# Patient Record
Sex: MALE | Race: WHITE | NOT HISPANIC OR LATINO | Employment: OTHER | URBAN - METROPOLITAN AREA
[De-identification: names, ages, dates, MRNs, and addresses within clinical notes are randomized per-mention and may not be internally consistent; named-entity substitution may affect disease eponyms.]

---

## 2017-01-03 ENCOUNTER — LAB VISIT (OUTPATIENT)
Dept: LAB | Facility: HOSPITAL | Age: 57
End: 2017-01-03
Attending: INTERNAL MEDICINE
Payer: MEDICARE

## 2017-01-03 DIAGNOSIS — Z95.811 HEART REPLACED BY HEART ASSIST DEVICE: ICD-10-CM

## 2017-01-03 DIAGNOSIS — R74.02 ELEVATED SERUM LACTATE DEHYDROGENASE (LDH): ICD-10-CM

## 2017-01-03 DIAGNOSIS — Z79.01 LONG TERM (CURRENT) USE OF ANTICOAGULANTS: ICD-10-CM

## 2017-01-03 LAB
INR PPP: 2.3
LDH SERPL L TO P-CCNC: 590 U/L
PROTHROMBIN TIME: 23.5 SEC

## 2017-01-03 PROCEDURE — 85610 PROTHROMBIN TIME: CPT

## 2017-01-03 PROCEDURE — 83615 LACTATE (LD) (LDH) ENZYME: CPT

## 2017-01-03 PROCEDURE — 36415 COLL VENOUS BLD VENIPUNCTURE: CPT

## 2017-01-04 ENCOUNTER — ANTI-COAG VISIT (OUTPATIENT)
Dept: CARDIOLOGY | Facility: CLINIC | Age: 57
End: 2017-01-04
Payer: MEDICAID

## 2017-01-04 DIAGNOSIS — Z95.811 PRESENCE OF HEART ASSIST DEVICE: ICD-10-CM

## 2017-01-04 PROCEDURE — 99999 PR PBB SHADOW E&M-EST. PATIENT-LVL I: CPT | Mod: PBBFAC,,, | Performed by: PHARMACIST

## 2017-01-04 PROCEDURE — 99211 OFF/OP EST MAY X REQ PHY/QHP: CPT | Mod: PBBFAC | Performed by: PHARMACIST

## 2017-01-04 NOTE — INTERVAL H&P NOTE
The patient has been examined and the H&P has been reviewed:    I concur with the findings and changes have been noted since the H&P was written: Patient is being admitted for elevated LDH     55 y/o man with history as below admitted for elevated LDH. Will order repeat LDH and monitor overnight. Also ordered CBC, CMP, INR, Mag. Last Admit it was believed his elevated LDH may be due to HTN. Patient was discharged with , yesterday was 590, INR 2.3. Historical baseline 200-300. Speed echo done on 12/30.    Elevated LDH/ S/P HM II  - VAD order set, Speed 9000, LSL 8600  - ECHO today  - Continue ASA, continue coumadin  - Consider Persantine 75 TID  - OHT eval orders last admit    HTN  - Will assess MAP on admission  - lisinopril 20 BID at home  - goal MAP 60-80    DM  - SS insulin    Juany Benjamin PA-C  [employer] #[id number]

## 2017-01-04 NOTE — H&P (VIEW-ONLY)
Subjective:   Patient ID:  Mick Barriga is a 56 y.o. male who presents for LVAD followup visit.    Implant date: 8/25/16    TXP RODRI INTERROGATIONS 12/28/2016   Type HeartMate II   Flow 4.8   Speed 9000   PI 7.4   Power (Kwon) 5.2   LSL 8600   Pulsatility Pulse       HPI  57 yo man with CHFrEF, stage D underwent HM 2 LVAD placement on 8/24/16.  s/p ICD, HTN, DM presented originally to [facility] with atrial flutter and decompensated HF with cardiogenic shock. He remains weak and not very active but is walking a few blocks almost daily.  Profound fatigue and generalized weakness are limiting factors more than MARTINO.  No orthopnea, PND.    Review of Systems   Constitution: Positive for weakness, malaise/fatigue and weight gain (2-3# past month). Negative for chills, fever, night sweats (but perspires easily) and weight loss.   HENT: Positive for headaches (intermittently). Negative for congestion, hearing loss, hoarse voice, nosebleeds and sore throat.    Eyes: Positive for visual disturbance (hard seeing distances). Negative for double vision, pain, vision loss in left eye and vision loss in right eye.   Cardiovascular: Negative for chest pain, claudication, dyspnea on exertion, irregular heartbeat, leg swelling, near-syncope, orthopnea, palpitations, paroxysmal nocturnal dyspnea and syncope.   Respiratory: Negative for cough, hemoptysis, shortness of breath, sleep disturbances due to breathing, snoring, sputum production and wheezing.    Endocrine: Negative for cold intolerance, heat intolerance, polydipsia and polyuria.   Hematologic/Lymphatic: Negative for bleeding problem. Does not bruise/bleed easily.   Musculoskeletal: Positive for muscle cramps (occassional) and neck pain (soreness). Negative for falls and myalgias.   Gastrointestinal: Positive for constipation. Negative for bloating, abdominal pain, change in bowel habit, diarrhea, hematochezia, jaundice, melena and nausea.   Genitourinary: Positive for nocturia  "(2x/night). Negative for bladder incontinence, dysuria, frequency, hematuria, hesitancy, incomplete emptying and urgency.   Neurological: Positive for numbness (back of head and went to ED last week with negative CT). Negative for brief paralysis, dizziness, focal weakness, paresthesias and seizures.   Psychiatric/Behavioral: Negative for depression and memory loss. The patient has insomnia (sleeps 5-6 hours/night). The patient is not nervous/anxious.      Objective:   Blood pressure 94/71, pulse 77, height 5' 8" (1.727 m), weight 94.8 kg (208 lb 15.9 oz).body mass index is 31.78 kg/(m^2).  Doppler: 82 MM hG  Physical Exam   Constitutional: He appears well-developed and well-nourished. No distress.   BP 94/71 MAP 79 Pulse 77  Ht 5' 8" (1.727 m)  Wt 94.8 kg (208 lb 15.9 oz)  BMI 31.78 kg/m2   HENT:   Head: Normocephalic and atraumatic.   Eyes: Conjunctivae are normal. No scleral icterus.   Neck: No JVD present. No tracheal deviation present. No thyromegaly present.   Cardiovascular:   DL 1-2 with scab  Normal VAD sounds   Pulmonary/Chest: Effort normal and breath sounds normal. No respiratory distress. He has no wheezes. He has no rales.   Abdominal: Soft. Bowel sounds are normal. He exhibits no distension and no mass. There is no tenderness. There is no rebound and no guarding.   Musculoskeletal: He exhibits no edema or tenderness.   Neurological: He is alert.   Skin: Skin is warm and dry. He is not diaphoretic.   Psychiatric: He has a normal mood and affect. His behavior is normal. Judgment and thought content normal.     Lab Results   Component Value Date     (H) 12/28/2016     (L) 12/28/2016    K 4.4 12/28/2016    MG 1.7 12/28/2016     12/28/2016    CO2 25 12/28/2016    BUN 13 12/28/2016    CREATININE 0.9 12/28/2016     (H) 12/28/2016    HGBA1C 8.3 (H) 08/23/2016    AST 29 12/28/2016    ALT 22 12/28/2016    ALBUMIN 3.5 12/28/2016    PROT 7.8 12/28/2016    BILITOT 0.7 12/28/2016    WBC " 9.12 12/28/2016    HGB 10.8 (L) 12/28/2016    HCT 35.0 (L) 12/28/2016    HCT 24 (L) 08/26/2016     12/15/2016    INR 3.7 (H) 12/28/2016    INR 4.4 10/10/2016     (H) 12/28/2016    TSH 1.129 12/28/2016    CHOL 123 11/02/2016    HDL 37 (L) 11/02/2016    LDLCALC 59.8 (L) 11/02/2016    TRIG 131 11/02/2016       REPEAT LDH STAT returned 578    Assessment:      1. Heart replaced by heart assist device    2. Elevated serum lactate dehydrogenase (LDH)    3. Chronic anticoagulation    4. Chronic combined systolic and diastolic heart failure    5. Essential hypertension    6. Cardiomyopathy        Plan:   Repeat LDH and wait for resultsRETURNED 578 SO DOUBLED SINCE LAST MONTH 236--HIS LOWEST.  HE WILL BE ADMITTED FOR ASSESSMENT AND TREATMENT OF LDH    Increase walking program to 45-60 min/day adding 5 min every week until there    Patient is now NYHA II  Recommend 2 gram sodium restriction and 1500cc fluid restriction.  Encourage physical activity with graded exercise program.  Requested patient to weigh themselves daily, and to notify us if their weight increases by more than 3 lbs in 1 day or 5 lbs in 1 week.     Listed for transplant: No    UNOS Patient Status  Functional Status: 80% - Normal activity with effort: some symptoms of disease  Physical Capacity: No Limitations  Working for Income: No  If no, reason not working: Disability

## 2017-01-05 ENCOUNTER — TELEPHONE (OUTPATIENT)
Dept: INTENSIVE CARE | Facility: HOSPITAL | Age: 57
End: 2017-01-05

## 2017-01-05 ENCOUNTER — HOSPITAL ENCOUNTER (INPATIENT)
Facility: HOSPITAL | Age: 57
LOS: 17 days | Discharge: HOME | DRG: 948 | End: 2017-01-22
Attending: INTERNAL MEDICINE | Admitting: INTERNAL MEDICINE
Payer: MEDICARE

## 2017-01-05 DIAGNOSIS — I42.0 CARDIOMYOPATHY, DILATED, NONISCHEMIC: ICD-10-CM

## 2017-01-05 DIAGNOSIS — Z95.811 HEART REPLACED BY HEART ASSIST DEVICE: ICD-10-CM

## 2017-01-05 DIAGNOSIS — I42.9 CARDIOMYOPATHY, UNSPECIFIED TYPE: ICD-10-CM

## 2017-01-05 DIAGNOSIS — Z95.810 PRESENCE OF AUTOMATIC IMPLANTABLE CARDIOVERTER-DEFIBRILLATOR: ICD-10-CM

## 2017-01-05 DIAGNOSIS — I42.9 CARDIOMYOPATHY: ICD-10-CM

## 2017-01-05 DIAGNOSIS — Z79.01 CHRONIC ANTICOAGULATION: ICD-10-CM

## 2017-01-05 DIAGNOSIS — E87.1 HYPONATREMIA: ICD-10-CM

## 2017-01-05 DIAGNOSIS — Z95.811 LVAD (LEFT VENTRICULAR ASSIST DEVICE) PRESENT: Primary | ICD-10-CM

## 2017-01-05 DIAGNOSIS — I10 ESSENTIAL HYPERTENSION: Chronic | ICD-10-CM

## 2017-01-05 DIAGNOSIS — R74.02 ELEVATED SERUM LACTATE DEHYDROGENASE (LDH): ICD-10-CM

## 2017-01-05 DIAGNOSIS — Z76.82 ORGAN TRANSPLANT CANDIDATE: ICD-10-CM

## 2017-01-05 DIAGNOSIS — Z95.9 PRESENCE OF CARDIAC AND VASCULAR IMPLANT AND GRAFT: ICD-10-CM

## 2017-01-05 LAB
ALBUMIN SERPL BCP-MCNC: 3.4 G/DL
ALP SERPL-CCNC: 83 U/L
ALT SERPL W/O P-5'-P-CCNC: 18 U/L
ANION GAP SERPL CALC-SCNC: 10 MMOL/L
ANION GAP SERPL CALC-SCNC: 10 MMOL/L
ANISOCYTOSIS BLD QL SMEAR: SLIGHT
AORTIC VALVE REGURGITATION: NORMAL
APTT BLDCRRT: 31.5 SEC
AST SERPL-CCNC: 28 U/L
BASOPHILS # BLD AUTO: 0.02 K/UL
BASOPHILS NFR BLD: 0.2 %
BILIRUB SERPL-MCNC: 0.7 MG/DL
BNP SERPL-MCNC: 74 PG/ML
BUN SERPL-MCNC: 13 MG/DL
BUN SERPL-MCNC: 13 MG/DL
CALCIUM SERPL-MCNC: 9.4 MG/DL
CALCIUM SERPL-MCNC: 9.6 MG/DL
CHLORIDE SERPL-SCNC: 96 MMOL/L
CHLORIDE SERPL-SCNC: 96 MMOL/L
CO2 SERPL-SCNC: 26 MMOL/L
CO2 SERPL-SCNC: 27 MMOL/L
CREAT SERPL-MCNC: 1 MG/DL
CREAT SERPL-MCNC: 1 MG/DL
DIFFERENTIAL METHOD: ABNORMAL
EOSINOPHIL # BLD AUTO: 0.3 K/UL
EOSINOPHIL NFR BLD: 2.8 %
ERYTHROCYTE [DISTWIDTH] IN BLOOD BY AUTOMATED COUNT: 20.3 %
EST. GFR  (AFRICAN AMERICAN): >60 ML/MIN/1.73 M^2
EST. GFR  (AFRICAN AMERICAN): >60 ML/MIN/1.73 M^2
EST. GFR  (NON AFRICAN AMERICAN): >60 ML/MIN/1.73 M^2
EST. GFR  (NON AFRICAN AMERICAN): >60 ML/MIN/1.73 M^2
GLUCOSE SERPL-MCNC: 192 MG/DL
GLUCOSE SERPL-MCNC: 197 MG/DL
HCT VFR BLD AUTO: 36.1 %
HGB BLD-MCNC: 11.3 G/DL
HYPOCHROMIA BLD QL SMEAR: ABNORMAL
INR PPP: 2
LDH SERPL L TO P-CCNC: 558 U/L
LYMPHOCYTES # BLD AUTO: 2.5 K/UL
LYMPHOCYTES NFR BLD: 26.4 %
MAGNESIUM SERPL-MCNC: 1.8 MG/DL
MCH RBC QN AUTO: 18 PG
MCHC RBC AUTO-ENTMCNC: 31.3 %
MCV RBC AUTO: 57 FL
MONOCYTES # BLD AUTO: 0.6 K/UL
MONOCYTES NFR BLD: 6.8 %
NEUTROPHILS # BLD AUTO: 5.9 K/UL
NEUTROPHILS NFR BLD: 63.1 %
OVALOCYTES BLD QL SMEAR: ABNORMAL
PHOSPHATE SERPL-MCNC: 4.2 MG/DL
PLATELET # BLD AUTO: 247 K/UL
PLATELET BLD QL SMEAR: ABNORMAL
PMV BLD AUTO: ABNORMAL FL
POCT GLUCOSE: 159 MG/DL (ref 70–110)
POCT GLUCOSE: 172 MG/DL (ref 70–110)
POCT GLUCOSE: 193 MG/DL (ref 70–110)
POIKILOCYTOSIS BLD QL SMEAR: SLIGHT
POLYCHROMASIA BLD QL SMEAR: ABNORMAL
POTASSIUM SERPL-SCNC: 4.6 MMOL/L
POTASSIUM SERPL-SCNC: 4.7 MMOL/L
PROT SERPL-MCNC: 7.5 G/DL
PROTHROMBIN TIME: 19.7 SEC
RBC # BLD AUTO: 6.29 M/UL
SODIUM SERPL-SCNC: 132 MMOL/L
SODIUM SERPL-SCNC: 133 MMOL/L
TARGETS BLD QL SMEAR: ABNORMAL
WBC # BLD AUTO: 9.41 K/UL

## 2017-01-05 PROCEDURE — 83615 LACTATE (LD) (LDH) ENZYME: CPT

## 2017-01-05 PROCEDURE — 80048 BASIC METABOLIC PNL TOTAL CA: CPT

## 2017-01-05 PROCEDURE — 20600001 HC STEP DOWN PRIVATE ROOM

## 2017-01-05 PROCEDURE — 25000003 PHARM REV CODE 250: Performed by: INTERNAL MEDICINE

## 2017-01-05 PROCEDURE — 85025 COMPLETE CBC W/AUTO DIFF WBC: CPT

## 2017-01-05 PROCEDURE — 99223 1ST HOSP IP/OBS HIGH 75: CPT | Mod: ,,, | Performed by: INTERNAL MEDICINE

## 2017-01-05 PROCEDURE — 83735 ASSAY OF MAGNESIUM: CPT

## 2017-01-05 PROCEDURE — 93283 PRGRMG EVAL IMPLANTABLE DFB: CPT

## 2017-01-05 PROCEDURE — 93283 PRGRMG EVAL IMPLANTABLE DFB: CPT | Mod: 26,,, | Performed by: INTERNAL MEDICINE

## 2017-01-05 PROCEDURE — 80053 COMPREHEN METABOLIC PANEL: CPT

## 2017-01-05 PROCEDURE — 83880 ASSAY OF NATRIURETIC PEPTIDE: CPT

## 2017-01-05 PROCEDURE — 93306 TTE W/DOPPLER COMPLETE: CPT | Mod: 26,,, | Performed by: INTERNAL MEDICINE

## 2017-01-05 PROCEDURE — 27000248 HC VAD-ADDITIONAL DAY

## 2017-01-05 PROCEDURE — 63600175 PHARM REV CODE 636 W HCPCS: Performed by: PHYSICIAN ASSISTANT

## 2017-01-05 PROCEDURE — 93750 INTERROGATION VAD IN PERSON: CPT | Performed by: PHYSICIAN ASSISTANT

## 2017-01-05 PROCEDURE — 85610 PROTHROMBIN TIME: CPT

## 2017-01-05 PROCEDURE — 93306 TTE W/DOPPLER COMPLETE: CPT

## 2017-01-05 PROCEDURE — 85730 THROMBOPLASTIN TIME PARTIAL: CPT

## 2017-01-05 PROCEDURE — 84100 ASSAY OF PHOSPHORUS: CPT

## 2017-01-05 PROCEDURE — 85002 BLEEDING TIME TEST: CPT

## 2017-01-05 PROCEDURE — 93750 INTERROGATION VAD IN PERSON: CPT | Mod: ,,, | Performed by: INTERNAL MEDICINE

## 2017-01-05 PROCEDURE — 36415 COLL VENOUS BLD VENIPUNCTURE: CPT

## 2017-01-05 PROCEDURE — 25000003 PHARM REV CODE 250: Performed by: PHYSICIAN ASSISTANT

## 2017-01-05 RX ORDER — LANOLIN ALCOHOL/MO/W.PET/CERES
400 CREAM (GRAM) TOPICAL 3 TIMES DAILY
Status: DISCONTINUED | OUTPATIENT
Start: 2017-01-05 | End: 2017-01-22 | Stop reason: HOSPADM

## 2017-01-05 RX ORDER — ASPIRIN 325 MG
325 TABLET, DELAYED RELEASE (ENTERIC COATED) ORAL DAILY
Status: DISCONTINUED | OUTPATIENT
Start: 2017-01-05 | End: 2017-01-22 | Stop reason: HOSPADM

## 2017-01-05 RX ORDER — SERTRALINE HYDROCHLORIDE 100 MG/1
100 TABLET, FILM COATED ORAL DAILY
Status: DISCONTINUED | OUTPATIENT
Start: 2017-01-05 | End: 2017-01-10

## 2017-01-05 RX ORDER — IMIPRAMINE HYDROCHLORIDE 25 MG/1
25 TABLET, FILM COATED ORAL NIGHTLY
Status: DISCONTINUED | OUTPATIENT
Start: 2017-01-05 | End: 2017-01-16

## 2017-01-05 RX ORDER — INSULIN ASPART 100 [IU]/ML
0-5 INJECTION, SOLUTION INTRAVENOUS; SUBCUTANEOUS
Status: DISCONTINUED | OUTPATIENT
Start: 2017-01-05 | End: 2017-01-22 | Stop reason: HOSPADM

## 2017-01-05 RX ORDER — WARFARIN 2 MG/1
2 TABLET ORAL DAILY
Status: DISCONTINUED | OUTPATIENT
Start: 2017-01-05 | End: 2017-01-05

## 2017-01-05 RX ORDER — DIPYRIDAMOLE 75 MG
75 TABLET ORAL 3 TIMES DAILY
Status: DISCONTINUED | OUTPATIENT
Start: 2017-01-05 | End: 2017-01-08

## 2017-01-05 RX ORDER — LISINOPRIL 20 MG/1
20 TABLET ORAL 2 TIMES DAILY
Status: DISCONTINUED | OUTPATIENT
Start: 2017-01-05 | End: 2017-01-22 | Stop reason: HOSPADM

## 2017-01-05 RX ORDER — RAMELTEON 8 MG/1
8 TABLET ORAL NIGHTLY PRN
Status: DISCONTINUED | OUTPATIENT
Start: 2017-01-05 | End: 2017-01-22 | Stop reason: HOSPADM

## 2017-01-05 RX ORDER — GLUCAGON 1 MG
1 KIT INJECTION
Status: DISCONTINUED | OUTPATIENT
Start: 2017-01-05 | End: 2017-01-22 | Stop reason: HOSPADM

## 2017-01-05 RX ORDER — WARFARIN 3 MG/1
3 TABLET ORAL DAILY
Status: DISCONTINUED | OUTPATIENT
Start: 2017-01-05 | End: 2017-01-08

## 2017-01-05 RX ORDER — INSULIN ASPART 100 [IU]/ML
20 INJECTION, SOLUTION INTRAVENOUS; SUBCUTANEOUS
Status: DISCONTINUED | OUTPATIENT
Start: 2017-01-05 | End: 2017-01-16

## 2017-01-05 RX ORDER — HYDRALAZINE HYDROCHLORIDE 25 MG/1
25 TABLET, FILM COATED ORAL ONCE
Status: DISCONTINUED | OUTPATIENT
Start: 2017-01-06 | End: 2017-01-06

## 2017-01-05 RX ORDER — IBUPROFEN 200 MG
16 TABLET ORAL
Status: DISCONTINUED | OUTPATIENT
Start: 2017-01-05 | End: 2017-01-22 | Stop reason: HOSPADM

## 2017-01-05 RX ORDER — SPIRONOLACTONE 25 MG/1
25 TABLET ORAL DAILY
Status: DISCONTINUED | OUTPATIENT
Start: 2017-01-05 | End: 2017-01-13

## 2017-01-05 RX ORDER — IBUPROFEN 200 MG
24 TABLET ORAL
Status: DISCONTINUED | OUTPATIENT
Start: 2017-01-05 | End: 2017-01-22 | Stop reason: HOSPADM

## 2017-01-05 RX ORDER — PANTOPRAZOLE SODIUM 40 MG/1
40 TABLET, DELAYED RELEASE ORAL DAILY
Status: DISCONTINUED | OUTPATIENT
Start: 2017-01-05 | End: 2017-01-22 | Stop reason: HOSPADM

## 2017-01-05 RX ORDER — DOCUSATE SODIUM 100 MG/1
200 CAPSULE, LIQUID FILLED ORAL NIGHTLY
Status: DISCONTINUED | OUTPATIENT
Start: 2017-01-05 | End: 2017-01-22 | Stop reason: HOSPADM

## 2017-01-05 RX ORDER — CYCLOBENZAPRINE HCL 5 MG
5 TABLET ORAL DAILY PRN
Status: DISCONTINUED | OUTPATIENT
Start: 2017-01-05 | End: 2017-01-22 | Stop reason: HOSPADM

## 2017-01-05 RX ORDER — TAMSULOSIN HYDROCHLORIDE 0.4 MG/1
0.4 CAPSULE ORAL DAILY
Status: DISCONTINUED | OUTPATIENT
Start: 2017-01-05 | End: 2017-01-17

## 2017-01-05 RX ORDER — AMIODARONE HYDROCHLORIDE 200 MG/1
400 TABLET ORAL DAILY
Status: DISCONTINUED | OUTPATIENT
Start: 2017-01-05 | End: 2017-01-11

## 2017-01-05 RX ORDER — AMOXICILLIN 250 MG
1 CAPSULE ORAL DAILY PRN
Status: DISCONTINUED | OUTPATIENT
Start: 2017-01-05 | End: 2017-01-22 | Stop reason: HOSPADM

## 2017-01-05 RX ORDER — FERROUS GLUCONATE 324(38)MG
324 TABLET ORAL
Status: DISCONTINUED | OUTPATIENT
Start: 2017-01-05 | End: 2017-01-22 | Stop reason: HOSPADM

## 2017-01-05 RX ADMIN — SPIRONOLACTONE 25 MG: 25 TABLET, FILM COATED ORAL at 08:01

## 2017-01-05 RX ADMIN — DOCUSATE SODIUM 200 MG: 100 CAPSULE, LIQUID FILLED ORAL at 09:01

## 2017-01-05 RX ADMIN — ASPIRIN 325 MG: 325 TABLET, DELAYED RELEASE ORAL at 08:01

## 2017-01-05 RX ADMIN — Medication 400 MG: at 01:01

## 2017-01-05 RX ADMIN — Medication 400 MG: at 09:01

## 2017-01-05 RX ADMIN — AMIODARONE HYDROCHLORIDE 400 MG: 200 TABLET ORAL at 08:01

## 2017-01-05 RX ADMIN — LISINOPRIL 20 MG: 20 TABLET ORAL at 09:01

## 2017-01-05 RX ADMIN — INSULIN ASPART 20 UNITS: 100 INJECTION, SOLUTION INTRAVENOUS; SUBCUTANEOUS at 01:01

## 2017-01-05 RX ADMIN — INSULIN ASPART 20 UNITS: 100 INJECTION, SOLUTION INTRAVENOUS; SUBCUTANEOUS at 07:01

## 2017-01-05 RX ADMIN — MAGNESIUM SULFATE HEPTAHYDRATE 1 G: 500 INJECTION, SOLUTION INTRAMUSCULAR; INTRAVENOUS at 10:01

## 2017-01-05 RX ADMIN — INSULIN ASPART 20 UNITS: 100 INJECTION, SOLUTION INTRAVENOUS; SUBCUTANEOUS at 08:01

## 2017-01-05 RX ADMIN — WARFARIN SODIUM 3 MG: 3 TABLET ORAL at 05:01

## 2017-01-05 RX ADMIN — LISINOPRIL 20 MG: 20 TABLET ORAL at 08:01

## 2017-01-05 RX ADMIN — PANTOPRAZOLE SODIUM 40 MG: 40 TABLET, DELAYED RELEASE ORAL at 08:01

## 2017-01-05 RX ADMIN — DIPYRIDAMOLE 75 MG: 75 TABLET, FILM COATED ORAL at 09:01

## 2017-01-05 RX ADMIN — DIPYRIDAMOLE 75 MG: 75 TABLET, FILM COATED ORAL at 01:01

## 2017-01-05 RX ADMIN — FERROUS GLUCONATE TAB 324 MG (37.5 MG ELEMENTAL IRON) 324 MG: 324 (37.5 FE) TAB at 08:01

## 2017-01-05 RX ADMIN — TAMSULOSIN HYDROCHLORIDE 0.4 MG: 0.4 CAPSULE ORAL at 08:01

## 2017-01-05 RX ADMIN — SERTRALINE HYDROCHLORIDE 100 MG: 100 TABLET ORAL at 08:01

## 2017-01-05 RX ADMIN — IMIPRAMINE HYDROCHLORIDE 25 MG: 25 TABLET, FILM COATED ORAL at 09:01

## 2017-01-05 RX ADMIN — Medication 400 MG: at 05:01

## 2017-01-05 NOTE — SIGNIFICANT EVENT
Patient arrived on unit. Seen and examined. Doing well with no complaints. Agree with Juany Benjamin's note. Will hold of on starting Persantine until assessment by day team.    Signed:    Robby Lim MD  Cardiology Fellow, PGY-5  Pager: [phone]  1/5/2017 2:02 AM

## 2017-01-05 NOTE — PROCEDURES
TXP LVAD INTERROGATIONS 1/5/2017 1/5/2017 1/5/2017 1/5/2017 12/31/2016 12/31/2016 12/31/2016   Type (No Data) HeartMate II HeartMate II HeartMate II HeartMate II HeartMate II HeartMate II   Flow - 4.4 4.9 4.7 4.9 5.0 5.2   Speed - 9000 9000 9000 9000 9000 9000   PI - 7.1 6.9 7.1 7.5 6.8 6.6   Power (Kwon) - 4.9 5.4 5.1 5.2 5.2 5.3   LSL - - - - - - 8600   Pulsatility - - - - - - Pulse     VAD sounds smooth  Non pulsatile  No alarms noted

## 2017-01-05 NOTE — TELEPHONE ENCOUNTER
Notified pt of LDH level from last night and MDs would like to recheck this am.  Set him up for lab at 9 am on [facility] and explained to pt.  He verbalized he would go.      1300:  Called pt to check on him seeing that LDH has not been done.  Pt reports he fell asleep.  Dr. Gupta and Dr. Romero re-discussed his labs and asked for him to be admitted.   Explained this to pt and asked him to come to hospital for admit.  Pt verbalized understanding and agreement.

## 2017-01-05 NOTE — IP AVS SNAPSHOT
[facility]  [address]  [facility] [address]  Phone: [phone]           Patient Discharge Instructions     Our goal is to set you up for success. This packet includes information on your condition, medications, and your home care. It will help you to care for yourself so you don't get sicker and need to go back to the hospital.     Please ask your nurse if you have any questions.        There are many details to remember when preparing to leave the hospital. Here is what you will need to do:    1. Take your medicine. If you are prescribed medications, review your Medication List in the following pages. You may have new medications to  at the pharmacy and others that you'll need to stop taking. Review the instructions for how and when to take your medications. Talk with your doctor or nurses if you are unsure of what to do.     2. Go to your follow-up appointments. Specific follow-up information is listed in the following pages. Your may be contacted by a transition nurse or clinical provider about future appointments. Be sure we have all of the phone numbers to reach you, if needed. Please contact your provider's office if you are unable to make an appointment.     3. Watch for warning signs. Your doctor or nurse will give you detailed warning signs to watch for and when to call for assistance. These instructions may also include educational information about your condition. If you experience any of warning signs to your health, call your doctor.               [facility]  Unless otherwise directed by your provider, please contact [facility], our nurse care line that is available for 24/7 assistance.     [phone] (toll-free)    Registered nurses in the [facility] provide clinical advisement, health education, appointment booking, and other advisory services.                    ** Verify the list of medication(s) below is accurate and up  to date. Carry this with you in case of emergency. If your medications have changed, please notify your healthcare provider.             Medication List      START taking these medications        Additional Info                      amlodipine 10 MG tablet   Commonly known as:  NORVASC   Quantity:  30 tablet   Refills:  11   Dose:  10 mg    Last time this was given:  10 mg on 1/22/2017  8:47 AM   Instructions:  Take 1 tablet (10 mg total) by mouth once daily.     Begin Date    AM    Noon    PM    Bedtime       dipyridamole 50 MG tablet   Commonly known as:  PERSANTINE   Quantity:  180 tablet   Refills:  11   Dose:  100 mg    Last time this was given:  100 mg on 1/22/2017  6:08 AM   Instructions:  Take 2 tablets (100 mg total) by mouth 3 (three) times daily.     Begin Date    AM    Noon    PM    Bedtime       dutasteride 0.5 mg capsule   Commonly known as:  AVODART   Quantity:  30 capsule   Refills:  11   Dose:  0.5 mg    Last time this was given:  0.5 mg on 1/22/2017  8:47 AM   Instructions:  Take 1 capsule (0.5 mg total) by mouth once daily.     Begin Date    AM    Noon    PM    Bedtime       hydrALAZINE 10 MG tablet   Commonly known as:  APRESOLINE   Quantity:  90 tablet   Refills:  11   Dose:  10 mg    Last time this was given:  10 mg on 1/22/2017  6:08 AM   Instructions:  Take 1 tablet (10 mg total) by mouth every 8 (eight) hours.     Begin Date    AM    Noon    PM    Bedtime       isosorbide dinitrate 10 MG tablet   Commonly known as:  ISORDIL   Quantity:  90 tablet   Refills:  11   Dose:  10 mg    Last time this was given:  10 mg on 1/22/2017  6:08 AM   Instructions:  Take 1 tablet (10 mg total) by mouth 3 (three) times daily.     Begin Date    AM    Noon    PM    Bedtime         CONTINUE taking these medications        Additional Info                      amiodarone 400 MG tablet   Commonly known as:  PACERONE   Quantity:  90 tablet   Refills:  3   Dose:  400 mg    Last time this was given:  400 mg on  1/22/2017  8:47 AM   Instructions:  Take 1 tablet (400 mg total) by mouth once daily.     Begin Date    AM    Noon    PM    Bedtime       aspirin 325 MG EC tablet   Commonly known as:  ECOTRIN   Quantity:  90 tablet   Refills:  3   Dose:  325 mg    Last time this was given:  325 mg on 1/22/2017  8:47 AM   Instructions:  Take 1 tablet (325 mg total) by mouth once daily.     Begin Date    AM    Noon    PM    Bedtime       cyclobenzaprine 5 MG tablet   Commonly known as:  FLEXERIL   Refills:  0   Dose:  5 mg    Instructions:  Take 5 mg by mouth daily as needed.     Begin Date    AM    Noon    PM    Bedtime       docusate sodium 100 MG capsule   Commonly known as:  COLACE   Quantity:  180 capsule   Refills:  3   Dose:  180 mg    Last time this was given:  200 mg on 1/21/2017  8:50 PM   Instructions:  Take 2 capsules (200 mg total) by mouth every evening.     Begin Date    AM    Noon    PM    Bedtime       ergocalciferol 50,000 unit Cap   Commonly known as:  ERGOCALCIFEROL   Refills:  3    Instructions:  TK 1 C PO TWICE PER WEEK     Begin Date    AM    Noon    PM    Bedtime       esomeprazole 40 MG capsule   Commonly known as:  NEXIUM   Quantity:  90 capsule   Refills:  3   Dose:  40 mg    Instructions:  Take 1 capsule (40 mg total) by mouth before breakfast.     Begin Date    AM    Noon    PM    Bedtime       ferrous gluconate 324 MG tablet   Commonly known as:  FERGON   Quantity:  90 tablet   Refills:  3   Dose:  324 mg    Last time this was given:  324 mg on 1/22/2017  8:47 AM   Instructions:  Take 1 tablet (324 mg total) by mouth daily with breakfast.     Begin Date    AM    Noon    PM    Bedtime       insulin aspart 100 unit/mL Inpn pen   Commonly known as:  NOVOLOG FLEXPEN   Refills:  0   Dose:  20 Units    Last time this was given:  3 Units on 1/22/2017  9:50 AM   Instructions:  Inject 20 Units into the skin 3 (three) times daily with meals.     Begin Date    AM    Noon    PM    Bedtime       insulin glargine 100  unit/mL (3 mL) Inpn pen   Commonly known as:  LANROXIE HOOKSOSTAR   Quantity:  14.4 mL   Refills:  3   Dose:  16 Units    Instructions:  Inject 16 Units into the skin once daily.     Begin Date    AM    Noon    PM    Bedtime       lisinopril 20 MG tablet   Commonly known as:  PRINIVIL,ZESTRIL   Quantity:  180 tablet   Refills:  3   Dose:  20 mg    Last time this was given:  20 mg on 1/22/2017  8:47 AM   Instructions:  Take 1 tablet (20 mg total) by mouth 2 (two) times daily.     Begin Date    AM    Noon    PM    Bedtime       magnesium oxide 400 mg tablet   Commonly known as:  MAG-OX   Quantity:  270 tablet   Refills:  3   Dose:  400 mg    Last time this was given:  400 mg on 1/22/2017  6:08 AM   Instructions:  Take 1 tablet (400 mg total) by mouth 3 (three) times daily.     Begin Date    AM    Noon    PM    Bedtime       oxycodone 10 mg Tab immediate release tablet   Commonly known as:  ROXICODONE   Quantity:  120 tablet   Refills:  0   Dose:  10 mg    Last time this was given:  10 mg on 1/22/2017  8:50 AM   Instructions:  Take 1 tablet (10 mg total) by mouth every 6 (six) hours as needed.     Begin Date    AM    Noon    PM    Bedtime       senna-docusate 8.6-50 mg 8.6-50 mg per tablet   Commonly known as:  PERICOLACE   Quantity:  90 tablet   Refills:  3   Dose:  1 tablet    Last time this was given:  1 tablet on 1/22/2017  6:08 AM   Instructions:  Take 1 tablet by mouth daily as needed for Constipation.     Begin Date    AM    Noon    PM    Bedtime       warfarin 1 MG tablet   Commonly known as:  COUMADIN   Quantity:  70 tablet   Refills:  11    Last time this was given:  1.5 mg on 1/21/2017  5:10 PM   Instructions:  Take 1-2.5mg daily as directed by coumadin clinic; #70pills = 1 month supply     Begin Date    AM    Noon    PM    Bedtime         STOP taking these medications     imipramine 25 MG tablet   Commonly known as:  TOFRANIL       ramelteon 8 mg tablet   Commonly known as:  ROZEREM       sertraline 100 MG  tablet   Commonly known as:  ZOLOFT       sildenafil 100 MG tablet   Commonly known as:  VIAGRA       spironolactone 25 MG tablet   Commonly known as:  ALDACTONE       tamsulosin 0.4 mg Cp24   Commonly known as:  FLOMAX            Where to Get Your Medications      These medications were sent to [facility] - [place], [place] - [address] AT [facility] [address]  [address], [place] [address]    Hours:  24-hours Phone:  [phone]     amlodipine 10 MG tablet    dipyridamole 50 MG tablet    dutasteride 0.5 mg capsule    hydrALAZINE 10 MG tablet    isosorbide dinitrate 10 MG tablet                  Please bring to all follow up appointments:    1. A copy of your discharge instructions.  2. All medicines you are currently taking in their original bottles.  3. Identification and insurance card.    Please arrive 15 minutes ahead of scheduled appointment time.    Please call 24 hours in advance if you must reschedule your appointment and/or time.        Your Scheduled Appointments     Jan 26, 2017 12:35 PM CST   Non-Fasting Lab with LAB, APPOINTMENT [place] [facility] ([facility])    [address] [facility] [address]   [phone]            Jan 26, 2017  1:30 PM CST   Nurse Visit with HEARTTRANSPLANT, LVADN   [facility] ([facility] )    [address]  [place] [address]   [phone]            Jan 26, 2017  3:00 PM CST   VAD with Peewee Romero MD   [facility] ([facility] )    [address]  [place] [address]   [phone]            Jun 13, 2017 10:15 AM CDT   Established Patient Visit with MAURA Chatterjee MD   [facility] ([facility])    [address] [place] [address] [address]  [facility] [address] [phone]              Follow-up Information     Follow up with HEART, FAILURE CLINIC In 1 week.    Specialty:  Transplant        Follow up with [facility] - Infectious Diseases  In 2 weeks.    Specialty:  Infectious Diseases    Contact information:    [address]  [facility] [address] [phone]    Additional information:    1st Floor - Atrium        Discharge Instructions     Future Orders    CBC W/ AUTO DIFFERENTIAL     Process Instructions:    Please collect a Lavender, EDTA tube or EDTA Microtainer.  If the patient is a known platelet clumper, please collect an additional citrate (blue top) tube.    LACTATE DEHYDROGENASE         Discharge Instructions         Colonoscopy     A camera attached to a flexible tube with a viewing lens is used to take video pictures.     Colonoscopy is used to view the inside of your lower digestive tract (colon and rectum). It can help screen for colon cancer and can help find the source of abdominal pain, bleeding, and changes in bowel habits. The test is usually done in the hospital on an outpatient basis. During the exam, the doctor can remove a small tissue sample ( a biopsy) for testing. Small growths, such as polyps, may also be removed during colonoscopy.  Getting ready   · Be sure to tell your doctor about any medications you take. Also tell your doctor about any health conditions you may have.  · Discuss the risks of the test with your doctor. These include bleeding and bowel puncture.  · Your rectum and colon must be empty for the test. So be sure to follow the diet and bowel prep instructions exactly. If you dont, the test may need to be rescheduled.  · Ask your doctor whether you need to have a friend or family member prepared to drive you home after the test.  During the test   · You are given sedating (relaxing) medication through an IV line. You may be drowsy or completely asleep.  · The procedure takes 30 minutes or longer.  · The doctor performs a digital rectal exam to check for anal and rectal problems. The rectum is lubricated and the scope inserted.  · If you are awake, you may have a feeling similar to needing to  have a bowel movement. You may also feel pressure as air is pumped into the colon. Its OK to pass gas during the procedure.  After the test   ·      Colonoscopy provides an inside view of the entire colon.     You may discuss the results with your doctor right away or at a future visit.  · Try to pass all the gas right after the test to help prevent bloating and cramping.  · After the test, you can go back to your normal eating and other activities.  Risks and possible complications include:  · Bleeding · A puncture or tear in the colon · Risks of anesthesia       © 0275-4677 [employer]. [address] [place] [address]. All rights reserved. This information is not intended as a substitute for professional medical care. Always follow your healthcare professional's instructions.            Primary Diagnosis     Your primary diagnosis was:  Elevated Serum Lactate Dehydrogenase (Ldh)      Admission Information     Date & Time Provider Department [facility]    1/5/2017 12:24 AM Peewee Romero MD [facility] [id number]      Care Providers     Provider Role Specialty Primary office phone    Peewee Romero MD Attending Provider Cardiology [phone]    Peewee Romero MD Consulting Physician  Cardiology [phone]    Trixie Marx MD Team Attending  Cardiology [phone]    Petr Almanzar MD Surgeon  Gastroenterology [phone]    Shital Mcclellan MD Consulting Physician  Infectious Diseases [phone]    Jon Deal MD Consulting Physician  Infectious Diseases [phone]    Katherine L. Baumgarten, MD Consulting Physician  Infectious Diseases [phone]    Aravind Gutierrez MD Consulting Physician  Infectious Diseases [phone]    Sapna Perez MD Consulting Physician  Infectious Diseases [phone]    Xavier Villegas MD Consulting Physician  Infectious Diseases [phone]    Yumiko Sheridan MD Consulting Physician  Infectious Diseases  "[phone]      Your Vitals Were     BP Pulse Temp Resp Height Weight    80/0 (BP Location: Right arm, Patient Position: Lying, BP Method: Doppler) 91 97.5 °F (36.4 °C) (Oral) 16 5' 8" (1.727 m) 87.8 kg (193 lb 9 oz)    SpO2 BMI             98% 29.43 kg/m2         Recent Lab Values        6/15/2015 8/20/2016 8/22/2016 8/23/2016 12/28/2016               5:29 AM 12:00 AM  2:50 PM  4:00 AM 10:41 AM       A1C 7.9 (H) 7.8 (H) 8.1 (H) 8.3 (H) 7.8 (H)       Comment for A1C at 12:00 AM on 8/20/2016:  According to ADA guidelines, hemoglobin A1C <7.0% represents  optimal control in non-pregnant diabetic patients.  Different  metrics may apply to specific populations.   Standards of Medical Care in Diabetes - 2016.  For the purpose of screening for the presence of diabetes:  <5.7%     Consistent with the absence of diabetes  5.7-6.4%  Consistent with increasing risk for diabetes   (prediabetes)  >or=6.5%  Consistent with diabetes  Currently no consensus exists for use of hemoglobin A1C  for diagnosis of diabetes for children.      Comment for A1C at  2:50 PM on 8/22/2016:  According to ADA guidelines, hemoglobin A1C <7.0% represents  optimal control in non-pregnant diabetic patients.  Different  metrics may apply to specific populations.   Standards of Medical Care in Diabetes - 2016.  For the purpose of screening for the presence of diabetes:  <5.7%     Consistent with the absence of diabetes  5.7-6.4%  Consistent with increasing risk for diabetes   (prediabetes)  >or=6.5%  Consistent with diabetes  Currently no consensus exists for use of hemoglobin A1C  for diagnosis of diabetes for children.      Comment for A1C at  4:00 AM on 8/23/2016:  According to ADA guidelines, hemoglobin A1C <7.0% represents  optimal control in non-pregnant diabetic patients.  Different  metrics may apply to specific populations.   Standards of Medical Care in Diabetes - 2016.  For the purpose of screening for the presence of diabetes:  <5.7%     " Consistent with the absence of diabetes  5.7-6.4%  Consistent with increasing risk for diabetes   (prediabetes)  >or=6.5%  Consistent with diabetes  Currently no consensus exists for use of hemoglobin A1C  for diagnosis of diabetes for children.      Comment for A1C at 10:41 AM on 12/28/2016:  According to ADA guidelines, hemoglobin A1C <7.0% represents  optimal control in non-pregnant diabetic patients.  Different  metrics may apply to specific populations.   Standards of Medical Care in Diabetes - 2016.  For the purpose of screening for the presence of diabetes:  <5.7%     Consistent with the absence of diabetes  5.7-6.4%  Consistent with increasing risk for diabetes   (prediabetes)  >or=6.5%  Consistent with diabetes  Currently no consensus exists for use of hemoglobin A1C  for diagnosis of diabetes for children.        Pending Labs     Order Current Status    Magnesium In process    Schistosoma antibody, IgG In process    Strongyloides IgG Antibodies In process    Toxacara antibody In process    Trichinella antibody In process    Blood culture Preliminary result    Blood culture Preliminary result      Allergies as of 1/22/2017        Reactions    Levemir [Insulin Detemir] Itching    Pork/porcine Containing Products     Ranexa [Ranolazine] Nausea Only      Advance Directives     An advance directive is a document which, in the event you are no longer able to make decisions for yourself, tells your healthcare team what kind of treatment you do or do not want to receive, or who you would like to make those decisions for you.  If you do not currently have an advance directive, [facility] encourages you to create one.  For more information call:  [phone]), [phone] ([phone]),  or log on to [email or web address].        Smoking Cessation     If you would like to quit smoking:   You may be eligible for free services if you are a [place] resident and started smoking cigarettes before  September 1, 1988.  Call the Smoking Cessation Trust (SCT) toll free at (436) 426-4645 or (620) 142-4389.   Call 1-800-QUIT-NOW if you do not meet the above criteria.            Language Assistance Services     ATTENTION: Language assistance services are available, free of charge. Please call [phone].      ATENCIÓN: Si habla diaz, tiene a cowart disposición servicios gratuitos de asistencia lingüística. Llame al [phone].     CHÚ Ý: N?u b?n nói Ti?ng Vi?t, có các d?ch v? h? tr? ngôn ng? mi?n phí dành cho b?n. G?i s? [phone].        Heart Failure Education       Heart Failure: Being Active  You have a condition called heart failure. Being active doesnt mean that you have to wear yourself out. Even a little movement each day helps to strengthen your heart. If you cant get out to exercise, you can do simple stretching and strengthening exercises at home. These are good ways to keep you well-conditioned and prevent you and your heart from becoming excessively weak.    Ideas to get you started  · Add a little movement to things you do now. Walk to mail letters. Park your car at the far end of the parking lot and walk to the store. Walk up a flight of stairs instead of taking the elevator.  · Choose activities you enjoy. You might walk, swim, or ride an exercise bike. Things like gardening and washing the car count, too. Other possibilities include: washing dishes, walking the dog, walking around the mall, and doing aerobic activities with friends.  · Join a group exercise program at a [facility] or [facility], a senior center, or a community center. Or look into a hospital cardiac rehabilitation program. Ask your doctor if you qualify.  Tips to keep you going  · Get up and get dressed each day. Go to a coffee shop and read a newspaper or go somewhere that you'll be in the presence of other active people. Youll feel more like being active.  · Make a plan. Choose one or more activities that you enjoy and that  you can easily do. Then plan to do at least one each day. You might write your plan on a calendar.  · Go with a friend or a group if you like company. This can help you feel supported and stay motivated, too.  · Plan social events that you enjoy. This will keep you mentally engaged as well as physically motivated to do things you find pleasure in.  For your safety  · Talk with your healthcare provider before starting an exercise program.  · Exercise indoors when its too hot or too cold outside, or when the air quality is poor. Try walking at a shopping mall.  · Wear socks and sturdy shoes to maintain your balance and prevent falls.  · Start slowly. Do a few minutes several times a day at first. Increase your time and speed little by little.  · Stop and rest whenever you feel tired or get short of breath.  · Dont push yourself on days when you dont feel well.  © 4367-8769 [employer]. [address] [place] [address]. All rights reserved. This information is not intended as a substitute for professional medical care. Always follow your healthcare professional's instructions.              Heart Failure: Evaluating Your Heart  You have a condition called heart failure. To evaluate your condition, your doctor will examine you, ask questions, and do some tests. Along with looking for signs of heart failure, the doctor looks for any other health problems that may have led to heart failure. The results of your evaluation will help your doctor form a treatment plan.  Health history and physical exam  Your visit will start with a health history. Tell the doctor about any symptoms youve noticed and about all medicines you take. Then youll have a physical exam. This includes listening to your heartbeat and breathing. Youll also be checked for swelling (edema) in your legs and neck. When you have fluid buildup or fluid in the lungs, it may be called congestive heart failure.  Diagnosing heart  failure     During an echocardiogram, sound waves bounce off the heart. These are converted into a picture on the screen.   The following may be done to help your doctor form a diagnosis:  · X-rays show the size and shape of your heart. These pictures can also show fluid in your lungs.  · An electrocardiogram (ECG or EKG) shows the pattern of your heartbeat. Small pads (electrodes) are placed on your chest, arms, and legs. Wires connect the pads to the ECG machine, which records your hearts electrical signals. This can give the doctor information about heart function.  · An echocardiogram uses ultrasound waves to show the structure and movement of your heart muscle. This shows how well the heart pumps. It also shows the thickness of the heart walls, and if the heart is enlarged. It is one of the most useful, non-invasive tests as it provides information about the heart's general function. This helps your doctor make treatment decisions.  · Lab tests evaluate small amounts of blood or urine for signs of problems. A BNP lab test can help diagnose and evaluate heart failure. BNP stands for B-type natriuretic peptide. The ventricles secrete more BNP when heart failure worsens. Lab tests can also provide information about metabolic dysfunction or heart dysfunction.  Your treatment plan  Based on the results of your evaluation and tests, your doctor will develop a treatment plan. This plan is designed to relieve some of your heart failure symptoms and help make you more comfortable. Your treatment plan may include:  · Medicine to help your heart work better and improve your quality of life  · Changes in what you eat and drink to help prevent fluid from backing up in your body  · Daily monitoring of your weight and heart failure symptoms to see how well your treatment plan is working  · Exercise to help you stay healthy  · Help with quitting smoking  · Emotional and psychological support to help adjust to the  changes  · Referrals to other specialists to make sure you are being treated comprehensively  © 6690-7398 [employer]. [address] [place] [address], [place] [address]. All rights reserved. This information is not intended as a substitute for professional medical care. Always follow your healthcare professional's instructions.              Heart Failure: Making Changes to Your Diet  You have a condition called heart failure. When you have heart failure, excess fluid is more likely to build up in your body because your heart isn't working well. This makes the heart work harder to pump blood. Fluid buildup causes symptoms such as shortness of breath and swelling (edema). This is often referred to as congestive heart failure or CHF. Controlling the amount of salt (sodium) you eat may help stop fluid from building up. Your doctor may also tell you to reduce the amount of fluid you drink.  Reading food labels    Your healthcare provider will tell you how much sodium you can eat each day. Read food labels to keep track. Keep in mind that certain foods are high in salt. These include canned, frozen, and processed foods. Check the amount of sodium in each serving. Watch out for high-sodium ingredients. These include MSG (monosodium glutamate), baking soda, and sodium phosphate.   Eating less salt  Give yourself time to get used to eating less salt. It may take a little while. Here are some tips to help:  · Take the saltshaker off the table. Replace it with salt-free herb mixes and spices.  · Eat fresh or plain frozen vegetables. These have much less salt than canned vegetables.  · Choose low-sodium snacks like sodium-free pretzels, crackers, or air-popped popcorn.  · Dont add salt to your food when youre cooking. Instead, season your foods with pepper, lemon, garlic, or onion.  · When you eat out, ask that your food be cooked without added salt.  · Avoid eating fried foods as these often have a great deal of  salt.  If youre told to limit fluids  You may need to limit how much fluid you have to help prevent swelling. This includes anything that is liquid at room temperature, such as ice cream and soup. If your doctor tells you to limit fluid, try these tips:  · Measure drinks in a measuring cup before you drink them. This will help you meet daily goals.  · Chill drinks to make them more refreshing.  · Suck on frozen lemon wedges to quench thirst.  · Only drink when youre thirsty.  · Chew sugarless gum or suck on hard candy to keep your mouth moist.  · Weigh yourself daily to know if your body's fluid content is rising.  My sodium goal  Your healthcare provider may give you a sodium goal to meet each day. This includes sodium found in food as well as salt that you add. My goal is to eat no more than ___________ mg of sodium per day.     When to call your doctor  Call your doctor right away if you have any symptoms of worsening heart failure. These can include:  · Sudden weight gain  · Increased swelling of your legs or ankles  · Trouble breathing when youre resting or at night  · Increase in the number of pillows you have to sleep on  · Chest pain, pressure, discomfort, or pain in the jaw, neck, or back   © 7092-5468 [employer]. [address] [place] [address], [place] [address]. All rights reserved. This information is not intended as a substitute for professional medical care. Always follow your healthcare professional's instructions.              Heart Failure: Medicines to Help Your Heart    You have a condition called heart failure (also known as congestive heart failure, or CHF). Your doctor will likely prescribe medicines for heart failure and any underlying health problems you have. Most heart failure patients take one or more types of medicinen. Your healthcare provider will work to find the combination of medicines that works best for you.  Heart failure medicines  Here are the most common heart failure  medicines:  · ACE inhibitors lower blood pressure and decrease strain on the heart. This makes it easier for the heart to pump. Angiotensin receptor blockers have similar effects. These are prescribed for some patients instead of ACE inhibitors.  · Beta-blockers relieve stress on the heart. They also improve symptoms. They may also improve the heart's pumping action over time.  · Diuretics (also called water pills) help rid your body of excess water. This can help rid your body of swelling (edema). Having less fluid to pump means your heart doesnt have to work as hard. Some diuretics make your body lose a mineral called potassium. Your doctor will tell you if you need to take supplements or eat more foods high in potassium.  · Digoxin helps your heart pump with more strength. This helps your heart pump more blood with each beat. So, more oxygen-rich blood travels to the rest of the body.  · Aldosterone antagonists help alter hormones and decrease strain on the heart.  · Hydralazine and nitrates are two separate medicines used together to treat heart failure. They may come in one combination pill. They lower blood pressure and decrease how hard the heart has to pump.  Medicines for related conditions  Controlling other heart problems helps keep heart failure under control, too. Depending on other heart problems you have, medicines may be prescribed to:  · Lower blood pressure (antihypertensives).  · Lower cholesterol levels (statins).  · Prevent blood clots (anticoagulants or aspirin).  · Keep the heartbeat steady (antiarrhythmics).  © 0958-1325 [employer]. [address] [place] [address], [place] [address]. All rights reserved. This information is not intended as a substitute for professional medical care. Always follow your healthcare professional's instructions.              Heart Failure: Procedures That May Help    The heart is a muscle that pumps oxygen-rich blood to all parts of the body. When you  have heart failure, the heart is not able to pump as well as it should. Blood and fluid may back up into the lungs (congestive heart failure), and some parts of the body dont get enough oxygen-rich blood to work normally. These problems lead to the symptoms of heart failure.     Certain procedures may help the heart pump better in some cases of heart failure. Some procedures are done to treat health problems that may have caused the heart failure such as coronary artery disease or heart rhythm problems. For more serious heart failure, other options are available.  Treating artery and valve problems  If you have coronary artery disease or valve disease, procedures may be done to improve blood flow. This helps the heart pump better, which can improve heart failure symptoms. First, your doctor may do a cardiac catheterization to help detect clogged blood vessels or valve damage. During this procedure, a  thin tube (catheter) in inserted into a blood vessel and guided to the heart. There a dye is injected and a special type of X-ray (angiogram) is taken of the blood vessels. Procedures to open a blocked artery or fix damaged valves can also be done using catheterization.  · Angioplasty uses a balloon-tipped instrument at the end of the catheter. The balloon is inflated to widen the narrowed artery. In many cases, a stent is expanded to further support the narrowed artery. A stent is a metal mesh tube.  · Valve surgery repairs or replacement of faulty valves can also be done during catheterization so blood can flow properly through the chambers of the heart.  Bypass surgery is another option to help treat blocked arteries. It uses a healthy blood vessel from elsewhere in the body. The healthy blood vessel is attached above and below the blocked area so that blood can flow around the blocked artery.  Treating heart rhythm problems  A device may be placed in the chest to help a weak heart maintain a healthy, heartbeat so  the heart can pump more effectively:  · Pacemaker. A pacemaker is an implanted device that regulates your heartbeat electronically. It monitors your heart's rhythm and generates a painless electric impulse that helps the heart beat in a regular rhythm. A pacemaker is programmed to meet your specific heart rhythm needs.  · Biventricular pacing/cardiac resynchronization therapy. A type of pacemaker that paces both pumping chambers of the heart at the same time to coordinate contractions and to improve the heart's function. Some people with heart failure are candidates for this therapy.  · Implantable cardioverter defibrillator. A device similar to a pacemaker that senses when the heart is beating too fast and delivers an electrical shock to convert the fast rhythm to a normal rhythm. This can be a life saving device.  In severe cases  In more serious cases of heart failure when other treatments no longer work, other options may include:  · Ventricular assist devices (VADs). These are mechanical devices used to take over the pumping function for one or both of the heart's ventricles, or pumping chambers. A VAD may be necessary when heart failure progresses to the point that medicines and other treatments no longer help. In some cases, a VAD may be used as a bridge to transplant.  · Heart transplant. This is replacing the diseased heart with a healthy one from a donor. This is an option for a few people who are very sick. A heart transplant is very serious and not an option for all patients. Your doctor can tell you more.  © 7082-7504 [employer]. [address] [place] [address], [place] [address]. All rights reserved. This information is not intended as a substitute for professional medical care. Always follow your healthcare professional's instructions.              Heart Failure: Tracking Your Weight  You have a condition called heart failure. When you have heart failure, a sudden weight gain or a steady rise in  weight is a warning sign that your body is retaining too much water and salt. This could mean your heart failure is getting worse. If left untreated, it can cause problems for your lungs and result in shortness of breath. Weighing yourself each day is the best way to know if youre retaining water. If your weight goes up quickly, call your doctor. You will be given instructions on how to get rid of the excess water. You will likely need medicines and to avoid salt. This will help your heart work better.  Call your doctor if you gain more than 2 pounds in 1 day, more than 5 pounds in 1 week, or whatever weight gain you were told to report by your doctor. This is often a sign of worsening heart failure and needs to be evaluated and treated. Your doctor will tell you what to do next.   Tips for weighing yourself    · Weigh yourself at the same time each morning, wearing the same clothes. Weigh yourself after urinating and before eating.  · Use the same scale each day. Make sure the numbers are easy to read. Put the scale on a flat, hard surface -- not on a rug or carpet.  · Do not stop weighing yourself. If you forget one day, weigh again the next morning.  How to use your weight chart  · Keep your weight chart near the scale. Write your weight on the chart as soon as you get off the scale.  · Fill in the month and the start date on the chart. Then write down your weight each day. Your chart will look like this:    · If you miss a day, leave the space blank. Weigh yourself the next day and write your weight in the next space.  · Take your weight chart with you when you go to see your doctor.  © 4405-3042 [employer]. [address] [place] [address], [place] [address]. All rights reserved. This information is not intended as a substitute for professional medical care. Always follow your healthcare professional's instructions.              Heart Failure: Warning Signs of a Flare-Up  You have a condition called heart  failure. Once you have heart failure, flare-ups can happen. Below are signs that can mean your heart failure is getting worse. If you notice any of these warning signs, call your healthcare provider.  Swelling    · Your feet, ankles, or lower legs get puffier.  · You notice skin changes on your lower legs.  · Your shoes feel too tight.  · Your clothes are tighter in the waist.  · You have trouble getting rings on or off your fingers.  Shortness of breath  · You have to breathe harder even when youre doing your normal activities or when youre resting.  · You are short of breath walking up stairs or even short distances.  · You wake up at night short of breath or coughing.  · You need to use more pillows or sit up to sleep.  · You wake up tired or restless.  Other warning signs  · You feel weaker, dizzy, or more tired.  · You have chest pain or changes in your heartbeat.  · You have a cough that wont go away.  · You cant remember things or dont feel like eating.  Tracking your weight  Gaining weight is often the first warning sign that heart failure is getting worse. Gaining even a few pounds can be a sign that your body is retaining excess water and salt. Weighing yourself each day in the morning after you urinate and before you eat, is the best way to know if you're retaining water. Get a scale that is easy to read and make sure you wear the same clothes and use the same scale every time you weigh. Your healthcare provider will show you how to track your weight. Call your doctor if you gain more than 2 pounds in 1 day, 5 pounds in 1 week, or whatever weight gain you were told to report by your doctor. This is often a sign of worsening heart failure and needs to be evaluated and treated before it compromises your breathing. Your doctor will tell you what to do next.    © 1316-5152 [employer]. [address] [place] [address], [place] [address]. All rights reserved. This information is not intended as a  substitute for professional medical care. Always follow your healthcare professional's instructions.              Coumadin Discharge Instructions                         Chronic Kindey Disease Education             Diabetes Discharge Instructions                                    [facility] complies with applicable Federal civil rights laws and does not discriminate on the basis of race, color, national origin, age, disability, or sex.

## 2017-01-05 NOTE — PROGRESS NOTES
Progress Note  Heart Transplant Service    Admit Date: 1/5/2017   LOS: 0 days     SUBJECTIVE:     Follow up for: s/p LVAD, eleavted LDH    Interval History: Patient is very pleasant 56 year old male. Feels well. States he felt a mild shock sensation that woke him up out of his sleep overnight. Other than that patient feels well. Denies chest pain, SOB, NVD.     Scheduled Meds:   amiodarone  400 mg Oral Daily    aspirin  325 mg Oral Daily    dipyridamole  75 mg Oral TID    docusate sodium  200 mg Oral QHS    ferrous gluconate  324 mg Oral Daily with breakfast    imipramine  25 mg Oral QHS    insulin aspart  20 Units Subcutaneous TID WM    lisinopril  20 mg Oral BID    magnesium oxide  400 mg Oral TID    pantoprazole  40 mg Oral Daily    sertraline  100 mg Oral Daily    spironolactone  25 mg Oral Daily    tamsulosin  0.4 mg Oral Daily    warfarin  2 mg Oral Daily     Continuous Infusions:   PRN Meds:cyclobenzaprine, dextrose 50%, dextrose 50%, glucagon (human recombinant), glucose, glucose, insulin aspart, ramelteon, senna-docusate 8.6-50 mg    Review of patient's allergies indicates:   Allergen Reactions    Levemir [insulin detemir] Itching    Pork/porcine containing products     Ranexa [ranolazine] Nausea Only       OBJECTIVE:     Vital Signs (Most Recent)  Temp: 97.6 °F (36.4 °C) (01/05/17 1130)  Pulse: 86 (01/05/17 1300)  Resp: 18 (01/05/17 0800)  BP: (!) 86/0 (01/05/17 1144)  SpO2: 99 % (01/05/17 1130)    Vital Signs Range (Last 24H):  Temp:  [96.3 °F (35.7 °C)-97.7 °F (36.5 °C)]   Pulse:  [68-86]   Resp:  [18]   BP: ()/(0-82)   SpO2:  [98 %-99 %]     I & O (Last 24H):  Intake/Output Summary (Last 24 hours) at 01/05/17 1323  Last data filed at 01/05/17 0400   Gross per 24 hour   Intake              240 ml   Output              550 ml   Net             -310 ml     Telemetry: run of PVC's, may correlate with possibly ICD discharge. ICD programming ordered.     Physical Exam  Constitutional:  He appears well-developed and well-nourished. No distress.   Head: Normocephalic and atraumatic.   Eyes: Conjunctivae are normal. No scleral icterus.   Neck: No JVD present. No tracheal deviation present. No thyromegaly present.   Cardiovascular: VAD sounds smooth  Pulmonary/Chest: Effort normal and breath sounds normal. No respiratory distress. He has no wheezes. He has no rales.   Abdominal: Soft. Bowel sounds are normal. He exhibits no distension and no mass. There is no tenderness. There is no rebound and no guarding.   Musculoskeletal: He exhibits no edema or tenderness.   Neurological: He is alert.   Skin: Skin is warm and dry. He is not diaphoretic.   Psychiatric: He has a normal mood and affect. His behavior is normal. Judgment and thought content normal.     Labs:     Recent Labs  Lab 12/30/16  0708 12/31/16  0359 01/05/17  0506   WBC 8.62 8.67 9.41   HGB 10.5* 10.7* 11.3*   HCT 32.5* 34.1* 36.1*    224 247   LYMPH 30.2  2.6 23.6  2.1 26.4  2.5   MONO 5.8  0.5 7.2  0.6 6.8  0.6   EOSINOPHIL 1.7 2.2 2.8       Recent Labs  Lab 12/30/16  0708 12/31/16  0359 01/03/17  1605 01/05/17  0506   APTT 37.5* 34.1*  --  31.5   INR 2.8* 2.8* 2.3* 2.0*        Recent Labs  Lab 12/29/16  2138 12/30/16  0705 12/30/16  0708 12/31/16  0359 01/05/17  0506   *  --  185* 220* 197*  192*   CALCIUM 9.4  --  9.4 9.5 9.4  9.6   ALBUMIN 3.6 3.2*  --   --  3.4*   PROT 7.7 7.0  --   --  7.5   *  --  135* 135* 133*  132*   K 4.5  --  4.4 5.1 4.7  4.6   CO2 25  --  25 25 27  26   CL 99  --  101 100 96  96   BUN 13  --  14 14 13  13   CREATININE 0.9  --  0.9 0.9 1.0  1.0   ALKPHOS 75 70  --   --  83   ALT 20 20  --   --  18   AST 30 25  --   --  28   BILITOT 0.7 0.7  --   --  0.7   MG 2.0  --  1.7 1.8 1.8   PHOS  --   --  4.3 4.2 4.2     Estimated Creatinine Clearance: 92.5 mL/min (based on Cr of 1).      Recent Labs  Lab 01/05/17  1205   BNP 74       Recent Labs  Lab 12/29/16  2138 12/30/16  0708  "12/31/16  0359 01/03/17  1605 01/05/17  0506   * 540* 529* 590* 558*       Microbiology Results (last 7 days)     ** No results found for the last 168 hours. **        ASSESSMENT:     57 yo man with CHFrEF, stage D underwent HM 2 LVAD placement on 8/24/16. s/p ICD, HTN, DM who presents with elevated LDH, r/o pump thrombosis    PLAN:     Elevated LDH/ S/P HM II  - VAD order set, Speed 9000, LSL 8600  - ECHO today  - TEG drawn this AM  - Continue ASA, continue coumadin, INR 2 today   -home dose of coumadin 3 mg on Mon, Thu, Sat; 2 mg all other days  - Begin persantine 75 TID,  today  - OHT eval orders last admit    S/P ICD placement  - [employer] ICD, ICD programming performed today, awaiting results.  - "shock" sensation may have correlated with run of PVC's      HTN  - Will assess MAP on admission  - lisinopril 20 BID at home, continue on admit  - goal MAP 60-80, have been around 82 this admit     DM  - SS insulin    -2 g Na dietary restriction, 1500 mL fluid restriction, strict I/Os    Juany Benjamin PA-C  [facility] Spectralink #[id number]    "

## 2017-01-05 NOTE — PROGRESS NOTES
"[facility]-  Device interrogation performed 2ndary to patient feeling "mild shock" last evening.  Device fxn WNL, no sustained arrhythmias noted, no treated events seen.    "

## 2017-01-05 NOTE — PLAN OF CARE
Problem: Patient Care Overview  Goal: Plan of Care Review  Outcome: Ongoing (interventions implemented as appropriate)  Discussed Plan of Care with patient. VS stable and LVAD numbers within range. Ambulates well independently. Fall precautions in place. Bleeding precautions reviewed and maintained. Pain denied. TEG collected and ECHO complete. Persantine TID initiated. INR 2.0. Patient remained free of falls/ injury. All questions and concerns were addressed. Will continue to monitor patient.

## 2017-01-05 NOTE — PROGRESS NOTES
"[facility]-  Device interrogation performed 2ndary to patient feeling "mild shocks" last evening.  Device fxn WNL, no sustained arrhythmias noted, no treated events seen.  "

## 2017-01-05 NOTE — NURSING TRANSFER
Nursing Transfer Note      1/5/2017     Transfer To: ECHO    Transfer via stretcher    Transfer with cardiac monitoring    Transported by transport, [facility] [place]    Medicines sent: none    Chart send with patient: No    Notified: spouse    Telemetry room notiifed and LVAD emergency equipment with patient

## 2017-01-06 LAB
ALBUMIN SERPL BCP-MCNC: 3.4 G/DL
ALP SERPL-CCNC: 78 U/L
ALT SERPL W/O P-5'-P-CCNC: 18 U/L
ANION GAP SERPL CALC-SCNC: 10 MMOL/L
APTT BLDCRRT: 30.6 SEC
AST SERPL-CCNC: 24 U/L
BASOPHILS # BLD AUTO: 0.01 K/UL
BASOPHILS NFR BLD: 0.1 %
BILIRUB DIRECT SERPL-MCNC: 0.3 MG/DL
BILIRUB SERPL-MCNC: 0.6 MG/DL
BNP SERPL-MCNC: 51 PG/ML
BUN SERPL-MCNC: 15 MG/DL
CALCIUM SERPL-MCNC: 9.7 MG/DL
CHLORIDE SERPL-SCNC: 96 MMOL/L
CO2 SERPL-SCNC: 27 MMOL/L
CREAT SERPL-MCNC: 0.9 MG/DL
CRP SERPL-MCNC: 16.4 MG/L
DIFFERENTIAL METHOD: ABNORMAL
EOSINOPHIL # BLD AUTO: 0.2 K/UL
EOSINOPHIL NFR BLD: 2.7 %
ERYTHROCYTE [DISTWIDTH] IN BLOOD BY AUTOMATED COUNT: 20.3 %
EST. GFR  (AFRICAN AMERICAN): >60 ML/MIN/1.73 M^2
EST. GFR  (NON AFRICAN AMERICAN): >60 ML/MIN/1.73 M^2
GIANT PLATELETS BLD QL SMEAR: PRESENT
GLUCOSE SERPL-MCNC: 189 MG/DL
HCT VFR BLD AUTO: 35.5 %
HGB BLD-MCNC: 11.1 G/DL
INR PPP: 1.9
LDH SERPL L TO P-CCNC: 542 U/L
LYMPHOCYTES # BLD AUTO: 2.7 K/UL
LYMPHOCYTES NFR BLD: 30.1 %
MAGNESIUM SERPL-MCNC: 1.9 MG/DL
MCH RBC QN AUTO: 18 PG
MCHC RBC AUTO-ENTMCNC: 31.3 %
MCV RBC AUTO: 58 FL
MONOCYTES # BLD AUTO: 0.6 K/UL
MONOCYTES NFR BLD: 6.7 %
NEUTROPHILS # BLD AUTO: 5.3 K/UL
NEUTROPHILS NFR BLD: 59.5 %
PHOSPHATE SERPL-MCNC: 4.3 MG/DL
PLATELET # BLD AUTO: 243 K/UL
PLATELET BLD QL SMEAR: ABNORMAL
PMV BLD AUTO: ABNORMAL FL
POCT GLUCOSE: 161 MG/DL (ref 70–110)
POCT GLUCOSE: 186 MG/DL (ref 70–110)
POCT GLUCOSE: 199 MG/DL (ref 70–110)
POCT GLUCOSE: 221 MG/DL (ref 70–110)
POTASSIUM SERPL-SCNC: 5 MMOL/L
PREALB SERPL-MCNC: 20 MG/DL
PROT SERPL-MCNC: 7.3 G/DL
PROTHROMBIN TIME: 19.5 SEC
RBC # BLD AUTO: 6.17 M/UL
SODIUM SERPL-SCNC: 133 MMOL/L
WBC # BLD AUTO: 8.93 K/UL

## 2017-01-06 PROCEDURE — 80076 HEPATIC FUNCTION PANEL: CPT

## 2017-01-06 PROCEDURE — 80048 BASIC METABOLIC PNL TOTAL CA: CPT

## 2017-01-06 PROCEDURE — 99232 SBSQ HOSP IP/OBS MODERATE 35: CPT | Mod: ,,, | Performed by: INTERNAL MEDICINE

## 2017-01-06 PROCEDURE — 83735 ASSAY OF MAGNESIUM: CPT

## 2017-01-06 PROCEDURE — 25000003 PHARM REV CODE 250: Performed by: PHYSICIAN ASSISTANT

## 2017-01-06 PROCEDURE — 93750 INTERROGATION VAD IN PERSON: CPT | Mod: ,,, | Performed by: INTERNAL MEDICINE

## 2017-01-06 PROCEDURE — 84134 ASSAY OF PREALBUMIN: CPT

## 2017-01-06 PROCEDURE — 63600175 PHARM REV CODE 636 W HCPCS: Performed by: PHYSICIAN ASSISTANT

## 2017-01-06 PROCEDURE — 27000248 HC VAD-ADDITIONAL DAY

## 2017-01-06 PROCEDURE — 93750 INTERROGATION VAD IN PERSON: CPT | Performed by: PHYSICIAN ASSISTANT

## 2017-01-06 PROCEDURE — 25000003 PHARM REV CODE 250: Performed by: INTERNAL MEDICINE

## 2017-01-06 PROCEDURE — 36415 COLL VENOUS BLD VENIPUNCTURE: CPT

## 2017-01-06 PROCEDURE — 20600001 HC STEP DOWN PRIVATE ROOM

## 2017-01-06 PROCEDURE — 85025 COMPLETE CBC W/AUTO DIFF WBC: CPT

## 2017-01-06 PROCEDURE — 85610 PROTHROMBIN TIME: CPT

## 2017-01-06 PROCEDURE — 85730 THROMBOPLASTIN TIME PARTIAL: CPT

## 2017-01-06 PROCEDURE — 83615 LACTATE (LD) (LDH) ENZYME: CPT

## 2017-01-06 PROCEDURE — 84100 ASSAY OF PHOSPHORUS: CPT

## 2017-01-06 PROCEDURE — 86140 C-REACTIVE PROTEIN: CPT

## 2017-01-06 PROCEDURE — 83880 ASSAY OF NATRIURETIC PEPTIDE: CPT

## 2017-01-06 RX ORDER — HYDRALAZINE HYDROCHLORIDE 25 MG/1
25 TABLET, FILM COATED ORAL ONCE
Status: COMPLETED | OUTPATIENT
Start: 2017-01-06 | End: 2017-01-06

## 2017-01-06 RX ADMIN — DOCUSATE SODIUM 200 MG: 100 CAPSULE, LIQUID FILLED ORAL at 09:01

## 2017-01-06 RX ADMIN — SPIRONOLACTONE 25 MG: 25 TABLET, FILM COATED ORAL at 08:01

## 2017-01-06 RX ADMIN — WARFARIN SODIUM 3 MG: 3 TABLET ORAL at 05:01

## 2017-01-06 RX ADMIN — FERROUS GLUCONATE TAB 324 MG (37.5 MG ELEMENTAL IRON) 324 MG: 324 (37.5 FE) TAB at 08:01

## 2017-01-06 RX ADMIN — IMIPRAMINE HYDROCHLORIDE 25 MG: 25 TABLET, FILM COATED ORAL at 09:01

## 2017-01-06 RX ADMIN — INSULIN ASPART 20 UNITS: 100 INJECTION, SOLUTION INTRAVENOUS; SUBCUTANEOUS at 02:01

## 2017-01-06 RX ADMIN — INSULIN ASPART 20 UNITS: 100 INJECTION, SOLUTION INTRAVENOUS; SUBCUTANEOUS at 08:01

## 2017-01-06 RX ADMIN — Medication 400 MG: at 09:01

## 2017-01-06 RX ADMIN — AMIODARONE HYDROCHLORIDE 400 MG: 200 TABLET ORAL at 08:01

## 2017-01-06 RX ADMIN — DIPYRIDAMOLE 75 MG: 75 TABLET, FILM COATED ORAL at 09:01

## 2017-01-06 RX ADMIN — INSULIN ASPART 20 UNITS: 100 INJECTION, SOLUTION INTRAVENOUS; SUBCUTANEOUS at 06:01

## 2017-01-06 RX ADMIN — RAMELTEON 8 MG: 8 TABLET, FILM COATED ORAL at 09:01

## 2017-01-06 RX ADMIN — ASPIRIN 325 MG: 325 TABLET, DELAYED RELEASE ORAL at 08:01

## 2017-01-06 RX ADMIN — TAMSULOSIN HYDROCHLORIDE 0.4 MG: 0.4 CAPSULE ORAL at 08:01

## 2017-01-06 RX ADMIN — PANTOPRAZOLE SODIUM 40 MG: 40 TABLET, DELAYED RELEASE ORAL at 09:01

## 2017-01-06 RX ADMIN — DIPYRIDAMOLE 75 MG: 75 TABLET, FILM COATED ORAL at 05:01

## 2017-01-06 RX ADMIN — Medication 400 MG: at 02:01

## 2017-01-06 RX ADMIN — LISINOPRIL 20 MG: 20 TABLET ORAL at 09:01

## 2017-01-06 RX ADMIN — DIPYRIDAMOLE 75 MG: 75 TABLET, FILM COATED ORAL at 02:01

## 2017-01-06 RX ADMIN — Medication 400 MG: at 05:01

## 2017-01-06 RX ADMIN — INSULIN ASPART 2 UNITS: 100 INJECTION, SOLUTION INTRAVENOUS; SUBCUTANEOUS at 08:01

## 2017-01-06 RX ADMIN — HYDRALAZINE HYDROCHLORIDE 25 MG: 25 TABLET, FILM COATED ORAL at 02:01

## 2017-01-06 NOTE — PROCEDURES
Patient aaox3  with wife at bedside. VAD interrogation completed this AM in the event changes needed to be made.  Spent about 30 minutes with pt discussing LVAD pump thrombosis and s/s to monitor for at home.  Pt was able to verbalize read back of what to watch for and in agreement of plan to notifiy VAD coordinator of any of these s/s.  Pt verbalized appreciation for education.  Will continue to monitor for further issues.     Pulsatile: Yes, intermittent and No   VAD Sounds: Smooth  Problems / Issues / Alarms with VAD if any: None noted      VAD Interrogation:  TXP LVAD INTERROGATIONS 1/6/2017 1/6/2017 1/6/2017 1/6/2017 1/6/2017 1/5/2017 1/5/2017   Type HeartMate II HeartMate II HeartMate II HeartMate II HeartMate II HeartMate II HeartMate II   Flow 4.6 4.2 4.0 4 5 5.4 4.7   Speed 9000 9000 9000 9000 9000 9000 9000   PI 6.8 7.47 7.4 6.9 7.3 6.6 6.1   Power (Kwon) 5.1 4.8 4.8 4.6 5.2 5.5 5.1   LSL - 8600 8600 8600 8600 8600 -   Pulsatility - No Pulse - - No Pulse No Pulse -

## 2017-01-06 NOTE — PROGRESS NOTES
Progress Note  Heart Transplant Service    Admit Date: 1/5/2017   LOS: 1 day     SUBJECTIVE:     Follow up for: Elevated serum lactate dehydrogenase (LDH)    Interval History: Patient doing well today with no complaints. Had many questions about LDH and Persantine, understands need for medication and stay in hospital. Denies chest pain, SOB, NVD.    Scheduled Meds:   amiodarone  400 mg Oral Daily    aspirin  325 mg Oral Daily    dipyridamole  75 mg Oral TID    docusate sodium  200 mg Oral QHS    ferrous gluconate  324 mg Oral Daily with breakfast    imipramine  25 mg Oral QHS    insulin aspart  20 Units Subcutaneous TID WM    lisinopril  20 mg Oral BID    magnesium oxide  400 mg Oral TID    pantoprazole  40 mg Oral Daily    sertraline  100 mg Oral Daily    spironolactone  25 mg Oral Daily    tamsulosin  0.4 mg Oral Daily    warfarin  3 mg Oral Daily     Continuous Infusions:   PRN Meds:cyclobenzaprine, dextrose 50%, dextrose 50%, glucagon (human recombinant), glucose, glucose, insulin aspart, ramelteon, senna-docusate 8.6-50 mg    Review of patient's allergies indicates:   Allergen Reactions    Levemir [insulin detemir] Itching    Pork/porcine containing products     Ranexa [ranolazine] Nausea Only       OBJECTIVE:     Vital Signs (Most Recent)  Temp: 98.4 °F (36.9 °C) (01/06/17 1200)  Pulse: 89 (01/06/17 1300)  Resp: 18 (01/06/17 1200)  BP: (!) 84/0 (01/06/17 1205)  SpO2: 98 % (01/06/17 1200)    Vital Signs Range (Last 24H):  Temp:  [96.8 °F (36 °C)-98.6 °F (37 °C)]   Pulse:  [72-94]   Resp:  [16-18]   BP: ()/(0-72)   SpO2:  [97 %-99 %]     I & O (Last 24H):  Intake/Output Summary (Last 24 hours) at 01/06/17 1500  Last data filed at 01/06/17 0500   Gross per 24 hour   Intake              940 ml   Output             1900 ml   Net             -960 ml     Telemetry: no events overnight    Physical Exam  Constitutional: He appears well-developed and well-nourished. No distress.   Head:  Normocephalic and atraumatic.   Eyes: Conjunctivae are normal. No scleral icterus.   Neck: No JVD present. No tracheal deviation present. No thyromegaly present.   Cardiovascular: VAD sounds smooth  Pulmonary/Chest: Effort normal and breath sounds normal. No respiratory distress. He has no wheezes. He has no rales.   Abdominal: Soft. Bowel sounds are normal. He exhibits no distension and no mass. There is no tenderness. There is no rebound and no guarding.   Musculoskeletal: He exhibits no edema or tenderness.   Neurological: He is alert.   Skin: Skin is warm and dry. He is not diaphoretic.   Psychiatric: He has a normal mood and affect. His behavior is normal. Judgment and thought content normal.     Labs:    Recent Labs  Lab 12/31/16  0359 01/05/17  0506 01/06/17  0516   WBC 8.67 9.41 8.93   HGB 10.7* 11.3* 11.1*   HCT 34.1* 36.1* 35.5*    247 243   LYMPH 23.6  2.1 26.4  2.5 30.1  2.7   MONO 7.2  0.6 6.8  0.6 6.7  0.6   EOSINOPHIL 2.2 2.8 2.7       Recent Labs  Lab 12/31/16  0359 01/03/17  1605 01/05/17  0506 01/06/17  0516   APTT 34.1*  --  31.5 30.6   INR 2.8* 2.3* 2.0* 1.9*        Recent Labs  Lab 12/31/16  0359 01/05/17  0506 01/06/17  0516   * 197*  192* 189*   CALCIUM 9.5 9.4  9.6 9.7   ALBUMIN  --  3.4* 3.4*   PROT  --  7.5 7.3   * 133*  132* 133*   K 5.1 4.7  4.6 5.0   CO2 25 27  26 27    96  96 96   BUN 14 13  13 15   CREATININE 0.9 1.0  1.0 0.9   ALKPHOS  --  83 78   ALT  --  18 18   AST  --  28 24   BILITOT  --  0.7 0.6   MG 1.8 1.8 1.9   PHOS 4.2 4.2 4.3     Estimated Creatinine Clearance: 102.1 mL/min (based on Cr of 0.9).      Recent Labs  Lab 01/06/17  0516   BNP 51       Recent Labs  Lab 12/31/16  0359 01/03/17  1605 01/05/17  0506 01/06/17  0516   * 590* 558* 542*     Microbiology Results (last 7 days)     ** No results found for the last 168 hours. **        ECHO 1/5/17   1 - Eccentric hypertrophy.    2 - Severely depressed left ventricular systolic  function.    3 - Mild left atrial enlargement.    4 - Mildly depressed right ventricular systolic function .    5 - Mild aortic regurgitation.    6 - HeartMate II LVAD; speed 9000.     ASSESSMENT:     55 yo man with CHFrEF, stage D underwent HM 2 LVAD placement on 8/24/16. s/p ICD, HTN, DM who presents with elevated LDH, r/o pump thrombosis    PLAN:     Elevated LDH/ S/P HM II  - VAD order set, Speed 9000, LSL 8600  - ECHO yesterday, see above   - Continue ASA, continue coumadin, INR 1.9 today  - home dose of coumadin 3 mg on Mon, Thu, Sat; 2 mg all other days  - Begin persantine 75 TID,  today  - BNP 51  - OHT eval orders last admit     S/P ICD placement  - Biotronik ICD, ICD programming performed yesterday   -results: device fxn WNL, no sustained arrhythmias, no treated events seen    HTN  - Will assess MAP on admission  - lisinopril 20 BID at home, continue on admit  - goal MAP 60-80, have been around 82 this admit      DM  - SS insulin     -2 g Na dietary restriction, 1500 mL fluid restriction, strict I/Os    Juany Benjamin PA-C  [facility] #[id number]

## 2017-01-06 NOTE — PLAN OF CARE
Problem: Patient Care Overview  Goal: Plan of Care Review  Outcome: Ongoing (interventions implemented as appropriate)  No acute events throughout night, VS and assessment per flow sheet, 1x dose of hydralazine given for elevated doppler.  Patient progressing towards goals as tolerated, plan of care reviewed with Mick Barriga and family, all concerns addressed, will continue to monitor.

## 2017-01-07 LAB
ANION GAP SERPL CALC-SCNC: 7 MMOL/L
ANISOCYTOSIS BLD QL SMEAR: SLIGHT
APTT BLDCRRT: 32.4 SEC
BASOPHILS # BLD AUTO: 0.01 K/UL
BASOPHILS NFR BLD: 0.1 %
BUN SERPL-MCNC: 14 MG/DL
CALCIUM SERPL-MCNC: 9.5 MG/DL
CHLORIDE SERPL-SCNC: 95 MMOL/L
CO2 SERPL-SCNC: 27 MMOL/L
CREAT SERPL-MCNC: 0.9 MG/DL
DIFFERENTIAL METHOD: ABNORMAL
EOSINOPHIL # BLD AUTO: 0.3 K/UL
EOSINOPHIL NFR BLD: 2.8 %
ERYTHROCYTE [DISTWIDTH] IN BLOOD BY AUTOMATED COUNT: 20.7 %
EST. GFR  (AFRICAN AMERICAN): >60 ML/MIN/1.73 M^2
EST. GFR  (NON AFRICAN AMERICAN): >60 ML/MIN/1.73 M^2
GLUCOSE SERPL-MCNC: 209 MG/DL
HCT VFR BLD AUTO: 34.5 %
HGB BLD-MCNC: 11.1 G/DL
HYPOCHROMIA BLD QL SMEAR: ABNORMAL
INR PPP: 2.3
LDH SERPL L TO P-CCNC: 546 U/L
LYMPHOCYTES # BLD AUTO: 2.3 K/UL
LYMPHOCYTES NFR BLD: 21.3 %
MAGNESIUM SERPL-MCNC: 1.8 MG/DL
MCH RBC QN AUTO: 17.9 PG
MCHC RBC AUTO-ENTMCNC: 32.2 %
MCV RBC AUTO: 56 FL
MONOCYTES # BLD AUTO: 0.7 K/UL
MONOCYTES NFR BLD: 6.3 %
NEUTROPHILS # BLD AUTO: 7.4 K/UL
NEUTROPHILS NFR BLD: 69.5 %
OVALOCYTES BLD QL SMEAR: ABNORMAL
PHOSPHATE SERPL-MCNC: 3.9 MG/DL
PLATELET # BLD AUTO: 251 K/UL
PLATELET BLD QL SMEAR: ABNORMAL
PMV BLD AUTO: ABNORMAL FL
POCT GLUCOSE: 182 MG/DL (ref 70–110)
POCT GLUCOSE: 200 MG/DL (ref 70–110)
POCT GLUCOSE: 203 MG/DL (ref 70–110)
POIKILOCYTOSIS BLD QL SMEAR: SLIGHT
POLYCHROMASIA BLD QL SMEAR: ABNORMAL
POTASSIUM SERPL-SCNC: 4.9 MMOL/L
PROTHROMBIN TIME: 23.1 SEC
RBC # BLD AUTO: 6.2 M/UL
SCHISTOCYTES BLD QL SMEAR: PRESENT
SODIUM SERPL-SCNC: 129 MMOL/L
WBC # BLD AUTO: 10.77 K/UL

## 2017-01-07 PROCEDURE — 83615 LACTATE (LD) (LDH) ENZYME: CPT

## 2017-01-07 PROCEDURE — 27000248 HC VAD-ADDITIONAL DAY

## 2017-01-07 PROCEDURE — 25000003 PHARM REV CODE 250: Performed by: INTERNAL MEDICINE

## 2017-01-07 PROCEDURE — 85025 COMPLETE CBC W/AUTO DIFF WBC: CPT

## 2017-01-07 PROCEDURE — 84100 ASSAY OF PHOSPHORUS: CPT

## 2017-01-07 PROCEDURE — 85730 THROMBOPLASTIN TIME PARTIAL: CPT

## 2017-01-07 PROCEDURE — 85610 PROTHROMBIN TIME: CPT

## 2017-01-07 PROCEDURE — 20600001 HC STEP DOWN PRIVATE ROOM

## 2017-01-07 PROCEDURE — 63600175 PHARM REV CODE 636 W HCPCS: Performed by: INTERNAL MEDICINE

## 2017-01-07 PROCEDURE — 36415 COLL VENOUS BLD VENIPUNCTURE: CPT

## 2017-01-07 PROCEDURE — 99232 SBSQ HOSP IP/OBS MODERATE 35: CPT | Mod: ,,, | Performed by: INTERNAL MEDICINE

## 2017-01-07 PROCEDURE — 25000003 PHARM REV CODE 250: Performed by: PHYSICIAN ASSISTANT

## 2017-01-07 PROCEDURE — 83735 ASSAY OF MAGNESIUM: CPT

## 2017-01-07 PROCEDURE — 80048 BASIC METABOLIC PNL TOTAL CA: CPT

## 2017-01-07 RX ORDER — HYDRALAZINE HYDROCHLORIDE 20 MG/ML
5 INJECTION INTRAMUSCULAR; INTRAVENOUS ONCE
Status: COMPLETED | OUTPATIENT
Start: 2017-01-07 | End: 2017-01-07

## 2017-01-07 RX ADMIN — SPIRONOLACTONE 25 MG: 25 TABLET, FILM COATED ORAL at 08:01

## 2017-01-07 RX ADMIN — LISINOPRIL 20 MG: 20 TABLET ORAL at 08:01

## 2017-01-07 RX ADMIN — DIPYRIDAMOLE 75 MG: 75 TABLET, FILM COATED ORAL at 01:01

## 2017-01-07 RX ADMIN — Medication 400 MG: at 01:01

## 2017-01-07 RX ADMIN — INSULIN ASPART 2 UNITS: 100 INJECTION, SOLUTION INTRAVENOUS; SUBCUTANEOUS at 08:01

## 2017-01-07 RX ADMIN — INSULIN ASPART 20 UNITS: 100 INJECTION, SOLUTION INTRAVENOUS; SUBCUTANEOUS at 08:01

## 2017-01-07 RX ADMIN — AMIODARONE HYDROCHLORIDE 400 MG: 200 TABLET ORAL at 08:01

## 2017-01-07 RX ADMIN — WARFARIN SODIUM 3 MG: 3 TABLET ORAL at 04:01

## 2017-01-07 RX ADMIN — DIPYRIDAMOLE 75 MG: 75 TABLET, FILM COATED ORAL at 05:01

## 2017-01-07 RX ADMIN — TAMSULOSIN HYDROCHLORIDE 0.4 MG: 0.4 CAPSULE ORAL at 08:01

## 2017-01-07 RX ADMIN — IMIPRAMINE HYDROCHLORIDE 25 MG: 25 TABLET, FILM COATED ORAL at 08:01

## 2017-01-07 RX ADMIN — HYDRALAZINE HYDROCHLORIDE 5 MG: 20 INJECTION INTRAMUSCULAR; INTRAVENOUS at 12:01

## 2017-01-07 RX ADMIN — Medication 400 MG: at 10:01

## 2017-01-07 RX ADMIN — INSULIN ASPART 20 UNITS: 100 INJECTION, SOLUTION INTRAVENOUS; SUBCUTANEOUS at 01:01

## 2017-01-07 RX ADMIN — DIPYRIDAMOLE 75 MG: 75 TABLET, FILM COATED ORAL at 10:01

## 2017-01-07 RX ADMIN — FERROUS GLUCONATE TAB 324 MG (37.5 MG ELEMENTAL IRON) 324 MG: 324 (37.5 FE) TAB at 08:01

## 2017-01-07 RX ADMIN — DOCUSATE SODIUM 200 MG: 100 CAPSULE, LIQUID FILLED ORAL at 08:01

## 2017-01-07 RX ADMIN — ASPIRIN 325 MG: 325 TABLET, DELAYED RELEASE ORAL at 08:01

## 2017-01-07 RX ADMIN — PANTOPRAZOLE SODIUM 40 MG: 40 TABLET, DELAYED RELEASE ORAL at 08:01

## 2017-01-07 RX ADMIN — Medication 400 MG: at 05:01

## 2017-01-07 NOTE — PROGRESS NOTES
Progress Note  Cardiology    Admit Date: 1/5/2017   LOS: 2 days     SUBJECTIVE:     Patient seen and examined. Denies chest pain or shortness of breath. Pt denies any other complaints.     Scheduled Meds:   amiodarone  400 mg Oral Daily    aspirin  325 mg Oral Daily    dipyridamole  75 mg Oral TID    docusate sodium  200 mg Oral QHS    ferrous gluconate  324 mg Oral Daily with breakfast    imipramine  25 mg Oral QHS    insulin aspart  20 Units Subcutaneous TID WM    lisinopril  20 mg Oral BID    magnesium oxide  400 mg Oral TID    pantoprazole  40 mg Oral Daily    sertraline  100 mg Oral Daily    spironolactone  25 mg Oral Daily    tamsulosin  0.4 mg Oral Daily    warfarin  3 mg Oral Daily     Continuous Infusions:   PRN Meds:cyclobenzaprine, dextrose 50%, dextrose 50%, glucagon (human recombinant), glucose, glucose, insulin aspart, ramelteon, senna-docusate 8.6-50 mg    Review of patient's allergies indicates:   Allergen Reactions    Levemir [insulin detemir] Itching    Pork/porcine containing products     Ranexa [ranolazine] Nausea Only       OBJECTIVE:     Vital Signs (Most Recent)  Temp: 97.2 °F (36.2 °C) (01/07/17 0434)  Pulse: 92 (01/07/17 0700)  Resp: 18 (01/07/17 0434)  BP: (!) 76/0 (01/07/17 0436)  SpO2: 98 % (01/07/17 0434)    Vital Signs Range (Last 24H):  Temp:  [97.2 °F (36.2 °C)-98.4 °F (36.9 °C)]   Pulse:  [74-92]   Resp:  [16-20]   BP: ()/(0-81)   SpO2:  [98 %-99 %]     I & O (Last 24H):  Intake/Output Summary (Last 24 hours) at 01/07/17 0804  Last data filed at 01/07/17 0710   Gross per 24 hour   Intake              940 ml   Output             3725 ml   Net            -2785 ml       Physical Exam:   General: Patient in no acute distress or discomfort  HEENT: No JVD, moist mucous membranes  Cardiac: VAD hum  Chest: CTABL, no wheezing or rales  Abd:Soft NTND  Ext: No Edema No swelling  Neuro: A and O X 3, non focal      LABS  CBC with Diff:     Recent Labs  Lab 01/05/17  8009  01/06/17  0516 01/07/17 0431   WBC 9.41 8.93 10.77   HGB 11.3* 11.1* 11.1*   HCT 36.1* 35.5* 34.5*    243 251   LYMPH 26.4  2.5 30.1  2.7 21.3  2.3   MONO 6.8  0.6 6.7  0.6 6.3  0.7   EOSINOPHIL 2.8 2.7 2.8       COAG:    Recent Labs  Lab 01/05/17  0506 01/06/17  0516 01/07/17 0431   APTT 31.5 30.6 32.4*   INR 2.0* 1.9* 2.3*       CMP:   Recent Labs  Lab 01/05/17  0506 01/06/17 0516 01/07/17 0431   *  192* 189* 209*   CALCIUM 9.4  9.6 9.7 9.5   ALBUMIN 3.4* 3.4*  --    PROT 7.5 7.3  --    *  132* 133* 129*   K 4.7  4.6 5.0 4.9   CO2 27  26 27 27   CL 96  96 96 95   BUN 13  13 15 14   CREATININE 1.0  1.0 0.9 0.9   ALKPHOS 83 78  --    ALT 18 18  --    AST 28 24  --    BILITOT 0.7 0.6  --    MG 1.8 1.9 1.8   PHOS 4.2 4.3 3.9     Estimated Creatinine Clearance: 101.9 mL/min (based on Cr of 0.9).    .  Recent Labs  Lab 01/06/17 0516   BNP 51       Present on Admission:   Elevated serum lactate dehydrogenase (LDH)   Cardiomyopathy, dilated, nonischemic      ASSESSMENT / PLAN:   55 yo man with CHFrEF, stage D underwent HM 2 LVAD placement on 8/24/16. s/p ICD, HTN, DM who presents with elevated LDH, r/o pump thrombosis     Elevated LDH/ S/P HM II  - VAD order set, Speed 9000, LSL 8600  - ECHO , see above   - Continue ASA, continue coumadin, INR 2.3 today  - home dose of coumadin 3 mg on Mon, Thu, Sat; 2 mg all other days  - Begin persantine 75 TID,  today  - BNP 51  - OHT eval orders last admit      S/P ICD placement  - Biotronik ICD, ICD programming performed yesterday  -results: device fxn WNL, no sustained arrhythmias, no treated events seen     HTN  - Will assess MAP on admission  - lisinopril 20 BID at home, continue on admit  - goal MAP 60-80, have been around 82 this admit      DM  - SS insulin      -2 g Na dietary restriction, 1500 mL fluid restriction, strict I/Os      Jona Chaudhari MD  Cardiology Fellow  1/7/2017 8:04 AM

## 2017-01-07 NOTE — PLAN OF CARE
Problem: Patient Care Overview  Goal: Plan of Care Review  Outcome: Ongoing (interventions implemented as appropriate)  POC reviewed with patient and spouse with both verbalizing understanding. VSS and patient denies presence of pain. VAD numbers WNL. VAD dressing change due today with soap and water per wife. Patient continues on Persantine to lower LDH. Patient in no apparent sign of distress, will continue to monitor.

## 2017-01-08 LAB
ANION GAP SERPL CALC-SCNC: 8 MMOL/L
ANISOCYTOSIS BLD QL SMEAR: SLIGHT
APTT BLDCRRT: 33.6 SEC
BASOPHILS # BLD AUTO: 0.01 K/UL
BASOPHILS NFR BLD: 0.1 %
BUN SERPL-MCNC: 14 MG/DL
CALCIUM SERPL-MCNC: 9.4 MG/DL
CHLORIDE SERPL-SCNC: 95 MMOL/L
CO2 SERPL-SCNC: 27 MMOL/L
CREAT SERPL-MCNC: 0.9 MG/DL
DIFFERENTIAL METHOD: ABNORMAL
EOSINOPHIL # BLD AUTO: 0.3 K/UL
EOSINOPHIL NFR BLD: 3.5 %
ERYTHROCYTE [DISTWIDTH] IN BLOOD BY AUTOMATED COUNT: 19.8 %
EST. GFR  (AFRICAN AMERICAN): >60 ML/MIN/1.73 M^2
EST. GFR  (NON AFRICAN AMERICAN): >60 ML/MIN/1.73 M^2
GLUCOSE SERPL-MCNC: 150 MG/DL
HCT VFR BLD AUTO: 35.9 %
HGB BLD-MCNC: 11.2 G/DL
HYPOCHROMIA BLD QL SMEAR: ABNORMAL
INR PPP: 2.8
LDH SERPL L TO P-CCNC: 510 U/L
LYMPHOCYTES # BLD AUTO: 2.2 K/UL
LYMPHOCYTES NFR BLD: 24.5 %
MAGNESIUM SERPL-MCNC: 1.8 MG/DL
MCH RBC QN AUTO: 17.8 PG
MCHC RBC AUTO-ENTMCNC: 31.2 %
MCV RBC AUTO: 57 FL
MONOCYTES # BLD AUTO: 0.7 K/UL
MONOCYTES NFR BLD: 8 %
NEUTROPHILS # BLD AUTO: 5.8 K/UL
NEUTROPHILS NFR BLD: 63.9 %
OVALOCYTES BLD QL SMEAR: ABNORMAL
PHOSPHATE SERPL-MCNC: 4.7 MG/DL
PLATELET # BLD AUTO: 273 K/UL
PLATELET BLD QL SMEAR: ABNORMAL
PMV BLD AUTO: ABNORMAL FL
POCT GLUCOSE: 205 MG/DL (ref 70–110)
POCT GLUCOSE: 215 MG/DL (ref 70–110)
POCT GLUCOSE: 221 MG/DL (ref 70–110)
POIKILOCYTOSIS BLD QL SMEAR: SLIGHT
POTASSIUM SERPL-SCNC: 5 MMOL/L
PROTHROMBIN TIME: 28.4 SEC
RBC # BLD AUTO: 6.28 M/UL
SODIUM SERPL-SCNC: 130 MMOL/L
WBC # BLD AUTO: 9.1 K/UL

## 2017-01-08 PROCEDURE — 25000003 PHARM REV CODE 250: Performed by: PHYSICIAN ASSISTANT

## 2017-01-08 PROCEDURE — 85610 PROTHROMBIN TIME: CPT

## 2017-01-08 PROCEDURE — 85025 COMPLETE CBC W/AUTO DIFF WBC: CPT

## 2017-01-08 PROCEDURE — 83735 ASSAY OF MAGNESIUM: CPT

## 2017-01-08 PROCEDURE — 20600001 HC STEP DOWN PRIVATE ROOM

## 2017-01-08 PROCEDURE — 36415 COLL VENOUS BLD VENIPUNCTURE: CPT

## 2017-01-08 PROCEDURE — 85730 THROMBOPLASTIN TIME PARTIAL: CPT

## 2017-01-08 PROCEDURE — 27000248 HC VAD-ADDITIONAL DAY

## 2017-01-08 PROCEDURE — 99232 SBSQ HOSP IP/OBS MODERATE 35: CPT | Mod: ,,, | Performed by: INTERNAL MEDICINE

## 2017-01-08 PROCEDURE — 83615 LACTATE (LD) (LDH) ENZYME: CPT

## 2017-01-08 PROCEDURE — 80048 BASIC METABOLIC PNL TOTAL CA: CPT

## 2017-01-08 PROCEDURE — 84100 ASSAY OF PHOSPHORUS: CPT

## 2017-01-08 PROCEDURE — 25000003 PHARM REV CODE 250: Performed by: HOSPITALIST

## 2017-01-08 RX ORDER — WARFARIN 2 MG/1
2 TABLET ORAL DAILY
Status: DISCONTINUED | OUTPATIENT
Start: 2017-01-08 | End: 2017-01-09

## 2017-01-08 RX ADMIN — DOCUSATE SODIUM 200 MG: 100 CAPSULE, LIQUID FILLED ORAL at 08:01

## 2017-01-08 RX ADMIN — Medication 400 MG: at 09:01

## 2017-01-08 RX ADMIN — ASPIRIN 325 MG: 325 TABLET, DELAYED RELEASE ORAL at 08:01

## 2017-01-08 RX ADMIN — DIPYRIDAMOLE 75 MG: 75 TABLET, FILM COATED ORAL at 05:01

## 2017-01-08 RX ADMIN — Medication 400 MG: at 02:01

## 2017-01-08 RX ADMIN — INSULIN ASPART 2 UNITS: 100 INJECTION, SOLUTION INTRAVENOUS; SUBCUTANEOUS at 09:01

## 2017-01-08 RX ADMIN — SPIRONOLACTONE 25 MG: 25 TABLET, FILM COATED ORAL at 08:01

## 2017-01-08 RX ADMIN — INSULIN ASPART 2 UNITS: 100 INJECTION, SOLUTION INTRAVENOUS; SUBCUTANEOUS at 02:01

## 2017-01-08 RX ADMIN — TAMSULOSIN HYDROCHLORIDE 0.4 MG: 0.4 CAPSULE ORAL at 08:01

## 2017-01-08 RX ADMIN — AMIODARONE HYDROCHLORIDE 400 MG: 200 TABLET ORAL at 08:01

## 2017-01-08 RX ADMIN — DIPYRIDAMOLE 100 MG: 50 TABLET, FILM COATED ORAL at 02:01

## 2017-01-08 RX ADMIN — INSULIN ASPART 2 UNITS: 100 INJECTION, SOLUTION INTRAVENOUS; SUBCUTANEOUS at 07:01

## 2017-01-08 RX ADMIN — INSULIN ASPART 20 UNITS: 100 INJECTION, SOLUTION INTRAVENOUS; SUBCUTANEOUS at 07:01

## 2017-01-08 RX ADMIN — DIPYRIDAMOLE 100 MG: 50 TABLET, FILM COATED ORAL at 09:01

## 2017-01-08 RX ADMIN — LISINOPRIL 20 MG: 20 TABLET ORAL at 08:01

## 2017-01-08 RX ADMIN — IMIPRAMINE HYDROCHLORIDE 25 MG: 25 TABLET, FILM COATED ORAL at 08:01

## 2017-01-08 RX ADMIN — FERROUS GLUCONATE TAB 324 MG (37.5 MG ELEMENTAL IRON) 324 MG: 324 (37.5 FE) TAB at 08:01

## 2017-01-08 RX ADMIN — Medication 400 MG: at 05:01

## 2017-01-08 RX ADMIN — PANTOPRAZOLE SODIUM 40 MG: 40 TABLET, DELAYED RELEASE ORAL at 08:01

## 2017-01-08 RX ADMIN — WARFARIN SODIUM 2 MG: 2 TABLET ORAL at 05:01

## 2017-01-08 RX ADMIN — INSULIN ASPART 20 UNITS: 100 INJECTION, SOLUTION INTRAVENOUS; SUBCUTANEOUS at 02:01

## 2017-01-08 RX ADMIN — INSULIN ASPART 20 UNITS: 100 INJECTION, SOLUTION INTRAVENOUS; SUBCUTANEOUS at 09:01

## 2017-01-08 NOTE — PLAN OF CARE
Problem: Patient Care Overview  Goal: Plan of Care Review  Outcome: Ongoing (interventions implemented as appropriate)  POC reviewed with patient and spouse with both verbalizing understanding. VSS and patient denies presence of pain. Patient continues on Persantine to lower LDH. LVAD dressing due for change today per wife. Patient in no apparent sign of distress, will continue to monitor.

## 2017-01-08 NOTE — PLAN OF CARE
Problem: Patient Care Overview  Goal: Plan of Care Review  Outcome: Revised  Plan of care discussed with patient. AAO, VSS. Patient is free of fall/trauma/injury. Denies CP, SOB, or pain/discomfort.  , PO persantine given.  All questions addressed. Will continue to monitor.

## 2017-01-08 NOTE — PROGRESS NOTES
01/07/17 2000 01/07/17 2005   Vital Signs   /71 (!) 96/0   MAP (mmHg) 85 --    BP Location Left arm Left arm   BP Method Automatic Doppler   Patient Position Lying Lying   Scheduled lisinopril given. Dr. Gonzalez notified. No new orders. Will continue to monitor.

## 2017-01-08 NOTE — PROGRESS NOTES
Progress Note  Cardiology    Admit Date: 1/5/2017   LOS: 3 days     SUBJECTIVE:     Patient seen and examined. Denies chest pain or shortness of breath. Pt denies any other complaints.     Scheduled Meds:   amiodarone  400 mg Oral Daily    aspirin  325 mg Oral Daily    dipyridamole  100 mg Oral TID    docusate sodium  200 mg Oral QHS    ferrous gluconate  324 mg Oral Daily with breakfast    imipramine  25 mg Oral QHS    insulin aspart  20 Units Subcutaneous TID WM    lisinopril  20 mg Oral BID    magnesium oxide  400 mg Oral TID    pantoprazole  40 mg Oral Daily    sertraline  100 mg Oral Daily    spironolactone  25 mg Oral Daily    tamsulosin  0.4 mg Oral Daily    warfarin  2 mg Oral Daily     Continuous Infusions:   PRN Meds:cyclobenzaprine, dextrose 50%, dextrose 50%, glucagon (human recombinant), glucose, glucose, insulin aspart, ramelteon, senna-docusate 8.6-50 mg    Review of patient's allergies indicates:   Allergen Reactions    Levemir [insulin detemir] Itching    Pork/porcine containing products     Ranexa [ranolazine] Nausea Only       OBJECTIVE:     Vital Signs (Most Recent)  Temp: 97.9 °F (36.6 °C) (01/08/17 1130)  Pulse: 85 (01/08/17 1130)  Resp: 18 (01/08/17 1130)  BP: (!) 80/0 (01/08/17 1131)  SpO2: 97 % (01/08/17 1130)    Vital Signs Range (Last 24H):  Temp:  [96.8 °F (36 °C)-97.9 °F (36.6 °C)]   Pulse:  [72-85]   Resp:  [16-18]   BP: ()/(0-73)   SpO2:  [97 %-99 %]     I & O (Last 24H):  Intake/Output Summary (Last 24 hours) at 01/08/17 1245  Last data filed at 01/08/17 0537   Gross per 24 hour   Intake             1290 ml   Output             3200 ml   Net            -1910 ml       Physical Exam:   General: Patient in no acute distress or discomfort  HEENT: No JVD, moist mucous membranes  Cardiac: VAD hum  Chest: CTABL, no wheezing or rales  Abd:Soft NTND  Ext: No Edema No swelling  Neuro: A and O X 3, non focal  LABS  CBC with Diff:     Recent Labs  Lab 01/06/17  0570  01/07/17 0431 01/08/17 0428   WBC 8.93 10.77 9.10   HGB 11.1* 11.1* 11.2*   HCT 35.5* 34.5* 35.9*    251 273   LYMPH 30.1  2.7 21.3  2.3 24.5  2.2   MONO 6.7  0.6 6.3  0.7 8.0  0.7   EOSINOPHIL 2.7 2.8 3.5       COAG:    Recent Labs  Lab 01/06/17  0516 01/07/17 0431 01/08/17 0428   APTT 30.6 32.4* 33.6*   INR 1.9* 2.3* 2.8*       CMP:   Recent Labs  Lab 01/05/17  0506 01/06/17 0516 01/07/17 0431 01/08/17 0428   *  192* 189* 209* 150*   CALCIUM 9.4  9.6 9.7 9.5 9.4   ALBUMIN 3.4* 3.4*  --   --    PROT 7.5 7.3  --   --    *  132* 133* 129* 130*   K 4.7  4.6 5.0 4.9 5.0   CO2 27  26 27 27 27   CL 96  96 96 95 95   BUN 13  13 15 14 14   CREATININE 1.0  1.0 0.9 0.9 0.9   ALKPHOS 83 78  --   --    ALT 18 18  --   --    AST 28 24  --   --    BILITOT 0.7 0.6  --   --    MG 1.8 1.9 1.8 1.8   PHOS 4.2 4.3 3.9 4.7*     Estimated Creatinine Clearance: 102.2 mL/min (based on Cr of 0.9).    .  Recent Labs  Lab 01/06/17 0516   BNP 51       Present on Admission:   Elevated serum lactate dehydrogenase (LDH)   Cardiomyopathy, dilated, nonischemic      ASSESSMENT / PLAN:   55 yo man with CHFrEF, stage D underwent HM 2 LVAD placement on 8/24/16. s/p ICD, HTN, DM who presents with elevated LDH, r/o pump thrombosis      Elevated LDH/ S/P HM II  - VAD order set, Speed 9000, LSL 8600  - Continue ASA, continue coumadin will decrease to 2 mg, INR 2.8 today  - home dose of coumadin 3 mg on Mon, Thu, Sat; 2 mg all other days  - will increase persantine to 100 TID,  today  - BNP 51  - OHT eval orders last admit  -check daily LDH    S/P ICD placement  - Biotronik ICD, ICD programming performed yesterday  -results: device fxn WNL, no sustained arrhythmias, no treated events seen      HTN  - lisinopril 20 BID at home, continue on admit  - goal MAP 60-80, have been around 82 this admit      DM  - SS insulin      -2 g Na dietary restriction, 1500 mL fluid restriction, strict I/Os    Jona Chaudhari,  MD  Cardiology Fellow  1/8/2017 12:45 PM

## 2017-01-09 LAB
ALBUMIN SERPL BCP-MCNC: 3.5 G/DL
ALP SERPL-CCNC: 88 U/L
ALT SERPL W/O P-5'-P-CCNC: 18 U/L
ANION GAP SERPL CALC-SCNC: 7 MMOL/L
ANISOCYTOSIS BLD QL SMEAR: SLIGHT
APTT BLDCRRT: 34 SEC
AST SERPL-CCNC: 24 U/L
BASOPHILS # BLD AUTO: 0.02 K/UL
BASOPHILS NFR BLD: 0.2 %
BILIRUB DIRECT SERPL-MCNC: 0.3 MG/DL
BILIRUB SERPL-MCNC: 0.7 MG/DL
BNP SERPL-MCNC: 28 PG/ML
BUN SERPL-MCNC: 15 MG/DL
CALCIUM SERPL-MCNC: 9.1 MG/DL
CHLORIDE SERPL-SCNC: 91 MMOL/L
CO2 SERPL-SCNC: 28 MMOL/L
CREAT SERPL-MCNC: 0.9 MG/DL
CRP SERPL-MCNC: 13.2 MG/L
DIFFERENTIAL METHOD: ABNORMAL
EOSINOPHIL # BLD AUTO: 0.4 K/UL
EOSINOPHIL NFR BLD: 3.8 %
ERYTHROCYTE [DISTWIDTH] IN BLOOD BY AUTOMATED COUNT: 19.8 %
EST. GFR  (AFRICAN AMERICAN): >60 ML/MIN/1.73 M^2
EST. GFR  (NON AFRICAN AMERICAN): >60 ML/MIN/1.73 M^2
GLUCOSE SERPL-MCNC: 189 MG/DL
HCT VFR BLD AUTO: 37.8 %
HGB BLD-MCNC: 11.9 G/DL
INR PPP: 3.1
LDH SERPL L TO P-CCNC: 527 U/L
LYMPHOCYTES # BLD AUTO: 2.6 K/UL
LYMPHOCYTES NFR BLD: 24.3 %
MAGNESIUM SERPL-MCNC: 1.7 MG/DL
MCH RBC QN AUTO: 18 PG
MCHC RBC AUTO-ENTMCNC: 31.5 %
MCV RBC AUTO: 57 FL
MONOCYTES # BLD AUTO: 0.8 K/UL
MONOCYTES NFR BLD: 7 %
NEUTROPHILS # BLD AUTO: 6.9 K/UL
NEUTROPHILS NFR BLD: 64.7 %
OVALOCYTES BLD QL SMEAR: ABNORMAL
PHOSPHATE SERPL-MCNC: 4.1 MG/DL
PLATELET # BLD AUTO: 304 K/UL
PMV BLD AUTO: ABNORMAL FL
POCT GLUCOSE: 139 MG/DL (ref 70–110)
POCT GLUCOSE: 194 MG/DL (ref 70–110)
POCT GLUCOSE: 235 MG/DL (ref 70–110)
POCT GLUCOSE: 79 MG/DL (ref 70–110)
POIKILOCYTOSIS BLD QL SMEAR: SLIGHT
POLYCHROMASIA BLD QL SMEAR: ABNORMAL
POTASSIUM SERPL-SCNC: 4.9 MMOL/L
PREALB SERPL-MCNC: 20 MG/DL
PROT SERPL-MCNC: 7.1 G/DL
PROTHROMBIN TIME: 31.5 SEC
RBC # BLD AUTO: 6.62 M/UL
SCHISTOCYTES BLD QL SMEAR: ABNORMAL
SODIUM SERPL-SCNC: 126 MMOL/L
WBC # BLD AUTO: 10.85 K/UL

## 2017-01-09 PROCEDURE — 25000003 PHARM REV CODE 250: Performed by: INTERNAL MEDICINE

## 2017-01-09 PROCEDURE — 84100 ASSAY OF PHOSPHORUS: CPT

## 2017-01-09 PROCEDURE — 63600175 PHARM REV CODE 636 W HCPCS: Performed by: PHYSICIAN ASSISTANT

## 2017-01-09 PROCEDURE — 36415 COLL VENOUS BLD VENIPUNCTURE: CPT

## 2017-01-09 PROCEDURE — 85025 COMPLETE CBC W/AUTO DIFF WBC: CPT

## 2017-01-09 PROCEDURE — 99233 SBSQ HOSP IP/OBS HIGH 50: CPT | Mod: GC,,, | Performed by: INTERNAL MEDICINE

## 2017-01-09 PROCEDURE — 83880 ASSAY OF NATRIURETIC PEPTIDE: CPT

## 2017-01-09 PROCEDURE — 86140 C-REACTIVE PROTEIN: CPT

## 2017-01-09 PROCEDURE — 27000248 HC VAD-ADDITIONAL DAY

## 2017-01-09 PROCEDURE — 20600001 HC STEP DOWN PRIVATE ROOM

## 2017-01-09 PROCEDURE — 93750 INTERROGATION VAD IN PERSON: CPT | Performed by: PHYSICIAN ASSISTANT

## 2017-01-09 PROCEDURE — 25000003 PHARM REV CODE 250: Performed by: PHYSICIAN ASSISTANT

## 2017-01-09 PROCEDURE — 25000003 PHARM REV CODE 250: Performed by: HOSPITALIST

## 2017-01-09 PROCEDURE — 83615 LACTATE (LD) (LDH) ENZYME: CPT

## 2017-01-09 PROCEDURE — 93750 INTERROGATION VAD IN PERSON: CPT | Mod: ,,, | Performed by: INTERNAL MEDICINE

## 2017-01-09 PROCEDURE — 84134 ASSAY OF PREALBUMIN: CPT

## 2017-01-09 PROCEDURE — 83735 ASSAY OF MAGNESIUM: CPT

## 2017-01-09 PROCEDURE — 80048 BASIC METABOLIC PNL TOTAL CA: CPT

## 2017-01-09 PROCEDURE — 85730 THROMBOPLASTIN TIME PARTIAL: CPT

## 2017-01-09 PROCEDURE — 85610 PROTHROMBIN TIME: CPT

## 2017-01-09 PROCEDURE — 99232 SBSQ HOSP IP/OBS MODERATE 35: CPT | Mod: ,,, | Performed by: INTERNAL MEDICINE

## 2017-01-09 PROCEDURE — 80076 HEPATIC FUNCTION PANEL: CPT

## 2017-01-09 RX ORDER — POLYETHYLENE GLYCOL 3350, SODIUM SULFATE ANHYDROUS, SODIUM BICARBONATE, SODIUM CHLORIDE, POTASSIUM CHLORIDE 236; 22.74; 6.74; 5.86; 2.97 G/4L; G/4L; G/4L; G/4L; G/4L
4000 POWDER, FOR SOLUTION ORAL ONCE
Status: COMPLETED | OUTPATIENT
Start: 2017-01-10 | End: 2017-01-10

## 2017-01-09 RX ORDER — WARFARIN 1 MG/1
1 TABLET ORAL DAILY
Status: DISCONTINUED | OUTPATIENT
Start: 2017-01-09 | End: 2017-01-11

## 2017-01-09 RX ADMIN — AMIODARONE HYDROCHLORIDE 400 MG: 200 TABLET ORAL at 08:01

## 2017-01-09 RX ADMIN — INSULIN ASPART 2 UNITS: 100 INJECTION, SOLUTION INTRAVENOUS; SUBCUTANEOUS at 01:01

## 2017-01-09 RX ADMIN — IMIPRAMINE HYDROCHLORIDE 25 MG: 25 TABLET, FILM COATED ORAL at 09:01

## 2017-01-09 RX ADMIN — FERROUS GLUCONATE TAB 324 MG (37.5 MG ELEMENTAL IRON) 324 MG: 324 (37.5 FE) TAB at 08:01

## 2017-01-09 RX ADMIN — SPIRONOLACTONE 25 MG: 25 TABLET, FILM COATED ORAL at 08:01

## 2017-01-09 RX ADMIN — LISINOPRIL 20 MG: 20 TABLET ORAL at 08:01

## 2017-01-09 RX ADMIN — INSULIN ASPART 20 UNITS: 100 INJECTION, SOLUTION INTRAVENOUS; SUBCUTANEOUS at 08:01

## 2017-01-09 RX ADMIN — DIPYRIDAMOLE 100 MG: 50 TABLET, FILM COATED ORAL at 05:01

## 2017-01-09 RX ADMIN — DIPYRIDAMOLE 100 MG: 50 TABLET, FILM COATED ORAL at 09:01

## 2017-01-09 RX ADMIN — TAMSULOSIN HYDROCHLORIDE 0.4 MG: 0.4 CAPSULE ORAL at 08:01

## 2017-01-09 RX ADMIN — DOCUSATE SODIUM 200 MG: 100 CAPSULE, LIQUID FILLED ORAL at 09:01

## 2017-01-09 RX ADMIN — Medication 400 MG: at 09:01

## 2017-01-09 RX ADMIN — LISINOPRIL 20 MG: 20 TABLET ORAL at 09:01

## 2017-01-09 RX ADMIN — PANTOPRAZOLE SODIUM 40 MG: 40 TABLET, DELAYED RELEASE ORAL at 08:01

## 2017-01-09 RX ADMIN — DIPYRIDAMOLE 100 MG: 50 TABLET, FILM COATED ORAL at 02:01

## 2017-01-09 RX ADMIN — MAGNESIUM SULFATE HEPTAHYDRATE 1 G: 500 INJECTION, SOLUTION INTRAMUSCULAR; INTRAVENOUS at 09:01

## 2017-01-09 RX ADMIN — ASPIRIN 325 MG: 325 TABLET, DELAYED RELEASE ORAL at 08:01

## 2017-01-09 RX ADMIN — WARFARIN SODIUM 1 MG: 1 TABLET ORAL at 04:01

## 2017-01-09 RX ADMIN — INSULIN ASPART 20 UNITS: 100 INJECTION, SOLUTION INTRAVENOUS; SUBCUTANEOUS at 01:01

## 2017-01-09 RX ADMIN — Medication 400 MG: at 05:01

## 2017-01-09 RX ADMIN — Medication 400 MG: at 01:01

## 2017-01-09 NOTE — PLAN OF CARE
Problem: Patient Care Overview  Goal: Plan of Care Review  Outcome: Ongoing (interventions implemented as appropriate)  POC reviewed with patient and spouse with both verbalizing understanding. VSS and patient denies presence of pain. LVAD numbers WNL. LVAD dressing due for change today with soap and water per wife. Patient disappointed his LDH went up from yesterday. Patient continues on persantine. Patient received Mg IV replacement. Patient has remained free of falls and injuries, in no apparent sign of distress, will continue to monitor.

## 2017-01-09 NOTE — CONSULTS
GI Initial Consult H&P    Requesting service: HTS  Reason for Consult: heart transplant workup need colonoscopy    HPI:  Mick Barriga is a 56 y.o. male hx CHFrEF,  HM 2 LVAD placement on 8/24/16 on coumadin, s/p ICD, HTN, DM who presents with elevated LDH, undergoing workup and rule out pump thrombosis. Needs to remain on coumadin.  Primary team asking for evaluation for inpatient colonoscopy.  Pt has never had colonoscopy prior. No family hx of colon cancer or martinez cancers. Has brother with primary CNS malignancy. No blood in stool or changes in BMs. No diarrhea. No dysphagia. Otherwise feeling well, just concerned about his 'ldh level'      Endoscopic Hx:  n/a    Review of Systems:  Constitutional: no fever, chills or change in weight   Eyes: no visual changes   ENT: no sore throat or dysphagia  Respiratory: no cough or shortness of breath   Cardiovascular: no chest pain or palpitations   Gastrointestinal: as per HPI  Hematologic/Lymphatic: no easy bruising or lymphadenopathy   Musculoskeletal: no arthralgias or myalgias   Neurological: no seizures, tremors or change in mental status  Behavioral/Psych: no auditory or visual hallucinations    Past Medical History   Diagnosis Date    CHF (congestive heart failure)     Coronary artery disease     GERD (gastroesophageal reflux disease)     Hyperlipidemia     Hypertension     Type 2 diabetes mellitus with hyperglycemia        Past Surgical History   Procedure Laterality Date    Cardiac pacemaker placement      Cholecystectomy      Left ventricular assist device       x 3 months ago       Family History   Problem Relation Age of Onset    Heart disease Mother     Hypertension Mother     Heart attack Mother     Heart disease Father     Hypertension Father     Heart attack Father        Review of patient's allergies indicates:   Allergen Reactions    Levemir [insulin detemir] Itching    Pork/porcine containing products     Ranexa [ranolazine] Nausea  "Only       Social History     Social History    Marital status:      Spouse name: N/A    Number of children: N/A    Years of education: N/A     Social History Main Topics    Smoking status: Former Smoker     Packs/day: 0.50     Years: 15.00     Quit date: 6/14/2006    Smokeless tobacco: Never Used    Alcohol use No    Drug use: None    Sexual activity: Yes     Partners: Female     Other Topics Concern    None     Social History Narrative       Medications:     amiodarone  400 mg Oral Daily    aspirin  325 mg Oral Daily    dipyridamole  100 mg Oral TID    docusate sodium  200 mg Oral QHS    ferrous gluconate  324 mg Oral Daily with breakfast    imipramine  25 mg Oral QHS    insulin aspart  20 Units Subcutaneous TID WM    lisinopril  20 mg Oral BID    magnesium oxide  400 mg Oral TID    pantoprazole  40 mg Oral Daily    sertraline  100 mg Oral Daily    spironolactone  25 mg Oral Daily    tamsulosin  0.4 mg Oral Daily    warfarin  1 mg Oral Daily       Physical exam:  Visit Vitals    BP (!) 86/0 (BP Location: Left arm, Patient Position: Lying, BP Method: Doppler)    Pulse 72    Temp 96.4 °F (35.8 °C) (Oral)    Resp 18    Ht 5' 8" (1.727 m)    Wt 90.2 kg (198 lb 13.7 oz)    SpO2 97%    BMI 30.24 kg/m2       Gen: pleasant male in NAD with wife at bedside  HEENT: sclera non-icteric   Chest: non-labored breathing  CV: LVAD sounds ; distal pulses intact  Abd: soft, NT , ND ; active BS ; no organomegaly ; LVAD port over RLQ  Ext: no LE edema   Skin: no rashes,  or lesions   Pscyh: appropriate mood and affect  Neuro: alert, oriented ; no focal deficits noted        Recent Labs  Lab 01/09/17  0536   WBC 10.85   RBC 6.62*   HGB 11.9*   HCT 37.8*      MCV 57*   MCH 18.0*   MCHC 31.5*       Recent Labs  Lab 01/09/17  0536   CALCIUM 9.1   PROT 7.1   *   K 4.9   CO2 28   CL 91*   BUN 15   CREATININE 0.9   ALKPHOS 88   ALT 18   AST 24   BILITOT 0.7       Recent Labs  Lab " 01/09/17  0536   INR 3.1*   APTT 34.0*       MELD-Na score: 27 at 1/9/2017  5:36 AM  MELD score: 19 at 1/9/2017  5:36 AM  Calculated from:  Serum Creatinine: 0.9 mg/dL (Rounded to 1) at 1/9/2017  5:36 AM  Serum Sodium: 126 mmol/L at 1/9/2017  5:36 AM  Total Bilirubin: 0.7 mg/dL (Rounded to 1) at 1/9/2017  5:36 AM  INR(ratio): 3.1 at 1/9/2017  5:36 AM  Age: 56 years      Assessment:  Mick Barriga is a 56 y.o. male with LVAD on coumadin, here for concern for pump thrombosis with elevated LDH. Primary team requesting colonoscopy for potential Heart transplant eval.      Recs:  Plan for colonoscopy Wednesday (given had regular diet this morning)  Place on clear liquids tomorrow, NPO past Midnight Tuesday night  Will start prep tomorrow night, split dose PEG ( i will place order)      Thank you for the consult. Seen and discussed with attending, with addendum to follow.   Please call with any additional concerns/ questions.    Prasanth Gore MD  Gastroenterology Fellow ([id number])  Pager: [phone]

## 2017-01-09 NOTE — PROGRESS NOTES
Progress Note  Heart Transplant Service    Admit Date: 1/5/2017   LOS: 4 days     SUBJECTIVE:     Follow up for: Elevated serum lactate dehydrogenase (LDH)    Interval History: Patient has no complaints overnight. Denies chest pain, SOB, NVD. Demonstrates understanding that he needs C-scope to complete OHT work up.     Scheduled Meds:   amiodarone  400 mg Oral Daily    aspirin  325 mg Oral Daily    dipyridamole  100 mg Oral TID    docusate sodium  200 mg Oral QHS    ferrous gluconate  324 mg Oral Daily with breakfast    imipramine  25 mg Oral QHS    insulin aspart  20 Units Subcutaneous TID WM    lisinopril  20 mg Oral BID    magnesium oxide  400 mg Oral TID    pantoprazole  40 mg Oral Daily    sertraline  100 mg Oral Daily    spironolactone  25 mg Oral Daily    tamsulosin  0.4 mg Oral Daily    warfarin  1 mg Oral Daily     Continuous Infusions:   PRN Meds:cyclobenzaprine, dextrose 50%, dextrose 50%, glucagon (human recombinant), glucose, glucose, insulin aspart, ramelteon, senna-docusate 8.6-50 mg    Review of patient's allergies indicates:   Allergen Reactions    Levemir [insulin detemir] Itching    Pork/porcine containing products     Ranexa [ranolazine] Nausea Only       OBJECTIVE:     Vital Signs (Most Recent)  Temp: 96.4 °F (35.8 °C) (01/09/17 1120)  Pulse: 74 (01/09/17 1300)  Resp: 18 (01/09/17 1120)  BP: (!) 86/0 (01/09/17 1121)  SpO2: 97 % (01/09/17 1120)    Vital Signs Range (Last 24H):  Temp:  [96.4 °F (35.8 °C)-98 °F (36.7 °C)]   Pulse:  []   Resp:  [16-18]   BP: ()/(0-70)   SpO2:  [95 %-99 %]     I & O (Last 24H):  Intake/Output Summary (Last 24 hours) at 01/09/17 1441  Last data filed at 01/09/17 0600   Gross per 24 hour   Intake             1191 ml   Output             1450 ml   Net             -259 ml     Telemetry: no events overnight    Physical Exam  General: Patient in no acute distress or discomfort  HEENT: No JVD, moist mucous membranes  Cardiac: VAD hum  smooth  Chest: CTABL, no wheezing or rales  Abd:Soft NTND  Ext: No Edema No swelling  Neuro: A and O X 3, non focal    Labs:    Recent Labs  Lab 01/07/17  0431 01/08/17 0428 01/09/17  0536   WBC 10.77 9.10 10.85   HGB 11.1* 11.2* 11.9*   HCT 34.5* 35.9* 37.8*    273 304   LYMPH 21.3  2.3 24.5  2.2 24.3  2.6   MONO 6.3  0.7 8.0  0.7 7.0  0.8   EOSINOPHIL 2.8 3.5 3.8       Recent Labs  Lab 01/07/17  0431 01/08/17  0428 01/09/17  0536   APTT 32.4* 33.6* 34.0*   INR 2.3* 2.8* 3.1*        Recent Labs  Lab 01/05/17  0506 01/06/17  0516 01/07/17 0431 01/08/17 0428 01/09/17  0536   *  192* 189* 209* 150* 189*   CALCIUM 9.4  9.6 9.7 9.5 9.4 9.1   ALBUMIN 3.4* 3.4*  --   --  3.5   PROT 7.5 7.3  --   --  7.1   *  132* 133* 129* 130* 126*   K 4.7  4.6 5.0 4.9 5.0 4.9   CO2 27  26 27 27 27 28   CL 96  96 96 95 95 91*   BUN 13  13 15 14 14 15   CREATININE 1.0  1.0 0.9 0.9 0.9 0.9   ALKPHOS 83 78  --   --  88   ALT 18 18  --   --  18   AST 28 24  --   --  24   BILITOT 0.7 0.6  --   --  0.7   MG 1.8 1.9 1.8 1.8 1.7   PHOS 4.2 4.3 3.9 4.7* 4.1     Estimated Creatinine Clearance: 102.3 mL/min (based on Cr of 0.9).      Recent Labs  Lab 01/09/17  0536   BNP 28       Recent Labs  Lab 01/03/17  1605 01/05/17  0506 01/06/17  0516 01/07/17  0431 01/08/17  0428 01/09/17  0536   * 558* 542* 546* 510* 527*       Microbiology Results (last 7 days)     ** No results found for the last 168 hours. **          ASSESSMENT:     57 yo man with CHFrEF, stage D underwent HM 2 LVAD placement on 8/24/16. s/p ICD, HTN, DM who presents with elevated LDH, r/o pump thrombosis    PLAN:       Elevated LDH/ S/P HM II  - VAD order set, Speed 9000, LSL 8600  - Continue ASA, continue coumadin will decrease to 1 mg, INR 3.1 today  - home dose of coumadin 3 mg on Mon, Thu, Sat; 2 mg all other days  - will increase persantine to 100 TID,  today  - BNP 51     OHT eval  - Colonoscopy planned for this week. Patient  will get screening c-scope (no date yet, to be done w/o biopsy due to need for current anticoagulation). Appreciate GI assistance.   - will back off on coumadin dose tonight due to INR 3.1 today. (dose 1 mg today)    S/P ICD placement  - Biotronik ICD, ICD programming performed   -results: device fxn WNL, no sustained arrhythmias, no treated events seen      HTN  - lisinopril 20 BID at home, continue on admit  - goal MAP 60-80, currently at goal      DM  - SS insulin      -2 g Na dietary restriction, 1500 mL fluid restriction, strict I/Os      Juany Benjamin PA-C  [facility] #[id number]

## 2017-01-09 NOTE — NURSING
Notified HTS MD of patient's elevated doppler 98/0. Patient is pulsatile with pressure of 101/68 and map of 80. MD informed me to go with MAP which is WNL of 60-80 parameters. Will administer morning medication and continue to monitor.

## 2017-01-09 NOTE — PROGRESS NOTES
Update Note:    SW to pt's room for update. Pt and wife aaox4, calm, and pleasant. Pt and wife report coping well at this time. SW explained psychosocial evaluation will need to be finished (pt refused psychosocial eval with GERALD Graham LMSW on  8/23/16) prior to decision being made about listing pt for transplant. Pt and wife agreed to meet with SW to complete Psychosocial tomorrow. Pt and wife report no questions or concerns for SW at this time. SW providing ongoing psychosocial counseling and support, education, assistance, resources, and discharge planning as indicated. SW continuing to follow and remains available.

## 2017-01-09 NOTE — PROCEDURES
Patient aaox3  with wife at bedside. VAD interrogation completed this AM in the event changes needed to be made. Pt denies any additional questions at this time.  Will continue to monitor for further issues.     Pulsatile: Yes, intermittent   VAD Sounds: Smooth  Problems / Issues / Alarms with VAD if any: None noted      VAD Interrogation:  TXP LVAD INTERROGATIONS 1/9/2017 1/9/2017 1/9/2017 1/9/2017 1/8/2017 1/8/2017 1/8/2017   Type HeartMate II HeartMate II HeartMate II HeartMate II HeartMate II HeartMate II HeartMate II   Flow 4.5 4.2 4.6 4.6 - 6.0 -   Speed 9000 9000 9000 9000 - 9000 -   PI 6.1 7.3 6.9 6.4 - 6.8 -   Power (Kwon) 4.9 4.9 4.8 5.6 - 5.6 -   LSL 8600 - - - - - -   Pulsatility Intermittent pulse Pulse Pulse - - - -

## 2017-01-09 NOTE — PROGRESS NOTES
01/08/17 1945 01/08/17 1959   Vital Signs   BP (!) 92/0 101/70   MAP (mmHg) --  82   BP Location Left arm Left arm   BP Method Automatic Automatic   Patient Position Lying Lying   Dr. Majano notified. Stated this is ok, going by MAPs. Scheduled med given, continuing to monitor. No new orders.

## 2017-01-09 NOTE — PLAN OF CARE
Problem: Patient Care Overview  Goal: Plan of Care Review  Outcome: Revised  Plan of care discussed with patient. AAO,VSS. Patient is free of fall/trauma/injury. Denies CP, SOB, or pain/discomfort. All questions addressed. Will continue to monitor.

## 2017-01-10 ENCOUNTER — ANESTHESIA EVENT (OUTPATIENT)
Dept: ENDOSCOPY | Facility: HOSPITAL | Age: 57
DRG: 948 | End: 2017-01-10
Payer: MEDICARE

## 2017-01-10 ENCOUNTER — DOCUMENTATION ONLY (OUTPATIENT)
Dept: TRANSPLANT | Facility: CLINIC | Age: 57
End: 2017-01-10

## 2017-01-10 LAB
ANION GAP SERPL CALC-SCNC: 8 MMOL/L
ANISOCYTOSIS BLD QL SMEAR: SLIGHT
APTT BLDCRRT: 34.1 SEC
BACTERIA #/AREA URNS AUTO: NORMAL /HPF
BASOPHILS # BLD AUTO: 0.02 K/UL
BASOPHILS NFR BLD: 0.2 %
BILIRUB UR QL STRIP: NEGATIVE
BUN SERPL-MCNC: 15 MG/DL
BURR CELLS BLD QL SMEAR: ABNORMAL
CALCIUM SERPL-MCNC: 8.8 MG/DL
CHLORIDE SERPL-SCNC: 93 MMOL/L
CLARITY UR REFRACT.AUTO: CLEAR
CO2 SERPL-SCNC: 24 MMOL/L
COLOR UR AUTO: YELLOW
CREAT SERPL-MCNC: 0.8 MG/DL
CREAT UR-MCNC: 32 MG/DL
DIFFERENTIAL METHOD: ABNORMAL
EOSINOPHIL # BLD AUTO: 0.4 K/UL
EOSINOPHIL NFR BLD: 3.6 %
ERYTHROCYTE [DISTWIDTH] IN BLOOD BY AUTOMATED COUNT: 19.6 %
EST. GFR  (AFRICAN AMERICAN): >60 ML/MIN/1.73 M^2
EST. GFR  (NON AFRICAN AMERICAN): >60 ML/MIN/1.73 M^2
GLUCOSE SERPL-MCNC: 219 MG/DL
GLUCOSE UR QL STRIP: ABNORMAL
HCT VFR BLD AUTO: 36.5 %
HGB BLD-MCNC: 11.7 G/DL
HGB UR QL STRIP: NEGATIVE
HYPOCHROMIA BLD QL SMEAR: ABNORMAL
INR PPP: 2.9
KETONES UR QL STRIP: NEGATIVE
LDH SERPL L TO P-CCNC: 484 U/L
LEUKOCYTE ESTERASE UR QL STRIP: NEGATIVE
LYMPHOCYTES # BLD AUTO: 2.2 K/UL
LYMPHOCYTES NFR BLD: 22.2 %
MAGNESIUM SERPL-MCNC: 1.7 MG/DL
MCH RBC QN AUTO: 18.1 PG
MCHC RBC AUTO-ENTMCNC: 32.1 %
MCV RBC AUTO: 57 FL
MICROSCOPIC COMMENT: NORMAL
MONOCYTES # BLD AUTO: 0.8 K/UL
MONOCYTES NFR BLD: 8.3 %
NEUTROPHILS # BLD AUTO: 6.4 K/UL
NEUTROPHILS NFR BLD: 65 %
NITRITE UR QL STRIP: NEGATIVE
OSMOLALITY SERPL: 274 MOSM/KG
OVALOCYTES BLD QL SMEAR: ABNORMAL
PH UR STRIP: 5 [PH] (ref 5–8)
PHOSPHATE SERPL-MCNC: 3.4 MG/DL
PLATELET # BLD AUTO: 259 K/UL
PLATELET BLD QL SMEAR: ABNORMAL
PMV BLD AUTO: ABNORMAL FL
POCT GLUCOSE: 198 MG/DL (ref 70–110)
POCT GLUCOSE: 211 MG/DL (ref 70–110)
POCT GLUCOSE: 246 MG/DL (ref 70–110)
POCT GLUCOSE: 253 MG/DL (ref 70–110)
POIKILOCYTOSIS BLD QL SMEAR: SLIGHT
POLYCHROMASIA BLD QL SMEAR: ABNORMAL
POTASSIUM SERPL-SCNC: 4.7 MMOL/L
PROT UR QL STRIP: NEGATIVE
PROTHROMBIN TIME: 29.4 SEC
RBC # BLD AUTO: 6.46 M/UL
RBC #/AREA URNS AUTO: 1 /HPF (ref 0–4)
SODIUM SERPL-SCNC: 125 MMOL/L
SODIUM UR-SCNC: 27 MMOL/L
SP GR UR STRIP: 1 (ref 1–1.03)
TARGETS BLD QL SMEAR: ABNORMAL
URN SPEC COLLECT METH UR: ABNORMAL
UROBILINOGEN UR STRIP-ACNC: NEGATIVE EU/DL
WBC # BLD AUTO: 9.9 K/UL
YEAST UR QL AUTO: NORMAL

## 2017-01-10 PROCEDURE — 25000003 PHARM REV CODE 250: Performed by: PHYSICIAN ASSISTANT

## 2017-01-10 PROCEDURE — 82570 ASSAY OF URINE CREATININE: CPT

## 2017-01-10 PROCEDURE — 84300 ASSAY OF URINE SODIUM: CPT

## 2017-01-10 PROCEDURE — 25000003 PHARM REV CODE 250: Performed by: HOSPITALIST

## 2017-01-10 PROCEDURE — 85025 COMPLETE CBC W/AUTO DIFF WBC: CPT

## 2017-01-10 PROCEDURE — 36415 COLL VENOUS BLD VENIPUNCTURE: CPT

## 2017-01-10 PROCEDURE — 99223 1ST HOSP IP/OBS HIGH 75: CPT | Mod: ,,, | Performed by: INTERNAL MEDICINE

## 2017-01-10 PROCEDURE — 83735 ASSAY OF MAGNESIUM: CPT

## 2017-01-10 PROCEDURE — 93750 INTERROGATION VAD IN PERSON: CPT | Performed by: PHYSICIAN ASSISTANT

## 2017-01-10 PROCEDURE — 84100 ASSAY OF PHOSPHORUS: CPT

## 2017-01-10 PROCEDURE — 93750 INTERROGATION VAD IN PERSON: CPT | Mod: ,,, | Performed by: INTERNAL MEDICINE

## 2017-01-10 PROCEDURE — 85730 THROMBOPLASTIN TIME PARTIAL: CPT

## 2017-01-10 PROCEDURE — 99232 SBSQ HOSP IP/OBS MODERATE 35: CPT | Mod: ,,, | Performed by: INTERNAL MEDICINE

## 2017-01-10 PROCEDURE — 25000003 PHARM REV CODE 250: Performed by: INTERNAL MEDICINE

## 2017-01-10 PROCEDURE — 80048 BASIC METABOLIC PNL TOTAL CA: CPT

## 2017-01-10 PROCEDURE — 93283 PRGRMG EVAL IMPLANTABLE DFB: CPT

## 2017-01-10 PROCEDURE — 83930 ASSAY OF BLOOD OSMOLALITY: CPT

## 2017-01-10 PROCEDURE — 63600175 PHARM REV CODE 636 W HCPCS: Performed by: PHYSICIAN ASSISTANT

## 2017-01-10 PROCEDURE — 27000248 HC VAD-ADDITIONAL DAY

## 2017-01-10 PROCEDURE — 93283 PRGRMG EVAL IMPLANTABLE DFB: CPT | Mod: 26,,, | Performed by: INTERNAL MEDICINE

## 2017-01-10 PROCEDURE — 20600001 HC STEP DOWN PRIVATE ROOM

## 2017-01-10 PROCEDURE — 81001 URINALYSIS AUTO W/SCOPE: CPT

## 2017-01-10 PROCEDURE — 83615 LACTATE (LD) (LDH) ENZYME: CPT

## 2017-01-10 PROCEDURE — 85610 PROTHROMBIN TIME: CPT

## 2017-01-10 RX ORDER — HYDRALAZINE HYDROCHLORIDE 25 MG/1
25 TABLET, FILM COATED ORAL ONCE
Status: COMPLETED | OUTPATIENT
Start: 2017-01-10 | End: 2017-01-10

## 2017-01-10 RX ORDER — AMLODIPINE BESYLATE 5 MG/1
5 TABLET ORAL DAILY
Status: DISCONTINUED | OUTPATIENT
Start: 2017-01-10 | End: 2017-01-19

## 2017-01-10 RX ORDER — SERTRALINE HYDROCHLORIDE 50 MG/1
50 TABLET, FILM COATED ORAL DAILY
Status: DISCONTINUED | OUTPATIENT
Start: 2017-01-11 | End: 2017-01-16

## 2017-01-10 RX ADMIN — INSULIN ASPART 20 UNITS: 100 INJECTION, SOLUTION INTRAVENOUS; SUBCUTANEOUS at 06:01

## 2017-01-10 RX ADMIN — INSULIN ASPART 2 UNITS: 100 INJECTION, SOLUTION INTRAVENOUS; SUBCUTANEOUS at 06:01

## 2017-01-10 RX ADMIN — INSULIN ASPART 20 UNITS: 100 INJECTION, SOLUTION INTRAVENOUS; SUBCUTANEOUS at 09:01

## 2017-01-10 RX ADMIN — AMLODIPINE BESYLATE 5 MG: 5 TABLET ORAL at 05:01

## 2017-01-10 RX ADMIN — INSULIN ASPART 20 UNITS: 100 INJECTION, SOLUTION INTRAVENOUS; SUBCUTANEOUS at 01:01

## 2017-01-10 RX ADMIN — Medication 400 MG: at 05:01

## 2017-01-10 RX ADMIN — MAGNESIUM SULFATE HEPTAHYDRATE 3 G: 500 INJECTION, SOLUTION INTRAMUSCULAR; INTRAVENOUS at 10:01

## 2017-01-10 RX ADMIN — TAMSULOSIN HYDROCHLORIDE 0.4 MG: 0.4 CAPSULE ORAL at 08:01

## 2017-01-10 RX ADMIN — LISINOPRIL 20 MG: 20 TABLET ORAL at 09:01

## 2017-01-10 RX ADMIN — DIPYRIDAMOLE 100 MG: 50 TABLET, FILM COATED ORAL at 09:01

## 2017-01-10 RX ADMIN — Medication 400 MG: at 09:01

## 2017-01-10 RX ADMIN — FERROUS GLUCONATE TAB 324 MG (37.5 MG ELEMENTAL IRON) 324 MG: 324 (37.5 FE) TAB at 08:01

## 2017-01-10 RX ADMIN — POLYETHYLENE GLYCOL 3350, SODIUM SULFATE ANHYDROUS, SODIUM BICARBONATE, SODIUM CHLORIDE, POTASSIUM CHLORIDE 4000 ML: 236; 22.74; 6.74; 5.86; 2.97 POWDER, FOR SOLUTION ORAL at 06:01

## 2017-01-10 RX ADMIN — PANTOPRAZOLE SODIUM 40 MG: 40 TABLET, DELAYED RELEASE ORAL at 08:01

## 2017-01-10 RX ADMIN — HYDRALAZINE HYDROCHLORIDE 25 MG: 25 TABLET, FILM COATED ORAL at 01:01

## 2017-01-10 RX ADMIN — SPIRONOLACTONE 25 MG: 25 TABLET, FILM COATED ORAL at 08:01

## 2017-01-10 RX ADMIN — LISINOPRIL 20 MG: 20 TABLET ORAL at 08:01

## 2017-01-10 RX ADMIN — DIPYRIDAMOLE 100 MG: 50 TABLET, FILM COATED ORAL at 01:01

## 2017-01-10 RX ADMIN — INSULIN ASPART 2 UNITS: 100 INJECTION, SOLUTION INTRAVENOUS; SUBCUTANEOUS at 01:01

## 2017-01-10 RX ADMIN — Medication 400 MG: at 01:01

## 2017-01-10 RX ADMIN — IMIPRAMINE HYDROCHLORIDE 25 MG: 25 TABLET, FILM COATED ORAL at 09:01

## 2017-01-10 RX ADMIN — DIPYRIDAMOLE 100 MG: 50 TABLET, FILM COATED ORAL at 05:01

## 2017-01-10 RX ADMIN — AMIODARONE HYDROCHLORIDE 400 MG: 200 TABLET ORAL at 08:01

## 2017-01-10 RX ADMIN — ASPIRIN 325 MG: 325 TABLET, DELAYED RELEASE ORAL at 08:01

## 2017-01-10 RX ADMIN — WARFARIN SODIUM 1 MG: 1 TABLET ORAL at 05:01

## 2017-01-10 NOTE — PROCEDURES
Patient aaox3 with  Wife and Dr. Marx and [facility] team at bedside. VAD interrogation completed this AM in the event changes needed to be made. Will continue to monitor for further issues.     Pulsatile: Yes, intermittent   VAD Sounds: Smooth  Problems / Issues / Alarms with VAD if any: None noted      VAD Interrogation:  TXP LVAD INTERROGATIONS 1/10/2017 1/10/2017 1/10/2017 1/9/2017 1/9/2017 1/9/2017 1/9/2017   Type HeartMate II HeartMate II HeartMate II HeartMate II HeartMate II HeartMate II HeartMate II   Flow 4.9 5.0 5.2 4.4 4.5 4.2 5.1   Speed 9000 9000 9000 9000 9000 9000 9000   PI 7.0 6.4 6.5 6.5 6.1 6.4 4.9   Power (Kwon) 5.2 5.3 5.4 4.8 5 4.8 5.9   LSL 8600 8600 8600 8600 8600 - -   Pulsatility No Pulse No Pulse No Pulse Intermittent pulse Intermittent pulse - -   }

## 2017-01-10 NOTE — CONSULTS
Cardiac Electrophysiology Consult Note    Reason for Consultation: Palpitations  Attending Requesting: Francisco J  SUBJECTIVE:     History of Present Illness:  Patient is a 56 y.o. male with CAD and ICM s/p HM II LVAD 8/2016, HLD, HTN, Biotronik ICD for history of wide complex tachycardia currently being evaluated for OHT admitted for elevated LDH. Last night, he had an episode of palpitations and strange sensation of feeling like he was tremoring all over his body that lasted around 5 minutes. No chest pain or SOB during the episode. Otherwise, no complaints. Feels well. EP consulted for concern for slow VT.    Review of patient's allergies indicates:   Allergen Reactions    Levemir [insulin detemir] Itching    Pork/porcine containing products     Ranexa [ranolazine] Nausea Only       Past Medical History   Diagnosis Date    CHF (congestive heart failure)     Coronary artery disease     GERD (gastroesophageal reflux disease)     Hyperlipidemia     Hypertension     Type 2 diabetes mellitus with hyperglycemia      Past Surgical History   Procedure Laterality Date    Cardiac pacemaker placement      Cholecystectomy      Left ventricular assist device       x 3 months ago     Family History   Problem Relation Age of Onset    Heart disease Mother     Hypertension Mother     Heart attack Mother     Heart disease Father     Hypertension Father     Heart attack Father      Social History   Substance Use Topics    Smoking status: Former Smoker     Packs/day: 0.50     Years: 15.00     Quit date: 6/14/2006    Smokeless tobacco: Never Used    Alcohol use No        Review of Systems:  Gen: denies fever chills fatigue weight loss  HEENT: denies HA, blurry vision, tinnitus, dry mouth  Resp: denies SOB, cough, hemoptysis  CV: Denies CP, palps, orthopnea, PND, edema  GI: denies abd pain, nausea/vomiting, diarrhea, melena, constipation, hematochezia  : denies dysuria, hematuria  MSK: denies arthralgia,  myalgia  Neuro: denies LOC, weakness, seizure, HA, sensory loss  Psych: denies depression, anxiety, AH/VH  Endo: denies heat or cold intolerance, goiter  Heme: denies bleeding, infection, petechiae       OBJECTIVE:     Vital Signs (Most Recent)  Temp: 97.8 °F (36.6 °C) (01/10/17 1200)  Pulse: 90 (01/10/17 1519)  Resp: 16 (01/10/17 1200)  BP: (!) 92/0 (01/10/17 1522)  SpO2: 100 % (01/10/17 1519)    Vital Signs Range (Last 24H):  Temp:  [97.6 °F (36.4 °C)-98.2 °F (36.8 °C)]   Pulse:  [80-93]   Resp:  [16-18]   BP: ()/(0-75)   SpO2:  [97 %-100 %]     Physical Exam:  Gen: no acute distress, alert & oriented x 3  HEENT: normocephalic, atraumatic, extraocular movements intact, pupils equal round reactive to light, moist mucous membranes  Neck: no JVD, no carotid bruits  CV:  VAD hum  Resp: clear to auscultation bilaterally, normal effort  GI: soft, nontender nondistended, normal bowel sounds  Ext: warm well perfused, no edema, no clubbing or cyanosis      Laboratory:  Chemistry:  Lab Results   Component Value Date     (L) 01/10/2017    K 4.7 01/10/2017    CL 93 (L) 01/10/2017    CO2 24 01/10/2017    BUN 15 01/10/2017    CREATININE 0.8 01/10/2017    CALCIUM 8.8 01/10/2017    BNP 28 01/09/2017     CBC:   Lab Results   Component Value Date    WBC 9.90 01/10/2017    HGB 11.7 (L) 01/10/2017    HCT 36.5 (L) 01/10/2017    HCT 24 (L) 08/26/2016    MCV 57 (L) 01/10/2017     01/10/2017     Coagulation:   Lab Results   Component Value Date    INR 2.9 (H) 01/10/2017    INR 4.4 10/10/2016    APTT 34.1 (H) 01/10/2017       Diagnostic Results:  Telemetry with sinus rhythm and 8 minutes of V-paced rhythm at 90 bpm, no signs of VT    ASSESSMENT/PLAN:     Mr. Barriga is a 56 year old man with CAD and ICM s/p HM II LVAD and WCT s/p Biotronik ICD who presents for elevated LDH undergoing OHT eval, consulted to EP for palptiations overnight and concern for slow VT. Telemetry and interrogation with no signs of VT. During our  visit, we ventricular paced him and he had recurrence of symptoms. Likely became bradycardic overnight while sleeping and began to pace and he appears symptomatic when paced. Lowered his pacing threshold from 50 bpm to 40 bpm.    Plan:   --continue to monitor with new device settings  --if recurrence of symptoms or new questions arise, do not hesitate to call    Thank you for this interesting consult. Further recommendations per attending addendum    Andre Lin MD  PGY-[phone])  Cardiology Fellow

## 2017-01-10 NOTE — PROGRESS NOTES
"Progress Note  Heart Transplant Service    Admit Date: 1/5/2017   LOS: 5 days     SUBJECTIVE:     Follow up for: Elevated serum lactate dehydrogenase (LDH)    Interval History: Pt reports waking up around midnight with "jolting" feeling. Otherwise has no complaints. Plan for c-scope tomorrow.     Scheduled Meds:   amiodarone  400 mg Oral Daily    aspirin  325 mg Oral Daily    dipyridamole  100 mg Oral TID    docusate sodium  200 mg Oral QHS    ferrous gluconate  324 mg Oral Daily with breakfast    imipramine  25 mg Oral QHS    insulin aspart  20 Units Subcutaneous TID WM    lisinopril  20 mg Oral BID    magnesium oxide  400 mg Oral TID    magnesium sulfate IVPB  3 g Intravenous Once    pantoprazole  40 mg Oral Daily    polyethylene glycol  4,000 mL Oral Once    sertraline  100 mg Oral Daily    spironolactone  25 mg Oral Daily    tamsulosin  0.4 mg Oral Daily    warfarin  1 mg Oral Daily     Continuous Infusions:   PRN Meds:cyclobenzaprine, dextrose 50%, dextrose 50%, glucagon (human recombinant), glucose, glucose, insulin aspart, ramelteon, senna-docusate 8.6-50 mg    Review of patient's allergies indicates:   Allergen Reactions    Levemir [insulin detemir] Itching    Pork/porcine containing products     Ranexa [ranolazine] Nausea Only       OBJECTIVE:     Vital Signs (Most Recent)  Temp: 98 °F (36.7 °C) (01/10/17 0503)  Pulse: 81 (01/10/17 0518)  Resp: 16 (01/10/17 0503)  BP: (!) 80/0 (01/10/17 0610)  SpO2: 98 % (01/10/17 0503)    Vital Signs Range (Last 24H):  Temp:  [96.4 °F (35.8 °C)-98.2 °F (36.8 °C)]   Pulse:  [71-89]   Resp:  [16-18]   BP: ()/(0-70)   SpO2:  [97 %-99 %]          I & O (Last 24H):  Intake/Output Summary (Last 24 hours) at 01/10/17 0737  Last data filed at 01/10/17 0500   Gross per 24 hour   Intake             1080 ml   Output             1950 ml   Net             -870 ml       Telemetry: NSR 85 bpm currently, episode around midnight in  range with intermittent " pacing    Physical Exam   Constitutional: He is oriented to person, place, and time. He appears well-developed and well-nourished.   HENT:   Head: Normocephalic and atraumatic.   Eyes: EOM are normal. Pupils are equal, round, and reactive to light.   Neck: Normal range of motion. Neck supple. No JVD (at level of clavicle) present.   Cardiovascular: Normal rate and regular rhythm.    VAD sounds smooth   Pulmonary/Chest: Effort normal and breath sounds normal.   Abdominal: Soft. Bowel sounds are normal.   Musculoskeletal: Normal range of motion. He exhibits no edema.   Neurological: He is alert and oriented to person, place, and time.   Skin: Skin is warm and dry.   Psychiatric: He has a normal mood and affect. His behavior is normal.   Nursing note and vitals reviewed.      Labs:       Recent Labs  Lab 01/07/17  0431 01/08/17  0428 01/09/17  0536 01/10/17  0536   WBC 10.77 9.10 10.85 9.90   HGB 11.1* 11.2* 11.9* 11.7*   HCT 34.5* 35.9* 37.8* 36.5*    273 304  --    LYMPH 21.3  2.3 24.5  2.2 24.3  2.6 22.2  2.2   MONO 6.3  0.7 8.0  0.7 7.0  0.8 8.3  0.8   EOSINOPHIL 2.8 3.5 3.8 3.6         Recent Labs  Lab 01/08/17  0428 01/09/17  0536 01/10/17  0536   APTT 33.6* 34.0* 34.1*   INR 2.8* 3.1* 2.9*        Recent Labs  Lab 01/05/17  0506 01/06/17  0516  01/08/17  0428 01/09/17  0536 01/10/17  0536   *  192* 189*  < > 150* 189* 219*   CALCIUM 9.4  9.6 9.7  < > 9.4 9.1 8.8   ALBUMIN 3.4* 3.4*  --   --  3.5  --    PROT 7.5 7.3  --   --  7.1  --    *  132* 133*  < > 130* 126* 125*   K 4.7  4.6 5.0  < > 5.0 4.9 4.7   CO2 27  26 27  < > 27 28 24   CL 96  96 96  < > 95 91* 93*   BUN 13  13 15  < > 14 15 15   CREATININE 1.0  1.0 0.9  < > 0.9 0.9 0.8   ALKPHOS 83 78  --   --  88  --    ALT 18 18  --   --  18  --    AST 28 24  --   --  24  --    BILITOT 0.7 0.6  --   --  0.7  --    MG 1.8 1.9  < > 1.8 1.7 1.7   PHOS 4.2 4.3  < > 4.7* 4.1 3.4   < > = values in this interval not  displayed.  Estimated Creatinine Clearance: 114.4 mL/min (based on Cr of 0.8).      Recent Labs  Lab 01/09/17  0536   BNP 28         Recent Labs  Lab 01/03/17  1605 01/05/17  0506 01/06/17  0516 01/07/17  0431 01/08/17  0428 01/09/17  0536 01/10/17  0536   * 558* 542* 546* 510* 527* 484*       Microbiology Results (last 7 days)     ** No results found for the last 168 hours. **            ASSESSMENT:     55 y/o male with PMHx HFrEF s/p HM II 8/24/16, s/p AICD, HTN, DM , Atrial flutter, admitted for elevated LDH.     PLAN:     S/P LVAD HeartMate II Implanted 8/24/2016  -Continue Coumadin, Goal INR 2.0-3.0. Therapeutic today.   -LDH is trending down Will continue to monitor daily.  -Antiplatelets , Persantine 100 TID  -Speed set at 9000, LSL 8600  -Interrogation notable for no events  -needs colonoscopy to complete OHTx workup     Elevated LDH  -Trending down  -Continue current regimen with ASA, Persantine    Hypertension  -Non-Pulsatile, Dopplers/MAPs borderline uncontrolled  -BP medications include Spironolactone, Lisinopril   -Add additional agent if remains uncontrolled this afternoon     Hyponatremia  -Suspect this is due to zoloft, patient not on this as an outpatient  -Will wean to d/c starting today    Palpitations  -EP consult  -Patient reports symptoms during intermittent pacing overnight seen on tele  -Replete Mag     Clear liquid diet today, NPO after midnight with prep tonight for c-scope tomorrow    Neelima Navarrete PA-C  \[facility]\"" [facility] #[id number]

## 2017-01-10 NOTE — PLAN OF CARE
Problem: Patient Care Overview  Goal: Plan of Care Review  Outcome: Ongoing (interventions implemented as appropriate)  Diet switched to clear liquids; plan to start coloscopy prep today. Coloscopy planned for Wednesday (1/11/2017). Reviewed plan of care with pt and family; no questions or concerns at this time. Will continue to monitor.        Problem: Ventricular Assist Device (Adult)  Goal: Signs and Symptoms of Listed Potential Problems Will be Absent, Minimized or Managed (Ventricular Assist Device)  Signs and symptoms of listed potential problems will be absent, minimized or managed by discharge/transition of care (reference Ventricular Assist Device (Adult) CPG).   Outcome: Ongoing (interventions implemented as appropriate)  Increased persantine to 100 mg TID. LDH remains elevated @ 527.  Wife performed LVAD DSG change this evening.

## 2017-01-10 NOTE — PROGRESS NOTES
Device interrogated at bedside, no recorded VT/VF events. Pacing intermittently at 90 bpm while sleeping. Reprogrammed device to lower threshold of 40bpm from 50bpm. V-paced patient while in room with recurrence of symptoms.     Riley FORRESTER

## 2017-01-10 NOTE — PROGRESS NOTES
Mr. Barriga is readied for presentation at Heart Transplantation Multidisiplinary Team Selection Committee for 1/11/2016.  Per in patient rounds: Dr. Marx: Do not present tomorrow.

## 2017-01-10 NOTE — ANESTHESIA PREPROCEDURE EVALUATION
Pre-operative evaluation for Procedure(s) (LRB):  COLONOSCOPY (N/A)    Mick Barriga is a 56 y.o. male hx CHFrEF, HM 2 LVAD placement on 8/24/16 on coumadin, s/p ICD, HTN, DM who presents with elevated LDH, undergoing workup and rule out pump thrombosis. Plan for colonoscopy to complete OHT work up     LDA:   20g PIV    Prev airway:   Present Prior to Hospital Arrival?: No; Placement Date: 08/25/16; Placement Time: 1147; Method of Intubation: Direct laryngoscopy; Inserted by: Anesthesia Resident; Airway Device: Endotracheal Tube; Mask Ventilation: Easy; Intubated: Postinduction; Blade: Salazar #2; Airway Device Size: 9.0; Style: Cuffed; Cuff Inflation: Minimal occlusive pressure; Inflation Amount: 8; Placement Verified By: Auscultation, Capnometry; Grade: Grade I; Complicating Factors: None; Intubation Findings: Positive EtCO2, Bilateral breath sounds, Atraumatic/Condition of teeth unchanged;  Depth of Insertion: 22; Securment: Lips; Complications: None; Breath Sounds: Equal Bilateral; Insertion Attempts: 1; Removal Date: 08/27/16    Patient Active Problem List   Diagnosis    Presence of automatic implantable cardioverter-defibrillator    Essential hypertension    Hyperlipidemia    GERD (gastroesophageal reflux disease)    Type 2 diabetes mellitus with stage 3 chronic kidney disease, with long-term current use of insulin    Ischemic cardiomyopathy    Organ transplant candidate    Chronic combined systolic and diastolic heart failure    Presence of heart assist device    Cardiomyopathy    Non morbid obesity due to excess calories    Chronic anticoagulation    Uncontrolled secondary diabetes mellitus with stage 3 CKD (GFR 30-59)    Presence of left ventricular assist device (LVAD)    Elevated LDH    LVAD (left ventricular assist device) present    Elevated serum lactate dehydrogenase (LDH)    Cardiomyopathy, dilated, nonischemic       Review of patient's allergies indicates:   Allergen Reactions     Levemir [insulin detemir] Itching    Pork/porcine containing products     Ranexa [ranolazine] Nausea Only        No current facility-administered medications on file prior to encounter.      Current Outpatient Prescriptions on File Prior to Encounter   Medication Sig Dispense Refill    amiodarone (PACERONE) 400 MG tablet Take 1 tablet (400 mg total) by mouth once daily. 90 tablet 3    aspirin (ECOTRIN) 325 MG EC tablet Take 1 tablet (325 mg total) by mouth once daily. 90 tablet 3    cyclobenzaprine (FLEXERIL) 5 MG tablet Take 5 mg by mouth daily as needed.       docusate sodium (COLACE) 100 MG capsule Take 2 capsules (200 mg total) by mouth every evening. 180 capsule 3    ergocalciferol (ERGOCALCIFEROL) 50,000 unit Cap TK 1 C PO TWICE PER WEEK  3    esomeprazole (NEXIUM) 40 MG capsule Take 1 capsule (40 mg total) by mouth before breakfast. 90 capsule 3    ferrous gluconate (FERGON) 324 MG tablet Take 1 tablet (324 mg total) by mouth daily with breakfast. 90 tablet 3    imipramine (TOFRANIL) 25 MG tablet Take 1 tablet (25 mg total) by mouth every evening. 30 tablet 11    insulin aspart (NOVOLOG FLEXPEN) 100 unit/mL InPn pen Inject 20 Units into the skin 3 (three) times daily with meals.  0    insulin glargine (LANTUS SOLOSTAR) 100 unit/mL (3 mL) InPn pen Inject 16 Units into the skin once daily. 14.4 mL 3    lisinopril (PRINIVIL,ZESTRIL) 20 MG tablet Take 1 tablet (20 mg total) by mouth 2 (two) times daily. 180 tablet 3    magnesium oxide (MAG-OX) 400 mg tablet Take 1 tablet (400 mg total) by mouth 3 (three) times daily. 270 tablet 3    oxycodone (ROXICODONE) 10 mg Tab immediate release tablet Take 1 tablet (10 mg total) by mouth every 6 (six) hours as needed. 120 tablet 0    ramelteon (ROZEREM) 8 mg tablet Take 1 tablet (8 mg total) by mouth nightly as needed for Insomnia. 90 tablet 3    senna-docusate 8.6-50 mg (PERICOLACE) 8.6-50 mg per tablet Take 1 tablet by mouth daily as needed for  Constipation. 90 tablet 3    sertraline (ZOLOFT) 100 MG tablet Take 100 mg by mouth once daily.       sildenafil (VIAGRA) 100 MG tablet Take 1 tablet (100 mg total) by mouth daily as needed. 6 tablet 11    spironolactone (ALDACTONE) 25 MG tablet Take 1 tablet (25 mg total) by mouth once daily. 90 tablet 3    tamsulosin (FLOMAX) 0.4 mg Cp24 Take 1 capsule (0.4 mg total) by mouth once daily. 90 capsule 3    warfarin (COUMADIN) 1 MG tablet Take 1-2.5mg daily as directed by coumadin clinic; #70pills = 1 month supply 70 tablet 11       Past Surgical History   Procedure Laterality Date    Cardiac pacemaker placement      Cholecystectomy      Left ventricular assist device       x 3 months ago       Social History     Social History    Marital status:      Spouse name: N/A    Number of children: N/A    Years of education: N/A     Occupational History    Not on file.     Social History Main Topics    Smoking status: Former Smoker     Packs/day: 0.50     Years: 15.00     Quit date: 6/14/2006    Smokeless tobacco: Never Used    Alcohol use No    Drug use: Not on file    Sexual activity: Yes     Partners: Female     Other Topics Concern    Not on file     Social History Narrative         Vital Signs Range (Last 24H):  Temp:  [35.8 °C (96.4 °F)-36.8 °C (98.2 °F)]   Pulse:  [71-93]   Resp:  [16-18]   BP: ()/(0-72)   SpO2:  [97 %-99 %]       CBC:   Recent Labs      01/09/17   0536  01/10/17   0536   WBC  10.85  9.90   RBC  6.62*  6.46*   HGB  11.9*  11.7*   HCT  37.8*  36.5*   PLT  304  259   MCV  57*  57*   MCH  18.0*  18.1*   MCHC  31.5*  32.1       CMP:   Recent Labs      01/09/17   0536  01/10/17   0536   NA  126*  125*   K  4.9  4.7   CL  91*  93*   CO2  28  24   BUN  15  15   CREATININE  0.9  0.8   GLU  189*  219*   MG  1.7  1.7   PHOS  4.1  3.4   CALCIUM  9.1  8.8   ALBUMIN  3.5   --    PROT  7.1   --    ALKPHOS  88   --    ALT  18   --    AST  24   --    BILITOT  0.7   --        INR  Recent  Labs      01/08/17   0428  01/09/17   0536  01/10/17   0536   INR  2.8*  3.1*  2.9*   APTT  33.6*  34.0*  34.1*           Diagnostic Studies:  Cath 9/2016:  B. Summary/Post-Operative Diagnosis       Borderline normal-Mildly increased right and left-sided filling pressures.    No evidence of Pulmonary Hypertension.    Normal Cardiac output / index post HMII.    EKG:  Vent. Rate : 077 BPM     Atrial Rate : 077 BPM     P-R Int : 228 ms          QRS Dur : 136 ms      QT Int : 506 ms       P-R-T Axes : 051 -49 086 degrees     QTc Int : 572 ms    AV sequential or dual chamber electronic pacemaker    Abnormal ECG  When compared with ECG of 05-SEP-2016 23:24,  Poor data quality in current ECG precludes serial comparison  Confirmed by RICHARD IVEY MD ([id number]) on 9/9/2016 8:28:01 AM    2D Echo:  1/2017:  CONCLUSIONS     1 - Eccentric hypertrophy.     2 - Severely depressed left ventricular systolic function.     3 - Mild left atrial enlargement.     4 - Mildly depressed right ventricular systolic function .     5 - Mild aortic regurgitation.     6 - HeartMate II LVAD; speed 9000.         [facility] Anesthesia Evaluation         Review of Systems  Anesthesia Hx:  History of prior surgery of interest to airway management or planning: Denies Family Hx of Anesthesia complications.  Denies Personal Hx of Anesthesia complications.   Social:  Non-Smoker, No Alcohol Use    Cardiovascular:   Hypertension CHF See above   Pulmonary:   Denies COPD.  Denies Asthma. Shortness of breath    Renal/:   Denies Chronic Renal Disease.     Hepatic/GI:   GERD    Neurological:   Denies CVA. Denies Seizures.    Endocrine:   Diabetes, well controlled, type 2 Denies Hypothyroidism. Denies Hyperthyroidism.        Physical Exam  General:  Well nourished    Airway/Jaw/Neck:  Airway Findings: Mouth Opening: Normal Tongue: Normal  General Airway Assessment: Adult  Mallampati: III  Improves to II with phonation.  TM Distance: Normal, at least 6 cm  Jaw/Neck  Findings:  Neck ROM: Normal ROM      Dental:  Dental Findings: Lower Dentures    Chest/Lungs:  Chest/Lungs Findings: Clear to auscultation  Dyspneic.     Heart/Vascular:  Heart Findings: Rate: Normal  Rhythm: Regular Rhythm  Sounds: Quiet  Trace pitting edema BLE      Mental Status:  Mental Status Findings:  Cooperative, Alert and Oriented         Anesthesia Plan  Type of Anesthesia, risks & benefits discussed:  Anesthesia Type:  MAC, general  Patient's Preference: GA   Intra-op Monitoring Plan: standard ASA monitors  Intra-op Monitoring Plan Comments:   Post Op Pain Control Plan:   Post Op Pain Control Plan Comments:   Induction:   IV  Beta Blocker:  Patient is not currently on a Beta-Blocker (No further documentation required).       Informed Consent: Patient understands risks and agrees with Anesthesia plan.  Questions answered. Anesthesia consent signed with patient.  ASA Score: 4     Day of Surgery Review of History & Physical:    H&P update referred to the provider.         Ready For Surgery From Anesthesia Perspective.

## 2017-01-11 ENCOUNTER — ANESTHESIA (OUTPATIENT)
Dept: ENDOSCOPY | Facility: HOSPITAL | Age: 57
DRG: 948 | End: 2017-01-11
Payer: MEDICARE

## 2017-01-11 ENCOUNTER — SURGERY (OUTPATIENT)
Age: 57
End: 2017-01-11

## 2017-01-11 ENCOUNTER — ANESTHESIA EVENT (OUTPATIENT)
Dept: ENDOSCOPY | Facility: HOSPITAL | Age: 57
DRG: 948 | End: 2017-01-11
Payer: MEDICARE

## 2017-01-11 ENCOUNTER — SOCIAL WORK (OUTPATIENT)
Dept: TRANSPLANT | Facility: CLINIC | Age: 57
End: 2017-01-11
Payer: MEDICARE

## 2017-01-11 LAB
ALBUMIN SERPL BCP-MCNC: 3 G/DL
ALP SERPL-CCNC: 73 U/L
ALT SERPL W/O P-5'-P-CCNC: 16 U/L
ANION GAP SERPL CALC-SCNC: 5 MMOL/L
ANISOCYTOSIS BLD QL SMEAR: SLIGHT
APTT BLDCRRT: 33.9 SEC
AST SERPL-CCNC: 23 U/L
BASOPHILS # BLD AUTO: 0.01 K/UL
BASOPHILS NFR BLD: 0.1 %
BILIRUB DIRECT SERPL-MCNC: 0.3 MG/DL
BILIRUB SERPL-MCNC: 0.7 MG/DL
BNP SERPL-MCNC: 28 PG/ML
BUN SERPL-MCNC: 10 MG/DL
BURR CELLS BLD QL SMEAR: ABNORMAL
CALCIUM SERPL-MCNC: 8.3 MG/DL
CHLORIDE SERPL-SCNC: 91 MMOL/L
CLASS I ANTIBODIES - LUMINEX: NEGATIVE
CLASS II ANTIBODIES - LUMINEX: NEGATIVE
CO2 SERPL-SCNC: 28 MMOL/L
CPRA %: 0
CREAT SERPL-MCNC: 0.8 MG/DL
CREAT UR-MCNC: 38 MG/DL
CRP SERPL-MCNC: 9.5 MG/L
DIFFERENTIAL METHOD: ABNORMAL
EOSINOPHIL # BLD AUTO: 0.5 K/UL
EOSINOPHIL NFR BLD: 5.4 %
ERYTHROCYTE [DISTWIDTH] IN BLOOD BY AUTOMATED COUNT: 19.5 %
EST. GFR  (AFRICAN AMERICAN): >60 ML/MIN/1.73 M^2
EST. GFR  (NON AFRICAN AMERICAN): >60 ML/MIN/1.73 M^2
GIANT PLATELETS BLD QL SMEAR: PRESENT
GLUCOSE SERPL-MCNC: 123 MG/DL
HCT VFR BLD AUTO: 34.4 %
HGB BLD-MCNC: 11.1 G/DL
INR PPP: 2.9
LDH SERPL L TO P-CCNC: 486 U/L
LYMPHOCYTES # BLD AUTO: 2.2 K/UL
LYMPHOCYTES NFR BLD: 24 %
MAGNESIUM SERPL-MCNC: 1.6 MG/DL
MCH RBC QN AUTO: 18.1 PG
MCHC RBC AUTO-ENTMCNC: 32.3 %
MCV RBC AUTO: 56 FL
MONOCYTES # BLD AUTO: 0.7 K/UL
MONOCYTES NFR BLD: 7.6 %
NEUTROPHILS # BLD AUTO: 5.8 K/UL
NEUTROPHILS NFR BLD: 62.9 %
OSMOLALITY SERPL: 268 MOSM/KG
OSMOLALITY UR: 181 MOSM/KG
PHOSPHATE SERPL-MCNC: 3.6 MG/DL
PLATELET # BLD AUTO: 218 K/UL
PLATELET BLD QL SMEAR: ABNORMAL
PMV BLD AUTO: ABNORMAL FL
POCT GLUCOSE: 109 MG/DL (ref 70–110)
POCT GLUCOSE: 142 MG/DL (ref 70–110)
POCT GLUCOSE: 177 MG/DL (ref 70–110)
POCT GLUCOSE: 177 MG/DL (ref 70–110)
POCT GLUCOSE: 203 MG/DL (ref 70–110)
POIKILOCYTOSIS BLD QL SMEAR: SLIGHT
POTASSIUM SERPL-SCNC: 4.5 MMOL/L
PREALB SERPL-MCNC: 17 MG/DL
PROT SERPL-MCNC: 6.1 G/DL
PROTHROMBIN TIME: 29.1 SEC
RBC # BLD AUTO: 6.14 M/UL
SERUM COLLECTION DT - LUMINEX CLASS I: NORMAL
SERUM COLLECTION DT - LUMINEX CLASS II: NORMAL
SODIUM SERPL-SCNC: 124 MMOL/L
SODIUM UR-SCNC: 24 MMOL/L
SPCL1 TESTING DATE: NORMAL
SPCL2 TESTING DATE: NORMAL
TSH SERPL DL<=0.005 MIU/L-ACNC: 1.55 UIU/ML
WBC # BLD AUTO: 9.33 K/UL

## 2017-01-11 PROCEDURE — 86833 HLA CLASS II HIGH DEFIN QUAL: CPT

## 2017-01-11 PROCEDURE — 83735 ASSAY OF MAGNESIUM: CPT

## 2017-01-11 PROCEDURE — 37000008 HC ANESTHESIA 1ST 15 MINUTES: Performed by: INTERNAL MEDICINE

## 2017-01-11 PROCEDURE — 25000003 PHARM REV CODE 250: Performed by: PHYSICIAN ASSISTANT

## 2017-01-11 PROCEDURE — 25000003 PHARM REV CODE 250: Performed by: HOSPITALIST

## 2017-01-11 PROCEDURE — D9220A PRA ANESTHESIA: Mod: 33,ANES,, | Performed by: ANESTHESIOLOGY

## 2017-01-11 PROCEDURE — 27000248 HC VAD-ADDITIONAL DAY

## 2017-01-11 PROCEDURE — 80048 BASIC METABOLIC PNL TOTAL CA: CPT

## 2017-01-11 PROCEDURE — 93005 ELECTROCARDIOGRAM TRACING: CPT

## 2017-01-11 PROCEDURE — 84100 ASSAY OF PHOSPHORUS: CPT

## 2017-01-11 PROCEDURE — 83880 ASSAY OF NATRIURETIC PEPTIDE: CPT

## 2017-01-11 PROCEDURE — 83615 LACTATE (LD) (LDH) ENZYME: CPT

## 2017-01-11 PROCEDURE — 84134 ASSAY OF PREALBUMIN: CPT

## 2017-01-11 PROCEDURE — D9220A PRA ANESTHESIA: Mod: 33,CRNA,, | Performed by: NURSE ANESTHETIST, CERTIFIED REGISTERED

## 2017-01-11 PROCEDURE — 99232 SBSQ HOSP IP/OBS MODERATE 35: CPT | Mod: ,,, | Performed by: INTERNAL MEDICINE

## 2017-01-11 PROCEDURE — 25000003 PHARM REV CODE 250: Performed by: NURSE ANESTHETIST, CERTIFIED REGISTERED

## 2017-01-11 PROCEDURE — 83930 ASSAY OF BLOOD OSMOLALITY: CPT

## 2017-01-11 PROCEDURE — 93010 ELECTROCARDIOGRAM REPORT: CPT | Mod: ,,, | Performed by: INTERNAL MEDICINE

## 2017-01-11 PROCEDURE — 85730 THROMBOPLASTIN TIME PARTIAL: CPT

## 2017-01-11 PROCEDURE — 36415 COLL VENOUS BLD VENIPUNCTURE: CPT

## 2017-01-11 PROCEDURE — 86977 RBC SERUM PRETX INCUBJ/INHIB: CPT | Mod: 91

## 2017-01-11 PROCEDURE — 85610 PROTHROMBIN TIME: CPT

## 2017-01-11 PROCEDURE — 20600001 HC STEP DOWN PRIVATE ROOM

## 2017-01-11 PROCEDURE — 37000009 HC ANESTHESIA EA ADD 15 MINS: Performed by: INTERNAL MEDICINE

## 2017-01-11 PROCEDURE — 84300 ASSAY OF URINE SODIUM: CPT

## 2017-01-11 PROCEDURE — 83935 ASSAY OF URINE OSMOLALITY: CPT

## 2017-01-11 PROCEDURE — 84443 ASSAY THYROID STIM HORMONE: CPT

## 2017-01-11 PROCEDURE — 86832 HLA CLASS I HIGH DEFIN QUAL: CPT

## 2017-01-11 PROCEDURE — 63600175 PHARM REV CODE 636 W HCPCS: Performed by: PHYSICIAN ASSISTANT

## 2017-01-11 PROCEDURE — 85025 COMPLETE CBC W/AUTO DIFF WBC: CPT

## 2017-01-11 PROCEDURE — 86140 C-REACTIVE PROTEIN: CPT

## 2017-01-11 PROCEDURE — 25000003 PHARM REV CODE 250: Performed by: INTERNAL MEDICINE

## 2017-01-11 PROCEDURE — G0121 COLON CA SCRN NOT HI RSK IND: HCPCS | Mod: 53,GC,, | Performed by: INTERNAL MEDICINE

## 2017-01-11 PROCEDURE — 80076 HEPATIC FUNCTION PANEL: CPT

## 2017-01-11 PROCEDURE — 63600175 PHARM REV CODE 636 W HCPCS: Performed by: NURSE ANESTHETIST, CERTIFIED REGISTERED

## 2017-01-11 PROCEDURE — G0121 COLON CA SCRN NOT HI RSK IND: HCPCS | Performed by: INTERNAL MEDICINE

## 2017-01-11 PROCEDURE — 82570 ASSAY OF URINE CREATININE: CPT

## 2017-01-11 RX ORDER — LIDOCAINE HCL/PF 100 MG/5ML
SYRINGE (ML) INTRAVENOUS
Status: DISCONTINUED | OUTPATIENT
Start: 2017-01-11 | End: 2017-01-11

## 2017-01-11 RX ORDER — POLYETHYLENE GLYCOL 3350, SODIUM SULFATE ANHYDROUS, SODIUM BICARBONATE, SODIUM CHLORIDE, POTASSIUM CHLORIDE 236; 22.74; 6.74; 5.86; 2.97 G/4L; G/4L; G/4L; G/4L; G/4L
4000 POWDER, FOR SOLUTION ORAL ONCE
Status: COMPLETED | OUTPATIENT
Start: 2017-01-11 | End: 2017-01-11

## 2017-01-11 RX ORDER — PROPOFOL 10 MG/ML
VIAL (ML) INTRAVENOUS CONTINUOUS PRN
Status: DISCONTINUED | OUTPATIENT
Start: 2017-01-11 | End: 2017-01-11

## 2017-01-11 RX ORDER — SODIUM CHLORIDE 9 MG/ML
INJECTION, SOLUTION INTRAVENOUS CONTINUOUS PRN
Status: DISCONTINUED | OUTPATIENT
Start: 2017-01-11 | End: 2017-01-11

## 2017-01-11 RX ADMIN — DIPYRIDAMOLE 100 MG: 50 TABLET, FILM COATED ORAL at 09:01

## 2017-01-11 RX ADMIN — PANTOPRAZOLE SODIUM 40 MG: 40 TABLET, DELAYED RELEASE ORAL at 08:01

## 2017-01-11 RX ADMIN — AMIODARONE HYDROCHLORIDE 400 MG: 200 TABLET ORAL at 08:01

## 2017-01-11 RX ADMIN — Medication 400 MG: at 09:01

## 2017-01-11 RX ADMIN — LIDOCAINE HYDROCHLORIDE 100 MG: 20 INJECTION, SOLUTION INTRAVENOUS at 10:01

## 2017-01-11 RX ADMIN — FERROUS GLUCONATE TAB 324 MG (37.5 MG ELEMENTAL IRON) 324 MG: 324 (37.5 FE) TAB at 08:01

## 2017-01-11 RX ADMIN — DIPYRIDAMOLE 100 MG: 50 TABLET, FILM COATED ORAL at 05:01

## 2017-01-11 RX ADMIN — POLYETHYLENE GLYCOL 3350, SODIUM SULFATE ANHYDROUS, SODIUM BICARBONATE, SODIUM CHLORIDE, POTASSIUM CHLORIDE 4000 ML: 236; 22.74; 6.74; 5.86; 2.97 POWDER, FOR SOLUTION ORAL at 04:01

## 2017-01-11 RX ADMIN — TAMSULOSIN HYDROCHLORIDE 0.4 MG: 0.4 CAPSULE ORAL at 08:01

## 2017-01-11 RX ADMIN — SODIUM CHLORIDE: 0.9 INJECTION, SOLUTION INTRAVENOUS at 10:01

## 2017-01-11 RX ADMIN — INSULIN ASPART 2 UNITS: 100 INJECTION, SOLUTION INTRAVENOUS; SUBCUTANEOUS at 05:01

## 2017-01-11 RX ADMIN — INSULIN ASPART 20 UNITS: 100 INJECTION, SOLUTION INTRAVENOUS; SUBCUTANEOUS at 01:01

## 2017-01-11 RX ADMIN — PROPOFOL 150 MCG/KG/MIN: 10 INJECTION, EMULSION INTRAVENOUS at 10:01

## 2017-01-11 RX ADMIN — Medication 400 MG: at 05:01

## 2017-01-11 RX ADMIN — SPIRONOLACTONE 25 MG: 25 TABLET, FILM COATED ORAL at 08:01

## 2017-01-11 RX ADMIN — MAGNESIUM SULFATE HEPTAHYDRATE 3 G: 500 INJECTION, SOLUTION INTRAMUSCULAR; INTRAVENOUS at 01:01

## 2017-01-11 RX ADMIN — IMIPRAMINE HYDROCHLORIDE 25 MG: 25 TABLET, FILM COATED ORAL at 09:01

## 2017-01-11 RX ADMIN — INSULIN ASPART 20 UNITS: 100 INJECTION, SOLUTION INTRAVENOUS; SUBCUTANEOUS at 05:01

## 2017-01-11 RX ADMIN — AMLODIPINE BESYLATE 5 MG: 5 TABLET ORAL at 08:01

## 2017-01-11 RX ADMIN — LISINOPRIL 20 MG: 20 TABLET ORAL at 08:01

## 2017-01-11 RX ADMIN — Medication 400 MG: at 01:01

## 2017-01-11 RX ADMIN — LISINOPRIL 20 MG: 20 TABLET ORAL at 09:01

## 2017-01-11 RX ADMIN — DIPYRIDAMOLE 100 MG: 50 TABLET, FILM COATED ORAL at 01:01

## 2017-01-11 RX ADMIN — ASPIRIN 325 MG: 325 TABLET, DELAYED RELEASE ORAL at 08:01

## 2017-01-11 NOTE — TRANSFER OF CARE
"Anesthesia Transfer of Care Note    Patient: Mick Barriga    Procedure(s) Performed: Procedure(s) (LRB):  COLONOSCOPY (N/A)    Patient location: PACU    Anesthesia Type: general    Transport from OR: Transported from OR on room air with adequate spontaneous ventilation    Post pain: adequate analgesia    Post assessment: no apparent anesthetic complications and tolerated procedure well    Post vital signs: stable    Level of consciousness: awake, alert and oriented    Complications: none          Last vitals:   Visit Vitals    BP 92/65    Pulse 74    Temp 36.6 °C (97.8 °F) (Oral)    Resp 12    Ht 5' 8" (1.727 m)    Wt 91.2 kg (201 lb 1 oz)    SpO2 100%    BMI 30.57 kg/m2     "

## 2017-01-11 NOTE — ANESTHESIA POSTPROCEDURE EVALUATION
"Anesthesia Post Evaluation    Patient: Mick Barriga    Procedure(s) Performed: Procedure(s) (LRB):  COLONOSCOPY (N/A)    Final Anesthesia Type: general  Patient location during evaluation: PACU  Patient participation: Yes- Able to Participate  Level of consciousness: awake and alert and awake  Post-procedure vital signs: reviewed and stable  Pain management: adequate  Airway patency: patent  PONV status at discharge: No PONV  Anesthetic complications: no      Cardiovascular status: blood pressure returned to baseline  Respiratory status: unassisted and spontaneous ventilation  Hydration status: euvolemic  Follow-up not needed.        Visit Vitals    BP (!) 70/0 (BP Location: Left arm, Patient Position: Lying, BP Method: Doppler)    Pulse 76    Temp 36.6 °C (97.8 °F) (Oral)    Resp 13    Ht 5' 8" (1.727 m)    Wt 91.2 kg (201 lb 1 oz)    SpO2 100%    BMI 30.57 kg/m2       Pain/Hugo Score: Pain Assessment Performed: Yes (1/11/2017 11:30 AM)  Presence of Pain: denies (1/11/2017 11:30 AM)  Hugo Score: 9 (1/11/2017 11:30 AM)      "

## 2017-01-11 NOTE — NURSING TRANSFER
Nursing Transfer Note      1/11/2017     Transfer To: 312    Transfer via stretcher    Transfer with cardiac monitoring    Transported by RN & PCT    Medicines sent: N/A    Chart send with patient: Yes    Notified: spouse    Patient reassessed at: 1/11/17 @ 7857

## 2017-01-11 NOTE — PLAN OF CARE
Problem: Patient Care Overview  Goal: Plan of Care Review  Outcome: Ongoing (interventions implemented as appropriate)  NPO at midnight. GoLYTELY given until BMs clear. Tolerated very well. DP 78-86 this evening. 2.4 kg weight gain from yesterday. Reviewed plan of care with pt and family; no questions or concerns at this time. Will continue to monitor.

## 2017-01-11 NOTE — ANESTHESIA RELEASE NOTE
"Anesthesia Release from PACU Note    Patient: Mick Barriga    Procedure(s) Performed: Procedure(s) (LRB):  COLONOSCOPY (N/A)    Anesthesia type: general    Post pain: Adequate analgesia    Post assessment: no apparent anesthetic complications, tolerated procedure well and no evidence of recall    Last Vitals:   Visit Vitals    BP (!) 70/0 (BP Location: Left arm, Patient Position: Lying, BP Method: Doppler)    Pulse 76    Temp 36.6 °C (97.8 °F) (Oral)    Resp 13    Ht 5' 8" (1.727 m)    Wt 91.2 kg (201 lb 1 oz)    SpO2 100%    BMI 30.57 kg/m2       Post vital signs: stable    Level of consciousness: awake, alert  and oriented    Nausea/Vomiting: no nausea/no vomiting    Complications: none    Airway Patency: patent    Respiratory: unassisted, spontaneous ventilation    Cardiovascular: stable and blood pressure at baseline    Hydration: euvolemic  "

## 2017-01-11 NOTE — PROGRESS NOTES
GI Follow-up Note    Colon incomplete  Poor prep. Unable to visualize colon    Will repeat tomorrow  Keep on clear liquids today  Will re-order prep for tonight    Please call with any additional concerns /questions    Prasanth Gore MD  Gastroenterology Fellow (PGY-IV)  Pager: [phone]

## 2017-01-11 NOTE — PROGRESS NOTES
Transplant Recipient Adult Psychosocial Assessment    Mick Barriga   [facility] [place] [address]    Telephone Information:   Mobile [phone]   Home  [phone] (home)  Work  There is no work phone number on file.  E-mail  [email or web address]    Sex: male  YOB: 1960  Age: 56 y.o.    Encounter Date: 2017  [place] Citizen: yes  Primary Language: Syriac   Needed: no   Pt and wife report both read and speak English well and do not need an . SW advised pt of right to have  and how to contact internation services if desired.    Emergency Contact:  Name: Ivette Barriga  Relationship: wife  Address: same as pt  Phone Numbers:   [phone] (mobile)    Family/Social Support:   Number of dependents/: Pt denies  Marital history: Pt and wife report being  for 20 years.   Other family dynamics: Pt reports both parents are . Pt and wife have been  for 20 years and have 3 adult sons who all live close to pt. Pt's oldest son, Manny Barriga (age 26) is  with a 6 month old baby. Pt did not want to give SW the names of his other sons, and reported Manny is the most responsible.    Household Composition:  Name: Ivette Barriga  Relationship: spouse  Does person drive? yes   Pt's wife does drive, however she does not like to drive at fast speeds or on the interstate. Pt's wife uses Uber to get to and from the hospital.     Do you and your caregivers have access to reliable transportation? yes  PRIMARY CAREGIVER: Ivette Barriga will be primary caregiver, phone number [phone].      provided in-depth information to patient and caregiver regarding pre- and post-transplant caregiver role.   strongly encourages patient and caregiver to have concrete plan regarding post-transplant care giving, including back-up caregiver(s) to ensure care giving needs are met as needed.    Patient and Caregiver  states understanding all aspects of caregiver role/commitment and is able/willing/committed to being caregiver to the fullest extent necessary.    Patient and Caregiver verbalizes understanding of the education provided today and caregiver responsibilities.         remains available. Patient and Caregiver agree to contact  in a timely manner if concerns arise.      Able to take time off work without financial concerns: yes. Pt's wife does not work outside of the home and is available to pt 24/7.     Additional Significant Others who will Assist with Transplant:  Name: Manny Barriga  Age: 26  City: [place] State: [place]  Relationship: son  Does person drive? yes    Living Will: no  Healthcare Power of : no  Advance Directives on file: <<no information> per medical record.  Verbally reviewed LW/HCPA information.   provided patient with copy of LW/HCPA documents and provided education on completion of forms.    Highest Education Level: High School (9-12) or GED  Reading Ability: 12th grade  Reports difficulty with: N/A  Learns Best By:  Multiple methods, including verbal explanation, reading, and hands on.     Status: no  VA Benefits: no     Working for Income: No  If no, reason not working: Disability  Patient is disabled.  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    Spouse/Significant Other Employment: Pt's wife is not employed.    Disabled: yes: date disability began: 12 years ago, due to: CHF.    Monthly Income:  SSI: $954  Food [place]: $312  Able to afford all costs now and if transplanted, including medications: yes   Pt reports finances are tight, however pt is able to pay all necessary expenses at this time.     Patient and Caregiver verbalizes understanding of personal responsibilities related to transplant costs and the importance of having a financial plan to ensure that patients transplant costs are fully  "covered.      provided fundraising information/education.  Patient and Caregiver verbalizes understanding. Pt and wife report not interested in fundraising at this time. Pt reports fundraising would be "embarrassing" and is looked down on in his culture.  remains available.    Insurance:   Payor/Plan Subscr  Sex Relation Sub. Ins. ID Effective Group Num   1. [place] PHIL KUL[place]ARNI 1960 Male  [id number] 3/1/06                                    [address]   2. [place] 1960 Male  [id number]* 06                                    [address] [address]     Primary Insurance (for UNOS reporting): Public Insurance - Medicare FFS (Fee For Service)  Secondary Insurance (for UNOS reporting): Public Insurance - Medicaid  Patient and Caregiver verbalizes clear understanding that patient may experience difficulty obtaining and/or be denied insurance coverage post-surgery. This includes and is not limited to disability insurance, life insurance, health insurance, burial insurance, long term care insurance, and other insurances.    Patient and Caregiver also reports understanding that future health concerns related to or unrelated to transplantation may not be covered by patient's insurance.  Resources and information provided and reviewed.      Patient and Caregiver provides verbal permission to release any necessary information to outside resources for patient care and discharge planning.  Resources and information provided are reviewed.      Infusion Service: patient utilizing? no  Home Health: patient utilizing? no. Pt used [facility] in the past.   DME: no  Pulmonary/Cardiac Rehab: no   ADLS:  Pt reports nbot independent with ADLs. Pt reports able to care for himself on good days and requires assistance from with with dressing and bathing on other days. Pt reports pt does drive.    Adherence:   Pt reports a high level of adherence to health care regimen.  " Adherence education and counseling provided.     Per History Section:  Past Medical History   Diagnosis Date    CHF (congestive heart failure)     Coronary artery disease     GERD (gastroesophageal reflux disease)     Hyperlipidemia     Hypertension     Type 2 diabetes mellitus with hyperglycemia      Social History   Substance Use Topics    Smoking status: Former Smoker     Packs/day: 0.50     Years: 15.00     Quit date: 6/14/2006    Smokeless tobacco: Never Used    Alcohol use No     History   Drug Use Not on file     History   Sexual Activity    Sexual activity: Yes    Partners: Female       Per Today's Psychosocial:  Tobacco: No current use. Pt reports stopped smoking in 2006.  Alcohol: none, patient denies any use.  Illicit Drugs/Non-prescribed Medications: none, patient denies any use.    Patient and Caregiver states clear understanding of the potential impact of substance use as it relates to transplant candidacy and is aware of possible random substance screening.  Substance abstinence/cessation counseling, education and resources provided and reviewed.     Arrests/DWI/Treatment/Rehab: patient denies    Psychiatric History:    Mental Health: depression  Psychiatrist/Counselor: pt denies  Medications:  Zoloft  Suicide/Homicide Issues: Pt denies  Safety at home: Pt reports safe at home.    Knowledge: Patient and Caregiver states having clear understanding and realistic expectations regarding the potential risks and potential benefits of organ transplantation and organ donation, agrees to discuss with health care team members and support system members and to utilize available resources and express questions and/or concerns in order to further facilitate the pt informed decision-making.  Resources and information provided and reviewed.     Patient and Caregiver is aware of [facility]'s affiliation and/or partnership with agencies in home health care, LTAC, SNF, [facility], and other hospitals and  clinics.    Understanding: Patient and Caregiver reports having a clear understanding of the many lifetime commitments involved with being a transplant recipient, including costs, compliance, medications, lab work, procedures, appointments, concrete and financial planning, preparedness, timely and appropriate communication of concerns, abstinence (ETOH, tobacco, illicit non-prescribed drugs), adherence to all health care team recommendations, support system and caregiver involvement, appropriate and timely resource utilization and follow-through, mental health counseling as needed/recommended, and patient and caregiver responsibilities.  Social Service Handbook, resources and detailed educational information provided and reviewed.  Educational information provided.    Patient and Caregiver also reports current and expected compliance with health care regime and states having a clear understanding of the importance of compliance.      Patient and Caregiver reports a clear understanding that risks and benefits may be involved with organ transplantation and with organ donation.      Patient and Caregiver also reports clear understanding that psychosocial risk factors may affect patient, and include but are not limited to feelings of depression, generalized anxiety, anxiety regarding dependence on others, post traumatic stress disorder, feelings of guilt and other emotional and/or mental concerns, and/or exacerbation of existing mental health concerns.  Detailed resources provided and discussed.     Patient and Caregiver agrees to access appropriate resources in a timely manner as needed and/or as recommended, and to communicate concerns appropriately.  Patient and Caregiver also reports a clear understanding of treatment options available.      reviewed education, provided additional information, and answered questions.    Feelings or Concerns: Pt reports no concerns about transplant at this  time.    Coping: Pt reports coping adequately with family support at this time.    Goals: Pt reports goal of visiting [place] again.      Interview Behavior: Patient and Caregiver presents as alert and oriented x 4, pleasant, good eye contact, calm, communicative, cooperative and asking and answering questions appropriately.          Transplant Social Work - Candidacy  Assessment/Plan:     Psychosocial Suitability: Patient presents as a suitable candidate for transplant at this time. Based on psychosocial risk factors, patient presents as low risk, due to no aparant psychosocial concerns at this time.       Tamela Romeo, JACLYNW

## 2017-01-11 NOTE — ANESTHESIA PREPROCEDURE EVALUATION
01/11/2017  Mick Barriga is a 56 y.o., male. With PMH of HTN, HLD, GERD, DM 2, CKD III, CHF s/p LVAD (8/25/16) who presents for:    Pre-operative evaluation for Procedure(s) (LRB):  COLONOSCOPY (N/A)    Previous anes:  1/11/17: MAC for colonoscopy  9/9/16: Right heart cath  8/26/16: sternal wound closure  8/25/16: LVAD   Present Prior to Hospital Arrival?: No; Placement Date: 08/25/16; Placement Time: 1147; Method of Intubation: Direct laryngoscopy; Inserted by: Anesthesia Resident; Airway Device: Endotracheal Tube; Mask Ventilation: Easy; Intubated: Postinduction; Blade: Salazar #2; Airway Device Size: 9.0; Style: Cuffed; Cuff Inflation: Minimal occlusive pressure; Inflation Amount: 8; Placement Verified By: Auscultation, Capnometry; Grade: Grade I; Complicating Factors: None; Intubation Findings: Positive EtCO2, Bilateral breath sounds, Atraumatic/Condition of teeth unchanged;  Depth of Insertion: 22; Securment: Lips; Complications: None; Breath Sounds: Equal Bilateral; Insertion Attempts: 1; Removal Date: 08/27/16    Patient Active Problem List   Diagnosis    Presence of automatic implantable cardioverter-defibrillator    Essential hypertension    Hyperlipidemia    GERD (gastroesophageal reflux disease)    Type 2 diabetes mellitus with stage 3 chronic kidney disease, with long-term current use of insulin    Ischemic cardiomyopathy    Organ transplant candidate    Chronic combined systolic and diastolic heart failure    Presence of heart assist device    Cardiomyopathy    Non morbid obesity due to excess calories    Chronic anticoagulation    Uncontrolled secondary diabetes mellitus with stage 3 CKD (GFR 30-59)    Presence of left ventricular assist device (LVAD)    Elevated LDH    LVAD (left ventricular assist device) present    Elevated serum lactate dehydrogenase (LDH)     Cardiomyopathy, dilated, nonischemic       Review of patient's allergies indicates:   Allergen Reactions    Levemir [insulin detemir] Itching    Pork/porcine containing products     Ranexa [ranolazine] Nausea Only       No current facility-administered medications on file prior to encounter.      Current Outpatient Prescriptions on File Prior to Encounter   Medication Sig Dispense Refill    amiodarone (PACERONE) 400 MG tablet Take 1 tablet (400 mg total) by mouth once daily. 90 tablet 3    aspirin (ECOTRIN) 325 MG EC tablet Take 1 tablet (325 mg total) by mouth once daily. 90 tablet 3    cyclobenzaprine (FLEXERIL) 5 MG tablet Take 5 mg by mouth daily as needed.       docusate sodium (COLACE) 100 MG capsule Take 2 capsules (200 mg total) by mouth every evening. 180 capsule 3    ergocalciferol (ERGOCALCIFEROL) 50,000 unit Cap TK 1 C PO TWICE PER WEEK  3    esomeprazole (NEXIUM) 40 MG capsule Take 1 capsule (40 mg total) by mouth before breakfast. 90 capsule 3    ferrous gluconate (FERGON) 324 MG tablet Take 1 tablet (324 mg total) by mouth daily with breakfast. 90 tablet 3    imipramine (TOFRANIL) 25 MG tablet Take 1 tablet (25 mg total) by mouth every evening. 30 tablet 11    insulin aspart (NOVOLOG FLEXPEN) 100 unit/mL InPn pen Inject 20 Units into the skin 3 (three) times daily with meals.  0    insulin glargine (LANTUS SOLOSTAR) 100 unit/mL (3 mL) InPn pen Inject 16 Units into the skin once daily. 14.4 mL 3    lisinopril (PRINIVIL,ZESTRIL) 20 MG tablet Take 1 tablet (20 mg total) by mouth 2 (two) times daily. 180 tablet 3    magnesium oxide (MAG-OX) 400 mg tablet Take 1 tablet (400 mg total) by mouth 3 (three) times daily. 270 tablet 3    oxycodone (ROXICODONE) 10 mg Tab immediate release tablet Take 1 tablet (10 mg total) by mouth every 6 (six) hours as needed. 120 tablet 0    ramelteon (ROZEREM) 8 mg tablet Take 1 tablet (8 mg total) by mouth nightly as needed for Insomnia. 90 tablet 3     senna-docusate 8.6-50 mg (PERICOLACE) 8.6-50 mg per tablet Take 1 tablet by mouth daily as needed for Constipation. 90 tablet 3    sertraline (ZOLOFT) 100 MG tablet Take 100 mg by mouth once daily.       sildenafil (VIAGRA) 100 MG tablet Take 1 tablet (100 mg total) by mouth daily as needed. 6 tablet 11    spironolactone (ALDACTONE) 25 MG tablet Take 1 tablet (25 mg total) by mouth once daily. 90 tablet 3    tamsulosin (FLOMAX) 0.4 mg Cp24 Take 1 capsule (0.4 mg total) by mouth once daily. 90 capsule 3    warfarin (COUMADIN) 1 MG tablet Take 1-2.5mg daily as directed by coumadin clinic; #70pills = 1 month supply 70 tablet 11       Past Surgical History   Procedure Laterality Date    Cardiac pacemaker placement      Cholecystectomy      Left ventricular assist device       x 3 months ago       Social History     Social History    Marital status:      Spouse name: N/A    Number of children: N/A    Years of education: N/A     Occupational History    Not on file.     Social History Main Topics    Smoking status: Former Smoker     Packs/day: 0.50     Years: 15.00     Quit date: 6/14/2006    Smokeless tobacco: Never Used    Alcohol use No    Drug use: Not on file    Sexual activity: Yes     Partners: Female     Other Topics Concern    Not on file     Social History Narrative         Vital Signs Range (Last 24H):  Temp:  [36.3 °C (97.4 °F)-36.8 °C (98.3 °F)]   Pulse:  [68-96]   Resp:  [9-18]   BP: (70-93)/(0-65)   SpO2:  [95 %-100 %]       CBC:   Recent Labs      01/10/17   0536  01/11/17   0455   WBC  9.90  9.33   RBC  6.46*  6.14   HGB  11.7*  11.1*   HCT  36.5*  34.4*   PLT  259  218   MCV  57*  56*   MCH  18.1*  18.1*   MCHC  32.1  32.3       CMP:   Recent Labs      01/09/17   0536  01/10/17   0536  01/11/17   0455   NA  126*  125*  124*   K  4.9  4.7  4.5   CL  91*  93*  91*   CO2  28  24  28   BUN  15  15  10   CREATININE  0.9  0.8  0.8   GLU  189*  219*  123*   MG  1.7  1.7  1.6   PHOS  4.1   3.4  3.6   CALCIUM  9.1  8.8  8.3*   ALBUMIN  3.5   --   3.0*   PROT  7.1   --   6.1   ALKPHOS  88   --   73   ALT  18   --   16   AST  24   --   23   BILITOT  0.7   --   0.7       INR  Recent Labs      01/09/17   0536  01/10/17   0536  01/11/17   0455   INR  3.1*  2.9*  2.9*   APTT  34.0*  34.1*  33.9*           Diagnostic Studies:  CT Chest (12/30/16):  Technique: The chest was surveyed from the lung apices through the costophrenic angles without the use of intravenous contrast material.  Data was reformatted for contiguous 5 mm images in the axial, sagittal, and coronal planes.  Data was post processed for extraction of 1.25 mm images at 10 mm increments for effective high-resolution CT imaging and 8-mm maximum intensity projection (MIP) images at 2-mm increments confined to the axial plane without additional radiation to the patient.  3-D reconstruction of the LVAD device was obtained.    Comparison: CT chest abdomen and pelvis 8/23/16.  Findings:    The structures at the base of the neck reveal no convincing evidence of disease.  Postoperative changes from prior median sternotomy are noted with corticated but not yet fused sternal fragments.  An AICD device overlying the left anterior chest wall is noted with its distal transvenous leads terminating in the right atrium and ventricle.    There is a left-sided aortic arch with three branch vessels.  The thoracic aorta maintains normal caliber, contour, and course.  There is calcific atherosclerosis of the aorta and coronary arteries.  LVAD with inflow and outflow cannulae in appropriate positioning are identified.    The heart is not enlarged and there is no evidence of pericardial effusion.  There is no evidence of pneumothorax or pleural fluid.    The pulmonary arteries distribute normally.  There are four pulmonary veins.      There is no axillary lymph node enlargement.  The hilar contours are unremarkable on this non-contrast examination.  However, stable  enlarged mediastinal lymph nodes are noted including a paratracheal lymph node measuring 1.3 cm and a subcarinal lymph node measuring 2 cm in short axis.    The esophagus maintains a normal course and caliber.    The osseous structures demonstrate DJD.  Bilateral gynecomastia is noted.  Please note evaluation of abdominal structures is slightly limited by beam hardening artifact from the LVAD device.  There is a partially visualized drive line.      Limited views of the abdomen demonstrate cholecystectomy clips.  Hyperdense material is identified in the stomach and proximal small bowel.      The trachea and proximal airways are patent.    The lungs are symmetrically expanded without evidence of consolidation.  There is mosaic attenuation of the lung parenchyma, this may be secondary to small vessels disease, small airways disease or both.  Bandlike opacities are identified in the lingula, likely representing atelectatic changes.       Impression        1. LVAD in appropriate positioning.    2. Postoperative changes from prior median sternotomy with corticated but not yet fused sternal fragments.      3. Calcific atherosclerosis of aorta and coronary arteries.    4. Mosaic attenuation of the lung parenchyma, this may be secondary to small vessels disease, small airways disease or both.    5. Stable mediastinal lymphadenopathy.    6. Additional findings as above.      ______________________________________     Electronically signed by resident: DIANA SHANNON MD  Date: 12/30/16  Time: 09:20           EKG:  Vent. Rate : 077 BPM     Atrial Rate : 077 BPM     P-R Int : 228 ms          QRS Dur : 136 ms      QT Int : 506 ms       P-R-T Axes : 051 -49 086 degrees     QTc Int : 572 ms    AV sequential or dual chamber electronic pacemaker    Abnormal ECG  When compared with ECG of 05-SEP-2016 23:24,  Poor data quality in current ECG precludes serial comparison  Confirmed by RICHARD IVEY MD ([id number]) on 9/9/2016 8:28:01  AM    Echo(1/5/17):  TEST DESCRIPTION   Technical Quality: This is a technically challenging study. There is poor endocardial definition.     General: A catheter is present in the right-sided cardiac chambers.     Aorta: The aortic root is normal in size, measuring 2.2 cm at sinotubular junction and 2.4 cm at Sinuses of Valsalva. The proximal ascending aorta is normal in size, measuring 2.4 cm across.     Left Atrium: The left atrial volume index is mildly enlarged, measuring 36.13 cc/m2.     Left Ventricle: The left ventricle is moderately enlarged, with an end-diastolic diameter of 6.6 cm, and an end-systolic diameter of 5.7 cm. LV wall thickness is normal, with the septum measuring 0.7 cm and the posterior wall measuring 0.8 cm across.   Relative wall thickness was normal at 0.24, and the LV mass index was increased at 117.7 g/m2 consistent with eccentric left ventricular hypertrophy. Global left ventricular systolic function appears severely depressed. There is global hypokinesis.       Right Atrium: The right atrium is normal in size, measuring 4.6 cm in length and 3.7 cm in width in the apical view.     Right Ventricle: The right ventricle is normal in size measuring 4.3 cm at the base in the apical right ventricle-focused view. Global right ventricular systolic function appears mildly depressed. There is abnormal septal motion. Tricuspid annular plane   systolic excursion (TAPSE) is 1.4 cm.     Aortic Valve:  Aortic valve is normal in structure with normal leaflet mobility. Additionally, there is mild aortic regurgitation.     Mitral Valve:  Mitral valve is normal in structure with normal leaflet mobility.     Tricuspid Valve:  Tricuspid valve is normal in structure with normal leaflet mobility.     Pulmonary Valve:  Pulmonary valve is normal in structure with normal leaflet mobility.     IVC: The IVC is not visualized.     Intracavitary: There is no evidence of pericardial effusion, intracavity mass,  thrombi, or vegetation.       LVAD: There is a HeartMate II LVAD in place. Inflow cannula is visualized. Outflow graft is not visualized. Baseline speed is 9000 rpms. The aortic valve does not open. Septum is midline.       CONCLUSIONS     1 - Eccentric hypertrophy.     2 - Severely depressed left ventricular systolic function.     3 - Mild left atrial enlargement.     4 - Mildly depressed right ventricular systolic function .     5 - Mild aortic regurgitation.     6 - HeartMate II LVAD; speed 9000.       This document has been electronically    SIGNED BY: Sendy Caldwell MD On: 01/05/2017 15:10      [facility] Anesthesia Evaluation    I have reviewed the Patient Summary Reports.     I have reviewed the Medications.     Review of Systems  Anesthesia Hx:  No problems with previous Anesthesia  History of prior surgery of interest to airway management or planning: Previous anesthesia: MAC, General Denies Family Hx of Anesthesia complications.   Denies Personal Hx of Anesthesia complications.   Social:  Non-Smoker, No Alcohol Use    Hematology/Oncology:     Oncology Normal     Cardiovascular:   Hypertension CAD   CHF See above   Pulmonary:   Denies COPD.  Denies Asthma. Shortness of breath    Renal/:   Denies Chronic Renal Disease.     Hepatic/GI:   GERD    Neurological:   Denies CVA. Denies Seizures.    Endocrine:   Diabetes, well controlled, type 2 Denies Hypothyroidism. Denies Hyperthyroidism.        Physical Exam  General:  Well nourished    Airway/Jaw/Neck:  Airway Findings: Mouth Opening: Normal Tongue: Normal  General Airway Assessment: Adult  Mallampati: III  Improves to II with phonation.  TM Distance: Normal, at least 6 cm  Jaw/Neck Findings:  Neck ROM: Normal ROM      Dental:  Dental Findings: Lower Dentures    Chest/Lungs:  Chest/Lungs Findings: Clear to auscultation  Dyspneic.     Heart/Vascular:  Heart Findings: Rate: Normal  Rhythm: Regular Rhythm  LVAD heard throughout precordium, Trace pitting edema BLE       Mental Status:  Mental Status Findings:  Cooperative, Alert and Oriented         Anesthesia Plan  Type of Anesthesia, risks & benefits discussed:  Anesthesia Type:  MAC, general  Patient's Preference:   Intra-op Monitoring Plan:   Intra-op Monitoring Plan Comments:   Post Op Pain Control Plan:   Post Op Pain Control Plan Comments:   Induction:   IV  Beta Blocker:  Patient is not currently on a Beta-Blocker (No further documentation required).       Informed Consent: Patient understands risks and agrees with Anesthesia plan.  Questions answered. Anesthesia consent signed with patient.  ASA Score: 3     Day of Surgery Review of History & Physical:    H&P update referred to the surgeon.         Ready For Surgery From Anesthesia Perspective.

## 2017-01-11 NOTE — PLAN OF CARE
Problem: Patient Care Overview  Goal: Plan of Care Review  Outcome: Ongoing (interventions implemented as appropriate)  Pt remains free of falls and injury this shift, pt ambulates independnetly. Vital signs stable, SR noted on the monitor. Plan of care reviewed with patient, verbalized understanding. Pt going for C scope in AM; on clear liquid diet, given golytely. No signs of acute distress noted. Will continue to monitor.

## 2017-01-11 NOTE — INTERVAL H&P NOTE
Pre-Procedure H and P Addendum    Patient seen and examined.  History and exam unchanged from prior history and physical.      Procedure: Colonoscopy   Indication: Colon cancer screening for heart transplant evaluation  ASA Class: per anesthesiology  Airway: normal  Neck Mobility: full range of motion  Mallampatti score: per anesthesia  History of anesthesia problems: no  Family history of anesthesia problems: no  Anesthesia Plan: MAC    Anesthesia/Surgery risks, benefits and alternative options discussed and understood by patient/family.          Active Hospital Problems    Diagnosis  POA    *Elevated serum lactate dehydrogenase (LDH) [R74.0]  Yes    Cardiomyopathy, dilated, nonischemic [I42.9]  Yes    LVAD (left ventricular assist device) present [Z95.811]  Not Applicable      Resolved Hospital Problems    Diagnosis Date Resolved POA   No resolved problems to display.

## 2017-01-11 NOTE — PLAN OF CARE
Problem: Patient Care Overview  Goal: Plan of Care Review  Outcome: Ongoing (interventions implemented as appropriate)  Patient free of falls or trauma during this shift. Patient vital signs stable throughout shift. Has not complained of any breathing difficulties. CBGs have been managed with meals and insulin correction scale. Patient understands NPO orders after midnight for repeat colonoscopy in AM. Patient currently resting in bed and voices no concerns at this time. Will continue to monitor.

## 2017-01-11 NOTE — PROGRESS NOTES
Progress Note  Heart Transplant Service    Admit Date: 1/5/2017   LOS: 6 days     SUBJECTIVE:     Follow up for: Elevated serum lactate dehydrogenase (LDH)    Interval History: Patient is in good spirits today. States he did not sleep well due to golytely. Was up all night using the bathroom. Otherwise no complaints. Continues to ask questions about his labs and the plan, happy when we educate him. Denies chest pain, SOB, NV. Denies melena.    Scheduled Meds:   amiodarone  400 mg Oral Daily    amlodipine  5 mg Oral Daily    aspirin  325 mg Oral Daily    dipyridamole  100 mg Oral TID    docusate sodium  200 mg Oral QHS    ferrous gluconate  324 mg Oral Daily with breakfast    imipramine  25 mg Oral QHS    insulin aspart  20 Units Subcutaneous TID WM    lisinopril  20 mg Oral BID    magnesium oxide  400 mg Oral TID    magnesium sulfate IVPB  3 g Intravenous Once    pantoprazole  40 mg Oral Daily    polyethylene glycol  4,000 mL Oral Once    sertraline  50 mg Oral Daily    spironolactone  25 mg Oral Daily    tamsulosin  0.4 mg Oral Daily     Continuous Infusions:   PRN Meds:cyclobenzaprine, dextrose 50%, dextrose 50%, glucagon (human recombinant), glucose, glucose, insulin aspart, ramelteon, senna-docusate 8.6-50 mg    Review of patient's allergies indicates:   Allergen Reactions    Levemir [insulin detemir] Itching    Pork/porcine containing products     Ranexa [ranolazine] Nausea Only       OBJECTIVE:     Vital Signs (Most Recent)  Temp: 97.8 °F (36.6 °C) (01/11/17 1215)  Pulse: 78 (01/11/17 1300)  Resp: (!) 9 (01/11/17 1215)  BP: (!) 93/52 (01/11/17 1215)  SpO2: 100 % (01/11/17 1215)    Vital Signs Range (Last 24H):  Temp:  [97.4 °F (36.3 °C)-98.3 °F (36.8 °C)]   Pulse:  [69-96]   Resp:  [9-18]   BP: (70-93)/(0-65)   SpO2:  [97 %-100 %]      I & O (Last 24H):  Intake/Output Summary (Last 24 hours) at 01/11/17 1327  Last data filed at 01/11/17 1115   Gross per 24 hour   Intake              220 ml    Output             1475 ml   Net            -1255 ml     Telemetry: no events overnight    Physical Exam  Constitutional: He is oriented to person, place, and time. He appears well-developed and well-nourished.   Head: Normocephalic and atraumatic.   Eyes: EOM are normal. Pupils are equal, round, and reactive to light.   Neck: Normal range of motion. Neck supple. No JVD (at level of clavicle) present.   Cardiovascular: VAD sounds smooth   Pulmonary/Chest: Effort normal and breath sounds normal.   Abdominal: Soft. Bowel sounds are normal.   Musculoskeletal: Normal range of motion. He exhibits no edema.   Neurological: He is alert and oriented to person, place, and time.   Skin: Skin is warm and dry.   Psychiatric: He has a normal mood and affect. His behavior is normal.   Nursing note and vitals reviewed.    Labs:    Recent Labs  Lab 01/09/17  0536 01/10/17  0536 01/11/17  0455   WBC 10.85 9.90 9.33   HGB 11.9* 11.7* 11.1*   HCT 37.8* 36.5* 34.4*    259 218   LYMPH 24.3  2.6 22.2  2.2 24.0  2.2   MONO 7.0  0.8 8.3  0.8 7.6  0.7   EOSINOPHIL 3.8 3.6 5.4       Recent Labs  Lab 01/09/17  0536 01/10/17  0536 01/11/17  0455   APTT 34.0* 34.1* 33.9*   INR 3.1* 2.9* 2.9*        Recent Labs  Lab 01/06/17  0516  01/09/17  0536 01/10/17  0536 01/11/17  0455   *  < > 189* 219* 123*   CALCIUM 9.7  < > 9.1 8.8 8.3*   ALBUMIN 3.4*  --  3.5  --  3.0*   PROT 7.3  --  7.1  --  6.1   *  < > 126* 125* 124*   K 5.0  < > 4.9 4.7 4.5   CO2 27  < > 28 24 28   CL 96  < > 91* 93* 91*   BUN 15  < > 15 15 10   CREATININE 0.9  < > 0.9 0.8 0.8   ALKPHOS 78  --  88  --  73   ALT 18  --  18  --  16   AST 24  --  24  --  23   BILITOT 0.6  --  0.7  --  0.7   MG 1.9  < > 1.7 1.7 1.6   PHOS 4.3  < > 4.1 3.4 3.6   < > = values in this interval not displayed.  Estimated Creatinine Clearance: 115.7 mL/min (based on Cr of 0.8).      Recent Labs  Lab 01/11/17  0455   BNP 28       Recent Labs  Lab 01/05/17  0506 01/06/17  0516  01/07/17  0431 01/08/17  0428 01/09/17  0536 01/10/17  0536 01/11/17  0455   * 542* 546* 510* 527* 484* 486*     Microbiology Results (last 7 days)     ** No results found for the last 168 hours. **        ASSESSMENT:     55 y/o male with PMHx HFrEF s/p HM II 8/24/16, s/p AICD, HTN, DM , Atrial flutter, admitted for elevated LDH.     PLAN:     S/P LVAD HeartMate II Implanted 8/24/2016  - Continue Coumadin, Goal INR 2.0-3.0. Therapeutic today at 2.9  - LDH is trending down Will continue to monitor daily  - Antiplatelets , Persantine 100 TID  - Speed set at 9000, LSL 8600  - Interrogation notable for no events  - Needs colonoscopy to complete OHTx workup. Poor prep today (hard stool present), will repeat c-scope tomorrow. Clear liquids today, NPO at midnight.     Elevated LDH  - Trending down 456 today  - Continue current regimen with ASA, Persantine    Hypertension  - Non-Pulsatile, Dopplers/MAPs borderline uncontrolled  - BP medications include Spironolactone, Lisinopril, amlodipine  - Add additional agent if remains uncontrolled this afternoon     Hyponatremia  - Suspect this is due to zoloft, patient not on this as an outpatient  - Will wean to d/c starting today  - Chronic in nature (121-126 September of 2016 and even further back) Will also check urine osm, urine cr, urine Na    Palpitations  - EP consult  - Patient reports symptoms during intermittent pacing overnight seen on tele  - Patient had symptomatic RV pacing during sleep. Device interrogated, with no diaphragmatic capture seen at 5V @ 0.4 ms, EP dropped lower rate from 50 to 40 bpm  - Replete Mag prn     Clear liquid diet today, NPO after midnight with prep tonight for c-scope tomorrow    Juany Benjamin PA-C  [facility] #[id number]

## 2017-01-11 NOTE — H&P (VIEW-ONLY)
GI Initial Consult H&P    Requesting service: HTS  Reason for Consult: heart transplant workup need colonoscopy    HPI:  Mick Barriga is a 56 y.o. male hx CHFrEF,  HM 2 LVAD placement on 8/24/16 on coumadin, s/p ICD, HTN, DM who presents with elevated LDH, undergoing workup and rule out pump thrombosis. Needs to remain on coumadin.  Primary team asking for evaluation for inpatient colonoscopy.  Pt has never had colonoscopy prior. No family hx of colon cancer or martinez cancers. Has brother with primary CNS malignancy. No blood in stool or changes in BMs. No diarrhea. No dysphagia. Otherwise feeling well, just concerned about his 'ldh level'      Endoscopic Hx:  n/a    Review of Systems:  Constitutional: no fever, chills or change in weight   Eyes: no visual changes   ENT: no sore throat or dysphagia  Respiratory: no cough or shortness of breath   Cardiovascular: no chest pain or palpitations   Gastrointestinal: as per HPI  Hematologic/Lymphatic: no easy bruising or lymphadenopathy   Musculoskeletal: no arthralgias or myalgias   Neurological: no seizures, tremors or change in mental status  Behavioral/Psych: no auditory or visual hallucinations    Past Medical History   Diagnosis Date    CHF (congestive heart failure)     Coronary artery disease     GERD (gastroesophageal reflux disease)     Hyperlipidemia     Hypertension     Type 2 diabetes mellitus with hyperglycemia        Past Surgical History   Procedure Laterality Date    Cardiac pacemaker placement      Cholecystectomy      Left ventricular assist device       x 3 months ago       Family History   Problem Relation Age of Onset    Heart disease Mother     Hypertension Mother     Heart attack Mother     Heart disease Father     Hypertension Father     Heart attack Father        Review of patient's allergies indicates:   Allergen Reactions    Levemir [insulin detemir] Itching    Pork/porcine containing products     Ranexa [ranolazine] Nausea  "Only       Social History     Social History    Marital status:      Spouse name: N/A    Number of children: N/A    Years of education: N/A     Social History Main Topics    Smoking status: Former Smoker     Packs/day: 0.50     Years: 15.00     Quit date: 6/14/2006    Smokeless tobacco: Never Used    Alcohol use No    Drug use: None    Sexual activity: Yes     Partners: Female     Other Topics Concern    None     Social History Narrative       Medications:     amiodarone  400 mg Oral Daily    aspirin  325 mg Oral Daily    dipyridamole  100 mg Oral TID    docusate sodium  200 mg Oral QHS    ferrous gluconate  324 mg Oral Daily with breakfast    imipramine  25 mg Oral QHS    insulin aspart  20 Units Subcutaneous TID WM    lisinopril  20 mg Oral BID    magnesium oxide  400 mg Oral TID    pantoprazole  40 mg Oral Daily    sertraline  100 mg Oral Daily    spironolactone  25 mg Oral Daily    tamsulosin  0.4 mg Oral Daily    warfarin  1 mg Oral Daily       Physical exam:  Visit Vitals    BP (!) 86/0 (BP Location: Left arm, Patient Position: Lying, BP Method: Doppler)    Pulse 72    Temp 96.4 °F (35.8 °C) (Oral)    Resp 18    Ht 5' 8" (1.727 m)    Wt 90.2 kg (198 lb 13.7 oz)    SpO2 97%    BMI 30.24 kg/m2       Gen: pleasant male in NAD with wife at bedside  HEENT: sclera non-icteric   Chest: non-labored breathing  CV: LVAD sounds ; distal pulses intact  Abd: soft, NT , ND ; active BS ; no organomegaly ; LVAD port over RLQ  Ext: no LE edema   Skin: no rashes,  or lesions   Pscyh: appropriate mood and affect  Neuro: alert, oriented ; no focal deficits noted        Recent Labs  Lab 01/09/17  0536   WBC 10.85   RBC 6.62*   HGB 11.9*   HCT 37.8*      MCV 57*   MCH 18.0*   MCHC 31.5*       Recent Labs  Lab 01/09/17  0536   CALCIUM 9.1   PROT 7.1   *   K 4.9   CO2 28   CL 91*   BUN 15   CREATININE 0.9   ALKPHOS 88   ALT 18   AST 24   BILITOT 0.7       Recent Labs  Lab " 01/09/17  0536   INR 3.1*   APTT 34.0*       MELD-Na score: 27 at 1/9/2017  5:36 AM  MELD score: 19 at 1/9/2017  5:36 AM  Calculated from:  Serum Creatinine: 0.9 mg/dL (Rounded to 1) at 1/9/2017  5:36 AM  Serum Sodium: 126 mmol/L at 1/9/2017  5:36 AM  Total Bilirubin: 0.7 mg/dL (Rounded to 1) at 1/9/2017  5:36 AM  INR(ratio): 3.1 at 1/9/2017  5:36 AM  Age: 56 years      Assessment:  Mick Barriga is a 56 y.o. male with LVAD on coumadin, here for concern for pump thrombosis with elevated LDH. Primary team requesting colonoscopy for potential Heart transplant eval.      Recs:  Plan for colonoscopy Wednesday (given had regular diet this morning)  Place on clear liquids tomorrow, NPO past Midnight Tuesday night  Will start prep tomorrow night, split dose PEG ( i will place order)      Thank you for the consult. Seen and discussed with attending, with addendum to follow.   Please call with any additional concerns/ questions.    Prasanth Gore MD  Gastroenterology Fellow (PGY-IV)  Pager: [phone]

## 2017-01-12 ENCOUNTER — SURGERY (OUTPATIENT)
Age: 57
End: 2017-01-12

## 2017-01-12 ENCOUNTER — ANESTHESIA (OUTPATIENT)
Dept: ENDOSCOPY | Facility: HOSPITAL | Age: 57
DRG: 948 | End: 2017-01-12
Payer: MEDICARE

## 2017-01-12 LAB
ANION GAP SERPL CALC-SCNC: 9 MMOL/L
ANISOCYTOSIS BLD QL SMEAR: SLIGHT
APTT BLDCRRT: 33.9 SEC
BASOPHILS # BLD AUTO: 0.01 K/UL
BASOPHILS NFR BLD: 0.1 %
BUN SERPL-MCNC: 7 MG/DL
BURR CELLS BLD QL SMEAR: ABNORMAL
CALCIUM SERPL-MCNC: 9.1 MG/DL
CHLORIDE SERPL-SCNC: 100 MMOL/L
CO2 SERPL-SCNC: 25 MMOL/L
CREAT SERPL-MCNC: 0.8 MG/DL
DIFFERENTIAL METHOD: ABNORMAL
EOSINOPHIL # BLD AUTO: 0.4 K/UL
EOSINOPHIL NFR BLD: 3.4 %
ERYTHROCYTE [DISTWIDTH] IN BLOOD BY AUTOMATED COUNT: 20.2 %
EST. GFR  (AFRICAN AMERICAN): >60 ML/MIN/1.73 M^2
EST. GFR  (NON AFRICAN AMERICAN): >60 ML/MIN/1.73 M^2
GLUCOSE SERPL-MCNC: 145 MG/DL
HCT VFR BLD AUTO: 36.8 %
HGB BLD-MCNC: 11.8 G/DL
HPRA INTERPRETATION: NORMAL
HYPOCHROMIA BLD QL SMEAR: ABNORMAL
INR PPP: 2.3
LDH SERPL L TO P-CCNC: 511 U/L
LYMPHOCYTES # BLD AUTO: 1.4 K/UL
LYMPHOCYTES NFR BLD: 13.1 %
MAGNESIUM SERPL-MCNC: 1.7 MG/DL
MCH RBC QN AUTO: 18.3 PG
MCHC RBC AUTO-ENTMCNC: 32.1 %
MCV RBC AUTO: 57 FL
MONOCYTES # BLD AUTO: 0.6 K/UL
MONOCYTES NFR BLD: 5.5 %
NEUTROPHILS # BLD AUTO: 8.4 K/UL
NEUTROPHILS NFR BLD: 77.9 %
OVALOCYTES BLD QL SMEAR: ABNORMAL
PHOSPHATE SERPL-MCNC: 3.7 MG/DL
PLATELET # BLD AUTO: 228 K/UL
PMV BLD AUTO: ABNORMAL FL
POCT GLUCOSE: 166 MG/DL (ref 70–110)
POCT GLUCOSE: 202 MG/DL (ref 70–110)
POCT GLUCOSE: 206 MG/DL (ref 70–110)
POCT GLUCOSE: 274 MG/DL (ref 70–110)
POIKILOCYTOSIS BLD QL SMEAR: SLIGHT
POLYCHROMASIA BLD QL SMEAR: ABNORMAL
POTASSIUM SERPL-SCNC: 4.7 MMOL/L
PROTHROMBIN TIME: 22.6 SEC
RBC # BLD AUTO: 6.46 M/UL
SCHISTOCYTES BLD QL SMEAR: ABNORMAL
SODIUM SERPL-SCNC: 134 MMOL/L
WBC # BLD AUTO: 10.92 K/UL

## 2017-01-12 PROCEDURE — 93750 INTERROGATION VAD IN PERSON: CPT | Mod: ,,, | Performed by: INTERNAL MEDICINE

## 2017-01-12 PROCEDURE — G0121 COLON CA SCRN NOT HI RSK IND: HCPCS | Performed by: INTERNAL MEDICINE

## 2017-01-12 PROCEDURE — 36415 COLL VENOUS BLD VENIPUNCTURE: CPT

## 2017-01-12 PROCEDURE — 84100 ASSAY OF PHOSPHORUS: CPT

## 2017-01-12 PROCEDURE — 25000003 PHARM REV CODE 250: Performed by: NURSE ANESTHETIST, CERTIFIED REGISTERED

## 2017-01-12 PROCEDURE — 25000003 PHARM REV CODE 250: Performed by: PHYSICIAN ASSISTANT

## 2017-01-12 PROCEDURE — 63600175 PHARM REV CODE 636 W HCPCS: Performed by: PHYSICIAN ASSISTANT

## 2017-01-12 PROCEDURE — 85025 COMPLETE CBC W/AUTO DIFF WBC: CPT

## 2017-01-12 PROCEDURE — G0121 COLON CA SCRN NOT HI RSK IND: HCPCS | Mod: ,,, | Performed by: INTERNAL MEDICINE

## 2017-01-12 PROCEDURE — 37000009 HC ANESTHESIA EA ADD 15 MINS: Performed by: INTERNAL MEDICINE

## 2017-01-12 PROCEDURE — 37000008 HC ANESTHESIA 1ST 15 MINUTES: Performed by: INTERNAL MEDICINE

## 2017-01-12 PROCEDURE — 93750 INTERROGATION VAD IN PERSON: CPT | Performed by: INTERNAL MEDICINE

## 2017-01-12 PROCEDURE — 25000003 PHARM REV CODE 250: Performed by: HOSPITALIST

## 2017-01-12 PROCEDURE — 0DJD8ZZ INSPECTION OF LOWER INTESTINAL TRACT, VIA NATURAL OR ARTIFICIAL OPENING ENDOSCOPIC: ICD-10-PCS | Performed by: INTERNAL MEDICINE

## 2017-01-12 PROCEDURE — 80048 BASIC METABOLIC PNL TOTAL CA: CPT

## 2017-01-12 PROCEDURE — 85730 THROMBOPLASTIN TIME PARTIAL: CPT

## 2017-01-12 PROCEDURE — D9220A PRA ANESTHESIA: Mod: 33,CRNA,, | Performed by: NURSE ANESTHETIST, CERTIFIED REGISTERED

## 2017-01-12 PROCEDURE — 83615 LACTATE (LD) (LDH) ENZYME: CPT

## 2017-01-12 PROCEDURE — D9220A PRA ANESTHESIA: Mod: 33,ANES,, | Performed by: ANESTHESIOLOGY

## 2017-01-12 PROCEDURE — 85610 PROTHROMBIN TIME: CPT

## 2017-01-12 PROCEDURE — 63600175 PHARM REV CODE 636 W HCPCS: Performed by: NURSE ANESTHETIST, CERTIFIED REGISTERED

## 2017-01-12 PROCEDURE — 27000248 HC VAD-ADDITIONAL DAY

## 2017-01-12 PROCEDURE — 20600001 HC STEP DOWN PRIVATE ROOM

## 2017-01-12 PROCEDURE — 83735 ASSAY OF MAGNESIUM: CPT

## 2017-01-12 RX ORDER — PROPOFOL 10 MG/ML
VIAL (ML) INTRAVENOUS CONTINUOUS PRN
Status: DISCONTINUED | OUTPATIENT
Start: 2017-01-12 | End: 2017-01-12

## 2017-01-12 RX ORDER — FENTANYL CITRATE 50 UG/ML
INJECTION, SOLUTION INTRAMUSCULAR; INTRAVENOUS
Status: DISCONTINUED | OUTPATIENT
Start: 2017-01-12 | End: 2017-01-12

## 2017-01-12 RX ORDER — PHENYLEPHRINE HYDROCHLORIDE 10 MG/ML
INJECTION INTRAVENOUS
Status: DISCONTINUED | OUTPATIENT
Start: 2017-01-12 | End: 2017-01-12

## 2017-01-12 RX ORDER — WARFARIN 2 MG/1
2 TABLET ORAL ONCE
Status: COMPLETED | OUTPATIENT
Start: 2017-01-12 | End: 2017-01-12

## 2017-01-12 RX ORDER — LIDOCAINE HCL/PF 100 MG/5ML
SYRINGE (ML) INTRAVENOUS
Status: DISCONTINUED | OUTPATIENT
Start: 2017-01-12 | End: 2017-01-12

## 2017-01-12 RX ORDER — PROPOFOL 10 MG/ML
VIAL (ML) INTRAVENOUS
Status: DISCONTINUED | OUTPATIENT
Start: 2017-01-12 | End: 2017-01-12

## 2017-01-12 RX ORDER — SODIUM CHLORIDE 9 MG/ML
INJECTION, SOLUTION INTRAVENOUS CONTINUOUS PRN
Status: DISCONTINUED | OUTPATIENT
Start: 2017-01-12 | End: 2017-01-12

## 2017-01-12 RX ORDER — MAGNESIUM SULFATE HEPTAHYDRATE 40 MG/ML
2 INJECTION, SOLUTION INTRAVENOUS ONCE
Status: COMPLETED | OUTPATIENT
Start: 2017-01-12 | End: 2017-01-12

## 2017-01-12 RX ADMIN — IMIPRAMINE HYDROCHLORIDE 25 MG: 25 TABLET, FILM COATED ORAL at 09:01

## 2017-01-12 RX ADMIN — PROPOFOL 50 MG: 10 INJECTION, EMULSION INTRAVENOUS at 01:01

## 2017-01-12 RX ADMIN — Medication 400 MG: at 03:01

## 2017-01-12 RX ADMIN — INSULIN ASPART 1 UNITS: 100 INJECTION, SOLUTION INTRAVENOUS; SUBCUTANEOUS at 10:01

## 2017-01-12 RX ADMIN — SPIRONOLACTONE 25 MG: 25 TABLET, FILM COATED ORAL at 08:01

## 2017-01-12 RX ADMIN — ASPIRIN 325 MG: 325 TABLET, DELAYED RELEASE ORAL at 08:01

## 2017-01-12 RX ADMIN — PROPOFOL 100 MCG/KG/MIN: 10 INJECTION, EMULSION INTRAVENOUS at 01:01

## 2017-01-12 RX ADMIN — LISINOPRIL 20 MG: 20 TABLET ORAL at 09:01

## 2017-01-12 RX ADMIN — Medication 400 MG: at 09:01

## 2017-01-12 RX ADMIN — INSULIN ASPART 20 UNITS: 100 INJECTION, SOLUTION INTRAVENOUS; SUBCUTANEOUS at 04:01

## 2017-01-12 RX ADMIN — MAGNESIUM SULFATE IN WATER 2 G: 40 INJECTION, SOLUTION INTRAVENOUS at 08:01

## 2017-01-12 RX ADMIN — LIDOCAINE HYDROCHLORIDE 75 MG: 20 INJECTION, SOLUTION INTRAVENOUS at 01:01

## 2017-01-12 RX ADMIN — FERROUS GLUCONATE TAB 324 MG (37.5 MG ELEMENTAL IRON) 324 MG: 324 (37.5 FE) TAB at 08:01

## 2017-01-12 RX ADMIN — INSULIN ASPART 2 UNITS: 100 INJECTION, SOLUTION INTRAVENOUS; SUBCUTANEOUS at 04:01

## 2017-01-12 RX ADMIN — AMLODIPINE BESYLATE 5 MG: 5 TABLET ORAL at 08:01

## 2017-01-12 RX ADMIN — SODIUM CHLORIDE: 0.9 INJECTION, SOLUTION INTRAVENOUS at 12:01

## 2017-01-12 RX ADMIN — DIPYRIDAMOLE 100 MG: 50 TABLET, FILM COATED ORAL at 03:01

## 2017-01-12 RX ADMIN — TAMSULOSIN HYDROCHLORIDE 0.4 MG: 0.4 CAPSULE ORAL at 08:01

## 2017-01-12 RX ADMIN — PHENYLEPHRINE HYDROCHLORIDE 200 MCG: 10 INJECTION INTRAVENOUS at 01:01

## 2017-01-12 RX ADMIN — PANTOPRAZOLE SODIUM 40 MG: 40 TABLET, DELAYED RELEASE ORAL at 08:01

## 2017-01-12 RX ADMIN — FENTANYL CITRATE 25 MCG: 50 INJECTION, SOLUTION INTRAMUSCULAR; INTRAVENOUS at 01:01

## 2017-01-12 RX ADMIN — Medication 400 MG: at 05:01

## 2017-01-12 RX ADMIN — FENTANYL CITRATE 25 MCG: 50 INJECTION, SOLUTION INTRAMUSCULAR; INTRAVENOUS at 12:01

## 2017-01-12 RX ADMIN — LISINOPRIL 20 MG: 20 TABLET ORAL at 08:01

## 2017-01-12 RX ADMIN — WARFARIN SODIUM 2 MG: 2 TABLET ORAL at 04:01

## 2017-01-12 RX ADMIN — DOCUSATE SODIUM 200 MG: 100 CAPSULE, LIQUID FILLED ORAL at 09:01

## 2017-01-12 RX ADMIN — DIPYRIDAMOLE 100 MG: 50 TABLET, FILM COATED ORAL at 09:01

## 2017-01-12 RX ADMIN — DIPYRIDAMOLE 100 MG: 50 TABLET, FILM COATED ORAL at 05:01

## 2017-01-12 NOTE — PLAN OF CARE
Problem: Patient Care Overview  Goal: Plan of Care Review  Outcome: Revised  Plan of care discussed with patient.  Patient ambulating independently, fall precautions in place.LVAD DP and numbers WNL, smooth LVAD hum. Patient has no complaints of pain. Discussed medications and care.mag replaced. Scope today. Patient has no questions at this time. Will continue to monitor.

## 2017-01-12 NOTE — DISCHARGE INSTRUCTIONS
Colonoscopy     A camera attached to a flexible tube with a viewing lens is used to take video pictures.     Colonoscopy is used to view the inside of your lower digestive tract (colon and rectum). It can help screen for colon cancer and can help find the source of abdominal pain, bleeding, and changes in bowel habits. The test is usually done in the hospital on an outpatient basis. During the exam, the doctor can remove a small tissue sample ( a biopsy) for testing. Small growths, such as polyps, may also be removed during colonoscopy.  Getting ready   · Be sure to tell your doctor about any medications you take. Also tell your doctor about any health conditions you may have.  · Discuss the risks of the test with your doctor. These include bleeding and bowel puncture.  · Your rectum and colon must be empty for the test. So be sure to follow the diet and bowel prep instructions exactly. If you dont, the test may need to be rescheduled.  · Ask your doctor whether you need to have a friend or family member prepared to drive you home after the test.  During the test   · You are given sedating (relaxing) medication through an IV line. You may be drowsy or completely asleep.  · The procedure takes 30 minutes or longer.  · The doctor performs a digital rectal exam to check for anal and rectal problems. The rectum is lubricated and the scope inserted.  · If you are awake, you may have a feeling similar to needing to have a bowel movement. You may also feel pressure as air is pumped into the colon. Its OK to pass gas during the procedure.  After the test   ·      Colonoscopy provides an inside view of the entire colon.     You may discuss the results with your doctor right away or at a future visit.  · Try to pass all the gas right after the test to help prevent bloating and cramping.  · After the test, you can go back to your normal eating and other activities.  Risks and possible complications include:  · Bleeding · A  puncture or tear in the colon · Risks of anesthesia       © 2047-2942 [employer]. [address] [place] [address], [place] [address]. All rights reserved. This information is not intended as a substitute for professional medical care. Always follow your healthcare professional's instructions.

## 2017-01-12 NOTE — PROGRESS NOTES
Progress Note  Heart Transplant Service    Admit Date: 1/5/2017   LOS: 7 days     SUBJECTIVE:     Follow up for: Elevated serum lactate dehydrogenase (LDH)    Interval History: Patient has no complaints overnight. Did not sleep well due to golytely. Feels tired but states sips of water make him feel better. Very pleasant. Denies chest pain, SOB, NVD. Ready to have his c-scope today so he can eat later.     Scheduled Meds:   amlodipine  5 mg Oral Daily    aspirin  325 mg Oral Daily    dipyridamole  100 mg Oral TID    docusate sodium  200 mg Oral QHS    ferrous gluconate  324 mg Oral Daily with breakfast    imipramine  25 mg Oral QHS    insulin aspart  20 Units Subcutaneous TID WM    lisinopril  20 mg Oral BID    magnesium oxide  400 mg Oral TID    pantoprazole  40 mg Oral Daily    sertraline  50 mg Oral Daily    sodium chloride 0.9%  250 mL Intravenous Once    spironolactone  25 mg Oral Daily    tamsulosin  0.4 mg Oral Daily     Continuous Infusions:   PRN Meds:cyclobenzaprine, dextrose 50%, dextrose 50%, glucagon (human recombinant), glucose, glucose, insulin aspart, ramelteon, senna-docusate 8.6-50 mg    Review of patient's allergies indicates:   Allergen Reactions    Levemir [insulin detemir] Itching    Pork/porcine containing products     Ranexa [ranolazine] Nausea Only     OBJECTIVE:     Vital Signs (Most Recent)  Temp: 97.7 °F (36.5 °C) (01/12/17 1340)  Pulse: 72 (01/12/17 1400)  Resp: 10 (01/12/17 1400)  BP: (!) 81/64 (01/12/17 1400)  SpO2: 99 % (01/12/17 1400)    Vital Signs Range (Last 24H):  Temp:  [97.6 °F (36.4 °C)-98.4 °F (36.9 °C)]   Pulse:  []   Resp:  [10-18]   BP: (70-90)/(0-71)   SpO2:  [96 %-99 %]     I & O (Last 24H):  Intake/Output Summary (Last 24 hours) at 01/12/17 1406  Last data filed at 01/12/17 1323   Gross per 24 hour   Intake          2904.38 ml   Output             2800 ml   Net           104.38 ml     Telemetry: sustained VT this AM at 0758 on tele. Patient  without symptoms. He is dry of physical exam. Will replace lytes, give 250 bolus NS, and watch closely. If patient has another episode will re-consult EP.    Physical Exam  Constitutional: He is oriented to person, place, and time. He appears well-developed and well-nourished.   Head: Normocephalic and atraumatic.   Eyes: EOM are normal. Pupils are equal, round, and reactive to light.   Neck: Normal range of motion. Neck supple. No JVD (at level of clavicle) present.   Cardiovascular: VAD sounds smooth   Pulmonary/Chest: Effort normal and breath sounds normal.   Abdominal: Soft. Bowel sounds are normal.   Musculoskeletal: Normal range of motion. He exhibits no edema.   Neurological: He is alert and oriented to person, place, and time.   Skin: Skin is warm and dry.   Psychiatric: He has a normal mood and affect. His behavior is normal.   Nursing note and vitals reviewed.    Labs:    Recent Labs  Lab 01/10/17  0536 01/11/17  0455 01/12/17  0552   WBC 9.90 9.33 10.92   HGB 11.7* 11.1* 11.8*   HCT 36.5* 34.4* 36.8*    218 228   LYMPH 22.2  2.2 24.0  2.2 13.1*  1.4   MONO 8.3  0.8 7.6  0.7 5.5  0.6   EOSINOPHIL 3.6 5.4 3.4       Recent Labs  Lab 01/10/17  0536 01/11/17  0455 01/12/17  0552   APTT 34.1* 33.9* 33.9*   INR 2.9* 2.9* 2.3*      Recent Labs  Lab 01/06/17  0516  01/09/17  0536 01/10/17  0536 01/11/17  0455 01/12/17  0552   *  < > 189* 219* 123* 145*   CALCIUM 9.7  < > 9.1 8.8 8.3* 9.1   ALBUMIN 3.4*  --  3.5  --  3.0*  --    PROT 7.3  --  7.1  --  6.1  --    *  < > 126* 125* 124* 134*   K 5.0  < > 4.9 4.7 4.5 4.7   CO2 27  < > 28 24 28 25   CL 96  < > 91* 93* 91* 100   BUN 15  < > 15 15 10 7   CREATININE 0.9  < > 0.9 0.8 0.8 0.8   ALKPHOS 78  --  88  --  73  --    ALT 18  --  18  --  16  --    AST 24  --  24  --  23  --    BILITOT 0.6  --  0.7  --  0.7  --    MG 1.9  < > 1.7 1.7 1.6 1.7   PHOS 4.3  < > 4.1 3.4 3.6 3.7   < > = values in this interval not displayed.  Estimated Creatinine  Clearance: 113.3 mL/min (based on Cr of 0.8).    Recent Labs  Lab 01/11/17  0455   BNP 28       Recent Labs  Lab 01/06/17  0516 01/07/17  0431 01/08/17  0428 01/09/17  0536 01/10/17  0536 01/11/17  0455 01/12/17  0552   * 546* 510* 527* 484* 486* 511*     Microbiology Results (last 7 days)     ** No results found for the last 168 hours. **        C-scope  Impression:   - Non-thrombosed external hemorrhoids found on                         perianal exam.                        - Localized moderate inflammation was found in the                         cecum and may be secondary to ischemic colitis.                         Unable to biopsy due to anticoagulation.                        - One 3 mm polyp in the rectum. Resection not                         attempted.                        - The examination was otherwise normal on direct                         and retroflexion views.                        - No specimens collected.  Recommendation: - Return patient to [facility] for ongoing care.                        - Advance diet as tolerated.                        - Continue present medications.                        - Repeat colonoscopy (date not yet determined) for                         surveillance to remove polyp and follow-up colon                         inflammation. Recommend doing this at a time when                         he is able to safely stop anticoagulation - not                         urgent.    ASSESSMENT:     55 y/o male with PMHx HFrEF s/p HM II 8/24/16, s/p AICD, HTN, DM , Atrial flutter, admitted for elevated LDH.     PLAN:     S/P LVAD HeartMate II Implanted 8/24/2016  - Continue Coumadin, Goal INR 2.0-3.0. Therapeutic today at 2.3   - will give 2 mg tonight after GI gives clearance   - Antiplatelets , Persantine 100 TID. Will consider increasing it tomorrow if LDH not downtrending  - Speed set at 9000, LSL 8600  - Interrogation notable for no events  - Needs  colonoscopy to complete OHTx workup, c- scope today     Elevated LDH  - 511 today  -  today, may be falsely elevated due to dehydration (48 hours of golytely)  - Continue current regimen with ASA, Persantine     Hypertension  - Non-Pulsatile, Dopplers/MAPs closer to goal today  - BP medications include Spironolactone, Lisinopril, amlodipine     Hyponatremia  - Suspect this is due to zoloft, patient not on this as an outpatient  - Will wean to d/c starting today  - Chronic in nature (121-126 September of 2016 and even further back) Will also check urine osm, urine cr, urine Na   - FeNa calculated to be .4. Most likely pre-renal. Hypoalbuminemia?     Palpitations  - EP consult  - Patient reports symptoms during intermittent pacing overnight seen on tele  - Patient had symptomatic RV pacing during sleep. Device interrogated, with no diaphragmatic capture seen at 5V @ 0.4 ms, EP dropped lower rate from 50 to 40 bpm  - VT this am when patient got up to use the rest room. Patient was not symptomatic. States he is a little tired but feels better with sips of water. No re-occurrence since one episode this AM.    -received 300 bolus in c-scope, will give 250 bolus later if still clinically dry  - Replete Mag prn    - cardiac diet, 2g Na restriction    SANDIE WynneC  [facility] Scheduling Employee Scheduling Software #[id number]

## 2017-01-12 NOTE — ANESTHESIA POSTPROCEDURE EVALUATION
"Anesthesia Post Evaluation    Patient: Mick Barriga    Procedure(s) Performed: Procedure(s) (LRB):  COLONOSCOPY (N/A)    Final Anesthesia Type: general  Patient location during evaluation: PACU  Patient participation: Yes- Able to Participate  Level of consciousness: awake and alert  Post-procedure vital signs: reviewed and stable  Pain management: adequate  Airway patency: patent  PONV status at discharge: No PONV  Anesthetic complications: no      Cardiovascular status: stable  Respiratory status: unassisted and spontaneous ventilation  Hydration status: euvolemic  Follow-up not needed.        Visit Vitals    BP (!) 86/66    Pulse 70    Temp 36.6 °C (97.8 °F) (Oral)    Resp 14    Ht 5' 8" (1.727 m)    Wt 87.1 kg (192 lb 0.3 oz)    SpO2 99%    BMI 29.2 kg/m2       Pain/Hugo Score: Pain Assessment Performed: Yes (1/12/2017  1:40 PM)  Presence of Pain: denies (1/12/2017  1:40 PM)  Pain Rating Prior to Med Admin: 0 (1/12/2017  1:40 PM)  Hugo Score: 10 (1/12/2017  1:40 PM)      "

## 2017-01-12 NOTE — ANESTHESIA RELEASE NOTE
"Anesthesia Release from PACU Note    Patient: Mcik Barriga    Procedure(s) Performed: Procedure(s) (LRB):  COLONOSCOPY (N/A)  Anesthesia Release from PACU Note    Patient: Mick Barriga    Procedure(s) Performed: Procedure(s) (LRB):  COLONOSCOPY (N/A)    Anesthesia type: GEN    Post pain: Adequate analgesia reported    Post assessment: no apparent anesthetic complications, tolerated procedure well and no evidence of recall    Post vital signs:   Visit Vitals    BP (!) 86/66    Pulse 70    Temp 36.6 °C (97.8 °F) (Oral)    Resp 14    Ht 5' 8" (1.727 m)    Wt 87.1 kg (192 lb 0.3 oz)    SpO2 99%    BMI 29.2 kg/m2       Level of consciousness: awake, alert and oriented    Nausea/Vomiting: no nausea/no vomiting    Complications: none    Airway Patency: patent    Respiratory: unassisted, spontaneous ventilation, room air    Cardiovascular: stable and blood pressure at baseline    Hydration: euvolemic    "

## 2017-01-12 NOTE — PROGRESS NOTES
VAD Rounds:    Interdisciplinary team with HTS/CTS services including surgeon, cardiologist, LVAD coordinators, , dietician, PT, & Intermacs coordinator rounding on pt. Plan of care discussed with team.

## 2017-01-12 NOTE — PLAN OF CARE
Problem: Patient Care Overview  Goal: Plan of Care Review  Outcome: Ongoing (interventions implemented as appropriate)  Coloscopy unsuccessful today, will repeat tomorrow. NPO at midnight. Golytely given; pt tolerating better this evening. Periodic rhythm changes noted this evening, ECG shows NSR with PVCs, however.Reviewed plan of care with pt and family; no questions or concerns at this time. Will continue to monitor.

## 2017-01-12 NOTE — TRANSFER OF CARE
"Anesthesia Transfer of Care Note    Patient: Mick Barriga    Procedure(s) Performed: Procedure(s) (LRB):  COLONOSCOPY (N/A)    Patient location: PACU    Anesthesia Type: general    Transport from OR: Transported from OR on room air with adequate spontaneous ventilation    Post pain: adequate analgesia    Post assessment: no apparent anesthetic complications and tolerated procedure well    Post vital signs: stable    Level of consciousness: awake, alert and oriented    Nausea/Vomiting: no nausea/vomiting    Complications: none          Last vitals:   Visit Vitals    BP (!) 87/45    Pulse 80    Temp 36.9 °C (98.4 °F)    Resp 14    Ht 5' 8" (1.727 m)    Wt 87.1 kg (192 lb 0.3 oz)    SpO2 98%    BMI 29.2 kg/m2     "

## 2017-01-12 NOTE — PROCEDURES
TXP LVAD INTERROGATIONS 1/12/2017 1/12/2017 1/11/2017 1/11/2017 1/11/2017 1/11/2017 1/11/2017   Type HeartMate II HeartMate II HeartMate II HeartMate II HeartMate II HeartMate II HeartMate II   Flow 5.2 4.8 5.1 5.2 5.0 5.0 4.5   Speed 9000 9000 9000 9000 9000 9000 9000   PI 5.9 4.0 5.4 6.1 5.2 6.1 5.2   Power (Kwon) 5.3 4.9 5.3 5.2 5.2 5.1 4.9   LSL 8600 8600 8600 8600 - - -   Pulsatility No Pulse No Pulse No Pulse No Pulse - - -     VAD sounds smooth   No alarms noted  Non pulsatile    Pt and wife AAAO, no questions related to VAD today.

## 2017-01-12 NOTE — PROGRESS NOTES
Patient escorted off unit via stretcher for echo. Patient transferred to stretcher without assistance. No acute distress noted. Patient denies pain. Right forearm peripheral IV intact. No redness or swelling noted. Patient transported on telemetry. VAD emergency equipment with patient. Vane in monitor room notified. Wife at bedside awaiting patient's return.

## 2017-01-12 NOTE — INTERVAL H&P NOTE
Pre-Procedure H and P Addendum    Patient seen and examined.  History and exam unchanged from prior history and physical.      Procedure: Colonoscopy   Indication: Colon cancer screening, transplant evaluation  ASA Class: per anesthesiology  Airway: normal  Neck Mobility: full range of motion  Mallampatti score: per anesthesia  History of anesthesia problems: no  Family history of anesthesia problems: no  Anesthesia Plan: MAC    Anesthesia/Surgery risks, benefits and alternative options discussed and understood by patient/family.          Active Hospital Problems    Diagnosis  POA    *Elevated serum lactate dehydrogenase (LDH) [R74.0]  Yes    Cardiomyopathy, dilated, nonischemic [I42.9]  Yes    LVAD (left ventricular assist device) present [Z95.811]  Not Applicable      Resolved Hospital Problems    Diagnosis Date Resolved POA   No resolved problems to display.

## 2017-01-12 NOTE — NURSING TRANSFER
Nursing Transfer Note      1/12/2017     Transfer 312    Transfer via stretcher    Transfer with cardiac monitoring; LVAD emergency bag    Transported by RN and PCT    Medicines sent: none    Chart send with patient: Yes    Notified: pt states spouse is in room    Patient reassessed at: 1/12/16 at 1430     Report given to FIDENCIO Aguilar. Pt AAOx4. VSS. No distress noted at this time.

## 2017-01-13 LAB
ALBUMIN SERPL BCP-MCNC: 3.3 G/DL
ALP SERPL-CCNC: 77 U/L
ALT SERPL W/O P-5'-P-CCNC: 18 U/L
ANION GAP SERPL CALC-SCNC: 8 MMOL/L
ANISOCYTOSIS BLD QL SMEAR: SLIGHT
APTT BLDCRRT: 34.3 SEC
AST SERPL-CCNC: 24 U/L
BASOPHILS # BLD AUTO: 0.02 K/UL
BASOPHILS NFR BLD: 0.2 %
BILIRUB DIRECT SERPL-MCNC: 0.3 MG/DL
BILIRUB SERPL-MCNC: 0.7 MG/DL
BNP SERPL-MCNC: 135 PG/ML
BUN SERPL-MCNC: 13 MG/DL
CALCIUM SERPL-MCNC: 8.9 MG/DL
CHLORIDE SERPL-SCNC: 94 MMOL/L
CO2 SERPL-SCNC: 26 MMOL/L
CREAT SERPL-MCNC: 0.9 MG/DL
CRP SERPL-MCNC: 53.2 MG/L
DIFFERENTIAL METHOD: ABNORMAL
EOSINOPHIL # BLD AUTO: 0.3 K/UL
EOSINOPHIL NFR BLD: 4.1 %
ERYTHROCYTE [DISTWIDTH] IN BLOOD BY AUTOMATED COUNT: 20 %
EST. GFR  (AFRICAN AMERICAN): >60 ML/MIN/1.73 M^2
EST. GFR  (NON AFRICAN AMERICAN): >60 ML/MIN/1.73 M^2
GLUCOSE SERPL-MCNC: 197 MG/DL
HCT VFR BLD AUTO: 34.3 %
HGB BLD-MCNC: 10.8 G/DL
HYPOCHROMIA BLD QL SMEAR: ABNORMAL
INR PPP: 2.4
LDH SERPL L TO P-CCNC: 452 U/L
LYMPHOCYTES # BLD AUTO: 2.4 K/UL
LYMPHOCYTES NFR BLD: 29.1 %
MAGNESIUM SERPL-MCNC: 1.7 MG/DL
MCH RBC QN AUTO: 18 PG
MCHC RBC AUTO-ENTMCNC: 31.5 %
MCV RBC AUTO: 57 FL
MONOCYTES # BLD AUTO: 0.7 K/UL
MONOCYTES NFR BLD: 8.3 %
NEUTROPHILS # BLD AUTO: 4.8 K/UL
NEUTROPHILS NFR BLD: 58.3 %
OVALOCYTES BLD QL SMEAR: ABNORMAL
PHOSPHATE SERPL-MCNC: 3.5 MG/DL
PLATELET # BLD AUTO: 190 K/UL
PMV BLD AUTO: ABNORMAL FL
POCT GLUCOSE: 123 MG/DL (ref 70–110)
POCT GLUCOSE: 159 MG/DL (ref 70–110)
POCT GLUCOSE: 179 MG/DL (ref 70–110)
POCT GLUCOSE: 183 MG/DL (ref 70–110)
POIKILOCYTOSIS BLD QL SMEAR: SLIGHT
POLYCHROMASIA BLD QL SMEAR: ABNORMAL
POTASSIUM SERPL-SCNC: 5.4 MMOL/L
PREALB SERPL-MCNC: 17 MG/DL
PROT SERPL-MCNC: 6.7 G/DL
PROTHROMBIN TIME: 23.5 SEC
RBC # BLD AUTO: 6.01 M/UL
SCHISTOCYTES BLD QL SMEAR: ABNORMAL
SODIUM SERPL-SCNC: 128 MMOL/L
TARGETS BLD QL SMEAR: ABNORMAL
WBC # BLD AUTO: 8.33 K/UL

## 2017-01-13 PROCEDURE — 85025 COMPLETE CBC W/AUTO DIFF WBC: CPT

## 2017-01-13 PROCEDURE — 80076 HEPATIC FUNCTION PANEL: CPT

## 2017-01-13 PROCEDURE — 83615 LACTATE (LD) (LDH) ENZYME: CPT

## 2017-01-13 PROCEDURE — 85610 PROTHROMBIN TIME: CPT

## 2017-01-13 PROCEDURE — 83880 ASSAY OF NATRIURETIC PEPTIDE: CPT

## 2017-01-13 PROCEDURE — 86140 C-REACTIVE PROTEIN: CPT

## 2017-01-13 PROCEDURE — 25000003 PHARM REV CODE 250: Performed by: PHYSICIAN ASSISTANT

## 2017-01-13 PROCEDURE — 93283 PRGRMG EVAL IMPLANTABLE DFB: CPT

## 2017-01-13 PROCEDURE — 84134 ASSAY OF PREALBUMIN: CPT

## 2017-01-13 PROCEDURE — 83735 ASSAY OF MAGNESIUM: CPT

## 2017-01-13 PROCEDURE — 93283 PRGRMG EVAL IMPLANTABLE DFB: CPT | Mod: 26,,, | Performed by: INTERNAL MEDICINE

## 2017-01-13 PROCEDURE — 80048 BASIC METABOLIC PNL TOTAL CA: CPT

## 2017-01-13 PROCEDURE — 20600001 HC STEP DOWN PRIVATE ROOM

## 2017-01-13 PROCEDURE — 27000248 HC VAD-ADDITIONAL DAY

## 2017-01-13 PROCEDURE — 25000003 PHARM REV CODE 250: Performed by: HOSPITALIST

## 2017-01-13 PROCEDURE — 84100 ASSAY OF PHOSPHORUS: CPT

## 2017-01-13 PROCEDURE — 99233 SBSQ HOSP IP/OBS HIGH 50: CPT | Mod: ,,, | Performed by: INTERNAL MEDICINE

## 2017-01-13 PROCEDURE — 85730 THROMBOPLASTIN TIME PARTIAL: CPT

## 2017-01-13 PROCEDURE — 63600175 PHARM REV CODE 636 W HCPCS: Performed by: PHYSICIAN ASSISTANT

## 2017-01-13 RX ADMIN — INSULIN ASPART 20 UNITS: 100 INJECTION, SOLUTION INTRAVENOUS; SUBCUTANEOUS at 08:01

## 2017-01-13 RX ADMIN — AMLODIPINE BESYLATE 5 MG: 5 TABLET ORAL at 08:01

## 2017-01-13 RX ADMIN — INSULIN ASPART 20 UNITS: 100 INJECTION, SOLUTION INTRAVENOUS; SUBCUTANEOUS at 06:01

## 2017-01-13 RX ADMIN — INSULIN ASPART 20 UNITS: 100 INJECTION, SOLUTION INTRAVENOUS; SUBCUTANEOUS at 01:01

## 2017-01-13 RX ADMIN — Medication 400 MG: at 01:01

## 2017-01-13 RX ADMIN — ASPIRIN 325 MG: 325 TABLET, DELAYED RELEASE ORAL at 08:01

## 2017-01-13 RX ADMIN — DOCUSATE SODIUM 200 MG: 100 CAPSULE, LIQUID FILLED ORAL at 09:01

## 2017-01-13 RX ADMIN — Medication 400 MG: at 05:01

## 2017-01-13 RX ADMIN — DIPYRIDAMOLE 100 MG: 50 TABLET, FILM COATED ORAL at 09:01

## 2017-01-13 RX ADMIN — DIPYRIDAMOLE 100 MG: 50 TABLET, FILM COATED ORAL at 05:01

## 2017-01-13 RX ADMIN — IMIPRAMINE HYDROCHLORIDE 25 MG: 25 TABLET, FILM COATED ORAL at 09:01

## 2017-01-13 RX ADMIN — PANTOPRAZOLE SODIUM 40 MG: 40 TABLET, DELAYED RELEASE ORAL at 08:01

## 2017-01-13 RX ADMIN — MAGNESIUM SULFATE HEPTAHYDRATE 3 G: 500 INJECTION, SOLUTION INTRAMUSCULAR; INTRAVENOUS at 08:01

## 2017-01-13 RX ADMIN — FERROUS GLUCONATE TAB 324 MG (37.5 MG ELEMENTAL IRON) 324 MG: 324 (37.5 FE) TAB at 08:01

## 2017-01-13 RX ADMIN — TAMSULOSIN HYDROCHLORIDE 0.4 MG: 0.4 CAPSULE ORAL at 08:01

## 2017-01-13 RX ADMIN — LISINOPRIL 20 MG: 20 TABLET ORAL at 08:01

## 2017-01-13 RX ADMIN — Medication 400 MG: at 09:01

## 2017-01-13 RX ADMIN — DIPYRIDAMOLE 100 MG: 50 TABLET, FILM COATED ORAL at 01:01

## 2017-01-13 RX ADMIN — LISINOPRIL 20 MG: 20 TABLET ORAL at 09:01

## 2017-01-13 NOTE — NURSING
Right forearm PIV infiltrated.  Removed PIV.  Requested PICC consult per patient request.  Magnesium Sulfate was infusing.  Notified ETTA Coles  Warm packs applied to site.

## 2017-01-13 NOTE — PROGRESS NOTES
Progress Note  Heart Transplant Service    Admit Date: 1/5/2017   LOS: 8 days     SUBJECTIVE:     Follow up for: Elevated serum lactate dehydrogenase (LDH)    Interval History: Patient has no complaints overnight. Denies chest pain, SOB, NVD. Some possible slow VT overnight, patient states he was aware of palpitations during that time. No other complaints.     Scheduled Meds:   amlodipine  5 mg Oral Daily    aspirin  325 mg Oral Daily    dipyridamole  100 mg Oral TID    docusate sodium  200 mg Oral QHS    ferrous gluconate  324 mg Oral Daily with breakfast    imipramine  25 mg Oral QHS    insulin aspart  20 Units Subcutaneous TID WM    lisinopril  20 mg Oral BID    magnesium oxide  400 mg Oral TID    pantoprazole  40 mg Oral Daily    sertraline  50 mg Oral Daily    tamsulosin  0.4 mg Oral Daily     Continuous Infusions:   PRN Meds:cyclobenzaprine, dextrose 50%, dextrose 50%, glucagon (human recombinant), glucose, glucose, insulin aspart, ramelteon, senna-docusate 8.6-50 mg    Review of patient's allergies indicates:   Allergen Reactions    Levemir [insulin detemir] Itching    Pork/porcine containing products     Ranexa [ranolazine] Nausea Only       OBJECTIVE:     Vital Signs (Most Recent)  Temp: 97.7 °F (36.5 °C) (01/13/17 1200)  Pulse: 93 (01/13/17 1300)  Resp: 18 (01/13/17 1200)  BP: (!) 86/0 (01/13/17 1209)  SpO2: 100 % (01/13/17 0800)    Vital Signs Range (Last 24H):  Temp:  [97.6 °F (36.4 °C)-98.2 °F (36.8 °C)]   Pulse:  []   Resp:  [10-18]   BP: (72-95)/(0-71)   SpO2:  [98 %-100 %]     I & O (Last 24H):  Intake/Output Summary (Last 24 hours) at 01/13/17 1339  Last data filed at 01/13/17 1135   Gross per 24 hour   Intake              530 ml   Output             2550 ml   Net            -2020 ml     Telemetry: possible slow VT? Overnight. EP consulted    Physical Exam  Constitutional: He is oriented to person, place, and time. He appears well-developed and well-nourished.   Head:  Normocephalic and atraumatic.   Eyes: EOM are normal. Pupils are equal, round, and reactive to light.   Neck: Normal range of motion. Neck supple. No JVD (at level of clavicle) present.   Cardiovascular: VAD sounds smooth   Pulmonary/Chest: Effort normal and breath sounds normal.   Abdominal: Soft. Bowel sounds are normal.   Musculoskeletal: Normal range of motion. He exhibits no edema.   Neurological: He is alert and oriented to person, place, and time.   Skin: Skin is warm and dry.   Psychiatric: He has a normal mood and affect. His behavior is normal.   Nursing note and vitals reviewed.    Labs:    Recent Labs  Lab 01/11/17 0455 01/12/17  0552 01/13/17  0624   WBC 9.33 10.92 8.33   HGB 11.1* 11.8* 10.8*   HCT 34.4* 36.8* 34.3*    228 190   LYMPH 24.0  2.2 13.1*  1.4 29.1  2.4   MONO 7.6  0.7 5.5  0.6 8.3  0.7   EOSINOPHIL 5.4 3.4 4.1       Recent Labs  Lab 01/11/17  0455 01/12/17  0552 01/13/17  0624   APTT 33.9* 33.9* 34.3*   INR 2.9* 2.3* 2.4*        Recent Labs  Lab 01/09/17  0536  01/11/17  0455 01/12/17  0552 01/13/17  0624   *  < > 123* 145* 197*   CALCIUM 9.1  < > 8.3* 9.1 8.9   ALBUMIN 3.5  --  3.0*  --  3.3*   PROT 7.1  --  6.1  --  6.7   *  < > 124* 134* 128*   K 4.9  < > 4.5 4.7 5.4*   CO2 28  < > 28 25 26   CL 91*  < > 91* 100 94*   BUN 15  < > 10 7 13   CREATININE 0.9  < > 0.8 0.8 0.9   ALKPHOS 88  --  73  --  77   ALT 18  --  16  --  18   AST 24  --  23  --  24   BILITOT 0.7  --  0.7  --  0.7   MG 1.7  < > 1.6 1.7 1.7   PHOS 4.1  < > 3.6 3.7 3.5   < > = values in this interval not displayed.  Estimated Creatinine Clearance: 101.5 mL/min (based on Cr of 0.9).      Recent Labs  Lab 01/13/17  0624   *       Recent Labs  Lab 01/07/17  0431 01/08/17  0428 01/09/17  0536 01/10/17  0536 01/11/17  0455 01/12/17  0552 01/13/17  0624   * 510* 527* 484* 486* 511* 452*     Microbiology Results (last 7 days)     ** No results found for the last 168 hours. **         C-scope  Impression:   - Non-thrombosed external hemorrhoids found on                         perianal exam.                        - Localized moderate inflammation was found in the                         cecum and may be secondary to ischemic colitis.                         Unable to biopsy due to anticoagulation.                        - One 3 mm polyp in the rectum. Resection not                         attempted.                        - The examination was otherwise normal on direct                         and retroflexion views.                        - No specimens collected.  Recommendation: - Return [facility] to [facility] for ongoing care.                        - Advance diet as tolerated.                        - Continue present medications.                        - Repeat colonoscopy (date not yet determined) for                         surveillance to remove polyp and follow-up colon                         inflammation. Recommend doing this at a time when                         he is able to safely stop anticoagulation - not                         urgent.    ASSESSMENT:     55 y/o male with PMHx HFrEF s/p HM II 8/24/16, s/p AICD, HTN, DM , Atrial flutter, admitted for elevated LDH.     PLAN:     S/P LVAD HeartMate II Implanted 8/24/2016  - Continue Coumadin, Goal INR 2.0-3.0. Therapeutic today at 2.4  - will give 2 mg tonight after GI gives clearance   - Antiplatelets , Persantine 100 TID.  - Speed set at 9000, LSL 8600  - Interrogation notable for no events  - Needs colonoscopy to complete OHTx workup, c- scope yesterday   - repeat colonoscopy in future (date undetermined) after OHT, when patient can safely come off of  anticoagulation. Very low risk polyp (3mm).      Elevated LDH  - 452 today, downtrending  - Continue current regimen with ASA, Persantine      Hypertension  - Non-Pulsatile, Dopplers/MAPs closer to goal today  - BP medications include Spironolactone, Lisinopril,  amlodipine      Hyponatremia  - Suspect this is due to zoloft, patient not on this as an outpatient  - Will wean to d/c starting today  - Chronic in nature (121-126 September of 2016 and even further back) Will also check urine osm, urine cr, urine Na  - FeNa calculated to be .4. Most likely pre-renal, patient dehydrated. Encouraged liberal intake of fluid.       Palpitations  - Patient reports symptoms during intermittent pacing overnight seen on tele  - Patient had symptomatic RV pacing during sleep. Device interrogated, with no diaphragmatic capture seen at 5V @ 0.4 ms, EP dropped lower rate from 50 to 40 bpm  - VT yesterday morning, may be slow VT on tele today.. EP called and will give recs. Appreciate assistance.   - Replete Mag prn  EP note 1/13/16  - recommend restarting home amiodarone  - Reprogrammed to VT1 zone 120bpm with 6 bursts of ATP at 10ms decrements; VT2 zone 167 bpm with 3 burst ATP; VF zone to 231  - if cleared from HF standpoint, would begin low dose BB and Titrate up as tolerated     - cardiac diet, 2g Na restriction    Juany Benjamin PA-C  [employer] #[id number]

## 2017-01-13 NOTE — NURSING
Patient requested Low air Loss Overlay Mattress for complaints of back pain.  Ordered from [facility], [phone].

## 2017-01-13 NOTE — PLAN OF CARE
Problem: Patient Care Overview  Goal: Plan of Care Review  Outcome: Ongoing (interventions implemented as appropriate)  No significant events noted throughout shift. Free of falls/trauma/inury. VSS. Denies any CP, SOB, palpitations, or dizziness. General skin remains intact with no new breakdown. Turning/repositioning independently in bed. Diabetes managed with SSI, coverage required at bedtime. No c/o pain or any other discomforts this shift. Plan of care reviewed and updated, verbalizes understanding. No acute distress noted. Will continue to monitor.

## 2017-01-13 NOTE — PROGRESS NOTES
Cardiac EP Progress Note    CC: WCT overnight    S: Went into a wide complex tachycardia overnight, associated with feeling of tremors and uneasiness. No shock given, converted to sinus.     Current Facility-Administered Medications   Medication Dose Route Frequency Provider Last Rate Last Dose    amlodipine tablet 5 mg  5 mg Oral Daily Neelima FLOYDBennett Navarrete PA-C   5 mg at 01/13/17 0824    aspirin EC tablet 325 mg  325 mg Oral Daily Juany Benjamin PA-C   325 mg at 01/13/17 0824    cyclobenzaprine tablet 5 mg  5 mg Oral Daily PRN Juany Benjamin PA-C        dextrose 50% injection 12.5 g  12.5 g Intravenous PRN Juany Benjamin PA-C        dextrose 50% injection 25 g  25 g Intravenous PRN Juany Benjamin PA-C        dipyridamole tablet 100 mg  100 mg Oral TID Jona Chaudhari MD   100 mg at 01/13/17 1312    docusate sodium capsule 200 mg  200 mg Oral QHS Juany Benjamin PA-C   200 mg at 01/12/17 2131    ferrous gluconate tablet 324 mg  324 mg Oral Daily with breakfast Juany Benjamin PA-C   324 mg at 01/13/17 0830    glucagon (human recombinant) injection 1 mg  1 mg Intramuscular PRN Juany Benjamin PA-C        glucose chewable tablet 16 g  16 g Oral PRN Juany Benjamin PA-C        glucose chewable tablet 24 g  24 g Oral PRN Juany Benjamin PA-C        imipramine tablet 25 mg  25 mg Oral QHS Juany Benjamin PA-C   25 mg at 01/12/17 2131    insulin aspart pen 0-5 Units  0-5 Units Subcutaneous QID (AC + HS) PRN Juany Benjamin PA-C   1 Units at 01/12/17 2222    insulin aspart pen 20 Units  20 Units Subcutaneous TID WM Juany Benjamin PA-C   20 Units at 01/13/17 1310    lisinopril tablet 20 mg  20 mg Oral BID Juany Benjamin PA-C   20 mg at 01/13/17 0824    magnesium oxide tablet 400 mg  400 mg Oral TID Juany Benjamin PA-C   400 mg at 01/13/17 1313    pantoprazole EC tablet 40 mg  40 mg Oral Daily Juany Benjamin PA-C   40 mg at  "01/13/17 0824    ramelteon tablet 8 mg  8 mg Oral Nightly PRN Juany Benjamin PA-C   8 mg at 01/06/17 2105    senna-docusate 8.6-50 mg per tablet 1 tablet  1 tablet Oral Daily PRN Juany Benjamin PA-C        sertraline tablet 50 mg  50 mg Oral Daily Peewee Romero MD   50 mg at 01/11/17 0900    tamsulosin 24 hr capsule 0.4 mg  0.4 mg Oral Daily Juany Benjamin PA-C   0.4 mg at 01/13/17 0823       Gen: denies fever chills fatigue weight loss  HEENT: denies HA, blurry vision, tinnitus, dry mouth  Resp: denies SOB, cough, hemoptysis  CV: Denies CP, palps, orthopnea, PND, edema  GI: denies abd pain, nausea/vomiting, diarrhea, melena, constipation, hematochezia      O:  Visit Vitals    BP (!) 86/0 (BP Location: Left arm, Patient Position: Lying, BP Method: Doppler)    Pulse 93    Temp 97.7 °F (36.5 °C) (Oral)    Resp 18    Ht 5' 8" (1.727 m)    Wt 88.6 kg (195 lb 5.2 oz)    SpO2 100%    BMI 29.7 kg/m2       Intake/Output Summary (Last 24 hours) at 01/13/17 1405  Last data filed at 01/13/17 1135   Gross per 24 hour   Intake              350 ml   Output             2550 ml   Net            -2200 ml       Gen: NAD, A&Ox3  Neck: No JVD, no bruits  CV: VAD hum  Resp: CTAB, normal effort  Ext: no edema, 2+ pulses    Lab Results   Component Value Date    WBC 8.33 01/13/2017    HGB 10.8 (L) 01/13/2017    HCT 34.3 (L) 01/13/2017    MCV 57 (L) 01/13/2017     01/13/2017     BMP  Lab Results   Component Value Date     (L) 01/13/2017    K 5.4 (H) 01/13/2017    CL 94 (L) 01/13/2017    CO2 26 01/13/2017    BUN 13 01/13/2017    CREATININE 0.9 01/13/2017    CALCIUM 8.9 01/13/2017    ANIONGAP 8 01/13/2017    ESTGFRAFRICA >60.0 01/13/2017    EGFRNONAA >60.0 01/13/2017     Telemetry with MMVT morphology with pacer spikes ~135bpm  Interrogation with no sign of PMT, +atrial noise, no episodes of ATP or VT detection    A/P:  Mr. Barriga is a 56 year old man with CAD and ICM s/p HM II LVAD and WCT s/p " Biotronik ICD who presents for elevated LDH undergoing OHT eval, consulted to EP for concern for slow VT. PMT unlikely as there is no sign of VA conduction. Likely due to slow MMVT.     Plan:   --recommend restarting home amiodarone  --Reprogrammed to VT1 zone 120bpm with 6 bursts of ATP at 10ms decrements; VT2 zone 167 bpm with 3 burst ATP; VF zone to 231  --if cleared from HF standpoint, would begin low dose BB and  Titrate up as tolerated    Further recommendations per attending addendum    Andre Lin MD  PGY-4 ([phone])  Cardiology Fellow

## 2017-01-13 NOTE — PLAN OF CARE
Problem: Patient Care Overview  Goal: Plan of Care Review  Outcome: Ongoing (interventions implemented as appropriate)  Reviewed plan of care with patient.  Patient will be continuously monitored by telemetry. Vitals signs will be assessed q4h.  Labs will be drawn and results will be monitored.   Blood glucose monitoring will be completed 4 times daily before meals and at bedtime.  Dressing changes (LVAD) will be performed daily with soap and water by spouse per patient.   Today's procedures/and or assessments:  ICD Interrogation, EP Consult.  Patient will report:  Bleeding, SOB, pain, dizziness, and swelling.  Patient will immediately report:  inflammation, leaking, bruising or dislodged PIV.  Patient will remain free of fall/trauma/injury by using appropriate lighting, nonskid socks, and by keeping area free of debris.  Patient will call for assistance when needed. Patient verbalized understanding of all instructions.

## 2017-01-14 LAB
ANION GAP SERPL CALC-SCNC: 9 MMOL/L
ANISOCYTOSIS BLD QL SMEAR: SLIGHT
APTT BLDCRRT: 31.6 SEC
BASOPHILS # BLD AUTO: 0.02 K/UL
BASOPHILS NFR BLD: 0.2 %
BUN SERPL-MCNC: 13 MG/DL
BURR CELLS BLD QL SMEAR: ABNORMAL
CALCIUM SERPL-MCNC: 9 MG/DL
CHLORIDE SERPL-SCNC: 94 MMOL/L
CO2 SERPL-SCNC: 25 MMOL/L
CREAT SERPL-MCNC: 0.9 MG/DL
DIFFERENTIAL METHOD: ABNORMAL
EOSINOPHIL # BLD AUTO: 0.7 K/UL
EOSINOPHIL NFR BLD: 7.2 %
ERYTHROCYTE [DISTWIDTH] IN BLOOD BY AUTOMATED COUNT: 20 %
EST. GFR  (AFRICAN AMERICAN): >60 ML/MIN/1.73 M^2
EST. GFR  (NON AFRICAN AMERICAN): >60 ML/MIN/1.73 M^2
FACT X PPP CHRO-ACNC: 0.25 IU/ML
GLUCOSE SERPL-MCNC: 180 MG/DL
HCT VFR BLD AUTO: 33.2 %
HGB BLD-MCNC: 10.6 G/DL
HYPOCHROMIA BLD QL SMEAR: ABNORMAL
INR PPP: 1.6
LDH SERPL L TO P-CCNC: 478 U/L
LYMPHOCYTES # BLD AUTO: 2.7 K/UL
LYMPHOCYTES NFR BLD: 29.3 %
MAGNESIUM SERPL-MCNC: 1.7 MG/DL
MCH RBC QN AUTO: 18.2 PG
MCHC RBC AUTO-ENTMCNC: 31.9 %
MCV RBC AUTO: 57 FL
MONOCYTES # BLD AUTO: 0.6 K/UL
MONOCYTES NFR BLD: 6.4 %
NEUTROPHILS # BLD AUTO: 5.1 K/UL
NEUTROPHILS NFR BLD: 55.7 %
OVALOCYTES BLD QL SMEAR: ABNORMAL
PHOSPHATE SERPL-MCNC: 5.1 MG/DL
PLATELET # BLD AUTO: 155 K/UL
PMV BLD AUTO: ABNORMAL FL
POCT GLUCOSE: 154 MG/DL (ref 70–110)
POCT GLUCOSE: 186 MG/DL (ref 70–110)
POCT GLUCOSE: 194 MG/DL (ref 70–110)
POCT GLUCOSE: 200 MG/DL (ref 70–110)
POCT GLUCOSE: 209 MG/DL (ref 70–110)
POIKILOCYTOSIS BLD QL SMEAR: SLIGHT
POLYCHROMASIA BLD QL SMEAR: ABNORMAL
POTASSIUM SERPL-SCNC: 4.9 MMOL/L
PROTHROMBIN TIME: 16.1 SEC
RBC # BLD AUTO: 5.81 M/UL
SCHISTOCYTES BLD QL SMEAR: ABNORMAL
SODIUM SERPL-SCNC: 128 MMOL/L
WBC # BLD AUTO: 9.15 K/UL

## 2017-01-14 PROCEDURE — 27000248 HC VAD-ADDITIONAL DAY

## 2017-01-14 PROCEDURE — 20600001 HC STEP DOWN PRIVATE ROOM

## 2017-01-14 PROCEDURE — 25000003 PHARM REV CODE 250: Performed by: PHYSICIAN ASSISTANT

## 2017-01-14 PROCEDURE — 99232 SBSQ HOSP IP/OBS MODERATE 35: CPT | Mod: ,,, | Performed by: INTERNAL MEDICINE

## 2017-01-14 PROCEDURE — 85520 HEPARIN ASSAY: CPT

## 2017-01-14 PROCEDURE — 85610 PROTHROMBIN TIME: CPT

## 2017-01-14 PROCEDURE — 25000003 PHARM REV CODE 250: Performed by: HOSPITALIST

## 2017-01-14 PROCEDURE — 36415 COLL VENOUS BLD VENIPUNCTURE: CPT

## 2017-01-14 PROCEDURE — 83735 ASSAY OF MAGNESIUM: CPT

## 2017-01-14 PROCEDURE — 84100 ASSAY OF PHOSPHORUS: CPT

## 2017-01-14 PROCEDURE — 83615 LACTATE (LD) (LDH) ENZYME: CPT

## 2017-01-14 PROCEDURE — 25000003 PHARM REV CODE 250: Performed by: STUDENT IN AN ORGANIZED HEALTH CARE EDUCATION/TRAINING PROGRAM

## 2017-01-14 PROCEDURE — 85025 COMPLETE CBC W/AUTO DIFF WBC: CPT

## 2017-01-14 PROCEDURE — 85730 THROMBOPLASTIN TIME PARTIAL: CPT

## 2017-01-14 PROCEDURE — 25000003 PHARM REV CODE 250: Performed by: INTERNAL MEDICINE

## 2017-01-14 PROCEDURE — 80048 BASIC METABOLIC PNL TOTAL CA: CPT

## 2017-01-14 RX ORDER — OXYCODONE HYDROCHLORIDE 5 MG/1
10 TABLET ORAL EVERY 6 HOURS PRN
Status: DISCONTINUED | OUTPATIENT
Start: 2017-01-14 | End: 2017-01-22 | Stop reason: HOSPADM

## 2017-01-14 RX ORDER — WARFARIN 2 MG/1
2 TABLET ORAL DAILY
Status: DISCONTINUED | OUTPATIENT
Start: 2017-01-14 | End: 2017-01-16

## 2017-01-14 RX ORDER — AMIODARONE HYDROCHLORIDE 200 MG/1
400 TABLET ORAL 2 TIMES DAILY
Status: DISCONTINUED | OUTPATIENT
Start: 2017-01-14 | End: 2017-01-22 | Stop reason: HOSPADM

## 2017-01-14 RX ORDER — OXYCODONE HYDROCHLORIDE 5 MG/1
10 TABLET ORAL EVERY 6 HOURS PRN
Status: DISCONTINUED | OUTPATIENT
Start: 2017-01-14 | End: 2017-01-14

## 2017-01-14 RX ORDER — OXYCODONE HYDROCHLORIDE 5 MG/1
10 TABLET ORAL ONCE
Status: COMPLETED | OUTPATIENT
Start: 2017-01-14 | End: 2017-01-14

## 2017-01-14 RX ORDER — HEPARIN SODIUM 10000 [USP'U]/100ML
17 INJECTION, SOLUTION INTRAVENOUS CONTINUOUS
Status: DISCONTINUED | OUTPATIENT
Start: 2017-01-14 | End: 2017-01-18

## 2017-01-14 RX ADMIN — IMIPRAMINE HYDROCHLORIDE 25 MG: 25 TABLET, FILM COATED ORAL at 09:01

## 2017-01-14 RX ADMIN — HEPARIN SODIUM AND DEXTROSE 19 UNITS/KG/HR: 10000; 5 INJECTION INTRAVENOUS at 07:01

## 2017-01-14 RX ADMIN — OXYCODONE HYDROCHLORIDE 10 MG: 5 TABLET ORAL at 01:01

## 2017-01-14 RX ADMIN — OXYCODONE HYDROCHLORIDE 10 MG: 5 TABLET ORAL at 09:01

## 2017-01-14 RX ADMIN — INSULIN ASPART 20 UNITS: 100 INJECTION, SOLUTION INTRAVENOUS; SUBCUTANEOUS at 11:01

## 2017-01-14 RX ADMIN — Medication 400 MG: at 05:01

## 2017-01-14 RX ADMIN — SODIUM CHLORIDE 250 ML: 0.9 INJECTION, SOLUTION INTRAVENOUS at 08:01

## 2017-01-14 RX ADMIN — PANTOPRAZOLE SODIUM 40 MG: 40 TABLET, DELAYED RELEASE ORAL at 09:01

## 2017-01-14 RX ADMIN — Medication 400 MG: at 09:01

## 2017-01-14 RX ADMIN — ASPIRIN 325 MG: 325 TABLET, DELAYED RELEASE ORAL at 09:01

## 2017-01-14 RX ADMIN — INSULIN ASPART 20 UNITS: 100 INJECTION, SOLUTION INTRAVENOUS; SUBCUTANEOUS at 01:01

## 2017-01-14 RX ADMIN — INSULIN ASPART 20 UNITS: 100 INJECTION, SOLUTION INTRAVENOUS; SUBCUTANEOUS at 07:01

## 2017-01-14 RX ADMIN — TAMSULOSIN HYDROCHLORIDE 0.4 MG: 0.4 CAPSULE ORAL at 09:01

## 2017-01-14 RX ADMIN — DIPYRIDAMOLE 100 MG: 50 TABLET, FILM COATED ORAL at 01:01

## 2017-01-14 RX ADMIN — AMLODIPINE BESYLATE 5 MG: 5 TABLET ORAL at 09:01

## 2017-01-14 RX ADMIN — Medication 400 MG: at 01:01

## 2017-01-14 RX ADMIN — SODIUM CHLORIDE 250 ML: 0.9 INJECTION, SOLUTION INTRAVENOUS at 06:01

## 2017-01-14 RX ADMIN — DIPYRIDAMOLE 100 MG: 50 TABLET, FILM COATED ORAL at 05:01

## 2017-01-14 RX ADMIN — WARFARIN SODIUM 2 MG: 2 TABLET ORAL at 06:01

## 2017-01-14 RX ADMIN — LISINOPRIL 20 MG: 20 TABLET ORAL at 09:01

## 2017-01-14 RX ADMIN — AMIODARONE HYDROCHLORIDE 400 MG: 200 TABLET ORAL at 11:01

## 2017-01-14 RX ADMIN — FERROUS GLUCONATE TAB 324 MG (37.5 MG ELEMENTAL IRON) 324 MG: 324 (37.5 FE) TAB at 09:01

## 2017-01-14 RX ADMIN — SODIUM CHLORIDE 500 ML: 0.9 INJECTION, SOLUTION INTRAVENOUS at 10:01

## 2017-01-14 RX ADMIN — DIPYRIDAMOLE 100 MG: 50 TABLET, FILM COATED ORAL at 09:01

## 2017-01-14 RX ADMIN — DOCUSATE SODIUM 200 MG: 100 CAPSULE, LIQUID FILLED ORAL at 09:01

## 2017-01-14 RX ADMIN — AMIODARONE HYDROCHLORIDE 400 MG: 200 TABLET ORAL at 09:01

## 2017-01-14 RX ADMIN — HEPARIN SODIUM AND DEXTROSE 17 UNITS/KG/HR: 10000; 5 INJECTION INTRAVENOUS at 11:01

## 2017-01-14 NOTE — PLAN OF CARE
Problem: Patient Care Overview  Goal: Plan of Care Review  Outcome: Revised  Plan of care discussed with patient.  Patient ambulating independently, fall precautions in place. LVAD DP and numbers WNL, smooth LVAD hum. Patient c/o pain and PRN oxycodone 10 mg given.  Discussed medications and care. Patient has no questions at this time. Will continue to monitor.

## 2017-01-14 NOTE — PROGRESS NOTES
Cardiac EP Progress Note    CC: WCT overnight    S: PAULYEO, remains in sinus. Had some low PI this AM    Current Facility-Administered Medications   Medication Dose Route Frequency Provider Last Rate Last Dose    amlodipine tablet 5 mg  5 mg Oral Daily Neelima LOIS Navarrete PA-C   5 mg at 01/13/17 0824    aspirin EC tablet 325 mg  325 mg Oral Daily Juany Benjamin PA-C   325 mg at 01/13/17 0824    cyclobenzaprine tablet 5 mg  5 mg Oral Daily PRN Juany Benjamin PA-C        dextrose 50% injection 12.5 g  12.5 g Intravenous PRN Juany Benjamin PA-C        dextrose 50% injection 25 g  25 g Intravenous PRN Juany Benjamin PA-C        dipyridamole tablet 100 mg  100 mg Oral TID Jona Chaudhari MD   100 mg at 01/14/17 0525    docusate sodium capsule 200 mg  200 mg Oral QHS Juany Benjamin PA-C   200 mg at 01/13/17 2116    ferrous gluconate tablet 324 mg  324 mg Oral Daily with breakfast Juany Benjamin PA-C   324 mg at 01/13/17 0830    glucagon (human recombinant) injection 1 mg  1 mg Intramuscular PRN Juany Benjamin PA-C        glucose chewable tablet 16 g  16 g Oral PRN Juany Benjamin PA-C        glucose chewable tablet 24 g  24 g Oral PRN Juany Benjamin PA-C        imipramine tablet 25 mg  25 mg Oral QHS Juany Benjamin PA-C   25 mg at 01/13/17 2116    insulin aspart pen 0-5 Units  0-5 Units Subcutaneous QID (AC + HS) PRN Juany Benjamin PA-C   1 Units at 01/12/17 2222    insulin aspart pen 20 Units  20 Units Subcutaneous TID WM Juany Benjamin PA-C   20 Units at 01/13/17 1841    lisinopril tablet 20 mg  20 mg Oral BID Juany Benjamin PA-C   20 mg at 01/13/17 2116    magnesium oxide tablet 400 mg  400 mg Oral TID Juany Benjamin PA-C   400 mg at 01/14/17 0525    pantoprazole EC tablet 40 mg  40 mg Oral Daily Juany Benjamin PA-C   40 mg at 01/13/17 0824    ramelteon tablet 8 mg  8 mg Oral Nightly PRN Juany Benjamin PA-C   " 8 mg at 01/06/17 2105    senna-docusate 8.6-50 mg per tablet 1 tablet  1 tablet Oral Daily PRN Juany Benjamin PA-C        sertraline tablet 50 mg  50 mg Oral Daily Peewee Romero MD   50 mg at 01/11/17 0900    tamsulosin 24 hr capsule 0.4 mg  0.4 mg Oral Daily Juany Benjamin PA-C   0.4 mg at 01/13/17 0823       Gen: denies fever chills fatigue weight loss  HEENT: denies HA, blurry vision, tinnitus, dry mouth  Resp: denies SOB, cough, hemoptysis  CV: Denies CP, palps, orthopnea, PND, edema  GI: denies abd pain, nausea/vomiting, diarrhea, melena, constipation, hematochezia      O:    Visit Vitals    BP (!) 89/58 (BP Location: Right arm, Patient Position: Lying, BP Method: Automatic)    Pulse 70    Temp 97.4 °F (36.3 °C) (Oral)    Resp 18    Ht 5' 8" (1.727 m)    Wt 88.3 kg (194 lb 10.7 oz)    SpO2 98%    BMI 29.6 kg/m2       Intake/Output Summary (Last 24 hours) at 01/14/17 0837  Last data filed at 01/14/17 0438   Gross per 24 hour   Intake             2140 ml   Output             4800 ml   Net            -2660 ml       Gen: NAD, A&Ox3  Neck: No JVD, no bruits  CV: VAD hum  Resp: CTAB, normal effort  Ext: no edema, 2+ pulses    Lab Results   Component Value Date    WBC 9.15 01/14/2017    HGB 10.6 (L) 01/14/2017    HCT 33.2 (L) 01/14/2017    MCV 57 (L) 01/14/2017     01/13/2017     BMP  Lab Results   Component Value Date     (L) 01/14/2017    K 4.9 01/14/2017    CL 94 (L) 01/14/2017    CO2 25 01/14/2017    BUN 13 01/14/2017    CREATININE 0.9 01/14/2017    CALCIUM 9.0 01/14/2017    ANIONGAP 9 01/14/2017    ESTGFRAFRICA >60.0 01/14/2017    EGFRNONAA >60.0 01/14/2017     Telemetry with MMVT morphology with pacer spikes ~135bpm  Interrogation with no sign of PMT, +atrial noise, no episodes of ATP or VT detection    A/P:  Mr. Barriga is a 56 year old man with CAD and ICM s/p HM II LVAD and WCT s/p [employer] ICD who presents for elevated LDH undergoing OHT eval, consulted to EP for " concern for slow VT. PMT unlikely as there is no sign of VA conduction. Likely due to slow MMVT. Now with device changes for lower VT zone    Plan:   --recommend restarting home amiodarone 400mg daily  --Reprogrammed per 1/13 note  --if cleared from HF standpoint, would begin low dose BB and  Titrate up as tolerated    Further recommendations per attending addendum. Will sign off. Please call with questions    Andre Lin MD  PGY-4 ([phone])  Cardiology Fellow

## 2017-01-14 NOTE — NURSING
Patient complained of dizziness, worse with ambulation. He also noticed that his PI index numbers start to drop drastically when he stands up to walk. VS stable, Bg =154; MD notified, order is to give 250cc bolus of 0.9% Nacl.

## 2017-01-14 NOTE — PROGRESS NOTES
Progress Note  Heart Transplant Service    Admit Date: 1/5/2017   LOS: 9 days     SUBJECTIVE:     Follow up for: Elevated serum lactate dehydrogenase (LDH)    Interval History: Patient has some dizziness when standing. Denies chest pain, SOB, NVD. Denied palpitations.No other complaints.     Scheduled Meds:   amiodarone  400 mg Oral BID    amlodipine  5 mg Oral Daily    aspirin  325 mg Oral Daily    dipyridamole  100 mg Oral TID    docusate sodium  200 mg Oral QHS    ferrous gluconate  324 mg Oral Daily with breakfast    imipramine  25 mg Oral QHS    insulin aspart  20 Units Subcutaneous TID WM    lisinopril  20 mg Oral BID    magnesium oxide  400 mg Oral TID    pantoprazole  40 mg Oral Daily    sertraline  50 mg Oral Daily    tamsulosin  0.4 mg Oral Daily     Continuous Infusions:   heparin (porcine) in 5 % dex 17 Units/kg/hr (01/14/17 1113)     PRN Meds:cyclobenzaprine, dextrose 50%, dextrose 50%, glucagon (human recombinant), glucose, glucose, insulin aspart, ramelteon, senna-docusate 8.6-50 mg    Review of patient's allergies indicates:   Allergen Reactions    Levemir [insulin detemir] Itching    Pork/porcine containing products     Ranexa [ranolazine] Nausea Only       OBJECTIVE:     Vital Signs (Most Recent)  Temp: 97.4 °F (36.3 °C) (01/14/17 1120)  Pulse: 83 (01/14/17 1120)  Resp: 18 (01/14/17 0732)  BP: (!) 75/58 (01/14/17 1120)  SpO2: 95 % (01/14/17 1120)    Vital Signs Range (Last 24H):  Temp:  [97.2 °F (36.2 °C)-98 °F (36.7 °C)]   Pulse:  [70-97]   Resp:  [16-18]   BP: ()/(0-70)   SpO2:  [95 %-100 %]     I & O (Last 24H):    Intake/Output Summary (Last 24 hours) at 01/14/17 1154  Last data filed at 01/14/17 0438   Gross per 24 hour   Intake             2020 ml   Output             3400 ml   Net            -1380 ml     Telemetry: possible slow VT? Overnight. EP consulted    Physical Exam  Constitutional: He is oriented to person, place, and time. He appears well-developed and  well-nourished.   Head: Normocephalic and atraumatic.   Eyes: EOM are normal. Pupils are equal, round, and reactive to light.   Neck: Normal range of motion. Neck supple. No JVD (at level of clavicle) present.   Cardiovascular: VAD sounds smooth   Pulmonary/Chest: Effort normal and breath sounds normal.   Abdominal: Soft. Bowel sounds are normal.   Musculoskeletal: Normal range of motion. He exhibits no edema.   Neurological: He is alert and oriented to person, place, and time.   Skin: Skin is warm and dry.   Psychiatric: He has a normal mood and affect. His behavior is normal.   Nursing note and vitals reviewed.    Labs:    Recent Labs  Lab 01/12/17  0552 01/13/17  0624 01/14/17  0550   WBC 10.92 8.33 9.15   HGB 11.8* 10.8* 10.6*   HCT 36.8* 34.3* 33.2*    190 155   LYMPH 13.1*  1.4 29.1  2.4 29.3  2.7   MONO 5.5  0.6 8.3  0.7 6.4  0.6   EOSINOPHIL 3.4 4.1 7.2       Recent Labs  Lab 01/12/17  0552 01/13/17  0624 01/14/17  0550   APTT 33.9* 34.3* 31.6   INR 2.3* 2.4* 1.6*        Recent Labs  Lab 01/09/17  0536  01/11/17  0455 01/12/17  0552 01/13/17  0624 01/14/17  0550   *  < > 123* 145* 197* 180*   CALCIUM 9.1  < > 8.3* 9.1 8.9 9.0   ALBUMIN 3.5  --  3.0*  --  3.3*  --    PROT 7.1  --  6.1  --  6.7  --    *  < > 124* 134* 128* 128*   K 4.9  < > 4.5 4.7 5.4* 4.9   CO2 28  < > 28 25 26 25   CL 91*  < > 91* 100 94* 94*   BUN 15  < > 10 7 13 13   CREATININE 0.9  < > 0.8 0.8 0.9 0.9   ALKPHOS 88  --  73  --  77  --    ALT 18  --  16  --  18  --    AST 24  --  23  --  24  --    BILITOT 0.7  --  0.7  --  0.7  --    MG 1.7  < > 1.6 1.7 1.7 1.7   PHOS 4.1  < > 3.6 3.7 3.5 5.1*   < > = values in this interval not displayed.  Estimated Creatinine Clearance: 101.4 mL/min (based on Cr of 0.9).      Recent Labs  Lab 01/13/17  0624   *       Recent Labs  Lab 01/08/17  0428 01/09/17  0536 01/10/17  0536 01/11/17  0455 01/12/17  0552 01/13/17  0624 01/14/17  0550   * 527* 484* 486* 511* 452*  478*     Microbiology Results (last 7 days)     ** No results found for the last 168 hours. **        C-scope  Impression:   - Non-thrombosed external hemorrhoids found on                         perianal exam.                        - Localized moderate inflammation was found in the                         cecum and may be secondary to ischemic colitis.                         Unable to biopsy due to anticoagulation.                        - One 3 mm polyp in the rectum. Resection not                         attempted.                        - The examination was otherwise normal on direct                         and retroflexion views.                        - No specimens collected.  Recommendation: - Return patient to [facility] for ongoing care.                        - Advance diet as tolerated.                        - Continue present medications.                        - Repeat colonoscopy (date not yet determined) for                         surveillance to remove polyp and follow-up colon                         inflammation. Recommend doing this at a time when                         he is able to safely stop anticoagulation - not                         urgent.    ASSESSMENT:     57 y/o male with PMHx HFrEF s/p HM II 8/24/16, s/p AICD, HTN, DM , Atrial flutter, admitted for elevated LDH.     PLAN:     S/P LVAD HeartMate II Implanted 8/24/2016  - Continue Coumadin, Goal INR 2.0-3.0. Therapeutic today at 1.6 will start heparin gtt   - Antiplatelets , Persantine 100 TID.  - Speed set at 9000, LSL 8600  - Interrogation notable for no events  - Needs colonoscopy to complete OHTx workup, c- scope yesterday   - repeat colonoscopy in future (date undetermined) after OHT, when patient can safely come off of  anticoagulation. Very low risk polyp (3mm).      Elevated LDH  - 478 today, in the setting of dizziness will give  cc  - Continue current regimen with ASA, Persantine      Hypertension  -  Non-Pulsatile, Dopplers/MAPs closer to goal today  - BP medications include Spironolactone, Lisinopril, amlodipine      Hyponatremia  - Suspect this is due to zoloft, patient not on this as an outpatient  - Will wean to d/c   - Chronic in nature (121-126 September of 2016 and even further back) Will also check urine osm, urine cr, urine Na  - FeNa calculated to be .4. Most likely pre-renal, patient dehydrated. Encouraged liberal intake of fluid.       Palpitations  - Patient reports symptoms during intermittent pacing overnight seen on tele  - Patient had symptomatic RV pacing during sleep. Device interrogated, with no diaphragmatic capture seen at 5V @ 0.4 ms, EP dropped lower rate from 50 to 40 bpm  - EP increased ATP and dropped detection zone to 120   - Restart home amiodarone 400 po bid per EP home  EP note 1/13/16  - recommend restarting home amiodarone  - Reprogrammed to VT1 zone 120bpm with 6 bursts of ATP at 10ms decrements; VT2 zone 167 bpm with 3 burst ATP; VF zone to 231  - if cleared from HF standpoint, would begin low dose BB and Titrate up as tolerated     - cardiac diet, 2g Na restriction    Kamran Pablo MD  Cardiology Fellow, PGY-4  Pager: [phone]

## 2017-01-15 PROBLEM — E11.9 LONG-TERM INSULIN USE IN TYPE 2 DIABETES: Status: ACTIVE | Noted: 2017-01-15

## 2017-01-15 PROBLEM — Z79.4 LONG-TERM INSULIN USE IN TYPE 2 DIABETES: Status: ACTIVE | Noted: 2017-01-15

## 2017-01-15 LAB
ANION GAP SERPL CALC-SCNC: 6 MMOL/L
ANISOCYTOSIS BLD QL SMEAR: SLIGHT
APTT BLDCRRT: 62 SEC
BASOPHILS # BLD AUTO: 0.08 K/UL
BASOPHILS NFR BLD: 1 %
BUN SERPL-MCNC: 11 MG/DL
BURR CELLS BLD QL SMEAR: ABNORMAL
CALCIUM SERPL-MCNC: 8.8 MG/DL
CHLORIDE SERPL-SCNC: 95 MMOL/L
CO2 SERPL-SCNC: 27 MMOL/L
CREAT SERPL-MCNC: 0.8 MG/DL
DIFFERENTIAL METHOD: ABNORMAL
EOSINOPHIL # BLD AUTO: 1 K/UL
EOSINOPHIL NFR BLD: 12.4 %
ERYTHROCYTE [DISTWIDTH] IN BLOOD BY AUTOMATED COUNT: 19.2 %
EST. GFR  (AFRICAN AMERICAN): >60 ML/MIN/1.73 M^2
EST. GFR  (NON AFRICAN AMERICAN): >60 ML/MIN/1.73 M^2
FACT X PPP CHRO-ACNC: 0.49 IU/ML
FACT X PPP CHRO-ACNC: <0.1 IU/ML
GLUCOSE SERPL-MCNC: 194 MG/DL
HCT VFR BLD AUTO: 33 %
HGB BLD-MCNC: 10.4 G/DL
HYPOCHROMIA BLD QL SMEAR: ABNORMAL
INR PPP: 1.7
LDH SERPL L TO P-CCNC: 441 U/L
LYMPHOCYTES # BLD AUTO: 2.6 K/UL
LYMPHOCYTES NFR BLD: 31.7 %
MAGNESIUM SERPL-MCNC: 1.4 MG/DL
MCH RBC QN AUTO: 18 PG
MCHC RBC AUTO-ENTMCNC: 31.5 %
MCV RBC AUTO: 57 FL
MONOCYTES # BLD AUTO: 0.6 K/UL
MONOCYTES NFR BLD: 6.7 %
NEUTROPHILS # BLD AUTO: 3.9 K/UL
NEUTROPHILS NFR BLD: 48.2 %
OSMOLALITY SERPL: 277 MOSM/KG
OSMOLALITY UR: 131 MOSM/KG
OVALOCYTES BLD QL SMEAR: ABNORMAL
PHOSPHATE SERPL-MCNC: 4.4 MG/DL
PLATELET # BLD AUTO: 211 K/UL
PMV BLD AUTO: ABNORMAL FL
POCT GLUCOSE: 123 MG/DL (ref 70–110)
POCT GLUCOSE: 175 MG/DL (ref 70–110)
POCT GLUCOSE: 181 MG/DL (ref 70–110)
POCT GLUCOSE: 211 MG/DL (ref 70–110)
POIKILOCYTOSIS BLD QL SMEAR: SLIGHT
POLYCHROMASIA BLD QL SMEAR: ABNORMAL
POTASSIUM SERPL-SCNC: 4.8 MMOL/L
PROTHROMBIN TIME: 16.7 SEC
RBC # BLD AUTO: 5.78 M/UL
SCHISTOCYTES BLD QL SMEAR: ABNORMAL
SODIUM SERPL-SCNC: 128 MMOL/L
SODIUM SERPL-SCNC: 130 MMOL/L
SODIUM UR-SCNC: <20 MMOL/L
T4 FREE SERPL-MCNC: 1.39 NG/DL
TSH SERPL DL<=0.005 MIU/L-ACNC: 1.62 UIU/ML
WBC # BLD AUTO: 8.24 K/UL

## 2017-01-15 PROCEDURE — 84100 ASSAY OF PHOSPHORUS: CPT

## 2017-01-15 PROCEDURE — 25000003 PHARM REV CODE 250: Performed by: STUDENT IN AN ORGANIZED HEALTH CARE EDUCATION/TRAINING PROGRAM

## 2017-01-15 PROCEDURE — 99232 SBSQ HOSP IP/OBS MODERATE 35: CPT | Mod: ,,, | Performed by: INTERNAL MEDICINE

## 2017-01-15 PROCEDURE — 85520 HEPARIN ASSAY: CPT | Mod: 91

## 2017-01-15 PROCEDURE — 20600001 HC STEP DOWN PRIVATE ROOM

## 2017-01-15 PROCEDURE — 99222 1ST HOSP IP/OBS MODERATE 55: CPT | Mod: GC,,, | Performed by: INTERNAL MEDICINE

## 2017-01-15 PROCEDURE — 25000003 PHARM REV CODE 250: Performed by: INTERNAL MEDICINE

## 2017-01-15 PROCEDURE — 83935 ASSAY OF URINE OSMOLALITY: CPT

## 2017-01-15 PROCEDURE — 83930 ASSAY OF BLOOD OSMOLALITY: CPT

## 2017-01-15 PROCEDURE — 25000003 PHARM REV CODE 250: Performed by: HOSPITALIST

## 2017-01-15 PROCEDURE — 80048 BASIC METABOLIC PNL TOTAL CA: CPT

## 2017-01-15 PROCEDURE — 36415 COLL VENOUS BLD VENIPUNCTURE: CPT

## 2017-01-15 PROCEDURE — 84443 ASSAY THYROID STIM HORMONE: CPT

## 2017-01-15 PROCEDURE — 63600175 PHARM REV CODE 636 W HCPCS: Performed by: PHYSICIAN ASSISTANT

## 2017-01-15 PROCEDURE — 83735 ASSAY OF MAGNESIUM: CPT

## 2017-01-15 PROCEDURE — 27000248 HC VAD-ADDITIONAL DAY

## 2017-01-15 PROCEDURE — 25000003 PHARM REV CODE 250: Performed by: PHYSICIAN ASSISTANT

## 2017-01-15 PROCEDURE — 85025 COMPLETE CBC W/AUTO DIFF WBC: CPT

## 2017-01-15 PROCEDURE — 84295 ASSAY OF SERUM SODIUM: CPT

## 2017-01-15 PROCEDURE — 84439 ASSAY OF FREE THYROXINE: CPT

## 2017-01-15 PROCEDURE — 85610 PROTHROMBIN TIME: CPT

## 2017-01-15 PROCEDURE — 84300 ASSAY OF URINE SODIUM: CPT

## 2017-01-15 PROCEDURE — 83615 LACTATE (LD) (LDH) ENZYME: CPT

## 2017-01-15 PROCEDURE — 85730 THROMBOPLASTIN TIME PARTIAL: CPT

## 2017-01-15 RX ORDER — OXYCODONE HYDROCHLORIDE 5 MG/1
5 TABLET ORAL EVERY 6 HOURS PRN
Status: DISCONTINUED | OUTPATIENT
Start: 2017-01-15 | End: 2017-01-22 | Stop reason: HOSPADM

## 2017-01-15 RX ADMIN — Medication 400 MG: at 05:01

## 2017-01-15 RX ADMIN — OXYCODONE HYDROCHLORIDE 5 MG: 5 TABLET ORAL at 01:01

## 2017-01-15 RX ADMIN — HEPARIN SODIUM AND DEXTROSE 23 UNITS/KG/HR: 10000; 5 INJECTION INTRAVENOUS at 05:01

## 2017-01-15 RX ADMIN — DIPYRIDAMOLE 100 MG: 50 TABLET, FILM COATED ORAL at 01:01

## 2017-01-15 RX ADMIN — TAMSULOSIN HYDROCHLORIDE 0.4 MG: 0.4 CAPSULE ORAL at 08:01

## 2017-01-15 RX ADMIN — AMLODIPINE BESYLATE 5 MG: 5 TABLET ORAL at 08:01

## 2017-01-15 RX ADMIN — FERROUS GLUCONATE TAB 324 MG (37.5 MG ELEMENTAL IRON) 324 MG: 324 (37.5 FE) TAB at 08:01

## 2017-01-15 RX ADMIN — WARFARIN SODIUM 2 MG: 2 TABLET ORAL at 04:01

## 2017-01-15 RX ADMIN — DIPYRIDAMOLE 100 MG: 50 TABLET, FILM COATED ORAL at 09:01

## 2017-01-15 RX ADMIN — INSULIN ASPART 20 UNITS: 100 INJECTION, SOLUTION INTRAVENOUS; SUBCUTANEOUS at 09:01

## 2017-01-15 RX ADMIN — ASPIRIN 325 MG: 325 TABLET, DELAYED RELEASE ORAL at 08:01

## 2017-01-15 RX ADMIN — LISINOPRIL 20 MG: 20 TABLET ORAL at 08:01

## 2017-01-15 RX ADMIN — SODIUM CHLORIDE 250 ML: 0.9 INJECTION, SOLUTION INTRAVENOUS at 10:01

## 2017-01-15 RX ADMIN — PANTOPRAZOLE SODIUM 40 MG: 40 TABLET, DELAYED RELEASE ORAL at 08:01

## 2017-01-15 RX ADMIN — HEPARIN SODIUM AND DEXTROSE 23 UNITS/KG/HR: 10000; 5 INJECTION INTRAVENOUS at 03:01

## 2017-01-15 RX ADMIN — AMIODARONE HYDROCHLORIDE 400 MG: 200 TABLET ORAL at 08:01

## 2017-01-15 RX ADMIN — DOCUSATE SODIUM 200 MG: 100 CAPSULE, LIQUID FILLED ORAL at 09:01

## 2017-01-15 RX ADMIN — AMIODARONE HYDROCHLORIDE 400 MG: 200 TABLET ORAL at 09:01

## 2017-01-15 RX ADMIN — STANDARDIZED SENNA CONCENTRATE AND DOCUSATE SODIUM 1 TABLET: 8.6; 5 TABLET, FILM COATED ORAL at 08:01

## 2017-01-15 RX ADMIN — INSULIN ASPART 2 UNITS: 100 INJECTION, SOLUTION INTRAVENOUS; SUBCUTANEOUS at 05:01

## 2017-01-15 RX ADMIN — Medication 400 MG: at 01:01

## 2017-01-15 RX ADMIN — DIPYRIDAMOLE 100 MG: 50 TABLET, FILM COATED ORAL at 05:01

## 2017-01-15 RX ADMIN — Medication 400 MG: at 10:01

## 2017-01-15 RX ADMIN — INSULIN ASPART 20 UNITS: 100 INJECTION, SOLUTION INTRAVENOUS; SUBCUTANEOUS at 05:01

## 2017-01-15 RX ADMIN — HEPARIN SODIUM AND DEXTROSE 23 UNITS/KG/HR: 10000; 5 INJECTION INTRAVENOUS at 04:01

## 2017-01-15 NOTE — PROGRESS NOTES
on call notified that pt still c/o dizziness with standing  500 cc bolus given today without relief   While in room  Pt stood up and the PI went from 6.8 to 5.7  Will give additional bolus as ordered  And continue to monitor

## 2017-01-15 NOTE — PROGRESS NOTES
Progress Note  Heart Transplant Service    Admit Date: 1/5/2017   LOS: 10 days     SUBJECTIVE:     Follow up for: Elevated serum lactate dehydrogenase (LDH)    Interval History: Patient has some dizziness when standing. Denies chest pain, SOB, NVD. Denied palpitations.No other complaints. Had 250 cc IVF last night , Endocrine consulted for possible DI, LDH today 441 goal for below 400     Scheduled Meds:   amiodarone  400 mg Oral BID    amlodipine  5 mg Oral Daily    aspirin  325 mg Oral Daily    dipyridamole  100 mg Oral TID    docusate sodium  200 mg Oral QHS    ferrous gluconate  324 mg Oral Daily with breakfast    imipramine  25 mg Oral QHS    insulin aspart  20 Units Subcutaneous TID WM    lisinopril  20 mg Oral BID    magnesium oxide  400 mg Oral TID    pantoprazole  40 mg Oral Daily    sertraline  50 mg Oral Daily    sodium chloride 0.9%  250 mL Intravenous Once    tamsulosin  0.4 mg Oral Daily    warfarin  2 mg Oral Daily     Continuous Infusions:   heparin (porcine) in 5 % dex 23 Units/kg/hr (01/15/17 0521)     PRN Meds:cyclobenzaprine, dextrose 50%, dextrose 50%, glucagon (human recombinant), glucose, glucose, insulin aspart, oxycodone, ramelteon, senna-docusate 8.6-50 mg    Review of patient's allergies indicates:   Allergen Reactions    Levemir [insulin detemir] Itching    Pork/porcine containing products     Ranexa [ranolazine] Nausea Only       OBJECTIVE:     Vital Signs (Most Recent)  Temp: 96.7 °F (35.9 °C) (01/15/17 0746)  Pulse: 82 (01/15/17 0900)  Resp: 18 (01/15/17 0746)  BP: (!) 80/0 (01/15/17 0748)  SpO2: 98 % (01/15/17 0746)    Vital Signs Range (Last 24H):  Temp:  [96.7 °F (35.9 °C)-98 °F (36.7 °C)]   Pulse:  [67-87]   Resp:  [16-18]   BP: ()/(0-72)   SpO2:  [95 %-98 %]     I & O (Last 24H):    Intake/Output Summary (Last 24 hours) at 01/15/17 1027  Last data filed at 01/15/17 0600   Gross per 24 hour   Intake          1239.12 ml   Output             3900 ml   Net          -2660.88 ml     Telemetry: possible slow VT? Overnight. EP consulted    Physical Exam  Constitutional: He is oriented to person, place, and time. He appears well-developed and well-nourished.   Head: Normocephalic and atraumatic.   Eyes: EOM are normal. Pupils are equal, round, and reactive to light.   Neck: Normal range of motion. Neck supple. No JVD (at level of clavicle) present.   Cardiovascular: VAD sounds smooth   Pulmonary/Chest: Effort normal and breath sounds normal.   Abdominal: Soft. Bowel sounds are normal.   Musculoskeletal: Normal range of motion. He exhibits no edema.   Neurological: He is alert and oriented to person, place, and time.   Skin: Skin is warm and dry.   Psychiatric: He has a normal mood and affect. His behavior is normal.   Nursing note and vitals reviewed.    Labs:    Recent Labs  Lab 01/13/17  0624 01/14/17  0550 01/15/17  0534   WBC 8.33 9.15 8.24   HGB 10.8* 10.6* 10.4*   HCT 34.3* 33.2* 33.0*    155 211   LYMPH 29.1  2.4 29.3  2.7 31.7  2.6   MONO 8.3  0.7 6.4  0.6 6.7  0.6   EOSINOPHIL 4.1 7.2 12.4*       Recent Labs  Lab 01/13/17  0624 01/14/17  0550 01/15/17  0534   APTT 34.3* 31.6 62.0*   INR 2.4* 1.6* 1.7*        Recent Labs  Lab 01/09/17  0536  01/11/17  0455  01/13/17  0624 01/14/17  0550 01/15/17  0534   *  < > 123*  < > 197* 180* 194*   CALCIUM 9.1  < > 8.3*  < > 8.9 9.0 8.8   ALBUMIN 3.5  --  3.0*  --  3.3*  --   --    PROT 7.1  --  6.1  --  6.7  --   --    *  < > 124*  < > 128* 128* 128*   K 4.9  < > 4.5  < > 5.4* 4.9 4.8   CO2 28  < > 28  < > 26 25 27   CL 91*  < > 91*  < > 94* 94* 95   BUN 15  < > 10  < > 13 13 11   CREATININE 0.9  < > 0.8  < > 0.9 0.9 0.8   ALKPHOS 88  --  73  --  77  --   --    ALT 18  --  16  --  18  --   --    AST 24  --  23  --  24  --   --    BILITOT 0.7  --  0.7  --  0.7  --   --    MG 1.7  < > 1.6  < > 1.7 1.7 1.4*   PHOS 4.1  < > 3.6  < > 3.5 5.1* 4.4   < > = values in this interval not displayed.  Estimated  Creatinine Clearance: 115.1 mL/min (based on Cr of 0.8).      Recent Labs  Lab 01/13/17  0624   *       Recent Labs  Lab 01/09/17  0536 01/10/17  0536 01/11/17  0455 01/12/17  0552 01/13/17  0624 01/14/17  0550 01/15/17  0534   * 484* 486* 511* 452* 478* 441*     Microbiology Results (last 7 days)     ** No results found for the last 168 hours. **        C-scope  Impression:   - Non-thrombosed external hemorrhoids found on                         perianal exam.                        - Localized moderate inflammation was found in the                         cecum and may be secondary to ischemic colitis.                         Unable to biopsy due to anticoagulation.                        - One 3 mm polyp in the rectum. Resection not                         attempted.                        - The examination was otherwise normal on direct                         and retroflexion views.                        - No specimens collected.  Recommendation: - Return patient to [facility] for ongoing care.                        - Advance diet as tolerated.                        - Continue present medications.                        - Repeat colonoscopy (date not yet determined) for                         surveillance to remove polyp and follow-up colon                         inflammation. Recommend doing this at a time when                         he is able to safely stop anticoagulation - not                         urgent.    ASSESSMENT:     55 y/o male with PMHx HFrEF s/p HM II 8/24/16, s/p AICD, HTN, DM , Atrial flutter, admitted for elevated LDH.     PLAN:     S/P LVAD HeartMate II Implanted 8/24/2016  - Continue Coumadin, Goal INR 2.0-3.0. Therapeutic today at 1.7 will continue heparin gtt   - Antiplatelets , Persantine 100 TID.  - Speed set at 9000, LSL 8600  - Interrogation notable for no events  - Needs colonoscopy to complete OHTx workup, c- scope yesterday   - repeat colonoscopy in  future (date undetermined) after OHT, when patient can safely come off of  anticoagulation. Very low risk polyp (3mm).      Elevated LDH  -  LDH today 441 goal for below 400, in the setting of dizziness will give [id number] cc  - Continue current regimen with ASA, Persantine  - Discuss RHC tomorrow       Hypertension  - Non-Pulsatile, Dopplers/MAPs closer to goal today  - Continue same BP medicine       Hyponatremia  - Has high UOP with hyponatremia DI?  - Endocrine consulted appreciate home  - Suspect this is due to zoloft, patient not on this as an outpatient  - Will wean to d/c   - Chronic in nature (121-126 September of 2016 and even further back) Will also check urine osm, urine cr, urine Na  - FeNa calculated to be .4. Most likely pre-renal, patient dehydrated. Encouraged liberal intake of fluid.       Palpitations  - Patient reports symptoms during intermittent pacing overnight seen on tele  - Patient had symptomatic RV pacing during sleep. Device interrogated, with no diaphragmatic capture seen at 5V @ 0.4 ms, EP dropped lower rate from 50 to 40 bpm  - EP increased ATP and dropped detection zone to 120   - Restart home amiodarone 400 po bid per EP home  EP note 1/13/16  - recommend restarting home amiodarone  - Reprogrammed to VT1 zone 120bpm with 6 bursts of ATP at 10ms decrements; VT2 zone 167 bpm with 3 burst ATP; VF zone to 231  - if cleared from HF standpoint, would begin low dose BB and Titrate up as tolerated     - cardiac diet, 2g Na restriction    Kamran Pablo MD  Cardiology Fellow, PGY-4  Pager: [phone]

## 2017-01-15 NOTE — SUBJECTIVE & OBJECTIVE
"    Visit Vitals    BP (!) 80/0 (BP Location: Right arm, Patient Position: Sitting, BP Method: Doppler)    Pulse 82    Temp 96.7 °F (35.9 °C) (Oral)    Resp 18    Ht 5' 8" (1.727 m)    Wt 90.1 kg (198 lb 10.2 oz)    SpO2 98%    BMI 30.2 kg/m2       Labs Reviewed and Include      Recent Labs  Lab 01/15/17  0534   *   CALCIUM 8.8   *   K 4.8   CO2 27   CL 95   BUN 11   CREATININE 0.8     Lab Results   Component Value Date    WBC 8.24 01/15/2017    HGB 10.4 (L) 01/15/2017    HCT 33.0 (L) 01/15/2017    MCV 57 (L) 01/15/2017     01/15/2017       Recent Labs  Lab 01/11/17  1404   TSH 1.551     Lab Results   Component Value Date    HGBA1C 7.8 (H) 12/28/2016       Nutritional status:   Body mass index is 30.2 kg/(m^2).  Lab Results   Component Value Date    ALBUMIN 3.3 (L) 01/13/2017    ALBUMIN 3.0 (L) 01/11/2017    ALBUMIN 3.5 01/09/2017     Lab Results   Component Value Date    PREALBUMIN 17 (L) 01/13/2017    PREALBUMIN 17 (L) 01/11/2017    PREALBUMIN 20 01/09/2017       Estimated Creatinine Clearance: 115.1 mL/min (based on Cr of 0.8).    Accu-Checks  Recent Labs      01/13/17   0748  01/13/17   1308  01/13/17   1838  01/13/17   2116  01/14/17   0912  01/14/17   1326  01/14/17   1701  01/14/17   1814  01/14/17   2146  01/15/17   0847   POCTGLUCOSE  123*  159*  183*  179*  186*  194*  154*  209*  200*  181*       Current Medications and/or Treatments Impacting Glycemic Control  Immunotherapy:  Immunosuppressants     None        Steroids:   Hormones     None        Pressors:    Autonomic Drugs     None        Hyperglycemia/Diabetes Medications: Antihyperglycemics     Start     Stop Route Frequency Ordered    01/05/17 0107  insulin aspart pen 20 Units      -- SubQ 3 times daily with meals 01/05/17 0107    01/05/17 0107  insulin aspart pen 0-5 Units      -- SubQ Before meals & nightly PRN 01/05/17 0107          Lab Results   Component Value Date    HGBA1C 7.8 (H) 12/28/2016           Past Medical " History   Diagnosis Date    CHF (congestive heart failure)     Coronary artery disease     GERD (gastroesophageal reflux disease)     Hyperlipidemia     Hypertension     Type 2 diabetes mellitus with hyperglycemia        Past Surgical History   Procedure Laterality Date    Cardiac pacemaker placement      Cholecystectomy      Left ventricular assist device       x 3 months ago    Colonoscopy N/A 1/11/2017     Procedure: COLONOSCOPY;  Surgeon: Petr Almanzar MD;  Location: [address]);  Service: Endoscopy;  Laterality: N/A;       Family History   Problem Relation Age of Onset    Heart disease Mother     Hypertension Mother     Heart attack Mother     Heart disease Father     Hypertension Father     Heart attack Father     Cancer Brother        Prescriptions Prior to Admission   Medication Sig Dispense Refill Last Dose    amiodarone (PACERONE) 400 MG tablet Take 1 tablet (400 mg total) by mouth once daily. 90 tablet 3 12/29/2016 at Unknown time    aspirin (ECOTRIN) 325 MG EC tablet Take 1 tablet (325 mg total) by mouth once daily. 90 tablet 3 12/29/2016 at Unknown time    cyclobenzaprine (FLEXERIL) 5 MG tablet Take 5 mg by mouth daily as needed.    Past Week at Unknown time    docusate sodium (COLACE) 100 MG capsule Take 2 capsules (200 mg total) by mouth every evening. 180 capsule 3 12/29/2016 at Unknown time    ergocalciferol (ERGOCALCIFEROL) 50,000 unit Cap TK 1 C PO TWICE PER WEEK  3 12/29/2016 at Unknown time    esomeprazole (NEXIUM) 40 MG capsule Take 1 capsule (40 mg total) by mouth before breakfast. 90 capsule 3 12/29/2016 at Unknown time    ferrous gluconate (FERGON) 324 MG tablet Take 1 tablet (324 mg total) by mouth daily with breakfast. 90 tablet 3 12/29/2016 at Unknown time    imipramine (TOFRANIL) 25 MG tablet Take 1 tablet (25 mg total) by mouth every evening. 30 tablet 11 Past Week at Unknown time    insulin aspart (NOVOLOG FLEXPEN) 100 unit/mL InPn pen Inject 20  Units into the skin 3 (three) times daily with meals.  0 12/29/2016 at Unknown time    insulin glargine (LANTUS SOLOSTAR) 100 unit/mL (3 mL) InPn pen Inject 16 Units into the skin once daily. 14.4 mL 3 12/29/2016 at Unknown time    lisinopril (PRINIVIL,ZESTRIL) 20 MG tablet Take 1 tablet (20 mg total) by mouth 2 (two) times daily. 180 tablet 3     magnesium oxide (MAG-OX) 400 mg tablet Take 1 tablet (400 mg total) by mouth 3 (three) times daily. 270 tablet 3 12/29/2016 at Unknown time    oxycodone (ROXICODONE) 10 mg Tab immediate release tablet Take 1 tablet (10 mg total) by mouth every 6 (six) hours as needed. 120 tablet 0 12/29/2016 at Unknown time    ramelteon (ROZEREM) 8 mg tablet Take 1 tablet (8 mg total) by mouth nightly as needed for Insomnia. 90 tablet 3 12/28/2016 at Unknown time    senna-docusate 8.6-50 mg (PERICOLACE) 8.6-50 mg per tablet Take 1 tablet by mouth daily as needed for Constipation. 90 tablet 3 12/29/2016 at Unknown time    sertraline (ZOLOFT) 100 MG tablet Take 100 mg by mouth once daily.    12/28/2016 at Unknown time    sildenafil (VIAGRA) 100 MG tablet Take 1 tablet (100 mg total) by mouth daily as needed. 6 tablet 11 Past Week at Unknown time    spironolactone (ALDACTONE) 25 MG tablet Take 1 tablet (25 mg total) by mouth once daily. 90 tablet 3 12/29/2016 at Unknown time    tamsulosin (FLOMAX) 0.4 mg Cp24 Take 1 capsule (0.4 mg total) by mouth once daily. 90 capsule 3 12/29/2016 at Unknown time    warfarin (COUMADIN) 1 MG tablet Take 1-2.5mg daily as directed by coumadin clinic; #70pills = 1 month supply 70 tablet 11 12/29/2016 at Unknown time       Current Facility-Administered Medications   Medication Dose Route Frequency Provider Last Rate Last Dose    amiodarone tablet 400 mg  400 mg Oral BID Kamran Pablo MD   400 mg at 01/15/17 0844    amlodipine tablet 5 mg  5 mg Oral Daily Neelima Navarrete PA-C   5 mg at 01/15/17 0845    aspirin EC tablet 325 mg  325 mg Oral  Daily Juany Benjamin PA-C   325 mg at 01/15/17 0845    cyclobenzaprine tablet 5 mg  5 mg Oral Daily PRN Juany Benjamin PA-C        dextrose 50% injection 12.5 g  12.5 g Intravenous PRN HEATHER Rosario-KALEY        dextrose 50% injection 25 g  25 g Intravenous PRN SANDIE RosarioC        dipyridamole tablet 100 mg  100 mg Oral TID Oskar JERARDO Chaudhari MD   100 mg at 01/15/17 0521    docusate sodium capsule 200 mg  200 mg Oral QHS SANDIE RosarioC   200 mg at 01/14/17 2141    ferrous gluconate tablet 324 mg  324 mg Oral Daily with breakfast Juany Benjamin PA-C   324 mg at 01/15/17 0845    glucagon (human recombinant) injection 1 mg  1 mg Intramuscular PRN Juany Benjamin PA-C        glucose chewable tablet 16 g  16 g Oral PRN Juany Benjamin PA-C        glucose chewable tablet 24 g  24 g Oral PRN Juany Benjamin PA-C        heparin 25,000 units in dextrose 5% 250 mL (100 units/mL) infusion (heparin infusion)  17 Units/kg/hr Intravenous Continuous Kamran Pablo MD 20.3 mL/hr at 01/15/17 0521 23 Units/kg/hr at 01/15/17 0521    imipramine tablet 25 mg  25 mg Oral QHS Juany Benjamin PA-C   25 mg at 01/14/17 2142    insulin aspart pen 0-5 Units  0-5 Units Subcutaneous QID (AC + HS) PRN Juany Benjamin PA-C   1 Units at 01/12/17 2222    insulin aspart pen 20 Units  20 Units Subcutaneous TID  Juany Benjamin PA-C   20 Units at 01/15/17 0942    lisinopril tablet 20 mg  20 mg Oral BID SANDIE RosarioC   20 mg at 01/15/17 0845    magnesium oxide tablet 400 mg  400 mg Oral TID Juany Benjamin PA-C   400 mg at 01/15/17 0521    oxycodone immediate release tablet 10 mg  10 mg Oral Q6H PRN Olya Balderrama MD   10 mg at 01/14/17 2141    pantoprazole EC tablet 40 mg  40 mg Oral Daily Juany Benjamin PA-C   40 mg at 01/15/17 0802    ramelteon tablet 8 mg  8 mg Oral Nightly PRN Juany Benjamin PA-C   8 mg at 01/06/17 0599     senna-docusate 8.6-50 mg per tablet 1 tablet  1 tablet Oral Daily PRN Juany Benjamin PA-C   1 tablet at 01/15/17 0844    sertraline tablet 50 mg  50 mg Oral Daily Peewee Romero MD   50 mg at 01/11/17 0900    tamsulosin 24 hr capsule 0.4 mg  0.4 mg Oral Daily Juany Benjamin PA-C   0.4 mg at 01/15/17 0845    warfarin (COUMADIN) tablet 2 mg  2 mg Oral Daily Trixie Marx MD   2 mg at 01/14/17 1809       Unable to perform ROS due to sedation, intubation, altered mental status, critical illness but reviewed ROS from note dated:    REVIEW OF SYSTEMS:  Constitutional: Positive for weight changes.  Eyes: Negative for visual disturbance.  Respiratory: Negative for cough.   Cardiovascular: Negative for chest pain.  Gastrointestinal: Negative for nausea  Endocrine: Positive for polyuria, polydipsia, nocturia.  Musculoskeletal: Negative for back pain.  Skin: Negative for rash.  Neurological: Negative for syncope.  Psychiatric/Behavioral: Negative for depression, anxiety.   All other systems reviewed and are negative.    ENMT: External ears, nose with no mass or significant findings, Hearing intact, dry mucous membranes  Neck:  No tracheal deviation present, No neck masses noted.  No thyromegaly or thyroid tenderness.  Cardiovascular:  + murmur, no lower extremity edema bilaterally  Respiratory:  Normal effort, no use of accessory muscles, Normal breath sounds without adventitious sounds  Abdomen:  Soft, no masses, no tenderness, Bowel sounds are normal. No hernia noted, LVAD in place  Skin: No rashes, lesions or ulcers, no induration or nodules  Psychiatric:  Judgement and insight good with normal mood and affect  Musculoskeletal: Unable to assess gait, digits and nails with no clubbing, cyanosis or petechiae.  Neurological:  Feet with sensation intact to vibration and monofilament bilaterally.  Cranial nerves are grossly intact.

## 2017-01-15 NOTE — PROGRESS NOTES
Was called by nurse regarding patient still having PI drop when he stands from 6.9 to 5.7. He states that he gets dizzy with standing, but appears stable. Will give 250 cc bolus now.    Discussed with Dr. Francisco J Balderrama MD  Cardiology PGY4

## 2017-01-15 NOTE — CONSULTS
[facility]  Endocrinology  Consult Note    Consult Requested by: Peewee Romero MD   Reason for admit: Elevated serum lactate dehydrogenase (LDH)    HISTORY OF PRESENT ILLNESS:  Consult Requested by: GARDENIA Romero    Reason for Consult: Hyponatremia      HPI:  Patient is a 55 y/o male with  HFpEF with LVAD, HTN, Type 2 DM admitted with elevated LDH (concern for clot vs dehydration).  Endocrinology consulted for Hyponatremia.  Chart reviewed:  Na 125-141 but mostly hyponatremic since 2009.  Patient has been on sertraline since 2015 for depression.  During this admission: Serum Na on admit:  132.  Currently Serum Sodium 128      Medications and/or Treatments Impacting Glycemic Control:  Immunotherapy:    Immunosuppressants     None        Steroids:   Hormones     None        Pressors:    Autonomic Drugs     None          Prescriptions Prior to Admission   Medication Sig Dispense Refill Last Dose    amiodarone (PACERONE) 400 MG tablet Take 1 tablet (400 mg total) by mouth once daily. 90 tablet 3 12/29/2016 at Unknown time    aspirin (ECOTRIN) 325 MG EC tablet Take 1 tablet (325 mg total) by mouth once daily. 90 tablet 3 12/29/2016 at Unknown time    cyclobenzaprine (FLEXERIL) 5 MG tablet Take 5 mg by mouth daily as needed.    Past Week at Unknown time    docusate sodium (COLACE) 100 MG capsule Take 2 capsules (200 mg total) by mouth every evening. 180 capsule 3 12/29/2016 at Unknown time    ergocalciferol (ERGOCALCIFEROL) 50,000 unit Cap TK 1 C PO TWICE PER WEEK  3 12/29/2016 at Unknown time    esomeprazole (NEXIUM) 40 MG capsule Take 1 capsule (40 mg total) by mouth before breakfast. 90 capsule 3 12/29/2016 at Unknown time    ferrous gluconate (FERGON) 324 MG tablet Take 1 tablet (324 mg total) by mouth daily with breakfast. 90 tablet 3 12/29/2016 at Unknown time    imipramine (TOFRANIL) 25 MG tablet Take 1 tablet (25 mg total) by mouth every evening. 30 tablet 11 Past Week at Unknown time     insulin aspart (NOVOLOG FLEXPEN) 100 unit/mL InPn pen Inject 20 Units into the skin 3 (three) times daily with meals.  0 12/29/2016 at Unknown time    insulin glargine (LANTUS SOLOSTAR) 100 unit/mL (3 mL) InPn pen Inject 16 Units into the skin once daily. 14.4 mL 3 12/29/2016 at Unknown time    lisinopril (PRINIVIL,ZESTRIL) 20 MG tablet Take 1 tablet (20 mg total) by mouth 2 (two) times daily. 180 tablet 3     magnesium oxide (MAG-OX) 400 mg tablet Take 1 tablet (400 mg total) by mouth 3 (three) times daily. 270 tablet 3 12/29/2016 at Unknown time    oxycodone (ROXICODONE) 10 mg Tab immediate release tablet Take 1 tablet (10 mg total) by mouth every 6 (six) hours as needed. 120 tablet 0 12/29/2016 at Unknown time    ramelteon (ROZEREM) 8 mg tablet Take 1 tablet (8 mg total) by mouth nightly as needed for Insomnia. 90 tablet 3 12/28/2016 at Unknown time    senna-docusate 8.6-50 mg (PERICOLACE) 8.6-50 mg per tablet Take 1 tablet by mouth daily as needed for Constipation. 90 tablet 3 12/29/2016 at Unknown time    sertraline (ZOLOFT) 100 MG tablet Take 100 mg by mouth once daily.    12/28/2016 at Unknown time    sildenafil (VIAGRA) 100 MG tablet Take 1 tablet (100 mg total) by mouth daily as needed. 6 tablet 11 Past Week at Unknown time    spironolactone (ALDACTONE) 25 MG tablet Take 1 tablet (25 mg total) by mouth once daily. 90 tablet 3 12/29/2016 at Unknown time    tamsulosin (FLOMAX) 0.4 mg Cp24 Take 1 capsule (0.4 mg total) by mouth once daily. 90 capsule 3 12/29/2016 at Unknown time    warfarin (COUMADIN) 1 MG tablet Take 1-2.5mg daily as directed by coumadin clinic; #70pills = 1 month supply 70 tablet 11 12/29/2016 at Unknown time       Current Facility-Administered Medications   Medication Dose Route Frequency Provider Last Rate Last Dose    amiodarone tablet 400 mg  400 mg Oral BID Kamran Pablo MD   400 mg at 01/15/17 0844    amlodipine tablet 5 mg  5 mg Oral Daily SANDIE MorganC    5 mg at 01/15/17 0845    aspirin EC tablet 325 mg  325 mg Oral Daily Juany Benjamin PA-C   325 mg at 01/15/17 0845    cyclobenzaprine tablet 5 mg  5 mg Oral Daily PRN Juany Benjamin PA-C        dextrose 50% injection 12.5 g  12.5 g Intravenous PRN Juany Benjamin PA-C        dextrose 50% injection 25 g  25 g Intravenous PRN Juany Benjamin PA-C        dipyridamole tablet 100 mg  100 mg Oral TID Oskar JERARDO Chaudhari MD   100 mg at 01/15/17 0521    docusate sodium capsule 200 mg  200 mg Oral QHS Juany Benjamin PA-C   200 mg at 01/14/17 2141    ferrous gluconate tablet 324 mg  324 mg Oral Daily with breakfast Juany Benjamin PA-C   324 mg at 01/15/17 0845    glucagon (human recombinant) injection 1 mg  1 mg Intramuscular PRN Juany Benjamin PA-C        glucose chewable tablet 16 g  16 g Oral PRN Juany Benjamin PA-C        glucose chewable tablet 24 g  24 g Oral PRN Juany Benjamin PA-C        heparin 25,000 units in dextrose 5% 250 mL (100 units/mL) infusion (heparin infusion)  17 Units/kg/hr Intravenous Continuous Kamran Pablo MD 20.3 mL/hr at 01/15/17 0521 23 Units/kg/hr at 01/15/17 0521    imipramine tablet 25 mg  25 mg Oral QHS Juany Benjamin PA-C   25 mg at 01/14/17 2142    insulin aspart pen 0-5 Units  0-5 Units Subcutaneous QID (AC + HS) PRN Juany Benjamin PA-C   1 Units at 01/12/17 2222    insulin aspart pen 20 Units  20 Units Subcutaneous TID  Juany Benjamin PA-C   20 Units at 01/15/17 0942    lisinopril tablet 20 mg  20 mg Oral BID Juany Benjamin PA-C   20 mg at 01/15/17 0845    magnesium oxide tablet 400 mg  400 mg Oral TID Juany Benjamin PA-C   400 mg at 01/15/17 0521    oxycodone immediate release tablet 10 mg  10 mg Oral Q6H PRN Olya Balderrama MD   10 mg at 01/14/17 2141    pantoprazole EC tablet 40 mg  40 mg Oral Daily Juayn Benjamin PA-C   40 mg at 01/15/17 0845    ramelteon tablet 8 mg  8 mg  "Oral Nightly PRN Juany Benjamin PA-C   8 mg at 01/06/17 2105    senna-docusate 8.6-50 mg per tablet 1 tablet  1 tablet Oral Daily PRN Juany Benjamin PA-C   1 tablet at 01/15/17 0844    sertraline tablet 50 mg  50 mg Oral Daily Peewee Romero MD   50 mg at 01/11/17 0900    tamsulosin 24 hr capsule 0.4 mg  0.4 mg Oral Daily Juany Benjamin PA-C   0.4 mg at 01/15/17 0845    warfarin (COUMADIN) tablet 2 mg  2 mg Oral Daily Trixie Marx MD   2 mg at 01/14/17 1809           Visit Vitals    BP (!) 80/0 (BP Location: Right arm, Patient Position: Sitting, BP Method: Doppler)    Pulse 82    Temp 96.7 °F (35.9 °C) (Oral)    Resp 18    Ht 5' 8" (1.727 m)    Wt 90.1 kg (198 lb 10.2 oz)    SpO2 98%    BMI 30.2 kg/m2       Labs Reviewed and Include      Recent Labs  Lab 01/15/17  0534   *   CALCIUM 8.8   *   K 4.8   CO2 27   CL 95   BUN 11   CREATININE 0.8     Lab Results   Component Value Date    WBC 8.24 01/15/2017    HGB 10.4 (L) 01/15/2017    HCT 33.0 (L) 01/15/2017    MCV 57 (L) 01/15/2017     01/15/2017       Recent Labs  Lab 01/11/17  1404   TSH 1.551     Lab Results   Component Value Date    HGBA1C 7.8 (H) 12/28/2016       Nutritional status:   Body mass index is 30.2 kg/(m^2).  Lab Results   Component Value Date    ALBUMIN 3.3 (L) 01/13/2017    ALBUMIN 3.0 (L) 01/11/2017    ALBUMIN 3.5 01/09/2017     Lab Results   Component Value Date    PREALBUMIN 17 (L) 01/13/2017    PREALBUMIN 17 (L) 01/11/2017    PREALBUMIN 20 01/09/2017       Estimated Creatinine Clearance: 115.1 mL/min (based on Cr of 0.8).    Accu-Checks  Recent Labs      01/13/17   0748  01/13/17   1308  01/13/17   1838  01/13/17   2116  01/14/17   0912  01/14/17   1326  01/14/17   1701  01/14/17   1814  01/14/17   2146  01/15/17   0847   POCTGLUCOSE  123*  159*  183*  179*  186*  194*  154*  209*  200*  181*       Current Medications and/or Treatments Impacting Glycemic Control  Immunotherapy:  " Immunosuppressants     None        Steroids:   Hormones     None        Pressors:    Autonomic Drugs     None        Hyperglycemia/Diabetes Medications: Antihyperglycemics     Start     Stop Route Frequency Ordered    01/05/17 0107  insulin aspart pen 20 Units      -- SubQ 3 times daily with meals 01/05/17 0107 01/05/17 0107  insulin aspart pen 0-5 Units      -- SubQ Before meals & nightly PRN 01/05/17 0107          Lab Results   Component Value Date    HGBA1C 7.8 (H) 12/28/2016           Past Medical History   Diagnosis Date    CHF (congestive heart failure)     Coronary artery disease     GERD (gastroesophageal reflux disease)     Hyperlipidemia     Hypertension     Type 2 diabetes mellitus with hyperglycemia        Past Surgical History   Procedure Laterality Date    Cardiac pacemaker placement      Cholecystectomy      Left ventricular assist device       x 3 months ago    Colonoscopy N/A 1/11/2017     Procedure: COLONOSCOPY;  Surgeon: Petr Almanzar MD;  Location: [address]);  Service: Endoscopy;  Laterality: N/A;       Family History   Problem Relation Age of Onset    Heart disease Mother     Hypertension Mother     Heart attack Mother     Heart disease Father     Hypertension Father     Heart attack Father     Cancer Brother        Prescriptions Prior to Admission   Medication Sig Dispense Refill Last Dose    amiodarone (PACERONE) 400 MG tablet Take 1 tablet (400 mg total) by mouth once daily. 90 tablet 3 12/29/2016 at Unknown time    aspirin (ECOTRIN) 325 MG EC tablet Take 1 tablet (325 mg total) by mouth once daily. 90 tablet 3 12/29/2016 at Unknown time    cyclobenzaprine (FLEXERIL) 5 MG tablet Take 5 mg by mouth daily as needed.    Past Week at Unknown time    docusate sodium (COLACE) 100 MG capsule Take 2 capsules (200 mg total) by mouth every evening. 180 capsule 3 12/29/2016 at Unknown time    ergocalciferol (ERGOCALCIFEROL) 50,000 unit Cap TK 1 C PO TWICE PER WEEK   3 12/29/2016 at Unknown time    esomeprazole (NEXIUM) 40 MG capsule Take 1 capsule (40 mg total) by mouth before breakfast. 90 capsule 3 12/29/2016 at Unknown time    ferrous gluconate (FERGON) 324 MG tablet Take 1 tablet (324 mg total) by mouth daily with breakfast. 90 tablet 3 12/29/2016 at Unknown time    imipramine (TOFRANIL) 25 MG tablet Take 1 tablet (25 mg total) by mouth every evening. 30 tablet 11 Past Week at Unknown time    insulin aspart (NOVOLOG FLEXPEN) 100 unit/mL InPn pen Inject 20 Units into the skin 3 (three) times daily with meals.  0 12/29/2016 at Unknown time    insulin glargine (LANTUS SOLOSTAR) 100 unit/mL (3 mL) InPn pen Inject 16 Units into the skin once daily. 14.4 mL 3 12/29/2016 at Unknown time    lisinopril (PRINIVIL,ZESTRIL) 20 MG tablet Take 1 tablet (20 mg total) by mouth 2 (two) times daily. 180 tablet 3     magnesium oxide (MAG-OX) 400 mg tablet Take 1 tablet (400 mg total) by mouth 3 (three) times daily. 270 tablet 3 12/29/2016 at Unknown time    oxycodone (ROXICODONE) 10 mg Tab immediate release tablet Take 1 tablet (10 mg total) by mouth every 6 (six) hours as needed. 120 tablet 0 12/29/2016 at Unknown time    ramelteon (ROZEREM) 8 mg tablet Take 1 tablet (8 mg total) by mouth nightly as needed for Insomnia. 90 tablet 3 12/28/2016 at Unknown time    senna-docusate 8.6-50 mg (PERICOLACE) 8.6-50 mg per tablet Take 1 tablet by mouth daily as needed for Constipation. 90 tablet 3 12/29/2016 at Unknown time    sertraline (ZOLOFT) 100 MG tablet Take 100 mg by mouth once daily.    12/28/2016 at Unknown time    sildenafil (VIAGRA) 100 MG tablet Take 1 tablet (100 mg total) by mouth daily as needed. 6 tablet 11 Past Week at Unknown time    spironolactone (ALDACTONE) 25 MG tablet Take 1 tablet (25 mg total) by mouth once daily. 90 tablet 3 12/29/2016 at Unknown time    tamsulosin (FLOMAX) 0.4 mg Cp24 Take 1 capsule (0.4 mg total) by mouth once daily. 90 capsule 3 12/29/2016 at  Unknown time    warfarin (COUMADIN) 1 MG tablet Take 1-2.5mg daily as directed by coumadin clinic; #70pills = 1 month supply 70 tablet 11 12/29/2016 at Unknown time       Current Facility-Administered Medications   Medication Dose Route Frequency Provider Last Rate Last Dose    amiodarone tablet 400 mg  400 mg Oral BID Kamran Pablo MD   400 mg at 01/15/17 0844    amlodipine tablet 5 mg  5 mg Oral Daily Neelima LOIS Navarrete PA-C   5 mg at 01/15/17 0845    aspirin EC tablet 325 mg  325 mg Oral Daily HEATHER Rosario-C   325 mg at 01/15/17 0845    cyclobenzaprine tablet 5 mg  5 mg Oral Daily PRN Juany Benjamin PA-C        dextrose 50% injection 12.5 g  12.5 g Intravenous PRN HEATHER Rosario-C        dextrose 50% injection 25 g  25 g Intravenous PRN SANDIE RosarioC        dipyridamole tablet 100 mg  100 mg Oral TID Oskar JERARDO Chaudhari MD   100 mg at 01/15/17 0521    docusate sodium capsule 200 mg  200 mg Oral QHS SANDIE RosarioC   200 mg at 01/14/17 2141    ferrous gluconate tablet 324 mg  324 mg Oral Daily with breakfast SANDIE RosarioC   324 mg at 01/15/17 0845    glucagon (human recombinant) injection 1 mg  1 mg Intramuscular PRN Juany Benjamin PA-C        glucose chewable tablet 16 g  16 g Oral PRN HEATHER Rosario-KALEY        glucose chewable tablet 24 g  24 g Oral PRN Juany Benjamin PA-C        heparin 25,000 units in dextrose 5% 250 mL (100 units/mL) infusion (heparin infusion)  17 Units/kg/hr Intravenous Continuous Kamran Pablo MD 20.3 mL/hr at 01/15/17 0521 23 Units/kg/hr at 01/15/17 0521    imipramine tablet 25 mg  25 mg Oral QHS HEATHER Rosario-C   25 mg at 01/14/17 2142    insulin aspart pen 0-5 Units  0-5 Units Subcutaneous QID (AC + HS) PRN Juany Benjamin PA-C   1 Units at 01/12/17 2222    insulin aspart pen 20 Units  20 Units Subcutaneous TID WM Juany Benjamin PA-C   20 Units at 01/15/17  0942    lisinopril tablet 20 mg  20 mg Oral BID Juany Benjamin PA-C   20 mg at 01/15/17 0845    magnesium oxide tablet 400 mg  400 mg Oral TID Juany Benjamin PA-C   400 mg at 01/15/17 0521    oxycodone immediate release tablet 10 mg  10 mg Oral Q6H PRN Olya Balderrama MD   10 mg at 01/14/17 2141    pantoprazole EC tablet 40 mg  40 mg Oral Daily SANDIE RosarioC   40 mg at 01/15/17 0845    ramelteon tablet 8 mg  8 mg Oral Nightly PRN SANDIE RosarioC   8 mg at 01/06/17 2105    senna-docusate 8.6-50 mg per tablet 1 tablet  1 tablet Oral Daily PRN Juany Benjamin PA-C   1 tablet at 01/15/17 0844    sertraline tablet 50 mg  50 mg Oral Daily Peewee Romero MD   50 mg at 01/11/17 0900    tamsulosin 24 hr capsule 0.4 mg  0.4 mg Oral Daily Juany Benjamin PA-C   0.4 mg at 01/15/17 0845    warfarin (COUMADIN) tablet 2 mg  2 mg Oral Daily Trixie Marx MD   2 mg at 01/14/17 1809       Unable to perform ROS due to sedation, intubation, altered mental status, critical illness but reviewed ROS from note dated:    REVIEW OF SYSTEMS:  Constitutional: Positive for weight changes.  Eyes: Negative for visual disturbance.  Respiratory: Negative for cough.   Cardiovascular: Negative for chest pain.  Gastrointestinal: Negative for nausea  Endocrine: Positive for polyuria, polydipsia, nocturia.  Musculoskeletal: Negative for back pain.  Skin: Negative for rash.  Neurological: Negative for syncope.  Psychiatric/Behavioral: Negative for depression, anxiety.   All other systems reviewed and are negative.    ENMT: External ears, nose with no mass or significant findings, Hearing intact, dry mucous membranes  Neck:  No tracheal deviation present, No neck masses noted.  No thyromegaly or thyroid tenderness.  Cardiovascular:  + murmur, no lower extremity edema bilaterally  Respiratory:  Normal effort, no use of accessory muscles, Normal breath sounds without adventitious sounds  Abdomen:  Soft, no  masses, no tenderness, Bowel sounds are normal. No hernia noted, LVAD in place  Skin: No rashes, lesions or ulcers, no induration or nodules  Psychiatric:  Judgement and insight good with normal mood and affect  Musculoskeletal: Unable to assess gait, digits and nails with no clubbing, cyanosis or petechiae.  Neurological:  Feet with sensation intact to vibration and monofilament bilaterally.  Cranial nerves are grossly intact.               .     ASSESSMENT and PLAN:    Hyponatremia  Serum Na: 128  Differential Dx includes: Heart failure, SIADH, and Andrenal Insufficiency  -Patient has been on Sertraline, Imipramine, and sparingly Remeron in the o/p setting which all can cause hyponatremia  -Recommend ordering Serum Osm, Urine Osm  -Patient appears hypovolemic (also has pre-renal azotemia)  -Will evaluate for Adrenal insufficiency with 8 AM Cortiso  l        LVAD (left ventricular assist device) present  Management per primary team      Long-term insulin use in type 2 diabetes  A1c: 7.8 indicating moderately well controlled diabetes  -Would recommend reducing home dose by 50% (Aspart 10 tid and add Lev 8 units tid ac) with moderate dose correction      Plan discussed with patient and family at bedside.     DISCHARGE NEEDS: will assess daily    Zoya Dawson MD  Endocrinology  [facility]

## 2017-01-15 NOTE — PROGRESS NOTES
LVAD coordinator Sandy notified of drop in pi when standing and ns bolus given  Will continue to monitor

## 2017-01-15 NOTE — ASSESSMENT & PLAN NOTE
A1c: 7.8 indicating moderately well controlled diabetes  -Would recommend reducing home dose by 50% (Aspart 10 tid and add Lev 8 units tid ac) with moderate dose correction

## 2017-01-15 NOTE — CHAPLAIN
Heparin infusion found disconnected and off.  Pt states that the bag was finished and beeping.  Still had approx 100cc in bag.  Heparin restarted at the new rate of 23units without a bolus.  Educated pt on importance of heparin with lvad.  Instructed to call for assistance if iv beeping and not disconconnect iv.  Verbalized understanding  Will get anti xa in 6 hrs.  Charge nurse aruna notified

## 2017-01-15 NOTE — PLAN OF CARE
Problem: Patient Care Overview  Goal: Plan of Care Review  Outcome: Revised  Plan of care discussed with patient. LVAD DP and numbers WNL, smooth LVAD hum. Patient has no complaints of pain. Heparin infusing. For repeat Anti-Xa at 9am today. Patient has no questions at this time. Will continue to monitor.

## 2017-01-15 NOTE — ASSESSMENT & PLAN NOTE
Serum Na: 128  Differential Dx includes: Heart failure, SIADH, and Andrenal Insufficiency  -Patient has been on Sertraline, Imipramine, and sparingly Remeron in the o/p setting which all can cause hyponatremia  -Recommend ordering Serum Osm, Urine Osm  -Patient appears hypovolemic (also has pre-renal azotemia)  -Will evaluate for Adrenal insufficiency with 8 AM Cortiso  l

## 2017-01-16 LAB
ALBUMIN SERPL BCP-MCNC: 3.2 G/DL
ALP SERPL-CCNC: 74 U/L
ALT SERPL W/O P-5'-P-CCNC: 21 U/L
ANION GAP SERPL CALC-SCNC: 10 MMOL/L
APTT BLDCRRT: >150 SEC
AST SERPL-CCNC: 26 U/L
BASOPHILS # BLD AUTO: 0.06 K/UL
BASOPHILS NFR BLD: 0.6 %
BILIRUB DIRECT SERPL-MCNC: 0.3 MG/DL
BILIRUB SERPL-MCNC: 0.5 MG/DL
BNP SERPL-MCNC: 63 PG/ML
BUN SERPL-MCNC: 12 MG/DL
CALCIUM SERPL-MCNC: 9.1 MG/DL
CHLORIDE SERPL-SCNC: 95 MMOL/L
CO2 SERPL-SCNC: 24 MMOL/L
CORTIS SERPL-MCNC: 18.7 UG/DL
CREAT SERPL-MCNC: 0.8 MG/DL
CRP SERPL-MCNC: 18.6 MG/L
DIASTOLIC DYSFUNCTION: NO
DIFFERENTIAL METHOD: ABNORMAL
EOSINOPHIL # BLD AUTO: 1.7 K/UL
EOSINOPHIL NFR BLD: 16.4 %
ERYTHROCYTE [DISTWIDTH] IN BLOOD BY AUTOMATED COUNT: 19.2 %
EST. GFR  (AFRICAN AMERICAN): >60 ML/MIN/1.73 M^2
EST. GFR  (NON AFRICAN AMERICAN): >60 ML/MIN/1.73 M^2
FACT X PPP CHRO-ACNC: 0.58 IU/ML
FACT X PPP CHRO-ACNC: 0.73 IU/ML
GIANT PLATELETS BLD QL SMEAR: PRESENT
GLUCOSE SERPL-MCNC: 186 MG/DL
HCT VFR BLD AUTO: 34.3 %
HGB BLD-MCNC: 10.7 G/DL
INR PPP: 1.7
LDH SERPL L TO P-CCNC: 507 U/L
LYMPHOCYTES # BLD AUTO: 3.1 K/UL
LYMPHOCYTES NFR BLD: 29 %
MAGNESIUM SERPL-MCNC: 1.5 MG/DL
MCH RBC QN AUTO: 17.7 PG
MCHC RBC AUTO-ENTMCNC: 31.2 %
MCV RBC AUTO: 57 FL
MITRAL VALVE MOBILITY: NORMAL
MONOCYTES # BLD AUTO: 0.6 K/UL
MONOCYTES NFR BLD: 5.2 %
NEUTROPHILS # BLD AUTO: 5.1 K/UL
NEUTROPHILS NFR BLD: 48.8 %
PHOSPHATE SERPL-MCNC: 4.4 MG/DL
PLATELET # BLD AUTO: 218 K/UL
PLATELET BLD QL SMEAR: ABNORMAL
PMV BLD AUTO: ABNORMAL FL
POCT GLUCOSE: 162 MG/DL (ref 70–110)
POCT GLUCOSE: 165 MG/DL (ref 70–110)
POCT GLUCOSE: 213 MG/DL (ref 70–110)
POCT GLUCOSE: 245 MG/DL (ref 70–110)
POTASSIUM SERPL-SCNC: 4.4 MMOL/L
PREALB SERPL-MCNC: 21 MG/DL
PROT SERPL-MCNC: 6.6 G/DL
PROTHROMBIN TIME: 16.9 SEC
RBC # BLD AUTO: 6.05 M/UL
RETIRED EF AND QEF - SEE NOTES: 10 (ref 55–65)
SODIUM SERPL-SCNC: 129 MMOL/L
WBC # BLD AUTO: 10.62 K/UL

## 2017-01-16 PROCEDURE — 93306 TTE W/DOPPLER COMPLETE: CPT | Mod: 26,,, | Performed by: INTERNAL MEDICINE

## 2017-01-16 PROCEDURE — 80048 BASIC METABOLIC PNL TOTAL CA: CPT

## 2017-01-16 PROCEDURE — 85610 PROTHROMBIN TIME: CPT

## 2017-01-16 PROCEDURE — 83615 LACTATE (LD) (LDH) ENZYME: CPT

## 2017-01-16 PROCEDURE — 36415 COLL VENOUS BLD VENIPUNCTURE: CPT

## 2017-01-16 PROCEDURE — 99232 SBSQ HOSP IP/OBS MODERATE 35: CPT | Mod: GC,,, | Performed by: INTERNAL MEDICINE

## 2017-01-16 PROCEDURE — 80076 HEPATIC FUNCTION PANEL: CPT

## 2017-01-16 PROCEDURE — 63600175 PHARM REV CODE 636 W HCPCS: Performed by: STUDENT IN AN ORGANIZED HEALTH CARE EDUCATION/TRAINING PROGRAM

## 2017-01-16 PROCEDURE — 99232 SBSQ HOSP IP/OBS MODERATE 35: CPT | Mod: ,,, | Performed by: INTERNAL MEDICINE

## 2017-01-16 PROCEDURE — 25000003 PHARM REV CODE 250: Performed by: HOSPITALIST

## 2017-01-16 PROCEDURE — 25000003 PHARM REV CODE 250: Performed by: PHYSICIAN ASSISTANT

## 2017-01-16 PROCEDURE — 84100 ASSAY OF PHOSPHORUS: CPT

## 2017-01-16 PROCEDURE — 93750 INTERROGATION VAD IN PERSON: CPT | Mod: ,,, | Performed by: INTERNAL MEDICINE

## 2017-01-16 PROCEDURE — 85730 THROMBOPLASTIN TIME PARTIAL: CPT

## 2017-01-16 PROCEDURE — 85025 COMPLETE CBC W/AUTO DIFF WBC: CPT

## 2017-01-16 PROCEDURE — 25000003 PHARM REV CODE 250: Performed by: STUDENT IN AN ORGANIZED HEALTH CARE EDUCATION/TRAINING PROGRAM

## 2017-01-16 PROCEDURE — 86140 C-REACTIVE PROTEIN: CPT

## 2017-01-16 PROCEDURE — 84134 ASSAY OF PREALBUMIN: CPT

## 2017-01-16 PROCEDURE — 93750 INTERROGATION VAD IN PERSON: CPT | Performed by: INTERNAL MEDICINE

## 2017-01-16 PROCEDURE — 82533 TOTAL CORTISOL: CPT

## 2017-01-16 PROCEDURE — 83880 ASSAY OF NATRIURETIC PEPTIDE: CPT

## 2017-01-16 PROCEDURE — 83735 ASSAY OF MAGNESIUM: CPT

## 2017-01-16 PROCEDURE — 93306 TTE W/DOPPLER COMPLETE: CPT

## 2017-01-16 PROCEDURE — 85520 HEPARIN ASSAY: CPT

## 2017-01-16 PROCEDURE — 20600001 HC STEP DOWN PRIVATE ROOM

## 2017-01-16 PROCEDURE — 27000248 HC VAD-ADDITIONAL DAY

## 2017-01-16 RX ORDER — BISACODYL 10 MG
10 SUPPOSITORY, RECTAL RECTAL ONCE
Status: COMPLETED | OUTPATIENT
Start: 2017-01-16 | End: 2017-01-16

## 2017-01-16 RX ORDER — INSULIN ASPART 100 [IU]/ML
10 INJECTION, SOLUTION INTRAVENOUS; SUBCUTANEOUS
Status: DISCONTINUED | OUTPATIENT
Start: 2017-01-16 | End: 2017-01-16

## 2017-01-16 RX ORDER — INSULIN ASPART 100 [IU]/ML
20 INJECTION, SOLUTION INTRAVENOUS; SUBCUTANEOUS
Status: DISCONTINUED | OUTPATIENT
Start: 2017-01-16 | End: 2017-01-22 | Stop reason: HOSPADM

## 2017-01-16 RX ORDER — WARFARIN 3 MG/1
3 TABLET ORAL DAILY
Status: DISCONTINUED | OUTPATIENT
Start: 2017-01-16 | End: 2017-01-18

## 2017-01-16 RX ADMIN — INSULIN ASPART 2 UNITS: 100 INJECTION, SOLUTION INTRAVENOUS; SUBCUTANEOUS at 01:01

## 2017-01-16 RX ADMIN — HEPARIN SODIUM AND DEXTROSE 22 UNITS/KG/HR: 10000; 5 INJECTION INTRAVENOUS at 06:01

## 2017-01-16 RX ADMIN — SODIUM CHLORIDE 250 ML: 0.9 INJECTION, SOLUTION INTRAVENOUS at 09:01

## 2017-01-16 RX ADMIN — INSULIN ASPART 20 UNITS: 100 INJECTION, SOLUTION INTRAVENOUS; SUBCUTANEOUS at 01:01

## 2017-01-16 RX ADMIN — LISINOPRIL 20 MG: 20 TABLET ORAL at 08:01

## 2017-01-16 RX ADMIN — DIPYRIDAMOLE 100 MG: 50 TABLET, FILM COATED ORAL at 09:01

## 2017-01-16 RX ADMIN — FERROUS GLUCONATE TAB 324 MG (37.5 MG ELEMENTAL IRON) 324 MG: 324 (37.5 FE) TAB at 08:01

## 2017-01-16 RX ADMIN — TAMSULOSIN HYDROCHLORIDE 0.4 MG: 0.4 CAPSULE ORAL at 08:01

## 2017-01-16 RX ADMIN — PANTOPRAZOLE SODIUM 40 MG: 40 TABLET, DELAYED RELEASE ORAL at 08:01

## 2017-01-16 RX ADMIN — Medication 400 MG: at 09:01

## 2017-01-16 RX ADMIN — HEPARIN SODIUM AND DEXTROSE 23 UNITS/KG/HR: 10000; 5 INJECTION INTRAVENOUS at 03:01

## 2017-01-16 RX ADMIN — BISACODYL 10 MG: 10 SUPPOSITORY RECTAL at 09:01

## 2017-01-16 RX ADMIN — INSULIN ASPART 20 UNITS: 100 INJECTION, SOLUTION INTRAVENOUS; SUBCUTANEOUS at 09:01

## 2017-01-16 RX ADMIN — WARFARIN SODIUM 3 MG: 3 TABLET ORAL at 04:01

## 2017-01-16 RX ADMIN — AMIODARONE HYDROCHLORIDE 400 MG: 200 TABLET ORAL at 09:01

## 2017-01-16 RX ADMIN — AMIODARONE HYDROCHLORIDE 400 MG: 200 TABLET ORAL at 08:01

## 2017-01-16 RX ADMIN — Medication 400 MG: at 05:01

## 2017-01-16 RX ADMIN — INSULIN DETEMIR 8 UNITS: 100 INJECTION, SOLUTION SUBCUTANEOUS at 09:01

## 2017-01-16 RX ADMIN — Medication 400 MG: at 04:01

## 2017-01-16 RX ADMIN — DOCUSATE SODIUM 200 MG: 100 CAPSULE, LIQUID FILLED ORAL at 09:01

## 2017-01-16 RX ADMIN — ASPIRIN 325 MG: 325 TABLET, DELAYED RELEASE ORAL at 08:01

## 2017-01-16 RX ADMIN — DIPYRIDAMOLE 100 MG: 50 TABLET, FILM COATED ORAL at 05:01

## 2017-01-16 RX ADMIN — INSULIN ASPART 2 UNITS: 100 INJECTION, SOLUTION INTRAVENOUS; SUBCUTANEOUS at 09:01

## 2017-01-16 RX ADMIN — DIPYRIDAMOLE 100 MG: 50 TABLET, FILM COATED ORAL at 04:01

## 2017-01-16 RX ADMIN — AMLODIPINE BESYLATE 5 MG: 5 TABLET ORAL at 08:01

## 2017-01-16 RX ADMIN — LISINOPRIL 20 MG: 20 TABLET ORAL at 09:01

## 2017-01-16 NOTE — PLAN OF CARE
Problem: Patient Care Overview  Goal: Plan of Care Review  Outcome: Ongoing (interventions implemented as appropriate)  Patient ambulating independently, fall precautions in place.Continuing to encourage  IS, and ambulation. LVAD DP and numbers WNL, smooth LVAD hum. Patient has no complaints of pain. Discussed medications and care. Heparin drip ongoing. Decreased to 20units today per Dr. Hirsch order for aPTT reading of >150. Will recheck anti xa at 1230. Pt tolerating well. Blood glucose monitoring ongoing. Supplemental insulin administered per sliding scale and mealtime orders. Plan of care discussed with pt. Pt verbalizes understanding. Patient has no questions at this time. Will continue to monitor.

## 2017-01-16 NOTE — PROGRESS NOTES
"[facility]  Endocrinology  Progress Note    Admit Date: 2017     Consult Requested by: GARDENIA Romero    Reason for Consult: Hyponatremia      HPI:  Patient is a 55 y/o male with  HFpEF with LVAD, HTN, Type 2 DM admitted with elevated LDH (concern for clot vs dehydration).  Endocrinology consulted for Hyponatremia.  Chart reviewed:  Na 125-141 but mostly hyponatremic since .  Patient has been on sertraline since  for depression.  During this admission: Serum Na on admit:  132.  Currently Serum Sodium 128      Interval HPI:   Overnight events: Na of 129 this am. Patient denies any acute problems overnight. Denies nausea, vomiting, chest pain, or SOB.  Eatin%  Nausea: No  Hypoglycemia and intervention: No  Fever: No  TPN and/or TF: No  If yes, type of TF/TPN and rate: n/a    Visit Vitals    BP (!) 82/0 (BP Location: Right arm, Patient Position: Sitting, BP Method: Doppler)    Pulse 100    Temp 98.4 °F (36.9 °C) (Oral)    Resp 16    Ht 5' 8" (1.727 m)    Wt 90.5 kg (199 lb 8.3 oz)    SpO2 97%    BMI 30.34 kg/m2       Labs Reviewed and Include      Recent Labs  Lab 17  0420   *   CALCIUM 9.1   ALBUMIN 3.2*   PROT 6.6   *   K 4.4   CO2 24   CL 95   BUN 12   CREATININE 0.8   ALKPHOS 74   ALT 21   AST 26   BILITOT 0.5     Lab Results   Component Value Date    WBC 10.62 2017    HGB 10.7 (L) 2017    HCT 34.3 (L) 2017    MCV 57 (L) 2017     2017       Recent Labs  Lab 17  1404 01/15/17  1053   TSH 1.551 1.624   FREET4  --  1.39     Lab Results   Component Value Date    HGBA1C 7.8 (H) 2016       Nutritional status:   Body mass index is 30.34 kg/(m^2).  Lab Results   Component Value Date    ALBUMIN 3.2 (L) 2017    ALBUMIN 3.3 (L) 2017    ALBUMIN 3.0 (L) 2017     Lab Results   Component Value Date    PREALBUMIN 21 2017    PREALBUMIN 17 (L) 2017    PREALBUMIN 17 (L) 2017       Estimated " Creatinine Clearance: 115.3 mL/min (based on Cr of 0.8).    Accu-Checks  Recent Labs      01/14/17   0912  01/14/17   1326  01/14/17   1701  01/14/17   1814  01/14/17   2146  01/15/17   0847  01/15/17   1313  01/15/17   1733  01/15/17   2137  01/16/17   0844   POCTGLUCOSE  186*  194*  154*  209*  200*  181*  123*  211*  175*  245*       Current Medications and/or Treatments Impacting Glycemic Control  Immunotherapy:  Immunosuppressants     None        Steroids:   Hormones     None        Pressors:    Autonomic Drugs     None        Hyperglycemia/Diabetes Medications: Antihyperglycemics     Start     Stop Route Frequency Ordered    01/05/17 0107  insulin aspart pen 0-5 Units      -- SubQ Before meals & nightly PRN 01/05/17 0107    01/16/17 1200  insulin aspart pen 10 Units      -- SubQ 3 times daily with meals 01/16/17 0750    01/16/17 0900  insulin detemir pen 8 Units      -- SubQ 2 times daily 01/16/17 0753          ASSESSMENT and PLAN    Hyponatremia  Serum Na: 129  Differential Dx includes: Heart failure, SIADH  -Patient has been on Sertraline, Imipramine, and sparingly Remeron in the o/p setting which all can cause hyponatremia  -Patient appears hypovolemic (also has pre-renal azotemia)  -8 am cortisol at 18.7, patient does not have adrenal insufficiency  -Recommend patient be placed on 1 L fluid restriction (ok to do complete prep for colonoscopy)        Long-term insulin use in type 2 diabetes  A1c: 7.8 indicating moderately well controlled diabetes  -Fasting BG of 186, prandial glucose range from 175-245  -Insulin aspart 10 U tidwm, detemir 8 U bid, low dose correction    * Elevated serum lactate dehydrogenase (LDH)  -Management per primary team      LVAD (left ventricular assist device) present  Management per primary team        Rikki Lauren MD  Endocrinology  [facility]

## 2017-01-16 NOTE — PROGRESS NOTES
Progress Note  Heart Transplant Service    Admit Date: 1/5/2017   LOS: 11 days     SUBJECTIVE:     Follow up for: Elevated serum lactate dehydrogenase (LDH)    Interval History: Patient still has dizziness when standing. Denies chest pain, SOB, NVD. Denied palpitations.No other complaints. Had 250 cc IVF this am, Endocrine consulted for possible DI, LDH today 507 goal for below 400     Scheduled Meds:   amiodarone  400 mg Oral BID    amlodipine  5 mg Oral Daily    aspirin  325 mg Oral Daily    dipyridamole  100 mg Oral TID    docusate sodium  200 mg Oral QHS    ferrous gluconate  324 mg Oral Daily with breakfast    imipramine  25 mg Oral QHS    insulin aspart  20 Units Subcutaneous TID WM    lisinopril  20 mg Oral BID    magnesium oxide  400 mg Oral TID    pantoprazole  40 mg Oral Daily    tamsulosin  0.4 mg Oral Daily    warfarin  3 mg Oral Daily     Continuous Infusions:   heparin (porcine) in 5 % dex 20 Units/kg/hr (01/16/17 0934)     PRN Meds:cyclobenzaprine, dextrose 50%, dextrose 50%, glucagon (human recombinant), glucose, glucose, insulin aspart, oxycodone, oxycodone, ramelteon, senna-docusate 8.6-50 mg    Review of patient's allergies indicates:   Allergen Reactions    Levemir [insulin detemir] Itching    Pork/porcine containing products     Ranexa [ranolazine] Nausea Only       OBJECTIVE:     Vital Signs (Most Recent)  Temp: 98.4 °F (36.9 °C) (01/16/17 0730)  Pulse: 102 (01/16/17 1100)  Resp: 16 (01/16/17 0730)  BP: (!) 82/0 (01/16/17 0730)  SpO2: 97 % (01/16/17 0730)    Vital Signs Range (Last 24H):  Temp:  [95.9 °F (35.5 °C)-98.4 °F (36.9 °C)]   Pulse:  []   Resp:  [16-18]   BP: ()/(0-90)   SpO2:  [97 %-98 %]     I & O (Last 24H):    Intake/Output Summary (Last 24 hours) at 01/16/17 1300  Last data filed at 01/16/17 0634   Gross per 24 hour   Intake          2528.41 ml   Output             2900 ml   Net          -371.59 ml     Telemetry: possible slow VT? Overnight. EP  consulted    Physical Exam  Constitutional: He is oriented to person, place, and time. He appears well-developed and well-nourished.   Head: Normocephalic and atraumatic.   Eyes: EOM are normal. Pupils are equal, round, and reactive to light.   Neck: Normal range of motion. Neck supple. No JVD (at level of clavicle) present.   Cardiovascular: VAD sounds smooth   Pulmonary/Chest: Effort normal and breath sounds normal.   Abdominal: Soft. Bowel sounds are normal.   Musculoskeletal: Normal range of motion. He exhibits no edema.   Neurological: He is alert and oriented to person, place, and time.   Skin: Skin is warm and dry.   Psychiatric: He has a normal mood and affect. His behavior is normal.   Nursing note and vitals reviewed.    Labs:    Recent Labs  Lab 01/14/17  0550 01/15/17  0534 01/16/17  0420   WBC 9.15 8.24 10.62   HGB 10.6* 10.4* 10.7*   HCT 33.2* 33.0* 34.3*    211 218   LYMPH 29.3  2.7 31.7  2.6 29.0  3.1   MONO 6.4  0.6 6.7  0.6 5.2  0.6   EOSINOPHIL 7.2 12.4* 16.4*       Recent Labs  Lab 01/14/17  0550 01/15/17  0534 01/16/17  0420   APTT 31.6 62.0* >150.0*   INR 1.6* 1.7* 1.7*        Recent Labs  Lab 01/11/17  0455  01/13/17  0624 01/14/17  0550 01/15/17  0534 01/15/17  1256 01/16/17  0420   *  < > 197* 180* 194*  --  186*   CALCIUM 8.3*  < > 8.9 9.0 8.8  --  9.1   ALBUMIN 3.0*  --  3.3*  --   --   --  3.2*   PROT 6.1  --  6.7  --   --   --  6.6   *  < > 128* 128* 128* 130* 129*   K 4.5  < > 5.4* 4.9 4.8  --  4.4   CO2 28  < > 26 25 27  --  24   CL 91*  < > 94* 94* 95  --  95   BUN 10  < > 13 13 11  --  12   CREATININE 0.8  < > 0.9 0.9 0.8  --  0.8   ALKPHOS 73  --  77  --   --   --  74   ALT 16  --  18  --   --   --  21   AST 23  --  24  --   --   --  26   BILITOT 0.7  --  0.7  --   --   --  0.5   MG 1.6  < > 1.7 1.7 1.4*  --  1.5*   PHOS 3.6  < > 3.5 5.1* 4.4  --  4.4   < > = values in this interval not displayed.  Estimated Creatinine Clearance: 115.3 mL/min (based on Cr  of 0.8).      Recent Labs  Lab 01/16/17  0420   BNP 63       Recent Labs  Lab 01/10/17  0536 01/11/17  0455 01/12/17  0552 01/13/17  0624 01/14/17  0550 01/15/17  0534 01/16/17  0420   * 486* 511* 452* 478* 441* 507*     Microbiology Results (last 7 days)     ** No results found for the last 168 hours. **        C-scope  Impression:   - Non-thrombosed external hemorrhoids found on                         perianal exam.                        - Localized moderate inflammation was found in the                         cecum and may be secondary to ischemic colitis.                         Unable to biopsy due to anticoagulation.                        - One 3 mm polyp in the rectum. Resection not                         attempted.                        - The examination was otherwise normal on direct                         and retroflexion views.                        - No specimens collected.  Recommendation: - [facility].                        - Advance diet as tolerated.                        - Continue present medications.                        - Repeat colonoscopy (date not yet determined) for                         surveillance to remove polyp and follow-up colon                         inflammation. Recommend doing this at a time when                         he is able to safely stop anticoagulation - not                         urgent.    ASSESSMENT:     55 y/o male with PMHx HFrEF s/p HM II 8/24/16, s/p AICD, HTN, DM , Atrial flutter, admitted for elevated LDH.     PLAN:     S/P LVAD HeartMate II Implanted 8/24/2016  - Continue Coumadin, Goal INR 2.0-3.0. Therapeutic today at 1.7 will continue heparin gtt , will increase coumadin to 3  - Antiplatelets , Persantine 100 TID.  - Speed set at 9000, LSL 8600  - Interrogation notable for no events  - Needs colonoscopy to complete OHTx workup, c- scope on 1/12/207   - repeat colonoscopy in future (date  undetermined) after OHT, when patient can safely come off of  anticoagulation. Very low risk polyp (3mm).      Elevated LDH  - LDH today 507 goal for below 400, in the setting of dizziness will give VBH777 cc this am , 2d echo   - Orthotast today BP in bed 105/74 setting 94/70 and standing 84/68   - Continue current regimen with ASA, Persantine  - Had RHC after VAD       Hypertension  - Non-Pulsatile, Dopplers/MAPs closer to goal today  - Continue same BP medicine       Hyponatremia  - Chronic in nature (121-126 September of 2016 and even further back) Will also check urine osm, urine cr, urine Na  - Has high UOP with hyponatremia DI?  - Endocrine consulted appreciate home  - Suspect this is due to medications , he is off Zoloft and Remeron will discuss stopping imipramine with his urologist       Palpitations  - Patient reports symptoms during intermittent pacing overnight seen on tele  - Patient had symptomatic RV pacing during sleep. Device interrogated, with no diaphragmatic capture seen at 5V @ 0.4 ms, EP dropped lower rate from 50 to 40 bpm  - EP increased ATP and dropped detection zone to 120   - Restart home amiodarone 400 po bid per EP home    EP note 1/13/16  - recommend restarting home amiodarone  - Reprogrammed to VT1 zone 120bpm with 6 bursts of ATP at 10ms decrements; VT2 zone 167 bpm with 3 burst ATP; VF zone to 231  - if cleared from HF standpoint, would begin low dose BB and Titrate up as tolerated     - cardiac diet, 2g Na restriction    Kamran Pablo MD  Cardiology Fellow, PGY-4  Pager: [phone]

## 2017-01-16 NOTE — SUBJECTIVE & OBJECTIVE
"Interval HPI:   Overnight events: Na of 129 this am. Patient denies any acute problems overnight. Denies nausea, vomiting, chest pain, or SOB.  Eatin%  Nausea: No  Hypoglycemia and intervention: No  Fever: No  TPN and/or TF: No  If yes, type of TF/TPN and rate: n/a    Visit Vitals    BP (!) 82/0 (BP Location: Right arm, Patient Position: Sitting, BP Method: Doppler)    Pulse 100    Temp 98.4 °F (36.9 °C) (Oral)    Resp 16    Ht 5' 8" (1.727 m)    Wt 90.5 kg (199 lb 8.3 oz)    SpO2 97%    BMI 30.34 kg/m2       Labs Reviewed and Include      Recent Labs  Lab 17  0420   *   CALCIUM 9.1   ALBUMIN 3.2*   PROT 6.6   *   K 4.4   CO2 24   CL 95   BUN 12   CREATININE 0.8   ALKPHOS 74   ALT 21   AST 26   BILITOT 0.5     Lab Results   Component Value Date    WBC 10.62 2017    HGB 10.7 (L) 2017    HCT 34.3 (L) 2017    MCV 57 (L) 2017     2017       Recent Labs  Lab 17  1404 01/15/17  1053   TSH 1.551 1.624   FREET4  --  1.39     Lab Results   Component Value Date    HGBA1C 7.8 (H) 2016       Nutritional status:   Body mass index is 30.34 kg/(m^2).  Lab Results   Component Value Date    ALBUMIN 3.2 (L) 2017    ALBUMIN 3.3 (L) 2017    ALBUMIN 3.0 (L) 2017     Lab Results   Component Value Date    PREALBUMIN 21 2017    PREALBUMIN 17 (L) 2017    PREALBUMIN 17 (L) 2017       Estimated Creatinine Clearance: 115.3 mL/min (based on Cr of 0.8).    Accu-Checks  Recent Labs      17   0912  17   1326  17   1701  17   1814  17   2146  01/15/17   0847  01/15/17   1313  01/15/17   1733  01/15/17   2137  17   0844   POCTGLUCOSE  186*  194*  154*  209*  200*  181*  123*  211*  175*  245*       Current Medications and/or Treatments Impacting Glycemic Control  Immunotherapy:  Immunosuppressants     None        Steroids:   Hormones     None        Pressors:    Autonomic Drugs     None    "     Hyperglycemia/Diabetes Medications: Antihyperglycemics     Start     Stop Route Frequency Ordered    01/05/17 0107  insulin aspart pen 0-5 Units      -- SubQ Before meals & nightly PRN 01/05/17 0107    01/16/17 1200  insulin aspart pen 10 Units      -- SubQ 3 times daily with meals 01/16/17 0750    01/16/17 0900  insulin detemir pen 8 Units      -- SubQ 2 times daily 01/16/17 0755

## 2017-01-16 NOTE — PROGRESS NOTES
Patient alert and oriented to room from echo. Cardiac monitoring maintained throughout transfer. Patient connected to telemetry, assessed, denies any pain, oriented to room, and instructed to call for assistance or any needs. Call bell in reach. Reviewed goals for today. Will continue to monitor

## 2017-01-16 NOTE — ASSESSMENT & PLAN NOTE
Serum Na: 129  Differential Dx includes: Heart failure, SIADH, and Andrenal Insufficiency  -Patient has been on Sertraline, Imipramine, and sparingly Remeron in the o/p setting which all can cause hyponatremia  -Patient appears hypovolemic (also has pre-renal azotemia)  -Will evaluate for Adrenal insufficiency with 8 AM Cortisol, currently pending  -Recommend patient be placed on 1 L fluid restriction (ok to do complete prep for colonoscopy)

## 2017-01-16 NOTE — ASSESSMENT & PLAN NOTE
A1c: 7.8 indicating moderately well controlled diabetes  -Fasting BG of 186, prandial glucose range from 175-245  -Insulin aspart 10 U tidwm, detemir 8 U bid, low dose correction

## 2017-01-16 NOTE — PROGRESS NOTES
Update Note:    SW to pt's room for Psychosocial evaluation (please see full psychosocial evaluation in SW encounter). Pt and wife aaolx4, calm, and pleasant. Pt and wife report coping well at this time. SW providing ongoing psychosocial counseling and support, education, assistance, resources, and discharge planning as indicated. SW continuing to follow and remains available.

## 2017-01-17 LAB
ANION GAP SERPL CALC-SCNC: 7 MMOL/L
APTT BLDCRRT: 92 SEC
BASOPHILS # BLD AUTO: 0.03 K/UL
BASOPHILS NFR BLD: 0.3 %
BUN SERPL-MCNC: 10 MG/DL
CALCIUM SERPL-MCNC: 9 MG/DL
CHLORIDE SERPL-SCNC: 95 MMOL/L
CO2 SERPL-SCNC: 27 MMOL/L
CREAT SERPL-MCNC: 0.8 MG/DL
DIFFERENTIAL METHOD: ABNORMAL
EOSINOPHIL # BLD AUTO: 1.8 K/UL
EOSINOPHIL NFR BLD: 15.6 %
ERYTHROCYTE [DISTWIDTH] IN BLOOD BY AUTOMATED COUNT: 19.4 %
EST. GFR  (AFRICAN AMERICAN): >60 ML/MIN/1.73 M^2
EST. GFR  (NON AFRICAN AMERICAN): >60 ML/MIN/1.73 M^2
FACT X PPP CHRO-ACNC: 0.3 IU/ML
GIANT PLATELETS BLD QL SMEAR: PRESENT
GLUCOSE SERPL-MCNC: 197 MG/DL
HCT VFR BLD AUTO: 32.3 %
HGB BLD-MCNC: 10.2 G/DL
INR PPP: 1.8
LDH SERPL L TO P-CCNC: 528 U/L
LYMPHOCYTES # BLD AUTO: 2.6 K/UL
LYMPHOCYTES NFR BLD: 22.9 %
MAGNESIUM SERPL-MCNC: 1.5 MG/DL
MCH RBC QN AUTO: 18.2 PG
MCHC RBC AUTO-ENTMCNC: 31.6 %
MCV RBC AUTO: 58 FL
MONOCYTES # BLD AUTO: 0.7 K/UL
MONOCYTES NFR BLD: 6 %
NEUTROPHILS # BLD AUTO: 6.1 K/UL
NEUTROPHILS NFR BLD: 55.2 %
PHOSPHATE SERPL-MCNC: 3.8 MG/DL
PLATELET # BLD AUTO: 156 K/UL
PLATELET BLD QL SMEAR: ABNORMAL
PMV BLD AUTO: ABNORMAL FL
POCT GLUCOSE: 144 MG/DL (ref 70–110)
POCT GLUCOSE: 186 MG/DL (ref 70–110)
POCT GLUCOSE: 190 MG/DL (ref 70–110)
POCT GLUCOSE: 213 MG/DL (ref 70–110)
POCT GLUCOSE: 216 MG/DL (ref 70–110)
POTASSIUM SERPL-SCNC: 4.5 MMOL/L
PROTHROMBIN TIME: 17.9 SEC
RBC # BLD AUTO: 5.61 M/UL
SODIUM SERPL-SCNC: 129 MMOL/L
WBC # BLD AUTO: 11.26 K/UL

## 2017-01-17 PROCEDURE — 85730 THROMBOPLASTIN TIME PARTIAL: CPT

## 2017-01-17 PROCEDURE — 85610 PROTHROMBIN TIME: CPT

## 2017-01-17 PROCEDURE — 20600001 HC STEP DOWN PRIVATE ROOM

## 2017-01-17 PROCEDURE — 99232 SBSQ HOSP IP/OBS MODERATE 35: CPT | Mod: ,,, | Performed by: INTERNAL MEDICINE

## 2017-01-17 PROCEDURE — 80048 BASIC METABOLIC PNL TOTAL CA: CPT

## 2017-01-17 PROCEDURE — 25000003 PHARM REV CODE 250: Performed by: STUDENT IN AN ORGANIZED HEALTH CARE EDUCATION/TRAINING PROGRAM

## 2017-01-17 PROCEDURE — 85025 COMPLETE CBC W/AUTO DIFF WBC: CPT

## 2017-01-17 PROCEDURE — 93750 INTERROGATION VAD IN PERSON: CPT | Performed by: INTERNAL MEDICINE

## 2017-01-17 PROCEDURE — 83615 LACTATE (LD) (LDH) ENZYME: CPT

## 2017-01-17 PROCEDURE — 36415 COLL VENOUS BLD VENIPUNCTURE: CPT

## 2017-01-17 PROCEDURE — 25000003 PHARM REV CODE 250: Performed by: INTERNAL MEDICINE

## 2017-01-17 PROCEDURE — 63600175 PHARM REV CODE 636 W HCPCS: Performed by: STUDENT IN AN ORGANIZED HEALTH CARE EDUCATION/TRAINING PROGRAM

## 2017-01-17 PROCEDURE — 27000248 HC VAD-ADDITIONAL DAY

## 2017-01-17 PROCEDURE — 85520 HEPARIN ASSAY: CPT

## 2017-01-17 PROCEDURE — 93750 INTERROGATION VAD IN PERSON: CPT | Mod: ,,, | Performed by: INTERNAL MEDICINE

## 2017-01-17 PROCEDURE — 25000003 PHARM REV CODE 250: Performed by: PHYSICIAN ASSISTANT

## 2017-01-17 PROCEDURE — 82955 ASSAY OF G6PD ENZYME: CPT

## 2017-01-17 PROCEDURE — 83735 ASSAY OF MAGNESIUM: CPT

## 2017-01-17 PROCEDURE — 25000003 PHARM REV CODE 250: Performed by: HOSPITALIST

## 2017-01-17 PROCEDURE — 84100 ASSAY OF PHOSPHORUS: CPT

## 2017-01-17 RX ORDER — DUTASTERIDE 0.5 MG/1
0.5 CAPSULE, LIQUID FILLED ORAL DAILY
Status: DISCONTINUED | OUTPATIENT
Start: 2017-01-17 | End: 2017-01-22 | Stop reason: HOSPADM

## 2017-01-17 RX ORDER — MAGNESIUM SULFATE HEPTAHYDRATE 40 MG/ML
2 INJECTION, SOLUTION INTRAVENOUS ONCE
Status: COMPLETED | OUTPATIENT
Start: 2017-01-17 | End: 2017-01-17

## 2017-01-17 RX ADMIN — DIPYRIDAMOLE 100 MG: 50 TABLET, FILM COATED ORAL at 05:01

## 2017-01-17 RX ADMIN — WARFARIN SODIUM 3 MG: 3 TABLET ORAL at 04:01

## 2017-01-17 RX ADMIN — Medication 400 MG: at 02:01

## 2017-01-17 RX ADMIN — DIPYRIDAMOLE 100 MG: 50 TABLET, FILM COATED ORAL at 02:01

## 2017-01-17 RX ADMIN — LISINOPRIL 20 MG: 20 TABLET ORAL at 10:01

## 2017-01-17 RX ADMIN — INSULIN ASPART 20 UNITS: 100 INJECTION, SOLUTION INTRAVENOUS; SUBCUTANEOUS at 06:01

## 2017-01-17 RX ADMIN — INSULIN ASPART 2 UNITS: 100 INJECTION, SOLUTION INTRAVENOUS; SUBCUTANEOUS at 09:01

## 2017-01-17 RX ADMIN — LISINOPRIL 20 MG: 20 TABLET ORAL at 09:01

## 2017-01-17 RX ADMIN — DUTASTERIDE 0.5 MG: 0.5 CAPSULE, LIQUID FILLED ORAL at 09:01

## 2017-01-17 RX ADMIN — ASPIRIN 325 MG: 325 TABLET, DELAYED RELEASE ORAL at 09:01

## 2017-01-17 RX ADMIN — MAGNESIUM SULFATE IN WATER 2 G: 40 INJECTION, SOLUTION INTRAVENOUS at 08:01

## 2017-01-17 RX ADMIN — HEPARIN SODIUM AND DEXTROSE 20 UNITS/KG/HR: 10000; 5 INJECTION INTRAVENOUS at 10:01

## 2017-01-17 RX ADMIN — FERROUS GLUCONATE TAB 324 MG (37.5 MG ELEMENTAL IRON) 324 MG: 324 (37.5 FE) TAB at 09:01

## 2017-01-17 RX ADMIN — Medication 400 MG: at 05:01

## 2017-01-17 RX ADMIN — AMIODARONE HYDROCHLORIDE 400 MG: 200 TABLET ORAL at 09:01

## 2017-01-17 RX ADMIN — DIPYRIDAMOLE 100 MG: 50 TABLET, FILM COATED ORAL at 10:01

## 2017-01-17 RX ADMIN — HEPARIN SODIUM AND DEXTROSE 20 UNITS/KG/HR: 10000; 5 INJECTION INTRAVENOUS at 08:01

## 2017-01-17 RX ADMIN — Medication 400 MG: at 10:01

## 2017-01-17 RX ADMIN — AMIODARONE HYDROCHLORIDE 400 MG: 200 TABLET ORAL at 10:01

## 2017-01-17 RX ADMIN — AMLODIPINE BESYLATE 5 MG: 5 TABLET ORAL at 09:01

## 2017-01-17 RX ADMIN — PANTOPRAZOLE SODIUM 40 MG: 40 TABLET, DELAYED RELEASE ORAL at 09:01

## 2017-01-17 RX ADMIN — INSULIN ASPART 20 UNITS: 100 INJECTION, SOLUTION INTRAVENOUS; SUBCUTANEOUS at 09:01

## 2017-01-17 RX ADMIN — DOCUSATE SODIUM 200 MG: 100 CAPSULE, LIQUID FILLED ORAL at 10:01

## 2017-01-17 NOTE — PLAN OF CARE
Problem: Patient Care Overview  Goal: Plan of Care Review  Outcome: Ongoing (interventions implemented as appropriate)  Patient remains free from falls and injuries through out shift. Patient AAO and VSS. Patient denies chest pain and SOB. Patient's family at bedside. Patient continues on heparin and persantine per MD order. Plan of care reviewed with patient. Patient verbalizes understanding of plan.  Will continue to monitor.

## 2017-01-17 NOTE — PROGRESS NOTES
"Patient unhooked himself from heparin drip at approximately 1915 because "it beeped and he thought it was finished." Drip off for an hour. MD Fara notified. Orders to restart drip and check antixA in 6 hours. Will continue to monitor.  "

## 2017-01-17 NOTE — PROGRESS NOTES
Progress Note  Heart Transplant Service    Admit Date: 1/5/2017   LOS: 12 days     SUBJECTIVE:     Follow up for: Elevated serum lactate dehydrogenase (LDH)    Interval History: Patient with one episode of dizziness this am when standing. Denies chest pain, SOB, NVD. Denied palpitations.No other complaints. On fluid restriction <1.5 L. Endocrine on board, LDH today 528 goal for below 400     Scheduled Meds:   amiodarone  400 mg Oral BID    amlodipine  5 mg Oral Daily    aspirin  325 mg Oral Daily    dipyridamole  100 mg Oral TID    docusate sodium  200 mg Oral QHS    dutasteride  0.5 mg Oral Daily    ferrous gluconate  324 mg Oral Daily with breakfast    insulin aspart  20 Units Subcutaneous TID WM    lisinopril  20 mg Oral BID    magnesium oxide  400 mg Oral TID    pantoprazole  40 mg Oral Daily    warfarin  3 mg Oral Daily     Continuous Infusions:   heparin (porcine) in 5 % dex 20 Units/kg/hr (01/17/17 0807)     PRN Meds:cyclobenzaprine, dextrose 50%, dextrose 50%, glucagon (human recombinant), glucose, glucose, insulin aspart, oxycodone, oxycodone, ramelteon, senna-docusate 8.6-50 mg    Review of patient's allergies indicates:   Allergen Reactions    Levemir [insulin detemir] Itching    Pork/porcine containing products     Ranexa [ranolazine] Nausea Only       OBJECTIVE:     Vital Signs (Most Recent)  Temp: 98.3 °F (36.8 °C) (01/17/17 0800)  Pulse: 88 (01/17/17 1200)  Resp: 16 (01/17/17 0800)  BP: (!) 78/0 (01/17/17 1200)  SpO2: 97 % (01/17/17 0800)    Vital Signs Range (Last 24H):  Temp:  [96.3 °F (35.7 °C)-98.5 °F (36.9 °C)]   Pulse:  [59-97]   Resp:  [16-18]   BP: ()/(0-73)   SpO2:  [97 %-99 %]     I & O (Last 24H):    Intake/Output Summary (Last 24 hours) at 01/17/17 1305  Last data filed at 01/17/17 0600   Gross per 24 hour   Intake             3393 ml   Output             3350 ml   Net               43 ml     Telemetry: possible slow VT? Overnight. EP consulted    Physical  Exam  Constitutional: He is oriented to person, place, and time. He appears well-developed and well-nourished.   Head: Normocephalic and atraumatic.   Eyes: EOM are normal. Pupils are equal, round, and reactive to light.   Neck: Normal range of motion. Neck supple. No JVD (at level of clavicle) present.   Cardiovascular: VAD sounds smooth   Pulmonary/Chest: Effort normal and breath sounds normal.   Abdominal: Soft. Bowel sounds are normal.   Musculoskeletal: Normal range of motion. He exhibits no edema.   Neurological: He is alert and oriented to person, place, and time.   Skin: Skin is warm and dry.   Psychiatric: He has a normal mood and affect. His behavior is normal.   Nursing note and vitals reviewed.    Labs:    Recent Labs  Lab 01/15/17  0534 01/16/17  0420 01/17/17  0212   WBC 8.24 10.62 11.26   HGB 10.4* 10.7* 10.2*   HCT 33.0* 34.3* 32.3*    218 156   LYMPH 31.7  2.6 29.0  3.1 22.9  2.6   MONO 6.7  0.6 5.2  0.6 6.0  0.7   EOSINOPHIL 12.4* 16.4* 15.6*       Recent Labs  Lab 01/15/17  0534 01/16/17  0420 01/17/17  0212   APTT 62.0* >150.0* 92.0*   INR 1.7* 1.7* 1.8*        Recent Labs  Lab 01/11/17  0455  01/13/17  0624  01/15/17  0534 01/15/17  1256 01/16/17  0420 01/17/17  0212   *  < > 197*  < > 194*  --  186* 197*   CALCIUM 8.3*  < > 8.9  < > 8.8  --  9.1 9.0   ALBUMIN 3.0*  --  3.3*  --   --   --  3.2*  --    PROT 6.1  --  6.7  --   --   --  6.6  --    *  < > 128*  < > 128* 130* 129* 129*   K 4.5  < > 5.4*  < > 4.8  --  4.4 4.5   CO2 28  < > 26  < > 27  --  24 27   CL 91*  < > 94*  < > 95  --  95 95   BUN 10  < > 13  < > 11  --  12 10   CREATININE 0.8  < > 0.9  < > 0.8  --  0.8 0.8   ALKPHOS 73  --  77  --   --   --  74  --    ALT 16  --  18  --   --   --  21  --    AST 23  --  24  --   --   --  26  --    BILITOT 0.7  --  0.7  --   --   --  0.5  --    MG 1.6  < > 1.7  < > 1.4*  --  1.5* 1.5*   PHOS 3.6  < > 3.5  < > 4.4  --  4.4 3.8   < > = values in this interval not  displayed.  Estimated Creatinine Clearance: 114.2 mL/min (based on Cr of 0.8).      Recent Labs  Lab 01/16/17  0420   BNP 63       Recent Labs  Lab 01/11/17  0455 01/12/17  0552 01/13/17  0624 01/14/17  0550 01/15/17  0534 01/16/17  0420 01/17/17  0212   * 511* 452* 478* 441* 507* 528*     Microbiology Results (last 7 days)     ** No results found for the last 168 hours. **        C-scope  Impression:   - Non-thrombosed external hemorrhoids found on                         perianal exam.                        - Localized moderate inflammation was found in the                         cecum and may be secondary to ischemic colitis.                         Unable to biopsy due to anticoagulation.                        - One 3 mm polyp in the rectum. Resection not                         attempted.                        - The examination was otherwise normal on direct                         and retroflexion views.                        - No specimens collected.  Recommendation: - [facility].                        - Advance diet as tolerated.                        - Continue present medications.                        - Repeat colonoscopy (date not yet determined) for                         surveillance to remove polyp and follow-up colon                         inflammation. Recommend doing this at a time when                         he is able to safely stop anticoagulation - not                         urgent.    ASSESSMENT:     57 y/o male with PMHx HFrEF s/p HM II 8/24/16, s/p AICD, HTN, DM , Atrial flutter, admitted for elevated LDH.     PLAN:     S/P LVAD HeartMate II Implanted 8/24/2016  - Continue Coumadin, Goal INR 2.0-3.0. Therapeutic today at 1.8 will continue heparin gtt ,coumadin 3  - Antiplatelets , Persantine 100 TID.  - Speed set at 9000, LSL 8600  - Needs colonoscopy to complete OHTx workup, c- scope on 1/12/207   - repeat colonoscopy in future  (date undetermined) after OHT, when patient can safely come off of  anticoagulation. Very low risk polyp (3mm).      Elevated LDH  - LDH today 528 goal for below 400, 2D echo performed   - Orthotast on 1/15/2017 BP in bed 105/74 setting 94/70 and standing 84/68   - Continue current regimen with ASA, Persantine  - Had RHC after VAD   -Ordered G6pd Level       Hypertension  - Non-Pulsatile, Dopplers/MAPs closer to goal today  - Continue same BP medicine       Hyponatremia/Dizzeness   - Chronic in nature (121-126 September of 2016 and even further back) Will also check urine osm, urine cr, urine Na  - Has high UOP with hyponatremia  - Endocrine consulted appreciate rexommend fluid restriction<1.5 L/day think its psychogenic polydipsia   - Suspect this is due to medications , he is off Zoloft and Remeron and imipramine   -DC flomax on 1/16/2017      Palpitations  - Patient reports symptoms during intermittent pacing overnight seen on tele  - Patient had symptomatic RV pacing during sleep. Device interrogated, with no diaphragmatic capture seen at 5V @ 0.4 ms, EP dropped lower rate from 50 to 40 bpm  - EP increased ATP and dropped detection zone to 120   - Restart home amiodarone 400 po bid per EP home    EP note 1/13/16  - recommend restarting home amiodarone  - Reprogrammed to VT1 zone 120bpm with 6 bursts of ATP at 10ms decrements; VT2 zone 167 bpm with 3 burst ATP; VF zone to 231  - if cleared from HF standpoint, would begin low dose BB and Titrate up as tolerated     - cardiac diet, 2g Na restriction    Kamran Pablo MD  Cardiology Fellow, PGY-4  Pager: [phone]

## 2017-01-17 NOTE — PROGRESS NOTES
Update Note:    SW to pt's room for update. Pt and wife aaox4, calm, and pleasant. Pt and wife report coping well today. Pt reports changing insurance to [employer] and then asking the [employer] representative to cancel the application. Pt asked SW to discuss with . SW discussed with BYRON Barron who reports pt does show up as having [employer], and Pt has until Feb to cancel [employer] by filling out cancelation paperwork. SW notified pt of the same. SW providing ongoing psychosocial counseling and support, education, assistance, resources, and discharge planning as indicated. SW continuing to follow and remains available.

## 2017-01-17 NOTE — PROGRESS NOTES
Dr. Chaudhari notified of pt accucheck reading of 165 30mins after eating. Instructed to hold mealtime dose of insulin and recheck before bedtime. Night nurse Norma notified. Will continue to monitor.

## 2017-01-17 NOTE — PROCEDURES
"Patient aaox3 with  family at bedside. VAD interrogation completed this AM in the event changes needed to be made. Pt reports having "episodes of dizziness when standing".  Dr. Romero notified and orthostatic vitals ordered. Will continue to monitor for further issues.     Pulsatile: Yes   VAD Sounds: Smooth  Problems / Issues / Alarms with VAD if any: None noted      VAD Interrogation:  TXP LVAD INTERROGATIONS 1/17/2017 1/17/2017 1/17/2017 1/17/2017 1/17/2017 1/17/2017 1/16/2017   Type HeartMate II - HeartMate II HeartMate II HeartMate II HeartMate II HeartMate II   Flow 4.9 - - 5.1 4.7 5.1 5.3   Speed 9000 - - 8990 9000 9000 9000   PI 7.3 4.2 6.8 7.3 7.0 7.2 7.3   Power (Kwon) 5.1 - - 5.3 5.1 5.3 5.4   LSL - - - 8600 8600 8600 8600   Pulsatility - - - Intermittent pulse - - Intermittent pulse   }    "

## 2017-01-18 ENCOUNTER — COMMITTEE REVIEW (OUTPATIENT)
Dept: TRANSPLANT | Facility: CLINIC | Age: 57
End: 2017-01-18

## 2017-01-18 ENCOUNTER — DOCUMENTATION ONLY (OUTPATIENT)
Dept: TRANSPLANT | Facility: CLINIC | Age: 57
End: 2017-01-18

## 2017-01-18 LAB
ALBUMIN SERPL BCP-MCNC: 3.1 G/DL
ALP SERPL-CCNC: 70 U/L
ALT SERPL W/O P-5'-P-CCNC: 37 U/L
ANION GAP SERPL CALC-SCNC: 7 MMOL/L
ANION GAP SERPL CALC-SCNC: 8 MMOL/L
ANISOCYTOSIS BLD QL SMEAR: SLIGHT
APTT BLDCRRT: 92.8 SEC
AST SERPL-CCNC: 52 U/L
BASOPHILS # BLD AUTO: 0.04 K/UL
BASOPHILS NFR BLD: 0.3 %
BILIRUB DIRECT SERPL-MCNC: 0.2 MG/DL
BILIRUB SERPL-MCNC: 0.6 MG/DL
BNP SERPL-MCNC: 29 PG/ML
BNP SERPL-MCNC: 56 PG/ML
BUN SERPL-MCNC: 11 MG/DL
BUN SERPL-MCNC: 13 MG/DL
CALCIUM SERPL-MCNC: 8.8 MG/DL
CALCIUM SERPL-MCNC: 8.9 MG/DL
CHLORIDE SERPL-SCNC: 98 MMOL/L
CHLORIDE SERPL-SCNC: 99 MMOL/L
CO2 SERPL-SCNC: 24 MMOL/L
CO2 SERPL-SCNC: 25 MMOL/L
CREAT SERPL-MCNC: 0.8 MG/DL
CREAT SERPL-MCNC: 0.8 MG/DL
CRP SERPL-MCNC: 35.3 MG/L
DIFFERENTIAL METHOD: ABNORMAL
EOSINOPHIL # BLD AUTO: 2.2 K/UL
EOSINOPHIL NFR BLD: 17 %
ERYTHROCYTE [DISTWIDTH] IN BLOOD BY AUTOMATED COUNT: 20.2 %
EST. GFR  (AFRICAN AMERICAN): >60 ML/MIN/1.73 M^2
EST. GFR  (AFRICAN AMERICAN): >60 ML/MIN/1.73 M^2
EST. GFR  (NON AFRICAN AMERICAN): >60 ML/MIN/1.73 M^2
EST. GFR  (NON AFRICAN AMERICAN): >60 ML/MIN/1.73 M^2
FACT X PPP CHRO-ACNC: 0.41 IU/ML
G6PD RBC-CCNT: 20.1 U/G HGB (ref 7–20.5)
GLUCOSE SERPL-MCNC: 143 MG/DL
GLUCOSE SERPL-MCNC: 230 MG/DL
HCT VFR BLD AUTO: 33.7 %
HGB BLD-MCNC: 10.9 G/DL
INR PPP: 2.3
LDH SERPL L TO P-CCNC: 481 U/L
LYMPHOCYTES # BLD AUTO: 3.5 K/UL
LYMPHOCYTES NFR BLD: 26.2 %
MAGNESIUM SERPL-MCNC: 2.1 MG/DL
MCH RBC QN AUTO: 18 PG
MCHC RBC AUTO-ENTMCNC: 32.3 %
MCV RBC AUTO: 56 FL
MONOCYTES # BLD AUTO: 0.7 K/UL
MONOCYTES NFR BLD: 5.4 %
NEUTROPHILS # BLD AUTO: 6.5 K/UL
NEUTROPHILS NFR BLD: 51.1 %
OVALOCYTES BLD QL SMEAR: ABNORMAL
PHOSPHATE SERPL-MCNC: 4.2 MG/DL
PLATELET # BLD AUTO: 207 K/UL
PLATELET BLD QL SMEAR: ABNORMAL
PMV BLD AUTO: ABNORMAL FL
POCT GLUCOSE: 132 MG/DL (ref 70–110)
POCT GLUCOSE: 133 MG/DL (ref 70–110)
POCT GLUCOSE: 151 MG/DL (ref 70–110)
POCT GLUCOSE: 223 MG/DL (ref 70–110)
POIKILOCYTOSIS BLD QL SMEAR: SLIGHT
POLYCHROMASIA BLD QL SMEAR: ABNORMAL
POTASSIUM SERPL-SCNC: 4.7 MMOL/L
POTASSIUM SERPL-SCNC: 5.6 MMOL/L
PROT SERPL-MCNC: 6.9 G/DL
PROTHROMBIN TIME: 23.1 SEC
RBC # BLD AUTO: 6.06 M/UL
SODIUM SERPL-SCNC: 130 MMOL/L
SODIUM SERPL-SCNC: 131 MMOL/L
WBC # BLD AUTO: 13.18 K/UL

## 2017-01-18 PROCEDURE — 85025 COMPLETE CBC W/AUTO DIFF WBC: CPT

## 2017-01-18 PROCEDURE — 99232 SBSQ HOSP IP/OBS MODERATE 35: CPT | Mod: ,,, | Performed by: INTERNAL MEDICINE

## 2017-01-18 PROCEDURE — 93750 INTERROGATION VAD IN PERSON: CPT | Mod: ,,, | Performed by: INTERNAL MEDICINE

## 2017-01-18 PROCEDURE — 84100 ASSAY OF PHOSPHORUS: CPT

## 2017-01-18 PROCEDURE — 86140 C-REACTIVE PROTEIN: CPT

## 2017-01-18 PROCEDURE — 25000003 PHARM REV CODE 250: Performed by: PHYSICIAN ASSISTANT

## 2017-01-18 PROCEDURE — 36415 COLL VENOUS BLD VENIPUNCTURE: CPT

## 2017-01-18 PROCEDURE — 80076 HEPATIC FUNCTION PANEL: CPT

## 2017-01-18 PROCEDURE — 80048 BASIC METABOLIC PNL TOTAL CA: CPT | Mod: 91

## 2017-01-18 PROCEDURE — 25000003 PHARM REV CODE 250: Performed by: INTERNAL MEDICINE

## 2017-01-18 PROCEDURE — 20600001 HC STEP DOWN PRIVATE ROOM

## 2017-01-18 PROCEDURE — 93750 INTERROGATION VAD IN PERSON: CPT | Performed by: INTERNAL MEDICINE

## 2017-01-18 PROCEDURE — 25000003 PHARM REV CODE 250: Performed by: STUDENT IN AN ORGANIZED HEALTH CARE EDUCATION/TRAINING PROGRAM

## 2017-01-18 PROCEDURE — 83735 ASSAY OF MAGNESIUM: CPT

## 2017-01-18 PROCEDURE — 80048 BASIC METABOLIC PNL TOTAL CA: CPT

## 2017-01-18 PROCEDURE — 83615 LACTATE (LD) (LDH) ENZYME: CPT

## 2017-01-18 PROCEDURE — 25000003 PHARM REV CODE 250: Performed by: HOSPITALIST

## 2017-01-18 PROCEDURE — 85520 HEPARIN ASSAY: CPT

## 2017-01-18 PROCEDURE — 83880 ASSAY OF NATRIURETIC PEPTIDE: CPT

## 2017-01-18 PROCEDURE — 85610 PROTHROMBIN TIME: CPT

## 2017-01-18 PROCEDURE — 27000248 HC VAD-ADDITIONAL DAY

## 2017-01-18 PROCEDURE — 85730 THROMBOPLASTIN TIME PARTIAL: CPT

## 2017-01-18 PROCEDURE — 97802 MEDICAL NUTRITION INDIV IN: CPT

## 2017-01-18 RX ORDER — WARFARIN 2 MG/1
2 TABLET ORAL DAILY
Status: DISCONTINUED | OUTPATIENT
Start: 2017-01-18 | End: 2017-01-20

## 2017-01-18 RX ADMIN — DOCUSATE SODIUM 200 MG: 100 CAPSULE, LIQUID FILLED ORAL at 08:01

## 2017-01-18 RX ADMIN — DIPYRIDAMOLE 100 MG: 50 TABLET, FILM COATED ORAL at 08:01

## 2017-01-18 RX ADMIN — AMIODARONE HYDROCHLORIDE 400 MG: 200 TABLET ORAL at 08:01

## 2017-01-18 RX ADMIN — ASPIRIN 325 MG: 325 TABLET, DELAYED RELEASE ORAL at 09:01

## 2017-01-18 RX ADMIN — Medication 400 MG: at 01:01

## 2017-01-18 RX ADMIN — LISINOPRIL 20 MG: 20 TABLET ORAL at 09:01

## 2017-01-18 RX ADMIN — INSULIN ASPART 2 UNITS: 100 INJECTION, SOLUTION INTRAVENOUS; SUBCUTANEOUS at 09:01

## 2017-01-18 RX ADMIN — INSULIN ASPART 20 UNITS: 100 INJECTION, SOLUTION INTRAVENOUS; SUBCUTANEOUS at 01:01

## 2017-01-18 RX ADMIN — DIPYRIDAMOLE 100 MG: 50 TABLET, FILM COATED ORAL at 05:01

## 2017-01-18 RX ADMIN — DIPYRIDAMOLE 100 MG: 50 TABLET, FILM COATED ORAL at 01:01

## 2017-01-18 RX ADMIN — FERROUS GLUCONATE TAB 324 MG (37.5 MG ELEMENTAL IRON) 324 MG: 324 (37.5 FE) TAB at 09:01

## 2017-01-18 RX ADMIN — WARFARIN SODIUM 2 MG: 2 TABLET ORAL at 06:01

## 2017-01-18 RX ADMIN — AMIODARONE HYDROCHLORIDE 400 MG: 200 TABLET ORAL at 09:01

## 2017-01-18 RX ADMIN — LISINOPRIL 20 MG: 20 TABLET ORAL at 08:01

## 2017-01-18 RX ADMIN — OXYCODONE HYDROCHLORIDE 5 MG: 5 TABLET ORAL at 06:01

## 2017-01-18 RX ADMIN — Medication 400 MG: at 08:01

## 2017-01-18 RX ADMIN — AMLODIPINE BESYLATE 5 MG: 5 TABLET ORAL at 09:01

## 2017-01-18 RX ADMIN — INSULIN ASPART 20 UNITS: 100 INJECTION, SOLUTION INTRAVENOUS; SUBCUTANEOUS at 09:01

## 2017-01-18 RX ADMIN — PANTOPRAZOLE SODIUM 40 MG: 40 TABLET, DELAYED RELEASE ORAL at 09:01

## 2017-01-18 RX ADMIN — INSULIN ASPART 20 UNITS: 100 INJECTION, SOLUTION INTRAVENOUS; SUBCUTANEOUS at 06:01

## 2017-01-18 RX ADMIN — DUTASTERIDE 0.5 MG: 0.5 CAPSULE, LIQUID FILLED ORAL at 09:01

## 2017-01-18 RX ADMIN — Medication 400 MG: at 05:01

## 2017-01-18 NOTE — PROGRESS NOTES
PHARM.D. PRE-TRANSPLANT NOTE:    This patient's medication therapy was evaluated and with no medication related issues identified to discuss with the Selection Committee at the time the patient was presented as a candidate for heart transplantation.    As a 55 y/o non-black male with 0% cPRA he would likely be considered moderate risk for rejection. If cPRA, DSA or HLA match changes at time of transplant this may modify risk.  I will continue to follow and participate in care aa member of the inpatient rounding team.

## 2017-01-18 NOTE — PROGRESS NOTES
Update Note:    SW to pt's room for update. Pt and wife not in room. SW to return as appropriate. SW providing ongoing psychosocial counseling and support, education, assistance, resources, and discharge planning as indicated. SW continuing to follow and remains available.

## 2017-01-18 NOTE — COMMITTEE REVIEW
[facility] Dx: End stage dilated heart failure. S/P Heart Mate II Mechanical Circulatory Support 8/25/2016   Mick Barriga's case was presented to the heart transplant selection committee most recently on 1/18/2017 .  Patient has been accepted as a candidate for heart transplantation due to end stage heart failure with an NYHA Functional Class IV  and a Karnofsky score of 40. Disabled; requires special care and assistance.  Patient to be listed through [facility] pending financial clearance at his qualifying status. Donor weight 30 % below his weight with [facility].  He is pending financial clearance at his presentation to day as he has changed health insurance coverage.    Note was written by May Elizabeth.    ==========================================================    I was present at the meeting and agree to the decision of the committee.

## 2017-01-18 NOTE — PROGRESS NOTES
Progress Note  Heart Transplant Service    Admit Date: 1/5/2017   LOS: 13 days     SUBJECTIVE:     Follow up for: Elevated serum lactate dehydrogenase (LDH)    Interval History: Patient with mentioned dizziness is better but still there when he change position. Denies chest pain, SOB, NVD. Denied palpitations.No other complaints. On fluid restriction <1.5 L. Endocrine on board, LDH today 481    Scheduled Meds:   amiodarone  400 mg Oral BID    amlodipine  5 mg Oral Daily    aspirin  325 mg Oral Daily    dipyridamole  100 mg Oral TID    docusate sodium  200 mg Oral QHS    dutasteride  0.5 mg Oral Daily    ferrous gluconate  324 mg Oral Daily with breakfast    insulin aspart  20 Units Subcutaneous TID WM    lisinopril  20 mg Oral BID    magnesium oxide  400 mg Oral TID    pantoprazole  40 mg Oral Daily    warfarin  2 mg Oral Daily     Continuous Infusions:     PRN Meds:cyclobenzaprine, dextrose 50%, dextrose 50%, glucagon (human recombinant), glucose, glucose, insulin aspart, oxycodone, oxycodone, ramelteon, senna-docusate 8.6-50 mg    Review of patient's allergies indicates:   Allergen Reactions    Levemir [insulin detemir] Itching    Pork/porcine containing products     Ranexa [ranolazine] Nausea Only       OBJECTIVE:     Vital Signs (Most Recent)  Temp: 97.6 °F (36.4 °C) (01/18/17 1200)  Pulse: 90 (01/18/17 1200)  Resp: 18 (01/18/17 1200)  BP: (!) 82/0 (01/18/17 1200)  SpO2: 96 % (01/18/17 1200)    Vital Signs Range (Last 24H):  Temp:  [97.6 °F (36.4 °C)-98.1 °F (36.7 °C)]   Pulse:  []   Resp:  [17-18]   BP: (68-91)/(0-64)   SpO2:  [96 %-98 %]     I & O (Last 24H):    Intake/Output Summary (Last 24 hours) at 01/18/17 1309  Last data filed at 01/18/17 0600   Gross per 24 hour   Intake              935 ml   Output             1950 ml   Net            -1015 ml     Telemetry: possible slow VT? Overnight. EP consulted    Physical Exam  Constitutional: He is oriented to person, place, and time. He  appears well-developed and well-nourished.   Head: Normocephalic and atraumatic.   Eyes: EOM are normal. Pupils are equal, round, and reactive to light.   Neck: Normal range of motion. Neck supple. No JVD (at level of clavicle) present.   Cardiovascular: VAD sounds smooth   Pulmonary/Chest: Effort normal and breath sounds normal.   Abdominal: Soft. Bowel sounds are normal.   Musculoskeletal: Normal range of motion. He exhibits no edema.   Neurological: He is alert and oriented to person, place, and time.   Skin: Skin is warm and dry.   Psychiatric: He has a normal mood and affect. His behavior is normal.   Nursing note and vitals reviewed.    Labs:    Recent Labs  Lab 01/16/17  0420 01/17/17 0212 01/18/17 0419   WBC 10.62 11.26 13.18*   HGB 10.7* 10.2* 10.9*   HCT 34.3* 32.3* 33.7*    156 207   LYMPH 29.0  3.1 22.9  2.6 26.2  3.5   MONO 5.2  0.6 6.0  0.7 5.4  0.7   EOSINOPHIL 16.4* 15.6* 17.0*       Recent Labs  Lab 01/16/17  0420 01/17/17  0212 01/18/17  0419   APTT >150.0* 92.0* 92.8*   INR 1.7* 1.8* 2.3*        Recent Labs  Lab 01/13/17  0624  01/16/17  0420 01/17/17  0212 01/18/17  0419 01/18/17  0902   *  < > 186* 197* 143* 230*   CALCIUM 8.9  < > 9.1 9.0 8.8 8.9   ALBUMIN 3.3*  --  3.2*  --  3.1*  --    PROT 6.7  --  6.6  --  6.9  --    *  < > 129* 129* 131* 130*   K 5.4*  < > 4.4 4.5 5.6* 4.7   CO2 26  < > 24 27 24 25   CL 94*  < > 95 95 99 98   BUN 13  < > 12 10 11 13   CREATININE 0.9  < > 0.8 0.8 0.8 0.8   ALKPHOS 77  --  74  --  70  --    ALT 18  --  21  --  37  --    AST 24  --  26  --  52*  --    BILITOT 0.7  --  0.5  --  0.6  --    MG 1.7  < > 1.5* 1.5* 2.1  --    PHOS 3.5  < > 4.4 3.8 4.2  --    < > = values in this interval not displayed.  Estimated Creatinine Clearance: 114.5 mL/min (based on Cr of 0.8).      Recent Labs  Lab 01/18/17  0729   BNP 56       Recent Labs  Lab 01/12/17  0552 01/13/17  0624 01/14/17  0550 01/15/17  0534 01/16/17  0420 01/17/17  0212  01/18/17  0729   * 452* 478* 441* 507* 528* 481*     Microbiology Results (last 7 days)     ** No results found for the last 168 hours. **        C-scope  Impression:   - Non-thrombosed external hemorrhoids found on                         perianal exam.                        - Localized moderate inflammation was found in the                         cecum and may be secondary to ischemic colitis.                         Unable to biopsy due to anticoagulation.                        - One 3 mm polyp in the rectum. Resection not                         attempted.                        - The examination was otherwise normal on direct                         and retroflexion views.                        - No specimens collected.  Recommendation: - [facility] care.                        - Advance diet as tolerated.                        - Continue present medications.                        - Repeat colonoscopy (date not yet determined) for                         surveillance to remove polyp and follow-up colon                         inflammation. Recommend doing this at a time when                         he is able to safely stop anticoagulation - not                         urgent.    ASSESSMENT:     57 y/o male with PMHx HFrEF s/p HM II 8/24/16, s/p AICD, HTN, DM , Atrial flutter, admitted for elevated LDH.     PLAN:     S/P LVAD HeartMate II Implanted 8/24/2016  - Continue Coumadin, Goal INR 2.0-3.0. today at 2.3 will dc heparin gtt ,continue coumadin 3  - Antiplatelets , Persantine 100 TID.  - Speed set at 9000, LSL 8600  - Needs colonoscopy to complete OHTx workup, c- scope on 1/12/207   - repeat colonoscopy in future (date undetermined) after OHT, when patient can safely come off of  anticoagulation. Very low risk polyp (3mm).      Elevated LDH  - LDH today 481 from 528 , 2D echo performed   - Orthotast on 1/15/2017 BP in bed 105/74 setting 94/70 and standing  84/68   - Continue current regimen with ASA, Persantine  - Had RHC after VAD   -Ordered G6pd Level was 20.1      Hypertension  - Non-Pulsatile, Dopplers/MAPs closer to goal today  - Continue same BP medicine       Hyponatremia/Dizzeness   - Chronic in nature (121-126 September of 2016 and even further back) Will also check urine osm, urine cr, urine Na  - Has high UOP with hyponatremia, improving with fluid restriction   - Endocrine consulted appreciate home fluid restriction<1.5 L/day think its psychogenic polydipsia   - Suspect this is due to medications , he is off Zoloft and Remeron and imipramine   - DC flomax on 1/16/2017      Palpitations  - Patient reports symptoms during intermittent pacing overnight seen on tele  - Patient had symptomatic RV pacing during sleep. Device interrogated, with no diaphragmatic capture seen at 5V @ 0.4 ms, EP dropped lower rate from 50 to 40 bpm  - EP increased ATP and dropped detection zone to 120   - Restart home amiodarone 400 po bid per EP home    EP note 1/13/16  - recommend restarting home amiodarone  - Reprogrammed to VT1 zone 120bpm with 6 bursts of ATP at 10ms decrements; VT2 zone 167 bpm with 3 burst ATP; VF zone to 231  - if cleared from HF standpoint, would begin low dose BB and Titrate up as tolerated     - cardiac diet, 2g Na restriction    Kamran Pablo MD  Cardiology Fellow, PGY-4  Pager: [phone]

## 2017-01-18 NOTE — PROCEDURES
"Patient aax3 with no family at bedside. VAD interrogation completed this AM in the event changes needed to be made. Pt denies any more "episodes" and reports feeling "much better".  Will continue to monitor for further issues.     Pulsatile: Yes, intermittent   VAD Sounds: Smooth  Problems / Issues / Alarms with VAD if any: None noted      VAD Interrogation:  TXP LVAD INTERROGATIONS 1/18/2017 1/18/2017 1/17/2017 1/17/2017 1/17/2017 1/17/2017 1/17/2017   Type HeartMate II HeartMate II HeartMate II HeartMate II HeartMate II HeartMate II -   Flow 4.7 5.3 5.5 5.0 4.4 4.9 -   Speed 9000 9000 8800 9000 9000 9000 -   PI 6.6 6.4 6.5 7.2 6.8 7.3 4.2   Power (Kwon) 5.0 5.4 5.3 5.3 8.0 5.1 -   LSL 8600 - - 8600 - - -   Pulsatility Intermittent pulse - - Intermittent pulse - - -   }    "

## 2017-01-18 NOTE — PLAN OF CARE
Problem: Patient Care Overview  Goal: Plan of Care Review  Outcome: Ongoing (interventions implemented as appropriate)  Pt free of falls and injury this shift. POC reviewed with pt and wife at bedside, pt verbalized understanding. Educated pt on fall risk status due to weakness on standing, pt verbalized he would call if needed to get up. Heparin gtt infusing at 20 units x 88.3 kg at 17.7 mL/hr. DLES dsg change due today, performed by pt's wife. VAD numbers WNL. VSS, NAD noted at this time. Will continue to monitor.

## 2017-01-18 NOTE — PROGRESS NOTES
[facility]  Adult Nutrition  Consult Note    SUMMARY     Recommendations    Recommendation/Intervention:   1. Continue current diet order.   2. Encouraged good PO intake of meals. Noted Alb 3.1, PAB 21, and CRP 35.3.   Goals: PO intake >75%.   Nutrition Goal Status: new  Communication of RD Recs: reviewed with RN    Reason for Assessment    Reason for Assessment: length of stay  Diagnosis: other (see comments) (elevated LDH)  Relevent Medical History: HF s/p LVAD, HTN, DM   Nutrition Discharge Planning: No needs at this time.     Nutrition Prescription Ordered    Current Diet Order: Cardiac, Diabetic 1800kcal, Fluid 1500mL    Nutrition Risk Screen     Nutrition Risk Screen: no indicators present    Nutrition/Diet History    Typical Food/Fluid Intake: Pt reports PO intake 100%.   Factors Affecting Nutritional Intake: other (see comments) (none reported)    Labs/Tests/Procedures/Meds    Pertinent Labs Reviewed: reviewed  Pertinent Labs Comments: Na 131, K 5.6  Pertinent Medications Reviewed: reviewed  Pertinent Medications Comments: docusate, insulin, coumadin    Physical Findings    Overall Physical Appearance: overweight  Oral/Mouth Cavity: tooth/teeth missing  Skin: intact    Anthropometrics    Height (inches): 67.99 in  Weight Method: Standard Scale  Weight (kg): 89.1 kg  Ideal Body Weight (IBW), Male: 153.94 lb  % Ideal Body Weight, Male (lb): 127.6   BMI (kg/m2): 29.87  BMI Grade: 25 - 29.9 - overweight    Estimated/Assessed Needs    Weight Used For Calorie Calculations: 89.1 kg (196 lb 6.9 oz)   Height (cm): 172.7 cm  Energy Need Method: Daniels-St Jeor (1.3 PAL: 2220kcal)  RMR (Daniels-St. Jeor Equation): 1708.8  Weight Used For Protein Calculations: 89.1 kg (196 lb 6.9 oz)  Protein Requirements: 89-107g (1-1.2g/kg)  Fluid Need Method: RDA Method (or per MD)    Malnutrition (Undernutrition) Diagnosis    % Intake of Estimated Energy Needs: 75 - 100%  % Meal Intake: 100%    Nutrition  Diagnosis    Nutrition Problem: Other (see comments) (No nutrition related risk factor present at this time)    Monitor and Evaluation    Food and Nutrient Intake: energy intake, food and beverage intake  Food and Nutrient Adminstration: diet order  Anthropometric Measurements: weight, weight change  Biochemical Data, Medical Tests and Procedures: other (specify) (All labs)  Nutrition-Focused Physical Findings: overall appearance    Nutrition Risk    Level of Risk: low    Nutrition Follow-Up    RD Follow-up?: Yes

## 2017-01-19 LAB
ANION GAP SERPL CALC-SCNC: 8 MMOL/L
ANION GAP SERPL CALC-SCNC: 9 MMOL/L
ANION GAP SERPL CALC-SCNC: 9 MMOL/L
ANISOCYTOSIS BLD QL SMEAR: SLIGHT
APTT BLDCRRT: 32.1 SEC
BASOPHILS # BLD AUTO: 0.04 K/UL
BASOPHILS NFR BLD: 0.3 %
BILIRUB UR QL STRIP: NEGATIVE
BUN SERPL-MCNC: 13 MG/DL
BUN SERPL-MCNC: 14 MG/DL
BUN SERPL-MCNC: 16 MG/DL
BURR CELLS BLD QL SMEAR: ABNORMAL
CALCIUM SERPL-MCNC: 8.9 MG/DL
CALCIUM SERPL-MCNC: 9.3 MG/DL
CALCIUM SERPL-MCNC: 9.5 MG/DL
CHLORIDE SERPL-SCNC: 91 MMOL/L
CHLORIDE SERPL-SCNC: 92 MMOL/L
CHLORIDE SERPL-SCNC: 97 MMOL/L
CLARITY UR REFRACT.AUTO: CLEAR
CO2 SERPL-SCNC: 26 MMOL/L
COLOR UR AUTO: YELLOW
CREAT SERPL-MCNC: 0.8 MG/DL
CREAT SERPL-MCNC: 0.9 MG/DL
CREAT SERPL-MCNC: 1.1 MG/DL
DACRYOCYTES BLD QL SMEAR: ABNORMAL
DIFFERENTIAL METHOD: ABNORMAL
EOSINOPHIL # BLD AUTO: 2.7 K/UL
EOSINOPHIL NFR BLD: 17.5 %
ERYTHROCYTE [DISTWIDTH] IN BLOOD BY AUTOMATED COUNT: 19.8 %
EST. GFR  (AFRICAN AMERICAN): >60 ML/MIN/1.73 M^2
EST. GFR  (NON AFRICAN AMERICAN): >60 ML/MIN/1.73 M^2
GLUCOSE SERPL-MCNC: 200 MG/DL
GLUCOSE SERPL-MCNC: 215 MG/DL
GLUCOSE SERPL-MCNC: 256 MG/DL
GLUCOSE UR QL STRIP: ABNORMAL
HCT VFR BLD AUTO: 36.8 %
HGB BLD-MCNC: 11.8 G/DL
HGB UR QL STRIP: NEGATIVE
HYPOCHROMIA BLD QL SMEAR: ABNORMAL
INR PPP: 2.6
KETONES UR QL STRIP: NEGATIVE
LDH SERPL L TO P-CCNC: 427 U/L
LEUKOCYTE ESTERASE UR QL STRIP: NEGATIVE
LYMPHOCYTES # BLD AUTO: 3.3 K/UL
LYMPHOCYTES NFR BLD: 21.2 %
MAGNESIUM SERPL-MCNC: 1.8 MG/DL
MCH RBC QN AUTO: 18.2 PG
MCHC RBC AUTO-ENTMCNC: 32.1 %
MCV RBC AUTO: 57 FL
MONOCYTES # BLD AUTO: 0.9 K/UL
MONOCYTES NFR BLD: 6 %
NEUTROPHILS # BLD AUTO: 8.3 K/UL
NEUTROPHILS NFR BLD: 55 %
NITRITE UR QL STRIP: NEGATIVE
OVALOCYTES BLD QL SMEAR: ABNORMAL
PH UR STRIP: 6 [PH] (ref 5–8)
PHOSPHATE SERPL-MCNC: 4.4 MG/DL
PLATELET # BLD AUTO: 238 K/UL
PLATELET BLD QL SMEAR: ABNORMAL
PMV BLD AUTO: ABNORMAL FL
POCT GLUCOSE: 110 MG/DL (ref 70–110)
POCT GLUCOSE: 137 MG/DL (ref 70–110)
POCT GLUCOSE: 208 MG/DL (ref 70–110)
POCT GLUCOSE: 219 MG/DL (ref 70–110)
POIKILOCYTOSIS BLD QL SMEAR: SLIGHT
POLYCHROMASIA BLD QL SMEAR: ABNORMAL
POTASSIUM SERPL-SCNC: 4.7 MMOL/L
POTASSIUM SERPL-SCNC: 5.2 MMOL/L
POTASSIUM SERPL-SCNC: 5.2 MMOL/L
PROT UR QL STRIP: ABNORMAL
PROTHROMBIN TIME: 26.5 SEC
RBC # BLD AUTO: 6.47 M/UL
SODIUM SERPL-SCNC: 125 MMOL/L
SODIUM SERPL-SCNC: 127 MMOL/L
SODIUM SERPL-SCNC: 132 MMOL/L
SP GR UR STRIP: 1.01 (ref 1–1.03)
URN SPEC COLLECT METH UR: ABNORMAL
UROBILINOGEN UR STRIP-ACNC: NEGATIVE EU/DL
WBC # BLD AUTO: 15.58 K/UL

## 2017-01-19 PROCEDURE — 25000003 PHARM REV CODE 250: Performed by: INTERNAL MEDICINE

## 2017-01-19 PROCEDURE — 80048 BASIC METABOLIC PNL TOTAL CA: CPT

## 2017-01-19 PROCEDURE — 25000003 PHARM REV CODE 250: Performed by: STUDENT IN AN ORGANIZED HEALTH CARE EDUCATION/TRAINING PROGRAM

## 2017-01-19 PROCEDURE — 27000248 HC VAD-ADDITIONAL DAY

## 2017-01-19 PROCEDURE — 85730 THROMBOPLASTIN TIME PARTIAL: CPT

## 2017-01-19 PROCEDURE — 25000003 PHARM REV CODE 250: Performed by: HOSPITALIST

## 2017-01-19 PROCEDURE — 84100 ASSAY OF PHOSPHORUS: CPT

## 2017-01-19 PROCEDURE — 99232 SBSQ HOSP IP/OBS MODERATE 35: CPT | Mod: ,,, | Performed by: INTERNAL MEDICINE

## 2017-01-19 PROCEDURE — 63600175 PHARM REV CODE 636 W HCPCS: Performed by: STUDENT IN AN ORGANIZED HEALTH CARE EDUCATION/TRAINING PROGRAM

## 2017-01-19 PROCEDURE — 93750 INTERROGATION VAD IN PERSON: CPT | Mod: ,,, | Performed by: INTERNAL MEDICINE

## 2017-01-19 PROCEDURE — 25000003 PHARM REV CODE 250: Performed by: PHYSICIAN ASSISTANT

## 2017-01-19 PROCEDURE — 85025 COMPLETE CBC W/AUTO DIFF WBC: CPT

## 2017-01-19 PROCEDURE — 93750 INTERROGATION VAD IN PERSON: CPT | Performed by: INTERNAL MEDICINE

## 2017-01-19 PROCEDURE — 36415 COLL VENOUS BLD VENIPUNCTURE: CPT

## 2017-01-19 PROCEDURE — 87040 BLOOD CULTURE FOR BACTERIA: CPT | Mod: 59

## 2017-01-19 PROCEDURE — 81003 URINALYSIS AUTO W/O SCOPE: CPT

## 2017-01-19 PROCEDURE — 87186 SC STD MICRODIL/AGAR DIL: CPT

## 2017-01-19 PROCEDURE — 83735 ASSAY OF MAGNESIUM: CPT

## 2017-01-19 PROCEDURE — 20600001 HC STEP DOWN PRIVATE ROOM

## 2017-01-19 PROCEDURE — 87088 URINE BACTERIA CULTURE: CPT

## 2017-01-19 PROCEDURE — 87077 CULTURE AEROBIC IDENTIFY: CPT

## 2017-01-19 PROCEDURE — 87086 URINE CULTURE/COLONY COUNT: CPT

## 2017-01-19 PROCEDURE — 85610 PROTHROMBIN TIME: CPT

## 2017-01-19 PROCEDURE — 80048 BASIC METABOLIC PNL TOTAL CA: CPT | Mod: 91

## 2017-01-19 PROCEDURE — 83615 LACTATE (LD) (LDH) ENZYME: CPT

## 2017-01-19 RX ORDER — BISACODYL 10 MG
10 SUPPOSITORY, RECTAL RECTAL DAILY PRN
Status: DISCONTINUED | OUTPATIENT
Start: 2017-01-19 | End: 2017-01-22 | Stop reason: HOSPADM

## 2017-01-19 RX ORDER — AMLODIPINE BESYLATE 10 MG/1
10 TABLET ORAL DAILY
Status: DISCONTINUED | OUTPATIENT
Start: 2017-01-20 | End: 2017-01-22 | Stop reason: HOSPADM

## 2017-01-19 RX ADMIN — FERROUS GLUCONATE TAB 324 MG (37.5 MG ELEMENTAL IRON) 324 MG: 324 (37.5 FE) TAB at 09:01

## 2017-01-19 RX ADMIN — Medication 400 MG: at 03:01

## 2017-01-19 RX ADMIN — DEXTROSE MONOHYDRATE 25 G: 25 INJECTION, SOLUTION INTRAVENOUS at 03:01

## 2017-01-19 RX ADMIN — AMLODIPINE BESYLATE 5 MG: 5 TABLET ORAL at 09:01

## 2017-01-19 RX ADMIN — PANTOPRAZOLE SODIUM 40 MG: 40 TABLET, DELAYED RELEASE ORAL at 09:01

## 2017-01-19 RX ADMIN — AMIODARONE HYDROCHLORIDE 400 MG: 200 TABLET ORAL at 08:01

## 2017-01-19 RX ADMIN — BISACODYL 10 MG: 10 SUPPOSITORY RECTAL at 12:01

## 2017-01-19 RX ADMIN — OXYCODONE HYDROCHLORIDE 10 MG: 5 TABLET ORAL at 04:01

## 2017-01-19 RX ADMIN — LISINOPRIL 20 MG: 20 TABLET ORAL at 09:01

## 2017-01-19 RX ADMIN — INSULIN HUMAN 10 UNITS: 100 INJECTION, SOLUTION PARENTERAL at 03:01

## 2017-01-19 RX ADMIN — DIPYRIDAMOLE 100 MG: 50 TABLET, FILM COATED ORAL at 08:01

## 2017-01-19 RX ADMIN — WARFARIN SODIUM 2 MG: 2 TABLET ORAL at 05:01

## 2017-01-19 RX ADMIN — DOCUSATE SODIUM 200 MG: 100 CAPSULE, LIQUID FILLED ORAL at 08:01

## 2017-01-19 RX ADMIN — LISINOPRIL 20 MG: 20 TABLET ORAL at 08:01

## 2017-01-19 RX ADMIN — Medication 400 MG: at 04:01

## 2017-01-19 RX ADMIN — AMIODARONE HYDROCHLORIDE 400 MG: 200 TABLET ORAL at 09:01

## 2017-01-19 RX ADMIN — INSULIN ASPART 1 UNITS: 100 INJECTION, SOLUTION INTRAVENOUS; SUBCUTANEOUS at 08:01

## 2017-01-19 RX ADMIN — DIPYRIDAMOLE 100 MG: 50 TABLET, FILM COATED ORAL at 04:01

## 2017-01-19 RX ADMIN — DIPYRIDAMOLE 100 MG: 50 TABLET, FILM COATED ORAL at 03:01

## 2017-01-19 RX ADMIN — INSULIN ASPART 20 UNITS: 100 INJECTION, SOLUTION INTRAVENOUS; SUBCUTANEOUS at 09:01

## 2017-01-19 RX ADMIN — OXYCODONE HYDROCHLORIDE 5 MG: 5 TABLET ORAL at 12:01

## 2017-01-19 RX ADMIN — DUTASTERIDE 0.5 MG: 0.5 CAPSULE, LIQUID FILLED ORAL at 09:01

## 2017-01-19 RX ADMIN — INSULIN ASPART 20 UNITS: 100 INJECTION, SOLUTION INTRAVENOUS; SUBCUTANEOUS at 06:01

## 2017-01-19 RX ADMIN — ASPIRIN 325 MG: 325 TABLET, DELAYED RELEASE ORAL at 09:01

## 2017-01-19 RX ADMIN — Medication 400 MG: at 08:01

## 2017-01-19 NOTE — PLAN OF CARE
Problem: Patient Care Overview  Goal: Plan of Care Review  Outcome: Ongoing (interventions implemented as appropriate)  Patient remains free of falls or injury. Patient denies pain.  today. Plan to monitor LDH. Plan of care reviewed with patient and wife.

## 2017-01-19 NOTE — PROGRESS NOTES
Pt's vitals as follows        01/19/17 1200 01/19/17 1201   Vital Signs   Temp 97.5 °F (36.4 °C) --    Temp src Oral --    Pulse 86 --    Heart Rate Source Monitor --    Resp 18 --    SpO2 98 % --    Pulse Oximetry Type Intermittent --    /84 (!) 94/0   MAP (mmHg) 98 --    BP Location Left arm --    BP Method Automatic Doppler   Patient Position Lying --        Pt had a PI of 8.3    Dr Pablo notified. Pt stable, will continue to monitor.

## 2017-01-19 NOTE — PROGRESS NOTES
Progress Note  Heart Transplant Service    Admit Date: 1/5/2017   LOS: 14 days     SUBJECTIVE:     Follow up for: Elevated serum lactate dehydrogenase (LDH)    Interval History: Patient denied dizziness still have PI and flow dropping momentarily when change position. Denies chest pain and no SOB.Denied palpitations.No other complaints. On fluid restriction <1.5 L. LDH today 427, some leukocytosis today with no fever.    Scheduled Meds:   amiodarone  400 mg Oral BID    amlodipine  5 mg Oral Daily    aspirin  325 mg Oral Daily    dipyridamole  100 mg Oral TID    docusate sodium  200 mg Oral QHS    dutasteride  0.5 mg Oral Daily    ferrous gluconate  324 mg Oral Daily with breakfast    insulin aspart  20 Units Subcutaneous TID WM    lisinopril  20 mg Oral BID    magnesium oxide  400 mg Oral TID    pantoprazole  40 mg Oral Daily    warfarin  2 mg Oral Daily     Continuous Infusions:     PRN Meds:cyclobenzaprine, dextrose 50%, dextrose 50%, glucagon (human recombinant), glucose, glucose, insulin aspart, oxycodone, oxycodone, ramelteon, senna-docusate 8.6-50 mg    Review of patient's allergies indicates:   Allergen Reactions    Levemir [insulin detemir] Itching    Pork/porcine containing products     Ranexa [ranolazine] Nausea Only       OBJECTIVE:     Vital Signs (Most Recent)  Temp: 97.6 °F (36.4 °C) (01/19/17 0415)  Pulse: 86 (01/19/17 0501)  Resp: 16 (01/19/17 0415)  BP: (!) 74/0 (01/19/17 0420)  SpO2: 98 % (01/19/17 0415)    Vital Signs Range (Last 24H):  Temp:  [97.6 °F (36.4 °C)-98.2 °F (36.8 °C)]   Pulse:  [71-94]   Resp:  [14-18]   BP: ()/(0-86)   SpO2:  [95 %-99 %]     I & O (Last 24H):    Intake/Output Summary (Last 24 hours) at 01/19/17 0840  Last data filed at 01/19/17 0500   Gross per 24 hour   Intake              180 ml   Output             2025 ml   Net            -1845 ml     Telemetry: possible slow VT? Overnight. EP consulted    Physical Exam  Constitutional: He is oriented to  person, place, and time. He appears well-developed and well-nourished.   Head: Normocephalic and atraumatic.   Eyes: EOM are normal. Pupils are equal, round, and reactive to light.   Neck: Normal range of motion. Neck supple. No JVD (at level of clavicle) present.   Cardiovascular: VAD sounds smooth   Pulmonary/Chest: Effort normal and breath sounds normal.   Abdominal: Soft. Bowel sounds are normal.   Musculoskeletal: Normal range of motion. He exhibits no edema.   Neurological: He is alert and oriented to person, place, and time.   Skin: Skin is warm and dry.   Psychiatric: He has a normal mood and affect. His behavior is normal.   Nursing note and vitals reviewed.    Labs:    Recent Labs  Lab 01/17/17  0212 01/18/17  0419 01/19/17  0453   WBC 11.26 13.18* 15.58*   HGB 10.2* 10.9* 11.8*   HCT 32.3* 33.7* 36.8*    207 238   LYMPH 22.9  2.6 26.2  3.5 21.2  3.3   MONO 6.0  0.7 5.4  0.7 6.0  0.9   EOSINOPHIL 15.6* 17.0* 17.5*       Recent Labs  Lab 01/17/17  0212 01/18/17  0419 01/19/17  0453   APTT 92.0* 92.8* 32.1*   INR 1.8* 2.3* 2.6*        Recent Labs  Lab 01/13/17  0624  01/16/17  0420 01/17/17  0212 01/18/17  0419 01/18/17  0902 01/19/17  0453   *  < > 186* 197* 143* 230* 200*   CALCIUM 8.9  < > 9.1 9.0 8.8 8.9 9.5   ALBUMIN 3.3*  --  3.2*  --  3.1*  --   --    PROT 6.7  --  6.6  --  6.9  --   --    *  < > 129* 129* 131* 130* 132*   K 5.4*  < > 4.4 4.5 5.6* 4.7 5.2*   CO2 26  < > 24 27 24 25 26   CL 94*  < > 95 95 99 98 97   BUN 13  < > 12 10 11 13 13   CREATININE 0.9  < > 0.8 0.8 0.8 0.8 0.9   ALKPHOS 77  --  74  --  70  --   --    ALT 18  --  21  --  37  --   --    AST 24  --  26  --  52*  --   --    BILITOT 0.7  --  0.5  --  0.6  --   --    MG 1.7  < > 1.5* 1.5* 2.1  --  1.8   PHOS 3.5  < > 4.4 3.8 4.2  --  4.4   < > = values in this interval not displayed.  Estimated Creatinine Clearance: 101 mL/min (based on Cr of 0.9).      Recent Labs  Lab 01/18/17  0729   BNP 56       Recent  Labs  Lab 01/13/17  0624 01/14/17  0550 01/15/17  0534 01/16/17  0420 01/17/17  0212 01/18/17  0729 01/19/17  0453   * 478* 441* 507* 528* 481* 427*     Microbiology Results (last 7 days)     ** No results found for the last 168 hours. **        C-scope  Impression:   - Non-thrombosed external hemorrhoids found on                         perianal exam.                        - Localized moderate inflammation was found in the                         cecum and may be secondary to ischemic colitis.                         Unable to biopsy due to anticoagulation.                        - One 3 mm polyp in the rectum. Resection not                         attempted.                        - The examination was otherwise normal on direct                         and retroflexion views.                        - No specimens collected.  Recommendation: - [facility] for [facility] care.                        - Advance diet as tolerated.                        - Continue present medications.                        - Repeat colonoscopy (date not yet determined) for                         surveillance to remove polyp and follow-up colon                         inflammation. Recommend doing this at a time when                         he is able to safely stop anticoagulation - not                         urgent.    ASSESSMENT:     57 y/o male with PMHx HFrEF s/p HM II 8/24/16, s/p AICD, HTN, DM , Atrial flutter, admitted for elevated LDH.     PLAN:     S/P LVAD HeartMate II Implanted 8/24/2016  - Continue Coumadin, Goal INR 2.0-3.0. today at 2.6,continue coumadin 2  - Antiplatelets , Persantine 100 TID.  - Speed set at 9000, LSL 8600  - Needs colonoscopy to complete OHTx workup, c- scope on 1/12/207   - repeat colonoscopy in future (date undetermined) after OHT, when patient can safely come off of  anticoagulation. Very low risk polyp (3mm).      Elevated LDH  - LDH today 427 from 481 , 2D echo  performed   - Orthotast on 1/15/2017 BP in bed 105/74 setting 94/70 and standing 84/68   - Continue current regimen with ASA, Persantine  - Had RHC after VAD   - Ordered G6pd Level was 20.1    Leukocytosis:  -No sign of infection   -Pan cultures+Cxray  -Will monitor closely       Hypertension  - Non-Pulsatile, Dopplers/MAPs closer to goal today  - Continue same BP medicine       Hyponatremia/Dizzeness   - Chronic in nature (121-126 September of 2016 and even further back) Will also check urine osm, urine cr, urine Na  - Has high UOP with hyponatremia, improving with fluid restriction   - Endocrine consulted appreciate home fluid restriction<1.5 L/day think its psychogenic polydipsia   - Suspect this is due to medications , he is off Zoloft and Remeron and imipramine   - DC flomax on 1/16/2017      Palpitations  - Patient reports symptoms during intermittent pacing overnight seen on tele  - Patient had symptomatic RV pacing during sleep. Device interrogated, with no diaphragmatic capture seen at 5V @ 0.4 ms, EP dropped lower rate from 50 to 40 bpm  - EP increased ATP and dropped detection zone to 120   - Restart home amiodarone 400 po bid per EP home    EP note 1/13/16  - recommend restarting home amiodarone  - Reprogrammed to VT1 zone 120bpm with 6 bursts of ATP at 10ms decrements; VT2 zone 167 bpm with 3 burst ATP; VF zone to 231  - if cleared from HF standpoint, would begin low dose BB and Titrate up as tolerated     - cardiac diet, 2g Na restriction    Kamran Pablo MD  Cardiology Fellow, PGY-4  Pager: [phone]

## 2017-01-19 NOTE — PROCEDURES
Patient aaox3 with no family at bedside. VAD interrogation completed this AM in the event changes needed to be made. Will continue to monitor for further issues.     Pulsatile: Yes, intermittent   VAD Sounds: Smooth  Problems / Issues / Alarms with VAD if any: None noted      VAD Interrogation:  TXP LVAD INTERROGATIONS 1/19/2017 1/19/2017 1/19/2017 1/19/2017 1/18/2017 1/18/2017 1/18/2017   Type HeartMate II HeartMate II HeartMate II HeartMate II HeartMate II HeartMate II HeartMate II   Flow 4.8 4.5 5.3 4.9 5.0 5.1 4.9   Speed 9000 9000 9000 9000 9000 9000 9000   PI 8.3 7.3 7.1 7.3 7.8 7.1 6.2   Power (Kwon) 5.1 4.9 5.3 5.1 5.2 5.2 5.6   LSL - 8600 8600 8600 8600 - -   Pulsatility - Intermittent pulse - - - - -   }

## 2017-01-20 LAB
ALBUMIN SERPL BCP-MCNC: 3.6 G/DL
ALP SERPL-CCNC: 80 U/L
ALT SERPL W/O P-5'-P-CCNC: 43 U/L
ANION GAP SERPL CALC-SCNC: 10 MMOL/L
ANISOCYTOSIS BLD QL SMEAR: ABNORMAL
APTT BLDCRRT: 32.7 SEC
AST SERPL-CCNC: 38 U/L
BASO STIPL BLD QL SMEAR: ABNORMAL
BASOPHILS # BLD AUTO: ABNORMAL K/UL
BASOPHILS NFR BLD: 0 %
BILIRUB DIRECT SERPL-MCNC: 0.3 MG/DL
BILIRUB SERPL-MCNC: 0.7 MG/DL
BNP SERPL-MCNC: 87 PG/ML
BUN SERPL-MCNC: 12 MG/DL
CALCIUM SERPL-MCNC: 9.6 MG/DL
CHLORIDE SERPL-SCNC: 92 MMOL/L
CO2 SERPL-SCNC: 25 MMOL/L
CREAT SERPL-MCNC: 0.9 MG/DL
CRP SERPL-MCNC: 14.3 MG/L
DACRYOCYTES BLD QL SMEAR: ABNORMAL
DIFFERENTIAL METHOD: ABNORMAL
EOSINOPHIL # BLD AUTO: ABNORMAL K/UL
EOSINOPHIL NFR BLD: 15 %
ERYTHROCYTE [DISTWIDTH] IN BLOOD BY AUTOMATED COUNT: 20.2 %
EST. GFR  (AFRICAN AMERICAN): >60 ML/MIN/1.73 M^2
EST. GFR  (NON AFRICAN AMERICAN): >60 ML/MIN/1.73 M^2
GLUCOSE SERPL-MCNC: 198 MG/DL
HCT VFR BLD AUTO: 38.5 %
HGB BLD-MCNC: 12.5 G/DL
INR PPP: 2.7
LDH SERPL L TO P-CCNC: 450 U/L
LYMPHOCYTES # BLD AUTO: ABNORMAL K/UL
LYMPHOCYTES NFR BLD: 21 %
MAGNESIUM SERPL-MCNC: 1.8 MG/DL
MCH RBC QN AUTO: 18.4 PG
MCHC RBC AUTO-ENTMCNC: 32.5 %
MCV RBC AUTO: 57 FL
MONOCYTES # BLD AUTO: ABNORMAL K/UL
MONOCYTES NFR BLD: 5 %
NEUTROPHILS NFR BLD: 58 %
NEUTS BAND NFR BLD MANUAL: 1 %
OVALOCYTES BLD QL SMEAR: ABNORMAL
PHOSPHATE SERPL-MCNC: 4.1 MG/DL
PLATELET # BLD AUTO: 270 K/UL
PLATELET BLD QL SMEAR: ABNORMAL
PMV BLD AUTO: ABNORMAL FL
POCT GLUCOSE: 139 MG/DL (ref 70–110)
POCT GLUCOSE: 217 MG/DL (ref 70–110)
POCT GLUCOSE: 236 MG/DL (ref 70–110)
POIKILOCYTOSIS BLD QL SMEAR: SLIGHT
POLYCHROMASIA BLD QL SMEAR: ABNORMAL
POTASSIUM SERPL-SCNC: 4.9 MMOL/L
PREALB SERPL-MCNC: 23 MG/DL
PROT SERPL-MCNC: 7.4 G/DL
PROTHROMBIN TIME: 27 SEC
RBC # BLD AUTO: 6.8 M/UL
SCHISTOCYTES BLD QL SMEAR: PRESENT
SODIUM SERPL-SCNC: 127 MMOL/L
WBC # BLD AUTO: 17.1 K/UL

## 2017-01-20 PROCEDURE — 25000003 PHARM REV CODE 250: Performed by: INTERNAL MEDICINE

## 2017-01-20 PROCEDURE — 99233 SBSQ HOSP IP/OBS HIGH 50: CPT | Mod: ,,, | Performed by: INTERNAL MEDICINE

## 2017-01-20 PROCEDURE — 80048 BASIC METABOLIC PNL TOTAL CA: CPT

## 2017-01-20 PROCEDURE — 93750 INTERROGATION VAD IN PERSON: CPT | Mod: ,,, | Performed by: INTERNAL MEDICINE

## 2017-01-20 PROCEDURE — 27000248 HC VAD-ADDITIONAL DAY

## 2017-01-20 PROCEDURE — 25000003 PHARM REV CODE 250: Performed by: STUDENT IN AN ORGANIZED HEALTH CARE EDUCATION/TRAINING PROGRAM

## 2017-01-20 PROCEDURE — 99232 SBSQ HOSP IP/OBS MODERATE 35: CPT | Mod: ,,, | Performed by: INTERNAL MEDICINE

## 2017-01-20 PROCEDURE — 85610 PROTHROMBIN TIME: CPT

## 2017-01-20 PROCEDURE — 85027 COMPLETE CBC AUTOMATED: CPT

## 2017-01-20 PROCEDURE — 20600001 HC STEP DOWN PRIVATE ROOM

## 2017-01-20 PROCEDURE — 83735 ASSAY OF MAGNESIUM: CPT

## 2017-01-20 PROCEDURE — 85007 BL SMEAR W/DIFF WBC COUNT: CPT

## 2017-01-20 PROCEDURE — 84100 ASSAY OF PHOSPHORUS: CPT

## 2017-01-20 PROCEDURE — 85730 THROMBOPLASTIN TIME PARTIAL: CPT

## 2017-01-20 PROCEDURE — 25000003 PHARM REV CODE 250: Performed by: PHYSICIAN ASSISTANT

## 2017-01-20 PROCEDURE — 80076 HEPATIC FUNCTION PANEL: CPT

## 2017-01-20 PROCEDURE — 84134 ASSAY OF PREALBUMIN: CPT

## 2017-01-20 PROCEDURE — 86140 C-REACTIVE PROTEIN: CPT

## 2017-01-20 PROCEDURE — 25000003 PHARM REV CODE 250: Performed by: HOSPITALIST

## 2017-01-20 PROCEDURE — 83615 LACTATE (LD) (LDH) ENZYME: CPT

## 2017-01-20 PROCEDURE — 36415 COLL VENOUS BLD VENIPUNCTURE: CPT

## 2017-01-20 PROCEDURE — 93750 INTERROGATION VAD IN PERSON: CPT | Performed by: INTERNAL MEDICINE

## 2017-01-20 PROCEDURE — 83880 ASSAY OF NATRIURETIC PEPTIDE: CPT

## 2017-01-20 RX ADMIN — PANTOPRAZOLE SODIUM 40 MG: 40 TABLET, DELAYED RELEASE ORAL at 09:01

## 2017-01-20 RX ADMIN — AMIODARONE HYDROCHLORIDE 400 MG: 200 TABLET ORAL at 08:01

## 2017-01-20 RX ADMIN — INSULIN ASPART 2 UNITS: 100 INJECTION, SOLUTION INTRAVENOUS; SUBCUTANEOUS at 08:01

## 2017-01-20 RX ADMIN — DIPYRIDAMOLE 100 MG: 50 TABLET, FILM COATED ORAL at 02:01

## 2017-01-20 RX ADMIN — INSULIN ASPART 2 UNITS: 100 INJECTION, SOLUTION INTRAVENOUS; SUBCUTANEOUS at 09:01

## 2017-01-20 RX ADMIN — OXYCODONE HYDROCHLORIDE 10 MG: 5 TABLET ORAL at 11:01

## 2017-01-20 RX ADMIN — ASPIRIN 325 MG: 325 TABLET, DELAYED RELEASE ORAL at 09:01

## 2017-01-20 RX ADMIN — OXYCODONE HYDROCHLORIDE 5 MG: 5 TABLET ORAL at 02:01

## 2017-01-20 RX ADMIN — DUTASTERIDE 0.5 MG: 0.5 CAPSULE, LIQUID FILLED ORAL at 09:01

## 2017-01-20 RX ADMIN — Medication 400 MG: at 08:01

## 2017-01-20 RX ADMIN — DIPYRIDAMOLE 100 MG: 50 TABLET, FILM COATED ORAL at 08:01

## 2017-01-20 RX ADMIN — INSULIN ASPART 20 UNITS: 100 INJECTION, SOLUTION INTRAVENOUS; SUBCUTANEOUS at 01:01

## 2017-01-20 RX ADMIN — LISINOPRIL 20 MG: 20 TABLET ORAL at 08:01

## 2017-01-20 RX ADMIN — Medication 400 MG: at 05:01

## 2017-01-20 RX ADMIN — FERROUS GLUCONATE TAB 324 MG (37.5 MG ELEMENTAL IRON) 324 MG: 324 (37.5 FE) TAB at 09:01

## 2017-01-20 RX ADMIN — Medication 400 MG: at 02:01

## 2017-01-20 RX ADMIN — OXYCODONE HYDROCHLORIDE 10 MG: 5 TABLET ORAL at 05:01

## 2017-01-20 RX ADMIN — DOCUSATE SODIUM 200 MG: 100 CAPSULE, LIQUID FILLED ORAL at 08:01

## 2017-01-20 RX ADMIN — AMIODARONE HYDROCHLORIDE 400 MG: 200 TABLET ORAL at 09:01

## 2017-01-20 RX ADMIN — AMLODIPINE BESYLATE 10 MG: 10 TABLET ORAL at 09:01

## 2017-01-20 RX ADMIN — DIPYRIDAMOLE 100 MG: 50 TABLET, FILM COATED ORAL at 05:01

## 2017-01-20 RX ADMIN — LISINOPRIL 20 MG: 20 TABLET ORAL at 09:01

## 2017-01-20 RX ADMIN — WARFARIN SODIUM 1.5 MG: 1 TABLET ORAL at 04:01

## 2017-01-20 RX ADMIN — INSULIN ASPART 20 UNITS: 100 INJECTION, SOLUTION INTRAVENOUS; SUBCUTANEOUS at 09:01

## 2017-01-20 NOTE — PROCEDURES
Patient AAO with family at bedside. VAD interrogation completed this AM in the event changes needed to be made. Will continue to monitor for further issues.     Pulsatile: Yes   VAD Sounds: Smooth  Problems / Issues / Alarms with VAD if any: Very Few PI Events      VAD Interrogation:  TXP LVAD INTERROGATIONS 1/20/2017 1/20/2017 1/20/2017 1/20/2017 1/19/2017 1/19/2017 1/19/2017   Type HeartMate II HeartMate II HeartMate II HeartMate II HeartMate II HeartMate II HeartMate II   Flow 4.8 - 4.9 4.7 5.2 4.9 4.8   Speed 9000 - 9000 9000 9000 9000 9000   PI 7.8 - 7.4 7.0 7.0 7.3 8.3   Power (Kwon) 5.1 - 5.1 5.1 5.3 5.1 5.1   LSL - - 8600 8600 8600 - -   Pulsatility - - - - - - -   }

## 2017-01-20 NOTE — CONSULTS
Infectious Disease Consult Note    Consulting Physician:Dr. Romero    Reason for Consult: management recommendations      Assessment and Recommendations:    1. Elevated WBC with marked eosinophilia - differential includes but is not limited to allergic causes and parasites - Strongyloides negative 8/2016.   - repeat Strongyl; toxocara   - drugs - will discuss with cardiology    - if continue to increase, may need to consult hem   - no antibiotics for now  2. S/p LVAD HMII implanted 8/24/2016    Will discuss with primary service'  Yumiko Sheridan MD      Chief Complaint: leukocytosis    History of Present Illness:    56 year old admitted on 1/5 for elevated LDH; s/p HM2 LVAD placement on 8/24/2016 and now on coumadin, s/p ICD.    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 Range 8/22/2016 14:50   Mic-Barr Virus IgG (VCA) Latest Ref Range: Negative  Positive (A)   HSV 1 IgG Latest Ref Range: Negative  Positive (A)   HSV 2 IgG Latest Ref Range: Negative  Negative   Interpretation Unknown    Strongyloides Ab IgG Latest Ref Range: Negative  Negative   Varicella IgG Latest Ref Range: 0.00 - 0.90 ISR 3.49 (H)   Varicella Interpretation Latest Ref Range: Negative  Positive (A)     Component      Latest Ref Rng & Units 1/15/2017 1/14/2017              Eosinophil%      0.0 - 8.0 % 12.4 (H) 7.2     Component      Latest Ref Rng & Units 1/20/2017 1/19/2017              WBC      3.90 - 12.70 K/uL 17.10 (H) 15.58 (H)   RBC      4.60 - 6.20 M/uL 6.80 (H) 6.47 (H)   Hemoglobin      14.0 - 18.0 g/dL 12.5 (L) 11.8 (L)   Hematocrit      40.0 - 54.0 % 38.5 (L) 36.8 (L)   MCV      82 - 98 fL 57 (L) 57 (L)   MCH      27.0 - 31.0 pg 18.4 (L) 18.2 (L)   MCHC      32.0 - 36.0 % 32.5 32.1   RDW      11.5 - 14.5 % 20.2 (H) 19.8 (H)    Platelets      150 - 350 K/uL 270 238   Eosinophil%      0.0 - 8.0 % 15.0 (H) 17.5 (H)     Component      Latest Ref Rng & Units 1/18/2017             WBC      3.90 - 12.70 K/uL 13.18 (H)   Platelets      150 - 350 K/uL 207   Eos #      0.0 - 0.5 K/uL 2.2 (H)   Eosinophil%      0.0 - 8.0 % 17.0 (H)       Review of Symptoms:  Constitutional: Denies fevers, weight loss, chills, or weakness.  Eyes: Denies changes in vision.  ENT: Denies dysphagia, nasal discharge, ear pain or discharge.  Cardiovascular: Denies chest pain, palpitations, orthopnea, or claudication.  Respiratory: Denies shortness of breath, cough, hemoptysis, or wheezing.  GI: Denies nausea/vomitting, hematochezia, melena, abd pain, or changes in appetite.  : Denies dysuria, incontinence, or hematuria.  Musculoskeletal: Denies joint pain or myalgias.  Skin/breast: Denies rashes, lumps, lesions, or discharge.  Neurologic: Denies headache, dizziness, vertigo, or paresthesias.  Psychiatric: Denies changes in mood or hallucinations.  Endocrine: Denies polyuria, polydipsia, heat/cold intolerance.  Hematologic/Lymph: Denies lymphadenopathy, easy bruising or easy bleeding.  Allergic/Immunologic: Denies rash, rhinitis.      Past Medical History:  Past Medical History   Diagnosis Date    CHF (congestive heart failure)     Coronary artery disease     GERD (gastroesophageal reflux disease)     Hyperlipidemia     Hypertension     Type 2 diabetes mellitus with hyperglycemia        Past Surgical History:  Past Surgical History   Procedure Laterality Date    Cardiac pacemaker placement      Cholecystectomy      Left ventricular assist device       x 3 months ago    Colonoscopy N/A 1/11/2017     Procedure: COLONOSCOPY;  Surgeon: Petr Almanzar MD;  Location: [address]);  Service: Endoscopy;  Laterality: N/A;    Colonoscopy N/A 1/12/2017     Procedure: COLONOSCOPY;  Surgeon: Petr Almanzar MD;  Location: [address]);  Service:  Endoscopy;  Laterality: N/A;       Family History:  Family History   Problem Relation Age of Onset    Heart disease Mother     Hypertension Mother     Heart attack Mother     Heart disease Father     Hypertension Father     Heart attack Father     Cancer Brother          Social History:  Social History     Social History    Marital status:      Spouse name: N/A    Number of children: N/A    Years of education: N/A     Occupational History    Not on file.     Social History Main Topics    Smoking status: Former Smoker     Packs/day: 0.50     Years: 15.00     Quit date: 6/14/2006    Smokeless tobacco: Never Used    Alcohol use No    Drug use: Not on file    Sexual activity: Yes     Partners: Female     Other Topics Concern    Not on file     Social History Narrative       Allergies:  Review of patient's allergies indicates:   Allergen Reactions    Levemir [insulin detemir] Itching    Pork/porcine containing products     Ranexa [ranolazine] Nausea Only       Pertinent Medications:  Antibiotics:   Antibiotics     None          Physical Exam:  VS (24h):   Vitals:    01/20/17 1656   BP:    Pulse: 81   Resp:    Temp:      Temp:  [97.6 °F (36.4 °C)-98.3 °F (36.8 °C)]       General: Afebrile, alert, comfortable, no acute distress.   HEENT: MELISSA. EOMI, no scleral icterus. No sinus tenderness. MMM.  Pulmonary: overall clear  Cardiac: VAD humming.   Abdominal: Non-tenderness around VAD  Extremities: Moves all extremities x 4. No peripheral edema. 2+ pulses.  Skin: No jaundice, rashes, or visible lesions.      Lines:      [REMOVED]      Arterial Line 08/25/16 1200 Left Other (Comment) (Removed)   Removed 08/30/16 1745   Number of days:5               [REMOVED]      IABP 08/20/16 1100 08/26/16 (Removed)   Removed 08/26/16 1340   Number of days:6         [REMOVED]      Pulmonary Artery Catheter Assessment  08/26/16 0641 (Removed)   Removed 08/30/16 1830   Number of days:4         Labs:  CBC:   Lab  Results   Component Value Date    WBC 17.10 (H) 01/20/2017    WBC 15.58 (H) 01/19/2017    WBC 13.18 (H) 01/18/2017    WBC 11.26 01/17/2017    WBC 10.62 01/16/2017    HGB 12.5 (L) 01/20/2017    HCT 38.5 (L) 01/20/2017    MCV 57 (L) 01/20/2017     01/20/2017       BMP:   Recent Labs  Lab 01/20/17  0509   *   *   K 4.9   CL 92*   CO2 25   BUN 12   CREATININE 0.9   CALCIUM 9.6   MG 1.8       LFT: Lab Results   Component Value Date    ALT 43 01/20/2017    AST 38 01/20/2017    ALKPHOS 80 01/20/2017    BILITOT 0.7 01/20/2017         Microbiology x 7d:   Microbiology Results (last 7 days)     Procedure Component Value Units Date/Time    Blood culture [[id number]] Collected:  01/19/17 1012    Order Status:  Completed Specimen:  Blood Updated:  01/20/17 1212     Blood Culture, Routine No Growth to date     Blood Culture, Routine No Growth to date    Blood culture [[id number]] Collected:  01/19/17 1012    Order Status:  Completed Specimen:  Blood Updated:  01/20/17 1212     Blood Culture, Routine No Growth to date     Blood Culture, Routine No Growth to date    Urine culture [[id number]] Collected:  01/19/17 1156    Order Status:  Sent Specimen:  Urine from Urine, Clean Catch Updated:  01/19/17 1522

## 2017-01-20 NOTE — PROGRESS NOTES
Progress Note  Heart Transplant Service    Admit Date: 1/5/2017   LOS: 15 days     SUBJECTIVE:     Follow up for: Elevated serum lactate dehydrogenase (LDH)    Interval History: Patient denied dizziness today. Denies chest pain and no SOB.Denied palpitations.No other complaints. On fluid restriction <1.5 L. LDH today 450,leukocytosis today with no fever.Cultures negative    Scheduled Meds:   amiodarone  400 mg Oral BID    amlodipine  10 mg Oral Daily    aspirin  325 mg Oral Daily    dipyridamole  100 mg Oral TID    docusate sodium  200 mg Oral QHS    dutasteride  0.5 mg Oral Daily    ferrous gluconate  324 mg Oral Daily with breakfast    insulin aspart  20 Units Subcutaneous TID WM    lisinopril  20 mg Oral BID    magnesium oxide  400 mg Oral TID    pantoprazole  40 mg Oral Daily    warfarin  2 mg Oral Daily     Continuous Infusions:     PRN Meds:bisacodyl, cyclobenzaprine, dextrose 50%, dextrose 50%, glucagon (human recombinant), glucose, glucose, insulin aspart, oxycodone, oxycodone, ramelteon, senna-docusate 8.6-50 mg    Review of patient's allergies indicates:   Allergen Reactions    Levemir [insulin detemir] Itching    Pork/porcine containing products     Ranexa [ranolazine] Nausea Only       OBJECTIVE:     Vital Signs (Most Recent)  Temp: 97.7 °F (36.5 °C) (01/20/17 0400)  Pulse: 83 (01/20/17 0700)  Resp: 16 (01/20/17 0400)  BP: (!) 82/0 (01/20/17 0405)  SpO2: 98 % (01/20/17 0400)    Vital Signs Range (Last 24H):  Temp:  [97.3 °F (36.3 °C)-98.1 °F (36.7 °C)]   Pulse:  [74-99]   Resp:  [14-18]   BP: ()/(0-84)   SpO2:  [96 %-99 %]     I & O (Last 24H):    Intake/Output Summary (Last 24 hours) at 01/20/17 0852  Last data filed at 01/20/17 0500   Gross per 24 hour   Intake              360 ml   Output             2550 ml   Net            -2190 ml     Telemetry: possible slow VT? Overnight. EP consulted    Physical Exam  Constitutional: He is oriented to person, place, and time. He appears  well-developed and well-nourished.   Head: Normocephalic and atraumatic.   Eyes: EOM are normal. Pupils are equal, round, and reactive to light.   Neck: Normal range of motion. Neck supple. No JVD (at level of clavicle) present.   Cardiovascular: VAD sounds smooth   Pulmonary/Chest: Effort normal and breath sounds normal.   Abdominal: Soft. Bowel sounds are normal.   Musculoskeletal: Normal range of motion. He exhibits no edema.   Neurological: He is alert and oriented to person, place, and time.   Skin: Skin is warm and dry.   Psychiatric: He has a normal mood and affect. His behavior is normal.   Nursing note and vitals reviewed.    Labs:    Recent Labs  Lab 01/18/17  0419 01/19/17  0453 01/20/17  0509   WBC 13.18* 15.58* 17.10*   HGB 10.9* 11.8* 12.5*   HCT 33.7* 36.8* 38.5*    238 270   LYMPH 26.2  3.5 21.2  3.3 21.0  CANCELED   MONO 5.4  0.7 6.0  0.9 5.0  CANCELED   EOSINOPHIL 17.0* 17.5* 15.0*       Recent Labs  Lab 01/18/17  0419 01/19/17  0453 01/20/17  0509   APTT 92.8* 32.1* 32.7*   INR 2.3* 2.6* 2.7*        Recent Labs  Lab 01/16/17  0420  01/18/17  0419  01/19/17  0453 01/19/17  1205 01/19/17  1605 01/20/17  0509   *  < > 143*  < > 200* 256* 215* 198*   CALCIUM 9.1  < > 8.8  < > 9.5 9.3 8.9 9.6   ALBUMIN 3.2*  --  3.1*  --   --   --   --  3.6   PROT 6.6  --  6.9  --   --   --   --  7.4   *  < > 131*  < > 132* 125* 127* 127*   K 4.4  < > 5.6*  < > 5.2* 5.2* 4.7 4.9   CO2 24  < > 24  < > 26 26 26 25   CL 95  < > 99  < > 97 91* 92* 92*   BUN 12  < > 11  < > 13 16 14 12   CREATININE 0.8  < > 0.8  < > 0.9 1.1 0.8 0.9   ALKPHOS 74  --  70  --   --   --   --  80   ALT 21  --  37  --   --   --   --  43   AST 26  --  52*  --   --   --   --  38   BILITOT 0.5  --  0.6  --   --   --   --  0.7   MG 1.5*  < > 2.1  --  1.8  --   --  1.8   PHOS 4.4  < > 4.2  --  4.4  --   --  4.1   < > = values in this interval not displayed.  Estimated Creatinine Clearance: 100.3 mL/min (based on Cr of  0.9).      Recent Labs  Lab 01/20/17  0509   BNP 87       Recent Labs  Lab 01/14/17  0550 01/15/17  0534 01/16/17  0420 01/17/17  0212 01/18/17  0729 01/19/17  0453 01/20/17  0509   * 441* 507* 528* 481* 427* 450*     Microbiology Results (last 7 days)     Procedure Component Value Units Date/Time    Blood culture [[id number]] Collected:  01/19/17 1012    Order Status:  Completed Specimen:  Blood Updated:  01/19/17 1745     Blood Culture, Routine No Growth to date    Blood culture [[id number]] Collected:  01/19/17 1012    Order Status:  Completed Specimen:  Blood Updated:  01/19/17 1745     Blood Culture, Routine No Growth to date    Urine culture [[id number]] Collected:  01/19/17 1156    Order Status:  Sent Specimen:  Urine from Urine, Clean Catch Updated:  01/19/17 1522        C-scope  Impression:   - Non-thrombosed external hemorrhoids found on                         perianal exam.                        - Localized moderate inflammation was found in the                         cecum and may be secondary to ischemic colitis.                         Unable to biopsy due to anticoagulation.                        - One 3 mm polyp in the rectum. Resection not                         attempted.                        - The examination was otherwise normal on direct                         and retroflexion views.                        - No specimens collected.  Recommendation: - [facility] to [facility] for [facility] care.                        - Advance diet as tolerated.                        - Continue present medications.                        - Repeat colonoscopy (date not yet determined) for                         surveillance to remove polyp and follow-up colon                         inflammation. Recommend doing this at a time when                         he is able to safely stop anticoagulation - not                         urgent.    ASSESSMENT:     57 y/o male with PMHx HFrEF s/p HM II  8/24/16, s/p AICD, HTN, DM , Atrial flutter, admitted for elevated LDH.     PLAN:     S/P LVAD HeartMate II Implanted 8/24/2016  - Continue Coumadin, Goal INR 2.0-3.0. today at 2.6,continue coumadin 1.5   - Antiplatelets , Persantine 100 TID.  - Speed set at 9000, LSL 8600  - Needs colonoscopy to complete OHTx workup, c- scope on 1/12/207   - repeat colonoscopy in future (date undetermined) after OHT, when patient can safely come off of  anticoagulation. Very low risk polyp (3mm).      Elevated LDH  - LDH today 427 from 481 , 2D echo performed   - Orthotast on 1/15/2017 BP in bed 105/74 setting 94/70 and standing 84/68   - Continue current regimen with ASA, Persantine  - Had RHC after VAD   - Ordered G6pd Level was 20.1    Leukocytosis:  - ID on board appreciate home  -Pan cultures+Cxray all negative so far  -Will monitor closely       Hypertension  - Non-Pulsatile, Dopplers/MAPs closer to goal today  - Continue same BP medicine       Hyponatremia/Dizzeness   - Chronic in nature (121-126 September of 2016 and even further back) Will also check urine osm, urine cr, urine Na  - Has high UOP with hyponatremia, improving with fluid restriction   - Endocrine consulted appreciate home fluid restriction<1.5 L/day think its psychogenic polydipsia   - Suspect this is due to medications , he is off Zoloft and Remeron and imipramine   - DC flomax on 1/16/2017      Palpitations  - Patient reports symptoms during intermittent pacing overnight seen on tele  - Patient had symptomatic RV pacing during sleep. Device interrogated, with no diaphragmatic capture seen at 5V @ 0.4 ms, EP dropped lower rate from 50 to 40 bpm  - EP increased ATP and dropped detection zone to 120   - Restart home amiodarone 400 po bid per EP home    EP note 1/13/16  - recommend restarting home amiodarone  - Reprogrammed to VT1 zone 120bpm with 6 bursts of ATP at 10ms decrements; VT2 zone 167 bpm with 3 burst ATP; VF zone to 231  - if cleared from HF  standpoint, would begin low dose BB and Titrate up as tolerated     - cardiac diet, 2g Na restriction    Kamran Pablo MD  Cardiology Fellow, PGY-4  Pager: [phone]

## 2017-01-20 NOTE — PROGRESS NOTES
D/C note:    SW to pt's room for possible weekend D/C. Pt and wife aaox4, calm, and pleasant. Pt reports in agreement with plan to D/C home tomorrow, although he would prefer to D/C today. Pt and wife report coping well at this time. SW explained pt will have a yearly $6700 out of pocket expense with [employer].  Pt reports unable to afford this cost. Pt reports will call SW once he receives cancellation paperwork in the mail from [employer]. SW explained the plan is to list pt for transplant once pt changes insurance back to Medicare with Medicaid. Pt and wife verbalized understanding. Pt and wife report no other questions or concerns. No needs reported and none identified. SW providing psychosocial counseling and support, education, assistance, resources, and D/C planning as indicated. SW remains available.

## 2017-01-20 NOTE — PLAN OF CARE
Problem: Patient Care Overview  Goal: Plan of Care Review  Outcome: Ongoing (interventions implemented as appropriate)  Pt remains free of falls and injury this shift, pt ambulates independnetly. Vital signs stable. Plan of care reviewed with patient, verbalized understanding. No signs of acute distress noted. Will continue to monitor.

## 2017-01-20 NOTE — PLAN OF CARE
Problem: Patient Care Overview  Goal: Plan of Care Review  Outcome: Ongoing (interventions implemented as appropriate)  Patient remains free of falls or injury. Patient denies pain.  today. Plan to monitor LDH. Blood cultures from 1/19/17 negative. Plan of care reviewed with patient and wife.

## 2017-01-20 NOTE — PLAN OF CARE
Problem: Patient Care Overview  Goal: Plan of Care Review  Outcome: Ongoing (interventions implemented as appropriate)  Pt remains free of falls and injury this shift. Vital signs stable, SR w/ pacer spikes on monitor. Plan of care reviewed with patient, verbalized understanding. Pt's VAD dressing done by pt's wife. No signs of acute distress noted. Will continue to monitor.

## 2017-01-21 LAB
ANION GAP SERPL CALC-SCNC: 10 MMOL/L
ANISOCYTOSIS BLD QL SMEAR: SLIGHT
APTT BLDCRRT: 32.2 SEC
BACTERIA UR CULT: NORMAL
BASOPHILS # BLD AUTO: 0.09 K/UL
BASOPHILS NFR BLD: 0.5 %
BUN SERPL-MCNC: 13 MG/DL
BURR CELLS BLD QL SMEAR: ABNORMAL
CALCIUM SERPL-MCNC: 9.3 MG/DL
CHLORIDE SERPL-SCNC: 91 MMOL/L
CO2 SERPL-SCNC: 26 MMOL/L
CREAT SERPL-MCNC: 0.9 MG/DL
DIFFERENTIAL METHOD: ABNORMAL
EOSINOPHIL # BLD AUTO: 2.9 K/UL
EOSINOPHIL NFR BLD: 16.3 %
ERYTHROCYTE [DISTWIDTH] IN BLOOD BY AUTOMATED COUNT: 20.8 %
EST. GFR  (AFRICAN AMERICAN): >60 ML/MIN/1.73 M^2
EST. GFR  (NON AFRICAN AMERICAN): >60 ML/MIN/1.73 M^2
GLUCOSE SERPL-MCNC: 236 MG/DL
HCT VFR BLD AUTO: 35.8 %
HGB BLD-MCNC: 11.6 G/DL
HYPOCHROMIA BLD QL SMEAR: ABNORMAL
IGE SERPL-ACNC: 167 IU/ML
INR PPP: 2.8
LDH SERPL L TO P-CCNC: 435 U/L
LYMPHOCYTES # BLD AUTO: 3.7 K/UL
LYMPHOCYTES NFR BLD: 20.7 %
MAGNESIUM SERPL-MCNC: 1.7 MG/DL
MCH RBC QN AUTO: 18.1 PG
MCHC RBC AUTO-ENTMCNC: 32.4 %
MCV RBC AUTO: 56 FL
MONOCYTES # BLD AUTO: 1 K/UL
MONOCYTES NFR BLD: 5.7 %
NEUTROPHILS # BLD AUTO: 9.9 K/UL
NEUTROPHILS NFR BLD: 56.8 %
OVALOCYTES BLD QL SMEAR: ABNORMAL
PHOSPHATE SERPL-MCNC: 4.7 MG/DL
PLATELET # BLD AUTO: 305 K/UL
PLATELET BLD QL SMEAR: ABNORMAL
PMV BLD AUTO: ABNORMAL FL
POCT GLUCOSE: 182 MG/DL (ref 70–110)
POCT GLUCOSE: 187 MG/DL (ref 70–110)
POCT GLUCOSE: 197 MG/DL (ref 70–110)
POIKILOCYTOSIS BLD QL SMEAR: SLIGHT
POLYCHROMASIA BLD QL SMEAR: ABNORMAL
POTASSIUM SERPL-SCNC: 4.5 MMOL/L
PROTHROMBIN TIME: 28.3 SEC
RBC # BLD AUTO: 6.42 M/UL
SCHISTOCYTES BLD QL SMEAR: ABNORMAL
SODIUM SERPL-SCNC: 127 MMOL/L
WBC # BLD AUTO: 17.67 K/UL

## 2017-01-21 PROCEDURE — 36415 COLL VENOUS BLD VENIPUNCTURE: CPT

## 2017-01-21 PROCEDURE — 85730 THROMBOPLASTIN TIME PARTIAL: CPT

## 2017-01-21 PROCEDURE — 25000003 PHARM REV CODE 250: Performed by: INTERNAL MEDICINE

## 2017-01-21 PROCEDURE — 86784 TRICHINELLA ANTIBODY: CPT

## 2017-01-21 PROCEDURE — 84100 ASSAY OF PHOSPHORUS: CPT

## 2017-01-21 PROCEDURE — 83735 ASSAY OF MAGNESIUM: CPT

## 2017-01-21 PROCEDURE — 83615 LACTATE (LD) (LDH) ENZYME: CPT

## 2017-01-21 PROCEDURE — 82785 ASSAY OF IGE: CPT

## 2017-01-21 PROCEDURE — 86682 HELMINTH ANTIBODY: CPT

## 2017-01-21 PROCEDURE — 27000248 HC VAD-ADDITIONAL DAY

## 2017-01-21 PROCEDURE — 25000003 PHARM REV CODE 250: Performed by: PHYSICIAN ASSISTANT

## 2017-01-21 PROCEDURE — 63600175 PHARM REV CODE 636 W HCPCS: Performed by: INTERNAL MEDICINE

## 2017-01-21 PROCEDURE — 99232 SBSQ HOSP IP/OBS MODERATE 35: CPT | Mod: ,,, | Performed by: INTERNAL MEDICINE

## 2017-01-21 PROCEDURE — 25000003 PHARM REV CODE 250: Performed by: HOSPITALIST

## 2017-01-21 PROCEDURE — 80048 BASIC METABOLIC PNL TOTAL CA: CPT

## 2017-01-21 PROCEDURE — 85610 PROTHROMBIN TIME: CPT

## 2017-01-21 PROCEDURE — 25000003 PHARM REV CODE 250: Performed by: STUDENT IN AN ORGANIZED HEALTH CARE EDUCATION/TRAINING PROGRAM

## 2017-01-21 PROCEDURE — 20600001 HC STEP DOWN PRIVATE ROOM

## 2017-01-21 PROCEDURE — 85025 COMPLETE CBC W/AUTO DIFF WBC: CPT

## 2017-01-21 PROCEDURE — 86682 HELMINTH ANTIBODY: CPT | Mod: 91

## 2017-01-21 RX ORDER — HYDRALAZINE HYDROCHLORIDE 10 MG/1
10 TABLET, FILM COATED ORAL EVERY 8 HOURS
Status: DISCONTINUED | OUTPATIENT
Start: 2017-01-21 | End: 2017-01-22 | Stop reason: HOSPADM

## 2017-01-21 RX ORDER — ISOSORBIDE DINITRATE 10 MG/1
10 TABLET ORAL 3 TIMES DAILY
Status: DISCONTINUED | OUTPATIENT
Start: 2017-01-21 | End: 2017-01-22 | Stop reason: HOSPADM

## 2017-01-21 RX ADMIN — OXYCODONE HYDROCHLORIDE 10 MG: 5 TABLET ORAL at 12:01

## 2017-01-21 RX ADMIN — DIPYRIDAMOLE 100 MG: 50 TABLET, FILM COATED ORAL at 05:01

## 2017-01-21 RX ADMIN — PANTOPRAZOLE SODIUM 40 MG: 40 TABLET, DELAYED RELEASE ORAL at 09:01

## 2017-01-21 RX ADMIN — HYDRALAZINE HYDROCHLORIDE 10 MG: 10 TABLET, FILM COATED ORAL at 09:01

## 2017-01-21 RX ADMIN — ASPIRIN 325 MG: 325 TABLET, DELAYED RELEASE ORAL at 09:01

## 2017-01-21 RX ADMIN — LISINOPRIL 20 MG: 20 TABLET ORAL at 08:01

## 2017-01-21 RX ADMIN — WARFARIN SODIUM 1.5 MG: 1 TABLET ORAL at 05:01

## 2017-01-21 RX ADMIN — AMIODARONE HYDROCHLORIDE 400 MG: 200 TABLET ORAL at 08:01

## 2017-01-21 RX ADMIN — Medication 400 MG: at 01:01

## 2017-01-21 RX ADMIN — DUTASTERIDE 0.5 MG: 0.5 CAPSULE, LIQUID FILLED ORAL at 09:01

## 2017-01-21 RX ADMIN — MAGNESIUM SULFATE HEPTAHYDRATE 3 G: 500 INJECTION, SOLUTION INTRAMUSCULAR; INTRAVENOUS at 10:01

## 2017-01-21 RX ADMIN — AMIODARONE HYDROCHLORIDE 400 MG: 200 TABLET ORAL at 09:01

## 2017-01-21 RX ADMIN — OXYCODONE HYDROCHLORIDE 10 MG: 5 TABLET ORAL at 05:01

## 2017-01-21 RX ADMIN — OXYCODONE HYDROCHLORIDE 10 MG: 5 TABLET ORAL at 06:01

## 2017-01-21 RX ADMIN — Medication 400 MG: at 09:01

## 2017-01-21 RX ADMIN — DIPYRIDAMOLE 100 MG: 50 TABLET, FILM COATED ORAL at 01:01

## 2017-01-21 RX ADMIN — LISINOPRIL 20 MG: 20 TABLET ORAL at 09:01

## 2017-01-21 RX ADMIN — ISOSORBIDE DINITRATE 10 MG: 10 TABLET ORAL at 01:01

## 2017-01-21 RX ADMIN — DOCUSATE SODIUM 200 MG: 100 CAPSULE, LIQUID FILLED ORAL at 08:01

## 2017-01-21 RX ADMIN — INSULIN ASPART 20 UNITS: 100 INJECTION, SOLUTION INTRAVENOUS; SUBCUTANEOUS at 09:01

## 2017-01-21 RX ADMIN — Medication 400 MG: at 05:01

## 2017-01-21 RX ADMIN — FERROUS GLUCONATE TAB 324 MG (37.5 MG ELEMENTAL IRON) 324 MG: 324 (37.5 FE) TAB at 09:01

## 2017-01-21 RX ADMIN — DIPYRIDAMOLE 100 MG: 50 TABLET, FILM COATED ORAL at 09:01

## 2017-01-21 RX ADMIN — AMLODIPINE BESYLATE 10 MG: 10 TABLET ORAL at 09:01

## 2017-01-21 RX ADMIN — ISOSORBIDE DINITRATE 10 MG: 10 TABLET ORAL at 09:01

## 2017-01-21 RX ADMIN — INSULIN ASPART 20 UNITS: 100 INJECTION, SOLUTION INTRAVENOUS; SUBCUTANEOUS at 01:01

## 2017-01-21 RX ADMIN — HYDRALAZINE HYDROCHLORIDE 10 MG: 10 TABLET, FILM COATED ORAL at 01:01

## 2017-01-21 NOTE — PLAN OF CARE
Problem: Patient Care Overview  Goal: Plan of Care Review  Outcome: Outcome(s) achieved Date Met:  01/21/17  Pt free of falls/trauma/injuries this shift. LVAD working properly and sounds smooth. Dopplers and MAPs WNL. LVAD dressing remains clean, dry & intact with no drainage noted. Dressing change done daily. INR 2.8. Magnesium replaced this shift. Blood glucose managed. Plan for possible discharge home today. Plan of care reviewed with patient; tolerating POC well. Will continue to monitor.

## 2017-01-21 NOTE — PLAN OF CARE
Problem: Patient Care Overview  Goal: Plan of Care Review  Outcome: Ongoing (interventions implemented as appropriate)  Patient remains free of falls or injury. Patient denies pain.  today. Plan to monitor LDH. Blood cultures from 1/19/17 negative. Urine culture with citrobacter koseri. ID consulted. Possible d/c in AM pending labs. Plan of care reviewed with patient and wife.

## 2017-01-21 NOTE — PROGRESS NOTES
Heart Transplant Progress Note  Attending Physician: Peewee Romero MD  Hospital Day: 17    Subjective:   Interval History: Overnight, patient had no major events. WBC count continues to rise.    Medications:   Continuous Infusions:     Scheduled Meds:   amiodarone  400 mg Oral BID    amlodipine  10 mg Oral Daily    aspirin  325 mg Oral Daily    dipyridamole  100 mg Oral TID    docusate sodium  200 mg Oral QHS    dutasteride  0.5 mg Oral Daily    ferrous gluconate  324 mg Oral Daily with breakfast    hydrALAZINE  10 mg Oral Q8H    insulin aspart  20 Units Subcutaneous TID WM    isosorbide dinitrate  10 mg Oral TID    lisinopril  20 mg Oral BID    magnesium oxide  400 mg Oral TID    magnesium sulfate IVPB  3 g Intravenous Once    pantoprazole  40 mg Oral Daily    warfarin  1.5 mg Oral Daily     PRN Meds:bisacodyl, cyclobenzaprine, dextrose 50%, dextrose 50%, glucagon (human recombinant), glucose, glucose, insulin aspart, oxycodone, oxycodone, ramelteon, senna-docusate 8.6-50 mg  Objective:     Vitals:  Temp:  [97.5 °F (36.4 °C)-98.2 °F (36.8 °C)]   Pulse:  [69-96]   Resp:  [14-18]   BP: ()/(0-80)   SpO2:  [96 %-100 %]    I/O's:    Intake/Output Summary (Last 24 hours) at 01/21/17 1147  Last data filed at 01/21/17 0725   Gross per 24 hour   Intake             1140 ml   Output             1750 ml   Net             -610 ml      Constitutional: He is oriented to person, place, and time. He appears well-developed and well-nourished.   Head: Normocephalic and atraumatic.   Eyes: EOM are normal. Pupils are equal, round, and reactive to light.   Neck: Normal range of motion. Neck supple. No JVD (at level of clavicle) present.   Cardiovascular: VAD sounds smooth   Pulmonary/Chest: Effort normal and breath sounds normal.   Abdominal: Soft. Bowel sounds are normal.   Musculoskeletal: Normal range of motion. He exhibits no edema.   Neurological: He is alert and oriented to person, place, and time.    Skin: Skin is warm and dry.   Psychiatric: He has a normal mood and affect. His behavior is normal.   Nursing note and vitals reviewed.    Labs:       Recent Labs  Lab 01/19/17  1605 01/20/17  0509 01/21/17  0511   * 127* 127*   K 4.7 4.9 4.5   CL 92* 92* 91*   CO2 26 25 26   BUN 14 12 13   CREATININE 0.8 0.9 0.9   * 198* 236*   ANIONGAP 9 10 10       Recent Labs  Lab 01/16/17  0420 01/18/17  0419 01/20/17  0509   AST 26 52* 38   ALT 21 37 43   ALKPHOS 74 70 80   BILITOT 0.5 0.6 0.7   BILIDIR 0.3 0.2 0.3   ALBUMIN 3.2* 3.1* 3.6       Recent Labs  Lab 01/19/17  1605 01/20/17  0509 01/21/17  0511   CALCIUM 8.9 9.6 9.3   MG  --  1.8 1.7   PHOS  --  4.1 4.7*       Estimated Creatinine Clearance: 100.6 mL/min (based on Cr of 0.9).   Recent Labs  Lab 01/19/17  0453 01/20/17  0509 01/21/17  0511   WBC 15.58* 17.10* 17.67*   HGB 11.8* 12.5* 11.6*   HCT 36.8* 38.5* 35.8*    270 305   GRAN 55.0  8.3* 58.0 56.8  9.9*       Recent Labs  Lab 01/19/17  0453 01/20/17  0509 01/21/17  0511   INR 2.6* 2.7* 2.8*     No results for input(s): LACTATE in the last 168 hours.    Recent Labs  Lab 01/18/17  0419 01/18/17  0729 01/20/17  0509   BNP 29 56 87            Assessment and Plan:   55 y/o male with PMHx HFrEF s/p HM II 8/24/16, s/p AICD, HTN, DM , Atrial flutter, admitted for elevated LDH.     Leukocytosis - Continues to rise  - ID on board, may be eosinophilic, medications reviewed, no obvious cause (eosinophilia not normally reported with Lisinopril (derm/pulm effects) or pantoprazole, though other medications in their respective classes can cause eosinophilia, both are long term medications). Patient previously on imipramine, but this as discontinued on 1/16/17.  - Pan cultures+Cxray all negative so far  - Will monitor closely in house    S/P LVAD HeartMate II Implanted 8/24/2016  - Continue Coumadin, Goal INR 2.0-3.0. today at 2.8,continue coumadin  - Antiplatelets , Persantine 100 TID.  - Speed set  at 9000, LSL 8600  - repeat colonoscopy in future (date undetermined) after OHT, when patient can safely come off of  anticoagulation. Very low risk polyp (3mm found on colo on 1/12/17).      Elevated LDH  -  today from 450  - Continue current regimen with ASA, Persantine  - Had RHC after VAD   - Ordered G6pd Level was 20.1      Hypertension  - Non-Pulsatile, Dopplers/MAPs closer to goal today  - Will add very low dose Bidil (10/10) and monitor for symptoms      Hyponatremia   - Chronic in nature (121-126 September of 2016 and even further back)   - Has high UOP with hyponatremia, improving with fluid restriction   - Endocrine consulted appreciate home fluid restriction<1.5 L/day think its psychogenic polydipsia   - Suspect this is due to medications , he is off Zoloft and Remeron and imipramine   - DC flomax on 1/16/2017  - Will monitor closely with addition of bidil as above     Palpitations  - Patient reports symptoms during intermittent pacing overnight seen on tele  - Patient had symptomatic RV pacing during sleep. Device interrogated, with no diaphragmatic capture seen at 5V @ 0.4 ms, EP dropped lower rate from 50 to 40 bpm  - EP increased ATP and dropped detection zone to 120   - Restart home amiodarone 400 po bid per EP home     EP note 1/13/16  - recommend restarting home amiodarone  - Reprogrammed to VT1 zone 120bpm with 6 bursts of ATP at 10ms decrements; VT2 zone 167 bpm with 3 burst ATP; VF zone to 231  - if cleared from HF standpoint, would begin low dose BB and Titrate up as tolerated      - cardiac diet, 2g Na restriction    Patient discussed and examined with attending physician, Dr. Gupta.      Signed:    Robby Lim MD  Cardiology Fellow, PGY-5  Pager: [phone]  1/21/2017 5:28 AM

## 2017-01-21 NOTE — PROGRESS NOTES
INFECTIOUS DISEASES FOLLOW UP NOTE      ASSESSMENT AND RECOMMENDATIONS:    1. Elevated WBC with marked eosinophilia - differential includes but is not limited to allergic causes and parasites - Strongyloides negative 8/2016.   - repeat Strongyl; toxocara; trichinella; and schistosomiasis   - drugs - discussed with cardiology    - if continue to increase, may need to consult hem   - no antibiotics for now  2. S/p LVAD HMII implanted 8/24/2016    Please call with any questions,  Yumiko Sheridan MD  Beeper - [phone]    SUBJECTIVE:    Feels great and wants to go home.     REVIEW OF SYSTEMS:  Constitutional: Denies fevers, chills, or weakness.  ENT: Denies dysphagia, nasal discharge, ear pain or discharge.  Cardiovascular: Denies chest pain, palpitations, orthopnea, or claudication.  Respiratory: Denies shortness of breath, cough, hemoptysis, or wheezing.  GI: Denies nausea/vomitting, hematochezia, melena, abd pain, or changes in appetite.  : Denies dysuria, incontinence, or hematuria.  Musculoskeletal: Denies joint pain or myalgias.  Skin/breast: Denies rashes, lumps, lesions, or discharge.  Neurologic: Denies headache, dizziness, vertigo, or paresthesias.      OBJECTIVES:    MEDICATIONS:    Antibiotics     None          PHYSICAL EXAM:  VS (24h):   Vitals:    01/21/17 1300   BP:    Pulse: 79   Resp:    Temp:      Temp:  [97.5 °F (36.4 °C)-98.2 °F (36.8 °C)]       General: Afebrile, alert, comfortable, no acute distress.   HEENT: MELISSA. EOMI, no scleral icterus. No sinus tenderness. MMM.  Pulmonary: Non labored,clear to auscultation A/P/L. No wheezing, crackles, or rhonchi.  Cardiac: VAD humming   Abdominal: Non-tender, non-distended.Bowel sounds present x 4. No appreciable hepatosplenomegaly. No guarding or rebound tenderness  Extremities: Moves all extremities x 4. No peripheral edema. 2+ pulses.  Skin: No jaundice, rashes, or visible lesions.       Lines:        [REMOVED]      Arterial Line 08/25/16 1200 Left  Other (Comment) (Removed)   Removed 08/30/16 1745   Number of days:5               [REMOVED]      IABP 08/20/16 1100 08/26/16 (Removed)   Removed 08/26/16 1340   Number of days:6         [REMOVED]      Pulmonary Artery Catheter Assessment  08/26/16 0641 (Removed)   Removed 08/30/16 1830   Number of days:4     LABORATORY TESTS:  CBC:   Lab Results   Component Value Date    WBC 17.67 (H) 01/21/2017    WBC 17.10 (H) 01/20/2017    WBC 15.58 (H) 01/19/2017    WBC 13.18 (H) 01/18/2017    WBC 11.26 01/17/2017    HCT 35.8 (L) 01/21/2017     01/21/2017        BMP:   Recent Labs  Lab 01/21/17  0511   *   *   K 4.5   CL 91*   CO2 26   BUN 13   CREATININE 0.9   CALCIUM 9.3   MG 1.7       LFT: Lab Results   Component Value Date    ALT 43 01/20/2017    AST 38 01/20/2017    ALKPHOS 80 01/20/2017    BILITOT 0.7 01/20/2017         Microbiology x 7d:   Microbiology Results (last 7 days)     Procedure Component Value Units Date/Time    Blood culture [[id number]] Collected:  01/19/17 1012    Order Status:  Completed Specimen:  Blood Updated:  01/21/17 1212     Blood Culture, Routine No Growth to date     Blood Culture, Routine No Growth to date     Blood Culture, Routine No Growth to date    Blood culture [[id number]] Collected:  01/19/17 1012    Order Status:  Completed Specimen:  Blood Updated:  01/21/17 1212     Blood Culture, Routine No Growth to date     Blood Culture, Routine No Growth to date     Blood Culture, Routine No Growth to date    Urine culture [[id number]] Collected:  01/19/17 1156    Order Status:  Completed Specimen:  Urine from Urine, Clean Catch Updated:  01/20/17 1815     Urine Culture, Routine --     CITROBACTER KOSERI  10,000 - 49,999 cfu/ml  Susceptibility pending

## 2017-01-22 VITALS
BODY MASS INDEX: 29.34 KG/M2 | HEIGHT: 68 IN | OXYGEN SATURATION: 98 % | HEART RATE: 88 BPM | RESPIRATION RATE: 16 BRPM | WEIGHT: 193.56 LBS | SYSTOLIC BLOOD PRESSURE: 80 MMHG | TEMPERATURE: 98 F

## 2017-01-22 LAB
ANION GAP SERPL CALC-SCNC: 8 MMOL/L
ANISOCYTOSIS BLD QL SMEAR: SLIGHT
APTT BLDCRRT: 32.3 SEC
BASOPHILS # BLD AUTO: 0.05 K/UL
BASOPHILS NFR BLD: 0.3 %
BUN SERPL-MCNC: 17 MG/DL
CALCIUM SERPL-MCNC: 9.1 MG/DL
CHLORIDE SERPL-SCNC: 93 MMOL/L
CO2 SERPL-SCNC: 26 MMOL/L
CREAT SERPL-MCNC: 0.9 MG/DL
DIFFERENTIAL METHOD: ABNORMAL
EOSINOPHIL # BLD AUTO: 2.7 K/UL
EOSINOPHIL NFR BLD: 16 %
ERYTHROCYTE [DISTWIDTH] IN BLOOD BY AUTOMATED COUNT: 20.7 %
EST. GFR  (AFRICAN AMERICAN): >60 ML/MIN/1.73 M^2
EST. GFR  (NON AFRICAN AMERICAN): >60 ML/MIN/1.73 M^2
GLUCOSE SERPL-MCNC: 201 MG/DL
HCT VFR BLD AUTO: 34.6 %
HGB BLD-MCNC: 11.3 G/DL
INR PPP: 2.9
LDH SERPL L TO P-CCNC: 412 U/L
LYMPHOCYTES # BLD AUTO: 3 K/UL
LYMPHOCYTES NFR BLD: 17.4 %
MAGNESIUM SERPL-MCNC: 1.7 MG/DL
MCH RBC QN AUTO: 18.2 PG
MCHC RBC AUTO-ENTMCNC: 32.7 %
MCV RBC AUTO: 56 FL
MONOCYTES # BLD AUTO: 0.9 K/UL
MONOCYTES NFR BLD: 5 %
NEUTROPHILS # BLD AUTO: 10.2 K/UL
NEUTROPHILS NFR BLD: 61.3 %
OVALOCYTES BLD QL SMEAR: ABNORMAL
PHOSPHATE SERPL-MCNC: 4.7 MG/DL
PLATELET # BLD AUTO: 276 K/UL
PMV BLD AUTO: ABNORMAL FL
POCT GLUCOSE: 295 MG/DL (ref 70–110)
POIKILOCYTOSIS BLD QL SMEAR: SLIGHT
POLYCHROMASIA BLD QL SMEAR: ABNORMAL
POTASSIUM SERPL-SCNC: 4.6 MMOL/L
PROTHROMBIN TIME: 28.8 SEC
RBC # BLD AUTO: 6.21 M/UL
SCHISTOCYTES BLD QL SMEAR: ABNORMAL
SODIUM SERPL-SCNC: 127 MMOL/L
WBC # BLD AUTO: 17.09 K/UL

## 2017-01-22 PROCEDURE — 25000003 PHARM REV CODE 250: Performed by: HOSPITALIST

## 2017-01-22 PROCEDURE — 83735 ASSAY OF MAGNESIUM: CPT

## 2017-01-22 PROCEDURE — 25000003 PHARM REV CODE 250: Performed by: INTERNAL MEDICINE

## 2017-01-22 PROCEDURE — 85610 PROTHROMBIN TIME: CPT

## 2017-01-22 PROCEDURE — 25000003 PHARM REV CODE 250: Performed by: PHYSICIAN ASSISTANT

## 2017-01-22 PROCEDURE — 36415 COLL VENOUS BLD VENIPUNCTURE: CPT

## 2017-01-22 PROCEDURE — 84100 ASSAY OF PHOSPHORUS: CPT

## 2017-01-22 PROCEDURE — 83615 LACTATE (LD) (LDH) ENZYME: CPT

## 2017-01-22 PROCEDURE — 99232 SBSQ HOSP IP/OBS MODERATE 35: CPT | Mod: ,,, | Performed by: INTERNAL MEDICINE

## 2017-01-22 PROCEDURE — 85025 COMPLETE CBC W/AUTO DIFF WBC: CPT

## 2017-01-22 PROCEDURE — 85730 THROMBOPLASTIN TIME PARTIAL: CPT

## 2017-01-22 PROCEDURE — 80048 BASIC METABOLIC PNL TOTAL CA: CPT

## 2017-01-22 PROCEDURE — 25000003 PHARM REV CODE 250: Performed by: STUDENT IN AN ORGANIZED HEALTH CARE EDUCATION/TRAINING PROGRAM

## 2017-01-22 PROCEDURE — 27000248 HC VAD-ADDITIONAL DAY

## 2017-01-22 RX ORDER — HYDRALAZINE HYDROCHLORIDE 10 MG/1
10 TABLET, FILM COATED ORAL EVERY 8 HOURS
Qty: 90 TABLET | Refills: 11 | Status: SHIPPED | OUTPATIENT
Start: 2017-01-22 | End: 2017-01-26 | Stop reason: SDUPTHER

## 2017-01-22 RX ORDER — ISOSORBIDE DINITRATE 10 MG/1
10 TABLET ORAL 3 TIMES DAILY
Qty: 90 TABLET | Refills: 11 | Status: ON HOLD | OUTPATIENT
Start: 2017-01-22 | End: 2017-02-20 | Stop reason: HOSPADM

## 2017-01-22 RX ORDER — DUTASTERIDE 0.5 MG/1
0.5 CAPSULE, LIQUID FILLED ORAL DAILY
Qty: 30 CAPSULE | Refills: 11 | Status: SHIPPED | OUTPATIENT
Start: 2017-01-22 | End: 2017-01-26 | Stop reason: ALTCHOICE

## 2017-01-22 RX ORDER — AMLODIPINE BESYLATE 10 MG/1
10 TABLET ORAL DAILY
Qty: 30 TABLET | Refills: 11 | Status: ON HOLD | OUTPATIENT
Start: 2017-01-22 | End: 2017-02-20 | Stop reason: HOSPADM

## 2017-01-22 RX ORDER — DIPYRIDAMOLE 50 MG
100 TABLET ORAL 3 TIMES DAILY
Qty: 180 TABLET | Refills: 11 | Status: ON HOLD | OUTPATIENT
Start: 2017-01-22 | End: 2017-02-20 | Stop reason: HOSPADM

## 2017-01-22 RX ADMIN — ASPIRIN 325 MG: 325 TABLET, DELAYED RELEASE ORAL at 08:01

## 2017-01-22 RX ADMIN — LISINOPRIL 20 MG: 20 TABLET ORAL at 08:01

## 2017-01-22 RX ADMIN — DIPYRIDAMOLE 100 MG: 50 TABLET, FILM COATED ORAL at 06:01

## 2017-01-22 RX ADMIN — PANTOPRAZOLE SODIUM 40 MG: 40 TABLET, DELAYED RELEASE ORAL at 08:01

## 2017-01-22 RX ADMIN — OXYCODONE HYDROCHLORIDE 10 MG: 5 TABLET ORAL at 01:01

## 2017-01-22 RX ADMIN — AMIODARONE HYDROCHLORIDE 400 MG: 200 TABLET ORAL at 08:01

## 2017-01-22 RX ADMIN — Medication 400 MG: at 06:01

## 2017-01-22 RX ADMIN — DUTASTERIDE 0.5 MG: 0.5 CAPSULE, LIQUID FILLED ORAL at 08:01

## 2017-01-22 RX ADMIN — INSULIN ASPART 20 UNITS: 100 INJECTION, SOLUTION INTRAVENOUS; SUBCUTANEOUS at 09:01

## 2017-01-22 RX ADMIN — FERROUS GLUCONATE TAB 324 MG (37.5 MG ELEMENTAL IRON) 324 MG: 324 (37.5 FE) TAB at 08:01

## 2017-01-22 RX ADMIN — OXYCODONE HYDROCHLORIDE 10 MG: 5 TABLET ORAL at 08:01

## 2017-01-22 RX ADMIN — AMLODIPINE BESYLATE 10 MG: 10 TABLET ORAL at 08:01

## 2017-01-22 RX ADMIN — INSULIN ASPART 3 UNITS: 100 INJECTION, SOLUTION INTRAVENOUS; SUBCUTANEOUS at 09:01

## 2017-01-22 RX ADMIN — ISOSORBIDE DINITRATE 10 MG: 10 TABLET ORAL at 06:01

## 2017-01-22 RX ADMIN — HYDRALAZINE HYDROCHLORIDE 10 MG: 10 TABLET, FILM COATED ORAL at 06:01

## 2017-01-22 RX ADMIN — STANDARDIZED SENNA CONCENTRATE AND DOCUSATE SODIUM 1 TABLET: 8.6; 5 TABLET, FILM COATED ORAL at 06:01

## 2017-01-22 NOTE — NURSING
Pt discharged per MD orders. Tele and IV d/c'd. IV catheter tip intact x1; pt tolerated well. Med list given and reviewed with pt. Verbalizes understanding of all verbal and written discharge instructions. Prescriptions sent to patient specific pharmacy. LVAD equipment documented in flowsheet. Pt in no distress, resting comfortably. Awaiting pt escort. Will continue to monitor.

## 2017-01-22 NOTE — PROGRESS NOTES
Heart Transplant Progress Note  Attending Physician: Peewee Romero MD  Hospital Day: 18    Subjective:   Interval History: Overnight, patient had no major events. No complaints. Feels well and requesting to go home.    Medications:   Continuous Infusions:     Scheduled Meds:   amiodarone  400 mg Oral BID    amlodipine  10 mg Oral Daily    aspirin  325 mg Oral Daily    dipyridamole  100 mg Oral TID    docusate sodium  200 mg Oral QHS    dutasteride  0.5 mg Oral Daily    ferrous gluconate  324 mg Oral Daily with breakfast    hydrALAZINE  10 mg Oral Q8H    insulin aspart  20 Units Subcutaneous TID WM    isosorbide dinitrate  10 mg Oral TID    lisinopril  20 mg Oral BID    magnesium oxide  400 mg Oral TID    pantoprazole  40 mg Oral Daily    warfarin  1.5 mg Oral Daily     PRN Meds:bisacodyl, cyclobenzaprine, dextrose 50%, dextrose 50%, glucagon (human recombinant), glucose, glucose, insulin aspart, oxycodone, oxycodone, ramelteon, senna-docusate 8.6-50 mg  Objective:     Vitals:  Temp:  [97.4 °F (36.3 °C)-98.1 °F (36.7 °C)]   Pulse:  []   Resp:  [18-20]   BP: ()/(0-72)   SpO2:  [96 %-99 %]    I/O's:    Intake/Output Summary (Last 24 hours) at 01/22/17 0555  Last data filed at 01/21/17 2300   Gross per 24 hour   Intake             1745 ml   Output             1750 ml   Net               -5 ml      Constitutional: He is oriented to person, place, and time. He appears well-developed and well-nourished.   Head: Normocephalic and atraumatic.   Eyes: EOM are normal. Pupils are equal, round, and reactive to light.   Neck: Normal range of motion. Neck supple. No JVD (at level of clavicle) present.   Cardiovascular: VAD sounds smooth   Pulmonary/Chest: Effort normal and breath sounds normal.   Abdominal: Soft. Bowel sounds are normal.   Musculoskeletal: Normal range of motion. He exhibits no edema.   Neurological: He is alert and oriented to person, place, and time.   Skin: Skin is warm and dry.    Psychiatric: He has a normal mood and affect. His behavior is normal.   Nursing note and vitals reviewed.    Labs:       Recent Labs  Lab 01/20/17  0509 01/21/17  0511 01/22/17  0451   * 127* 127*   K 4.9 4.5 4.6   CL 92* 91* 93*   CO2 25 26 26   BUN 12 13 17   CREATININE 0.9 0.9 0.9   * 236* 201*   ANIONGAP 10 10 8       Recent Labs  Lab 01/16/17  0420 01/18/17  0419 01/20/17  0509   AST 26 52* 38   ALT 21 37 43   ALKPHOS 74 70 80   BILITOT 0.5 0.6 0.7   BILIDIR 0.3 0.2 0.3   ALBUMIN 3.2* 3.1* 3.6       Recent Labs  Lab 01/20/17  0509 01/21/17  0511 01/22/17  0451   CALCIUM 9.6 9.3 9.1   MG 1.8 1.7 1.7   PHOS 4.1 4.7* 4.7*       Estimated Creatinine Clearance: 100.6 mL/min (based on Cr of 0.9).   Recent Labs  Lab 01/19/17  0453 01/20/17  0509 01/21/17  0511 01/22/17  0451   WBC 15.58* 17.10* 17.67*  --    HGB 11.8* 12.5* 11.6* 11.3*   HCT 36.8* 38.5* 35.8* 34.6*    270 305  --    GRAN 55.0  8.3* 58.0 56.8  9.9*  --        Recent Labs  Lab 01/20/17  0509 01/21/17  0511 01/22/17  0451   INR 2.7* 2.8* 2.9*     No results for input(s): LACTATE in the last 168 hours.    Recent Labs  Lab 01/18/17  0419 01/18/17  0729 01/20/17  0509   BNP 29 56 87            Assessment and Plan:   55 y/o male with PMHx HFrEF s/p HM II 8/24/16, s/p AICD, HTN, DM , Atrial flutter, admitted for elevated LDH.      Leukocytosis - Started to fall  - ID on board, may be eosinophilic, medications reviewed, no obvious cause (eosinophilia not normally reported with Lisinopril (derm/pulm effects) or pantoprazole, though other medications in their respective classes can cause eosinophilia, both are long term medications). Patient previously on imipramine, but this as discontinued on 1/16/17.  - Pan cultures+Cxray all negative so far  - Considering no obvious source has been found, will complete workup as an outpatient     S/P LVAD HeartMate II Implanted 8/24/2016  - Continue Coumadin, Goal INR 2.0-3.0. today at 2.9,continue  coumadin  - Antiplatelets , Persantine 100 TID.  - Speed set at 9000, LSL 8600  - repeat colonoscopy in future (date undetermined) after OHT, when patient can safely come off of anticoagulation. Very low risk polyp (3mm found on colo on 1/12/17).      Elevated LDH  -  today from 435  - Continue current regimen with ASA, Persantine  - Had RHC after VAD   - Ordered G6pd Level was 20.1      Hypertension  - Non-Pulsatile, Dopplers/MAPs closer to goal today  - Will add very low dose Bidil (10/10) and monitor for symptoms      Hyponatremia   - Chronic in nature (121-126 September of 2016 and even further back)   - Has high UOP with hyponatremia, improving with fluid restriction   - Endocrine consulted appreciate home fluid restriction<1.5 L/day think its psychogenic polydipsia   - Suspect this is due to medications , he is off Zoloft and Remeron and imipramine   - DC flomax on 1/16/2017  - Will monitor closely with addition of bidil as above      Palpitations  - Patient reports symptoms during intermittent pacing overnight seen on tele  - Patient had symptomatic RV pacing during sleep. Device interrogated, with no diaphragmatic capture seen at 5V @ 0.4 ms, EP dropped lower rate from 50 to 40 bpm  - EP increased ATP and dropped detection zone to 120   - Restart home amiodarone 400 po daily per EP recs      EP note 1/13/16  - recommend restarting home amiodarone  - Reprogrammed to VT1 zone 120bpm with 6 bursts of ATP at 10ms decrements; VT2 zone 167 bpm with 3 burst ATP; VF zone to 231  - if cleared from HF standpoint, would begin low dose BB and Titrate up as tolerated      - cardiac diet, 2g Na restriction     Patient discussed and examined with attending physician, Dr. Gupta.        Signed:    Robby Lim MD  Cardiology Fellow, PGY-5  Pager: [phone]  1/22/2017 5:55 AM

## 2017-01-22 NOTE — PLAN OF CARE
Problem: Patient Care Overview  Goal: Plan of Care Review  Outcome: Revised  Plan of care discussed with patient. AAO, VSS. Patient is free of fall/trauma/injury. Denies CP, SOB, or pain/discomfort. NADN.  All questions addressed. Will continue to monitor

## 2017-01-22 NOTE — PROGRESS NOTES
Doppler 96, MAP 80. Scheduled BP meds given. Dr. Coles notified, no new orders. Will continue to monitor.

## 2017-01-22 NOTE — DISCHARGE SUMMARY
[facility]  Discharge Summary      Admit Date: 1/5/2017    Discharge Date and Time: 1/22/2017 10:25 AM    Attending Physician: Peewee Romero MD     Reason for Admission: Elevated LDH    Procedures Performed: Procedure(s) (LRB):  COLONOSCOPY (N/A)     HPI  55 y/o man with history as below admitted for elevated LDH. Will order repeat LDH and monitor overnight. Also ordered CBC, CMP, INR, Mag. Last Admit it was believed his elevated LDH may be due to HTN. Patient was discharged with , yesterday was 590, INR 2.3. Historical baseline 200-300. Speed echo done on 12/30.    Hospital Course (synopsis of major diagnoses, care, treatment, and services provided during the course of the hospital stay):       Elevated LDH Stable at time of discharge   today. Discharged with aspirin and persantine Ordered G6pd Level was 20.1    Leukocytosis - Ana Maria at the end of his admission, ID on board, ordered multiple lab studies that were pending at the time of discharge. May be eosinophilic. Ordered outpatient CBC and if elevated. Ordered outpatient ID follow up in 2 weeks. Medications reviewed, no obvious cause (eosinophilia not normally reported with Lisinopril (derm/pulm effects) or pantoprazole, though other medications in their respective classes can cause eosinophilia, both are long term medications). Patient previously on imipramine, but this as discontinued on 1/16/17. Pan cultures+Cxray all negative at time of discharge. Considering no obvious source has been found, will complete workup as an outpatient      S/P LVAD HeartMate II Implanted 8/24/2016  - Coumadin continued along with aspirin. On Persantine 100mg PO TID      Hypertension  Discharged on amlodipine 10, hydralazine 10, isosorbide dinitrate 10, and lisinopril 20 BID. Sildenafil discontinued secondary to starting isosorbide.      Hyponatremia   Chronic in nature (121-126 September of 2016 and even further back)  Has high UOP with  hyponatremia, improved with fluid restriction. Endocrine consulted appreciate home fluid restriction<1.5 L/day think its psychogenic polydipsia. Also suspected a medication cause, therefore Zoloft, Remeron, Flomax, and imipramine were discontniued       Palpitations Patient reports symptoms during intermittent pacing overnight seen on tele. Patient had symptomatic RV pacing during sleep. Device interrogated, with no diaphragmatic capture seen at 5V @ 0.4 ms, EP dropped lower rate from 50 to 40 bpm and increased ATP and dropped detection zone to 120. Also recommended restarting home amiodarone at 400mg po daily    Transplant workup  Patient will need repeat colonoscopy in future (date undetermined) after OHT, when patient can safely come off of anticoagulation. Very low risk polyp (3mm found on colo on 1/12/17).    Consults: GI, EP    Significant Diagnostic Studies:      Final Diagnoses:    Principal Problem: Elevated serum lactate dehydrogenase (LDH)   Secondary Diagnoses:   Active Hospital Problems    Diagnosis  POA    *Elevated serum lactate dehydrogenase (LDH) [R74.0]  Yes    Long-term insulin use in type 2 diabetes [E11.9, Z79.4]  Not Applicable    Cardiomyopathy, dilated, nonischemic [I42.9]  Yes    LVAD (left ventricular assist device) present [Z95.811]  Not Applicable    Hyponatremia [E87.1]  Yes      Resolved Hospital Problems    Diagnosis Date Resolved POA   No resolved problems to display.       Discharged Condition: good    Disposition: Home or Self Care    Follow Up/Patient Instructions:     Medications:  Reconciled Home Medications:   Current Discharge Medication List      START taking these medications    Details   amlodipine (NORVASC) 10 MG tablet Take 1 tablet (10 mg total) by mouth once daily.  Qty: 30 tablet, Refills: 11      dipyridamole (PERSANTINE) 50 MG tablet Take 2 tablets (100 mg total) by mouth 3 (three) times daily.  Qty: 180 tablet, Refills: 11      dutasteride (AVODART) 0.5 mg  capsule Take 1 capsule (0.5 mg total) by mouth once daily.  Qty: 30 capsule, Refills: 11      hydrALAZINE (APRESOLINE) 10 MG tablet Take 1 tablet (10 mg total) by mouth every 8 (eight) hours.  Qty: 90 tablet, Refills: 11      isosorbide dinitrate (ISORDIL) 10 MG tablet Take 1 tablet (10 mg total) by mouth 3 (three) times daily.  Qty: 90 tablet, Refills: 11         CONTINUE these medications which have NOT CHANGED    Details   amiodarone (PACERONE) 400 MG tablet Take 1 tablet (400 mg total) by mouth once daily.  Qty: 90 tablet, Refills: 3      aspirin (ECOTRIN) 325 MG EC tablet Take 1 tablet (325 mg total) by mouth once daily.  Qty: 90 tablet, Refills: 3      cyclobenzaprine (FLEXERIL) 5 MG tablet Take 5 mg by mouth daily as needed.       docusate sodium (COLACE) 100 MG capsule Take 2 capsules (200 mg total) by mouth every evening.  Qty: 180 capsule, Refills: 3      ergocalciferol (ERGOCALCIFEROL) 50,000 unit Cap TK 1 C PO TWICE PER WEEK  Refills: 3      esomeprazole (NEXIUM) 40 MG capsule Take 1 capsule (40 mg total) by mouth before breakfast.  Qty: 90 capsule, Refills: 3      ferrous gluconate (FERGON) 324 MG tablet Take 1 tablet (324 mg total) by mouth daily with breakfast.  Qty: 90 tablet, Refills: 3      insulin aspart (NOVOLOG FLEXPEN) 100 unit/mL InPn pen Inject 20 Units into the skin 3 (three) times daily with meals.  Refills: 0      insulin glargine (LANTUS SOLOSTAR) 100 unit/mL (3 mL) InPn pen Inject 16 Units into the skin once daily.  Qty: 14.4 mL, Refills: 3      lisinopril (PRINIVIL,ZESTRIL) 20 MG tablet Take 1 tablet (20 mg total) by mouth 2 (two) times daily.  Qty: 180 tablet, Refills: 3      magnesium oxide (MAG-OX) 400 mg tablet Take 1 tablet (400 mg total) by mouth 3 (three) times daily.  Qty: 270 tablet, Refills: 3      oxycodone (ROXICODONE) 10 mg Tab immediate release tablet Take 1 tablet (10 mg total) by mouth every 6 (six) hours as needed.  Qty: 120 tablet, Refills: 0      senna-docusate 8.6-50  mg (PERICOLACE) 8.6-50 mg per tablet Take 1 tablet by mouth daily as needed for Constipation.  Qty: 90 tablet, Refills: 3      warfarin (COUMADIN) 1 MG tablet Take 1-2.5mg daily as directed by coumadin clinic; #70pills = 1 month supply  Qty: 70 tablet, Refills: 11    Associated Diagnoses: Long term (current) use of anticoagulants; Heart replaced by heart assist device         STOP taking these medications       imipramine (TOFRANIL) 25 MG tablet Comments:   Reason for Stopping:         ramelteon (ROZEREM) 8 mg tablet Comments:   Reason for Stopping:         sertraline (ZOLOFT) 100 MG tablet Comments:   Reason for Stopping:         sildenafil (VIAGRA) 100 MG tablet Comments:   Reason for Stopping:         spironolactone (ALDACTONE) 25 MG tablet Comments:   Reason for Stopping:         tamsulosin (FLOMAX) 0.4 mg Cp24 Comments:   Reason for Stopping:               Discharge Procedure Orders  CBC W/ AUTO DIFFERENTIAL   Standing Status: Future  Standing Exp. Date: 03/23/18     LACTATE DEHYDROGENASE   Standing Status: Future  Standing Exp. Date: 03/23/18       Follow-up Information     Follow up with [facility]URE [facility] In 1 week.    Specialty:  Transplant

## 2017-01-22 NOTE — PLAN OF CARE
Problem: Patient Care Overview  Goal: Plan of Care Review  Outcome: Outcome(s) achieved Date Met:  01/22/17  Pt free of falls/trauma/injuries this shift. LVAD working properly and sounds smooth. Dopplers and MAPs WNL. LVAD dressing remains clean, dry & intact with no drainage noted. Dressing change done daily. Plan for discharge home today. Plan of care reviewed with patient. Pt tolerating POC well. Will continue to monitor.

## 2017-01-23 NOTE — PROGRESS NOTES
Per d/c note: 55 y/o man with history as below admitted for elevated LDH. Will order repeat LDH and monitor overnight. Also ordered CBC, CMP, INR, Mag. Last Admit it was believed his elevated LDH may be due to HTN. Patient was discharged with , yesterday was 590, INR 2.3. Historical baseline 200-300. Speed echo done on 12/30.  Pt reports d/c 1/22, advised to take 1mg. Advised to start 2mg daily. INR 1/24. Calendar updated.

## 2017-01-24 ENCOUNTER — LAB VISIT (OUTPATIENT)
Dept: LAB | Facility: HOSPITAL | Age: 57
End: 2017-01-24
Attending: INTERNAL MEDICINE
Payer: MEDICARE

## 2017-01-24 ENCOUNTER — ANTI-COAG VISIT (OUTPATIENT)
Dept: CARDIOLOGY | Facility: CLINIC | Age: 57
End: 2017-01-24

## 2017-01-24 DIAGNOSIS — Z79.01 LONG TERM (CURRENT) USE OF ANTICOAGULANTS: ICD-10-CM

## 2017-01-24 DIAGNOSIS — Z95.811 LVAD (LEFT VENTRICULAR ASSIST DEVICE) PRESENT: ICD-10-CM

## 2017-01-24 DIAGNOSIS — Z95.811 PRESENCE OF HEART ASSIST DEVICE: ICD-10-CM

## 2017-01-24 DIAGNOSIS — Z95.811 HEART REPLACED BY HEART ASSIST DEVICE: ICD-10-CM

## 2017-01-24 LAB
ALBUMIN SERPL BCP-MCNC: 3.4 G/DL
ALP SERPL-CCNC: 61 U/L
ALT SERPL W/O P-5'-P-CCNC: 25 U/L
ANION GAP SERPL CALC-SCNC: 7 MMOL/L
ANISOCYTOSIS BLD QL SMEAR: SLIGHT
AST SERPL-CCNC: 22 U/L
BACTERIA BLD CULT: NORMAL
BACTERIA BLD CULT: NORMAL
BASO STIPL BLD QL SMEAR: ABNORMAL
BASOPHILS # BLD AUTO: 0.06 K/UL
BASOPHILS NFR BLD: 0.5 %
BILIRUB SERPL-MCNC: 0.5 MG/DL
BUN SERPL-MCNC: 14 MG/DL
CALCIUM SERPL-MCNC: 8.9 MG/DL
CHLORIDE SERPL-SCNC: 103 MMOL/L
CO2 SERPL-SCNC: 24 MMOL/L
CREAT SERPL-MCNC: 0.9 MG/DL
DACRYOCYTES BLD QL SMEAR: ABNORMAL
DIFFERENTIAL METHOD: ABNORMAL
EOSINOPHIL # BLD AUTO: 0.7 K/UL
EOSINOPHIL NFR BLD: 5.6 %
ERYTHROCYTE [DISTWIDTH] IN BLOOD BY AUTOMATED COUNT: 20.3 %
EST. GFR  (AFRICAN AMERICAN): >60 ML/MIN/1.73 M^2
EST. GFR  (NON AFRICAN AMERICAN): >60 ML/MIN/1.73 M^2
GIANT PLATELETS BLD QL SMEAR: PRESENT
GLUCOSE SERPL-MCNC: 203 MG/DL
HCT VFR BLD AUTO: 33.7 %
HGB BLD-MCNC: 10.8 G/DL
HYPOCHROMIA BLD QL SMEAR: ABNORMAL
INR PPP: 2.3
LDH SERPL L TO P-CCNC: 390 U/L
LYMPHOCYTES # BLD AUTO: 3.3 K/UL
LYMPHOCYTES NFR BLD: 25.3 %
MCH RBC QN AUTO: 18.7 PG
MCHC RBC AUTO-ENTMCNC: 32 %
MCV RBC AUTO: 58 FL
MONOCYTES # BLD AUTO: 0.8 K/UL
MONOCYTES NFR BLD: 6.1 %
NEUTROPHILS # BLD AUTO: 8.1 K/UL
NEUTROPHILS NFR BLD: 63.3 %
OVALOCYTES BLD QL SMEAR: ABNORMAL
PLATELET # BLD AUTO: 277 K/UL
PMV BLD AUTO: ABNORMAL FL
POIKILOCYTOSIS BLD QL SMEAR: SLIGHT
POLYCHROMASIA BLD QL SMEAR: ABNORMAL
POTASSIUM SERPL-SCNC: 4.2 MMOL/L
PROT SERPL-MCNC: 6.7 G/DL
PROTHROMBIN TIME: 23.1 SEC
RBC # BLD AUTO: 5.79 M/UL
SODIUM SERPL-SCNC: 134 MMOL/L
TARGETS BLD QL SMEAR: ABNORMAL
WBC # BLD AUTO: 12.89 K/UL

## 2017-01-24 PROCEDURE — 36415 COLL VENOUS BLD VENIPUNCTURE: CPT

## 2017-01-24 PROCEDURE — 83615 LACTATE (LD) (LDH) ENZYME: CPT

## 2017-01-24 PROCEDURE — 85025 COMPLETE CBC W/AUTO DIFF WBC: CPT

## 2017-01-24 PROCEDURE — 85610 PROTHROMBIN TIME: CPT

## 2017-01-24 PROCEDURE — 80053 COMPREHEN METABOLIC PANEL: CPT

## 2017-01-25 LAB — STRONGYLOIDES ANTIBODY IGG: NEGATIVE

## 2017-01-26 ENCOUNTER — OFFICE VISIT (OUTPATIENT)
Dept: TRANSPLANT | Facility: CLINIC | Age: 57
End: 2017-01-26
Attending: INTERNAL MEDICINE
Payer: MEDICARE

## 2017-01-26 ENCOUNTER — TELEPHONE (OUTPATIENT)
Dept: ADMINISTRATIVE | Facility: HOSPITAL | Age: 57
End: 2017-01-26

## 2017-01-26 ENCOUNTER — CLINICAL SUPPORT (OUTPATIENT)
Dept: TRANSPLANT | Facility: CLINIC | Age: 57
End: 2017-01-26
Payer: MEDICARE

## 2017-01-26 ENCOUNTER — LAB VISIT (OUTPATIENT)
Dept: LAB | Facility: HOSPITAL | Age: 57
End: 2017-01-26
Attending: INTERNAL MEDICINE
Payer: MEDICARE

## 2017-01-26 ENCOUNTER — ANTI-COAG VISIT (OUTPATIENT)
Dept: CARDIOLOGY | Facility: CLINIC | Age: 57
End: 2017-01-26

## 2017-01-26 VITALS — WEIGHT: 198 LBS | HEIGHT: 68 IN | TEMPERATURE: 98 F | SYSTOLIC BLOOD PRESSURE: 90 MMHG | BODY MASS INDEX: 30.01 KG/M2

## 2017-01-26 DIAGNOSIS — Z95.811 HEART REPLACED BY HEART ASSIST DEVICE: ICD-10-CM

## 2017-01-26 DIAGNOSIS — Z95.811 HEART REPLACED BY HEART ASSIST DEVICE: Primary | ICD-10-CM

## 2017-01-26 DIAGNOSIS — Z95.811 PRESENCE OF HEART ASSIST DEVICE: ICD-10-CM

## 2017-01-26 DIAGNOSIS — Z79.4 TYPE 2 DIABETES MELLITUS WITH STAGE 3 CHRONIC KIDNEY DISEASE, WITH LONG-TERM CURRENT USE OF INSULIN: Chronic | ICD-10-CM

## 2017-01-26 DIAGNOSIS — E11.22 TYPE 2 DIABETES MELLITUS WITH STAGE 3 CHRONIC KIDNEY DISEASE, WITH LONG-TERM CURRENT USE OF INSULIN: Chronic | ICD-10-CM

## 2017-01-26 DIAGNOSIS — Z79.01 LONG TERM (CURRENT) USE OF ANTICOAGULANTS: ICD-10-CM

## 2017-01-26 DIAGNOSIS — I10 ESSENTIAL HYPERTENSION: Chronic | ICD-10-CM

## 2017-01-26 DIAGNOSIS — N18.30 TYPE 2 DIABETES MELLITUS WITH STAGE 3 CHRONIC KIDNEY DISEASE, WITH LONG-TERM CURRENT USE OF INSULIN: Chronic | ICD-10-CM

## 2017-01-26 DIAGNOSIS — N40.0 BENIGN PROSTATIC HYPERPLASIA, PRESENCE OF LOWER URINARY TRACT SYMPTOMS UNSPECIFIED, UNSPECIFIED MORPHOLOGY: ICD-10-CM

## 2017-01-26 DIAGNOSIS — E78.5 HYPERLIPIDEMIA, UNSPECIFIED HYPERLIPIDEMIA TYPE: Chronic | ICD-10-CM

## 2017-01-26 DIAGNOSIS — I25.5 ISCHEMIC CARDIOMYOPATHY: ICD-10-CM

## 2017-01-26 LAB
ALBUMIN SERPL BCP-MCNC: 3.4 G/DL
ALP SERPL-CCNC: 64 U/L
ALT SERPL W/O P-5'-P-CCNC: 25 U/L
ANION GAP SERPL CALC-SCNC: 7 MMOL/L
AST SERPL-CCNC: 32 U/L
BASOPHILS # BLD AUTO: 0.07 K/UL
BASOPHILS NFR BLD: 0.7 %
BILIRUB DIRECT SERPL-MCNC: 0.3 MG/DL
BILIRUB SERPL-MCNC: 0.7 MG/DL
BNP SERPL-MCNC: 240 PG/ML
BUN SERPL-MCNC: 10 MG/DL
CALCIUM SERPL-MCNC: 9.1 MG/DL
CHLORIDE SERPL-SCNC: 99 MMOL/L
CO2 SERPL-SCNC: 28 MMOL/L
CREAT SERPL-MCNC: 0.8 MG/DL
CRP SERPL-MCNC: 19.8 MG/L
DIFFERENTIAL METHOD: ABNORMAL
EOSINOPHIL # BLD AUTO: 0.5 K/UL
EOSINOPHIL NFR BLD: 4.4 %
ERYTHROCYTE [DISTWIDTH] IN BLOOD BY AUTOMATED COUNT: 20.2 %
EST. GFR  (AFRICAN AMERICAN): >60 ML/MIN/1.73 M^2
EST. GFR  (NON AFRICAN AMERICAN): >60 ML/MIN/1.73 M^2
GLUCOSE SERPL-MCNC: 96 MG/DL
HCT VFR BLD AUTO: 32.9 %
HGB BLD-MCNC: 10.3 G/DL
INR PPP: 1.7
LDH SERPL L TO P-CCNC: 501 U/L
LYMPHOCYTES # BLD AUTO: 2.5 K/UL
LYMPHOCYTES NFR BLD: 23.7 %
MAGNESIUM SERPL-MCNC: 1.6 MG/DL
MCH RBC QN AUTO: 18.1 PG
MCHC RBC AUTO-ENTMCNC: 31.3 %
MCV RBC AUTO: 58 FL
MONOCYTES # BLD AUTO: 0.6 K/UL
MONOCYTES NFR BLD: 6 %
NEUTROPHILS # BLD AUTO: 7 K/UL
NEUTROPHILS NFR BLD: 65.2 %
PHOSPHATE SERPL-MCNC: 3.8 MG/DL
PLATELET # BLD AUTO: 253 K/UL
PMV BLD AUTO: ABNORMAL FL
POTASSIUM SERPL-SCNC: 4.9 MMOL/L
PREALB SERPL-MCNC: 23 MG/DL
PROT SERPL-MCNC: 6.9 G/DL
PROTHROMBIN TIME: 16.7 SEC
RBC # BLD AUTO: 5.7 M/UL
SCHISTOSOMA IGG SER QL: ABNORMAL
SODIUM SERPL-SCNC: 134 MMOL/L
TRICHINELLA IGG SER IA-ACNC: NEGATIVE
WBC # BLD AUTO: 10.72 K/UL

## 2017-01-26 PROCEDURE — 93750 INTERROGATION VAD IN PERSON: CPT | Mod: S$PBB,,, | Performed by: INTERNAL MEDICINE

## 2017-01-26 PROCEDURE — 99999 PR PBB SHADOW E&M-EST. PATIENT-LVL III: CPT | Mod: 25,PBBFAC,, | Performed by: INTERNAL MEDICINE

## 2017-01-26 PROCEDURE — 99214 OFFICE O/P EST MOD 30 MIN: CPT | Mod: S$PBB,,, | Performed by: INTERNAL MEDICINE

## 2017-01-26 RX ORDER — HYDRALAZINE HYDROCHLORIDE 10 MG/1
20 TABLET, FILM COATED ORAL EVERY 8 HOURS
Qty: 90 TABLET | Refills: 11 | Status: ON HOLD | OUTPATIENT
Start: 2017-01-26 | End: 2017-02-20 | Stop reason: HOSPADM

## 2017-01-26 RX ORDER — TAMSULOSIN HYDROCHLORIDE 0.4 MG/1
0.4 CAPSULE ORAL DAILY
Status: DISCONTINUED | OUTPATIENT
Start: 2017-01-26 | End: 2017-02-16 | Stop reason: HOSPADM

## 2017-01-26 NOTE — PROGRESS NOTES
Patient has emergency bag: yes  Condition of emergency bag: good  Contents of emergency bag: Px1, cx2,bx2    Patient has monthly checklist today: yes}  Patient correctly utilizing monthly checklist: yes  Educated patient on utilizing monthly checklist correctly and importance of this: yes    Inspected patient's equipment today: yes  Equipment clean and free of debris: yes  Cleaned equipment today and educated patient on how to do this: yes  Locking mechanism clean and free of debris: no  Clamshell repair: no    Manual and visual inspection of driveline: YES unremarkable   Kinks, rescue tape, tears: no    Patient wearing waist strap, vest, zo vest: Vest   Condition of this: good    Power module maintenance due: 8/2017, completed today: no  Power module serial number: n/a  Power module battery SN: n/a    Pocket Controller info:  Primary: [id number] EBB S/N: -975 g Date: 4/04/2016  Backup: [id number] EBB S/N: -577 g Date: 4/04/2016    Batteries checked for calibration: no  Batteries calibrated today: no  Which batteries: n/a  Educated patient on how and why to calibrate batteries: no    Batteries expiration dates: on snapshot             Serial Number                          Expiration Date  1.    2.    3.    4.    5.    6.    7.    8.      Other equipment issues: none    Equipment given to patient today in clinic: no  Discussed with MCS Coordinator and physician: no    Equipment loaned to patient today: no    Waveforms sent: no

## 2017-01-26 NOTE — PHYSICIAN QUERY
PT Name: Mick Barriga  MR #: [id number]     Physician Query Form - Documentation Clarification    Reviewer  Ext         Analisa Feldman [id number]    This form is a permanent document in the medical record.     Query Date: January 26, 2017  By submitting this query, we are merely seeking further clarification of documentation to reflect the severity of illness of your patient. Please utilize your independent clinical judgment when addressing the question(s) below.    (The Medical record reflects the following:)      Supporting Clinical Findings Location in Medical Record   HPI  55 y/o man with history as below admitted for elevated LDH. Will order repeat LDH and monitor overnight. Also ordered CBC, CMP, INR, Mag. Last Admit it was believed his elevated LDH may be due to HTN. Patient was discharged with , yesterday was 590, INR 2.3. Historical baseline 200-300. Speed echo done on 12/30.  Elevated LDH Stable at time of discharge   today. Discharged with aspirin and persantine Ordered G6pd Level was 20.1       Discharge Summary   , please clarify if elevated LDH was due to:    (  ) Hypertension    (  ) Other    ( x ) Unable to Determine                                                                                   Doctor, Please specify diagnosis or diagnoses associated with above clinical findings.    Physician Use Only                                                                                                                               [  ] Unable to determine

## 2017-01-26 NOTE — PROGRESS NOTES
"Date of Implant with Heartmate II LVAD: 16    PATIENT ARRIVED IN CLINIC:  Ambulatory  Accompanied by:Wife    Vitals  Doppler:90  Pulsatile:no   PAIN:0 on 0-10 pain scale,  location of pain: n/a,   description of pain:n/a  Is patient currently on medications for pain?yes What kind? roxicodone as needed  Weight (with controller and 2 batteries): refer to encounter    VAD Interrogation:  TXP RODRI INTERROGATIONS 2017   Type HeartMate II   Flow 5.73   Speed 9000   PI 6.8   Power (Kwon) 5.5   LSL 8600   Pulsatility No Pulse       Flow in history: 4.7-6.2  History Lo.log  Back up battery uses:4/0    Problems / Issues / Alarms with VAD if any:none   Any Equipment Issues:none  Emergency Equipment With Patient:yes   VAD Binder With Patient: no   Reviewed VAD Numbers In Binder:no  VAD Sounds:static  Manual & Visual Inspection Of Driveline:  NO KINKS OR TEARS NOTED   Clamshell Repair: no  Patient wearing (consolidated bag, vest, holster vest)with waist strap:YES     LVAD Dressing/DLES:  Appearance Of Driveline:"1" with scab and scant blood, no redness. Patient instructed that if drainage appears or blood increases to call LVAD clinic. Patient verbalized understanding to all instructions given.   Antibiotics:NO  Velour:no  Frequency of Dressing Changes:Daily with soap and water   Stabilization Device In Use: YES Ta Jennings    Pt In Need Of Management Kits?:no   It is medically necessary to have VAD management kits in order to prevent infection or to assist in the healing of an infected DLES.    Assessment:   Complaints/reason for visit today:  Routine follow up  Complaints Of Nausea / Vomiting:none   Appearance and Frequency Of Stools:didn't answer question  Color Of Urine: clear and yellow  Coping/Depression/Anxiety:patient has anxiety but coping well  Sleep Habits:didnt' answer question hrs /night  Sleep Aids:n/a  Showering :NO   Activity/Exercise:n/a   Driving:yes, Reminded to pull over should there be " an alarm before looking down at controller.     Labs:    Chemistry        Component Value Date/Time     (L) 01/26/2017 0918    K 4.9 01/26/2017 0918    CL 99 01/26/2017 0918    CO2 28 01/26/2017 0918    BUN 10 01/26/2017 0918    CREATININE 0.8 01/26/2017 0918    GLU 96 01/26/2017 0918        Component Value Date/Time    CALCIUM 9.1 01/26/2017 0918    ALKPHOS 64 01/26/2017 0918    AST 32 01/26/2017 0918    ALT 25 01/26/2017 0918    BILITOT 0.7 01/26/2017 0918            Magnesium   Date Value Ref Range Status   01/26/2017 1.6 1.6 - 2.6 mg/dL Final       Lab Results   Component Value Date    WBC 10.72 01/26/2017    HGB 10.3 (L) 01/26/2017    HCT 32.9 (L) 01/26/2017    MCV 58 (L) 01/26/2017     01/26/2017       Lab Results   Component Value Date    INR 1.7 (H) 01/26/2017    INR 2.3 (H) 01/24/2017    INR 2.9 (H) 01/22/2017       BNP   Date Value Ref Range Status   01/26/2017 240 (H) 0 - 99 pg/mL Final     Comment:     Values of less than 100 pg/ml are consistent with non-CHF populations.   01/20/2017 87 0 - 99 pg/mL Final     Comment:     Values of less than 100 pg/ml are consistent with non-CHF populations.   01/18/2017 56 0 - 99 pg/mL Final     Comment:     Values of less than 100 pg/ml are consistent with non-CHF populations.       LD   Date Value Ref Range Status   01/26/2017 501 (H) 110 - 260 U/L Final     Comment:     Results are increased in hemolyzed samples.   01/24/2017 390 (H) 110 - 260 U/L Final     Comment:     Results are increased in hemolyzed samples.   01/22/2017 412 (H) 110 - 260 U/L Final     Comment:     Results are increased in hemolyzed samples.       Labs reviewed with patient: YES      Patient Satisfaction Survey Completed by Patient: no  (explained about signature and box to check)  Medication reconciliation: per MA.  Purple card updated today:YES New purple card made for patient today per patient request bc patient stated that purple card was totally inaccurate after last hospital  discharge.   Coumadin Managed by: [facility],     Education: Reviewed driveline care, emergency procedures, how to change the controller, alarms with patient.        Plans/Needs:Patient presents today for routine followup. Patient's only complaint today is that since he was started on avodart in the hospital, he has had a lot of urinary frequency. Patient states the urge to urinate comes over him all of the sudden that he almost cannot make it to the restroom. Patient states when he actually does urinate, only a small amount comes out. INR today 1.7. LDH today 501 and Doppler 90. Per Dr Romero, coumadin clinic will alter dosing accordingly for subtherapeutic INR. Additionally, patient to increase hydralazine to 20 mg TID, have cmp, cbc, ldh drawn on Monday and RTC in 2 weeks for followup. Patient also started on Flomax and avodart discontinued. Remind Me entered in EPIC. Refer to MD note.

## 2017-01-26 NOTE — LETTER
January 26, 2017        Fox Quinn  [address] [place] [address] [address]LD  [id number]  [facility]  [place] [address]  Phone: [phone]  Fax: [phone]             [facility] [address]  [facility] [address]  Phone: [phone]   Patient: Mick Barriga   MR Number: [id number]   YOB: 1960   Date of Visit: 1/26/2017       Dear Dr. Fox Quinn    Thank you for referring Mick Barriga to me for evaluation. Attached you will find relevant portions of my assessment and plan of care.    If you have questions, please do not hesitate to call me. I look forward to following Mick Barriga along with you.    Sincerely,    Peewee Romero MD    Enclosure    If you would like to receive this communication electronically, please contact [email or web address] or [phone] to request [employer] Link access.    [employer] Link is a tool which provides read-only access to select patient information with whom you have a relationship. Its easy to use and provides real time access to review your patients record including encounter summaries, notes, results, and demographic information.    If you feel you have received this communication in error or would no longer like to receive these types of communications, please e-mail [email or web address]

## 2017-01-26 NOTE — PROCEDURES
TXP RODRI INTERROGATIONS 1/26/2017 1/22/2017 1/21/2017 1/21/2017 1/19/2017 1/18/2017 1/17/2017   Type HeartMate II - - - - - -   Flow 5.73 - - - - - -   Speed 9000 - - - - - -   PI 6.8 - - - - - -   Power (Kwon) 5.5 - - - - - -   LSL 8600 - - - - - -   Pulsatility No Pulse Pulse Intermittent pulse Pulse Intermittent pulse Intermittent pulse Intermittent pulse   }

## 2017-01-26 NOTE — PROGRESS NOTES
"Subjective:   Patient ID:  Mick Barriga is a 56 y.o. male who presents for LVAD followup visit.    Implant Date:16    HMII RPM 9000     INR goal: 2-3  Bridge with Heparin   Antiplatelets:   LDH:528/606  TE16    TXP RODRI INTERROGATIONS 2017   Type HeartMate II   Flow 5.73   Speed 9000   PI 6.8   Power (Kwon) 5.5   LSL 8600   Pulsatility No Pulse       HPI   Mr. Barriga is a very pleasant 55 yo male with Hx of NICMP stage D underwent HM 2 LVAD placement on 16. Was recently discharged from the hospital after being treated for elevated LDH, low flow alarms and orthostatic hypotension. His LDH improved with persantine. Echo and CT showed no evidence of pump thrombosis. His orthostatic hypotension was thought to be related to his BPH regimen (imipramine and flomax) which were discontinued. Also had unexplained leukocytosis. All cultures were negative. ID was consulted and thought it was drug induced. His WBC today is down to 12. He comes for a VAD visit. Feeling better. No dizziness.   VAD speed is at 9000 rpm. No VAD alarms noted on interrogation occasional PI events . BP is up at 86 . DLES is a "1". INR is subtherapeutic at 1.7 . LDH is up again at 501.     Review of Systems   Constitution: Negative. Negative for chills, decreased appetite, diaphoresis, fever, weakness, malaise/fatigue, night sweats, weight gain and weight loss.   Eyes: Negative.    Cardiovascular: Negative for chest pain, claudication, cyanosis, dyspnea on exertion, irregular heartbeat, leg swelling, near-syncope, orthopnea, palpitations, paroxysmal nocturnal dyspnea and syncope.   Respiratory: Negative for cough, hemoptysis and shortness of breath.    Endocrine: Negative.    Hematologic/Lymphatic: Negative.    Skin: Negative for color change, dry skin and nail changes.   Musculoskeletal: Negative.    Gastrointestinal: Negative.    Genitourinary: Negative.        Objective:   Blood pressure (!) 90/0, temperature 97.7 " "°F (36.5 °C), temperature source Oral, height 5' 8" (1.727 m), weight 89.8 kg (198 lb).body mass index is 30.11 kg/(m^2).    Doppler: repeat 86    Physical Exam   Constitutional: He appears well-developed.   BP (!) 90/0 (BP Location: Left arm, Patient Position: Sitting, BP Method: Doppler)  Temp 97.7 °F (36.5 °C) (Oral)   Ht 5' 8" (1.727 m)  Wt 89.8 kg (198 lb)  BMI 30.11 kg/m2     HENT:   Head: Normocephalic.   Neck: No JVD present. Carotid bruit is not present.   Cardiovascular: Regular rhythm and normal heart sounds.    No murmur heard.  Smooth VAD hum. DLES is "1"   Pulmonary/Chest: Effort normal and breath sounds normal. No respiratory distress. He has no wheezes. He has no rales.   Abdominal: Soft. Bowel sounds are normal. He exhibits no distension. There is no tenderness. There is no rebound.   Neurological: He is alert.   Skin: Skin is warm.   Vitals reviewed.      Lab Results   Component Value Date    WBC 10.72 01/26/2017    HGB 10.3 (L) 01/26/2017    HCT 32.9 (L) 01/26/2017    MCV 58 (L) 01/26/2017     01/24/2017    CO2 28 01/26/2017    CREATININE 0.8 01/26/2017    CALCIUM 9.1 01/26/2017    ALBUMIN 3.4 (L) 01/26/2017    AST 32 01/26/2017     (H) 01/26/2017    ALT 25 01/26/2017     (H) 01/24/2017       Lab Results   Component Value Date    INR 1.7 (H) 01/26/2017    INR 2.3 (H) 01/24/2017    INR 2.9 (H) 01/22/2017       BNP   Date Value Ref Range Status   01/20/2017 87 0 - 99 pg/mL Final     Comment:     Values of less than 100 pg/ml are consistent with non-CHF populations.   01/18/2017 56 0 - 99 pg/mL Final     Comment:     Values of less than 100 pg/ml are consistent with non-CHF populations.   01/18/2017 29 0 - 99 pg/mL Final     Comment:     Values of less than 100 pg/ml are consistent with non-CHF populations.       LD   Date Value Ref Range Status   01/26/2017 501 (H) 110 - 260 U/L Final     Comment:     Results are increased in hemolyzed samples.   01/24/2017 390 (H) 110 - 260 U/L " Final     Comment:     Results are increased in hemolyzed samples.   01/22/2017 412 (H) 110 - 260 U/L Final     Comment:     Results are increased in hemolyzed samples.       Labs were reviewed with the patient.    No results found for this or any previous visit.  No results found for this or any previous visit.    Assessment:      1. Heart replaced by heart assist device    2. Ischemic cardiomyopathy    3. Essential hypertension    4. Type 2 diabetes mellitus with stage 3 chronic kidney disease, with long-term current use of insulin    5. Hyperlipidemia, unspecified hyperlipidemia type        Plan:   No more low flow alarm and PI events and Patient is now NYHA class II but BP is a little elevated and LDH is up again.    INR is subtherapeutic (coumadin dose to be adjusted by coumadin clinic).  Increase Hydralazine to 20 mg TID  DC Avodart (states its not working for him as well as Flomax did). Will restart Flomax. Patient instructed to call us if he experience symptoms of dizziness or hypotension or low flow alarms.   VAD interrogation was performed in clinic  Does not need VAD supplies.   Recommend 2 gram sodium restriction and 1500cc fluid restriction.  Encourage physical activity with graded exercise program.  Requested patient to weigh themselves daily, and to notify us if their weight increases by more than 3 lbs in 1 day or 5 lbs in 1 week.   Labs Monday and RTC in 2 weeks      Listed for transplant: Approved for listing pending insurance clearance    [facility] Patient Status  Functional Status: 100% - Normal, no complaints, no evidence of disease  Physical Capacity: No Limitations  Working for Income: Unknown      Peewee Romero MD

## 2017-01-27 ENCOUNTER — DOCUMENTATION ONLY (OUTPATIENT)
Dept: TRANSPLANT | Facility: CLINIC | Age: 57
End: 2017-01-27

## 2017-01-27 LAB — T CANIS AB SER IA-ACNC: 0.01 OD

## 2017-01-27 NOTE — PROGRESS NOTES
It is recorded that Linus Jackman is S/p Heart Mate II implant 8/25/2016 as BTT.  His pre heart transplantation evaluation could not be completed due to extremis of condition.  He has completed his pre heart transplantation evaluation and surgical recovery. His case has been presented to the heart transplantation selection committee 1/18/2017. He was accepted as a candidate for heart transplantation. Mr. Kulkarni has had a change in health care insurance coverage that he did not report.  Although he has been accepted as a candidate his listing was made pending financial clearance with his newly selected and changed health care insurance provider.  This financial clearance is obtained and Mr. Fulton has been placed on the [employer] Heart Transplant waitl ist today.  His Blood type is as follows:  Results for PHIL KULKARNI (MRN [id number]) as of 1/27/2017 15:31   Ref. Range 8/22/2016 14:50 8/24/2016 06:00 8/24/2016 06:00 8/24/2016 06:00 8/24/2016 06:00 8/24/2016 06:00 8/24/2016 06:00 8/24/2016 06:00 8/25/2016 13:16 8/25/2016 13:16 8/25/2016 13:16 8/25/2016 13:16 8/25/2016 13:16 8/25/2016 13:16 8/25/2016 13:16 8/25/2016 13:16 8/25/2016 13:16 9/8/2016 03:59   Group & Rh Unknown A POS A POS       A POS         A POS   INDIRECT KRISTINA Unknown         NEG         NEG     His RUST Status is 1B with criteria of Mechanical Circulatory Support implant.  Donor weight is 30 % below his weight.  He is willing to consider all donors matched for hin to include those in the [employer] High risk category.  He has  HLA sample dated 1/11/2017.  Mr. Kulkarni is called via phone and advised of his placement of the Heart Transplant Wait list.  Questions answered.  He expresses thanks and gratitude for care rendered. Will plan for additional transplant educational re enforcement with he and his family at next visit.  He is advised of this as well.

## 2017-01-30 ENCOUNTER — ANTI-COAG VISIT (OUTPATIENT)
Dept: CARDIOLOGY | Facility: CLINIC | Age: 57
End: 2017-01-30

## 2017-01-30 ENCOUNTER — TELEPHONE (OUTPATIENT)
Dept: TRANSPLANT | Facility: CLINIC | Age: 57
End: 2017-01-30

## 2017-01-30 ENCOUNTER — HOSPITAL ENCOUNTER (INPATIENT)
Facility: HOSPITAL | Age: 57
LOS: 23 days | Discharge: HOME HEALTH | DRG: 001 | End: 2017-02-22
Attending: INTERNAL MEDICINE | Admitting: INTERNAL MEDICINE
Payer: MEDICARE

## 2017-01-30 DIAGNOSIS — Z01.818 PRE-OP TESTING: ICD-10-CM

## 2017-01-30 DIAGNOSIS — I10 ESSENTIAL HYPERTENSION: Chronic | ICD-10-CM

## 2017-01-30 DIAGNOSIS — I25.5 ISCHEMIC CARDIOMYOPATHY: ICD-10-CM

## 2017-01-30 DIAGNOSIS — Z95.811 HEART REPLACED BY HEART ASSIST DEVICE: ICD-10-CM

## 2017-01-30 DIAGNOSIS — Z95.811 PRESENCE OF HEART ASSIST DEVICE: ICD-10-CM

## 2017-01-30 DIAGNOSIS — Z95.810 PRESENCE OF AUTOMATIC IMPLANTABLE CARDIOVERTER-DEFIBRILLATOR: Chronic | ICD-10-CM

## 2017-01-30 DIAGNOSIS — Z76.82 HEART TRANSPLANT CANDIDATE: ICD-10-CM

## 2017-01-30 DIAGNOSIS — I10 ESSENTIAL (PRIMARY) HYPERTENSION: ICD-10-CM

## 2017-01-30 DIAGNOSIS — Z76.82 ORGAN TRANSPLANT CANDIDATE: ICD-10-CM

## 2017-01-30 DIAGNOSIS — Z79.4 TYPE 2 DIABETES MELLITUS WITH STAGE 3 CHRONIC KIDNEY DISEASE, WITH LONG-TERM CURRENT USE OF INSULIN: Chronic | ICD-10-CM

## 2017-01-30 DIAGNOSIS — Z94.1 HEART TRANSPLANTED: Primary | ICD-10-CM

## 2017-01-30 DIAGNOSIS — E66.9 OBESITY (BMI 30-39.9): Chronic | ICD-10-CM

## 2017-01-30 DIAGNOSIS — I50.42 CHRONIC COMBINED SYSTOLIC AND DIASTOLIC HEART FAILURE: ICD-10-CM

## 2017-01-30 DIAGNOSIS — Z76.82 AWAITING ORGAN TRANSPLANT STATUS: ICD-10-CM

## 2017-01-30 DIAGNOSIS — Z95.811 LVAD (LEFT VENTRICULAR ASSIST DEVICE) PRESENT: ICD-10-CM

## 2017-01-30 DIAGNOSIS — Z94.1 HEART TRANSPLANT, ORTHOTOPIC, STATUS: ICD-10-CM

## 2017-01-30 DIAGNOSIS — N18.30 TYPE 2 DIABETES MELLITUS WITH STAGE 3 CHRONIC KIDNEY DISEASE, WITH LONG-TERM CURRENT USE OF INSULIN: Chronic | ICD-10-CM

## 2017-01-30 DIAGNOSIS — Z76.82 ORGAN TRANSPLANT CANDIDATE: Primary | ICD-10-CM

## 2017-01-30 DIAGNOSIS — R74.02 ELEVATED LDH: ICD-10-CM

## 2017-01-30 DIAGNOSIS — D84.9 IMMUNOSUPPRESSION: ICD-10-CM

## 2017-01-30 DIAGNOSIS — N40.0 BENIGN PROSTATIC HYPERPLASIA, PRESENCE OF LOWER URINARY TRACT SYMPTOMS UNSPECIFIED, UNSPECIFIED MORPHOLOGY: ICD-10-CM

## 2017-01-30 DIAGNOSIS — E11.22 TYPE 2 DIABETES MELLITUS WITH STAGE 3 CHRONIC KIDNEY DISEASE, WITH LONG-TERM CURRENT USE OF INSULIN: Chronic | ICD-10-CM

## 2017-01-30 DIAGNOSIS — T38.0X5A ADRENAL CORTICAL STEROIDS CAUSING ADVERSE EFFECT IN THERAPEUTIC USE, INITIAL ENCOUNTER: ICD-10-CM

## 2017-01-30 DIAGNOSIS — E66.09 NON MORBID OBESITY DUE TO EXCESS CALORIES: ICD-10-CM

## 2017-01-30 DIAGNOSIS — T38.0X5D ADRENAL CORTICAL STEROIDS CAUSING ADVERSE EFFECT IN THERAPEUTIC USE, SUBSEQUENT ENCOUNTER: ICD-10-CM

## 2017-01-30 DIAGNOSIS — Z01.810 PRE-OPERATIVE CARDIOVASCULAR EXAMINATION, ICD IN PLACE: ICD-10-CM

## 2017-01-30 DIAGNOSIS — Z95.810 PRE-OPERATIVE CARDIOVASCULAR EXAMINATION, ICD IN PLACE: ICD-10-CM

## 2017-01-30 LAB
ANION GAP SERPL CALC-SCNC: 11 MMOL/L
ANISOCYTOSIS BLD QL SMEAR: SLIGHT
APTT BLDCRRT: 29.2 SEC
BASO STIPL BLD QL SMEAR: ABNORMAL
BASOPHILS # BLD AUTO: 0.08 K/UL
BASOPHILS NFR BLD: 0.7 %
BNP SERPL-MCNC: 122 PG/ML
BUN SERPL-MCNC: 13 MG/DL
BURR CELLS BLD QL SMEAR: ABNORMAL
CALCIUM SERPL-MCNC: 8.9 MG/DL
CHLORIDE SERPL-SCNC: 94 MMOL/L
CO2 SERPL-SCNC: 28 MMOL/L
CREAT SERPL-MCNC: 1 MG/DL
CRP SERPL-MCNC: 19.7 MG/L
DIFFERENTIAL METHOD: ABNORMAL
EOSINOPHIL # BLD AUTO: 0.4 K/UL
EOSINOPHIL NFR BLD: 4 %
ERYTHROCYTE [DISTWIDTH] IN BLOOD BY AUTOMATED COUNT: 20.6 %
EST. GFR  (AFRICAN AMERICAN): >60 ML/MIN/1.73 M^2
EST. GFR  (NON AFRICAN AMERICAN): >60 ML/MIN/1.73 M^2
GLUCOSE SERPL-MCNC: 139 MG/DL
HCT VFR BLD AUTO: 30.3 %
HGB BLD-MCNC: 9.7 G/DL
HYPOCHROMIA BLD QL SMEAR: ABNORMAL
INR PPP: 1.8
LDH SERPL L TO P-CCNC: 583 U/L
LYMPHOCYTES # BLD AUTO: 3.1 K/UL
LYMPHOCYTES NFR BLD: 28.2 %
MAGNESIUM SERPL-MCNC: 1.8 MG/DL
MCH RBC QN AUTO: 18.7 PG
MCHC RBC AUTO-ENTMCNC: 32 %
MCV RBC AUTO: 58 FL
MONOCYTES # BLD AUTO: 0.6 K/UL
MONOCYTES NFR BLD: 5.6 %
NEUTROPHILS # BLD AUTO: 6.8 K/UL
NEUTROPHILS NFR BLD: 61.5 %
OVALOCYTES BLD QL SMEAR: ABNORMAL
PHOSPHATE SERPL-MCNC: 4.7 MG/DL
PLATELET # BLD AUTO: 307 K/UL
PLATELET BLD QL SMEAR: ABNORMAL
PMV BLD AUTO: ABNORMAL FL
POCT GLUCOSE: 148 MG/DL (ref 70–110)
POIKILOCYTOSIS BLD QL SMEAR: SLIGHT
POLYCHROMASIA BLD QL SMEAR: ABNORMAL
POTASSIUM SERPL-SCNC: 3.8 MMOL/L
PREALB SERPL-MCNC: 22 MG/DL
PROTHROMBIN TIME: 18.1 SEC
RBC # BLD AUTO: 5.2 M/UL
SCHISTOCYTES BLD QL SMEAR: ABNORMAL
SCHISTOCYTES BLD QL SMEAR: PRESENT
SODIUM SERPL-SCNC: 133 MMOL/L
WBC # BLD AUTO: 11.1 K/UL

## 2017-01-30 PROCEDURE — 20600001 HC STEP DOWN PRIVATE ROOM

## 2017-01-30 PROCEDURE — 85730 THROMBOPLASTIN TIME PARTIAL: CPT

## 2017-01-30 PROCEDURE — 25000003 PHARM REV CODE 250: Performed by: PHYSICIAN ASSISTANT

## 2017-01-30 PROCEDURE — 25000003 PHARM REV CODE 250: Performed by: HOSPITALIST

## 2017-01-30 PROCEDURE — 27000248 HC VAD-ADDITIONAL DAY

## 2017-01-30 PROCEDURE — 84100 ASSAY OF PHOSPHORUS: CPT

## 2017-01-30 PROCEDURE — 83615 LACTATE (LD) (LDH) ENZYME: CPT

## 2017-01-30 PROCEDURE — 36415 COLL VENOUS BLD VENIPUNCTURE: CPT

## 2017-01-30 PROCEDURE — 84134 ASSAY OF PREALBUMIN: CPT

## 2017-01-30 PROCEDURE — 83735 ASSAY OF MAGNESIUM: CPT

## 2017-01-30 PROCEDURE — 85610 PROTHROMBIN TIME: CPT

## 2017-01-30 PROCEDURE — 63600175 PHARM REV CODE 636 W HCPCS: Performed by: HOSPITALIST

## 2017-01-30 PROCEDURE — 83880 ASSAY OF NATRIURETIC PEPTIDE: CPT

## 2017-01-30 PROCEDURE — 85025 COMPLETE CBC W/AUTO DIFF WBC: CPT

## 2017-01-30 PROCEDURE — 86140 C-REACTIVE PROTEIN: CPT

## 2017-01-30 PROCEDURE — 80048 BASIC METABOLIC PNL TOTAL CA: CPT

## 2017-01-30 RX ORDER — HEPARIN SODIUM,PORCINE/D5W 25000/250
17 INTRAVENOUS SOLUTION INTRAVENOUS CONTINUOUS
Status: DISCONTINUED | OUTPATIENT
Start: 2017-01-30 | End: 2017-01-30

## 2017-01-30 RX ORDER — PANTOPRAZOLE SODIUM 40 MG/1
40 TABLET, DELAYED RELEASE ORAL DAILY
Status: DISCONTINUED | OUTPATIENT
Start: 2017-01-31 | End: 2017-02-09

## 2017-01-30 RX ORDER — OXYCODONE AND ACETAMINOPHEN 10; 325 MG/1; MG/1
1 TABLET ORAL EVERY 6 HOURS PRN
Status: DISCONTINUED | OUTPATIENT
Start: 2017-01-30 | End: 2017-02-05

## 2017-01-30 RX ORDER — ISOSORBIDE DINITRATE 10 MG/1
10 TABLET ORAL 3 TIMES DAILY
Status: DISCONTINUED | OUTPATIENT
Start: 2017-01-30 | End: 2017-02-07

## 2017-01-30 RX ORDER — IBUPROFEN 200 MG
24 TABLET ORAL
Status: DISCONTINUED | OUTPATIENT
Start: 2017-01-30 | End: 2017-02-09

## 2017-01-30 RX ORDER — AMIODARONE HYDROCHLORIDE 200 MG/1
400 TABLET ORAL DAILY
Status: DISCONTINUED | OUTPATIENT
Start: 2017-01-31 | End: 2017-02-09

## 2017-01-30 RX ORDER — HEPARIN SODIUM,PORCINE/D5W 25000/250
17 INTRAVENOUS SOLUTION INTRAVENOUS CONTINUOUS
Status: DISCONTINUED | OUTPATIENT
Start: 2017-01-30 | End: 2017-02-03

## 2017-01-30 RX ORDER — FERROUS GLUCONATE 324(38)MG
324 TABLET ORAL
Status: DISCONTINUED | OUTPATIENT
Start: 2017-01-31 | End: 2017-02-07

## 2017-01-30 RX ORDER — FUROSEMIDE 10 MG/ML
40 INJECTION INTRAMUSCULAR; INTRAVENOUS 3 TIMES DAILY
Status: DISCONTINUED | OUTPATIENT
Start: 2017-01-30 | End: 2017-01-31

## 2017-01-30 RX ORDER — HYDRALAZINE HYDROCHLORIDE 10 MG/1
20 TABLET, FILM COATED ORAL EVERY 8 HOURS
Status: DISCONTINUED | OUTPATIENT
Start: 2017-01-30 | End: 2017-01-31

## 2017-01-30 RX ORDER — GLUCAGON 1 MG
1 KIT INJECTION
Status: DISCONTINUED | OUTPATIENT
Start: 2017-01-30 | End: 2017-02-09

## 2017-01-30 RX ORDER — DIPYRIDAMOLE 50 MG
100 TABLET ORAL 3 TIMES DAILY
Status: DISCONTINUED | OUTPATIENT
Start: 2017-01-30 | End: 2017-02-03

## 2017-01-30 RX ORDER — IBUPROFEN 200 MG
16 TABLET ORAL
Status: DISCONTINUED | OUTPATIENT
Start: 2017-01-30 | End: 2017-02-09

## 2017-01-30 RX ORDER — LISINOPRIL 10 MG/1
20 TABLET ORAL 2 TIMES DAILY
Status: DISCONTINUED | OUTPATIENT
Start: 2017-01-30 | End: 2017-02-09

## 2017-01-30 RX ORDER — AMLODIPINE BESYLATE 10 MG/1
10 TABLET ORAL DAILY
Status: DISCONTINUED | OUTPATIENT
Start: 2017-01-31 | End: 2017-02-09

## 2017-01-30 RX ORDER — ASPIRIN 325 MG
325 TABLET, DELAYED RELEASE (ENTERIC COATED) ORAL DAILY
Status: DISCONTINUED | OUTPATIENT
Start: 2017-01-31 | End: 2017-02-09

## 2017-01-30 RX ORDER — TAMSULOSIN HYDROCHLORIDE 0.4 MG/1
0.4 CAPSULE ORAL DAILY
Status: DISCONTINUED | OUTPATIENT
Start: 2017-01-31 | End: 2017-02-09

## 2017-01-30 RX ORDER — INSULIN ASPART 100 [IU]/ML
5 INJECTION, SOLUTION INTRAVENOUS; SUBCUTANEOUS
Status: DISCONTINUED | OUTPATIENT
Start: 2017-01-30 | End: 2017-02-09

## 2017-01-30 RX ORDER — LANOLIN ALCOHOL/MO/W.PET/CERES
400 CREAM (GRAM) TOPICAL 3 TIMES DAILY
Status: DISCONTINUED | OUTPATIENT
Start: 2017-01-30 | End: 2017-02-09

## 2017-01-30 RX ADMIN — HEPARIN SODIUM AND DEXTROSE 17 UNITS/KG/HR: 10000; 5 INJECTION INTRAVENOUS at 10:01

## 2017-01-30 RX ADMIN — FUROSEMIDE 40 MG: 10 INJECTION, SOLUTION INTRAMUSCULAR; INTRAVENOUS at 09:01

## 2017-01-30 RX ADMIN — ISOSORBIDE DINITRATE 10 MG: 10 TABLET ORAL at 09:01

## 2017-01-30 RX ADMIN — LISINOPRIL 20 MG: 20 TABLET ORAL at 09:01

## 2017-01-30 RX ADMIN — OXYCODONE HYDROCHLORIDE AND ACETAMINOPHEN 1 TABLET: 10; 325 TABLET ORAL at 09:01

## 2017-01-30 RX ADMIN — HYDRALAZINE HYDROCHLORIDE 20 MG: 10 TABLET, FILM COATED ORAL at 09:01

## 2017-01-30 RX ADMIN — MAGNESIUM OXIDE TAB 400 MG (241.3 MG ELEMENTAL MG) 400 MG: 400 (241.3 MG) TAB at 09:01

## 2017-01-30 RX ADMIN — DIPYRIDAMOLE 100 MG: 50 TABLET, FILM COATED ORAL at 09:01

## 2017-01-30 NOTE — TELEPHONE ENCOUNTER
Pt labs reviewed with Dr. Romero.  INR 2 x low 1.9 today and ldh 638.  Plan is for pt to be admitted.  Called and discussed with pt and pt verbalizes understanding and will come later today.  JANES Pavon of [facility] notifed.  Will follow up with pt soon.

## 2017-01-30 NOTE — IP AVS SNAPSHOT
[facility]  [address]  [facility] [address]  Phone: [phone]           Patient Discharge Instructions     Our goal is to set you up for success. This packet includes information on your condition, medications, and your home care. It will help you to care for yourself so you don't get sicker and need to go back to the hospital.     Please ask your nurse if you have any questions.        There are many details to remember when preparing to leave the hospital. Here is what you will need to do:    1. Take your medicine. If you are prescribed medications, review your Medication List in the following pages. You may have new medications to  at the pharmacy and others that you'll need to stop taking. Review the instructions for how and when to take your medications. Talk with your doctor or nurses if you are unsure of what to do.     2. Go to your follow-up appointments. Specific follow-up information is listed in the following pages. Your may be contacted by a transition nurse or clinical provider about future appointments. Be sure we have all of the phone numbers to reach you, if needed. Please contact your provider's office if you are unable to make an appointment.     3. Watch for warning signs. Your doctor or nurse will give you detailed warning signs to watch for and when to call for assistance. These instructions may also include educational information about your condition. If you experience any of warning signs to your health, call your doctor.               [facility]  Unless otherwise directed by your provider, please contact [facility], our nurse care line that is available for 24/7 assistance.     [phone] (toll-free)    Registered nurses in the [facility] provide clinical advisement, health education, appointment booking, and other advisory services.                    ** Verify the list of medication(s) below is accurate and up  to date. Carry this with you in case of emergency. If your medications have changed, please notify your healthcare provider.             Medication List      START taking these medications        Additional Info                      alendronate 70 MG tablet   Commonly known as:  FOSAMAX   Quantity:  12 tablet   Refills:  11   Comments:  **Patient requests 90 days supply**    Instructions:  TAKE 1 TABLET BY MOUTH EVERY 7 DAYS     Begin Date    AM    Noon    PM    Bedtime       amlodipine 10 MG tablet   Commonly known as:  NORVASC   Quantity:  30 tablet   Refills:  11   Dose:  10 mg    Last time this was given:  10 mg on 2/20/2017  8:13 AM   Instructions:  Take 1 tablet (10 mg total) by mouth once daily.     Begin Date    AM    Noon    PM    Bedtime       aspirin 81 MG EC tablet   Commonly known as:  ECOTRIN   Refills:  0   Dose:  81 mg    Last time this was given:  325 mg on 2/8/2017  8:20 AM   Instructions:  Take 1 tablet (81 mg total) by mouth once daily.     Begin Date    AM    Noon    PM    Bedtime       blood sugar diagnostic Strp   Quantity:  350 strip   Refills:  3    Instructions:  To use to check BG 4 times daily, to use with insurance preferred meter     Begin Date    AM    Noon    PM    Bedtime       blood-glucose meter kit   Quantity:  1 each   Refills:  0    Instructions:  To use to check BG 4 times daily, to use with insurance preferred meter     Begin Date    AM    Noon    PM    Bedtime       calcium-vitamin D3 500 mg(1,250mg) -200 unit per tablet   Quantity:  60 tablet   Refills:  11   Dose:  1 tablet    Instructions:  Take 1 tablet by mouth 2 (two) times daily with meals.     Begin Date    AM    Noon    PM    Bedtime       furosemide 80 MG tablet   Commonly known as:  LASIX   Quantity:  60 tablet   Refills:  11   Dose:  40 mg    Last time this was given:  40 mg on 2/22/2017  9:43 AM   Instructions:  Take 0.5 tablets (40 mg total) by mouth 2 (two) times daily.     Begin Date    AM    Noon    PM     Bedtime       insulin aspart 100 unit/mL Inpn pen   Commonly known as:  NovoLOG   Quantity:  1 Box   Refills:  0   Dose:  15 Units    Last time this was given:  15 Units on 2/22/2017 11:55 AM   Instructions:  Inject 15 Units into the skin 3 (three) times daily.     Begin Date    AM    Noon    PM    Bedtime       lancets Misc   Quantity:  350 each   Refills:  3    Instructions:  To use to check BG 4 times daily, to use with insurance preferred meter     Begin Date    AM    Noon    PM    Bedtime       mycophenolate 250 mg Cap   Commonly known as:  CELLCEPT   Quantity:  360 capsule   Refills:  11   Dose:  1500 mg    Last time this was given:  1,500 mg on 2/22/2017  9:42 AM   Instructions:  Take 6 capsules (1,500 mg total) by mouth 2 (two) times daily.     Begin Date    AM    Noon    PM    Bedtime       nystatin 100,000 unit/mL suspension   Commonly known as:  MYCOSTATIN   Quantity:  473 mL   Refills:  5   Dose:  692810 Units    Last time this was given:  500,000 Units on 2/22/2017 11:56 AM   Instructions:  Take 5 mLs (500,000 Units total) by mouth 4 (four) times daily with meals and nightly.     Begin Date    AM    Noon    PM    Bedtime       oxycodone 10 mg Tab immediate release tablet   Commonly known as:  ROXICODONE   Quantity:  56 tablet   Refills:  0   Dose:  10 mg    Last time this was given:  10 mg on 2/22/2017  9:43 AM   Instructions:  Take 1 tablet (10 mg total) by mouth every 4 (four) hours as needed (pain).     Begin Date    AM    Noon    PM    Bedtime       pravastatin 40 MG tablet   Commonly known as:  PRAVACHOL   Quantity:  30 tablet   Refills:  11   Dose:  40 mg    Instructions:  Take 1 tablet (40 mg total) by mouth once daily.     Begin Date    AM    Noon    PM    Bedtime       predniSONE 10 MG tablet   Commonly known as:  DELTASONE   Quantity:  120 tablet   Refills:  11   Dose:  15 mg    Last time this was given:  20 mg on 2/22/2017  9:41 AM   Instructions:  Take 1.5 tablets (15 mg total) by mouth 2  (two) times daily.     Begin Date    AM    Noon    PM    Bedtime       sulfamethoxazole-trimethoprim 400-80mg 400-80 mg per tablet   Commonly known as:  BACTRIM,SEPTRA   Quantity:  30 tablet   Refills:  11   Dose:  1 tablet   Indications:  Opportunistic infection prophylaxis    Last time this was given:  1 tablet on 2/22/2017  9:42 AM   Instructions:  Take 1 tablet by mouth once daily.     Begin Date    AM    Noon    PM    Bedtime       tacrolimus 1 MG Cap   Commonly known as:  PROGRAF   Quantity:  150 capsule   Refills:  11    Last time this was given:  3 mg on 2/22/2017  9:41 AM   Instructions:  Take 3mg orally in the morning and 2mg orally in the evening     Begin Date    AM    Noon    PM    Bedtime       terbutaline 5 mg Tab   Commonly known as:  BRETHINE   Quantity:  90 tablet   Refills:  11   Dose:  5 mg    Last time this was given:  5 mg on 2/22/2017  5:03 AM   Instructions:  Take 1 tablet (5 mg total) by mouth 3 (three) times daily.     Begin Date    AM    Noon    PM    Bedtime       valganciclovir 450 mg Tab   Commonly known as:  VALCYTE   Quantity:  60 tablet   Refills:  2   Dose:  900 mg    Last time this was given:  900 mg on 2/22/2017  9:41 AM   Instructions:  Take 2 tablets (900 mg total) by mouth once daily.     Begin Date    AM    Noon    PM    Bedtime         CHANGE how you take these medications        Additional Info                      insulin glargine 100 unit/mL (3 mL) Inpn pen   Commonly known as:  LANTUS SOLOSTAR   Quantity:  15 mL   Refills:  3   Dose:  28 Units   What changed:    - how much to take  - when to take this    Last time this was given:  28 Units on 2/21/2017  9:00 PM   Instructions:  Inject 28 Units into the skin every evening.     Begin Date    AM    Noon    PM    Bedtime       magnesium oxide 400 mg tablet   Commonly known as:  MAG-OX   Quantity:  270 tablet   Refills:  3   Dose:  400 mg   What changed:  when to take this    Last time this was given:  400 mg on 2/22/2017   9:42 AM   Instructions:  Take 1 tablet (400 mg total) by mouth 2 (two) times daily.     Begin Date    AM    Noon    PM    Bedtime         CONTINUE taking these medications        Additional Info                      docusate sodium 100 MG capsule   Commonly known as:  COLACE   Quantity:  180 capsule   Refills:  3   Dose:  180 mg    Last time this was given:  100 mg on 2/22/2017  9:42 AM   Instructions:  Take 2 capsules (200 mg total) by mouth every evening.     Begin Date    AM    Noon    PM    Bedtime       esomeprazole 40 MG capsule   Commonly known as:  NEXIUM   Quantity:  90 capsule   Refills:  3   Dose:  40 mg    Instructions:  Take 1 capsule (40 mg total) by mouth before breakfast.     Begin Date    AM    Noon    PM    Bedtime       ferrous gluconate 324 MG tablet   Commonly known as:  FERGON   Quantity:  90 tablet   Refills:  3   Dose:  324 mg    Last time this was given:  324 mg on 2/8/2017  1:23 PM   Instructions:  Take 1 tablet (324 mg total) by mouth daily with breakfast.     Begin Date    AM    Noon    PM    Bedtime            Where to Get Your Medications      These medications were sent to [facility] - [place], [place] - [address], [place] [address]     Phone:  [phone]     nystatin 100,000 unit/mL suspension         [facility] [address] [place] [address] ALESHIA KUHN - [address] AT [facility] [address]  [address] [address]    Hours:  24-hours Phone:  [phone]     alendronate 70 MG tablet    amlodipine 10 MG tablet    blood sugar diagnostic Strp    blood-glucose meter kit    calcium-vitamin D3 500 mg(1,250mg) -200 unit per tablet    lancets Misc    magnesium oxide 400 mg tablet    mycophenolate 250 mg Cap    pravastatin 40 MG tablet    predniSONE 10 MG tablet    sulfamethoxazole-trimethoprim 400-80mg 400-80 mg per tablet    tacrolimus 1 MG Cap    terbutaline 5 mg Tab     valganciclovir 450 mg Tab         You can get these medications from any pharmacy     Bring a paper prescription for each of these medications     oxycodone 10 mg Tab immediate release tablet       You don't need a prescription for these medications     aspirin 81 MG EC tablet         Information about where to get these medications is not yet available     ! Ask your nurse or doctor about these medications     furosemide 80 MG tablet    insulin aspart 100 unit/mL Inpn pen    insulin glargine 100 unit/mL (3 mL) Inpn pen                  Please bring to all follow up appointments:    1. A copy of your discharge instructions.  2. All medicines you are currently taking in their original bottles.  3. Identification and insurance card.    Please arrive 15 minutes ahead of scheduled appointment time.    Please call 24 hours in advance if you must reschedule your appointment and/or time.        Your Scheduled Appointments     Mar 01, 2017  8:05 AM CST   Fasting Lab with LAB, APPOINTMENT [place] [facility])    [address] [facility] [address]   [phone]            Mar 01, 2017 11:00 AM CST   Established Patient Visit with Joan Benjamin PA-C   [facility] ([facility] )    [address]  [place] [address]   [phone]            Mar 07, 2017  7:50 AM CST   Fasting Lab with LAB, APPOINTMENT [place] [facility])    [address] [facility] [address]   [phone]            Mar 07, 2017 10:00 AM CST   Heart Transplant  Visit with GERARD,    [facility] ([facility] )    [address]  [place] [address]   [phone]            Mar 07, 2017  4:00 PM CST   Established Patient Visit with Ryan Dawn MD   [facility] ([facility] )    [address]  [place] [address]   [phone]               Your Future Surgeries/Procedures     Mar 07, 2017   Surgery with Trixie Marx MD   [facility])    [address] [facility] [address] [phone]            Mar 21, 2017   Surgery with Josefina Saul MD   [facility] ([facility])    [address] [facility] [address] [phone]            Apr 04, 2017   Surgery with Peewee Romero MD   [facility] ([facility])    [address] [facility] [address] [phone]            May 02, 2017   Surgery with Josefina Saul MD   [facility] ([facility])    [address] [facility] [address] [phone]                  Primary Diagnosis     Your primary diagnosis was:  Status Post Heart Transplantation      Admission Information     Date & Time Provider Department CSN    1/30/2017  7:10 PM Peewee Romero MD [facility] [id number]      Care Providers     Provider Role Specialty Primary office phone    Peewee Romero MD Attending Provider Cardiology [phone]    Julia Gupta MD Team Attending  Cardiology [phone]    Peewee Romero MD Consulting Physician  Cardiology [phone]    Josefina Saul MD Team Attending  Cardiology [phone]    Trixie Marx MD Team Attending  Cardiology [phone]    Ryan Dawn MD Team Attending  Cardiology [phone]    Colette Bermudez MD (Inactive) Consulting Physician  Endocrinology [phone]    Mandi Madrid MD Consulting Physician  Endocrinology [phone]    Malina Colon MD Consulting Physician  Endocrinology [phone]    Jorge Lujan II, MD Consulting Physician  Endocrinology [phone]    Morro Munoz MD Consulting Physician  Endocrinology [phone]    Debbie Gaytan MD Consulting Physician  Endocrinology [phone]      Your Vitals Were     BP  "Pulse Temp Resp Height Weight    118/56 (BP Location: Right arm, Patient Position: Lying, BP Method: Automatic) 80 97.6 °F (36.4 °C) (Oral) 18 5' 8" (1.727 m) 97.2 kg (214 lb 4.6 oz)    SpO2 BMI             96% 32.58 kg/m2         Recent Lab Values        6/15/2015 8/20/2016 8/22/2016 8/23/2016 12/28/2016 2/9/2017            5:29 AM 12:00 AM  2:50 PM  4:00 AM 10:41 AM  2:17 PM      A1C 7.9 (H) 7.8 (H) 8.1 (H) 8.3 (H) 7.8 (H) 6.8 (H)      Comment for A1C at 12:00 AM on 8/20/2016:  According to ADA guidelines, hemoglobin A1C <7.0% represents  optimal control in non-pregnant diabetic patients.  Different  metrics may apply to specific populations.   Standards of Medical Care in Diabetes - 2016.  For the purpose of screening for the presence of diabetes:  <5.7%     Consistent with the absence of diabetes  5.7-6.4%  Consistent with increasing risk for diabetes   (prediabetes)  >or=6.5%  Consistent with diabetes  Currently no consensus exists for use of hemoglobin A1C  for diagnosis of diabetes for children.      Comment for A1C at  2:50 PM on 8/22/2016:  According to ADA guidelines, hemoglobin A1C <7.0% represents  optimal control in non-pregnant diabetic patients.  Different  metrics may apply to specific populations.   Standards of Medical Care in Diabetes - 2016.  For the purpose of screening for the presence of diabetes:  <5.7%     Consistent with the absence of diabetes  5.7-6.4%  Consistent with increasing risk for diabetes   (prediabetes)  >or=6.5%  Consistent with diabetes  Currently no consensus exists for use of hemoglobin A1C  for diagnosis of diabetes for children.      Comment for A1C at  4:00 AM on 8/23/2016:  According to ADA guidelines, hemoglobin A1C <7.0% represents  optimal control in non-pregnant diabetic patients.  Different  metrics may apply to specific populations.   Standards of Medical Care in Diabetes - 2016.  For the purpose of screening for the presence of diabetes:  <5.7%     Consistent with " the absence of diabetes  5.7-6.4%  Consistent with increasing risk for diabetes   (prediabetes)  >or=6.5%  Consistent with diabetes  Currently no consensus exists for use of hemoglobin A1C  for diagnosis of diabetes for children.      Comment for A1C at 10:41 AM on 12/28/2016:  According to ADA guidelines, hemoglobin A1C <7.0% represents  optimal control in non-pregnant diabetic patients.  Different  metrics may apply to specific populations.   Standards of Medical Care in Diabetes - 2016.  For the purpose of screening for the presence of diabetes:  <5.7%     Consistent with the absence of diabetes  5.7-6.4%  Consistent with increasing risk for diabetes   (prediabetes)  >or=6.5%  Consistent with diabetes  Currently no consensus exists for use of hemoglobin A1C  for diagnosis of diabetes for children.      Comment for A1C at  2:17 PM on 2/9/2017:  According to ADA guidelines, hemoglobin A1C <7.0% represents  optimal control in non-pregnant diabetic patients.  Different  metrics may apply to specific populations.   Standards of Medical Care in Diabetes - 2016.  For the purpose of screening for the presence of diabetes:  <5.7%     Consistent with the absence of diabetes  5.7-6.4%  Consistent with increasing risk for diabetes   (prediabetes)  >or=6.5%  Consistent with diabetes  Currently no consensus exists for use of hemoglobin A1C  for diagnosis of diabetes for children.        Pending Labs     Order Current Status    Magnesium In process    Phosphorus In process    Prepare Fresh Frozen Plasma 4 Units In process    Prepare Platelets 1 Dose In process    Prepare RBC 1 Unit In process    Prepare RBC 1 Unit In process    Prepare RBC 4 Units; Transplant OR In process    Prepare RBC 4 Units; heart transplant In process    Blood culture Preliminary result    Blood culture Preliminary result    Prepare Cryoprecipitate 2 Doses (20 Units) Preliminary result    Prepare Fresh Frozen Plasma Preliminary result    Prepare Platelets  1 Dose Preliminary result      Allergies as of 2/22/2017        Reactions    Levemir [Insulin Detemir] Itching    Pork/porcine Containing Products     Ranexa [Ranolazine] Nausea Only      Advance Directives     An advance directive is a document which, in the event you are no longer able to make decisions for yourself, tells your healthcare team what kind of treatment you do or do not want to receive, or who you would like to make those decisions for you.  If you do not currently have an advance directive, [facility] encourages you to create one.  For more information call:  [phone]), [phone] ([phone]),  or log on to [email or web address].        Smoking Cessation     If you would like to quit smoking:   You may be eligible for free services if you are a [place] resident and started smoking cigarettes before September 1, 1988.  Call the Smoking Cessation Trust ([facility]) toll free at [phone] or [phone] 120-7516.   Call -290-QUIT-NOW if you do not meet the above criteria.            Language Assistance Services     ATTENTION: Language assistance services are available, free of charge. Please call [phone].      ATENCIÓN: Si habla español, tiene a cowart disposición servicios gratuitos de asistencia lingüística. Llame al [phone].     CHÚ Ý: N?u b?n nói Ti?ng Vi?t, có các d?ch v? h? tr? ngôn ng? mi?n phí dành cho b?n. G?i s? [phone].        Blood Transfusion Reaction Signs and Symptoms     The blood you have received has been matched for you as carefully as possible. Most patients who receive a blood transfusion do not experience problems. However, there can be a delayed reaction that happens a few weeks after your blood transfusion. Contact your physician immediately if you experience any NEW SYMPTOMS listed below:     Fever greater than 100.4 degrees    Chills   Yellow color to your skin or eyes(Jaundice)   Back pain, chest pain, or pain at the infusion  site   Weakness (more than usual)   Discomfort or uneasiness more than usual (Malaise)   Nausea or vomiting   Shortness of breath, wheezing, or coughing   Higher or lower blood pressure than normal   Skin rash, itching, skin redness, or localized swelling (example: hands or feet)   Urinating less than normal   Urine appears reddish or orange and is darker than normal      Remember that some these signs may already exist for you--such as having chronic back pain or high blood pressure. You only need to look for and report to your doctor any new occurrences since your blood transfusion that are of concern.        Heart Failure Education       Heart Failure: Being Active  You have a condition called heart failure. Being active doesnt mean that you have to wear yourself out. Even a little movement each day helps to strengthen your heart. If you cant get out to exercise, you can do simple stretching and strengthening exercises at home. These are good ways to keep you well-conditioned and prevent you and your heart from becoming excessively weak.    Ideas to get you started  · Add a little movement to things you do now. Walk to mail letters. Park your car at the far end of the parking lot and walk to the store. Walk up a flight of stairs instead of taking the elevator.  · Choose activities you enjoy. You might walk, swim, or ride an exercise bike. Things like gardening and washing the car count, too. Other possibilities include: washing dishes, walking the dog, walking around the mall, and doing aerobic activities with friends.  · Join a group exercise program at a [facility], a senior center, or a community center. Or look into a hospital cardiac rehabilitation program. Ask your doctor if you qualify.  Tips to keep you going  · Get up and get dressed each day. Go to a coffee shop and read a newspaper or go somewhere that you'll be in the presence of other active people. Youll feel more like being  active.  · Make a plan. Choose one or more activities that you enjoy and that you can easily do. Then plan to do at least one each day. You might write your plan on a calendar.  · Go with a friend or a group if you like company. This can help you feel supported and stay motivated, too.  · Plan social events that you enjoy. This will keep you mentally engaged as well as physically motivated to do things you find pleasure in.  For your safety  · Talk with your healthcare provider before starting an exercise program.  · Exercise indoors when its too hot or too cold outside, or when the air quality is poor. Try walking at a shopping mall.  · Wear socks and sturdy shoes to maintain your balance and prevent falls.  · Start slowly. Do a few minutes several times a day at first. Increase your time and speed little by little.  · Stop and rest whenever you feel tired or get short of breath.  · Dont push yourself on days when you dont feel well.  Date Last Reviewed: 3/20/2016  © 3286-0104 [employer]. [address] [place] [address]. All rights reserved. This information is not intended as a substitute for professional medical care. Always follow your healthcare professional's instructions.              Heart Failure: Evaluating Your Heart  You have a condition called heart failure. To evaluate your condition, your doctor will examine you, ask questions, and do some tests. Along with looking for signs of heart failure, the doctor looks for any other health problems that may have led to heart failure. The results of your evaluation will help your doctor form a treatment plan.  Health history and physical exam  Your visit will start with a health history. Tell the doctor about any symptoms youve noticed and about all medicines you take. Then youll have a physical exam. This includes listening to your heartbeat and breathing. Youll also be checked for swelling (edema) in your legs and neck. When you  have fluid buildup or fluid in the lungs, it may be called congestive heart failure.  Diagnosing heart failure     During an echocardiogram, sound waves bounce off the heart. These are converted into a picture on the screen.   The following may be done to help your doctor form a diagnosis:  · X-rays show the size and shape of your heart. These pictures can also show fluid in your lungs.  · An electrocardiogram (ECG or EKG) shows the pattern of your heartbeat. Small pads (electrodes) are placed on your chest, arms, and legs. Wires connect the pads to the ECG machine, which records your hearts electrical signals. This can give the doctor information about heart function.  · An echocardiogram uses ultrasound waves to show the structure and movement of your heart muscle. This shows how well the heart pumps. It also shows the thickness of the heart walls, and if the heart is enlarged. It is one of the most useful, non-invasive tests as it provides information about the heart's general function. This helps your doctor make treatment decisions.  · Lab tests evaluate small amounts of blood or urine for signs of problems. A BNP lab test can help diagnose and evaluate heart failure. BNP stands for B-type natriuretic peptide. The ventricles secrete more BNP when heart failure worsens. Lab tests can also provide information about metabolic dysfunction or heart dysfunction.  Your treatment plan  Based on the results of your evaluation and tests, your doctor will develop a treatment plan. This plan is designed to relieve some of your heart failure symptoms and help make you more comfortable. Your treatment plan may include:  · Medicine to help your heart work better and improve your quality of life  · Changes in what you eat and drink to help prevent fluid from backing up in your body  · Daily monitoring of your weight and heart failure symptoms to see how well your treatment plan is working  · Exercise to help you stay  healthy  · Help with quitting smoking  · Emotional and psychological support to help adjust to the changes  · Referrals to other specialists to make sure you are being treated comprehensively  Date Last Reviewed: 3/21/2016  © 5773-0590 [employer]. [address] [place] [address], [place] [address]. All rights reserved. This information is not intended as a substitute for professional medical care. Always follow your healthcare professional's instructions.              Heart Failure: Making Changes to Your Diet  You have a condition called heart failure. When you have heart failure, excess fluid is more likely to build up in your body because your heart isn't working well. This makes the heart work harder to pump blood. Fluid buildup causes symptoms such as shortness of breath and swelling (edema). This is often referred to as congestive heart failure or CHF. Controlling the amount of salt (sodium) you eat may help stop fluid from building up. Your doctor may also tell you to reduce the amount of fluid you drink.  Reading food labels    Your healthcare provider will tell you how much sodium you can eat each day. Read food labels to keep track. Keep in mind that certain foods are high in salt. These include canned, frozen, and processed foods. Check the amount of sodium in each serving. Watch out for high-sodium ingredients. These include MSG (monosodium glutamate), baking soda, and sodium phosphate.   Eating less salt  Give yourself time to get used to eating less salt. It may take a little while. Here are some tips to help:  · Take the saltshaker off the table. Replace it with salt-free herb mixes and spices.  · Eat fresh or plain frozen vegetables. These have much less salt than canned vegetables.  · Choose low-sodium snacks like sodium-free pretzels, crackers, or air-popped popcorn.  · Dont add salt to your food when youre cooking. Instead, season your foods with pepper, lemon, garlic, or onion.  · When  you eat out, ask that your food be cooked without added salt.  · Avoid eating fried foods as these often have a great deal of salt.  If youre told to limit fluids  You may need to limit how much fluid you have to help prevent swelling. This includes anything that is liquid at room temperature, such as ice cream and soup. If your doctor tells you to limit fluid, try these tips:  · Measure drinks in a measuring cup before you drink them. This will help you meet daily goals.  · Chill drinks to make them more refreshing.  · Suck on frozen lemon wedges to quench thirst.  · Only drink when youre thirsty.  · Chew sugarless gum or suck on hard candy to keep your mouth moist.  · Weigh yourself daily to know if your body's fluid content is rising.  My sodium goal  Your healthcare provider may give you a sodium goal to meet each day. This includes sodium found in food as well as salt that you add. My goal is to eat no more than ___________ mg of sodium per day.     When to call your doctor  Call your doctor right away if you have any symptoms of worsening heart failure. These can include:  · Sudden weight gain  · Increased swelling of your legs or ankles  · Trouble breathing when youre resting or at night  · Increase in the number of pillows you have to sleep on  · Chest pain, pressure, discomfort, or pain in the jaw, neck, or back   Date Last Reviewed: 3/21/2016  © 7909-4044 [employer]. [address] [place] [address], [place] [address]. All rights reserved. This information is not intended as a substitute for professional medical care. Always follow your healthcare professional's instructions.              Heart Failure: Medicines to Help Your Heart    You have a condition called heart failure (also known as congestive heart failure, or CHF). Your doctor will likely prescribe medicines for heart failure and any underlying health problems you have. Most heart failure patients take one or more types of medicinen. Your  healthcare provider will work to find the combination of medicines that works best for you.  Heart failure medicines  Here are the most common heart failure medicines:  · ACE inhibitors lower blood pressure and decrease strain on the heart. This makes it easier for the heart to pump. Angiotensin receptor blockers have similar effects. These are prescribed for some patients instead of ACE inhibitors.  · Beta-blockers relieve stress on the heart. They also improve symptoms. They may also improve the heart's pumping action over time.  · Diuretics (also called water pills) help rid your body of excess water. This can help rid your body of swelling (edema). Having less fluid to pump means your heart doesnt have to work as hard. Some diuretics make your body lose a mineral called potassium. Your doctor will tell you if you need to take supplements or eat more foods high in potassium.  · Digoxin helps your heart pump with more strength. This helps your heart pump more blood with each beat. So, more oxygen-rich blood travels to the rest of the body.  · Aldosterone antagonists help alter hormones and decrease strain on the heart.  · Hydralazine and nitrates are two separate medicines used together to treat heart failure. They may come in one combination pill. They lower blood pressure and decrease how hard the heart has to pump.  Medicines for related conditions  Controlling other heart problems helps keep heart failure under control, too. Depending on other heart problems you have, medicines may be prescribed to:  · Lower blood pressure (antihypertensives).  · Lower cholesterol levels (statins).  · Prevent blood clots (anticoagulants or aspirin).  · Keep the heartbeat steady (antiarrhythmics).  Date Last Reviewed: 3/5/2016  © 3489-2113 [employer]. [address] [place] [address], [place] [address]. All rights reserved. This information is not intended as a substitute for professional medical care. Always follow  your healthcare professional's instructions.              Heart Failure: Procedures That May Help    The heart is a muscle that pumps oxygen-rich blood to all parts of the body. When you have heart failure, the heart is not able to pump as well as it should. Blood and fluid may back up into the lungs (congestive heart failure), and some parts of the body dont get enough oxygen-rich blood to work normally. These problems lead to the symptoms of heart failure.     Certain procedures may help the heart pump better in some cases of heart failure. Some procedures are done to treat health problems that may have caused the heart failure such as coronary artery disease or heart rhythm problems. For more serious heart failure, other options are available.  Treating artery and valve problems  If you have coronary artery disease or valve disease, procedures may be done to improve blood flow. This helps the heart pump better, which can improve heart failure symptoms. First, your doctor may do a cardiac catheterization to help detect clogged blood vessels or valve damage. During this procedure, a  thin tube (catheter) in inserted into a blood vessel and guided to the heart. There a dye is injected and a special type of X-ray (angiogram) is taken of the blood vessels. Procedures to open a blocked artery or fix damaged valves can also be done using catheterization.  · Angioplasty uses a balloon-tipped instrument at the end of the catheter. The balloon is inflated to widen the narrowed artery. In many cases, a stent is expanded to further support the narrowed artery. A stent is a metal mesh tube.  · Valve surgery repairs or replacement of faulty valves can also be done during catheterization so blood can flow properly through the chambers of the heart.  Bypass surgery is another option to help treat blocked arteries. It uses a healthy blood vessel from elsewhere in the body. The healthy blood vessel is attached above and below the  blocked area so that blood can flow around the blocked artery.  Treating heart rhythm problems  A device may be placed in the chest to help a weak heart maintain a healthy, heartbeat so the heart can pump more effectively:  · Pacemaker. A pacemaker is an implanted device that regulates your heartbeat electronically. It monitors your heart's rhythm and generates a painless electric impulse that helps the heart beat in a regular rhythm. A pacemaker is programmed to meet your specific heart rhythm needs.  · Biventricular pacing/cardiac resynchronization therapy. A type of pacemaker that paces both pumping chambers of the heart at the same time to coordinate contractions and to improve the heart's function. Some people with heart failure are candidates for this therapy.  · Implantable cardioverter defibrillator. A device similar to a pacemaker that senses when the heart is beating too fast and delivers an electrical shock to convert the fast rhythm to a normal rhythm. This can be a life saving device.  In severe cases  In more serious cases of heart failure when other treatments no longer work, other options may include:  · Ventricular assist devices (VADs). These are mechanical devices used to take over the pumping function for one or both of the heart's ventricles, or pumping chambers. A VAD may be necessary when heart failure progresses to the point that medicines and other treatments no longer help. In some cases, a VAD may be used as a bridge to transplant.  · Heart transplant. This is replacing the diseased heart with a healthy one from a donor. This is an option for a few people who are very sick. A heart transplant is very serious and not an option for all patients. Your doctor can tell you more.  Date Last Reviewed: 3/20/2016  © 4095-7186 [employer]. [address] [place] [address], [place] [address]. All rights reserved. This information is not intended as a substitute for professional medical care.  Always follow your healthcare professional's instructions.              Heart Failure: Tracking Your Weight  You have a condition called heart failure. When you have heart failure, a sudden weight gain or a steady rise in weight is a warning sign that your body is retaining too much water and salt. This could mean your heart failure is getting worse. If left untreated, it can cause problems for your lungs and result in shortness of breath. Weighing yourself each day is the best way to know if youre retaining water. If your weight goes up quickly, call your doctor. You will be given instructions on how to get rid of the excess water. You will likely need medicines and to avoid salt. This will help your heart work better.  Call your doctor if you gain more than 2 pounds in 1 day, more than 5 pounds in 1 week, or whatever weight gain you were told to report by your doctor. This is often a sign of worsening heart failure and needs to be evaluated and treated. Your doctor will tell you what to do next.   Tips for weighing yourself    · Weigh yourself at the same time each morning, wearing the same clothes. Weigh yourself after urinating and before eating.  · Use the same scale each day. Make sure the numbers are easy to read. Put the scale on a flat, hard surface -- not on a rug or carpet.  · Do not stop weighing yourself. If you forget one day, weigh again the next morning.  How to use your weight chart  · Keep your weight chart near the scale. Write your weight on the chart as soon as you get off the scale.  · Fill in the month and the start date on the chart. Then write down your weight each day. Your chart will look like this:    · If you miss a day, leave the space blank. Weigh yourself the next day and write your weight in the next space.  · Take your weight chart with you when you go to see your doctor.  Date Last Reviewed: 3/20/2016  © 6511-2185 [employer]. [address] [place] [address], [place]  [address]. All rights reserved. This information is not intended as a substitute for professional medical care. Always follow your healthcare professional's instructions.              Heart Failure: Warning Signs of a Flare-Up  You have a condition called heart failure. Once you have heart failure, flare-ups can happen. Below are signs that can mean your heart failure is getting worse. If you notice any of these warning signs, call your healthcare provider.  Swelling    · Your feet, ankles, or lower legs get puffier.  · You notice skin changes on your lower legs.  · Your shoes feel too tight.  · Your clothes are tighter in the waist.  · You have trouble getting rings on or off your fingers.  Shortness of breath  · You have to breathe harder even when youre doing your normal activities or when youre resting.  · You are short of breath walking up stairs or even short distances.  · You wake up at night short of breath or coughing.  · You need to use more pillows or sit up to sleep.  · You wake up tired or restless.  Other warning signs  · You feel weaker, dizzy, or more tired.  · You have chest pain or changes in your heartbeat.  · You have a cough that wont go away.  · You cant remember things or dont feel like eating.  Tracking your weight  Gaining weight is often the first warning sign that heart failure is getting worse. Gaining even a few pounds can be a sign that your body is retaining excess water and salt. Weighing yourself each day in the morning after you urinate and before you eat, is the best way to know if you're retaining water. Get a scale that is easy to read and make sure you wear the same clothes and use the same scale every time you weigh. Your healthcare provider will show you how to track your weight. Call your doctor if you gain more than 2 pounds in 1 day, 5 pounds in 1 week, or whatever weight gain you were told to report by your doctor. This is often a sign of worsening heart failure and needs  to be evaluated and treated before it compromises your breathing. Your doctor will tell you what to do next.    Date Last Reviewed: 3/15/2016  © 7761-6910 [employer]. [address] [place] [address], [place] [address]. All rights reserved. This information is not intended as a substitute for professional medical care. Always follow your healthcare professional's instructions.              Chronic Kindey Disease Education             Diabetes Discharge Instructions                                    [facility] complies with applicable Federal civil rights laws and does not discriminate on the basis of race, color, national origin, age, disability, or sex.

## 2017-01-31 ENCOUNTER — SOCIAL WORK (OUTPATIENT)
Dept: TRANSPLANT | Facility: CLINIC | Age: 57
End: 2017-01-31

## 2017-01-31 LAB
ANION GAP SERPL CALC-SCNC: 8 MMOL/L
APTT BLDCRRT: 42.1 SEC
BASOPHILS # BLD AUTO: 0.05 K/UL
BASOPHILS NFR BLD: 0.6 %
BUN SERPL-MCNC: 13 MG/DL
CALCIUM SERPL-MCNC: 9.1 MG/DL
CHLORIDE SERPL-SCNC: 97 MMOL/L
CO2 SERPL-SCNC: 31 MMOL/L
CREAT SERPL-MCNC: 0.9 MG/DL
DIFFERENTIAL METHOD: ABNORMAL
EOSINOPHIL # BLD AUTO: 0.5 K/UL
EOSINOPHIL NFR BLD: 5.8 %
ERYTHROCYTE [DISTWIDTH] IN BLOOD BY AUTOMATED COUNT: 20.9 %
EST. GFR  (AFRICAN AMERICAN): >60 ML/MIN/1.73 M^2
EST. GFR  (NON AFRICAN AMERICAN): >60 ML/MIN/1.73 M^2
FACT X PPP CHRO-ACNC: 0.19 IU/ML
FACT X PPP CHRO-ACNC: 0.23 IU/ML
GLUCOSE SERPL-MCNC: 201 MG/DL
HCT VFR BLD AUTO: 30.5 %
HGB BLD-MCNC: 9.5 G/DL
INR PPP: 1.6
LDH SERPL L TO P-CCNC: 599 U/L
LYMPHOCYTES # BLD AUTO: 2.8 K/UL
LYMPHOCYTES NFR BLD: 30.6 %
MAGNESIUM SERPL-MCNC: 1.6 MG/DL
MCH RBC QN AUTO: 18.5 PG
MCHC RBC AUTO-ENTMCNC: 31.1 %
MCV RBC AUTO: 59 FL
MITRAL VALVE MOBILITY: NORMAL
MITRAL VALVE REGURGITATION: ABNORMAL
MONOCYTES # BLD AUTO: 0.5 K/UL
MONOCYTES NFR BLD: 5.8 %
NEUTROPHILS # BLD AUTO: 5.2 K/UL
NEUTROPHILS NFR BLD: 57.2 %
PHOSPHATE SERPL-MCNC: 4.9 MG/DL
PLATELET # BLD AUTO: 238 K/UL
PLATELET BLD QL SMEAR: ABNORMAL
PMV BLD AUTO: ABNORMAL FL
POCT GLUCOSE: 170 MG/DL (ref 70–110)
POCT GLUCOSE: 179 MG/DL (ref 70–110)
POCT GLUCOSE: 205 MG/DL (ref 70–110)
POTASSIUM SERPL-SCNC: 4.1 MMOL/L
PROTHROMBIN TIME: 16.6 SEC
RBC # BLD AUTO: 5.14 M/UL
RETIRED EF AND QEF - SEE NOTES: 15 (ref 55–65)
SODIUM SERPL-SCNC: 136 MMOL/L
WBC # BLD AUTO: 9.09 K/UL

## 2017-01-31 PROCEDURE — 85730 THROMBOPLASTIN TIME PARTIAL: CPT

## 2017-01-31 PROCEDURE — 27000248 HC VAD-ADDITIONAL DAY

## 2017-01-31 PROCEDURE — 93306 TTE W/DOPPLER COMPLETE: CPT

## 2017-01-31 PROCEDURE — 84100 ASSAY OF PHOSPHORUS: CPT

## 2017-01-31 PROCEDURE — 25000003 PHARM REV CODE 250: Performed by: STUDENT IN AN ORGANIZED HEALTH CARE EDUCATION/TRAINING PROGRAM

## 2017-01-31 PROCEDURE — 99223 1ST HOSP IP/OBS HIGH 75: CPT | Mod: ,,, | Performed by: INTERNAL MEDICINE

## 2017-01-31 PROCEDURE — 93306 TTE W/DOPPLER COMPLETE: CPT | Mod: 26,,, | Performed by: INTERNAL MEDICINE

## 2017-01-31 PROCEDURE — 85520 HEPARIN ASSAY: CPT

## 2017-01-31 PROCEDURE — 85610 PROTHROMBIN TIME: CPT

## 2017-01-31 PROCEDURE — 25000003 PHARM REV CODE 250: Performed by: PHYSICIAN ASSISTANT

## 2017-01-31 PROCEDURE — 25000003 PHARM REV CODE 250: Performed by: HOSPITALIST

## 2017-01-31 PROCEDURE — 25000003 PHARM REV CODE 250: Performed by: INTERNAL MEDICINE

## 2017-01-31 PROCEDURE — 83051 HEMOGLOBIN PLASMA: CPT

## 2017-01-31 PROCEDURE — 85025 COMPLETE CBC W/AUTO DIFF WBC: CPT

## 2017-01-31 PROCEDURE — 36415 COLL VENOUS BLD VENIPUNCTURE: CPT

## 2017-01-31 PROCEDURE — 63600175 PHARM REV CODE 636 W HCPCS: Performed by: STUDENT IN AN ORGANIZED HEALTH CARE EDUCATION/TRAINING PROGRAM

## 2017-01-31 PROCEDURE — 83735 ASSAY OF MAGNESIUM: CPT

## 2017-01-31 PROCEDURE — 83615 LACTATE (LD) (LDH) ENZYME: CPT

## 2017-01-31 PROCEDURE — 80048 BASIC METABOLIC PNL TOTAL CA: CPT

## 2017-01-31 PROCEDURE — 63600175 PHARM REV CODE 636 W HCPCS: Performed by: PHYSICIAN ASSISTANT

## 2017-01-31 PROCEDURE — 93750 INTERROGATION VAD IN PERSON: CPT | Mod: ,,, | Performed by: INTERNAL MEDICINE

## 2017-01-31 PROCEDURE — 93750 INTERROGATION VAD IN PERSON: CPT | Performed by: PHYSICIAN ASSISTANT

## 2017-01-31 PROCEDURE — 63600175 PHARM REV CODE 636 W HCPCS: Performed by: HOSPITALIST

## 2017-01-31 PROCEDURE — 20600001 HC STEP DOWN PRIVATE ROOM

## 2017-01-31 RX ORDER — INSULIN ASPART 100 [IU]/ML
20 INJECTION, SOLUTION INTRAVENOUS; SUBCUTANEOUS
Status: DISCONTINUED | OUTPATIENT
Start: 2017-01-31 | End: 2017-02-09

## 2017-01-31 RX ORDER — HYDRALAZINE HYDROCHLORIDE 10 MG/1
30 TABLET, FILM COATED ORAL EVERY 8 HOURS
Status: DISCONTINUED | OUTPATIENT
Start: 2017-01-31 | End: 2017-02-01

## 2017-01-31 RX ORDER — WARFARIN 4 MG/1
4 TABLET ORAL ONCE
Status: COMPLETED | OUTPATIENT
Start: 2017-01-31 | End: 2017-01-31

## 2017-01-31 RX ADMIN — INSULIN ASPART 2 UNITS: 100 INJECTION, SOLUTION INTRAVENOUS; SUBCUTANEOUS at 09:01

## 2017-01-31 RX ADMIN — HEPARIN SODIUM AND DEXTROSE 23 UNITS/KG/HR: 10000; 5 INJECTION INTRAVENOUS at 06:01

## 2017-01-31 RX ADMIN — AMLODIPINE BESYLATE 10 MG: 10 TABLET ORAL at 08:01

## 2017-01-31 RX ADMIN — AMIODARONE HYDROCHLORIDE 400 MG: 200 TABLET ORAL at 08:01

## 2017-01-31 RX ADMIN — DIPYRIDAMOLE 100 MG: 50 TABLET, FILM COATED ORAL at 01:01

## 2017-01-31 RX ADMIN — ISOSORBIDE DINITRATE 10 MG: 10 TABLET ORAL at 09:01

## 2017-01-31 RX ADMIN — TAMSULOSIN HYDROCHLORIDE 0.4 MG: 0.4 CAPSULE ORAL at 08:01

## 2017-01-31 RX ADMIN — HYDRALAZINE HYDROCHLORIDE 30 MG: 10 TABLET, FILM COATED ORAL at 09:01

## 2017-01-31 RX ADMIN — PANTOPRAZOLE SODIUM 40 MG: 40 TABLET, DELAYED RELEASE ORAL at 08:01

## 2017-01-31 RX ADMIN — LISINOPRIL 20 MG: 20 TABLET ORAL at 09:01

## 2017-01-31 RX ADMIN — DIPYRIDAMOLE 100 MG: 50 TABLET, FILM COATED ORAL at 09:01

## 2017-01-31 RX ADMIN — ISOSORBIDE DINITRATE 10 MG: 10 TABLET ORAL at 01:01

## 2017-01-31 RX ADMIN — OXYCODONE HYDROCHLORIDE AND ACETAMINOPHEN 1 TABLET: 10; 325 TABLET ORAL at 09:01

## 2017-01-31 RX ADMIN — OXYCODONE HYDROCHLORIDE AND ACETAMINOPHEN 1 TABLET: 10; 325 TABLET ORAL at 05:01

## 2017-01-31 RX ADMIN — MAGNESIUM OXIDE TAB 400 MG (241.3 MG ELEMENTAL MG) 400 MG: 400 (241.3 MG) TAB at 09:01

## 2017-01-31 RX ADMIN — OXYCODONE HYDROCHLORIDE AND ACETAMINOPHEN 1 TABLET: 10; 325 TABLET ORAL at 01:01

## 2017-01-31 RX ADMIN — MAGNESIUM OXIDE TAB 400 MG (241.3 MG ELEMENTAL MG) 400 MG: 400 (241.3 MG) TAB at 01:01

## 2017-01-31 RX ADMIN — ASPIRIN 325 MG: 325 TABLET, DELAYED RELEASE ORAL at 08:01

## 2017-01-31 RX ADMIN — FUROSEMIDE 40 MG: 10 INJECTION, SOLUTION INTRAMUSCULAR; INTRAVENOUS at 05:01

## 2017-01-31 RX ADMIN — HYDRALAZINE HYDROCHLORIDE 20 MG: 10 TABLET, FILM COATED ORAL at 01:01

## 2017-01-31 RX ADMIN — FERROUS GLUCONATE TAB 324 MG (37.5 MG ELEMENTAL IRON) 324 MG: 324 (37.5 FE) TAB at 10:01

## 2017-01-31 RX ADMIN — WARFARIN SODIUM 4 MG: 4 TABLET ORAL at 04:01

## 2017-01-31 RX ADMIN — MAGNESIUM OXIDE TAB 400 MG (241.3 MG ELEMENTAL MG) 400 MG: 400 (241.3 MG) TAB at 05:01

## 2017-01-31 RX ADMIN — ISOSORBIDE DINITRATE 10 MG: 10 TABLET ORAL at 05:01

## 2017-01-31 RX ADMIN — DIPYRIDAMOLE 100 MG: 50 TABLET, FILM COATED ORAL at 05:01

## 2017-01-31 RX ADMIN — INSULIN ASPART 20 UNITS: 100 INJECTION, SOLUTION INTRAVENOUS; SUBCUTANEOUS at 02:01

## 2017-01-31 RX ADMIN — HEPARIN SODIUM AND DEXTROSE 21 UNITS/KG/HR: 10000; 5 INJECTION INTRAVENOUS at 03:01

## 2017-01-31 RX ADMIN — HYDRALAZINE HYDROCHLORIDE 20 MG: 10 TABLET, FILM COATED ORAL at 05:01

## 2017-01-31 NOTE — PROGRESS NOTES
1/26/17    Pt called SW and reported belief that [employer] was discontinued. Pt requesting SW confirm pt has Medicare and Medicaid. SW spoke to , BYRON Barron, who confirmed pt has been reinstated with Medicare and Medicaid. SW informed pt. SW providing psychosocial counseling and support, education, assistance, and resources as indicated. SW remains available.

## 2017-01-31 NOTE — PROGRESS NOTES
"     Heart Failure Progress Note  Attending Physician: Dennis Lyles Jr.,*  Hospital Day: 2    Subjective:   Interval History: The patient feels well and denies any current complaints. Currently on IV heparin gtt for subtherapeutic INR. Currently being worked up for pump thrombosis. The patient does not seem volume overloaded after receiving a dose of lasix. VAD interrogation revealed stable flows, no PI events, no power spikes.     Medications:   Continuous Infusions:   heparin (porcine) in D5W 21 Units/kg/hr (01/31/17 1556)       Scheduled Meds:   amiodarone  400 mg Oral Daily    amlodipine  10 mg Oral Daily    aspirin  325 mg Oral Daily    dipyridamole  100 mg Oral TID    ferrous gluconate  324 mg Oral Daily with breakfast    hydrALAZINE  20 mg Oral Q8H    insulin aspart  20 Units Subcutaneous TIDWM    isosorbide dinitrate  10 mg Oral TID    lisinopril  20 mg Oral BID    magnesium oxide  400 mg Oral TID    pantoprazole  40 mg Oral Daily    tamsulosin  0.4 mg Oral Daily     PRN Meds:dextrose 50%, dextrose 50%, glucagon (human recombinant), glucose, glucose, insulin aspart, oxycodone-acetaminophen  Objective:     Vitals:  Temp:  [97.6 °F (36.4 °C)-98.8 °F (37.1 °C)]   Pulse:  [64-97]   Resp:  [16-18]   BP: ()/(0-73)   SpO2:  [94 %-97 %]     Visit Vitals    BP (!) 68/0 (BP Location: Right arm, Patient Position: Sitting, BP Method: Doppler)    Pulse 85    Temp 97.9 °F (36.6 °C) (Oral)    Resp 18    Ht 5' 8" (1.727 m)    Wt 87.4 kg (192 lb 10.9 oz)    SpO2 (!) 94%    BMI 29.3 kg/m2    I/O's:    Intake/Output Summary (Last 24 hours) at 01/31/17 5425  Last data filed at 01/31/17 1500   Gross per 24 hour   Intake             1005 ml   Output             4645 ml   Net            -3640 ml       Wt Readings from Last 10 Encounters:   01/31/17 87.4 kg (192 lb 10.9 oz)   01/26/17 89.8 kg (198 lb)   01/22/17 87.8 kg (193 lb 9 oz)   12/31/16 88.3 kg (194 lb 10.7 oz)   12/28/16 94.8 kg (208 lb " 15.9 oz)   12/28/16 94 kg (207 lb 3.7 oz)   12/27/16 95 kg (209 lb 7 oz)   12/15/16 88 kg (194 lb)   11/30/16 91.2 kg (201 lb)   11/02/16 89.8 kg (198 lb)        Constitutional: NAD, conversant  HEENT: Sclera anicteric, PERRLA, EOMI  Neck: No JVD, no carotid bruits  CV: LVAD hum audible, no murmur,  No Pericardial rub  Pulm: CTAB with no wheezes, rales, or rhonchi  GI: Abdomen soft, NTND, +BS  Extremities: Trace LE edema, warm and well perfused, No cyanosis, No clubbing  Skin: No ecchymosis, erythema, or ulcers  Psych: AOx3, appropriate affect  Neuro: CNII-XII intact, no focal deficits      Labs:       Recent Labs  Lab 01/30/17 0727 01/30/17 2132 01/31/17  0513    133* 136   K 4.4 3.8 4.1   CL 99 94* 97   CO2 25 28 31*   BUN 10 13 13   CREATININE 1.0 1.0 0.9   * 139* 201*   ANIONGAP 12 11 8       Recent Labs  Lab 01/26/17 0918 01/30/17  0727   AST 32 30   ALT 25 20   ALKPHOS 64 62   BILITOT 0.7 0.7   BILIDIR 0.3  --    ALBUMIN 3.4* 3.4*     No results for input(s): PH, PCO2, PO2, HCO3, POCSATURATED in the last 168 hours.     Recent Labs  Lab 01/30/17 0727 01/30/17 2132 01/31/17  0513   WBC 9.27 11.10 9.09   HGB 9.9* 9.7* 9.5*   HCT 31.9* 30.3* 30.5*    307 238   GRAN 57.0  5.3 61.5  6.8 57.2  5.2       Recent Labs  Lab 01/30/17 0727 01/30/17 2132 01/31/17  0513   INR 1.9* 1.8* 1.6*       Recent Labs  Lab 01/26/17 0918 01/30/17 2132   * 122*      Micro:   Blood Cultures  Lab Results   Component Value Date    LABBLOO No growth after 5 days. 01/19/2017    LABBLOO No growth after 5 days. 01/19/2017    LABBLOO No growth after 5 days. 09/01/2016    LABBLOO No growth after 5 days. 09/01/2016    LABBLOO No growth after 5 days. 08/24/2016     Urine Cultures  Lab Results   Component Value Date    LABURIN CITROBACTER KOSERI  10,000 - 49,999 cfu/ml   01/19/2017    LABURIN No growth 09/03/2016    LABURIN No growth 09/01/2016       Imaging:   VAD interrogation:  TXP LVAD INTERROGATIONS  1/31/2017 1/31/2017 1/31/2017 1/31/2017 1/31/2017 1/31/2017 1/31/2017   Type HeartMate II HeartMate II HeartMate II HeartMate II   HeartMate II HeartMate II HeartMate II   Flow 5.7 6.0 5.7 5.9 5.7 5.9 5.5   Speed 9000 9000 9000 9000 9000 9000 9000   PI 3.3 5.0 3.0 5.1 6.6 5.5 6.5   Power (Kwon) 5.4 5.7 5.6 5.7 5.5 5.6 5.4   LSL 8600 8600 8600 8600 8600 8600 -   Pulsatility No Pulse - - - - - -     Speed TTE (01/31/17):  SPEED(rpm)     AV            AI                    SEPTUM         LVEDd(cm)     RVEDd(cm)     RVOT VTI(cm)     TR(GR mmHg)       MR  ----------------------------------------------------------------------------------------------------------------------------------------------------------  9000              opened      trivial                  midline              6.5                                           13                                                trivial         9400              closed       trivial                  midline              6.3                                           15                                                  9800              closed       trivial                  midline              6.0                                           14                                                  01803            closed       trivial                  midline              5.9                                           12                                                  53033            closed       trivial                  leftwards         5.8                                           14    CONCLUSIONS     1 - HeartMate II LVAD; speed 9000.     2 - Severely depressed left ventricular systolic function (EF 15-20%).     3 - Mild left ventricular enlargement.     4 - Mildly depressed right ventricular systolic function .     5 - Ramp echo study showing that LV size decreases and AV closes as speed goes up. These findings are suggestive of normal pump function by echo        EF    Date Value Ref Range Status   01/31/2017 15 (A) 55 - 65    01/16/2017 10 (A) 55 - 65    09/19/2016 10 (A) 55 - 65    09/06/2016 20 (A) 55 - 65    09/02/2016 15 (A) 55 - 65        Assessment and Plan:   Mr. Barriga is a 55 yo M with PMH of NICM s/p HM II (8/24/16), HLD, HTN, VT s/p ICD who presented to the hospital for elevated LDH in the setting of subtherapeutic INR with lower extremity swelling concerning for LVAD pump thrombosis/ failure.    Elevated LDH - D/D uncontrolled HTN likely vs pump thrombosis (unlikely) due to subtherapeutic INR  - Speed TTE revealed normal functioning VAD  - VAD interrogation ruled out low flow, power spikes  - increase warfarin to 4 mg  - c/w heparin gtt until INR > 2.0  - INR daily  - increased hydralazine to 30 mg TID  - monitor LDH daily  - no need for integrillin at this point given VAD thrombosis unlikely    VT s/p ICD  - c/w amiodarone    NICM s/p HMII  - amlodipine, lisinopril, hydralazine, Isodril    Signed:  Renee Seymour MD  Cardiology Fellow  Pager: [phone]  1/31/2017 5:34 PM

## 2017-01-31 NOTE — INTERVAL H&P NOTE
The patient has been examined and the H&P has been reviewed:    Patient is here for Subtherapeutic INR and Elevated LDH. He denies missing coumadin doses or changes in  His diet. He has been taking 2 mg over the past week. Patient reported leg swelling over the past day, reported that this is the first time after his LVAD to notice leg swelling. Patient doesn't take lasix at home. Denied SOB or orthopnea.   Physical exam consistent with volume overload with + JVD and 2+ pitting ankle edema, clear lungs.     Plan:  -Will recheck INR if subtherapeutic, will start heparin.  -Hold coumadin tonight incase integrelin started tomorrow.  -Will diurese with lasix 40 TID.  -Continue with his home medications.    Jona Chaudhari MD    Active Hospital Problems    Diagnosis  POA    *Elevated LDH [R74.0]  Yes    LVAD (left ventricular assist device) present [Z95.811]  Not Applicable    Presence of heart assist device [Z95.811]  Not Applicable    Presence of automatic implantable cardioverter-defibrillator [Z95.810]  Yes     Chronic      Resolved Hospital Problems    Diagnosis Date Resolved POA   No resolved problems to display.

## 2017-01-31 NOTE — PROCEDURES
"Patient aaox3 with no family at bedside. VAD interrogation completed this AM in the event changes needed to be made. No power elevations noted. Pt verbalizes having "fluttery" feeling when laying down.  Dr. Saul notifed.  No orders received. Will continue to monitor for further issues.     Pulsatile: Yes   VAD Sounds: Smooth  Problems / Issues / Alarms with VAD if any: None noted      VAD Interrogation:  TXP LVAD INTERROGATIONS 1/31/2017 1/31/2017 1/31/2017 1/31/2017 1/31/2017 1/31/2017 1/31/2017   Type HeartMate II HeartMate II HeartMate II HeartMate II HeartMate II HeartMate II HeartMate II   Flow 5.7 6.0 5.7 5.9 5.7 5.9 5.5   Speed 9000 9000 9000 9000 9000 9000 9000   PI 3.3 5.0 3.0 5.1 6.6 5.5 6.5   Power (Kwon) 5.4 5.7 5.6 5.7 5.5 5.6 5.4   LSL 8600 8600 8600 8600 8600 8600 -   Pulsatility No Pulse - - - - - -       "

## 2017-01-31 NOTE — PLAN OF CARE
Problem: Patient Care Overview  Goal: Plan of Care Review  Outcome: Revised  Plan of care discussed with patient. LVAD DP and numbers WNL, smooth LVAD hum. Patient has no complaints of pain. Discussed medications and care. Patient has no questions at this time. Will continue to monitor.

## 2017-01-31 NOTE — PROGRESS NOTES
Pt admitted to CSU from admit office. Pt arrived to unit via walking in no evident distress. Pt placed on csu tele monitor. VSS. No complaints at this time. Plan of care initiated. CSU orders implemented. Pt has no family at bedside. MD Chaudhari notified. Bed locked in lowest position, side rails up X2. Call bell in reach. Will continue to monitor pt.

## 2017-01-31 NOTE — PROGRESS NOTES
Admit Note     Met with patient and spouse to assess needs. Patient is a 56 y.o.  male, admitted for elevated LDH, pt has an LVAD.      Patient admitted from home on 1/30/2017 .  At this time, patient presents as alert and oriented x 4, pleasant and good eye contact.  At this time, patients caregiver presents as not present.    Household/Family Systems     Patient resides with patient's spouse, at:     [address] [facility] [place] [address].      Support system includes spouse and three adult children.  Patient does not have dependents that are need of being cared for.     Patients primary caregiver is Eptesam, patients spouse.    Home phone: [phone]  Pt's cell:  [phone]  Additional emergency contact:  Manny (son) [phone]    During admission, patient's caregiver plans to stay in patient's room or at home.  Confirmed patient and patients caregivers do have access to reliable transportation.    Cognitive Status/Learning     Patient reports reading ability as 8th grade and states patient does have difficulty with reading and writing in English.  The pt does not have issues with hearing, eye sight, learning or memory.    Patient reports patient learns best by one on one.   Needed: not for pt or spouse      Highest education level: Grade School (0-8)    Vocation/Disability     Working for Income: No  If no, reason not working: Disability  Patient is disabled due to heart failure.     Adherence     Patient reports a high level of adherence to patients health care regimen.  Adherence counseling and education provided. Patient verbalizes understanding.    Substance Use    Patient reports the following substance usage.    Tobacco: none.  Pt quit smoking in 2006.  Alcohol: none, patient denies any use.  Illicit Drugs/Non-prescribed Medications: none, patient denies any use.  Patient states clear understanding of the potential impact of substance use.  Substance abstinence/cessation  counseling, education and resources provided and reviewed.     Services Utilizing/ADLS    Infusion Service: Prior to admission, patient utilizing? no  Home Health: Prior to admission, patient utilizing? no Pt has used [facility] in the past  DME: Prior to admission? no  Pulmonary/Cardiac Rehab: Prior to admission, no  Dialysis:  Prior to admission, no  Transplant Specialty Pharmacy:  Prior to admission, no.    Prior to admission, patient reports patient was independent with ADLS and was driving.  Patient reports patient able to care for himself at this time. Patient indicates a willingness to care for self once medically cleared to do so.    Insurance/Medications    Insured by   Payor/Plan Subscr  Sex Relation Sub. Ins. ID Effective Group Num   1. [place]* PHIL KULKARNI 1960 Male  [id number] 3/1/06                                    [address]   2. [place] KULKARNI 1960 Male  [id number]* 06                                    [address] [address]      Primary Insurance (for UNOS reporting): Public Insurance - Medicare FFS (Fee For Service)  Secondary Insurance (for UNOS reporting): Public Insurance - Medicaid    Patient reports patient is able to obtain and afford medications at this time and at time of discharge.    Living Will/Healthcare Power of     Patient states patient does not have a LW and/or HCPA.   provided education regarding LW and HCPA and the completion of forms.    Coping/Mental Health    Patient is coping adequately with the aid of  family members.   Patient indicates mental health difficulties. Pt reports he has depression and has been taking Zoloft for over 14 years. Pt reports no current issues with depression. General support provided.     Discharge Planning    At time of discharge, patient plans to return to patient's home under the care of spouse.  Patients family will transport patient.  Per rounds today, expected discharge date has not  been medically determined at this time. Patient and patients caregiver  verbalize understanding and are involved in treatment planning and discharge process.    Additional Concerns    No concerns voiced at this time. SW following for education, support, resources and dc planning as appropriate. SW remains available.

## 2017-01-31 NOTE — PLAN OF CARE
Problem: Patient Care Overview  Goal: Individualization & Mutuality  Outcome: Ongoing (interventions implemented as appropriate)  Pt free of falls/trauma/injuries this shift.VSS.Heparin gtt infusing, medication administered as perscribed. PRN percocet administered for generalized pain. LDH:583, INR :1.8. Patient tolerating POC well. Pt verbalizes understanding of care. Pt denies any chest pain, SOB, or any other discomforts at this time. Will continue to monitor.

## 2017-02-01 ENCOUNTER — CONFERENCE (OUTPATIENT)
Dept: TRANSPLANT | Facility: CLINIC | Age: 57
End: 2017-02-01
Payer: MEDICARE

## 2017-02-01 LAB
ALBUMIN SERPL BCP-MCNC: 3.1 G/DL
ALP SERPL-CCNC: 61 U/L
ALT SERPL W/O P-5'-P-CCNC: 17 U/L
ANION GAP SERPL CALC-SCNC: 9 MMOL/L
ANISOCYTOSIS BLD QL SMEAR: SLIGHT
APTT BLDCRRT: 54.4 SEC
AST SERPL-CCNC: 25 U/L
BASO STIPL BLD QL SMEAR: ABNORMAL
BASOPHILS # BLD AUTO: 0.03 K/UL
BASOPHILS NFR BLD: 0.3 %
BILIRUB DIRECT SERPL-MCNC: 0.3 MG/DL
BILIRUB SERPL-MCNC: 0.7 MG/DL
BNP SERPL-MCNC: 68 PG/ML
BUN SERPL-MCNC: 15 MG/DL
CALCIUM SERPL-MCNC: 8.9 MG/DL
CHLORIDE SERPL-SCNC: 96 MMOL/L
CO2 SERPL-SCNC: 27 MMOL/L
CREAT SERPL-MCNC: 0.9 MG/DL
CRP SERPL-MCNC: 50.5 MG/L
DIFFERENTIAL METHOD: ABNORMAL
EOSINOPHIL # BLD AUTO: 0.3 K/UL
EOSINOPHIL NFR BLD: 3 %
ERYTHROCYTE [DISTWIDTH] IN BLOOD BY AUTOMATED COUNT: 20.1 %
EST. GFR  (AFRICAN AMERICAN): >60 ML/MIN/1.73 M^2
EST. GFR  (NON AFRICAN AMERICAN): >60 ML/MIN/1.73 M^2
FACT X PPP CHRO-ACNC: 0.36 IU/ML
FERRITIN SERPL-MCNC: 142 NG/ML
GLUCOSE SERPL-MCNC: 268 MG/DL
HAPTOGLOB SERPL-MCNC: <10 MG/DL
HCT VFR BLD AUTO: 27.5 %
HGB BLD-MCNC: 8.8 G/DL
HYPOCHROMIA BLD QL SMEAR: ABNORMAL
INR PPP: 1.4
IRON SERPL-MCNC: 29 UG/DL
LDH SERPL L TO P-CCNC: 612 U/L
LYMPHOCYTES # BLD AUTO: 1.6 K/UL
LYMPHOCYTES NFR BLD: 18.4 %
MAGNESIUM SERPL-MCNC: 1.8 MG/DL
MCH RBC QN AUTO: 18.7 PG
MCHC RBC AUTO-ENTMCNC: 32 %
MCV RBC AUTO: 58 FL
MONOCYTES # BLD AUTO: 0.7 K/UL
MONOCYTES NFR BLD: 7.6 %
NEUTROPHILS # BLD AUTO: 6.2 K/UL
NEUTROPHILS NFR BLD: 70.7 %
OVALOCYTES BLD QL SMEAR: ABNORMAL
PHOSPHATE SERPL-MCNC: 4.1 MG/DL
PLATELET # BLD AUTO: 211 K/UL
PLATELET BLD QL SMEAR: ABNORMAL
PMV BLD AUTO: ABNORMAL FL
POCT GLUCOSE: 152 MG/DL (ref 70–110)
POCT GLUCOSE: 221 MG/DL (ref 70–110)
POCT GLUCOSE: 289 MG/DL (ref 70–110)
POCT GLUCOSE: 308 MG/DL (ref 70–110)
POIKILOCYTOSIS BLD QL SMEAR: SLIGHT
POLYCHROMASIA BLD QL SMEAR: ABNORMAL
POTASSIUM SERPL-SCNC: 4.3 MMOL/L
PREALB SERPL-MCNC: 19 MG/DL
PROT SERPL-MCNC: 6.4 G/DL
PROTHROMBIN TIME: 13.9 SEC
RBC # BLD AUTO: 4.71 M/UL
SATURATED IRON: 10 %
SCHISTOCYTES BLD QL SMEAR: ABNORMAL
SODIUM SERPL-SCNC: 132 MMOL/L
TOTAL IRON BINDING CAPACITY: 305 UG/DL
TRANSFERRIN SERPL-MCNC: 206 MG/DL
WBC # BLD AUTO: 8.81 K/UL

## 2017-02-01 PROCEDURE — 85730 THROMBOPLASTIN TIME PARTIAL: CPT

## 2017-02-01 PROCEDURE — 36415 COLL VENOUS BLD VENIPUNCTURE: CPT

## 2017-02-01 PROCEDURE — 83735 ASSAY OF MAGNESIUM: CPT

## 2017-02-01 PROCEDURE — 25000003 PHARM REV CODE 250: Performed by: HOSPITALIST

## 2017-02-01 PROCEDURE — 80048 BASIC METABOLIC PNL TOTAL CA: CPT

## 2017-02-01 PROCEDURE — 82728 ASSAY OF FERRITIN: CPT

## 2017-02-01 PROCEDURE — 84134 ASSAY OF PREALBUMIN: CPT

## 2017-02-01 PROCEDURE — 85025 COMPLETE CBC W/AUTO DIFF WBC: CPT

## 2017-02-01 PROCEDURE — 80076 HEPATIC FUNCTION PANEL: CPT

## 2017-02-01 PROCEDURE — 86140 C-REACTIVE PROTEIN: CPT

## 2017-02-01 PROCEDURE — 25000003 PHARM REV CODE 250: Performed by: INTERNAL MEDICINE

## 2017-02-01 PROCEDURE — 84100 ASSAY OF PHOSPHORUS: CPT

## 2017-02-01 PROCEDURE — 83540 ASSAY OF IRON: CPT

## 2017-02-01 PROCEDURE — 83615 LACTATE (LD) (LDH) ENZYME: CPT

## 2017-02-01 PROCEDURE — 83010 ASSAY OF HAPTOGLOBIN QUANT: CPT

## 2017-02-01 PROCEDURE — 20600001 HC STEP DOWN PRIVATE ROOM

## 2017-02-01 PROCEDURE — 25000003 PHARM REV CODE 250: Performed by: STUDENT IN AN ORGANIZED HEALTH CARE EDUCATION/TRAINING PROGRAM

## 2017-02-01 PROCEDURE — 25000003 PHARM REV CODE 250: Performed by: PHYSICIAN ASSISTANT

## 2017-02-01 PROCEDURE — 99232 SBSQ HOSP IP/OBS MODERATE 35: CPT | Mod: ,,, | Performed by: INTERNAL MEDICINE

## 2017-02-01 PROCEDURE — 83880 ASSAY OF NATRIURETIC PEPTIDE: CPT

## 2017-02-01 PROCEDURE — 93750 INTERROGATION VAD IN PERSON: CPT | Performed by: PHYSICIAN ASSISTANT

## 2017-02-01 PROCEDURE — 85610 PROTHROMBIN TIME: CPT

## 2017-02-01 PROCEDURE — 27000248 HC VAD-ADDITIONAL DAY

## 2017-02-01 PROCEDURE — 93750 INTERROGATION VAD IN PERSON: CPT | Mod: ,,, | Performed by: INTERNAL MEDICINE

## 2017-02-01 RX ORDER — BISACODYL 10 MG
10 SUPPOSITORY, RECTAL RECTAL DAILY PRN
Status: DISCONTINUED | OUTPATIENT
Start: 2017-02-01 | End: 2017-02-09

## 2017-02-01 RX ORDER — HYDRALAZINE HYDROCHLORIDE 50 MG/1
50 TABLET, FILM COATED ORAL EVERY 8 HOURS
Status: DISCONTINUED | OUTPATIENT
Start: 2017-02-01 | End: 2017-02-02

## 2017-02-01 RX ORDER — WARFARIN SODIUM 5 MG/1
5 TABLET ORAL ONCE
Status: COMPLETED | OUTPATIENT
Start: 2017-02-01 | End: 2017-02-01

## 2017-02-01 RX ADMIN — PANTOPRAZOLE SODIUM 40 MG: 40 TABLET, DELAYED RELEASE ORAL at 09:02

## 2017-02-01 RX ADMIN — ISOSORBIDE DINITRATE 10 MG: 10 TABLET ORAL at 05:02

## 2017-02-01 RX ADMIN — DIPYRIDAMOLE 100 MG: 50 TABLET, FILM COATED ORAL at 05:02

## 2017-02-01 RX ADMIN — WARFARIN SODIUM 5 MG: 5 TABLET ORAL at 04:02

## 2017-02-01 RX ADMIN — HEPARIN SODIUM AND DEXTROSE 23 UNITS/KG/HR: 10000; 5 INJECTION INTRAVENOUS at 05:02

## 2017-02-01 RX ADMIN — LISINOPRIL 20 MG: 20 TABLET ORAL at 09:02

## 2017-02-01 RX ADMIN — SODIUM CHLORIDE 250 ML: 0.9 INJECTION, SOLUTION INTRAVENOUS at 04:02

## 2017-02-01 RX ADMIN — INSULIN ASPART 5 UNITS: 100 INJECTION, SOLUTION INTRAVENOUS; SUBCUTANEOUS at 02:02

## 2017-02-01 RX ADMIN — MAGNESIUM OXIDE TAB 400 MG (241.3 MG ELEMENTAL MG) 400 MG: 400 (241.3 MG) TAB at 09:02

## 2017-02-01 RX ADMIN — DIPYRIDAMOLE 100 MG: 50 TABLET, FILM COATED ORAL at 09:02

## 2017-02-01 RX ADMIN — OXYCODONE HYDROCHLORIDE AND ACETAMINOPHEN 1 TABLET: 10; 325 TABLET ORAL at 02:02

## 2017-02-01 RX ADMIN — MAGNESIUM OXIDE TAB 400 MG (241.3 MG ELEMENTAL MG) 400 MG: 400 (241.3 MG) TAB at 02:02

## 2017-02-01 RX ADMIN — ISOSORBIDE DINITRATE 10 MG: 10 TABLET ORAL at 09:02

## 2017-02-01 RX ADMIN — DIPYRIDAMOLE 100 MG: 50 TABLET, FILM COATED ORAL at 02:02

## 2017-02-01 RX ADMIN — ISOSORBIDE DINITRATE 10 MG: 10 TABLET ORAL at 02:02

## 2017-02-01 RX ADMIN — TAMSULOSIN HYDROCHLORIDE 0.4 MG: 0.4 CAPSULE ORAL at 09:02

## 2017-02-01 RX ADMIN — ASPIRIN 325 MG: 325 TABLET, DELAYED RELEASE ORAL at 09:02

## 2017-02-01 RX ADMIN — AMIODARONE HYDROCHLORIDE 400 MG: 200 TABLET ORAL at 09:02

## 2017-02-01 RX ADMIN — HYDRALAZINE HYDROCHLORIDE 50 MG: 50 TABLET ORAL at 09:02

## 2017-02-01 RX ADMIN — OXYCODONE HYDROCHLORIDE AND ACETAMINOPHEN 1 TABLET: 10; 325 TABLET ORAL at 09:02

## 2017-02-01 RX ADMIN — FERROUS GLUCONATE TAB 324 MG (37.5 MG ELEMENTAL IRON) 324 MG: 324 (37.5 FE) TAB at 09:02

## 2017-02-01 RX ADMIN — HEPARIN SODIUM AND DEXTROSE 23 UNITS/KG/HR: 10000; 5 INJECTION INTRAVENOUS at 07:02

## 2017-02-01 RX ADMIN — HYDRALAZINE HYDROCHLORIDE 30 MG: 10 TABLET, FILM COATED ORAL at 02:02

## 2017-02-01 RX ADMIN — BISACODYL 10 MG: 10 SUPPOSITORY RECTAL at 11:02

## 2017-02-01 RX ADMIN — HYDRALAZINE HYDROCHLORIDE 30 MG: 10 TABLET, FILM COATED ORAL at 05:02

## 2017-02-01 RX ADMIN — INSULIN ASPART 5 UNITS: 100 INJECTION, SOLUTION INTRAVENOUS; SUBCUTANEOUS at 07:02

## 2017-02-01 RX ADMIN — AMLODIPINE BESYLATE 10 MG: 10 TABLET ORAL at 09:02

## 2017-02-01 RX ADMIN — OXYCODONE HYDROCHLORIDE AND ACETAMINOPHEN 1 TABLET: 10; 325 TABLET ORAL at 05:02

## 2017-02-01 RX ADMIN — MAGNESIUM OXIDE TAB 400 MG (241.3 MG ELEMENTAL MG) 400 MG: 400 (241.3 MG) TAB at 05:02

## 2017-02-01 RX ADMIN — INSULIN ASPART 5 UNITS: 100 INJECTION, SOLUTION INTRAVENOUS; SUBCUTANEOUS at 10:02

## 2017-02-01 RX ADMIN — INSULIN ASPART 20 UNITS: 100 INJECTION, SOLUTION INTRAVENOUS; SUBCUTANEOUS at 10:02

## 2017-02-01 RX ADMIN — SODIUM CHLORIDE 250 ML: 0.9 INJECTION, SOLUTION INTRAVENOUS at 02:02

## 2017-02-01 RX ADMIN — INSULIN ASPART 20 UNITS: 100 INJECTION, SOLUTION INTRAVENOUS; SUBCUTANEOUS at 02:02

## 2017-02-01 NOTE — PROGRESS NOTES
"     Heart Failure Progress Note  Attending Physician: Dennis Lyles Jr.,*  Hospital Day: 3    Subjective:   Interval History: No acute significant events overnight. The patient reports upon standing up yesterday evening, he had a drop in his PI and felt dizzy for a short time. All symptoms resolved after he sat down. Reports that he feels dehydrated. Denies any other complaints.    Medications:   Continuous Infusions:   heparin (porcine) in D5W 23 Units/kg/hr (02/01/17 0504)       Scheduled Meds:   amiodarone  400 mg Oral Daily    amlodipine  10 mg Oral Daily    aspirin  325 mg Oral Daily    dipyridamole  100 mg Oral TID    ferrous gluconate  324 mg Oral Daily with breakfast    hydrALAZINE  30 mg Oral Q8H    insulin aspart  20 Units Subcutaneous TIDWM    isosorbide dinitrate  10 mg Oral TID    lisinopril  20 mg Oral BID    magnesium oxide  400 mg Oral TID    pantoprazole  40 mg Oral Daily    sodium chloride 0.9%  250 mL Intravenous Once    tamsulosin  0.4 mg Oral Daily    warfarin  5 mg Oral Once     PRN Meds:dextrose 50%, dextrose 50%, glucagon (human recombinant), glucose, glucose, insulin aspart, oxycodone-acetaminophen  Objective:     Vitals:  Temp:  [97.7 °F (36.5 °C)-98.4 °F (36.9 °C)]   Pulse:  []   Resp:  [16-20]   BP: (68-97)/(0-66)   SpO2:  [92 %-97 %]     Visit Vitals    BP (!) 74/0 (BP Location: Left arm, Patient Position: Lying, BP Method: Doppler)    Pulse 87    Temp 98.3 °F (36.8 °C) (Oral)    Resp 16    Ht 5' 8" (1.727 m)    Wt 90.5 kg (199 lb 6.5 oz)    SpO2 96%    BMI 30.32 kg/m2    I/O's:    Intake/Output Summary (Last 24 hours) at 02/01/17 1525  Last data filed at 02/01/17 1400   Gross per 24 hour   Intake          1335.44 ml   Output             4100 ml   Net         -2764.56 ml       Wt Readings from Last 10 Encounters:   02/01/17 90.5 kg (199 lb 6.5 oz)   01/26/17 89.8 kg (198 lb)   01/22/17 87.8 kg (193 lb 9 oz)   12/31/16 88.3 kg (194 lb 10.7 oz) "   12/28/16 94.8 kg (208 lb 15.9 oz)   12/28/16 94 kg (207 lb 3.7 oz)   12/27/16 95 kg (209 lb 7 oz)   12/15/16 88 kg (194 lb)   11/30/16 91.2 kg (201 lb)   11/02/16 89.8 kg (198 lb)        Constitutional: NAD, conversant  HEENT: Sclera anicteric, PERRLA, EOMI  Neck: No JVD, no carotid bruits  CV: LVAD hum  Pulm: CTAB with no wheezes, rales, or rhonchi  GI: Abdomen soft, NTND, +BS  Extremities: No LE edema, warm and well perfused, No cyanosis, No clubbing  Skin: No ecchymosis, erythema, or ulcers  Psych: AOx3, appropriate affect  Neuro: CNII-XII intact, no focal deficits      Labs:       Recent Labs  Lab 01/30/17 2132 01/31/17 0513 02/01/17  0408   * 136 132*   K 3.8 4.1 4.3   CL 94* 97 96   CO2 28 31* 27   BUN 13 13 15   CREATININE 1.0 0.9 0.9   * 201* 268*   ANIONGAP 11 8 9       Recent Labs  Lab 01/26/17 0918 01/30/17  0727 02/01/17  0408   AST 32 30 25   ALT 25 20 17   ALKPHOS 64 62 61   BILITOT 0.7 0.7 0.7   BILIDIR 0.3  --  0.3   ALBUMIN 3.4* 3.4* 3.1*     No results for input(s): PH, PCO2, PO2, HCO3, POCSATURATED in the last 168 hours.     Recent Labs  Lab 01/30/17 2132 01/31/17 0513 02/01/17  0408   WBC 11.10 9.09 8.81   HGB 9.7* 9.5* 8.8*   HCT 30.3* 30.5* 27.5*    238 211   GRAN 61.5  6.8 57.2  5.2 70.7  6.2       Recent Labs  Lab 01/30/17 2132 01/31/17  0513 02/01/17  0408   INR 1.8* 1.6* 1.4*       Recent Labs  Lab 01/26/17 0918 01/30/17 2132 02/01/17  0408   * 122* 68      Micro:   Blood Cultures  Lab Results   Component Value Date    LABBLOO No growth after 5 days. 01/19/2017    LABBLOO No growth after 5 days. 01/19/2017    LABBLOO No growth after 5 days. 09/01/2016    LABBLOO No growth after 5 days. 09/01/2016    LABBLOO No growth after 5 days. 08/24/2016     Urine Cultures  Lab Results   Component Value Date    LABURIN CITROBACTER KOSERI  10,000 - 49,999 cfu/ml   01/19/2017    LABURIN No growth 09/03/2016    LABURIN No growth 09/01/2016       Imaging:     EF    Date Value Ref Range Status   01/31/2017 15 (A) 55 - 65    01/16/2017 10 (A) 55 - 65    09/19/2016 10 (A) 55 - 65    09/06/2016 20 (A) 55 - 65    09/02/2016 15 (A) 55 - 65      LVAD Interrogation (02/01/17):  TXP LVAD INTERROGATIONS 2/1/2017 2/1/2017 2/1/2017 2/1/2017 1/31/2017 1/31/2017 1/31/2017   Type HeartMate II HeartMate II HeartMate II HeartMate II   HeartMate II HeartMate II HeartMate II   Flow 5.2 6.3 5.2 5.8 5.8 5.7 6.0   Speed 9000 9000 9000 9000 9000 9000 9000   PI 6.2 4.3 6.7 6.2 5.5 3.3 5.0   Power (Kwon) 5.3 5.8 5.3 5.6 5.3 5.4 5.7   LSL - - - - - 8600 8600   Pulsatility - - - - - No Pulse     Assessment:   Mr. Barriga is a 57 yo M with PMH of NICM s/p HM II (8/24/16), HLD, HTN, VT s/p ICD who presented to the hospital for elevated LDH in the setting of subtherapeutic INR with lower extremity swelling concerning for LVAD pump thrombosis/ failure.     Elevated LDH - D/D dehydration, uncontrolled HTN, pump thrombosis due to subtherapeutic INR  - Speed TTE revealed normal functioning VAD however VAD thrombosis cannot be completely ruled out  - VAD interrogation revealed a drop in PI to 3.9 when the patient stood up yesterday evening. No power spikes or low flow alarms  - giving two boluses of  cc for dehydration  - increase warfarin to 5 mg as INR dropped to 1.4 today  - c/w heparin gtt until INR > 2.0, check INR daily  - c/w hydralazine to 30 mg TID  - monitor LDH daily; LDH increased to 612 from 599 yesterday  - If the patient has elevated LDH tomorrow, will consider starting Integrillin     VT s/p ICD  - c/w amiodarone     NICM s/p HMII  - amlodipine, lisinopril, hydralazine, Isodril      Signed:  Renee Seymour MD  Cardiology Fellow  Pager: [phone]  2/1/2017 3:25 PM

## 2017-02-01 NOTE — PLAN OF CARE
Problem: Patient Care Overview  Goal: Plan of Care Review  Outcome: Revised  Plan of care discussed with patient.  Patient ambulating independently, fall precautions in place.Continuing to encourage IS, and ambulation. LVAD DP and numbers WNL, smooth LVAD hum. Patient has no complaints of pain. Discussed medications and care. Heparin gtt continued. Patient has no questions at this time. Will continue to monitor.

## 2017-02-01 NOTE — PROCEDURES
TXP LVAD INTERROGATIONS 2/1/2017 2/1/2017 2/1/2017 2/1/2017 1/31/2017 1/31/2017 1/31/2017   Type HeartMate II HeartMate II HeartMate II HeartMate II HeartMate II HeartMate II HeartMate II   Flow 5.2 6.3 5.2 5.8 5.8 5.7 6.0   Speed 9000 9000 9000 9000 9000 9000 9000   PI 6.2 4.3 6.7 6.2 5.5 3.3 5.0   Power (Kwon) 5.3 5.8 5.3 5.6 5.3 5.4 5.7   LSL - - - - - 8600 8600   Pulsatility - - - - - No Pulse -     No alarms noted  VAD sounds smooth   non pulsatile

## 2017-02-01 NOTE — NURSING
Patient wants to walk the hallway. Shikha Cash ( night nurse notified). Emergency bag with patient.

## 2017-02-01 NOTE — PROGRESS NOTES
Patient back in room, emergency equipment with patient. Vital signs taken and assessment completed. Patient in no acute distress. Will continue to monitor.

## 2017-02-01 NOTE — PROGRESS NOTES
02/01/17 0845 02/01/17 0847 02/01/17 0849   Vital Signs   BP (!) 92/50 (!) 97/54 (!) 74/0   MAP (mmHg) 64 70 --    BP Location Left arm Left arm Left arm   BP Method Automatic Automatic Doppler   Patient Position Lying Sitting Standing   Notified Dr Seymour. No orders given.

## 2017-02-01 NOTE — PLAN OF CARE
Problem: Patient Care Overview  Goal: Plan of Care Review  Outcome: Ongoing (interventions implemented as appropriate)  Patient complained of generalized pain overnight relieved by prn pain medication. Denies chest pain, SOB, or other pain discomfort. Heparin drip infusing as ordered. Goal INR >2.0, current INR 1.6. Patient remains free of falls or injury. No significant events. Patient verbalizes complete understanding of plan of care. Will continue to monitor.

## 2017-02-02 LAB
ANION GAP SERPL CALC-SCNC: 11 MMOL/L
ANISOCYTOSIS BLD QL SMEAR: SLIGHT
APTT BLDCRRT: 74 SEC
BASOPHILS # BLD AUTO: 0.02 K/UL
BASOPHILS NFR BLD: 0.2 %
BUN SERPL-MCNC: 13 MG/DL
CALCIUM SERPL-MCNC: 8.8 MG/DL
CHLORIDE SERPL-SCNC: 97 MMOL/L
CO2 SERPL-SCNC: 22 MMOL/L
CREAT SERPL-MCNC: 0.8 MG/DL
DACRYOCYTES BLD QL SMEAR: ABNORMAL
DIFFERENTIAL METHOD: ABNORMAL
EOSINOPHIL # BLD AUTO: 0.2 K/UL
EOSINOPHIL NFR BLD: 1.8 %
ERYTHROCYTE [DISTWIDTH] IN BLOOD BY AUTOMATED COUNT: 20.4 %
EST. GFR  (AFRICAN AMERICAN): >60 ML/MIN/1.73 M^2
EST. GFR  (NON AFRICAN AMERICAN): >60 ML/MIN/1.73 M^2
FACT X PPP CHRO-ACNC: 0.36 IU/ML
GLUCOSE SERPL-MCNC: 193 MG/DL
HCT VFR BLD AUTO: 27.6 %
HGB BLD-MCNC: 8.6 G/DL
HGB FREE PLAS-MCNC: 3.5 MG/DL (ref 0–9.7)
HYPOCHROMIA BLD QL SMEAR: ABNORMAL
INR PPP: 1.7
LDH SERPL L TO P-CCNC: 604 U/L
LYMPHOCYTES # BLD AUTO: 2 K/UL
LYMPHOCYTES NFR BLD: 24.4 %
MAGNESIUM SERPL-MCNC: 1.8 MG/DL
MCH RBC QN AUTO: 18.3 PG
MCHC RBC AUTO-ENTMCNC: 31.2 %
MCV RBC AUTO: 59 FL
MONOCYTES # BLD AUTO: 0.4 K/UL
MONOCYTES NFR BLD: 4.5 %
NEUTROPHILS # BLD AUTO: 5.7 K/UL
NEUTROPHILS NFR BLD: 69.1 %
OVALOCYTES BLD QL SMEAR: ABNORMAL
PHOSPHATE SERPL-MCNC: 3.3 MG/DL
PLATELET # BLD AUTO: 203 K/UL
PLATELET BLD QL SMEAR: ABNORMAL
PMV BLD AUTO: ABNORMAL FL
POCT GLUCOSE: 114 MG/DL (ref 70–110)
POCT GLUCOSE: 155 MG/DL (ref 70–110)
POCT GLUCOSE: 182 MG/DL (ref 70–110)
POCT GLUCOSE: 219 MG/DL (ref 70–110)
POIKILOCYTOSIS BLD QL SMEAR: SLIGHT
POLYCHROMASIA BLD QL SMEAR: ABNORMAL
POTASSIUM SERPL-SCNC: 4 MMOL/L
PROTHROMBIN TIME: 17.3 SEC
RBC # BLD AUTO: 4.7 M/UL
SODIUM SERPL-SCNC: 130 MMOL/L
WBC # BLD AUTO: 8.31 K/UL

## 2017-02-02 PROCEDURE — 36415 COLL VENOUS BLD VENIPUNCTURE: CPT

## 2017-02-02 PROCEDURE — 85610 PROTHROMBIN TIME: CPT

## 2017-02-02 PROCEDURE — 25000003 PHARM REV CODE 250: Performed by: STUDENT IN AN ORGANIZED HEALTH CARE EDUCATION/TRAINING PROGRAM

## 2017-02-02 PROCEDURE — 25000003 PHARM REV CODE 250: Performed by: INTERNAL MEDICINE

## 2017-02-02 PROCEDURE — 80048 BASIC METABOLIC PNL TOTAL CA: CPT

## 2017-02-02 PROCEDURE — 83735 ASSAY OF MAGNESIUM: CPT

## 2017-02-02 PROCEDURE — 25000003 PHARM REV CODE 250: Performed by: HOSPITALIST

## 2017-02-02 PROCEDURE — 25000003 PHARM REV CODE 250: Performed by: PHYSICIAN ASSISTANT

## 2017-02-02 PROCEDURE — 93750 INTERROGATION VAD IN PERSON: CPT | Performed by: PHYSICIAN ASSISTANT

## 2017-02-02 PROCEDURE — 27000248 HC VAD-ADDITIONAL DAY

## 2017-02-02 PROCEDURE — 84100 ASSAY OF PHOSPHORUS: CPT

## 2017-02-02 PROCEDURE — 93750 INTERROGATION VAD IN PERSON: CPT | Mod: ,,, | Performed by: INTERNAL MEDICINE

## 2017-02-02 PROCEDURE — 85520 HEPARIN ASSAY: CPT

## 2017-02-02 PROCEDURE — 99232 SBSQ HOSP IP/OBS MODERATE 35: CPT | Mod: ,,, | Performed by: INTERNAL MEDICINE

## 2017-02-02 PROCEDURE — 85025 COMPLETE CBC W/AUTO DIFF WBC: CPT

## 2017-02-02 PROCEDURE — 85730 THROMBOPLASTIN TIME PARTIAL: CPT

## 2017-02-02 PROCEDURE — 20600001 HC STEP DOWN PRIVATE ROOM

## 2017-02-02 PROCEDURE — 83615 LACTATE (LD) (LDH) ENZYME: CPT

## 2017-02-02 RX ORDER — SENNOSIDES 8.6 MG/1
8.6 TABLET ORAL DAILY PRN
Status: DISCONTINUED | OUTPATIENT
Start: 2017-02-02 | End: 2017-02-09

## 2017-02-02 RX ORDER — WARFARIN 4 MG/1
4 TABLET ORAL ONCE
Status: DISCONTINUED | OUTPATIENT
Start: 2017-02-02 | End: 2017-02-03

## 2017-02-02 RX ORDER — HYDRALAZINE HYDROCHLORIDE 25 MG/1
75 TABLET, FILM COATED ORAL EVERY 8 HOURS
Status: DISCONTINUED | OUTPATIENT
Start: 2017-02-02 | End: 2017-02-03

## 2017-02-02 RX ORDER — WARFARIN SODIUM 5 MG/1
5 TABLET ORAL ONCE
Status: DISCONTINUED | OUTPATIENT
Start: 2017-02-02 | End: 2017-02-02

## 2017-02-02 RX ORDER — WARFARIN SODIUM 5 MG/1
5 TABLET ORAL ONCE
Status: COMPLETED | OUTPATIENT
Start: 2017-02-02 | End: 2017-02-02

## 2017-02-02 RX ADMIN — ISOSORBIDE DINITRATE 10 MG: 10 TABLET ORAL at 05:02

## 2017-02-02 RX ADMIN — FERROUS GLUCONATE TAB 324 MG (37.5 MG ELEMENTAL IRON) 324 MG: 324 (37.5 FE) TAB at 08:02

## 2017-02-02 RX ADMIN — ISOSORBIDE DINITRATE 10 MG: 10 TABLET ORAL at 02:02

## 2017-02-02 RX ADMIN — DIPYRIDAMOLE 100 MG: 50 TABLET, FILM COATED ORAL at 02:02

## 2017-02-02 RX ADMIN — MAGNESIUM OXIDE TAB 400 MG (241.3 MG ELEMENTAL MG) 400 MG: 400 (241.3 MG) TAB at 05:02

## 2017-02-02 RX ADMIN — HEPARIN SODIUM AND DEXTROSE 23 UNITS/KG/HR: 10000; 5 INJECTION INTRAVENOUS at 09:02

## 2017-02-02 RX ADMIN — DIPYRIDAMOLE 100 MG: 50 TABLET, FILM COATED ORAL at 09:02

## 2017-02-02 RX ADMIN — HEPARIN SODIUM AND DEXTROSE 23 UNITS/KG/HR: 10000; 5 INJECTION INTRAVENOUS at 10:02

## 2017-02-02 RX ADMIN — HYDRALAZINE HYDROCHLORIDE 50 MG: 50 TABLET ORAL at 02:02

## 2017-02-02 RX ADMIN — HYDRALAZINE HYDROCHLORIDE 50 MG: 50 TABLET ORAL at 05:02

## 2017-02-02 RX ADMIN — TAMSULOSIN HYDROCHLORIDE 0.4 MG: 0.4 CAPSULE ORAL at 08:02

## 2017-02-02 RX ADMIN — OXYCODONE HYDROCHLORIDE AND ACETAMINOPHEN 1 TABLET: 10; 325 TABLET ORAL at 05:02

## 2017-02-02 RX ADMIN — OXYCODONE HYDROCHLORIDE AND ACETAMINOPHEN 1 TABLET: 10; 325 TABLET ORAL at 11:02

## 2017-02-02 RX ADMIN — AMIODARONE HYDROCHLORIDE 400 MG: 200 TABLET ORAL at 08:02

## 2017-02-02 RX ADMIN — SENNOSIDES A AND B 8.6 MG: 8.6 TABLET, FILM COATED ORAL at 05:02

## 2017-02-02 RX ADMIN — ASPIRIN 325 MG: 325 TABLET, DELAYED RELEASE ORAL at 08:02

## 2017-02-02 RX ADMIN — PANTOPRAZOLE SODIUM 40 MG: 40 TABLET, DELAYED RELEASE ORAL at 08:02

## 2017-02-02 RX ADMIN — LISINOPRIL 20 MG: 20 TABLET ORAL at 08:02

## 2017-02-02 RX ADMIN — INSULIN ASPART 20 UNITS: 100 INJECTION, SOLUTION INTRAVENOUS; SUBCUTANEOUS at 09:02

## 2017-02-02 RX ADMIN — BISACODYL 10 MG: 10 SUPPOSITORY RECTAL at 05:02

## 2017-02-02 RX ADMIN — DIPYRIDAMOLE 100 MG: 50 TABLET, FILM COATED ORAL at 05:02

## 2017-02-02 RX ADMIN — AMLODIPINE BESYLATE 10 MG: 10 TABLET ORAL at 08:02

## 2017-02-02 RX ADMIN — INSULIN ASPART 20 UNITS: 100 INJECTION, SOLUTION INTRAVENOUS; SUBCUTANEOUS at 02:02

## 2017-02-02 RX ADMIN — INSULIN ASPART 2 UNITS: 100 INJECTION, SOLUTION INTRAVENOUS; SUBCUTANEOUS at 02:02

## 2017-02-02 RX ADMIN — MAGNESIUM OXIDE TAB 400 MG (241.3 MG ELEMENTAL MG) 400 MG: 400 (241.3 MG) TAB at 09:02

## 2017-02-02 RX ADMIN — WARFARIN SODIUM 5 MG: 5 TABLET ORAL at 05:02

## 2017-02-02 RX ADMIN — DOCUSATE SODIUM 50 MG: 50 CAPSULE, LIQUID FILLED ORAL at 09:02

## 2017-02-02 RX ADMIN — LISINOPRIL 20 MG: 20 TABLET ORAL at 09:02

## 2017-02-02 RX ADMIN — MAGNESIUM OXIDE TAB 400 MG (241.3 MG ELEMENTAL MG) 400 MG: 400 (241.3 MG) TAB at 02:02

## 2017-02-02 RX ADMIN — ISOSORBIDE DINITRATE 10 MG: 10 TABLET ORAL at 10:02

## 2017-02-02 RX ADMIN — HYDRALAZINE HYDROCHLORIDE 75 MG: 25 TABLET ORAL at 09:02

## 2017-02-02 NOTE — PLAN OF CARE
Problem: Patient Care Overview  Goal: Plan of Care Review  Outcome: Ongoing (interventions implemented as appropriate)  Heparin gttx maintained throughout shift. VS/VAD/Doppler #s WNL. Denies any CP, SOB, palpitations, or dizziness. Fall precautions maintained. Free of fall/trauma/injury.  General skin remains intact with no breakdown. VAD CDI, due to be changed today 02/02/17. C/o feeling constipated and unable to have BM x 2 days, PRN suppository ordered. No BM as of yet. Plan of care reviewed and updated with patient, verbalizes understanding. Patient in no acute distress. Will continue to monitor.

## 2017-02-02 NOTE — PROGRESS NOTES
"LVAD dressing change completed using sterile technique with soap and water completed by RN. DLES is a "1" with minimal drainage noted on the drain sponge. Tolerated without any complication. no redness, or tenderness noted. Some pinkness, and old scab noted.     "

## 2017-02-02 NOTE — PLAN OF CARE
Problem: Patient Care Overview  Goal: Plan of Care Review  Outcome: Ongoing (interventions implemented as appropriate)  Patient progressing toward all goals. Plan of care discussed with patient, no questions at this time. Patient ambulating independently, fall precautions in place. VS & LVAD numbers WNL. Pt not compliant with diet/fluid restriction despite  Education;  Will monitor.

## 2017-02-02 NOTE — PROCEDURES
Patient aaox3 with no family at bedside. VAD interrogation completed this AM in the event changes needed to be made. No power elevations noted. Will continue to monitor for further issues.     Pulsatile: Yes, intermittent   VAD Sounds: Smooth  Problems / Issues / Alarms with VAD if any: None noted      VAD Interrogation:  TXP LVAD INTERROGATIONS 2/2/2017 2/2/2017 2/2/2017 2/2/2017 2/1/2017 2/1/2017 2/1/2017   Type HeartMate II HeartMate II HeartMate II HeartMate II HeartMate II HeartMate II HeartMate II   Flow 6.7 7.3 6.7 6.4 6.6 6.1 6.3   Speed 9000 9000 9000 9000 9000 9000 9000   PI 4.9 4.4 4.9 4.5 5.1 5.1 4.4   Power (Kwon) 5.9 4.0 6.1 5.8 6.1 5.8 5.8   LSL - 8600 8600 8600 8600 8600 -   Pulsatility - Intermittent pulse - Intermittent pulse Intermittent pulse Intermittent pulse -

## 2017-02-02 NOTE — PROGRESS NOTES
"     Heart Failure Progress Note  Attending Physician: Dennis Lyles Jr.,*  Hospital Day: 4    Subjective:   Interval History: No acute events overnight. The patient feels well and denies any current complaints. Upgraded to heart transplant Status 1A from 1B using elective 1A time. VAD interrogation revealed no power spikes, PI events or low flow alarms.    Medications:   Continuous Infusions:   heparin (porcine) in D5W 23 Units/kg/hr (02/02/17 0930)       Scheduled Meds:   amiodarone  400 mg Oral Daily    amlodipine  10 mg Oral Daily    aspirin  325 mg Oral Daily    dipyridamole  100 mg Oral TID    ferrous gluconate  324 mg Oral Daily with breakfast    hydrALAZINE  50 mg Oral Q8H    insulin aspart  20 Units Subcutaneous TIDWM    isosorbide dinitrate  10 mg Oral TID    lisinopril  20 mg Oral BID    magnesium oxide  400 mg Oral TID    pantoprazole  40 mg Oral Daily    tamsulosin  0.4 mg Oral Daily     PRN Meds:bisacodyl, dextrose 50%, dextrose 50%, glucagon (human recombinant), glucose, glucose, insulin aspart, oxycodone-acetaminophen  Objective:     Vitals:  Temp:  [97.7 °F (36.5 °C)-98.2 °F (36.8 °C)]   Pulse:  [71-95]   Resp:  [16-18]   BP: (78-91)/(0-68)   SpO2:  [97 %]     Visit Vitals    BP 91/63 (BP Location: Left arm, Patient Position: Sitting, BP Method: Automatic)    Pulse 90    Temp 97.9 °F (36.6 °C)    Resp 16    Ht 5' 8" (1.727 m)    Wt 91.4 kg (201 lb 8 oz)    SpO2 97%    BMI 30.64 kg/m2    I/O's:    Intake/Output Summary (Last 24 hours) at 02/02/17 1517  Last data filed at 02/02/17 1330   Gross per 24 hour   Intake           2434.3 ml   Output             4975 ml   Net          -2540.7 ml       Wt Readings from Last 10 Encounters:   02/02/17 91.4 kg (201 lb 8 oz)   01/26/17 89.8 kg (198 lb)   01/22/17 87.8 kg (193 lb 9 oz)   12/31/16 88.3 kg (194 lb 10.7 oz)   12/28/16 94.8 kg (208 lb 15.9 oz)   12/28/16 94 kg (207 lb 3.7 oz)   12/27/16 95 kg (209 lb 7 oz)   12/15/16 88 kg " (194 lb)   11/30/16 91.2 kg (201 lb)   11/02/16 89.8 kg (198 lb)        Constitutional: NAD, conversant  HEENT: Sclera anicteric, PERRLA, EOMI  Neck: No JVD, no carotid bruits  CV: LVAD hum, nonpulsatile  Pulm: CTAB with no wheezes, rales, or rhonchi  GI: Abdomen soft, NTND, +BS  Extremities: No LE edema, warm and well perfused, No cyanosis, No clubbing  Skin: No ecchymosis, erythema, or ulcers  Psych: AOx3, appropriate affect  Neuro: CNII-XII intact, no focal deficits    Labs:       Recent Labs  Lab 01/31/17  0513 02/01/17  0408 02/02/17  0556    132* 130*   K 4.1 4.3 4.0   CL 97 96 97   CO2 31* 27 22*   BUN 13 15 13   CREATININE 0.9 0.9 0.8   * 268* 193*   ANIONGAP 8 9 11       Recent Labs  Lab 01/30/17  0727 02/01/17  0408   AST 30 25   ALT 20 17   ALKPHOS 62 61   BILITOT 0.7 0.7   BILIDIR  --  0.3   ALBUMIN 3.4* 3.1*     No results for input(s): PH, PCO2, PO2, HCO3, POCSATURATED in the last 168 hours.     Recent Labs  Lab 01/31/17  0513 02/01/17  0408 02/02/17  0556   WBC 9.09 8.81 8.31   HGB 9.5* 8.8* 8.6*   HCT 30.5* 27.5* 27.6*    211 203   GRAN 57.2  5.2 70.7  6.2 69.1  5.7       Recent Labs  Lab 01/31/17  0513 02/01/17  0408 02/02/17  0556   INR 1.6* 1.4* 1.7*       Recent Labs  Lab 01/30/17  2132 02/01/17  0408   * 68      Micro:   Blood Cultures  Lab Results   Component Value Date    LABBLOO No growth after 5 days. 01/19/2017    LABBLOO No growth after 5 days. 01/19/2017    LABBLOO No growth after 5 days. 09/01/2016    LABBLOO No growth after 5 days. 09/01/2016    LABBLOO No growth after 5 days. 08/24/2016     Urine Cultures  Lab Results   Component Value Date    LABURIN CITROBACTER KOSERI  10,000 - 49,999 cfu/ml   01/19/2017    LABURIN No growth 09/03/2016    LABURIN No growth 09/01/2016       Imaging:     VAD Interrogation:  TXP LVAD INTERROGATIONS 2/2/2017 2/2/2017 2/2/2017 2/2/2017 2/1/2017 2/1/2017 2/1/2017   Type HeartMate II HeartMate II HeartMate II HeartMate II    HeartMate II HeartMate II HeartMate II   Flow 6.7 7.3 6.7 6.4 6.6 6.1 6.3   Speed 9000 9000 9000 9000 9000 9000 9000   PI 4.9 4.4 4.9 4.5 5.1 5.1 4.4   Power (Kwon) 5.9 4.0 6.1 5.8 6.1 5.8 5.8   LSL - 8600 8600 8600 8600 8600 -   Pulsatility - Intermittent pulse - Intermittent pulse   Intermittent pulse Intermittent pulse -     EF   Date Value Ref Range Status   01/31/2017 15 (A) 55 - 65    01/16/2017 10 (A) 55 - 65    09/19/2016 10 (A) 55 - 65    09/06/2016 20 (A) 55 - 65    09/02/2016 15 (A) 55 - 65        Assessment:   Mr. Barriga is a 55 yo M with PMH of NICM s/p HM II (8/24/16), HLD, HTN, VT s/p ICD who presented to the hospital for elevated LDH in the setting of subtherapeutic INR with lower extremity swelling concerning for LVAD pump thrombosis/ failure.      Elevated LDH - D/D dehydration, uncontrolled HTN, pump thrombosis due to subtherapeutic INR  - Speed TTE revealed normal functioning VAD however VAD thrombosis cannot be completely ruled out  - VAD interrogation revealed no power spikes, low flow alarms or PI events  - c/w warfarin to 5 mg. INR 1.7 today.  - c/w heparin gtt until INR > 2.0  - increased hydralazine to 75 mg TID due to Doppler pressures > 80.  - monitor LDH daily; LDH decreased to 604 from 612 yesterday.  - Will hold off on starting Integrillin for now; goal to get LDH < 500 and INR >2.0 prior to discharge.      VT s/p ICD  - c/w amiodarone      NICM s/p HMII  - amlodipine, lisinopril, hydralazine, Isodril  - status 1A for OHT    Case discussed with HF/HTS attending, Dr. Saul.    Signed:  Renee Seymour MD  Cardiology Fellow  Pager: [phone]  2/2/2017 3:17 PM

## 2017-02-02 NOTE — PHYSICIAN QUERY
"PT Name: Mick Barriga  MR #: [id number]  Physician Query Form - Heart  Condition Clarification   Reviewer  Ext Hari Nielsen,[place] [id number]    This form is a permanent document in the medical record.     Query Date: February 2, 2017  By submitting this query, we are merely seeking further clarification of documentation. Please utilize your independent clinical judgment when addressing the question(s) below.  (The Medical record reflects the following:)   Indicators     Supporting Clinical Findings Location in Medical Record   x BNP = 122 1/30 Labs   x EF = Severely depressed left ventricular systolic function (EF 15-20%).  1/31 2D Echo Report    CXR findings:      "Ascites" documented      "SOB" or "MARTINO" documented      "Hypoxia" documented      CHF, HFpEF documented     x "Edema" documented Physical exam consistent with volume overload with + JVD and 2+ pitting ankle edema, clear lungs.  1/30 H&P   x Diuretics/Meds furosemide injection 40 mg TID  1/30-1/31 MAR    Treatment:     x Other:  NICMP stage D underwent HM 2 LVAD placement on 8/24/16. 1/30 H&P     Provider, please specify diagnosis or diagnoses associated with above clinical findings.    [  ] Acute Systolic Heart Failure ( EF < 40)*  [  ] Acute on Chronic Systolic Heart Failure ( EF < 40)*  [X  ] Chronic Systolic Heart Failure ( EF < 40)*  [  ] Acute Diastolic Heart Failure ( EF > 40)*  [  ] Acute on Chronic Diastolic Heart Failure( EF > 40)*  [  ] Chronic Diastolic Heart Failure ( EF > 40)*  [  ] Acute Combined Systolic and Diastolic Heart Failure  [  ] Acute on Chronic Combined Systolic and Diastolic Heart Failure  [  ] Chronic Combined Systolic and Diastolic Heart Failure  *American Heart Association  [  ] Other Cardiac Diagnosis (Specify) ___________________________________  [  ] Clinically undetermined    Please document in your progress notes daily for the duration of treatment, until resolved, and include in your discharge summary.                "

## 2017-02-03 LAB
ALBUMIN SERPL BCP-MCNC: 3 G/DL
ALP SERPL-CCNC: 61 U/L
ALT SERPL W/O P-5'-P-CCNC: 15 U/L
ANION GAP SERPL CALC-SCNC: 13 MMOL/L
ANISOCYTOSIS BLD QL SMEAR: SLIGHT
APTT BLDCRRT: 82.1 SEC
AST SERPL-CCNC: 24 U/L
BASOPHILS # BLD AUTO: 0.02 K/UL
BASOPHILS NFR BLD: 0.2 %
BILIRUB DIRECT SERPL-MCNC: 0.4 MG/DL
BILIRUB SERPL-MCNC: 0.8 MG/DL
BNP SERPL-MCNC: 188 PG/ML
BUN SERPL-MCNC: 10 MG/DL
CALCIUM SERPL-MCNC: 9 MG/DL
CHLORIDE SERPL-SCNC: 96 MMOL/L
CO2 SERPL-SCNC: 21 MMOL/L
CREAT SERPL-MCNC: 0.9 MG/DL
CRP SERPL-MCNC: 150.3 MG/L
DIFFERENTIAL METHOD: ABNORMAL
EOSINOPHIL # BLD AUTO: 0.1 K/UL
EOSINOPHIL NFR BLD: 1.3 %
ERYTHROCYTE [DISTWIDTH] IN BLOOD BY AUTOMATED COUNT: 20.8 %
EST. GFR  (AFRICAN AMERICAN): >60 ML/MIN/1.73 M^2
EST. GFR  (NON AFRICAN AMERICAN): >60 ML/MIN/1.73 M^2
FACT X PPP CHRO-ACNC: 0.27 IU/ML
FACT X PPP CHRO-ACNC: 0.29 IU/ML
GLUCOSE SERPL-MCNC: 197 MG/DL
HCT VFR BLD AUTO: 27.7 %
HGB BLD-MCNC: 8.8 G/DL
INR PPP: 2.2
LDH SERPL L TO P-CCNC: 664 U/L
LDH SERPL L TO P-CCNC: 671 U/L
LYMPHOCYTES # BLD AUTO: 2.1 K/UL
LYMPHOCYTES NFR BLD: 18.4 %
MAGNESIUM SERPL-MCNC: 1.9 MG/DL
MCH RBC QN AUTO: 18.9 PG
MCHC RBC AUTO-ENTMCNC: 31.8 %
MCV RBC AUTO: 59 FL
MONOCYTES # BLD AUTO: 1 K/UL
MONOCYTES NFR BLD: 9.1 %
NEUTROPHILS # BLD AUTO: 7.9 K/UL
NEUTROPHILS NFR BLD: 71 %
OVALOCYTES BLD QL SMEAR: ABNORMAL
PHOSPHATE SERPL-MCNC: 4 MG/DL
PLATELET # BLD AUTO: 197 K/UL
PLATELET BLD QL SMEAR: ABNORMAL
PMV BLD AUTO: ABNORMAL FL
POCT GLUCOSE: 100 MG/DL (ref 70–110)
POCT GLUCOSE: 161 MG/DL (ref 70–110)
POCT GLUCOSE: 258 MG/DL (ref 70–110)
POIKILOCYTOSIS BLD QL SMEAR: SLIGHT
POLYCHROMASIA BLD QL SMEAR: ABNORMAL
POTASSIUM SERPL-SCNC: 4.4 MMOL/L
PREALB SERPL-MCNC: 14 MG/DL
PROT SERPL-MCNC: 6.7 G/DL
PROTHROMBIN TIME: 21.8 SEC
RBC # BLD AUTO: 4.66 M/UL
SODIUM SERPL-SCNC: 130 MMOL/L
WBC # BLD AUTO: 11.13 K/UL

## 2017-02-03 PROCEDURE — 85025 COMPLETE CBC W/AUTO DIFF WBC: CPT

## 2017-02-03 PROCEDURE — 84100 ASSAY OF PHOSPHORUS: CPT

## 2017-02-03 PROCEDURE — 83615 LACTATE (LD) (LDH) ENZYME: CPT

## 2017-02-03 PROCEDURE — 93750 INTERROGATION VAD IN PERSON: CPT | Performed by: PHYSICIAN ASSISTANT

## 2017-02-03 PROCEDURE — 27000248 HC VAD-ADDITIONAL DAY

## 2017-02-03 PROCEDURE — 85730 THROMBOPLASTIN TIME PARTIAL: CPT

## 2017-02-03 PROCEDURE — 25000003 PHARM REV CODE 250: Performed by: STUDENT IN AN ORGANIZED HEALTH CARE EDUCATION/TRAINING PROGRAM

## 2017-02-03 PROCEDURE — 85002 BLEEDING TIME TEST: CPT

## 2017-02-03 PROCEDURE — 36415 COLL VENOUS BLD VENIPUNCTURE: CPT

## 2017-02-03 PROCEDURE — 85520 HEPARIN ASSAY: CPT | Mod: 91

## 2017-02-03 PROCEDURE — 83880 ASSAY OF NATRIURETIC PEPTIDE: CPT

## 2017-02-03 PROCEDURE — 80076 HEPATIC FUNCTION PANEL: CPT

## 2017-02-03 PROCEDURE — 86140 C-REACTIVE PROTEIN: CPT

## 2017-02-03 PROCEDURE — 25000003 PHARM REV CODE 250: Performed by: PHYSICIAN ASSISTANT

## 2017-02-03 PROCEDURE — 85610 PROTHROMBIN TIME: CPT

## 2017-02-03 PROCEDURE — 25000003 PHARM REV CODE 250: Performed by: HOSPITALIST

## 2017-02-03 PROCEDURE — 25000003 PHARM REV CODE 250: Performed by: INTERNAL MEDICINE

## 2017-02-03 PROCEDURE — 84134 ASSAY OF PREALBUMIN: CPT

## 2017-02-03 PROCEDURE — 80048 BASIC METABOLIC PNL TOTAL CA: CPT

## 2017-02-03 PROCEDURE — 20600001 HC STEP DOWN PRIVATE ROOM

## 2017-02-03 PROCEDURE — 83615 LACTATE (LD) (LDH) ENZYME: CPT | Mod: 91

## 2017-02-03 PROCEDURE — 83735 ASSAY OF MAGNESIUM: CPT

## 2017-02-03 RX ORDER — WARFARIN 4 MG/1
4 TABLET ORAL DAILY
Status: DISCONTINUED | OUTPATIENT
Start: 2017-02-03 | End: 2017-02-03

## 2017-02-03 RX ORDER — HYDRALAZINE HYDROCHLORIDE 50 MG/1
100 TABLET, FILM COATED ORAL EVERY 8 HOURS
Status: DISCONTINUED | OUTPATIENT
Start: 2017-02-03 | End: 2017-02-09

## 2017-02-03 RX ORDER — DIPYRIDAMOLE 75 MG
150 TABLET ORAL 3 TIMES DAILY
Status: DISCONTINUED | OUTPATIENT
Start: 2017-02-03 | End: 2017-02-09

## 2017-02-03 RX ORDER — WARFARIN 3 MG/1
3 TABLET ORAL DAILY
Status: DISCONTINUED | OUTPATIENT
Start: 2017-02-03 | End: 2017-02-06

## 2017-02-03 RX ADMIN — TAMSULOSIN HYDROCHLORIDE 0.4 MG: 0.4 CAPSULE ORAL at 09:02

## 2017-02-03 RX ADMIN — OXYCODONE HYDROCHLORIDE AND ACETAMINOPHEN 1 TABLET: 10; 325 TABLET ORAL at 11:02

## 2017-02-03 RX ADMIN — DOCUSATE SODIUM 50 MG: 50 CAPSULE, LIQUID FILLED ORAL at 08:02

## 2017-02-03 RX ADMIN — HYDRALAZINE HYDROCHLORIDE 100 MG: 50 TABLET ORAL at 08:02

## 2017-02-03 RX ADMIN — AMIODARONE HYDROCHLORIDE 400 MG: 200 TABLET ORAL at 09:02

## 2017-02-03 RX ADMIN — DIPYRIDAMOLE 100 MG: 50 TABLET, FILM COATED ORAL at 05:02

## 2017-02-03 RX ADMIN — AMLODIPINE BESYLATE 10 MG: 10 TABLET ORAL at 09:02

## 2017-02-03 RX ADMIN — LISINOPRIL 20 MG: 20 TABLET ORAL at 09:02

## 2017-02-03 RX ADMIN — MAGNESIUM OXIDE TAB 400 MG (241.3 MG ELEMENTAL MG) 400 MG: 400 (241.3 MG) TAB at 08:02

## 2017-02-03 RX ADMIN — ISOSORBIDE DINITRATE 10 MG: 10 TABLET ORAL at 08:02

## 2017-02-03 RX ADMIN — OXYCODONE HYDROCHLORIDE AND ACETAMINOPHEN 1 TABLET: 10; 325 TABLET ORAL at 05:02

## 2017-02-03 RX ADMIN — DIPYRIDAMOLE 150 MG: 75 TABLET, FILM COATED ORAL at 02:02

## 2017-02-03 RX ADMIN — INSULIN ASPART 3 UNITS: 100 INJECTION, SOLUTION INTRAVENOUS; SUBCUTANEOUS at 03:02

## 2017-02-03 RX ADMIN — DOCUSATE SODIUM 50 MG: 50 CAPSULE, LIQUID FILLED ORAL at 09:02

## 2017-02-03 RX ADMIN — ASPIRIN 325 MG: 325 TABLET, DELAYED RELEASE ORAL at 09:02

## 2017-02-03 RX ADMIN — ISOSORBIDE DINITRATE 10 MG: 10 TABLET ORAL at 02:02

## 2017-02-03 RX ADMIN — HYDRALAZINE HYDROCHLORIDE 100 MG: 50 TABLET ORAL at 02:02

## 2017-02-03 RX ADMIN — HYDRALAZINE HYDROCHLORIDE 75 MG: 25 TABLET ORAL at 05:02

## 2017-02-03 RX ADMIN — ISOSORBIDE DINITRATE 10 MG: 10 TABLET ORAL at 05:02

## 2017-02-03 RX ADMIN — WARFARIN SODIUM 3 MG: 3 TABLET ORAL at 05:02

## 2017-02-03 RX ADMIN — DIPYRIDAMOLE 150 MG: 75 TABLET, FILM COATED ORAL at 08:02

## 2017-02-03 RX ADMIN — MAGNESIUM OXIDE TAB 400 MG (241.3 MG ELEMENTAL MG) 400 MG: 400 (241.3 MG) TAB at 05:02

## 2017-02-03 RX ADMIN — PANTOPRAZOLE SODIUM 40 MG: 40 TABLET, DELAYED RELEASE ORAL at 09:02

## 2017-02-03 RX ADMIN — HEPARIN SODIUM AND DEXTROSE 25 UNITS/KG/HR: 10000; 5 INJECTION INTRAVENOUS at 11:02

## 2017-02-03 RX ADMIN — LISINOPRIL 20 MG: 20 TABLET ORAL at 08:02

## 2017-02-03 RX ADMIN — MAGNESIUM OXIDE TAB 400 MG (241.3 MG ELEMENTAL MG) 400 MG: 400 (241.3 MG) TAB at 02:02

## 2017-02-03 RX ADMIN — FERROUS GLUCONATE TAB 324 MG (37.5 MG ELEMENTAL IRON) 324 MG: 324 (37.5 FE) TAB at 09:02

## 2017-02-03 RX ADMIN — INSULIN ASPART 20 UNITS: 100 INJECTION, SOLUTION INTRAVENOUS; SUBCUTANEOUS at 03:02

## 2017-02-03 NOTE — PLAN OF CARE
Problem: Patient Care Overview  Goal: Plan of Care Review  Outcome: Ongoing (interventions implemented as appropriate)  Patient progressing toward all goals. Plan of care discussed with patient, no questions at this time. Patient ambulating independently, fall precautions in place. VS & LVAD numbers WNL. Heparin infusing; Will monitor.

## 2017-02-03 NOTE — PROGRESS NOTES
"LVAD dsg change done using sterile technique with soap and water; DLES "2"; Small scab (pea-size) under driveline; Redness dime size around driveline site; No change; Will monitor  "

## 2017-02-03 NOTE — PROGRESS NOTES
"     Heart Failure Progress Note  Attending Physician: Dennis Lyles Jr.,*  Hospital Day: 5    Subjective:   Interval History: No acute events overnight. The patient's labs revealed elevated  (from 604 yesterday) , haptoglobin <10, .3, Hb at 8.8 (stable). His INR is now in therapeutic range (2.2). VAD interrogation did not reveal any low flow, power spikes or PI events.    Medications:   Continuous Infusions:       Scheduled Meds:   amiodarone  400 mg Oral Daily    amlodipine  10 mg Oral Daily    aspirin  325 mg Oral Daily    dipyridamole  150 mg Oral TID    docusate sodium  50 mg Oral BID    ferrous gluconate  324 mg Oral Daily with breakfast    hydrALAZINE  100 mg Oral Q8H    insulin aspart  20 Units Subcutaneous TIDWM    isosorbide dinitrate  10 mg Oral TID    lisinopril  20 mg Oral BID    magnesium oxide  400 mg Oral TID    pantoprazole  40 mg Oral Daily    tamsulosin  0.4 mg Oral Daily    warfarin  4 mg Oral Once     PRN Meds:bisacodyl, dextrose 50%, dextrose 50%, glucagon (human recombinant), glucose, glucose, insulin aspart, oxycodone-acetaminophen, senna  Objective:     Vitals:  Temp:  [97.8 °F (36.6 °C)-98.3 °F (36.8 °C)]   Pulse:  []   Resp:  [16-18]   BP: ()/(0-77)   SpO2:  [97 %-99 %]     Visit Vitals    BP (!) 76/0 (BP Location: Right arm, Patient Position: Lying, BP Method: Doppler)    Pulse 85    Temp 98.2 °F (36.8 °C) (Oral)    Resp 16    Ht 5' 8" (1.727 m)    Wt 91.7 kg (202 lb 2.6 oz)    SpO2 97%    BMI 30.74 kg/m2    I/O's:    Intake/Output Summary (Last 24 hours) at 02/03/17 1330  Last data filed at 02/03/17 0617   Gross per 24 hour   Intake          1532.57 ml   Output             2900 ml   Net         -1367.43 ml       Wt Readings from Last 10 Encounters:   02/03/17 91.7 kg (202 lb 2.6 oz)   01/26/17 89.8 kg (198 lb)   01/22/17 87.8 kg (193 lb 9 oz)   12/31/16 88.3 kg (194 lb 10.7 oz)   12/28/16 94.8 kg (208 lb 15.9 oz)   12/28/16 94 kg (207 " lb 3.7 oz)   12/27/16 95 kg (209 lb 7 oz)   12/15/16 88 kg (194 lb)   11/30/16 91.2 kg (201 lb)   11/02/16 89.8 kg (198 lb)        Constitutional: NAD, conversant  HEENT: Sclera anicteric, PERRLA, EOMI  Neck: No JVD, no carotid bruits  CV: +LVAD hum  Pulm: CTAB with no wheezes, rales, or rhonchi  GI: Abdomen soft, NTND, +BS. No tenderness at driveline site.  Extremities: No LE edema, warm and well perfused, No cyanosis, No clubbing      Labs:       Recent Labs  Lab 02/01/17 0408 02/02/17 0556 02/03/17 0447   * 130* 130*   K 4.3 4.0 4.4   CL 96 97 96   CO2 27 22* 21*   BUN 15 13 10   CREATININE 0.9 0.8 0.9   * 193* 197*   ANIONGAP 9 11 13       Recent Labs  Lab 01/30/17  0727  02/01/17 0408 02/03/17 0447   AST 30  --  25 24   ALT 20  --  17 15   ALKPHOS 62  --  61 61   BILITOT 0.7  --  0.7 0.8   BILIDIR  --   < > 0.3 0.4*   ALBUMIN 3.4*  --  3.1* 3.0*   < > = values in this interval not displayed.  No results for input(s): PH, PCO2, PO2, HCO3, POCSATURATED in the last 168 hours.     Recent Labs  Lab 02/01/17 0408 02/02/17 0556 02/03/17 0447   WBC 8.81 8.31 11.13   HGB 8.8* 8.6* 8.8*   HCT 27.5* 27.6* 27.7*    203 197   GRAN 70.7  6.2 69.1  5.7 71.0  7.9*       Recent Labs  Lab 02/01/17 0408 02/02/17  0556 02/03/17 0447   INR 1.4* 1.7* 2.2*       Recent Labs  Lab 01/30/17  2132 02/01/17 0408 02/03/17 0447   * 68 188*      Micro:   Blood Cultures  Lab Results   Component Value Date    LABBLOO No growth after 5 days. 01/19/2017    LABBLOO No growth after 5 days. 01/19/2017    LABBLOO No growth after 5 days. 09/01/2016    LABBLOO No growth after 5 days. 09/01/2016    LABBLOO No growth after 5 days. 08/24/2016     Urine Cultures  Lab Results   Component Value Date    LABURIN CITROBACTER KOSERI  10,000 - 49,999 cfu/ml   01/19/2017    LABURIN No growth 09/03/2016    LABURIN No growth 09/01/2016       Imaging:   VAD interrogation report pending from today.    EF   Date Value Ref  Range Status   01/31/2017 15 (A) 55 - 65    01/16/2017 10 (A) 55 - 65    09/19/2016 10 (A) 55 - 65    09/06/2016 20 (A) 55 - 65    09/02/2016 15 (A) 55 - 65      Assessment:   Mr. Barriga is a 55 yo M with PMH of NICM s/p HM II (8/24/16), HLD, HTN, VT s/p ICD who presented to the hospital for elevated LDH in the setting of subtherapeutic INR with lower extremity swelling concerning for LVAD pump thrombosis/ failure.      Elevated LDH - D/D dehydration, uncontrolled HTN, pump thrombosis due to subtherapeutic INR  - Speed TTE revealed normal functioning VAD however VAD thrombosis cannot be completely ruled out  - VAD interrogation revealed no power spikes, low flow alarms or PI events  - c/w warfarin 4mg QD.  INR 2.2 today. D/C'd heparin gtt  - increased hydralazine to 100 mg TID due to Doppler pressures > 80  - monitor LDH daily; LDH elevated to 671 from 604. Goal to get LDH < 500 and INR >2.0 prior to discharge  - increased persantine to 150 mg TID  - encourage PO fluid intake      VT s/p ICD  - c/w amiodarone      NICM s/p HMII  - amlodipine, lisinopril, hydralazine, Isodril  - status 1A for OHT     Case discussed with HF/HTS attending, Dr. Saul.      Signed:  Renee Seymour MD  Cardiology Fellow  Pager: [phone]  2/3/2017 1:30 PM

## 2017-02-03 NOTE — PROCEDURES
Patient aaox3 with no family at bedside. VAD interrogation completed this AM in the event changes needed to be made. No power elevations noted.  Will continue to monitor for further issues.     Pulsatile: Yes   VAD Sounds: Smooth  Problems / Issues / Alarms with VAD if any: None noted      VAD Interrogation:  TXP LVAD INTERROGATIONS 2/3/2017 2/3/2017 2/3/2017 2/3/2017 2/2/2017 2/2/2017 2/2/2017   Type HeartMate II HeartMate II HeartMate II HeartMate II HeartMate II HeartMate II HeartMate II   Flow 6.2 5.9 5.7 5.6 6.3 6.3 6.6   Speed 9000 9000 9000 9000 9000 9000 9000   PI 4.8 5.0 5.7 6.0 5.1 4.5 4.7   Power (Kwon) 5.9 5.7 5.5 5.5 5.8 5.7 6.1   [facility] - 8600 8600 8600 - 8600 -   Pulsatility - Intermittent pulse - Intermittent pulse Intermittent pulse Intermittent pulse -

## 2017-02-03 NOTE — PROGRESS NOTES
"LVAD dsg change done using sterile technique with soap and water; DLES "2"; Small scab (pea-size) under driveline; Redness dime size around driveline site; Will monitor    "

## 2017-02-03 NOTE — PLAN OF CARE
Problem: Patient Care Overview  Goal: Plan of Care Review  Outcome: Ongoing (interventions implemented as appropriate)  Heparin gttx maintained throughout shift. VS/VAD/Doppler #s WNL. Denies any CP, SOB, palpitations, or dizziness. Fall precautions maintained. Free of fall/trauma/injury. General skin remains intact with no breakdown. VAD dressing changed per dayshift RN, dressing remains CDI. Pt upgraded to status 1A for OHT. Plan of care reviewed and updated with patient, verbalizes understanding. Patient in no acute distress. Will continue to monitor.

## 2017-02-04 LAB
ANION GAP SERPL CALC-SCNC: 6 MMOL/L
ANISOCYTOSIS BLD QL SMEAR: SLIGHT
APTT BLDCRRT: 40.9 SEC
BASOPHILS # BLD AUTO: 0.02 K/UL
BASOPHILS NFR BLD: 0.2 %
BUN SERPL-MCNC: 10 MG/DL
CALCIUM SERPL-MCNC: 8.8 MG/DL
CHLORIDE SERPL-SCNC: 96 MMOL/L
CO2 SERPL-SCNC: 27 MMOL/L
CREAT SERPL-MCNC: 0.8 MG/DL
DIFFERENTIAL METHOD: ABNORMAL
EOSINOPHIL # BLD AUTO: 0.2 K/UL
EOSINOPHIL NFR BLD: 1.4 %
ERYTHROCYTE [DISTWIDTH] IN BLOOD BY AUTOMATED COUNT: 20.3 %
EST. GFR  (AFRICAN AMERICAN): >60 ML/MIN/1.73 M^2
EST. GFR  (NON AFRICAN AMERICAN): >60 ML/MIN/1.73 M^2
GLUCOSE SERPL-MCNC: 186 MG/DL
HCT VFR BLD AUTO: 26.2 %
HGB BLD-MCNC: 8.1 G/DL
HYPOCHROMIA BLD QL SMEAR: ABNORMAL
INR PPP: 2.7
LDH SERPL L TO P-CCNC: 584 U/L
LYMPHOCYTES # BLD AUTO: 1.4 K/UL
LYMPHOCYTES NFR BLD: 13.2 %
MAGNESIUM SERPL-MCNC: 2 MG/DL
MCH RBC QN AUTO: 18.3 PG
MCHC RBC AUTO-ENTMCNC: 30.9 %
MCV RBC AUTO: 59 FL
MONOCYTES # BLD AUTO: 1.1 K/UL
MONOCYTES NFR BLD: 9.8 %
NEUTROPHILS # BLD AUTO: 8.2 K/UL
NEUTROPHILS NFR BLD: 75.4 %
PHOSPHATE SERPL-MCNC: 4 MG/DL
PLATELET # BLD AUTO: 151 K/UL
PLATELET BLD QL SMEAR: ABNORMAL
PMV BLD AUTO: ABNORMAL FL
POCT GLUCOSE: 228 MG/DL (ref 70–110)
POCT GLUCOSE: 253 MG/DL (ref 70–110)
POCT GLUCOSE: 305 MG/DL (ref 70–110)
POTASSIUM SERPL-SCNC: 4.6 MMOL/L
PROTHROMBIN TIME: 27.1 SEC
RBC # BLD AUTO: 4.42 M/UL
SODIUM SERPL-SCNC: 129 MMOL/L
WBC # BLD AUTO: 10.89 K/UL

## 2017-02-04 PROCEDURE — 80048 BASIC METABOLIC PNL TOTAL CA: CPT

## 2017-02-04 PROCEDURE — 25000003 PHARM REV CODE 250: Performed by: INTERNAL MEDICINE

## 2017-02-04 PROCEDURE — 27000248 HC VAD-ADDITIONAL DAY

## 2017-02-04 PROCEDURE — 85610 PROTHROMBIN TIME: CPT

## 2017-02-04 PROCEDURE — 25000003 PHARM REV CODE 250: Performed by: PHYSICIAN ASSISTANT

## 2017-02-04 PROCEDURE — 20600001 HC STEP DOWN PRIVATE ROOM

## 2017-02-04 PROCEDURE — 25000003 PHARM REV CODE 250: Performed by: HOSPITALIST

## 2017-02-04 PROCEDURE — 25000003 PHARM REV CODE 250: Performed by: STUDENT IN AN ORGANIZED HEALTH CARE EDUCATION/TRAINING PROGRAM

## 2017-02-04 PROCEDURE — 36415 COLL VENOUS BLD VENIPUNCTURE: CPT

## 2017-02-04 PROCEDURE — 83615 LACTATE (LD) (LDH) ENZYME: CPT

## 2017-02-04 PROCEDURE — 84100 ASSAY OF PHOSPHORUS: CPT

## 2017-02-04 PROCEDURE — 99232 SBSQ HOSP IP/OBS MODERATE 35: CPT | Mod: ,,, | Performed by: INTERNAL MEDICINE

## 2017-02-04 PROCEDURE — 83735 ASSAY OF MAGNESIUM: CPT

## 2017-02-04 PROCEDURE — 85730 THROMBOPLASTIN TIME PARTIAL: CPT

## 2017-02-04 PROCEDURE — 85025 COMPLETE CBC W/AUTO DIFF WBC: CPT

## 2017-02-04 RX ADMIN — INSULIN ASPART 1 UNITS: 100 INJECTION, SOLUTION INTRAVENOUS; SUBCUTANEOUS at 09:02

## 2017-02-04 RX ADMIN — BISACODYL 10 MG: 10 SUPPOSITORY RECTAL at 12:02

## 2017-02-04 RX ADMIN — DIPYRIDAMOLE 150 MG: 75 TABLET, FILM COATED ORAL at 09:02

## 2017-02-04 RX ADMIN — AMLODIPINE BESYLATE 10 MG: 10 TABLET ORAL at 09:02

## 2017-02-04 RX ADMIN — ASPIRIN 325 MG: 325 TABLET, DELAYED RELEASE ORAL at 09:02

## 2017-02-04 RX ADMIN — DOCUSATE SODIUM 50 MG: 50 CAPSULE, LIQUID FILLED ORAL at 09:02

## 2017-02-04 RX ADMIN — MAGNESIUM OXIDE TAB 400 MG (241.3 MG ELEMENTAL MG) 400 MG: 400 (241.3 MG) TAB at 05:02

## 2017-02-04 RX ADMIN — OXYCODONE HYDROCHLORIDE AND ACETAMINOPHEN 1 TABLET: 10; 325 TABLET ORAL at 05:02

## 2017-02-04 RX ADMIN — FERROUS GLUCONATE TAB 324 MG (37.5 MG ELEMENTAL IRON) 324 MG: 324 (37.5 FE) TAB at 09:02

## 2017-02-04 RX ADMIN — AMIODARONE HYDROCHLORIDE 400 MG: 200 TABLET ORAL at 09:02

## 2017-02-04 RX ADMIN — DIPYRIDAMOLE 150 MG: 75 TABLET, FILM COATED ORAL at 01:02

## 2017-02-04 RX ADMIN — HYDRALAZINE HYDROCHLORIDE 100 MG: 50 TABLET ORAL at 09:02

## 2017-02-04 RX ADMIN — MAGNESIUM OXIDE TAB 400 MG (241.3 MG ELEMENTAL MG) 400 MG: 400 (241.3 MG) TAB at 09:02

## 2017-02-04 RX ADMIN — DIPYRIDAMOLE 150 MG: 75 TABLET, FILM COATED ORAL at 05:02

## 2017-02-04 RX ADMIN — ISOSORBIDE DINITRATE 10 MG: 10 TABLET ORAL at 09:02

## 2017-02-04 RX ADMIN — ISOSORBIDE DINITRATE 10 MG: 10 TABLET ORAL at 01:02

## 2017-02-04 RX ADMIN — INSULIN ASPART 20 UNITS: 100 INJECTION, SOLUTION INTRAVENOUS; SUBCUTANEOUS at 09:02

## 2017-02-04 RX ADMIN — INSULIN ASPART 3 UNITS: 100 INJECTION, SOLUTION INTRAVENOUS; SUBCUTANEOUS at 02:02

## 2017-02-04 RX ADMIN — HYDRALAZINE HYDROCHLORIDE 100 MG: 50 TABLET ORAL at 01:02

## 2017-02-04 RX ADMIN — WARFARIN SODIUM 3 MG: 3 TABLET ORAL at 05:02

## 2017-02-04 RX ADMIN — MAGNESIUM OXIDE TAB 400 MG (241.3 MG ELEMENTAL MG) 400 MG: 400 (241.3 MG) TAB at 01:02

## 2017-02-04 RX ADMIN — OXYCODONE HYDROCHLORIDE AND ACETAMINOPHEN 1 TABLET: 10; 325 TABLET ORAL at 11:02

## 2017-02-04 RX ADMIN — INSULIN ASPART 20 UNITS: 100 INJECTION, SOLUTION INTRAVENOUS; SUBCUTANEOUS at 02:02

## 2017-02-04 RX ADMIN — PANTOPRAZOLE SODIUM 40 MG: 40 TABLET, DELAYED RELEASE ORAL at 09:02

## 2017-02-04 RX ADMIN — OXYCODONE HYDROCHLORIDE AND ACETAMINOPHEN 1 TABLET: 10; 325 TABLET ORAL at 06:02

## 2017-02-04 RX ADMIN — LISINOPRIL 20 MG: 20 TABLET ORAL at 09:02

## 2017-02-04 RX ADMIN — OXYCODONE HYDROCHLORIDE AND ACETAMINOPHEN 1 TABLET: 10; 325 TABLET ORAL at 12:02

## 2017-02-04 RX ADMIN — TAMSULOSIN HYDROCHLORIDE 0.4 MG: 0.4 CAPSULE ORAL at 09:02

## 2017-02-04 RX ADMIN — ISOSORBIDE DINITRATE 10 MG: 10 TABLET ORAL at 05:02

## 2017-02-04 RX ADMIN — HYDRALAZINE HYDROCHLORIDE 100 MG: 50 TABLET ORAL at 05:02

## 2017-02-04 NOTE — PROGRESS NOTES
"     Heart Failure Progress Note  Attending Physician: Dennis Lyles Jr.,*  Hospital Day: 6    Subjective:   Interval History: No acute events overnight. Feels well and denies any complaints.    Medications:   Continuous Infusions:     Scheduled Meds:   amiodarone  400 mg Oral Daily    amlodipine  10 mg Oral Daily    aspirin  325 mg Oral Daily    dipyridamole  150 mg Oral TID    docusate sodium  50 mg Oral BID    ferrous gluconate  324 mg Oral Daily with breakfast    hydrALAZINE  100 mg Oral Q8H    insulin aspart  20 Units Subcutaneous TIDWM    isosorbide dinitrate  10 mg Oral TID    lisinopril  20 mg Oral BID    magnesium oxide  400 mg Oral TID    pantoprazole  40 mg Oral Daily    tamsulosin  0.4 mg Oral Daily    warfarin  3 mg Oral Daily     PRN Meds:bisacodyl, dextrose 50%, dextrose 50%, glucagon (human recombinant), glucose, glucose, insulin aspart, oxycodone-acetaminophen, senna  Objective:     Vitals:  Temp:  [96.3 °F (35.7 °C)-98.2 °F (36.8 °C)]   Pulse:  []   Resp:  [14-18]   BP: ()/(0-63)   SpO2:  [94 %-98 %]     Visit Vitals    BP (!) 76/0 (BP Location: Right arm, Patient Position: Sitting, BP Method: Doppler)    Pulse 88    Temp 96.3 °F (35.7 °C) (Oral)    Resp 18    Ht 5' 8" (1.727 m)    Wt 91.7 kg (202 lb 2.6 oz)    SpO2 97%    BMI 30.74 kg/m2    I/O's:    Intake/Output Summary (Last 24 hours) at 02/04/17 1356  Last data filed at 02/04/17 0600   Gross per 24 hour   Intake             1872 ml   Output             5080 ml   Net            -3208 ml       Wt Readings from Last 10 Encounters:   02/03/17 91.7 kg (202 lb 2.6 oz)   01/26/17 89.8 kg (198 lb)   01/22/17 87.8 kg (193 lb 9 oz)   12/31/16 88.3 kg (194 lb 10.7 oz)   12/28/16 94.8 kg (208 lb 15.9 oz)   12/28/16 94 kg (207 lb 3.7 oz)   12/27/16 95 kg (209 lb 7 oz)   12/15/16 88 kg (194 lb)   11/30/16 91.2 kg (201 lb)   11/02/16 89.8 kg (198 lb)        Constitutional: NAD, conversant  HEENT: Sclera anicteric, " PERRLA, EOMI  Neck: No JVD, no carotid bruits  CV: +LVAD hum  Pulm: CTAB with no wheezes, rales, or rhonchi  GI: Abdomen soft, NTND, +BS. No tenderness at driveline site.  Extremities: No LE edema, warm and well perfused, No cyanosis, No clubbing      Labs:       Recent Labs  Lab 02/02/17  0556 02/03/17 0447 02/04/17 0425   * 130* 129*   K 4.0 4.4 4.6   CL 97 96 96   CO2 22* 21* 27   BUN 13 10 10   CREATININE 0.8 0.9 0.8   * 197* 186*   ANIONGAP 11 13 6*       Recent Labs  Lab 01/30/17  0727  02/01/17 0408 02/03/17 0447   AST 30  --  25 24   ALT 20  --  17 15   ALKPHOS 62  --  61 61   BILITOT 0.7  --  0.7 0.8   BILIDIR  --   < > 0.3 0.4*   ALBUMIN 3.4*  --  3.1* 3.0*   < > = values in this interval not displayed.  No results for input(s): PH, PCO2, PO2, HCO3, POCSATURATED in the last 168 hours.     Recent Labs  Lab 02/02/17  0556 02/03/17 0447 02/04/17 0425   WBC 8.31 11.13 10.89   HGB 8.6* 8.8* 8.1*   HCT 27.6* 27.7* 26.2*    197 151   GRAN 69.1  5.7 71.0  7.9* 75.4*  8.2*       Recent Labs  Lab 02/02/17  0556 02/03/17 0447 02/04/17 0425   INR 1.7* 2.2* 2.7*       Recent Labs  Lab 01/30/17  2132 02/01/17  0408 02/03/17 0447   * 68 188*      Micro:   Blood Cultures  Lab Results   Component Value Date    LABBLOO No growth after 5 days. 01/19/2017    LABBLOO No growth after 5 days. 01/19/2017    LABBLOO No growth after 5 days. 09/01/2016    LABBLOO No growth after 5 days. 09/01/2016    LABBLOO No growth after 5 days. 08/24/2016     Urine Cultures  Lab Results   Component Value Date    LABURIN CITROBACTER KOSERI  10,000 - 49,999 cfu/ml   01/19/2017    LABURIN No growth 09/03/2016    LABURIN No growth 09/01/2016       Imaging:     EF   Date Value Ref Range Status   01/31/2017 15 (A) 55 - 65    01/16/2017 10 (A) 55 - 65    09/19/2016 10 (A) 55 - 65    09/06/2016 20 (A) 55 - 65    09/02/2016 15 (A) 55 - 65          Assessment:   Mr. Barriga is a 55 yo M with PMH of [facility] s/p  II  (8/24/16), HLD, HTN, VT s/p ICD who presented to the hospital for elevated LDH in the setting of subtherapeutic INR with lower extremity swelling concerning for LVAD pump thrombosis/ failure.      Elevated LDH - D/D dehydration, uncontrolled HTN, pump thrombosis due to subtherapeutic INR  - Speed TTE revealed normal functioning VAD however VAD thrombosis cannot be completely ruled out  - VAD interrogation revealed no power spikes, low flow alarms or PI events  - c/w warfarin 3 mg QD. INR 2.7 today  - c/w hydralazine to 100 mg TID; Doppler pressures in 70s  - monitor LDH daily; LDH decreased to 584 from 671. Goal to get LDH < 500 and INR >2.0 prior to discharge  - c/w persantine to 150 mg TID  - encourage PO fluid intake      VT s/p ICD  - c/w amiodarone      NICM s/p HMII  - amlodipine, lisinopril, hydralazine, Isodril  - status 1A for OHT      Case discussed with HF/HTS attending, Dr. Dawn.      Signed:  Renee Seymour MD  Cardiology Fellow  Pager: [phone]  2/4/2017 1:56 PM

## 2017-02-04 NOTE — PLAN OF CARE
Problem: Patient Care Overview  Goal: Plan of Care Review  Outcome: Ongoing (interventions implemented as appropriate)  Pt free of falls/trauma/injuries this shift. LVAD working properly and sounds smooth. Dopplers and MAPs WDL. LVAD dressing remains clean, dry, and intact with no drainage noted.  Dressing change done per RN. INR today is 2.7. Patient tolerating plan of care well. Pt complains of no chest pain, SOB, or dizziness. Blood sugars maintained with scheduled insulin and SSI. Plan of care reviewed with patient at bedside, all questions answered. Will continue to monitor.

## 2017-02-04 NOTE — PLAN OF CARE
Problem: Patient Care Overview  Goal: Plan of Care Review  Outcome: Ongoing (interventions implemented as appropriate)  Plan of care discussed with patient, no new questions at this time. VSS, denies SOB, no complaint of pain. All drips D/c'd 2/3/17. INR therapuetic 2.2. Monitoring LDH. Safety precautions taken, no falls/trauma during the shift. Will continue to monitor.

## 2017-02-05 LAB
ANION GAP SERPL CALC-SCNC: 6 MMOL/L
ANISOCYTOSIS BLD QL SMEAR: SLIGHT
APTT BLDCRRT: 43.9 SEC
BASOPHILS # BLD AUTO: 0.03 K/UL
BASOPHILS NFR BLD: 0.4 %
BUN SERPL-MCNC: 11 MG/DL
CALCIUM SERPL-MCNC: 9.1 MG/DL
CHLORIDE SERPL-SCNC: 97 MMOL/L
CO2 SERPL-SCNC: 26 MMOL/L
CREAT SERPL-MCNC: 0.9 MG/DL
DACRYOCYTES BLD QL SMEAR: ABNORMAL
DIFFERENTIAL METHOD: ABNORMAL
EOSINOPHIL # BLD AUTO: 0.3 K/UL
EOSINOPHIL NFR BLD: 3.7 %
ERYTHROCYTE [DISTWIDTH] IN BLOOD BY AUTOMATED COUNT: 19.8 %
EST. GFR  (AFRICAN AMERICAN): >60 ML/MIN/1.73 M^2
EST. GFR  (NON AFRICAN AMERICAN): >60 ML/MIN/1.73 M^2
GLUCOSE SERPL-MCNC: 238 MG/DL
HCT VFR BLD AUTO: 27 %
HGB BLD-MCNC: 8.5 G/DL
HYPOCHROMIA BLD QL SMEAR: ABNORMAL
INR PPP: 2.6
LDH SERPL L TO P-CCNC: 563 U/L
LDH SERPL L TO P-CCNC: 673 U/L
LYMPHOCYTES # BLD AUTO: 1.7 K/UL
LYMPHOCYTES NFR BLD: 22.1 %
MAGNESIUM SERPL-MCNC: 1.9 MG/DL
MCH RBC QN AUTO: 18.3 PG
MCHC RBC AUTO-ENTMCNC: 31.5 %
MCV RBC AUTO: 58 FL
MONOCYTES # BLD AUTO: 0.5 K/UL
MONOCYTES NFR BLD: 6.8 %
NEUTROPHILS # BLD AUTO: 5 K/UL
NEUTROPHILS NFR BLD: 67 %
OVALOCYTES BLD QL SMEAR: ABNORMAL
PHOSPHATE SERPL-MCNC: 4.2 MG/DL
PLATELET # BLD AUTO: 185 K/UL
PLATELET BLD QL SMEAR: ABNORMAL
PMV BLD AUTO: ABNORMAL FL
POCT GLUCOSE: 117 MG/DL (ref 70–110)
POCT GLUCOSE: 219 MG/DL (ref 70–110)
POCT GLUCOSE: 278 MG/DL (ref 70–110)
POCT GLUCOSE: 84 MG/DL (ref 70–110)
POIKILOCYTOSIS BLD QL SMEAR: SLIGHT
POLYCHROMASIA BLD QL SMEAR: ABNORMAL
POTASSIUM SERPL-SCNC: 5 MMOL/L
PROTHROMBIN TIME: 25.7 SEC
RBC # BLD AUTO: 4.64 M/UL
SCHISTOCYTES BLD QL SMEAR: ABNORMAL
SODIUM SERPL-SCNC: 129 MMOL/L
WBC # BLD AUTO: 7.54 K/UL

## 2017-02-05 PROCEDURE — 36415 COLL VENOUS BLD VENIPUNCTURE: CPT

## 2017-02-05 PROCEDURE — 83615 LACTATE (LD) (LDH) ENZYME: CPT

## 2017-02-05 PROCEDURE — 85730 THROMBOPLASTIN TIME PARTIAL: CPT

## 2017-02-05 PROCEDURE — 25000003 PHARM REV CODE 250: Performed by: INTERNAL MEDICINE

## 2017-02-05 PROCEDURE — 83735 ASSAY OF MAGNESIUM: CPT

## 2017-02-05 PROCEDURE — 85025 COMPLETE CBC W/AUTO DIFF WBC: CPT

## 2017-02-05 PROCEDURE — 99232 SBSQ HOSP IP/OBS MODERATE 35: CPT | Mod: ,,, | Performed by: INTERNAL MEDICINE

## 2017-02-05 PROCEDURE — 84100 ASSAY OF PHOSPHORUS: CPT

## 2017-02-05 PROCEDURE — 25000003 PHARM REV CODE 250: Performed by: PHYSICIAN ASSISTANT

## 2017-02-05 PROCEDURE — 85610 PROTHROMBIN TIME: CPT

## 2017-02-05 PROCEDURE — 25000003 PHARM REV CODE 250: Performed by: STUDENT IN AN ORGANIZED HEALTH CARE EDUCATION/TRAINING PROGRAM

## 2017-02-05 PROCEDURE — 83615 LACTATE (LD) (LDH) ENZYME: CPT | Mod: 91

## 2017-02-05 PROCEDURE — 27000248 HC VAD-ADDITIONAL DAY

## 2017-02-05 PROCEDURE — 25000003 PHARM REV CODE 250: Performed by: HOSPITALIST

## 2017-02-05 PROCEDURE — 20600001 HC STEP DOWN PRIVATE ROOM

## 2017-02-05 PROCEDURE — 80048 BASIC METABOLIC PNL TOTAL CA: CPT

## 2017-02-05 RX ORDER — OXYCODONE HCL 10 MG/1
20 TABLET, FILM COATED, EXTENDED RELEASE ORAL EVERY 12 HOURS
Status: DISCONTINUED | OUTPATIENT
Start: 2017-02-05 | End: 2017-02-07

## 2017-02-05 RX ORDER — OXYCODONE HCL 10 MG/1
20 TABLET, FILM COATED, EXTENDED RELEASE ORAL EVERY 12 HOURS
Status: DISCONTINUED | OUTPATIENT
Start: 2017-02-05 | End: 2017-02-05

## 2017-02-05 RX ADMIN — MAGNESIUM OXIDE TAB 400 MG (241.3 MG ELEMENTAL MG) 400 MG: 400 (241.3 MG) TAB at 02:02

## 2017-02-05 RX ADMIN — INSULIN ASPART 20 UNITS: 100 INJECTION, SOLUTION INTRAVENOUS; SUBCUTANEOUS at 09:02

## 2017-02-05 RX ADMIN — LISINOPRIL 20 MG: 20 TABLET ORAL at 09:02

## 2017-02-05 RX ADMIN — OXYCODONE HYDROCHLORIDE AND ACETAMINOPHEN 1 TABLET: 10; 325 TABLET ORAL at 06:02

## 2017-02-05 RX ADMIN — DIPYRIDAMOLE 150 MG: 75 TABLET, FILM COATED ORAL at 02:02

## 2017-02-05 RX ADMIN — PANTOPRAZOLE SODIUM 40 MG: 40 TABLET, DELAYED RELEASE ORAL at 08:02

## 2017-02-05 RX ADMIN — AMLODIPINE BESYLATE 10 MG: 10 TABLET ORAL at 08:02

## 2017-02-05 RX ADMIN — ASPIRIN 325 MG: 325 TABLET, DELAYED RELEASE ORAL at 08:02

## 2017-02-05 RX ADMIN — ISOSORBIDE DINITRATE 10 MG: 10 TABLET ORAL at 09:02

## 2017-02-05 RX ADMIN — BISACODYL 10 MG: 10 SUPPOSITORY RECTAL at 09:02

## 2017-02-05 RX ADMIN — LISINOPRIL 20 MG: 20 TABLET ORAL at 08:02

## 2017-02-05 RX ADMIN — DOCUSATE SODIUM 50 MG: 50 CAPSULE, LIQUID FILLED ORAL at 09:02

## 2017-02-05 RX ADMIN — WARFARIN SODIUM 3 MG: 3 TABLET ORAL at 04:02

## 2017-02-05 RX ADMIN — TAMSULOSIN HYDROCHLORIDE 0.4 MG: 0.4 CAPSULE ORAL at 08:02

## 2017-02-05 RX ADMIN — MAGNESIUM OXIDE TAB 400 MG (241.3 MG ELEMENTAL MG) 400 MG: 400 (241.3 MG) TAB at 09:02

## 2017-02-05 RX ADMIN — FERROUS GLUCONATE TAB 324 MG (37.5 MG ELEMENTAL IRON) 324 MG: 324 (37.5 FE) TAB at 08:02

## 2017-02-05 RX ADMIN — OXYCODONE HYDROCHLORIDE 20 MG: 10 TABLET, FILM COATED, EXTENDED RELEASE ORAL at 06:02

## 2017-02-05 RX ADMIN — DIPYRIDAMOLE 150 MG: 75 TABLET, FILM COATED ORAL at 09:02

## 2017-02-05 RX ADMIN — HYDRALAZINE HYDROCHLORIDE 100 MG: 50 TABLET ORAL at 05:02

## 2017-02-05 RX ADMIN — ISOSORBIDE DINITRATE 10 MG: 10 TABLET ORAL at 02:02

## 2017-02-05 RX ADMIN — INSULIN ASPART 1 UNITS: 100 INJECTION, SOLUTION INTRAVENOUS; SUBCUTANEOUS at 09:02

## 2017-02-05 RX ADMIN — HYDRALAZINE HYDROCHLORIDE 100 MG: 50 TABLET ORAL at 02:02

## 2017-02-05 RX ADMIN — ISOSORBIDE DINITRATE 10 MG: 10 TABLET ORAL at 05:02

## 2017-02-05 RX ADMIN — INSULIN ASPART 3 UNITS: 100 INJECTION, SOLUTION INTRAVENOUS; SUBCUTANEOUS at 09:02

## 2017-02-05 RX ADMIN — MAGNESIUM OXIDE TAB 400 MG (241.3 MG ELEMENTAL MG) 400 MG: 400 (241.3 MG) TAB at 05:02

## 2017-02-05 RX ADMIN — AMIODARONE HYDROCHLORIDE 400 MG: 200 TABLET ORAL at 08:02

## 2017-02-05 RX ADMIN — DOCUSATE SODIUM 50 MG: 50 CAPSULE, LIQUID FILLED ORAL at 08:02

## 2017-02-05 RX ADMIN — DIPYRIDAMOLE 150 MG: 75 TABLET, FILM COATED ORAL at 05:02

## 2017-02-05 RX ADMIN — HYDRALAZINE HYDROCHLORIDE 100 MG: 50 TABLET ORAL at 09:02

## 2017-02-05 NOTE — PLAN OF CARE
Problem: Patient Care Overview  Goal: Plan of Care Review  Outcome: Ongoing (interventions implemented as appropriate)  Pt free of falls/trauma/injuries this shift. Pt ambulating independently, fall precautions remain in place. LVAD working properly and sounds smooth. Dopplers and MAPs WDL. LVAD dressing remains clean, dry, and intact with no drainage noted. Pt stated he would rather have dressing change done later in the day. INR is 2.6. LDH is 563. Goal is to get pt's LDH <500. Plan of care reviewed with pt at bedside, no new questions at this time. Will continue to monitor.

## 2017-02-05 NOTE — PROGRESS NOTES
"     Heart Failure Progress Note  Attending Physician: Dennis Lyles Jr.,*  Hospital Day: 7    Subjective:   Interval History: No acute events overnight. Feels well and denies any complaints.    Medications:   Continuous Infusions:     Scheduled Meds:   amiodarone  400 mg Oral Daily    amlodipine  10 mg Oral Daily    aspirin  325 mg Oral Daily    dipyridamole  150 mg Oral TID    docusate sodium  50 mg Oral BID    ferrous gluconate  324 mg Oral Daily with breakfast    hydrALAZINE  100 mg Oral Q8H    insulin aspart  20 Units Subcutaneous TIDWM    isosorbide dinitrate  10 mg Oral TID    lisinopril  20 mg Oral BID    magnesium oxide  400 mg Oral TID    pantoprazole  40 mg Oral Daily    tamsulosin  0.4 mg Oral Daily    warfarin  3 mg Oral Daily     PRN Meds:bisacodyl, dextrose 50%, dextrose 50%, glucagon (human recombinant), glucose, glucose, insulin aspart, oxycodone-acetaminophen, senna  Objective:     Vitals:  Temp:  [96.6 °F (35.9 °C)-98.6 °F (37 °C)]   Pulse:  [58-98]   Resp:  [18]   BP: ()/(0-73)   SpO2:  [95 %-100 %]     Visit Vitals    BP (!) 84/0 (BP Location: Right arm, Patient Position: Lying, BP Method: Doppler)    Pulse 90    Temp 98.1 °F (36.7 °C) (Oral)    Resp 18    Ht 5' 8" (1.727 m)    Wt 91.8 kg (202 lb 6.1 oz)    SpO2 100%    BMI 30.77 kg/m2    I/O's:    Intake/Output Summary (Last 24 hours) at 02/05/17 1254  Last data filed at 02/05/17 0908   Gross per 24 hour   Intake             2376 ml   Output             5150 ml   Net            -2774 ml       Wt Readings from Last 10 Encounters:   02/05/17 91.8 kg (202 lb 6.1 oz)   01/26/17 89.8 kg (198 lb)   01/22/17 87.8 kg (193 lb 9 oz)   12/31/16 88.3 kg (194 lb 10.7 oz)   12/28/16 94.8 kg (208 lb 15.9 oz)   12/28/16 94 kg (207 lb 3.7 oz)   12/27/16 95 kg (209 lb 7 oz)   12/15/16 88 kg (194 lb)   11/30/16 91.2 kg (201 lb)   11/02/16 89.8 kg (198 lb)        Constitutional: NAD, conversant  HEENT: Sclera anicteric, PERRLA, " EOMI  Neck: No JVD, no carotid bruits  CV: +LVAD hum  Pulm: CTAB with no wheezes, rales, or rhonchi  GI: Abdomen soft, NTND, +BS. No tenderness at driveline site.  Extremities: No LE edema, warm and well perfused, No cyanosis, No clubbing      Labs:       Recent Labs  Lab 02/03/17 0447 02/04/17  0425 02/05/17  0505   * 129* 129*   K 4.4 4.6 5.0   CL 96 96 97   CO2 21* 27 26   BUN 10 10 11   CREATININE 0.9 0.8 0.9   * 186* 238*   ANIONGAP 13 6* 6*       Recent Labs  Lab 01/30/17  0727 02/01/17  0408 02/03/17 0447   AST 30  --  25 24   ALT 20  --  17 15   ALKPHOS 62  --  61 61   BILITOT 0.7  --  0.7 0.8   BILIDIR  --   < > 0.3 0.4*   ALBUMIN 3.4*  --  3.1* 3.0*   < > = values in this interval not displayed.  No results for input(s): PH, PCO2, PO2, HCO3, POCSATURATED in the last 168 hours.     Recent Labs  Lab 02/03/17 0447 02/04/17  0425 02/05/17  0506   WBC 11.13 10.89 7.54   HGB 8.8* 8.1* 8.5*   HCT 27.7* 26.2* 27.0*    151 185   GRAN 71.0  7.9* 75.4*  8.2* 67.0  5.0       Recent Labs  Lab 02/03/17 0447 02/04/17  0425 02/05/17  0505   INR 2.2* 2.7* 2.6*       Recent Labs  Lab 01/30/17  2132 02/01/17  0408 02/03/17  0447   * 68 188*      Micro:   Blood Cultures  Lab Results   Component Value Date    LABBLOO No growth after 5 days. 01/19/2017    LABBLOO No growth after 5 days. 01/19/2017    LABBLOO No growth after 5 days. 09/01/2016    LABBLOO No growth after 5 days. 09/01/2016    LABBLOO No growth after 5 days. 08/24/2016     Urine Cultures  Lab Results   Component Value Date    LABURIN CITROBACTER KOSERI  10,000 - 49,999 cfu/ml   01/19/2017    LABURIN No growth 09/03/2016    LABURIN No growth 09/01/2016       Imaging:     EF   Date Value Ref Range Status   01/31/2017 15 (A) 55 - 65    01/16/2017 10 (A) 55 - 65    09/19/2016 10 (A) 55 - 65    09/06/2016 20 (A) 55 - 65    09/02/2016 15 (A) 55 - 65          Assessment:   Mr. Barriga is a 55 yo M with PMH of [facility] s/p  II (8/24/16),  HLD, HTN, VT s/p ICD who presented to the hospital for elevated LDH in the setting of subtherapeutic INR with lower extremity swelling concerning for LVAD pump thrombosis/ failure.      Elevated LDH - D/D dehydration, uncontrolled HTN, pump thrombosis due to subtherapeutic INR  - Speed TTE revealed normal functioning VAD however VAD thrombosis cannot be completely ruled out  - VAD interrogation revealed no power spikes, low flow alarms or PI events. Speed 9000, Flow 6.4, PI 5.1, Power 5.9  - c/w warfarin 3 mg QD. INR 2.7 today  - c/w hydralazine to 100 mg TID; Doppler pressures in 70s  - monitor LDH daily; LDH increased to 673 from 584. Goal to get LDH < 500 and INR >2.0 prior to discharge  - c/w  mg, persantine to 150 mg TID  - encourage PO fluid intake  - If LDH further elevated tomorrow, will consider Integrillin drip      VT s/p ICD  - c/w amiodarone      NICM s/p HMII  - amlodipine, lisinopril, hydralazine, Isodril  - status 1A for OHT      Case discussed with HF/HTS attending, Dr. Dawn.      Signed:  Renee Seymour MD  Cardiology Fellow  Pager: [phone]  2/5/2017 12:55PM

## 2017-02-05 NOTE — PLAN OF CARE
Problem: Patient Care Overview  Goal: Plan of Care Review  Outcome: Ongoing (interventions implemented as appropriate)  VS/VAD/Doppler #s WNL. Denies any CP, SOB, palpitations, or dizziness. Fall precautions maintained. Free of fall/trauma/injury. General skin remains intact with no breakdown. VAD dressing changed per dayshift RN, dressing remains CDI. Plan of care reviewed and updated with patient, verbalizes understanding. Patient in no acute distress. Will continue to monitor.

## 2017-02-06 LAB
ALBUMIN SERPL BCP-MCNC: 3.1 G/DL
ALP SERPL-CCNC: 56 U/L
ALT SERPL W/O P-5'-P-CCNC: 13 U/L
ANION GAP SERPL CALC-SCNC: 8 MMOL/L
AST SERPL-CCNC: 18 U/L
BASOPHILS # BLD AUTO: 0.04 K/UL
BASOPHILS NFR BLD: 0.5 %
BILIRUB DIRECT SERPL-MCNC: 0.3 MG/DL
BILIRUB SERPL-MCNC: 0.7 MG/DL
BNP SERPL-MCNC: 113 PG/ML
BUN SERPL-MCNC: 15 MG/DL
CALCIUM SERPL-MCNC: 9.3 MG/DL
CHLORIDE SERPL-SCNC: 101 MMOL/L
CO2 SERPL-SCNC: 24 MMOL/L
CREAT SERPL-MCNC: 0.9 MG/DL
CRP SERPL-MCNC: 107.9 MG/L
DIFFERENTIAL METHOD: ABNORMAL
EOSINOPHIL # BLD AUTO: 0.3 K/UL
EOSINOPHIL NFR BLD: 2.9 %
ERYTHROCYTE [DISTWIDTH] IN BLOOD BY AUTOMATED COUNT: 20 %
EST. GFR  (AFRICAN AMERICAN): >60 ML/MIN/1.73 M^2
EST. GFR  (NON AFRICAN AMERICAN): >60 ML/MIN/1.73 M^2
GIANT PLATELETS BLD QL SMEAR: PRESENT
GLUCOSE SERPL-MCNC: 170 MG/DL
HCT VFR BLD AUTO: 26.5 %
HGB BLD-MCNC: 8.5 G/DL
INR PPP: 2.9
LDH SERPL L TO P-CCNC: 584 U/L
LYMPHOCYTES # BLD AUTO: 1.9 K/UL
LYMPHOCYTES NFR BLD: 22.4 %
MAGNESIUM SERPL-MCNC: 2.1 MG/DL
MCH RBC QN AUTO: 18.8 PG
MCHC RBC AUTO-ENTMCNC: 32.1 %
MCV RBC AUTO: 59 FL
MONOCYTES # BLD AUTO: 0.6 K/UL
MONOCYTES NFR BLD: 6.9 %
NEUTROPHILS # BLD AUTO: 5.7 K/UL
NEUTROPHILS NFR BLD: 67.3 %
PHOSPHATE SERPL-MCNC: 4.9 MG/DL
PLATELET # BLD AUTO: 195 K/UL
PLATELET BLD QL SMEAR: ABNORMAL
PMV BLD AUTO: ABNORMAL FL
POCT GLUCOSE: 174 MG/DL (ref 70–110)
POCT GLUCOSE: 318 MG/DL (ref 70–110)
POCT GLUCOSE: 332 MG/DL (ref 70–110)
POTASSIUM SERPL-SCNC: 4.6 MMOL/L
PREALB SERPL-MCNC: 14 MG/DL
PROT SERPL-MCNC: 6.7 G/DL
PROTHROMBIN TIME: 29.4 SEC
RBC # BLD AUTO: 4.52 M/UL
SODIUM SERPL-SCNC: 133 MMOL/L
WBC # BLD AUTO: 8.53 K/UL

## 2017-02-06 PROCEDURE — 25000003 PHARM REV CODE 250: Performed by: PHYSICIAN ASSISTANT

## 2017-02-06 PROCEDURE — 93750 INTERROGATION VAD IN PERSON: CPT | Performed by: PHYSICIAN ASSISTANT

## 2017-02-06 PROCEDURE — 20600001 HC STEP DOWN PRIVATE ROOM

## 2017-02-06 PROCEDURE — 99232 SBSQ HOSP IP/OBS MODERATE 35: CPT | Mod: ,,, | Performed by: INTERNAL MEDICINE

## 2017-02-06 PROCEDURE — 86826 HLA X-MATCH NONCYTOTOXC ADDL: CPT

## 2017-02-06 PROCEDURE — 83880 ASSAY OF NATRIURETIC PEPTIDE: CPT

## 2017-02-06 PROCEDURE — 25000003 PHARM REV CODE 250: Performed by: INTERNAL MEDICINE

## 2017-02-06 PROCEDURE — 80076 HEPATIC FUNCTION PANEL: CPT

## 2017-02-06 PROCEDURE — 86140 C-REACTIVE PROTEIN: CPT

## 2017-02-06 PROCEDURE — 83735 ASSAY OF MAGNESIUM: CPT

## 2017-02-06 PROCEDURE — 84134 ASSAY OF PREALBUMIN: CPT

## 2017-02-06 PROCEDURE — 86825 HLA X-MATH NON-CYTOTOXIC: CPT | Mod: 91

## 2017-02-06 PROCEDURE — 27000248 HC VAD-ADDITIONAL DAY

## 2017-02-06 PROCEDURE — 80048 BASIC METABOLIC PNL TOTAL CA: CPT

## 2017-02-06 PROCEDURE — 25000003 PHARM REV CODE 250: Performed by: STUDENT IN AN ORGANIZED HEALTH CARE EDUCATION/TRAINING PROGRAM

## 2017-02-06 PROCEDURE — 86825 HLA X-MATH NON-CYTOTOXIC: CPT

## 2017-02-06 PROCEDURE — 83615 LACTATE (LD) (LDH) ENZYME: CPT

## 2017-02-06 PROCEDURE — 93750 INTERROGATION VAD IN PERSON: CPT | Mod: ,,, | Performed by: INTERNAL MEDICINE

## 2017-02-06 PROCEDURE — 84100 ASSAY OF PHOSPHORUS: CPT

## 2017-02-06 PROCEDURE — 86826 HLA X-MATCH NONCYTOTOXC ADDL: CPT | Mod: 91

## 2017-02-06 PROCEDURE — 85025 COMPLETE CBC W/AUTO DIFF WBC: CPT

## 2017-02-06 PROCEDURE — 85610 PROTHROMBIN TIME: CPT

## 2017-02-06 RX ORDER — WARFARIN 2.5 MG/1
2.5 TABLET ORAL DAILY
Status: DISCONTINUED | OUTPATIENT
Start: 2017-02-06 | End: 2017-02-07

## 2017-02-06 RX ORDER — HYDROCODONE BITARTRATE AND ACETAMINOPHEN 500; 5 MG/1; MG/1
TABLET ORAL
Status: CANCELLED | OUTPATIENT
Start: 2017-02-06

## 2017-02-06 RX ADMIN — MAGNESIUM OXIDE TAB 400 MG (241.3 MG ELEMENTAL MG) 400 MG: 400 (241.3 MG) TAB at 02:02

## 2017-02-06 RX ADMIN — PANTOPRAZOLE SODIUM 40 MG: 40 TABLET, DELAYED RELEASE ORAL at 08:02

## 2017-02-06 RX ADMIN — DIPYRIDAMOLE 150 MG: 75 TABLET, FILM COATED ORAL at 02:02

## 2017-02-06 RX ADMIN — MAGNESIUM OXIDE TAB 400 MG (241.3 MG ELEMENTAL MG) 400 MG: 400 (241.3 MG) TAB at 09:02

## 2017-02-06 RX ADMIN — LISINOPRIL 20 MG: 20 TABLET ORAL at 09:02

## 2017-02-06 RX ADMIN — WARFARIN SODIUM 2.5 MG: 2.5 TABLET ORAL at 04:02

## 2017-02-06 RX ADMIN — DIPYRIDAMOLE 150 MG: 75 TABLET, FILM COATED ORAL at 05:02

## 2017-02-06 RX ADMIN — LISINOPRIL 20 MG: 20 TABLET ORAL at 08:02

## 2017-02-06 RX ADMIN — ISOSORBIDE DINITRATE 10 MG: 10 TABLET ORAL at 05:02

## 2017-02-06 RX ADMIN — ISOSORBIDE DINITRATE 10 MG: 10 TABLET ORAL at 02:02

## 2017-02-06 RX ADMIN — DIPYRIDAMOLE 150 MG: 75 TABLET, FILM COATED ORAL at 09:02

## 2017-02-06 RX ADMIN — ASPIRIN 325 MG: 325 TABLET, DELAYED RELEASE ORAL at 08:02

## 2017-02-06 RX ADMIN — FERROUS GLUCONATE TAB 324 MG (37.5 MG ELEMENTAL IRON) 324 MG: 324 (37.5 FE) TAB at 08:02

## 2017-02-06 RX ADMIN — ISOSORBIDE DINITRATE 10 MG: 10 TABLET ORAL at 09:02

## 2017-02-06 RX ADMIN — MAGNESIUM OXIDE TAB 400 MG (241.3 MG ELEMENTAL MG) 400 MG: 400 (241.3 MG) TAB at 05:02

## 2017-02-06 RX ADMIN — HYDRALAZINE HYDROCHLORIDE 100 MG: 50 TABLET ORAL at 02:02

## 2017-02-06 RX ADMIN — INSULIN ASPART 4 UNITS: 100 INJECTION, SOLUTION INTRAVENOUS; SUBCUTANEOUS at 09:02

## 2017-02-06 RX ADMIN — HYDRALAZINE HYDROCHLORIDE 100 MG: 50 TABLET ORAL at 09:02

## 2017-02-06 RX ADMIN — TAMSULOSIN HYDROCHLORIDE 0.4 MG: 0.4 CAPSULE ORAL at 08:02

## 2017-02-06 RX ADMIN — DOCUSATE SODIUM 50 MG: 50 CAPSULE, LIQUID FILLED ORAL at 08:02

## 2017-02-06 RX ADMIN — OXYCODONE HYDROCHLORIDE 20 MG: 10 TABLET, FILM COATED, EXTENDED RELEASE ORAL at 07:02

## 2017-02-06 RX ADMIN — INSULIN ASPART 20 UNITS: 100 INJECTION, SOLUTION INTRAVENOUS; SUBCUTANEOUS at 02:02

## 2017-02-06 RX ADMIN — INSULIN ASPART 20 UNITS: 100 INJECTION, SOLUTION INTRAVENOUS; SUBCUTANEOUS at 09:02

## 2017-02-06 RX ADMIN — OXYCODONE HYDROCHLORIDE 20 MG: 10 TABLET, FILM COATED, EXTENDED RELEASE ORAL at 08:02

## 2017-02-06 RX ADMIN — DOCUSATE SODIUM 50 MG: 50 CAPSULE, LIQUID FILLED ORAL at 09:02

## 2017-02-06 RX ADMIN — HYDRALAZINE HYDROCHLORIDE 100 MG: 50 TABLET ORAL at 05:02

## 2017-02-06 RX ADMIN — AMIODARONE HYDROCHLORIDE 400 MG: 200 TABLET ORAL at 08:02

## 2017-02-06 RX ADMIN — AMLODIPINE BESYLATE 10 MG: 10 TABLET ORAL at 08:02

## 2017-02-06 NOTE — PLAN OF CARE
Problem: Patient Care Overview  Goal: Plan of Care Review  Outcome: Ongoing (interventions implemented as appropriate)  Pt free of falls/trauma/injuries this shift. LVAD working properly and sounds smooth. Dopplers and MAPs WDL. LVAD dressing remains clean, dry, and intact with no drainage noted. INR is 2.9. LDH is 584 today. Plan of care reviewed with pt at bedside, all questions answered. Blood sugars being maintained with scheduled insulin at meals. Pt ambulating independently, fall precautions remain in place. Will continue to monitor.

## 2017-02-06 NOTE — PROCEDURES
Patient aaox3 with no family at bedside but is facetiming pt wife. VAD interrogation completed this AM in the event changes needed to be made. No power elevations noted.  Will continue to monitor for further issues.     Pulsatile: Yes   VAD Sounds: Smooth  Problems / Issues / Alarms with VAD if any: None noted      VAD Interrogation:  TXP LVAD INTERROGATIONS 2/6/2017 2/6/2017 2/6/2017 2/6/2017 2/6/2017 2/6/2017 2/5/2017   Type HeartMate II HeartMate II HeartMate II HeartMate II HeartMate II HeartMate II HeartMate II   Flow 6.5 6.1 6.5 5.6 5.9 6.2 6.3   Speed 9000 9000 9000 9000 9000 9000 9000   PI 4.6 5.6 5.0 6.2 5.4 5.1 4.9   Power (Kwon) 6.0 5.7 5.8 5.5 5.7 5.8 5.8   LSL 8600 8600 8600 8600 8600 8600 8600   Pulsatility Intermittent pulse Intermittent pulse Intermittent pulse Intermittent pulse Intermittent pulse Intermittent pulse Intermittent pulse

## 2017-02-06 NOTE — PROGRESS NOTES
Room#: 324    Pump type: HM 2    Power Module: [id number]    System Monitor: [id number]    Battery Charger: [id number]     Reconciliation done: NO    Does patient have emergency bag: YES    Is patient's equipment clean and free of debris: YES    Cleaned system monitor in hospital: NO    Educated patients on cleaning equipment: YES    Waist strap:  Vest Other:NECK STRAP    Driveline: NEW KINKS or TEARS; RESCUE TAPE: NO    Do patient's batteries need to be calibrated: NO    Equipment on order (ECT): NONE

## 2017-02-06 NOTE — PLAN OF CARE
Problem: Patient Care Overview  Goal: Plan of Care Review  Outcome: Ongoing (interventions implemented as appropriate)  VS/VAD/Doppler #s WNL. Denies any CP, SOB, palpitations, or dizziness. Fall precautions maintained. Free of fall/trauma/injury. General skin remains intact with no breakdown. VAD dressing changed per dayshift RN, dressing remains CDI. C/o constipation, rectal suppository given per pt request. Expresses full relief from pain with scheduled pain meds. Plan of care reviewed and updated with patient, verbalizes understanding. Patient in no acute distress. Will continue to monitor.

## 2017-02-06 NOTE — PROGRESS NOTES
Patient stated he wanted to take a walk and will be back within an hour. Patient off the floor currently. VAD emergency bag with patient.

## 2017-02-06 NOTE — PROGRESS NOTES
"     Heart Failure Progress Note  Attending Physician: Dennis Lyles Jr.,*  Hospital Day: 8    Subjective:   Interval History: No acute events overnight. Feels well and denies any complaints. LDH stable in the upper 500s. Today it was 584. No VAD alarms, power spikes, low flows.    Medications:   Continuous Infusions:     Scheduled Meds:   amiodarone  400 mg Oral Daily    amlodipine  10 mg Oral Daily    aspirin  325 mg Oral Daily    dipyridamole  150 mg Oral TID    docusate sodium  50 mg Oral BID    ferrous gluconate  324 mg Oral Daily with breakfast    hydrALAZINE  100 mg Oral Q8H    insulin aspart  20 Units Subcutaneous TIDWM    isosorbide dinitrate  10 mg Oral TID    lisinopril  20 mg Oral BID    magnesium oxide  400 mg Oral TID    oxycodone  20 mg Oral Q12H    pantoprazole  40 mg Oral Daily    tamsulosin  0.4 mg Oral Daily    warfarin  2.5 mg Oral Daily     PRN Meds:bisacodyl, dextrose 50%, dextrose 50%, glucagon (human recombinant), glucose, glucose, insulin aspart, senna  Objective:     Vitals:  Temp:  [97.4 °F (36.3 °C)-98.4 °F (36.9 °C)]   Pulse:  [79-94]   Resp:  [16-18]   BP: ()/(0-73)   SpO2:  [97 %-100 %]     Visit Vitals    BP (!) 80/0 (BP Location: Left arm, Patient Position: Lying, BP Method: Doppler)    Pulse 91    Temp 97.4 °F (36.3 °C) (Oral)    Resp 18    Ht 5' 8" (1.727 m)    Wt 89.6 kg (197 lb 8.5 oz)    SpO2 97%    BMI 30.03 kg/m2    I/O's:    Intake/Output Summary (Last 24 hours) at 02/06/17 1112  Last data filed at 02/06/17 0600   Gross per 24 hour   Intake             1391 ml   Output             4250 ml   Net            -2859 ml       Wt Readings from Last 10 Encounters:   02/06/17 89.6 kg (197 lb 8.5 oz)   01/26/17 89.8 kg (198 lb)   01/22/17 87.8 kg (193 lb 9 oz)   12/31/16 88.3 kg (194 lb 10.7 oz)   12/28/16 94.8 kg (208 lb 15.9 oz)   12/28/16 94 kg (207 lb 3.7 oz)   12/27/16 95 kg (209 lb 7 oz)   12/15/16 88 kg (194 lb)   11/30/16 91.2 kg (201 lb) "   11/02/16 89.8 kg (198 lb)        Constitutional: NAD, conversant  HEENT: Sclera anicteric, PERRLA, EOMI  Neck: No JVD, no carotid bruits  CV: +LVAD hum  Pulm: CTAB with no wheezes, rales, or rhonchi  GI: Abdomen soft, NTND, +BS. No tenderness at driveline site.  Extremities: No LE edema, warm and well perfused, No cyanosis, No clubbing      Labs:       Recent Labs  Lab 02/04/17  0425 02/05/17  0505 02/06/17  0501   * 129* 133*   K 4.6 5.0 4.6   CL 96 97 101   CO2 27 26 24   BUN 10 11 15   CREATININE 0.8 0.9 0.9   * 238* 170*   ANIONGAP 6* 6* 8       Recent Labs  Lab 02/01/17 0408 02/03/17  0447 02/06/17  0501   AST 25 24 18   ALT 17 15 13   ALKPHOS 61 61 56   BILITOT 0.7 0.8 0.7   BILIDIR 0.3 0.4* 0.3   ALBUMIN 3.1* 3.0* 3.1*     No results for input(s): PH, PCO2, PO2, HCO3, POCSATURATED in the last 168 hours.     Recent Labs  Lab 02/04/17 0425 02/05/17  0506 02/06/17  0501   WBC 10.89 7.54 8.53   HGB 8.1* 8.5* 8.5*   HCT 26.2* 27.0* 26.5*    185 195   GRAN 75.4*  8.2* 67.0  5.0 67.3  5.7       Recent Labs  Lab 02/04/17  0425 02/05/17  0505 02/06/17  0501   INR 2.7* 2.6* 2.9*       Recent Labs  Lab 02/01/17  0408 02/03/17  0447 02/06/17  0501   BNP 68 188* 113*      Micro:   Blood Cultures  Lab Results   Component Value Date    LABBLOO No growth after 5 days. 01/19/2017    LABBLOO No growth after 5 days. 01/19/2017    LABBLOO No growth after 5 days. 09/01/2016    LABBLOO No growth after 5 days. 09/01/2016    LABBLOO No growth after 5 days. 08/24/2016     Urine Cultures  Lab Results   Component Value Date    LABURIN CITROBACTER KOSERI  10,000 - 49,999 cfu/ml   01/19/2017    LABURIN No growth 09/03/2016    LABURIN No growth 09/01/2016       Imaging:     EF   Date Value Ref Range Status   01/31/2017 15 (A) 55 - 65    01/16/2017 10 (A) 55 - 65    09/19/2016 10 (A) 55 - 65    09/06/2016 20 (A) 55 - 65    09/02/2016 15 (A) 55 - 65          Assessment:   Mr. Barriga is a 55 yo M with PMH of [facility]  s/p HM II (8/24/16), HLD, HTN, VT s/p ICD who presented to the hospital for elevated LDH in the setting of subtherapeutic INR with lower extremity swelling concerning for LVAD pump thrombosis/ failure.      Elevated LDH - D/D dehydration, uncontrolled HTN, pump thrombosis due to subtherapeutic INR  - Speed TTE revealed normal functioning VAD however VAD thrombosis cannot be completely ruled out  - VAD interrogation revealed no power spikes, low flow alarms or PI events. Speed 9000, Flow 5.6, PI 6.1, Power 5.6  - warfarin 2.5 mg QD (decreased from 3mg QD). INR 2.9 today  - c/w hydralazine to 100 mg TID; Doppler pressures in 68  - monitor LDH daily; LDH decreased to 584 today, down from 673. Goal to get LDH < 500 prior to discharge  - c/w  mg, persantine to 150 mg TID  - encourage PO fluid intake  - No Integrillin drip given no power spikes or LDH rise       VT s/p ICD  - c/w amiodarone      NICM s/p HMII  - amlodipine, lisinopril, hydralazine, Isodril  - status 1A for OHT      Case discussed with HF/HTS attending, Dr. aMrx.      Signed:  Renee Seymour MD  Cardiology Fellow  Pager: [phone]  2/6/2017 12:55PM

## 2017-02-07 PROBLEM — N40.0 BENIGN PROSTATIC HYPERPLASIA: Status: ACTIVE | Noted: 2017-02-07

## 2017-02-07 LAB
ABO + RH BLD: NORMAL
ANION GAP SERPL CALC-SCNC: 8 MMOL/L
ANISOCYTOSIS BLD QL SMEAR: SLIGHT
B CELL RESULTS - XM ALLO: NEGATIVE
B CELL RESULTS - XM ALLO: NEGATIVE
B MCS AVERAGE - XM ALLO: 36.5
B MCS AVERAGE - XM ALLO: 49.5
BASOPHILS # BLD AUTO: 0.04 K/UL
BASOPHILS NFR BLD: 0.6 %
BLD GP AB SCN CELLS X3 SERPL QL: NORMAL
BUN SERPL-MCNC: 13 MG/DL
BURR CELLS BLD QL SMEAR: ABNORMAL
CALCIUM SERPL-MCNC: 8.8 MG/DL
CHLORIDE SERPL-SCNC: 102 MMOL/L
CO2 SERPL-SCNC: 22 MMOL/L
CREAT SERPL-MCNC: 0.9 MG/DL
DIFFERENTIAL METHOD: ABNORMAL
DONOR MRN: NORMAL
DONOR MRN: NORMAL
EOSINOPHIL # BLD AUTO: 0.2 K/UL
EOSINOPHIL NFR BLD: 3.2 %
ERYTHROCYTE [DISTWIDTH] IN BLOOD BY AUTOMATED COUNT: 19.7 %
EST. GFR  (AFRICAN AMERICAN): >60 ML/MIN/1.73 M^2
EST. GFR  (NON AFRICAN AMERICAN): >60 ML/MIN/1.73 M^2
FXMAL TESTING DATE: NORMAL
FXMAL TESTING DATE: NORMAL
GLUCOSE SERPL-MCNC: 160 MG/DL
HCT VFR BLD AUTO: 25.8 %
HGB BLD-MCNC: 8.1 G/DL
HLA FLOW CROSSMATCH (ALLO) INTERPRETATION: NORMAL
HYPOCHROMIA BLD QL SMEAR: ABNORMAL
INR PPP: 3.2
LDH SERPL L TO P-CCNC: 536 U/L
LYMPHOCYTES # BLD AUTO: 2.4 K/UL
LYMPHOCYTES NFR BLD: 33.3 %
MAGNESIUM SERPL-MCNC: 1.9 MG/DL
MCH RBC QN AUTO: 18.4 PG
MCHC RBC AUTO-ENTMCNC: 31.4 %
MCV RBC AUTO: 59 FL
MONOCYTES # BLD AUTO: 0.5 K/UL
MONOCYTES NFR BLD: 6.9 %
NEUTROPHILS # BLD AUTO: 4 K/UL
NEUTROPHILS NFR BLD: 55.6 %
OVALOCYTES BLD QL SMEAR: ABNORMAL
PHOSPHATE SERPL-MCNC: 4.5 MG/DL
PLATELET # BLD AUTO: 254 K/UL
PLATELET BLD QL SMEAR: ABNORMAL
PMV BLD AUTO: ABNORMAL FL
POCT GLUCOSE: 197 MG/DL (ref 70–110)
POCT GLUCOSE: 248 MG/DL (ref 70–110)
POCT GLUCOSE: 256 MG/DL (ref 70–110)
POCT GLUCOSE: 349 MG/DL (ref 70–110)
POCT GLUCOSE: 370 MG/DL (ref 70–110)
POIKILOCYTOSIS BLD QL SMEAR: SLIGHT
POLYCHROMASIA BLD QL SMEAR: ABNORMAL
POTASSIUM SERPL-SCNC: 4.6 MMOL/L
PROTHROMBIN TIME: 32 SEC
RBC # BLD AUTO: 4.41 M/UL
SCHISTOCYTES BLD QL SMEAR: ABNORMAL
SERUM COLLECTION DT - XM ALLO: NORMAL
SERUM COLLECTION DT - XM ALLO: NORMAL
SODIUM SERPL-SCNC: 132 MMOL/L
T CELL RESULTS - XM ALLO: NEGATIVE
T CELL RESULTS - XM ALLO: NEGATIVE
T MCS AVERAGE - XM ALLO: 12.5
T MCS AVERAGE - XM ALLO: 14
WBC # BLD AUTO: 7.14 K/UL

## 2017-02-07 PROCEDURE — 25000003 PHARM REV CODE 250: Performed by: STUDENT IN AN ORGANIZED HEALTH CARE EDUCATION/TRAINING PROGRAM

## 2017-02-07 PROCEDURE — 27000248 HC VAD-ADDITIONAL DAY

## 2017-02-07 PROCEDURE — 25000003 PHARM REV CODE 250: Performed by: INTERNAL MEDICINE

## 2017-02-07 PROCEDURE — 86900 BLOOD TYPING SEROLOGIC ABO: CPT

## 2017-02-07 PROCEDURE — 20600001 HC STEP DOWN PRIVATE ROOM

## 2017-02-07 PROCEDURE — 84100 ASSAY OF PHOSPHORUS: CPT

## 2017-02-07 PROCEDURE — 25000003 PHARM REV CODE 250: Performed by: PHYSICIAN ASSISTANT

## 2017-02-07 PROCEDURE — 85610 PROTHROMBIN TIME: CPT

## 2017-02-07 PROCEDURE — 86850 RBC ANTIBODY SCREEN: CPT

## 2017-02-07 PROCEDURE — 99232 SBSQ HOSP IP/OBS MODERATE 35: CPT | Mod: ,,, | Performed by: INTERNAL MEDICINE

## 2017-02-07 PROCEDURE — 80048 BASIC METABOLIC PNL TOTAL CA: CPT

## 2017-02-07 PROCEDURE — 83735 ASSAY OF MAGNESIUM: CPT

## 2017-02-07 PROCEDURE — 86920 COMPATIBILITY TEST SPIN: CPT

## 2017-02-07 PROCEDURE — 36415 COLL VENOUS BLD VENIPUNCTURE: CPT

## 2017-02-07 PROCEDURE — 27201040 HC RC 50 FILTER

## 2017-02-07 PROCEDURE — 83615 LACTATE (LD) (LDH) ENZYME: CPT

## 2017-02-07 PROCEDURE — 85025 COMPLETE CBC W/AUTO DIFF WBC: CPT

## 2017-02-07 RX ORDER — WARFARIN 2 MG/1
2 TABLET ORAL DAILY
Status: DISCONTINUED | OUTPATIENT
Start: 2017-02-07 | End: 2017-02-08

## 2017-02-07 RX ORDER — ISOSORBIDE DINITRATE 20 MG/1
20 TABLET ORAL 3 TIMES DAILY
Status: DISCONTINUED | OUTPATIENT
Start: 2017-02-07 | End: 2017-02-09

## 2017-02-07 RX ORDER — FERROUS GLUCONATE 324(38)MG
324 TABLET ORAL
Status: DISCONTINUED | OUTPATIENT
Start: 2017-02-07 | End: 2017-02-09

## 2017-02-07 RX ORDER — OXYCODONE HCL 20 MG/1
20 TABLET, FILM COATED, EXTENDED RELEASE ORAL 2 TIMES DAILY
Status: DISCONTINUED | OUTPATIENT
Start: 2017-02-08 | End: 2017-02-09

## 2017-02-07 RX ORDER — DOCUSATE SODIUM 100 MG/1
100 CAPSULE, LIQUID FILLED ORAL 2 TIMES DAILY
Status: DISCONTINUED | OUTPATIENT
Start: 2017-02-08 | End: 2017-02-09

## 2017-02-07 RX ADMIN — ISOSORBIDE DINITRATE 10 MG: 10 TABLET ORAL at 05:02

## 2017-02-07 RX ADMIN — TAMSULOSIN HYDROCHLORIDE 0.4 MG: 0.4 CAPSULE ORAL at 08:02

## 2017-02-07 RX ADMIN — OXYCODONE HYDROCHLORIDE 20 MG: 10 TABLET, FILM COATED, EXTENDED RELEASE ORAL at 06:02

## 2017-02-07 RX ADMIN — MAGNESIUM OXIDE TAB 400 MG (241.3 MG ELEMENTAL MG) 400 MG: 400 (241.3 MG) TAB at 09:02

## 2017-02-07 RX ADMIN — DIPYRIDAMOLE 150 MG: 75 TABLET, FILM COATED ORAL at 09:02

## 2017-02-07 RX ADMIN — AMIODARONE HYDROCHLORIDE 400 MG: 200 TABLET ORAL at 09:02

## 2017-02-07 RX ADMIN — ASPIRIN 325 MG: 325 TABLET, DELAYED RELEASE ORAL at 09:02

## 2017-02-07 RX ADMIN — INSULIN ASPART 1 UNITS: 100 INJECTION, SOLUTION INTRAVENOUS; SUBCUTANEOUS at 09:02

## 2017-02-07 RX ADMIN — DIPYRIDAMOLE 150 MG: 75 TABLET, FILM COATED ORAL at 01:02

## 2017-02-07 RX ADMIN — HYDRALAZINE HYDROCHLORIDE 100 MG: 50 TABLET ORAL at 09:02

## 2017-02-07 RX ADMIN — HYDRALAZINE HYDROCHLORIDE 100 MG: 50 TABLET ORAL at 05:02

## 2017-02-07 RX ADMIN — DOCUSATE SODIUM 50 MG: 50 CAPSULE, LIQUID FILLED ORAL at 08:02

## 2017-02-07 RX ADMIN — WARFARIN SODIUM 2 MG: 2 TABLET ORAL at 05:02

## 2017-02-07 RX ADMIN — PANTOPRAZOLE SODIUM 40 MG: 40 TABLET, DELAYED RELEASE ORAL at 08:02

## 2017-02-07 RX ADMIN — INSULIN ASPART 20 UNITS: 100 INJECTION, SOLUTION INTRAVENOUS; SUBCUTANEOUS at 02:02

## 2017-02-07 RX ADMIN — ISOSORBIDE DINITRATE 20 MG: 10 TABLET ORAL at 09:02

## 2017-02-07 RX ADMIN — FERROUS GLUCONATE TAB 324 MG (37.5 MG ELEMENTAL IRON) 324 MG: 324 (37.5 FE) TAB at 05:02

## 2017-02-07 RX ADMIN — FERROUS GLUCONATE TAB 324 MG (37.5 MG ELEMENTAL IRON) 324 MG: 324 (37.5 FE) TAB at 09:02

## 2017-02-07 RX ADMIN — LISINOPRIL 20 MG: 20 TABLET ORAL at 09:02

## 2017-02-07 RX ADMIN — ISOSORBIDE DINITRATE 20 MG: 10 TABLET ORAL at 01:02

## 2017-02-07 RX ADMIN — INSULIN ASPART 4 UNITS: 100 INJECTION, SOLUTION INTRAVENOUS; SUBCUTANEOUS at 09:02

## 2017-02-07 RX ADMIN — SENNOSIDES A AND B 8.6 MG: 8.6 TABLET, FILM COATED ORAL at 09:02

## 2017-02-07 RX ADMIN — DOCUSATE SODIUM 50 MG: 50 CAPSULE, LIQUID FILLED ORAL at 09:02

## 2017-02-07 RX ADMIN — MAGNESIUM OXIDE TAB 400 MG (241.3 MG ELEMENTAL MG) 400 MG: 400 (241.3 MG) TAB at 05:02

## 2017-02-07 RX ADMIN — DIPYRIDAMOLE 150 MG: 75 TABLET, FILM COATED ORAL at 05:02

## 2017-02-07 RX ADMIN — HYDRALAZINE HYDROCHLORIDE 100 MG: 50 TABLET ORAL at 01:02

## 2017-02-07 RX ADMIN — AMLODIPINE BESYLATE 10 MG: 10 TABLET ORAL at 08:02

## 2017-02-07 RX ADMIN — MAGNESIUM OXIDE TAB 400 MG (241.3 MG ELEMENTAL MG) 400 MG: 400 (241.3 MG) TAB at 01:02

## 2017-02-07 RX ADMIN — LISINOPRIL 20 MG: 20 TABLET ORAL at 08:02

## 2017-02-07 NOTE — PROGRESS NOTES
UPDATE    SW to pt's room for update.  Pt presents as lying in bed, aao x4, calm, cooperative, and asking and answering questions appropriately.  Pt states he is currently listed as 1A for heart transplant and is hopeful that he will match with a donor soon.  Pt reports he is coping well at this time.  Pt does not identify any needs or concerns at this time.  SW providing ongoing psychosocial and counseling support, education, resources, assistance, and discharge planning as indicated.  SW following and remains available.

## 2017-02-07 NOTE — PROGRESS NOTES
"     Heart Failure Progress Note  Attending Physician: Dennis Lyles Jr.,*  Hospital Day: 9    Subjective:   Interval History: No acute events overnight. Feels well and denies any complaints. LDH stable in the upper 500s. Today it was 584. No VAD alarms, power spikes, low flows.    Medications:   Continuous Infusions:     Scheduled Meds:   amiodarone  400 mg Oral Daily    amlodipine  10 mg Oral Daily    aspirin  325 mg Oral Daily    dipyridamole  150 mg Oral TID    docusate sodium  50 mg Oral BID    ferrous gluconate  324 mg Oral Daily with breakfast    hydrALAZINE  100 mg Oral Q8H    insulin aspart  20 Units Subcutaneous TIDWM    isosorbide dinitrate  20 mg Oral TID    lisinopril  20 mg Oral BID    magnesium oxide  400 mg Oral TID    oxycodone  20 mg Oral Q12H    pantoprazole  40 mg Oral Daily    tamsulosin  0.4 mg Oral Daily    warfarin  2 mg Oral Daily     PRN Meds:bisacodyl, dextrose 50%, dextrose 50%, glucagon (human recombinant), glucose, glucose, insulin aspart, senna  Objective:     Vitals:  Temp:  [96 °F (35.6 °C)-98.6 °F (37 °C)]   Pulse:  [78-97]   Resp:  [16-18]   BP: ()/(0-59)   SpO2:  [97 %-99 %]     Visit Vitals    BP (!) 68/0 (BP Location: Right arm, Patient Position: Lying, BP Method: Doppler)    Pulse 82    Temp 97.8 °F (36.6 °C) (Oral)    Resp 16    Ht 5' 8" (1.727 m)    Wt 90.9 kg (200 lb 6.4 oz)    SpO2 98%    BMI 30.47 kg/m2    I/O's:    Intake/Output Summary (Last 24 hours) at 02/07/17 1256  Last data filed at 02/07/17 0505   Gross per 24 hour   Intake              320 ml   Output             4900 ml   Net            -4580 ml       Wt Readings from Last 10 Encounters:   02/07/17 90.9 kg (200 lb 6.4 oz)   01/26/17 89.8 kg (198 lb)   01/22/17 87.8 kg (193 lb 9 oz)   12/31/16 88.3 kg (194 lb 10.7 oz)   12/28/16 94.8 kg (208 lb 15.9 oz)   12/28/16 94 kg (207 lb 3.7 oz)   12/27/16 95 kg (209 lb 7 oz)   12/15/16 88 kg (194 lb)   11/30/16 91.2 kg (201 lb)   11/02/16 " 89.8 kg (198 lb)        Constitutional: NAD, conversant  HEENT: Sclera anicteric, PERRLA, EOMI  Neck: No JVD, no carotid bruits  CV: +LVAD hum  Pulm: CTAB with no wheezes, rales, or rhonchi  GI: Abdomen soft, NTND, +BS. No tenderness at driveline site.  Extremities: No LE edema, warm and well perfused, No cyanosis, No clubbing      Labs:       Recent Labs  Lab 02/05/17  0505 02/06/17  0501 02/07/17  0503   * 133* 132*   K 5.0 4.6 4.6   CL 97 101 102   CO2 26 24 22*   BUN 11 15 13   CREATININE 0.9 0.9 0.9   * 170* 160*   ANIONGAP 6* 8 8       Recent Labs  Lab 02/01/17  0408 02/03/17  0447 02/06/17  0501   AST 25 24 18   ALT 17 15 13   ALKPHOS 61 61 56   BILITOT 0.7 0.8 0.7   BILIDIR 0.3 0.4* 0.3   ALBUMIN 3.1* 3.0* 3.1*     No results for input(s): PH, PCO2, PO2, HCO3, POCSATURATED in the last 168 hours.     Recent Labs  Lab 02/05/17  0506 02/06/17  0501 02/07/17  0503   WBC 7.54 8.53 7.14   HGB 8.5* 8.5* 8.1*   HCT 27.0* 26.5* 25.8*    195 254   GRAN 67.0  5.0 67.3  5.7 55.6  4.0       Recent Labs  Lab 02/05/17  0505 02/06/17  0501 02/07/17  0503   INR 2.6* 2.9* 3.2*       Recent Labs  Lab 02/01/17  0408 02/03/17  0447 02/06/17  0501   BNP 68 188* 113*      Micro:   Blood Cultures  Lab Results   Component Value Date    LABBLOO No growth after 5 days. 01/19/2017    LABBLOO No growth after 5 days. 01/19/2017    LABBLOO No growth after 5 days. 09/01/2016    LABBLOO No growth after 5 days. 09/01/2016    LABBLOO No growth after 5 days. 08/24/2016     Urine Cultures  Lab Results   Component Value Date    LABURIN CITROBACTER KOSERI  10,000 - 49,999 cfu/ml   01/19/2017    LABURIN No growth 09/03/2016    LABURIN No growth 09/01/2016       Imaging:     EF   Date Value Ref Range Status   01/31/2017 15 (A) 55 - 65    01/16/2017 10 (A) 55 - 65    09/19/2016 10 (A) 55 - 65    09/06/2016 20 (A) 55 - 65    09/02/2016 15 (A) 55 - 65          Assessment:   Mr. Barriga is a 55 yo M with PMH of [facility] s/p  II  (8/24/16), HLD, HTN, VT s/p ICD who presented to the hospital for elevated LDH in the setting of subtherapeutic INR with lower extremity swelling concerning for LVAD pump thrombosis/ failure.      Elevated LDH - D/D dehydration, uncontrolled HTN, pump thrombosis due to subtherapeutic INR  - Speed TTE revealed normal functioning VAD however VAD thrombosis cannot be completely ruled out  - VAD interrogation revealed no power spikes, low flow alarms or PI events. Flow 5.9, Speed 9000, PI 5.4, Power 5.6  - warfarin 2 mg QD (decreased from 2.5 mg QD). INR 3.2 today  - c/w hydralazine to 100 mg TID and increased Isordil to 20mg TID (from 10mg TID); Doppler pressures 68-88  - monitor LDH daily; LDH decreased to 536 from 584 yesterday. Goal to get LDH < 500 prior to discharge  - c/w  mg, persantine to 150 mg TID  - encourage PO fluid intake  - No Integrillin drip given no power spikes or LDH rise       VT s/p ICD  - c/w amiodarone      NICM s/p HMII  - amlodipine, lisinopril, hydralazine, Isodril  - status 1A for OHT  - No OHT scheduled for today, will resume diet      Case discussed with HF/HTS attending, Dr. Marx.      Signed:  Renee Seymour MD  Cardiology Fellow  Pager: [phone]  2/7/2017 12:55PM

## 2017-02-07 NOTE — PROGRESS NOTES
"Patient was using the bathroom and when he stood up, the patient felt dizzy, and was notified by wife that "PI dropped to 2.2". Notified Dr. Seymour.  No new orders received at this time.  Will continue to monitor.    "

## 2017-02-08 ENCOUNTER — ANESTHESIA EVENT (OUTPATIENT)
Dept: SURGERY | Facility: HOSPITAL | Age: 57
DRG: 001 | End: 2017-02-08
Payer: MEDICARE

## 2017-02-08 ENCOUNTER — ANESTHESIA (OUTPATIENT)
Dept: SURGERY | Facility: HOSPITAL | Age: 57
DRG: 001 | End: 2017-02-08
Payer: MEDICARE

## 2017-02-08 ENCOUNTER — TELEPHONE (OUTPATIENT)
Dept: TRANSPLANT | Facility: CLINIC | Age: 57
End: 2017-02-08

## 2017-02-08 DIAGNOSIS — Z94.1 STATUS POST HEART TRANSPLANT: ICD-10-CM

## 2017-02-08 DIAGNOSIS — T86.20 COMPLICATION OF HEART TRANSPLANT, UNSPECIFIED COMPLICATION: ICD-10-CM

## 2017-02-08 DIAGNOSIS — Z76.82 HEART TRANSPLANT CANDIDATE: Primary | ICD-10-CM

## 2017-02-08 LAB
ABO + RH BLD: NORMAL
ALBUMIN SERPL BCP-MCNC: 2.8 G/DL
ALBUMIN SERPL BCP-MCNC: 3 G/DL
ALP SERPL-CCNC: 57 U/L
ALP SERPL-CCNC: 59 U/L
ALT SERPL W/O P-5'-P-CCNC: 14 U/L
ALT SERPL W/O P-5'-P-CCNC: 14 U/L
ANION GAP SERPL CALC-SCNC: 4 MMOL/L
ANION GAP SERPL CALC-SCNC: 7 MMOL/L
ANISOCYTOSIS BLD QL SMEAR: ABNORMAL
ANISOCYTOSIS BLD QL SMEAR: SLIGHT
APTT BLDCRRT: 36.9 SEC
AST SERPL-CCNC: 17 U/L
AST SERPL-CCNC: 18 U/L
BASO STIPL BLD QL SMEAR: ABNORMAL
BASO STIPL BLD QL SMEAR: ABNORMAL
BASOPHILS # BLD AUTO: 0.04 K/UL
BASOPHILS # BLD AUTO: 0.05 K/UL
BASOPHILS NFR BLD: 0.6 %
BASOPHILS NFR BLD: 0.7 %
BILIRUB DIRECT SERPL-MCNC: 0.2 MG/DL
BILIRUB SERPL-MCNC: 0.5 MG/DL
BILIRUB SERPL-MCNC: 0.5 MG/DL
BILIRUB UR QL STRIP: NEGATIVE
BLD GP AB SCN CELLS X3 SERPL QL: NORMAL
BNP SERPL-MCNC: 149 PG/ML
BUN SERPL-MCNC: 13 MG/DL
BUN SERPL-MCNC: 14 MG/DL
CALCIUM SERPL-MCNC: 8.6 MG/DL
CALCIUM SERPL-MCNC: 8.7 MG/DL
CHLORIDE SERPL-SCNC: 101 MMOL/L
CHLORIDE SERPL-SCNC: 98 MMOL/L
CLARITY UR REFRACT.AUTO: CLEAR
CLASS I ANTIBODIES - LUMINEX: NEGATIVE
CLASS II ANTIBODIES - LUMINEX: NEGATIVE
CO2 SERPL-SCNC: 23 MMOL/L
CO2 SERPL-SCNC: 27 MMOL/L
COLOR UR AUTO: NORMAL
CPRA %: 0
CREAT SERPL-MCNC: 0.8 MG/DL
CREAT SERPL-MCNC: 0.9 MG/DL
CRP SERPL-MCNC: 29.2 MG/L
DIFFERENTIAL METHOD: ABNORMAL
DIFFERENTIAL METHOD: ABNORMAL
EOSINOPHIL # BLD AUTO: 0.3 K/UL
EOSINOPHIL # BLD AUTO: 0.3 K/UL
EOSINOPHIL NFR BLD: 3.7 %
EOSINOPHIL NFR BLD: 3.8 %
ERYTHROCYTE [DISTWIDTH] IN BLOOD BY AUTOMATED COUNT: 19.4 %
ERYTHROCYTE [DISTWIDTH] IN BLOOD BY AUTOMATED COUNT: 19.7 %
EST. GFR  (AFRICAN AMERICAN): >60 ML/MIN/1.73 M^2
EST. GFR  (AFRICAN AMERICAN): >60 ML/MIN/1.73 M^2
EST. GFR  (NON AFRICAN AMERICAN): >60 ML/MIN/1.73 M^2
EST. GFR  (NON AFRICAN AMERICAN): >60 ML/MIN/1.73 M^2
GLUCOSE SERPL-MCNC: 275 MG/DL
GLUCOSE SERPL-MCNC: 296 MG/DL
GLUCOSE UR QL STRIP: NEGATIVE
HCT VFR BLD AUTO: 26 %
HCT VFR BLD AUTO: 26.6 %
HGB BLD-MCNC: 8.1 G/DL
HGB BLD-MCNC: 8.5 G/DL
HGB UR QL STRIP: NEGATIVE
HPRA INTERPRETATION: NORMAL
HYPOCHROMIA BLD QL SMEAR: ABNORMAL
INR PPP: 3
INR PPP: 3.2
KETONES UR QL STRIP: NEGATIVE
LDH SERPL L TO P-CCNC: 523 U/L
LEUKOCYTE ESTERASE UR QL STRIP: NEGATIVE
LYMPHOCYTES # BLD AUTO: 1.7 K/UL
LYMPHOCYTES # BLD AUTO: 2 K/UL
LYMPHOCYTES NFR BLD: 24.5 %
LYMPHOCYTES NFR BLD: 28.1 %
MAGNESIUM SERPL-MCNC: 1.7 MG/DL
MCH RBC QN AUTO: 18.3 PG
MCH RBC QN AUTO: 18.6 PG
MCHC RBC AUTO-ENTMCNC: 31.2 %
MCHC RBC AUTO-ENTMCNC: 32 %
MCV RBC AUTO: 58 FL
MCV RBC AUTO: 59 FL
MONOCYTES # BLD AUTO: 0.5 K/UL
MONOCYTES # BLD AUTO: 0.6 K/UL
MONOCYTES NFR BLD: 6.7 %
MONOCYTES NFR BLD: 7.9 %
NEUTROPHILS # BLD AUTO: 4.2 K/UL
NEUTROPHILS # BLD AUTO: 4.4 K/UL
NEUTROPHILS NFR BLD: 60.8 %
NEUTROPHILS NFR BLD: 63.2 %
NITRITE UR QL STRIP: NEGATIVE
OVALOCYTES BLD QL SMEAR: ABNORMAL
OVALOCYTES BLD QL SMEAR: ABNORMAL
PH UR STRIP: 7 [PH] (ref 5–8)
PHOSPHATE SERPL-MCNC: 3.8 MG/DL
PLATELET # BLD AUTO: 288 K/UL
PLATELET # BLD AUTO: 313 K/UL
PLATELET BLD QL SMEAR: ABNORMAL
PLATELET BLD QL SMEAR: ABNORMAL
PMV BLD AUTO: ABNORMAL FL
PMV BLD AUTO: ABNORMAL FL
POCT GLUCOSE: 108 MG/DL (ref 70–110)
POCT GLUCOSE: 242 MG/DL (ref 70–110)
POCT GLUCOSE: 307 MG/DL (ref 70–110)
POIKILOCYTOSIS BLD QL SMEAR: SLIGHT
POIKILOCYTOSIS BLD QL SMEAR: SLIGHT
POLYCHROMASIA BLD QL SMEAR: ABNORMAL
POLYCHROMASIA BLD QL SMEAR: ABNORMAL
POTASSIUM SERPL-SCNC: 4.7 MMOL/L
POTASSIUM SERPL-SCNC: 4.8 MMOL/L
PREALB SERPL-MCNC: 19 MG/DL
PROT SERPL-MCNC: 6.1 G/DL
PROT SERPL-MCNC: 6.6 G/DL
PROT UR QL STRIP: NEGATIVE
PROTHROMBIN TIME: 30.4 SEC
PROTHROMBIN TIME: 32.4 SEC
RBC # BLD AUTO: 4.42 M/UL
RBC # BLD AUTO: 4.58 M/UL
SERUM COLLECTION DT - LUMINEX CLASS I: NORMAL
SERUM COLLECTION DT - LUMINEX CLASS II: NORMAL
SODIUM SERPL-SCNC: 129 MMOL/L
SODIUM SERPL-SCNC: 131 MMOL/L
SP GR UR STRIP: 1 (ref 1–1.03)
SPCL1 TESTING DATE: NORMAL
SPCL2 TESTING DATE: NORMAL
URN SPEC COLLECT METH UR: NORMAL
UROBILINOGEN UR STRIP-ACNC: NEGATIVE EU/DL
WBC # BLD AUTO: 7.02 K/UL
WBC # BLD AUTO: 7.06 K/UL

## 2017-02-08 PROCEDURE — 93750 INTERROGATION VAD IN PERSON: CPT | Performed by: INTERNAL MEDICINE

## 2017-02-08 PROCEDURE — 86832 HLA CLASS I HIGH DEFIN QUAL: CPT

## 2017-02-08 PROCEDURE — 25000003 PHARM REV CODE 250: Performed by: PHYSICIAN ASSISTANT

## 2017-02-08 PROCEDURE — 63600175 PHARM REV CODE 636 W HCPCS: Performed by: STUDENT IN AN ORGANIZED HEALTH CARE EDUCATION/TRAINING PROGRAM

## 2017-02-08 PROCEDURE — 87186 SC STD MICRODIL/AGAR DIL: CPT

## 2017-02-08 PROCEDURE — 86977 RBC SERUM PRETX INCUBJ/INHIB: CPT | Mod: 91

## 2017-02-08 PROCEDURE — 86140 C-REACTIVE PROTEIN: CPT

## 2017-02-08 PROCEDURE — 87086 URINE CULTURE/COLONY COUNT: CPT

## 2017-02-08 PROCEDURE — 81003 URINALYSIS AUTO W/O SCOPE: CPT

## 2017-02-08 PROCEDURE — 93750 INTERROGATION VAD IN PERSON: CPT | Mod: ,,, | Performed by: INTERNAL MEDICINE

## 2017-02-08 PROCEDURE — 80048 BASIC METABOLIC PNL TOTAL CA: CPT

## 2017-02-08 PROCEDURE — 84100 ASSAY OF PHOSPHORUS: CPT

## 2017-02-08 PROCEDURE — 85025 COMPLETE CBC W/AUTO DIFF WBC: CPT

## 2017-02-08 PROCEDURE — 85610 PROTHROMBIN TIME: CPT | Mod: 91

## 2017-02-08 PROCEDURE — 84134 ASSAY OF PREALBUMIN: CPT

## 2017-02-08 PROCEDURE — 25000003 PHARM REV CODE 250: Performed by: STUDENT IN AN ORGANIZED HEALTH CARE EDUCATION/TRAINING PROGRAM

## 2017-02-08 PROCEDURE — 85730 THROMBOPLASTIN TIME PARTIAL: CPT

## 2017-02-08 PROCEDURE — 85610 PROTHROMBIN TIME: CPT

## 2017-02-08 PROCEDURE — 25000003 PHARM REV CODE 250: Performed by: INTERNAL MEDICINE

## 2017-02-08 PROCEDURE — 83880 ASSAY OF NATRIURETIC PEPTIDE: CPT

## 2017-02-08 PROCEDURE — 80076 HEPATIC FUNCTION PANEL: CPT

## 2017-02-08 PROCEDURE — 86901 BLOOD TYPING SEROLOGIC RH(D): CPT

## 2017-02-08 PROCEDURE — 87088 URINE BACTERIA CULTURE: CPT

## 2017-02-08 PROCEDURE — 87077 CULTURE AEROBIC IDENTIFY: CPT

## 2017-02-08 PROCEDURE — 80053 COMPREHEN METABOLIC PANEL: CPT

## 2017-02-08 PROCEDURE — 20600001 HC STEP DOWN PRIVATE ROOM

## 2017-02-08 PROCEDURE — 27000248 HC VAD-ADDITIONAL DAY

## 2017-02-08 PROCEDURE — 86900 BLOOD TYPING SEROLOGIC ABO: CPT

## 2017-02-08 PROCEDURE — 83615 LACTATE (LD) (LDH) ENZYME: CPT

## 2017-02-08 PROCEDURE — 83735 ASSAY OF MAGNESIUM: CPT

## 2017-02-08 PROCEDURE — 86920 COMPATIBILITY TEST SPIN: CPT

## 2017-02-08 PROCEDURE — 86833 HLA CLASS II HIGH DEFIN QUAL: CPT

## 2017-02-08 RX ORDER — METHYLPREDNISOLONE SODIUM SUCCINATE 500 MG/8ML
500 INJECTION INTRAMUSCULAR; INTRAVENOUS ONCE
Status: CANCELLED | OUTPATIENT
Start: 2017-02-08 | End: 2017-02-08

## 2017-02-08 RX ORDER — HYDROCODONE BITARTRATE AND ACETAMINOPHEN 500; 5 MG/1; MG/1
TABLET ORAL
Status: CANCELLED | OUTPATIENT
Start: 2017-02-08

## 2017-02-08 RX ORDER — CEFAZOLIN SODIUM 1 G/50ML
1 SOLUTION INTRAVENOUS ONCE
Status: CANCELLED | OUTPATIENT
Start: 2017-02-08 | End: 2017-02-08

## 2017-02-08 RX ADMIN — MAGNESIUM OXIDE TAB 400 MG (241.3 MG ELEMENTAL MG) 400 MG: 400 (241.3 MG) TAB at 01:02

## 2017-02-08 RX ADMIN — PANTOPRAZOLE SODIUM 40 MG: 40 TABLET, DELAYED RELEASE ORAL at 08:02

## 2017-02-08 RX ADMIN — BISACODYL 10 MG: 10 SUPPOSITORY RECTAL at 05:02

## 2017-02-08 RX ADMIN — MAGNESIUM OXIDE TAB 400 MG (241.3 MG ELEMENTAL MG) 400 MG: 400 (241.3 MG) TAB at 05:02

## 2017-02-08 RX ADMIN — ISOSORBIDE DINITRATE 20 MG: 10 TABLET ORAL at 01:02

## 2017-02-08 RX ADMIN — OXYCODONE HYDROCHLORIDE 20 MG: 10 TABLET, FILM COATED, EXTENDED RELEASE ORAL at 06:02

## 2017-02-08 RX ADMIN — DIPYRIDAMOLE 150 MG: 75 TABLET, FILM COATED ORAL at 01:02

## 2017-02-08 RX ADMIN — LISINOPRIL 20 MG: 20 TABLET ORAL at 08:02

## 2017-02-08 RX ADMIN — HYDRALAZINE HYDROCHLORIDE 100 MG: 50 TABLET ORAL at 01:02

## 2017-02-08 RX ADMIN — DIPYRIDAMOLE 150 MG: 75 TABLET, FILM COATED ORAL at 09:02

## 2017-02-08 RX ADMIN — AMIODARONE HYDROCHLORIDE 400 MG: 200 TABLET ORAL at 08:02

## 2017-02-08 RX ADMIN — DOCUSATE SODIUM 100 MG: 100 CAPSULE, LIQUID FILLED ORAL at 09:02

## 2017-02-08 RX ADMIN — INSULIN ASPART 2 UNITS: 100 INJECTION, SOLUTION INTRAVENOUS; SUBCUTANEOUS at 07:02

## 2017-02-08 RX ADMIN — ASPIRIN 325 MG: 325 TABLET, DELAYED RELEASE ORAL at 08:02

## 2017-02-08 RX ADMIN — ISOSORBIDE DINITRATE 20 MG: 10 TABLET ORAL at 05:02

## 2017-02-08 RX ADMIN — MAGNESIUM OXIDE TAB 400 MG (241.3 MG ELEMENTAL MG) 400 MG: 400 (241.3 MG) TAB at 09:02

## 2017-02-08 RX ADMIN — DIPYRIDAMOLE 150 MG: 75 TABLET, FILM COATED ORAL at 05:02

## 2017-02-08 RX ADMIN — FERROUS GLUCONATE TAB 324 MG (37.5 MG ELEMENTAL IRON) 324 MG: 324 (37.5 FE) TAB at 08:02

## 2017-02-08 RX ADMIN — HYDRALAZINE HYDROCHLORIDE 100 MG: 50 TABLET ORAL at 09:02

## 2017-02-08 RX ADMIN — INSULIN ASPART 2 UNITS: 100 INJECTION, SOLUTION INTRAVENOUS; SUBCUTANEOUS at 09:02

## 2017-02-08 RX ADMIN — LISINOPRIL 20 MG: 20 TABLET ORAL at 09:02

## 2017-02-08 RX ADMIN — ISOSORBIDE DINITRATE 20 MG: 10 TABLET ORAL at 09:02

## 2017-02-08 RX ADMIN — AMLODIPINE BESYLATE 10 MG: 10 TABLET ORAL at 08:02

## 2017-02-08 RX ADMIN — HYDRALAZINE HYDROCHLORIDE 100 MG: 50 TABLET ORAL at 05:02

## 2017-02-08 RX ADMIN — FERROUS GLUCONATE TAB 324 MG (37.5 MG ELEMENTAL IRON) 324 MG: 324 (37.5 FE) TAB at 01:02

## 2017-02-08 RX ADMIN — OXYCODONE HYDROCHLORIDE 20 MG: 10 TABLET, FILM COATED, EXTENDED RELEASE ORAL at 05:02

## 2017-02-08 RX ADMIN — SENNOSIDES A AND B 8.6 MG: 8.6 TABLET, FILM COATED ORAL at 08:02

## 2017-02-08 RX ADMIN — DOCUSATE SODIUM 100 MG: 100 CAPSULE, LIQUID FILLED ORAL at 08:02

## 2017-02-08 RX ADMIN — INSULIN ASPART 20 UNITS: 100 INJECTION, SOLUTION INTRAVENOUS; SUBCUTANEOUS at 08:02

## 2017-02-08 RX ADMIN — TAMSULOSIN HYDROCHLORIDE 0.4 MG: 0.4 CAPSULE ORAL at 08:02

## 2017-02-08 NOTE — PROCEDURES
TXP LVAD INTERROGATIONS 2/8/2017 2/8/2017 2/8/2017 2/7/2017 2/7/2017 2/7/2017 2/7/2017   Type HeartMate II HeartMate II HeartMate II HeartMate II HeartMate II HeartMate II HeartMate II   Flow 6.0 6.9 5.9 6.2 6.1 6.8 5.6   Speed 9000 9000 9000 9000 9000 9000 9000   PI 5.3 4.6 5.7 5.8 5.5 3.3 5.7   Power (Kwon) 5.8 6.2 5.5 5.8 5.8 6.5 5.5   LSL 8600 8600 8600 8600 8600 8600 8600   Pulsatility Pulse Pulse Intermittent pulse Intermittent pulse Intermittent pulse - -     VAD sounds smooth  No alarms noted in history  Non pulsatile  Pt and family AAAO.  Updated all on VAD history.  No questions at this time.

## 2017-02-08 NOTE — H&P
[facility]  History & Physical  Cardiothoracic Surgery    SUBJECTIVE:     Chief Complaint/Reason for Admission: Heart transplant candidate    History of Present Illness:  Patient is a 56 y.o. male with a hx of ischemic cardiomyopathy s/p LVAD placement who has now been accepted as a candidate for heart transplantation. Today he feels well. He has no complaints. Denies fevers, chills, nausea, vomiting.     Facility-Administered Medications Prior to Admission   Medication    tamsulosin 24 hr capsule 0.4 mg     PTA Medications   Medication Sig    amiodarone (PACERONE) 400 MG tablet Take 1 tablet (400 mg total) by mouth once daily.    amlodipine (NORVASC) 10 MG tablet Take 1 tablet (10 mg total) by mouth once daily.    aspirin (ECOTRIN) 325 MG EC tablet Take 1 tablet (325 mg total) by mouth once daily.    cyclobenzaprine (FLEXERIL) 5 MG tablet Take 5 mg by mouth daily as needed.     dipyridamole (PERSANTINE) 50 MG tablet Take 2 tablets (100 mg total) by mouth 3 (three) times daily.    docusate sodium (COLACE) 100 MG capsule Take 2 capsules (200 mg total) by mouth every evening.    ergocalciferol (ERGOCALCIFEROL) 50,000 unit Cap TK 1 C PO TWICE PER WEEK    esomeprazole (NEXIUM) 40 MG capsule Take 1 capsule (40 mg total) by mouth before breakfast.    ferrous gluconate (FERGON) 324 MG tablet Take 1 tablet (324 mg total) by mouth daily with breakfast.    hydrALAZINE (APRESOLINE) 10 MG tablet Take 2 tablets (20 mg total) by mouth every 8 (eight) hours.    insulin aspart (NOVOLOG FLEXPEN) 100 unit/mL InPn pen Inject 20 Units into the skin 3 (three) times daily with meals.    insulin glargine (LANTUS SOLOSTAR) 100 unit/mL (3 mL) InPn pen Inject 16 Units into the skin once daily.    isosorbide dinitrate (ISORDIL) 10 MG tablet Take 1 tablet (10 mg total) by mouth 3 (three) times daily.    lisinopril (PRINIVIL,ZESTRIL) 20 MG tablet Take 1 tablet (20 mg total) by mouth 2 (two) times daily.     magnesium oxide (MAG-OX) 400 mg tablet Take 1 tablet (400 mg total) by mouth 3 (three) times daily.    oxycodone (ROXICODONE) 10 mg Tab immediate release tablet Take 1 tablet (10 mg total) by mouth every 6 (six) hours as needed.    senna-docusate 8.6-50 mg (PERICOLACE) 8.6-50 mg per tablet Take 1 tablet by mouth daily as needed for Constipation.    warfarin (COUMADIN) 1 MG tablet Take 1-2.5mg daily as directed by coumadin clinic; #70pills = 1 month supply       Review of patient's allergies indicates:   Allergen Reactions    Levemir [insulin detemir] Itching    Pork/porcine containing products     Ranexa [ranolazine] Nausea Only       Past Medical History   Diagnosis Date    CHF (congestive heart failure)     Coronary artery disease     GERD (gastroesophageal reflux disease)     Hyperlipidemia     Hypertension     Type 2 diabetes mellitus with hyperglycemia      Past Surgical History   Procedure Laterality Date    Cardiac pacemaker placement      Cholecystectomy      Left ventricular assist device       x 3 months ago    Colonoscopy N/A 1/11/2017     Procedure: COLONOSCOPY;  Surgeon: Petr Almanzar MD;  Location: [facility] ([address] [place] [address]);  Service: Endoscopy;  Laterality: N/A;    Colonoscopy N/A 1/12/2017     Procedure: COLONOSCOPY;  Surgeon: Petr Almanzar MD;  Location: [facility] ([address] [place] [address]);  Service: Endoscopy;  Laterality: N/A;     Family History   Problem Relation Age of Onset    Heart disease Mother     Hypertension Mother     Heart attack Mother     Heart disease Father     Hypertension Father     Heart attack Father     Cancer Brother      Social History   Substance Use Topics    Smoking status: Former Smoker     Packs/day: 0.50     Years: 15.00     Quit date: 6/14/2006    Smokeless tobacco: Never Used    Alcohol use No     Review of Systems:  Constitutional: no fever or chills, pain well controlled  ENT: no nasal congestion or sore throat  Respiratory: no cough or shortness of  breath  Cardiovascular: no chest pain or palpitations  Gastrointestinal: tolerating diet  Neurological: no seizures or tremors    OBJECTIVE:     Vital Signs (Most Recent):  Temp: 97.7 °F (36.5 °C) (02/08/17 1130)  Pulse: 87 (02/08/17 1500)  Resp: 18 (02/08/17 1130)  BP: (!) 80/0 (02/08/17 1131)  SpO2: 97 % (02/08/17 1130)    Physical Exam:  General: well developed, well nourished, appears stated age, no distress  Head: normocephalic, atraumatic  Eyes:  conjunctivae/corneas clear.  Lungs:  normal respiratory effort  Heart: LVAD in place  Abdomen: soft, non-tender non-distented; bowel sounds normal; no masses,  no organomegaly  Neurologic: Alert and oriented. Thought content appropriate  No focal numbness or weakness    Laboratory:  CBC:   Recent Labs  Lab 02/08/17  0447 02/08/17  1638   WBC 7.06 7.02   RBC 4.42* 4.58*   HGB 8.1* 8.5*   HCT 26.0* 26.6*     --    MCV 59* 58*   MCH 18.3* 18.6*   MCHC 31.2* 32.0     CMP:   Recent Labs  Lab 02/08/17  0447   *   CALCIUM 8.6*   ALBUMIN 2.8*   PROT 6.1   *   K 4.8   CO2 23      BUN 13   CREATININE 0.8   ALKPHOS 57   ALT 14   AST 18   BILITOT 0.5       Diagnostic Results:  Labs: Reviewed  X-Ray: Reviewed    ASSESSMENT/PLAN:       56 y.o. male with ischemic cardiomyopathy s/p LVAD    Plan for heart Tx today or tomorrow most likely  Consents obtained    Lenin Asher MD PGY II  [phone]

## 2017-02-08 NOTE — TELEPHONE ENCOUNTER
Received call from Dr. Romero, potential heart donor available. Donor is a high risk donor, pt has accepted use of high risk donor after speaking with Dr. Romero.

## 2017-02-08 NOTE — ANESTHESIA PREPROCEDURE EVALUATION
02/08/2017  Mick Barriga is a 56 y.o., male c PmHx s/p ischemic cardiomyopathy s/p LVAD placement who will now go for heart transplantation.  Patient also has PMHx significant for presence of AICD, HTN, Type II DM complicated by CKD, BPH and GERD.      Pre-operative evaluation for Procedure(s) (LRB):  TRANSPLANT-HEART (N/A)    IV Access:  22G PIV, R Forearm    Last Airway:    Method of Intubation: Direct laryngoscopy; Inserted by: Anesthesia Resident; Airway Device: Endotracheal Tube; Mask Ventilation: Easy; Intubated: Postinduction; Blade: Salazar #2; Airway Device Size: 9.0; Style: Cuffed; Cuff Inflation: Minimal occlusive pressure; Inflation Amount: 8; Placement Verified By: Auscultation, Capnometry; Grade: Grade I; Complicating Factors: None; Intubation Findings: Positive EtCO2, Bilateral breath sounds, Atraumatic/Condition of teeth unchanged;  Depth of Insertion: 22; Securment: Lips; Complications: None; Breath Sounds: Equal Bilateral; Insertion Attempts: 1;    Patient Active Problem List   Diagnosis    Presence of automatic implantable cardioverter-defibrillator    Essential hypertension    Hyperlipidemia    GERD (gastroesophageal reflux disease)    Type 2 diabetes mellitus with stage 3 chronic kidney disease, with long-term current use of insulin    Ischemic cardiomyopathy    Organ transplant candidate    Chronic combined systolic and diastolic heart failure    Presence of heart assist device    Hyponatremia    Cardiomyopathy    Non morbid obesity due to excess calories    Chronic anticoagulation    Uncontrolled secondary diabetes mellitus with stage 3 CKD (GFR 30-59)    Presence of left ventricular assist device (LVAD)    Elevated LDH    LVAD (left ventricular assist device) present    Elevated serum lactate dehydrogenase (LDH)    Cardiomyopathy, dilated, nonischemic     Long-term insulin use in type 2 diabetes    Benign prostatic hyperplasia       Review of patient's allergies indicates:   Allergen Reactions    Levemir [insulin detemir] Itching    Pork/porcine containing products     Ranexa [ranolazine] Nausea Only       No current facility-administered medications on file prior to encounter.      Current Outpatient Prescriptions on File Prior to Encounter   Medication Sig Dispense Refill    amiodarone (PACERONE) 400 MG tablet Take 1 tablet (400 mg total) by mouth once daily. 90 tablet 3    amlodipine (NORVASC) 10 MG tablet Take 1 tablet (10 mg total) by mouth once daily. 30 tablet 11    aspirin (ECOTRIN) 325 MG EC tablet Take 1 tablet (325 mg total) by mouth once daily. 90 tablet 3    cyclobenzaprine (FLEXERIL) 5 MG tablet Take 5 mg by mouth daily as needed.       dipyridamole (PERSANTINE) 50 MG tablet Take 2 tablets (100 mg total) by mouth 3 (three) times daily. 180 tablet 11    docusate sodium (COLACE) 100 MG capsule Take 2 capsules (200 mg total) by mouth every evening. 180 capsule 3    ergocalciferol (ERGOCALCIFEROL) 50,000 unit Cap TK 1 C PO TWICE PER WEEK  3    esomeprazole (NEXIUM) 40 MG capsule Take 1 capsule (40 mg total) by mouth before breakfast. 90 capsule 3    ferrous gluconate (FERGON) 324 MG tablet Take 1 tablet (324 mg total) by mouth daily with breakfast. 90 tablet 3    hydrALAZINE (APRESOLINE) 10 MG tablet Take 2 tablets (20 mg total) by mouth every 8 (eight) hours. 90 tablet 11    insulin aspart (NOVOLOG FLEXPEN) 100 unit/mL InPn pen Inject 20 Units into the skin 3 (three) times daily with meals.  0    insulin glargine (LANTUS SOLOSTAR) 100 unit/mL (3 mL) InPn pen Inject 16 Units into the skin once daily. 14.4 mL 3    isosorbide dinitrate (ISORDIL) 10 MG tablet Take 1 tablet (10 mg total) by mouth 3 (three) times daily. 90 tablet 11    lisinopril (PRINIVIL,ZESTRIL) 20 MG tablet Take 1 tablet (20 mg total) by mouth 2 (two) times daily. 180  tablet 3    magnesium oxide (MAG-OX) 400 mg tablet Take 1 tablet (400 mg total) by mouth 3 (three) times daily. 270 tablet 3    oxycodone (ROXICODONE) 10 mg Tab immediate release tablet Take 1 tablet (10 mg total) by mouth every 6 (six) hours as needed. 120 tablet 0    senna-docusate 8.6-50 mg (PERICOLACE) 8.6-50 mg per tablet Take 1 tablet by mouth daily as needed for Constipation. 90 tablet 3    warfarin (COUMADIN) 1 MG tablet Take 1-2.5mg daily as directed by coumadin clinic; #70pills = 1 month supply 70 tablet 11       Past Surgical History   Procedure Laterality Date    Cardiac pacemaker placement      Cholecystectomy      Left ventricular assist device       x 3 months ago    Colonoscopy N/A 1/11/2017     Procedure: COLONOSCOPY;  Surgeon: Petr Almanzar MD;  Location: [facility] ([address] [place] [address]);  Service: Endoscopy;  Laterality: N/A;    Colonoscopy N/A 1/12/2017     Procedure: COLONOSCOPY;  Surgeon: Petr Almanzar MD;  Location: [facility] ([address] [place] [address]);  Service: Endoscopy;  Laterality: N/A;       Social History     Social History    Marital status:      Spouse name: N/A    Number of children: N/A    Years of education: N/A     Occupational History    Not on file.     Social History Main Topics    Smoking status: Former Smoker     Packs/day: 0.50     Years: 15.00     Quit date: 6/14/2006    Smokeless tobacco: Never Used    Alcohol use No    Drug use: Not on file    Sexual activity: Yes     Partners: Female     Other Topics Concern    Not on file     Social History Narrative         Vital Signs Range (Last 24H):  Temp:  [36.5 °C (97.7 °F)-37 °C (98.6 °F)]   Pulse:  [60-93]   Resp:  [18]   BP: (66-93)/(0-68)   SpO2:  [97 %-98 %]       CBC:   Recent Labs      02/08/17   0447  02/08/17   1638   WBC  7.06  7.02   RBC  4.42*  4.58*   HGB  8.1*  8.5*   HCT  26.0*  26.6*   PLT  313  288   MCV  59*  58*   MCH  18.3*  18.6*   MCHC  31.2*  32.0       CMP:   Recent Labs      02/07/17   0503  02/08/17    0447  02/08/17   1638   NA  132*  131*  129*   K  4.6  4.8  4.7   CL  102  101  98   CO2  22*  23  27   BUN  13  13  14   CREATININE  0.9  0.8  0.9   GLU  160*  296*  275*   MG  1.9  1.7   --    PHOS  4.5  3.8   --    CALCIUM  8.8  8.6*  8.7   ALBUMIN   --   2.8*  3.0*   PROT   --   6.1  6.6   ALKPHOS   --   57  59   ALT   --   14  14   AST   --   18  17   BILITOT   --   0.5  0.5       INR  Recent Labs      02/07/17   0503  02/08/17   0447  02/08/17   1638   INR  3.2*  3.2*  3.0*   APTT   --    --   36.9*           Diagnostic Studies:    2D Echo:  CONCLUSIONS     1 - HeartMate II LVAD; speed 9000.     2 - Severely depressed left ventricular systolic function (EF 15-20%).     3 - Mild left ventricular enlargement.     4 - Mildly depressed right ventricular systolic function .     5 - Ramp echo study showing that LV size decreases and AV closes as speed goes up. These findings are suggestive of normal pump function by echo  .       This document has been electronically    SIGNED BY: Peewee Romero MD On: 01/31/2017 14:12    [facility] Anesthesia Evaluation    I have reviewed the Patient Summary Reports.    I have reviewed the Nursing Notes.   I have reviewed the Medications.     Review of Systems  Anesthesia Hx:  Denies Family Hx of Anesthesia complications.   Denies Personal Hx of Anesthesia complications.   Hematology/Oncology:         -- Anemia:   Cardiovascular:   Hypertension CAD    Cardiomyopathy, Dilated Cardiomyopathy, Ischemic Cardiomyopathy Awaiting Heart Transplant, LVAD in place    Pulmonary:   Denies COPD.  Denies Asthma.    Renal/:   Chronic Renal Disease, CRI    Hepatic/GI:   GERD    Neurological:   Denies CVA. Denies Seizures.    Endocrine:   Diabetes, type 2        Physical Exam  General:  Well nourished    Airway/Jaw/Neck:  Airway Findings: Mouth Opening: Normal Tongue: Normal  General Airway Assessment: Adult  Mallampati: III  Improves to II with phonation.  TM Distance: Normal, at least 6 cm  Jaw/Neck  Findings:  Neck ROM: Normal ROM      Dental:  Dental Findings: lower partial dentures    Chest/Lungs:  Chest/Lungs Findings: Clear to auscultation  Dyspneic.     Heart/Vascular:  Heart Findings: Rate: Normal  Rhythm: Regular Rhythm  LVAD heard throughout precordium, Trace pitting edema BLE      Mental Status:  Mental Status Findings:  Cooperative, Alert and Oriented         Anesthesia Plan  Type of Anesthesia, risks & benefits discussed:  Anesthesia Type:  general  Patient's Preference:   Intra-op Monitoring Plan:   Intra-op Monitoring Plan Comments:   Post Op Pain Control Plan:   Post Op Pain Control Plan Comments:   Induction:   IV  Beta Blocker:  Patient is not currently on a Beta-Blocker (No further documentation required).       Informed Consent: Patient understands risks and agrees with Anesthesia plan.  Questions answered. Anesthesia consent signed with patient.  ASA Score: 4  emergent   Day of Surgery Review of History & Physical:    H&P update referred to the provider.         Ready For Surgery From Anesthesia Perspective.

## 2017-02-08 NOTE — PROGRESS NOTES
Received a call from [employer] for a possible heart offer forMr. Linus is currently listed status 1A for OHTx. Discussed the use of organs from donors with PHS increased-risk behavior,including the testing protocols utilized, as well as data from the literature regarding the likelihood of transmission of hepatitis or HIV. The patient is willing to proceed with Tx.     Peewee Romero MD

## 2017-02-08 NOTE — PLAN OF CARE
Problem: Patient Care Overview  Goal: Plan of Care Review  Outcome: Ongoing (interventions implemented as appropriate)  VS/VAD/Doppler #s WNL. Denies any CP, SOB, palpitations, or dizziness. Fall precautions maintained. Free of fall/trauma/injury. General skin remains intact with no breakdown. Plan of care reviewed and updated with patient and spouse, verbalizes understanding. Patient in no acute distress. Will continue to monitor.

## 2017-02-08 NOTE — PROGRESS NOTES
"     Heart Failure Progress Note  Attending Physician: Dennis Lyles Jr.,*  Hospital Day: 10    Subjective:   Interval History: No acute events overnight. Feels well and denies any complaints. LDH trending down. Today it was 523 down from 536. NAEON. The patient was notified that he will be receiving a heart transplant later tonight.    Medications:   Continuous Infusions:     Scheduled Meds:   amiodarone  400 mg Oral Daily    amlodipine  10 mg Oral Daily    aspirin  325 mg Oral Daily    dipyridamole  150 mg Oral TID    docusate sodium  100 mg Oral BID    ferrous gluconate  324 mg Oral TID WM    hydrALAZINE  100 mg Oral Q8H    insulin aspart  20 Units Subcutaneous TIDWM    isosorbide dinitrate  20 mg Oral TID    lisinopril  20 mg Oral BID    magnesium oxide  400 mg Oral TID    oxycodone  20 mg Oral BID    pantoprazole  40 mg Oral Daily    tamsulosin  0.4 mg Oral Daily     PRN Meds:bisacodyl, dextrose 50%, dextrose 50%, glucagon (human recombinant), glucose, glucose, insulin aspart, senna  Objective:     Vitals:  Temp:  [97.7 °F (36.5 °C)-98.6 °F (37 °C)]   Pulse:  [60-93]   Resp:  [18]   BP: (66-93)/(0-68)   SpO2:  [97 %-98 %]     Visit Vitals    BP (!) 80/0 (BP Location: Left arm, Patient Position: Sitting, BP Method: Doppler)    Pulse 87    Temp 97.7 °F (36.5 °C) (Oral)    Resp 18    Ht 5' 8" (1.727 m)    Wt 90.7 kg (199 lb 15.3 oz)    SpO2 97%    BMI 30.4 kg/m2    I/O's:    Intake/Output Summary (Last 24 hours) at 02/08/17 1701  Last data filed at 02/08/17 0738   Gross per 24 hour   Intake             1260 ml   Output             3100 ml   Net            -1840 ml       Wt Readings from Last 10 Encounters:   02/08/17 90.7 kg (199 lb 15.3 oz)   01/26/17 89.8 kg (198 lb)   01/22/17 87.8 kg (193 lb 9 oz)   12/31/16 88.3 kg (194 lb 10.7 oz)   12/28/16 94.8 kg (208 lb 15.9 oz)   12/28/16 94 kg (207 lb 3.7 oz)   12/27/16 95 kg (209 lb 7 oz)   12/15/16 88 kg (194 lb)   11/30/16 91.2 kg (201 " lb)   11/02/16 89.8 kg (198 lb)        Constitutional: NAD, conversant  HEENT: Sclera anicteric, PERRLA, EOMI  Neck: No JVD, no carotid bruits  CV: +LVAD hum  Pulm: CTAB with no wheezes, rales, or rhonchi  GI: Abdomen soft, NTND, +BS. No tenderness at driveline site.  Extremities: No LE edema, warm and well perfused, No cyanosis, No clubbing      Labs:       Recent Labs  Lab 02/06/17  0501 02/07/17  0503 02/08/17 0447   * 132* 131*   K 4.6 4.6 4.8    102 101   CO2 24 22* 23   BUN 15 13 13   CREATININE 0.9 0.9 0.8   * 160* 296*   ANIONGAP 8 8 7*       Recent Labs  Lab 02/03/17 0447 02/06/17  0501 02/08/17 0447   AST 24 18 18   ALT 15 13 14   ALKPHOS 61 56 57   BILITOT 0.8 0.7 0.5   BILIDIR 0.4* 0.3 0.2   ALBUMIN 3.0* 3.1* 2.8*     No results for input(s): PH, PCO2, PO2, HCO3, POCSATURATED in the last 168 hours.     Recent Labs  Lab 02/07/17  0503 02/08/17  0447 02/08/17  1638   WBC 7.14 7.06 7.02   HGB 8.1* 8.1* 8.5*   HCT 25.8* 26.0* 26.6*    313  --    GRAN 55.6  4.0 63.2  4.4  --        Recent Labs  Lab 02/06/17  0501 02/07/17  0503 02/08/17 0447   INR 2.9* 3.2* 3.2*       Recent Labs  Lab 02/03/17  0447 02/06/17  0501 02/08/17  0447   * 113* 149*      Micro:   Blood Cultures  Lab Results   Component Value Date    LABBLOO No growth after 5 days. 01/19/2017    LABBLOO No growth after 5 days. 01/19/2017    LABBLOO No growth after 5 days. 09/01/2016    LABBLOO No growth after 5 days. 09/01/2016    LABBLOO No growth after 5 days. 08/24/2016     Urine Cultures  Lab Results   Component Value Date    LABURIN CITROBACTER KOSERI  10,000 - 49,999 cfu/ml   01/19/2017    LABURIN No growth 09/03/2016    LABURIN No growth 09/01/2016       Imaging:     EF   Date Value Ref Range Status   01/31/2017 15 (A) 55 - 65    01/16/2017 10 (A) 55 - 65    09/19/2016 10 (A) 55 - 65    09/06/2016 20 (A) 55 - 65    09/02/2016 15 (A) 55 - 65          Assessment:   Mr. Barriga is a 57 yo M with PMH of [facility]  s/p HM II (8/24/16), HLD, HTN, VT s/p ICD who presented to the hospital for elevated LDH in the setting of subtherapeutic INR with lower extremity swelling concerning for LVAD pump thrombosis/ failure.      Elevated LDH - D/D dehydration, uncontrolled HTN, pump thrombosis due to subtherapeutic INR  - holding warfarin as the patient is scheduled for OHT tonight/early AM  - INR 3.2 today  - hydralazine to 100 mg TID and increased Isordil to 20mg TID (from 10mg TID); Doppler pressures 68-88  - monitor LDH daily; LDH decreased to 523 from 536 yesterday. Goal to get LDH < 500 prior to discharge IF the patient were to not receive OHT  - c/w  mg, persantine 150 mg TID (IF no OHT), otherwise hold      VT s/p ICD  - c/w amiodarone      NICM s/p HMII s/p Biotronik ICD  - amlodipine, lisinopril, hydralazine, Isodril  - status 1A for OHT  - OHT scheduled for today, keep NPO, will need ICD tachy therapy turned off prior to procedure.      Case discussed with HF/HTS attending, Dr. Marx.      Signed:  Renee Seymour MD  Cardiology Fellow  Pager: [phone]  2/8/2017 12:55PM

## 2017-02-08 NOTE — PLAN OF CARE
PHS: I discussed the use of organs from donors with PHS increased-risk behavior,including the testing protocols utilized, as well as data from the literature regarding the likelihood of transmission of hepatitis or HIV. The patient is willing to consider such grafts.

## 2017-02-08 NOTE — PROGRESS NOTES
Pt and wife AAAO.  Talked with them about sending pump back to company for evaluation after transplant.  Both agree. Pt signed consent for this today.

## 2017-02-09 PROBLEM — Z95.811 LVAD (LEFT VENTRICULAR ASSIST DEVICE) PRESENT: Status: RESOLVED | Noted: 2017-01-04 | Resolved: 2017-02-09

## 2017-02-09 PROBLEM — Z94.1 HEART TRANSPLANT, ORTHOTOPIC, STATUS: Status: ACTIVE | Noted: 2017-02-09

## 2017-02-09 PROBLEM — R74.02 ELEVATED SERUM LACTATE DEHYDROGENASE (LDH): Status: RESOLVED | Noted: 2017-01-05 | Resolved: 2017-02-09

## 2017-02-09 PROBLEM — Z95.811 LVAD (LEFT VENTRICULAR ASSIST DEVICE) PRESENT: Status: ACTIVE | Noted: 2017-02-09

## 2017-02-09 PROBLEM — Z95.811 LVAD (LEFT VENTRICULAR ASSIST DEVICE) PRESENT: Status: RESOLVED | Noted: 2017-02-09 | Resolved: 2017-02-09

## 2017-02-09 PROBLEM — D84.9 IMMUNOSUPPRESSION: Status: ACTIVE | Noted: 2017-02-09

## 2017-02-09 PROBLEM — T38.0X5A ADRENAL CORTICAL STEROIDS CAUSING ADVERSE EFFECT IN THERAPEUTIC USE: Status: ACTIVE | Noted: 2017-02-09

## 2017-02-09 LAB
ALBUMIN SERPL BCP-MCNC: 2.8 G/DL
ALLENS TEST: ABNORMAL
ALP SERPL-CCNC: 48 U/L
ALT SERPL W/O P-5'-P-CCNC: 36 U/L
ANION GAP SERPL CALC-SCNC: 15 MMOL/L
ANION GAP SERPL CALC-SCNC: 17 MMOL/L
ANISOCYTOSIS BLD QL SMEAR: SLIGHT
APTT BLDCRRT: 28.8 SEC
AST SERPL-CCNC: 102 U/L
BASO STIPL BLD QL SMEAR: ABNORMAL
BASO STIPL BLD QL SMEAR: ABNORMAL
BASOPHILS # BLD AUTO: 0 K/UL
BASOPHILS # BLD AUTO: 0.01 K/UL
BASOPHILS # BLD AUTO: 0.01 K/UL
BASOPHILS NFR BLD: 0 %
BASOPHILS NFR BLD: 0.1 %
BASOPHILS NFR BLD: 0.1 %
BILIRUB SERPL-MCNC: 0.4 MG/DL
BLD PROD TYP BPU: NORMAL
BLOOD UNIT EXPIRATION DATE: NORMAL
BLOOD UNIT TYPE CODE: 6200
BLOOD UNIT TYPE: NORMAL
BUN SERPL-MCNC: 17 MG/DL
BUN SERPL-MCNC: 19 MG/DL
CALCIUM SERPL-MCNC: 8.6 MG/DL
CALCIUM SERPL-MCNC: 8.9 MG/DL
CHLORIDE SERPL-SCNC: 102 MMOL/L
CHLORIDE SERPL-SCNC: 97 MMOL/L
CO2 SERPL-SCNC: 19 MMOL/L
CO2 SERPL-SCNC: 21 MMOL/L
CODING SYSTEM: NORMAL
CREAT SERPL-MCNC: 1.5 MG/DL
CREAT SERPL-MCNC: 1.7 MG/DL
DACRYOCYTES BLD QL SMEAR: ABNORMAL
DELSYS: ABNORMAL
DIFFERENTIAL METHOD: ABNORMAL
DISPENSE STATUS: NORMAL
EOSINOPHIL # BLD AUTO: 0 K/UL
EOSINOPHIL # BLD AUTO: 0 K/UL
EOSINOPHIL # BLD AUTO: 0.1 K/UL
EOSINOPHIL NFR BLD: 0 %
EOSINOPHIL NFR BLD: 0.1 %
EOSINOPHIL NFR BLD: 0.6 %
ERYTHROCYTE [DISTWIDTH] IN BLOOD BY AUTOMATED COUNT: 19.5 %
ERYTHROCYTE [DISTWIDTH] IN BLOOD BY AUTOMATED COUNT: 27.5 %
ERYTHROCYTE [DISTWIDTH] IN BLOOD BY AUTOMATED COUNT: 27.7 %
ERYTHROCYTE [SEDIMENTATION RATE] IN BLOOD BY WESTERGREN METHOD: 16 MM/H
ERYTHROCYTE [SEDIMENTATION RATE] IN BLOOD BY WESTERGREN METHOD: 17 MM/H
ERYTHROCYTE [SEDIMENTATION RATE] IN BLOOD BY WESTERGREN METHOD: 21 MM/H
ERYTHROCYTE [SEDIMENTATION RATE] IN BLOOD BY WESTERGREN METHOD: 21 MM/H
EST. GFR  (AFRICAN AMERICAN): 51.7 ML/MIN/1.73 M^2
EST. GFR  (AFRICAN AMERICAN): >60 ML/MIN/1.73 M^2
EST. GFR  (NON AFRICAN AMERICAN): 44.7 ML/MIN/1.73 M^2
EST. GFR  (NON AFRICAN AMERICAN): 52 ML/MIN/1.73 M^2
ETCO2: 1
FIBRINOGEN PPP-MCNC: 390 MG/DL
FIBRINOGEN PPP-MCNC: 437 MG/DL
FIBRINOGEN PPP-MCNC: 487 MG/DL
FIO2: 100
FIO2: 100
FIO2: 50
FIO2: 80
FLOW: 70
GLUCOSE SERPL-MCNC: 389 MG/DL
GLUCOSE SERPL-MCNC: 466 MG/DL (ref 70–110)
GLUCOSE SERPL-MCNC: 470 MG/DL (ref 70–110)
GLUCOSE SERPL-MCNC: 477 MG/DL (ref 70–110)
GLUCOSE SERPL-MCNC: 478 MG/DL
GLUCOSE SERPL-MCNC: 504 MG/DL (ref 70–110)
HBV SURFACE AG SERPL QL IA: NEGATIVE
HCO3 UR-SCNC: 18.6 MMOL/L (ref 24–28)
HCO3 UR-SCNC: 19.3 MMOL/L (ref 24–28)
HCO3 UR-SCNC: 19.5 MMOL/L (ref 24–28)
HCO3 UR-SCNC: 20.3 MMOL/L (ref 24–28)
HCO3 UR-SCNC: 20.7 MMOL/L (ref 24–28)
HCO3 UR-SCNC: 21 MMOL/L (ref 24–28)
HCO3 UR-SCNC: 22.7 MMOL/L (ref 24–28)
HCO3 UR-SCNC: 22.9 MMOL/L (ref 24–28)
HCO3 UR-SCNC: 23 MMOL/L (ref 24–28)
HCO3 UR-SCNC: 23.5 MMOL/L (ref 24–28)
HCT VFR BLD AUTO: 17.1 %
HCT VFR BLD AUTO: 21.3 %
HCT VFR BLD AUTO: 24.4 %
HCT VFR BLD CALC: 20 %PCV (ref 36–54)
HCT VFR BLD CALC: 21 %PCV (ref 36–54)
HCT VFR BLD CALC: 22 %PCV (ref 36–54)
HCT VFR BLD CALC: 24 %PCV (ref 36–54)
HCT VFR BLD CALC: 26 %PCV (ref 36–54)
HGB BLD-MCNC: 5.4 G/DL
HGB BLD-MCNC: 6.8 G/DL
HGB BLD-MCNC: 7.9 G/DL
HYPOCHROMIA BLD QL SMEAR: ABNORMAL
INR PPP: 1.1
INR PPP: 1.5
INR PPP: 2.4
LDH SERPL L TO P-CCNC: 8.94 MMOL/L (ref 0.36–1.25)
LDH SERPL L TO P-CCNC: 9.1 MMOL/L (ref 0.36–1.25)
LDH SERPL L TO P-CCNC: 9.24 MMOL/L (ref 0.36–1.25)
LDH SERPL L TO P-CCNC: 9.27 MMOL/L (ref 0.36–1.25)
LDH SERPL L TO P-CCNC: 9.39 MMOL/L (ref 0.36–1.25)
LDH SERPL L TO P-CCNC: 9.45 MMOL/L (ref 0.36–1.25)
LDH SERPL L TO P-CCNC: 9.74 MMOL/L (ref 0.36–1.25)
LYMPHOCYTES # BLD AUTO: 0.5 K/UL
LYMPHOCYTES # BLD AUTO: 0.7 K/UL
LYMPHOCYTES # BLD AUTO: 1.5 K/UL
LYMPHOCYTES NFR BLD: 5.5 %
LYMPHOCYTES NFR BLD: 6.2 %
LYMPHOCYTES NFR BLD: 9.2 %
MAGNESIUM SERPL-MCNC: 2.1 MG/DL
MAGNESIUM SERPL-MCNC: 2.4 MG/DL
MCH RBC QN AUTO: 18.6 PG
MCH RBC QN AUTO: 21.7 PG
MCH RBC QN AUTO: 22.6 PG
MCHC RBC AUTO-ENTMCNC: 31.6 %
MCHC RBC AUTO-ENTMCNC: 31.9 %
MCHC RBC AUTO-ENTMCNC: 32.4 %
MCV RBC AUTO: 59 FL
MCV RBC AUTO: 68 FL
MCV RBC AUTO: 70 FL
MODE: ABNORMAL
MONOCYTES # BLD AUTO: 0.3 K/UL
MONOCYTES # BLD AUTO: 0.4 K/UL
MONOCYTES # BLD AUTO: 0.6 K/UL
MONOCYTES NFR BLD: 2.2 %
MONOCYTES NFR BLD: 3.3 %
MONOCYTES NFR BLD: 5.2 %
NEUTROPHILS # BLD AUTO: 14 K/UL
NEUTROPHILS # BLD AUTO: 8 K/UL
NEUTROPHILS # BLD AUTO: 9.3 K/UL
NEUTROPHILS NFR BLD: 87.9 %
NEUTROPHILS NFR BLD: 88.6 %
NEUTROPHILS NFR BLD: 91 %
OVALOCYTES BLD QL SMEAR: ABNORMAL
PCO2 BLDA: 30.4 MMHG (ref 35–45)
PCO2 BLDA: 31.2 MMHG (ref 35–45)
PCO2 BLDA: 33.7 MMHG (ref 35–45)
PCO2 BLDA: 34.1 MMHG (ref 35–45)
PCO2 BLDA: 34.4 MMHG (ref 35–45)
PCO2 BLDA: 39.4 MMHG (ref 35–45)
PCO2 BLDA: 41.3 MMHG (ref 35–45)
PCO2 BLDA: 42.5 MMHG (ref 35–45)
PCO2 BLDA: 48 MMHG (ref 35–45)
PCO2 BLDA: 49.1 MMHG (ref 35–45)
PEEP: 5
PH SMN: 7.23 [PH] (ref 7.35–7.45)
PH SMN: 7.29 [PH] (ref 7.35–7.45)
PH SMN: 7.35 [PH] (ref 7.35–7.45)
PH SMN: 7.37 [PH] (ref 7.35–7.45)
PH SMN: 7.38 [PH] (ref 7.35–7.45)
PH SMN: 7.39 [PH] (ref 7.35–7.45)
PH SMN: 7.4 [PH] (ref 7.35–7.45)
PH SMN: 7.41 [PH] (ref 7.35–7.45)
PHOSPHATE SERPL-MCNC: 1.8 MG/DL
PHOSPHATE SERPL-MCNC: 1.9 MG/DL
PIP: 28
PIP: 29
PIP: 30
PIP: 30
PLATELET # BLD AUTO: 198 K/UL
PLATELET # BLD AUTO: 223 K/UL
PLATELET # BLD AUTO: 275 K/UL
PLATELET BLD QL SMEAR: ABNORMAL
PLATELET BLD QL SMEAR: ABNORMAL
PMV BLD AUTO: ABNORMAL FL
PO2 BLDA: 165 MMHG (ref 80–100)
PO2 BLDA: 174 MMHG (ref 80–100)
PO2 BLDA: 174 MMHG (ref 80–100)
PO2 BLDA: 181 MMHG (ref 80–100)
PO2 BLDA: 183 MMHG (ref 80–100)
PO2 BLDA: 276 MMHG (ref 80–100)
PO2 BLDA: 368 MMHG (ref 80–100)
PO2 BLDA: 388 MMHG (ref 80–100)
PO2 BLDA: 400 MMHG (ref 80–100)
PO2 BLDA: 45 MMHG (ref 40–60)
POC BE: -2 MMOL/L
POC BE: -2 MMOL/L
POC BE: -3 MMOL/L
POC BE: -4 MMOL/L
POC BE: -4 MMOL/L
POC BE: -5 MMOL/L
POC BE: -7 MMOL/L
POC BE: -7 MMOL/L
POC IONIZED CALCIUM: 0.92 MMOL/L (ref 1.06–1.42)
POC IONIZED CALCIUM: 0.98 MMOL/L (ref 1.06–1.42)
POC IONIZED CALCIUM: 1.23 MMOL/L (ref 1.06–1.42)
POC SATURATED O2: 100 % (ref 95–100)
POC SATURATED O2: 71 % (ref 95–100)
POC SATURATED O2: 99 % (ref 95–100)
POC TCO2: 20 MMOL/L (ref 23–27)
POC TCO2: 21 MMOL/L (ref 23–27)
POC TCO2: 22 MMOL/L (ref 23–27)
POC TCO2: 22 MMOL/L (ref 24–29)
POC TCO2: 24 MMOL/L (ref 23–27)
POC TCO2: 25 MMOL/L (ref 23–27)
POCT GLUCOSE: 384 MG/DL (ref 70–110)
POCT GLUCOSE: 443 MG/DL (ref 70–110)
POIKILOCYTOSIS BLD QL SMEAR: SLIGHT
POLYCHROMASIA BLD QL SMEAR: ABNORMAL
POOLED CRYOPPT GVN BPU: NORMAL
POOLED CRYOPPT GVN BPU: NORMAL
POTASSIUM BLD-SCNC: 3.3 MMOL/L (ref 3.5–5.1)
POTASSIUM BLD-SCNC: 4.3 MMOL/L (ref 3.5–5.1)
POTASSIUM BLD-SCNC: 4.6 MMOL/L (ref 3.5–5.1)
POTASSIUM BLD-SCNC: 4.7 MMOL/L (ref 3.5–5.1)
POTASSIUM BLD-SCNC: 4.8 MMOL/L (ref 3.5–5.1)
POTASSIUM SERPL-SCNC: 3.4 MMOL/L
POTASSIUM SERPL-SCNC: 3.4 MMOL/L
POTASSIUM SERPL-SCNC: 3.5 MMOL/L
PROT SERPL-MCNC: 5.5 G/DL
PROTHROMBIN TIME: 11.5 SEC
PROTHROMBIN TIME: 14.9 SEC
PROTHROMBIN TIME: 23.5 SEC
PROVIDER CREDENTIALS: ABNORMAL
PROVIDER NOTIFIED: ABNORMAL
PS: 10
RBC # BLD AUTO: 2.91 M/UL
RBC # BLD AUTO: 3.14 M/UL
RBC # BLD AUTO: 3.5 M/UL
SAMPLE: ABNORMAL
SCHISTOCYTES BLD QL SMEAR: ABNORMAL
SITE: ABNORMAL
SODIUM BLD-SCNC: 127 MMOL/L (ref 136–145)
SODIUM BLD-SCNC: 128 MMOL/L (ref 136–145)
SODIUM BLD-SCNC: 128 MMOL/L (ref 136–145)
SODIUM BLD-SCNC: 130 MMOL/L (ref 136–145)
SODIUM BLD-SCNC: 136 MMOL/L (ref 136–145)
SODIUM SERPL-SCNC: 133 MMOL/L
SODIUM SERPL-SCNC: 138 MMOL/L
SP02: 100
SP02: 50
SPHEROCYTES BLD QL SMEAR: ABNORMAL
SPHEROCYTES BLD QL SMEAR: ABNORMAL
TARGETS BLD QL SMEAR: ABNORMAL
TARGETS BLD QL SMEAR: ABNORMAL
TIME NOTIFIED: 1412
TIME NOTIFIED: 1413
TIME NOTIFIED: 1523
TIME NOTIFIED: 1625
TIME NOTIFIED: 1728
TIME NOTIFIED: 1828
TRANS PLATPHERESIS VOL PATIENT: NORMAL ML
VERBAL RESULT READBACK PERFORMED: YES
VT: 550
VT: 600
WBC # BLD AUTO: 10.59 K/UL
WBC # BLD AUTO: 16.15 K/UL
WBC # BLD AUTO: 8.91 K/UL

## 2017-02-09 PROCEDURE — 85610 PROTHROMBIN TIME: CPT | Mod: 91

## 2017-02-09 PROCEDURE — 37000008 HC ANESTHESIA 1ST 15 MINUTES: Performed by: THORACIC SURGERY (CARDIOTHORACIC VASCULAR SURGERY)

## 2017-02-09 PROCEDURE — 27000248 HC VAD-ADDITIONAL DAY

## 2017-02-09 PROCEDURE — 63600175 PHARM REV CODE 636 W HCPCS: Performed by: NURSE PRACTITIONER

## 2017-02-09 PROCEDURE — 85007 BL SMEAR W/DIFF WBC COUNT: CPT

## 2017-02-09 PROCEDURE — 25000003 PHARM REV CODE 250: Performed by: THORACIC SURGERY (CARDIOTHORACIC VASCULAR SURGERY)

## 2017-02-09 PROCEDURE — 82803 BLOOD GASES ANY COMBINATION: CPT

## 2017-02-09 PROCEDURE — 27000175 HC ADULT BYPASS PUMP

## 2017-02-09 PROCEDURE — 63600175 PHARM REV CODE 636 W HCPCS: Performed by: STUDENT IN AN ORGANIZED HEALTH CARE EDUCATION/TRAINING PROGRAM

## 2017-02-09 PROCEDURE — 63600367 HC NITRIC OXIDE PER HOUR

## 2017-02-09 PROCEDURE — P9017 PLASMA 1 DONOR FRZ W/IN 8 HR: HCPCS

## 2017-02-09 PROCEDURE — P9045 ALBUMIN (HUMAN), 5%, 250 ML: HCPCS

## 2017-02-09 PROCEDURE — 63600175 PHARM REV CODE 636 W HCPCS: Performed by: INTERNAL MEDICINE

## 2017-02-09 PROCEDURE — 93503 INSERT/PLACE HEART CATHETER: CPT | Mod: 59,GC,, | Performed by: ANESTHESIOLOGY

## 2017-02-09 PROCEDURE — 27200651 HC AIRWAY, LMA: Performed by: STUDENT IN AN ORGANIZED HEALTH CARE EDUCATION/TRAINING PROGRAM

## 2017-02-09 PROCEDURE — 25000003 PHARM REV CODE 250: Performed by: ANESTHESIOLOGY

## 2017-02-09 PROCEDURE — 88309 TISSUE EXAM BY PATHOLOGIST: CPT | Performed by: PATHOLOGY

## 2017-02-09 PROCEDURE — 02NN0ZZ RELEASE PERICARDIUM, OPEN APPROACH: ICD-10-PCS | Performed by: THORACIC SURGERY (CARDIOTHORACIC VASCULAR SURGERY)

## 2017-02-09 PROCEDURE — 33243 REMOVE ELTRD/THORACOTOMY: CPT | Mod: 51,GC,, | Performed by: THORACIC SURGERY (CARDIOTHORACIC VASCULAR SURGERY)

## 2017-02-09 PROCEDURE — 85025 COMPLETE CBC W/AUTO DIFF WBC: CPT

## 2017-02-09 PROCEDURE — 85027 COMPLETE CBC AUTOMATED: CPT

## 2017-02-09 PROCEDURE — C1751 CATH, INF, PER/CENT/MIDLINE: HCPCS | Performed by: STUDENT IN AN ORGANIZED HEALTH CARE EDUCATION/TRAINING PROGRAM

## 2017-02-09 PROCEDURE — 25000003 PHARM REV CODE 250: Performed by: STUDENT IN AN ORGANIZED HEALTH CARE EDUCATION/TRAINING PROGRAM

## 2017-02-09 PROCEDURE — C1729 CATH, DRAINAGE: HCPCS | Performed by: THORACIC SURGERY (CARDIOTHORACIC VASCULAR SURGERY)

## 2017-02-09 PROCEDURE — 63600175 PHARM REV CODE 636 W HCPCS: Performed by: GENERAL PRACTICE

## 2017-02-09 PROCEDURE — 27200678 HC TRANSDUCER MONITOR KIT TRIPLE: Performed by: STUDENT IN AN ORGANIZED HEALTH CARE EDUCATION/TRAINING PROGRAM

## 2017-02-09 PROCEDURE — 93287 PERI-PX DEVICE EVAL & PRGR: CPT | Mod: 26,,, | Performed by: INTERNAL MEDICINE

## 2017-02-09 PROCEDURE — 27201423 OPTIME MED/SURG SUP & DEVICES STERILE SUPPLY: Performed by: THORACIC SURGERY (CARDIOTHORACIC VASCULAR SURGERY)

## 2017-02-09 PROCEDURE — 99900035 HC TECH TIME PER 15 MIN (STAT)

## 2017-02-09 PROCEDURE — 93005 ELECTROCARDIOGRAM TRACING: CPT

## 2017-02-09 PROCEDURE — 33945 TRANSPLANTATION OF HEART: CPT | Mod: 22,GC,, | Performed by: THORACIC SURGERY (CARDIOTHORACIC VASCULAR SURGERY)

## 2017-02-09 PROCEDURE — 25000003 PHARM REV CODE 250: Performed by: INTERNAL MEDICINE

## 2017-02-09 PROCEDURE — 84132 ASSAY OF SERUM POTASSIUM: CPT

## 2017-02-09 PROCEDURE — 82330 ASSAY OF CALCIUM: CPT

## 2017-02-09 PROCEDURE — P9035 PLATELET PHERES LEUKOREDUCED: HCPCS

## 2017-02-09 PROCEDURE — 63600175 PHARM REV CODE 636 W HCPCS

## 2017-02-09 PROCEDURE — 33241 REMOVE PULSE GENERATOR: CPT | Mod: 51,GC,, | Performed by: THORACIC SURGERY (CARDIOTHORACIC VASCULAR SURGERY)

## 2017-02-09 PROCEDURE — 63600175 PHARM REV CODE 636 W HCPCS: Performed by: THORACIC SURGERY (CARDIOTHORACIC VASCULAR SURGERY)

## 2017-02-09 PROCEDURE — 85014 HEMATOCRIT: CPT

## 2017-02-09 PROCEDURE — 63600531 PHARM REV CODE 636 NO ALT 250 W HCPCS: Performed by: ANESTHESIOLOGY

## 2017-02-09 PROCEDURE — 63600175 PHARM REV CODE 636 W HCPCS: Performed by: ANESTHESIOLOGY

## 2017-02-09 PROCEDURE — 36000930 HC OR TIME LEV VII 1ST 15 MIN: Performed by: THORACIC SURGERY (CARDIOTHORACIC VASCULAR SURGERY)

## 2017-02-09 PROCEDURE — 87340 HEPATITIS B SURFACE AG IA: CPT

## 2017-02-09 PROCEDURE — 36592 COLLECT BLOOD FROM PICC: CPT

## 2017-02-09 PROCEDURE — 27200953 HC CARDIOPLEGIA SYSTEM

## 2017-02-09 PROCEDURE — 83735 ASSAY OF MAGNESIUM: CPT | Mod: 91

## 2017-02-09 PROCEDURE — 27100025 HC TUBING, SET FLUID WARMER: Performed by: STUDENT IN AN ORGANIZED HEALTH CARE EDUCATION/TRAINING PROGRAM

## 2017-02-09 PROCEDURE — 83735 ASSAY OF MAGNESIUM: CPT

## 2017-02-09 PROCEDURE — 36000931 HC OR TIME LEV VII EA ADD 15 MIN: Performed by: THORACIC SURGERY (CARDIOTHORACIC VASCULAR SURGERY)

## 2017-02-09 PROCEDURE — 27000221 HC OXYGEN, UP TO 24 HOURS

## 2017-02-09 PROCEDURE — 5A1221Z PERFORMANCE OF CARDIAC OUTPUT, CONTINUOUS: ICD-10-PCS | Performed by: THORACIC SURGERY (CARDIOTHORACIC VASCULAR SURGERY)

## 2017-02-09 PROCEDURE — 25000242 PHARM REV CODE 250 ALT 637 W/ HCPCS: Performed by: INTERNAL MEDICINE

## 2017-02-09 PROCEDURE — 27201021 HC LEUKOCYTE FILTER

## 2017-02-09 PROCEDURE — 84100 ASSAY OF PHOSPHORUS: CPT

## 2017-02-09 PROCEDURE — 80048 BASIC METABOLIC PNL TOTAL CA: CPT

## 2017-02-09 PROCEDURE — 93010 ELECTROCARDIOGRAM REPORT: CPT | Mod: ,,, | Performed by: INTERNAL MEDICINE

## 2017-02-09 PROCEDURE — 94002 VENT MGMT INPAT INIT DAY: CPT

## 2017-02-09 PROCEDURE — 25000003 PHARM REV CODE 250: Performed by: NURSE PRACTITIONER

## 2017-02-09 PROCEDURE — 85384 FIBRINOGEN ACTIVITY: CPT

## 2017-02-09 PROCEDURE — 36415 COLL VENOUS BLD VENIPUNCTURE: CPT

## 2017-02-09 PROCEDURE — 33980 REMOVE INTRACORPOREAL DEVICE: CPT | Mod: 51,GC,, | Performed by: THORACIC SURGERY (CARDIOTHORACIC VASCULAR SURGERY)

## 2017-02-09 PROCEDURE — 83605 ASSAY OF LACTIC ACID: CPT

## 2017-02-09 PROCEDURE — D9220A PRA ANESTHESIA: Mod: ,,, | Performed by: ANESTHESIOLOGY

## 2017-02-09 PROCEDURE — 99223 1ST HOSP IP/OBS HIGH 75: CPT | Mod: ,,, | Performed by: NURSE PRACTITIONER

## 2017-02-09 PROCEDURE — 80053 COMPREHEN METABOLIC PANEL: CPT | Mod: 91

## 2017-02-09 PROCEDURE — 94640 AIRWAY INHALATION TREATMENT: CPT

## 2017-02-09 PROCEDURE — 02PA0MZ REMOVAL OF CARDIAC LEAD FROM HEART, OPEN APPROACH: ICD-10-PCS | Performed by: THORACIC SURGERY (CARDIOTHORACIC VASCULAR SURGERY)

## 2017-02-09 PROCEDURE — 85384 FIBRINOGEN ACTIVITY: CPT | Mod: 91

## 2017-02-09 PROCEDURE — 99900026 HC AIRWAY MAINTENANCE (STAT)

## 2017-02-09 PROCEDURE — 36430 TRANSFUSION BLD/BLD COMPNT: CPT

## 2017-02-09 PROCEDURE — 84295 ASSAY OF SERUM SODIUM: CPT

## 2017-02-09 PROCEDURE — 27100088 HC CELL SAVER

## 2017-02-09 PROCEDURE — 37799 UNLISTED PX VASCULAR SURGERY: CPT

## 2017-02-09 PROCEDURE — 37000009 HC ANESTHESIA EA ADD 15 MINS: Performed by: THORACIC SURGERY (CARDIOTHORACIC VASCULAR SURGERY)

## 2017-02-09 PROCEDURE — P9021 RED BLOOD CELLS UNIT: HCPCS

## 2017-02-09 PROCEDURE — 85520 HEPARIN ASSAY: CPT

## 2017-02-09 PROCEDURE — 84100 ASSAY OF PHOSPHORUS: CPT | Mod: 91

## 2017-02-09 PROCEDURE — 88309 TISSUE EXAM BY PATHOLOGIST: CPT | Mod: 26,,, | Performed by: PATHOLOGY

## 2017-02-09 PROCEDURE — 0JPT0PZ REMOVAL OF CARDIAC RHYTHM RELATED DEVICE FROM TRUNK SUBCUTANEOUS TISSUE AND FASCIA, OPEN APPROACH: ICD-10-PCS | Performed by: THORACIC SURGERY (CARDIOTHORACIC VASCULAR SURGERY)

## 2017-02-09 PROCEDURE — 02YA0Z0 TRANSPLANTATION OF HEART, ALLOGENEIC, OPEN APPROACH: ICD-10-PCS | Performed by: THORACIC SURGERY (CARDIOTHORACIC VASCULAR SURGERY)

## 2017-02-09 PROCEDURE — 87536 HIV-1 QUANT&REVRSE TRNSCRPJ: CPT

## 2017-02-09 PROCEDURE — 27000191 HC C-V MONITORING

## 2017-02-09 PROCEDURE — 20000000 HC ICU ROOM

## 2017-02-09 PROCEDURE — P9045 ALBUMIN (HUMAN), 5%, 250 ML: HCPCS | Performed by: STUDENT IN AN ORGANIZED HEALTH CARE EDUCATION/TRAINING PROGRAM

## 2017-02-09 PROCEDURE — 83036 HEMOGLOBIN GLYCOSYLATED A1C: CPT

## 2017-02-09 PROCEDURE — 27201015 HC HEMO-CONCENTRATOR

## 2017-02-09 PROCEDURE — 87522 HEPATITIS C REVRS TRNSCRPJ: CPT

## 2017-02-09 PROCEDURE — 36620 INSERTION CATHETER ARTERY: CPT | Mod: 59,GC,, | Performed by: ANESTHESIOLOGY

## 2017-02-09 PROCEDURE — 25000003 PHARM REV CODE 250

## 2017-02-09 PROCEDURE — 27201040 HC RC 50 FILTER

## 2017-02-09 PROCEDURE — 87517 HEPATITIS B DNA QUANT: CPT

## 2017-02-09 PROCEDURE — P9012 CRYOPRECIPITATE EACH UNIT: HCPCS

## 2017-02-09 PROCEDURE — 85610 PROTHROMBIN TIME: CPT

## 2017-02-09 PROCEDURE — 86965 POOLING BLOOD PLATELETS: CPT

## 2017-02-09 PROCEDURE — 85730 THROMBOPLASTIN TIME PARTIAL: CPT

## 2017-02-09 PROCEDURE — 93287 PERI-PX DEVICE EVAL & PRGR: CPT

## 2017-02-09 PROCEDURE — 27800505 HC CATH, RADIAL ARTERY KIT: Performed by: STUDENT IN AN ORGANIZED HEALTH CARE EDUCATION/TRAINING PROGRAM

## 2017-02-09 RX ORDER — ASPIRIN 325 MG
325 TABLET ORAL DAILY
Status: DISCONTINUED | OUTPATIENT
Start: 2017-02-10 | End: 2017-02-10

## 2017-02-09 RX ORDER — METOCLOPRAMIDE HYDROCHLORIDE 5 MG/ML
5 INJECTION INTRAMUSCULAR; INTRAVENOUS EVERY 6 HOURS PRN
Status: DISCONTINUED | OUTPATIENT
Start: 2017-02-09 | End: 2017-02-22 | Stop reason: HOSPADM

## 2017-02-09 RX ORDER — POTASSIUM CHLORIDE 29.8 MG/ML
40 INJECTION INTRAVENOUS
Status: DISCONTINUED | OUTPATIENT
Start: 2017-02-09 | End: 2017-02-22 | Stop reason: HOSPADM

## 2017-02-09 RX ORDER — NICARDIPINE HYDROCHLORIDE 0.2 MG/ML
5 INJECTION INTRAVENOUS CONTINUOUS
Status: DISCONTINUED | OUTPATIENT
Start: 2017-02-09 | End: 2017-02-13

## 2017-02-09 RX ORDER — ACETAMINOPHEN 10 MG/ML
1000 INJECTION, SOLUTION INTRAVENOUS EVERY 8 HOURS
Status: COMPLETED | OUTPATIENT
Start: 2017-02-09 | End: 2017-02-10

## 2017-02-09 RX ORDER — ALBUMIN HUMAN 50 G/1000ML
12.5 SOLUTION INTRAVENOUS ONCE
Status: COMPLETED | OUTPATIENT
Start: 2017-02-09 | End: 2017-02-09

## 2017-02-09 RX ORDER — POTASSIUM CHLORIDE 14.9 MG/ML
60 INJECTION INTRAVENOUS
Status: DISCONTINUED | OUTPATIENT
Start: 2017-02-09 | End: 2017-02-22 | Stop reason: HOSPADM

## 2017-02-09 RX ORDER — NOREPINEPHRINE BITARTRATE 1 MG/ML
INJECTION, SOLUTION INTRAVENOUS
Status: DISCONTINUED | OUTPATIENT
Start: 2017-02-09 | End: 2017-02-09

## 2017-02-09 RX ORDER — LIDOCAINE HCL/PF 100 MG/5ML
SYRINGE (ML) INTRAVENOUS
Status: DISCONTINUED | OUTPATIENT
Start: 2017-02-09 | End: 2017-02-09

## 2017-02-09 RX ORDER — DOBUTAMINE HYDROCHLORIDE 200 MG/100ML
INJECTION INTRAVENOUS CONTINUOUS PRN
Status: DISCONTINUED | OUTPATIENT
Start: 2017-02-09 | End: 2017-02-09

## 2017-02-09 RX ORDER — DOBUTAMINE HYDROCHLORIDE 200 MG/100ML
5 INJECTION INTRAVENOUS CONTINUOUS
Status: DISCONTINUED | OUTPATIENT
Start: 2017-02-09 | End: 2017-02-14

## 2017-02-09 RX ORDER — HYDROCODONE BITARTRATE AND ACETAMINOPHEN 500; 5 MG/1; MG/1
TABLET ORAL
Status: DISCONTINUED | OUTPATIENT
Start: 2017-02-09 | End: 2017-02-22 | Stop reason: HOSPADM

## 2017-02-09 RX ORDER — FAMOTIDINE 10 MG/ML
20 INJECTION INTRAVENOUS 2 TIMES DAILY
Status: DISCONTINUED | OUTPATIENT
Start: 2017-02-09 | End: 2017-02-13

## 2017-02-09 RX ORDER — FUROSEMIDE 10 MG/ML
40 INJECTION INTRAMUSCULAR; INTRAVENOUS ONCE
Status: COMPLETED | OUTPATIENT
Start: 2017-02-09 | End: 2017-02-09

## 2017-02-09 RX ORDER — INDOMETHACIN 25 MG/1
CAPSULE ORAL
Status: COMPLETED
Start: 2017-02-09 | End: 2017-02-09

## 2017-02-09 RX ORDER — PHENYLEPHRINE HYDROCHLORIDE 10 MG/ML
INJECTION INTRAVENOUS
Status: DISCONTINUED | OUTPATIENT
Start: 2017-02-09 | End: 2017-02-09

## 2017-02-09 RX ORDER — HYDROMORPHONE HYDROCHLORIDE 1 MG/ML
1 INJECTION, SOLUTION INTRAMUSCULAR; INTRAVENOUS; SUBCUTANEOUS EVERY 4 HOURS PRN
Status: DISCONTINUED | OUTPATIENT
Start: 2017-02-09 | End: 2017-02-10

## 2017-02-09 RX ORDER — SODIUM CHLORIDE 450 MG/100ML
10 INJECTION, SOLUTION INTRAVENOUS CONTINUOUS
Status: DISCONTINUED | OUTPATIENT
Start: 2017-02-09 | End: 2017-02-21

## 2017-02-09 RX ORDER — VECURONIUM BROMIDE FOR INJECTION 1 MG/ML
INJECTION, POWDER, LYOPHILIZED, FOR SOLUTION INTRAVENOUS
Status: DISCONTINUED | OUTPATIENT
Start: 2017-02-09 | End: 2017-02-09

## 2017-02-09 RX ORDER — POLYETHYLENE GLYCOL 3350 17 G/17G
17 POWDER, FOR SOLUTION ORAL DAILY
Status: DISCONTINUED | OUTPATIENT
Start: 2017-02-09 | End: 2017-02-22 | Stop reason: HOSPADM

## 2017-02-09 RX ORDER — CEFAZOLIN SODIUM 1 G/3ML
INJECTION, POWDER, FOR SOLUTION INTRAMUSCULAR; INTRAVENOUS
Status: DISCONTINUED | OUTPATIENT
Start: 2017-02-09 | End: 2017-02-09

## 2017-02-09 RX ORDER — INDOMETHACIN 25 MG/1
50 CAPSULE ORAL ONCE
Status: COMPLETED | OUTPATIENT
Start: 2017-02-09 | End: 2017-02-09

## 2017-02-09 RX ORDER — ACETAMINOPHEN 325 MG/1
650 TABLET ORAL EVERY 4 HOURS PRN
Status: DISCONTINUED | OUTPATIENT
Start: 2017-02-09 | End: 2017-02-22 | Stop reason: HOSPADM

## 2017-02-09 RX ORDER — HYDROMORPHONE HYDROCHLORIDE 1 MG/ML
0.5 INJECTION, SOLUTION INTRAMUSCULAR; INTRAVENOUS; SUBCUTANEOUS EVERY 4 HOURS PRN
Status: DISCONTINUED | OUTPATIENT
Start: 2017-02-09 | End: 2017-02-10

## 2017-02-09 RX ORDER — ROCURONIUM BROMIDE 10 MG/ML
INJECTION, SOLUTION INTRAVENOUS
Status: DISCONTINUED | OUTPATIENT
Start: 2017-02-09 | End: 2017-02-09

## 2017-02-09 RX ORDER — ALBUMIN HUMAN 50 G/1000ML
SOLUTION INTRAVENOUS CONTINUOUS PRN
Status: DISCONTINUED | OUTPATIENT
Start: 2017-02-09 | End: 2017-02-09

## 2017-02-09 RX ORDER — MIDAZOLAM HYDROCHLORIDE 1 MG/ML
INJECTION, SOLUTION INTRAMUSCULAR; INTRAVENOUS
Status: DISCONTINUED | OUTPATIENT
Start: 2017-02-09 | End: 2017-02-09

## 2017-02-09 RX ORDER — FUROSEMIDE 10 MG/ML
10 INJECTION INTRAMUSCULAR; INTRAVENOUS ONCE
Status: COMPLETED | OUTPATIENT
Start: 2017-02-09 | End: 2017-02-09

## 2017-02-09 RX ORDER — AMINOCAPROIC ACID 250 MG/ML
INJECTION, SOLUTION INTRAVENOUS
Status: DISCONTINUED | OUTPATIENT
Start: 2017-02-09 | End: 2017-02-09

## 2017-02-09 RX ORDER — PROTAMINE SULFATE 10 MG/ML
INJECTION, SOLUTION INTRAVENOUS
Status: DISCONTINUED | OUTPATIENT
Start: 2017-02-09 | End: 2017-02-09

## 2017-02-09 RX ORDER — MAGNESIUM SULFATE HEPTAHYDRATE 40 MG/ML
2 INJECTION, SOLUTION INTRAVENOUS
Status: DISCONTINUED | OUTPATIENT
Start: 2017-02-09 | End: 2017-02-22 | Stop reason: HOSPADM

## 2017-02-09 RX ORDER — NYSTATIN 100000 [USP'U]/ML
500000 SUSPENSION ORAL
Status: DISCONTINUED | OUTPATIENT
Start: 2017-02-09 | End: 2017-02-22 | Stop reason: HOSPADM

## 2017-02-09 RX ORDER — DOCUSATE SODIUM 100 MG/1
100 CAPSULE, LIQUID FILLED ORAL 2 TIMES DAILY
Status: DISCONTINUED | OUTPATIENT
Start: 2017-02-09 | End: 2017-02-22 | Stop reason: HOSPADM

## 2017-02-09 RX ORDER — ASPIRIN 325 MG
325 TABLET ORAL ONCE
Status: COMPLETED | OUTPATIENT
Start: 2017-02-09 | End: 2017-02-09

## 2017-02-09 RX ORDER — ALBUMIN HUMAN 50 G/1000ML
SOLUTION INTRAVENOUS
Status: COMPLETED
Start: 2017-02-09 | End: 2017-02-09

## 2017-02-09 RX ORDER — ASCORBIC ACID 500 MG
500 TABLET ORAL 2 TIMES DAILY
Status: DISCONTINUED | OUTPATIENT
Start: 2017-02-09 | End: 2017-02-13

## 2017-02-09 RX ORDER — PROPOFOL 10 MG/ML
20 INJECTION, EMULSION INTRAVENOUS CONTINUOUS
Status: DISCONTINUED | OUTPATIENT
Start: 2017-02-09 | End: 2017-02-13

## 2017-02-09 RX ORDER — FENTANYL CITRATE 50 UG/ML
INJECTION, SOLUTION INTRAMUSCULAR; INTRAVENOUS
Status: DISCONTINUED | OUTPATIENT
Start: 2017-02-09 | End: 2017-02-09

## 2017-02-09 RX ORDER — IPRATROPIUM BROMIDE AND ALBUTEROL SULFATE 2.5; .5 MG/3ML; MG/3ML
3 SOLUTION RESPIRATORY (INHALATION) EVERY 4 HOURS
Status: DISCONTINUED | OUTPATIENT
Start: 2017-02-09 | End: 2017-02-22 | Stop reason: HOSPADM

## 2017-02-09 RX ORDER — DEXMEDETOMIDINE HYDROCHLORIDE 4 UG/ML
0.2 INJECTION, SOLUTION INTRAVENOUS CONTINUOUS
Status: DISCONTINUED | OUTPATIENT
Start: 2017-02-09 | End: 2017-02-09

## 2017-02-09 RX ORDER — CALCIUM GLUCONATE 98 MG/ML
INJECTION, SOLUTION INTRAVENOUS
Status: DISCONTINUED | OUTPATIENT
Start: 2017-02-09 | End: 2017-02-09

## 2017-02-09 RX ORDER — BACITRACIN 50000 [IU]/1
INJECTION, POWDER, FOR SOLUTION INTRAMUSCULAR
Status: DISCONTINUED | OUTPATIENT
Start: 2017-02-09 | End: 2017-02-09 | Stop reason: HOSPADM

## 2017-02-09 RX ORDER — MUPIROCIN 20 MG/G
1 OINTMENT TOPICAL 2 TIMES DAILY
Status: COMPLETED | OUTPATIENT
Start: 2017-02-09 | End: 2017-02-12

## 2017-02-09 RX ORDER — EPINEPHRINE 0.1 MG/ML
INJECTION INTRAVENOUS
Status: DISCONTINUED | OUTPATIENT
Start: 2017-02-09 | End: 2017-02-09

## 2017-02-09 RX ORDER — INDOMETHACIN 25 MG/1
25 CAPSULE ORAL ONCE
Status: COMPLETED | OUTPATIENT
Start: 2017-02-09 | End: 2017-02-09

## 2017-02-09 RX ORDER — METHYLPREDNISOLONE SOD SUCC 125 MG
125 VIAL (EA) INJECTION EVERY 8 HOURS
Status: COMPLETED | OUTPATIENT
Start: 2017-02-09 | End: 2017-02-10

## 2017-02-09 RX ORDER — POTASSIUM CHLORIDE 14.9 MG/ML
20 INJECTION INTRAVENOUS
Status: DISCONTINUED | OUTPATIENT
Start: 2017-02-09 | End: 2017-02-22 | Stop reason: HOSPADM

## 2017-02-09 RX ORDER — PROPOFOL 10 MG/ML
VIAL (ML) INTRAVENOUS CONTINUOUS PRN
Status: DISCONTINUED | OUTPATIENT
Start: 2017-02-09 | End: 2017-02-09

## 2017-02-09 RX ORDER — METHYLPREDNISOLONE ACETATE 40 MG/ML
INJECTION, SUSPENSION INTRA-ARTICULAR; INTRALESIONAL; INTRAMUSCULAR; SOFT TISSUE
Status: DISCONTINUED | OUTPATIENT
Start: 2017-02-09 | End: 2017-02-09

## 2017-02-09 RX ORDER — HEPARIN SODIUM 1000 [USP'U]/ML
INJECTION, SOLUTION INTRAVENOUS; SUBCUTANEOUS
Status: DISCONTINUED | OUTPATIENT
Start: 2017-02-09 | End: 2017-02-09

## 2017-02-09 RX ORDER — METHYLPREDNISOLONE SODIUM SUCCINATE 500 MG/8ML
500 INJECTION INTRAMUSCULAR; INTRAVENOUS ONCE
Status: CANCELLED | OUTPATIENT
Start: 2017-02-09 | End: 2017-02-09

## 2017-02-09 RX ORDER — NOREPINEPHRINE BITARTRATE/D5W 4MG/250ML
0.02 PLASTIC BAG, INJECTION (ML) INTRAVENOUS CONTINUOUS
Status: DISCONTINUED | OUTPATIENT
Start: 2017-02-09 | End: 2017-02-13

## 2017-02-09 RX ORDER — ONDANSETRON 2 MG/ML
4 INJECTION INTRAMUSCULAR; INTRAVENOUS EVERY 12 HOURS PRN
Status: DISCONTINUED | OUTPATIENT
Start: 2017-02-09 | End: 2017-02-22 | Stop reason: HOSPADM

## 2017-02-09 RX ORDER — CEFAZOLIN SODIUM 2 G/50ML
2 SOLUTION INTRAVENOUS
Status: COMPLETED | OUTPATIENT
Start: 2017-02-09 | End: 2017-02-11

## 2017-02-09 RX ORDER — IPRATROPIUM BROMIDE AND ALBUTEROL SULFATE 2.5; .5 MG/3ML; MG/3ML
3 SOLUTION RESPIRATORY (INHALATION) EVERY 4 HOURS
Status: DISCONTINUED | OUTPATIENT
Start: 2017-02-09 | End: 2017-02-09 | Stop reason: ALTCHOICE

## 2017-02-09 RX ORDER — ETOMIDATE 2 MG/ML
INJECTION INTRAVENOUS
Status: DISCONTINUED | OUTPATIENT
Start: 2017-02-09 | End: 2017-02-09

## 2017-02-09 RX ADMIN — PROTAMINE SULFATE 50 MG: 10 INJECTION, SOLUTION INTRAVENOUS at 11:02

## 2017-02-09 RX ADMIN — MUPIROCIN 1 G: 20 OINTMENT TOPICAL at 08:02

## 2017-02-09 RX ADMIN — INSULIN HUMAN 10 UNITS: 100 INJECTION, SOLUTION PARENTERAL at 11:02

## 2017-02-09 RX ADMIN — SODIUM CHLORIDE, SODIUM GLUCONATE, SODIUM ACETATE, POTASSIUM CHLORIDE, MAGNESIUM CHLORIDE, SODIUM PHOSPHATE, DIBASIC, AND POTASSIUM PHOSPHATE: .53; .5; .37; .037; .03; .012; .00082 INJECTION, SOLUTION INTRAVENOUS at 04:02

## 2017-02-09 RX ADMIN — NYSTATIN 500000 UNITS: 500000 SUSPENSION ORAL at 08:02

## 2017-02-09 RX ADMIN — EPINEPHRINE 8 MCG: 0.1 INJECTION, SOLUTION ENDOTRACHEAL; INTRACARDIAC; INTRAVENOUS at 06:02

## 2017-02-09 RX ADMIN — SODIUM CHLORIDE 3 UNITS/HR: 9 INJECTION, SOLUTION INTRAVENOUS at 08:02

## 2017-02-09 RX ADMIN — LIDOCAINE HYDROCHLORIDE 100 MG: 20 INJECTION, SOLUTION INTRAVENOUS at 04:02

## 2017-02-09 RX ADMIN — CALCIUM GLUCONATE 0.5 G: 94 INJECTION, SOLUTION INTRAVENOUS at 11:02

## 2017-02-09 RX ADMIN — EPINEPHRINE 24 MCG: 0.1 INJECTION, SOLUTION ENDOTRACHEAL; INTRACARDIAC; INTRAVENOUS at 06:02

## 2017-02-09 RX ADMIN — VASOPRESSIN 2 UNITS: 20 INJECTION, SOLUTION INTRAMUSCULAR; SUBCUTANEOUS at 08:02

## 2017-02-09 RX ADMIN — EPINEPHRINE 16 MCG: 0.1 INJECTION, SOLUTION ENDOTRACHEAL; INTRACARDIAC; INTRAVENOUS at 08:02

## 2017-02-09 RX ADMIN — HEPARIN SODIUM 25000 UNITS: 1000 INJECTION, SOLUTION INTRAVENOUS; SUBCUTANEOUS at 08:02

## 2017-02-09 RX ADMIN — SODIUM CHLORIDE 28 UNITS/HR: 9 INJECTION, SOLUTION INTRAVENOUS at 02:02

## 2017-02-09 RX ADMIN — FENTANYL CITRATE 250 MCG: 50 INJECTION, SOLUTION INTRAMUSCULAR; INTRAVENOUS at 04:02

## 2017-02-09 RX ADMIN — FENTANYL CITRATE 100 MCG: 50 INJECTION, SOLUTION INTRAMUSCULAR; INTRAVENOUS at 11:02

## 2017-02-09 RX ADMIN — COAGULATION FACTOR VIIA (RECOMBINANT) 1 MG: KIT at 12:02

## 2017-02-09 RX ADMIN — MIDAZOLAM HYDROCHLORIDE 2 MG: 1 INJECTION, SOLUTION INTRAMUSCULAR; INTRAVENOUS at 09:02

## 2017-02-09 RX ADMIN — INSULIN HUMAN 5 UNITS: 100 INJECTION, SOLUTION PARENTERAL at 09:02

## 2017-02-09 RX ADMIN — CEFAZOLIN 2 G: 1 INJECTION, POWDER, FOR SOLUTION INTRAVENOUS at 01:02

## 2017-02-09 RX ADMIN — FUROSEMIDE 40 MG: 10 INJECTION, SOLUTION INTRAMUSCULAR; INTRAVENOUS at 10:02

## 2017-02-09 RX ADMIN — METHYLPREDNISOLONE SODIUM SUCCINATE 125 MG: 125 INJECTION, POWDER, FOR SOLUTION INTRAMUSCULAR; INTRAVENOUS at 06:02

## 2017-02-09 RX ADMIN — SODIUM CHLORIDE, SODIUM GLUCONATE, SODIUM ACETATE, POTASSIUM CHLORIDE, MAGNESIUM CHLORIDE, SODIUM PHOSPHATE, DIBASIC, AND POTASSIUM PHOSPHATE: .53; .5; .37; .037; .03; .012; .00082 INJECTION, SOLUTION INTRAVENOUS at 12:02

## 2017-02-09 RX ADMIN — IPRATROPIUM BROMIDE AND ALBUTEROL SULFATE 3 ML: .5; 3 SOLUTION RESPIRATORY (INHALATION) at 03:02

## 2017-02-09 RX ADMIN — POTASSIUM CHLORIDE 40 MEQ: 400 INJECTION, SOLUTION INTRAVENOUS at 02:02

## 2017-02-09 RX ADMIN — POTASSIUM CHLORIDE 20 MEQ: 200 INJECTION, SOLUTION INTRAVENOUS at 07:02

## 2017-02-09 RX ADMIN — AMINOCAPROIC ACID 10 G: 250 INJECTION, SOLUTION INTRAVENOUS at 04:02

## 2017-02-09 RX ADMIN — MIDAZOLAM HYDROCHLORIDE 4 MG: 1 INJECTION, SOLUTION INTRAMUSCULAR; INTRAVENOUS at 05:02

## 2017-02-09 RX ADMIN — VECURONIUM BROMIDE FOR INJECTION 7 MG: 1 INJECTION, POWDER, LYOPHILIZED, FOR SOLUTION INTRAVENOUS at 05:02

## 2017-02-09 RX ADMIN — MIDAZOLAM HYDROCHLORIDE 3 MG: 1 INJECTION, SOLUTION INTRAMUSCULAR; INTRAVENOUS at 09:02

## 2017-02-09 RX ADMIN — NOREPINEPHRINE BITARTRATE 0.02 MG: 1 INJECTION, SOLUTION, CONCENTRATE INTRAVENOUS at 08:02

## 2017-02-09 RX ADMIN — SODIUM BICARBONATE 50 MEQ: 84 INJECTION, SOLUTION INTRAVENOUS at 02:02

## 2017-02-09 RX ADMIN — SODIUM BICARBONATE 25 MEQ: 84 INJECTION, SOLUTION INTRAVENOUS at 06:02

## 2017-02-09 RX ADMIN — VASOPRESSIN 0.04 UNITS/MIN: 20 INJECTION, SOLUTION INTRAMUSCULAR; SUBCUTANEOUS at 07:02

## 2017-02-09 RX ADMIN — PROPOFOL 20 MCG/KG/MIN: 10 INJECTION, EMULSION INTRAVENOUS at 08:02

## 2017-02-09 RX ADMIN — PHENYLEPHRINE HYDROCHLORIDE 100 MCG: 10 INJECTION INTRAVENOUS at 08:02

## 2017-02-09 RX ADMIN — ETOMIDATE 14 MG: 2 INJECTION, SOLUTION INTRAVENOUS at 04:02

## 2017-02-09 RX ADMIN — SODIUM CHLORIDE 91 UNITS/HR: 9 INJECTION, SOLUTION INTRAVENOUS at 09:02

## 2017-02-09 RX ADMIN — NOREPINEPHRINE BITARTRATE 0.32 MG: 1 INJECTION, SOLUTION, CONCENTRATE INTRAVENOUS at 08:02

## 2017-02-09 RX ADMIN — DOBUTAMINE HYDROCHLORIDE 5 MCG/KG/MIN: 200 INJECTION INTRAVENOUS at 11:02

## 2017-02-09 RX ADMIN — ASPIRIN 325 MG ORAL TABLET 325 MG: 325 PILL ORAL at 02:02

## 2017-02-09 RX ADMIN — ALBUMIN (HUMAN) 12.5 G: 12.5 SOLUTION INTRAVENOUS at 05:02

## 2017-02-09 RX ADMIN — SODIUM CHLORIDE 71 UNITS/HR: 9 INJECTION, SOLUTION INTRAVENOUS at 06:02

## 2017-02-09 RX ADMIN — ALBUMIN HUMAN 12.5 G: 50 SOLUTION INTRAVENOUS at 05:02

## 2017-02-09 RX ADMIN — ROCURONIUM BROMIDE 100 MG: 10 INJECTION, SOLUTION INTRAVENOUS at 04:02

## 2017-02-09 RX ADMIN — IPRATROPIUM BROMIDE AND ALBUTEROL SULFATE 3 ML: .5; 3 SOLUTION RESPIRATORY (INHALATION) at 07:02

## 2017-02-09 RX ADMIN — INSULIN HUMAN 10 UNITS: 100 INJECTION, SOLUTION PARENTERAL at 02:02

## 2017-02-09 RX ADMIN — EPINEPHRINE 0.04 MCG/KG/MIN: 1 INJECTION INTRAMUSCULAR; INTRAVENOUS; SUBCUTANEOUS at 05:02

## 2017-02-09 RX ADMIN — AMINOCAPROIC ACID 1 G/HR: 250 INJECTION, SOLUTION INTRAVENOUS at 04:02

## 2017-02-09 RX ADMIN — ACETAMINOPHEN 1000 MG: 10 INJECTION, SOLUTION INTRAVENOUS at 04:02

## 2017-02-09 RX ADMIN — VECURONIUM BROMIDE FOR INJECTION 3 MG: 1 INJECTION, POWDER, LYOPHILIZED, FOR SOLUTION INTRAVENOUS at 05:02

## 2017-02-09 RX ADMIN — SODIUM PHOSPHATE, MONOBASIC, MONOHYDRATE 20.01 MMOL: 276; 142 INJECTION, SOLUTION INTRAVENOUS at 05:02

## 2017-02-09 RX ADMIN — LIDOCAINE HYDROCHLORIDE 40 MG: 20 INJECTION, SOLUTION INTRAVENOUS at 06:02

## 2017-02-09 RX ADMIN — OXYCODONE HYDROCHLORIDE AND ACETAMINOPHEN 500 MG: 500 TABLET ORAL at 08:02

## 2017-02-09 RX ADMIN — FENTANYL CITRATE 250 MCG: 50 INJECTION, SOLUTION INTRAMUSCULAR; INTRAVENOUS at 05:02

## 2017-02-09 RX ADMIN — INSULIN HUMAN 8 UNITS: 100 INJECTION, SOLUTION PARENTERAL at 10:02

## 2017-02-09 RX ADMIN — MIDAZOLAM HYDROCHLORIDE 2 MG: 1 INJECTION, SOLUTION INTRAMUSCULAR; INTRAVENOUS at 04:02

## 2017-02-09 RX ADMIN — SODIUM BICARBONATE 50 MEQ: 84 INJECTION, SOLUTION INTRAVENOUS at 08:02

## 2017-02-09 RX ADMIN — VECURONIUM BROMIDE FOR INJECTION 5 MG: 1 INJECTION, POWDER, LYOPHILIZED, FOR SOLUTION INTRAVENOUS at 09:02

## 2017-02-09 RX ADMIN — SODIUM CHLORIDE 66 UNITS/HR: 9 INJECTION, SOLUTION INTRAVENOUS at 05:02

## 2017-02-09 RX ADMIN — CEFAZOLIN 2 G: 1 INJECTION, POWDER, FOR SOLUTION INTRAVENOUS at 05:02

## 2017-02-09 RX ADMIN — CEFAZOLIN 2 G: 1 INJECTION, POWDER, FOR SOLUTION INTRAVENOUS at 09:02

## 2017-02-09 RX ADMIN — MIDAZOLAM HYDROCHLORIDE 4 MG: 1 INJECTION, SOLUTION INTRAMUSCULAR; INTRAVENOUS at 06:02

## 2017-02-09 RX ADMIN — FAMOTIDINE 20 MG: 10 INJECTION, SOLUTION INTRAVENOUS at 08:02

## 2017-02-09 RX ADMIN — ACETAMINOPHEN 1000 MG: 10 INJECTION, SOLUTION INTRAVENOUS at 11:02

## 2017-02-09 RX ADMIN — SODIUM CHLORIDE 10 ML/HR: 0.45 INJECTION, SOLUTION INTRAVENOUS at 02:02

## 2017-02-09 RX ADMIN — ALBUMIN (HUMAN): 12.5 SOLUTION INTRAVENOUS at 05:02

## 2017-02-09 RX ADMIN — PROPOFOL 25 MG/KG/MIN: 10 INJECTION, EMULSION INTRAVENOUS at 01:02

## 2017-02-09 RX ADMIN — SODIUM CHLORIDE 91 UNITS/HR: 9 INJECTION, SOLUTION INTRAVENOUS at 11:02

## 2017-02-09 RX ADMIN — CEFAZOLIN SODIUM 2 G: 2 SOLUTION INTRAVENOUS at 08:02

## 2017-02-09 RX ADMIN — INSULIN HUMAN 5 UNITS: 100 INJECTION, SOLUTION PARENTERAL at 08:02

## 2017-02-09 RX ADMIN — FUROSEMIDE 10 MG: 10 INJECTION, SOLUTION INTRAMUSCULAR; INTRAVENOUS at 07:02

## 2017-02-09 RX ADMIN — NOREPINEPHRINE BITARTRATE 0.02 MCG/KG/MIN: 1 INJECTION, SOLUTION, CONCENTRATE INTRAVENOUS at 06:02

## 2017-02-09 RX ADMIN — Medication 0.04 MCG/KG/MIN: at 08:02

## 2017-02-09 RX ADMIN — SODIUM CHLORIDE 91 UNITS/HR: 9 INJECTION, SOLUTION INTRAVENOUS at 10:02

## 2017-02-09 RX ADMIN — METHYLPREDNISOLONE ACETATE 500 MG: 40 INJECTION, SUSPENSION INTRA-ARTICULAR; INTRALESIONAL; INTRAMUSCULAR; SOFT TISSUE at 10:02

## 2017-02-09 RX ADMIN — IPRATROPIUM BROMIDE AND ALBUTEROL SULFATE 3 ML: .5; 3 SOLUTION RESPIRATORY (INHALATION) at 11:02

## 2017-02-09 RX ADMIN — METHYLPREDNISOLONE ACETATE 500 MG: 40 INJECTION, SUSPENSION INTRA-ARTICULAR; INTRALESIONAL; INTRAMUSCULAR; SOFT TISSUE at 09:02

## 2017-02-09 RX ADMIN — INDOMETHACIN 25 MEQ: 25 CAPSULE ORAL at 06:02

## 2017-02-09 NOTE — PROGRESS NOTES
Mr. Mick Barriga is a 56 year old male undergoing heart transplant and lvad removal.     He is considered moderate risk for rejection with cPRA of 0%, and a negative crossmatch with some b cell channel shifts. The plan for him will include standard heart transplant immunosuppression with methylprednisolone 1 gram in the operating room followed by methylprednisolone 125 mg IV x 3 doses followed by 1mg /kg BID, tacrolimus and mycophenolate.      There are no pertinent home medications that must be restarted post-transplant.  Opportunistic infection prophylaxis will include nystatin, bactrim and valcyte as tolerated. He is CMV IgG + and will be intermediate risk requiring 3 months of valcyte.     Medication education will begin as soon as the patient has been transferred to the transplant step down unit and the patient's home supply of medications will be ordered for discharge.  Will continue to follow with team.Thank you.

## 2017-02-09 NOTE — PROGRESS NOTES
Pt admitted to SICU 6082 from OR.  Report received from anesthesia team.  Upon arrival to room, pt connected to bedside monitor, ventilator.  Pt arrived with amicar, epinephrine, levophed, vasopressin, dobutamine, propofol, insulin infusing. Dr. Sinclair and Dr. Woodruff at bedside upon pt's arrival, MD made aware of results of ABG,, POCT lactic, all v/s, assessment findings, and current gtt rates.  Orders received to give 1 amp bicarb.  Monitor lactic acid q1h. Also discussed NIBP vs art line pressures; Order received to monitor BP with NIBP pressures. See chart for additional orders received.      KALYAN Nicholson NP notified of BG upon admit >500; BMP also drawn and sent to lab.  Pt arrived with insulin at 14 units/hr.  Order received to give 10 unit bolus, then double gtt rate to 28 units/hr.  Will notify of next BG.

## 2017-02-09 NOTE — ASSESSMENT & PLAN NOTE
bg goal 140-180- Continue intensive insulin gtt protocol,   BG > 500 will Bolus 10 u Reg insulin IV x 1 now,  Increase insulin gtt to 28 u/h  Nurse to call with bg in 1 hour  Continue intensive insulin gtt protocol with q1h bg monitoring    DISCHARGE PLANNING: TBD  previously on MDI- likely re-calculation of insulin needs prior to d/c

## 2017-02-09 NOTE — PROGRESS NOTES
Pt has a diet order of NPO since 1700. He is going for a heart transplant daniel osvaldo. At 1925, pt was seen eating fruits. Educated pt not to eat or drink anything. Pt verbalized understanding.

## 2017-02-09 NOTE — ANESTHESIA PROCEDURE NOTES
[place] [facility]    Diagnosis: NICM  Patient location during procedure: done in OR  Procedure start time: 2/9/2017 4:46 AM  Timeout: 2/9/2017 4:46 AM  Procedure end time: 2/9/2017 4:56 AM  Staffing  Anesthesiologist: JEFFRY MILLS  Resident/CRNA: SOLA HITCHCOCK  Performed by: resident/CRNA   Anesthesiologist was present at the time of the procedure.  Preanesthetic Checklist  Completed: patient identified, site marked, surgical consent, pre-op evaluation, timeout performed, IV checked, risks and benefits discussed, monitors and equipment checked and anesthesia consent given  [place] [facility]  Skin Prep: chlorhexidine gluconate  Local Infiltration: none  Location: right,  internal jugular vein  Vessel Caliber: medium, patent, compressibility normal  Vascular Doppler:  not done  Coaxial introducer size: 9 Fr Double Lumen  manometry used.  Device: CCO/Oximetric Catheter  Catheter Size: 8 Fr  Catheter placement by yes. Heme positive aspiration all ports. PAC floated with balloon up not wedgedSterile sheath usedInsertion Attempts: 1  Indication: intravenous therapy, hemodynamic monitoring, transvenous pacing  Ultrasound Guidance  Needle advanced into vessel with real time Ultrasound guidance.  Guidewire confirmed in vessel.  Sterile sheath used.  Assessment  Central Line Bundle Protocol followed. Hand hygiene before procedure, surgical cap worn, surgical mask worn, sterile surgical gloves worn, large sterile drape used.  Verification: ultrasound and blood return  Dressing: secured with tape and tegaderm and sutured in place and taped  Patient: Tolerated Well

## 2017-02-09 NOTE — CONSULTS
[facility]  Endocrinology  Diabetes Consult Note    Consult Requested by: Heber Woodruff MD   Reason for admit: Presence of automatic implantable cardioverter-defibrillator    HISTORY OF PRESENT ILLNESS:  Reason for Consult: Management of T2DM     Surgical Procedure and Date: 2/9/17 s/p heart txp    Diabetes diagnosis year: 1996    Home Diabetes Medications:  Lantus 16 u qam, Novolog 20 U AC     How often checking glucose at home? STEVEN  BG readings on regimen: STEVEN  Hypoglycemia on the regimen?  STEVEN  Missed doses on regimen?  STEVEN    Diabetes Complications include:  Hyperglycemia, Diabetic chronic kidney disease and Diabetic gastroparesis      Complicating diabetes co morbidities: CHF and CKD    HPI: Patient is a 56 y.o. male with T2DM, ischemic cardiomyopathy, s/p LVAD placement in 8/2016. He is not s/p heart transplantation. Endocrine consulted for bg management.           Interval HPI:   Intubated and sedated from OR, BG in 400s in OR< > 500 on arrival to ICU, K 3.3 on ISTAT, no hypoglycemia, vasopressors infusing for hemodynamic support  Temp 100.9    PMH, PSH, FH, SH updated and reviewed     ROS:  Unable to obtain due to: Sedation,Intubation,Altered mental status,Critical illness,Reviewed ROS from note dated preogress note 2/7/17    Current Medications and/or Treatments Impacting Glycemic Control  Immunotherapy:    Immunosuppressants     None        Steroids:   Hormones     Start     Stop Route Frequency Ordered    02/09/17 1515  vasopressin (PITRESSIN) 100 Units in dextrose 5 % 100 mL infusion      -- IV Continuous 02/09/17 1417        Pressors:    Autonomic Drugs     Start     Stop Route Frequency Ordered    02/09/17 1515  epinephrine 4 mg in sodium chloride 0.9% 250 mL infusion     Question Answer Comment   Titrate by: (in mcg/kg/min) 0.05    Titrate interval: (in minutes) 5    Titrate to maintain: (SBP or MAP or Cardiac Index) CARDIAC INDEX    Cardiac index greater than: (in L/min) 2.2   "  Maximum dose: (in mcg/kg/min) 2        -- IV Continuous 02/09/17 1417    02/09/17 1515  norepinephrine 4 mg in dextrose 5% 250 mL infusion (premix) (titrating)     Question Answer Comment   Titrate by: (in mcg/kg/min) 0.05    Titrate interval: (in minutes) 5    Titrate to maintain: (MAP or SBP) MAP    Greater than: (in mmHg) 60    Maximum dose: (in mcg/kg/min) 3        -- IV Continuous 02/09/17 1417        Hyperglycemia/Diabetes Medications:   Antihyperglycemics     Start     Stop Route Frequency Ordered    02/09/17 1530  insulin regular (Humulin R) 100 Units in sodium chloride 0.9% 100 mL infusion      -- IV Continuous 02/09/17 1422    02/09/17 1410  insulin regular injection 10 Units      -- IV Once 02/09/17 1423          PHYSICAL EXAMINATION:  Visit Vitals    BP (!) 104/52    Pulse (!) 114    Temp (!) 100.9 °F (38.3 °C) (Core)    Resp 18    Ht 5' 8" (1.727 m)    Wt 90.7 kg (199 lb 15.3 oz)    SpO2 100%    BMI 30.4 kg/m2     Constitutional:  Well developed, well nourished, NAD.  ENT: External ears no masses with nose patent; normal hearing.   Neck:  Supple; trachea midline.   Cardiovascular: Normal heart sounds, no LE edema.     Lungs:  Normal effort; lungs anterior bilaterally clear to auscultation.  Abdomen:  Soft, no masses,  no hernias.  MS: No clubbing or cyanosis of nails noted; unable to assess gait.  Skin: Mid-sternal incision dressing DI, No rashes   Psychiatric: STEVEN  Neurological: STEVEN      Labs Reviewed and Include     Recent Labs  Lab 02/08/17  1638   *   CALCIUM 8.7   ALBUMIN 3.0*   PROT 6.6   *   K 4.7   CO2 27   CL 98   BUN 14   CREATININE 0.9   ALKPHOS 59   ALT 14   AST 17   BILITOT 0.5     Lab Results   Component Value Date    WBC 10.59 02/09/2017    HGB 6.8 (L) 02/09/2017    HCT 21.3 (L) 02/09/2017    MCV 68 (L) 02/09/2017     02/09/2017     No results for input(s): TSH, FREET4 in the last 168 hours.  Lab Results   Component Value Date    HGBA1C 7.8 (H) 12/28/2016 "       Nutritional status:   Body mass index is 30.4 kg/(m^2).  Lab Results   Component Value Date    ALBUMIN 3.0 (L) 02/08/2017    ALBUMIN 2.8 (L) 02/08/2017    ALBUMIN 3.1 (L) 02/06/2017     Lab Results   Component Value Date    PREALBUMIN 19 (L) 02/08/2017    PREALBUMIN 14 (L) 02/06/2017    PREALBUMIN 14 (L) 02/03/2017       Estimated Creatinine Clearance: 102.6 mL/min (based on Cr of 0.9).    Accu-Checks  Recent Labs      02/06/17   2113  02/07/17   0858  02/07/17   0859  02/07/17   1429  02/07/17   1840  02/07/17   2109  02/08/17   0822  02/08/17   1909  02/08/17   2119   POCTGLUCOSE  318*  370*  349*  197*  248*  256*  108  242*  307*        ASSESSMENT and PLAN    Type 2 diabetes mellitus with stage 3 chronic kidney disease, with long-term current use of insulin  bg goal 140-180- Continue intensive insulin gtt protocol,   BG > 500 will Bolus 10 u Reg insulin IV x 1 now,  Increase insulin gtt to 28 u/h  Nurse to call with bg in 1 hour  Continue intensive insulin gtt protocol with q1h bg monitoring    DISCHARGE PLANNING: TBD  previously on MDI- likely re-calculation of insulin needs prior to d/c      Ischemic cardiomyopathy  S/p heart txp        Non morbid obesity due to excess calories  Increases insulin resistance   Body mass index is 30.4 kg/(m^2).        Heart transplant, orthotopic, status  Per CTS, HTS      Adrenal cortical steroids causing adverse effect in therapeutic use  Increasing insulin needs      Immunosuppression  May increase insulin resistance       Plan discussed with  RN at bedside.     Peggy Nicholson, DNP, NP  Endocrinology  [facility]

## 2017-02-09 NOTE — BRIEF OP NOTE
[facility]  Cardiothoracic Surgery  Operative Note    SUMMARY     Date of Procedure: 2/9/2017     Procedure: Procedure(s) (LRB):  TRANSPLANT-HEART (N/A)  REMOVAL-PACEMAKER (Left)  Removal-LEFT VENTRICULAR ASSIST DEVICE (N/A)    Surgeon(s) and Role:     * Heber Woodruff MD - Primary     * Sukhdev Sinclair MD - Fellow    Assisting Surgeon: None    Pre-Operative Diagnosis: LVAD (left ventricular assist device) present [Z95.811]    Post-Operative Diagnosis: Post-Op Diagnosis Codes:     * LVAD (left ventricular assist device) present [Z95.811]    Anesthesia: General    Technical Procedures Used:   Bicaval orthotopic heart transplant  Removal of left ventricular assist device   Removal of pacemaker    Description of the Findings of the Procedure: excellent graft function    Significant Surgical Tasks Conducted by the Assistant(s), if Applicable:     Complications: No    Estimated Blood Loss (EBL): 100 ml           Implants: * No implants in log *    Specimens:   Specimen (12h ago through future)    Start     Ordered    02/09/17 1044  Specimen to Pathology - Surgery  Once     Comments:  1. Heart, LVAD pump, driveline, and pacemaker (GROSS)    02/09/17 1052                  Condition: Good    Disposition: ICU - intubated and critically ill.    Attestation: Dr Woodruff was present and participated in all key and critical portions of the operation.    Sukhdev Sinclair D.O.   Fellow in Cardiothoracic Surgery  [facility]  Pg [phone]  2/9/2017 2:37 PM

## 2017-02-09 NOTE — PROGRESS NOTES
"At 2110, pt was seen again eating chips, drink and other snacks at the garden area. He was there with his wife and family member. Educated pt again not to eat or drink. Pt states "I am not eating. I am just looking." But we saw him eating from the nursing station. Dr. Gonzalez notified. He said he will come and talk with the patient. Pt needs reinforcement.   "

## 2017-02-09 NOTE — PROGRESS NOTES
HTS Night Float note:    Patient pending OR at 4AM for heart transplant per nursing, requested to turn tachy therapies off prior to surgery.     Tachycardiac detection/therapies turned OFF. No other settings changed on Biotronik ICD.     Tiago Gonzalez DO  Cardiology Fellow  Pager [phone]

## 2017-02-09 NOTE — TRANSFER OF CARE
"Anesthesia Transfer of Care Note    Patient: Mick Barriga    Procedure(s) Performed: Procedure(s) (LRB):  TRANSPLANT-HEART (N/A)  REMOVAL-PACEMAKER (Left)  Removal-LEFT VENTRICULAR ASSIST DEVICE (N/A)    Patient location: ICU    Anesthesia Type: general    Transport from OR: Transported from OR intubated on 100% O2 by AMBU with assisted ventilation    Post pain: adequate analgesia    Post assessment: no apparent anesthetic complications and tolerated procedure well    Post vital signs: stable    Level of consciousness: sedated    Nausea/Vomiting: no nausea/vomiting    Complications: none          Last vitals:   Visit Vitals    BP (!) 107/51    Pulse (!) 113    Temp 36.4 °C (97.5 °F) (Oral)    Resp 17    Ht 5' 8" (1.727 m)    Wt 90.7 kg (199 lb 15.3 oz)    SpO2 100%    BMI 30.4 kg/m2     "

## 2017-02-09 NOTE — TELEPHONE ENCOUNTER
Met with pt about 3:45 pm Yesterday afternoon and reviewed with him and his wife High Risk donor and pt stated that he understood and that Dr. Romero came and spoke with him about this also.  I spoke with Chiki his nurse regarding adding lab work for High risk donor and and HLA testing, I spoke with Chiki around 4:45 PM. Chiki stated that lab had been there and yue his labs.     I called at 12:15 am and spoke with pt's nurse, He explained that the surgical consent was not on chart but he did have luis's consent.  He also stated that the pt keeps drinking and and eating   And he has called the HTS fellow on call and he will address the pt eating and drinking when he is to be NPO.    I did speak with the transplant Resident on call at the ext [phone] at 12:30 AM, She understood that the pt had been consented already and agreed that it was High Risk donor.    Checking for the labs drawn this morning, High risk donor labs not drawn with other labs on the floor, OR is trying to obtain prior to the transplant.

## 2017-02-09 NOTE — ANESTHESIA PROCEDURE NOTES
Arterial    Diagnosis: NICM    Patient location during procedure: done in OR  Procedure start time: 2/9/2017 4:13 AM  Timeout: 2/9/2017 4:13 AM  Procedure end time: 2/9/2017 4:13 AM  Staffing  Anesthesiologist: JEFFRY MILLS  Resident/CRNA: SOLA HITCHCOCK  Performed by: resident/CRNA   Anesthesiologist was present at the time of the procedure.  Preanesthetic Checklist  Completed: patient identified, site marked, surgical consent, pre-op evaluation, timeout performed, IV checked, risks and benefits discussed, monitors and equipment checked and anesthesia consent given  Arterial Line  Skin Prep: chlorhexidine gluconate  Local Infiltration: lidocaine  Orientation: left  Location: radial  Catheter Size: 20 G{[facility] ANESTHESIA BLOCK ART PLACEMENTInsertion Attempts: 1  Assessment  Dressing: secured with tape and tegaderm  Patient: Tolerated well

## 2017-02-09 NOTE — PROGRESS NOTES
KALYAN Diez notified of POCT ; also notified of result of BMP glucose 478 (drawn upon admit).  NP stated to follow algorithm and double insulin rate to 56 units/hr and continue q1h monitoring following algorithm.

## 2017-02-09 NOTE — SUBJECTIVE & OBJECTIVE
"Interval HPI:   Intubated and sedated from OR, BG in 400s in OR< > 500 on arrival to ICU, K 3.3 on ISTAT, no hypoglycemia, vasopressors infusing for hemodynamic support  Temp 100.9    PMH, PSH, FH, SH updated and reviewed     ROS:  Unable to obtain due to: Sedation,Intubation,Altered mental status,Critical illness,Reviewed ROS from note dated preogress note 2/7/17    Current Medications and/or Treatments Impacting Glycemic Control  Immunotherapy:    Immunosuppressants     None        Steroids:   Hormones     Start     Stop Route Frequency Ordered    02/09/17 1515  vasopressin (PITRESSIN) 100 Units in dextrose 5 % 100 mL infusion      -- IV Continuous 02/09/17 1417        Pressors:    Autonomic Drugs     Start     Stop Route Frequency Ordered    02/09/17 1515  epinephrine 4 mg in sodium chloride 0.9% 250 mL infusion     Question Answer Comment   Titrate by: (in mcg/kg/min) 0.05    Titrate interval: (in minutes) 5    Titrate to maintain: (SBP or MAP or Cardiac Index) CARDIAC INDEX    Cardiac index greater than: (in L/min) 2.2    Maximum dose: (in mcg/kg/min) 2        -- IV Continuous 02/09/17 1417    02/09/17 1515  norepinephrine 4 mg in dextrose 5% 250 mL infusion (premix) (titrating)     Question Answer Comment   Titrate by: (in mcg/kg/min) 0.05    Titrate interval: (in minutes) 5    Titrate to maintain: (MAP or SBP) MAP    Greater than: (in mmHg) 60    Maximum dose: (in mcg/kg/min) 3        -- IV Continuous 02/09/17 1417        Hyperglycemia/Diabetes Medications:   Antihyperglycemics     Start     Stop Route Frequency Ordered    02/09/17 1530  insulin regular (Humulin R) 100 Units in sodium chloride 0.9% 100 mL infusion      -- IV Continuous 02/09/17 1422    02/09/17 1410  insulin regular injection 10 Units      -- IV Once 02/09/17 1423          PHYSICAL EXAMINATION:  Visit Vitals    BP (!) 104/52    Pulse (!) 114    Temp (!) 100.9 °F (38.3 °C) (Core)    Resp 18    Ht 5' 8" (1.727 m)    Wt 90.7 kg (199 lb " 15.3 oz)    SpO2 100%    BMI 30.4 kg/m2     Constitutional:  Well developed, well nourished, NAD.  ENT: External ears no masses with nose patent; normal hearing.   Neck:  Supple; trachea midline.   Cardiovascular: Normal heart sounds, no LE edema.     Lungs:  Normal effort; lungs anterior bilaterally clear to auscultation.  Abdomen:  Soft, no masses,  no hernias.  MS: No clubbing or cyanosis of nails noted; unable to assess gait.  Skin: Mid-sternal incision dressing DI, No rashes   Psychiatric: STEVEN  Neurological: STEVEN

## 2017-02-09 NOTE — OP NOTE
Pre op: S/P LVAd  Post Op: Same  Surgery: OHT      LVAD Explantation      AICD Removal      Endocardial Leads removal  Anesthesia: GETA  Surgeon: Heber Sinclair  Condition: Stable  Disposition: ICU  EBL: 350 cc

## 2017-02-09 NOTE — PLAN OF CARE
Problem: Patient Care Overview  Goal: Plan of Care Review  Outcome: Ongoing (interventions implemented as appropriate)  Pt denies Chest pain, SOB or nausea. No falls, trauma or injury noted. VSS. NPO (needs frequent reinforcement). LVAD pt; HM II; soap and water daily by family. VAD number WNL. Plan is to have heart transplant this am. Plan of care reviewed with patient. Family at bedside. No further questions at this time. No significant events. Will continue to monitor.

## 2017-02-10 LAB
ALBUMIN SERPL BCP-MCNC: 2.7 G/DL
ALBUMIN SERPL BCP-MCNC: 2.9 G/DL
ALBUMIN SERPL BCP-MCNC: 3.1 G/DL
ALLENS TEST: ABNORMAL
ALP SERPL-CCNC: 52 U/L
ALP SERPL-CCNC: 57 U/L
ALP SERPL-CCNC: 67 U/L
ALT SERPL W/O P-5'-P-CCNC: 104 U/L
ALT SERPL W/O P-5'-P-CCNC: 57 U/L
ALT SERPL W/O P-5'-P-CCNC: 65 U/L
ANION GAP SERPL CALC-SCNC: 16 MMOL/L
ANION GAP SERPL CALC-SCNC: 9 MMOL/L
ANION GAP SERPL CALC-SCNC: 9 MMOL/L
ANISOCYTOSIS BLD QL SMEAR: SLIGHT
APTT BLDCRRT: 28.5 SEC
AST SERPL-CCNC: 139 U/L
AST SERPL-CCNC: 139 U/L
AST SERPL-CCNC: 178 U/L
BASO STIPL BLD QL SMEAR: ABNORMAL
BASO STIPL BLD QL SMEAR: ABNORMAL
BASOPHILS # BLD AUTO: 0 K/UL
BASOPHILS # BLD AUTO: 0.01 K/UL
BASOPHILS # BLD AUTO: 0.01 K/UL
BASOPHILS NFR BLD: 0 %
BASOPHILS NFR BLD: 0 %
BASOPHILS NFR BLD: 0.1 %
BASOPHILS NFR BLD: 0.1 %
BILIRUB SERPL-MCNC: 0.4 MG/DL
BILIRUB SERPL-MCNC: 0.4 MG/DL
BILIRUB SERPL-MCNC: 0.5 MG/DL
BLD PROD TYP BPU: NORMAL
BLOOD UNIT EXPIRATION DATE: NORMAL
BLOOD UNIT TYPE CODE: 6200
BLOOD UNIT TYPE: NORMAL
BUN SERPL-MCNC: 23 MG/DL
BUN SERPL-MCNC: 23 MG/DL
BUN SERPL-MCNC: 26 MG/DL
BURR CELLS BLD QL SMEAR: ABNORMAL
CALCIUM SERPL-MCNC: 8.4 MG/DL
CALCIUM SERPL-MCNC: 8.7 MG/DL
CALCIUM SERPL-MCNC: 8.8 MG/DL
CHLORIDE SERPL-SCNC: 101 MMOL/L
CHLORIDE SERPL-SCNC: 105 MMOL/L
CHLORIDE SERPL-SCNC: 105 MMOL/L
CO2 SERPL-SCNC: 20 MMOL/L
CO2 SERPL-SCNC: 26 MMOL/L
CO2 SERPL-SCNC: 27 MMOL/L
CODING SYSTEM: NORMAL
CREAT SERPL-MCNC: 1.1 MG/DL
CREAT SERPL-MCNC: 1.3 MG/DL
CREAT SERPL-MCNC: 1.7 MG/DL
DACRYOCYTES BLD QL SMEAR: ABNORMAL
DELSYS: ABNORMAL
DIFFERENTIAL METHOD: ABNORMAL
DISPENSE STATUS: NORMAL
EOSINOPHIL # BLD AUTO: 0 K/UL
EOSINOPHIL NFR BLD: 0 %
ERYTHROCYTE [DISTWIDTH] IN BLOOD BY AUTOMATED COUNT: 26.9 %
ERYTHROCYTE [DISTWIDTH] IN BLOOD BY AUTOMATED COUNT: 26.9 %
ERYTHROCYTE [DISTWIDTH] IN BLOOD BY AUTOMATED COUNT: 27 %
ERYTHROCYTE [DISTWIDTH] IN BLOOD BY AUTOMATED COUNT: 27.1 %
ERYTHROCYTE [SEDIMENTATION RATE] IN BLOOD BY WESTERGREN METHOD: 14 MM/H
ERYTHROCYTE [SEDIMENTATION RATE] IN BLOOD BY WESTERGREN METHOD: 16 MM/H
EST. GFR  (AFRICAN AMERICAN): 51.7 ML/MIN/1.73 M^2
EST. GFR  (AFRICAN AMERICAN): >60 ML/MIN/1.73 M^2
EST. GFR  (AFRICAN AMERICAN): >60 ML/MIN/1.73 M^2
EST. GFR  (NON AFRICAN AMERICAN): 44.7 ML/MIN/1.73 M^2
EST. GFR  (NON AFRICAN AMERICAN): >60 ML/MIN/1.73 M^2
EST. GFR  (NON AFRICAN AMERICAN): >60 ML/MIN/1.73 M^2
ESTIMATED AVG GLUCOSE: 148 MG/DL
FIBRINOGEN PPP-MCNC: 449 MG/DL
FIBRINOGEN PPP-MCNC: 460 MG/DL
FIBRINOGEN PPP-MCNC: 469 MG/DL
FIO2: 50
GIANT PLATELETS BLD QL SMEAR: PRESENT
GLUCOSE SERPL-MCNC: 157 MG/DL
GLUCOSE SERPL-MCNC: 213 MG/DL
GLUCOSE SERPL-MCNC: 227 MG/DL (ref 70–110)
GLUCOSE SERPL-MCNC: 328 MG/DL
GLUCOSE SERPL-MCNC: 412 MG/DL (ref 70–110)
GLUCOSE SERPL-MCNC: 421 MG/DL (ref 70–110)
GLUCOSE SERPL-MCNC: 447 MG/DL (ref 70–110)
HBA1C MFR BLD HPLC: 6.8 %
HCO3 UR-SCNC: 20.4 MMOL/L (ref 24–28)
HCO3 UR-SCNC: 20.5 MMOL/L (ref 24–28)
HCO3 UR-SCNC: 21.2 MMOL/L (ref 24–28)
HCO3 UR-SCNC: 21.3 MMOL/L (ref 24–28)
HCO3 UR-SCNC: 23.1 MMOL/L (ref 24–28)
HCO3 UR-SCNC: 23.8 MMOL/L (ref 24–28)
HCO3 UR-SCNC: 26.6 MMOL/L (ref 24–28)
HCO3 UR-SCNC: 27.2 MMOL/L (ref 24–28)
HCO3 UR-SCNC: 27.3 MMOL/L (ref 24–28)
HCO3 UR-SCNC: 28 MMOL/L (ref 24–28)
HCO3 UR-SCNC: 31.7 MMOL/L (ref 24–28)
HCT VFR BLD AUTO: 26.1 %
HCT VFR BLD AUTO: 26.3 %
HCT VFR BLD AUTO: 26.8 %
HCT VFR BLD AUTO: 27.1 %
HCT VFR BLD CALC: 18 %PCV (ref 36–54)
HCT VFR BLD CALC: 20 %PCV (ref 36–54)
HCT VFR BLD CALC: 24 %PCV (ref 36–54)
HCT VFR BLD CALC: 24 %PCV (ref 36–54)
HEPATITIS B VIRAL DNA - QUANTITATIVE: <10 IU/ML
HEPATITIS B VIRUS DNA: NOT DETECTED
HGB BLD-MCNC: 8.9 G/DL
HGB BLD-MCNC: 8.9 G/DL
HGB BLD-MCNC: 9.1 G/DL
HGB BLD-MCNC: 9.1 G/DL
HIV UQ DATE RECEIVED: NORMAL
HIV UQ DATE REPORTED: NORMAL
HIV1 RNA # SERPL NAA+PROBE: <40 COPIES/ML
HIV1 RNA SERPL NAA+PROBE-LOG#: <1.6 LOG (10) COPIES/ML
HIV1 RNA SERPL QL NAA+PROBE: NOT DETECTED
HYPOCHROMIA BLD QL SMEAR: ABNORMAL
INR PPP: 2
LDH SERPL L TO P-CCNC: 1.98 MMOL/L (ref 0.36–1.25)
LDH SERPL L TO P-CCNC: 2.31 MMOL/L (ref 0.36–1.25)
LDH SERPL L TO P-CCNC: 2.35 MMOL/L (ref 0.36–1.25)
LDH SERPL L TO P-CCNC: 2.55 MMOL/L (ref 0.36–1.25)
LDH SERPL L TO P-CCNC: 4.07 MMOL/L (ref 0.36–1.25)
LDH SERPL L TO P-CCNC: 4.94 MMOL/L (ref 0.36–1.25)
LDH SERPL L TO P-CCNC: 7.3 MMOL/L (ref 0.36–1.25)
LOG HBV IU/ML: <1 LOG (10) IU/ML
LYMPHOCYTES # BLD AUTO: 0.8 K/UL
LYMPHOCYTES # BLD AUTO: 1 K/UL
LYMPHOCYTES # BLD AUTO: 1 K/UL
LYMPHOCYTES NFR BLD: 3.5 %
LYMPHOCYTES NFR BLD: 4.8 %
LYMPHOCYTES NFR BLD: 6.1 %
LYMPHOCYTES NFR BLD: 9 %
MAGNESIUM SERPL-MCNC: 1.9 MG/DL
MAGNESIUM SERPL-MCNC: 2.1 MG/DL
MAGNESIUM SERPL-MCNC: 2.1 MG/DL
MAGNESIUM SERPL-MCNC: 2.2 MG/DL
MCH RBC QN AUTO: 23.5 PG
MCH RBC QN AUTO: 23.5 PG
MCH RBC QN AUTO: 23.6 PG
MCH RBC QN AUTO: 23.8 PG
MCHC RBC AUTO-ENTMCNC: 33.6 %
MCHC RBC AUTO-ENTMCNC: 33.8 %
MCHC RBC AUTO-ENTMCNC: 34 %
MCHC RBC AUTO-ENTMCNC: 34.1 %
MCV RBC AUTO: 69 FL
MCV RBC AUTO: 69 FL
MCV RBC AUTO: 70 FL
MCV RBC AUTO: 70 FL
METHEMOGLOBIN: 2 % (ref 0–3)
MIN VOL: 10
MODE: ABNORMAL
MONOCYTES # BLD AUTO: 1 K/UL
MONOCYTES # BLD AUTO: 1.1 K/UL
MONOCYTES # BLD AUTO: 1.3 K/UL
MONOCYTES NFR BLD: 1 %
MONOCYTES NFR BLD: 4.1 %
MONOCYTES NFR BLD: 5.3 %
MONOCYTES NFR BLD: 7.8 %
NEUTROPHILS # BLD AUTO: 13.8 K/UL
NEUTROPHILS # BLD AUTO: 17.8 K/UL
NEUTROPHILS # BLD AUTO: 21.5 K/UL
NEUTROPHILS NFR BLD: 86 %
NEUTROPHILS NFR BLD: 87 %
NEUTROPHILS NFR BLD: 89.8 %
NEUTROPHILS NFR BLD: 92.4 %
NEUTS BAND NFR BLD MANUAL: 3 %
NUM UNITS TRANS FFP: NORMAL
OVALOCYTES BLD QL SMEAR: ABNORMAL
PCO2 BLDA: 32.1 MMHG (ref 35–45)
PCO2 BLDA: 33.8 MMHG (ref 35–45)
PCO2 BLDA: 33.8 MMHG (ref 35–45)
PCO2 BLDA: 37.5 MMHG (ref 35–45)
PCO2 BLDA: 38.1 MMHG (ref 35–45)
PCO2 BLDA: 40.8 MMHG (ref 35–45)
PCO2 BLDA: 40.9 MMHG (ref 35–45)
PCO2 BLDA: 42.5 MMHG (ref 35–45)
PCO2 BLDA: 43.9 MMHG (ref 35–45)
PCO2 BLDA: 44 MMHG (ref 35–45)
PCO2 BLDA: 45 MMHG (ref 35–45)
PEEP: 5
PH SMN: 7.27 [PH] (ref 7.35–7.45)
PH SMN: 7.31 [PH] (ref 7.35–7.45)
PH SMN: 7.33 [PH] (ref 7.35–7.45)
PH SMN: 7.39 [PH] (ref 7.35–7.45)
PH SMN: 7.39 [PH] (ref 7.35–7.45)
PH SMN: 7.41 [PH] (ref 7.35–7.45)
PH SMN: 7.43 [PH] (ref 7.35–7.45)
PH SMN: 7.46 [PH] (ref 7.35–7.45)
PH SMN: 7.46 [PH] (ref 7.35–7.45)
PH SMN: 7.47 [PH] (ref 7.35–7.45)
PH SMN: 7.53 [PH] (ref 7.35–7.45)
PHOSPHATE SERPL-MCNC: 4.3 MG/DL
PHOSPHATE SERPL-MCNC: 4.8 MG/DL
PHOSPHATE SERPL-MCNC: 5.9 MG/DL
PHOSPHATE SERPL-MCNC: 6.3 MG/DL
PIP: 23
PLATELET # BLD AUTO: 193 K/UL
PLATELET # BLD AUTO: 194 K/UL
PLATELET # BLD AUTO: 217 K/UL
PLATELET # BLD AUTO: 238 K/UL
PLATELET BLD QL SMEAR: ABNORMAL
PLATELET BLD QL SMEAR: ABNORMAL
PMV BLD AUTO: ABNORMAL FL
PO2 BLDA: 133 MMHG (ref 80–100)
PO2 BLDA: 135 MMHG (ref 80–100)
PO2 BLDA: 156 MMHG (ref 80–100)
PO2 BLDA: 164 MMHG (ref 80–100)
PO2 BLDA: 193 MMHG (ref 80–100)
PO2 BLDA: 27 MMHG (ref 40–60)
PO2 BLDA: 28 MMHG (ref 40–60)
PO2 BLDA: 320 MMHG (ref 80–100)
PO2 BLDA: 36 MMHG (ref 40–60)
PO2 BLDA: 375 MMHG (ref 80–100)
PO2 BLDA: 394 MMHG (ref 80–100)
POC ACTIVATED CLOTTING TIME K: 183 SEC (ref 74–137)
POC BE: -1 MMOL/L
POC BE: -4 MMOL/L
POC BE: -5 MMOL/L
POC BE: -5 MMOL/L
POC BE: -7 MMOL/L
POC BE: 0 MMOL/L
POC BE: 2 MMOL/L
POC BE: 3 MMOL/L
POC BE: 3 MMOL/L
POC BE: 4 MMOL/L
POC BE: 9 MMOL/L
POC IONIZED CALCIUM: 0.89 MMOL/L (ref 1.06–1.42)
POC IONIZED CALCIUM: 1.01 MMOL/L (ref 1.06–1.42)
POC IONIZED CALCIUM: 1.12 MMOL/L (ref 1.06–1.42)
POC IONIZED CALCIUM: 1.18 MMOL/L (ref 1.06–1.42)
POC SATURATED O2: 100 % (ref 95–100)
POC SATURATED O2: 48 % (ref 95–100)
POC SATURATED O2: 54 % (ref 95–100)
POC SATURATED O2: 61 % (ref 95–100)
POC SATURATED O2: 99 % (ref 95–100)
POC TCO2: 22 MMOL/L (ref 23–27)
POC TCO2: 22 MMOL/L (ref 23–27)
POC TCO2: 22 MMOL/L (ref 24–29)
POC TCO2: 23 MMOL/L (ref 23–27)
POC TCO2: 24 MMOL/L (ref 23–27)
POC TCO2: 25 MMOL/L (ref 23–27)
POC TCO2: 28 MMOL/L (ref 23–27)
POC TCO2: 28 MMOL/L (ref 24–29)
POC TCO2: 28 MMOL/L (ref 24–29)
POC TCO2: 29 MMOL/L (ref 23–27)
POC TCO2: 33 MMOL/L (ref 23–27)
POCT GLUCOSE: 120 MG/DL (ref 70–110)
POCT GLUCOSE: 129 MG/DL (ref 70–110)
POCT GLUCOSE: 129 MG/DL (ref 70–110)
POCT GLUCOSE: 140 MG/DL (ref 70–110)
POCT GLUCOSE: 161 MG/DL (ref 70–110)
POCT GLUCOSE: 161 MG/DL (ref 70–110)
POCT GLUCOSE: 196 MG/DL (ref 70–110)
POCT GLUCOSE: 206 MG/DL (ref 70–110)
POCT GLUCOSE: 214 MG/DL (ref 70–110)
POCT GLUCOSE: 214 MG/DL (ref 70–110)
POCT GLUCOSE: 216 MG/DL (ref 70–110)
POCT GLUCOSE: 222 MG/DL (ref 70–110)
POCT GLUCOSE: 227 MG/DL (ref 70–110)
POCT GLUCOSE: 228 MG/DL (ref 70–110)
POCT GLUCOSE: 233 MG/DL (ref 70–110)
POCT GLUCOSE: 239 MG/DL (ref 70–110)
POCT GLUCOSE: 265 MG/DL (ref 70–110)
POCT GLUCOSE: 293 MG/DL (ref 70–110)
POCT GLUCOSE: 309 MG/DL (ref 70–110)
POCT GLUCOSE: 328 MG/DL (ref 70–110)
POCT GLUCOSE: 336 MG/DL (ref 70–110)
POCT GLUCOSE: 349 MG/DL (ref 70–110)
POCT GLUCOSE: 378 MG/DL (ref 70–110)
POCT GLUCOSE: 383 MG/DL (ref 70–110)
POCT GLUCOSE: 390 MG/DL (ref 70–110)
POCT GLUCOSE: 410 MG/DL (ref 70–110)
POCT GLUCOSE: >500 MG/DL (ref 70–110)
POIKILOCYTOSIS BLD QL SMEAR: SLIGHT
POLYCHROMASIA BLD QL SMEAR: ABNORMAL
POTASSIUM BLD-SCNC: 3.5 MMOL/L (ref 3.5–5.1)
POTASSIUM BLD-SCNC: 3.8 MMOL/L (ref 3.5–5.1)
POTASSIUM BLD-SCNC: 4.1 MMOL/L (ref 3.5–5.1)
POTASSIUM BLD-SCNC: 4.3 MMOL/L (ref 3.5–5.1)
POTASSIUM SERPL-SCNC: 3.3 MMOL/L
POTASSIUM SERPL-SCNC: 3.9 MMOL/L
POTASSIUM SERPL-SCNC: 4.4 MMOL/L
POTASSIUM SERPL-SCNC: 4.4 MMOL/L
POTASSIUM SERPL-SCNC: 4.8 MMOL/L
PROT SERPL-MCNC: 5.3 G/DL
PROT SERPL-MCNC: 5.5 G/DL
PROT SERPL-MCNC: 5.8 G/DL
PROTHROMBIN TIME: 19.7 SEC
PROVIDER CREDENTIALS: ABNORMAL
PROVIDER NOTIFIED: ABNORMAL
PS: 10
RBC # BLD AUTO: 3.74 M/UL
RBC # BLD AUTO: 3.79 M/UL
RBC # BLD AUTO: 3.86 M/UL
RBC # BLD AUTO: 3.87 M/UL
SAMPLE: ABNORMAL
SCHISTOCYTES BLD QL SMEAR: ABNORMAL
SCHISTOCYTES BLD QL SMEAR: PRESENT
SITE: ABNORMAL
SODIUM BLD-SCNC: 131 MMOL/L (ref 136–145)
SODIUM BLD-SCNC: 133 MMOL/L (ref 136–145)
SODIUM BLD-SCNC: 134 MMOL/L (ref 136–145)
SODIUM BLD-SCNC: 136 MMOL/L (ref 136–145)
SODIUM SERPL-SCNC: 137 MMOL/L
SODIUM SERPL-SCNC: 140 MMOL/L
SODIUM SERPL-SCNC: 141 MMOL/L
SP02: 100
TARGETS BLD QL SMEAR: ABNORMAL
TARGETS BLD QL SMEAR: ABNORMAL
TIME NOTIFIED: 1438
VERBAL RESULT READBACK PERFORMED: YES
VT: 450
VT: 600
WBC # BLD AUTO: 10.74 K/UL
WBC # BLD AUTO: 16.2 K/UL
WBC # BLD AUTO: 20 K/UL
WBC # BLD AUTO: 23.53 K/UL

## 2017-02-10 PROCEDURE — 94640 AIRWAY INHALATION TREATMENT: CPT

## 2017-02-10 PROCEDURE — 83605 ASSAY OF LACTIC ACID: CPT

## 2017-02-10 PROCEDURE — 63600175 PHARM REV CODE 636 W HCPCS: Performed by: NURSE PRACTITIONER

## 2017-02-10 PROCEDURE — 99233 SBSQ HOSP IP/OBS HIGH 50: CPT | Mod: ,,, | Performed by: NURSE PRACTITIONER

## 2017-02-10 PROCEDURE — 20000000 HC ICU ROOM

## 2017-02-10 PROCEDURE — 25000242 PHARM REV CODE 250 ALT 637 W/ HCPCS: Performed by: INTERNAL MEDICINE

## 2017-02-10 PROCEDURE — 37799 UNLISTED PX VASCULAR SURGERY: CPT

## 2017-02-10 PROCEDURE — 25000003 PHARM REV CODE 250: Performed by: THORACIC SURGERY (CARDIOTHORACIC VASCULAR SURGERY)

## 2017-02-10 PROCEDURE — 63600175 PHARM REV CODE 636 W HCPCS: Performed by: INTERNAL MEDICINE

## 2017-02-10 PROCEDURE — 85610 PROTHROMBIN TIME: CPT

## 2017-02-10 PROCEDURE — 63600175 PHARM REV CODE 636 W HCPCS: Performed by: THORACIC SURGERY (CARDIOTHORACIC VASCULAR SURGERY)

## 2017-02-10 PROCEDURE — 25000003 PHARM REV CODE 250: Performed by: NURSE PRACTITIONER

## 2017-02-10 PROCEDURE — 97802 MEDICAL NUTRITION INDIV IN: CPT

## 2017-02-10 PROCEDURE — 85384 FIBRINOGEN ACTIVITY: CPT | Mod: 91

## 2017-02-10 PROCEDURE — 99900035 HC TECH TIME PER 15 MIN (STAT)

## 2017-02-10 PROCEDURE — 99900026 HC AIRWAY MAINTENANCE (STAT)

## 2017-02-10 PROCEDURE — 85027 COMPLETE CBC AUTOMATED: CPT

## 2017-02-10 PROCEDURE — 85025 COMPLETE CBC W/AUTO DIFF WBC: CPT

## 2017-02-10 PROCEDURE — 84100 ASSAY OF PHOSPHORUS: CPT | Mod: 91

## 2017-02-10 PROCEDURE — 94003 VENT MGMT INPAT SUBQ DAY: CPT

## 2017-02-10 PROCEDURE — 85007 BL SMEAR W/DIFF WBC COUNT: CPT

## 2017-02-10 PROCEDURE — 99233 SBSQ HOSP IP/OBS HIGH 50: CPT | Mod: GC,,, | Performed by: SURGERY

## 2017-02-10 PROCEDURE — 63600175 PHARM REV CODE 636 W HCPCS: Performed by: STUDENT IN AN ORGANIZED HEALTH CARE EDUCATION/TRAINING PROGRAM

## 2017-02-10 PROCEDURE — 85730 THROMBOPLASTIN TIME PARTIAL: CPT

## 2017-02-10 PROCEDURE — 82803 BLOOD GASES ANY COMBINATION: CPT

## 2017-02-10 PROCEDURE — 84132 ASSAY OF SERUM POTASSIUM: CPT | Mod: 91

## 2017-02-10 PROCEDURE — 83050 HGB METHEMOGLOBIN QUAN: CPT

## 2017-02-10 PROCEDURE — 25000003 PHARM REV CODE 250: Performed by: INTERNAL MEDICINE

## 2017-02-10 PROCEDURE — 83735 ASSAY OF MAGNESIUM: CPT | Mod: 91

## 2017-02-10 PROCEDURE — 63600367 HC NITRIC OXIDE PER HOUR

## 2017-02-10 PROCEDURE — 80053 COMPREHEN METABOLIC PANEL: CPT | Mod: 91

## 2017-02-10 PROCEDURE — 63600175 PHARM REV CODE 636 W HCPCS

## 2017-02-10 RX ORDER — ACETAMINOPHEN 10 MG/ML
1000 INJECTION, SOLUTION INTRAVENOUS EVERY 8 HOURS
Status: DISCONTINUED | OUTPATIENT
Start: 2017-02-10 | End: 2017-02-10

## 2017-02-10 RX ORDER — OXYCODONE HYDROCHLORIDE 5 MG/1
10 TABLET ORAL EVERY 6 HOURS PRN
Status: DISCONTINUED | OUTPATIENT
Start: 2017-02-10 | End: 2017-02-10

## 2017-02-10 RX ORDER — OXYCODONE HYDROCHLORIDE 5 MG/1
5 TABLET ORAL EVERY 6 HOURS PRN
Status: DISCONTINUED | OUTPATIENT
Start: 2017-02-10 | End: 2017-02-10

## 2017-02-10 RX ORDER — FUROSEMIDE 10 MG/ML
20 INJECTION INTRAMUSCULAR; INTRAVENOUS ONCE
Status: COMPLETED | OUTPATIENT
Start: 2017-02-10 | End: 2017-02-10

## 2017-02-10 RX ORDER — MYCOPHENOLATE MOFETIL 200 MG/ML
1000 POWDER, FOR SUSPENSION ORAL 2 TIMES DAILY
Status: DISCONTINUED | OUTPATIENT
Start: 2017-02-10 | End: 2017-02-12

## 2017-02-10 RX ORDER — FENTANYL CITRATE 50 UG/ML
INJECTION, SOLUTION INTRAMUSCULAR; INTRAVENOUS
Status: DISPENSED
Start: 2017-02-10 | End: 2017-02-10

## 2017-02-10 RX ORDER — FENTANYL CITRATE 50 UG/ML
25 INJECTION, SOLUTION INTRAMUSCULAR; INTRAVENOUS EVERY 6 HOURS PRN
Status: DISCONTINUED | OUTPATIENT
Start: 2017-02-10 | End: 2017-02-10

## 2017-02-10 RX ORDER — OXYCODONE HCL 5 MG/5 ML
5 SOLUTION, ORAL ORAL EVERY 4 HOURS PRN
Status: DISCONTINUED | OUTPATIENT
Start: 2017-02-10 | End: 2017-02-13

## 2017-02-10 RX ORDER — FENTANYL CITRATE 50 UG/ML
25 INJECTION, SOLUTION INTRAMUSCULAR; INTRAVENOUS ONCE
Status: COMPLETED | OUTPATIENT
Start: 2017-02-10 | End: 2017-02-10

## 2017-02-10 RX ORDER — OXYCODONE HCL 5 MG/5 ML
10 SOLUTION, ORAL ORAL EVERY 4 HOURS PRN
Status: DISCONTINUED | OUTPATIENT
Start: 2017-02-10 | End: 2017-02-13

## 2017-02-10 RX ORDER — FENTANYL CITRATE 50 UG/ML
INJECTION, SOLUTION INTRAMUSCULAR; INTRAVENOUS
Status: COMPLETED
Start: 2017-02-10 | End: 2017-02-10

## 2017-02-10 RX ADMIN — SODIUM CHLORIDE 96 UNITS/HR: 9 INJECTION, SOLUTION INTRAVENOUS at 12:02

## 2017-02-10 RX ADMIN — OXYCODONE HYDROCHLORIDE AND ACETAMINOPHEN 500 MG: 500 TABLET ORAL at 09:02

## 2017-02-10 RX ADMIN — ACETAMINOPHEN 1000 MG: 10 INJECTION, SOLUTION INTRAVENOUS at 06:02

## 2017-02-10 RX ADMIN — FAMOTIDINE 20 MG: 10 INJECTION, SOLUTION INTRAVENOUS at 09:02

## 2017-02-10 RX ADMIN — PROPOFOL 35 MCG/KG/MIN: 10 INJECTION, EMULSION INTRAVENOUS at 02:02

## 2017-02-10 RX ADMIN — OXYCODONE HYDROCHLORIDE 10 MG: 5 SOLUTION ORAL at 06:02

## 2017-02-10 RX ADMIN — IPRATROPIUM BROMIDE AND ALBUTEROL SULFATE 3 ML: .5; 3 SOLUTION RESPIRATORY (INHALATION) at 03:02

## 2017-02-10 RX ADMIN — FENTANYL CITRATE 25 MCG: 50 INJECTION, SOLUTION INTRAMUSCULAR; INTRAVENOUS at 02:02

## 2017-02-10 RX ADMIN — POLYETHYLENE GLYCOL 3350 17 G: 17 POWDER, FOR SOLUTION ORAL at 09:02

## 2017-02-10 RX ADMIN — CEFAZOLIN SODIUM 2 G: 2 SOLUTION INTRAVENOUS at 08:02

## 2017-02-10 RX ADMIN — NICARDIPINE HYDROCHLORIDE 2.5 MG/HR: 0.2 INJECTION, SOLUTION INTRAVENOUS at 04:02

## 2017-02-10 RX ADMIN — OXYCODONE HYDROCHLORIDE 10 MG: 5 SOLUTION ORAL at 11:02

## 2017-02-10 RX ADMIN — EPINEPHRINE 0.09 MCG/KG/MIN: 1 INJECTION PARENTERAL at 12:02

## 2017-02-10 RX ADMIN — CEFAZOLIN SODIUM 2 G: 2 SOLUTION INTRAVENOUS at 12:02

## 2017-02-10 RX ADMIN — DOBUTAMINE IN DEXTROSE 5 MCG/KG/MIN: 200 INJECTION, SOLUTION INTRAVENOUS at 02:02

## 2017-02-10 RX ADMIN — FUROSEMIDE 20 MG: 10 INJECTION, SOLUTION INTRAMUSCULAR; INTRAVENOUS at 10:02

## 2017-02-10 RX ADMIN — NYSTATIN 500000 UNITS: 500000 SUSPENSION ORAL at 08:02

## 2017-02-10 RX ADMIN — IPRATROPIUM BROMIDE AND ALBUTEROL SULFATE 3 ML: .5; 3 SOLUTION RESPIRATORY (INHALATION) at 11:02

## 2017-02-10 RX ADMIN — IPRATROPIUM BROMIDE AND ALBUTEROL SULFATE 3 ML: .5; 3 SOLUTION RESPIRATORY (INHALATION) at 07:02

## 2017-02-10 RX ADMIN — PROPOFOL 25 MCG/KG/MIN: 10 INJECTION, EMULSION INTRAVENOUS at 01:02

## 2017-02-10 RX ADMIN — SODIUM CHLORIDE 6.5 UNITS/HR: 9 INJECTION, SOLUTION INTRAVENOUS at 07:02

## 2017-02-10 RX ADMIN — MUPIROCIN 1 G: 20 OINTMENT TOPICAL at 09:02

## 2017-02-10 RX ADMIN — METHYLPREDNISOLONE SODIUM SUCCINATE 125 MG: 125 INJECTION, POWDER, FOR SOLUTION INTRAMUSCULAR; INTRAVENOUS at 10:02

## 2017-02-10 RX ADMIN — POTASSIUM CHLORIDE 40 MEQ: 400 INJECTION, SOLUTION INTRAVENOUS at 01:02

## 2017-02-10 RX ADMIN — FUROSEMIDE 5 MG/HR: 10 INJECTION, SOLUTION INTRAMUSCULAR; INTRAVENOUS at 08:02

## 2017-02-10 RX ADMIN — METHYLPREDNISOLONE SODIUM SUCCINATE 125 MG: 125 INJECTION, POWDER, FOR SOLUTION INTRAMUSCULAR; INTRAVENOUS at 01:02

## 2017-02-10 RX ADMIN — PROPOFOL 35 MCG/KG/MIN: 10 INJECTION, EMULSION INTRAVENOUS at 10:02

## 2017-02-10 RX ADMIN — SODIUM CHLORIDE 67 UNITS/HR: 9 INJECTION, SOLUTION INTRAVENOUS at 01:02

## 2017-02-10 RX ADMIN — NICARDIPINE HYDROCHLORIDE 9 MG/HR: 0.2 INJECTION, SOLUTION INTRAVENOUS at 10:02

## 2017-02-10 RX ADMIN — METHYLPREDNISOLONE SODIUM SUCCINATE 40 MG: 40 INJECTION, POWDER, FOR SOLUTION INTRAMUSCULAR; INTRAVENOUS at 09:02

## 2017-02-10 RX ADMIN — Medication 1000 MG: at 09:02

## 2017-02-10 RX ADMIN — NYSTATIN 500000 UNITS: 500000 SUSPENSION ORAL at 12:02

## 2017-02-10 RX ADMIN — CEFAZOLIN SODIUM 2 G: 2 SOLUTION INTRAVENOUS at 04:02

## 2017-02-10 RX ADMIN — OXYCODONE HYDROCHLORIDE 10 MG: 5 SOLUTION ORAL at 10:02

## 2017-02-10 RX ADMIN — FENTANYL CITRATE 25 MCG: 50 INJECTION INTRAMUSCULAR; INTRAVENOUS at 02:02

## 2017-02-10 RX ADMIN — FENTANYL CITRATE 25 MCG: 50 INJECTION INTRAMUSCULAR; INTRAVENOUS at 07:02

## 2017-02-10 RX ADMIN — POTASSIUM CHLORIDE 20 MEQ: 200 INJECTION, SOLUTION INTRAVENOUS at 07:02

## 2017-02-10 RX ADMIN — NYSTATIN 500000 UNITS: 500000 SUSPENSION ORAL at 05:02

## 2017-02-10 RX ADMIN — MAGNESIUM SULFATE IN WATER 2 G: 40 INJECTION, SOLUTION INTRAVENOUS at 09:02

## 2017-02-10 RX ADMIN — PROPOFOL 30 MCG/KG/MIN: 10 INJECTION, EMULSION INTRAVENOUS at 07:02

## 2017-02-10 RX ADMIN — NYSTATIN 500000 UNITS: 500000 SUSPENSION ORAL at 09:02

## 2017-02-10 NOTE — PT/OT/SLP PROGRESS
Physical Therapy      Mick FREDY Linus  MRN: [id number]    Patient not seen today secondary to  (pt on hold 2nd to being intubated and sedated.). Will follow-up at a later date..    Veronica Horan, PT

## 2017-02-10 NOTE — PROGRESS NOTES
Nurse Memo called w/ recent bg 349 mg/dl, protocol instructs to increase to 96 u/hr.  Will have pt continue at 91 u/hr, 3 insulin bags during shift, insulin gtt via central line.  Will place bolus 8 units x 1 now.   Nursing communication order to continue 91 u/hr, call endocrinology next hour.  1u/kg calculation, 200 mg/dl target to determine bolus dose.  Will conitnue to monitor.

## 2017-02-10 NOTE — NURSING
Rounds Report: Attended interdisciplinary rounds this morning with the transplant team including SW, physicians, fellows,  mid-level providers, and transplant coordinators.  Discussed plan of care, including DC date, current medications. I went to pt's beside: unable to start Post Heart transplant Education due to pt is sedated and intubated, no family at bedside to educate regarding Post Heart transplant.

## 2017-02-10 NOTE — ANESTHESIA POSTPROCEDURE EVALUATION
"Anesthesia Post Evaluation    Patient: Mick Barriga    Procedure(s) Performed: Procedure(s) (LRB):  TRANSPLANT-HEART (N/A)  REMOVAL-PACEMAKER (Left)  Removal-LEFT VENTRICULAR ASSIST DEVICE (N/A)    Final Anesthesia Type: general  Patient location during evaluation: ICU  Patient participation: No - Unable to Participate, Intubation  Level of consciousness: sedated  Post-procedure vital signs: reviewed and stable (on pressors for BP support)  Pain management: adequate  Airway patency: patent  PONV status at discharge: No PONV  Anesthetic complications: no      Cardiovascular status: hypotensive and stable  Respiratory status: intubated and ventilator  Hydration status: euvolemic  Follow-up not needed.  Comments: Remains on epi, levophed, and vasopressin for BP support.  Blood glucose high despite insulin gtt increases.  Endocrine following        Visit Vitals    /63 (BP Location: Right arm, Patient Position: Lying, BP Method: Automatic)    Pulse 91    Temp 37.3 °C (99.1 °F) (Core)    Resp (!) 26    Ht 5' 8" (1.727 m)    Wt 90.7 kg (199 lb 15.3 oz)    SpO2 100%    BMI 30.4 kg/m2       Pain/Hugo Score: Pain Assessment Performed: Yes (2/10/2017  3:00 AM)  Presence of Pain: denies (2/10/2017  3:00 AM)  Pain Rating Prior to Med Admin: 0 (2/9/2017 10:28 PM)  Pain Rating Post Med Admin: 0 (2/9/2017 11:30 PM)      "

## 2017-02-10 NOTE — PROGRESS NOTES
Consult received on patient's pressure injuries to bilateral lateral heels. See flow sheet below for additional documentation. Patient kicking off heel boots per nursing. Recommend dressing with mepilex foam dressing for added cushion and protection from shearing, change twice a week. Recommend offloading heels with pillows and with heel boots when patient compliant. When patient's legs contracted with feet flat against bed, off load feet/heels as best as possible with pillows or wheel chair cushions. Discussed with nursing. Nursing to continue care.     02/10/17 1555       Pressure Ulcer 02/10/17 0705 Right heel suspected deep tissue injury   Date First Assessed/Time First Assessed: 02/10/17 0705   Pressure Ulcer Present on Admission: no  Side: Right  Location: heel  Staging: suspected deep tissue injury  Wound Length (cm): 8  Wound Width (cm): 4   Staging suspected deep tissue injury   Pressure Ulcer Risk Factors activity;mobility   Drainage Amount none   Drainage Characteristics/Odor no odor   Appearance maroon;purple   Periwound Area intact   Wound Length (cm) 3   Wound Width (cm) 5   Depth (cm) 0   Dressing foam  (mepilex)       Pressure Ulcer 02/10/17 0705 Left lateral heel Stage I   Date First Assessed/Time First Assessed: 02/10/17 0705   Pressure Ulcer Present on Admission: no  Side: Left  Orientation: lateral  Location: heel  Staging: Stage I  Wound Length (cm): 8  Wound Width (cm): 4   Staging Stage I   Pressure Ulcer Risk Factors activity;mobility   Drainage Amount none   Drainage Characteristics/Odor no odor   Appearance reddened  (non-blanchable)   Periwound Area normal skin tone   Wound Length (cm) 3   Wound Width (cm) 5   Depth (cm) 0   Dressing foam  (mepilex)

## 2017-02-10 NOTE — SUBJECTIVE & OBJECTIVE
"Interval HPI:     NPO, intubated, on large amounts of insulin overnight from 28 units to 96 units, now at 0.6 u/hr, received 2 boluses of insulin via pump 8 and 10 units.  No hypoglycemia.  BG at goal over the past few hours.   Visit Vitals    /60    Pulse 104    Temp 100.3 °F (37.9 °C) (Core)    Resp (!) 27    Ht 5' 8" (1.727 m)    Wt 99.1 kg (218 lb 7.6 oz)    SpO2 100%    BMI 33.22 kg/m2       Labs Reviewed and Include      Recent Labs  Lab 02/10/17  0600   *   CALCIUM 8.8   ALBUMIN 2.9*   PROT 5.5*      K 3.9   CO2 26      BUN 23*   CREATININE 1.3   ALKPHOS 52*   ALT 65*   *   BILITOT 0.4     Lab Results   Component Value Date    WBC 16.20 (H) 02/10/2017    HGB 8.9 (L) 02/10/2017    HCT 26.3 (L) 02/10/2017    MCV 69 (L) 02/10/2017     02/10/2017     No results for input(s): TSH, FREET4 in the last 168 hours.  Lab Results   Component Value Date    HGBA1C 6.8 (H) 02/09/2017       Nutritional status:   Body mass index is 33.22 kg/(m^2).  Lab Results   Component Value Date    ALBUMIN 2.9 (L) 02/10/2017    ALBUMIN 3.1 (L) 02/09/2017    ALBUMIN 2.8 (L) 02/09/2017     Lab Results   Component Value Date    PREALBUMIN 19 (L) 02/08/2017    PREALBUMIN 14 (L) 02/06/2017    PREALBUMIN 14 (L) 02/03/2017       Estimated Creatinine Clearance: 74.1 mL/min (based on Cr of 1.3).    Accu-Checks  Recent Labs      02/09/17   2049  02/09/17   2158  02/09/17   2301  02/09/17   2351  02/10/17   0109  02/10/17   0159  02/10/17   0259  02/10/17   0357  02/10/17   0456  02/10/17   0558   POCTGLUCOSE  378*  349*  336*  328*  293*  265*  227*  214*  196*  161*       Current Medications and/or Treatments Impacting Glycemic Control  Immunotherapy:  Immunosuppressants     Start     Stop Route Frequency Ordered    02/10/17 2100  mycophenolate mofetil 200 mg/mL suspension 1,000 mg      -- Oral 2 times daily 02/10/17 1015        Steroids:   Hormones     Start     Stop Route Frequency Ordered    " 02/10/17 2100  methylPREDNISolone sodium succinate injection 40 mg      -- IV Every 12 hours 02/10/17 1012        Pressors:    Autonomic Drugs     Start     Stop Route Frequency Ordered    02/09/17 1515  epinephrine 4 mg in sodium chloride 0.9% 250 mL infusion     Question Answer Comment   Titrate by: (in mcg/kg/min) 0.05    Titrate interval: (in minutes) 5    Titrate to maintain: (SBP or MAP or Cardiac Index) CARDIAC INDEX    Cardiac index greater than: (in L/min) 2.2    Maximum dose: (in mcg/kg/min) 2        -- IV Continuous 02/09/17 1417    02/09/17 1515  norepinephrine 4 mg in dextrose 5% 250 mL infusion (premix) (titrating)     Question Answer Comment   Titrate by: (in mcg/kg/min) 0.05    Titrate interval: (in minutes) 5    Titrate to maintain: (MAP or SBP) MAP    Greater than: (in mmHg) 60    Maximum dose: (in mcg/kg/min) 3        -- IV Continuous 02/09/17 1417        Hyperglycemia/Diabetes Medications: Antihyperglycemics     Start     Stop Route Frequency Ordered    02/10/17 0125  insulin regular (Humulin R) 100 Units in sodium chloride 0.9% 100 mL infusion      -- IV Continuous 02/10/17 0128

## 2017-02-10 NOTE — PROGRESS NOTES
Dr. Woodruff at bedside.  Updated on v/s, CVP, hemodynamics, and UO.  Discussed current gtt rates; MD stated to decrease epi to 0.08 mcg/kg/min.  Plan discussed, including keeping pt intubated overnight; possible extubation tomorrow.

## 2017-02-10 NOTE — PROGRESS NOTES
Call endocrinology at 0100, right now bolus 10 units x 1, continue insulin gtt rate at 91 u/hr.  If bg less than 250 mg/dl, cut gtt by 80 %.  Memo NICOLAS is aware of all instructions/orders.  Will continue to monitor.  Last bg 336 at 2315.    At 0115, bg trending down to 293 mg/dl, insulin gtt remaining at 96 u/hr, will cut back by 30% to 67 u/hr and have FIDENCIO Hayden call w/ next reading at 0215.    At 0200, decrease insulin gtt rate by 40% to 40 u/hr, then continue protocol. Instructed FIDENCIO Hayden to call if BG drops under 200 mg/dl and insulin gtt rate is still at a high amount > 30 u/hr.    At 0305, bg trending down to 227 mg/dl, decrease by 50-55% to 18 u/hr, then resume protocol.     At 0454: noted BG trending down, last reading 214 mg/dl, cut back to 12 u/hr, ** Please call Endocrine for any BG related issues **     At 0511: bg trending down to 196 mg/dl, decrease by 70% to 6 u/hr, if next hour 0600, bg is less than 150 mg/dl, suspend insulin gtt. Call endocrinology.

## 2017-02-10 NOTE — PROGRESS NOTES
Dr. Woodruff updated on pt status via phone.  Discussed current gtt rates, v/s, CVPs, CI/SvO2 MAP 80s on NIBP and 60s on art line.  MD stated to decrease levophed to 0.06 mcg/kg/min.  MD updated on most recent labs with H&H 7.9/24.4 and INR 1.5, lactic acid and ABG.  2nd unit PRBC infusing currently.  Order received to give 10 mg IV lasix while PRBC infusing.  Also discussed HR goals; MD stated to set pacer to v paced back up rate 90.

## 2017-02-10 NOTE — PROGRESS NOTES
"[facility]  Endocrinology  Progress Note    Admit Date: 1/30/2017     Reason for Consult: Management of T2DM     Surgical Procedure and Date: 2/9/17 s/p heart txp    Diabetes diagnosis year: 1996    Home Diabetes Medications:  Lantus 16 u qam, Novolog 20 U AC     How often checking glucose at home? STEVEN  BG readings on regimen: STEVEN  Hypoglycemia on the regimen?  STEVEN  Missed doses on regimen?  STEVEN    Diabetes Complications include:  Hyperglycemia, Diabetic chronic kidney disease and Diabetic gastroparesis      Complicating diabetes co morbidities: CHF and CKD    HPI: Patient is a 56 y.o. male with T2DM, ischemic cardiomyopathy, s/p LVAD placement in 8/2016. He is not s/p heart transplantation. Endocrine consulted for bg management.         Interval HPI:     NPO, intubated, on large amounts of insulin overnight from 28 units to 96 units, now at 0.6 u/hr, received 2 boluses of insulin via pump 8 and then 10 units.  No hypoglycemia.  BG at goal over the past few hours.   Visit Vitals    /60    Pulse 104    Temp 100.3 °F (37.9 °C) (Core)    Resp (!) 27    Ht 5' 8" (1.727 m)    Wt 99.1 kg (218 lb 7.6 oz)    SpO2 100%    BMI 33.22 kg/m2       Labs Reviewed and Include      Recent Labs  Lab 02/10/17  0600   *   CALCIUM 8.8   ALBUMIN 2.9*   PROT 5.5*      K 3.9   CO2 26      BUN 23*   CREATININE 1.3   ALKPHOS 52*   ALT 65*   *   BILITOT 0.4     Lab Results   Component Value Date    WBC 16.20 (H) 02/10/2017    HGB 8.9 (L) 02/10/2017    HCT 26.3 (L) 02/10/2017    MCV 69 (L) 02/10/2017     02/10/2017     No results for input(s): TSH, FREET4 in the last 168 hours.  Lab Results   Component Value Date    HGBA1C 6.8 (H) 02/09/2017       Nutritional status:   Body mass index is 33.22 kg/(m^2).  Lab Results   Component Value Date    ALBUMIN 2.9 (L) 02/10/2017    ALBUMIN 3.1 (L) 02/09/2017    ALBUMIN 2.8 (L) 02/09/2017     Lab Results   Component Value Date    " PREALBUMIN 19 (L) 02/08/2017    PREALBUMIN 14 (L) 02/06/2017    PREALBUMIN 14 (L) 02/03/2017       Estimated Creatinine Clearance: 74.1 mL/min (based on Cr of 1.3).    Accu-Checks  Recent Labs      02/09/17   2049  02/09/17   2158  02/09/17   2301  02/09/17   2351  02/10/17   0109  02/10/17   0159  02/10/17   0259  02/10/17   0357  02/10/17   0456  02/10/17   0558   POCTGLUCOSE  378*  349*  336*  328*  293*  265*  227*  214*  196*  161*       Current Medications and/or Treatments Impacting Glycemic Control  Immunotherapy:  Immunosuppressants     Start     Stop Route Frequency Ordered    02/10/17 2100  mycophenolate mofetil 200 mg/mL suspension 1,000 mg      -- Oral 2 times daily 02/10/17 1015        Steroids:   Hormones     Start     Stop Route Frequency Ordered    02/10/17 2100  methylPREDNISolone sodium succinate injection 40 mg      -- IV Every 12 hours 02/10/17 1012        Pressors:    Autonomic Drugs     Start     Stop Route Frequency Ordered    02/09/17 1515  epinephrine 4 mg in sodium chloride 0.9% 250 mL infusion     Question Answer Comment   Titrate by: (in mcg/kg/min) 0.05    Titrate interval: (in minutes) 5    Titrate to maintain: (SBP or MAP or Cardiac Index) CARDIAC INDEX    Cardiac index greater than: (in L/min) 2.2    Maximum dose: (in mcg/kg/min) 2        -- IV Continuous 02/09/17 1417    02/09/17 1515  norepinephrine 4 mg in dextrose 5% 250 mL infusion (premix) (titrating)     Question Answer Comment   Titrate by: (in mcg/kg/min) 0.05    Titrate interval: (in minutes) 5    Titrate to maintain: (MAP or SBP) MAP    Greater than: (in mmHg) 60    Maximum dose: (in mcg/kg/min) 3        -- IV Continuous 02/09/17 1417        Hyperglycemia/Diabetes Medications: Antihyperglycemics     Start     Stop Route Frequency Ordered    02/10/17 0125  insulin regular (Humulin R) 100 Units in sodium chloride 0.9% 100 mL infusion      -- IV Continuous 02/10/17 0128          ASSESSMENT and PLAN    Type 2 diabetes mellitus  with stage 3 chronic kidney disease, with long-term current use of insulin  bg goal 140-180- Continue intensive insulin gtt protocol,    Glucose toxicity has resolved.  Continue intensive protocol q1h, will continue to monitor closely.  Pt was up to 96 u/hr to 0.6 u/hr over the past 14 hours.  No hypoglycemia.    DISCHARGE PLANNING: TBD  previously on MDI- likely re-calculation of insulin needs prior to d/c      Ischemic cardiomyopathy  S/p heart txp        Non morbid obesity due to excess calories  Increases insulin resistance   Body mass index is 33.22 kg/(m^2).        Heart transplant, orthotopic, status  Per CTS, HTS      Adrenal cortical steroids causing adverse effect in therapeutic use  Increasing insulin needs      Immunosuppression  May increase insulin resistance         KIMBERLEY Santos, FNP  Endocrinology  [facility]

## 2017-02-10 NOTE — PLAN OF CARE
Problem: Patient Care Overview  Goal: Plan of Care Review  Dx: heart transplant (2/9), 1 PRBC, 250cc Albumin    PMH: AICD, HTN, HLD, GERD, T2DM, CKD (3), ischemic CM, systolic and diastolic HF, low Na, non morbid obesity, BPH    PSH: cholecystectomy, LVAD (8/24/16)    2/9: Heart Tx (out @ 1405)    Nursing:  - MAP 60-80  - HR > 90 (backup rate on pacer - 90)  - Q1 hour accuchecks  - Q2 hour lactic (iSTAT)  - Q4 hour ABG  - Q6 hour CBC, CMP, Mg, Phos  - Q6 hour Fibringen   Outcome: Ongoing (interventions implemented as appropriate)  Plan of care reviewed with pt, no evidence of learning. Vent settings: SIMV, 50% FiO2, 5 PEEP. NO @ 40 ppm. Pt connected to pacer, backup rate 90. Epinephrine gtt @ 0.08 mcg/kg/min, Vasopressin gtt @ 0.04 units/min, Propofol gtt @ 25 mcg/kg/min, Cardene gtt @ 2.5 mg/hr, Dobutamine gtt @ 5 mcg/kg/min, Insulin gtt @ 6 units/hr, and D5 1/2NS @ 10 cc/hr. Q1 hour accuchecks. Q2 hour lactics (iSTAT). Q4 hour ABGs. Q6 hour CBC, CMP, Mg, Phos, Fibrinogen. MAP goal 60-80. 250cc Albumin given. Total of 50mg Lasix given overnight. SvO2 this morning - 48. Latest lactic 4.0 on iSTAT. Restraints in place, will discontinue when appropriate. Will continue to monitor closely.

## 2017-02-10 NOTE — PROGRESS NOTES
Dr. Woodruff updated on pt's current status via telephone. Made aware of VS, gtts, CT output, ABG results, Lactic (iSTAT) and UO. Order received to decrease NO to 30 ppm, decrease Vaso gtt to 0.03 units/min, decrease Cardene gtt to 1 mg/hr, and increase Epi gtt to 0.1 mcg/kg/min. Also stated to come off of Vaso as tolerated, then to come off of Cardene. Will continue to monitor closely.

## 2017-02-10 NOTE — PROGRESS NOTES
"General Surgery Daily Progress Note    Mick Barriga  56 y.o.    Hospital Day: 11    Post Op Day: 1 Day Post-Op       Subjective  Patient stable overnight. No significant events. Continues on epi, nitric, vaso, intermittent cardene. CI >2.2. PA pressures WNL. Making good urine. On minimal vent settings.       Objective  Temp:  [99.1 °F (37.3 °C)-101.6 °F (38.7 °C)]   Pulse:  []   Resp:  [17-30]   BP: ()/(36-66)   SpO2:  [100 %]   Arterial Line BP: ()/(31-63)     Labs    Recent Labs  Lab 02/10/17  0600   WBC 16.20*   RBC 3.79*   HGB 8.9*   HCT 26.3*      MCV 69*   MCH 23.5*   MCHC 33.8       Recent Labs  Lab 02/10/17  0600   CALCIUM 8.8   PROT 5.5*      K 3.9   CO2 26      BUN 23*   CREATININE 1.3   ALKPHOS 52*   ALT 65*   *   BILITOT 0.4       Recent Labs  Lab 02/09/17  1807 02/10/17  0600   INR 1.5*  --    APTT  --  28.5       Visit Vitals    BP (!) 108/56 (BP Location: Right arm, Patient Position: Lying, BP Method: Automatic)    Pulse 105    Temp 100.2 °F (37.9 °C) (Core)    Resp (!) 27    Ht 5' 8" (1.727 m)    Wt 99.1 kg (218 lb 7.6 oz)    SpO2 100%    BMI 33.22 kg/m2       General:  Intubated and sedated  Head: Normocephalic, without obvious abnormality, atraumatic  Neck: Supple  Lungs: Mechanically ventilated   Heart: AV paced  Abdomen: soft, NT/ND, normal bowel sounds  Extremities: normal, atraumatic, no edema  Neurologic: Waking up when sedation weaned, very agitated, moving all extremities    Imaging Results         X-Ray Chest AP Portable (Final result) Result time:  02/10/17 07:57:20    Final result by Bonilla Green III, MD (02/10/17 07:57:20)    Narrative:    One view: Tubes and lines appropriate.  There is postoperative change, cardiomegaly, moderate edema, and no change.      Electronically signed by: BONILLA GREEN  Date:     02/10/17  Time:    07:57             X-Ray Chest AP Portable (Final result) Result time:  02/09/17 14:54:16    Final " result by Ed Waller Jr., MD (02/09/17 14:54:16)    Impression:     Small left inferolateral pneumothorax.    Curvilinear metallic density likely residual wire from the AICD removal.    Patchy parenchymal opacification bilaterally.    Findings discussed with Dr. Sukhdev Sinclair at 1453      Electronically signed by: ED WALLER MD  Date:     02/09/17  Time:    14:54     Narrative:    Chest single view compared to 01/19/2017.  Residual wire overlies the innominate vein toward the SVC likely from AICD removal.  ET tube, NG tube, Perry-Maribel catheter and mediastinal drain noted.  Heart size is mildly enlarged.  Slight increase in the pulmonary vascular markings.  Some patchy parenchymal opacification bilaterally left greater than right.  There is a pneumothorax at the left lung base laterally.                Assessment/Plan  56 y.o.yo male s/p TRANSPLANT-HEART (N/A), REMOVAL-PACEMAKER (Left), Removal-LEFT VENTRICULAR ASSIST DEVICE (N/A) on 2/10/17      Neuro: Extremely agitated when sedation is weaned  - Continue low dose propofol  - Initiate prn PO pain medications for longer term pain control    CV: HDS, on epi, weaning vaso, 30 nitric, intermittent cardene  - wean vaso  - wean nitric  - continue swan and tele  - will consider epi wean later today, goal CI >2.2    Pulm: On minimal vent settings, gas wnl, nitric at 30    Recent Labs  Lab 02/10/17  0715   PH 7.465*   PCO2 32.1*   PO2 156*   HCO3 23.1*   POCSATURATED 100   BE -1     - Wean nitric  - Will consider wean to extubate later today if pt stable and comfortable on minimal nitric    Renal: Adequate uop, no evidence of barrie  Recent Labs      02/10/17   0600   BUN  23*   CREATININE  1.3   - Push lasix 20 mg  - Lasix gtt at 5  - Strict in/out    Hem/ID: H/h stable, mild leukocytosis, afebrile  Recent Labs      02/10/17   0600   HGB  8.9*   HCT  26.3*   WBC  16.20*   - Immunosuppression per heart failure team, currently receiving pulse steroids  - prophylaxis with  nystatin and ancef postoperatively    Endocrine: On insulin gtt for postoperative hyperglycemia  - appreciate endocrine recs    FEN/GI: OG in place  - Replace lytes prn  - Aggressive bowel regimen    Dispo: Continue ICU level care      Lenin Asher MD PGY II  [phone]

## 2017-02-10 NOTE — PROGRESS NOTES
SW to pt's room x2 today to check in with family.  Pt is s/p OHTx on 2/9/17.  Pt presents in room as intubated and sedated.  No family present in room x2.  SW will continue to f/u with pt and family.  SW providing ongoing psychosocial and counseling support, education, resources, assistance, and discharge planning as indicated.  SW following and remains available.

## 2017-02-10 NOTE — PROGRESS NOTES
Dr. Woodruff updated on pt's current status via telephone. Made aware of VS, gtts, CT output, ABG results, Lactic (iSTAT) and UO. Order received to give 10mg Lasix IVP while blood is still transfusing and then to administer 250cc Albumin afterwards. Order also received to decrease vasopressin gtt from 0.6 units/min to 0.04 units/min, decrease Levo gtt to off as tolerated and then slowly titrate down on Epi gtt. Will continue to monitor closely.

## 2017-02-10 NOTE — PROGRESS NOTES
Dr. Asher updated on most recent lactic acid 9.39 per RRT.  PRBC done.  Order received to give 250 cc 5% albumin.

## 2017-02-10 NOTE — PROGRESS NOTES
History & Physical  Surgical Intensive Care    SUBJECTIVE:     Chief Complaint/Reason for Admission: Heart Transplant    History of Present Illness:  Patient is a 56 y.o. male with a hx of ischemic cardiomyopathy s/p LVAD placement who has now been accepted as a candidate for heart transplantation. PMH includes HTN, HLD, GERD and DM2.    Interval History:  Pt presents to the ICU s/p heart transplant. Intubated and sedated on dobutamine, epi, levo and vaso.     Facility-Administered Medications Prior to Admission   Medication    tamsulosin 24 hr capsule 0.4 mg     PTA Medications   Medication Sig    amiodarone (PACERONE) 400 MG tablet Take 1 tablet (400 mg total) by mouth once daily.    amlodipine (NORVASC) 10 MG tablet Take 1 tablet (10 mg total) by mouth once daily.    aspirin (ECOTRIN) 325 MG EC tablet Take 1 tablet (325 mg total) by mouth once daily.    cyclobenzaprine (FLEXERIL) 5 MG tablet Take 5 mg by mouth daily as needed.     dipyridamole (PERSANTINE) 50 MG tablet Take 2 tablets (100 mg total) by mouth 3 (three) times daily.    docusate sodium (COLACE) 100 MG capsule Take 2 capsules (200 mg total) by mouth every evening.    ergocalciferol (ERGOCALCIFEROL) 50,000 unit Cap TK 1 C PO TWICE PER WEEK    esomeprazole (NEXIUM) 40 MG capsule Take 1 capsule (40 mg total) by mouth before breakfast.    ferrous gluconate (FERGON) 324 MG tablet Take 1 tablet (324 mg total) by mouth daily with breakfast.    hydrALAZINE (APRESOLINE) 10 MG tablet Take 2 tablets (20 mg total) by mouth every 8 (eight) hours.    insulin aspart (NOVOLOG FLEXPEN) 100 unit/mL InPn pen Inject 20 Units into the skin 3 (three) times daily with meals.    insulin glargine (LANTUS SOLOSTAR) 100 unit/mL (3 mL) InPn pen Inject 16 Units into the skin once daily.    isosorbide dinitrate (ISORDIL) 10 MG tablet Take 1 tablet (10 mg total) by mouth 3 (three) times daily.    lisinopril (PRINIVIL,ZESTRIL) 20 MG tablet Take 1 tablet (20 mg total)  by mouth 2 (two) times daily.    magnesium oxide (MAG-OX) 400 mg tablet Take 1 tablet (400 mg total) by mouth 3 (three) times daily.    oxycodone (ROXICODONE) 10 mg Tab immediate release tablet Take 1 tablet (10 mg total) by mouth every 6 (six) hours as needed.    senna-docusate 8.6-50 mg (PERICOLACE) 8.6-50 mg per tablet Take 1 tablet by mouth daily as needed for Constipation.    warfarin (COUMADIN) 1 MG tablet Take 1-2.5mg daily as directed by coumadin clinic; #70pills = 1 month supply       Review of patient's allergies indicates:   Allergen Reactions    Levemir [insulin detemir] Itching    Pork/porcine containing products     Ranexa [ranolazine] Nausea Only       Past Medical History   Diagnosis Date    CHF (congestive heart failure)     Coronary artery disease     GERD (gastroesophageal reflux disease)     Hyperlipidemia     Hypertension     Type 2 diabetes mellitus with hyperglycemia      Past Surgical History   Procedure Laterality Date    Cardiac pacemaker placement      Cholecystectomy      Left ventricular assist device       x 3 months ago    Colonoscopy N/A 1/11/2017     Procedure: COLONOSCOPY;  Surgeon: Petr Almanzar MD;  Location: [facility] ([address] [place] [address]);  Service: Endoscopy;  Laterality: N/A;    Colonoscopy N/A 1/12/2017     Procedure: COLONOSCOPY;  Surgeon: Petr Almanzar MD;  Location: [facility] ([address] [place] [address]);  Service: Endoscopy;  Laterality: N/A;     Family History   Problem Relation Age of Onset    Heart disease Mother     Hypertension Mother     Heart attack Mother     Heart disease Father     Hypertension Father     Heart attack Father     Cancer Brother      Social History   Substance Use Topics    Smoking status: Former Smoker     Packs/day: 0.50     Years: 15.00     Quit date: 6/14/2006    Smokeless tobacco: Never Used    Alcohol use No        Review of Systems:  Review of systems not obtained due to patient factors intubated and sedated.    OBJECTIVE:     Vital  Signs (Most Recent)  Temp: (!) 101.4 °F (38.6 °C) (02/09/17 1905)  Pulse: 97 (02/09/17 1951)  Resp: (!) 22 (02/09/17 1951)  BP: (!) 114/56 (02/09/17 1934)  SpO2: 100 % (02/09/17 1934)  Ventilator Data (Last 24H):     Vent Mode: SIMV  Oxygen Concentration (%):  [] 50  Resp Rate Total:  [16 br/min-26 br/min] 21 br/min  Vt Set:  [550 mL-600 mL] 600 mL  PEEP/CPAP:  [5 cmH20] 5 cmH20  Pressure Support:  [10 cmH20] 10 cmH20  Mean Airway Pressure:  [11 ltT29-18 cmH20] 12 cmH20    Hemodynamic Parameters (Last 24H):  PAP: (30-33)/(17-18) 31/17  PAP (Mean):  [23 mmHg-24 mmHg] 23 mmHg  PCWP:  [17 mmHg] 17 mmHg  CO:  [5.6 L/min-7.6 L/min] 6.2 L/min  CI:  [2.7 L/min/m2-3.7 L/min/m2] 3 L/min/m2    Physical Exam:  General: well developed, intubated and sedated  Lungs:  clear to auscultation bilaterally and mechanical breath sounds  Cardiovascular: Heart: tachycardic, no murmur. Chest Wall: pt sedated. Extremities: no cyanosis or edema, or clubbing. Pulses: 2+ and symmetric.  Neurologic: Mental status: sedated    Lines/Drains:       Introducer 02/09/17  right internal jugular (Active)   Specific Qualities [place] in place 2/9/2017  3:30 PM   Dressing Status Biopatch in place;Clean;Dry;Intact 2/9/2017  3:30 PM   Daily Line Review Performed 2/9/2017  3:30 PM   Number of days:0            Pulmonary Artery Catheter Assessment  02/09/17 0446 (Active)   Dressing biopatch in place;dressing dry and intact 2/9/2017  3:30 PM   Securement secured w/ sutures 2/9/2017  3:30 PM   Current Insertion Depth (cm) 52 cm 2/9/2017  3:30 PM   Phlebitis 0-->no symptoms 2/9/2017  3:30 PM   Infiltration 0-->no symptoms 2/9/2017  3:30 PM   Waveform normal 2/9/2017  3:30 PM   Pressure Catheter Interventions line leveled/zeroed 2/9/2017  3:30 PM   Prox Injectate Status Infusing 2/9/2017  3:30 PM   Prox Infusate Status Infusing 2/9/2017  3:30 PM   Distal Status Infusing 2/9/2017  3:30 PM   Daily Line Review Performed 2/9/2017  3:30 PM   Number of days:0             Peripheral IV - Single Lumen 02/09/17 0421 Right Hand (Active)   Site Assessment Clean;Dry;Intact;No redness;No swelling 2/9/2017  3:30 PM   Line Status Flushed 2/9/2017  3:30 PM   Dressing Status Clean;Dry;Intact 2/9/2017  3:30 PM   Reason Not Rotated Not due 2/9/2017  3:30 PM   Number of days:0            Peripheral IV - Single Lumen 02/09/17 0424 Left Wrist (Active)   Site Assessment Clean;Dry;Intact;No redness;No swelling 2/9/2017  3:30 PM   Line Status Flushed;Saline locked 2/9/2017  3:30 PM   Dressing Status Clean;Dry;Intact 2/9/2017  3:30 PM   Dressing Intervention Dressing reinforced 2/9/2017  3:30 PM   Reason Not Rotated Not due 2/9/2017  3:30 PM   Number of days:0            Arterial Line 02/09/17 0413 Left Radial (Active)   Site Assessment Clean;No redness;Dry;Intact;No swelling 2/9/2017  3:30 PM   Line Status Pulsatile blood flow 2/9/2017  3:30 PM   Art Line Waveform Other (Comment) 2/9/2017  3:30 PM   Arterial Line Interventions Leveled;Connections checked and tightened;Flushed per protocol 2/9/2017  3:30 PM   Color/Movement/Sensation Capillary refill less than 3 sec 2/9/2017  3:30 PM   Dressing Type Transparent 2/9/2017  3:30 PM   Dressing Status Clean;Dry;Intact 2/9/2017  3:30 PM   Number of days:0            Arterial Line 02/09/17  Right Femoral (Active)   Site Assessment Clean;Dry;Intact;No redness;No swelling 2/9/2017  3:30 PM   Line Status Pulsatile blood flow 2/9/2017  3:30 PM   Art Line Waveform Appropriate;Square wave test performed 2/9/2017  3:30 PM   Arterial Line Interventions Zeroed and calibrated;Leveled;Connections checked and tightened;Flushed per protocol 2/9/2017  3:30 PM   Color/Movement/Sensation Capillary refill less than 3 sec 2/9/2017  3:30 PM   Dressing Type Transparent 2/9/2017  3:30 PM   Dressing Status Clean;Dry;Intact 2/9/2017  3:30 PM   Number of days:0            Pacer Wires 02/09/17 1238 (Active)   Pacer Wire Status Pacing wires not connected to pacer 2/9/2017  3:30  PM   Site Assessment Clean;Dry;Intact 2/9/2017  3:30 PM   How Pacer Wires are Secured Pacing wires isolated and secured 2/9/2017  3:30 PM   Dressing Status Clean;Dry;Intact 2/9/2017  3:30 PM   Number of days:0            Chest Tube 02/09/17 1205 1 Right Mediastinal 32 Fr. (Active)   Chest Tube WDL ex 2/9/2017  2:10 PM   Function -20 cm H2O 2/9/2017  3:30 PM   Air Leak/Fluctuation air leak not present;fluctuation not present 2/9/2017  3:30 PM   Safety all tubing connections taped;all connections secured;suction checked 2/9/2017  3:30 PM   Securement tubing anchored to body distal to insertion site w/ tape 2/9/2017  3:30 PM   Patency Intervention Tip/tilt 2/9/2017  3:30 PM   Drainage Description Serosanguineous 2/9/2017  3:30 PM   Dressing Appearance occlusive gauze dressing intact;clean and dry 2/9/2017  7:05 PM   Left Subcutaneous Emphysema none present 2/9/2017  3:30 PM   Right Subcutaneous Emphysema none present 2/9/2017  3:30 PM   Site Assessment Not assessed;Other (Comment) 2/9/2017  7:05 PM   Surrounding Skin Unable to view 2/9/2017  3:30 PM   Output (mL) 10 mL 2/9/2017  7:00 PM   Number of days:0            Chest Tube 02/09/17 1206 2 Left Mediastinal 32 Fr. (Active)   Chest Tube WDL WDL 2/9/2017  2:10 PM   Function -20 cm H2O 2/9/2017  3:30 PM   Air Leak/Fluctuation air leak not present;fluctuation not present 2/9/2017  3:30 PM   Safety all tubing connections taped;all connections secured;suction checked 2/9/2017  3:30 PM   Securement tubing anchored to body distal to insertion site w/ tape 2/9/2017  3:30 PM   Patency Intervention Tip/tilt 2/9/2017  3:30 PM   Drainage Description Serosanguineous 2/9/2017  3:30 PM   Dressing Appearance occlusive gauze dressing intact;clean and dry 2/9/2017  7:05 PM   Left Subcutaneous Emphysema none present 2/9/2017  3:30 PM   Right Subcutaneous Emphysema none present 2/9/2017  3:30 PM   Site Assessment Not assessed;Other (Comment) 2/9/2017  7:05 PM   Surrounding Skin Unable to  "view 2/9/2017  3:30 PM   Output (mL) 10 mL 2/9/2017  7:00 PM   Number of days:0            NG/OG Tube 02/09/17  (Active)   Placement Check placement verified by x-ray;placement verified by aspirate characteristics 2/9/2017  3:30 PM   Tolerance no signs/symptoms of discomfort 2/9/2017  3:30 PM   Securement taped to commercial device 2/9/2017  3:30 PM   Clamp Status/Tolerance unclamped 2/9/2017  3:30 PM   Suction Setting/Drainage Method suction at;low;intermittent setting 2/9/2017  3:30 PM   Drainage Green;Yellow 2/9/2017  3:30 PM   Number of days:0            Urethral Catheter 02/09/17 0430 Temperature probe;Straight-tip;Non-latex 16 Fr. (Active)   Site Assessment Clean;Intact 2/9/2017  3:30 PM   Collection Container Urimeter 2/9/2017  3:30 PM   Securement Method secured to top of thigh w/ adhesive device 2/9/2017  3:30 PM   Catheter Care Performed yes 2/9/2017  3:30 PM   Reason for Continuing Urinary Catheterization Post operative 2/9/2017  3:30 PM   CAUTI Prevention Bundle StatLock in place w 1" slack;Intact seal between catheter & drainage tubing;Drainage bag off the floor;Green sheeting clip in use;No dependent loops or kinks;Drainage bag not overfilled (<2/3 full);Drainage bag below bladder 2/9/2017  2:10 PM   Output (mL) 45 mL 2/9/2017  7:00 PM   Number of days:0       Laboratory  CBC:   Recent Labs  Lab 02/09/17  1807   WBC 8.91   RBC 3.50*   HGB 7.9*   HCT 24.4*      MCV 70*   MCH 22.6*   MCHC 32.4     CMP:   Recent Labs  Lab 02/09/17  1807   *   CALCIUM 8.6*   ALBUMIN 2.8*   PROT 5.5*      K 3.5   CO2 19*      BUN 19   CREATININE 1.7*   ALKPHOS 48*   ALT 36   *   BILITOT 0.4     Coagulation:   Recent Labs  Lab 02/09/17  1417 02/09/17  1807   INR 1.1 1.5*   APTT 28.8  --            ASSESSMENT/PLAN:     57yo M s/p heart transplant    Plan:  Neuro:   - Sedated on propofol  - Dilaudid for pain    Pulmonary:   - Intubated  Vent Mode: SIMV  Oxygen Concentration (%):  [] " 50  Resp Rate Total:  [16 br/min-26 br/min] 24 br/min  Vt Set:  [550 mL-600 mL] 600 mL  PEEP/CPAP:  [5 cmH20] 5 cmH20  Pressure Support:  [10 cmH20] 10 cmH20  Mean Airway Pressure:  [11 jjC70-26 cmH20] 12 cmH20    Cardiac:  - Pressors: epi, levo, vaso, dobutamine  - Cardene gtt  - NO 20ppm    Renal:   - Ta  - Strict I/O's  - Lasix gtt    Infectious Disease:   - Methylprednisolone  - Nystatin, ancef    Hematology:  - CBC Q6 hrs  - H/H 7.9/24.4  - 2 units PRBC    Endocrine:  - Insulin gtt    Fluids/Electrolytes/Nutrition/GI:   - 1/2 NS @ 10  - NPO  - Replace lytes PRN    Dispo:  - ICU care    Aparna Olvera MD  Surgery Resident, PGY-1  Pager [phone]  02/09/2017

## 2017-02-10 NOTE — PHYSICIAN QUERY
"PT Name: Mick Barriga  MR #: [id number]  Physician Query Form - Hematology Clarification    Reviewer  Ext Saranya Keyes RN, CCDS    This form is a permanent document in the medical record.      Query Date: February 10, 2017    By submitting this query, we are merely seeking further clarification of documentation. Please utilize your independent clinical judgment when addressing the question(s) below.    (The Medical record reflects the following:)     Indicators    Supporting Clinical Findings Location in Medical Record   X "Anemia" documented Will increase iron to TID as anemia becoming an issue      2/7 pn     X H & H = 8.1/25.8-->5.4/17.1-->8.9/26.3 2/7, 2/9, 2/10 lab    BP =      HR =      "GI bleeding" documented      Acute bleeding (Non GI site)     X Transfusion(s) prbc's transfused I/O 2/9     Treatment:     X Other:  Removal LVAD  Transplant heart   Op note 2/9      Provider, please specify diagnosis or diagnoses associated with above clinical findings.    [  ] Acute blood loss anemia expected post-operatively    [ x  ] Acute blood loss anemia    [  ] Aplastic anemia    [  ] Iron deficiency anemia    [  ] Pernicious anemia     [  ] Precipitous drop in H&H    [  ] Anemia of chronic disease ( Specify chronic disease)      [  ] CKD (specify stage) ___________________________     [  ] Neoplastic disease      [  ] Due to Chemotherapy      [  ] Other (Specify) _______________________________     [  ] Clinically Undetermined     [  ] Other Hematological Diagnosis _________________________________    [  ] Clinically Undetermined       Please document in your progress notes daily for the duration of treatment, until resolved, and include in your discharge summary.                                                                                                      "

## 2017-02-10 NOTE — PROGRESS NOTES
Attempted to page endocrine NP/fellow on call for pt's BG remaining >250.  Currenlty on 71 units/hr of insulin following algorithm.  No call returned.    Paged 3 more times, no call returned.

## 2017-02-10 NOTE — CONSULTS
[facility]  Adult Nutrition  Consult Note    SUMMARY     Recommendations    Recommendation/Intervention: 1. If unable to extubate pt over the next 72 hrs, RD to provide TF recs   2. If able to extubate & no swallowing difficulties detected, adv diet as barney'd to Bariatric CL's then Cardiac/2000 calorie diabetic   3. RD to monitor  Goals: Diet advancement or TF initiation w/in 72 hrs  Nutrition Goal Status: new       Continuum of Care Plan    Referral to Outpatient Services: other (see comments) (Nutr D/c Plan: complete post-tx diet educ prior to d/c)    Reason for Assessment    Reason for Assessment: physician consult  Diagnosis: cardiac disease, other (see comments) (s/p OHTx 2/9/17 )  Relevent Medical History: HF s/p LVAD 8/2016, AICD, HTN, HLD, GERD, DMII, CKD stg 3   Interdisciplinary Rounds: attended     General Information Comments: Pt intubated, sedated. No fmly @ bedside to speak to.    Nutrition Prescription Ordered    Current Diet Order: NPO                    Evaluation of Received Nutrients/Fluid Intake           Other Calories (kcal): 504 (lipid cals via propofol)        Energy Calories Required: not meeting needs                 Protein Required: not meeting needs        IV Fluid (mL):  (1/2 NS @ 10ml/hr)  Other Fluid (mL):  (IVPB meds)      Fluid Required: meeting needs  Comments: I/O noted; good uop        Nutrition Risk Screen     Nutrition Risk Screen: no indicators present    Nutrition/Diet History    Patient Reported Diet/Restrictions/Preferences: low salt, diabetic diet (pta)  Typical Food/Fluid Intake: NPO  Food Preferences: Shinto/cultural prefs from prior admission noted; NO PORK           Labs/Tests/Procedures/Meds       Pertinent Labs Reviewed: reviewed  Pertinent Labs Comments: Phos 6.8, K 4.8, Glu 157, A1C 6.8, Alb 2.9  Pertinent Medications Reviewed: reviewed  Pertinent Medications Comments: epi, insulin, dobutamine, propofol drips; albumin, Vit C, docusate,  famotidine, furosemide    Physical Findings    Overall Physical Appearance: overweight     Oral/Mouth Cavity: tooth/teeth missing  Skin: other (see comments) (sx incisions; chest tubes; Ramirez 3)    Anthropometrics       Height (inches): 67.99 in  Weight Method: Bed Scale  Weight (kg): 99.1 kg  Ideal Body Weight (IBW), Male: 153.94 lb     % Ideal Body Weight, Male (lb): 141.93 lb     BMI (kg/m2): 33.23  BMI Grade: 30 - 34.9- obesity - grade I  Usual Body Weight (UBW), k kg  % Usual Body Weight: 111.35           Estimated/Assessed Needs    Weight Used For Calorie Calculations: 99.7 kg (219 lb 12.8 oz)   Height (cm): 172.7 cm     Energy Need Method: [place] (= 2168 cals daily)     RMR ([place]-St. Jeor Equation): 1808.7        Weight Used For Protein Calculations: 99.1 kg (218 lb 7.6 oz)  Protein Requirements: 119-129 gms (1.2-1.3 gms/kg)    Fluid Need Method: other (see comments) (per MD)        Monitor and Evaluation    Food and Nutrient Intake: energy intake  Food and Nutrient Adminstration: diet order        Anthropometric Measurements: weight, weight change  Biochemical Data, Medical Tests and Procedures: electrolyte and renal panel, glucose/endocrine profile  Nutrition-Focused Physical Findings: overall appearance    Nutrition Risk    Level of Risk: high    Nutrition Follow-Up    RD Follow-up?: Yes    Assessment and Plan    Nutrition Dx/problem: Increased protein needs  Related to/etiology: physiological factors s/p OHTx  As Evidenced by: EPN  Status: New

## 2017-02-10 NOTE — PROGRESS NOTES
Dr. Woodruff updated on pt via phone.  Discussed lactic acid trending up and last ABG results; UO, V/s, CVPs, hemodynamics, and current gtt rates.  Orders received to give additional 1 unit PRBC, stop albumin while PRBC infusing; give 1/2 amp bicarb; and decrease RR on vent to 16.  Will monitor.

## 2017-02-10 NOTE — PROGRESS NOTES
Dr. Woodruff updated on pt's current status via telephone. Made aware of VS, gtts, CT output, Lactic (iSTAT) and UO. Order received to give 40mg Lasix IVP and 1 amp of bicarb. Order received to increase NO from 20 to 40ppm. Order received to get an ABG @ 2130. Will continue to monitor closely.

## 2017-02-10 NOTE — PROGRESS NOTES
Dr. Woodruff and Dr. Asher roundross at bedside.  MD updated on pt v/s, CVP, PA pressures, SVO2 in 40s, current gtt rates, UO, chest tube outputs.  AM labs and x ray reviwed.  Discussed plan for day.  Vasopressin stopped, epi to be weaned slowly starting the afternoon, lasix gtt to be started, NO decreased to 20 PPM, ABG monitoring with lactic acid q8h.  See chart for orders received.  MD also changed settings on pacer to VVI with rate 105.  Will monitor.

## 2017-02-10 NOTE — PT/OT/SLP PROGRESS
Occupational Therapy      Mick FREDY Linus  MRN: [id number]    Patient not seen today secondary to  (remains intubated with weaning parameters in place). Will follow-up over the weekend.    SANTIAGO Shaver  2/10/2017

## 2017-02-10 NOTE — PROGRESS NOTES
Progress Note  Surgical Intensive Care      Admit Date: 1/30/2017  Post-operative Day: 1 Day Post-Op  Hospital Day: 12    History of Present Illness:  Patient is a 56 y.o. male with a hx of ischemic cardiomyopathy s/p LVAD placement who has now been accepted as a candidate for heart transplantation. PMH includes HTN, HLD, GERD and DM2.       INTERVAL HISTORY:   Pt with no acute events overnight.  Remains on epi, vaso, dobu, insulin gtt.  On Lay.        Continuous Infusions:   sodium chloride 0.45% 10 mL/hr (02/10/17 0900)    DOBUTamine 5 mcg/kg/min (02/10/17 1000)    epinephrine 0.1 mcg/kg/min (02/10/17 1000)    furosemide (LASIX) 1 mg/mL infusion (non-titrating) 5 mg/hr (02/10/17 1000)    insulin (HUMAN R) infusion (adults) 0.6 Units/hr (02/10/17 1000)    nicardipine Stopped (02/10/17 0815)    nitric oxide gas      norepinephrine bitartrate-D5W Stopped (02/10/17 0005)    propofol 35 mcg/kg/min (02/10/17 1000)     Scheduled Meds:   albuterol-ipratropium 2.5mg-0.5mg/3mL  3 mL Nebulization Q4H    ascorbic acid (vitamin C)  500 mg Oral BID    ceFAZolin 2 g/50mL Dextrose IVPB  2 g Intravenous Q8H    docusate sodium  100 mg Oral BID    famotidine (PF)  20 mg Intravenous BID    methylPREDNISolone sodium succinate  40 mg Intravenous Q12H    mupirocin  1 g Nasal BID    mycophenolate mofetil  1,000 mg Oral BID    nystatin  500,000 Units Mouth/Throat QID (WM & HS)    polyethylene glycol  17 g Oral Daily     PRN Meds:sodium chloride, sodium chloride, acetaminophen, dextrose 50%, dextrose 50%, magnesium sulfate IVPB, metoclopramide HCl, ondansetron, oxycodone, oxycodone, potassium chloride **AND** potassium chloride **AND** potassium chloride, sodium phosphate IVPB, sodium phosphate IVPB, sodium phosphate IVPB    Review of patient's allergies indicates:   Allergen Reactions    Levemir [insulin detemir] Itching    Pork/porcine containing products     Ranexa [ranolazine] Nausea Only       OBJECTIVE:      Vital Signs (Most Recent)  Temp: 100.3 °F (37.9 °C) (02/10/17 1000)  Pulse: 104 (02/10/17 1000)  Resp: (!) 27 (02/10/17 0707)  BP: 110/60 (02/10/17 1000)  SpO2: 100 % (02/10/17 1000)    Vital Signs Range (Last 24H):  Temp:  [99.1 °F (37.3 °C)-101.6 °F (38.7 °C)]   Pulse:  []   Resp:  [17-30]   BP: ()/(36-66)   SpO2:  [100 %]   Arterial Line BP: ()/(31-63)     I & O (Last 24H):  Intake/Output Summary (Last 24 hours) at 02/10/17 1020  Last data filed at 02/10/17 1000   Gross per 24 hour   Intake         25455.06 ml   Output             3177 ml   Net          7057.06 ml     Ventilator Data (Last 24H):     Vent Mode: A/C  Oxygen Concentration (%):  [] 50  Resp Rate Total:  [16 br/min-33 br/min] 25 br/min  Vt Set:  [420 mL-600 mL] 450 mL  PEEP/CPAP:  [5 cmH20] 5 cmH20  Pressure Support:  [0 cmH20-10 cmH20] 0 cmH20  Mean Airway Pressure:  [9.4 tqI44-22 cmH20] 9.4 cmH20    Hemodynamic Parameters (Last 24H):  PAP: (27-37)/(15-18) 27/15  PAP (Mean):  [21 mmHg-25 mmHg] 21 mmHg  PCWP:  [15 mmHg-18 mmHg] 16 mmHg  CO:  [4.6 L/min-7.6 L/min] 5.6 L/min  CI:  [2.2 L/min/m2-3.7 L/min/m2] 2.7 L/min/m2    Physical Exam:  General: well developed, intubated and sedated  Lungs: clear to auscultation bilaterally and mechanical breath sounds  Cardiovascular: Heart: tachycardic, no murmur. Chest Wall: pt sedated. Extremities: no cyanosis or edema, or clubbing. Pulses: 2+ and symmetric.  Neurologic: Mental status: sedated      Lines/Drains:       Introducer 02/09/17  right internal jugular (Active)   Specific Qualities [place] in place 2/10/2017  7:05 AM   Dressing Status Biopatch in place;Clean;Dry;Intact 2/10/2017  7:05 AM   Daily Line Review Performed 2/10/2017  7:05 AM   Number of days:1            Pulmonary Artery Catheter Assessment  02/09/17 0446 (Active)   Dressing biopatch in place;dressing dry and intact 2/10/2017  7:05 AM   Securement secured w/ sutures 2/10/2017  7:05 AM   Current Insertion Depth (cm) 52  cm 2/10/2017  7:05 AM   Phlebitis 0-->no symptoms 2/10/2017  7:05 AM   Infiltration 0-->no symptoms 2/10/2017  7:05 AM   Waveform normal 2/10/2017  7:05 AM   Pressure Catheter Interventions line leveled/zeroed 2/10/2017  7:05 AM   Prox Injectate Status Infusing 2/10/2017  7:05 AM   Prox Infusate Status Infusing 2/10/2017  7:05 AM   Distal Status Infusing 2/10/2017  7:05 AM   Daily Line Review Performed 2/10/2017  7:05 AM   Number of days:1            Peripheral IV - Single Lumen 02/09/17 0421 Right Hand (Active)   Site Assessment Clean;Dry;Intact;No redness;No swelling 2/10/2017  7:05 AM   Line Status Flushed;Saline locked 2/10/2017  7:05 AM   Dressing Status Clean;Dry;Intact 2/10/2017  7:05 AM   Dressing Change Due 02/13/17 2/10/2017  3:00 AM   Site Change Due 02/13/17 2/10/2017  3:00 AM   Reason Not Rotated Not due 2/10/2017  7:05 AM   Number of days:1            Peripheral IV - Single Lumen 02/09/17 0424 Left Wrist (Active)   Site Assessment Clean;Dry;Intact;No redness;No swelling 2/10/2017  7:05 AM   Line Status Infusing 2/10/2017  7:05 AM   Dressing Status Clean;Dry;Intact 2/10/2017  7:05 AM   Dressing Intervention Dressing reinforced 2/9/2017 11:00 PM   Dressing Change Due 02/13/17 2/10/2017  3:00 AM   Site Change Due 02/13/17 2/10/2017  3:00 AM   Reason Not Rotated Not due 2/10/2017  7:05 AM   Number of days:1            Arterial Line 02/09/17 0413 Left Radial (Active)   Site Assessment Clean;Dry;Intact;No redness;No swelling 2/10/2017  7:05 AM   Line Status Pulsatile blood flow 2/10/2017  7:05 AM   Art Line Waveform Other (Comment) 2/10/2017  7:05 AM   Arterial Line Interventions Connections checked and tightened;Flushed per protocol 2/10/2017  7:05 AM   Color/Movement/Sensation Capillary refill less than 3 sec 2/10/2017  7:05 AM   Dressing Type Transparent 2/10/2017  7:05 AM   Dressing Status Clean;Dry;Intact 2/10/2017  7:05 AM   Number of days:1            Arterial Line 02/09/17  Right Femoral (Active)    Site Assessment Clean;Dry;Intact;No redness;No swelling 2/10/2017  7:05 AM   Line Status Pulsatile blood flow 2/10/2017  7:05 AM   Art Line Waveform Appropriate;Square wave test performed 2/10/2017  7:05 AM   Arterial Line Interventions Zeroed and calibrated;Leveled;Connections checked and tightened;Flushed per protocol 2/10/2017  7:05 AM   Color/Movement/Sensation Capillary refill less than 3 sec 2/10/2017  7:05 AM   Dressing Type Transparent 2/10/2017  7:05 AM   Dressing Status Clean;Dry;Intact 2/10/2017  7:05 AM   Number of days:1            Pacer Wires 02/09/17 1238 (Active)   Pacer Wire Status Atrial wires connected to pacer 2/10/2017  7:05 AM   Site Assessment Clean;Dry;Intact 2/10/2017  7:05 AM   How Pacer Wires are Secured Pacing wires isolated and secured 2/10/2017  7:05 AM   Dressing Status Clean;Dry;Intact 2/10/2017  7:05 AM   Number of days:0            Chest Tube 02/09/17 1205 1 Right Mediastinal 32 Fr. (Active)   Chest Tube WDL ex 2/9/2017  2:10 PM   Function -20 cm H2O 2/10/2017  7:05 AM   Air Leak/Fluctuation air leak not present;fluctuation not present;dependent drainage cleared 2/10/2017  7:05 AM   Safety all tubing connections taped;all connections secured;suction checked 2/10/2017  7:05 AM   Securement tubing anchored to body distal to insertion site w/ tape 2/10/2017  7:05 AM   Patency Intervention Tip/tilt 2/10/2017  7:05 AM   Drainage Description Serosanguineous 2/10/2017  7:05 AM   Dressing Appearance occlusive gauze dressing intact;clean and dry 2/10/2017  7:05 AM   Left Subcutaneous Emphysema none present 2/10/2017  7:05 AM   Right Subcutaneous Emphysema none present 2/10/2017  7:05 AM   Site Assessment Not assessed 2/10/2017  7:05 AM   Surrounding Skin Unable to view 2/10/2017  7:05 AM   Output (mL) 0 mL 2/10/2017  9:00 AM   Number of days:0            Chest Tube 02/09/17 1206 2 Left Mediastinal 32 Fr. (Active)   Chest Tube WDL WDL 2/9/2017  2:10 PM   Function -20 cm H2O 2/10/2017  7:05  "AM   Air Leak/Fluctuation air leak not present;fluctuation not present 2/10/2017  7:05 AM   Safety all tubing connections taped;all connections secured;suction checked 2/10/2017  7:05 AM   Securement tubing anchored to body distal to insertion site w/ tape 2/10/2017  7:05 AM   Patency Intervention Tip/tilt 2/10/2017  7:05 AM   Drainage Description Serosanguineous 2/10/2017  7:05 AM   Dressing Appearance occlusive gauze dressing intact;clean and dry 2/10/2017  7:05 AM   Left Subcutaneous Emphysema none present 2/10/2017  7:05 AM   Right Subcutaneous Emphysema none present 2/10/2017  7:05 AM   Site Assessment Not assessed 2/10/2017  7:05 AM   Surrounding Skin Unable to view 2/10/2017  7:05 AM   Output (mL) 6 mL 2/10/2017  9:00 AM   Number of days:0            NG/OG Tube 02/09/17  (Active)   Placement Check placement verified by distal tube length measurement 2/10/2017  7:05 AM   Distal Tube Length (cm) 60 2/10/2017  7:05 AM   Tolerance no signs/symptoms of discomfort 2/10/2017  7:05 AM   Securement taped to commercial device 2/10/2017  3:00 AM   Clamp Status/Tolerance unclamped 2/10/2017  7:05 AM   Suction Setting/Drainage Method suction at;low;intermittent setting 2/10/2017  7:05 AM   Drainage Yellow 2/10/2017  7:05 AM   Intake (mL) 60 mL 2/9/2017  8:45 PM   Tube Output(mL)(Include Discarded Residual) 100 mL 2/10/2017  6:00 AM   Number of days:1            Urethral Catheter 02/09/17 0430 Temperature probe;Straight-tip;Non-latex 16 Fr. (Active)   Site Assessment Clean;Intact 2/10/2017  7:05 AM   Collection Container Urimeter 2/10/2017  7:05 AM   Securement Method secured to top of thigh w/ adhesive device 2/10/2017  7:05 AM   Catheter Care Performed yes 2/10/2017  7:05 AM   Reason for Continuing Urinary Catheterization Post operative 2/10/2017  7:05 AM   CAUTI Prevention Bundle StatLock in place w 1" slack;Intact seal between catheter & drainage tubing;Drainage bag off the floor;Green sheeting clip in use;No dependent " loops or kinks;Drainage bag not overfilled (<2/3 full);Drainage bag below bladder 2/10/2017  7:05 AM   Output (mL) 200 mL 2/10/2017 10:00 AM   Number of days:1       Laboratory (Last 24H):  CBC:   Recent Labs  Lab 02/10/17  0600   WBC 16.20*   RBC 3.79*   HGB 8.9*   HCT 26.3*      MCV 69*   MCH 23.5*   MCHC 33.8       CMP:   Recent Labs  Lab 02/10/17  0600   *   CALCIUM 8.8   ALBUMIN 2.9*   PROT 5.5*      K 3.9   CO2 26      BUN 23*   CREATININE 1.3   ALKPHOS 52*   ALT 65*   *   BILITOT 0.4       Coagulation:   Recent Labs  Lab 02/09/17  1807 02/10/17  0600   INR 1.5*  --    APTT  --  28.5       Cardiac markers: No results for input(s): CKMB, CPKMB, TROPONINT, TROPONINI, MYOGLOBIN in the last 168 hours.    ABGs:   Recent Labs  Lab 02/10/17  0715   PH 7.465*   PCO2 32.1*   PO2 156*   HCO3 23.1*   POCSATURATED 100   BE -1         ASSESSMENT/PLAN:   Patient is a 56 y.o. male with a hx of ischemic cardiomyopathy s/p LVAD placement s/p heart transplantation    Neuro:  - Sedation - Propofol  - Agitated when weaning attempted  - Pain control - Prn    Resp:  - Intubated on minimal vent settings and doing well  Vent Mode: A/C  Oxygen Concentration (%):  [] 50  Resp Rate Total:  [16 br/min-33 br/min] 25 br/min  Vt Set:  [420 mL-600 mL] 450 mL  PEEP/CPAP:  [5 cmH20] 5 cmH20  Pressure Support:  [0 cmH20-10 cmH20] 0 cmH20  Mean Airway Pressure:  [9.4 uoN75-26 cmH20] 9.4 cmH20   - Can attempt to wean today towards extubation    CV:  - Pressors weaned as tolerated with epi last    Heme/ID:  - H/H stabe  - Afebrile  - Mild leukocytosis; WBC 16    Renal:  - Ta in place; Good UOP  - Strict I/Os  - BUN Cr 23/1.3    FEN/GI:  - NPO  - OG in place  - Replace lytes PRN    Endo:  - Insulin gtt    Dispo:  - Continue ICU care    Fritz Quinones MD  PGY-2  Surgery Intensive Care Unit

## 2017-02-10 NOTE — PROGRESS NOTES
Progress Note  HTS    Admit Date: 1/30/2017   LOS: 11 days     SUBJECTIVE:     Follow up for: s/p OHTX    Interval History: POD 1 for OHTX; Intubated, sedated. Doing well hemodynamically.     Scheduled Meds:   albuterol-ipratropium 2.5mg-0.5mg/3mL  3 mL Nebulization Q4H    ascorbic acid (vitamin C)  500 mg Oral BID    ceFAZolin 2 g/50mL Dextrose IVPB  2 g Intravenous Q8H    docusate sodium  100 mg Oral BID    famotidine (PF)  20 mg Intravenous BID    fentaNYL        methylPREDNISolone sodium succinate  125 mg Intravenous Q8H    mupirocin  1 g Nasal BID    nystatin  500,000 Units Mouth/Throat QID (WM & HS)    polyethylene glycol  17 g Oral Daily     Continuous Infusions:   sodium chloride 0.45% 10 mL/hr (02/10/17 0700)    DOBUTamine 5 mcg/kg/min (02/10/17 0700)    epinephrine 0.1 mcg/kg/min (02/10/17 0700)    insulin (HUMAN R) infusion (adults) 2 Units/hr (02/10/17 0700)    nicardipine 1 mg/hr (02/10/17 0600)    nitric oxide gas      norepinephrine bitartrate-D5W Stopped (02/10/17 0005)    propofol 30 mcg/kg/min (02/10/17 0700)    vasopressin (PITRESSIN) infusion 0.02 Units/min (02/10/17 0700)     PRN Meds:sodium chloride, sodium chloride, acetaminophen, dextrose 50%, dextrose 50%, fentaNYL, magnesium sulfate IVPB, metoclopramide HCl, ondansetron, potassium chloride **AND** potassium chloride **AND** potassium chloride, sodium phosphate IVPB, sodium phosphate IVPB, sodium phosphate IVPB    Review of patient's allergies indicates:   Allergen Reactions    Levemir [insulin detemir] Itching    Pork/porcine containing products     Ranexa [ranolazine] Nausea Only       OBJECTIVE:     Vital Signs (Most Recent)  Temp: 99.9 °F (37.7 °C) (02/10/17 0700)  Pulse: 90 (02/10/17 0730)  Resp: (!) 27 (02/10/17 0707)  BP: (!) 126/59 (02/10/17 0730)  SpO2: 100 % (02/10/17 0730)    Vital Signs Range (Last 24H):  Temp:  [99.1 °F (37.3 °C)-101.6 °F (38.7 °C)]   Pulse:  []   Resp:  [17-30]   BP: ()/(36-66)    SpO2:  [100 %]   Arterial Line BP: ()/(31-63)     I & O (Last 24H):  Intake/Output Summary (Last 24 hours) at 02/10/17 0803  Last data filed at 02/10/17 0700   Gross per 24 hour   Intake         67735.06 ml   Output             3006 ml   Net          7528.06 ml       PAP: (30-37)/(15-18) 32/15  PAP (Mean):  [22 mmHg-25 mmHg] 22 mmHg  PCWP:  [15 mmHg-18 mmHg] 15 mmHg  CO:  [4.6 L/min-7.6 L/min] 4.9 L/min  CI:  [2.2 L/min/m2-3.7 L/min/m2] 2.4 L/min/m2    Telemetry: NSR. No acute events overnight.     Physical Exam:    Constitutional: Intubated, sedated  Neck: No JVD present. No carotid bruits present.  Cardiovascular: S1 S2 audible, regular rate and rhythm. No murmurs, rubs or gallops. Intact distal pulses.    Pulmonary/Chest: Clear to auscultation bilaterally, no wheezes, rhonchi or rales.  No respiratory distress.   Abdominal: Soft. Normoactive bowel sounds x 4. Non tender to palpation, no distention. No rebound or guarding.   Extremities: No edema. No cyanosis or clubbing. Warm and dry.    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]  Unknown    Immunosuppression [D89.9]  Unknown    Benign prostatic hyperplasia [N40.0]  Yes    Elevated LDH [R74.0]  Yes    Non morbid obesity due to excess calories [E66.09]  Yes    Ischemic cardiomyopathy [I25.5]  Yes    Type 2 diabetes mellitus with stage 3 chronic kidney disease, with long-term current use of insulin [E11.22, N18.3, Z79.4]  Not Applicable     Chronic      Resolved Hospital Problems    Diagnosis Date Resolved POA    *Presence of automatic implantable cardioverter-defibrillator [Z95.810] 02/09/2017 Yes     Chronic    LVAD (left ventricular assist device) present [Z95.811] 02/09/2017 Not Applicable    LVAD (left ventricular assist device) present [Z95.811] 02/09/2017 Not Applicable    Presence of heart  assist device [Z95.811] 02/09/2017 Not Applicable           ASSESSMENT/PLAN:     1. S/p OHTX POD 1   -CTS Primary   -Doing well; Continue , Epi; Would add Lasix gtt   -Will start immunosuppression; Solumedrol 40 mg IVP q12 hours, Prograf 1 mg po bid starting tonight, and Cellcept 1500 mg po bid   -A pace at 100 bpm    Lee Ann Okeefe PA-C  [id number]

## 2017-02-11 LAB
ALBUMIN SERPL BCP-MCNC: 2.6 G/DL
ALBUMIN SERPL BCP-MCNC: 2.7 G/DL
ALLENS TEST: ABNORMAL
ALP SERPL-CCNC: 67 U/L
ALP SERPL-CCNC: 68 U/L
ALT SERPL W/O P-5'-P-CCNC: 68 U/L
ALT SERPL W/O P-5'-P-CCNC: 83 U/L
ANION GAP SERPL CALC-SCNC: 11 MMOL/L
ANION GAP SERPL CALC-SCNC: 9 MMOL/L
ANISOCYTOSIS BLD QL SMEAR: SLIGHT
AST SERPL-CCNC: 116 U/L
AST SERPL-CCNC: 79 U/L
BACTERIA UR CULT: NORMAL
BASO STIPL BLD QL SMEAR: ABNORMAL
BASOPHILS # BLD AUTO: 0 K/UL
BASOPHILS # BLD AUTO: 0 K/UL
BASOPHILS # BLD AUTO: 0.01 K/UL
BASOPHILS # BLD AUTO: ABNORMAL K/UL
BASOPHILS # BLD AUTO: ABNORMAL K/UL
BASOPHILS NFR BLD: 0 %
BILIRUB SERPL-MCNC: 0.6 MG/DL
BILIRUB SERPL-MCNC: 0.7 MG/DL
BLD PROD TYP BPU: NORMAL
BLOOD UNIT EXPIRATION DATE: NORMAL
BLOOD UNIT TYPE CODE: 6200
BLOOD UNIT TYPE: NORMAL
BUN SERPL-MCNC: 32 MG/DL
BUN SERPL-MCNC: 39 MG/DL
BURR CELLS BLD QL SMEAR: ABNORMAL
CALCIUM SERPL-MCNC: 8.3 MG/DL
CALCIUM SERPL-MCNC: 8.6 MG/DL
CHLORIDE SERPL-SCNC: 103 MMOL/L
CHLORIDE SERPL-SCNC: 103 MMOL/L
CO2 SERPL-SCNC: 25 MMOL/L
CO2 SERPL-SCNC: 28 MMOL/L
CODING SYSTEM: NORMAL
CREAT SERPL-MCNC: 1 MG/DL
CREAT SERPL-MCNC: 1.1 MG/DL
DELSYS: ABNORMAL
DIFFERENTIAL METHOD: ABNORMAL
DISPENSE STATUS: NORMAL
EOSINOPHIL # BLD AUTO: 0 K/UL
EOSINOPHIL # BLD AUTO: ABNORMAL K/UL
EOSINOPHIL # BLD AUTO: ABNORMAL K/UL
EOSINOPHIL NFR BLD: 0 %
ERYTHROCYTE [DISTWIDTH] IN BLOOD BY AUTOMATED COUNT: 27 %
ERYTHROCYTE [DISTWIDTH] IN BLOOD BY AUTOMATED COUNT: 27 %
ERYTHROCYTE [DISTWIDTH] IN BLOOD BY AUTOMATED COUNT: 27.1 %
ERYTHROCYTE [DISTWIDTH] IN BLOOD BY AUTOMATED COUNT: 27.3 %
ERYTHROCYTE [DISTWIDTH] IN BLOOD BY AUTOMATED COUNT: 27.4 %
ERYTHROCYTE [SEDIMENTATION RATE] IN BLOOD BY WESTERGREN METHOD: 14 MM/H
ERYTHROCYTE [SEDIMENTATION RATE] IN BLOOD BY WESTERGREN METHOD: 20 MM/H
ERYTHROCYTE [SEDIMENTATION RATE] IN BLOOD BY WESTERGREN METHOD: 24 MM/H
EST. GFR  (AFRICAN AMERICAN): >60 ML/MIN/1.73 M^2
EST. GFR  (AFRICAN AMERICAN): >60 ML/MIN/1.73 M^2
EST. GFR  (NON AFRICAN AMERICAN): >60 ML/MIN/1.73 M^2
EST. GFR  (NON AFRICAN AMERICAN): >60 ML/MIN/1.73 M^2
FIBRINOGEN PPP-MCNC: 488 MG/DL
FIBRINOGEN PPP-MCNC: 523 MG/DL
FIBRINOGEN PPP-MCNC: 543 MG/DL
FIBRINOGEN PPP-MCNC: 552 MG/DL
FIBRINOGEN PPP-MCNC: 566 MG/DL
FIO2: 100
FIO2: 50
FIO2: 50
FLOW: 12
FLOW: 5
GIANT PLATELETS BLD QL SMEAR: PRESENT
GIANT PLATELETS BLD QL SMEAR: PRESENT
GLUCOSE SERPL-MCNC: 143 MG/DL
GLUCOSE SERPL-MCNC: 226 MG/DL
HCO3 UR-SCNC: 26.2 MMOL/L (ref 24–28)
HCO3 UR-SCNC: 26.8 MMOL/L (ref 24–28)
HCO3 UR-SCNC: 27 MMOL/L (ref 24–28)
HCO3 UR-SCNC: 29.8 MMOL/L (ref 24–28)
HCT VFR BLD AUTO: 25.8 %
HCT VFR BLD AUTO: 26.7 %
HCT VFR BLD AUTO: 27.2 %
HCT VFR BLD AUTO: 27.3 %
HCT VFR BLD AUTO: 28 %
HCV LOG: <1.08 LOG (10) IU/ML
HCV RNA QUANT PCR: <12 IU/ML
HCV, QUALITATIVE: NOT DETECTED IU/ML
HGB BLD-MCNC: 8.3 G/DL
HGB BLD-MCNC: 8.9 G/DL
HGB BLD-MCNC: 9 G/DL
HGB BLD-MCNC: 9 G/DL
HGB BLD-MCNC: 9.1 G/DL
HYPOCHROMIA BLD QL SMEAR: ABNORMAL
INR PPP: 1.6
LDH SERPL L TO P-CCNC: 0.91 MMOL/L (ref 0.36–1.25)
LDH SERPL L TO P-CCNC: 1.08 MMOL/L (ref 0.36–1.25)
LYMPHOCYTES # BLD AUTO: 0.8 K/UL
LYMPHOCYTES # BLD AUTO: 0.8 K/UL
LYMPHOCYTES # BLD AUTO: 0.9 K/UL
LYMPHOCYTES # BLD AUTO: ABNORMAL K/UL
LYMPHOCYTES # BLD AUTO: ABNORMAL K/UL
LYMPHOCYTES NFR BLD: 0.5 %
LYMPHOCYTES NFR BLD: 2.5 %
LYMPHOCYTES NFR BLD: 3.4 %
LYMPHOCYTES NFR BLD: 3.5 %
LYMPHOCYTES NFR BLD: 3.6 %
MAGNESIUM SERPL-MCNC: 2 MG/DL
MAGNESIUM SERPL-MCNC: 2.1 MG/DL
MAGNESIUM SERPL-MCNC: 2.1 MG/DL
MAGNESIUM SERPL-MCNC: 2.3 MG/DL
MAGNESIUM SERPL-MCNC: 2.7 MG/DL
MCH RBC QN AUTO: 22.6 PG
MCH RBC QN AUTO: 22.9 PG
MCH RBC QN AUTO: 23 PG
MCH RBC QN AUTO: 23.4 PG
MCH RBC QN AUTO: 23.5 PG
MCHC RBC AUTO-ENTMCNC: 31.8 %
MCHC RBC AUTO-ENTMCNC: 32.2 %
MCHC RBC AUTO-ENTMCNC: 33.1 %
MCHC RBC AUTO-ENTMCNC: 33.3 %
MCHC RBC AUTO-ENTMCNC: 33.7 %
MCV RBC AUTO: 69 FL
MCV RBC AUTO: 69 FL
MCV RBC AUTO: 70 FL
MCV RBC AUTO: 71 FL
MCV RBC AUTO: 71 FL
METAMYELOCYTES NFR BLD MANUAL: 0.5 %
METAMYELOCYTES NFR BLD MANUAL: 0.5 %
MIN VOL: 11
MIN VOL: 11
MODE: ABNORMAL
MONOCYTES # BLD AUTO: 1 K/UL
MONOCYTES # BLD AUTO: 1 K/UL
MONOCYTES # BLD AUTO: 1.2 K/UL
MONOCYTES # BLD AUTO: ABNORMAL K/UL
MONOCYTES # BLD AUTO: ABNORMAL K/UL
MONOCYTES NFR BLD: 1 %
MONOCYTES NFR BLD: 3.5 %
MONOCYTES NFR BLD: 4.2 %
MONOCYTES NFR BLD: 4.6 %
MONOCYTES NFR BLD: 5.2 %
NEUTROPHILS # BLD AUTO: 19.9 K/UL
NEUTROPHILS # BLD AUTO: 21.1 K/UL
NEUTROPHILS # BLD AUTO: 22.5 K/UL
NEUTROPHILS NFR BLD: 88.5 %
NEUTROPHILS NFR BLD: 91.4 %
NEUTROPHILS NFR BLD: 91.8 %
NEUTROPHILS NFR BLD: 92.3 %
NEUTROPHILS NFR BLD: 92.5 %
NEUTS BAND NFR BLD MANUAL: 5 %
NEUTS BAND NFR BLD MANUAL: 5.5 %
NRBC BLD-RTO: ABNORMAL /100 WBC
OVALOCYTES BLD QL SMEAR: ABNORMAL
PCO2 BLDA: 34.9 MMHG (ref 35–45)
PCO2 BLDA: 35.7 MMHG (ref 35–45)
PCO2 BLDA: 35.8 MMHG (ref 35–45)
PCO2 BLDA: 40.9 MMHG (ref 35–45)
PEEP: 5
PEEP: 5
PH SMN: 7.47 [PH] (ref 7.35–7.45)
PH SMN: 7.48 [PH] (ref 7.35–7.45)
PH SMN: 7.49 [PH] (ref 7.35–7.45)
PH SMN: 7.49 [PH] (ref 7.35–7.45)
PHOSPHATE SERPL-MCNC: 4.4 MG/DL
PHOSPHATE SERPL-MCNC: 4.9 MG/DL
PHOSPHATE SERPL-MCNC: 5.2 MG/DL
PHOSPHATE SERPL-MCNC: 5.5 MG/DL
PHOSPHATE SERPL-MCNC: 5.7 MG/DL
PIP: 24
PLATELET # BLD AUTO: 206 K/UL
PLATELET # BLD AUTO: 234 K/UL
PLATELET # BLD AUTO: 243 K/UL
PLATELET # BLD AUTO: 245 K/UL
PLATELET # BLD AUTO: 245 K/UL
PLATELET BLD QL SMEAR: ABNORMAL
PMV BLD AUTO: ABNORMAL FL
PO2 BLDA: 27 MMHG (ref 40–60)
PO2 BLDA: 51 MMHG (ref 80–100)
PO2 BLDA: 55 MMHG (ref 80–100)
PO2 BLDA: 95 MMHG (ref 80–100)
POC BE: 3 MMOL/L
POC BE: 3 MMOL/L
POC BE: 4 MMOL/L
POC BE: 6 MMOL/L
POC SATURATED O2: 54 % (ref 95–100)
POC SATURATED O2: 88 % (ref 95–100)
POC SATURATED O2: 90 % (ref 95–100)
POC SATURATED O2: 98 % (ref 95–100)
POC TCO2: 27 MMOL/L (ref 23–27)
POC TCO2: 28 MMOL/L (ref 23–27)
POC TCO2: 28 MMOL/L (ref 23–27)
POC TCO2: 31 MMOL/L (ref 24–29)
POCT GLUCOSE: 133 MG/DL (ref 70–110)
POCT GLUCOSE: 137 MG/DL (ref 70–110)
POCT GLUCOSE: 150 MG/DL (ref 70–110)
POCT GLUCOSE: 151 MG/DL (ref 70–110)
POCT GLUCOSE: 156 MG/DL (ref 70–110)
POCT GLUCOSE: 157 MG/DL (ref 70–110)
POCT GLUCOSE: 158 MG/DL (ref 70–110)
POCT GLUCOSE: 180 MG/DL (ref 70–110)
POCT GLUCOSE: 180 MG/DL (ref 70–110)
POCT GLUCOSE: 185 MG/DL (ref 70–110)
POCT GLUCOSE: 187 MG/DL (ref 70–110)
POCT GLUCOSE: 188 MG/DL (ref 70–110)
POCT GLUCOSE: 188 MG/DL (ref 70–110)
POCT GLUCOSE: 210 MG/DL (ref 70–110)
POCT GLUCOSE: 243 MG/DL (ref 70–110)
POCT GLUCOSE: 244 MG/DL (ref 70–110)
POCT GLUCOSE: 246 MG/DL (ref 70–110)
POCT GLUCOSE: 257 MG/DL (ref 70–110)
POIKILOCYTOSIS BLD QL SMEAR: ABNORMAL
POIKILOCYTOSIS BLD QL SMEAR: SLIGHT
POLYCHROMASIA BLD QL SMEAR: ABNORMAL
POTASSIUM SERPL-SCNC: 3.7 MMOL/L
POTASSIUM SERPL-SCNC: 3.9 MMOL/L
POTASSIUM SERPL-SCNC: 4 MMOL/L
POTASSIUM SERPL-SCNC: 4.2 MMOL/L
PROT SERPL-MCNC: 5.4 G/DL
PROT SERPL-MCNC: 5.6 G/DL
PROTHROMBIN TIME: 16.3 SEC
PROVIDER CREDENTIALS: ABNORMAL
PROVIDER NOTIFIED: ABNORMAL
PS: 10
RBC # BLD AUTO: 3.62 M/UL
RBC # BLD AUTO: 3.85 M/UL
RBC # BLD AUTO: 3.88 M/UL
RBC # BLD AUTO: 3.92 M/UL
RBC # BLD AUTO: 3.93 M/UL
SAMPLE: ABNORMAL
SCHISTOCYTES BLD QL SMEAR: ABNORMAL
SCHISTOCYTES BLD QL SMEAR: PRESENT
SCHISTOCYTES BLD QL SMEAR: PRESENT
SITE: ABNORMAL
SODIUM SERPL-SCNC: 139 MMOL/L
SODIUM SERPL-SCNC: 140 MMOL/L
SP02: 100
SP02: 100
SP02: 89
SP02: 92
SPONT RATE: 30
TARGETS BLD QL SMEAR: ABNORMAL
TIME NOTIFIED: 1432
TRANS ERYTHROCYTES VOL PATIENT: NORMAL ML
VT: 450
WBC # BLD AUTO: 18.8 K/UL
WBC # BLD AUTO: 21.93 K/UL
WBC # BLD AUTO: 23.21 K/UL
WBC # BLD AUTO: 24.56 K/UL
WBC # BLD AUTO: 26.79 K/UL

## 2017-02-11 PROCEDURE — 94003 VENT MGMT INPAT SUBQ DAY: CPT

## 2017-02-11 PROCEDURE — 27100171 HC OXYGEN HIGH FLOW UP TO 24 HOURS

## 2017-02-11 PROCEDURE — 99233 SBSQ HOSP IP/OBS HIGH 50: CPT | Mod: GC,,, | Performed by: SURGERY

## 2017-02-11 PROCEDURE — 99900017 HC EXTUBATION W/PARAMETERS (STAT)

## 2017-02-11 PROCEDURE — 82803 BLOOD GASES ANY COMBINATION: CPT

## 2017-02-11 PROCEDURE — 63600175 PHARM REV CODE 636 W HCPCS: Performed by: INTERNAL MEDICINE

## 2017-02-11 PROCEDURE — 80053 COMPREHEN METABOLIC PANEL: CPT | Mod: 91

## 2017-02-11 PROCEDURE — 99233 SBSQ HOSP IP/OBS HIGH 50: CPT | Mod: ,,, | Performed by: INTERNAL MEDICINE

## 2017-02-11 PROCEDURE — 84100 ASSAY OF PHOSPHORUS: CPT | Mod: 91

## 2017-02-11 PROCEDURE — 85027 COMPLETE CBC AUTOMATED: CPT

## 2017-02-11 PROCEDURE — 85007 BL SMEAR W/DIFF WBC COUNT: CPT

## 2017-02-11 PROCEDURE — 25000003 PHARM REV CODE 250: Performed by: THORACIC SURGERY (CARDIOTHORACIC VASCULAR SURGERY)

## 2017-02-11 PROCEDURE — 63600175 PHARM REV CODE 636 W HCPCS: Performed by: THORACIC SURGERY (CARDIOTHORACIC VASCULAR SURGERY)

## 2017-02-11 PROCEDURE — 84132 ASSAY OF SERUM POTASSIUM: CPT

## 2017-02-11 PROCEDURE — 85384 FIBRINOGEN ACTIVITY: CPT | Mod: 91

## 2017-02-11 PROCEDURE — 25000003 PHARM REV CODE 250: Performed by: INTERNAL MEDICINE

## 2017-02-11 PROCEDURE — 27000221 HC OXYGEN, UP TO 24 HOURS

## 2017-02-11 PROCEDURE — 99900035 HC TECH TIME PER 15 MIN (STAT)

## 2017-02-11 PROCEDURE — 63600175 PHARM REV CODE 636 W HCPCS: Performed by: STUDENT IN AN ORGANIZED HEALTH CARE EDUCATION/TRAINING PROGRAM

## 2017-02-11 PROCEDURE — 94799 UNLISTED PULMONARY SVC/PX: CPT

## 2017-02-11 PROCEDURE — 83735 ASSAY OF MAGNESIUM: CPT | Mod: 91

## 2017-02-11 PROCEDURE — 85025 COMPLETE CBC W/AUTO DIFF WBC: CPT | Mod: 91

## 2017-02-11 PROCEDURE — 94640 AIRWAY INHALATION TREATMENT: CPT

## 2017-02-11 PROCEDURE — 37799 UNLISTED PX VASCULAR SURGERY: CPT

## 2017-02-11 PROCEDURE — 25000242 PHARM REV CODE 250 ALT 637 W/ HCPCS: Performed by: INTERNAL MEDICINE

## 2017-02-11 PROCEDURE — 94150 VITAL CAPACITY TEST: CPT

## 2017-02-11 PROCEDURE — 83605 ASSAY OF LACTIC ACID: CPT

## 2017-02-11 PROCEDURE — 84100 ASSAY OF PHOSPHORUS: CPT

## 2017-02-11 PROCEDURE — 20000000 HC ICU ROOM

## 2017-02-11 PROCEDURE — 94761 N-INVAS EAR/PLS OXIMETRY MLT: CPT

## 2017-02-11 PROCEDURE — 85610 PROTHROMBIN TIME: CPT

## 2017-02-11 PROCEDURE — 25000003 PHARM REV CODE 250: Performed by: PHYSICIAN ASSISTANT

## 2017-02-11 PROCEDURE — 99900026 HC AIRWAY MAINTENANCE (STAT)

## 2017-02-11 PROCEDURE — 80053 COMPREHEN METABOLIC PANEL: CPT

## 2017-02-11 RX ADMIN — OXYCODONE HYDROCHLORIDE AND ACETAMINOPHEN 500 MG: 500 TABLET ORAL at 09:02

## 2017-02-11 RX ADMIN — MUPIROCIN 1 G: 20 OINTMENT TOPICAL at 09:02

## 2017-02-11 RX ADMIN — FAMOTIDINE 20 MG: 10 INJECTION, SOLUTION INTRAVENOUS at 09:02

## 2017-02-11 RX ADMIN — DOCUSATE SODIUM 100 MG: 100 CAPSULE, LIQUID FILLED ORAL at 09:02

## 2017-02-11 RX ADMIN — POTASSIUM CHLORIDE 20 MEQ: 200 INJECTION, SOLUTION INTRAVENOUS at 01:02

## 2017-02-11 RX ADMIN — NYSTATIN 500000 UNITS: 500000 SUSPENSION ORAL at 09:02

## 2017-02-11 RX ADMIN — EPINEPHRINE 0.02 MCG/KG/MIN: 1 INJECTION PARENTERAL at 08:02

## 2017-02-11 RX ADMIN — IPRATROPIUM BROMIDE AND ALBUTEROL SULFATE 3 ML: .5; 3 SOLUTION RESPIRATORY (INHALATION) at 11:02

## 2017-02-11 RX ADMIN — OXYCODONE HYDROCHLORIDE 10 MG: 5 SOLUTION ORAL at 01:02

## 2017-02-11 RX ADMIN — NYSTATIN 500000 UNITS: 500000 SUSPENSION ORAL at 02:02

## 2017-02-11 RX ADMIN — OXYCODONE HYDROCHLORIDE 10 MG: 5 SOLUTION ORAL at 09:02

## 2017-02-11 RX ADMIN — IPRATROPIUM BROMIDE AND ALBUTEROL SULFATE 3 ML: .5; 3 SOLUTION RESPIRATORY (INHALATION) at 03:02

## 2017-02-11 RX ADMIN — METHYLPREDNISOLONE SODIUM SUCCINATE 40 MG: 40 INJECTION, POWDER, FOR SOLUTION INTRAMUSCULAR; INTRAVENOUS at 09:02

## 2017-02-11 RX ADMIN — OXYCODONE HYDROCHLORIDE 10 MG: 5 SOLUTION ORAL at 06:02

## 2017-02-11 RX ADMIN — EPINEPHRINE 0.06 MCG/KG/MIN: 1 INJECTION PARENTERAL at 06:02

## 2017-02-11 RX ADMIN — Medication 1 MG: at 01:02

## 2017-02-11 RX ADMIN — POTASSIUM CHLORIDE 20 MEQ: 200 INJECTION, SOLUTION INTRAVENOUS at 06:02

## 2017-02-11 RX ADMIN — NICARDIPINE HYDROCHLORIDE 5 MG/HR: 0.2 INJECTION, SOLUTION INTRAVENOUS at 08:02

## 2017-02-11 RX ADMIN — IPRATROPIUM BROMIDE AND ALBUTEROL SULFATE 3 ML: .5; 3 SOLUTION RESPIRATORY (INHALATION) at 08:02

## 2017-02-11 RX ADMIN — OXYCODONE HYDROCHLORIDE 10 MG: 5 SOLUTION ORAL at 04:02

## 2017-02-11 RX ADMIN — IPRATROPIUM BROMIDE AND ALBUTEROL SULFATE 3 ML: .5; 3 SOLUTION RESPIRATORY (INHALATION) at 06:02

## 2017-02-11 RX ADMIN — PROPOFOL 20 MCG/KG/MIN: 10 INJECTION, EMULSION INTRAVENOUS at 03:02

## 2017-02-11 RX ADMIN — OXYCODONE HYDROCHLORIDE 10 MG: 5 SOLUTION ORAL at 10:02

## 2017-02-11 RX ADMIN — Medication 1000 MG: at 10:02

## 2017-02-11 RX ADMIN — NYSTATIN 500000 UNITS: 500000 SUSPENSION ORAL at 06:02

## 2017-02-11 RX ADMIN — Medication 1000 MG: at 09:02

## 2017-02-11 RX ADMIN — POTASSIUM CHLORIDE 20 MEQ: 200 INJECTION, SOLUTION INTRAVENOUS at 08:02

## 2017-02-11 RX ADMIN — DOBUTAMINE IN DEXTROSE 2.5 MCG/KG/MIN: 200 INJECTION, SOLUTION INTRAVENOUS at 10:02

## 2017-02-11 RX ADMIN — CEFAZOLIN SODIUM 2 G: 2 SOLUTION INTRAVENOUS at 04:02

## 2017-02-11 RX ADMIN — POLYETHYLENE GLYCOL 3350 17 G: 17 POWDER, FOR SOLUTION ORAL at 09:02

## 2017-02-11 NOTE — PROGRESS NOTES
This note also relates to the following rows which could not be included:  SpO2 - Cannot attach notes to unvalidated device data  Pulse - Cannot attach notes to unvalidated device data  BP - Cannot attach notes to unvalidated device data    Pt extubated to 5ppm on 5L humidified O2, RSBI 80/ / [id number]/ NIF -31. Repeat ABG at 1200. MD Asher aware.

## 2017-02-11 NOTE — NURSING
Spoke with Dr. Loera regarding pt decrease in urine output to 60-90cc/hr, CVP 12, and cardene at 10mg/hr. No new orders at this time.

## 2017-02-11 NOTE — PROGRESS NOTES
"General Surgery Daily Progress Note    Mick Barriga  56 y.o.    Hospital Day: 12    Post Op Day: 1 Day Post-Op       Subjective  Very stable overnight. No acute events. Doing well. Pt weaned off vent today. No new complaints or concerns today.       Objective  Temp:  [98.5 °F (36.9 °C)-100.5 °F (38.1 °C)]   Pulse:  [104-105]   Resp:  [21-29]   BP: ()/(51-67)   SpO2:  [89 %-100 %]   Arterial Line BP: ()/(39-61)     Labs    Recent Labs  Lab 02/11/17  1127   WBC 23.21*   RBC 3.92*   HGB 9.0*   HCT 27.2*      MCV 69*   MCH 23.0*   MCHC 33.1       Recent Labs  Lab 02/11/17  0505 02/11/17  1128   CALCIUM 8.3*  --    PROT 5.4*  --      --    K 3.9  3.9 3.7   CO2 25  --      --    BUN 32*  --    CREATININE 1.1  --    ALKPHOS 68  --    ALT 83*  --    *  --    BILITOT 0.6  --        Recent Labs  Lab 02/11/17  0505   INR 1.6*       Visit Vitals    /61    Pulse 104    Temp 98.5 °F (36.9 °C) (Core)    Resp (!) 29    Ht 5' 8" (1.727 m)    Wt 95.5 kg (210 lb 8.6 oz)    SpO2 (!) 91%    BMI 32.01 kg/m2       General: NAD, appears stated age,  Head: Normocephalic, without obvious abnormality, atraumatic  Neck: Supple  Lung: CTAB, comfortable respirations on minimal supplemental O2  Heart: AV paced  Abdomen: soft, NT/ND, normal bowel sounds  Extremities: normal, atraumatic, no edema  Neurologic: Alert and oriented, no focal deficits    Imaging Results         X-Ray Chest AP Portable (Final result) Result time:  02/11/17 08:50:25    Final result by Krishna Alvarado MD (02/11/17 08:50:25)    Impression:     Unchanged position of the support devices. No significant change in appearance chest.      Electronically signed by: KRISHNA ALVARADO MD  Date:     02/11/17  Time:    08:50     Narrative:    PORTABLE AP CHEST:      Comparison: 2/10/17    Findings            X-Ray Chest AP Portable (Final result) Result time:  02/10/17 07:57:20    Final result by Bonilla Bo III, MD " (02/10/17 07:57:20)    Narrative:    One view: Tubes and lines appropriate.  There is postoperative change, cardiomegaly, moderate edema, and no change.      Electronically signed by: BAM GREEN  Date:     02/10/17  Time:    07:57             X-Ray Chest AP Portable (Final result) Result time:  02/09/17 14:54:16    Final result by Ed Waller Jr., MD (02/09/17 14:54:16)    Impression:     Small left inferolateral pneumothorax.    Curvilinear metallic density likely residual wire from the AICD removal.    Patchy parenchymal opacification bilaterally.    Findings discussed with Dr. Sukhdev Sinclair at 1453      Electronically signed by: ED WALLER MD  Date:     02/09/17  Time:    14:54     Narrative:    Chest single view compared to 01/19/2017.  Residual wire overlies the innominate vein toward the SVC likely from AICD removal.  ET tube, NG tube, [place] catheter and mediastinal drain noted.  Heart size is mildly enlarged.  Slight increase in the pulmonary vascular markings.  Some patchy parenchymal opacification bilaterally left greater than right.  There is a pneumothorax at the left lung base laterally.                Assessment/Plan  56 y.o.yo male s/p TRANSPLANT-HEART (N/A), REMOVAL-PACEMAKER (Left), Removal-LEFT VENTRICULAR ASSIST DEVICE (N/A) on 2/10/17      Neuro: Alert and oriented, pain controlled  -prn PO pain medications for longer term pain control    CV: HDS, on epi, weaning vaso, and minimal nitric  - continue swan and tele  - will slowly wean epi 0.01 q 12 hours as tolerated    Pulm: On minimal vent settings, gas wnl, nitric at 30    Recent Labs  Lab 02/11/17  1432   PH 7.483*   PCO2 35.8   PO2 51*   HCO3 26.8   POCSATURATED 88*   BE 3     - extubated this morning to nasal nitric    Renal: Adequate uop, no evidence of barrie  Recent Labs      02/11/17   0505   BUN  32*   CREATININE  1.1   - Lasix gtt at 10/hr  - Strict in/out    Hem/ID: H/h stable, leukocytosis, afebrile  Recent Labs       02/11/17   1127   HGB  9.0*   HCT  27.2*   WBC  23.21*   - Immunosuppression per heart failure team, currently receiving pulse steroids  - prophylaxis with nystatin and ancef postoperatively    Endocrine: On insulin gtt for postoperative hyperglycemia  - appreciate endocrine recs    FEN/GI: Will begin diet once passes swallow  - Replace lytes prn  - Aggressive bowel regimen    Dispo: Continue ICU level care      Lenin Asher MD PGY II  [phone]

## 2017-02-11 NOTE — PROGRESS NOTES
"[facility]  Endocrinology  Progress Note    Admit Date: 1/30/2017     Reason for Consult: Management of T2DM     Surgical Procedure and Date: 2/9/17 s/p heart txp    Diabetes diagnosis year: 1996    Home Diabetes Medications:  Lantus 16 u qam, Novolog 20 U AC     How often checking glucose at home? STEVEN  BG readings on regimen: STEVEN  Hypoglycemia on the regimen?  STEVEN  Missed doses on regimen?  STEVEN    Diabetes Complications include:  Hyperglycemia, Diabetic chronic kidney disease and Diabetic gastroparesis      Complicating diabetes co morbidities: CHF and CKD    HPI: Patient is a 56 y.o. male with T2DM, ischemic cardiomyopathy, s/p LVAD placement in 8/2016. He is not s/p heart transplantation. Endocrine consulted for bg management.           Interval HPI:   Overnight events: Patient remains intubated. Bg at or near goal.   Eating:   NPO  Nausea: No  Hypoglycemia and intervention: No  Fever: Yes, 100.6  TPN and/or TF: No  If yes, type of TF/TPN and rate: no    Visit Vitals    BP (!) 121/58    Pulse 104    Temp 98.6 °F (37 °C) (Oral)    Resp (!) 25    Ht 5' 8" (1.727 m)    Wt 95.5 kg (210 lb 8.6 oz)    SpO2 100%    BMI 32.01 kg/m2       Labs Reviewed and Include      Recent Labs  Lab 02/11/17  0505   *   CALCIUM 8.3*   ALBUMIN 2.6*   PROT 5.4*      K 3.9  3.9   CO2 25      BUN 32*   CREATININE 1.1   ALKPHOS 68   ALT 83*   *   BILITOT 0.6     Lab Results   Component Value Date    WBC 24.56 (H) 02/11/2017    HGB 9.0 (L) 02/11/2017    HCT 26.7 (L) 02/11/2017    MCV 69 (L) 02/11/2017     02/11/2017     No results for input(s): TSH, FREET4 in the last 168 hours.  Lab Results   Component Value Date    HGBA1C 6.8 (H) 02/09/2017       Nutritional status:   Body mass index is 32.01 kg/(m^2).  Lab Results   Component Value Date    ALBUMIN 2.6 (L) 02/11/2017    ALBUMIN 2.7 (L) 02/10/2017    ALBUMIN 2.9 (L) 02/10/2017     Lab Results   Component Value Date    PREALBUMIN " 19 (L) 02/08/2017    PREALBUMIN 14 (L) 02/06/2017    PREALBUMIN 14 (L) 02/03/2017       Estimated Creatinine Clearance: 86 mL/min (based on Cr of 1.1).    Accu-Checks  Recent Labs      02/10/17   2310  02/10/17   2357  02/11/17   0108  02/11/17   0206  02/11/17   0304  02/11/17   0417  02/11/17   0504  02/11/17   0604  02/11/17   0713  02/11/17   0800   POCTGLUCOSE  137*  151*  133*  156*  180*  180*  257*  246*  243*  244*       Current Medications and/or Treatments Impacting Glycemic Control  Immunotherapy:  Immunosuppressants     Start     Stop Route Frequency Ordered    02/10/17 2100  mycophenolate mofetil 200 mg/mL suspension 1,000 mg      -- Oral 2 times daily 02/10/17 1015        Steroids:   Hormones     Start     Stop Route Frequency Ordered    02/10/17 2100  methylPREDNISolone sodium succinate injection 40 mg      -- IV Every 12 hours 02/10/17 1012        Pressors:    Autonomic Drugs     Start     Stop Route Frequency Ordered    02/09/17 1515  epinephrine 4 mg in sodium chloride 0.9% 250 mL infusion     Question Answer Comment   Titrate by: (in mcg/kg/min) 0.05    Titrate interval: (in minutes) 5    Titrate to maintain: (SBP or MAP or Cardiac Index) CARDIAC INDEX    Cardiac index greater than: (in L/min) 2.2    Maximum dose: (in mcg/kg/min) 2        -- IV Continuous 02/09/17 1417    02/09/17 1515  norepinephrine 4 mg in dextrose 5% 250 mL infusion (premix) (titrating)     Question Answer Comment   Titrate by: (in mcg/kg/min) 0.05    Titrate interval: (in minutes) 5    Titrate to maintain: (MAP or SBP) MAP    Greater than: (in mmHg) 60    Maximum dose: (in mcg/kg/min) 3        -- IV Continuous 02/09/17 1417        Hyperglycemia/Diabetes Medications: Antihyperglycemics     Start     Stop Route Frequency Ordered    02/10/17 0125  insulin regular (Humulin R) 100 Units in sodium chloride 0.9% 100 mL infusion      -- IV Continuous 02/10/17 0128          ASSESSMENT and PLAN    * Heart transplanted  Per CTS,  HTS      Type 2 diabetes mellitus with stage 3 chronic kidney disease, with long-term current use of insulin  bg goal 140-180- Continue intensive insulin gtt protocol,    Change intensive protocol to q2h, will continue to monitor closely.  No hypoglycemia.    DISCHARGE PLANNING: TBD  previously on MDI- likely re-calculation of insulin needs prior to d/c      Ischemic cardiomyopathy  S/p heart txp        Non morbid obesity due to excess calories  Increases insulin resistance   Body mass index is 32.01 kg/(m^2).        Adrenal cortical steroids causing adverse effect in therapeutic use  Increasing insulin needs      Immunosuppression  May increase insulin resistance         Zoya Dawson MD  Endocrinology  [facility], Malina Colon MD,  have personally taken the history and examined the patient and agree with the resident's note as stated above.  Variable needs   Intensive insulin drip

## 2017-02-11 NOTE — ASSESSMENT & PLAN NOTE
bg goal 140-180- Continue intensive insulin gtt protocol,    Change intensive protocol to q2h, will continue to monitor closely.  No hypoglycemia.    DISCHARGE PLANNING: TBD  previously on MDI- likely re-calculation of insulin needs prior to d/c

## 2017-02-11 NOTE — PROGRESS NOTES
Progress Note  Surgical Intensive Care      Admit Date: 1/30/2017  Post-operative Day: 2 Days Post-Op  Hospital Day: 13    History of Present Illness:  Patient is a 56 y.o. male with a hx of ischemic cardiomyopathy s/p LVAD placement who has now been accepted as a candidate for heart transplantation. PMH includes HTN, HLD, GERD and DM2.       INTERVAL HISTORY:   Pt with no acute events overnight. Remains on epi, dobutamine, nitric, cardene. Likely Wean to extubation today.        Continuous Infusions:   sodium chloride 0.45% 10 mL/hr (02/10/17 2000)    DOBUTamine 2.5 mcg/kg/min (02/11/17 1000)    epinephrine 0.02 mcg/kg/min (02/11/17 1000)    furosemide (LASIX) 1 mg/mL infusion (non-titrating) 10 mg/hr (02/11/17 1000)    insulin (HUMAN R) infusion (adults) 1.7 Units/hr (02/11/17 1000)    nicardipine 5 mg/hr (02/11/17 1000)    nitric oxide gas      norepinephrine bitartrate-D5W Stopped (02/10/17 0005)    propofol Stopped (02/11/17 0700)     Scheduled Meds:   albuterol-ipratropium 2.5mg-0.5mg/3mL  3 mL Nebulization Q4H    ascorbic acid (vitamin C)  500 mg Oral BID    docusate sodium  100 mg Oral BID    famotidine (PF)  20 mg Intravenous BID    methylPREDNISolone sodium succinate  40 mg Intravenous Q12H    mupirocin  1 g Nasal BID    mycophenolate mofetil  1,000 mg Oral BID    nystatin  500,000 Units Mouth/Throat QID (WM & HS)    polyethylene glycol  17 g Oral Daily    tacrolimus  1 mg Oral BID     PRN Meds:sodium chloride, sodium chloride, acetaminophen, dextrose 50%, dextrose 50%, magnesium sulfate IVPB, metoclopramide HCl, ondansetron, oxycodone, oxycodone, potassium chloride **AND** potassium chloride **AND** potassium chloride, sodium phosphate IVPB, sodium phosphate IVPB, sodium phosphate IVPB    Review of patient's allergies indicates:   Allergen Reactions    Levemir [insulin detemir] Itching    Pork/porcine containing products     Ranexa [ranolazine] Nausea Only       OBJECTIVE:     Vital  Signs (Most Recent)  Temp: 98.9 °F (37.2 °C) (02/11/17 0900)  Pulse: 104 (02/11/17 1120)  Resp: (!) 29 (02/11/17 1120)  BP: 118/60 (02/11/17 1120)  SpO2: 100 % (02/11/17 1120)    Vital Signs Range (Last 24H):  Temp:  [98.6 °F (37 °C)-100.6 °F (38.1 °C)]   Pulse:  [104-105]   Resp:  [21-29]   BP: ()/(51-67)   SpO2:  [100 %]   Arterial Line BP: ()/(39-61)     I & O (Last 24H):    Intake/Output Summary (Last 24 hours) at 02/11/17 1126  Last data filed at 02/11/17 1000   Gross per 24 hour   Intake             2694 ml   Output             4055 ml   Net            -1361 ml     Ventilator Data (Last 24H):     Vent Mode: Spont  Oxygen Concentration (%):  [50] 50  Resp Rate Total:  [20 br/min-42 br/min] 22 br/min  Vt Set:  [450 mL] 450 mL  PEEP/CPAP:  [5 cmH20] 5 cmH20  Pressure Support:  [0 cmH20-10 cmH20] 10 cmH20  Mean Airway Pressure:  [8 gsN54-49 cmH20] 9.6 cmH20    Hemodynamic Parameters (Last 24H):  PAP: (19-36)/(6-21) 27/11  PAP (Mean):  [13 mmHg-27 mmHg] 19 mmHg  PCWP:  [11 mmHg-17 mmHg] 13 mmHg  CO:  [4.6 L/min-6.1 L/min] 5.3 L/min  CI:  [2.3 L/min/m2-2.9 L/min/m2] 2.6 L/min/m2    Physical Exam:  General: well developed, intubated, no distress   Lungs: clear to auscultation bilaterally and mechanical breath sounds  Cardiovascular: Heart: tachycardic, no murmur. Extremities: no cyanosis or edema, or clubbing. Pulses: 2+ and symmetric.  Neurologic: Mental status: alert, calm, follows commands.       Lines/Drains:       Introducer 02/09/17  right internal jugular (Active)   Specific Qualities Cherry in place 2/10/2017  7:05 AM   Dressing Status Biopatch in place;Clean;Dry;Intact 2/10/2017  7:05 AM   Daily Line Review Performed 2/10/2017  7:05 AM   Number of days:1            Pulmonary Artery Catheter Assessment  02/09/17 0446 (Active)   Dressing biopatch in place;dressing dry and intact 2/10/2017  7:05 AM   Securement secured w/ sutures 2/10/2017  7:05 AM   Current Insertion Depth (cm) 52 cm 2/10/2017  7:05  AM   Phlebitis 0-->no symptoms 2/10/2017  7:05 AM   Infiltration 0-->no symptoms 2/10/2017  7:05 AM   Waveform normal 2/10/2017  7:05 AM   Pressure Catheter Interventions line leveled/zeroed 2/10/2017  7:05 AM   Prox Injectate Status Infusing 2/10/2017  7:05 AM   Prox Infusate Status Infusing 2/10/2017  7:05 AM   Distal Status Infusing 2/10/2017  7:05 AM   Daily Line Review Performed 2/10/2017  7:05 AM   Number of days:1            Peripheral IV - Single Lumen 02/09/17 0421 Right Hand (Active)   Site Assessment Clean;Dry;Intact;No redness;No swelling 2/10/2017  7:05 AM   Line Status Flushed;Saline locked 2/10/2017  7:05 AM   Dressing Status Clean;Dry;Intact 2/10/2017  7:05 AM   Dressing Change Due 02/13/17 2/10/2017  3:00 AM   Site Change Due 02/13/17 2/10/2017  3:00 AM   Reason Not Rotated Not due 2/10/2017  7:05 AM   Number of days:1            Peripheral IV - Single Lumen 02/09/17 0424 Left Wrist (Active)   Site Assessment Clean;Dry;Intact;No redness;No swelling 2/10/2017  7:05 AM   Line Status Infusing 2/10/2017  7:05 AM   Dressing Status Clean;Dry;Intact 2/10/2017  7:05 AM   Dressing Intervention Dressing reinforced 2/9/2017 11:00 PM   Dressing Change Due 02/13/17 2/10/2017  3:00 AM   Site Change Due 02/13/17 2/10/2017  3:00 AM   Reason Not Rotated Not due 2/10/2017  7:05 AM   Number of days:1            Arterial Line 02/09/17 0413 Left Radial (Active)   Site Assessment Clean;Dry;Intact;No redness;No swelling 2/10/2017  7:05 AM   Line Status Pulsatile blood flow 2/10/2017  7:05 AM   Art Line Waveform Other (Comment) 2/10/2017  7:05 AM   Arterial Line Interventions Connections checked and tightened;Flushed per protocol 2/10/2017  7:05 AM   Color/Movement/Sensation Capillary refill less than 3 sec 2/10/2017  7:05 AM   Dressing Type Transparent 2/10/2017  7:05 AM   Dressing Status Clean;Dry;Intact 2/10/2017  7:05 AM   Number of days:1            Arterial Line 02/09/17  Right Femoral (Active)   Site Assessment  Clean;Dry;Intact;No redness;No swelling 2/10/2017  7:05 AM   Line Status Pulsatile blood flow 2/10/2017  7:05 AM   Art Line Waveform Appropriate;Square wave test performed 2/10/2017  7:05 AM   Arterial Line Interventions Zeroed and calibrated;Leveled;Connections checked and tightened;Flushed per protocol 2/10/2017  7:05 AM   Color/Movement/Sensation Capillary refill less than 3 sec 2/10/2017  7:05 AM   Dressing Type Transparent 2/10/2017  7:05 AM   Dressing Status Clean;Dry;Intact 2/10/2017  7:05 AM   Number of days:1            Pacer Wires 02/09/17 1238 (Active)   Pacer Wire Status Atrial wires connected to pacer 2/10/2017  7:05 AM   Site Assessment Clean;Dry;Intact 2/10/2017  7:05 AM   How Pacer Wires are Secured Pacing wires isolated and secured 2/10/2017  7:05 AM   Dressing Status Clean;Dry;Intact 2/10/2017  7:05 AM   Number of days:0            Chest Tube 02/09/17 1205 1 Right Mediastinal 32 Fr. (Active)   Chest Tube WDL ex 2/9/2017  2:10 PM   Function -20 cm H2O 2/10/2017  7:05 AM   Air Leak/Fluctuation air leak not present;fluctuation not present;dependent drainage cleared 2/10/2017  7:05 AM   Safety all tubing connections taped;all connections secured;suction checked 2/10/2017  7:05 AM   Securement tubing anchored to body distal to insertion site w/ tape 2/10/2017  7:05 AM   Patency Intervention Tip/tilt 2/10/2017  7:05 AM   Drainage Description Serosanguineous 2/10/2017  7:05 AM   Dressing Appearance occlusive gauze dressing intact;clean and dry 2/10/2017  7:05 AM   Left Subcutaneous Emphysema none present 2/10/2017  7:05 AM   Right Subcutaneous Emphysema none present 2/10/2017  7:05 AM   Site Assessment Not assessed 2/10/2017  7:05 AM   Surrounding Skin Unable to view 2/10/2017  7:05 AM   Output (mL) 0 mL 2/10/2017  9:00 AM   Number of days:0            Chest Tube 02/09/17 1206 2 Left Mediastinal 32 Fr. (Active)   Chest Tube WDL WDL 2/9/2017  2:10 PM   Function -20 cm H2O 2/10/2017  7:05 AM   Air  "Leak/Fluctuation air leak not present;fluctuation not present 2/10/2017  7:05 AM   Safety all tubing connections taped;all connections secured;suction checked 2/10/2017  7:05 AM   Securement tubing anchored to body distal to insertion site w/ tape 2/10/2017  7:05 AM   Patency Intervention Tip/tilt 2/10/2017  7:05 AM   Drainage Description Serosanguineous 2/10/2017  7:05 AM   Dressing Appearance occlusive gauze dressing intact;clean and dry 2/10/2017  7:05 AM   Left Subcutaneous Emphysema none present 2/10/2017  7:05 AM   Right Subcutaneous Emphysema none present 2/10/2017  7:05 AM   Site Assessment Not assessed 2/10/2017  7:05 AM   Surrounding Skin Unable to view 2/10/2017  7:05 AM   Output (mL) 6 mL 2/10/2017  9:00 AM   Number of days:0            NG/OG Tube 02/09/17  (Active)   Placement Check placement verified by distal tube length measurement 2/10/2017  7:05 AM   Distal Tube Length (cm) 60 2/10/2017  7:05 AM   Tolerance no signs/symptoms of discomfort 2/10/2017  7:05 AM   Securement taped to commercial device 2/10/2017  3:00 AM   Clamp Status/Tolerance unclamped 2/10/2017  7:05 AM   Suction Setting/Drainage Method suction at;low;intermittent setting 2/10/2017  7:05 AM   Drainage Yellow 2/10/2017  7:05 AM   Intake (mL) 60 mL 2/9/2017  8:45 PM   Tube Output(mL)(Include Discarded Residual) 100 mL 2/10/2017  6:00 AM   Number of days:1            Urethral Catheter 02/09/17 0430 Temperature probe;Straight-tip;Non-latex 16 Fr. (Active)   Site Assessment Clean;Intact 2/10/2017  7:05 AM   Collection Container Urimeter 2/10/2017  7:05 AM   Securement Method secured to top of thigh w/ adhesive device 2/10/2017  7:05 AM   Catheter Care Performed yes 2/10/2017  7:05 AM   Reason for Continuing Urinary Catheterization Post operative 2/10/2017  7:05 AM   CAUTI Prevention Bundle StatLock in place w 1" slack;Intact seal between catheter & drainage tubing;Drainage bag off the floor;Green sheeting clip in use;No dependent loops or " kinks;Drainage bag not overfilled (<2/3 full);Drainage bag below bladder 2/10/2017  7:05 AM   Output (mL) 200 mL 2/10/2017 10:00 AM   Number of days:1       Laboratory (Last 24H):  CBC:     Recent Labs  Lab 02/11/17  0505   WBC 24.56*   RBC 3.85*   HGB 9.0*   HCT 26.7*      MCV 69*   MCH 23.4*   MCHC 33.7       CMP:     Recent Labs  Lab 02/11/17  0505   *   CALCIUM 8.3*   ALBUMIN 2.6*   PROT 5.4*      K 3.9  3.9   CO2 25      BUN 32*   CREATININE 1.1   ALKPHOS 68   ALT 83*   *   BILITOT 0.6       Coagulation:   Recent Labs  Lab 02/10/17  0600  02/11/17  0505   INR  --   < > 1.6*   APTT 28.5  --   --    < > = values in this interval not displayed.    Cardiac markers: No results for input(s): CKMB, CPKMB, TROPONINT, TROPONINI, MYOGLOBIN in the last 168 hours.    ABGs:     Recent Labs  Lab 02/11/17  0825   PH 7.487*   PCO2 35.7   PO2 95   HCO3 27.0   POCSATURATED 98   BE 4         ASSESSMENT/PLAN:   Patient is a 56 y.o. male with a hx of ischemic cardiomyopathy s/p LVAD placement s/p heart transplantation    Neuro:  - Sedation - Propofol  - mild agitation when weaned  - follows commands  - pain control prn     Resp:  - Intubated on minimal vent settings and doing well  Vent Mode: Spont  Oxygen Concentration (%):  [50] 50  Resp Rate Total:  [20 br/min-42 br/min] 22 br/min  Vt Set:  [450 mL] 450 mL  PEEP/CPAP:  [5 cmH20] 5 cmH20  Pressure Support:  [0 cmH20-10 cmH20] 10 cmH20  Mean Airway Pressure:  [8 htI67-66 cmH20] 9.6 cmH20   - tolerating SBT   - likely extubation today     CV:  - Dobutamine 2.5  - epi 0.02  - cardene 5mg/hr    Heme/ID:  - H/H stable  - leukocytosis stable  - mild fever overnight    Renal:  - lasix gtt 10mg/h  - Strict I/Os  - BUN Cr 32/1.1    FEN/GI:  - NPO  - OG in place  - Replace lytes PRN    Endo:  - Insulin gtt  - endocrine following   - remains hyperglycemic     Dispo:  - Continue ICU care    Dominique Hou  CA1  Anesthesiology

## 2017-02-11 NOTE — PLAN OF CARE
Problem: Patient Care Overview  Goal: Plan of Care Review  Dx: heart transplant (2/9), 1 PRBC, 250cc Albumin    PMH: AICD, HTN, HLD, GERD, T2DM, CKD (3), ischemic CM, systolic and diastolic HF, low Na, non morbid obesity, BPH    PSH: cholecystectomy, LVAD (8/24/16)    2/9: Heart Tx (out @ 1405)    Nursing:  - MAP 60-80  - HR > 90 (backup rate on pacer - 90)  - Q1 hour accuchecks  - Q2 hour lactic (iSTAT)  - Q4 hour ABG  - Q6 hour CBC, CMP, Mg, Phos  - Q6 hour Fibringen   Outcome: Ongoing (interventions implemented as appropriate)  VSS throughout shift.  Pt v paced at 105; MAP 60-80 per cuff; CVP trending down; SpO2 >95% on vent A/C 50% with 5 PEEP.  SvO2 increased to 50s after recalibrated this afternoon.  CI >2.2.  Chest tube output minimal.  -200 cc/hr.  BG monitored q1h; insulin titrated per orders.  Pt to remain intubated overnight; propofol used and PRN pain meds given to keep pt comfortable while on vent.  Pt arouses to voice and follows commands.  Pressure ulcers noted to bilateral heels; mepilex applied; boots ordered and utilized; wound care consulted.  Pt turned q2h and bed kept free of debris; no signs of redness/breakdown noted to sacrum/buttocks.  Pt remains in restraints to prevent self-extubation. Pt's sons present at bedside intermittently during shift; updated on plan of care.  Plan includes slowly weaning epinephrine; extubation tomorrow to NC with NO.

## 2017-02-11 NOTE — PROGRESS NOTES
Progress Note  Heart Transplant Service    Admit Date: 1/30/2017   LOS: 12 days     SUBJECTIVE:     Follow up for: Heart transplanted    Interval History: Patient requesting to write message to me this morning; c/o having to urinate. Reassurance provided that patient has oneill in place. Otherwise patient denies pain or any other complaints at this time. SBT this am.     Scheduled Meds:   albuterol-ipratropium 2.5mg-0.5mg/3mL  3 mL Nebulization Q4H    ascorbic acid (vitamin C)  500 mg Oral BID    docusate sodium  100 mg Oral BID    famotidine (PF)  20 mg Intravenous BID    methylPREDNISolone sodium succinate  40 mg Intravenous Q12H    mupirocin  1 g Nasal BID    mycophenolate mofetil  1,000 mg Oral BID    nystatin  500,000 Units Mouth/Throat QID (WM & HS)    polyethylene glycol  17 g Oral Daily     Continuous Infusions:   sodium chloride 0.45% 10 mL/hr (02/10/17 2000)    DOBUTamine 3 mcg/kg/min (02/11/17 0500)    epinephrine 0.07 mcg/kg/min (02/11/17 0500)    furosemide (LASIX) 1 mg/mL infusion (non-titrating) 10 mg/hr (02/11/17 0500)    insulin (HUMAN R) infusion (adults) 2.1 Units/hr (02/11/17 0500)    nicardipine 7 mg/hr (02/11/17 0429)    nitric oxide gas      norepinephrine bitartrate-D5W Stopped (02/10/17 0005)    propofol 15 mcg/kg/min (02/11/17 0429)     PRN Meds:sodium chloride, sodium chloride, acetaminophen, dextrose 50%, dextrose 50%, magnesium sulfate IVPB, metoclopramide HCl, ondansetron, oxycodone, oxycodone, potassium chloride **AND** potassium chloride **AND** potassium chloride, sodium phosphate IVPB, sodium phosphate IVPB, sodium phosphate IVPB    Immunosuppressants     Start     Stop Route Frequency Ordered    02/10/17 2100  mycophenolate mofetil 200 mg/mL suspension 1,000 mg      -- Oral 2 times daily 02/10/17 1015          Review of patient's allergies indicates:   Allergen Reactions    Levemir [insulin detemir] Itching    Pork/porcine containing products     Ranexa  [ranolazine] Nausea Only       OBJECTIVE:     Vital Signs (Most Recent)  Temp: 98.9 °F (37.2 °C) (02/11/17 0300)  Pulse: 104 (02/11/17 0515)  Resp: (!) 26 (02/11/17 0333)  BP: (!) 110/56 (02/11/17 0515)  SpO2: 100 % (02/11/17 0515)    Vital Signs Range (Last 24H):  Temp:  [98.9 °F (37.2 °C)-100.6 °F (38.1 °C)]   Pulse:  []   Resp:  [21-28]   BP: ()/(52-67)   SpO2:  [100 %]   Arterial Line BP: ()/(39-60)     I & O (Last 24H):    Intake/Output Summary (Last 24 hours) at 02/11/17 0517  Last data filed at 02/11/17 0400   Gross per 24 hour   Intake           3154.7 ml   Output             3890 ml   Net           -735.3 ml       PAP: (19-33)/(6-18) 28/15  PAP (Mean):  [13 mmHg-25 mmHg] 22 mmHg  PCWP:  [12 mmHg-17 mmHg] 12 mmHg  CO:  [4.6 L/min-6 L/min] 6 L/min  CI:  [2.3 L/min/m2-2.9 L/min/m2] 2.9 L/min/m2    Telemetry: a-paced at 100 bpm    Physical Exam   Constitutional: He is oriented to person, place, and time. He appears well-developed and well-nourished. He is intubated.   HENT:   Head: Normocephalic and atraumatic.   Eyes: EOM are normal. Pupils are equal, round, and reactive to light.   Neck: Normal range of motion. Neck supple. No JVD present.   Cardiovascular: Normal rate and regular rhythm.    Pulmonary/Chest: Effort normal and breath sounds normal. He is intubated. No respiratory distress. He has no wheezes. He has no rales.   Abdominal: Soft. Bowel sounds are normal. He exhibits no distension. There is no tenderness.   Musculoskeletal: Normal range of motion. He exhibits no edema.   Neurological: He is alert and oriented to person, place, and time.   Skin: Skin is warm and dry.   Nursing note and vitals reviewed.      Labs:       Recent Labs  Lab 02/10/17  1157 02/10/17  1805 02/10/17  2355   WBC 20.00* 23.53* 26.79*   HGB 8.9* 9.1* 9.1*   HCT 26.1* 26.8* 27.3*    238 245   LYMPH 4.8*  1.0 3.5*  0.8* 0.5*  CANCELED   MONO 5.3  1.1* 4.1  1.0 1.0*  CANCELED   EOSINOPHIL 0.0 0.0  0.0         Recent Labs  Lab 02/08/17  1638  02/09/17  1417 02/09/17  1807 02/10/17  0600 02/10/17  0826   APTT 36.9*  --  28.8  --  28.5  --    INR 3.0*  < > 1.1 1.5*  --  2.0*   < > = values in this interval not displayed.       Recent Labs  Lab 02/09/17  2355 02/10/17  0600 02/10/17  1157 02/10/17  1805 02/10/17  2355   * 157*  --  213*  --    CALCIUM 8.7 8.8  --  8.4*  --    ALBUMIN 3.1* 2.9*  --  2.7*  --    PROT 5.8* 5.5*  --  5.3*  --     140  --  141  --    K 3.3* 3.9 4.8 4.4  4.4 4.0   CO2 20* 26  --  27  --     105  --  105  --    BUN 23* 23*  --  26*  --    CREATININE 1.7* 1.3  --  1.1  --    ALKPHOS 57 52*  --  67  --    ALT 57* 65*  --  104*  --    * 139*  --  178*  --    BILITOT 0.4 0.4  --  0.5  --    MG 2.1 2.2 2.1 1.9 2.7*   PHOS 4.3 4.8* 6.3* 5.9* 5.7*     Estimated Creatinine Clearance: 86 mL/min (based on Cr of 1.1).      Recent Labs  Lab 02/08/17  0447   *         Recent Labs  Lab 02/05/17  0506 02/05/17  1033 02/06/17  0501 02/07/17  0504 02/08/17  0447   * 563* 584* 536* 523*       Microbiology Results (last 7 days)     Procedure Component Value Units Date/Time    Urine culture [[id number]] Collected:  02/08/17 2000    Order Status:  Completed Specimen:  Urine from Urine, Clean Catch Updated:  02/10/17 1438     Urine Culture, Routine --     CITROBACTER KOSERI  >100,000 cfu/ml  Susceptibility pending              ASSESSMENT:     55 yo M with PMHx of NICM s/p HM II (8/24/16), HLD, HTN, VT s/p ICD who presented to the hospital for elevated LDH in the setting of subtherapeutic INR with lower extremity swelling concerning for LVAD pump thrombosis, now s/p OHTx 2/9/17.       PLAN:     S/P Orthotopic Heart Transplantation 2/9/2017  -CTS Primary  -High Risk Donor  -Moderate Risk of Rejection: cPRA 0%, Negative Crossmatch with B Channel Shifts  -CMV Status: D-/R+: Will require valcyte x 3 months  -Immunosuppression: MMF 1000 mg BID, Solumedrol 40 mg IV BID. [place]  initiation held yesterday per CTS request. Will initiate today  -Opportunistic Infection PPx to be initiated per protocol  - @3, Epi @ 0.07, Lasix @ 10 mg/hr, insulin @ 1.5, cardene @ 2.5, nitric. Continue diuresis, CVP 14 this am  -Hope to extubate today  -a-paced @ 100 bpm. SVO2 54.    Bacteruria, Citrobacter Koseri  -UCx 1/19/2017 with 10-49k citrobacter  -Repeat UCx 2/8/17 with >100,000 citrobacter koseri. Sensitive to ancef (currently on surgical PPx)  -Consider transplant ID consult to ensure appropriate duration of abx given h/o LVAD & recent transplant    Neelima Navarrete PA-C  [facility] Spectralink #[id number]

## 2017-02-11 NOTE — SUBJECTIVE & OBJECTIVE
"Interval HPI:   Overnight events: Patient remains intubated. Bg at or near goal.   Eating:   NPO  Nausea: No  Hypoglycemia and intervention: No  Fever: Yes, 100.6  TPN and/or TF: No  If yes, type of TF/TPN and rate: no    Visit Vitals    BP (!) 121/58    Pulse 104    Temp 98.6 °F (37 °C) (Oral)    Resp (!) 25    Ht 5' 8" (1.727 m)    Wt 95.5 kg (210 lb 8.6 oz)    SpO2 100%    BMI 32.01 kg/m2       Labs Reviewed and Include      Recent Labs  Lab 02/11/17  0505   *   CALCIUM 8.3*   ALBUMIN 2.6*   PROT 5.4*      K 3.9  3.9   CO2 25      BUN 32*   CREATININE 1.1   ALKPHOS 68   ALT 83*   *   BILITOT 0.6     Lab Results   Component Value Date    WBC 24.56 (H) 02/11/2017    HGB 9.0 (L) 02/11/2017    HCT 26.7 (L) 02/11/2017    MCV 69 (L) 02/11/2017     02/11/2017     No results for input(s): TSH, FREET4 in the last 168 hours.  Lab Results   Component Value Date    HGBA1C 6.8 (H) 02/09/2017       Nutritional status:   Body mass index is 32.01 kg/(m^2).  Lab Results   Component Value Date    ALBUMIN 2.6 (L) 02/11/2017    ALBUMIN 2.7 (L) 02/10/2017    ALBUMIN 2.9 (L) 02/10/2017     Lab Results   Component Value Date    PREALBUMIN 19 (L) 02/08/2017    PREALBUMIN 14 (L) 02/06/2017    PREALBUMIN 14 (L) 02/03/2017       Estimated Creatinine Clearance: 86 mL/min (based on Cr of 1.1).    Accu-Checks  Recent Labs      02/10/17   2310  02/10/17   2357  02/11/17   0108  02/11/17   0206  02/11/17   0304  02/11/17   0417  02/11/17   0504  02/11/17   0604  02/11/17   0713  02/11/17   0800   POCTGLUCOSE  137*  151*  133*  156*  180*  180*  257*  246*  243*  244*       Current Medications and/or Treatments Impacting Glycemic Control  Immunotherapy:  Immunosuppressants     Start     Stop Route Frequency Ordered    02/10/17 2100  mycophenolate mofetil 200 mg/mL suspension 1,000 mg      -- Oral 2 times daily 02/10/17 1015        Steroids:   Hormones     Start     Stop Route Frequency Ordered    " 02/10/17 2100  methylPREDNISolone sodium succinate injection 40 mg      -- IV Every 12 hours 02/10/17 1012        Pressors:    Autonomic Drugs     Start     Stop Route Frequency Ordered    02/09/17 1515  epinephrine 4 mg in sodium chloride 0.9% 250 mL infusion     Question Answer Comment   Titrate by: (in mcg/kg/min) 0.05    Titrate interval: (in minutes) 5    Titrate to maintain: (SBP or MAP or Cardiac Index) CARDIAC INDEX    Cardiac index greater than: (in L/min) 2.2    Maximum dose: (in mcg/kg/min) 2        -- IV Continuous 02/09/17 1417    02/09/17 1515  norepinephrine 4 mg in dextrose 5% 250 mL infusion (premix) (titrating)     Question Answer Comment   Titrate by: (in mcg/kg/min) 0.05    Titrate interval: (in minutes) 5    Titrate to maintain: (MAP or SBP) MAP    Greater than: (in mmHg) 60    Maximum dose: (in mcg/kg/min) 3        -- IV Continuous 02/09/17 1417        Hyperglycemia/Diabetes Medications: Antihyperglycemics     Start     Stop Route Frequency Ordered    02/10/17 0125  insulin regular (Humulin R) 100 Units in sodium chloride 0.9% 100 mL infusion      -- IV Continuous 02/10/17 0128

## 2017-02-11 NOTE — NURSING
Spoke with Dr. Woodruff. Discussed pt hypertension, cardene usage, drips, urine output, and sedation. Orders to decrease dobutamine to 4mcg now and wean to 2.5 mcg/kg/min by 0700; 10mg lasix bolus now and increase gtt to 10mg/hr; wean nitric to 5ppm now and decrease to 2.5 by 0700. Also discussed plan to wean epi gtt, and sedation. Plans for CPAP/extubation in AM.

## 2017-02-11 NOTE — PLAN OF CARE
Problem: Patient Care Overview  Goal: Plan of Care Review  Dx: heart transplant (2/9), 1 PRBC, 250cc Albumin    PMH: AICD, HTN, HLD, GERD, T2DM, CKD (3), ischemic CM, systolic and diastolic HF, low Na, non morbid obesity, BPH    PSH: cholecystectomy, LVAD (8/24/16)    2/9: Heart Tx (out @ 1405); 2 units PRBC post op    Nursing:  - MAP 60-80  - V paced at 105  - Q1 hour accuchecks  - Q8 hour lactic (iSTAT)  - Q8 hour ABG  - Q6 hour CBC, K, Mg, Phos  - Q6 hour Fibringen  -Q12 hour CMP   Outcome: Ongoing (interventions implemented as appropriate)  Pt awake, alert, oriented x 3. Insulin gtt, Dobutamine at 2.5mcg/kg/min, Epinephrine at .05mcg/kg/min, Lasix at 10mg/h. Goal for MAP 60-80, assess NIBP for blood pressure readings. Pacer VVI @ 105 BPM. Pt extubated this AM, currently on Comfort flow via nasal cannula at 25L at 100%, Nitric at 5. Pt up to chair x 2.5hours today. Family at bedside. Chest tubes with minimal output.

## 2017-02-11 NOTE — PLAN OF CARE
VSS throughout shift. Pt hypertensive initially, Dr. Woodruff aware. See previous charting for adjustments made to drips. Pt hypertensive this AM when agitated (MAP 80-88), cardene currently off. CI >2.2. Plan to place pt on CPAP at 0700. Pt is alert, following commands, nodding appropriately. Pain controlled with PO oxycodone. Unstageable DTIs noted to bilateral heels, mepilex dressings applied per order, heel elevated on wheelchair cushions/heel booties used when pt allowed. Refer to flowsheets for full assessment details. POC reviewed with pt and pt son Manny who called overnight.

## 2017-02-12 LAB
ALBUMIN SERPL BCP-MCNC: 2.3 G/DL
ALBUMIN SERPL BCP-MCNC: 2.4 G/DL
ALLENS TEST: ABNORMAL
ALLENS TEST: ABNORMAL
ALP SERPL-CCNC: 60 U/L
ALP SERPL-CCNC: 61 U/L
ALT SERPL W/O P-5'-P-CCNC: 41 U/L
ALT SERPL W/O P-5'-P-CCNC: 52 U/L
ANION GAP SERPL CALC-SCNC: 10 MMOL/L
ANION GAP SERPL CALC-SCNC: 9 MMOL/L
ANISOCYTOSIS BLD QL SMEAR: SLIGHT
ANISOCYTOSIS BLD QL SMEAR: SLIGHT
AST SERPL-CCNC: 37 U/L
AST SERPL-CCNC: 51 U/L
BASOPHILS # BLD AUTO: 0 K/UL
BASOPHILS # BLD AUTO: 0 K/UL
BASOPHILS NFR BLD: 0 %
BASOPHILS NFR BLD: 0 %
BILIRUB SERPL-MCNC: 0.6 MG/DL
BILIRUB SERPL-MCNC: 0.7 MG/DL
BLD PROD TYP BPU: NORMAL
BLOOD UNIT EXPIRATION DATE: NORMAL
BLOOD UNIT TYPE CODE: 6200
BLOOD UNIT TYPE: NORMAL
BUN SERPL-MCNC: 39 MG/DL
BUN SERPL-MCNC: 42 MG/DL
CALCIUM SERPL-MCNC: 8.1 MG/DL
CALCIUM SERPL-MCNC: 8.3 MG/DL
CHLORIDE SERPL-SCNC: 100 MMOL/L
CHLORIDE SERPL-SCNC: 100 MMOL/L
CO2 SERPL-SCNC: 26 MMOL/L
CO2 SERPL-SCNC: 28 MMOL/L
CODING SYSTEM: NORMAL
CREAT SERPL-MCNC: 0.9 MG/DL
CREAT SERPL-MCNC: 0.9 MG/DL
DACRYOCYTES BLD QL SMEAR: ABNORMAL
DELSYS: ABNORMAL
DELSYS: ABNORMAL
DIFFERENTIAL METHOD: ABNORMAL
DIFFERENTIAL METHOD: ABNORMAL
DISPENSE STATUS: NORMAL
EOSINOPHIL # BLD AUTO: 0 K/UL
EOSINOPHIL # BLD AUTO: 0 K/UL
EOSINOPHIL NFR BLD: 0 %
EOSINOPHIL NFR BLD: 0 %
ERYTHROCYTE [DISTWIDTH] IN BLOOD BY AUTOMATED COUNT: 26.8 %
ERYTHROCYTE [DISTWIDTH] IN BLOOD BY AUTOMATED COUNT: 27.1 %
ERYTHROCYTE [SEDIMENTATION RATE] IN BLOOD BY WESTERGREN METHOD: 16 MM/H
EST. GFR  (AFRICAN AMERICAN): >60 ML/MIN/1.73 M^2
EST. GFR  (AFRICAN AMERICAN): >60 ML/MIN/1.73 M^2
EST. GFR  (NON AFRICAN AMERICAN): >60 ML/MIN/1.73 M^2
EST. GFR  (NON AFRICAN AMERICAN): >60 ML/MIN/1.73 M^2
FIBRINOGEN PPP-MCNC: 515 MG/DL
FIBRINOGEN PPP-MCNC: 523 MG/DL
FIBRINOGEN PPP-MCNC: 548 MG/DL
FIBRINOGEN PPP-MCNC: 572 MG/DL
FIO2: 55
FIO2: 60
FLOW: 25
FLOW: 25
GLUCOSE SERPL-MCNC: 180 MG/DL
GLUCOSE SERPL-MCNC: 242 MG/DL
HCO3 UR-SCNC: 25.8 MMOL/L (ref 24–28)
HCO3 UR-SCNC: 27.7 MMOL/L (ref 24–28)
HCT VFR BLD AUTO: 25.3 %
HCT VFR BLD AUTO: 26.3 %
HGB BLD-MCNC: 8.4 G/DL
HGB BLD-MCNC: 8.6 G/DL
HYPOCHROMIA BLD QL SMEAR: ABNORMAL
HYPOCHROMIA BLD QL SMEAR: ABNORMAL
INR PPP: 1.4
LDH SERPL L TO P-CCNC: 0.7 MMOL/L (ref 0.36–1.25)
LDH SERPL L TO P-CCNC: 1.12 MMOL/L (ref 0.36–1.25)
LYMPHOCYTES # BLD AUTO: 0.7 K/UL
LYMPHOCYTES # BLD AUTO: 0.7 K/UL
LYMPHOCYTES NFR BLD: 4.5 %
LYMPHOCYTES NFR BLD: 4.6 %
MAGNESIUM SERPL-MCNC: 1.8 MG/DL
MAGNESIUM SERPL-MCNC: 1.8 MG/DL
MAGNESIUM SERPL-MCNC: 1.9 MG/DL
MCH RBC QN AUTO: 23 PG
MCH RBC QN AUTO: 23.2 PG
MCHC RBC AUTO-ENTMCNC: 32.7 %
MCHC RBC AUTO-ENTMCNC: 33.2 %
MCV RBC AUTO: 70 FL
MCV RBC AUTO: 70 FL
METHEMOGLOBIN: 1.3 % (ref 0–3)
MODE: ABNORMAL
MODE: ABNORMAL
MONOCYTES # BLD AUTO: 0.3 K/UL
MONOCYTES # BLD AUTO: 0.4 K/UL
MONOCYTES NFR BLD: 2.3 %
MONOCYTES NFR BLD: 2.7 %
NEUTROPHILS # BLD AUTO: 13.4 K/UL
NEUTROPHILS # BLD AUTO: 13.9 K/UL
NEUTROPHILS NFR BLD: 91.8 %
NEUTROPHILS NFR BLD: 93.2 %
NUM UNITS TRANS FFP: NORMAL
OVALOCYTES BLD QL SMEAR: ABNORMAL
OVALOCYTES BLD QL SMEAR: ABNORMAL
PCO2 BLDA: 34.3 MMHG (ref 35–45)
PCO2 BLDA: 35.8 MMHG (ref 35–45)
PH SMN: 7.49 [PH] (ref 7.35–7.45)
PH SMN: 7.5 [PH] (ref 7.35–7.45)
PHOSPHATE SERPL-MCNC: 4.1 MG/DL
PHOSPHATE SERPL-MCNC: 4.4 MG/DL
PHOSPHATE SERPL-MCNC: 4.6 MG/DL
PLATELET # BLD AUTO: 198 K/UL
PLATELET # BLD AUTO: 199 K/UL
PMV BLD AUTO: ABNORMAL FL
PMV BLD AUTO: ABNORMAL FL
PO2 BLDA: 63 MMHG (ref 80–100)
PO2 BLDA: 85 MMHG (ref 80–100)
POC BE: 2 MMOL/L
POC BE: 4 MMOL/L
POC SATURATED O2: 94 % (ref 95–100)
POC SATURATED O2: 97 % (ref 95–100)
POC TCO2: 27 MMOL/L (ref 23–27)
POC TCO2: 29 MMOL/L (ref 23–27)
POCT GLUCOSE: 150 MG/DL (ref 70–110)
POCT GLUCOSE: 160 MG/DL (ref 70–110)
POCT GLUCOSE: 165 MG/DL (ref 70–110)
POCT GLUCOSE: 173 MG/DL (ref 70–110)
POCT GLUCOSE: 184 MG/DL (ref 70–110)
POCT GLUCOSE: 220 MG/DL (ref 70–110)
POCT GLUCOSE: 223 MG/DL (ref 70–110)
POCT GLUCOSE: 224 MG/DL (ref 70–110)
POCT GLUCOSE: 228 MG/DL (ref 70–110)
POCT GLUCOSE: 239 MG/DL (ref 70–110)
POIKILOCYTOSIS BLD QL SMEAR: SLIGHT
POIKILOCYTOSIS BLD QL SMEAR: SLIGHT
POLYCHROMASIA BLD QL SMEAR: ABNORMAL
POLYCHROMASIA BLD QL SMEAR: ABNORMAL
POTASSIUM SERPL-SCNC: 3.5 MMOL/L
POTASSIUM SERPL-SCNC: 3.7 MMOL/L
PROT SERPL-MCNC: 5.2 G/DL
PROT SERPL-MCNC: 5.4 G/DL
PROTHROMBIN TIME: 14.8 SEC
RBC # BLD AUTO: 3.62 M/UL
RBC # BLD AUTO: 3.74 M/UL
SAMPLE: ABNORMAL
SAMPLE: ABNORMAL
SCHISTOCYTES BLD QL SMEAR: ABNORMAL
SCHISTOCYTES BLD QL SMEAR: ABNORMAL
SITE: ABNORMAL
SITE: ABNORMAL
SODIUM SERPL-SCNC: 136 MMOL/L
SODIUM SERPL-SCNC: 137 MMOL/L
SP02: 92
TACROLIMUS BLD-MCNC: 2.7 NG/ML
TRANS ERYTHROCYTES VOL PATIENT: NORMAL ML
WBC # BLD AUTO: 14.55 K/UL
WBC # BLD AUTO: 14.95 K/UL

## 2017-02-12 PROCEDURE — 80197 ASSAY OF TACROLIMUS: CPT

## 2017-02-12 PROCEDURE — 25000003 PHARM REV CODE 250: Performed by: PHYSICIAN ASSISTANT

## 2017-02-12 PROCEDURE — 94640 AIRWAY INHALATION TREATMENT: CPT

## 2017-02-12 PROCEDURE — 20000000 HC ICU ROOM

## 2017-02-12 PROCEDURE — G8978 MOBILITY CURRENT STATUS: HCPCS | Mod: CN

## 2017-02-12 PROCEDURE — 63600367 HC NITRIC OXIDE PER HOUR

## 2017-02-12 PROCEDURE — 92610 EVALUATE SWALLOWING FUNCTION: CPT

## 2017-02-12 PROCEDURE — 83605 ASSAY OF LACTIC ACID: CPT

## 2017-02-12 PROCEDURE — 82803 BLOOD GASES ANY COMBINATION: CPT

## 2017-02-12 PROCEDURE — 27000221 HC OXYGEN, UP TO 24 HOURS

## 2017-02-12 PROCEDURE — 25000003 PHARM REV CODE 250: Performed by: INTERNAL MEDICINE

## 2017-02-12 PROCEDURE — 25000003 PHARM REV CODE 250: Performed by: THORACIC SURGERY (CARDIOTHORACIC VASCULAR SURGERY)

## 2017-02-12 PROCEDURE — 99233 SBSQ HOSP IP/OBS HIGH 50: CPT | Mod: ,,, | Performed by: INTERNAL MEDICINE

## 2017-02-12 PROCEDURE — 97166 OT EVAL MOD COMPLEX 45 MIN: CPT

## 2017-02-12 PROCEDURE — 84132 ASSAY OF SERUM POTASSIUM: CPT

## 2017-02-12 PROCEDURE — 97530 THERAPEUTIC ACTIVITIES: CPT

## 2017-02-12 PROCEDURE — 97161 PT EVAL LOW COMPLEX 20 MIN: CPT

## 2017-02-12 PROCEDURE — 99233 SBSQ HOSP IP/OBS HIGH 50: CPT | Mod: GC,,, | Performed by: SURGERY

## 2017-02-12 PROCEDURE — 84100 ASSAY OF PHOSPHORUS: CPT

## 2017-02-12 PROCEDURE — 85025 COMPLETE CBC W/AUTO DIFF WBC: CPT

## 2017-02-12 PROCEDURE — 63600175 PHARM REV CODE 636 W HCPCS: Performed by: THORACIC SURGERY (CARDIOTHORACIC VASCULAR SURGERY)

## 2017-02-12 PROCEDURE — 85610 PROTHROMBIN TIME: CPT

## 2017-02-12 PROCEDURE — 84132 ASSAY OF SERUM POTASSIUM: CPT | Mod: 91

## 2017-02-12 PROCEDURE — 37799 UNLISTED PX VASCULAR SURGERY: CPT

## 2017-02-12 PROCEDURE — 97535 SELF CARE MNGMENT TRAINING: CPT

## 2017-02-12 PROCEDURE — 85384 FIBRINOGEN ACTIVITY: CPT

## 2017-02-12 PROCEDURE — G8979 MOBILITY GOAL STATUS: HCPCS | Mod: CJ

## 2017-02-12 PROCEDURE — 80053 COMPREHEN METABOLIC PANEL: CPT

## 2017-02-12 PROCEDURE — 25000242 PHARM REV CODE 250 ALT 637 W/ HCPCS: Performed by: INTERNAL MEDICINE

## 2017-02-12 PROCEDURE — 83735 ASSAY OF MAGNESIUM: CPT | Mod: 91

## 2017-02-12 PROCEDURE — 84100 ASSAY OF PHOSPHORUS: CPT | Mod: 91

## 2017-02-12 PROCEDURE — 63600175 PHARM REV CODE 636 W HCPCS: Performed by: STUDENT IN AN ORGANIZED HEALTH CARE EDUCATION/TRAINING PROGRAM

## 2017-02-12 PROCEDURE — 27100171 HC OXYGEN HIGH FLOW UP TO 24 HOURS

## 2017-02-12 PROCEDURE — 63600175 PHARM REV CODE 636 W HCPCS: Performed by: INTERNAL MEDICINE

## 2017-02-12 PROCEDURE — 83050 HGB METHEMOGLOBIN QUAN: CPT

## 2017-02-12 PROCEDURE — 99900035 HC TECH TIME PER 15 MIN (STAT)

## 2017-02-12 PROCEDURE — 99232 SBSQ HOSP IP/OBS MODERATE 35: CPT | Mod: ,,, | Performed by: INTERNAL MEDICINE

## 2017-02-12 RX ORDER — INSULIN ASPART 100 [IU]/ML
5 INJECTION, SOLUTION INTRAVENOUS; SUBCUTANEOUS
Status: DISCONTINUED | OUTPATIENT
Start: 2017-02-12 | End: 2017-02-13

## 2017-02-12 RX ORDER — IBUPROFEN 200 MG
24 TABLET ORAL
Status: DISCONTINUED | OUTPATIENT
Start: 2017-02-12 | End: 2017-02-18

## 2017-02-12 RX ORDER — IBUPROFEN 200 MG
16 TABLET ORAL
Status: DISCONTINUED | OUTPATIENT
Start: 2017-02-12 | End: 2017-02-18

## 2017-02-12 RX ORDER — MYCOPHENOLATE MOFETIL 200 MG/ML
1500 POWDER, FOR SUSPENSION ORAL 2 TIMES DAILY
Status: DISCONTINUED | OUTPATIENT
Start: 2017-02-12 | End: 2017-02-13

## 2017-02-12 RX ORDER — INSULIN ASPART 100 [IU]/ML
1-10 INJECTION, SOLUTION INTRAVENOUS; SUBCUTANEOUS
Status: DISCONTINUED | OUTPATIENT
Start: 2017-02-12 | End: 2017-02-15

## 2017-02-12 RX ORDER — GLUCAGON 1 MG
1 KIT INJECTION
Status: DISCONTINUED | OUTPATIENT
Start: 2017-02-12 | End: 2017-02-18

## 2017-02-12 RX ADMIN — NICARDIPINE HYDROCHLORIDE 5 MG/HR: 0.2 INJECTION, SOLUTION INTRAVENOUS at 05:02

## 2017-02-12 RX ADMIN — MUPIROCIN 1 G: 20 OINTMENT TOPICAL at 08:02

## 2017-02-12 RX ADMIN — IPRATROPIUM BROMIDE AND ALBUTEROL SULFATE 3 ML: .5; 3 SOLUTION RESPIRATORY (INHALATION) at 11:02

## 2017-02-12 RX ADMIN — OXYCODONE HYDROCHLORIDE AND ACETAMINOPHEN 500 MG: 500 TABLET ORAL at 10:02

## 2017-02-12 RX ADMIN — DOCUSATE SODIUM 100 MG: 100 CAPSULE, LIQUID FILLED ORAL at 10:02

## 2017-02-12 RX ADMIN — Medication 1 MG: at 08:02

## 2017-02-12 RX ADMIN — POTASSIUM CHLORIDE 20 MEQ: 200 INJECTION, SOLUTION INTRAVENOUS at 06:02

## 2017-02-12 RX ADMIN — MAGNESIUM SULFATE IN WATER 2 G: 40 INJECTION, SOLUTION INTRAVENOUS at 06:02

## 2017-02-12 RX ADMIN — Medication 1500 MG: at 10:02

## 2017-02-12 RX ADMIN — NYSTATIN 500000 UNITS: 500000 SUSPENSION ORAL at 08:02

## 2017-02-12 RX ADMIN — IPRATROPIUM BROMIDE AND ALBUTEROL SULFATE 3 ML: .5; 3 SOLUTION RESPIRATORY (INHALATION) at 08:02

## 2017-02-12 RX ADMIN — POLYETHYLENE GLYCOL 3350 17 G: 17 POWDER, FOR SOLUTION ORAL at 08:02

## 2017-02-12 RX ADMIN — Medication 1000 MG: at 10:02

## 2017-02-12 RX ADMIN — METHYLPREDNISOLONE SODIUM SUCCINATE 40 MG: 40 INJECTION, POWDER, FOR SOLUTION INTRAMUSCULAR; INTRAVENOUS at 08:02

## 2017-02-12 RX ADMIN — NICARDIPINE HYDROCHLORIDE 5 MG/HR: 0.2 INJECTION, SOLUTION INTRAVENOUS at 08:02

## 2017-02-12 RX ADMIN — SODIUM CHLORIDE 1.8 UNITS/HR: 9 INJECTION, SOLUTION INTRAVENOUS at 12:02

## 2017-02-12 RX ADMIN — INSULIN ASPART 4 UNITS: 100 INJECTION, SOLUTION INTRAVENOUS; SUBCUTANEOUS at 04:02

## 2017-02-12 RX ADMIN — IPRATROPIUM BROMIDE AND ALBUTEROL SULFATE 3 ML: .5; 3 SOLUTION RESPIRATORY (INHALATION) at 12:02

## 2017-02-12 RX ADMIN — OXYCODONE HYDROCHLORIDE 10 MG: 5 SOLUTION ORAL at 10:02

## 2017-02-12 RX ADMIN — FAMOTIDINE 20 MG: 10 INJECTION, SOLUTION INTRAVENOUS at 08:02

## 2017-02-12 RX ADMIN — NYSTATIN 500000 UNITS: 500000 SUSPENSION ORAL at 05:02

## 2017-02-12 RX ADMIN — Medication 1 MG: at 05:02

## 2017-02-12 RX ADMIN — IPRATROPIUM BROMIDE AND ALBUTEROL SULFATE 3 ML: .5; 3 SOLUTION RESPIRATORY (INHALATION) at 04:02

## 2017-02-12 RX ADMIN — NYSTATIN 500000 UNITS: 500000 SUSPENSION ORAL at 12:02

## 2017-02-12 RX ADMIN — METHYLPREDNISOLONE SODIUM SUCCINATE 40 MG: 40 INJECTION, POWDER, FOR SOLUTION INTRAMUSCULAR; INTRAVENOUS at 10:02

## 2017-02-12 RX ADMIN — INSULIN ASPART 4 UNITS: 100 INJECTION, SOLUTION INTRAVENOUS; SUBCUTANEOUS at 10:02

## 2017-02-12 RX ADMIN — FAMOTIDINE 20 MG: 10 INJECTION, SOLUTION INTRAVENOUS at 10:02

## 2017-02-12 RX ADMIN — OXYCODONE HYDROCHLORIDE 10 MG: 5 SOLUTION ORAL at 03:02

## 2017-02-12 RX ADMIN — NYSTATIN 500000 UNITS: 500000 SUSPENSION ORAL at 10:02

## 2017-02-12 NOTE — OP NOTE
DATE OF PROCEDURE:  02/09/2017.    PREOPERATIVE DIAGNOSES:  1.  End-stage heart failure, supported with LVAD.  2.  Elevated LDH, listed as status 1A.  3.  Ischemic cardiomyopathy.  4.  Hyponatremia.  5.  Heart failure.  6.  Essential hypertension.  7.  Hyperlipidemia.  8.  Diabetes mellitus, type 2.    POSTOPERATIVE DIAGNOSES:  1.  End-stage heart failure, supported with LVAD.  2.  Elevated LDH, listed as status 1A.  3.  Ischemic cardiomyopathy.  4.  Hyponatremia.  5.  Heart failure.  6.  Essential hypertension.  7.  Hyperlipidemia.  8.  Diabetes mellitus, type 2.    OPERATIONS:  1.  Orthotopic heart transplant.  2.  Backtable preparation of the donor heart.  3.  Cutdown of the left femoral vessels, 14 each, for anticipation of initiation   of cardiopulmonary bypass due to redo nature.  4.  Explantation of an LVAD.  5.  Removal of endocardial leads x4.  6.  Removal of AICD.  7.  Extremely complex case due to significant time increase in dissection by   more than two hours.  Also, the patient was in extreme fluid overload that made   all the layers extremely edematous, and 4 liters of fluid was taken out on   cardiopulmonary bypass machine.    STAFF SURGEON:  Heber Woodruff M.D.    FIRST ASSISTANT:  Sukhdev Sinclair M.D. (RES).    SECOND ASSISTANT:  Prasanth Miller.    ANESTHESIA:  GETA.    KEY FINDINGS OF THE OPERATION:  1.  Extremely fluid overloaded patient and vasoplegic.  2.  Very low diastolic pressures.  3.  Requiring high inotropic support and vasopressors right from the initiation   of anesthesia.  4.  Massive fluid overload requiring 4 liters to be taken off on the   cardiopulmonary bypass and 750 mL of bypass blood left in the container.  5.  All the layers of tissues were extremely edematous.  6.  Dense adhesion noted throughout the mediastinum, that required   meticulous dissection using a combination of electrocautery   and sharp dissection.    INDICATIONS OF OPERATION:  This is a 56-year-old gentleman who  underwent   implantation of left ventricular assist device for cardiogenic shock in August   of last year.  The patient was then listed for heart transplantation.  A donor   heart was made available.  Donor specifics were present in the [facility]orNet.  The   donor evaluation was done by Dr. Hector and Dr. Peewee Romero.  Onsite evaluation   was done by Dr. Bubba Norton.  There was found to be a good match for Mr. Barriga.    An informed consent was obtained.    DESCRIPTION OF OPERATION:  The patient was brought to the Operating Room and   placed in a supine position.  After induction of anesthesia, area was prepped   and draped in the usual sterile fashion.  A right groin incision was made which   was carried all the way down.  The femoral artery and femoral veins were then   identified and circumferentially dissected out.  Cannulation stitches were   placed.  This was done for anticipation of initiating cardiopulmonary bypass due   to the redo nature of the surgery.  Once that was achieved, the area was packed   in a lap sponge and attention was directed towards the median sternotomy   incision.  Previously healed incision was used and the incision carried all the   way down to the sternum.  Good hemostasis was achieved.  The sternal wires were   cut and left in place.  Using a redo oscillating saw, the upper table was cut   all the way down to the wires.  Once that was done, the wires were passed off   the field.  The posterior table was then cut and dissected out.  Once that was   done, the lower portion of the table was dissected out from the heart   meticulously using electrocautery.  During that time, the electrocautery setting   was decreased to 20 and    lidocaine was injected to decrease the chances of any arrhythmia.  Once that was   achieved, sternal retractor was then placed.  Meticulous dissection of the rest   of the heart was carried out.  Dense adhesions were noted all around and a   combination of sharp and  electrocautery was used to dissect out the heart.  The   ascending aorta , the IVC and SVC were all dissected out.  The LVAD pocket was   then started to be dissected out.  The left hemidiaphragm was then opened up and   the complete LVAD was then dissected out including the driveline.  Once that   was achieved, then the patient's systemic heparinization was carried out.  Once   ACT greater than 450 was obtained, cannulation stitches were placed.  Arterial   cannula was placed in the ascending aorta.  Bicaval venous cannulation was   carried out in the SVC and the IVC.  Circumferential control of the SVC and IVC   was obtained.  The patient was then placed on full cardiopulmonary bypass with   CO2 use to flood the operative field to decrease the chances of air embolism.    Once that was done, clamp was applied on the ascending aorta and the cardiectomy   was carried out in the usual standard fashion for initiating a bicaval   anastomosis.  There were four endocardial leads and they were dissected out   circumferentially from the coronary sinus as LV and SV in the right atrium.  All   of the leads were dissected circumferentially and cut and removed.  After that,   incision on the AICD pocket was carried out.  It was carried all the way down   to the pocket.  The AICD was then delivered out and removed and passed for   pathology examination.  Good hemostasis was ensured.  The pocket was then   cauterized to decrease the chances of any fluid reaccumulation and was closed in   multiple layers.  Once that was achieved and as soon as the heart was brought   to the field, the donor heart was brought to the field for evaluation.  No gross   abnormality was noted.  No injury to the heart was noted.  Once that was done,   the heart was lowered into the chest cavity on a bed of ice and the left atrial   anastomosis was brought to the field, was evaluated, and no abnormalities were   noted.  Once that was done, backtable  preparation of the donor heart was   initiated.  The aorta was then trimmed to appropriate length.  The pulmonary   artery was trimmed to appropriate length.  The left atrial anastomosis was   assessed and trimmed.  After that, no PFO was identified.  The heart was then   lowered into the chest cavity on a bed of ice and a left atrial anastomosis was   initiated using a 3-0 Prolene.  The anastomosis was done in such a way that   there was endothelial to endothelial apposition.  Once that was achieved,   attention was directed towards the aorta.  The aorta was then trimmed to   appropriate size and length.  Due to size mismatch, the donor aorta was   intussuscepted into the recipient aorta using a 4-0 Prolene stitch in a   continuous running fashion.  Once that was achieved, a dose of cardioplegia was   given through the antegrade cardioplegia catheter placed in the griselda-ascending   aorta.  Once that was done, the pulmonary artery was then trimmed to appropriate   length and a pulmonary artery anastomosis was initiated using a 4-0 Prolene   stitch.  Continuous running anastomosis was performed.  The sutures were not   tied down.  Once that was achieved, then attention was directed towards the IVC   anastomosis.  IVC anastomosis was also achieved using a 4-0 Prolene stitch in a   continuous running fashion.  Once that was achieved, another dose of   cardioplegia was given and this was followed by an SVC anastomosis after   trimming to appropriate length.  SVC anastomosis was achieved by using a 4-0   Prolene.  The suturing of this involved a couple of things, which involved #1,   taking small bites and also ensuring that we were locking the suture after every   few stitches to prevent a pursestring effect.  The sutures were then tied.    Once that was done, hot shot was delivered over 3 minutes.  The umbilical tapes   around the SVC and IVC were released.  After 3 minutes, the crossclamp was   removed.  Root vent was  used for de-airing the heart.  Instant cardiac activity   was noted.  Ventilation was resumed.  Reperfusion was initiated for about 15-20   minutes before attempting cardiopulmonary bypass.  The patient was then   gradually weaned from cardiopulmonary bypass on inotropic support without any   concerns.  Extreme high doses of vasopressors were required because of extreme   vasoplegia.  Diastolics appeared to be extremely low; however, cardiac function   appeared to be normal.  No intracardiac air was noted.  Test dose of protamine   given, which was followed by full dose of protamine to reverse the effects of   systemic heparin.  Midway dose, all the various catheters and cannulas were   removed.  Temporary pacing wires were placed on the atrium as well as the   ventricular and brought through a separate skin incision.  Drainage tubes were   placed in the mediastinum.  The groin incision was closed in multiple layers.    The AICD pocket was closed in multiple layers.  The sternum was then   approximated by #6 stainless steel wires.  Skin and subcutaneous tissues were   closed in multiple layers.  Sterile dressing was applied.  After that, attention   was directed towards the driveline exit site.  The driveline exit site was then   opened up and excised at the exit site and was dissected circumferentially and   the driveline was removed.  Once that was done, it was packed with iodoform   gauze.  The terminal count of needles, sponges, and instrument was found to be   correct.    MEDICARE ATTESTATION:  Due to the unavailability of any adequately trained   Cardiothoracic Surgery resident, I performed all parts of the operation.  Dr. Sukhdev Sinclair acted as my first assistant and Prasanth Miller acted as my second   assistant.      EARL  dd: 02/12/2017 10:57:43 (CST)  td: 02/12/2017 12:21:07 (CST)  Doc ID   #[id number]  Job ID #[id number]    CC:

## 2017-02-12 NOTE — SUBJECTIVE & OBJECTIVE
"Interval HPI:   Overnight events: Patient extubated overnight.  Sitting in chair.  Diet being advanced to diabetic diet today  Eating:   <25%  Nausea: No  Hypoglycemia and intervention: No  Fever: No  TPN and/or TF: No  If yes, type of TF/TPN and rate: n/a    Visit Vitals    BP (!) 109/54    Pulse 104    Temp 98.1 °F (36.7 °C) (Core)    Resp 20    Ht 5' 8" (1.727 m)    Wt 95.5 kg (210 lb 8.6 oz)    SpO2 95%    BMI 32.01 kg/m2       Labs Reviewed and Include      Recent Labs  Lab 02/12/17  0455   *   CALCIUM 8.3*   ALBUMIN 2.4*   PROT 5.4*      K 3.7  3.7   CO2 28      BUN 39*   CREATININE 0.9   ALKPHOS 61   ALT 52*   AST 51*   BILITOT 0.6     Lab Results   Component Value Date    WBC 14.95 (H) 02/12/2017    HGB 8.4 (L) 02/12/2017    HCT 25.3 (L) 02/12/2017    MCV 70 (L) 02/12/2017     02/12/2017     No results for input(s): TSH, FREET4 in the last 168 hours.  Lab Results   Component Value Date    HGBA1C 6.8 (H) 02/09/2017       Nutritional status:   Body mass index is 32.01 kg/(m^2).  Lab Results   Component Value Date    ALBUMIN 2.4 (L) 02/12/2017    ALBUMIN 2.7 (L) 02/11/2017    ALBUMIN 2.6 (L) 02/11/2017     Lab Results   Component Value Date    PREALBUMIN 19 (L) 02/08/2017    PREALBUMIN 14 (L) 02/06/2017    PREALBUMIN 14 (L) 02/03/2017       Estimated Creatinine Clearance: 105.1 mL/min (based on Cr of 0.9).    Accu-Checks  Recent Labs      02/11/17   1159  02/11/17   1459  02/11/17   1632  02/11/17   1753  02/11/17   2031  02/11/17   2230  02/12/17   0033  02/12/17   0353  02/12/17   0630  02/12/17   0839   POCTGLUCOSE  210*  187*  157*  165*  220*  228*  223*  224*  160*  184*       Current Medications and/or Treatments Impacting Glycemic Control  Immunotherapy:  Immunosuppressants     Start     Stop Route Frequency Ordered    02/10/17 2100  mycophenolate mofetil 200 mg/mL suspension 1,000 mg      -- Oral 2 times daily 02/10/17 1015    02/11/17 1245  tacrolimus (prograf) 1 " mg/2 mL oral syringe      -- Oral 2 times daily 02/11/17 1243        Steroids:   Hormones     Start     Stop Route Frequency Ordered    02/10/17 2100  methylPREDNISolone sodium succinate injection 40 mg      -- IV Every 12 hours 02/10/17 1012        Pressors:    Autonomic Drugs     Start     Stop Route Frequency Ordered    02/09/17 1515  epinephrine 4 mg in sodium chloride 0.9% 250 mL infusion     Question Answer Comment   Titrate by: (in mcg/kg/min) 0.05    Titrate interval: (in minutes) 5    Titrate to maintain: (SBP or MAP or Cardiac Index) CARDIAC INDEX    Cardiac index greater than: (in L/min) 2.2    Maximum dose: (in mcg/kg/min) 2        -- IV Continuous 02/09/17 1417    02/09/17 1515  norepinephrine 4 mg in dextrose 5% 250 mL infusion (premix) (titrating)     Question Answer Comment   Titrate by: (in mcg/kg/min) 0.05    Titrate interval: (in minutes) 5    Titrate to maintain: (MAP or SBP) MAP    Greater than: (in mmHg) 60    Maximum dose: (in mcg/kg/min) 3        -- IV Continuous 02/09/17 1417        Hyperglycemia/Diabetes Medications: Antihyperglycemics     Start     Stop Route Frequency Ordered    02/10/17 0125  insulin regular (Humulin R) 100 Units in sodium chloride 0.9% 100 mL infusion      -- IV Continuous 02/10/17 0128

## 2017-02-12 NOTE — PT/OT/SLP EVAL
Physical Therapy  Evaluation    Mick Barriga   MRN: [id number]   Admitting Diagnosis: Heart transplanted    PT Received On: 02/12/17  PT Start Time: 0916     PT Stop Time: 0937    PT Total Time (min): 21 min       Billable Minutes:  Evaluation 13 min and Therapeutic Activity 8 min    Diagnosis: Heart transplanted  S/p removal of LVAD and pacemaker, heart transplant 2/9/17.    Past Medical History   Diagnosis Date    CHF (congestive heart failure)     Coronary artery disease     GERD (gastroesophageal reflux disease)     Hyperlipidemia     Hypertension     Type 2 diabetes mellitus with hyperglycemia       Past Surgical History   Procedure Laterality Date    Cardiac pacemaker placement      Cholecystectomy      Left ventricular assist device       x 3 months ago    Colonoscopy N/A 1/11/2017     Procedure: COLONOSCOPY;  Surgeon: Petr Almanzar MD;  [facility]  Service: Endoscopy;  Laterality: N/A;    Colonoscopy N/A 1/12/2017     Procedure: COLONOSCOPY;  Surgeon: Petr Almanzar MD;  [facility]  Service: Endoscopy;  Laterality: N/A;       Referring physician: Sukhdev Sinclair   Date referred to PT: 2/9/17    General Precautions: Standard, fall, sternal (out of room with mask.)  Orthopedic Precautions:     Braces:         Do you have any cultural, spiritual, Latter day conflicts, given your current situation?:  (none)    Patient History:  Lives With: spouse (pt does not work and lives with his wife who will be his caregiver.)  Living Arrangements: house (2 story but pt has been living downstairs since LVAD placement. )  Equipment Currently Used at Home: none  DME owned (not currently used):none    Previous Level of Function:  Ambulation Skills: independent  Transfer Skills: independent    Subjective:  Communicated with nurse prior to session.    Chief Complaint: pt c/o pain in L arm with treatment.   Patient goals: to get better and go home.     Pain Rating:  10/10         Location:  (L arm)     Pain Rating Post-Intervention: 10/10    Objective:   Patient found with: telemetry, pulse ox (continuous), oneill catheter, oxygen, SCD, chest tube (external pacemaker, CVP, PA, O2 comfort flow, swan-kasey catheter)     Cognitive Exam:  Oriented to: Person, Place, Time and Situation    Follows Commands/attention: Follows multistep  commands  Communication: clear/fluent  Safety awareness/insight to disability: intact    Physical Exam:    Lower Extremity Range of Motion:  Right Lower Extremity: WFL  Left Lower Extremity: WFL    Lower Extremity Strength:  Right Lower Extremity: WFL  Left Lower Extremity: WFL       Functional Mobility:  Bed Mobility:  Rolling/Turning Right: Total assistance (max assist x 2. pt needed verbal cues for functional mobility with sternal precautions.)  Supine to Sit: Total Assistance (max x 2)    Transfers:  Sit <> Stand Assistance: Minimum Assistance (pt had B flexed knees with standing.)  Bed <> Chair Technique: Stand Pivot  Bed <> Chair Assistance: Minimum Assistance  Bed <> Chair Assistive Device: No Assistive Device    Gait:   Gait Distance: not tested. pt had swan -kasey catheter in place.      Therapeutic Activities and Exercises:  Pt and wife received verbal and written instructions in sternal precautions and pt will need re-instruction.    AM-PAC 6 CLICK MOBILITY  How much help from another person does this patient currently need?   1 = Unable, Total/Dependent Assistance  2 = A lot, Maximum/Moderate Assistance  3 = A little, Minimum/Contact Guard/Supervision  4 = None, Modified [place]/Independent    Turning over in bed (including adjusting bedclothes, sheets and blankets)?: 2  Sitting down on and standing up from a chair with arms (e.g., wheelchair, bedside commode, etc.): 3  Moving from lying on back to sitting on the side of the bed?: 2  Moving to and from a bed to a chair (including a wheelchair)?: 3  Need to walk in hospital room?:  1  Climbing 3-5 steps with a railing?: 1  Total Score: 12     AM-PAC Raw Score CMS G-Code Modifier Level of Impairment Assistance   6 % Total / Unable   7 - 9 CM 80 - 100% Maximal Assist   10 - 14 CL 60 - 80% Moderate Assist   15 - 19 CK 40 - 60% Moderate Assist   20 - 22 CJ 20 - 40% Minimal Assist   23 CI 1-20% SBA / CGA   24 CH 0% Independent/ Mod I     Patient left up in chair with all lines intact, call button in reach and wife present.    Assessment:   Mick Barriga is a 56 y.o. male with a medical diagnosis of Heart transplanted and presents with decreased mobility, transfer and decreased distance ambulated. Pt would benefit from cont PT to address deficits and will need HHPT for discharge.  Pt will benefit from skilled PT 5x/wk to maximize physical recovery and return pt to Independent status. Pt is s/p removal of LVAD and pacemaker, heart transplant 17..    Rehab identified problem list/impairments: Rehab identified problem list/impairments: impaired endurance, impaired functional mobilty, gait instability, impaired balance, pain    Rehab potential is good.    Activity tolerance: Good    Discharge recommendations: Discharge Facility/Level Of Care Needs: home health PT     Barriers to discharge: Barriers to Discharge: None    Equipment recommendations: Equipment Needed After Discharge: none     GOALS:   Physical Therapy Goals        Problem: Physical Therapy Goal    Goal Priority Disciplines Outcome Goal Variances Interventions   Physical Therapy Goal     PT/OT, PT      Description:  Goals to be met by: 17    Patient will increase functional independence with mobility by performin. Supine to sit with Stand-by Assistance - not met  2. Sit to stand transfer with Supervision - not met  3. Gait  x 220 feet with Supervision  - not met                PLAN:    Patient to be seen 5 x/week to address the above listed problems via gait training, therapeutic activities  Plan of Care expires:  03/09/17  Plan of Care reviewed with: patient, spouse    Functional Assessment Tool Used: FIM  Score: 1  Functional Limitation: Mobility: Walking and moving around  Mobility: Walking and Moving Around Current Status (): JANES  Mobility: Walking and Moving Around Goal Status (): COLIN Horan, PT  02/12/2017

## 2017-02-12 NOTE — PROGRESS NOTES
"General Surgery Daily Progress Note    Mick Barriga  56 y.o.    Hospital Day: 13    Post Op Day: 1 Day Post-Op       Subjective  No acute events overnight. Pt seen and examined at the bedside. Vital signs stable. Reporting RUE weakness and numbness and pain. Otherwise pain controlled and feeling well.       Objective  Temp:  [98.1 °F (36.7 °C)-99.9 °F (37.7 °C)]   Pulse:  [104]   Resp:  [10-30]   BP: ()/(50-67)   SpO2:  [86 %-100 %]   Arterial Line BP: ()/(42-95)     Labs    Recent Labs  Lab 02/12/17  0455   WBC 14.95*   RBC 3.62*   HGB 8.4*   HCT 25.3*      MCV 70*   MCH 23.2*   MCHC 33.2       Recent Labs  Lab 02/12/17  0455   CALCIUM 8.3*   PROT 5.4*      K 3.7  3.7   CO2 28      BUN 39*   CREATININE 0.9   ALKPHOS 61   ALT 52*   AST 51*   BILITOT 0.6       Recent Labs  Lab 02/12/17  0455   INR 1.4*       Visit Vitals    BP (!) 114/59    Pulse 104    Temp 98.1 °F (36.7 °C) (Core)    Resp 18    Ht 5' 8" (1.727 m)    Wt 95.5 kg (210 lb 8.6 oz)    SpO2 95%    BMI 32.01 kg/m2       General: NAD, appears stated age,  Head: Normocephalic, without obvious abnormality, atraumatic  Neck: Supple  Lung: CTAB, comfortable respirations on minimal supplemental O2  Heart: AV paced  Abdomen: soft, NT/ND, normal bowel sounds  Extremities: normal, atraumatic, no edema. RUE with 5/5 strength except for hand  which is approximately 3/5  Neurologic: Alert and oriented, no focal deficits    Imaging Results         X-Ray Chest AP Portable (Final result) Result time:  02/11/17 08:50:25    Final result by Krishna Alvarado MD (02/11/17 08:50:25)    Impression:     Unchanged position of the support devices. No significant change in appearance chest.      Electronically signed by: KRISHNA ALVARADO MD  Date:     02/11/17  Time:    08:50     Narrative:    PORTABLE AP CHEST:      Comparison: 2/10/17    Findings            X-Ray Chest AP Portable (Final result) Result time:  02/10/17 07:57:20    " Final result by Bonilla Bo III, MD (02/10/17 07:57:20)    Narrative:    One view: Tubes and lines appropriate.  There is postoperative change, cardiomegaly, moderate edema, and no change.      Electronically signed by: BONILLA BO  Date:     02/10/17  Time:    07:57             X-Ray Chest AP Portable (Final result) Result time:  02/09/17 14:54:16    Final result by Ed Joe Jr., MD (02/09/17 14:54:16)    Impression:     Small left inferolateral pneumothorax.    Curvilinear metallic density likely residual wire from the AICD removal.    Patchy parenchymal opacification bilaterally.    Findings discussed with Dr. Sukhdev Sinclair at 1453      Electronically signed by: ED JOE MD  Date:     02/09/17  Time:    14:54     Narrative:    Chest single view compared to 01/19/2017.  Residual wire overlies the innominate vein toward the SVC likely from AICD removal.  ET tube, NG tube, [place] catheter and mediastinal drain noted.  Heart size is mildly enlarged.  Slight increase in the pulmonary vascular markings.  Some patchy parenchymal opacification bilaterally left greater than right.  There is a pneumothorax at the left lung base laterally.                Assessment/Plan  56 y.o.yo male s/p TRANSPLANT-HEART (N/A), REMOVAL-PACEMAKER (Left), Removal-LEFT VENTRICULAR ASSIST DEVICE (N/A) on 2/10/17      Neuro: Alert and oriented, pain controlled  -prn PO pain medications for longer term pain control    CV: HDS, on epi wean,  and minimal nitric  - dc swan and femoral a line  - will slowly wean epi 0.01 q 12 hours as tolerated, will be off by today.     Pulm: Extubated on NC with nitric    Recent Labs  Lab 02/12/17  0851   PH 7.485*   PCO2 34.3*   PO2 63*   HCO3 25.8   POCSATURATED 94*   BE 2     - extubated, saturating well on RA    Renal: Adequate uop, no evidence of barrie  Recent Labs      02/12/17   0455   BUN  39*   CREATININE  0.9   - Continue lasix gtt at 10/hr  - Strict in/out    Hem/ID: H/h stable,  leukocytosis, afebrile  Recent Labs      02/12/17   0455   HGB  8.4*   HCT  25.3*   WBC  14.95*   - Immunosuppression per heart failure team, currently receiving pulse steroids  - prophylaxis with nystatin and ancef postoperatively    Endocrine: On insulin gtt for postoperative hyperglycemia  - appreciate endocrine recs    FEN/GI: Cardiac diet  - Replace lytes prn  - Aggressive bowel regimen    Dispo: Continue ICU level care, tx to heart failure soon, CT head for RUE weakness      Lenin Asher MD PGY II  [phone]

## 2017-02-12 NOTE — PROGRESS NOTES
Progress Note  Surgical Intensive Care      Admit Date: 1/30/2017  Post-operative Day: 3 Days Post-Op  Hospital Day: 14    History of Present Illness:  Patient is a 56 y.o. male with a hx of ischemic cardiomyopathy s/p LVAD placement who has now been accepted as a candidate for heart transplantation. PMH includes HTN, HLD, GERD and DM2.       INTERVAL HISTORY:   Pt with no acute events overnight. Remains on epi, dobutamine, nitric, cardene. Complains of left hand numbness/weakness since awakening from his surgery.      Continuous Infusions:   sodium chloride 0.45% 10 mL/hr (02/10/17 2000)    DOBUTamine 2.5 mcg/kg/min (02/12/17 1000)    epinephrine 0.01 mcg/kg/min (02/12/17 1000)    furosemide (LASIX) 1 mg/mL infusion (non-titrating) 10 mg/hr (02/12/17 1000)    insulin (HUMAN R) infusion (adults)      nicardipine 5 mg/hr (02/12/17 0842)    nitric oxide gas      norepinephrine bitartrate-D5W Stopped (02/10/17 0005)    propofol Stopped (02/11/17 0700)     Scheduled Meds:   albuterol-ipratropium 2.5mg-0.5mg/3mL  3 mL Nebulization Q4H    ascorbic acid (vitamin C)  500 mg Oral BID    docusate sodium  100 mg Oral BID    famotidine (PF)  20 mg Intravenous BID    insulin aspart  5 Units Subcutaneous TIDWM    methylPREDNISolone sodium succinate  40 mg Intravenous Q12H    mycophenolate mofetil  1,000 mg Oral BID    nystatin  500,000 Units Mouth/Throat QID (WM & HS)    polyethylene glycol  17 g Oral Daily    tacrolimus  1 mg Oral BID     PRN Meds:sodium chloride, sodium chloride, acetaminophen, dextrose 50%, dextrose 50%, glucagon (human recombinant), glucose, glucose, magnesium sulfate IVPB, metoclopramide HCl, ondansetron, oxycodone, oxycodone, potassium chloride **AND** potassium chloride **AND** potassium chloride, sodium phosphate IVPB, sodium phosphate IVPB, sodium phosphate IVPB    Review of patient's allergies indicates:   Allergen Reactions    Levemir [insulin detemir] Itching    Pork/porcine  containing products     Ranexa [ranolazine] Nausea Only       OBJECTIVE:     Vital Signs (Most Recent)  Temp: 98.1 °F (36.7 °C) (02/12/17 0700)  Pulse: 104 (02/12/17 1000)  Resp: 20 (02/12/17 1000)  BP: (!) 109/54 (02/12/17 1000)  SpO2: 95 % (02/12/17 1000)    Vital Signs Range (Last 24H):  Temp:  [98.1 °F (36.7 °C)-99.9 °F (37.7 °C)]   Pulse:  [104]   Resp:  [11-30]   BP: ()/(50-67)   SpO2:  [86 %-100 %]   Arterial Line BP: ()/(42-95)     I & O (Last 24H):    Intake/Output Summary (Last 24 hours) at 02/12/17 1020  Last data filed at 02/12/17 1000   Gross per 24 hour   Intake              450 ml   Output             2515 ml   Net            -2065 ml     Ventilator Data (Last 24H):     Vent Mode: Spont  Oxygen Concentration (%):  [] 55  Resp Rate Total:  [15 br/min-42 br/min] 15 br/min  Vt Set:  [450 mL] 450 mL  PEEP/CPAP:  [5 cmH20] 5 cmH20  Pressure Support:  [10 cmH20] 10 cmH20  Mean Airway Pressure:  [9.6 cmH20] 9.6 cmH20    Hemodynamic Parameters (Last 24H):  PAP: (27-57)/(7-33) 41/17  PAP (Mean):  [17 mmHg-44 mmHg] 29 mmHg  PCWP:  [8 mmHg-13 mmHg] 8 mmHg  CO:  [5.1 L/min-7.9 L/min] 6 L/min  CI:  [2.5 L/min/m2-4.2 L/min/m2] 2.9 L/min/m2    Physical Exam:  General: well developed, intubated, no distress   Lungs: clear to auscultation bilaterally and mechanical breath sounds  Cardiovascular: Heart: tachycardic, no murmur. Extremities: no cyanosis or edema, or clubbing. Weak  strength to left hand. Ulnar distribution decreased sensation. Pulses: 2+ and symmetric.  Neurologic: Mental status: alert, awake.       Lines/Drains:       Introducer 02/09/17  right internal jugular (Active)   Specific Qualities [place] in place 2/10/2017  7:05 AM   Dressing Status Biopatch in place;Clean;Dry;Intact 2/10/2017  7:05 AM   Daily Line Review Performed 2/10/2017  7:05 AM   Number of days:1            Pulmonary Artery Catheter Assessment  02/09/17 0446 (Active)   Dressing biopatch in place;dressing dry and  intact 2/10/2017  7:05 AM   Securement secured w/ sutures 2/10/2017  7:05 AM   Current Insertion Depth (cm) 52 cm 2/10/2017  7:05 AM   Phlebitis 0-->no symptoms 2/10/2017  7:05 AM   Infiltration 0-->no symptoms 2/10/2017  7:05 AM   Waveform normal 2/10/2017  7:05 AM   Pressure Catheter Interventions line leveled/zeroed 2/10/2017  7:05 AM   Prox Injectate Status Infusing 2/10/2017  7:05 AM   Prox Infusate Status Infusing 2/10/2017  7:05 AM   Distal Status Infusing 2/10/2017  7:05 AM   Daily Line Review Performed 2/10/2017  7:05 AM   Number of days:1            Peripheral IV - Single Lumen 02/09/17 0421 Right Hand (Active)   Site Assessment Clean;Dry;Intact;No redness;No swelling 2/10/2017  7:05 AM   Line Status Flushed;Saline locked 2/10/2017  7:05 AM   Dressing Status Clean;Dry;Intact 2/10/2017  7:05 AM   Dressing Change Due 02/13/17 2/10/2017  3:00 AM   Site Change Due 02/13/17 2/10/2017  3:00 AM   Reason Not Rotated Not due 2/10/2017  7:05 AM   Number of days:1            Peripheral IV - Single Lumen 02/09/17 0424 Left Wrist (Active)   Site Assessment Clean;Dry;Intact;No redness;No swelling 2/10/2017  7:05 AM   Line Status Infusing 2/10/2017  7:05 AM   Dressing Status Clean;Dry;Intact 2/10/2017  7:05 AM   Dressing Intervention Dressing reinforced 2/9/2017 11:00 PM   Dressing Change Due 02/13/17 2/10/2017  3:00 AM   Site Change Due 02/13/17 2/10/2017  3:00 AM   Reason Not Rotated Not due 2/10/2017  7:05 AM   Number of days:1            Arterial Line 02/09/17 0413 Left Radial (Active)   Site Assessment Clean;Dry;Intact;No redness;No swelling 2/10/2017  7:05 AM   Line Status Pulsatile blood flow 2/10/2017  7:05 AM   Art Line Waveform Other (Comment) 2/10/2017  7:05 AM   Arterial Line Interventions Connections checked and tightened;Flushed per protocol 2/10/2017  7:05 AM   Color/Movement/Sensation Capillary refill less than 3 sec 2/10/2017  7:05 AM   Dressing Type Transparent 2/10/2017  7:05 AM   Dressing Status  Clean;Dry;Intact 2/10/2017  7:05 AM   Number of days:1            Arterial Line 02/09/17  Right Femoral (Active)   Site Assessment Clean;Dry;Intact;No redness;No swelling 2/10/2017  7:05 AM   Line Status Pulsatile blood flow 2/10/2017  7:05 AM   Art Line Waveform Appropriate;Square wave test performed 2/10/2017  7:05 AM   Arterial Line Interventions Zeroed and calibrated;Leveled;Connections checked and tightened;Flushed per protocol 2/10/2017  7:05 AM   Color/Movement/Sensation Capillary refill less than 3 sec 2/10/2017  7:05 AM   Dressing Type Transparent 2/10/2017  7:05 AM   Dressing Status Clean;Dry;Intact 2/10/2017  7:05 AM   Number of days:1            Pacer Wires 02/09/17 1238 (Active)   Pacer Wire Status Atrial wires connected to pacer 2/10/2017  7:05 AM   Site Assessment Clean;Dry;Intact 2/10/2017  7:05 AM   How Pacer Wires are Secured Pacing wires isolated and secured 2/10/2017  7:05 AM   Dressing Status Clean;Dry;Intact 2/10/2017  7:05 AM   Number of days:0            Chest Tube 02/09/17 1205 1 Right Mediastinal 32 Fr. (Active)   Chest Tube WDL ex 2/9/2017  2:10 PM   Function -20 cm H2O 2/10/2017  7:05 AM   Air Leak/Fluctuation air leak not present;fluctuation not present;dependent drainage cleared 2/10/2017  7:05 AM   Safety all tubing connections taped;all connections secured;suction checked 2/10/2017  7:05 AM   Securement tubing anchored to body distal to insertion site w/ tape 2/10/2017  7:05 AM   Patency Intervention Tip/tilt 2/10/2017  7:05 AM   Drainage Description Serosanguineous 2/10/2017  7:05 AM   Dressing Appearance occlusive gauze dressing intact;clean and dry 2/10/2017  7:05 AM   Left Subcutaneous Emphysema none present 2/10/2017  7:05 AM   Right Subcutaneous Emphysema none present 2/10/2017  7:05 AM   Site Assessment Not assessed 2/10/2017  7:05 AM   Surrounding Skin Unable to view 2/10/2017  7:05 AM   Output (mL) 0 mL 2/10/2017  9:00 AM   Number of days:0            Chest Tube 02/09/17 1206  "2 Left Mediastinal 32 Fr. (Active)   Chest Tube WDL WDL 2/9/2017  2:10 PM   Function -20 cm H2O 2/10/2017  7:05 AM   Air Leak/Fluctuation air leak not present;fluctuation not present 2/10/2017  7:05 AM   Safety all tubing connections taped;all connections secured;suction checked 2/10/2017  7:05 AM   Securement tubing anchored to body distal to insertion site w/ tape 2/10/2017  7:05 AM   Patency Intervention Tip/tilt 2/10/2017  7:05 AM   Drainage Description Serosanguineous 2/10/2017  7:05 AM   Dressing Appearance occlusive gauze dressing intact;clean and dry 2/10/2017  7:05 AM   Left Subcutaneous Emphysema none present 2/10/2017  7:05 AM   Right Subcutaneous Emphysema none present 2/10/2017  7:05 AM   Site Assessment Not assessed 2/10/2017  7:05 AM   Surrounding Skin Unable to view 2/10/2017  7:05 AM   Output (mL) 6 mL 2/10/2017  9:00 AM   Number of days:0            NG/OG Tube 02/09/17  (Active)   Placement Check placement verified by distal tube length measurement 2/10/2017  7:05 AM   Distal Tube Length (cm) 60 2/10/2017  7:05 AM   Tolerance no signs/symptoms of discomfort 2/10/2017  7:05 AM   Securement taped to commercial device 2/10/2017  3:00 AM   Clamp Status/Tolerance unclamped 2/10/2017  7:05 AM   Suction Setting/Drainage Method suction at;low;intermittent setting 2/10/2017  7:05 AM   Drainage Yellow 2/10/2017  7:05 AM   Intake (mL) 60 mL 2/9/2017  8:45 PM   Tube Output(mL)(Include Discarded Residual) 100 mL 2/10/2017  6:00 AM   Number of days:1            Urethral Catheter 02/09/17 0430 Temperature probe;Straight-tip;Non-latex 16 Fr. (Active)   Site Assessment Clean;Intact 2/10/2017  7:05 AM   Collection Container Urimeter 2/10/2017  7:05 AM   Securement Method secured to top of thigh w/ adhesive device 2/10/2017  7:05 AM   Catheter Care Performed yes 2/10/2017  7:05 AM   Reason for Continuing Urinary Catheterization Post operative 2/10/2017  7:05 AM   CAUTI Prevention Bundle StatLock in place w 1" " slack;Intact seal between catheter & drainage tubing;Drainage bag off the floor;Green sheeting clip in use;No dependent loops or kinks;Drainage bag not overfilled (<2/3 full);Drainage bag below bladder 2/10/2017  7:05 AM   Output (mL) 200 mL 2/10/2017 10:00 AM   Number of days:1       Laboratory (Last 24H):  CBC:     Recent Labs  Lab 02/12/17  0455   WBC 14.95*   RBC 3.62*   HGB 8.4*   HCT 25.3*      MCV 70*   MCH 23.2*   MCHC 33.2       CMP:     Recent Labs  Lab 02/12/17  0455   *   CALCIUM 8.3*   ALBUMIN 2.4*   PROT 5.4*      K 3.7  3.7   CO2 28      BUN 39*   CREATININE 0.9   ALKPHOS 61   ALT 52*   AST 51*   BILITOT 0.6       Coagulation:   Recent Labs  Lab 02/10/17  0600  02/12/17  0455   INR  --   < > 1.4*   APTT 28.5  --   --    < > = values in this interval not displayed.    Cardiac markers: No results for input(s): CKMB, CPKMB, TROPONINT, TROPONINI, MYOGLOBIN in the last 168 hours.    ABGs:     Recent Labs  Lab 02/12/17  0851   PH 7.485*   PCO2 34.3*   PO2 63*   HCO3 25.8   POCSATURATED 94*   BE 2         ASSESSMENT/PLAN:   Patient is a 56 y.o. male with a hx of ischemic cardiomyopathy s/p LVAD placement s/p heart transplantation.    Neuro:  - awake, alert, no sedation     Resp:  -extubated yesterday  - maintaining sats on 55% FiO2  - aggressive pulmonary toilet       CV:  - Dobutamine 2.5  - epi 0.02  - cardene 5mg/hr    Heme/ID:  - H/H stable  - leukocytosis trending down  - afebrile    Renal:  - lasix gtt 10mg/h  - Strict I/Os  - BUN Cr 32/1.1  - UOP 1.5cc/kg/h    FEN/GI:  - NPO  - OG in place  - Replace lytes PRN    Endo:  - Insulin gtt  - endocrine following   - remains hyperglycemic     Dispo:  - Continue ICU care    Dominique Hou  [facility]  Anesthesiology

## 2017-02-12 NOTE — PT/OT/SLP EVAL
"Speech Language Pathology  Evaluation    Mick Barriga   MRN: [id number]   Admitting Diagnosis: Heart transplanted    Diet recommendations: Solid Diet Level: NPO (pt can be given meds whole in pureed bolus)  Liquid Diet Level: NPO meds can be given whole in pureed bolus only    SLP Treatment Date: 17  Speech Start Time: 1325     Speech Stop Time: 1339     Speech Total (min): 14 min       TREATMENT BILLABLE MINUTES:  Eval Swallow and Oral Function 14    Diagnosis: Heart transplanted      Past Medical History   Diagnosis Date    CHF (congestive heart failure)     Coronary artery disease     GERD (gastroesophageal reflux disease)     Hyperlipidemia     Hypertension     Type 2 diabetes mellitus with hyperglycemia      Past Surgical History   Procedure Laterality Date    Cardiac pacemaker placement      Cholecystectomy      Left ventricular assist device       x 3 months ago    Colonoscopy N/A 2017     Procedure: COLONOSCOPY;  Surgeon: Petr Almanzar MD;  Location: [facility] ([address] [place] [address]);  Service: Endoscopy;  Laterality: N/A;    Colonoscopy N/A 2017     Procedure: COLONOSCOPY;  Surgeon: Petr Almanzar MD;  Location: [facility] ([address] [place] [address]);  Service: Endoscopy;  Laterality: N/A;       Has the patient been evaluated by SLP for swallowing? : Yes  Keep patient NPO?: Yes   General Precautions: Standard, aspiration, fall, sternal          Social Hx: Patient lives with his wife    Prior diet: regular/thin      Subjective:  " Oh that would be awful" discussing crushed meds  Patient goals: to eat./drink    Pain Ratin/10              Pain Rating Post-Intervention: 0/10    Objective:        Oral Musculature Evaluation  Oral Musculature: WFL  Dentition: present and adequate  Mucosal Quality: good  Mandibular Strength and Mobility: WFL  Oral Labial Strength and Mobility: WFL  Velar Elevation: WFL  Volitional Cough: fair  Voice Prior to PO Intake: hoarse     Bedside Swallow Eval:  Consistencies Assessed: " Thin liquids tsp and cup sip, Nectar thick liquids cup sip x4 and Puree teaspoon x3  Oral Phase: WFL  Pharyngeal Phase: coughing/choking and decreased hyolaryngeal excursion    Additional Treatment:    Pt with immediate coughing/choking and expectoration of mucous following thin liquids. Pt then took several minutes to cough and recover. Pt given nectar thick liquids and throat clear/slight wet vocal quality noted following all attempts. Pt with a throat clear noted on 1/3 pureed boluses. Recommend pt remain npo. If meds need to be given, safest is buried whole in pureed bolus with strict aspiration precautions. Vocal quality was hoarse with decreased intensity. White board updated and family education provided    FIM:                                 Assessment:  Mick Barriga is a 56 y.o. male with a medical diagnosis of Heart transplanted and presents with pharyngeal dysphagia and dysphonia          Discharge recommendations: Discharge Facility/Level Of Care Needs:  (tbd)     Goals:   SLP Goals        Problem: SLP Goal    Goal Priority Disciplines Outcome   SLP Goal     SLP    Description:  Goals to be met 2/19 1 Pt will participate in ongoing swallow assessment to determine safest po diet               Plan:   Patient to be seen Therapy Frequency: 5 x/week   Plan of Care expires:    Plan of Care reviewed with: patient, spouse  SLP Follow-up?: Yes              RODRI Parker, CCC-SLP  02/12/2017

## 2017-02-12 NOTE — PT/OT/SLP EVAL
"Occupational Therapy  Evaluation and Treatment    Mick Barriga   MRN: [id number]   Admitting Diagnosis: Heart transplanted    OT Date of Treatment: 02/12/17   OT Start Time: 0910  OT Stop Time: 0935  OT Total Time (min): 25 min    Billable Minutes:  Evaluation 10  Self Care/Home Management 15    Diagnosis: Heart transplanted 2/9/17      Past Medical History   Diagnosis Date    CHF (congestive heart failure)     Coronary artery disease     GERD (gastroesophageal reflux disease)     Hyperlipidemia     Hypertension     Type 2 diabetes mellitus with hyperglycemia       Past Surgical History   Procedure Laterality Date    Cardiac pacemaker placement      Cholecystectomy      Left ventricular assist device       x 3 months ago    Colonoscopy N/A 1/11/2017     Procedure: COLONOSCOPY;  Surgeon: Petr Almanzar MD;  Location: [facility] (2ND FLR);  Service: Endoscopy;  Laterality: N/A;    Colonoscopy N/A 1/12/2017     Procedure: COLONOSCOPY;  Surgeon: Petr Almanzar MD;  Location: [facility] (2ND FLR);  Service: Endoscopy;  Laterality: N/A;         General Precautions: Standard, fall, sternal, mask out of room.  Orthopedic Precautions: N/A      Patient History:  Living Environment  Lives With: spouse  Living Arrangements: house  Home Accessibility: stairs within home  Number of Stairs Within Home: 16  Living Environment Comment: Pt lives with spouse in 2 story home but can stay on first floor. Spouse available to assist as needed. Pt was independent with LVAD PTA.    Subjective:  Communicated with nsg prior to session.  "I'm ready to get up" pt reports.     Pain Rating: 10/10  Location - Side: Left     Location:  (UE)  Pain Addressed: Distraction, Reposition, Other (see comments) (MD present in room and aware of left UE pain.)       Objective:   Pt found supine in room. Pt with [place] Mar[place] in place. MDs present in room discussing left UE pain with patient.     Cognitive Exam:  Oriented to: Person, Place, Time and " "Situation  Follows Commands/attention: Follows one-step commands  Communication: clear/fluent  Pt with good eye contact and appropriate affect. Pt does need cues to maintain attention to tasks.    Physical Exam:  Pt is right hand dominant and demo WFL right UE strength/ROM. Pt reports increased pain in left UE and demo 3/5 shoulder strength, 3+/5 elbow strength and poor . Pt with intact B UE light touch sensation and GM/FM coordination is impaired L UE related to UE weakness.     Functional Mobility:  Bed Mobility:  Supine to Sit: With assist of 2, Maximum Assistance    Transfers:  Sit <> Stand Assistance: Minimum Assistance  Sit <> Stand Assistive Device: No Assistive Device  Bed <> Chair Technique: Stand Pivot  Bed <> Chair Transfer Assistance: Minimum Assistance (with B flexed knees.)    Activities of Daily Living:     UE Dressing Level of Assistance: Maximum assistance  LE Dressing Level of Assistance: Total assistance  Grooming Position: Seated  Grooming Level of Assistance: Stand by assistance        Education provided to pt re: sternal precautions with handout provided.  OT provided education to pt re: OT POC and safety with functional mobility and ADL skills. Pt needing several cues to maintain attention to task as pt with increased distractibility.     AM-PAC 6 CLICK ADL  How much help from another person does this patient currently need?  1 = Unable, Total/Dependent Assistance  2 = A lot, Maximum/Moderate Assistance  3 = A little, Minimum/Contact Guard/Supervision  4 = None, Modified [place]/Independent    Putting on and taking off regular lower body clothing? : 1  Bathing (including washing, rinsing, drying)?: 2  Toileting, which includes using toilet, bedpan, or urinal? : 2  Putting on and taking off regular upper body clothing?: 2  Taking care of personal grooming such as brushing teeth?: 3  Eating meals?: 3  Total Score: 13    AM-PAC Raw Score CMS "G-Code Modifier Level of Impairment " Assistance   6 % Total / Unable   7 - 9 CM 80 - 100% Maximal Assist   10 - 14 CL 60 - 80% Moderate Assist   15 - 19 CK 40 - 60% Moderate Assist   20 - 22 CJ 20 - 40% Minimal Assist   23 CI 1-20% SBA / CGA   24 CH 0% Independent/ Mod I       Patient left up in chair with all lines intact, call button in reach, nsg notified and spouse present    Assessment:  Mick Barriga is a 56 y.o. male with a medical diagnosis of Heart transplanted and tolerated session well with good effort. Pt to benefit from cont OT to address stated goals. OT anticipates he will be ready for d/c home with spouse when medically stable. .    Rehab identified problem list/impairments: Rehab identified problem list/impairments: weakness, impaired endurance, impaired self care skills, impaired functional mobilty, gait instability    Rehab potential is good.    Activity tolerance: Good    Discharge recommendations: Discharge Facility/Level Of Care Needs: home with home health       GOALS:   Occupational Therapy Goals        Problem: Occupational Therapy Goal    Goal Priority Disciplines Outcome Interventions   Occupational Therapy Goal     OT, PT/OT     Description:  Goals to be met by: 2 weeks 2/26/17     Patient will increase functional independence with ADLs by performing:    UE Dressing with Set-up Assistance.  LE Dressing with Supervision.  Grooming while standing with Supervision.  Toileting from toilet with Supervision for hygiene and clothing management.   Stand pivot transfers with Supervision.  Toilet transfer to toilet with Supervision.                PLAN:  Patient to be seen 5 x/week to address the above listed problems via self-care/home management, therapeutic activities, therapeutic exercises  Plan of Care expires:    Plan of Care reviewed with: patient, spouse         SANTIAGO Dickens  02/12/2017

## 2017-02-12 NOTE — ASSESSMENT & PLAN NOTE
bg goal 140-180- Continue intensive insulin gtt protocol,    Change to step down drip 2.9 units an hour with 5 units with meals + moderate dose correction    DISCHARGE PLANNING: TBD  previously on MDI- likely re-calculation of insulin needs prior to d/c

## 2017-02-12 NOTE — PLAN OF CARE
Problem: SLP Goal  Goal: SLP Goal  Goals to be met 2/19 1 Pt will participate in ongoing swallow assessment to determine safest po diet  Swallow eval completed. Pt at risk for aspiration. Recommend pt remain npo. Meds can be given whole buried in pureed bolus. Strict aspiration precautions     RODRI Parker, CCC-SLP  2/12/2017

## 2017-02-12 NOTE — PROGRESS NOTES
Progress Note  Heart Transplant Service    Admit Date: 1/30/2017   LOS: 13 days     SUBJECTIVE:     Follow up for: Heart transplanted    Interval History: Patient tired this morning, but otherwise feeling great. Is complaining of numbness to left hand which he noticed once he was extubated. Extubated and doing well on NC.     Scheduled Meds:   albuterol-ipratropium 2.5mg-0.5mg/3mL  3 mL Nebulization Q4H    ascorbic acid (vitamin C)  500 mg Oral BID    docusate sodium  100 mg Oral BID    famotidine (PF)  20 mg Intravenous BID    methylPREDNISolone sodium succinate  40 mg Intravenous Q12H    mupirocin  1 g Nasal BID    mycophenolate mofetil  1,000 mg Oral BID    nystatin  500,000 Units Mouth/Throat QID (WM & HS)    polyethylene glycol  17 g Oral Daily    tacrolimus  1 mg Oral BID     Continuous Infusions:   sodium chloride 0.45% 10 mL/hr (02/10/17 2000)    DOBUTamine 2.5 mcg/kg/min (02/11/17 2218)    epinephrine 0.02 mcg/kg/min (02/11/17 2014)    furosemide (LASIX) 1 mg/mL infusion (non-titrating) 10 mg/hr (02/11/17 1700)    insulin (HUMAN R) infusion (adults) 1.6 Units/hr (02/11/17 1700)    nicardipine 5 mg/hr (02/11/17 2017)    nitric oxide gas      norepinephrine bitartrate-D5W Stopped (02/10/17 0005)    propofol Stopped (02/11/17 0700)     PRN Meds:sodium chloride, sodium chloride, acetaminophen, dextrose 50%, dextrose 50%, magnesium sulfate IVPB, metoclopramide HCl, ondansetron, oxycodone, oxycodone, potassium chloride **AND** potassium chloride **AND** potassium chloride, sodium phosphate IVPB, sodium phosphate IVPB, sodium phosphate IVPB    Immunosuppressants     Start     Stop Route Frequency Ordered    02/10/17 2100  mycophenolate mofetil 200 mg/mL suspension 1,000 mg      -- Oral 2 times daily 02/10/17 1015    02/11/17 1245  tacrolimus (prograf) 1 mg/2 mL oral syringe      -- Oral 2 times daily 02/11/17 1243          Review of patient's allergies indicates:   Allergen Reactions    Levemir  [insulin detemir] Itching    Pork/porcine containing products     Ranexa [ranolazine] Nausea Only       OBJECTIVE:     Vital Signs (Most Recent)  Temp: 99.4 °F (37.4 °C) (02/11/17 2000)  Pulse: 104 (02/12/17 0500)  Resp: (!) 29 (02/12/17 0500)  BP: (!) 116/55 (02/12/17 0500)  SpO2: (!) 91 % (02/12/17 0500)    Vital Signs Range (Last 24H):  Temp:  [98.5 °F (36.9 °C)-99.9 °F (37.7 °C)]   Pulse:  [104]   Resp:  [17-29]   BP: ()/(50-67)   SpO2:  [86 %-100 %]   Arterial Line BP: ()/(42-95)     I & O (Last 24H):    Intake/Output Summary (Last 24 hours) at 02/12/17 0611  Last data filed at 02/12/17 0500   Gross per 24 hour   Intake              450 ml   Output             2610 ml   Net            -2160 ml       PAP: (26-57)/(7-33) 41/17  PAP (Mean):  [17 mmHg-44 mmHg] 29 mmHg  PCWP:  [11 mmHg-13 mmHg] 11 mmHg  CO:  [5.1 L/min-7.9 L/min] 6.3 L/min  CI:  [2.4 L/min/m2-4.2 L/min/m2] 3 L/min/m2    Telemetry: a-paced at 100 bpm    Physical Exam   Constitutional: He is oriented to person, place, and time. He appears well-developed and well-nourished.   HENT:   Head: Normocephalic and atraumatic.   Eyes: EOM are normal. Pupils are equal, round, and reactive to light.   Neck: Normal range of motion. Neck supple. No JVD present.   Right cordis and swan in place   Cardiovascular: Normal rate and regular rhythm.    Pulmonary/Chest: Effort normal and breath sounds normal. No respiratory distress. He has no wheezes. He has no rales.   Abdominal: Soft. Bowel sounds are normal. He exhibits no distension. There is no tenderness.   Musculoskeletal: Normal range of motion. He exhibits no edema.   Neurological: He is alert and oriented to person, place, and time.   Skin: Skin is warm and dry.   Nursing note and vitals reviewed.      Labs:       Recent Labs  Lab 02/11/17  1127 02/11/17  1744 02/11/17  2237 02/12/17  0455   WBC 23.21* 21.93* 18.80* 14.95*   HGB 9.0* 8.9* 8.3* 8.4*   HCT 27.2* 28.0* 25.8* 25.3*    243 206   --    LYMPH 3.4*  0.8* 3.6*  0.8* 2.5*  CANCELED  --    MONO 5.2  1.2* 4.6  1.0 3.5*  CANCELED  --    EOSINOPHIL 0.0 0.0 0.0  --          Recent Labs  Lab 02/08/17  1638  02/09/17  1417  02/10/17  0600 02/10/17  0826 02/11/17  0505 02/12/17  0455   APTT 36.9*  --  28.8  --  28.5  --   --   --    INR 3.0*  < > 1.1  < >  --  2.0* 1.6* 1.4*   < > = values in this interval not displayed.       Recent Labs  Lab 02/11/17  0505  02/11/17  1744 02/11/17  2237 02/12/17  0455   *  --  143*  --  180*   CALCIUM 8.3*  --  8.6*  --  8.3*   ALBUMIN 2.6*  --  2.7*  --  2.4*   PROT 5.4*  --  5.6*  --  5.4*     --  140  --  137   K 3.9  3.9  < > 3.9  3.9 4.2 3.7  3.7   CO2 25  --  28  --  28     --  103  --  100   BUN 32*  --  39*  --  39*   CREATININE 1.1  --  1.0  --  0.9   ALKPHOS 68  --  67  --  61   ALT 83*  --  68*  --  52*   *  --  79*  --  51*   BILITOT 0.6  --  0.7  --  0.6   MG 2.1  < > 2.0 2.1 1.8   PHOS 5.5*  < > 4.9* 4.4 4.1   < > = values in this interval not displayed.  Estimated Creatinine Clearance: 105.1 mL/min (based on Cr of 0.9).      Recent Labs  Lab 02/08/17  0447   *         Recent Labs  Lab 02/05/17  1033 02/06/17  0501 02/07/17  0504 02/08/17  0447   * 584* 536* 523*       Microbiology Results (last 7 days)     Procedure Component Value Units Date/Time    Urine culture [[id number]]  (Susceptibility) Collected:  02/08/17 2000    Order Status:  Completed Specimen:  Urine from Urine, Clean Catch Updated:  02/11/17 1158     Urine Culture, Routine --     CITROBACTER KOSERI  >100,000 cfu/ml              ASSESSMENT:     57 yo M with PMHx of NICM s/p HM II (8/24/16), HLD, HTN, VT s/p ICD who presented to the hospital for elevated LDH in the setting of subtherapeutic INR with lower extremity swelling concerning for LVAD pump thrombosis, now s/p OHTx 2/9/17.       PLAN:     S/P Orthotopic Heart Transplantation 2/9/2017  -CTS Primary  -High Risk Donor  -Moderate Risk of  Rejection: cPRA 0%, Negative Crossmatch with B Channel Shifts  -CMV Status: D-/R+: Will require valcyte x 3 months  -Immunosuppression: MMF 1500 mg BID, Solumedrol 40 mg IV BID. Tacro 1/1 with level 2.7 today  -Opportunistic Infection PPx to be initiated per protocol  - @ 2.5, Epi @ 0.01, Lasix @ 10 mg/hr, insulin @ 1.8, cardene @ 2.5, nitric @ 4.9 ppm.   -a-paced @ 100 bpm.     Bacteruria, Citrobacter Koseri  -UCx 1/19/2017 with 10-49k citrobacter  -Repeat UCx 2/8/17 with >100,000 citrobacter koseri. Sensitive to ancef (currently on surgical PPx)  -Consider transplant ID consult to ensure appropriate duration of abx given h/o LVAD & recent transplant    Left Hand Pain  -Recommend removing arterial line as it may be irritating a nerve    Neelima Navarrete PA-C  [facility]ink #[id number]

## 2017-02-12 NOTE — PLAN OF CARE
Problem: Physical Therapy Goal  Goal: Physical Therapy Goal  Goals to be met by: 17    Patient will increase functional independence with mobility by performin. Supine to sit with Stand-by Assistance - not met  2. Sit to stand transfer with Supervision - not met  3. Gait x 220 feet with Supervision - not met  eval completed and goals appropriate. Veronica Horan, PT 2017

## 2017-02-12 NOTE — ASSESSMENT & PLAN NOTE
bg goal 140-180- Increase insulin transition gtt to 2.4 u/h, Novolog 7 u AC  + moderate dose correction  BG monitoring Ac/hs/0200    DISCHARGE PLANNING: TBD  previously on MDI- likely re-calculation of insulin needs prior to d/c

## 2017-02-12 NOTE — PLAN OF CARE
Problem: Occupational Therapy Goal  Goal: Occupational Therapy Goal  Goals to be met by: 2 weeks 2/26/17     Patient will increase functional independence with ADLs by performing:    UE Dressing with Set-up Assistance.  LE Dressing with Supervision.  Grooming while standing with Supervision.  Toileting from toilet with Supervision for hygiene and clothing management.   Stand pivot transfers with Supervision.  Toilet transfer to toilet with Supervision.  Goals and POC established today.

## 2017-02-12 NOTE — PROGRESS NOTES
"[facility]  Endocrinology  Progress Note    Admit Date: 1/30/2017     Reason for Consult: Management of T2DM     Surgical Procedure and Date: 2/9/17 s/p heart txp    Diabetes diagnosis year: 1996    Home Diabetes Medications:  Lantus 16 u qam, Novolog 20 U AC     How often checking glucose at home? STEVEN  BG readings on regimen: STEVEN  Hypoglycemia on the regimen?  STEVEN  Missed doses on regimen?  STEVEN    Diabetes Complications include:  Hyperglycemia, Diabetic chronic kidney disease and Diabetic gastroparesis      Complicating diabetes co morbidities: CHF and CKD    HPI: Patient is a 56 y.o. male with T2DM, ischemic cardiomyopathy, s/p LVAD placement in 8/2016. He is not s/p heart transplantation. Endocrine consulted for bg management.           Interval HPI:   Overnight events: Patient extubated overnight.  Sitting in chair.  Diet being advanced to diabetic diet today  Eating:   <25%  Nausea: No  Hypoglycemia and intervention: No  Fever: No  TPN and/or TF: No  If yes, type of TF/TPN and rate: n/a    Visit Vitals    BP (!) 109/54    Pulse 104    Temp 98.1 °F (36.7 °C) (Core)    Resp 20    Ht 5' 8" (1.727 m)    Wt 95.5 kg (210 lb 8.6 oz)    SpO2 95%    BMI 32.01 kg/m2       Labs Reviewed and Include      Recent Labs  Lab 02/12/17  0455   *   CALCIUM 8.3*   ALBUMIN 2.4*   PROT 5.4*      K 3.7  3.7   CO2 28      BUN 39*   CREATININE 0.9   ALKPHOS 61   ALT 52*   AST 51*   BILITOT 0.6     Lab Results   Component Value Date    WBC 14.95 (H) 02/12/2017    HGB 8.4 (L) 02/12/2017    HCT 25.3 (L) 02/12/2017    MCV 70 (L) 02/12/2017     02/12/2017     No results for input(s): TSH, FREET4 in the last 168 hours.  Lab Results   Component Value Date    HGBA1C 6.8 (H) 02/09/2017       Nutritional status:   Body mass index is 32.01 kg/(m^2).  Lab Results   Component Value Date    ALBUMIN 2.4 (L) 02/12/2017    ALBUMIN 2.7 (L) 02/11/2017    ALBUMIN 2.6 (L) 02/11/2017     Lab Results "   Component Value Date    PREALBUMIN 19 (L) 02/08/2017    PREALBUMIN 14 (L) 02/06/2017    PREALBUMIN 14 (L) 02/03/2017       Estimated Creatinine Clearance: 105.1 mL/min (based on Cr of 0.9).    Accu-Checks  Recent Labs      02/11/17   1159  02/11/17   1459  02/11/17   1632  02/11/17   1753  02/11/17   2031  02/11/17   2230  02/12/17   0033  02/12/17   0353  02/12/17   0630  02/12/17   0839   POCTGLUCOSE  210*  187*  157*  165*  220*  228*  223*  224*  160*  184*       Current Medications and/or Treatments Impacting Glycemic Control  Immunotherapy:  Immunosuppressants     Start     Stop Route Frequency Ordered    02/10/17 2100  mycophenolate mofetil 200 mg/mL suspension 1,000 mg      -- Oral 2 times daily 02/10/17 1015    02/11/17 1245  tacrolimus (prograf) 1 mg/2 mL oral syringe      -- Oral 2 times daily 02/11/17 1243        Steroids:   Hormones     Start     Stop Route Frequency Ordered    02/10/17 2100  methylPREDNISolone sodium succinate injection 40 mg      -- IV Every 12 hours 02/10/17 1012        Pressors:    Autonomic Drugs     Start     Stop Route Frequency Ordered    02/09/17 1515  epinephrine 4 mg in sodium chloride 0.9% 250 mL infusion     Question Answer Comment   Titrate by: (in mcg/kg/min) 0.05    Titrate interval: (in minutes) 5    Titrate to maintain: (SBP or MAP or Cardiac Index) CARDIAC INDEX    Cardiac index greater than: (in L/min) 2.2    Maximum dose: (in mcg/kg/min) 2        -- IV Continuous 02/09/17 1417    02/09/17 1515  norepinephrine 4 mg in dextrose 5% 250 mL infusion (premix) (titrating)     Question Answer Comment   Titrate by: (in mcg/kg/min) 0.05    Titrate interval: (in minutes) 5    Titrate to maintain: (MAP or SBP) MAP    Greater than: (in mmHg) 60    Maximum dose: (in mcg/kg/min) 3        -- IV Continuous 02/09/17 1417        Hyperglycemia/Diabetes Medications: Antihyperglycemics     Start     Stop Route Frequency Ordered    02/10/17 0125  insulin regular (Humulin R) 100 Units in  sodium chloride 0.9% 100 mL infusion      -- IV Continuous 02/10/17 0128          ASSESSMENT and PLAN    * Heart transplanted  Per CTS, HTS      Type 2 diabetes mellitus with stage 3 chronic kidney disease, with long-term current use of insulin  bg goal 140-180-  Change to step down drip 2.9 units an hour with 5 units with meals + moderate dose correction  Ac/hs/0200    DISCHARGE PLANNING: TBD  previously on MDI- likely re-calculation of insulin needs prior to d/c      Ischemic cardiomyopathy  S/p heart txp        Non morbid obesity due to excess calories  Increases insulin resistance   Body mass index is 32.01 kg/(m^2).        Adrenal cortical steroids causing adverse effect in therapeutic use  Increasing insulin needs      Immunosuppression  May increase insulin resistance         Zoya Dawson MD  Endocrinology  [facility],  have personally taken the history and examined the patient and agree with the resident's note as stated above.      rewrite sd at 1.8 noting based on TDD at home needs would be closer to 1.2   Expect change to MDI tomorrow  Plan 5 ac noting that he will likely need more but will assess what he eats first

## 2017-02-12 NOTE — NURSING
Dr. Woodruff at bedside. Orders received. Pt to remain ice chips only, sips of water with medication. Orders also received to turn Epi gtt off completely at 4pm. Once Epi gtt off, wait one hour, then d/c swan kasey catheter. Will continue to monitor.

## 2017-02-13 PROBLEM — Z95.811 LVAD (LEFT VENTRICULAR ASSIST DEVICE) PRESENT: Status: ACTIVE | Noted: 2017-02-13

## 2017-02-13 LAB
ALBUMIN SERPL BCP-MCNC: 2.3 G/DL
ALLENS TEST: ABNORMAL
ALP SERPL-CCNC: 64 U/L
ALT SERPL W/O P-5'-P-CCNC: 35 U/L
ANION GAP SERPL CALC-SCNC: 10 MMOL/L
ANISOCYTOSIS BLD QL SMEAR: SLIGHT
AST SERPL-CCNC: 28 U/L
BASOPHILS # BLD AUTO: 0.01 K/UL
BASOPHILS NFR BLD: 0.1 %
BILIRUB SERPL-MCNC: 0.6 MG/DL
BUN SERPL-MCNC: 42 MG/DL
CALCIUM SERPL-MCNC: 8.2 MG/DL
CHLORIDE SERPL-SCNC: 99 MMOL/L
CO2 SERPL-SCNC: 28 MMOL/L
CREAT SERPL-MCNC: 0.9 MG/DL
DELSYS: ABNORMAL
DIFFERENTIAL METHOD: ABNORMAL
EOSINOPHIL # BLD AUTO: 0 K/UL
EOSINOPHIL NFR BLD: 0.1 %
ERYTHROCYTE [DISTWIDTH] IN BLOOD BY AUTOMATED COUNT: 26.6 %
ERYTHROCYTE [SEDIMENTATION RATE] IN BLOOD BY WESTERGREN METHOD: 20 MM/H
EST. GFR  (AFRICAN AMERICAN): >60 ML/MIN/1.73 M^2
EST. GFR  (NON AFRICAN AMERICAN): >60 ML/MIN/1.73 M^2
FIBRINOGEN PPP-MCNC: 437 MG/DL
FIO2: 55
FLOW: 25
GLUCOSE SERPL-MCNC: 296 MG/DL
HCO3 UR-SCNC: 29.5 MMOL/L (ref 24–28)
HCT VFR BLD AUTO: 26.6 %
HGB BLD-MCNC: 8.6 G/DL
HYPOCHROMIA BLD QL SMEAR: ABNORMAL
INR PPP: 1.5
LDH SERPL L TO P-CCNC: 1.17 MMOL/L (ref 0.36–1.25)
LYMPHOCYTES # BLD AUTO: 0.5 K/UL
LYMPHOCYTES NFR BLD: 3 %
MAGNESIUM SERPL-MCNC: 1.5 MG/DL
MAGNESIUM SERPL-MCNC: 1.9 MG/DL
MAGNESIUM SERPL-MCNC: 2.2 MG/DL
MAGNESIUM SERPL-MCNC: 3.4 MG/DL
MCH RBC QN AUTO: 23 PG
MCHC RBC AUTO-ENTMCNC: 32.3 %
MCV RBC AUTO: 71 FL
MODE: ABNORMAL
MONOCYTES # BLD AUTO: 0.7 K/UL
MONOCYTES NFR BLD: 4.6 %
NEUTROPHILS # BLD AUTO: 14.3 K/UL
NEUTROPHILS NFR BLD: 91.8 %
OVALOCYTES BLD QL SMEAR: ABNORMAL
PCO2 BLDA: 38.9 MMHG (ref 35–45)
PH SMN: 7.49 [PH] (ref 7.35–7.45)
PHOSPHATE SERPL-MCNC: 3.9 MG/DL
PHOSPHATE SERPL-MCNC: 4.4 MG/DL
PHOSPHATE SERPL-MCNC: 4.4 MG/DL
PHOSPHATE SERPL-MCNC: 4.8 MG/DL
PLATELET # BLD AUTO: 217 K/UL
PLATELET BLD QL SMEAR: ABNORMAL
PMV BLD AUTO: ABNORMAL FL
PO2 BLDA: 64 MMHG (ref 80–100)
POC BE: 6 MMOL/L
POC SATURATED O2: 94 % (ref 95–100)
POC TCO2: 31 MMOL/L (ref 23–27)
POCT GLUCOSE: 187 MG/DL (ref 70–110)
POCT GLUCOSE: 227 MG/DL (ref 70–110)
POCT GLUCOSE: 316 MG/DL (ref 70–110)
POCT GLUCOSE: 332 MG/DL (ref 70–110)
POCT GLUCOSE: 346 MG/DL (ref 70–110)
POCT GLUCOSE: 375 MG/DL (ref 70–110)
POIKILOCYTOSIS BLD QL SMEAR: SLIGHT
POTASSIUM SERPL-SCNC: 3.6 MMOL/L
POTASSIUM SERPL-SCNC: 3.7 MMOL/L
POTASSIUM SERPL-SCNC: 3.7 MMOL/L
POTASSIUM SERPL-SCNC: 4 MMOL/L
POTASSIUM SERPL-SCNC: 5.5 MMOL/L
PROT SERPL-MCNC: 5.2 G/DL
PROTHROMBIN TIME: 15.5 SEC
RBC # BLD AUTO: 3.74 M/UL
SAMPLE: ABNORMAL
SCHISTOCYTES BLD QL SMEAR: ABNORMAL
SITE: ABNORMAL
SODIUM SERPL-SCNC: 137 MMOL/L
SP02: 94
SPHEROCYTES BLD QL SMEAR: ABNORMAL
TACROLIMUS BLD-MCNC: 8.6 NG/ML
WBC # BLD AUTO: 15.55 K/UL

## 2017-02-13 PROCEDURE — 83735 ASSAY OF MAGNESIUM: CPT | Mod: 91

## 2017-02-13 PROCEDURE — 63600175 PHARM REV CODE 636 W HCPCS: Performed by: INTERNAL MEDICINE

## 2017-02-13 PROCEDURE — 20000000 HC ICU ROOM

## 2017-02-13 PROCEDURE — 97530 THERAPEUTIC ACTIVITIES: CPT

## 2017-02-13 PROCEDURE — 25000242 PHARM REV CODE 250 ALT 637 W/ HCPCS: Performed by: INTERNAL MEDICINE

## 2017-02-13 PROCEDURE — 83050 HGB METHEMOGLOBIN QUAN: CPT

## 2017-02-13 PROCEDURE — 80197 ASSAY OF TACROLIMUS: CPT

## 2017-02-13 PROCEDURE — 25000003 PHARM REV CODE 250: Performed by: PHYSICIAN ASSISTANT

## 2017-02-13 PROCEDURE — 63600175 PHARM REV CODE 636 W HCPCS: Performed by: STUDENT IN AN ORGANIZED HEALTH CARE EDUCATION/TRAINING PROGRAM

## 2017-02-13 PROCEDURE — 99232 SBSQ HOSP IP/OBS MODERATE 35: CPT | Mod: ,,, | Performed by: INTERNAL MEDICINE

## 2017-02-13 PROCEDURE — 25000003 PHARM REV CODE 250: Performed by: STUDENT IN AN ORGANIZED HEALTH CARE EDUCATION/TRAINING PROGRAM

## 2017-02-13 PROCEDURE — 85025 COMPLETE CBC W/AUTO DIFF WBC: CPT

## 2017-02-13 PROCEDURE — 85384 FIBRINOGEN ACTIVITY: CPT

## 2017-02-13 PROCEDURE — 94664 DEMO&/EVAL PT USE INHALER: CPT

## 2017-02-13 PROCEDURE — 92526 ORAL FUNCTION THERAPY: CPT

## 2017-02-13 PROCEDURE — 25000003 PHARM REV CODE 250: Performed by: THORACIC SURGERY (CARDIOTHORACIC VASCULAR SURGERY)

## 2017-02-13 PROCEDURE — 25000003 PHARM REV CODE 250: Performed by: INTERNAL MEDICINE

## 2017-02-13 PROCEDURE — 99233 SBSQ HOSP IP/OBS HIGH 50: CPT | Mod: ,,, | Performed by: SURGERY

## 2017-02-13 PROCEDURE — 63600175 PHARM REV CODE 636 W HCPCS: Performed by: THORACIC SURGERY (CARDIOTHORACIC VASCULAR SURGERY)

## 2017-02-13 PROCEDURE — 27100171 HC OXYGEN HIGH FLOW UP TO 24 HOURS

## 2017-02-13 PROCEDURE — 94799 UNLISTED PULMONARY SVC/PX: CPT

## 2017-02-13 PROCEDURE — 94640 AIRWAY INHALATION TREATMENT: CPT

## 2017-02-13 PROCEDURE — 99232 SBSQ HOSP IP/OBS MODERATE 35: CPT | Mod: ,,, | Performed by: NURSE PRACTITIONER

## 2017-02-13 PROCEDURE — 25000003 PHARM REV CODE 250: Performed by: NURSE PRACTITIONER

## 2017-02-13 PROCEDURE — 84132 ASSAY OF SERUM POTASSIUM: CPT

## 2017-02-13 PROCEDURE — 63600367 HC NITRIC OXIDE PER HOUR

## 2017-02-13 PROCEDURE — 27000221 HC OXYGEN, UP TO 24 HOURS

## 2017-02-13 PROCEDURE — 84100 ASSAY OF PHOSPHORUS: CPT | Mod: 91

## 2017-02-13 PROCEDURE — 85610 PROTHROMBIN TIME: CPT

## 2017-02-13 PROCEDURE — 63600175 PHARM REV CODE 636 W HCPCS: Performed by: NURSE PRACTITIONER

## 2017-02-13 PROCEDURE — 37799 UNLISTED PX VASCULAR SURGERY: CPT

## 2017-02-13 PROCEDURE — 99900035 HC TECH TIME PER 15 MIN (STAT)

## 2017-02-13 PROCEDURE — 80053 COMPREHEN METABOLIC PANEL: CPT

## 2017-02-13 RX ORDER — HYDROMORPHONE HYDROCHLORIDE 1 MG/ML
0.5 INJECTION, SOLUTION INTRAMUSCULAR; INTRAVENOUS; SUBCUTANEOUS EVERY 6 HOURS PRN
Status: DISCONTINUED | OUTPATIENT
Start: 2017-02-13 | End: 2017-02-22 | Stop reason: HOSPADM

## 2017-02-13 RX ORDER — INSULIN ASPART 100 [IU]/ML
7 INJECTION, SOLUTION INTRAVENOUS; SUBCUTANEOUS
Status: DISCONTINUED | OUTPATIENT
Start: 2017-02-13 | End: 2017-02-15

## 2017-02-13 RX ORDER — VALGANCICLOVIR 450 MG/1
900 TABLET, FILM COATED ORAL DAILY
Status: DISCONTINUED | OUTPATIENT
Start: 2017-02-13 | End: 2017-02-22 | Stop reason: HOSPADM

## 2017-02-13 RX ORDER — PANTOPRAZOLE SODIUM 40 MG/1
40 TABLET, DELAYED RELEASE ORAL DAILY
Status: DISCONTINUED | OUTPATIENT
Start: 2017-02-13 | End: 2017-02-22 | Stop reason: HOSPADM

## 2017-02-13 RX ORDER — OXYCODONE HYDROCHLORIDE 5 MG/1
10 TABLET ORAL EVERY 4 HOURS PRN
Status: DISCONTINUED | OUTPATIENT
Start: 2017-02-13 | End: 2017-02-22 | Stop reason: HOSPADM

## 2017-02-13 RX ORDER — MYCOPHENOLATE MOFETIL 250 MG/1
1500 CAPSULE ORAL 2 TIMES DAILY
Status: DISCONTINUED | OUTPATIENT
Start: 2017-02-13 | End: 2017-02-22 | Stop reason: HOSPADM

## 2017-02-13 RX ORDER — SULFAMETHOXAZOLE AND TRIMETHOPRIM 400; 80 MG/1; MG/1
1 TABLET ORAL DAILY
Status: DISCONTINUED | OUTPATIENT
Start: 2017-02-13 | End: 2017-02-22 | Stop reason: HOSPADM

## 2017-02-13 RX ORDER — CIPROFLOXACIN 500 MG/1
500 TABLET ORAL EVERY 12 HOURS
Status: COMPLETED | OUTPATIENT
Start: 2017-02-13 | End: 2017-02-19

## 2017-02-13 RX ORDER — TACROLIMUS 1 MG/1
2 CAPSULE ORAL 2 TIMES DAILY
Status: DISCONTINUED | OUTPATIENT
Start: 2017-02-13 | End: 2017-02-14

## 2017-02-13 RX ADMIN — POLYETHYLENE GLYCOL 3350 17 G: 17 POWDER, FOR SOLUTION ORAL at 08:02

## 2017-02-13 RX ADMIN — CIPROFLOXACIN HYDROCHLORIDE 500 MG: 500 TABLET, FILM COATED ORAL at 08:02

## 2017-02-13 RX ADMIN — POTASSIUM CHLORIDE 20 MEQ: 200 INJECTION, SOLUTION INTRAVENOUS at 03:02

## 2017-02-13 RX ADMIN — IPRATROPIUM BROMIDE AND ALBUTEROL SULFATE 3 ML: .5; 3 SOLUTION RESPIRATORY (INHALATION) at 04:02

## 2017-02-13 RX ADMIN — INSULIN ASPART 8 UNITS: 100 INJECTION, SOLUTION INTRAVENOUS; SUBCUTANEOUS at 07:02

## 2017-02-13 RX ADMIN — OXYCODONE HYDROCHLORIDE 10 MG: 5 TABLET ORAL at 08:02

## 2017-02-13 RX ADMIN — TACROLIMUS 2 MG: 1 CAPSULE, GELATIN COATED ORAL at 06:02

## 2017-02-13 RX ADMIN — NICARDIPINE HYDROCHLORIDE 5 MG/HR: 0.2 INJECTION, SOLUTION INTRAVENOUS at 12:02

## 2017-02-13 RX ADMIN — MYCOPHENOLATE MOFETIL 1500 MG: 250 CAPSULE ORAL at 08:02

## 2017-02-13 RX ADMIN — NYSTATIN 500000 UNITS: 500000 SUSPENSION ORAL at 01:02

## 2017-02-13 RX ADMIN — OXYCODONE HYDROCHLORIDE 10 MG: 5 TABLET ORAL at 03:02

## 2017-02-13 RX ADMIN — INSULIN ASPART 4 UNITS: 100 INJECTION, SOLUTION INTRAVENOUS; SUBCUTANEOUS at 11:02

## 2017-02-13 RX ADMIN — IPRATROPIUM BROMIDE AND ALBUTEROL SULFATE 3 ML: .5; 3 SOLUTION RESPIRATORY (INHALATION) at 11:02

## 2017-02-13 RX ADMIN — NICARDIPINE HYDROCHLORIDE 7.52 MG/HR: 0.2 INJECTION, SOLUTION INTRAVENOUS at 06:02

## 2017-02-13 RX ADMIN — FUROSEMIDE 10 MG/HR: 10 INJECTION, SOLUTION INTRAMUSCULAR; INTRAVENOUS at 03:02

## 2017-02-13 RX ADMIN — INSULIN ASPART 5 UNITS: 100 INJECTION, SOLUTION INTRAVENOUS; SUBCUTANEOUS at 07:02

## 2017-02-13 RX ADMIN — METHYLPREDNISOLONE SODIUM SUCCINATE 40 MG: 40 INJECTION, POWDER, FOR SOLUTION INTRAMUSCULAR; INTRAVENOUS at 08:02

## 2017-02-13 RX ADMIN — PANTOPRAZOLE SODIUM 40 MG: 40 TABLET, DELAYED RELEASE ORAL at 09:02

## 2017-02-13 RX ADMIN — INSULIN ASPART 7 UNITS: 100 INJECTION, SOLUTION INTRAVENOUS; SUBCUTANEOUS at 11:02

## 2017-02-13 RX ADMIN — NYSTATIN 500000 UNITS: 500000 SUSPENSION ORAL at 08:02

## 2017-02-13 RX ADMIN — OXYCODONE HYDROCHLORIDE 10 MG: 5 SOLUTION ORAL at 06:02

## 2017-02-13 RX ADMIN — IPRATROPIUM BROMIDE AND ALBUTEROL SULFATE 3 ML: .5; 3 SOLUTION RESPIRATORY (INHALATION) at 03:02

## 2017-02-13 RX ADMIN — CIPROFLOXACIN HYDROCHLORIDE 500 MG: 500 TABLET, FILM COATED ORAL at 10:02

## 2017-02-13 RX ADMIN — HYDROMORPHONE HYDROCHLORIDE 0.5 MG: 1 INJECTION, SOLUTION INTRAMUSCULAR; INTRAVENOUS; SUBCUTANEOUS at 10:02

## 2017-02-13 RX ADMIN — INSULIN ASPART 2 UNITS: 100 INJECTION, SOLUTION INTRAVENOUS; SUBCUTANEOUS at 05:02

## 2017-02-13 RX ADMIN — OXYCODONE HYDROCHLORIDE AND ACETAMINOPHEN 500 MG: 500 TABLET ORAL at 08:02

## 2017-02-13 RX ADMIN — DOCUSATE SODIUM 100 MG: 100 CAPSULE, LIQUID FILLED ORAL at 08:02

## 2017-02-13 RX ADMIN — SULFAMETHOXAZOLE AND TRIMETHOPRIM 1 TABLET: 400; 80 TABLET ORAL at 10:02

## 2017-02-13 RX ADMIN — IPRATROPIUM BROMIDE AND ALBUTEROL SULFATE 3 ML: .5; 3 SOLUTION RESPIRATORY (INHALATION) at 07:02

## 2017-02-13 RX ADMIN — POTASSIUM CHLORIDE 20 MEQ: 200 INJECTION, SOLUTION INTRAVENOUS at 10:02

## 2017-02-13 RX ADMIN — Medication 1500 MG: at 09:02

## 2017-02-13 RX ADMIN — MAGNESIUM SULFATE IN WATER 2 G: 40 INJECTION, SOLUTION INTRAVENOUS at 09:02

## 2017-02-13 RX ADMIN — VALGANCICLOVIR 900 MG: 450 TABLET, FILM COATED ORAL at 10:02

## 2017-02-13 RX ADMIN — DOBUTAMINE IN DEXTROSE 2.5 MCG/KG/MIN: 200 INJECTION, SOLUTION INTRAVENOUS at 09:02

## 2017-02-13 RX ADMIN — INSULIN ASPART 7 UNITS: 100 INJECTION, SOLUTION INTRAVENOUS; SUBCUTANEOUS at 05:02

## 2017-02-13 RX ADMIN — SODIUM CHLORIDE 2.4 UNITS/HR: 9 INJECTION, SOLUTION INTRAVENOUS at 03:02

## 2017-02-13 RX ADMIN — NYSTATIN 500000 UNITS: 500000 SUSPENSION ORAL at 05:02

## 2017-02-13 NOTE — PLAN OF CARE
Problem: Patient Care Overview  Goal: Plan of Care Review  Dx: heart transplant (2/9), 1 PRBC, 250cc Albumin    PMH: AICD, HTN, HLD, GERD, T2DM, CKD (3), ischemic CM, systolic and diastolic HF, low Na, non morbid obesity, BPH    PSH: cholecystectomy, LVAD (8/24/16)    2/9: Heart Tx (out @ 1405); 2 units PRBC post op  2/11: Post-op day 2, Extubated  Nursing:  - MAP 60-80  - V paced at 105  - Q2 hour accuchecks  - Q8 hour lactic (iSTAT)  - Q8 hour ABG  - Q6 hour CBC, K, Mg, Phos  - Q6 hour Fibringen  -Q12 hour CMP   Outcome: Ongoing (interventions implemented as appropriate)  Pt awake, alert, and oriented. Wife at bedside. Vital signs stable. Epi gtt d/c'd. Abie kasey catheter d/c'd. Right femoral anirudh d/c'd. Pt tolerated well. Pt up to chair x 5 hours today. PT continues to complain of left arm numbness. MD's aware. Cat scan of head ordered. Radiology called twice today, stated that since CT was ordered as routine it would take some time before completed. O2 being titrated via nasal cannula on comfort flow. Dobutamine gtt @ 2.5mcg/h, Lasix gtt at 10 mg/h, Cardene gtt at 5mg/h. Pt on a clear liquid cardiac diet, blood sugars and insulin adjusted per endocrine. Dressing changed to old lvad site, wound bed pink and moist, cleansed with normal saline, packed with saline moistened plain nu gauze. Dressings reapplied to bilateral heels.

## 2017-02-13 NOTE — SUBJECTIVE & OBJECTIVE
"Interval HPI: BG above goal, on insulin transition gtt, tolerating diet well     Visit Vitals    /64 (BP Location: Right arm, Patient Position: Lying, BP Method: Automatic)    Pulse 105    Temp 97 °F (36.1 °C) (Oral)    Resp 18    Ht 5' 8" (1.727 m)    Wt 95.5 kg (210 lb 8.6 oz)    SpO2 96%    BMI 32.01 kg/m2       Labs Reviewed and Include      Recent Labs  Lab 02/13/17  0555   *   CALCIUM 8.2*   ALBUMIN 2.3*   PROT 5.2*      K 3.7  3.7   CO2 28   CL 99   BUN 42*   CREATININE 0.9   ALKPHOS 64   ALT 35   AST 28   BILITOT 0.6     Lab Results   Component Value Date    WBC 14.55 (H) 02/12/2017    HGB 8.6 (L) 02/12/2017    HCT 26.3 (L) 02/12/2017    MCV 70 (L) 02/12/2017     02/12/2017     No results for input(s): TSH, FREET4 in the last 168 hours.  Lab Results   Component Value Date    HGBA1C 6.8 (H) 02/09/2017       Nutritional status:   Body mass index is 32.01 kg/(m^2).  Lab Results   Component Value Date    ALBUMIN 2.3 (L) 02/13/2017    ALBUMIN 2.3 (L) 02/12/2017    ALBUMIN 2.4 (L) 02/12/2017     Lab Results   Component Value Date    PREALBUMIN 19 (L) 02/08/2017    PREALBUMIN 14 (L) 02/06/2017    PREALBUMIN 14 (L) 02/03/2017       Estimated Creatinine Clearance: 105.1 mL/min (based on Cr of 0.9).    Accu-Checks  Recent Labs      02/12/17   0033  02/12/17   0353  02/12/17   0630  02/12/17   0839  02/12/17   1007  02/12/17   1231  02/12/17   1643  02/12/17   2228  02/12/17   2338  02/13/17   0029   POCTGLUCOSE  223*  224*  160*  184*  150*  173*  239*  346*  375*  332*       Current Medications and/or Treatments Impacting Glycemic Control  Immunotherapy:  Immunosuppressants     Start     Stop Route Frequency Ordered    02/13/17 1800  tacrolimus capsule 2 mg      -- Oral 2 times daily 02/13/17 0937    02/13/17 2100  mycophenolate capsule 1,500 mg      -- Oral 2 times daily 02/13/17 0937        Steroids:   Hormones     Start     Stop Route Frequency Ordered    02/10/17 2100  " methylPREDNISolone sodium succinate injection 40 mg      -- IV Every 12 hours 02/10/17 1012        Pressors:    Autonomic Drugs     None        Hyperglycemia/Diabetes Medications: Antihyperglycemics     Start     Stop Route Frequency Ordered    02/12/17 1130  insulin regular (Humulin R) 100 Units in sodium chloride 0.9% 100 mL infusion      -- IV Continuous 02/12/17 1016    02/12/17 1310  insulin aspart pen 1-10 Units      -- SubQ Before meals & nightly PRN 02/12/17 1210    02/13/17 0800  insulin aspart pen 7 Units      -- SubQ 3 times daily with meals 02/13/17 5486

## 2017-02-13 NOTE — PLAN OF CARE
Problem: Physical Therapy Goal  Goal: Physical Therapy Goal  Goals to be met by: 17    Patient will increase functional independence with mobility by performin. Supine to sit with Stand-by Assistance - not met  2. Sit to stand transfer with Supervision - not met  3. Gait x 220 feet with Supervision - not met   Outcome: Ongoing (interventions implemented as appropriate)  Progressing towards goals.

## 2017-02-13 NOTE — NURSING
Call placed to heart failure team to notify of continued MAP>80 since cardene off this am.  Per heart failure, ok for MAP>80 with SBP in the 130s.

## 2017-02-13 NOTE — PROGRESS NOTES
"General Surgery Daily Progress Note    Mick Barriga  56 y.o.    Hospital Day: 14    Post Op Day: 1 Day Post-Op       Subjective  No acute events overnight. Pt seen and examined at the bedside. Vital signs stable. No new complaints or concerns. LUE pain and weakness improving. Pain controlled.       Objective  Temp:  [97 °F (36.1 °C)-98.1 °F (36.7 °C)]   Pulse:  [103-105]   Resp:  [11-33]   BP: ()/(53-67)   SpO2:  [86 %-98 %]   Arterial Line BP: ()/()     Labs    Recent Labs  Lab 02/12/17  1256   WBC 14.55*   RBC 3.74*   HGB 8.6*   HCT 26.3*      MCV 70*   MCH 23.0*   MCHC 32.7       Recent Labs  Lab 02/13/17  0555   CALCIUM 8.2*   PROT 5.2*      K 3.7  3.7   CO2 28   CL 99   BUN 42*   CREATININE 0.9   ALKPHOS 64   ALT 35   AST 28   BILITOT 0.6       Recent Labs  Lab 02/13/17  0554   INR 1.5*       Visit Vitals    /61    Pulse 104    Temp 97.1 °F (36.2 °C) (Oral)    Resp 14    Ht 5' 8" (1.727 m)    Wt 95.5 kg (210 lb 8.6 oz)    SpO2 (!) 89%    BMI 32.01 kg/m2       General: NAD, appears stated age,  Head: Normocephalic, without obvious abnormality, atraumatic  Neck: Supple  Lung: CTAB, comfortable respirations on minimal supplemental O2  Heart: AV paced  Abdomen: soft, NT/ND, normal bowel sounds  Extremities: normal, atraumatic, no edema. RUE with 5/5 strength except for hand  which is approximately 3/5  Neurologic: Alert and oriented, no focal deficits    Imaging Results         CT Head Without Contrast (In process)         X-Ray Chest AP Portable (Final result) Result time:  02/13/17 09:24:50    Final result by Anne Waller Jr., MD (02/13/17 09:24:50)    Impression:     Abnormal study as above.      Electronically signed by: ANNE WALLER MD  Date:     02/13/17  Time:    09:24     Narrative:    Portable chest compared to February 11.  ET tube NG tube and [place]-Maribel catheter have been removed.  Right central vascular catheter remains.  Remnant wire overlies the " left innominate extending into the SVC.  There is increased opacification at the bases likely some atelectasis and/or consolidation.  No pneumothorax.            X-Ray Chest AP Portable (Final result) Result time:  02/11/17 08:50:25    Final result by Krishna Alas MD (02/11/17 08:50:25)    Impression:     Unchanged position of the support devices. No significant change in appearance chest.      Electronically signed by: KRISHNA ALAS MD  Date:     02/11/17  Time:    08:50     Narrative:    PORTABLE AP CHEST:      Comparison: 2/10/17    Findings            X-Ray Chest AP Portable (Final result) Result time:  02/10/17 07:57:20    Final result by Bonilla Bo III, MD (02/10/17 07:57:20)    Narrative:    One view: Tubes and lines appropriate.  There is postoperative change, cardiomegaly, moderate edema, and no change.      Electronically signed by: BONILLA BO  Date:     02/10/17  Time:    07:57             X-Ray Chest AP Portable (Final result) Result time:  02/09/17 14:54:16    Final result by Ed Joe Jr., MD (02/09/17 14:54:16)    Impression:     Small left inferolateral pneumothorax.    Curvilinear metallic density likely residual wire from the AICD removal.    Patchy parenchymal opacification bilaterally.    Findings discussed with Dr. Sukhdev Sinclair at 1453      Electronically signed by: ED JOE MD  Date:     02/09/17  Time:    14:54     Narrative:    Chest single view compared to 01/19/2017.  Residual wire overlies the innominate vein toward the SVC likely from AICD removal.  ET tube, NG tube, [place]-Maribel catheter and mediastinal drain noted.  Heart size is mildly enlarged.  Slight increase in the pulmonary vascular markings.  Some patchy parenchymal opacification bilaterally left greater than right.  There is a pneumothorax at the left lung base laterally.                Assessment/Plan  56 y.o.yo male s/p TRANSPLANT-HEART (N/A), REMOVAL-PACEMAKER (Left), Removal-LEFT VENTRICULAR ASSIST  DEVICE (N/A) on 2/10/17      Neuro: Alert and oriented, pain controlled  -prn PO pain medication    - RUE weakness improving, 4/5 strength in left hand now.     CV: HDS on minimal nitric and dobutamine at 4  - keep dobutamine at 4  - epi off  - continue tele  - swan discontinued  - a-line discontinued    Pulm: Extubated on NC with nitric    Recent Labs  Lab 02/12/17  2358   PH 7.488*   PCO2 38.9   PO2 64*   HCO3 29.5*   POCSATURATED 94*   BE 6     - extubated, saturating well on minimal NC  - IS/ a capella/ pulm toilet  - wean nitric  - left sided chest tube removed  - right side chest tube left in place until heart bx taken    Renal: Adequate uop, no evidence of barrie  Recent Labs      02/13/17   0555   BUN  42*   CREATININE  0.9   - Continue lasix gtt at 10/hr  - Strict in/out    Hem/ID: H/h stable, leukocytosis, afebrile  Recent Labs      02/12/17   1256   HGB  8.6*   HCT  26.3*   WBC  14.55*   - Immunosuppression per heart failure team, currently receiving pulse steroids  - prophylaxis with nystatin and ancef postoperatively    Endocrine: On insulin gtt for postoperative hyperglycemia  - appreciate endocrine recs    FEN/GI: NPO  - Replace lytes prn  - Aggressive bowel regimen  - Formal speech swallow by speech before advancing diet    Dispo: Continue ICU level care, tx to heart failure as primary. CT head for RUE weakness when possible      Lenin Asher MD PGY II  [phone]

## 2017-02-13 NOTE — PT/OT/SLP PROGRESS
"Speech Language Pathology  Treatment    Mick Barriga   MRN: [id number]   Admitting Diagnosis: Heart transplanted    Diet recommendations: Solid Diet Level: NPO  Liquid Diet Level: NPO   If oral meds must be administered, recommend safest to bury in puree bolus.  Upright positioning and close monitoring for any s/s of difficulty.    Modified Barium Swallow Study planned for tomorrow to further assess ability to safely tolerate po.    SLP Treatment Date: 17  Speech Start Time: 150     Speech Stop Time: 1531     Speech Total (min): 23 min       TREATMENT BILLABLE MINUTES:  Treatment Swallowing Dysfunction 23    Has the patient been evaluated by SLP for swallowing? : Yes  Keep patient NPO?: Yes   General Precautions: Standard, aspiration, fall, sternal  Current Respiratory Status: nasal cannula       Subjective:  "fast.  It's gone" (pt stated during po trials)    Pain Ratin/10     Pain Rating Post-Intervention: 0/10    Objective:    Pt seen this afternoon with wife present.  Mr. Aldrich was upright in chair at bedside.  Vocal quality remains somewhat hoarse.  Pt was presented with ice chips x2, tsp thin water x3, cup sips water x2, puree x4, and nectar thick water via spoon x2 and cup x1.  Oral phase was functional .  Initiation of pharyngeal phase was not significantly delayed and laryngeal elevation appeared somewhat reduced via palpation.  No overt coughing/choking observed; however, delayed throat clearing evident post cup sips of thin and nectar thick water.  Slightly wet vocal quality detected at times as well.  Aspiration cannot fully be ruled out.      Results of session, dysphagia, risk of aspiration, s/s of aspiration, and plan of care were all reviewed with pt/wife at end of session.  Results / recommendations also discussed with nursing and PA.  Modified Barium Swallow Study ordered for tomorrow to further assess swallow function.     FIM:  Social Interaction: 7 Complete [place]--The " patient interacts appropriately with staff, other patients, and family members (e.g., controls temper, accepts criticism, is aware that words and actions have an impact on others), and does not require medication for                            Assessment:  Mick Barriga is a 56 y.o. male with a medical diagnosis of Heart transplanted and presents with dysphonia and dysphagia.    Discharge recommendations: Discharge Facility/Level Of Care Needs:  (to be determined pending progress and any PT/OT recs)     Goals:   SLP Goals        Problem: SLP Goal    Goal Priority Disciplines Outcome   SLP Goal     SLP    Description:  Goals to be met 2/19 1 Pt will participate in ongoing swallow assessment to determine safest po diet               Plan:   Patient to be seen Therapy Frequency: 5 x/week   Plan of Care expires:    Plan of Care reviewed with: patient, spouse  SLP Follow-up?: Yes               RODRI Wise, CCC-SLP  Speech Language Pathologist  [phone]  2/13/2017

## 2017-02-13 NOTE — PROGRESS NOTES
UPDATE    SW to pt's room for update.  Pt presents as sitting up in bedside chair, with wife at bedside.  Pt and wife both present as aao x4, calm, cooperative, and asking and answering questions appropriately.  ST finishing assessment with pt upon SW's arrival.  Pt reports he is feeling good today.  Pt and wife both report coping well at this time.  Pt and wife deny any needs or concerns at this time.  Patient and caregiver verbalize understanding of  availability and how to access available resources as needed.  SW providing ongoing psychosocial and counseling support, education, resources, assistance, and discharge planning as indicated.  SW following and remains available.

## 2017-02-13 NOTE — PROGRESS NOTES
Progress Note  Surgical Intensive Care      Admit Date: 1/30/2017  Post-operative Day: 4 Days Post-Op  Hospital Day: 15    History of Present Illness:  Patient is a 56 y.o. male with a hx of ischemic cardiomyopathy s/p LVAD placement who has now been accepted as a candidate for heart transplantation. PMH includes HTN, HLD, GERD and DM2.       INTERVAL HISTORY:   Pt with no acute events overnight. Remains on dobutamine, nitric, cardene, and lasix. Only complaint is of left hand numbness/weakness since awakening from his surgery. CT head without contrast ordered for further evaluation.     Continuous Infusions:   sodium chloride 0.45% 10 mL/hr (02/10/17 2000)    DOBUTamine 2.5 mcg/kg/min (02/12/17 1800)    furosemide (LASIX) 1 mg/mL infusion (non-titrating) 10 mg/hr (02/12/17 1800)    insulin (HUMAN R) infusion (adults) 1.8 Units/hr (02/12/17 1800)    nicardipine 7.52 mg/hr (02/13/17 0644)    nitric oxide gas       Scheduled Meds:   albuterol-ipratropium 2.5mg-0.5mg/3mL  3 mL Nebulization Q4H    ascorbic acid (vitamin C)  500 mg Oral BID    docusate sodium  100 mg Oral BID    famotidine (PF)  20 mg Intravenous BID    insulin aspart  5 Units Subcutaneous TIDWM    methylPREDNISolone sodium succinate  40 mg Intravenous Q12H    mycophenolate mofetil  1,500 mg Oral BID    nystatin  500,000 Units Mouth/Throat QID (WM & HS)    polyethylene glycol  17 g Oral Daily    tacrolimus  1 mg Oral BID     PRN Meds:sodium chloride, sodium chloride, acetaminophen, dextrose 50%, dextrose 50%, glucagon (human recombinant), glucose, glucose, insulin aspart, magnesium sulfate IVPB, metoclopramide HCl, ondansetron, oxycodone, oxycodone, potassium chloride **AND** potassium chloride **AND** potassium chloride, sodium phosphate IVPB, sodium phosphate IVPB, sodium phosphate IVPB    Review of patient's allergies indicates:   Allergen Reactions    Levemir [insulin detemir] Itching    Pork/porcine containing products     Ranexa  [ranolazine] Nausea Only       OBJECTIVE:     Vital Signs (Most Recent)  Temp: 98.1 °F (36.7 °C) (02/12/17 1530)  Pulse: 104 (02/13/17 0428)  Resp: (!) 23 (02/13/17 0428)  BP: (!) 115/57 (02/12/17 1600)  SpO2: (!) 91 % (02/13/17 0428)    Vital Signs Range (Last 24H):  Temp:  [98.1 °F (36.7 °C)]   Pulse:  [104-105]   Resp:  [10-33]   BP: ()/(53-62)   SpO2:  [91 %-98 %]   Arterial Line BP: ()/(41-60)     I & O (Last 24H):    Intake/Output Summary (Last 24 hours) at 02/13/17 0649  Last data filed at 02/12/17 1800   Gross per 24 hour   Intake              641 ml   Output             1550 ml   Net             -909 ml     Ventilator Data (Last 24H):     Oxygen Concentration (%):  [55-65] 65    Hemodynamic Parameters (Last 24H):  PAP: (29-32)/(8-12) 32/12  PAP (Mean):  [19 mmHg-22 mmHg] 22 mmHg  PCWP:  [8 mmHg] 8 mmHg  CO:  [5.7 L/min-6 L/min] 5.7 L/min  CI:  [2.7 L/min/m2-2.9 L/min/m2] 2.7 L/min/m2    Physical Exam:  General: well developed, no distress; resting comfortably in bed   Lungs: clear to auscultation bilaterally   Cardiovascular: Heart: tachycardic, no murmur. Extremities: no cyanosis or edema, or clubbing. Weak  strength to left hand. Ulnar distribution decreased sensation. Pulses: 2+ and symmetric.  Neurologic: Mental status: alert, awake.       Lines/Drains:       Introducer 02/09/17  right internal jugular (Active)   Specific Qualities [place] in place 2/10/2017  7:05 AM   Dressing Status Biopatch in place;Clean;Dry;Intact 2/10/2017  7:05 AM   Daily Line Review Performed 2/10/2017  7:05 AM   Number of days:1            Pulmonary Artery Catheter Assessment  02/09/17 9456 (Active)   Dressing biopatch in place;dressing dry and intact 2/10/2017  7:05 AM   Securement secured w/ sutures 2/10/2017  7:05 AM   Current Insertion Depth (cm) 52 cm 2/10/2017  7:05 AM   Phlebitis 0-->no symptoms 2/10/2017  7:05 AM   Infiltration 0-->no symptoms 2/10/2017  7:05 AM   Waveform normal 2/10/2017  7:05 AM    Pressure Catheter Interventions line leveled/zeroed 2/10/2017  7:05 AM   Prox Injectate Status Infusing 2/10/2017  7:05 AM   Prox Infusate Status Infusing 2/10/2017  7:05 AM   Distal Status Infusing 2/10/2017  7:05 AM   Daily Line Review Performed 2/10/2017  7:05 AM   Number of days:1            Peripheral IV - Single Lumen 02/09/17 0421 Right Hand (Active)   Site Assessment Clean;Dry;Intact;No redness;No swelling 2/10/2017  7:05 AM   Line Status Flushed;Saline locked 2/10/2017  7:05 AM   Dressing Status Clean;Dry;Intact 2/10/2017  7:05 AM   Dressing Change Due 02/13/17 2/10/2017  3:00 AM   Site Change Due 02/13/17 2/10/2017  3:00 AM   Reason Not Rotated Not due 2/10/2017  7:05 AM   Number of days:1            Peripheral IV - Single Lumen 02/09/17 0424 Left Wrist (Active)   Site Assessment Clean;Dry;Intact;No redness;No swelling 2/10/2017  7:05 AM   Line Status Infusing 2/10/2017  7:05 AM   Dressing Status Clean;Dry;Intact 2/10/2017  7:05 AM   Dressing Intervention Dressing reinforced 2/9/2017 11:00 PM   Dressing Change Due 02/13/17 2/10/2017  3:00 AM   Site Change Due 02/13/17 2/10/2017  3:00 AM   Reason Not Rotated Not due 2/10/2017  7:05 AM   Number of days:1            Arterial Line 02/09/17 0413 Left Radial (Active)   Site Assessment Clean;Dry;Intact;No redness;No swelling 2/10/2017  7:05 AM   Line Status Pulsatile blood flow 2/10/2017  7:05 AM   Art Line Waveform Other (Comment) 2/10/2017  7:05 AM   Arterial Line Interventions Connections checked and tightened;Flushed per protocol 2/10/2017  7:05 AM   Color/Movement/Sensation Capillary refill less than 3 sec 2/10/2017  7:05 AM   Dressing Type Transparent 2/10/2017  7:05 AM   Dressing Status Clean;Dry;Intact 2/10/2017  7:05 AM   Number of days:1            Arterial Line 02/09/17  Right Femoral (Active)   Site Assessment Clean;Dry;Intact;No redness;No swelling 2/10/2017  7:05 AM   Line Status Pulsatile blood flow 2/10/2017  7:05 AM   Art Line Waveform  Appropriate;Square wave test performed 2/10/2017  7:05 AM   Arterial Line Interventions Zeroed and calibrated;Leveled;Connections checked and tightened;Flushed per protocol 2/10/2017  7:05 AM   Color/Movement/Sensation Capillary refill less than 3 sec 2/10/2017  7:05 AM   Dressing Type Transparent 2/10/2017  7:05 AM   Dressing Status Clean;Dry;Intact 2/10/2017  7:05 AM   Number of days:1            Pacer Wires 02/09/17 1238 (Active)   Pacer Wire Status Atrial wires connected to pacer 2/10/2017  7:05 AM   Site Assessment Clean;Dry;Intact 2/10/2017  7:05 AM   How Pacer Wires are Secured Pacing wires isolated and secured 2/10/2017  7:05 AM   Dressing Status Clean;Dry;Intact 2/10/2017  7:05 AM   Number of days:0            Chest Tube 02/09/17 1205 1 Right Mediastinal 32 Fr. (Active)   Chest Tube WDL ex 2/9/2017  2:10 PM   Function -20 cm H2O 2/10/2017  7:05 AM   Air Leak/Fluctuation air leak not present;fluctuation not present;dependent drainage cleared 2/10/2017  7:05 AM   Safety all tubing connections taped;all connections secured;suction checked 2/10/2017  7:05 AM   Securement tubing anchored to body distal to insertion site w/ tape 2/10/2017  7:05 AM   Patency Intervention Tip/tilt 2/10/2017  7:05 AM   Drainage Description Serosanguineous 2/10/2017  7:05 AM   Dressing Appearance occlusive gauze dressing intact;clean and dry 2/10/2017  7:05 AM   Left Subcutaneous Emphysema none present 2/10/2017  7:05 AM   Right Subcutaneous Emphysema none present 2/10/2017  7:05 AM   Site Assessment Not assessed 2/10/2017  7:05 AM   Surrounding Skin Unable to view 2/10/2017  7:05 AM   Output (mL) 0 mL 2/10/2017  9:00 AM   Number of days:0            Chest Tube 02/09/17 1206 2 Left Mediastinal 32 Fr. (Active)   Chest Tube WDL WDL 2/9/2017  2:10 PM   Function -20 cm H2O 2/10/2017  7:05 AM   Air Leak/Fluctuation air leak not present;fluctuation not present 2/10/2017  7:05 AM   Safety all tubing connections taped;all connections  "secured;suction checked 2/10/2017  7:05 AM   Securement tubing anchored to body distal to insertion site w/ tape 2/10/2017  7:05 AM   Patency Intervention Tip/tilt 2/10/2017  7:05 AM   Drainage Description Serosanguineous 2/10/2017  7:05 AM   Dressing Appearance occlusive gauze dressing intact;clean and dry 2/10/2017  7:05 AM   Left Subcutaneous Emphysema none present 2/10/2017  7:05 AM   Right Subcutaneous Emphysema none present 2/10/2017  7:05 AM   Site Assessment Not assessed 2/10/2017  7:05 AM   Surrounding Skin Unable to view 2/10/2017  7:05 AM   Output (mL) 6 mL 2/10/2017  9:00 AM   Number of days:0            NG/OG Tube 02/09/17  (Active)   Placement Check placement verified by distal tube length measurement 2/10/2017  7:05 AM   Distal Tube Length (cm) 60 2/10/2017  7:05 AM   Tolerance no signs/symptoms of discomfort 2/10/2017  7:05 AM   Securement taped to commercial device 2/10/2017  3:00 AM   Clamp Status/Tolerance unclamped 2/10/2017  7:05 AM   Suction Setting/Drainage Method suction at;low;intermittent setting 2/10/2017  7:05 AM   Drainage Yellow 2/10/2017  7:05 AM   Intake (mL) 60 mL 2/9/2017  8:45 PM   Tube Output(mL)(Include Discarded Residual) 100 mL 2/10/2017  6:00 AM   Number of days:1            Urethral Catheter 02/09/17 0430 Temperature probe;Straight-tip;Non-latex 16 Fr. (Active)   Site Assessment Clean;Intact 2/10/2017  7:05 AM   Collection Container Urimeter 2/10/2017  7:05 AM   Securement Method secured to top of thigh w/ adhesive device 2/10/2017  7:05 AM   Catheter Care Performed yes 2/10/2017  7:05 AM   Reason for Continuing Urinary Catheterization Post operative 2/10/2017  7:05 AM   CAUTI Prevention Bundle StatLock in place w 1" slack;Intact seal between catheter & drainage tubing;Drainage bag off the floor;Green sheeting clip in use;No dependent loops or kinks;Drainage bag not overfilled (<2/3 full);Drainage bag below bladder 2/10/2017  7:05 AM   Output (mL) 200 mL 2/10/2017 10:00 AM "   Number of days:1       Laboratory (Last 24H):  CBC:     Recent Labs  Lab 02/12/17  1256   WBC 14.55*   RBC 3.74*   HGB 8.6*   HCT 26.3*      MCV 70*   MCH 23.0*   MCHC 32.7       CMP:     Recent Labs  Lab 02/12/17  1713 02/12/17  2340   *  --    CALCIUM 8.1*  --    ALBUMIN 2.3*  --    PROT 5.2*  --      --    K 3.7  3.7 4.0   CO2 26  --      --    BUN 42*  --    CREATININE 0.9  --    ALKPHOS 60  --    ALT 41  --    AST 37  --    BILITOT 0.7  --        Coagulation:   Recent Labs  Lab 02/10/17  0600  02/13/17  0554   INR  --   < > 1.5*   APTT 28.5  --   --    < > = values in this interval not displayed.    Cardiac markers: No results for input(s): CKMB, CPKMB, TROPONINT, TROPONINI, MYOGLOBIN in the last 168 hours.    ABGs:     Recent Labs  Lab 02/12/17  2358   PH 7.488*   PCO2 38.9   PO2 64*   HCO3 29.5*   POCSATURATED 94*   BE 6         ASSESSMENT/PLAN:   Patient is a 56 y.o. male with a hx of ischemic cardiomyopathy s/p LVAD placement s/p heart transplantation on 2/9/2017.    Neuro:  - awake, alert, no sedation     Resp:  -extubated on 2/11/2017  - maintaining sats on nasal cannula   - aggressive pulmonary toilet     CV:  - Dobutamine 2.5  - Cardene 7.5 mg/hr  - Nitric at 5 ppm   - Wean gtt's per CTS team    Heme/ID:  - H/H stable  - leukocytosis trending down  - afebrile    Renal:  - lasix gtt 10mg/h  - Strict I/Os  - UOP 0.6 cc/kg/h    FEN/GI:  - Cardiac diet  - Replace lytes PRN    Endo:  - Insulin gtt  - endocrine following     Dispo:  - Continue ICU care; wean gtts and remove a-line per CTS    Antoinette Goldman  PGY-3  Anesthesiology

## 2017-02-13 NOTE — PLAN OF CARE
Problem: SLP Goal  Goal: SLP Goal  Goals to be met 2/19  1 Pt will participate in ongoing swallow assessment to determine safest po diet   Pt seen for ongoing swallow assessment at bedside this date.  Modified Barium Swallow Study recommended to further assess safety of swallow function radiographically.       RODRI Wise, CCC-SLP  Speech Language Pathologist  [phone]  2/13/2017

## 2017-02-13 NOTE — PROGRESS NOTES
"[facility]  Endocrinology  Progress Note    Admit Date: 1/30/2017     Reason for Consult: Management of T2DM     Surgical Procedure and Date: 2/9/17 s/p heart txp    Diabetes diagnosis year: 1996    Home Diabetes Medications:  Lantus 16 u qam, Novolog 20 U AC     Diabetes Complications include:  Hyperglycemia, Diabetic chronic kidney disease and Diabetic gastroparesis      Complicating diabetes co morbidities: CHF and CKD    HPI: Patient is a 56 y.o. male with T2DM, ischemic cardiomyopathy, s/p LVAD placement in 8/2016. He is now s/p heart transplantation. Endocrine consulted for bg management.         Interval HPI: BG above goal, on insulin transition gtt, tolerating diet well     Visit Vitals    /64 (BP Location: Right arm, Patient Position: Lying, BP Method: Automatic)    Pulse 105    Temp 97 °F (36.1 °C) (Oral)    Resp 18    Ht 5' 8" (1.727 m)    Wt 95.5 kg (210 lb 8.6 oz)    SpO2 96%    BMI 32.01 kg/m2       Labs Reviewed and Include      Recent Labs  Lab 02/13/17  0555   *   CALCIUM 8.2*   ALBUMIN 2.3*   PROT 5.2*      K 3.7  3.7   CO2 28   CL 99   BUN 42*   CREATININE 0.9   ALKPHOS 64   ALT 35   AST 28   BILITOT 0.6     Lab Results   Component Value Date    WBC 14.55 (H) 02/12/2017    HGB 8.6 (L) 02/12/2017    HCT 26.3 (L) 02/12/2017    MCV 70 (L) 02/12/2017     02/12/2017     No results for input(s): TSH, FREET4 in the last 168 hours.  Lab Results   Component Value Date    HGBA1C 6.8 (H) 02/09/2017       Nutritional status:   Body mass index is 32.01 kg/(m^2).  Lab Results   Component Value Date    ALBUMIN 2.3 (L) 02/13/2017    ALBUMIN 2.3 (L) 02/12/2017    ALBUMIN 2.4 (L) 02/12/2017     Lab Results   Component Value Date    PREALBUMIN 19 (L) 02/08/2017    PREALBUMIN 14 (L) 02/06/2017    PREALBUMIN 14 (L) 02/03/2017       Estimated Creatinine Clearance: 105.1 mL/min (based on Cr of 0.9).    Accu-Checks  Recent Labs      02/12/17   0033  02/12/17   0353  " 02/12/17   0630  02/12/17   0839  02/12/17   1007  02/12/17   1231  02/12/17   1643  02/12/17   2228  02/12/17   2338  02/13/17   0029   POCTGLUCOSE  223*  224*  160*  184*  150*  173*  239*  346*  375*  332*       Current Medications and/or Treatments Impacting Glycemic Control  Immunotherapy:  Immunosuppressants     Start     Stop Route Frequency Ordered    02/13/17 1800  tacrolimus capsule 2 mg      -- Oral 2 times daily 02/13/17 0937    02/13/17 2100  mycophenolate capsule 1,500 mg      -- Oral 2 times daily 02/13/17 0937        Steroids:   Hormones     Start     Stop Route Frequency Ordered    02/10/17 2100  methylPREDNISolone sodium succinate injection 40 mg      -- IV Every 12 hours 02/10/17 1012        Pressors:    Autonomic Drugs     None        Hyperglycemia/Diabetes Medications: Antihyperglycemics     Start     Stop Route Frequency Ordered    02/12/17 1130  insulin regular (Humulin R) 100 Units in sodium chloride 0.9% 100 mL infusion      -- IV Continuous 02/12/17 1016    02/12/17 1310  insulin aspart pen 1-10 Units      -- SubQ Before meals & nightly PRN 02/12/17 1210    02/13/17 0800  insulin aspart pen 7 Units      -- SubQ 3 times daily with meals 02/13/17 0759          ASSESSMENT and PLAN    Type 2 diabetes mellitus with stage 3 chronic kidney disease, with long-term current use of insulin  bg goal 140-180- Increase insulin transition gtt to 2.4 u/h, Novolog 7 u AC  + moderate dose correction  BG monitoring Ac/hs/0200    DISCHARGE PLANNING: TBD  previously on MDI- likely re-calculation of insulin needs prior to d/c      * Heart transplanted  Per CTS, HTS      Ischemic cardiomyopathy  S/p heart txp        Non morbid obesity due to excess calories  Increases insulin resistance   Body mass index is 32.01 kg/(m^2).        Adrenal cortical steroids causing adverse effect in therapeutic use  Increasing insulin needs      Immunosuppression  May increase insulin resistance       Peggy Nicholson, PEDRO,  NP  Endocrinology  [facility]

## 2017-02-13 NOTE — PLAN OF CARE
Problem: Patient Care Overview  Goal: Plan of Care Review  Dx: heart transplant (2/9), 1 PRBC, 250cc Albumin    PMH: AICD, HTN, HLD, GERD, T2DM, CKD (3), ischemic CM, systolic and diastolic HF, low Na, non morbid obesity, BPH    PSH: cholecystectomy, LVAD (8/24/16)    2/9: Heart Tx (out @ 1405); 2 units PRBC post op  2/11: Post-op day 2, Extubated    Nursing:  - MAP 60-80  - V paced at 105  - Q2 hour accuchecks  - Q8 hour lactic (iSTAT)  - Q8 hour ABG  - Q6 hour CBC, K, Mg, Phos  - Q6 hour Fibringen  -Q12 hour CMP   Outcome: Ongoing (interventions implemented as appropriate)  Plan of care reviewed with pt pt safety maintained side rails upx3 fld restriction, Reinforced frequently.. bi lateral heels with decub..meyplex placed on bi latera heels .

## 2017-02-13 NOTE — PT/OT/SLP PROGRESS
"Physical Therapy  Treatment    Mick Barriga   MRN: [id number]   Admitting Diagnosis: Heart transplanted    PT Received On: 02/13/17  PT Start Time: 1437     PT Stop Time: 1500    PT Total Time (min): 23 min       Billable Minutes:  Therapeutic Activity 23    Treatment Type: Treatment  PT/PTA: PT             General Precautions: Standard, fall, sternal, aspiration  Orthopedic Precautions: N/A   Braces:      Do you have any cultural, spiritual, Anglican conflicts, given your current situation?:  (none)    Subjective:  Communicated with RN prior to session.  "I've been waiting for PT."    Pain Rating:  (did not rate)  Location - Side: Left     Location: hand  Pain Addressed: Reposition, Distraction       Objective:   Patient found with: telemetry, peripheral IV, chest tube, oxygen, oneill catheter, pulse ox (continuous), central line, arterial line, blood pressure cuff    Functional Mobility:  Bed Mobility:   Rolling/Turning to Left: Maximum assistance  Rolling/Turning Right: Maximum assistance  Scooting/Bridging: Maximum Assistance  Supine to Sit: Maximum Assistance    Transfers:  Sit <> Stand Assistance: Minimum Assistance  Sit <> Stand Assistive Device: No Assistive Device    Gait:   Gait Distance: x7 steps from EOB to chair  Assistance 1: Minimum assistance  Gait Assistive Device: No device, Hand held assist  Gait Pattern: 2-point gait  Gait Deviation(s): decreased amelia, increased time in double stance, decreased velocity of limb motion      Balance:   Static Sit: FAIR: Maintains without assist, but unable to take any challenges   Dynamic Sit: FAIR: Cannot move trunk without losing balance  Static Stand: FAIR: Maintains without assist but unable to take challenges  Dynamic stand: POOR: N/A     Therapeutic Activities and Exercises:  Pt sat EOB x 5 min with CGA>SBA for stability. Pt reeducated on sternal precautions and pt able to recite precautions after being reminded. Pt demonstrated ability to adhere to " precautions with mobility in transfers sit<>stand with min a for vc and tc required.   Pt performed static stand with increased LIZBETH and required vc for narrowing BLE to improve balance. Pt ambulated with min a for support 2/2 to BLE weakness.      AM-PAC 6 CLICK MOBILITY  How much help from another person does this patient currently need?   1 = Unable, Total/Dependent Assistance  2 = A lot, Maximum/Moderate Assistance  3 = A little, Minimum/Contact Guard/Supervision  4 = None, Modified Forrest/Independent    Turning over in bed (including adjusting bedclothes, sheets and blankets)?: 2  Sitting down on and standing up from a chair with arms (e.g., wheelchair, bedside commode, etc.): 3  Moving from lying on back to sitting on the side of the bed?: 2  Moving to and from a bed to a chair (including a wheelchair)?: 3  Need to walk in hospital room?: 3  Climbing 3-5 steps with a railing?: 2  Total Score: 15    AM-PAC Raw Score CMS G-Code Modifier Level of Impairment Assistance   6 % Total / Unable   7 - 9 CM 80 - 100% Maximal Assist   10 - 14 CL 60 - 80% Moderate Assist   15 - 19 CK 40 - 60% Moderate Assist   20 - 22 CJ 20 - 40% Minimal Assist   23 CI 1-20% SBA / CGA   24 CH 0% Independent/ Mod I     Patient left up in chair with all lines intact, call button in reach and RN and spouse present.    Assessment:  Mick Barriga is a 56 y.o. male with a medical diagnosis of Heart transplanted and presents with decreased functional mobility due to impaired endurance and weakness throughout.  Patient would benefit from skilled PT in the acute care setting to improve the limitations listed below. Patient would benefit from HHPT upon discharge.      Rehab identified problem list/impairments: Rehab identified problem list/impairments: weakness, impaired endurance, impaired functional mobilty, gait instability, impaired balance, decreased coordination    Rehab potential is good.    Activity tolerance:  Fair    Discharge recommendations: Discharge Facility/Level Of Care Needs: home health PT     Barriers to discharge: Barriers to Discharge: None    Equipment recommendations: Equipment Needed After Discharge: none     GOALS:   Physical Therapy Goals        Problem: Physical Therapy Goal    Goal Priority Disciplines Outcome Goal Variances Interventions   Physical Therapy Goal     PT/OT, PT Ongoing (interventions implemented as appropriate)     Description:  Goals to be met by: 17    Patient will increase functional independence with mobility by performin. Supine to sit with Stand-by Assistance - not met  2. Sit to stand transfer with Supervision - not met  3. Gait  x 220 feet with Supervision  - not met                PLAN:    Patient to be seen 5 x/week  to address the above listed problems via gait training, therapeutic activities, therapeutic exercises  Plan of Care expires: 17  Plan of Care reviewed with: patient, spouse         Dean Padron, PT  2017

## 2017-02-13 NOTE — PROGRESS NOTES
Progress Note  Heart Transplant Service    Admit Date: 1/30/2017   LOS: 14 days     SUBJECTIVE:     Follow up for: Heart transplanted    Interval History: only complaint is left hand pain     Scheduled Meds:   albuterol-ipratropium 2.5mg-0.5mg/3mL  3 mL Nebulization Q4H    ciprofloxacin HCl  500 mg Oral Q12H    docusate sodium  100 mg Oral BID    insulin aspart  7 Units Subcutaneous TIDWM    methylPREDNISolone sodium succinate  40 mg Intravenous Q12H    mycophenolate  1,500 mg Oral BID    nystatin  500,000 Units Mouth/Throat QID (WM & HS)    pantoprazole  40 mg Oral Daily    polyethylene glycol  17 g Oral Daily    sulfamethoxazole-trimethoprim 400-80mg  1 tablet Oral Daily    tacrolimus  2 mg Oral BID    valganciclovir  900 mg Oral Daily     Continuous Infusions:   sodium chloride 0.45% 10 mL/hr (02/10/17 2000)    DOBUTamine 2.5 mcg/kg/min (02/13/17 1200)    furosemide (LASIX) 1 mg/mL infusion (non-titrating) 10 mg/hr (02/13/17 1200)    insulin (HUMAN R) infusion (adults) 2.4 Units/hr (02/13/17 1200)    nicardipine Stopped (02/13/17 0908)    nitric oxide gas       PRN Meds:sodium chloride, sodium chloride, acetaminophen, dextrose 50%, dextrose 50%, glucagon (human recombinant), glucose, glucose, HYDROmorphone, insulin aspart, magnesium sulfate IVPB, metoclopramide HCl, ondansetron, oxycodone, potassium chloride **AND** potassium chloride **AND** potassium chloride, sodium phosphate IVPB, sodium phosphate IVPB, sodium phosphate IVPB    Immunosuppressants     Start     Stop Route Frequency Ordered    02/13/17 1800  tacrolimus capsule 2 mg      -- Oral 2 times daily 02/13/17 0937    02/13/17 2100  mycophenolate capsule 1,500 mg      -- Oral 2 times daily 02/13/17 0937          Review of patient's allergies indicates:   Allergen Reactions    Levemir [insulin detemir] Itching    Pork/porcine containing products     Ranexa [ranolazine] Nausea Only       OBJECTIVE:     Vital Signs (Most Recent)  Temp:  97.1 °F (36.2 °C) (02/13/17 1100)  Pulse: 104 (02/13/17 1200)  Resp: 18 (02/13/17 1200)  BP: 127/60 (02/13/17 1200)  SpO2: (!) 94 % (02/13/17 1200)    Vital Signs Range (Last 24H):  Temp:  [97 °F (36.1 °C)-98.1 °F (36.7 °C)]   Pulse:  [103-105]   Resp:  [11-33]   BP: ()/(53-67)   SpO2:  [86 %-98 %]   Arterial Line BP: ()/()     I & O (Last 24H):    Intake/Output Summary (Last 24 hours) at 02/13/17 1354  Last data filed at 02/13/17 1200   Gross per 24 hour   Intake             1567 ml   Output             3105 ml   Net            -1538 ml            Telemetry: a-paced at 100 bpm    Physical Exam   Constitutional: He is oriented to person, place, and time. He appears well-developed and well-nourished.   HENT:   Head: Normocephalic and atraumatic.   Eyes: EOM are normal. Pupils are equal, round, and reactive to light.   Neck: Normal range of motion. Neck supple. No JVD present.   Right cordis and swan in place   Cardiovascular: Normal rate and regular rhythm.    Pulmonary/Chest: Effort normal and breath sounds normal. No respiratory distress. He has no wheezes. He has no rales.   Abdominal: Soft. Bowel sounds are normal. He exhibits no distension. There is no tenderness.   Musculoskeletal: Normal range of motion. He exhibits no edema.   Neurological: He is alert and oriented to person, place, and time.   Skin: Skin is warm and dry.   Nursing note and vitals reviewed.      Labs:       Recent Labs  Lab 02/11/17  2237 02/12/17  0455 02/12/17  1256   WBC 18.80* 14.95* 14.55*   HGB 8.3* 8.4* 8.6*   HCT 25.8* 25.3* 26.3*    198 199   LYMPH 2.5*  CANCELED 4.5*  0.7* 4.6*  0.7*   MONO 3.5*  CANCELED 2.3*  0.3 2.7*  0.4   EOSINOPHIL 0.0 0.0 0.0         Recent Labs  Lab 02/08/17  1638  02/09/17  1417  02/10/17  0600  02/11/17  0505 02/12/17  0455 02/13/17  0554   APTT 36.9*  --  28.8  --  28.5  --   --   --   --    INR 3.0*  < > 1.1  < >  --   < > 1.6* 1.4* 1.5*   < > = values in this interval not  displayed.       Recent Labs  Lab 02/12/17  0455  02/12/17  1713 02/12/17  2340 02/13/17  0555 02/13/17  1215   *  --  242*  --  296*  --    CALCIUM 8.3*  --  8.1*  --  8.2*  --    ALBUMIN 2.4*  --  2.3*  --  2.3*  --    PROT 5.4*  --  5.2*  --  5.2*  --      --  136  --  137  --    K 3.7  3.7  < > 3.7  3.7 4.0 3.7  3.7 3.6   CO2 28  --  26  --  28  --      --  100  --  99  --    BUN 39*  --  42*  --  42*  --    CREATININE 0.9  --  0.9  --  0.9  --    ALKPHOS 61  --  60  --  64  --    ALT 52*  --  41  --  35  --    AST 51*  --  37  --  28  --    BILITOT 0.6  --  0.7  --  0.6  --    MG 1.8  < > 1.8 1.9 1.5* 3.4*   PHOS 4.1  < > 4.6* 3.9 4.4 4.4   < > = values in this interval not displayed.  Estimated Creatinine Clearance: 105.1 mL/min (based on Cr of 0.9).      Recent Labs  Lab 02/08/17  0447   *         Recent Labs  Lab 02/07/17  0504 02/08/17  0447   * 523*       Microbiology Results (last 7 days)     Procedure Component Value Units Date/Time    Urine culture [[id number]]  (Susceptibility) Collected:  02/08/17 2000    Order Status:  Completed Specimen:  Urine from Urine, Clean Catch Updated:  02/11/17 1158     Urine Culture, Routine --     CITROBACTER KOSERI  >100,000 cfu/ml              ASSESSMENT:     55 yo M with PMHx of NICM s/p HM II (8/24/16), HLD, HTN, VT s/p ICD who presented to the hospital for elevated LDH in the setting of subtherapeutic INR with lower extremity swelling concerning for LVAD pump thrombosis, now s/p OHTx 2/9/17.       PLAN:     S/P Orthotopic Heart Transplantation 2/9/2017  -HTS primary  -High Risk Donor  -Moderate Risk of Rejection: cPRA 0%, Negative Crossmatch with B Channel Shifts  -CMV Status: D-/R+: Will require valcyte x 3 months  -Immunosuppression: MMF 1500 mg BID, Solumedrol 40 mg IV BID. Tacro increased to 2/2 today  -Opportunistic Infection PPx to be initiated per protocol  -off cardene   -a-paced @ 100 bpm.     Bacteruria, Citrobacter  Koseri  -UCx 1/19/2017 with 10-49k citrobacter  -Repeat UCx 2/8/17 with >100,000 citrobacter koseri. Sensitive to ancef (currently on surgical PPx)  -start cipro    Left Hand Pain  -will dc A line

## 2017-02-14 PROBLEM — R20.0 NUMBNESS OF LEFT HAND: Status: ACTIVE | Noted: 2017-02-14

## 2017-02-14 LAB
ALBUMIN SERPL BCP-MCNC: 2.5 G/DL
ALP SERPL-CCNC: 66 U/L
ALT SERPL W/O P-5'-P-CCNC: 31 U/L
ANION GAP SERPL CALC-SCNC: 9 MMOL/L
AST SERPL-CCNC: 23 U/L
BASOPHILS # BLD AUTO: 0 K/UL
BASOPHILS # BLD AUTO: 0 K/UL
BASOPHILS NFR BLD: 0 %
BASOPHILS NFR BLD: 0 %
BILIRUB SERPL-MCNC: 0.5 MG/DL
BUN SERPL-MCNC: 39 MG/DL
CALCIUM SERPL-MCNC: 8.5 MG/DL
CHLORIDE SERPL-SCNC: 97 MMOL/L
CO2 SERPL-SCNC: 32 MMOL/L
CREAT SERPL-MCNC: 0.8 MG/DL
DIFFERENTIAL METHOD: ABNORMAL
DIFFERENTIAL METHOD: ABNORMAL
EOSINOPHIL # BLD AUTO: 0 K/UL
EOSINOPHIL # BLD AUTO: 0 K/UL
EOSINOPHIL NFR BLD: 0 %
EOSINOPHIL NFR BLD: 0 %
ERYTHROCYTE [DISTWIDTH] IN BLOOD BY AUTOMATED COUNT: 26.7 %
ERYTHROCYTE [DISTWIDTH] IN BLOOD BY AUTOMATED COUNT: 26.7 %
EST. GFR  (AFRICAN AMERICAN): >60 ML/MIN/1.73 M^2
EST. GFR  (NON AFRICAN AMERICAN): >60 ML/MIN/1.73 M^2
GLUCOSE SERPL-MCNC: 180 MG/DL
HCT VFR BLD AUTO: 25.9 %
HCT VFR BLD AUTO: 25.9 %
HGB BLD-MCNC: 8.4 G/DL
HGB BLD-MCNC: 8.4 G/DL
INR PPP: 1.4
LYMPHOCYTES # BLD AUTO: 0.5 K/UL
LYMPHOCYTES # BLD AUTO: 0.5 K/UL
LYMPHOCYTES NFR BLD: 4.2 %
LYMPHOCYTES NFR BLD: 4.2 %
MAGNESIUM SERPL-MCNC: 1.5 MG/DL
MAGNESIUM SERPL-MCNC: 1.6 MG/DL
MAGNESIUM SERPL-MCNC: 2.1 MG/DL
MAGNESIUM SERPL-MCNC: 2.1 MG/DL
MCH RBC QN AUTO: 23 PG
MCH RBC QN AUTO: 23 PG
MCHC RBC AUTO-ENTMCNC: 32.4 %
MCHC RBC AUTO-ENTMCNC: 32.4 %
MCV RBC AUTO: 71 FL
MCV RBC AUTO: 71 FL
MONOCYTES # BLD AUTO: 0.4 K/UL
MONOCYTES # BLD AUTO: 0.4 K/UL
MONOCYTES NFR BLD: 3.3 %
MONOCYTES NFR BLD: 3.3 %
NEUTROPHILS # BLD AUTO: 10.4 K/UL
NEUTROPHILS # BLD AUTO: 10.4 K/UL
NEUTROPHILS NFR BLD: 92.5 %
NEUTROPHILS NFR BLD: 92.5 %
PHOSPHATE SERPL-MCNC: 4.4 MG/DL
PHOSPHATE SERPL-MCNC: 4.7 MG/DL
PHOSPHATE SERPL-MCNC: 5.2 MG/DL
PHOSPHATE SERPL-MCNC: 5.6 MG/DL
PLATELET # BLD AUTO: 188 K/UL
PLATELET # BLD AUTO: 188 K/UL
PMV BLD AUTO: ABNORMAL FL
PMV BLD AUTO: ABNORMAL FL
POCT GLUCOSE: 102 MG/DL (ref 70–110)
POCT GLUCOSE: 236 MG/DL (ref 70–110)
POCT GLUCOSE: 295 MG/DL (ref 70–110)
POTASSIUM SERPL-SCNC: 3.7 MMOL/L
POTASSIUM SERPL-SCNC: 4 MMOL/L
POTASSIUM SERPL-SCNC: 4.2 MMOL/L
POTASSIUM SERPL-SCNC: 4.2 MMOL/L
PROT SERPL-MCNC: 5.4 G/DL
PROTHROMBIN TIME: 14.7 SEC
RBC # BLD AUTO: 3.66 M/UL
RBC # BLD AUTO: 3.66 M/UL
SODIUM SERPL-SCNC: 138 MMOL/L
TACROLIMUS BLD-MCNC: 17 NG/ML
WBC # BLD AUTO: 11.35 K/UL
WBC # BLD AUTO: 11.35 K/UL

## 2017-02-14 PROCEDURE — 20600001 HC STEP DOWN PRIVATE ROOM

## 2017-02-14 PROCEDURE — 63600175 PHARM REV CODE 636 W HCPCS: Performed by: INTERNAL MEDICINE

## 2017-02-14 PROCEDURE — 25000003 PHARM REV CODE 250: Performed by: INTERNAL MEDICINE

## 2017-02-14 PROCEDURE — 25000003 PHARM REV CODE 250: Performed by: THORACIC SURGERY (CARDIOTHORACIC VASCULAR SURGERY)

## 2017-02-14 PROCEDURE — 99232 SBSQ HOSP IP/OBS MODERATE 35: CPT | Mod: ,,, | Performed by: NURSE PRACTITIONER

## 2017-02-14 PROCEDURE — 83735 ASSAY OF MAGNESIUM: CPT | Mod: 91

## 2017-02-14 PROCEDURE — 63600175 PHARM REV CODE 636 W HCPCS: Performed by: THORACIC SURGERY (CARDIOTHORACIC VASCULAR SURGERY)

## 2017-02-14 PROCEDURE — 27100171 HC OXYGEN HIGH FLOW UP TO 24 HOURS

## 2017-02-14 PROCEDURE — 25000242 PHARM REV CODE 250 ALT 637 W/ HCPCS: Performed by: INTERNAL MEDICINE

## 2017-02-14 PROCEDURE — 25000003 PHARM REV CODE 250: Performed by: STUDENT IN AN ORGANIZED HEALTH CARE EDUCATION/TRAINING PROGRAM

## 2017-02-14 PROCEDURE — 92611 MOTION FLUOROSCOPY/SWALLOW: CPT

## 2017-02-14 PROCEDURE — 94761 N-INVAS EAR/PLS OXIMETRY MLT: CPT

## 2017-02-14 PROCEDURE — 80053 COMPREHEN METABOLIC PANEL: CPT

## 2017-02-14 PROCEDURE — 94664 DEMO&/EVAL PT USE INHALER: CPT

## 2017-02-14 PROCEDURE — 97803 MED NUTRITION INDIV SUBSEQ: CPT

## 2017-02-14 PROCEDURE — 63600175 PHARM REV CODE 636 W HCPCS: Performed by: NURSE PRACTITIONER

## 2017-02-14 PROCEDURE — 84100 ASSAY OF PHOSPHORUS: CPT | Mod: 91

## 2017-02-14 PROCEDURE — 85610 PROTHROMBIN TIME: CPT

## 2017-02-14 PROCEDURE — 94640 AIRWAY INHALATION TREATMENT: CPT

## 2017-02-14 PROCEDURE — 94799 UNLISTED PULMONARY SVC/PX: CPT

## 2017-02-14 PROCEDURE — 85025 COMPLETE CBC W/AUTO DIFF WBC: CPT

## 2017-02-14 PROCEDURE — 27000221 HC OXYGEN, UP TO 24 HOURS

## 2017-02-14 PROCEDURE — 99232 SBSQ HOSP IP/OBS MODERATE 35: CPT | Mod: ,,, | Performed by: INTERNAL MEDICINE

## 2017-02-14 PROCEDURE — 84132 ASSAY OF SERUM POTASSIUM: CPT

## 2017-02-14 PROCEDURE — 36415 COLL VENOUS BLD VENIPUNCTURE: CPT

## 2017-02-14 PROCEDURE — 97116 GAIT TRAINING THERAPY: CPT

## 2017-02-14 PROCEDURE — 80197 ASSAY OF TACROLIMUS: CPT

## 2017-02-14 PROCEDURE — 25000003 PHARM REV CODE 250: Performed by: NURSE PRACTITIONER

## 2017-02-14 RX ORDER — DOBUTAMINE HYDROCHLORIDE 400 MG/100ML
5 INJECTION INTRAVENOUS CONTINUOUS
Status: DISCONTINUED | OUTPATIENT
Start: 2017-02-14 | End: 2017-02-16

## 2017-02-14 RX ORDER — PREDNISONE 10 MG/1
30 TABLET ORAL 2 TIMES DAILY
Status: COMPLETED | OUTPATIENT
Start: 2017-02-14 | End: 2017-02-16

## 2017-02-14 RX ORDER — AMLODIPINE BESYLATE 5 MG/1
5 TABLET ORAL DAILY
Status: DISCONTINUED | OUTPATIENT
Start: 2017-02-14 | End: 2017-02-15

## 2017-02-14 RX ORDER — TACROLIMUS 1 MG/1
1 CAPSULE ORAL 2 TIMES DAILY
Status: DISCONTINUED | OUTPATIENT
Start: 2017-02-15 | End: 2017-02-16

## 2017-02-14 RX ADMIN — IPRATROPIUM BROMIDE AND ALBUTEROL SULFATE 3 ML: .5; 3 SOLUTION RESPIRATORY (INHALATION) at 03:02

## 2017-02-14 RX ADMIN — INSULIN ASPART 7 UNITS: 100 INJECTION, SOLUTION INTRAVENOUS; SUBCUTANEOUS at 06:02

## 2017-02-14 RX ADMIN — DOBUTAMINE IN DEXTROSE 5 MCG/KG/MIN: 400 INJECTION, SOLUTION INTRAVENOUS at 07:02

## 2017-02-14 RX ADMIN — DOBUTAMINE IN DEXTROSE 5 MCG/KG/MIN: 400 INJECTION, SOLUTION INTRAVENOUS at 02:02

## 2017-02-14 RX ADMIN — MYCOPHENOLATE MOFETIL 1500 MG: 250 CAPSULE ORAL at 09:02

## 2017-02-14 RX ADMIN — PANTOPRAZOLE SODIUM 40 MG: 40 TABLET, DELAYED RELEASE ORAL at 08:02

## 2017-02-14 RX ADMIN — SULFAMETHOXAZOLE AND TRIMETHOPRIM 1 TABLET: 400; 80 TABLET ORAL at 09:02

## 2017-02-14 RX ADMIN — OXYCODONE HYDROCHLORIDE 10 MG: 5 TABLET ORAL at 11:02

## 2017-02-14 RX ADMIN — METHYLPREDNISOLONE SODIUM SUCCINATE 40 MG: 40 INJECTION, POWDER, FOR SOLUTION INTRAMUSCULAR; INTRAVENOUS at 08:02

## 2017-02-14 RX ADMIN — IPRATROPIUM BROMIDE AND ALBUTEROL SULFATE 3 ML: .5; 3 SOLUTION RESPIRATORY (INHALATION) at 04:02

## 2017-02-14 RX ADMIN — CIPROFLOXACIN HYDROCHLORIDE 500 MG: 500 TABLET, FILM COATED ORAL at 09:02

## 2017-02-14 RX ADMIN — INSULIN ASPART 4 UNITS: 100 INJECTION, SOLUTION INTRAVENOUS; SUBCUTANEOUS at 10:02

## 2017-02-14 RX ADMIN — IPRATROPIUM BROMIDE AND ALBUTEROL SULFATE 3 ML: .5; 3 SOLUTION RESPIRATORY (INHALATION) at 07:02

## 2017-02-14 RX ADMIN — DOBUTAMINE IN DEXTROSE 5 MCG/KG/MIN: 400 INJECTION, SOLUTION INTRAVENOUS at 05:02

## 2017-02-14 RX ADMIN — PREDNISONE 30 MG: 10 TABLET ORAL at 09:02

## 2017-02-14 RX ADMIN — POLYETHYLENE GLYCOL 3350 17 G: 17 POWDER, FOR SOLUTION ORAL at 08:02

## 2017-02-14 RX ADMIN — OXYCODONE HYDROCHLORIDE 10 MG: 5 TABLET ORAL at 04:02

## 2017-02-14 RX ADMIN — OXYCODONE HYDROCHLORIDE 10 MG: 5 TABLET ORAL at 05:02

## 2017-02-14 RX ADMIN — MYCOPHENOLATE MOFETIL 1500 MG: 250 CAPSULE ORAL at 08:02

## 2017-02-14 RX ADMIN — DOCUSATE SODIUM 100 MG: 100 CAPSULE, LIQUID FILLED ORAL at 08:02

## 2017-02-14 RX ADMIN — INSULIN ASPART 8 UNITS: 100 INJECTION, SOLUTION INTRAVENOUS; SUBCUTANEOUS at 06:02

## 2017-02-14 RX ADMIN — POTASSIUM CHLORIDE 20 MEQ: 200 INJECTION, SOLUTION INTRAVENOUS at 01:02

## 2017-02-14 RX ADMIN — INSULIN ASPART 6 UNITS: 100 INJECTION, SOLUTION INTRAVENOUS; SUBCUTANEOUS at 11:02

## 2017-02-14 RX ADMIN — INSULIN ASPART 4 UNITS: 100 INJECTION, SOLUTION INTRAVENOUS; SUBCUTANEOUS at 08:02

## 2017-02-14 RX ADMIN — VALGANCICLOVIR 900 MG: 450 TABLET, FILM COATED ORAL at 08:02

## 2017-02-14 RX ADMIN — NYSTATIN 500000 UNITS: 500000 SUSPENSION ORAL at 09:02

## 2017-02-14 RX ADMIN — DOCUSATE SODIUM 100 MG: 100 CAPSULE, LIQUID FILLED ORAL at 09:02

## 2017-02-14 RX ADMIN — FUROSEMIDE 5 MG/HR: 10 INJECTION, SOLUTION INTRAMUSCULAR; INTRAVENOUS at 08:02

## 2017-02-14 RX ADMIN — NYSTATIN 500000 UNITS: 500000 SUSPENSION ORAL at 08:02

## 2017-02-14 RX ADMIN — CIPROFLOXACIN HYDROCHLORIDE 500 MG: 500 TABLET, FILM COATED ORAL at 08:02

## 2017-02-14 RX ADMIN — TACROLIMUS 2 MG: 1 CAPSULE, GELATIN COATED ORAL at 08:02

## 2017-02-14 RX ADMIN — AMLODIPINE BESYLATE 5 MG: 5 TABLET ORAL at 11:02

## 2017-02-14 RX ADMIN — NYSTATIN 500000 UNITS: 500000 SUSPENSION ORAL at 06:02

## 2017-02-14 RX ADMIN — OXYCODONE HYDROCHLORIDE 10 MG: 5 TABLET ORAL at 10:02

## 2017-02-14 RX ADMIN — IPRATROPIUM BROMIDE AND ALBUTEROL SULFATE 3 ML: .5; 3 SOLUTION RESPIRATORY (INHALATION) at 11:02

## 2017-02-14 RX ADMIN — MAGNESIUM SULFATE IN WATER 2 G: 40 INJECTION, SOLUTION INTRAVENOUS at 02:02

## 2017-02-14 RX ADMIN — IPRATROPIUM BROMIDE AND ALBUTEROL SULFATE 3 ML: .5; 3 SOLUTION RESPIRATORY (INHALATION) at 08:02

## 2017-02-14 NOTE — ASSESSMENT & PLAN NOTE
bg goal 140-180- Increase insulin transition gtt to 2 u/h, Resume Novolog 7 u AC  + moderate dose correction if passes swallow study  BG monitoring Ac/hs/0200    DISCHARGE PLANNING: TBD  previously on MDI- likely re-calculation of insulin needs prior to d/c

## 2017-02-14 NOTE — PROGRESS NOTES
"[facility]  Endocrinology  Progress Note    Admit Date: 1/30/2017     Reason for Consult: Management of T2DM     Surgical Procedure and Date: 2/9/17 s/p heart txp    Diabetes diagnosis year: 1996    Home Diabetes Medications:  Lantus 16 u qam, Novolog 20 U AC     Diabetes Complications include:  Hyperglycemia, Diabetic chronic kidney disease and Diabetic gastroparesis      Complicating diabetes co morbidities: CHF and CKD    HPI: Patient is a 56 y.o. male with T2DM, ischemic cardiomyopathy, s/p LVAD placement in 8/2016. He is now s/p heart transplantation. Endocrine consulted for bg management.         Interval HPI:    gtt remains globally elevated, insulin gtt decreased at 2200 to 1.7u/h - no corresponding bg in EPIC  No hypoglycemia  Taking meds with pudding,  Barium swallow study today    Visit Vitals    /63    Pulse 104    Temp 97.7 °F (36.5 °C) (Oral)    Resp 20    Ht 5' 8" (1.727 m)    Wt 89.5 kg (197 lb 5 oz)    SpO2 98%    BMI 30 kg/m2       Labs Reviewed and Include      Recent Labs  Lab 02/14/17  0425   *   CALCIUM 8.5*   ALBUMIN 2.5*   PROT 5.4*      K 4.2  4.2   CO2 32*   CL 97   BUN 39*   CREATININE 0.8   ALKPHOS 66   ALT 31   AST 23   BILITOT 0.5     Lab Results   Component Value Date    WBC 11.35 02/14/2017    WBC 11.35 02/14/2017    HGB 8.4 (L) 02/14/2017    HGB 8.4 (L) 02/14/2017    HCT 25.9 (L) 02/14/2017    HCT 25.9 (L) 02/14/2017    MCV 71 (L) 02/14/2017    MCV 71 (L) 02/14/2017     02/14/2017     02/14/2017     No results for input(s): TSH, FREET4 in the last 168 hours.  Lab Results   Component Value Date    HGBA1C 6.8 (H) 02/09/2017       Nutritional status:   Body mass index is 30 kg/(m^2).  Lab Results   Component Value Date    ALBUMIN 2.5 (L) 02/14/2017    ALBUMIN 2.3 (L) 02/13/2017    ALBUMIN 2.3 (L) 02/12/2017     Lab Results   Component Value Date    PREALBUMIN 19 (L) 02/08/2017    PREALBUMIN 14 (L) 02/06/2017    PREALBUMIN 14 " (L) 02/03/2017       Estimated Creatinine Clearance: 114.7 mL/min (based on Cr of 0.8).    Accu-Checks  Recent Labs      02/12/17   1007  02/12/17   1231  02/12/17   1643  02/12/17   2228  02/12/17   2338  02/13/17   0029  02/13/17   0749  02/13/17   1123  02/13/17   1717  02/14/17   0808   POCTGLUCOSE  150*  173*  239*  346*  375*  332*  316*  227*  187*  236*       Current Medications and/or Treatments Impacting Glycemic Control  Immunotherapy:  Immunosuppressants     Start     Stop Route Frequency Ordered    02/13/17 1800  tacrolimus capsule 2 mg      -- Oral 2 times daily 02/13/17 0937    02/13/17 2100  mycophenolate capsule 1,500 mg      -- Oral 2 times daily 02/13/17 0937        Steroids:   Hormones     Start     Stop Route Frequency Ordered    02/10/17 2100  methylPREDNISolone sodium succinate injection 40 mg      -- IV Every 12 hours 02/10/17 1012        Pressors:    Autonomic Drugs     None        Hyperglycemia/Diabetes Medications: Antihyperglycemics     Start     Stop Route Frequency Ordered    02/12/17 1130  insulin regular (Humulin R) 100 Units in sodium chloride 0.9% 100 mL infusion      -- IV Continuous 02/12/17 1016    02/12/17 1310  insulin aspart pen 1-10 Units      -- SubQ Before meals & nightly PRN 02/12/17 1210    02/13/17 0800  insulin aspart pen 7 Units      -- SubQ 3 times daily with meals 02/13/17 0759          ASSESSMENT and PLAN    Type 2 diabetes mellitus with stage 3 chronic kidney disease, with long-term current use of insulin  bg goal 140-180- Increase insulin transition gtt to 2 u/h, Resume Novolog 7 u AC  + moderate dose correction if passes swallow study  BG monitoring Ac/hs/0200    DISCHARGE PLANNING: TBD  previously on MDI- likely re-calculation of insulin needs prior to d/c      * Heart transplanted  Per CTS, HTS      Ischemic cardiomyopathy  S/p heart txp        Non morbid obesity due to excess calories  Increases insulin resistance   Body mass index is 30  kg/(m^2).        Adrenal cortical steroids causing adverse effect in therapeutic use  Increasing insulin needs      Immunosuppression  May increase insulin resistance         Peggy Nicholson DNP, NP  Endocrinology  [facility]

## 2017-02-14 NOTE — PLAN OF CARE
Problem: SLP Goal  Goal: SLP Goal  Speech Language Pathology Goals  Goals expected to be met by 2/21  1. Pt will tolerate puree & honey thickened liquid with no s/s aspiration  2. Pt will tolerate trials of soft solid with no s/s aspiration & adequate oral phase of swallow  3. Pt will tolerate trials of less restrictive liquid consistencies to determine if safe for diet upgrades  4. Pt will complete dysphagia exercises given min A      Goals to be met 2/19  1. Pt will participate in ongoing swallow assessment to determine safest po diet GOAL MET  Outcome: Ongoing (interventions implemented as appropriate)  Modified Barium Swallow Study completed. Pt presents with moderate oropharyngeal dysphagia with silent aspiration of thin liquid & penetration & aspiration of nectar thick liquids. SLP recs:  Puree & honey thickened liquid following strict aspiration precautions including:   -Small bites/sips  -fully upright for all PO  -fully awake & alert for all PO  -one bite/sip at a time  -monitor for any s/s aspiration (cough, throat clearing, wet vocal quality) & discontinue PO if present  Recs d/w team     Formal note to follow.     Lakesha Headley MS, CCC-SLP  Speech Language Pathologist  Pager: [phone]  2/14/2017

## 2017-02-14 NOTE — NURSING
Patient weaned from nitric today, currently on comfort flow 55% 15 L with 02 sats 91-97%.  Cardene gtt weaned to off this morning, MAP >80 shortly after, heart failure notified and and plan is to continue to monitor.  insulin, lasix and dubutamine gtts continued. Art line d/c'd.  CT head today for complaints of numbness to left arm, CT results negative.  Speech to bedside today for swallow eval, patient to remain NPO except for meds and a barium swallow is ordered for tomorrow.  MD to bedside to d/c left chest tube, patient tolerated well.  Transfer orders in place for TSU.  VSS throughout the shift, see flowsheet for full assessment.

## 2017-02-14 NOTE — CONSULTS
Please see Dr. Lobo's consult note from today at 3:07pm.    Bonita Salazar MD  General Neurology Spectra Link: [phone]  [facility]  PGY-2

## 2017-02-14 NOTE — SUBJECTIVE & OBJECTIVE
Past Medical History   Diagnosis Date    CHF (congestive heart failure)     Coronary artery disease     GERD (gastroesophageal reflux disease)     Hyperlipidemia     Hypertension     Type 2 diabetes mellitus with hyperglycemia        Past Surgical History   Procedure Laterality Date    Cardiac pacemaker placement      Cholecystectomy      Left ventricular assist device       x 3 months ago    Colonoscopy N/A 1/11/2017     Procedure: COLONOSCOPY;  Surgeon: Petr Almanzar MD;  [facility]  Service: Endoscopy;  Laterality: N/A;    Colonoscopy N/A 1/12/2017     Procedure: COLONOSCOPY;  Surgeon: Petr Almanzar MD;  Location: [facility] ([address] [place] [address]);  Service: Endoscopy;  Laterality: N/A;       Review of patient's allergies indicates:   Allergen Reactions    Levemir [insulin detemir] Itching    Pork/porcine containing products     Ranexa [ranolazine] Nausea Only     No current facility-administered medications on file prior to encounter.      Current Outpatient Prescriptions on File Prior to Encounter   Medication Sig    amiodarone (PACERONE) 400 MG tablet Take 1 tablet (400 mg total) by mouth once daily.    amlodipine (NORVASC) 10 MG tablet Take 1 tablet (10 mg total) by mouth once daily.    aspirin (ECOTRIN) 325 MG EC tablet Take 1 tablet (325 mg total) by mouth once daily.    cyclobenzaprine (FLEXERIL) 5 MG tablet Take 5 mg by mouth daily as needed.     dipyridamole (PERSANTINE) 50 MG tablet Take 2 tablets (100 mg total) by mouth 3 (three) times daily.    docusate sodium (COLACE) 100 MG capsule Take 2 capsules (200 mg total) by mouth every evening.    ergocalciferol (ERGOCALCIFEROL) 50,000 unit Cap TK 1 C PO TWICE PER WEEK    esomeprazole (NEXIUM) 40 MG capsule Take 1 capsule (40 mg total) by mouth before breakfast.    ferrous gluconate (FERGON) 324 MG tablet Take 1 tablet (324 mg total) by mouth daily with breakfast.    hydrALAZINE (APRESOLINE) 10 MG tablet Take 2 tablets (20  mg total) by mouth every 8 (eight) hours.    insulin aspart (NOVOLOG FLEXPEN) 100 unit/mL InPn pen Inject 20 Units into the skin 3 (three) times daily with meals.    insulin glargine (LANTUS SOLOSTAR) 100 unit/mL (3 mL) InPn pen Inject 16 Units into the skin once daily.    isosorbide dinitrate (ISORDIL) 10 MG tablet Take 1 tablet (10 mg total) by mouth 3 (three) times daily.    lisinopril (PRINIVIL,ZESTRIL) 20 MG tablet Take 1 tablet (20 mg total) by mouth 2 (two) times daily.    magnesium oxide (MAG-OX) 400 mg tablet Take 1 tablet (400 mg total) by mouth 3 (three) times daily.    oxycodone (ROXICODONE) 10 mg Tab immediate release tablet Take 1 tablet (10 mg total) by mouth every 6 (six) hours as needed.    senna-docusate 8.6-50 mg (PERICOLACE) 8.6-50 mg per tablet Take 1 tablet by mouth daily as needed for Constipation.    warfarin (COUMADIN) 1 MG tablet Take 1-2.5mg daily as directed by coumadin clinic; #70pills = 1 month supply     Family History     Problem Relation (Age of Onset)    Cancer Brother    Heart attack Mother, Father    Heart disease Mother, Father    Hypertension Mother, Father        Social History Main Topics    Smoking status: Former Smoker     Packs/day: 0.50     Years: 15.00     Quit date: 6/14/2006    Smokeless tobacco: Never Used    Alcohol use No    Drug use: Not on file    Sexual activity: Yes     Partners: Female     Review of Systems   Constitutional: Negative for chills and fever.   HENT: Positive for voice change. Negative for drooling, ear pain and trouble swallowing.    Eyes: Negative for visual disturbance.   Respiratory: Negative for cough and shortness of breath.    Cardiovascular: Negative for chest pain.   Gastrointestinal: Negative for abdominal pain and nausea.   Musculoskeletal: Negative for back pain, neck pain and neck stiffness.   Neurological: Positive for weakness and numbness. Negative for dizziness and facial asymmetry.     Objective:     Vital Signs (Most  Recent):  Temp: 98.4 °F (36.9 °C) (02/14/17 1100)  Pulse: 87 (02/14/17 1315)  Resp: 19 (02/14/17 1315)  BP: (!) 139/96 (02/14/17 1300)  SpO2: 96 % (02/14/17 1315) Vital Signs (24h Range):  Temp:  [97.2 °F (36.2 °C)-98.4 °F (36.9 °C)] 98.4 °F (36.9 °C)  Pulse:  [] 87  Resp:  [11-33] 19  SpO2:  [88 %-99 %] 96 %  BP: (109-145)/(56-96) 139/96     Weight: 89.5 kg (197 lb 5 oz)  Body mass index is 30 kg/(m^2).    Physical Exam   Constitutional: He is oriented to person, place, and time.   Eyes: Pupils are equal, round, and reactive to light.   Neurological: He is oriented to person, place, and time. He has a normal Finger-Nose-Finger Test.   Reflex Scores:       Bicep reflexes are 1+ on the right side and 2+ on the left side.       Brachioradialis reflexes are 1+ on the right side.       Patellar reflexes are 1+ on the right side and 3+ on the left side.      NEUROLOGICAL EXAMINATION:     MENTAL STATUS   Oriented to person, place, and time.   Attention: normal. Concentration: normal.   Speech: (Hoarse; however not dysarthric)  Level of consciousness: alert    CRANIAL NERVES     CN II   Visual fields full to confrontation.     CN III, IV, VI   Pupils are equal, round, and reactive to light.    CN V   Facial sensation intact.     CN VII   Facial expression full, symmetric.     MOTOR EXAM   Right arm tone: normal  Left arm tone: normal  Right arm pronator drift: absent  Right leg tone: normal  Left leg tone: normal    Strength   Right deltoid: 5/5  Right biceps: 5/5  Left biceps: 4/5  Right triceps: 5/5  Left triceps: 5/5  Right wrist extension: 5/5  Left wrist extension: 4/5  Right interossei: 5/5  Left interossei: 3/5  Right iliopsoas: 5/5  Left iliopsoas: 4/5  Right quadriceps: 5/5  Left quadriceps: 5/5  Right anterior tibial: 5/5  Left anterior tibial: 5/5  Right gastroc: 5/5  Left gastroc: 5/5    REFLEXES     Reflexes   Right brachioradialis: 1+  Right biceps: 1+  Left biceps: 2+  Right patellar: 1+  Left  patellar: 3+  Right plantar: normal  Left plantar: normal    SENSORY EXAM   Right arm light touch: normal  Left arm light touch: decreased from fingers (improves past the forearm)  Right leg light touch: decreased from toes (length dependant improves proximally. )  Left leg light touch: decreased from toes  Right arm vibration: normal  Left arm vibration: decreased from fingers  Right leg vibration: length dependant improves proximally.   Proprioception normal.     GAIT AND COORDINATION      Coordination   Finger to nose coordination: normal      Significant Labs:   Hemoglobin A1c:   Recent Labs  Lab 02/09/17  1417   HGBA1C 6.8*     BMP:   Recent Labs  Lab 02/12/17 1713 02/13/17  0555  02/14/17  0107 02/14/17 0425 02/14/17  1157   *  --  296*  --   --  180*  --      --  137  --   --  138  --    K 3.7  3.7  < > 3.7  3.7  < > 3.7 4.2  4.2 4.0     --  99  --   --  97  --    CO2 26  --  28  --   --  32*  --    BUN 42*  --  42*  --   --  39*  --    CREATININE 0.9  --  0.9  --   --  0.8  --    CALCIUM 8.1*  --  8.2*  --   --  8.5*  --    MG 1.8  < > 1.5*  < > 1.5* 2.1 2.1   < > = values in this interval not displayed.  CBC:   Recent Labs  Lab 02/13/17  1515 02/14/17  0425   WBC 15.55* 11.35  11.35   HGB 8.6* 8.4*  8.4*   HCT 26.6* 25.9*  25.9*    188  188     CMP:   Recent Labs  Lab 02/12/17 1713 02/13/17  0555  02/14/17  0107 02/14/17 0425 02/14/17  1157   *  --  296*  --   --  180*  --      --  137  --   --  138  --    K 3.7  3.7  < > 3.7  3.7  < > 3.7 4.2  4.2 4.0     --  99  --   --  97  --    CO2 26  --  28  --   --  32*  --    BUN 42*  --  42*  --   --  39*  --    CREATININE 0.9  --  0.9  --   --  0.8  --    CALCIUM 8.1*  --  8.2*  --   --  8.5*  --    MG 1.8  < > 1.5*  < > 1.5* 2.1 2.1   PROT 5.2*  --  5.2*  --   --  5.4*  --    ALBUMIN 2.3*  --  2.3*  --   --  2.5*  --    BILITOT 0.7  --  0.6  --   --  0.5  --    ALKPHOS 60  --  64  --   --  66  --     AST 37  --  28  --   --  23  --    ALT 41  --  35  --   --  31  --    ANIONGAP 10  --  10  --   --  9  --    EGFRNONAA >60.0  --  >60.0  --   --  >60.0  --    < > = values in this interval not displayed.  POCT Glucose:   Recent Labs  Lab 02/13/17  2233 02/14/17  0808 02/14/17  1150   POCTGLUCOSE 102 236* 295*       Significant Imaging:   CT head without contrast (2/13/2017)  No acute intracranial abnormality

## 2017-02-14 NOTE — PLAN OF CARE
Problem: Patient Care Overview  Goal: Plan of Care Review  Dx: heart transplant (2/9), 1 PRBC, 250cc Albumin    PMH: AICD, HTN, HLD, GERD, T2DM, CKD (3), ischemic CM, systolic and diastolic HF, low Na, non morbid obesity, BPH    PSH: cholecystectomy, LVAD (8/24/16)    2/9: Heart Tx (out @ 1405); 2 units PRBC post op  2/11: Post-op day 2, Extubated    Nursing:  - MAP 60-80  - V paced at 105  - ACHS accuchecks  - Q6 hour K, Mg, Phos     Outcome: Ongoing (interventions implemented as appropriate)  Plan of care reviewed with patient and patient's spouse by KALYAN Jacome RN; all questions and concerns answered appropriately. Pt remains on Lasix IV drip, Insulin IV drip and Dobutamine IV drip. Sats 98% on 15L/ 55% FiO2 comfort flow nasal cannula. SANDIE. VSS. Pt awaiting transfer to TSU. No breakdown to sacrum or back of head; positions/repositions independently. NPO pending swallow study. Adequate UO on Lasix IV drip. No BM overnight. Pain controlled well with PRN pain medicine. See flowsheet for full assessment. Will continue to monitor patient closely.

## 2017-02-14 NOTE — PT/OT/SLP PROGRESS
Physical Therapy  Treatment    Mick Barriga   MRN: [id number]   Admitting Diagnosis: Heart transplanted    PT Received On: 17  PT Start Time: 1015     PT Stop Time: 1043    PT Total Time (min): 28 min       Billable Minutes:  Gait [id number]    Treatment Type: Treatment  PT/PTA: PT             General Precautions: Standard, fall, sternal  Orthopedic Precautions: N/A   Braces: N/A    Do you have any cultural, spiritual, Mosque conflicts, given your current situation?:  (none)    Subjective:  Communicated with RN prior to session.  Pt agreeable to working with PT.    Pain Ratin/10        Location: chest  Pain Addressed: Reposition  Pain Rating Post-Intervention: 9/10    Objective:   Patient found with: blood pressure cuff, chest tube, oneill catheter, peripheral IV, central line, pulse ox (continuous), telemetry, oxygen    Functional Mobility:  Bed Mobility:    NT 2/2 to pt sitting [facility] upon arrival.     Transfers:  Sit <> Stand Assistance: Minimum Assistance  Sit <> Stand Assistive Device: No Assistive Device    Gait:   Gait Distance: x35 feet in room  Assistance 1: Minimum assistance  Gait Assistive Device: No device, Hand held assist  Gait Pattern: 2-point gait  Gait Deviation(s): decreased amelia, increased time in double stance, decreased velocity of limb motion  Pt ambulated with RN present for IV management, pt required min a for stability. Pt ambulated with narrow LIZBETH and decreased step length. Pt demo increased difficulty advancing LLE>RLE in swing phase of gait 2/2 to decreased strength in LLE. Pt required vc for widening LIZBETH for increased stability and for proper trunk positioning to correct forward lean. Pt gait limited by fatigue.     Balance:   Static Sit: FAIR+: Able to take MINIMAL challenges from all directions  Dynamic Sit: FAIR+: Maintains balance through MINIMAL excursions of active trunk motion  Static Stand: FAIR: Maintains without assist but unable to take challenges  Dynamic stand:  POOR: N/A     Therapeutic Activities and Exercises:  Pt VSS throughout, RN present. Post gait train, patient performed seated therex x 10 reps of AROM of BLE in all available planes of motion. Patient educated on therex to be performed throughout the day to improve strength. Pt demonstrated understanding of therex to be performed.       AM-PAC 6 CLICK MOBILITY  How much help from another person does this patient currently need?   1 = Unable, Total/Dependent Assistance  2 = A lot, Maximum/Moderate Assistance  3 = A little, Minimum/Contact Guard/Supervision  4 = None, Modified [place]/Independent    Turning over in bed (including adjusting bedclothes, sheets and blankets)?: 3  Sitting down on and standing up from a chair with arms (e.g., wheelchair, bedside commode, etc.): 3  Moving from lying on back to sitting on the side of the bed?: 3  Moving to and from a bed to a chair (including a wheelchair)?: 3  Need to walk in hospital room?: 3  Climbing 3-5 steps with a railing?: 2  Total Score: 17    AM-PAC Raw Score CMS G-Code Modifier Level of Impairment Assistance   6 % Total / Unable   7 - 9 CM 80 - 100% Maximal Assist   10 - 14 CL 60 - 80% Moderate Assist   15 - 19 CK 40 - 60% Moderate Assist   20 - 22 CJ 20 - 40% Minimal Assist   23 CI 1-20% SBA / CGA   24 CH 0% Independent/ Mod I     Patient left up in chair with all lines intact, call button in reach and RN and daughter present.    Assessment:  Mick Barriga is a 56 y.o. male with a medical diagnosis of Heart transplanted and presents with decreased functional mobility due to impaired endurance and weakness throughout. Pt demo improvement through increasing distance ambulated today.  Patient would benefit from skilled PT in the acute care setting to improve the limitations listed below. Patient would benefit from HHPT upon discharge.      Rehab identified problem list/impairments: Rehab identified problem list/impairments: weakness, impaired  endurance, impaired functional mobilty, gait instability, impaired balance, pain    Rehab potential is good.    Activity tolerance: Fair    Discharge recommendations: Discharge Facility/Level Of Care Needs: home health PT     Barriers to discharge: Barriers to Discharge: None    Equipment recommendations: Equipment Needed After Discharge: none     GOALS:   Physical Therapy Goals        Problem: Physical Therapy Goal    Goal Priority Disciplines Outcome Goal Variances Interventions   Physical Therapy Goal     PT/OT, PT Ongoing (interventions implemented as appropriate)     Description:  Goals to be met by: 17    Patient will increase functional independence with mobility by performin. Supine to sit with Stand-by Assistance - not met  2. Sit to stand transfer with Supervision - not met  3. Gait  x 220 feet with Supervision  - not met                PLAN:    Patient to be seen 5 x/week  to address the above listed problems via gait training, therapeutic activities, therapeutic exercises  Plan of Care expires: 17  Plan of Care reviewed with: patient, spouse         Dean Padron, PT  2017

## 2017-02-14 NOTE — PROGRESS NOTES
Patient transferred to U, room 8070. Report given at bedside to TSU RNIlsa. Plan of care reviewed with patient and family.  Wife at bedside. Patient remains AAOx4, following commands. Questions answered.

## 2017-02-14 NOTE — CONSULTS
"[facility]  Neurology  Consult Note    Patient Name: Mick Barriga  MRN: [id number]  Admission Date: 1/30/2017  Hospital Length of Stay: 15 days  Code Status: Full Code   Attending Provider: Dr. Chaudhry  Consulting Provider: Rupinder Lobo MD  Primary Care Physician: Sukhdev Alan MD  Principal Problem:Heart transplanted    Consults   Subjective:     Chief Complaint:  Left arm numbness    HPI:   56 y.o. male with PMHx of CAD, CM, HLD, HTN now s/p orthotopic heart transplant on 2/9 for whom neurology is being consulted for left arm numbness and weakness. Patient reports that after he came off the anesthesia, is when he started noticing whole left arm and hand numbness. He was restrained at the time so was unable to notice much weakness. Ptient had a left radial ART line in which was removed yesterday.This morning, he reported that the feeling in his left hand and arm are back except in his pink and on the dorso-medial aspect of the arm extending to the mid forearm. He was extubated on 2/11, in the past day, he reports hoarseness in his voice. He denies swallowing issues currently, per SLP note, "No overt coughing/choking observed; however, delayed throat clearing evident post cup sips of thin and nectar thick water", and patient planned for barium swallow study today.     Past Medical History   Diagnosis Date    CHF (congestive heart failure)     Coronary artery disease     GERD (gastroesophageal reflux disease)     Hyperlipidemia     Hypertension     Type 2 diabetes mellitus with hyperglycemia        Past Surgical History   Procedure Laterality Date    Cardiac pacemaker placement      Cholecystectomy      Left ventricular assist device       x 3 months ago    Colonoscopy N/A 1/11/2017     Procedure: COLONOSCOPY;  Surgeon: Petr Almanzar MD;  Location: [address];  Service: Endoscopy;  Laterality: N/A;    Colonoscopy N/A 1/12/2017     Procedure: COLONOSCOPY;  Surgeon: " Petr Almanzar MD;  Location: [facility] ([address] [place] [address]);  Service: Endoscopy;  Laterality: N/A;       Review of patient's allergies indicates:   Allergen Reactions    Levemir [insulin detemir] Itching    Pork/porcine containing products     Ranexa [ranolazine] Nausea Only     No current facility-administered medications on file prior to encounter.      Current Outpatient Prescriptions on File Prior to Encounter   Medication Sig    amiodarone (PACERONE) 400 MG tablet Take 1 tablet (400 mg total) by mouth once daily.    amlodipine (NORVASC) 10 MG tablet Take 1 tablet (10 mg total) by mouth once daily.    aspirin (ECOTRIN) 325 MG EC tablet Take 1 tablet (325 mg total) by mouth once daily.    cyclobenzaprine (FLEXERIL) 5 MG tablet Take 5 mg by mouth daily as needed.     dipyridamole (PERSANTINE) 50 MG tablet Take 2 tablets (100 mg total) by mouth 3 (three) times daily.    docusate sodium (COLACE) 100 MG capsule Take 2 capsules (200 mg total) by mouth every evening.    ergocalciferol (ERGOCALCIFEROL) 50,000 unit Cap TK 1 C PO TWICE PER WEEK    esomeprazole (NEXIUM) 40 MG capsule Take 1 capsule (40 mg total) by mouth before breakfast.    ferrous gluconate (FERGON) 324 MG tablet Take 1 tablet (324 mg total) by mouth daily with breakfast.    hydrALAZINE (APRESOLINE) 10 MG tablet Take 2 tablets (20 mg total) by mouth every 8 (eight) hours.    insulin aspart (NOVOLOG FLEXPEN) 100 unit/mL InPn pen Inject 20 Units into the skin 3 (three) times daily with meals.    insulin glargine (LANTUS SOLOSTAR) 100 unit/mL (3 mL) InPn pen Inject 16 Units into the skin once daily.    isosorbide dinitrate (ISORDIL) 10 MG tablet Take 1 tablet (10 mg total) by mouth 3 (three) times daily.    lisinopril (PRINIVIL,ZESTRIL) 20 MG tablet Take 1 tablet (20 mg total) by mouth 2 (two) times daily.    magnesium oxide (MAG-OX) 400 mg tablet Take 1 tablet (400 mg total) by mouth 3 (three) times daily.    oxycodone (ROXICODONE) 10 mg Tab  immediate release tablet Take 1 tablet (10 mg total) by mouth every 6 (six) hours as needed.    senna-docusate 8.6-50 mg (PERICOLACE) 8.6-50 mg per tablet Take 1 tablet by mouth daily as needed for Constipation.    warfarin (COUMADIN) 1 MG tablet Take 1-2.5mg daily as directed by coumadin clinic; #70pills = 1 month supply     Family History     Problem Relation (Age of Onset)    Cancer Brother    Heart attack Mother, Father    Heart disease Mother, Father    Hypertension Mother, Father        Social History Main Topics    Smoking status: Former Smoker     Packs/day: 0.50     Years: 15.00     Quit date: 6/14/2006    Smokeless tobacco: Never Used    Alcohol use No    Drug use: Not on file    Sexual activity: Yes     Partners: Female     Review of Systems   Constitutional: Negative for chills and fever.   HENT: Positive for voice change. Negative for drooling, ear pain and trouble swallowing.    Eyes: Negative for visual disturbance.   Respiratory: Negative for cough and shortness of breath.    Cardiovascular: Negative for chest pain.   Gastrointestinal: Negative for abdominal pain and nausea.   Musculoskeletal: Negative for back pain, neck pain and neck stiffness.   Neurological: Positive for weakness and numbness. Negative for dizziness and facial asymmetry.     Objective:     Vital Signs (Most Recent):  Temp: 98.4 °F (36.9 °C) (02/14/17 1100)  Pulse: 87 (02/14/17 1315)  Resp: 19 (02/14/17 1315)  BP: (!) 139/96 (02/14/17 1300)  SpO2: 96 % (02/14/17 1315) Vital Signs (24h Range):  Temp:  [97.2 °F (36.2 °C)-98.4 °F (36.9 °C)] 98.4 °F (36.9 °C)  Pulse:  [] 87  Resp:  [11-33] 19  SpO2:  [88 %-99 %] 96 %  BP: (109-145)/(56-96) 139/96     Weight: 89.5 kg (197 lb 5 oz)  Body mass index is 30 kg/(m^2).    Physical Exam   Constitutional: He is oriented to person, place, and time.   Eyes: Pupils are equal, round, and reactive to light.   Neurological: He is oriented to person, place, and time. He has a normal  Finger-Nose-Finger Test.   Reflex Scores:       Bicep reflexes are 1+ on the right side and 2+ on the left side.       Brachioradialis reflexes are 1+ on the right side.       Patellar reflexes are 1+ on the right side and 3+ on the left side.      NEUROLOGICAL EXAMINATION:     MENTAL STATUS   Oriented to person, place, and time.   Attention: normal. Concentration: normal.   Speech: (Hoarse; however not dysarthric)  Level of consciousness: alert    CRANIAL NERVES     CN II   Visual fields full to confrontation.     CN III, IV, VI   Pupils are equal, round, and reactive to light.    CN V   Facial sensation intact.     CN VII   Facial expression full, symmetric.     MOTOR EXAM   Right arm tone: normal  Left arm tone: normal  Right arm pronator drift: absent  Right leg tone: normal  Left leg tone: normal    Strength   Right deltoid: 5/5  Right biceps: 5/5  Left biceps: 4/5  Right triceps: 5/5  Left triceps: 5/5  Right wrist extension: 5/5  Left wrist extension: 4/5  Right interossei: 5/5  Left interossei: 3/5  Right iliopsoas: 5/5  Left iliopsoas: 4/5  Right quadriceps: 5/5  Left quadriceps: 5/5  Right anterior tibial: 5/5  Left anterior tibial: 5/5  Right gastroc: 5/5  Left gastroc: 5/5    REFLEXES     Reflexes   Right brachioradialis: 1+  Right biceps: 1+  Left biceps: 2+  Right patellar: 1+  Left patellar: 3+  Right plantar: normal  Left plantar: normal    SENSORY EXAM   Right arm light touch: normal  Left arm light touch: decreased from fingers (improves past the forearm)  Right leg light touch: decreased from toes (length dependant improves proximally. )  Left leg light touch: decreased from toes  Right arm vibration: normal  Left arm vibration: decreased from fingers  Right leg vibration: length dependant improves proximally.   Proprioception normal.     GAIT AND COORDINATION      Coordination   Finger to nose coordination: normal      Significant Labs:   Hemoglobin A1c:   Recent Labs  Lab 02/09/17  1417   HGBA1C  6.8*     BMP:   Recent Labs  Lab 02/12/17  1713  02/13/17  0555  02/14/17 0107 02/14/17 0425 02/14/17  1157   *  --  296*  --   --  180*  --      --  137  --   --  138  --    K 3.7  3.7  < > 3.7  3.7  < > 3.7 4.2  4.2 4.0     --  99  --   --  97  --    CO2 26  --  28  --   --  32*  --    BUN 42*  --  42*  --   --  39*  --    CREATININE 0.9  --  0.9  --   --  0.8  --    CALCIUM 8.1*  --  8.2*  --   --  8.5*  --    MG 1.8  < > 1.5*  < > 1.5* 2.1 2.1   < > = values in this interval not displayed.  CBC:   Recent Labs  Lab 02/13/17  1515 02/14/17 0425   WBC 15.55* 11.35  11.35   HGB 8.6* 8.4*  8.4*   HCT 26.6* 25.9*  25.9*    188  188     CMP:   Recent Labs  Lab 02/12/17 1713 02/13/17 0555 02/14/17 0107 02/14/17 0425 02/14/17  1157   *  --  296*  --   --  180*  --      --  137  --   --  138  --    K 3.7  3.7  < > 3.7  3.7  < > 3.7 4.2  4.2 4.0     --  99  --   --  97  --    CO2 26  --  28  --   --  32*  --    BUN 42*  --  42*  --   --  39*  --    CREATININE 0.9  --  0.9  --   --  0.8  --    CALCIUM 8.1*  --  8.2*  --   --  8.5*  --    MG 1.8  < > 1.5*  < > 1.5* 2.1 2.1   PROT 5.2*  --  5.2*  --   --  5.4*  --    ALBUMIN 2.3*  --  2.3*  --   --  2.5*  --    BILITOT 0.7  --  0.6  --   --  0.5  --    ALKPHOS 60  --  64  --   --  66  --    AST 37  --  28  --   --  23  --    ALT 41  --  35  --   --  31  --    ANIONGAP 10  --  10  --   --  9  --    EGFRNONAA >60.0  --  >60.0  --   --  >60.0  --    < > = values in this interval not displayed.  POCT Glucose:   Recent Labs  Lab 02/13/17  2233 02/14/17  0808 02/14/17  1150   POCTGLUCOSE 102 236* 295*       Significant Imaging:   CT head without contrast (2/13/2017)  No acute intracranial abnormality    Assessment and Plan:     Numbness of left hand  - exam remarkable for decreased sensation to light touch, vibration and temperature on the left upper extremity; improves proximally with interosseous and some biceps  weakness but other muscle groups intact. Possible C8 involvement causing some of the symptoms. Would like to rule out stroke or cervical structural abnormality causing symptoms  - recommend MRI brain and c spine W WO contrast         Thank you for your consult. I will follow-up with patient. Please contact us if you have any additional questions.    Rupinder Lobo MD  Neurology  [facility]

## 2017-02-14 NOTE — ASSESSMENT & PLAN NOTE
- exam remarkable for decreased sensation to light touch, vibration and temperature on the left upper extremity; improves proximally with interosseous and some biceps weakness but other muscle groups intact. Possible C8 involvement causing some of the symptoms. Would like to rule out stroke or cervical structural abnormality causing symptoms  - recommend MRI brain and c spine W WO contrast

## 2017-02-14 NOTE — PLAN OF CARE
Problem: Physical Therapy Goal  Goal: Physical Therapy Goal  Goals to be met by: 17    Patient will increase functional independence with mobility by performin. Supine to sit with Stand-by Assistance - not met  2. Sit to stand transfer with Supervision - not met  3. Gait x 220 feet with Supervision - not met   Outcome: Ongoing (interventions implemented as appropriate)  Pt progressing towards goals

## 2017-02-14 NOTE — SUBJECTIVE & OBJECTIVE
"Interval HPI:    gtt remains globally elevated, insulin gtt decreased at 2200 to 1.7u/h - no corresponding bg in EPIC  No hypoglycemia  Taking meds with pudding,  Barium swallow study today    Visit Vitals    /63    Pulse 104    Temp 97.7 °F (36.5 °C) (Oral)    Resp 20    Ht 5' 8" (1.727 m)    Wt 89.5 kg (197 lb 5 oz)    SpO2 98%    BMI 30 kg/m2       Labs Reviewed and Include      Recent Labs  Lab 02/14/17  0425   *   CALCIUM 8.5*   ALBUMIN 2.5*   PROT 5.4*      K 4.2  4.2   CO2 32*   CL 97   BUN 39*   CREATININE 0.8   ALKPHOS 66   ALT 31   AST 23   BILITOT 0.5     Lab Results   Component Value Date    WBC 11.35 02/14/2017    WBC 11.35 02/14/2017    HGB 8.4 (L) 02/14/2017    HGB 8.4 (L) 02/14/2017    HCT 25.9 (L) 02/14/2017    HCT 25.9 (L) 02/14/2017    MCV 71 (L) 02/14/2017    MCV 71 (L) 02/14/2017     02/14/2017     02/14/2017     No results for input(s): TSH, FREET4 in the last 168 hours.  Lab Results   Component Value Date    HGBA1C 6.8 (H) 02/09/2017       Nutritional status:   Body mass index is 30 kg/(m^2).  Lab Results   Component Value Date    ALBUMIN 2.5 (L) 02/14/2017    ALBUMIN 2.3 (L) 02/13/2017    ALBUMIN 2.3 (L) 02/12/2017     Lab Results   Component Value Date    PREALBUMIN 19 (L) 02/08/2017    PREALBUMIN 14 (L) 02/06/2017    PREALBUMIN 14 (L) 02/03/2017       Estimated Creatinine Clearance: 114.7 mL/min (based on Cr of 0.8).    Accu-Checks  Recent Labs      02/12/17   1007  02/12/17   1231  02/12/17   1643  02/12/17   2228  02/12/17   2338  02/13/17   0029  02/13/17   0749  02/13/17   1123  02/13/17   1717  02/14/17   0808   POCTGLUCOSE  150*  173*  239*  346*  375*  332*  316*  227*  187*  236*       Current Medications and/or Treatments Impacting Glycemic Control  Immunotherapy:  Immunosuppressants     Start     Stop Route Frequency Ordered    02/13/17 1800  tacrolimus capsule 2 mg      -- Oral 2 times daily 02/13/17 0937    02/13/17 2100  mycophenolate " capsule 1,500 mg      -- Oral 2 times daily 02/13/17 0937        Steroids:   Hormones     Start     Stop Route Frequency Ordered    02/10/17 2100  methylPREDNISolone sodium succinate injection 40 mg      -- IV Every 12 hours 02/10/17 1012        Pressors:    Autonomic Drugs     None        Hyperglycemia/Diabetes Medications: Antihyperglycemics     Start     Stop Route Frequency Ordered    02/12/17 1130  insulin regular (Humulin R) 100 Units in sodium chloride 0.9% 100 mL infusion      -- IV Continuous 02/12/17 1016    02/12/17 1310  insulin aspart pen 1-10 Units      -- SubQ Before meals & nightly PRN 02/12/17 1210    02/13/17 0800  insulin aspart pen 7 Units      -- SubQ 3 times daily with meals 02/13/17 0750

## 2017-02-14 NOTE — PROGRESS NOTES
Progress Note  Heart Transplant Service    Admit Date: 1/30/2017   LOS: 15 days     SUBJECTIVE:     Follow up for: Heart transplanted    Interval History: Complains of worsening LUE weakness and numbness starting at 5th finger and radiating up arm    Scheduled Meds:   albuterol-ipratropium 2.5mg-0.5mg/3mL  3 mL Nebulization Q4H    ciprofloxacin HCl  500 mg Oral Q12H    docusate sodium  100 mg Oral BID    insulin aspart  7 Units Subcutaneous TIDWM    methylPREDNISolone sodium succinate  40 mg Intravenous Q12H    mycophenolate  1,500 mg Oral BID    nystatin  500,000 Units Mouth/Throat QID (WM & HS)    pantoprazole  40 mg Oral Daily    polyethylene glycol  17 g Oral Daily    sulfamethoxazole-trimethoprim 400-80mg  1 tablet Oral Daily    tacrolimus  2 mg Oral BID    valganciclovir  900 mg Oral Daily     Continuous Infusions:   sodium chloride 0.45% 10 mL/hr (02/10/17 2000)    DOBUTamine 5 mcg/kg/min (02/14/17 0900)    furosemide (LASIX) 1 mg/mL infusion (non-titrating) 10 mg/hr (02/14/17 0900)    insulin (HUMAN R) infusion (adults) 2 Units/hr (02/14/17 0900)     PRN Meds:sodium chloride, sodium chloride, acetaminophen, dextrose 50%, dextrose 50%, glucagon (human recombinant), glucose, glucose, HYDROmorphone, insulin aspart, magnesium sulfate IVPB, metoclopramide HCl, ondansetron, oxycodone, potassium chloride **AND** potassium chloride **AND** potassium chloride, sodium phosphate IVPB, sodium phosphate IVPB, sodium phosphate IVPB    Immunosuppressants     Start     Stop Route Frequency Ordered    02/13/17 1800  tacrolimus capsule 2 mg      -- Oral 2 times daily 02/13/17 0937 02/13/17 2100  mycophenolate capsule 1,500 mg      -- Oral 2 times daily 02/13/17 0937          Review of patient's allergies indicates:   Allergen Reactions    Levemir [insulin detemir] Itching    Pork/porcine containing products     Ranexa [ranolazine] Nausea Only       OBJECTIVE:     Vital Signs (Most Recent)  Temp: 97.7 °F  (36.5 °C) (02/14/17 0730)  Pulse: 104 (02/14/17 0915)  Resp: 13 (02/14/17 0915)  BP: 128/62 (02/14/17 0900)  SpO2: 96 % (02/14/17 0915)    Vital Signs Range (Last 24H):  Temp:  [97.1 °F (36.2 °C)-97.8 °F (36.6 °C)]   Pulse:  [103-105]   Resp:  [11-28]   BP: (109-145)/(56-66)   SpO2:  [89 %-99 %]     I & O (Last 24H):    Intake/Output Summary (Last 24 hours) at 02/14/17 1007  Last data filed at 02/14/17 0900   Gross per 24 hour   Intake              824 ml   Output             4420 ml   Net            -3596 ml            Telemetry: a-paced at 100 bpm    Physical Exam   Constitutional: He is oriented to person, place, and time. He appears well-developed and well-nourished.   HENT:   Head: Normocephalic and atraumatic.   Eyes: EOM are normal. Pupils are equal, round, and reactive to light.   Neck: Normal range of motion. Neck supple. No JVD present.   Cardiovascular: S1 S2 audible, regular, tachycardic    Pulmonary/Chest: Bronchial breath sounds on left  Abdominal: Soft. Bowel sounds are normal. He exhibits no distension. There is no tenderness.   Musculoskeletal: Normal range of motion. He exhibits no edema.   Neurological: He is alert and oriented to person, place, and time.   Skin: Skin is warm and dry.   Extremities: BUE edematous. LUE weaker than RUE. 2+ peripheral pulses.      Labs:       Recent Labs  Lab 02/12/17  1256 02/13/17  1515 02/14/17  0425   WBC 14.55* 15.55* 11.35  11.35   HGB 8.6* 8.6* 8.4*  8.4*   HCT 26.3* 26.6* 25.9*  25.9*    217 188  188   LYMPH 4.6*  0.7* 3.0*  0.5* 4.2*  4.2*  0.5*  0.5*   MONO 2.7*  0.4 4.6  0.7 3.3*  3.3*  0.4  0.4   EOSINOPHIL 0.0 0.1 0.0  0.0         Recent Labs  Lab 02/08/17  1638  02/09/17  1417  02/10/17  0600  02/12/17  0455 02/13/17  0554 02/14/17  0425   APTT 36.9*  --  28.8  --  28.5  --   --   --   --    INR 3.0*  < > 1.1  < >  --   < > 1.4* 1.5* 1.4*   < > = values in this interval not displayed.       Recent Labs  Lab 02/12/17  3832   02/13/17  0555  02/13/17  1713 02/14/17  0107 02/14/17  0425   *  --  296*  --   --   --  180*   CALCIUM 8.1*  --  8.2*  --   --   --  8.5*   ALBUMIN 2.3*  --  2.3*  --   --   --  2.5*   PROT 5.2*  --  5.2*  --   --   --  5.4*     --  137  --   --   --  138   K 3.7  3.7  < > 3.7  3.7  < > 5.5* 3.7 4.2  4.2   CO2 26  --  28  --   --   --  32*     --  99  --   --   --  97   BUN 42*  --  42*  --   --   --  39*   CREATININE 0.9  --  0.9  --   --   --  0.8   ALKPHOS 60  --  64  --   --   --  66   ALT 41  --  35  --   --   --  31   AST 37  --  28  --   --   --  23   BILITOT 0.7  --  0.6  --   --   --  0.5   MG 1.8  < > 1.5*  < > 2.2 1.5* 2.1   PHOS 4.6*  < > 4.4  < > 4.8* 5.2* 5.6*   < > = values in this interval not displayed.  Estimated Creatinine Clearance: 114.7 mL/min (based on Cr of 0.8).      Recent Labs  Lab 02/08/17 0447   *         Recent Labs  Lab 02/08/17 0447   *       Microbiology Results (last 7 days)     Procedure Component Value Units Date/Time    Urine culture [[id number]]  (Susceptibility) Collected:  02/08/17 2000    Order Status:  Completed Specimen:  Urine from Urine, Clean Catch Updated:  02/11/17 1158     Urine Culture, Routine --     CITROBACTER KOSERI  >100,000 cfu/ml              ASSESSMENT:     57 yo M with PMHx of NICM s/p HM II (8/24/16), HLD, HTN, VT s/p ICD who presented to the hospital for elevated LDH in the setting of subtherapeutic INR with lower extremity swelling concerning for LVAD pump thrombosis, now s/p OHTx 2/9/17.       PLAN:     S/P Orthotopic Heart Transplantation 2/9/2017  -HTS primary  -Moderate Risk of Rejection: cPRA 0%, Negative Crossmatch with B Channel Shifts  -CMV Status: D-/R+: Will require valcyte x 3 months  -Immunosuppression: MMF 1500 mg BID, Solumedrol 40 mg IV BID - will change to Prednisone 30 mg bid. Tacro increased to 2/2 yesterday with level today 17.0, up from 8.6. Already got 2 mg this morning - will hold PM dose  tonight and decrease to Prograf to 1 mg bid  -On full OI prophylaxis  -Off cardene. Will add Norvasc 5 mg qd to optimize BP control especially given increase in  today   -V-paced @ 105 bpm with intrinsic heart rate 80 - will increase  to 5 mcg/kg/min. PPM turned off given V-pacing.  -CVP 13 with UOP > 175 cc/hr. CO2 climbing at 32 - will decrease Lasix gtt to 5 mg/hr  -Plan EGBx #1 this Thursday  -Remains NPO - to have modified barium swallow today  -CXR today given bronchial breath sounds on left (left CT pulled yesterday)  -Transfer to step-down    DM  -Insulin gtt per Endocrine    Bacteruria, Citrobacter Koseri  -UCx 1/19/2017 with 10-49k citrobacter  -Repeat UCx 2/8/17 with >100,000 citrobacter koseri  -started cipro 2/13/17  -D/C Ta today    Worsening LUE weakness and numbness  -Left radial A line D/C'd yesterday. CT of head -ve. Has easily palpable radial/ulnar pulses. Will consult Neurology

## 2017-02-15 LAB
ALBUMIN SERPL BCP-MCNC: 2.6 G/DL
ALP SERPL-CCNC: 65 U/L
ALT SERPL W/O P-5'-P-CCNC: 26 U/L
ANION GAP SERPL CALC-SCNC: 12 MMOL/L
ANION GAP SERPL CALC-SCNC: 9 MMOL/L
ANISOCYTOSIS BLD QL SMEAR: ABNORMAL
ANISOCYTOSIS BLD QL SMEAR: ABNORMAL
AST SERPL-CCNC: 20 U/L
BASOPHILS # BLD AUTO: 0.01 K/UL
BASOPHILS # BLD AUTO: 0.01 K/UL
BASOPHILS NFR BLD: 0.1 %
BASOPHILS NFR BLD: 0.1 %
BILIRUB SERPL-MCNC: 0.6 MG/DL
BNP SERPL-MCNC: 987 PG/ML
BUN SERPL-MCNC: 36 MG/DL
BUN SERPL-MCNC: 40 MG/DL
CALCIUM SERPL-MCNC: 8.4 MG/DL
CALCIUM SERPL-MCNC: 8.6 MG/DL
CHLORIDE SERPL-SCNC: 95 MMOL/L
CHLORIDE SERPL-SCNC: 95 MMOL/L
CO2 SERPL-SCNC: 30 MMOL/L
CO2 SERPL-SCNC: 32 MMOL/L
CREAT SERPL-MCNC: 0.9 MG/DL
CREAT SERPL-MCNC: 0.9 MG/DL
DIFFERENTIAL METHOD: ABNORMAL
DIFFERENTIAL METHOD: ABNORMAL
EOSINOPHIL # BLD AUTO: 0 K/UL
EOSINOPHIL # BLD AUTO: 0 K/UL
EOSINOPHIL NFR BLD: 0 %
EOSINOPHIL NFR BLD: 0 %
ERYTHROCYTE [DISTWIDTH] IN BLOOD BY AUTOMATED COUNT: 26.6 %
ERYTHROCYTE [DISTWIDTH] IN BLOOD BY AUTOMATED COUNT: 26.6 %
EST. GFR  (AFRICAN AMERICAN): >60 ML/MIN/1.73 M^2
EST. GFR  (AFRICAN AMERICAN): >60 ML/MIN/1.73 M^2
EST. GFR  (NON AFRICAN AMERICAN): >60 ML/MIN/1.73 M^2
EST. GFR  (NON AFRICAN AMERICAN): >60 ML/MIN/1.73 M^2
GLUCOSE SERPL-MCNC: 203 MG/DL
GLUCOSE SERPL-MCNC: 318 MG/DL
HCT VFR BLD AUTO: 27.5 %
HCT VFR BLD AUTO: 27.5 %
HGB BLD-MCNC: 9 G/DL
HGB BLD-MCNC: 9 G/DL
HYPOCHROMIA BLD QL SMEAR: ABNORMAL
HYPOCHROMIA BLD QL SMEAR: ABNORMAL
INR PPP: 1.6
LYMPHOCYTES # BLD AUTO: 0.4 K/UL
LYMPHOCYTES # BLD AUTO: 0.4 K/UL
LYMPHOCYTES NFR BLD: 2.4 %
LYMPHOCYTES NFR BLD: 2.4 %
MAGNESIUM SERPL-MCNC: 1.4 MG/DL
MAGNESIUM SERPL-MCNC: 1.7 MG/DL
MCH RBC QN AUTO: 22.6 PG
MCH RBC QN AUTO: 22.6 PG
MCHC RBC AUTO-ENTMCNC: 32.7 %
MCHC RBC AUTO-ENTMCNC: 32.7 %
MCV RBC AUTO: 69 FL
MCV RBC AUTO: 69 FL
MONOCYTES # BLD AUTO: 1 K/UL
MONOCYTES # BLD AUTO: 1 K/UL
MONOCYTES NFR BLD: 5.3 %
MONOCYTES NFR BLD: 5.3 %
NEUTROPHILS # BLD AUTO: 16.4 K/UL
NEUTROPHILS # BLD AUTO: 16.4 K/UL
NEUTROPHILS NFR BLD: 92.2 %
NEUTROPHILS NFR BLD: 92.2 %
OVALOCYTES BLD QL SMEAR: ABNORMAL
OVALOCYTES BLD QL SMEAR: ABNORMAL
PHOSPHATE SERPL-MCNC: 4.4 MG/DL
PHOSPHATE SERPL-MCNC: 4.5 MG/DL
PLATELET # BLD AUTO: 255 K/UL
PLATELET # BLD AUTO: 255 K/UL
PLATELET BLD QL SMEAR: ABNORMAL
PLATELET BLD QL SMEAR: ABNORMAL
PMV BLD AUTO: ABNORMAL FL
PMV BLD AUTO: ABNORMAL FL
POCT GLUCOSE: 119 MG/DL (ref 70–110)
POCT GLUCOSE: 218 MG/DL (ref 70–110)
POCT GLUCOSE: 322 MG/DL (ref 70–110)
POCT GLUCOSE: 330 MG/DL (ref 70–110)
POCT GLUCOSE: 340 MG/DL (ref 70–110)
POCT GLUCOSE: 349 MG/DL (ref 70–110)
POIKILOCYTOSIS BLD QL SMEAR: SLIGHT
POIKILOCYTOSIS BLD QL SMEAR: SLIGHT
POLYCHROMASIA BLD QL SMEAR: ABNORMAL
POLYCHROMASIA BLD QL SMEAR: ABNORMAL
POTASSIUM SERPL-SCNC: 3.7 MMOL/L
POTASSIUM SERPL-SCNC: 3.8 MMOL/L
POTASSIUM SERPL-SCNC: 4 MMOL/L
POTASSIUM SERPL-SCNC: 4 MMOL/L
PROT SERPL-MCNC: 5.7 G/DL
PROTHROMBIN TIME: 16.2 SEC
RBC # BLD AUTO: 3.98 M/UL
RBC # BLD AUTO: 3.98 M/UL
SCHISTOCYTES BLD QL SMEAR: PRESENT
SCHISTOCYTES BLD QL SMEAR: PRESENT
SODIUM SERPL-SCNC: 136 MMOL/L
SODIUM SERPL-SCNC: 137 MMOL/L
TACROLIMUS BLD-MCNC: 13.3 NG/ML
WBC # BLD AUTO: 17.96 K/UL
WBC # BLD AUTO: 17.96 K/UL

## 2017-02-15 PROCEDURE — 25000003 PHARM REV CODE 250: Performed by: INTERNAL MEDICINE

## 2017-02-15 PROCEDURE — 63600175 PHARM REV CODE 636 W HCPCS: Performed by: NURSE PRACTITIONER

## 2017-02-15 PROCEDURE — 80053 COMPREHEN METABOLIC PANEL: CPT

## 2017-02-15 PROCEDURE — 36415 COLL VENOUS BLD VENIPUNCTURE: CPT

## 2017-02-15 PROCEDURE — 99232 SBSQ HOSP IP/OBS MODERATE 35: CPT | Mod: ,,, | Performed by: INTERNAL MEDICINE

## 2017-02-15 PROCEDURE — 85610 PROTHROMBIN TIME: CPT

## 2017-02-15 PROCEDURE — 25000003 PHARM REV CODE 250: Performed by: THORACIC SURGERY (CARDIOTHORACIC VASCULAR SURGERY)

## 2017-02-15 PROCEDURE — 25000003 PHARM REV CODE 250: Performed by: NURSE PRACTITIONER

## 2017-02-15 PROCEDURE — 84132 ASSAY OF SERUM POTASSIUM: CPT

## 2017-02-15 PROCEDURE — 25000003 PHARM REV CODE 250: Performed by: STUDENT IN AN ORGANIZED HEALTH CARE EDUCATION/TRAINING PROGRAM

## 2017-02-15 PROCEDURE — 25000242 PHARM REV CODE 250 ALT 637 W/ HCPCS: Performed by: INTERNAL MEDICINE

## 2017-02-15 PROCEDURE — 80197 ASSAY OF TACROLIMUS: CPT

## 2017-02-15 PROCEDURE — 20600001 HC STEP DOWN PRIVATE ROOM

## 2017-02-15 PROCEDURE — 83735 ASSAY OF MAGNESIUM: CPT

## 2017-02-15 PROCEDURE — 27100171 HC OXYGEN HIGH FLOW UP TO 24 HOURS

## 2017-02-15 PROCEDURE — 83880 ASSAY OF NATRIURETIC PEPTIDE: CPT

## 2017-02-15 PROCEDURE — 94760 N-INVAS EAR/PLS OXIMETRY 1: CPT

## 2017-02-15 PROCEDURE — 94640 AIRWAY INHALATION TREATMENT: CPT

## 2017-02-15 PROCEDURE — 84100 ASSAY OF PHOSPHORUS: CPT

## 2017-02-15 PROCEDURE — 94664 DEMO&/EVAL PT USE INHALER: CPT

## 2017-02-15 PROCEDURE — 63600175 PHARM REV CODE 636 W HCPCS: Performed by: INTERNAL MEDICINE

## 2017-02-15 PROCEDURE — 27000221 HC OXYGEN, UP TO 24 HOURS

## 2017-02-15 PROCEDURE — 80048 BASIC METABOLIC PNL TOTAL CA: CPT

## 2017-02-15 PROCEDURE — 94799 UNLISTED PULMONARY SVC/PX: CPT

## 2017-02-15 PROCEDURE — 84132 ASSAY OF SERUM POTASSIUM: CPT | Mod: 91

## 2017-02-15 PROCEDURE — 85025 COMPLETE CBC W/AUTO DIFF WBC: CPT

## 2017-02-15 PROCEDURE — 84100 ASSAY OF PHOSPHORUS: CPT | Mod: 91

## 2017-02-15 PROCEDURE — 99232 SBSQ HOSP IP/OBS MODERATE 35: CPT | Mod: ,,, | Performed by: NURSE PRACTITIONER

## 2017-02-15 PROCEDURE — 83735 ASSAY OF MAGNESIUM: CPT | Mod: 91

## 2017-02-15 PROCEDURE — 92526 ORAL FUNCTION THERAPY: CPT

## 2017-02-15 RX ORDER — LANOLIN ALCOHOL/MO/W.PET/CERES
400 CREAM (GRAM) TOPICAL 2 TIMES DAILY
Status: DISCONTINUED | OUTPATIENT
Start: 2017-02-15 | End: 2017-02-22 | Stop reason: HOSPADM

## 2017-02-15 RX ORDER — FUROSEMIDE 10 MG/ML
60 INJECTION INTRAMUSCULAR; INTRAVENOUS 2 TIMES DAILY
Status: DISCONTINUED | OUTPATIENT
Start: 2017-02-15 | End: 2017-02-20

## 2017-02-15 RX ORDER — INSULIN ASPART 100 [IU]/ML
14 INJECTION, SOLUTION INTRAVENOUS; SUBCUTANEOUS
Status: DISCONTINUED | OUTPATIENT
Start: 2017-02-15 | End: 2017-02-15

## 2017-02-15 RX ORDER — AMLODIPINE BESYLATE 10 MG/1
10 TABLET ORAL DAILY
Status: DISCONTINUED | OUTPATIENT
Start: 2017-02-15 | End: 2017-02-21

## 2017-02-15 RX ORDER — BISACODYL 10 MG
10 SUPPOSITORY, RECTAL RECTAL ONCE
Status: COMPLETED | OUTPATIENT
Start: 2017-02-15 | End: 2017-02-15

## 2017-02-15 RX ORDER — INSULIN ASPART 100 [IU]/ML
4 INJECTION, SOLUTION INTRAVENOUS; SUBCUTANEOUS
Status: DISCONTINUED | OUTPATIENT
Start: 2017-02-15 | End: 2017-02-18

## 2017-02-15 RX ORDER — INSULIN ASPART 100 [IU]/ML
0-10 INJECTION, SOLUTION INTRAVENOUS; SUBCUTANEOUS
Status: DISCONTINUED | OUTPATIENT
Start: 2017-02-15 | End: 2017-02-18

## 2017-02-15 RX ORDER — INSULIN ASPART 100 [IU]/ML
18 INJECTION, SOLUTION INTRAVENOUS; SUBCUTANEOUS
Status: DISCONTINUED | OUTPATIENT
Start: 2017-02-15 | End: 2017-02-19

## 2017-02-15 RX ADMIN — MYCOPHENOLATE MOFETIL 1500 MG: 250 CAPSULE ORAL at 08:02

## 2017-02-15 RX ADMIN — INSULIN ASPART 2 UNITS: 100 INJECTION, SOLUTION INTRAVENOUS; SUBCUTANEOUS at 07:02

## 2017-02-15 RX ADMIN — PANTOPRAZOLE SODIUM 40 MG: 40 TABLET, DELAYED RELEASE ORAL at 08:02

## 2017-02-15 RX ADMIN — INSULIN ASPART 14 UNITS: 100 INJECTION, SOLUTION INTRAVENOUS; SUBCUTANEOUS at 12:02

## 2017-02-15 RX ADMIN — NYSTATIN 500000 UNITS: 500000 SUSPENSION ORAL at 09:02

## 2017-02-15 RX ADMIN — OXYCODONE HYDROCHLORIDE 10 MG: 5 TABLET ORAL at 09:02

## 2017-02-15 RX ADMIN — BISACODYL 10 MG: 10 SUPPOSITORY RECTAL at 09:02

## 2017-02-15 RX ADMIN — MAGNESIUM OXIDE TAB 400 MG (241.3 MG ELEMENTAL MG) 400 MG: 400 (241.3 MG) TAB at 07:02

## 2017-02-15 RX ADMIN — INSULIN ASPART 7 UNITS: 100 INJECTION, SOLUTION INTRAVENOUS; SUBCUTANEOUS at 07:02

## 2017-02-15 RX ADMIN — IPRATROPIUM BROMIDE AND ALBUTEROL SULFATE 3 ML: .5; 3 SOLUTION RESPIRATORY (INHALATION) at 12:02

## 2017-02-15 RX ADMIN — MYCOPHENOLATE MOFETIL 1500 MG: 250 CAPSULE ORAL at 09:02

## 2017-02-15 RX ADMIN — POLYETHYLENE GLYCOL 3350 17 G: 17 POWDER, FOR SOLUTION ORAL at 08:02

## 2017-02-15 RX ADMIN — NYSTATIN 500000 UNITS: 500000 SUSPENSION ORAL at 12:02

## 2017-02-15 RX ADMIN — DOCUSATE SODIUM 100 MG: 100 CAPSULE, LIQUID FILLED ORAL at 09:02

## 2017-02-15 RX ADMIN — PREDNISONE 30 MG: 10 TABLET ORAL at 09:02

## 2017-02-15 RX ADMIN — PREDNISONE 30 MG: 10 TABLET ORAL at 08:02

## 2017-02-15 RX ADMIN — IPRATROPIUM BROMIDE AND ALBUTEROL SULFATE 3 ML: .5; 3 SOLUTION RESPIRATORY (INHALATION) at 08:02

## 2017-02-15 RX ADMIN — DOCUSATE SODIUM 100 MG: 100 CAPSULE, LIQUID FILLED ORAL at 08:02

## 2017-02-15 RX ADMIN — NYSTATIN 500000 UNITS: 500000 SUSPENSION ORAL at 08:02

## 2017-02-15 RX ADMIN — HYDROMORPHONE HYDROCHLORIDE 0.5 MG: 1 INJECTION, SOLUTION INTRAMUSCULAR; INTRAVENOUS; SUBCUTANEOUS at 12:02

## 2017-02-15 RX ADMIN — SULFAMETHOXAZOLE AND TRIMETHOPRIM 1 TABLET: 400; 80 TABLET ORAL at 08:02

## 2017-02-15 RX ADMIN — IPRATROPIUM BROMIDE AND ALBUTEROL SULFATE 3 ML: .5; 3 SOLUTION RESPIRATORY (INHALATION) at 04:02

## 2017-02-15 RX ADMIN — HYDROMORPHONE HYDROCHLORIDE 0.5 MG: 1 INJECTION, SOLUTION INTRAMUSCULAR; INTRAVENOUS; SUBCUTANEOUS at 06:02

## 2017-02-15 RX ADMIN — IPRATROPIUM BROMIDE AND ALBUTEROL SULFATE 3 ML: .5; 3 SOLUTION RESPIRATORY (INHALATION) at 09:02

## 2017-02-15 RX ADMIN — FUROSEMIDE 60 MG: 10 INJECTION, SOLUTION INTRAMUSCULAR; INTRAVENOUS at 07:02

## 2017-02-15 RX ADMIN — INSULIN ASPART 10 UNITS: 100 INJECTION, SOLUTION INTRAVENOUS; SUBCUTANEOUS at 07:02

## 2017-02-15 RX ADMIN — IPRATROPIUM BROMIDE AND ALBUTEROL SULFATE 3 ML: .5; 3 SOLUTION RESPIRATORY (INHALATION) at 05:02

## 2017-02-15 RX ADMIN — IPRATROPIUM BROMIDE AND ALBUTEROL SULFATE 3 ML: .5; 3 SOLUTION RESPIRATORY (INHALATION) at 01:02

## 2017-02-15 RX ADMIN — TACROLIMUS 1 MG: 1 CAPSULE, GELATIN COATED ORAL at 08:02

## 2017-02-15 RX ADMIN — TACROLIMUS 1 MG: 1 CAPSULE, GELATIN COATED ORAL at 06:02

## 2017-02-15 RX ADMIN — SODIUM CHLORIDE 2 UNITS/HR: 9 INJECTION, SOLUTION INTRAVENOUS at 05:02

## 2017-02-15 RX ADMIN — OXYCODONE HYDROCHLORIDE 10 MG: 5 TABLET ORAL at 02:02

## 2017-02-15 RX ADMIN — CIPROFLOXACIN HYDROCHLORIDE 500 MG: 500 TABLET, FILM COATED ORAL at 09:02

## 2017-02-15 RX ADMIN — INSULIN ASPART 8 UNITS: 100 INJECTION, SOLUTION INTRAVENOUS; SUBCUTANEOUS at 12:02

## 2017-02-15 RX ADMIN — NYSTATIN 500000 UNITS: 500000 SUSPENSION ORAL at 06:02

## 2017-02-15 RX ADMIN — AMLODIPINE BESYLATE 10 MG: 5 TABLET ORAL at 08:02

## 2017-02-15 RX ADMIN — INSULIN ASPART 18 UNITS: 100 INJECTION, SOLUTION INTRAVENOUS; SUBCUTANEOUS at 07:02

## 2017-02-15 RX ADMIN — VALGANCICLOVIR 900 MG: 450 TABLET, FILM COATED ORAL at 08:02

## 2017-02-15 RX ADMIN — CIPROFLOXACIN HYDROCHLORIDE 500 MG: 500 TABLET, FILM COATED ORAL at 08:02

## 2017-02-15 RX ADMIN — MAGNESIUM OXIDE TAB 400 MG (241.3 MG ELEMENTAL MG) 400 MG: 400 (241.3 MG) TAB at 09:02

## 2017-02-15 NOTE — SUBJECTIVE & OBJECTIVE
Subjective:     Interval History: no acute events overnight. Some improvement in numbness reported    Current Neurological Medications:     Current Facility-Administered Medications   Medication Dose Route Frequency Provider Last Rate Last Dose    0.45% NaCl infusion  10 mL/hr Intravenous Continuous Sukhdev Sinclair MD 10 mL/hr at 02/10/17 2000 10 mL/hr at 02/10/17 2000    0.9%  NaCl infusion (for blood administration)   Intravenous Q24H PRN Sukhdev Sinclair MD        0.9%  NaCl infusion (for blood administration)   Intravenous Q24H PRN Frederick Galdamez MD        acetaminophen tablet 650 mg  650 mg Per OG tube Q4H PRN Sukhdev Sinclair MD        albuterol-ipratropium 2.5mg-0.5mg/3mL nebulizer solution 3 mL  3 mL Nebulization Q4H Dennis Lyles Jr., MD   3 mL at 02/15/17 1300    amlodipine tablet 10 mg  10 mg Oral Daily Iris Duarte NP   10 mg at 02/15/17 0848    ciprofloxacin HCl tablet 500 mg  500 mg Oral Q12H Dennis Lyles Jr., MD   500 mg at 02/15/17 0848    dextrose 50% injection 12.5 g  12.5 g Intravenous PRN Zoya Dawson MD        dextrose 50% injection 25 g  25 g Intravenous PRN Zoya Dawson MD        dobutamine 1000 mg in D5W 250 mL (premix)  5 mcg/kg/min Intravenous Continuous Peewee Romero MD 6.7 mL/hr at 02/14/17 1925 5 mcg/kg/min at 02/14/17 1925    docusate sodium capsule 100 mg  100 mg Oral BID Sukhdev Sinclair MD   100 mg at 02/15/17 0848    furosemide (LASIX) 250 mg in sodium chloride 0.9 % 250 mL infusion (non-titrating)  10 mg/hr Intravenous Continuous Iris Duarte NP 10 mL/hr at 02/15/17 0911 10 mg/hr at 02/15/17 0911    glucagon (human recombinant) injection 1 mg  1 mg Intramuscular PRN Zoya Daswon MD        glucose chewable tablet 16 g  16 g Oral PRN Zoya Dawson MD        glucose chewable tablet 24 g  24 g Oral PRN Zoya Dawson MD        hydromorphone injection 0.5 mg  0.5 mg Intravenous Q6H PRN  Lenin Asher MD   0.5 mg at 02/15/17 1207    insulin aspart pen 0-10 Units  0-10 Units Subcutaneous PRN Kristin Shane NP        insulin aspart pen 18 Units  18 Units Subcutaneous TIDWM Kristin Shane NP        insulin aspart pen 4 Units  4 Units Subcutaneous PRN Kristin Shane NP        insulin regular (Humulin R) 100 Units in sodium chloride 0.9% 100 mL infusion  2.4 Units/hr Intravenous Continuous Kristin Shane NP 2.4 mL/hr at 02/15/17 1156 2.4 Units/hr at 02/15/17 1156    magnesium oxide tablet 400 mg  400 mg Oral BID Iris Duarte NP   400 mg at 02/15/17 0727    magnesium sulfate 2g in water 50mL IVPB (premix)  2 g Intravenous PRN Sukhdev Sinclair MD   2 g at 02/14/17 0203    metoclopramide HCl injection 5 mg  5 mg Intravenous Q6H PRN Sukhdev Sinclair MD        mycophenolate capsule 1,500 mg  1,500 mg Oral BID Dennis Lyles Jr., MD   1,500 mg at 02/15/17 0848    nystatin 100,000 unit/mL suspension 500,000 Units  500,000 Units Mouth/Throat QID (WM & HS) Trixie Marx MD   500,000 Units at 02/15/17 1203    ondansetron injection 4 mg  4 mg Intravenous Q12H PRN Sukhdev Sinclair MD        oxycodone immediate release tablet 10 mg  10 mg Oral Q4H PRN Lenin Asher MD   10 mg at 02/15/17 0907    pantoprazole EC tablet 40 mg  40 mg Oral Daily Lenin Asher MD   40 mg at 02/15/17 0848    polyethylene glycol packet 17 g  17 g Oral Daily Sukhdev Sinclair MD   17 g at 02/15/17 0848    potassium chloride 20 mEq in 100 mL IVPB (FOR CENTRAL LINE ADMINISTRATION ONLY)  20 mEq Intravenous PRN Sukhdev Sinclair MD 50 mL/hr at 02/14/17 0158 20 mEq at 02/14/17 0158    And    potassium chloride 40 mEq in 100 mL IVPB (FOR CENTRAL LINE ADMINISTRATION ONLY)  40 mEq Intravenous PRN Sukhdev Sinclair MD 25 mL/hr at 02/10/17 0145 40 mEq at 02/10/17 0145    And    potassium chloride 20 mEq in 100 mL IVPB (FOR CENTRAL LINE ADMINISTRATION ONLY)  60 mEq  Intravenous PRN Sukhdev Sinclair MD        predniSONE tablet 30 mg  30 mg Oral BID Peewee Romero MD   30 mg at 02/15/17 0848    sodium phosphate 15 mmol in dextrose 5 % 250 mL IVPB  15 mmol Intravenous PRN Sukhdev Sinclair MD        sodium phosphate 20.01 mmol in dextrose 5 % 250 mL IVPB  20.01 mmol Intravenous PRN Sukhdev Sinclair MD 62.5 mL/hr at 02/09/17 1726 20.01 mmol at 02/09/17 1726    sodium phosphate 30 mmol in dextrose 5 % 250 mL IVPB  30 mmol Intravenous PRN Sukhdev Sinclair MD        sulfamethoxazole-trimethoprim 400-80mg per tablet 1 tablet  1 tablet Oral Daily Dennis Lyles Jr., MD   1 tablet at 02/15/17 0848    tacrolimus capsule 1 mg  1 mg Oral BID Peewee Romero MD   1 mg at 02/15/17 0848    valganciclovir tablet 900 mg  900 mg Oral Daily Dennis Lyles Jr., MD   900 mg at 02/15/17 0848       Review of Systems   Constitutional: Negative for chills and fever.   HENT: Positive for voice change. Negative for drooling, ear pain and trouble swallowing.    Eyes: Negative for visual disturbance.   Respiratory: Negative for cough and shortness of breath.    Cardiovascular: Negative for chest pain.   Gastrointestinal: Negative for abdominal pain and nausea.   Musculoskeletal: Negative for back pain, neck pain and neck stiffness.   Neurological: Positive for weakness and numbness. Negative for dizziness and facial asymmetry.     Objective:     Vital Signs (Most Recent):  Temp: 98.1 °F (36.7 °C) (02/15/17 1145)  Pulse: 88 (02/15/17 1300)  Resp: 16 (02/15/17 1300)  BP: (!) 145/65 (02/15/17 1145)  SpO2: 97 % (02/15/17 1300) Vital Signs (24h Range):  Temp:  [97.6 °F (36.4 °C)-98.1 °F (36.7 °C)] 98.1 °F (36.7 °C)  Pulse:  [85-98] 88  Resp:  [16-34] 16  SpO2:  [94 %-99 %] 97 %  BP: (134-166)/(63-73) 145/65     Weight: 90.1 kg (198 lb 10.2 oz)  Body mass index is 30.2 kg/(m^2).    Physical Exam   Constitutional: He is oriented to person, place, and time.   Eyes: Pupils  are equal, round, and reactive to light.   Neurological: He is oriented to person, place, and time. He has a normal Finger-Nose-Finger Test.   Reflex Scores:       Bicep reflexes are 1+ on the right side and 2+ on the left side.       Brachioradialis reflexes are 1+ on the right side.       Patellar reflexes are 1+ on the right side and 3+ on the left side.      NEUROLOGICAL EXAMINATION:     MENTAL STATUS   Oriented to person, place, and time.   Attention: normal. Concentration: normal.   Speech: (Hoarse; however not dysarthric)  Level of consciousness: alert    CRANIAL NERVES     CN II   Visual fields full to confrontation.     CN III, IV, VI   Pupils are equal, round, and reactive to light.    CN V   Facial sensation intact.     CN VII   Facial expression full, symmetric.     MOTOR EXAM   Right arm tone: normal  Left arm tone: normal  Right arm pronator drift: absent  Right leg tone: normal  Left leg tone: normal    Strength   Right deltoid: 5/5  Right biceps: 5/5  Left biceps: 4/5  Right triceps: 5/5  Left triceps: 5/5  Right wrist extension: 5/5  Left wrist extension: 4/5  Right interossei: 5/5  Left interossei: 3/5  Right iliopsoas: 5/5  Left iliopsoas: 4/5  Right quadriceps: 5/5  Left quadriceps: 5/5  Right anterior tibial: 5/5  Left anterior tibial: 5/5  Right gastroc: 5/5  Left gastroc: 5/5    REFLEXES     Reflexes   Right brachioradialis: 1+  Right biceps: 1+  Left biceps: 2+  Right patellar: 1+  Left patellar: 3+  Right plantar: normal  Left plantar: normal    SENSORY EXAM   Right arm light touch: normal  Left arm light touch: decreased from fingers (improves past the forearm)  Right leg light touch: decreased from toes (length dependant improves proximally. )  Left leg light touch: decreased from toes  Right arm vibration: normal  Left arm vibration: decreased from fingers  Right leg vibration: length dependant improves proximally.   Proprioception normal.     GAIT AND COORDINATION      Coordination    Finger to nose coordination: normal      Significant Labs:   Hemoglobin A1c:   Recent Labs  Lab 02/09/17  1417   HGBA1C 6.8*     BMP:   Recent Labs  Lab 02/14/17  0425 02/14/17  1157 02/14/17  1831 02/15/17  0014 02/15/17  0103 02/15/17  0417   *  --   --   --   --  203*     --   --   --   --  137   K 4.2  4.2 4.0  --   --  3.7 4.0  4.0   CL 97  --   --   --   --  95   CO2 32*  --   --   --   --  30*   BUN 39*  --   --   --   --  40*   CREATININE 0.8  --   --   --   --  0.9   CALCIUM 8.5*  --   --   --   --  8.6*   MG 2.1 2.1 1.6 1.4*  --  1.7     CBC:   Recent Labs  Lab 02/13/17  1515 02/14/17  0425 02/15/17  0417   WBC 15.55* 11.35  11.35 17.96*  17.96*   HGB 8.6* 8.4*  8.4* 9.0*  9.0*   HCT 26.6* 25.9*  25.9* 27.5*  27.5*    188  188 255  255     CMP:   Recent Labs  Lab 02/14/17  0425 02/14/17  1157 02/14/17  1831 02/15/17  0014 02/15/17  0103 02/15/17  0417   *  --   --   --   --  203*     --   --   --   --  137   K 4.2  4.2 4.0  --   --  3.7 4.0  4.0   CL 97  --   --   --   --  95   CO2 32*  --   --   --   --  30*   BUN 39*  --   --   --   --  40*   CREATININE 0.8  --   --   --   --  0.9   CALCIUM 8.5*  --   --   --   --  8.6*   MG 2.1 2.1 1.6 1.4*  --  1.7   PROT 5.4*  --   --   --   --  5.7*   ALBUMIN 2.5*  --   --   --   --  2.6*   BILITOT 0.5  --   --   --   --  0.6   ALKPHOS 66  --   --   --   --  65   AST 23  --   --   --   --  20   ALT 31  --   --   --   --  26   ANIONGAP 9  --   --   --   --  12   EGFRNONAA >60.0  --   --   --   --  >60.0     CSF Culture: No results for input(s): CSFCULTURE in the last 48 hours.  CSF Studies: No results for input(s): ALIQUT, APPEARCSF, COLORCSF, CSFWBC, CSFRBC, GLUCCSF, LDHCSF, PROTEINCSF, VDRLCSF in the last 48 hours.  Inflammatory Markers: No results for input(s): SEDRATE, CRP, PROCAL in the last 48 hours.    Significant Imaging:   Ct head without contrast   No acute intracranial abnormality

## 2017-02-15 NOTE — PROGRESS NOTES
JAC Duarte, NP notified that patient has only urinated 500cc since Lasix gtt increased to 10mg at 0911. New orders received to recheck CVP and draw BNP and BMP now. Will implement and continue to monitor.    1410 NP notified of CVP 12. Awaiting lab results.

## 2017-02-15 NOTE — PROGRESS NOTES
"[facility]  Endocrinology  Progress Note    Admit Date: 2017     Reason for Consult: Management of T2DM     Surgical Procedure and Date: 17 s/p heart txp    Diabetes diagnosis year:     Home Diabetes Medications:  Lantus 16 u qam, Novolog 20 U AC     Diabetes Complications include:  Hyperglycemia, Diabetic chronic kidney disease and Diabetic gastroparesis      Complicating diabetes co morbidities: CHF and CKD    HPI: Patient is a 56 y.o. male with T2DM, ischemic cardiomyopathy, s/p LVAD placement in 2016. He is now s/p heart transplantation. Endocrine consulted for bg management.       Interval HPI:   Overnight events: BG trending up, now markedly above goal. Noted FBG on AM near goal on transition insulin infusion @ 2 units/hr. Passed swallow study yesterday, pureed diet started at dinner. + prandial excursions noted. Reports drinking juices, a boost, and milk overnight. Received Solumedrol 40 mg IV yesterday AM. Started Prednisone 30 mg BID last night.    Eatin% pureed diet with honey thickened liquids.  Nausea: No  Hypoglycemia and intervention: No  Fever: No  TPN and/or TF: No    Visit Vitals    BP (!) 147/67 (BP Location: Right arm, Patient Position: Lying, BP Method: Automatic)    Pulse 90    Temp 97.8 °F (36.6 °C) (Oral)    Resp 20    Ht 5' 8" (1.727 m)    Wt 90.1 kg (198 lb 10.2 oz)    SpO2 96%    BMI 30.2 kg/m2       Labs Reviewed and Include      Recent Labs  Lab 02/15/17  0417   *   CALCIUM 8.6*   ALBUMIN 2.6*   PROT 5.7*      K 4.0  4.0   CO2 30*   CL 95   BUN 40*   CREATININE 0.9   ALKPHOS 65   ALT 26   AST 20   BILITOT 0.6     Lab Results   Component Value Date    WBC 17.96 (H) 02/15/2017    WBC 17.96 (H) 02/15/2017    HGB 9.0 (L) 02/15/2017    HGB 9.0 (L) 02/15/2017    HCT 27.5 (L) 02/15/2017    HCT 27.5 (L) 02/15/2017    MCV 69 (L) 02/15/2017    MCV 69 (L) 02/15/2017     02/15/2017     02/15/2017     No results for " input(s): TSH, FREET4 in the last 168 hours.  Lab Results   Component Value Date    HGBA1C 6.8 (H) 02/09/2017       Nutritional status:   Body mass index is 30.2 kg/(m^2).  Lab Results   Component Value Date    ALBUMIN 2.6 (L) 02/15/2017    ALBUMIN 2.5 (L) 02/14/2017    ALBUMIN 2.3 (L) 02/13/2017     Lab Results   Component Value Date    PREALBUMIN 19 (L) 02/08/2017    PREALBUMIN 14 (L) 02/06/2017    PREALBUMIN 14 (L) 02/03/2017       Estimated Creatinine Clearance: 102.3 mL/min (based on Cr of 0.9).    Accu-Checks  Recent Labs      02/13/17   0029  02/13/17   0749  02/13/17   1123  02/13/17   1717  02/13/17   2233  02/14/17   0808  02/14/17   1150  02/14/17   1807  02/14/17   2217  02/15/17   0748   POCTGLUCOSE  332*  316*  227*  187*  102  236*  295*  330*  322*  349*       Current Medications and/or Treatments Impacting Glycemic Control  Immunotherapy:  Immunosuppressants     Start     Stop Route Frequency Ordered    02/13/17 2100  mycophenolate capsule 1,500 mg      -- Oral 2 times daily 02/13/17 0937    02/15/17 0800  tacrolimus capsule 1 mg      -- Oral 2 times daily 02/14/17 1108        Steroids:   Hormones     Start     Stop Route Frequency Ordered    02/14/17 2000  predniSONE tablet 30 mg      -- Oral 2 times daily 02/14/17 1108        Pressors:    Autonomic Drugs     None        Hyperglycemia/Diabetes Medications: Antihyperglycemics     Start     Stop Route Frequency Ordered    02/12/17 1130  insulin regular (Humulin R) 100 Units in sodium chloride 0.9% 100 mL infusion      -- IV Continuous 02/12/17 1016    02/12/17 1310  insulin aspart pen 1-10 Units      -- SubQ Before meals & nightly PRN 02/12/17 1210    02/13/17 0800  insulin aspart pen 7 Units      -- SubQ 3 times daily with meals 02/13/17 0759          ASSESSMENT and PLAN    Type 2 diabetes mellitus with stage 3 chronic kidney disease, with long-term current use of insulin  bg goal 140-180- BG markedly above goal. Noted significant prandial  excursions.  Increase insulin transition gtt to 2.4 u/h   Increase Novolog to 14 units AC    BG monitoring AC/HS/0200 with moderate dose correction scale.     ADDENDUM 1245: Continues to have significant prandial excursions despite increase in scheduled Novolog. Increase Novolog to 18 units AC.  Add snack dose of Novolog 4 units qhs for bedtime snack. HOLD if patient does not snack.     Of note, pt is allergic to Levemir with reaction of severe itching. Instructed pt to have wife or son bring Lantus pens from home for when he is transitioned from IV insulin to MDI.     DISCHARGE PLANNING: Not a candidate for discharge at this time.   previously on MDI- likely re-calculation of insulin needs prior to d/c.  He does not need rx. Has Lantus and Novolog at home. Uses pens and has supplies.  Has new meter. His wife is checking to see if he needs rx for strips or lancets.   He is followed by Fabián Navarrete NP with [facility] for his DM management. He is considering f/u with [facility] endocrine after discharge.       Ischemic cardiomyopathy  S/p heart txp    * Heart transplanted  Per CTS, HTS    Adrenal cortical steroids causing adverse effect in therapeutic use  Increasing insulin needs  On Prednisone 30 mg BID.     Immunosuppression  May increase insulin resistance       Non morbid obesity due to excess calories  Increases insulin resistance   Body mass index is 30.2 kg/(m^2).          Kristin Shane NP  Endocrinology  [facility]

## 2017-02-15 NOTE — PROGRESS NOTES
Progress Note  Heart Transplant Service    Admit Date: 1/30/2017   LOS: 16 days     SUBJECTIVE:     Follow up for: Heart transplanted    Interval History: LUE is stronger, and numbness is improving. Less O2 requirements - now on comfort flow at 10/40 with sat 90's    Scheduled Meds:   albuterol-ipratropium 2.5mg-0.5mg/3mL  3 mL Nebulization Q4H    amlodipine  10 mg Oral Daily    ciprofloxacin HCl  500 mg Oral Q12H    docusate sodium  100 mg Oral BID    insulin aspart  7 Units Subcutaneous TIDWM    magnesium oxide  400 mg Oral BID    mycophenolate  1,500 mg Oral BID    nystatin  500,000 Units Mouth/Throat QID (WM & HS)    pantoprazole  40 mg Oral Daily    polyethylene glycol  17 g Oral Daily    predniSONE  30 mg Oral BID    sulfamethoxazole-trimethoprim 400-80mg  1 tablet Oral Daily    tacrolimus  1 mg Oral BID    valganciclovir  900 mg Oral Daily     Continuous Infusions:   sodium chloride 0.45% 10 mL/hr (02/10/17 2000)    DOBUTamine 5 mcg/kg/min (02/14/17 1925)    furosemide (LASIX) 1 mg/mL infusion (non-titrating) 10 mg/hr (02/15/17 0911)    insulin (HUMAN R) infusion (adults) 2 Units/hr (02/15/17 0549)     PRN Meds:sodium chloride, sodium chloride, acetaminophen, dextrose 50%, dextrose 50%, glucagon (human recombinant), glucose, glucose, HYDROmorphone, insulin aspart, magnesium sulfate IVPB, metoclopramide HCl, ondansetron, oxycodone, potassium chloride **AND** potassium chloride **AND** potassium chloride, sodium phosphate IVPB, sodium phosphate IVPB, sodium phosphate IVPB    Immunosuppressants     Start     Stop Route Frequency Ordered    02/13/17 2100  mycophenolate capsule 1,500 mg      -- Oral 2 times daily 02/13/17 0937    02/15/17 0800  tacrolimus capsule 1 mg      -- Oral 2 times daily 02/14/17 1108          Review of patient's allergies indicates:   Allergen Reactions    Levemir [insulin detemir] Itching    Pork/porcine containing products     Ranexa [ranolazine] Nausea Only        OBJECTIVE:     Vital Signs (Most Recent)  Temp: 97.8 °F (36.6 °C) (02/15/17 0845)  Pulse: 90 (02/15/17 0900)  Resp: 20 (02/15/17 0845)  BP: (!) 147/67 (02/15/17 0845)  SpO2: 96 % (02/15/17 0845)    Vital Signs Range (Last 24H):  Temp:  [97.6 °F (36.4 °C)-98.4 °F (36.9 °C)]   Pulse:  []   Resp:  [13-34]   BP: (134-166)/(63-96)   SpO2:  [93 %-98 %]     I & O (Last 24H):    Intake/Output Summary (Last 24 hours) at 02/15/17 1000  Last data filed at 02/15/17 0800   Gross per 24 hour   Intake          1270.37 ml   Output             2496 ml   Net         -1225.63 ml            Telemetry: a-paced at 100 bpm    Physical Exam   Constitutional: He is oriented to person, place, and time. He appears well-developed and well-nourished.   HENT:   Head: Normocephalic and atraumatic.   Eyes: EOM are normal. Pupils are equal, round, and reactive to light.   Neck: Normal range of motion. Neck supple. No JVD present.   Cardiovascular: S1 S2 audible, regular, tachycardic    Pulmonary/Chest: Bronchial breath sounds on left  Abdominal: Soft. Bowel sounds are normal. He exhibits no distension. There is no tenderness.   Musculoskeletal: Normal range of motion. He exhibits no edema.   Neurological: He is alert and oriented to person, place, and time.   Skin: Skin is warm and dry.   Extremities: BUE edematous. LUE slightly weaker than RUE. 2+ peripheral pulses.      Labs:       Recent Labs  Lab 02/13/17  1515 02/14/17  0425 02/15/17  0417   WBC 15.55* 11.35  11.35 17.96*  17.96*   HGB 8.6* 8.4*  8.4* 9.0*  9.0*   HCT 26.6* 25.9*  25.9* 27.5*  27.5*    188  188 255  255   LYMPH 3.0*  0.5* 4.2*  4.2*  0.5*  0.5* 2.4*  2.4*  0.4*  0.4*   MONO 4.6  0.7 3.3*  3.3*  0.4  0.4 5.3  5.3  1.0  1.0   EOSINOPHIL 0.1 0.0  0.0 0.0  0.0         Recent Labs  Lab 02/08/17  1638  02/09/17  1417  02/10/17  0600  02/13/17  0554 02/14/17  0425 02/15/17  0417   APTT 36.9*  --  28.8  --  28.5  --   --   --   --    INR 3.0*   < > 1.1  < >  --   < > 1.5* 1.4* 1.6*   < > = values in this interval not displayed.       Recent Labs  Lab 02/13/17  0555  02/14/17  0425 02/14/17  1157 02/14/17  1831 02/15/17  0014 02/15/17  0103 02/15/17  0417   *  --  180*  --   --   --   --  203*   CALCIUM 8.2*  --  8.5*  --   --   --   --  8.6*   ALBUMIN 2.3*  --  2.5*  --   --   --   --  2.6*   PROT 5.2*  --  5.4*  --   --   --   --  5.7*     --  138  --   --   --   --  137   K 3.7  3.7  < > 4.2  4.2 4.0  --   --  3.7 4.0  4.0   CO2 28  --  32*  --   --   --   --  30*   CL 99  --  97  --   --   --   --  95   BUN 42*  --  39*  --   --   --   --  40*   CREATININE 0.9  --  0.8  --   --   --   --  0.9   ALKPHOS 64  --  66  --   --   --   --  65   ALT 35  --  31  --   --   --   --  26   AST 28  --  23  --   --   --   --  20   BILITOT 0.6  --  0.5  --   --   --   --  0.6   MG 1.5*  < > 2.1 2.1 1.6 1.4*  --  1.7   PHOS 4.4  < > 5.6* 4.4 4.7* 4.4  --  4.5   < > = values in this interval not displayed.  Estimated Creatinine Clearance: 102.3 mL/min (based on Cr of 0.9).    No results for input(s): BNP, BNPTRIAGEBLO in the last 168 hours.    No results for input(s): LDH in the last 168 hours.    Microbiology Results (last 7 days)     Procedure Component Value Units Date/Time    Urine culture [[id number]]  (Susceptibility) Collected:  02/08/17 2000    Order Status:  Completed Specimen:  Urine from Urine, Clean Catch Updated:  02/11/17 1158     Urine Culture, Routine --     CITROBACTER KOSERI  >100,000 cfu/ml              ASSESSMENT:     55 yo M with PMHx of NICM s/p HM II (8/24/16), HLD, HTN, VT s/p ICD who presented to the hospital for elevated LDH in the setting of subtherapeutic INR with lower extremity swelling concerning for LVAD pump thrombosis, now s/p OHTx 2/9/17.       PLAN:     S/P Orthotopic Heart Transplantation 2/9/2017  -HTS primary  -Moderate Risk of Rejection: cPRA 0%, Negative Crossmatch with B Channel Shifts  -CMV Status: D-/R+: Will  require valcyte x 3 months  -Immunosuppression: MMF 1500 mg BID, Prednisone 30 mg bid. [place] in held last night 2/2 level 17.0; resumed at 1 mg bid this morning - level 13.3  -On full OI prophylaxis  -EGBx #1 tomorrow  -Increase Norvasc to 10 mg qd to optimize BP control   -PPM turned off yesterday given V-pacing. Intrinsic HR today 80's on  5 mcg/kg/min. Will ask CTS to pull pacing wires after EGBx tomorrow  -CVP 13 - will transition to IVP Lasix  -ST following. Tolerating pureed diet with honey-thick liquids  -Right CT with minimal drainage - will ask CTS to pull after EGBx tomorrow  -WBC count up at 17.96 likely 2/2 steroids. Afebrile    DM  -Insulin gtt per Endocrine    Bacteruria, Citrobacter Koseri  -UCx 1/19/2017 with 10-49k citrobacter  -Repeat UCx 2/8/17 with >100,000 citrobacter koseri  -started cipro 2/13/17 X 7 days    Improving LUE weakness and numbness  -Appreciate Neurology's help. Has not been staffed yet.

## 2017-02-15 NOTE — NURSING
Heart Transplant Coordinator: Rounds Report: Attended interdisciplinary rounds this morning with the transplant team including SW, physicians, fellows,  mid-level providers, and transplant coordinators.  Discussed plan of care, including DC date, current medications and plan for follow-up.

## 2017-02-15 NOTE — PROGRESS NOTES
[facility]  Adult Nutrition  Consult Note    SUMMARY     Recommendations    Recommendation/Intervention: 1. Cont current diet order; meal & liquids texture per SLP recs  2. Tx RD to f/u w/ pt @ next eval to discuss post-tx diet guidelines  3. If meal intake is consistently <50% x 2-3 days, order Boost Plus TID  4. RD to monitor  Goals: Diet advancement or TF initiation w/in 72 hrs  Nutrition Goal Status: goal met       Continuum of Care Plan    Referral to Outpatient Services: other (see comments) (Nutr D/c Plan: complete post-tx diet educ prior to d/c)    Reason for Assessment    Reason for Assessment: RD follow-up  Diagnosis: cardiac disease, other (see comments) (s/p OHTx 2/9/17 )  Relevent Medical History: HF s/p LVAD 8/2016, AICD, HTN, HLD, GERD, DMII, CKD stg 3   Interdisciplinary Rounds: attended     General Information Comments: Pt extubated since RD's last eval, OOB to chair. MBSS done this afternoon. Pt looking fwd to eating.    Nutrition Prescription Ordered    Current Diet Order: Pureed  Nutrition Order Comments: Honey thickend liquids        Evaluation of Received Nutrients/Fluid Intake        Energy Calories Required: not meeting needs                 Protein Required: not meeting needs        IV Fluid (mL):  (MIVF @ 5ml/hr)  Other Fluid (mL):  (IVPB meds)      Fluid Required: meeting needs  Comments: I/O noted, good uop; -25.1L since admit        Nutrition Risk Screen     Nutrition Risk Screen: no indicators present    Nutrition/Diet History    Patient Reported Diet/Restrictions/Preferences: low salt, diabetic diet (pta)  Typical Food/Fluid Intake: Diet adv'd this afternoon  Food Preferences: Taoist/cultural prefs from prior admission noted; NO PORK           Labs/Tests/Procedures/Meds       Pertinent Labs Reviewed: reviewed  Pertinent Labs Comments: Phos 5.6, BUN 39, Glu 180, Alb 2.5  Pertinent Medications Reviewed: reviewed  Pertinent Medications Comments: dobutamine,  furosemide, insulin, docusate, pantoprazole, tacrolimus    Physical Findings    Overall Physical Appearance: overweight     Oral/Mouth Cavity: tooth/teeth missing  Skin: pressure ulcer(s), other (see comments) (R)heel DTI, L)heel-stg 1, Ramirez 17; sx incisions)    Anthropometrics       Height (inches): 67.99 in  Weight Method: Bed Scale  Weight (kg): 89.5 kg  Ideal Body Weight (IBW), Male: 153.94 lb     % Ideal Body Weight, Male (lb): 128.17 lb     BMI (kg/m2): 30.01  BMI Grade: 30 - 34.9- obesity - grade I  Usual Body Weight (UBW), k kg  % Usual Body Weight: 111.35        Estimated/Assessed Needs    Weight Used For Calorie Calculations: 89.5 kg (197 lb 5 oz)   Height (cm): 172.7 cm     Energy Need Method: Elwood-St Jeor (x 1.25 (PAL) = 2140 cals daily)     RMR (Elwood-St. Jeor Equation): 1712.8        Weight Used For Protein Calculations: 89.5 kg (197 lb 5 oz)  Protein Requirements: 108-117 gms (1.2-1.3 gms/kg)    Fluid Need Method: RDA Method (1ml/kcal or Per MD)        Monitor and Evaluation    Food and Nutrient Intake: energy intake  Food and Nutrient Adminstration: diet order        Anthropometric Measurements: weight, weight change  Biochemical Data, Medical Tests and Procedures: electrolyte and renal panel, glucose/endocrine profile  Nutrition-Focused Physical Findings: overall appearance    Nutrition Risk    Level of Risk: moderate    Nutrition Follow-Up    RD Follow-up?: Yes    Assessment and Plan    Nutrition Dx/problem: Increased protein needs  Related to/etiology: physiological factors s/p OHTx  As Evidenced by: EPN  Status: Continues

## 2017-02-15 NOTE — ASSESSMENT & PLAN NOTE
- exam remarkable for decreased sensation to light touch, vibration and temperature on the left upper extremity in distal C8 distribution; pain elicited on the taping the ulnar nerve at the elbow; and ulnar nerve compression/irritation likely contributing to symptoms.  - no additional imaging required at this time  - recommend elbow patch

## 2017-02-15 NOTE — PROGRESS NOTES
Pt transferred to TSU, room 8070 from SICU.  Report given at bedside from SICU RN, Deepa Kc.  Pt AAOx4.  VS stable upon arrival.  Tele monitoring ordered and box sent for.  Chest tube to continuous suction.  DTI's noted to both feet, face, and back of head.  Pt c/o pain to incision site, PRN pain medication administered. Numbness and tingling noted to left arm, positive for pulses. R IJ w/ introducer reinforced.  Drips noted - insulin @ 2units/hr, Lasix 5ml/hr, Dobutamine at 5mc/kg/min.  No SOB or n/v noted. Will continue to monitor pt.

## 2017-02-15 NOTE — PROGRESS NOTES
[facility]  Neurology  Progress Note    Patient Name: Mick Barriga  MRN: [id number]  Admission Date: 1/30/2017  Hospital Length of Stay: 16 days  Code Status: Full Code   Attending Provider: Peewee Romero MD  Primary Care Physician: Sukhdev Alan MD   Principal Problem:Heart transplanted      Subjective:     Interval History: no acute events overnight. Some improvement in numbness reported    Current Neurological Medications:     Current Facility-Administered Medications   Medication Dose Route Frequency Provider Last Rate Last Dose    0.45% NaCl infusion  10 mL/hr Intravenous Continuous Sukhdev Sinclair MD 10 mL/hr at 02/10/17 2000 10 mL/hr at 02/10/17 2000    0.9%  NaCl infusion (for blood administration)   Intravenous Q24H PRN Sukhdev Sinclair MD        0.9%  NaCl infusion (for blood administration)   Intravenous Q24H PRN Frederick Galdamez MD        acetaminophen tablet 650 mg  650 mg Per OG tube Q4H PRN Sukhdev Sinclair MD        albuterol-ipratropium 2.5mg-0.5mg/3mL nebulizer solution 3 mL  3 mL Nebulization Q4H Dennis Lyles Jr., MD   3 mL at 02/15/17 1300    amlodipine tablet 10 mg  10 mg Oral Daily Iris Duarte NP   10 mg at 02/15/17 0848    ciprofloxacin HCl tablet 500 mg  500 mg Oral Q12H Dennis Lyles Jr., MD   500 mg at 02/15/17 0848    dextrose 50% injection 12.5 g  12.5 g Intravenous PRN Zoya Dawson MD        dextrose 50% injection 25 g  25 g Intravenous PRN Zoya Dawson MD        dobutamine 1000 mg in D5W 250 mL (premix)  5 mcg/kg/min Intravenous Continuous Peewee Romero MD 6.7 mL/hr at 02/14/17 1925 5 mcg/kg/min at 02/14/17 1925    docusate sodium capsule 100 mg  100 mg Oral BID Sukhdev Sinclair MD   100 mg at 02/15/17 0848    furosemide (LASIX) 250 mg in sodium chloride 0.9 % 250 mL infusion (non-titrating)  10 mg/hr Intravenous Continuous Iris Duarte NP 10 mL/hr at 02/15/17 0911 10 mg/hr at  02/15/17 0911    glucagon (human recombinant) injection 1 mg  1 mg Intramuscular PRN Zoya Dawson MD        glucose chewable tablet 16 g  16 g Oral PRN Zoya Dawson MD        glucose chewable tablet 24 g  24 g Oral PRN Zoya Dawson MD        hydromorphone injection 0.5 mg  0.5 mg Intravenous Q6H PRN Lenin Asher MD   0.5 mg at 02/15/17 1207    insulin aspart pen 0-10 Units  0-10 Units Subcutaneous PRN Kristin Shane NP        insulin aspart pen 18 Units  18 Units Subcutaneous TIDWM Kristin Shane NP        insulin aspart pen 4 Units  4 Units Subcutaneous PRN Kristin Shane NP        insulin regular (Humulin R) 100 Units in sodium chloride 0.9% 100 mL infusion  2.4 Units/hr Intravenous Continuous Kristin Shane NP 2.4 mL/hr at 02/15/17 1156 2.4 Units/hr at 02/15/17 1156    magnesium oxide tablet 400 mg  400 mg Oral BID Iris Duarte NP   400 mg at 02/15/17 0727    magnesium sulfate 2g in water 50mL IVPB (premix)  2 g Intravenous PRN Sukhdev Sinclair MD   2 g at 02/14/17 0203    metoclopramide HCl injection 5 mg  5 mg Intravenous Q6H PRN Sukhdev Sinclair MD        mycophenolate capsule 1,500 mg  1,500 mg Oral BID Dennis Lyles Jr., MD   1,500 mg at 02/15/17 0848    nystatin 100,000 unit/mL suspension 500,000 Units  500,000 Units Mouth/Throat QID (WM & HS) Trixie Marx MD   500,000 Units at 02/15/17 1203    ondansetron injection 4 mg  4 mg Intravenous Q12H PRN Sukhdev Sinclair MD        oxycodone immediate release tablet 10 mg  10 mg Oral Q4H PRN Lenin Asher MD   10 mg at 02/15/17 0907    pantoprazole EC tablet 40 mg  40 mg Oral Daily Lenin Asher MD   40 mg at 02/15/17 0848    polyethylene glycol packet 17 g  17 g Oral Daily Sukhdev Sinclair MD   17 g at 02/15/17 0848    potassium chloride 20 mEq in 100 mL IVPB (FOR CENTRAL LINE ADMINISTRATION ONLY)  20 mEq Intravenous PRN Sukhdev Sinclair MD 50 mL/hr at 02/14/17  0158 20 mEq at 02/14/17 0158    And    potassium chloride 40 mEq in 100 mL IVPB (FOR CENTRAL LINE ADMINISTRATION ONLY)  40 mEq Intravenous PRN Sukhdev Sinclair MD 25 mL/hr at 02/10/17 0145 40 mEq at 02/10/17 0145    And    potassium chloride 20 mEq in 100 mL IVPB (FOR CENTRAL LINE ADMINISTRATION ONLY)  60 mEq Intravenous PRN Sukhdev Sinclair MD        predniSONE tablet 30 mg  30 mg Oral BID Peewee Romero MD   30 mg at 02/15/17 0848    sodium phosphate 15 mmol in dextrose 5 % 250 mL IVPB  15 mmol Intravenous PRN Sukhdev Sinclair MD        sodium phosphate 20.01 mmol in dextrose 5 % 250 mL IVPB  20.01 mmol Intravenous PRN Sukhdev Sinclair MD 62.5 mL/hr at 02/09/17 1726 20.01 mmol at 02/09/17 1726    sodium phosphate 30 mmol in dextrose 5 % 250 mL IVPB  30 mmol Intravenous PRN Sukhdev Sinclair MD        sulfamethoxazole-trimethoprim 400-80mg per tablet 1 tablet  1 tablet Oral Daily Dennis Lyles Jr., MD   1 tablet at 02/15/17 0848    tacrolimus capsule 1 mg  1 mg Oral BID Peewee Romero MD   1 mg at 02/15/17 0848    valganciclovir tablet 900 mg  900 mg Oral Daily Dennis Lyles Jr., MD   900 mg at 02/15/17 0848       Review of Systems   Constitutional: Negative for chills and fever.   HENT: Positive for voice change. Negative for drooling, ear pain and trouble swallowing.    Eyes: Negative for visual disturbance.   Respiratory: Negative for cough and shortness of breath.    Cardiovascular: Negative for chest pain.   Gastrointestinal: Negative for abdominal pain and nausea.   Musculoskeletal: Negative for back pain, neck pain and neck stiffness.   Neurological: Positive for weakness and numbness. Negative for dizziness and facial asymmetry.     Objective:     Vital Signs (Most Recent):  Temp: 98.1 °F (36.7 °C) (02/15/17 1145)  Pulse: 88 (02/15/17 1300)  Resp: 16 (02/15/17 1300)  BP: (!) 145/65 (02/15/17 1145)  SpO2: 97 % (02/15/17 1300) Vital Signs (24h  Range):  Temp:  [97.6 °F (36.4 °C)-98.1 °F (36.7 °C)] 98.1 °F (36.7 °C)  Pulse:  [85-98] 88  Resp:  [16-34] 16  SpO2:  [94 %-99 %] 97 %  BP: (134-166)/(63-73) 145/65     Weight: 90.1 kg (198 lb 10.2 oz)  Body mass index is 30.2 kg/(m^2).    Physical Exam   Constitutional: He is oriented to person, place, and time.   Eyes: Pupils are equal, round, and reactive to light.   Neurological: He is oriented to person, place, and time. He has a normal Finger-Nose-Finger Test.   Reflex Scores:       Bicep reflexes are 1+ on the right side and 2+ on the left side.       Brachioradialis reflexes are 1+ on the right side.       Patellar reflexes are 1+ on the right side and 3+ on the left side.      NEUROLOGICAL EXAMINATION:     MENTAL STATUS   Oriented to person, place, and time.   Attention: normal. Concentration: normal.   Speech: (Hoarse; however not dysarthric)  Level of consciousness: alert    CRANIAL NERVES     CN II   Visual fields full to confrontation.     CN III, IV, VI   Pupils are equal, round, and reactive to light.    CN V   Facial sensation intact.     CN VII   Facial expression full, symmetric.     MOTOR EXAM   Right arm tone: normal  Left arm tone: normal  Right arm pronator drift: absent  Right leg tone: normal  Left leg tone: normal    Strength   Right deltoid: 5/5  Right biceps: 5/5  Left biceps: 4/5  Right triceps: 5/5  Left triceps: 5/5  Right wrist extension: 5/5  Left wrist extension: 4/5  Right interossei: 5/5  Left interossei: 3/5  Right iliopsoas: 5/5  Left iliopsoas: 4/5  Right quadriceps: 5/5  Left quadriceps: 5/5  Right anterior tibial: 5/5  Left anterior tibial: 5/5  Right gastroc: 5/5  Left gastroc: 5/5    REFLEXES     Reflexes   Right brachioradialis: 1+  Right biceps: 1+  Left biceps: 2+  Right patellar: 1+  Left patellar: 3+  Right plantar: normal  Left plantar: normal    SENSORY EXAM   Right arm light touch: normal  Left arm light touch: decreased from fingers (improves past the  forearm)  Right leg light touch: decreased from toes (length dependant improves proximally. )  Left leg light touch: decreased from toes  Right arm vibration: normal  Left arm vibration: decreased from fingers  Right leg vibration: length dependant improves proximally.   Proprioception normal.     GAIT AND COORDINATION      Coordination   Finger to nose coordination: normal      Significant Labs:   Hemoglobin A1c:   Recent Labs  Lab 02/09/17  1417   HGBA1C 6.8*     BMP:   Recent Labs  Lab 02/14/17  0425 02/14/17  1157 02/14/17  1831 02/15/17  0014 02/15/17  0103 02/15/17  0417   *  --   --   --   --  203*     --   --   --   --  137   K 4.2  4.2 4.0  --   --  3.7 4.0  4.0   CL 97  --   --   --   --  95   CO2 32*  --   --   --   --  30*   BUN 39*  --   --   --   --  40*   CREATININE 0.8  --   --   --   --  0.9   CALCIUM 8.5*  --   --   --   --  8.6*   MG 2.1 2.1 1.6 1.4*  --  1.7     CBC:   Recent Labs  Lab 02/13/17  1515 02/14/17  0425 02/15/17  0417   WBC 15.55* 11.35  11.35 17.96*  17.96*   HGB 8.6* 8.4*  8.4* 9.0*  9.0*   HCT 26.6* 25.9*  25.9* 27.5*  27.5*    188  188 255  255     CMP:   Recent Labs  Lab 02/14/17 0425 02/14/17  1157 02/14/17  1831 02/15/17  0014 02/15/17  0103 02/15/17  0417   *  --   --   --   --  203*     --   --   --   --  137   K 4.2  4.2 4.0  --   --  3.7 4.0  4.0   CL 97  --   --   --   --  95   CO2 32*  --   --   --   --  30*   BUN 39*  --   --   --   --  40*   CREATININE 0.8  --   --   --   --  0.9   CALCIUM 8.5*  --   --   --   --  8.6*   MG 2.1 2.1 1.6 1.4*  --  1.7   PROT 5.4*  --   --   --   --  5.7*   ALBUMIN 2.5*  --   --   --   --  2.6*   BILITOT 0.5  --   --   --   --  0.6   ALKPHOS 66  --   --   --   --  65   AST 23  --   --   --   --  20   ALT 31  --   --   --   --  26   ANIONGAP 9  --   --   --   --  12   EGFRNONAA >60.0  --   --   --   --  >60.0     CSF Culture: No results for input(s): CSFCULTURE in the last 48 hours.  CSF  Studies: No results for input(s): ALIQUT, APPEARCSF, COLORCSF, CSFWBC, CSFRBC, GLUCCSF, LDHCSF, PROTEINCSF, VDRLCSF in the last 48 hours.  Inflammatory Markers: No results for input(s): SEDRATE, CRP, PROCAL in the last 48 hours.    Significant Imaging:   Ct head without contrast   No acute intracranial abnormality    Assessment and Plan:     Numbness of left hand  - exam remarkable for decreased sensation to light touch, vibration and temperature on the left upper extremity in distal C8 distribution; pain elicited on the taping the ulnar nerve at the elbow; and ulnar nerve compression/irritation likely contributing to symptoms.  - no additional imaging required at this time  - recommend elbow patch       Will sign off.     Rupinder Lobo MD  Neurology  [facility]

## 2017-02-15 NOTE — SUBJECTIVE & OBJECTIVE
"Interval HPI:   Overnight events: BG trending up, now markedly above goal. Noted FBG on AM near goal on transition insulin infusion @ 2 units/hr. Passed swallow study yesterday, pureed diet started at dinner. + prandial excursions noted. Reports drinking juices, a boost, and milk overnight. Received Solumedrol 40 mg IV yesterday AM. Started Prednisone 30 mg BID last night.    Eatin% pureed diet with honey thickened liquids.  Nausea: No  Hypoglycemia and intervention: No  Fever: No  TPN and/or TF: No    Visit Vitals    BP (!) 147/67 (BP Location: Right arm, Patient Position: Lying, BP Method: Automatic)    Pulse 90    Temp 97.8 °F (36.6 °C) (Oral)    Resp 20    Ht 5' 8" (1.727 m)    Wt 90.1 kg (198 lb 10.2 oz)    SpO2 96%    BMI 30.2 kg/m2       Labs Reviewed and Include      Recent Labs  Lab 02/15/17  0417   *   CALCIUM 8.6*   ALBUMIN 2.6*   PROT 5.7*      K 4.0  4.0   CO2 30*   CL 95   BUN 40*   CREATININE 0.9   ALKPHOS 65   ALT 26   AST 20   BILITOT 0.6     Lab Results   Component Value Date    WBC 17.96 (H) 02/15/2017    WBC 17.96 (H) 02/15/2017    HGB 9.0 (L) 02/15/2017    HGB 9.0 (L) 02/15/2017    HCT 27.5 (L) 02/15/2017    HCT 27.5 (L) 02/15/2017    MCV 69 (L) 02/15/2017    MCV 69 (L) 02/15/2017     02/15/2017     02/15/2017     No results for input(s): TSH, FREET4 in the last 168 hours.  Lab Results   Component Value Date    HGBA1C 6.8 (H) 2017       Nutritional status:   Body mass index is 30.2 kg/(m^2).  Lab Results   Component Value Date    ALBUMIN 2.6 (L) 02/15/2017    ALBUMIN 2.5 (L) 2017    ALBUMIN 2.3 (L) 2017     Lab Results   Component Value Date    PREALBUMIN 19 (L) 2017    PREALBUMIN 14 (L) 2017    PREALBUMIN 14 (L) 2017       Estimated Creatinine Clearance: 102.3 mL/min (based on Cr of 0.9).    Accu-Checks  Recent Labs      17   0029  17   0749  17   1123  17   1717  17   2233  17   " 0808  02/14/17   1150  02/14/17   1807  02/14/17   2217  02/15/17   0748   POCTGLUCOSE  332*  316*  227*  187*  102  236*  295*  330*  322*  349*       Current Medications and/or Treatments Impacting Glycemic Control  Immunotherapy:  Immunosuppressants     Start     Stop Route Frequency Ordered    02/13/17 2100  mycophenolate capsule 1,500 mg      -- Oral 2 times daily 02/13/17 0937    02/15/17 0800  tacrolimus capsule 1 mg      -- Oral 2 times daily 02/14/17 1108        Steroids:   Hormones     Start     Stop Route Frequency Ordered    02/14/17 2000  predniSONE tablet 30 mg      -- Oral 2 times daily 02/14/17 1108        Pressors:    Autonomic Drugs     None        Hyperglycemia/Diabetes Medications: Antihyperglycemics     Start     Stop Route Frequency Ordered    02/12/17 1130  insulin regular (Humulin R) 100 Units in sodium chloride 0.9% 100 mL infusion      -- IV Continuous 02/12/17 1016    02/12/17 1310  insulin aspart pen 1-10 Units      -- SubQ Before meals & nightly PRN 02/12/17 1210    02/13/17 0800  insulin aspart pen 7 Units      -- SubQ 3 times daily with meals 02/13/17 0751

## 2017-02-15 NOTE — PLAN OF CARE
Problem: Patient Care Overview  Goal: Plan of Care Review  Dx: heart transplant (2/9), 1 PRBC, 250cc Albumin    PMH: AICD, HTN, HLD, GERD, T2DM, CKD (3), ischemic CM, systolic and diastolic HF, low Na, non morbid obesity, BPH    PSH: cholecystectomy, LVAD (8/24/16)    2/9: Heart Tx (out @ 1405); 2 units PRBC post op  2/11: Post-op day 2, Extubated    Nursing:  - MAP 60-80  - V paced at 105  - ACHS accuchecks  - Q6 hour K, Mg, Phos     Outcome: Ongoing (interventions implemented as appropriate)  Pt AAO, spouse at the bedside. VSS, see flowhsheet for further assessment data.     Telemetry monitoring in progress; Pt remains paced (ventricular pacer wires intact, atrial wires remain grounded), HR      ACX AC/HS. LAst . Coverage administered as ordered. Insulin gtt infusing at 2units/hr.     POD #6.Midsternal incision remains intact with steri strips. 1 chest tube remaining; output 0mls overnight. Dressing changed X 1. Previous LVAD site dressing changed X 1 as well and repacked with idofoam. Incisional pain moderately managed with PO pain medication. Oxy, 10mg, given  X 3. Dobutamine gtt infusing at 5mcg/kg/min(DW 89.5kg, 6.7ml/hr)     Os levels stable on Hi-Flow N/C, >96%     Pt c/o of Left hand/arm numbness. Neuro consulted completed during AM shift on 2/14, see notes for recs.     FVO mgmt in progress; Lasix gtt infusing at 5mls/hr. UO 1525mls w/ 0 BM overnight.     I/Os monitored. Pt placed on pureed/honey thick liquid diet per Speech recs. See notes for details.     Pt up with standby assists. No new skin breakdown noted, healing DTIs on R+L heels, back of head, and cheek.     Fall precautions in place. Pt remains free of injury at this time. Daily labs monitored. Q6 K, Mg, and Phos ordered. No acute events overnight.

## 2017-02-15 NOTE — PLAN OF CARE
Problem: SLP Goal  Goal: SLP Goal  Speech Language Pathology Goals  Goals expected to be met by 2/21  1. Pt will tolerate puree & honey thickened liquid with no s/s aspiration  2. Pt will tolerate trials of soft solid with no s/s aspiration & adequate oral phase of swallow  3. Pt will tolerate trials of less restrictive liquid consistencies to determine if safe for diet upgrades  4. Pt will complete dysphagia exercises given min A      Goals to be met 2/19  1. Pt will participate in ongoing swallow assessment to determine safest po diet GOAL MET     Outcome: Ongoing (interventions implemented as appropriate)  Pt was seen for ST session. SLP recs continue current diet of Puree & honey thickened liquid following strict aspiration precautions including:   -Small bites/sips  -fully upright for all PO  -fully awake & alert for all PO  -one bite/sip at a time  -monitor for any s/s aspiration (cough, throat clearing, wet vocal quality) & discontinue PO if present     Formal note to follow.      Lakesha Headley MS, CCC-SLP  Speech Language Pathologist  Pager: [phone]  2/15/2017

## 2017-02-15 NOTE — PROCEDURES
Modifed Barium Swallow Study  Speech Start Time: 1500  Speech Stop Time: 1430  Speech Total (min): 1410 min    SLP Treatment Date: 02/14/17     Recommendations  Puree & honey thickened liquid following strict aspiration precautions including:   -Small bites/sips  -fully upright for all PO  -fully awake & alert for all PO  -one bite/sip at a time  -monitor for any s/s aspiration (cough, throat clearing, wet vocal quality) & discontinue PO if present  Recs d/w team    Reason for Referral  Patient was referred for a Modified Barium Swallow Study to assess the efficiency of his/her swallow function, rule out aspiration and make recommendations regarding safe dietary consistencies, effective compensatory strategies, and safe eating environment.     Diagnosis   Heart transplanted    Past Medical History   Diagnosis Date    CHF (congestive heart failure)     Coronary artery disease     GERD (gastroesophageal reflux disease)     Hyperlipidemia     Hypertension     Type 2 diabetes mellitus with hyperglycemia      Past Surgical History   Procedure Laterality Date    Cardiac pacemaker placement      Cholecystectomy      Left ventricular assist device       x 3 months ago    Colonoscopy N/A 1/11/2017     Procedure: COLONOSCOPY;  Surgeon: Petr Almanzar MD;  Location: [address]);  Service: Endoscopy;  Laterality: N/A;    Colonoscopy N/A 1/12/2017     Procedure: COLONOSCOPY;  Surgeon: Petr Almanzar MD;  Location: [address]);  Service: Endoscopy;  Laterality: N/A;        General Precautions: fall, sternal  Current Respiratory Status: non-rebreather mask      Oral Peripheral Examination  Oral Musculature Evaluation  Oral Musculature: WFL  Dentition: present and adequate  Mucosal Quality: good  Mandibular Strength and Mobility: WFL  Oral Labial Strength and Mobility: WFL  Velar Elevation: WFL  Volitional Cough: fair  Voice Prior to PO Intake: hoarse    Consistencies Assessed  Thin liquids 1 teaspoon  via spoon, Nectar thick liquids 1 teaspon via spoon x4, via small cup sips x2 self fed, Honey thick liquids 1 teaspoon via spoon x2, via cup sips x2 self fed, Puree 1 teaspoon via spoon x3 and Soft solids 1/2 cracker    Oral Preparation / Oral Phase  Mild premature spillage, decreased base of tongue retraction, delayed initiation of swallow    Pharyngeal Phase  Pharyngeal phase c/b delayed initiation of pharyngeal swallow, decreased epiglottic inversion, decreased hyolaryngeal rise & excursion, mild stasis with thin & nectar cleared independently with spontaneous second swallow  Thin: silent gross aspiration during swallow, delayed cough response  Nectar: no penetration/aspiration with spoon sips x2, flash penetration during swallow with spoon sips x2, penetration to cords with cup sip x1, aspiration during swallow with cup sip while tucking chin  Honey, puree & solid: no penetration/aspiration    Cervical Esophageal Phase  WFL    Impressions  Pt presents with moderate oropharyngeal dysphagia with silent aspiration of thin liquid & penetration & aspiration of nectar thick liquids    Prognosis/Plan/Education  Good/continue ST tx/pt educated on results & recs       Care Plan   SLP Goals        Problem: SLP Goal    Goal Priority Disciplines Outcome   SLP Goal     SLP Ongoing (interventions implemented as appropriate)   Description:  Speech Language Pathology Goals  Goals expected to be met by 2/21  1. Pt will tolerate puree & honey thickened liquid with no s/s aspiration  2. Pt will tolerate trials of soft solid with no s/s aspiration & adequate oral phase of swallow  3. Pt will tolerate trials of less restrictive liquid consistencies to determine if safe for diet upgrades  4. Pt will complete dysphagia exercises given min A      Goals to be met 2/19  1. Pt will participate in ongoing swallow assessment to determine safest po diet GOAL MET                   Lakesha Headley MS, CCC-SLP  Speech Language  Pathologist  Pager: [phone]  2/14/2017

## 2017-02-15 NOTE — ASSESSMENT & PLAN NOTE
bg goal 140-180- BG markedly above goal. Noted significant prandial excursions.  Increase insulin transition gtt to 2.4 u/h   Increase Novolog to 14 units AC    BG monitoring AC/HS/0200 with moderate dose correction scale.     ADDENDUM 1245: Continues to have significant prandial excursions despite increase in scheduled Novolog. Increase Novolog to 18 units AC.      Of note, pt is allergic to Levemir with reaction of severe itching. Instructed pt to have wife or son bring Lantus pens from home for when he is transitioned from IV insulin to MDI.     DISCHARGE PLANNING: Not a candidate for discharge at this time.   previously on MDI- likely re-calculation of insulin needs prior to d/c.  He does not need rx. Has Lantus and Novolog at home. Uses pens and has supplies.  Has new meter. His wife is checking to see if he needs rx for strips or lancets.   He is followed by Dr. Iglesias on the [facility] for his DM management. He is considering f/u with [facility] endocrine after discharge.

## 2017-02-15 NOTE — ASSESSMENT & PLAN NOTE
BG goal 140-180- FBG at goal on AM labs. Noted significant prandial excursions despite increasing scheduled Novolog. However, he is currently on a regular diet and consuming ice cream and Italian Ice.   Continue Transition insulin infusion @ 1.6 units/hr.  Continue Novolog 18 units AC  BG monitoring AC/HS/0200 with moderate dose correction scale.   Added diabetic modifications to current diet.     Talked at length with patient about following a diabetic diet and the importance of avoiding sugar and high CHO meals/snacks while receiving steroids. Discussed the negative impact it has on BG readings.      Of note, pt is allergic to Levemir with reaction of severe itching. Instructed pt to have wife or son bring Lantus pens from home for when he is transitioned from IV insulin to MDI. He reports that his son will bring it today and will be at the bedside for tomorrow.     DISCHARGE PLANNING: Not a candidate for discharge at this time.   previously on MDI- likely re-calculation of insulin needs prior to d/c.  He does not need rx. Has Lantus and Novolog at home. Uses pens and has supplies.  Has new meter. His wife is checking to see if he needs rx for strips or lancets.   He is followed by Fabián Navarrete NP with [facility] management (ALESHIA Ordonez) for his DM management. He is considering f/u with [facility] endocrine after discharge.

## 2017-02-15 NOTE — PROGRESS NOTES
UPDATE    SW to pt's room for update.  Pt is s/p OHTx on 2/9/17 (6 days).  Pt presents as sitting up in bedside chair, with ST and female family member at bedside.  Pt presents as aao x4, calm, cooperative, and asking and answering questions appropriately.  SW spoke with pt very briefly so as to allow ST to finish working with pt.  Pt reports he is coping well at this time.  Pt and family member deny any needs or concerns at this time.  SW following and remains available.

## 2017-02-16 LAB
ALBUMIN SERPL BCP-MCNC: 2.5 G/DL
ALP SERPL-CCNC: 65 U/L
ALT SERPL W/O P-5'-P-CCNC: 21 U/L
ANION GAP SERPL CALC-SCNC: 8 MMOL/L
ANISOCYTOSIS BLD QL SMEAR: SLIGHT
AST SERPL-CCNC: 17 U/L
BASOPHILS # BLD AUTO: 0.01 K/UL
BASOPHILS NFR BLD: 0.1 %
BILIRUB SERPL-MCNC: 0.6 MG/DL
BUN SERPL-MCNC: 30 MG/DL
BURR CELLS BLD QL SMEAR: ABNORMAL
CALCIUM SERPL-MCNC: 8.7 MG/DL
CHLORIDE SERPL-SCNC: 97 MMOL/L
CO2 SERPL-SCNC: 33 MMOL/L
CREAT SERPL-MCNC: 0.8 MG/DL
DIASTOLIC DYSFUNCTION: NO
DIFFERENTIAL METHOD: ABNORMAL
EOSINOPHIL # BLD AUTO: 0 K/UL
EOSINOPHIL NFR BLD: 0 %
ERYTHROCYTE [DISTWIDTH] IN BLOOD BY AUTOMATED COUNT: 26.4 %
EST. GFR  (AFRICAN AMERICAN): >60 ML/MIN/1.73 M^2
EST. GFR  (NON AFRICAN AMERICAN): >60 ML/MIN/1.73 M^2
ESTIMATED PA SYSTOLIC PRESSURE: 32.26
GLUCOSE SERPL-MCNC: 177 MG/DL
HCT VFR BLD AUTO: 27.7 %
HGB BLD-MCNC: 9.2 G/DL
HYPOCHROMIA BLD QL SMEAR: ABNORMAL
LYMPHOCYTES # BLD AUTO: 0.5 K/UL
LYMPHOCYTES NFR BLD: 3.1 %
MAGNESIUM SERPL-MCNC: 1.2 MG/DL
MCH RBC QN AUTO: 22.8 PG
MCHC RBC AUTO-ENTMCNC: 33.2 %
MCV RBC AUTO: 69 FL
MITRAL VALVE MOBILITY: NORMAL
MONOCYTES # BLD AUTO: 1.1 K/UL
MONOCYTES NFR BLD: 7.1 %
NEUTROPHILS # BLD AUTO: 13.6 K/UL
NEUTROPHILS NFR BLD: 89.7 %
OVALOCYTES BLD QL SMEAR: ABNORMAL
PLATELET # BLD AUTO: 276 K/UL
PLATELET BLD QL SMEAR: ABNORMAL
PMV BLD AUTO: ABNORMAL FL
POCT GLUCOSE: 168 MG/DL (ref 70–110)
POCT GLUCOSE: 279 MG/DL (ref 70–110)
POCT GLUCOSE: 305 MG/DL (ref 70–110)
POCT GLUCOSE: 312 MG/DL (ref 70–110)
POCT GLUCOSE: 338 MG/DL (ref 70–110)
POIKILOCYTOSIS BLD QL SMEAR: SLIGHT
POLYCHROMASIA BLD QL SMEAR: ABNORMAL
POTASSIUM SERPL-SCNC: 4.4 MMOL/L
PROT SERPL-MCNC: 5.3 G/DL
RBC # BLD AUTO: 4.04 M/UL
RETIRED EF AND QEF - SEE NOTES: 65 (ref 55–65)
SODIUM SERPL-SCNC: 138 MMOL/L
TACROLIMUS BLD-MCNC: 10.3 NG/ML
TARGETS BLD QL SMEAR: ABNORMAL
TRICUSPID VALVE REGURGITATION: NORMAL
WBC # BLD AUTO: 15.37 K/UL

## 2017-02-16 PROCEDURE — 25000003 PHARM REV CODE 250: Performed by: NURSE PRACTITIONER

## 2017-02-16 PROCEDURE — 02BK3ZX EXCISION OF RIGHT VENTRICLE, PERCUTANEOUS APPROACH, DIAGNOSTIC: ICD-10-PCS | Performed by: INTERNAL MEDICINE

## 2017-02-16 PROCEDURE — 99232 SBSQ HOSP IP/OBS MODERATE 35: CPT | Mod: ,,, | Performed by: NURSE PRACTITIONER

## 2017-02-16 PROCEDURE — 85025 COMPLETE CBC W/AUTO DIFF WBC: CPT

## 2017-02-16 PROCEDURE — 25000003 PHARM REV CODE 250: Performed by: PHYSICIAN ASSISTANT

## 2017-02-16 PROCEDURE — 88342 IMHCHEM/IMCYTCHM 1ST ANTB: CPT | Mod: 26,,, | Performed by: PATHOLOGY

## 2017-02-16 PROCEDURE — 20600001 HC STEP DOWN PRIVATE ROOM

## 2017-02-16 PROCEDURE — 80197 ASSAY OF TACROLIMUS: CPT

## 2017-02-16 PROCEDURE — 63600175 PHARM REV CODE 636 W HCPCS: Performed by: INTERNAL MEDICINE

## 2017-02-16 PROCEDURE — 25000242 PHARM REV CODE 250 ALT 637 W/ HCPCS: Performed by: INTERNAL MEDICINE

## 2017-02-16 PROCEDURE — 88307 TISSUE EXAM BY PATHOLOGIST: CPT | Performed by: PATHOLOGY

## 2017-02-16 PROCEDURE — 63600175 PHARM REV CODE 636 W HCPCS: Performed by: PHYSICIAN ASSISTANT

## 2017-02-16 PROCEDURE — 25000003 PHARM REV CODE 250: Performed by: STUDENT IN AN ORGANIZED HEALTH CARE EDUCATION/TRAINING PROGRAM

## 2017-02-16 PROCEDURE — 92526 ORAL FUNCTION THERAPY: CPT

## 2017-02-16 PROCEDURE — 25000003 PHARM REV CODE 250: Performed by: INTERNAL MEDICINE

## 2017-02-16 PROCEDURE — 97116 GAIT TRAINING THERAPY: CPT

## 2017-02-16 PROCEDURE — 99232 SBSQ HOSP IP/OBS MODERATE 35: CPT | Mod: ,,, | Performed by: INTERNAL MEDICINE

## 2017-02-16 PROCEDURE — 4A023N6 MEASUREMENT OF CARDIAC SAMPLING AND PRESSURE, RIGHT HEART, PERCUTANEOUS APPROACH: ICD-10-PCS | Performed by: INTERNAL MEDICINE

## 2017-02-16 PROCEDURE — 93306 TTE W/DOPPLER COMPLETE: CPT | Mod: 26,,, | Performed by: INTERNAL MEDICINE

## 2017-02-16 PROCEDURE — 94799 UNLISTED PULMONARY SVC/PX: CPT

## 2017-02-16 PROCEDURE — 80053 COMPREHEN METABOLIC PANEL: CPT

## 2017-02-16 PROCEDURE — 63600175 PHARM REV CODE 636 W HCPCS: Performed by: NURSE PRACTITIONER

## 2017-02-16 PROCEDURE — B2141ZZ FLUOROSCOPY OF RIGHT HEART USING LOW OSMOLAR CONTRAST: ICD-10-PCS | Performed by: INTERNAL MEDICINE

## 2017-02-16 PROCEDURE — 93505 ENDOMYOCARDIAL BIOPSY: CPT | Mod: 26,,, | Performed by: INTERNAL MEDICINE

## 2017-02-16 PROCEDURE — 97110 THERAPEUTIC EXERCISES: CPT

## 2017-02-16 PROCEDURE — 94761 N-INVAS EAR/PLS OXIMETRY MLT: CPT

## 2017-02-16 PROCEDURE — 02HQ32Z INSERTION OF MONITORING DEVICE INTO RIGHT PULMONARY ARTERY, PERCUTANEOUS APPROACH: ICD-10-PCS | Performed by: INTERNAL MEDICINE

## 2017-02-16 PROCEDURE — 93505 ENDOMYOCARDIAL BIOPSY: CPT

## 2017-02-16 PROCEDURE — 93451 RIGHT HEART CATH: CPT | Mod: 26,59,, | Performed by: INTERNAL MEDICINE

## 2017-02-16 PROCEDURE — 25000003 PHARM REV CODE 250: Performed by: THORACIC SURGERY (CARDIOTHORACIC VASCULAR SURGERY)

## 2017-02-16 PROCEDURE — 25000003 PHARM REV CODE 250

## 2017-02-16 PROCEDURE — 94640 AIRWAY INHALATION TREATMENT: CPT

## 2017-02-16 PROCEDURE — 94664 DEMO&/EVAL PT USE INHALER: CPT

## 2017-02-16 PROCEDURE — 83735 ASSAY OF MAGNESIUM: CPT

## 2017-02-16 PROCEDURE — 88307 TISSUE EXAM BY PATHOLOGIST: CPT | Mod: 26,,, | Performed by: PATHOLOGY

## 2017-02-16 PROCEDURE — 93306 TTE W/DOPPLER COMPLETE: CPT

## 2017-02-16 PROCEDURE — 36415 COLL VENOUS BLD VENIPUNCTURE: CPT

## 2017-02-16 RX ORDER — THEOPHYLLINE 100 MG/1
100 TABLET, EXTENDED RELEASE ORAL EVERY 12 HOURS
Status: DISCONTINUED | OUTPATIENT
Start: 2017-02-16 | End: 2017-02-16

## 2017-02-16 RX ORDER — TACROLIMUS 1 MG/1
1 CAPSULE ORAL EVERY 12 HOURS
Qty: 60 CAPSULE | Refills: 11 | Status: SHIPPED | OUTPATIENT
Start: 2017-02-16 | End: 2017-02-20

## 2017-02-16 RX ORDER — TACROLIMUS 1 MG/1
1 CAPSULE ORAL EVERY MORNING
Status: DISCONTINUED | OUTPATIENT
Start: 2017-02-17 | End: 2017-02-17

## 2017-02-16 RX ORDER — TACROLIMUS 1 MG/1
2 CAPSULE ORAL EVERY MORNING
Status: DISCONTINUED | OUTPATIENT
Start: 2017-02-16 | End: 2017-02-17

## 2017-02-16 RX ORDER — TERBUTALINE SULFATE 2.5 MG/1
2.5 TABLET ORAL 3 TIMES DAILY
Status: DISCONTINUED | OUTPATIENT
Start: 2017-02-16 | End: 2017-02-17

## 2017-02-16 RX ORDER — VALGANCICLOVIR 450 MG/1
900 TABLET, FILM COATED ORAL DAILY
Qty: 60 TABLET | Refills: 2 | Status: SHIPPED | OUTPATIENT
Start: 2017-02-16 | End: 2017-02-20

## 2017-02-16 RX ORDER — PREDNISONE 10 MG/1
20 TABLET ORAL 2 TIMES DAILY
Qty: 120 TABLET | Refills: 11 | Status: SHIPPED | OUTPATIENT
Start: 2017-02-16 | End: 2017-02-20

## 2017-02-16 RX ORDER — SULFAMETHOXAZOLE AND TRIMETHOPRIM 400; 80 MG/1; MG/1
1 TABLET ORAL DAILY
Qty: 30 TABLET | Refills: 11 | Status: SHIPPED | OUTPATIENT
Start: 2017-02-16 | End: 2017-02-20

## 2017-02-16 RX ORDER — MAGNESIUM SULFATE HEPTAHYDRATE 40 MG/ML
2 INJECTION, SOLUTION INTRAVENOUS ONCE
Status: COMPLETED | OUTPATIENT
Start: 2017-02-16 | End: 2017-02-16

## 2017-02-16 RX ORDER — NYSTATIN 100000 [USP'U]/ML
500000 SUSPENSION ORAL
Qty: 473 ML | Refills: 5 | Status: SHIPPED | OUTPATIENT
Start: 2017-02-16 | End: 2017-02-23 | Stop reason: SDUPTHER

## 2017-02-16 RX ORDER — MYCOPHENOLATE MOFETIL 250 MG/1
1500 CAPSULE ORAL 2 TIMES DAILY
Qty: 360 CAPSULE | Refills: 11 | Status: SHIPPED | OUTPATIENT
Start: 2017-02-16 | End: 2017-02-20

## 2017-02-16 RX ADMIN — VALGANCICLOVIR 900 MG: 450 TABLET, FILM COATED ORAL at 08:02

## 2017-02-16 RX ADMIN — CIPROFLOXACIN HYDROCHLORIDE 500 MG: 500 TABLET, FILM COATED ORAL at 08:02

## 2017-02-16 RX ADMIN — IPRATROPIUM BROMIDE AND ALBUTEROL SULFATE 3 ML: .5; 3 SOLUTION RESPIRATORY (INHALATION) at 07:02

## 2017-02-16 RX ADMIN — MYCOPHENOLATE MOFETIL 1500 MG: 250 CAPSULE ORAL at 08:02

## 2017-02-16 RX ADMIN — IPRATROPIUM BROMIDE AND ALBUTEROL SULFATE 3 ML: .5; 3 SOLUTION RESPIRATORY (INHALATION) at 11:02

## 2017-02-16 RX ADMIN — INSULIN ASPART 18 UNITS: 100 INJECTION, SOLUTION INTRAVENOUS; SUBCUTANEOUS at 08:02

## 2017-02-16 RX ADMIN — INSULIN ASPART 18 UNITS: 100 INJECTION, SOLUTION INTRAVENOUS; SUBCUTANEOUS at 11:02

## 2017-02-16 RX ADMIN — MAGNESIUM SULFATE IN WATER 2 G: 40 INJECTION, SOLUTION INTRAVENOUS at 04:02

## 2017-02-16 RX ADMIN — TACROLIMUS 2 MG: 1 CAPSULE, GELATIN COATED ORAL at 05:02

## 2017-02-16 RX ADMIN — INSULIN ASPART 18 UNITS: 100 INJECTION, SOLUTION INTRAVENOUS; SUBCUTANEOUS at 06:02

## 2017-02-16 RX ADMIN — OXYCODONE HYDROCHLORIDE 10 MG: 5 TABLET ORAL at 06:02

## 2017-02-16 RX ADMIN — INSULIN ASPART 6 UNITS: 100 INJECTION, SOLUTION INTRAVENOUS; SUBCUTANEOUS at 06:02

## 2017-02-16 RX ADMIN — INSULIN ASPART 6 UNITS: 100 INJECTION, SOLUTION INTRAVENOUS; SUBCUTANEOUS at 10:02

## 2017-02-16 RX ADMIN — DOCUSATE SODIUM 100 MG: 100 CAPSULE, LIQUID FILLED ORAL at 08:02

## 2017-02-16 RX ADMIN — POLYETHYLENE GLYCOL 3350 17 G: 17 POWDER, FOR SOLUTION ORAL at 08:02

## 2017-02-16 RX ADMIN — TERBUTALINE SULFATE 2.5 MG: 2.5 TABLET ORAL at 09:02

## 2017-02-16 RX ADMIN — TACROLIMUS 1 MG: 1 CAPSULE, GELATIN COATED ORAL at 08:02

## 2017-02-16 RX ADMIN — NYSTATIN 500000 UNITS: 500000 SUSPENSION ORAL at 03:02

## 2017-02-16 RX ADMIN — IPRATROPIUM BROMIDE AND ALBUTEROL SULFATE 3 ML: .5; 3 SOLUTION RESPIRATORY (INHALATION) at 03:02

## 2017-02-16 RX ADMIN — PREDNISONE 30 MG: 10 TABLET ORAL at 08:02

## 2017-02-16 RX ADMIN — TERBUTALINE SULFATE 2.5 MG: 2.5 TABLET ORAL at 03:02

## 2017-02-16 RX ADMIN — MAGNESIUM OXIDE TAB 400 MG (241.3 MG ELEMENTAL MG) 400 MG: 400 (241.3 MG) TAB at 08:02

## 2017-02-16 RX ADMIN — FUROSEMIDE 60 MG: 10 INJECTION, SOLUTION INTRAMUSCULAR; INTRAVENOUS at 05:02

## 2017-02-16 RX ADMIN — SODIUM CHLORIDE 1.6 UNITS/HR: 9 INJECTION, SOLUTION INTRAVENOUS at 05:02

## 2017-02-16 RX ADMIN — NYSTATIN 500000 UNITS: 500000 SUSPENSION ORAL at 05:02

## 2017-02-16 RX ADMIN — OXYCODONE HYDROCHLORIDE 10 MG: 5 TABLET ORAL at 03:02

## 2017-02-16 RX ADMIN — HYDROMORPHONE HYDROCHLORIDE 0.5 MG: 1 INJECTION, SOLUTION INTRAMUSCULAR; INTRAVENOUS; SUBCUTANEOUS at 08:02

## 2017-02-16 RX ADMIN — NYSTATIN 500000 UNITS: 500000 SUSPENSION ORAL at 08:02

## 2017-02-16 RX ADMIN — FUROSEMIDE 60 MG: 10 INJECTION, SOLUTION INTRAMUSCULAR; INTRAVENOUS at 09:02

## 2017-02-16 RX ADMIN — IPRATROPIUM BROMIDE AND ALBUTEROL SULFATE 3 ML: .5; 3 SOLUTION RESPIRATORY (INHALATION) at 05:02

## 2017-02-16 RX ADMIN — INSULIN ASPART 8 UNITS: 100 INJECTION, SOLUTION INTRAVENOUS; SUBCUTANEOUS at 11:02

## 2017-02-16 RX ADMIN — SULFAMETHOXAZOLE AND TRIMETHOPRIM 1 TABLET: 400; 80 TABLET ORAL at 08:02

## 2017-02-16 RX ADMIN — PANTOPRAZOLE SODIUM 40 MG: 40 TABLET, DELAYED RELEASE ORAL at 08:02

## 2017-02-16 RX ADMIN — AMLODIPINE BESYLATE 10 MG: 5 TABLET ORAL at 08:02

## 2017-02-16 NOTE — PROGRESS NOTES
[facility] central line d/c'd per order. Patient to have RHC/bx tomorrow. Additional PIV placed to right forearm. Patient tolerated well, catheter tip visualized intact. Pressure applied to site for 5 minutes, vaseline gauze and 4x4 dressing applied and covered with Tegaderm. Instructed to lie flat for 30 minutes and to leave dressing in placed for 24 hours. Stated understanding.

## 2017-02-16 NOTE — PLAN OF CARE
Problem: Patient Care Overview  Goal: Plan of Care Review  Dx: heart transplant (2/9), 1 PRBC, 250cc Albumin    PMH: AICD, HTN, HLD, GERD, T2DM, CKD (3), ischemic CM, systolic and diastolic HF, low Na, non morbid obesity, BPH    PSH: cholecystectomy, LVAD (8/24/16)    2/9: Heart Tx (out @ 1405); 2 units PRBC post op  2/11: Post-op day 2, Extubated    Nursing:  - MAP 60-80  - V paced at 105  - ACHS accuchecks  - Q6 hour K, Mg, Phos     Outcome: Ongoing (interventions implemented as appropriate)  Pt is AAOx4 in bed wearing non-skid footwear, bed in low/locked position and with call bell within reach. Pt reminded to use call bell to call for assistance, pt verbalizes understanding. Wife at bedside. Pt is afebrile at this time. Proper hand hygiene performed before and after pt care activities. Mid-sternal incision and left CW incision CHARLES with steri-strips and free from SSI. Chest tube to H2O seal with no output so far this shift. Chest tube dressing and LLQ old drive line site dressings changed this shift. Dressings remain CDI at this time. Dobutamine gtt infusing at 5mcg/kg/min. Insulin gtt currently infusing at 1.6 units/hr. BG monitored AC, HS and 2am. BG this shift has been 119 and 168. Denies any pain or discomfort at this time.

## 2017-02-16 NOTE — SUBJECTIVE & OBJECTIVE
"Interval HPI:   Overnight events: BG globally elevated yesterday. BG trending to goal overnight. Stepped self down on transition insulin infusion to 1.6 units/hr, FBG on AM labs at goal. +prandial excursions. On Prednisone 30 mg BID.       Eatin% on pureed diet with honey thick liquids. - Today, he ate 2 ice creams for breakfast and 2 Italian ice popsicles for lunch. Neither were sugar free. Reports snacking this AM before POCT glucose.  Nausea: No  Hypoglycemia and intervention: No  Fever: No  TPN and/or TF: No    Visit Vitals    BP (!) 154/67 (BP Location: Right arm, Patient Position: Lying, BP Method: Automatic)    Pulse 91    Temp 97.5 °F (36.4 °C) (Oral)    Resp 17    Ht 5' 8" (1.727 m)    Wt 90.1 kg (198 lb 10.2 oz)    SpO2 (!) 93%    BMI 30.2 kg/m2       Labs Reviewed and Include      Recent Labs  Lab 17  0444   *   CALCIUM 8.7   ALBUMIN 2.5*   PROT 5.3*      K 4.4   CO2 33*   CL 97   BUN 30*   CREATININE 0.8   ALKPHOS 65   ALT 21   AST 17   BILITOT 0.6     Lab Results   Component Value Date    WBC 15.37 (H) 2017    HGB 9.2 (L) 2017    HCT 27.7 (L) 2017    MCV 69 (L) 2017     02/15/2017     02/15/2017     No results for input(s): TSH, FREET4 in the last 168 hours.  Lab Results   Component Value Date    HGBA1C 6.8 (H) 2017       Nutritional status:   Body mass index is 30.2 kg/(m^2).  Lab Results   Component Value Date    ALBUMIN 2.5 (L) 2017    ALBUMIN 2.6 (L) 02/15/2017    ALBUMIN 2.5 (L) 2017     Lab Results   Component Value Date    PREALBUMIN 19 (L) 2017    PREALBUMIN 14 (L) 2017    PREALBUMIN 14 (L) 2017       Estimated Creatinine Clearance: 115.1 mL/min (based on Cr of 0.8).    Accu-Checks  Recent Labs      17   2233  17   0808  17   1150  17   1807  17   2217  02/15/17   0748  02/15/17   1157  02/15/17   1902  02/15/17   2308  17   0204   POCTGLUCOSE  102  236* "  295*  330*  322*  349*  340*  218*  119*  168*       Current Medications and/or Treatments Impacting Glycemic Control  Immunotherapy:  Immunosuppressants     Start     Stop Route Frequency Ordered    02/13/17 2100  mycophenolate capsule 1,500 mg      -- Oral 2 times daily 02/13/17 0937    02/15/17 0800  tacrolimus capsule 1 mg      -- Oral 2 times daily 02/14/17 1108        Steroids:   Hormones     Start     Stop Route Frequency Ordered    02/14/17 2000  predniSONE tablet 30 mg      -- Oral 2 times daily 02/14/17 1108        Pressors:    Autonomic Drugs     None        Hyperglycemia/Diabetes Medications: Antihyperglycemics     Start     Stop Route Frequency Ordered    02/12/17 1130  insulin regular (Humulin R) 100 Units in sodium chloride 0.9% 100 mL infusion      -- IV Continuous 02/12/17 1016    02/15/17 1645  insulin aspart pen 18 Units      -- SubQ 3 times daily with meals 02/15/17 1251    02/15/17 2030  insulin aspart pen 4 Units      -- SubQ As needed (PRN) 02/15/17 1253    02/15/17 1418  insulin aspart pen 0-10 Units      -- SubQ As needed (PRN) 02/15/17 1318

## 2017-02-16 NOTE — PT/OT/SLP PROGRESS
Speech Language Pathology  Treatment    Mick Barriga   MRN: [id number]   Admitting Diagnosis: Heart transplanted    Diet recommendations:   Puree & honey thickened liquid following strict aspiration precautions including:   -Small bites/sips  -fully upright for all PO  -fully awake & alert for all PO  -one bite/sip at a time  -monitor for any s/s aspiration (cough, throat clearing, wet vocal quality) & discontinue PO if present    SLP Treatment Date: 02/15/17  Speech Start Time: 1020     Speech Stop Time: 1044     Speech Total (min): 24 min       TREATMENT BILLABLE MINUTES:  Treatment Swallowing Dysfunction 24    Has the patient been evaluated by SLP for swallowing? : Yes  Keep patient NPO?: No   General Precautions: Standard, fall, sternal  Current Respiratory Status: non-rebreather mask       Subjective:  Awake & alert. Wife at bedside. NSG reported no difficulty with current diet except when he took several pills at one time.                         Objective:    Pt with thin liquid water & ice at bedside when SLP entered room & reported that he had been drinking from this cup. Pt was demonstrating throat clearing. Pt & wife were provided with lengthy education regarding rec of honey thick liquids & puree, risk of aspiration, results of MBSS done yesterday, how to thicken to honey. List of recs & strict precautions & instructions for thickening were posted in pt's room. Pt & wife were asked to read back & explain back to show full understanding. Pt tolerated cup sips of honey thick liquid x6 & bites of puree x5 with no s/s aspiration. Pt completed falsetto & effortful swallow dysphagia exercises x5 each with good effort & ability & was instructed to practice IND'ly. SLP discussed with NSG that compliance of diet should be monitored & if pt not complying to contact SLP as diet recs may need to be reconsidered to ensure safety.       Assessment:  Mick Barriga is a 56 y.o. male with a medical diagnosis of Heart  transplanted and presents with oropharyngeal dysphagia    Discharge recommendations: Discharge Facility/Level Of Care Needs: home health speech therapy     Goals:   SLP Goals        Problem: SLP Goal    Goal Priority Disciplines Outcome   SLP Goal     SLP Ongoing (interventions implemented as appropriate)   Description:  Speech Language Pathology Goals  Goals expected to be met by 2/21  1. Pt will tolerate puree & honey thickened liquid with no s/s aspiration  2. Pt will tolerate trials of soft solid with no s/s aspiration & adequate oral phase of swallow  3. Pt will tolerate trials of less restrictive liquid consistencies to determine if safe for diet upgrades  4. Pt will complete dysphagia exercises given min A      Goals to be met 2/19  1. Pt will participate in ongoing swallow assessment to determine safest po diet GOAL MET                   Plan:   Patient to be seen Therapy Frequency: 5 x/week   Plan of Care expires:    Plan of Care reviewed with: patient, spouse  SLP Follow-up?: Yes              Lakesha Headley, CCC-SLP  02/15/2017  [phone]

## 2017-02-16 NOTE — PROGRESS NOTES
"UPDATE    SW to pt's room for update.  Pt is s/p OHTx on 2/9/17 (7 days).  Pt presents as sitting up in bedside chair, with wife in room.  Pt and wife both present as aao x4, calm, cooperative, and asking and answering questions appropriately.  PT has just finished working with pt.  Mary with PT reports she is recommending HH for pt at d/c.  Pt and wife both report they are coping well today.  Pt states he is feeling "so-so" today.  Pt states ulcers on his heels feel worse today than yesterday; SW encouraged pt to communicate this to medical team when they round today.  Pt and wife do not identify any needs or concerns at this time.  Per multi-disciplinary rounds this morning, pt may be ready to d/c early next week.  Pt and wife verbalize understanding of potential d/c next week and understanding that d/c plan/date may change.  Pt and wife do not identify any questions at this time.  SW providing ongoing psychosocial and counseling support, education, resources, assistance, and discharge planning as indicated.  SW following and remains available.  "

## 2017-02-16 NOTE — INTERVAL H&P NOTE
Cardiac Cath Lab History and Physical  - there has been no significant interval change since this history and physical.      History of Present Illness:  Patient is s/p OHT on 2/9/2017.  Here for routine heart biospy       Assessment/Plan:  - I have explained the risks, benefits, and alternatives of the procedure in detail. The patient expresses understanding and all questions have been answered. The patient agrees to the proceed as planned.  - proceed with heart biopsy via RIJ with echo guidance.   - Micropuncture access needle will be used to minimize bleeding risk.

## 2017-02-16 NOTE — PLAN OF CARE
Problem: Patient Care Overview  Goal: Plan of Care Review  Dx: heart transplant (2/9), 1 PRBC, 250cc Albumin    PMH: AICD, HTN, HLD, GERD, T2DM, CKD (3), ischemic CM, systolic and diastolic HF, low Na, non morbid obesity, BPH    PSH: cholecystectomy, LVAD (8/24/16)    2/9: Heart Tx (out @ 1405); 2 units PRBC post op  2/11: Post-op day 2, Extubated    Nursing:  - MAP 60-80  - V paced at 105  - ACHS accuchecks  - Q6 hour K, Mg, Phos     Outcome: Ongoing (interventions implemented as appropriate)  RHC and bx done today.  weaned off this morning; patient now Vpaced at 84, terbutaline to be started TID. Weaned off oxygen overnight - sats WNL on room air. OOB to chair and ambulated in hallway with PT - wearing mask as appropriate when outside of room. Instructed to float heets off bed with boots or with pillows. Stated understanding. Endocrine following BG - insulin gtt at 1.6units/hr. Patient with Levemir allergy - awaiting patient's family to bring home Lantus to transition off insulin gtt. Diet changed to diabetic pureed to help control BG. Patient's wife pulled self-meds correctly, with exception of omission of magnesium and Valcyte  - reviewed blue card and wife stated understanding.

## 2017-02-16 NOTE — PLAN OF CARE
Problem: Occupational Therapy Goal  Goal: Occupational Therapy Goal  Goals to be met by: 2 weeks 2/26/17     Patient will increase functional independence with ADLs by performing:    UE Dressing with Set-up Assistance.  LE Dressing with Supervision.  Grooming while standing with Supervision.  Toileting from toilet with Supervision for hygiene and clothing management.   Stand pivot transfers with Supervision.  Toilet transfer to toilet with Supervision.   Outcome: Ongoing (interventions implemented as appropriate)  Continue OT POC    Comments:   Sal Madrid OTR/L  2/16/2017

## 2017-02-16 NOTE — PROGRESS NOTES
Progress Note  Heart Transplant Service    Admit Date: 1/30/2017   LOS: 17 days     SUBJECTIVE:     Follow up for: Heart transplanted    Interval History:  Requiring less oxygen, no pain. Still on  5    Scheduled Meds:   albuterol-ipratropium 2.5mg-0.5mg/3mL  3 mL Nebulization Q4H    amlodipine  10 mg Oral Daily    ciprofloxacin HCl  500 mg Oral Q12H    docusate sodium  100 mg Oral BID    furosemide  60 mg Intravenous BID    insulin aspart  18 Units Subcutaneous TIDWM    magnesium oxide  400 mg Oral BID    mycophenolate  1,500 mg Oral BID    nystatin  500,000 Units Mouth/Throat QID (WM & HS)    pantoprazole  40 mg Oral Daily    polyethylene glycol  17 g Oral Daily    [START ON 2/17/2017] predniSONE  25 mg Oral BID    predniSONE  30 mg Oral BID    sulfamethoxazole-trimethoprim 400-80mg  1 tablet Oral Daily    [START ON 2/17/2017] tacrolimus  1 mg Oral Daily    tacrolimus  2 mg Oral Daily    terbutaline  2.5 mg Oral TID    valganciclovir  900 mg Oral Daily     Continuous Infusions:   sodium chloride 0.45% 10 mL/hr (02/10/17 2000)    insulin (HUMAN R) infusion (adults) 1.6 Units/hr (02/16/17 0549)     PRN Meds:sodium chloride, sodium chloride, acetaminophen, dextrose 50%, dextrose 50%, glucagon (human recombinant), glucose, glucose, HYDROmorphone, insulin aspart, insulin aspart, magnesium sulfate IVPB, metoclopramide HCl, ondansetron, oxycodone, potassium chloride **AND** potassium chloride **AND** potassium chloride, sodium phosphate IVPB, sodium phosphate IVPB, sodium phosphate IVPB    Immunosuppressants     Start     Stop Route Frequency Ordered    02/13/17 2100  mycophenolate capsule 1,500 mg      -- Oral 2 times daily 02/13/17 0937    02/17/17 0800  tacrolimus capsule 1 mg      -- Oral Daily 02/16/17 1157    02/16/17 1800  tacrolimus capsule 2 mg      -- Oral Daily 02/16/17 1157          Review of patient's allergies indicates:   Allergen Reactions    Levemir [insulin detemir] Itching     Pork/porcine containing products     Ranexa [ranolazine] Nausea Only       OBJECTIVE:     Vital Signs (Most Recent)  Temp: 97.7 °F (36.5 °C) (02/16/17 1145)  Pulse: 84 (02/16/17 1145)  Resp: 20 (02/16/17 1145)  BP: (!) 114/52 (02/16/17 1145)  SpO2: 95 % (02/16/17 1145)    Vital Signs Range (Last 24H):  Temp:  [97.5 °F (36.4 °C)-98.1 °F (36.7 °C)]   Pulse:  [84-94]   Resp:  [16-21]   BP: (114-176)/(52-75)   SpO2:  [93 %-99 %]     I & O (Last 24H):    Intake/Output Summary (Last 24 hours) at 02/16/17 1202  Last data filed at 02/16/17 1100   Gross per 24 hour   Intake              360 ml   Output             2355 ml   Net            -1995 ml            Telemetry: a-paced at 100 bpm    Physical Exam   Constitutional: He is oriented to person, place, and time. He appears well-developed and well-nourished.   HENT:   Head: Normocephalic and atraumatic.   Eyes: EOM are normal. Pupils are equal, round, and reactive to light.   Neck: Normal range of motion. Neck supple. No JVD present.   Cardiovascular: S1 S2 audible, regular, tachycardic    Pulmonary/Chest: Bronchial breath sounds on left  Abdominal: Soft. Bowel sounds are normal. He exhibits no distension. There is no tenderness.   Musculoskeletal: Normal range of motion. He exhibits no edema.   Neurological: He is alert and oriented to person, place, and time.   Skin: Skin is warm and dry.   Extremities: BUE edematous. LUE slightly weaker than RUE. 2+ peripheral pulses.      Labs:       Recent Labs  Lab 02/14/17  0425 02/15/17  0417 02/16/17  0444   WBC 11.35  11.35 17.96*  17.96* 15.37*   HGB 8.4*  8.4* 9.0*  9.0* 9.2*   HCT 25.9*  25.9* 27.5*  27.5* 27.7*     188 255  255 276   LYMPH 4.2*  4.2*  0.5*  0.5* 2.4*  2.4*  0.4*  0.4* 3.1*  0.5*   MONO 3.3*  3.3*  0.4  0.4 5.3  5.3  1.0  1.0 7.1  1.1*   EOSINOPHIL 0.0  0.0 0.0  0.0 0.0         Recent Labs  Lab 02/09/17  1417  02/10/17  0600  02/13/17  0554 02/14/17  0425 02/15/17  0417   APTT  28.8  --  28.5  --   --   --   --    INR 1.1  < >  --   < > 1.5* 1.4* 1.6*   < > = values in this interval not displayed.       Recent Labs  Lab 02/14/17  0425  02/14/17  1831 02/15/17  0014  02/15/17  0417 02/15/17  1357 02/16/17  0444   *  --   --   --   --  203* 318* 177*   CALCIUM 8.5*  --   --   --   --  8.6* 8.4* 8.7   ALBUMIN 2.5*  --   --   --   --  2.6*  --  2.5*   PROT 5.4*  --   --   --   --  5.7*  --  5.3*     --   --   --   --  137 136 138   K 4.2  4.2  < >  --   --   < > 4.0  4.0 3.8 4.4   CO2 32*  --   --   --   --  30* 32* 33*   CL 97  --   --   --   --  95 95 97   BUN 39*  --   --   --   --  40* 36* 30*   CREATININE 0.8  --   --   --   --  0.9 0.9 0.8   ALKPHOS 66  --   --   --   --  65  --  65   ALT 31  --   --   --   --  26  --  21   AST 23  --   --   --   --  20  --  17   BILITOT 0.5  --   --   --   --  0.6  --  0.6   MG 2.1  < > 1.6 1.4*  --  1.7  --  1.2*   PHOS 5.6*  < > 4.7* 4.4  --  4.5  --   --    < > = values in this interval not displayed.  Estimated Creatinine Clearance: 115.1 mL/min (based on Cr of 0.8).      Recent Labs  Lab 02/15/17  1357   *       No results for input(s): LDH in the last 168 hours.    Microbiology Results (last 7 days)     Procedure Component Value Units Date/Time    Urine culture [[id number]]  (Susceptibility) Collected:  02/08/17 2000    Order Status:  Completed Specimen:  Urine from Urine, Clean Catch Updated:  02/11/17 1158     Urine Culture, Routine --     CITROBACTER KOSERI  >100,000 cfu/ml              ASSESSMENT:     55 yo M with PMHx of NICM s/p HM II (8/24/16), HLD, HTN, VT s/p ICD who presented to the hospital for elevated LDH in the setting of subtherapeutic INR with lower extremity swelling concerning for LVAD pump thrombosis, now s/p OHTx 2/9/17.       PLAN:     S/P Orthotopic Heart Transplantation 2/9/2017  -HTS primary  -Moderate Risk of Rejection: cPRA 0%, Negative Crossmatch with B Channel Shifts  -CMV Status: D-/R+: Will  require valcyte x 3 months  -Immunosuppression: MMF 1500 mg BID, Prednisone 30 mg bid. Tacro 1/2  -On full OI prophylaxis  -EGBx #1 today, will dc  now and start theophylline if needed l   -ST following. Tolerating pureed diet with honey-thick liquids  -Right CT with minimal drainage - will ask CTS to pull after EGBx today    DM  -Insulin gtt per Endocrine    Bacteruria, Citrobacter Koseri  -UCx 1/19/2017 with 10-49k citrobacter  -Repeat UCx 2/8/17 with >100,000 citrobacter koseri  -started cipro 2/13/17 X 7 days    Improving LUE weakness and numbness  -Appreciate Neurology's help. Signed off

## 2017-02-16 NOTE — PROGRESS NOTES
"[facility]  Endocrinology  Progress Note    Admit Date: 2017     Reason for Consult: Management of T2DM     Surgical Procedure and Date: 17 s/p heart txp    Diabetes diagnosis year:     Home Diabetes Medications:  Lantus 16 u qam, Novolog 20 U AC     Diabetes Complications include:  Hyperglycemia, Diabetic chronic kidney disease and Diabetic gastroparesis      Complicating diabetes co morbidities: CHF and CKD    HPI: Patient is a 56 y.o. male with T2DM, ischemic cardiomyopathy, s/p LVAD placement in 2016. He is now s/p heart transplantation. Endocrine consulted for bg management.       Interval HPI:   Overnight events: BG globally elevated yesterday. BG trending to goal overnight. Stepped self down on transition insulin infusion to 1.6 units/hr, FBG on AM labs at goal. +prandial excursions. On Prednisone 30 mg BID.       Eatin% on pureed diet with honey thick liquids. - Today, he ate 2 ice creams for breakfast and 2 Italian ice popsicles for lunch. Neither were sugar free. Reports snacking this AM before POCT glucose.  Nausea: No  Hypoglycemia and intervention: No  Fever: No  TPN and/or TF: No    Visit Vitals    BP (!) 154/67 (BP Location: Right arm, Patient Position: Lying, BP Method: Automatic)    Pulse 91    Temp 97.5 °F (36.4 °C) (Oral)    Resp 17    Ht 5' 8" (1.727 m)    Wt 90.1 kg (198 lb 10.2 oz)    SpO2 (!) 93%    BMI 30.2 kg/m2       Labs Reviewed and Include      Recent Labs  Lab 17  0444   *   CALCIUM 8.7   ALBUMIN 2.5*   PROT 5.3*      K 4.4   CO2 33*   CL 97   BUN 30*   CREATININE 0.8   ALKPHOS 65   ALT 21   AST 17   BILITOT 0.6     Lab Results   Component Value Date    WBC 15.37 (H) 2017    HGB 9.2 (L) 2017    HCT 27.7 (L) 2017    MCV 69 (L) 2017     02/15/2017     02/15/2017     No results for input(s): TSH, FREET4 in the last 168 hours.  Lab Results   Component Value Date    HGBA1C 6.8 (H) " 02/09/2017       Nutritional status:   Body mass index is 30.2 kg/(m^2).  Lab Results   Component Value Date    ALBUMIN 2.5 (L) 02/16/2017    ALBUMIN 2.6 (L) 02/15/2017    ALBUMIN 2.5 (L) 02/14/2017     Lab Results   Component Value Date    PREALBUMIN 19 (L) 02/08/2017    PREALBUMIN 14 (L) 02/06/2017    PREALBUMIN 14 (L) 02/03/2017       Estimated Creatinine Clearance: 115.1 mL/min (based on Cr of 0.8).    Accu-Checks  Recent Labs      02/13/17   2233  02/14/17   0808  02/14/17   1150  02/14/17   1807  02/14/17   2217  02/15/17   0748  02/15/17   1157  02/15/17   1902  02/15/17   2308  02/16/17   0204   POCTGLUCOSE  102  236*  295*  330*  322*  349*  340*  218*  119*  168*       Current Medications and/or Treatments Impacting Glycemic Control  Immunotherapy:  Immunosuppressants     Start     Stop Route Frequency Ordered    02/13/17 2100  mycophenolate capsule 1,500 mg      -- Oral 2 times daily 02/13/17 0937    02/15/17 0800  tacrolimus capsule 1 mg      -- Oral 2 times daily 02/14/17 1108        Steroids:   Hormones     Start     Stop Route Frequency Ordered    02/14/17 2000  predniSONE tablet 30 mg      -- Oral 2 times daily 02/14/17 1108        Pressors:    Autonomic Drugs     None        Hyperglycemia/Diabetes Medications: Antihyperglycemics     Start     Stop Route Frequency Ordered    02/12/17 1130  insulin regular (Humulin R) 100 Units in sodium chloride 0.9% 100 mL infusion      -- IV Continuous 02/12/17 1016    02/15/17 1645  insulin aspart pen 18 Units      -- SubQ 3 times daily with meals 02/15/17 1251    02/15/17 2030  insulin aspart pen 4 Units      -- SubQ As needed (PRN) 02/15/17 1253    02/15/17 1418  insulin aspart pen 0-10 Units      -- SubQ As needed (PRN) 02/15/17 1318          ASSESSMENT and PLAN    Type 2 diabetes mellitus with stage 3 chronic kidney disease, with long-term current use of insulin  BG goal 140-180- FBG at goal on AM labs. Noted significant prandial excursions despite increasing  scheduled Novolog. However, he is currently on a regular diet and consuming ice cream and Italian Ice.   Continue Transition insulin infusion @ 1.6 units/hr.  Continue Novolog 18 units AC.   BG monitoring AC/HS/0200 with moderate dose correction scale.   Added diabetic modifications to current diet.     Talked at length with patient about following a diabetic diet and the importance of avoiding sugar and high CHO meals/snacks while receiving steroids. Discussed the negative impact it has on BG readings.      Of note, pt is allergic to Levemir with reaction of severe itching. Instructed pt to have wife or son bring Lantus pens from home for when he is transitioned from IV insulin to MDI. He reports that his son will bring it tonight (2/16/17) and it will be at the bedside for tomorrow.     DISCHARGE PLANNING: Not a candidate for discharge at this time.   previously on MDI- likely re-calculation of insulin needs prior to d/c.  He does not need rx. Has Lantus and Novolog at home. Uses pens and has supplies.  Has new meter. His wife is checking to see if he needs rx for strips or lancets.   He is followed by Fabián Navarrete NP with [facility] management (ALESHIA Ordonez) for his DM management. He is considering f/u with [facility] after discharge.       Ischemic cardiomyopathy  S/p heart txp      * Heart transplanted  Per CTS, HTS      Adrenal cortical steroids causing adverse effect in therapeutic use  Increasing insulin needs  On Prednisone 30 mg BID.     Immunosuppression  May increase insulin resistance       Non morbid obesity due to excess calories  Increases insulin resistance   Body mass index is 30.2 kg/(m^2).          Kristin Shane NP  Endocrinology  [facility]

## 2017-02-16 NOTE — PROGRESS NOTES
HEATHER Sawyer notified that patient is now Vpaced at 84 on telemetry following discontinuation of  about 1 hour ago. Patient was previously in SR with HR 90s. PA to order theophylline.

## 2017-02-16 NOTE — PT/OT/SLP PROGRESS
Occupational Therapy  Treatment    Mick Barriga   MRN: [id number]   Admitting Diagnosis: Heart transplanted    OT Date of Treatment: 17   OT Start Time:   OT Stop Time:   OT Total Time (min): 11 min    Billable Minutes:  Therapeutic Exercise 11 minutes    General Precautions: Standard, fall, sternal  Orthopedic Precautions: N/A  Braces: N/A         Subjective:  Communicated with nurse prior to session.  Pt agreeable to skilled OT services.    Pain Ratin/10  Location - Side: Right  Location - Orientation: generalized  Location: neck  Pain Addressed: Pre-medicate for activity  Pain Rating Post-Intervention: 2/10    Objective:  Patient found with: central line, peripheral IV  Pt received seated in bed. Pt stated he did not want to participate in oob activities, but pt requested to perform therapeutic exercise of his LUE. LUE is noted to be swollen and have decreased strength.     Functional Ambulation: Did not occur      Balance:   Pt sat in bed side chair w/ support.    Therapeutic Activities and Exercises:  Writing therapist educated and demonstrated pt on LUE hand exercises.   Pt completed gross  exercise w/ L hand for 10 reps.  Pt indep completed key pinch for 3x10 reps w/ yellow min resistive theraputty.  Pt completed tip to tip pinch for 10 reps w/ yellow min resistive theraputty.       AM-PAC 6 CLICK ADL   How much help from another person does this patient currently need?   1 = Unable, Total/Dependent Assistance  2 = A lot, Maximum/Moderate Assistance  3 = A little, Minimum/Contact Guard/Supervision  4 = None, Modified [place]/Independent    Putting on and taking off regular lower body clothing? : 1  Bathing (including washing, rinsing, drying)?: 2  Toileting, which includes using toilet, bedpan, or urinal? : 2  Putting on and taking off regular upper body clothing?: 2  Taking care of personal grooming such as brushing teeth?: 3  Eating meals?: 3  Total Score: 13     AM-PAC Raw Score  CMS G-Code Modifier Level of Impairment Assistance   6 % Total / Unable   7 - 9 CM 80 - 100% Maximal Assist   10 - 14 CL 60 - 80% Moderate Assist   15 - 19 CK 40 - 60% Moderate Assist   20 - 22 CJ 20 - 40% Minimal Assist   23 CI 1-20% SBA / CGA   24 CH 0% Independent/ Mod I     Patient left up in chair with call button in reach and spouse present    ASSESSMENT:  Mick Barriga is a 56 y.o. male with a medical diagnosis of Heart transplanted and presents with impairments listed below. Pt would benefit from skilled OT services to improve independence and overall occupational functioning.    Rehab identified problem list/impairments: Rehab identified problem list/impairments: weakness, impaired endurance, gait instability, decreased lower extremity function, impaired self care skills, impaired functional mobilty    Rehab potential is good.    Activity tolerance: Good    Discharge recommendations: Discharge Facility/Level Of Care Needs: home with home health     Barriers to discharge: Barriers to Discharge: None    Equipment recommendations: none     GOALS:   Occupational Therapy Goals        Problem: Occupational Therapy Goal    Goal Priority Disciplines Outcome Interventions   Occupational Therapy Goal     OT, PT/OT Ongoing (interventions implemented as appropriate)    Description:  Goals to be met by: 2 weeks 2/26/17     Patient will increase functional independence with ADLs by performing:    UE Dressing with Set-up Assistance.  LE Dressing with Supervision.  Grooming while standing with Supervision.  Toileting from toilet with Supervision for hygiene and clothing management.   Stand pivot transfers with Supervision.  Toilet transfer to toilet with Supervision.                Plan:  Patient to be seen 5 x/week to address the above listed problems via self-care/home management, therapeutic activities, therapeutic exercises  Plan of Care expires:    Plan of Care reviewed with: patient    Sal [place],  OT  02/16/2017

## 2017-02-16 NOTE — NURSING
Post Heart transplant Discharge education.  Met with pt and pt's wife this afternoon after they returned from pt getting an Endocardial biopsy.  Pt smiling and receptive with education, Pt has stated that he has started the self medication program and is   Very familiar with a medication regimen.    Reviewed the booklet and handed one to the pt's wife:  Ten Things to Review before Heart Transplant discharge:  o Take medications as prescribed and follow Blue Card.  o Activity as determined by Team and Sternal Precautions  o Nutrition and Fluid  o Signs /Symptoms to report to the team  o Record Blood Pressure, Pulse, Weight , and Temperature each morning and bring to appointments  o Infectious Disease Precautions: Mask, handwashing, food concerns, avoid crowds.  o Bring Blue Card and Vital signs,  to each clinic visit  o Diabetic needs: FU apt, glucometer and strips, lancets, teaching, and bring glucose logs to clinic  o How to contact the team: Phone, [facility]  o Review appointment schedule

## 2017-02-16 NOTE — PLAN OF CARE
Problem: Physical Therapy Goal  Goal: Physical Therapy Goal  Goals to be met by: 17    Patient will increase functional independence with mobility by performin. Supine to sit with Stand-by Assistance - GOAL MET  2. Sit to stand transfer with Supervision - GOAL MET  3. Gait x 220 feet with Supervision - not met   Outcome: Ongoing (interventions implemented as appropriate)  Pt achieved 2/3 goals at this time.

## 2017-02-16 NOTE — BRIEF OP NOTE
Date of admit to cath lab: 2/16/2017      Date of discharge from cath lab: 2/16/2017      Principal diagnosis: s/p OHT    Discharge attending physician: Josefina Saul MD    Hospital Course/Outcome of the treatment, procedures or surgery: Pt admitted for EGBx/RHC.   See CVIS/cath lab procedure report in EPIC  for full report of today's procedure.    Disposition of the case (d/c disposition): TSU    Discharge Medication List: see med card    Plan for follow up care, diet, activity level: F/U as scheduled. Resume low Na diet.  Activity as tolerated    Condition on discharge from Cath lab: Stable.

## 2017-02-16 NOTE — PLAN OF CARE
Problem: SLP Goal  Goal: SLP Goal  Speech Language Pathology Goals  Goals expected to be met by 2/21  1. Pt will tolerate puree & honey thickened liquid with no s/s aspiration  2. Pt will tolerate trials of soft solid with no s/s aspiration & adequate oral phase of swallow  3. Pt will tolerate trials of less restrictive liquid consistencies to determine if safe for diet upgrades  4. Pt will complete dysphagia exercises given min A      Goals to be met 2/19  1. Pt will participate in ongoing swallow assessment to determine safest po diet GOAL MET     Outcome: Ongoing (interventions implemented as appropriate)  Pt was seen for ST session. See note for details.      Lakesha Headley MS, CCC-SLP  Speech Language Pathologist  Pager: [phone]  2/16/2017

## 2017-02-16 NOTE — PT/OT/SLP PROGRESS
Speech Language Pathology  Treatment    Mick Barriga   MRN: [id number]   Admitting Diagnosis: Heart transplanted    Diet recommendations: Solid Diet Level: Dental Soft  Liquid Diet Level: Honey Thick   -Small bites/sips  -fully upright for all PO  -fully awake & alert for all PO  -one bite/sip at a time  -monitor for any s/s aspiration (cough, throat clearing, wet vocal quality) & discontinue PO if present  Recs d/w team    SLP Treatment Date: 17  Speech Start Time: 1236     Speech Stop Time: 1251     Speech Total (min): 15 min       TREATMENT BILLABLE MINUTES:  Treatment Swallowing Dysfunction 15    Has the patient been evaluated by SLP for swallowing? : Yes  Keep patient NPO?: No   General Precautions: Standard, fall, sternal  Current Respiratory Status: non-rebreather mask       Subjective:  Awake & alert , seated upright in chair. Wife at bedside. NSG reports pt & wife compliant with thickening liquids to honey.    Pain Ratin/10              Pain Rating Post-Intervention: 0/10    Objective:    Pt with honey thick liquids at bedside. All swallow precautions reviewed, pt & wife verbalize understanding. Pt with hoarse vocal quality. Pt tolerated cup sips of honey thick liquid with no s/s aspiration. Pt tolerated trial of soft solid (2 crackers) with adequate oral phase & no s/s aspiration. Rec for upgrade to soft solid & further precautions reviewed. Pt given handout of dysphagia exercises, reviewed falsetto, effortful swallow & mateo with good effort & ability.       Assessment:  Mick Barriga is a 56 y.o. male with a medical diagnosis of Heart transplanted and presents with dysphagia    Discharge recommendations: Discharge Facility/Level Of Care Needs: home with home health     Goals:   SLP Goals        Problem: SLP Goal    Goal Priority Disciplines Outcome   SLP Goal     SLP Ongoing (interventions implemented as appropriate)   Description:  Speech Language Pathology Goals  Goals expected to be met  by 2/21  1. Pt will tolerate puree & honey thickened liquid with no s/s aspiration  2. Pt will tolerate trials of soft solid with no s/s aspiration & adequate oral phase of swallow  3. Pt will tolerate trials of less restrictive liquid consistencies to determine if safe for diet upgrades  4. Pt will complete dysphagia exercises given min A      Goals to be met 2/19  1. Pt will participate in ongoing swallow assessment to determine safest po diet GOAL MET                   Plan:   Patient to be seen Therapy Frequency: 5 x/week   Plan of Care expires: 03/14/17  Plan of Care reviewed with: patient, spouse  SLP Follow-up?: Yes              Lakesha Headley, CCC-SLP  02/16/2017   [phone]

## 2017-02-16 NOTE — PT/OT/SLP PROGRESS
Physical Therapy  Treatment    Mick Barriga   MRN: [id number]   Admitting Diagnosis: Heart transplanted    PT Received On: 17  PT Start Time: 1019     PT Stop Time: 1102    PT Total Time (min): 43 min       Billable Minutes:  Gait Training 13 and Therapeutic Exercise 30    Treatment Type: Treatment  PT/PTA: PT             General Precautions: Standard, fall, sternal  Orthopedic Precautions: N/A   Braces: N/A    Do you have any cultural, spiritual, Anabaptist conflicts, given your current situation?:  (none)    Subjective:  Communicated with nursing prior to session.      Pain Ratin/10  Location - Side: Left     Location: fifth finger (Pt c/o pain in the ulnar aspect of L hand)  Pain Addressed: Distraction, Reposition (Ex provided)       Objective:   Patient found with: central line, peripheral IV    Functional Mobility:  Bed Mobility:   Rolling/Turning Right: Modified independent  Scooting/Bridging:  Independent - in hooklying without use of bed rails, maintaining sternal prec  Supine to Sit: Modified Independent    Transfers:  Sit <> Stand Assistance: Independent  Sit <> Stand Assistive Device: No Assistive Device  Bed <> Chair Technique: Stand Pivot  Bed <> Chair Assistance: Supervision  Bed <> Chair Assistive Device: Rolling Walker    Gait:   Gait Distance: Pt amb 144', verbal cuing to elevate head  Assistance 1: Supervision, Minimum assistance (72' with S, 72' with Rosa M and verbal cuing to inc step length, cont cuing)  Gait Assistive Device: Rolling walker  Gait Pattern: swing-through gait (with verbal cuing and Rosa M to increase step length)  Gait Deviation(s): decreased amelia, decreased step length, decreased stride length      Balance:   Static Sit: GOOD-: Takes MODERATE challenges from all directions but inconsistently  Dynamic Sit: GOOD-: Maintains balance through MODERATE excursions of active trunk movement,     Static Stand: FAIR: Maintains without assist but unable to take challenges  Dynamic  stand: FAIR: Needs CONTACT GUARD during gait     Therapeutic Activities and Exercises:  L hand: sensation to lt touch diminished at the 5th digit  Stage 1 Pressure ulcer without skin breakdown, non-blanchable erythema, lateral aspects of B heels.  Erythema of the head superior to occiput sec to pressure.    Pt perf and ed as part of HEP during hospitalization: ball squeeze with L hand, and B towel roll  Pt able to verbalize 1/3 sternal precautions, ed on other two  Pt perf incentive spirometer: inhalation and exhalation.  Pt perf flutter valve:  Ed to cough following.  Cough is productive , yellow sputum.   Ankle pumps: 30 reps, in sit  LAQs: 30 reps  Marching in sit: 30 reps  Whiteboard updated       AM-PAC 6 CLICK MOBILITY  How much help from another person does this patient currently need?   1 = Unable, Total/Dependent Assistance  2 = A lot, Maximum/Moderate Assistance  3 = A little, Minimum/Contact Guard/Supervision  4 = None, Modified [place]/Independent    Turning over in bed (including adjusting bedclothes, sheets and blankets)?: 4  Sitting down on and standing up from a chair with arms (e.g., wheelchair, bedside commode, etc.): 4  Moving from lying on back to sitting on the side of the bed?: 4  Moving to and from a bed to a chair (including a wheelchair)?: 3  Need to walk in hospital room?: 3  Climbing 3-5 steps with a railing?: 2  Total Score: 20    AM-PAC Raw Score CMS G-Code Modifier Level of Impairment Assistance   6 % Total / Unable   7 - 9 CM 80 - 100% Maximal Assist   10 - 14 CL 60 - 80% Moderate Assist   15 - 19 CK 40 - 60% Moderate Assist   20 - 22 CJ 20 - 40% Minimal Assist   23 CI 1-20% SBA / CGA   24 CH 0% Independent/ Mod I     Patient left up in chair with all lines intact and call button in reach. Wife present.     Assessment:  Mick Barriga is a 56 y.o. male with a medical diagnosis of Heart transplanted.  Concerns regarding skin breakdown.  Discussed with nurse providing pt  with foam floaters to relieve pressure on occiput and B heels.  Nursing stated that pt already has been provided with floaters in the room, although they were not in place for the pt when PT came to the room.  Pt initially presented in bed without floating heels or head.  Pt responded well to gait training to inc step length, although is inhibited by impaired endurance.      Rehab identified problem list/impairments: Rehab identified problem list/impairments: weakness, impaired endurance, gait instability, decreased lower extremity function, pain, impaired cardiopulmonary response to activity    Rehab potential is good.    Activity tolerance: Good    Discharge recommendations: Discharge Facility/Level Of Care Needs: home health PT     Barriers to discharge:      Equipment recommendations: Equipment Needed After Discharge: none     GOALS:   Physical Therapy Goals        Problem: Physical Therapy Goal    Goal Priority Disciplines Outcome Goal Variances Interventions   Physical Therapy Goal     PT/OT, PT Ongoing (interventions implemented as appropriate)     Description:  Goals to be met by: 17    Patient will increase functional independence with mobility by performin. Supine to sit with Stand-by Assistance - GOAL MET  2. Sit to stand transfer with Supervision - GOAL MET  3. Gait  x 220 feet with Supervision  - not met                 PLAN:    Patient to be seen 5 x/week  to address the above listed problems via gait training, therapeutic activities, therapeutic exercises, neuromuscular re-education  Plan of Care expires: 17  Plan of Care reviewed with: patient         Mary Kevin, PT  2017

## 2017-02-17 LAB
ALBUMIN SERPL BCP-MCNC: 2.3 G/DL
ALP SERPL-CCNC: 59 U/L
ALT SERPL W/O P-5'-P-CCNC: 19 U/L
ANION GAP SERPL CALC-SCNC: 8 MMOL/L
ANISOCYTOSIS BLD QL SMEAR: SLIGHT
AST SERPL-CCNC: 13 U/L
BASO STIPL BLD QL SMEAR: ABNORMAL
BASOPHILS # BLD AUTO: 0.01 K/UL
BASOPHILS NFR BLD: 0.1 %
BILIRUB SERPL-MCNC: 0.6 MG/DL
BUN SERPL-MCNC: 27 MG/DL
CALCIUM SERPL-MCNC: 8.6 MG/DL
CHLORIDE SERPL-SCNC: 98 MMOL/L
CO2 SERPL-SCNC: 30 MMOL/L
CREAT SERPL-MCNC: 0.8 MG/DL
DIFFERENTIAL METHOD: ABNORMAL
EOSINOPHIL # BLD AUTO: 0 K/UL
EOSINOPHIL NFR BLD: 0.1 %
ERYTHROCYTE [DISTWIDTH] IN BLOOD BY AUTOMATED COUNT: 26.4 %
EST. GFR  (AFRICAN AMERICAN): >60 ML/MIN/1.73 M^2
EST. GFR  (NON AFRICAN AMERICAN): >60 ML/MIN/1.73 M^2
GLUCOSE SERPL-MCNC: 202 MG/DL
HCT VFR BLD AUTO: 25.5 %
HGB BLD-MCNC: 8.2 G/DL
HYPOCHROMIA BLD QL SMEAR: ABNORMAL
LYMPHOCYTES # BLD AUTO: 0.7 K/UL
LYMPHOCYTES NFR BLD: 6.4 %
MCH RBC QN AUTO: 22.3 PG
MCHC RBC AUTO-ENTMCNC: 32.2 %
MCV RBC AUTO: 70 FL
MONOCYTES # BLD AUTO: 1 K/UL
MONOCYTES NFR BLD: 9.4 %
NEUTROPHILS # BLD AUTO: 8.6 K/UL
NEUTROPHILS NFR BLD: 84 %
OVALOCYTES BLD QL SMEAR: ABNORMAL
PLATELET # BLD AUTO: 242 K/UL
PLATELET BLD QL SMEAR: ABNORMAL
PMV BLD AUTO: ABNORMAL FL
POCT GLUCOSE: 130 MG/DL (ref 70–110)
POCT GLUCOSE: 144 MG/DL (ref 70–110)
POCT GLUCOSE: 163 MG/DL (ref 70–110)
POCT GLUCOSE: 169 MG/DL (ref 70–110)
POCT GLUCOSE: 224 MG/DL (ref 70–110)
POIKILOCYTOSIS BLD QL SMEAR: SLIGHT
POLYCHROMASIA BLD QL SMEAR: ABNORMAL
POTASSIUM SERPL-SCNC: 4.1 MMOL/L
PROT SERPL-MCNC: 4.7 G/DL
RBC # BLD AUTO: 3.67 M/UL
SODIUM SERPL-SCNC: 136 MMOL/L
TACROLIMUS BLD-MCNC: 8.2 NG/ML
TARGETS BLD QL SMEAR: ABNORMAL
WBC # BLD AUTO: 10.45 K/UL

## 2017-02-17 PROCEDURE — 25000003 PHARM REV CODE 250: Performed by: STUDENT IN AN ORGANIZED HEALTH CARE EDUCATION/TRAINING PROGRAM

## 2017-02-17 PROCEDURE — 99232 SBSQ HOSP IP/OBS MODERATE 35: CPT | Mod: ,,, | Performed by: INTERNAL MEDICINE

## 2017-02-17 PROCEDURE — 63600175 PHARM REV CODE 636 W HCPCS: Performed by: NURSE PRACTITIONER

## 2017-02-17 PROCEDURE — 92526 ORAL FUNCTION THERAPY: CPT

## 2017-02-17 PROCEDURE — 25000242 PHARM REV CODE 250 ALT 637 W/ HCPCS: Performed by: INTERNAL MEDICINE

## 2017-02-17 PROCEDURE — 25000003 PHARM REV CODE 250: Performed by: INTERNAL MEDICINE

## 2017-02-17 PROCEDURE — 25000003 PHARM REV CODE 250: Performed by: PHYSICIAN ASSISTANT

## 2017-02-17 PROCEDURE — 85025 COMPLETE CBC W/AUTO DIFF WBC: CPT

## 2017-02-17 PROCEDURE — 94668 MNPJ CHEST WALL SBSQ: CPT

## 2017-02-17 PROCEDURE — 25000003 PHARM REV CODE 250: Performed by: NURSE PRACTITIONER

## 2017-02-17 PROCEDURE — 94640 AIRWAY INHALATION TREATMENT: CPT

## 2017-02-17 PROCEDURE — 25000003 PHARM REV CODE 250: Performed by: THORACIC SURGERY (CARDIOTHORACIC VASCULAR SURGERY)

## 2017-02-17 PROCEDURE — 81300006 HC HEART TRANSPORT, GROUND 4-5 HOURS

## 2017-02-17 PROCEDURE — 94664 DEMO&/EVAL PT USE INHALER: CPT

## 2017-02-17 PROCEDURE — 81200000 HC HEART ACQUISITION CHARGE

## 2017-02-17 PROCEDURE — 80197 ASSAY OF TACROLIMUS: CPT

## 2017-02-17 PROCEDURE — 99232 SBSQ HOSP IP/OBS MODERATE 35: CPT | Mod: ,,, | Performed by: NURSE PRACTITIONER

## 2017-02-17 PROCEDURE — 20600001 HC STEP DOWN PRIVATE ROOM

## 2017-02-17 PROCEDURE — 63600175 PHARM REV CODE 636 W HCPCS: Performed by: INTERNAL MEDICINE

## 2017-02-17 PROCEDURE — 80053 COMPREHEN METABOLIC PANEL: CPT

## 2017-02-17 RX ORDER — TERBUTALINE SULFATE 5 MG/1
5 TABLET ORAL 3 TIMES DAILY
Status: DISCONTINUED | OUTPATIENT
Start: 2017-02-17 | End: 2017-02-22 | Stop reason: HOSPADM

## 2017-02-17 RX ORDER — TACROLIMUS 1 MG/1
1 CAPSULE ORAL ONCE
Status: COMPLETED | OUTPATIENT
Start: 2017-02-17 | End: 2017-02-17

## 2017-02-17 RX ORDER — INSULIN GLARGINE 100 [IU]/ML
24 INJECTION, SOLUTION SUBCUTANEOUS NIGHTLY
Status: DISCONTINUED | OUTPATIENT
Start: 2017-02-17 | End: 2017-02-20

## 2017-02-17 RX ORDER — TACROLIMUS 1 MG/1
2 CAPSULE ORAL 2 TIMES DAILY
Status: DISCONTINUED | OUTPATIENT
Start: 2017-02-17 | End: 2017-02-19

## 2017-02-17 RX ADMIN — FUROSEMIDE 60 MG: 10 INJECTION, SOLUTION INTRAMUSCULAR; INTRAVENOUS at 08:02

## 2017-02-17 RX ADMIN — MAGNESIUM OXIDE TAB 400 MG (241.3 MG ELEMENTAL MG) 400 MG: 400 (241.3 MG) TAB at 08:02

## 2017-02-17 RX ADMIN — TACROLIMUS 1 MG: 1 CAPSULE, GELATIN COATED ORAL at 12:02

## 2017-02-17 RX ADMIN — TACROLIMUS 1 MG: 1 CAPSULE, GELATIN COATED ORAL at 08:02

## 2017-02-17 RX ADMIN — AMLODIPINE BESYLATE 10 MG: 5 TABLET ORAL at 08:02

## 2017-02-17 RX ADMIN — PANTOPRAZOLE SODIUM 40 MG: 40 TABLET, DELAYED RELEASE ORAL at 08:02

## 2017-02-17 RX ADMIN — OXYCODONE HYDROCHLORIDE 10 MG: 5 TABLET ORAL at 06:02

## 2017-02-17 RX ADMIN — NYSTATIN 500000 UNITS: 500000 SUSPENSION ORAL at 08:02

## 2017-02-17 RX ADMIN — INSULIN ASPART 18 UNITS: 100 INJECTION, SOLUTION INTRAVENOUS; SUBCUTANEOUS at 12:02

## 2017-02-17 RX ADMIN — TERBUTALINE SULFATE 5 MG: 5 TABLET ORAL at 09:02

## 2017-02-17 RX ADMIN — IPRATROPIUM BROMIDE AND ALBUTEROL SULFATE 3 ML: .5; 3 SOLUTION RESPIRATORY (INHALATION) at 08:02

## 2017-02-17 RX ADMIN — SULFAMETHOXAZOLE AND TRIMETHOPRIM 1 TABLET: 400; 80 TABLET ORAL at 08:02

## 2017-02-17 RX ADMIN — TERBUTALINE SULFATE 5 MG: 5 TABLET ORAL at 02:02

## 2017-02-17 RX ADMIN — NYSTATIN 500000 UNITS: 500000 SUSPENSION ORAL at 12:02

## 2017-02-17 RX ADMIN — IPRATROPIUM BROMIDE AND ALBUTEROL SULFATE 3 ML: .5; 3 SOLUTION RESPIRATORY (INHALATION) at 12:02

## 2017-02-17 RX ADMIN — IPRATROPIUM BROMIDE AND ALBUTEROL SULFATE 3 ML: .5; 3 SOLUTION RESPIRATORY (INHALATION) at 03:02

## 2017-02-17 RX ADMIN — DOCUSATE SODIUM 100 MG: 100 CAPSULE, LIQUID FILLED ORAL at 09:02

## 2017-02-17 RX ADMIN — OXYCODONE HYDROCHLORIDE 10 MG: 5 TABLET ORAL at 04:02

## 2017-02-17 RX ADMIN — HYDROMORPHONE HYDROCHLORIDE 0.5 MG: 1 INJECTION, SOLUTION INTRAMUSCULAR; INTRAVENOUS; SUBCUTANEOUS at 07:02

## 2017-02-17 RX ADMIN — OXYCODONE HYDROCHLORIDE 10 MG: 5 TABLET ORAL at 09:02

## 2017-02-17 RX ADMIN — PREDNISONE 25 MG: 5 TABLET ORAL at 09:02

## 2017-02-17 RX ADMIN — VALGANCICLOVIR 900 MG: 450 TABLET, FILM COATED ORAL at 08:02

## 2017-02-17 RX ADMIN — MYCOPHENOLATE MOFETIL 1500 MG: 250 CAPSULE ORAL at 09:02

## 2017-02-17 RX ADMIN — NYSTATIN 500000 UNITS: 500000 SUSPENSION ORAL at 05:02

## 2017-02-17 RX ADMIN — PREDNISONE 25 MG: 5 TABLET ORAL at 08:02

## 2017-02-17 RX ADMIN — CIPROFLOXACIN HYDROCHLORIDE 500 MG: 500 TABLET, FILM COATED ORAL at 09:02

## 2017-02-17 RX ADMIN — DOCUSATE SODIUM 100 MG: 100 CAPSULE, LIQUID FILLED ORAL at 08:02

## 2017-02-17 RX ADMIN — CIPROFLOXACIN HYDROCHLORIDE 500 MG: 500 TABLET, FILM COATED ORAL at 08:02

## 2017-02-17 RX ADMIN — INSULIN ASPART 18 UNITS: 100 INJECTION, SOLUTION INTRAVENOUS; SUBCUTANEOUS at 08:02

## 2017-02-17 RX ADMIN — HYDROMORPHONE HYDROCHLORIDE 0.5 MG: 1 INJECTION, SOLUTION INTRAMUSCULAR; INTRAVENOUS; SUBCUTANEOUS at 05:02

## 2017-02-17 RX ADMIN — NYSTATIN 500000 UNITS: 500000 SUSPENSION ORAL at 09:02

## 2017-02-17 RX ADMIN — POLYETHYLENE GLYCOL 3350 17 G: 17 POWDER, FOR SOLUTION ORAL at 08:02

## 2017-02-17 RX ADMIN — IPRATROPIUM BROMIDE AND ALBUTEROL SULFATE 3 ML: .5; 3 SOLUTION RESPIRATORY (INHALATION) at 09:02

## 2017-02-17 RX ADMIN — TACROLIMUS 2 MG: 1 CAPSULE, GELATIN COATED ORAL at 05:02

## 2017-02-17 RX ADMIN — INSULIN ASPART 18 UNITS: 100 INJECTION, SOLUTION INTRAVENOUS; SUBCUTANEOUS at 05:02

## 2017-02-17 RX ADMIN — FUROSEMIDE 60 MG: 10 INJECTION, SOLUTION INTRAMUSCULAR; INTRAVENOUS at 05:02

## 2017-02-17 RX ADMIN — INSULIN ASPART 2 UNITS: 100 INJECTION, SOLUTION INTRAVENOUS; SUBCUTANEOUS at 05:02

## 2017-02-17 RX ADMIN — TERBUTALINE SULFATE 2.5 MG: 2.5 TABLET ORAL at 08:02

## 2017-02-17 RX ADMIN — MAGNESIUM OXIDE TAB 400 MG (241.3 MG ELEMENTAL MG) 400 MG: 400 (241.3 MG) TAB at 09:02

## 2017-02-17 RX ADMIN — OXYCODONE HYDROCHLORIDE 10 MG: 5 TABLET ORAL at 01:02

## 2017-02-17 RX ADMIN — MYCOPHENOLATE MOFETIL 1500 MG: 250 CAPSULE ORAL at 08:02

## 2017-02-17 RX ADMIN — SODIUM CHLORIDE 1.6 UNITS/HR: 9 INJECTION, SOLUTION INTRAVENOUS at 04:02

## 2017-02-17 RX ADMIN — INSULIN GLARGINE 24 UNITS: 100 INJECTION, SOLUTION SUBCUTANEOUS at 09:02

## 2017-02-17 NOTE — PLAN OF CARE
Problem: SLP Goal  Goal: SLP Goal  Speech Language Pathology Goals  Goals expected to be met by 2/21  1. Pt will tolerate puree & honey thickened liquid with no s/s aspiration  2. Pt will tolerate trials of soft solid with no s/s aspiration & adequate oral phase of swallow  3. Pt will tolerate trials of less restrictive liquid consistencies to determine if safe for diet upgrades  4. Pt will complete dysphagia exercises given min A      Goals to be met 2/19  1. Pt will participate in ongoing swallow assessment to determine safest po diet GOAL MET     Outcome: Ongoing (interventions implemented as appropriate)  Pt participated in ST session.      Lakesha Headley MS, CCC-SLP  Speech Language Pathologist  Pager: [phone]  2/17/2017

## 2017-02-17 NOTE — PROGRESS NOTES
"[facility]  Endocrinology  Progress Note    Admit Date: 1/30/2017     Reason for Consult: Management of T2DM     Surgical Procedure and Date: 2/9/17 s/p heart txp    Diabetes diagnosis year: 1996    Home Diabetes Medications:  Lantus 16 u qam, Novolog 20 U AC     Diabetes Complications include:  Hyperglycemia, Diabetic chronic kidney disease and Diabetic gastroparesis      Complicating diabetes co morbidities: CHF and CKD    HPI: Patient is a 56 y.o. male with T2DM, ischemic cardiomyopathy, s/p LVAD placement in 8/2016. He is now s/p heart transplantation. Endocrine consulted for bg management.           Interval HPI:   Fasting Bg near goal. + prandial excursions. Snacking increasing BG. Yesterday: pudding between lunch and dinner, juice between dinner and HS. Slight decrease in steroid dosing to prednisone 25mg twice daily. No nausea or fever.   Son is bringing Kaleb to bedside today.     Visit Vitals    BP (!) 161/76 (BP Location: Right arm, Patient Position: Lying, BP Method: Automatic)    Pulse 94    Temp 98.2 °F (36.8 °C) (Oral)    Resp 16    Ht 5' 8" (1.727 m)    Wt 91.5 kg (201 lb 11.5 oz)    SpO2 97%    BMI 30.67 kg/m2       Labs Reviewed and Include      Recent Labs  Lab 02/17/17  0622   *   CALCIUM 8.6*   ALBUMIN 2.3*   PROT 4.7*      K 4.1   CO2 30*   CL 98   BUN 27*   CREATININE 0.8   ALKPHOS 59   ALT 19   AST 13   BILITOT 0.6     Lab Results   Component Value Date    WBC 10.45 02/17/2017    HGB 8.2 (L) 02/17/2017    HCT 25.5 (L) 02/17/2017    MCV 70 (L) 02/17/2017     02/16/2017     No results for input(s): TSH, FREET4 in the last 168 hours.  Lab Results   Component Value Date    HGBA1C 6.8 (H) 02/09/2017       Nutritional status:   Body mass index is 30.67 kg/(m^2).  Lab Results   Component Value Date    ALBUMIN 2.3 (L) 02/17/2017    ALBUMIN 2.5 (L) 02/16/2017    ALBUMIN 2.6 (L) 02/15/2017     Lab Results   Component Value Date    PREALBUMIN 19 (L) " 02/08/2017    PREALBUMIN 14 (L) 02/06/2017    PREALBUMIN 14 (L) 02/03/2017       Estimated Creatinine Clearance: 115.9 mL/min (based on Cr of 0.8).    Accu-Checks  Recent Labs      02/15/17   0748  02/15/17   1157  02/15/17   1902  02/15/17   2308  02/16/17   0204  02/16/17   0800  02/16/17   1155  02/16/17   1712  02/16/17   2200  02/17/17   0202   POCTGLUCOSE  349*  340*  218*  119*  168*  279*  338*  312*  305*  144*       Current Medications and/or Treatments Impacting Glycemic Control  Immunotherapy:  Immunosuppressants     Start     Stop Route Frequency Ordered    02/13/17 2100  mycophenolate capsule 1,500 mg      -- Oral 2 times daily 02/13/17 0937    02/17/17 0800  tacrolimus capsule 1 mg      -- Oral Daily 02/16/17 1157    02/16/17 1800  tacrolimus capsule 2 mg      -- Oral Daily 02/16/17 1157        Steroids:   Hormones     Start     Stop Route Frequency Ordered    02/17/17 0900  predniSONE tablet 25 mg      -- Oral 2 times daily 02/16/17 1159        Pressors:    Autonomic Drugs     None        Hyperglycemia/Diabetes Medications: Antihyperglycemics     Start     Stop Route Frequency Ordered    02/12/17 1130  insulin regular (Humulin R) 100 Units in sodium chloride 0.9% 100 mL infusion      -- IV Continuous 02/12/17 1016    02/15/17 1645  insulin aspart pen 18 Units      -- SubQ 3 times daily with meals 02/15/17 1251    02/15/17 2030  insulin aspart pen 4 Units      -- SubQ As needed (PRN) 02/15/17 1253    02/15/17 1418  insulin aspart pen 0-10 Units      -- SubQ As needed (PRN) 02/15/17 1318          ASSESSMENT and PLAN    Type 2 diabetes mellitus with stage 3 chronic kidney disease, with long-term current use of insulin  BG goal 140-180- Continue Transition insulin infusion @ 1.6 units/hr.  Continue Novolog 18 units AC  BG monitoring AC/HS/0200 with moderate dose correction scale.   Discussed effect of snacking on BG management.     Pt is allergic to Levemir with reaction of severe itching. Son will bring  Lantus pens from home this evening. Pens will be relabeled for hospital use. At 2100, d/c transition insulin drip then start Lantus at 24 units nightly. Discussed plan with nursing.    DISCHARGE PLANNING: Ongoing.  Will be discharged on MDI. Has Lantus and Novolog pens and has supplies.  Has new meter. His wife is checking to see if he needs rx for strips or lancets.   He is followed by Fabián Navarrete NP with [facility] for his DM management. He is considering f/u with [facility] endocrine after discharge.   Will need sugar clinic follow-up.        Ischemic cardiomyopathy  S/p heart txp        * Heart transplanted  Per CTS, HTS      Adrenal cortical steroids causing adverse effect in therapeutic use  Increasing insulin needs  On Prednisone 25 mg BID.     Immunosuppression  May increase insulin resistance       Non morbid obesity due to excess calories  Increases insulin resistance   Body mass index is 30.67 kg/(m^2).          Dot Michelle, ELADIO  Endocrinology  [facility]

## 2017-02-17 NOTE — PROGRESS NOTES
Pt is AAOx4; afebrile; vital signs stable. He is on telemetry with a HR ranging from 77-81 following pacer being turned off. He was being v paced at 84 until noon. HTS to be notified for HR <70. He c/o pain mostly in his left arm, which has been moderately controlled with oxycodone; dilaudid given twice. Lantus to be given and insulin gtt turned off at 2100. He is up with standby assist. Wife is at bedside, attentive to pt and she is also pulling self meds. Diet switched to dental soft; he remains with honey thickened liquids. Right chest tube removed. DTIs to right cheek, both heels, and back of head. He is in heel protector boots while in bed.  Fall precautions in place. Pt has urinal at bedside, bed in low position, call bell in reach, non-skid socks on when pt not in bed.

## 2017-02-17 NOTE — NURSING
Met with pt and pt's wife regarding discharge Education.   Pt getting up to go for a ride in a wheelchair to down stairs.    Pt and pt's wife stated that the self Medication is going well,   Pt stated understanding regarding heart rate and plan for hopefully d/c on Monday.  Reviewed the first 6 Ten Things to Review before Heart Transplant discharge:  o Take medications as prescribed and follow Blue Card.  o Activity as determined by Team and Sternal Precautions  o Nutrition and Fluid  o Signs /Symptoms to report to the team  o Record Blood Pressure, Pulse, Weight , and Temperature each morning and bring to appointments  o Infectious Disease Precautions: Mask, handwashing, food concerns, avoid crowds

## 2017-02-17 NOTE — ASSESSMENT & PLAN NOTE
BG goal 140-180- Continue Transition insulin infusion @ 1.6 units/hr.  Continue Novolog 18 units AC  BG monitoring AC/HS/0200 with moderate dose correction scale.   Discussed effect of snacking on BG management.     Pt is allergic to Levemir with reaction of severe itching. Son will bring Lantus pens from home this evening. Pens will be relabeled for hospital use. At 2100, d/c transition insulin drip then start Lantus at 24 units nightly. Discussed plan with nursing.    DISCHARGE PLANNING: Ongoing.  Will be discharged on MDI. Has Lantus and Novolog pens and has supplies.  Has new meter. His wife is checking to see if he needs rx for strips or lancets.   He is followed by Fabián Navarrete NP with [facility] management ([facility]) for his DM management. He is considering f[place] [facility] [place].   Will need sugar clinic follow-up.

## 2017-02-17 NOTE — PLAN OF CARE
Problem: Patient Care Overview  Goal: Plan of Care Review  Dx: heart transplant (2/9), 1 PRBC, 250cc Albumin    PMH: AICD, HTN, HLD, GERD, T2DM, CKD (3), ischemic CM, systolic and diastolic HF, low Na, non morbid obesity, BPH    PSH: cholecystectomy, LVAD (8/24/16)    2/9: Heart Tx (out @ 1405); 2 units PRBC post op  2/11: Post-op day 2, Extubated    Nursing:  - MAP 60-80  - V paced at 105  - ACHS accuchecks  - Q6 hour K, Mg, Phos     Outcome: Ongoing (interventions implemented as appropriate)  Pt is AAOx4 in bed wearing non-skid footwear, bed in low/locked position and with call bell within reach. Pt reminded to use call bell to call for assistance, pt verbalizes understanding. Wife at bedside. Mid-sternal incision CHARLES with steri-strips. Chest tube to H2O seal with no output so far this shift. Chest tube dressing changed this shift. RLQ old drive line site dressing changed this shift. Dressings CDI. Patient remains paced at 84 bpm on telemetry monitor. Insulin gtt infusing at 1.6 units/hr. BG this shift has been 305 and 144. Patient received one dose of PRN pain medication earlier in shift. Denies any pain or discomfort at this time.

## 2017-02-17 NOTE — PROGRESS NOTES
Progress Note  Heart Transplant Service    Admit Date: 1/30/2017   LOS: 18 days     SUBJECTIVE:     Follow up for: Heart transplanted    Interval History:   off, HR paced at 84, started terbutaline     Scheduled Meds:   albuterol-ipratropium 2.5mg-0.5mg/3mL  3 mL Nebulization Q4H    amlodipine  10 mg Oral Daily    ciprofloxacin HCl  500 mg Oral Q12H    docusate sodium  100 mg Oral BID    furosemide  60 mg Intravenous BID    insulin aspart  18 Units Subcutaneous TIDWM    magnesium oxide  400 mg Oral BID    mycophenolate  1,500 mg Oral BID    nystatin  500,000 Units Mouth/Throat QID (WM & HS)    pantoprazole  40 mg Oral Daily    polyethylene glycol  17 g Oral Daily    predniSONE  25 mg Oral BID    sulfamethoxazole-trimethoprim 400-80mg  1 tablet Oral Daily    tacrolimus  1 mg Oral Once    tacrolimus  2 mg Oral BID    terbutaline  5 mg Oral TID    valganciclovir  900 mg Oral Daily     Continuous Infusions:   sodium chloride 0.45% 10 mL/hr (02/10/17 2000)    insulin (HUMAN R) infusion (adults) 1.6 Units/hr (02/17/17 0449)     PRN Meds:sodium chloride, sodium chloride, acetaminophen, dextrose 50%, dextrose 50%, glucagon (human recombinant), glucose, glucose, HYDROmorphone, insulin aspart, insulin aspart, magnesium sulfate IVPB, metoclopramide HCl, ondansetron, oxycodone, potassium chloride **AND** potassium chloride **AND** potassium chloride, sodium phosphate IVPB, sodium phosphate IVPB, sodium phosphate IVPB    Immunosuppressants     Start     Stop Route Frequency Ordered    02/13/17 2100  mycophenolate capsule 1,500 mg      -- Oral 2 times daily 02/13/17 0937    02/17/17 1230  tacrolimus capsule 1 mg      -- Oral Once 02/17/17 1126    02/17/17 1800  tacrolimus capsule 2 mg      -- Oral 2 times daily 02/17/17 1126          Review of patient's allergies indicates:   Allergen Reactions    Levemir [insulin detemir] Itching    Pork/porcine containing products     Ranexa [ranolazine] Nausea Only        OBJECTIVE:     Vital Signs (Most Recent)  Temp: 98.4 °F (36.9 °C) (02/17/17 1130)  Pulse: 89 (02/17/17 1130)  Resp: 18 (02/17/17 1130)  BP: (!) 129/58 (02/17/17 1130)  SpO2: 96 % (02/17/17 1130)    Vital Signs Range (Last 24H):  Temp:  [97.7 °F (36.5 °C)-98.4 °F (36.9 °C)]   Pulse:  [83-94]   Resp:  [16-22]   BP: (107-161)/(56-76)   SpO2:  [93 %-98 %]     I & O (Last 24H):    Intake/Output Summary (Last 24 hours) at 02/17/17 1201  Last data filed at 02/17/17 0800   Gross per 24 hour   Intake              720 ml   Output             1720 ml   Net            -1000 ml            Telemetry: a-paced at 100 bpm    Physical Exam   Constitutional: He is oriented to person, place, and time. He appears well-developed and well-nourished.   HENT:   Head: Normocephalic and atraumatic.   Eyes: EOM are normal. Pupils are equal, round, and reactive to light.   Neck: Normal range of motion. Neck supple. No JVD present.   Cardiovascular: S1 S2 audible, regular, tachycardic    Pulmonary/Chest: Bronchial breath sounds on left  Abdominal: Soft. Bowel sounds are normal. He exhibits no distension. There is no tenderness.   Musculoskeletal: Normal range of motion. He exhibits no edema.   Neurological: He is alert and oriented to person, place, and time.   Skin: Skin is warm and dry.   Extremities: BUE edematous. LUE slightly weaker than RUE. 2+ peripheral pulses.      Labs:       Recent Labs  Lab 02/15/17  0417 02/16/17  0444 02/17/17  0622   WBC 17.96*  17.96* 15.37* 10.45   HGB 9.0*  9.0* 9.2* 8.2*   HCT 27.5*  27.5* 27.7* 25.5*     255 276 242   LYMPH 2.4*  2.4*  0.4*  0.4* 3.1*  0.5* 6.4*  0.7*   MONO 5.3  5.3  1.0  1.0 7.1  1.1* 9.4  1.0   EOSINOPHIL 0.0  0.0 0.0 0.1         Recent Labs  Lab 02/13/17  0554 02/14/17  0425 02/15/17  0417   INR 1.5* 1.4* 1.6*          Recent Labs  Lab 02/14/17  1831 02/15/17  0014  02/15/17  0417 02/15/17  1357 02/16/17  0444 02/17/17  0622   GLU  --   --   --  203* 318* 177*  202*   CALCIUM  --   --   --  8.6* 8.4* 8.7 8.6*   ALBUMIN  --   --   --  2.6*  --  2.5* 2.3*   PROT  --   --   --  5.7*  --  5.3* 4.7*   NA  --   --   --  137 136 138 136   K  --   --   < > 4.0  4.0 3.8 4.4 4.1   CO2  --   --   --  30* 32* 33* 30*   CL  --   --   --  95 95 97 98   BUN  --   --   --  40* 36* 30* 27*   CREATININE  --   --   --  0.9 0.9 0.8 0.8   ALKPHOS  --   --   --  65  --  65 59   ALT  --   --   --  26  --  21 19   AST  --   --   --  20  --  17 13   BILITOT  --   --   --  0.6  --  0.6 0.6   MG 1.6 1.4*  --  1.7  --  1.2*  --    PHOS 4.7* 4.4  --  4.5  --   --   --    < > = values in this interval not displayed.  Estimated Creatinine Clearance: 115.9 mL/min (based on Cr of 0.8).      Recent Labs  Lab 02/15/17  1357   *       No results for input(s): LDH in the last 168 hours.    Microbiology Results (last 7 days)     Procedure Component Value Units Date/Time    Urine culture [[id number]]  (Susceptibility) Collected:  02/08/17 2000    Order Status:  Completed Specimen:  Urine from Urine, Clean Catch Updated:  02/11/17 1158     Urine Culture, Routine --     CITROBACTER KOSERI  >100,000 cfu/ml              ASSESSMENT:     57 yo M with PMHx of NICM s/p HM II (8/24/16), HLD, HTN, VT s/p ICD who presented to the hospital for elevated LDH in the setting of subtherapeutic INR with lower extremity swelling concerning for LVAD pump thrombosis, now s/p OHTx 2/9/17.       PLAN:     S/P Orthotopic Heart Transplantation 2/9/2017  -HTS primary  -Moderate Risk of Rejection: cPRA 0%, Negative Crossmatch with B Channel Shifts  -CMV Status: D-/R+: Will require valcyte x 3 months  -Immunosuppression: MMF 1500 mg BID, Prednisone 30 mg bid. [place] 1/2  -On full OI prophylaxis  -EGBx #1 done   -ST following. Tolerating pureed diet with honey-thick liquids  -Right CT with minimal drainage - will ask CTS to pull     DM  -Insulin gtt per Endocrine    Bacteruria, Citrobacter Koseri  -UCx 1/19/2017 with 10-49k  citrobacter  -Repeat UCx 2/8/17 with >100,000 citrobacter koseri  -started cipro 2/13/17 X 7 days    Improving LUE weakness and numbness  -Appreciate Neurology's help. Signed off

## 2017-02-17 NOTE — PT/OT/SLP PROGRESS
Speech Language Pathology  Treatment    Mick Barriga   MRN: [id number]   Admitting Diagnosis: Heart transplanted    Diet recommendations: Solid Diet Level: Dental Soft  Liquid Diet Level: Honey Thick No straws, HOB to 90 degrees, Small bites/sips, Alternating bites/sips, 1 bite/sip at a time and Assistance with thickening liquids   Monitor closely for any s/s aspiration  Monitor to ensure compliance of diet recs    SLP Treatment Date: 17  Speech Start Time: 1136     Speech Stop Time: 1151     Speech Total (min): 15 min       TREATMENT BILLABLE MINUTES:  Treatment Swallowing Dysfunction 15    Has the patient been evaluated by SLP for swallowing? : Yes  Keep patient NPO?: No   General Precautions: Standard, fall, sternal  Current Respiratory Status: non-rebreather mask       Subjective:  Awake & alert. Wife at bedside. I still have trouble with water. NSG reports that pt is complying with recs & tolerating current diet with no difficulty.    Pain Ratin/10              Pain Rating Post-Intervention: 0/10    Objective:    Pt & wife located list of swallow precautions in room, reviewed & they were able to recall back IND'ly. Pt tolerated cup sips of honey thickened liquid x5 with no s/s aspiration. Given small cup sips of nectar thick liquid x2, pt demonstrates throat clearing. Pt tolerated soft solid trial (2 bites of pizza) with no s/s aspiration & adequate oral phase of swallow. Pt located list of exercises in room. Pt completed each exercise listed x2 with good effort & ability & reports that he has been practicing IND'ly.       Assessment:  Mick Barriga is a 56 y.o. male with a medical diagnosis of Heart transplanted and presents with dysphagia    Discharge recommendations: Discharge Facility/Level Of Care Needs: home with home health     Goals:   SLP Goals        Problem: SLP Goal    Goal Priority Disciplines Outcome   SLP Goal     SLP Ongoing (interventions implemented as appropriate)   Description:   Speech Language Pathology Goals  Goals expected to be met by 2/21  1. Pt will tolerate puree & honey thickened liquid with no s/s aspiration  2. Pt will tolerate trials of soft solid with no s/s aspiration & adequate oral phase of swallow  3. Pt will tolerate trials of less restrictive liquid consistencies to determine if safe for diet upgrades  4. Pt will complete dysphagia exercises given min A      Goals to be met 2/19  1. Pt will participate in ongoing swallow assessment to determine safest po diet GOAL MET                   Plan:   Patient to be seen Therapy Frequency: 5 x/week   Plan of Care expires: 03/14/17  Plan of Care reviewed with: patient, spouse  SLP Follow-up?: Yes              Lakesha Headley, CCC-SLP  02/17/2017   [phone]

## 2017-02-17 NOTE — PHYSICIAN QUERY
PT Name: Mick Barriga  MR #: [id number]     Physician Query Form - Documentation Clarification    Reviewer Oxana NICOLAS Ext [phone]    This form is a permanent document in the medical record.     Query Date: February 17, 2017  By submitting this query, we are merely seeking further clarification of documentation to reflect the severity of illness of your patient. Please utilize your independent clinical judgment when addressing the question(s) below.    (The Medical record reflects the following:)      Supporting Clinical Findings Location in Medical Record   Bacteruria, Citrobacter Koseri  UCx 1/19/2017 with 10-49k citrobacter  Repeat UCx 2/8/17 with >100,000 citrobacter koseri  -started cipro 2/13/17 X 7 days      UTI hx noted  Transplant PN 2/17            Transplant PN 2/13                                                                                Doctor, Please specify diagnosis or diagnoses associated with above clinical findings.    Physician Use Only      (  ) Current citrobacter koseri UTI, POA    ( x ) Current citrobacter koseri UTI, not POA    (  ) History of citrobacter koseri UTI only                                                                                                           [  ] Unable to determine

## 2017-02-18 PROBLEM — E11.9 LONG-TERM INSULIN USE IN TYPE 2 DIABETES: Status: RESOLVED | Noted: 2017-01-15 | Resolved: 2017-02-18

## 2017-02-18 PROBLEM — Z79.4 LONG-TERM INSULIN USE IN TYPE 2 DIABETES: Status: RESOLVED | Noted: 2017-01-15 | Resolved: 2017-02-18

## 2017-02-18 PROBLEM — Z95.811 LVAD (LEFT VENTRICULAR ASSIST DEVICE) PRESENT: Status: RESOLVED | Noted: 2017-02-13 | Resolved: 2017-02-18

## 2017-02-18 LAB
ALBUMIN SERPL BCP-MCNC: 2.5 G/DL
ALP SERPL-CCNC: 64 U/L
ALT SERPL W/O P-5'-P-CCNC: 19 U/L
ANION GAP SERPL CALC-SCNC: 10 MMOL/L
ANISOCYTOSIS BLD QL SMEAR: SLIGHT
AST SERPL-CCNC: 12 U/L
BASOPHILS NFR BLD: 0 %
BILIRUB SERPL-MCNC: 0.6 MG/DL
BUN SERPL-MCNC: 23 MG/DL
CALCIUM SERPL-MCNC: 8.6 MG/DL
CHLORIDE SERPL-SCNC: 100 MMOL/L
CO2 SERPL-SCNC: 27 MMOL/L
CREAT SERPL-MCNC: 0.9 MG/DL
DIFFERENTIAL METHOD: ABNORMAL
EOSINOPHIL NFR BLD: 0 %
ERYTHROCYTE [DISTWIDTH] IN BLOOD BY AUTOMATED COUNT: 27 %
EST. GFR  (AFRICAN AMERICAN): >60 ML/MIN/1.73 M^2
EST. GFR  (NON AFRICAN AMERICAN): >60 ML/MIN/1.73 M^2
GLUCOSE SERPL-MCNC: 233 MG/DL
HCT VFR BLD AUTO: 26.4 %
HGB BLD-MCNC: 8.7 G/DL
HYPOCHROMIA BLD QL SMEAR: ABNORMAL
LYMPHOCYTES NFR BLD: 5 %
MCH RBC QN AUTO: 22.7 PG
MCHC RBC AUTO-ENTMCNC: 33 %
MCV RBC AUTO: 69 FL
MONOCYTES NFR BLD: 3 %
NEUTROPHILS NFR BLD: 92 %
OVALOCYTES BLD QL SMEAR: ABNORMAL
PLATELET # BLD AUTO: 268 K/UL
PLATELET BLD QL SMEAR: ABNORMAL
PMV BLD AUTO: ABNORMAL FL
POCT GLUCOSE: 139 MG/DL (ref 70–110)
POCT GLUCOSE: 165 MG/DL (ref 70–110)
POCT GLUCOSE: 215 MG/DL (ref 70–110)
POCT GLUCOSE: 239 MG/DL (ref 70–110)
POIKILOCYTOSIS BLD QL SMEAR: SLIGHT
POLYCHROMASIA BLD QL SMEAR: ABNORMAL
POTASSIUM SERPL-SCNC: 4.9 MMOL/L
PROT SERPL-MCNC: 5.3 G/DL
RBC # BLD AUTO: 3.84 M/UL
SODIUM SERPL-SCNC: 137 MMOL/L
TACROLIMUS BLD-MCNC: 8.4 NG/ML
TOXIC GRANULES BLD QL SMEAR: PRESENT
WBC # BLD AUTO: 13.71 K/UL

## 2017-02-18 PROCEDURE — 94799 UNLISTED PULMONARY SVC/PX: CPT

## 2017-02-18 PROCEDURE — 63600175 PHARM REV CODE 636 W HCPCS: Performed by: NURSE PRACTITIONER

## 2017-02-18 PROCEDURE — 25000003 PHARM REV CODE 250: Performed by: STUDENT IN AN ORGANIZED HEALTH CARE EDUCATION/TRAINING PROGRAM

## 2017-02-18 PROCEDURE — 94664 DEMO&/EVAL PT USE INHALER: CPT

## 2017-02-18 PROCEDURE — 20600001 HC STEP DOWN PRIVATE ROOM

## 2017-02-18 PROCEDURE — 94761 N-INVAS EAR/PLS OXIMETRY MLT: CPT

## 2017-02-18 PROCEDURE — 85007 BL SMEAR W/DIFF WBC COUNT: CPT

## 2017-02-18 PROCEDURE — 63600175 PHARM REV CODE 636 W HCPCS: Performed by: INTERNAL MEDICINE

## 2017-02-18 PROCEDURE — 25000242 PHARM REV CODE 250 ALT 637 W/ HCPCS: Performed by: INTERNAL MEDICINE

## 2017-02-18 PROCEDURE — 80053 COMPREHEN METABOLIC PANEL: CPT

## 2017-02-18 PROCEDURE — 25000003 PHARM REV CODE 250: Performed by: PHYSICIAN ASSISTANT

## 2017-02-18 PROCEDURE — 94640 AIRWAY INHALATION TREATMENT: CPT

## 2017-02-18 PROCEDURE — 99232 SBSQ HOSP IP/OBS MODERATE 35: CPT | Mod: ,,, | Performed by: NURSE PRACTITIONER

## 2017-02-18 PROCEDURE — 25000003 PHARM REV CODE 250: Performed by: INTERNAL MEDICINE

## 2017-02-18 PROCEDURE — 80197 ASSAY OF TACROLIMUS: CPT

## 2017-02-18 PROCEDURE — 85027 COMPLETE CBC AUTOMATED: CPT

## 2017-02-18 PROCEDURE — 25000003 PHARM REV CODE 250: Performed by: THORACIC SURGERY (CARDIOTHORACIC VASCULAR SURGERY)

## 2017-02-18 PROCEDURE — 25000003 PHARM REV CODE 250: Performed by: NURSE PRACTITIONER

## 2017-02-18 RX ORDER — INSULIN ASPART 100 [IU]/ML
6 INJECTION, SOLUTION INTRAVENOUS; SUBCUTANEOUS
Status: DISCONTINUED | OUTPATIENT
Start: 2017-02-18 | End: 2017-02-22 | Stop reason: HOSPADM

## 2017-02-18 RX ORDER — LANCETS
EACH MISCELLANEOUS
Qty: 350 EACH | Refills: 3 | Status: SHIPPED | OUTPATIENT
Start: 2017-02-18 | End: 2018-07-23 | Stop reason: SDUPTHER

## 2017-02-18 RX ORDER — IBUPROFEN 200 MG
24 TABLET ORAL
Status: DISCONTINUED | OUTPATIENT
Start: 2017-02-18 | End: 2017-02-22 | Stop reason: HOSPADM

## 2017-02-18 RX ORDER — INSULIN PUMP SYRINGE, 3 ML
EACH MISCELLANEOUS
Qty: 1 EACH | Refills: 0 | Status: SHIPPED | OUTPATIENT
Start: 2017-02-18 | End: 2017-12-12

## 2017-02-18 RX ORDER — GLUCAGON 1 MG
1 KIT INJECTION
Status: DISCONTINUED | OUTPATIENT
Start: 2017-02-18 | End: 2017-02-22 | Stop reason: HOSPADM

## 2017-02-18 RX ORDER — INSULIN ASPART 100 [IU]/ML
1-10 INJECTION, SOLUTION INTRAVENOUS; SUBCUTANEOUS
Status: DISCONTINUED | OUTPATIENT
Start: 2017-02-18 | End: 2017-02-22 | Stop reason: HOSPADM

## 2017-02-18 RX ORDER — IBUPROFEN 200 MG
16 TABLET ORAL
Status: DISCONTINUED | OUTPATIENT
Start: 2017-02-18 | End: 2017-02-22 | Stop reason: HOSPADM

## 2017-02-18 RX ADMIN — FUROSEMIDE 60 MG: 10 INJECTION, SOLUTION INTRAMUSCULAR; INTRAVENOUS at 06:02

## 2017-02-18 RX ADMIN — IPRATROPIUM BROMIDE AND ALBUTEROL SULFATE 3 ML: .5; 3 SOLUTION RESPIRATORY (INHALATION) at 12:02

## 2017-02-18 RX ADMIN — INSULIN ASPART 1 UNITS: 100 INJECTION, SOLUTION INTRAVENOUS; SUBCUTANEOUS at 09:02

## 2017-02-18 RX ADMIN — INSULIN ASPART 4 UNITS: 100 INJECTION, SOLUTION INTRAVENOUS; SUBCUTANEOUS at 02:02

## 2017-02-18 RX ADMIN — IPRATROPIUM BROMIDE AND ALBUTEROL SULFATE 3 ML: .5; 3 SOLUTION RESPIRATORY (INHALATION) at 08:02

## 2017-02-18 RX ADMIN — FUROSEMIDE 60 MG: 10 INJECTION, SOLUTION INTRAMUSCULAR; INTRAVENOUS at 09:02

## 2017-02-18 RX ADMIN — OXYCODONE HYDROCHLORIDE 10 MG: 5 TABLET ORAL at 05:02

## 2017-02-18 RX ADMIN — CIPROFLOXACIN HYDROCHLORIDE 500 MG: 500 TABLET, FILM COATED ORAL at 09:02

## 2017-02-18 RX ADMIN — TERBUTALINE SULFATE 5 MG: 5 TABLET ORAL at 01:02

## 2017-02-18 RX ADMIN — INSULIN ASPART 18 UNITS: 100 INJECTION, SOLUTION INTRAVENOUS; SUBCUTANEOUS at 09:02

## 2017-02-18 RX ADMIN — MAGNESIUM OXIDE TAB 400 MG (241.3 MG ELEMENTAL MG) 400 MG: 400 (241.3 MG) TAB at 09:02

## 2017-02-18 RX ADMIN — NYSTATIN 500000 UNITS: 500000 SUSPENSION ORAL at 12:02

## 2017-02-18 RX ADMIN — OXYCODONE HYDROCHLORIDE 10 MG: 5 TABLET ORAL at 09:02

## 2017-02-18 RX ADMIN — TERBUTALINE SULFATE 5 MG: 5 TABLET ORAL at 06:02

## 2017-02-18 RX ADMIN — NYSTATIN 500000 UNITS: 500000 SUSPENSION ORAL at 05:02

## 2017-02-18 RX ADMIN — PREDNISONE 25 MG: 5 TABLET ORAL at 09:02

## 2017-02-18 RX ADMIN — MYCOPHENOLATE MOFETIL 1500 MG: 250 CAPSULE ORAL at 09:02

## 2017-02-18 RX ADMIN — INSULIN ASPART 18 UNITS: 100 INJECTION, SOLUTION INTRAVENOUS; SUBCUTANEOUS at 06:02

## 2017-02-18 RX ADMIN — TACROLIMUS 2 MG: 1 CAPSULE, GELATIN COATED ORAL at 07:02

## 2017-02-18 RX ADMIN — NYSTATIN 500000 UNITS: 500000 SUSPENSION ORAL at 07:02

## 2017-02-18 RX ADMIN — AMLODIPINE BESYLATE 10 MG: 5 TABLET ORAL at 09:02

## 2017-02-18 RX ADMIN — HYDROMORPHONE HYDROCHLORIDE 0.5 MG: 1 INJECTION, SOLUTION INTRAMUSCULAR; INTRAVENOUS; SUBCUTANEOUS at 07:02

## 2017-02-18 RX ADMIN — SULFAMETHOXAZOLE AND TRIMETHOPRIM 1 TABLET: 400; 80 TABLET ORAL at 09:02

## 2017-02-18 RX ADMIN — TACROLIMUS 2 MG: 1 CAPSULE, GELATIN COATED ORAL at 06:02

## 2017-02-18 RX ADMIN — NYSTATIN 500000 UNITS: 500000 SUSPENSION ORAL at 09:02

## 2017-02-18 RX ADMIN — DOCUSATE SODIUM 100 MG: 100 CAPSULE, LIQUID FILLED ORAL at 09:02

## 2017-02-18 RX ADMIN — PANTOPRAZOLE SODIUM 40 MG: 40 TABLET, DELAYED RELEASE ORAL at 09:02

## 2017-02-18 RX ADMIN — INSULIN ASPART 4 UNITS: 100 INJECTION, SOLUTION INTRAVENOUS; SUBCUTANEOUS at 06:02

## 2017-02-18 RX ADMIN — INSULIN ASPART 18 UNITS: 100 INJECTION, SOLUTION INTRAVENOUS; SUBCUTANEOUS at 12:02

## 2017-02-18 RX ADMIN — INSULIN GLARGINE 24 UNITS: 100 INJECTION, SOLUTION SUBCUTANEOUS at 09:02

## 2017-02-18 RX ADMIN — VALGANCICLOVIR 900 MG: 450 TABLET, FILM COATED ORAL at 09:02

## 2017-02-18 RX ADMIN — OXYCODONE HYDROCHLORIDE 10 MG: 5 TABLET ORAL at 06:02

## 2017-02-18 RX ADMIN — TERBUTALINE SULFATE 5 MG: 5 TABLET ORAL at 09:02

## 2017-02-18 RX ADMIN — IPRATROPIUM BROMIDE AND ALBUTEROL SULFATE 3 ML: .5; 3 SOLUTION RESPIRATORY (INHALATION) at 07:02

## 2017-02-18 RX ADMIN — POLYETHYLENE GLYCOL 3350 17 G: 17 POWDER, FOR SOLUTION ORAL at 09:02

## 2017-02-18 NOTE — ASSESSMENT & PLAN NOTE
BG goal 140-180- Continue Lantus 24 units nightly, Novolog 18 units AC. Increase bedtime snack Novolog to 6 units if eats nightly snack. BG monitoring AC/HS with moderate dose correction scale.     Pt is allergic to Levemir with reaction of severe itching. Pt to use home Lantus pens.     DISCHARGE PLANNING: Ongoing.  Will be discharged on MDI. Has Lantus and Novolog pens and has supplies.  Needs BG meter, strips, lancets rx - sent to [facility] per pt request   He is followed by Fabián Navarrete NP with [facility] ([place], [place]) for his DM management. Pt wishes to continue f/u with Rosalba NP  No f/u appts needed

## 2017-02-18 NOTE — PROGRESS NOTES
Scant purulent drainage noted to L groin incison, CHARLES. Pt reports tenderness to site. Dr. Marx assessed at bedside, placed wound care consult. Will continue to monitor.

## 2017-02-18 NOTE — SUBJECTIVE & OBJECTIVE
"Interval HPI:   Overnight events: BG at goal this AM, slightly elevated above goal overnight after snack. Changed from transition insulin infusion to levemir overnight per order. No d/c this weekend, possible d/c Monday per primary team  Eatin%  Nausea: No  Hypoglycemia and intervention: No  Fever: No  TPN and/or TF: No  If yes, type of TF/TPN and rate:     Visit Vitals    /61 (BP Location: Right arm, Patient Position: Sitting, BP Method: Automatic)    Pulse 82    Temp 97.8 °F (36.6 °C) (Oral)    Resp 18    Ht 5' 8" (1.727 m)    Wt 91.5 kg (201 lb 11.5 oz)    SpO2 96%    BMI 30.67 kg/m2       Labs Reviewed and Include      Recent Labs  Lab 17  0449   *   CALCIUM 8.6*   ALBUMIN 2.5*   PROT 5.3*      K 4.9   CO2 27      BUN 23*   CREATININE 0.9   ALKPHOS 64   ALT 19   AST 12   BILITOT 0.6     Lab Results   Component Value Date    WBC 13.71 (H) 2017    HGB 8.7 (L) 2017    HCT 26.4 (L) 2017    MCV 69 (L) 2017     2017     No results for input(s): TSH, FREET4 in the last 168 hours.  Lab Results   Component Value Date    HGBA1C 6.8 (H) 2017       Nutritional status:   Body mass index is 30.67 kg/(m^2).  Lab Results   Component Value Date    ALBUMIN 2.5 (L) 2017    ALBUMIN 2.3 (L) 2017    ALBUMIN 2.5 (L) 2017     Lab Results   Component Value Date    PREALBUMIN 19 (L) 2017    PREALBUMIN 14 (L) 2017    PREALBUMIN 14 (L) 2017       Estimated Creatinine Clearance: 103 mL/min (based on Cr of 0.9).    Accu-Checks  Recent Labs      17   0800  17   1155  17   1712  17   2200  17   0202  17   0749  17   1143  17   1658  17   0225   POCTGLUCOSE  279*  338*  312*  305*  144*  169*  163*  224*  130*  239*       Current Medications and/or Treatments Impacting Glycemic Control  Immunotherapy:  Immunosuppressants     Start     Stop Route " Frequency Ordered    02/13/17 2100  mycophenolate capsule 1,500 mg      -- Oral 2 times daily 02/13/17 0937    02/17/17 1800  tacrolimus capsule 2 mg      -- Oral 2 times daily 02/17/17 1126        Steroids:   Hormones     Start     Stop Route Frequency Ordered    02/17/17 0900  predniSONE tablet 25 mg      -- Oral 2 times daily 02/16/17 1159        Pressors:    Autonomic Drugs     None        Hyperglycemia/Diabetes Medications: Antihyperglycemics     Start     Stop Route Frequency Ordered    02/12/17 1130  insulin regular (Humulin R) 100 Units in sodium chloride 0.9% 100 mL infusion      -- IV Continuous 02/12/17 1016    02/15/17 1645  insulin aspart pen 18 Units      -- SubQ 3 times daily with meals 02/15/17 1251    02/15/17 2030  insulin aspart pen 4 Units      -- SubQ As needed (PRN) 02/15/17 1253    02/15/17 1418  insulin aspart pen 0-10 Units      -- SubQ As needed (PRN) 02/15/17 1318    02/17/17 2100  insulin glargine pen 24 Units     Question Answer Comment   Brand Name: Lantus    Generic name: glargine    Form: Pen Injector insulin pen from home   Length of Therapy: Indefinite    Reason for Non-Formulary allergy to formulary alternative    How soon needed? (normally 72 hrs needed to procure): 0-24 hrs Pt will have family member bring Lantus pen from home; staff will [facility]to pharmacy [facility].    Is the patient competent? Yes        -- SubQ Nightly 02/17/17 1640

## 2017-02-18 NOTE — PROGRESS NOTES
Progress Note  Heart Transplant Service    Admit Date: 1/30/2017   LOS: 19 days     SUBJECTIVE:     Follow up for: Heart transplanted    Interval History:   off,  started terbutaline, insulin gtt off, prograf increased to 2/2    Scheduled Meds:   albuterol-ipratropium 2.5mg-0.5mg/3mL  3 mL Nebulization Q4H    amlodipine  10 mg Oral Daily    ciprofloxacin HCl  500 mg Oral Q12H    docusate sodium  100 mg Oral BID    furosemide  60 mg Intravenous BID    insulin aspart  18 Units Subcutaneous TIDWM    insulin glargine  24 Units Subcutaneous QHS    magnesium oxide  400 mg Oral BID    mycophenolate  1,500 mg Oral BID    nystatin  500,000 Units Mouth/Throat QID (WM & HS)    pantoprazole  40 mg Oral Daily    polyethylene glycol  17 g Oral Daily    predniSONE  25 mg Oral BID    sulfamethoxazole-trimethoprim 400-80mg  1 tablet Oral Daily    tacrolimus  2 mg Oral BID    terbutaline  5 mg Oral TID    valganciclovir  900 mg Oral Daily     Continuous Infusions:   sodium chloride 0.45% 10 mL/hr (02/10/17 2000)    insulin (HUMAN R) infusion (adults) Stopped (02/17/17 2100)     PRN Meds:sodium chloride, sodium chloride, acetaminophen, dextrose 50%, dextrose 50%, glucagon (human recombinant), glucose, glucose, HYDROmorphone, insulin aspart, insulin aspart, magnesium sulfate IVPB, metoclopramide HCl, ondansetron, oxycodone, potassium chloride **AND** potassium chloride **AND** potassium chloride, sodium phosphate IVPB, sodium phosphate IVPB, sodium phosphate IVPB    Immunosuppressants     Start     Stop Route Frequency Ordered    02/13/17 2100  mycophenolate capsule 1,500 mg      -- Oral 2 times daily 02/13/17 0937    02/17/17 1800  tacrolimus capsule 2 mg      -- Oral 2 times daily 02/17/17 1126          Review of patient's allergies indicates:   Allergen Reactions    Levemir [insulin detemir] Itching    Pork/porcine containing products     Ranexa [ranolazine] Nausea Only       OBJECTIVE:     Vital Signs (Most  Recent)  Temp: 98 °F (36.7 °C) (02/17/17 2300)  Pulse: 75 (02/18/17 0500)  Resp: 16 (02/18/17 0021)  BP: (!) 125/58 (02/17/17 2300)  SpO2: 96 % (02/18/17 0021)    Vital Signs Range (Last 24H):  Temp:  [97.8 °F (36.6 °C)-98.4 °F (36.9 °C)]   Pulse:  [67-94]   Resp:  [16-18]   BP: (120-161)/(56-76)   SpO2:  [95 %-97 %]     I & O (Last 24H):    Intake/Output Summary (Last 24 hours) at 02/18/17 0552  Last data filed at 02/17/17 2300   Gross per 24 hour   Intake          1454.16 ml   Output             1970 ml   Net          -515.84 ml            Telemetry: sinus    Physical Exam   Constitutional: He is oriented to person, place, and time. He appears well-developed and well-nourished.   HENT:   Head: Normocephalic and atraumatic. 2cm well circumscribed plaque to right cheek, dry and crusted.  Eyes: EOM are normal. Pupils are equal, round, and reactive to light.   Neck: Normal range of motion. Neck supple. No JVD present.   Cardiovascular: S1 S2 audible, regular, tachycardic    Pulmonary/Chest: Bronchial breath sounds on left  Abdominal: Soft. Bowel sounds are normal. He exhibits no distension. There is no tenderness.   Musculoskeletal: Normal range of motion. He exhibits no edema.   Neurological: He is alert and oriented to person, place, and time.   Skin: Skin is warm and dry.   Extremities: BUE edematous. LUE slightly weaker than RUE. 2+ peripheral pulses.      Labs:       Recent Labs  Lab 02/15/17  0417 02/16/17  0444 02/17/17  0622   WBC 17.96*  17.96* 15.37* 10.45   HGB 9.0*  9.0* 9.2* 8.2*   HCT 27.5*  27.5* 27.7* 25.5*     255 276 242   LYMPH 2.4*  2.4*  0.4*  0.4* 3.1*  0.5* 6.4*  0.7*   MONO 5.3  5.3  1.0  1.0 7.1  1.1* 9.4  1.0   EOSINOPHIL 0.0  0.0 0.0 0.1         Recent Labs  Lab 02/13/17  0554 02/14/17  0425 02/15/17  0417   INR 1.5* 1.4* 1.6*          Recent Labs  Lab 02/14/17  1831 02/15/17  0014  02/15/17  0417 02/15/17  1357 02/16/17  0444 02/17/17  0622   GLU  --   --   --  203*  318* 177* 202*   CALCIUM  --   --   --  8.6* 8.4* 8.7 8.6*   ALBUMIN  --   --   --  2.6*  --  2.5* 2.3*   PROT  --   --   --  5.7*  --  5.3* 4.7*   NA  --   --   --  137 136 138 136   K  --   --   < > 4.0  4.0 3.8 4.4 4.1   CO2  --   --   --  30* 32* 33* 30*   CL  --   --   --  95 95 97 98   BUN  --   --   --  40* 36* 30* 27*   CREATININE  --   --   --  0.9 0.9 0.8 0.8   ALKPHOS  --   --   --  65  --  65 59   ALT  --   --   --  26  --  21 19   AST  --   --   --  20  --  17 13   BILITOT  --   --   --  0.6  --  0.6 0.6   MG 1.6 1.4*  --  1.7  --  1.2*  --    PHOS 4.7* 4.4  --  4.5  --   --   --    < > = values in this interval not displayed.  Estimated Creatinine Clearance: 115.9 mL/min (based on Cr of 0.8).      Recent Labs  Lab 02/15/17  1357   *       No results for input(s): LDH in the last 168 hours.    Microbiology Results (last 7 days)     Procedure Component Value Units Date/Time    Urine culture [[id number]]  (Susceptibility) Collected:  02/08/17 2000    Order Status:  Completed Specimen:  Urine from Urine, Clean Catch Updated:  02/11/17 1158     Urine Culture, Routine --     CITROBACTER KOSERI  >100,000 cfu/ml              ASSESSMENT:     55 yo M with PMHx of NICM s/p HM II (8/24/16), HLD, HTN, VT s/p ICD who presented to the hospital for elevated LDH in the setting of subtherapeutic INR with lower extremity swelling concerning for LVAD pump thrombosis, now s/p OHTx 2/9/17.       PLAN:     S/P Orthotopic Heart Transplantation 2/9/2017  -HTS primary  -Moderate Risk of Rejection: cPRA 0%, Negative Crossmatch with B Channel Shifts  -CMV Status: D-/R+: Will require valcyte x 3 months  -Immunosuppression: MMF 1500 mg BID, Prednisone 30 mg bid. Tacro 2/2  -On full OI prophylaxis  -EGBx #1 done   -ST following. Tolerating pureed diet with honey-thick liquids  -Right CT out 2/17    DM  -Insulin gtt per off overnight, appreciate endocrine management   -transitioned to lantus(home) and pre meal, has an allergy  to detemir so has to use home lantus    Right face lesion  - Unclear if this is trauma related from tape etc. Lesion is well circumscribed  - Derm consult given immunosuppressed patient     Bacteruria, Citrobacter Koseri  -UCx 1/19/2017 with 10-49k citrobacter  -Repeat UCx 2/8/17 with >100,000 citrobacter koseri  -started cipro 2/13/17 X 7 days    Improving LUE weakness and numbness  -Appreciate Neurology's help. Signed off

## 2017-02-18 NOTE — PROGRESS NOTES
Patient remains free from injury at present time.  Patients wife in room with him.  Patient voids per urinal; clear yellow urine noted.  D/C'd insulin drip at 2100 hours and gave 24 units Lantus.  Accu-checks AC/HS/0200; BG was 130 and 239 respectfully.  Gave 4 units coverage for the 239 at 0200.  VS remain stable; afebrile.  On telemetry; HR remains between 75 and 81.  Order to call for any HR <70.  DTI to patients R cheek, back of patients head, and heels of his feet.  Patient wears protective boots while in bed.  Old LVAD site to RLQ.  Pacer wires still attached; Atrial wires are grounded in tube and taped to abdomen while Ventricular wires aren't grounded in tube but are taped to patients abdomen.  Dental soft diet and honey thick liquids.  Call bell within close reach of patient; instructed to call for any assistance needed.  Patient verbalized understanding.  Will continue to monitor.

## 2017-02-18 NOTE — PROGRESS NOTES
"[facility]  Endocrinology  Progress Note    Admit Date: 2017     Reason for Consult: Management of T2DM     Surgical Procedure and Date: 17 s/p heart txp    Diabetes diagnosis year:     Home Diabetes Medications:  Lantus 16 u qam, Novolog 20 U AC     Diabetes Complications include:  Hyperglycemia, Diabetic chronic kidney disease and Diabetic gastroparesis      Complicating diabetes co morbidities: CHF and CKD    HPI: Patient is a 56 y.o. male with T2DM, ischemic cardiomyopathy, s/p LVAD placement in 2016. He is now s/p heart transplantation. Endocrine consulted for bg management.       Interval HPI:   Overnight events: BG at goal this AM, slightly elevated above goal overnight after snack. Changed from transition insulin infusion to levemir overnight per order. No d/c this weekend, possible d/c Monday per primary team  Eatin%  Nausea: No  Hypoglycemia and intervention: No  Fever: No  TPN and/or TF: No  If yes, type of TF/TPN and rate:     Visit Vitals    /61 (BP Location: Right arm, Patient Position: Sitting, BP Method: Automatic)    Pulse 82    Temp 97.8 °F (36.6 °C) (Oral)    Resp 18    Ht 5' 8" (1.727 m)    Wt 91.5 kg (201 lb 11.5 oz)    SpO2 96%    BMI 30.67 kg/m2       Labs Reviewed and Include      Recent Labs  Lab 17  0449   *   CALCIUM 8.6*   ALBUMIN 2.5*   PROT 5.3*      K 4.9   CO2 27      BUN 23*   CREATININE 0.9   ALKPHOS 64   ALT 19   AST 12   BILITOT 0.6     Lab Results   Component Value Date    WBC 13.71 (H) 2017    HGB 8.7 (L) 2017    HCT 26.4 (L) 2017    MCV 69 (L) 2017     2017     No results for input(s): TSH, FREET4 in the last 168 hours.  Lab Results   Component Value Date    HGBA1C 6.8 (H) 2017       Nutritional status:   Body mass index is 30.67 kg/(m^2).  Lab Results   Component Value Date    ALBUMIN 2.5 (L) 2017    ALBUMIN 2.3 (L) 2017    ALBUMIN 2.5 (L) " 02/16/2017     Lab Results   Component Value Date    PREALBUMIN 19 (L) 02/08/2017    PREALBUMIN 14 (L) 02/06/2017    PREALBUMIN 14 (L) 02/03/2017       Estimated Creatinine Clearance: 103 mL/min (based on Cr of 0.9).    Accu-Checks  Recent Labs      02/16/17   0800  02/16/17   1155  02/16/17   1712  02/16/17   2200  02/17/17   0202  02/17/17   0749  02/17/17   1143  02/17/17   1658  02/17/17   2121  02/18/17   0225   POCTGLUCOSE  279*  338*  312*  305*  144*  169*  163*  224*  130*  239*       Current Medications and/or Treatments Impacting Glycemic Control  Immunotherapy:  Immunosuppressants     Start     Stop Route Frequency Ordered    02/13/17 2100  mycophenolate capsule 1,500 mg      -- Oral 2 times daily 02/13/17 0937    02/17/17 1800  tacrolimus capsule 2 mg      -- Oral 2 times daily 02/17/17 1126        Steroids:   Hormones     Start     Stop Route Frequency Ordered    02/17/17 0900  predniSONE tablet 25 mg      -- Oral 2 times daily 02/16/17 1159        Pressors:    Autonomic Drugs     None        Hyperglycemia/Diabetes Medications: Antihyperglycemics     Start     Stop Route Frequency Ordered    02/12/17 1130  insulin regular (Humulin R) 100 Units in sodium chloride 0.9% 100 mL infusion      -- IV Continuous 02/12/17 1016    02/15/17 1645  insulin aspart pen 18 Units      -- SubQ 3 times daily with meals 02/15/17 1251    02/15/17 2030  insulin aspart pen 4 Units      -- SubQ As needed (PRN) 02/15/17 1253    02/15/17 1418  insulin aspart pen 0-10 Units      -- SubQ As needed (PRN) 02/15/17 1318    02/17/17 2100  insulin glargine pen 24 Units     Question Answer Comment   Brand Name: Lantus    Generic name: glargine    Form: Pen Injector insulin pen from home   Length of Therapy: Indefinite    Reason for Non-Formulary allergy to formulary alternative    How soon needed? (normally 72 hrs needed to procure): 0-24 hrs Pt will have family member bring Lantus pen from home; [facility].     Is the patient competent? Yes        -- SubQ Nightly 02/17/17 1640          ASSESSMENT and PLAN    Type 2 diabetes mellitus with stage 3 chronic kidney disease, with long-term current use of insulin  BG goal 140-180- Continue Lantus 24 units nightly, Novolog 18 units AC. Increase bedtime snack Novolog to 6 units if eats nightly snack. BG monitoring AC/HS with moderate dose correction scale.     Pt is allergic to Levemir with reaction of severe itching. Pt to use home Lantus pens.     DISCHARGE PLANNING: Ongoing.  Will be discharged on MDI. Has Lantus and Novolog pens and has supplies.  Needs BG meter, strips, lancets rx - sent to [facility] per pt request   He is followed by Fabián Navarrete NP with [facility] ([place], [place]) for his DM management. Pt wishes to continue f/u with Rosalba MENJIVAR  No f/u appts needed         * Heart transplanted  Per CTS, HTS      Ischemic cardiomyopathy  S/p heart txp        Chronic combined systolic and diastolic heart failure  S/p heart transplant       Adrenal cortical steroids causing adverse effect in therapeutic use  Increasing insulin needs  On Prednisone 25 mg BID.     Immunosuppression  May increase insulin resistance       Obesity (BMI 30-39.9)  Increases insulin resistance   Body mass index is 30.67 kg/(m^2).          Lorna Emerson NP  Endocrinology  [facility]

## 2017-02-19 LAB
ALBUMIN SERPL BCP-MCNC: 2.4 G/DL
ALP SERPL-CCNC: 60 U/L
ALT SERPL W/O P-5'-P-CCNC: 17 U/L
ANION GAP SERPL CALC-SCNC: 9 MMOL/L
ANISOCYTOSIS BLD QL SMEAR: SLIGHT
AST SERPL-CCNC: 9 U/L
BASO STIPL BLD QL SMEAR: ABNORMAL
BASOPHILS # BLD AUTO: ABNORMAL K/UL
BASOPHILS NFR BLD: 0 %
BILIRUB SERPL-MCNC: 0.5 MG/DL
BUN SERPL-MCNC: 22 MG/DL
CALCIUM SERPL-MCNC: 8.4 MG/DL
CHLORIDE SERPL-SCNC: 97 MMOL/L
CO2 SERPL-SCNC: 29 MMOL/L
CREAT SERPL-MCNC: 0.9 MG/DL
DIFFERENTIAL METHOD: ABNORMAL
EOSINOPHIL # BLD AUTO: ABNORMAL K/UL
EOSINOPHIL NFR BLD: 0 %
ERYTHROCYTE [DISTWIDTH] IN BLOOD BY AUTOMATED COUNT: 27.4 %
EST. GFR  (AFRICAN AMERICAN): >60 ML/MIN/1.73 M^2
EST. GFR  (NON AFRICAN AMERICAN): >60 ML/MIN/1.73 M^2
GLUCOSE SERPL-MCNC: 207 MG/DL
HCT VFR BLD AUTO: 25 %
HGB BLD-MCNC: 8.1 G/DL
HYPOCHROMIA BLD QL SMEAR: ABNORMAL
LYMPHOCYTES # BLD AUTO: ABNORMAL K/UL
LYMPHOCYTES NFR BLD: 4 %
MCH RBC QN AUTO: 22.5 PG
MCHC RBC AUTO-ENTMCNC: 32.4 %
MCV RBC AUTO: 69 FL
METAMYELOCYTES NFR BLD MANUAL: 1 %
MONOCYTES # BLD AUTO: ABNORMAL K/UL
MONOCYTES NFR BLD: 0 %
MYELOCYTES NFR BLD MANUAL: 1 %
NEUTROPHILS NFR BLD: 94 %
OVALOCYTES BLD QL SMEAR: ABNORMAL
PLATELET # BLD AUTO: 278 K/UL
PMV BLD AUTO: ABNORMAL FL
POCT GLUCOSE: 117 MG/DL (ref 70–110)
POCT GLUCOSE: 176 MG/DL (ref 70–110)
POCT GLUCOSE: 225 MG/DL (ref 70–110)
POCT GLUCOSE: 255 MG/DL (ref 70–110)
POIKILOCYTOSIS BLD QL SMEAR: SLIGHT
POLYCHROMASIA BLD QL SMEAR: ABNORMAL
POTASSIUM SERPL-SCNC: 4.9 MMOL/L
PROT SERPL-MCNC: 5 G/DL
RBC # BLD AUTO: 3.6 M/UL
SODIUM SERPL-SCNC: 135 MMOL/L
TACROLIMUS BLD-MCNC: 8.6 NG/ML
WBC # BLD AUTO: 15.58 K/UL

## 2017-02-19 PROCEDURE — 63600175 PHARM REV CODE 636 W HCPCS: Performed by: INTERNAL MEDICINE

## 2017-02-19 PROCEDURE — 80053 COMPREHEN METABOLIC PANEL: CPT

## 2017-02-19 PROCEDURE — 25000003 PHARM REV CODE 250: Performed by: THORACIC SURGERY (CARDIOTHORACIC VASCULAR SURGERY)

## 2017-02-19 PROCEDURE — 25000003 PHARM REV CODE 250: Performed by: STUDENT IN AN ORGANIZED HEALTH CARE EDUCATION/TRAINING PROGRAM

## 2017-02-19 PROCEDURE — 20600001 HC STEP DOWN PRIVATE ROOM

## 2017-02-19 PROCEDURE — 94799 UNLISTED PULMONARY SVC/PX: CPT

## 2017-02-19 PROCEDURE — 25000003 PHARM REV CODE 250: Performed by: INTERNAL MEDICINE

## 2017-02-19 PROCEDURE — 85007 BL SMEAR W/DIFF WBC COUNT: CPT

## 2017-02-19 PROCEDURE — 63600175 PHARM REV CODE 636 W HCPCS: Performed by: NURSE PRACTITIONER

## 2017-02-19 PROCEDURE — 94760 N-INVAS EAR/PLS OXIMETRY 1: CPT

## 2017-02-19 PROCEDURE — 99900035 HC TECH TIME PER 15 MIN (STAT)

## 2017-02-19 PROCEDURE — 94761 N-INVAS EAR/PLS OXIMETRY MLT: CPT

## 2017-02-19 PROCEDURE — 25000242 PHARM REV CODE 250 ALT 637 W/ HCPCS: Performed by: INTERNAL MEDICINE

## 2017-02-19 PROCEDURE — 94640 AIRWAY INHALATION TREATMENT: CPT

## 2017-02-19 PROCEDURE — 80197 ASSAY OF TACROLIMUS: CPT

## 2017-02-19 PROCEDURE — 99232 SBSQ HOSP IP/OBS MODERATE 35: CPT | Mod: ,,, | Performed by: NURSE PRACTITIONER

## 2017-02-19 PROCEDURE — 94664 DEMO&/EVAL PT USE INHALER: CPT

## 2017-02-19 PROCEDURE — 25000003 PHARM REV CODE 250: Performed by: NURSE PRACTITIONER

## 2017-02-19 PROCEDURE — 25000003 PHARM REV CODE 250: Performed by: PHYSICIAN ASSISTANT

## 2017-02-19 PROCEDURE — 36415 COLL VENOUS BLD VENIPUNCTURE: CPT

## 2017-02-19 PROCEDURE — 85027 COMPLETE CBC AUTOMATED: CPT

## 2017-02-19 RX ORDER — MUPIROCIN 20 MG/G
OINTMENT TOPICAL 3 TIMES DAILY
Status: DISCONTINUED | OUTPATIENT
Start: 2017-02-19 | End: 2017-02-22 | Stop reason: HOSPADM

## 2017-02-19 RX ORDER — INSULIN ASPART 100 [IU]/ML
20 INJECTION, SOLUTION INTRAVENOUS; SUBCUTANEOUS
Status: DISCONTINUED | OUTPATIENT
Start: 2017-02-19 | End: 2017-02-22

## 2017-02-19 RX ORDER — TACROLIMUS 1 MG/1
3 CAPSULE ORAL 2 TIMES DAILY
Status: DISCONTINUED | OUTPATIENT
Start: 2017-02-20 | End: 2017-02-22 | Stop reason: HOSPADM

## 2017-02-19 RX ADMIN — NYSTATIN 500000 UNITS: 500000 SUSPENSION ORAL at 12:02

## 2017-02-19 RX ADMIN — MYCOPHENOLATE MOFETIL 1500 MG: 250 CAPSULE ORAL at 08:02

## 2017-02-19 RX ADMIN — TACROLIMUS 2 MG: 1 CAPSULE, GELATIN COATED ORAL at 08:02

## 2017-02-19 RX ADMIN — NYSTATIN 500000 UNITS: 500000 SUSPENSION ORAL at 08:02

## 2017-02-19 RX ADMIN — PREDNISONE 25 MG: 5 TABLET ORAL at 08:02

## 2017-02-19 RX ADMIN — IPRATROPIUM BROMIDE AND ALBUTEROL SULFATE 3 ML: .5; 3 SOLUTION RESPIRATORY (INHALATION) at 12:02

## 2017-02-19 RX ADMIN — VALGANCICLOVIR 900 MG: 450 TABLET, FILM COATED ORAL at 08:02

## 2017-02-19 RX ADMIN — TACROLIMUS 2 MG: 1 CAPSULE, GELATIN COATED ORAL at 05:02

## 2017-02-19 RX ADMIN — POLYETHYLENE GLYCOL 3350 17 G: 17 POWDER, FOR SOLUTION ORAL at 08:02

## 2017-02-19 RX ADMIN — FUROSEMIDE 60 MG: 10 INJECTION, SOLUTION INTRAMUSCULAR; INTRAVENOUS at 08:02

## 2017-02-19 RX ADMIN — OXYCODONE HYDROCHLORIDE 10 MG: 5 TABLET ORAL at 08:02

## 2017-02-19 RX ADMIN — INSULIN ASPART 4 UNITS: 100 INJECTION, SOLUTION INTRAVENOUS; SUBCUTANEOUS at 07:02

## 2017-02-19 RX ADMIN — IPRATROPIUM BROMIDE AND ALBUTEROL SULFATE 3 ML: .5; 3 SOLUTION RESPIRATORY (INHALATION) at 05:02

## 2017-02-19 RX ADMIN — TERBUTALINE SULFATE 5 MG: 5 TABLET ORAL at 06:02

## 2017-02-19 RX ADMIN — NYSTATIN 500000 UNITS: 500000 SUSPENSION ORAL at 05:02

## 2017-02-19 RX ADMIN — IPRATROPIUM BROMIDE AND ALBUTEROL SULFATE 3 ML: .5; 3 SOLUTION RESPIRATORY (INHALATION) at 07:02

## 2017-02-19 RX ADMIN — PANTOPRAZOLE SODIUM 40 MG: 40 TABLET, DELAYED RELEASE ORAL at 08:02

## 2017-02-19 RX ADMIN — DOCUSATE SODIUM 100 MG: 100 CAPSULE, LIQUID FILLED ORAL at 08:02

## 2017-02-19 RX ADMIN — TERBUTALINE SULFATE 5 MG: 5 TABLET ORAL at 08:02

## 2017-02-19 RX ADMIN — MAGNESIUM OXIDE TAB 400 MG (241.3 MG ELEMENTAL MG) 400 MG: 400 (241.3 MG) TAB at 08:02

## 2017-02-19 RX ADMIN — IPRATROPIUM BROMIDE AND ALBUTEROL SULFATE 3 ML: .5; 3 SOLUTION RESPIRATORY (INHALATION) at 11:02

## 2017-02-19 RX ADMIN — AMLODIPINE BESYLATE 10 MG: 5 TABLET ORAL at 08:02

## 2017-02-19 RX ADMIN — OXYCODONE HYDROCHLORIDE 10 MG: 5 TABLET ORAL at 03:02

## 2017-02-19 RX ADMIN — CIPROFLOXACIN HYDROCHLORIDE 500 MG: 500 TABLET, FILM COATED ORAL at 08:02

## 2017-02-19 RX ADMIN — INSULIN ASPART 6 UNITS: 100 INJECTION, SOLUTION INTRAVENOUS; SUBCUTANEOUS at 08:02

## 2017-02-19 RX ADMIN — SULFAMETHOXAZOLE AND TRIMETHOPRIM 1 TABLET: 400; 80 TABLET ORAL at 08:02

## 2017-02-19 RX ADMIN — INSULIN GLARGINE 24 UNITS: 100 INJECTION, SOLUTION SUBCUTANEOUS at 08:02

## 2017-02-19 RX ADMIN — INSULIN ASPART 20 UNITS: 100 INJECTION, SOLUTION INTRAVENOUS; SUBCUTANEOUS at 07:02

## 2017-02-19 RX ADMIN — FUROSEMIDE 60 MG: 10 INJECTION, SOLUTION INTRAMUSCULAR; INTRAVENOUS at 05:02

## 2017-02-19 RX ADMIN — IPRATROPIUM BROMIDE AND ALBUTEROL SULFATE 3 ML: .5; 3 SOLUTION RESPIRATORY (INHALATION) at 08:02

## 2017-02-19 RX ADMIN — TERBUTALINE SULFATE 5 MG: 5 TABLET ORAL at 02:02

## 2017-02-19 RX ADMIN — INSULIN ASPART 20 UNITS: 100 INJECTION, SOLUTION INTRAVENOUS; SUBCUTANEOUS at 12:02

## 2017-02-19 RX ADMIN — INSULIN ASPART 18 UNITS: 100 INJECTION, SOLUTION INTRAVENOUS; SUBCUTANEOUS at 08:02

## 2017-02-19 NOTE — SUBJECTIVE & OBJECTIVE
"Interval HPI:   Overnight events: BG slightly elevated with prandial excursions. Possible d/c Monday per primary team   Eatin%  Nausea: No  Hypoglycemia and intervention: No  Fever: No  TPN and/or TF: No  If yes, type of TF/TPN and rate:     Visit Vitals    BP (!) 121/55 (BP Location: Right arm, Patient Position: Sitting, BP Method: Automatic)    Pulse 85    Temp 97.8 °F (36.6 °C) (Oral)    Resp 18    Ht 5' 8" (1.727 m)    Wt 91.5 kg (201 lb 11.5 oz)    SpO2 97%    BMI 30.67 kg/m2       Labs Reviewed and Include      Recent Labs  Lab 17  0450   *   CALCIUM 8.4*   ALBUMIN 2.4*   PROT 5.0*   *   K 4.9   CO2 29   CL 97   BUN 22*   CREATININE 0.9   ALKPHOS 60   ALT 17   AST 9*   BILITOT 0.5     Lab Results   Component Value Date    WBC 15.58 (H) 2017    HGB 8.1 (L) 2017    HCT 25.0 (L) 2017    MCV 69 (L) 2017     2017     No results for input(s): TSH, FREET4 in the last 168 hours.  Lab Results   Component Value Date    HGBA1C 6.8 (H) 2017       Nutritional status:   Body mass index is 30.67 kg/(m^2).  Lab Results   Component Value Date    ALBUMIN 2.4 (L) 2017    ALBUMIN 2.5 (L) 2017    ALBUMIN 2.3 (L) 2017     Lab Results   Component Value Date    PREALBUMIN 19 (L) 2017    PREALBUMIN 14 (L) 2017    PREALBUMIN 14 (L) 2017       Estimated Creatinine Clearance: 103 mL/min (based on Cr of 0.9).    Accu-Checks  Recent Labs      17   0202  17   0749  17   1143  17   1658  17   2121  17   0225  17   0758  17   1240  17   1843  175   POCTGLUCOSE  144*  169*  163*  224*  130*  239*  139*  165*  215*  176*       Current Medications and/or Treatments Impacting Glycemic Control  Immunotherapy:  Immunosuppressants     Start     Stop Route Frequency Ordered    17  mycophenolate capsule 1,500 mg      -- Oral 2 times daily 17 0937    17 " 1800  tacrolimus capsule 2 mg      -- Oral 2 times daily 02/17/17 1126        Steroids:   Hormones     Start     Stop Route Frequency Ordered    02/17/17 0900  predniSONE tablet 25 mg      -- Oral 2 times daily 02/16/17 1159        Pressors:    Autonomic Drugs     None        Hyperglycemia/Diabetes Medications: Antihyperglycemics     Start     Stop Route Frequency Ordered    02/17/17 2100  insulin glargine pen 24 Units     Question Answer Comment   Brand Name: Lantus    Generic name: glargine    Form: Pen Injector insulin pen from home   Length of Therapy: Indefinite    Reason for Non-Formulary allergy to formulary alternative    How soon needed? (normally 72 hrs needed to procure): 0-24 hrs Pt will have family member bring Lantus pen from home; staff will [facility]to [facility].    Is the patient competent? Yes        -- SubQ Nightly 02/17/17 1640    02/18/17 1019  insulin aspart pen 6 Units      -- SubQ As needed (PRN) 02/18/17 1019    02/18/17 1120  insulin aspart pen 1-10 Units      -- SubQ Before meals & nightly PRN 02/18/17 1020    02/19/17 1130  insulin aspart pen 20 Units      -- SubQ 3 times daily with meals 02/19/17 1033

## 2017-02-19 NOTE — PROGRESS NOTES
Pt's heartrate noted to drop below 70 2x between 8937-9708 (68 for <1 minute ~1005 and 67 for <1 minute ~1012). Pt is OOB in chair, asymptomatic. Confirmed that HR matches telemetry monitoring by assessing apical and radial pulses in room. Notified Dr. Gonzalez with HTS. Confirmed that pt has been in sinus rhythm this morning/ though discussed events. Will continue to monitor and notify on call MD with HTS for HR <70 BPM for >1 minute, or if rhythm changes noted.

## 2017-02-19 NOTE — PROGRESS NOTES
"[facility]  Endocrinology  Progress Note    Admit Date: 2017     Reason for Consult: Management of T2DM     Surgical Procedure and Date: 17 s/p heart txp    Diabetes diagnosis year:     Home Diabetes Medications:  Lantus 16 u qam, Novolog 20 U AC     Diabetes Complications include:  Hyperglycemia, Diabetic chronic kidney disease and Diabetic gastroparesis      Complicating diabetes co morbidities: CHF and CKD    HPI: Patient is a 56 y.o. male with T2DM, ischemic cardiomyopathy, s/p LVAD placement in 2016. He is now s/p heart transplantation. Endocrine consulted for bg management.       Interval HPI:   Overnight events: BG slightly elevated with prandial excursions. Possible d/c Monday per primary team   Eatin%  Nausea: No  Hypoglycemia and intervention: No  Fever: No  TPN and/or TF: No  If yes, type of TF/TPN and rate:     Visit Vitals    BP (!) 121/55 (BP Location: Right arm, Patient Position: Sitting, BP Method: Automatic)    Pulse 85    Temp 97.8 °F (36.6 °C) (Oral)    Resp 18    Ht 5' 8" (1.727 m)    Wt 91.5 kg (201 lb 11.5 oz)    SpO2 97%    BMI 30.67 kg/m2       Labs Reviewed and Include      Recent Labs  Lab 17  0450   *   CALCIUM 8.4*   ALBUMIN 2.4*   PROT 5.0*   *   K 4.9   CO2 29   CL 97   BUN 22*   CREATININE 0.9   ALKPHOS 60   ALT 17   AST 9*   BILITOT 0.5     Lab Results   Component Value Date    WBC 15.58 (H) 2017    HGB 8.1 (L) 2017    HCT 25.0 (L) 2017    MCV 69 (L) 2017     2017     No results for input(s): TSH, FREET4 in the last 168 hours.  Lab Results   Component Value Date    HGBA1C 6.8 (H) 2017       Nutritional status:   Body mass index is 30.67 kg/(m^2).  Lab Results   Component Value Date    ALBUMIN 2.4 (L) 2017    ALBUMIN 2.5 (L) 2017    ALBUMIN 2.3 (L) 2017     Lab Results   Component Value Date    PREALBUMIN 19 (L) 2017    PREALBUMIN 14 (L) 2017    " PREALBUMIN 14 (L) 02/03/2017       Estimated Creatinine Clearance: 103 mL/min (based on Cr of 0.9).    Accu-Checks  Recent Labs      02/17/17   0202  02/17/17   0749  02/17/17   1143  02/17/17   1658  02/17/17   2121  02/18/17   0225  02/18/17   0758  02/18/17   1240  02/18/17   1843  02/18/17   2125   POCTGLUCOSE  144*  169*  163*  224*  130*  239*  139*  165*  215*  176*       Current Medications and/or Treatments Impacting Glycemic Control  Immunotherapy:  Immunosuppressants     Start     Stop Route Frequency Ordered    02/13/17 2100  mycophenolate capsule 1,500 mg      -- Oral 2 times daily 02/13/17 0937    02/17/17 1800  tacrolimus capsule 2 mg      -- Oral 2 times daily 02/17/17 1126        Steroids:   Hormones     Start     Stop Route Frequency Ordered    02/17/17 0900  predniSONE tablet 25 mg      -- Oral 2 times daily 02/16/17 1159        Pressors:    Autonomic Drugs     None        Hyperglycemia/Diabetes Medications: Antihyperglycemics     Start     Stop Route Frequency Ordered    02/17/17 2100  insulin glargine pen 24 Units     Question Answer Comment   Brand Name: Lantus    Generic name: glargine    Form: Pen Injector insulin pen from home   Length of Therapy: Indefinite    Reason for Non-Formulary allergy to formulary alternative    How soon needed? (normally 72 hrs needed to procure): 0-24 hrs Pt will have family member bring Lantus pen from home; staff will [facility]to [facility].    Is the patient competent? Yes        -- SubQ Nightly 02/17/17 1640    02/18/17 1019  insulin aspart pen 6 Units      -- SubQ As needed (PRN) 02/18/17 1019    02/18/17 1120  insulin aspart pen 1-10 Units      -- SubQ Before meals & nightly PRN 02/18/17 1020    02/19/17 1130  insulin aspart pen 18 Units      -- SubQ 3 times daily with meals 02/19/17 1033          ASSESSMENT and PLAN    Type 2 diabetes mellitus with stage 3 chronic kidney disease, with long-term current use of insulin  BG goal 140-180- Continue Lantus  24 units nightly, increase Novolog to 20 units AC. Continue Novolog 6 units if eats nightly snack. BG monitoring AC/HS with moderate dose correction scale.     Pt is allergic to Levemir with reaction of severe itching. Pt to use home Lantus pens.     DISCHARGE PLANNING: Ongoing.  Will be discharged on MDI. Has Lantus and Novolog pens and has supplies.  Needs BG meter, strips, lancets rx - sent to [facility] per pt request   He is followed by Fabián Navarrete NP with [facility] ([place], [place]) for his DM management. Pt wishes to continue f/u with Rosalba MENJIVAR  No f/u appts needed         * Heart transplanted  Per CTS, HTS      Ischemic cardiomyopathy  S/p heart txp        Chronic combined systolic and diastolic heart failure  S/p heart transplant       Adrenal cortical steroids causing adverse effect in therapeutic use  Increasing insulin needs  On Prednisone 25 mg BID.     Immunosuppression  May increase insulin resistance       Obesity (BMI 30-39.9)  Increases insulin resistance   Body mass index is 30.67 kg/(m^2).          Lorna Emerson NP  Endocrinology  [facility]

## 2017-02-19 NOTE — ASSESSMENT & PLAN NOTE
BG goal 140-180- Continue Lantus 24 units nightly, increase Novolog to 20 units AC. Continue Novolog 6 units if eats nightly snack. BG monitoring AC/HS with moderate dose correction scale.     Pt is allergic to Levemir with reaction of severe itching. Pt to use home Lantus pens.     DISCHARGE PLANNING: Ongoing.  Will be discharged on MDI. Has Lantus and Novolog pens and has supplies.  Needs BG meter, strips, lancets rx - sent to [facility] per pt request   He is followed by Fabián Navarrete NP with [facility] ([place], [place]) for his DM management. Pt wishes to continue f/u with Rosalba MENJIVAR  No f/u appts needed

## 2017-02-19 NOTE — PROGRESS NOTES
Progress Note  Heart Transplant Service    Admit Date: 1/30/2017   LOS: 20 days     SUBJECTIVE:     Follow up for: Heart transplanted    Interval History:   off,  started terbutaline, insulin gtt off, prograf increased to 2/2, seen by derm today    Scheduled Meds:   albuterol-ipratropium 2.5mg-0.5mg/3mL  3 mL Nebulization Q4H    amlodipine  10 mg Oral Daily    ciprofloxacin HCl  500 mg Oral Q12H    docusate sodium  100 mg Oral BID    furosemide  60 mg Intravenous BID    insulin aspart  20 Units Subcutaneous TIDWM    insulin glargine  24 Units Subcutaneous QHS    magnesium oxide  400 mg Oral BID    mycophenolate  1,500 mg Oral BID    nystatin  500,000 Units Mouth/Throat QID (WM & HS)    pantoprazole  40 mg Oral Daily    polyethylene glycol  17 g Oral Daily    predniSONE  25 mg Oral BID    sulfamethoxazole-trimethoprim 400-80mg  1 tablet Oral Daily    tacrolimus  2 mg Oral BID    terbutaline  5 mg Oral TID    valganciclovir  900 mg Oral Daily     Continuous Infusions:   sodium chloride 0.45% 10 mL/hr (02/10/17 2000)     PRN Meds:sodium chloride, sodium chloride, acetaminophen, dextrose 50%, dextrose 50%, glucagon (human recombinant), glucose, glucose, HYDROmorphone, insulin aspart, insulin aspart, magnesium sulfate IVPB, metoclopramide HCl, ondansetron, oxycodone, potassium chloride **AND** potassium chloride **AND** potassium chloride, sodium phosphate IVPB, sodium phosphate IVPB, sodium phosphate IVPB    Immunosuppressants     Start     Stop Route Frequency Ordered    02/13/17 2100  mycophenolate capsule 1,500 mg      -- Oral 2 times daily 02/13/17 0937    02/17/17 1800  tacrolimus capsule 2 mg      -- Oral 2 times daily 02/17/17 1126          Review of patient's allergies indicates:   Allergen Reactions    Levemir [insulin detemir] Itching    Pork/porcine containing products     Ranexa [ranolazine] Nausea Only       OBJECTIVE:     Vital Signs (Most Recent)  Temp: 97.7 °F (36.5 °C) (02/19/17  1607)  Pulse: 81 (02/19/17 1655)  Resp: 20 (02/19/17 1607)  BP: (!) 116/56 (02/19/17 1607)  SpO2: 97 % (02/19/17 1607)    Vital Signs Range (Last 24H):  Temp:  [97.7 °F (36.5 °C)-98.4 °F (36.9 °C)]   Pulse:  [75-91]   Resp:  [16-20]   BP: (112-130)/(53-61)   SpO2:  [95 %-98 %]     I & O (Last 24H):    Intake/Output Summary (Last 24 hours) at 02/19/17 1735  Last data filed at 02/19/17 1400   Gross per 24 hour   Intake             1490 ml   Output             2800 ml   Net            -1310 ml            Telemetry: sinus    Physical Exam   Constitutional: He is oriented to person, place, and time. He appears well-developed and well-nourished.   HENT:   Head: Normocephalic and atraumatic. 2cm well circumscribed plaque to right cheek, dry and crusted.  Eyes: EOM are normal. Pupils are equal, round, and reactive to light.   Neck: Normal range of motion. Neck supple. No JVD present.   Cardiovascular: S1 S2 audible, regular, tachycardic    Pulmonary/Chest: Bronchial breath sounds on left  Abdominal: Soft. Bowel sounds are normal. He exhibits no distension. There is no tenderness.   Musculoskeletal: Normal range of motion. He exhibits no edema.   Neurological: He is alert and oriented to person, place, and time.   Skin: Skin is warm and dry.   Extremities: BUE edematous. LUE slightly weaker than RUE. 2+ peripheral pulses.      Labs:       Recent Labs  Lab 02/17/17  0622 02/18/17  0449 02/19/17  0450   WBC 10.45 13.71* 15.58*   HGB 8.2* 8.7* 8.1*   HCT 25.5* 26.4* 25.0*    268 278   LYMPH 6.4*  0.7* 5.0* 4.0*  CANCELED   MONO 9.4  1.0 3.0* 0.0*  CANCELED   EOSINOPHIL 0.1 0.0 0.0         Recent Labs  Lab 02/13/17  0554 02/14/17  0915 02/15/17  0417   INR 1.5* 1.4* 1.6*          Recent Labs  Lab 02/14/17  1831 02/15/17  0014  02/15/17  0417  02/16/17  0444 02/17/17  0622 02/18/17  0449 02/19/17  0450   GLU  --   --   --  203*  < > 177* 202* 233* 207*   CALCIUM  --   --   --  8.6*  < > 8.7 8.6* 8.6* 8.4*   ALBUMIN  --    --   --  2.6*  --  2.5* 2.3* 2.5* 2.4*   PROT  --   --   --  5.7*  --  5.3* 4.7* 5.3* 5.0*   NA  --   --   --  137  < > 138 136 137 135*   K  --   --   < > 4.0  4.0  < > 4.4 4.1 4.9 4.9   CO2  --   --   --  30*  < > 33* 30* 27 29   CL  --   --   --  95  < > 97 98 100 97   BUN  --   --   --  40*  < > 30* 27* 23* 22*   CREATININE  --   --   --  0.9  < > 0.8 0.8 0.9 0.9   ALKPHOS  --   --   --  65  --  65 59 64 60   ALT  --   --   --  26  --  21 19 19 17   AST  --   --   --  20  --  17 13 12 9*   BILITOT  --   --   --  0.6  --  0.6 0.6 0.6 0.5   MG 1.6 1.4*  --  1.7  --  1.2*  --   --   --    PHOS 4.7* 4.4  --  4.5  --   --   --   --   --    < > = values in this interval not displayed.  Estimated Creatinine Clearance: 103 mL/min (based on Cr of 0.9).      Recent Labs  Lab 02/15/17  1357   *       No results for input(s): LDH in the last 168 hours.    Microbiology Results (last 7 days)     ** No results found for the last 168 hours. **            ASSESSMENT:     57 yo M with PMHx of NICM s/p HM II (8/24/16), HLD, HTN, VT s/p ICD who presented to the hospital for elevated LDH in the setting of subtherapeutic INR with lower extremity swelling concerning for LVAD pump thrombosis, now s/p OHTx 2/9/17.       PLAN:     S/P Orthotopic Heart Transplantation 2/9/2017  -HTS primary  -Moderate Risk of Rejection: cPRA 0%, Negative Crossmatch with B Channel Shifts  -CMV Status: D-/R+: Will require valcyte x 3 months  -Immunosuppression: MMF 1500 mg BID, Prednisone 30 mg bid. [place] 2/2  -On full OI prophylaxis  -EGBx #1 done   -ST following. Tolerating pureed diet with honey-thick liquids  -Right CT out 2/17    DM  -Insulin gtt per off overnight, appreciate endocrine management   -transitioned to lantus(home) and pre meal, has an allergy to detemir so has to use home lantus    Right face lesion  - Appreciate Dermatology recommendations  - Mupirocin 2% to lesion TID x 5 days, biopsy only if no improvement, will likely refer  for OP follow up    Bacteruria, Citrobacter Koseri  -UCx 1/19/2017 with 10-49k citrobacter  -Repeat UCx 2/8/17 with >100,000 citrobacter koseri  -started cipro 2/13/17 X 7 days    Improving LUE weakness and numbness  -Appreciate Neurology's help. Signed off

## 2017-02-20 ENCOUNTER — TELEPHONE (OUTPATIENT)
Dept: TRANSPLANT | Facility: CLINIC | Age: 57
End: 2017-02-20

## 2017-02-20 DIAGNOSIS — I10 ESSENTIAL HYPERTENSION: ICD-10-CM

## 2017-02-20 DIAGNOSIS — Z79.52 LONG TERM CURRENT USE OF SYSTEMIC STEROIDS: ICD-10-CM

## 2017-02-20 DIAGNOSIS — T86.20 COMPLICATION OF HEART TRANSPLANT, UNSPECIFIED COMPLICATION: ICD-10-CM

## 2017-02-20 DIAGNOSIS — Z79.899 ENCOUNTER FOR LONG-TERM (CURRENT) USE OF MEDICATIONS: ICD-10-CM

## 2017-02-20 DIAGNOSIS — E78.2 MIXED HYPERLIPIDEMIA: ICD-10-CM

## 2017-02-20 DIAGNOSIS — Z94.1 STATUS POST HEART TRANSPLANT: ICD-10-CM

## 2017-02-20 DIAGNOSIS — R06.02 SHORTNESS OF BREATH: ICD-10-CM

## 2017-02-20 LAB
ALBUMIN SERPL BCP-MCNC: 2.7 G/DL
ALP SERPL-CCNC: 71 U/L
ALT SERPL W/O P-5'-P-CCNC: 19 U/L
ANION GAP SERPL CALC-SCNC: 10 MMOL/L
ANISOCYTOSIS BLD QL SMEAR: SLIGHT
AST SERPL-CCNC: 11 U/L
BASOPHILS # BLD AUTO: ABNORMAL K/UL
BASOPHILS NFR BLD: 0 %
BILIRUB SERPL-MCNC: 0.6 MG/DL
BUN SERPL-MCNC: 25 MG/DL
BURR CELLS BLD QL SMEAR: ABNORMAL
CALCIUM SERPL-MCNC: 8.8 MG/DL
CHLORIDE SERPL-SCNC: 96 MMOL/L
CO2 SERPL-SCNC: 28 MMOL/L
CREAT SERPL-MCNC: 1.2 MG/DL
DACRYOCYTES BLD QL SMEAR: ABNORMAL
DIFFERENTIAL METHOD: ABNORMAL
EOSINOPHIL # BLD AUTO: ABNORMAL K/UL
EOSINOPHIL NFR BLD: 1 %
ERYTHROCYTE [DISTWIDTH] IN BLOOD BY AUTOMATED COUNT: 27.6 %
EST. GFR  (AFRICAN AMERICAN): >60 ML/MIN/1.73 M^2
EST. GFR  (NON AFRICAN AMERICAN): >60 ML/MIN/1.73 M^2
GLUCOSE SERPL-MCNC: 243 MG/DL
HCT VFR BLD AUTO: 26.8 %
HGB BLD-MCNC: 8.8 G/DL
HYPOCHROMIA BLD QL SMEAR: ABNORMAL
LYMPHOCYTES # BLD AUTO: ABNORMAL K/UL
LYMPHOCYTES NFR BLD: 4 %
MCH RBC QN AUTO: 22.6 PG
MCHC RBC AUTO-ENTMCNC: 32.8 %
MCV RBC AUTO: 69 FL
MONOCYTES # BLD AUTO: ABNORMAL K/UL
MONOCYTES NFR BLD: 3 %
MYELOCYTES NFR BLD MANUAL: 1 %
NEUTROPHILS NFR BLD: 91 %
OVALOCYTES BLD QL SMEAR: ABNORMAL
PLATELET # BLD AUTO: 345 K/UL
PLATELET BLD QL SMEAR: ABNORMAL
PMV BLD AUTO: ABNORMAL FL
POCT GLUCOSE: 153 MG/DL (ref 70–110)
POCT GLUCOSE: 217 MG/DL (ref 70–110)
POIKILOCYTOSIS BLD QL SMEAR: SLIGHT
POLYCHROMASIA BLD QL SMEAR: ABNORMAL
POTASSIUM SERPL-SCNC: 4.8 MMOL/L
PROT SERPL-MCNC: 5.8 G/DL
RBC # BLD AUTO: 3.9 M/UL
SCHISTOCYTES BLD QL SMEAR: ABNORMAL
SODIUM SERPL-SCNC: 134 MMOL/L
TACROLIMUS BLD-MCNC: 10 NG/ML
TARGETS BLD QL SMEAR: ABNORMAL
WBC # BLD AUTO: 22.55 K/UL

## 2017-02-20 PROCEDURE — 63600175 PHARM REV CODE 636 W HCPCS: Performed by: INTERNAL MEDICINE

## 2017-02-20 PROCEDURE — 25000003 PHARM REV CODE 250: Performed by: THORACIC SURGERY (CARDIOTHORACIC VASCULAR SURGERY)

## 2017-02-20 PROCEDURE — 85027 COMPLETE CBC AUTOMATED: CPT

## 2017-02-20 PROCEDURE — 80053 COMPREHEN METABOLIC PANEL: CPT

## 2017-02-20 PROCEDURE — 87040 BLOOD CULTURE FOR BACTERIA: CPT | Mod: 59

## 2017-02-20 PROCEDURE — 36415 COLL VENOUS BLD VENIPUNCTURE: CPT

## 2017-02-20 PROCEDURE — 85007 BL SMEAR W/DIFF WBC COUNT: CPT

## 2017-02-20 PROCEDURE — 99232 SBSQ HOSP IP/OBS MODERATE 35: CPT | Mod: ,,, | Performed by: NURSE PRACTITIONER

## 2017-02-20 PROCEDURE — 92526 ORAL FUNCTION THERAPY: CPT

## 2017-02-20 PROCEDURE — 25000003 PHARM REV CODE 250: Performed by: STUDENT IN AN ORGANIZED HEALTH CARE EDUCATION/TRAINING PROGRAM

## 2017-02-20 PROCEDURE — 97116 GAIT TRAINING THERAPY: CPT

## 2017-02-20 PROCEDURE — 25000003 PHARM REV CODE 250: Performed by: INTERNAL MEDICINE

## 2017-02-20 PROCEDURE — 80197 ASSAY OF TACROLIMUS: CPT

## 2017-02-20 PROCEDURE — 94640 AIRWAY INHALATION TREATMENT: CPT

## 2017-02-20 PROCEDURE — 94761 N-INVAS EAR/PLS OXIMETRY MLT: CPT

## 2017-02-20 PROCEDURE — 94664 DEMO&/EVAL PT USE INHALER: CPT

## 2017-02-20 PROCEDURE — 25000003 PHARM REV CODE 250: Performed by: PHYSICIAN ASSISTANT

## 2017-02-20 PROCEDURE — 25000242 PHARM REV CODE 250 ALT 637 W/ HCPCS: Performed by: INTERNAL MEDICINE

## 2017-02-20 PROCEDURE — 99232 SBSQ HOSP IP/OBS MODERATE 35: CPT | Mod: ,,, | Performed by: INTERNAL MEDICINE

## 2017-02-20 PROCEDURE — 63600175 PHARM REV CODE 636 W HCPCS: Performed by: PHYSICIAN ASSISTANT

## 2017-02-20 PROCEDURE — 20600001 HC STEP DOWN PRIVATE ROOM

## 2017-02-20 PROCEDURE — 25000003 PHARM REV CODE 250: Performed by: NURSE PRACTITIONER

## 2017-02-20 PROCEDURE — 97110 THERAPEUTIC EXERCISES: CPT

## 2017-02-20 RX ORDER — ALENDRONATE SODIUM 70 MG/1
70 TABLET ORAL
Qty: 4 TABLET | Refills: 11 | Status: SHIPPED | OUTPATIENT
Start: 2017-02-20 | End: 2017-02-20 | Stop reason: SDUPTHER

## 2017-02-20 RX ORDER — PRAVASTATIN SODIUM 40 MG/1
40 TABLET ORAL DAILY
Qty: 30 TABLET | Refills: 11 | Status: ON HOLD | OUTPATIENT
Start: 2017-02-20 | End: 2017-11-30

## 2017-02-20 RX ORDER — FUROSEMIDE 10 MG/ML
80 INJECTION INTRAMUSCULAR; INTRAVENOUS 2 TIMES DAILY
Status: DISCONTINUED | OUTPATIENT
Start: 2017-02-20 | End: 2017-02-21

## 2017-02-20 RX ORDER — AMLODIPINE BESYLATE 10 MG/1
10 TABLET ORAL DAILY
Qty: 30 TABLET | Refills: 11 | Status: SHIPPED | OUTPATIENT
Start: 2017-02-20 | End: 2017-10-17 | Stop reason: SDUPTHER

## 2017-02-20 RX ORDER — MYCOPHENOLATE MOFETIL 250 MG/1
1500 CAPSULE ORAL 2 TIMES DAILY
Qty: 360 CAPSULE | Refills: 11 | Status: ON HOLD | OUTPATIENT
Start: 2017-02-20 | End: 2017-04-10

## 2017-02-20 RX ORDER — INSULIN GLARGINE 100 [IU]/ML
24 INJECTION, SOLUTION SUBCUTANEOUS NIGHTLY
Qty: 14.4 ML | Refills: 3
Start: 2017-02-20 | End: 2017-02-22

## 2017-02-20 RX ORDER — SULFAMETHOXAZOLE AND TRIMETHOPRIM 400; 80 MG/1; MG/1
1 TABLET ORAL DAILY
Qty: 30 TABLET | Refills: 11 | Status: SHIPPED | OUTPATIENT
Start: 2017-02-20 | End: 2017-11-26

## 2017-02-20 RX ORDER — PREDNISONE 10 MG/1
20 TABLET ORAL 2 TIMES DAILY
Status: DISCONTINUED | OUTPATIENT
Start: 2017-02-20 | End: 2017-02-22 | Stop reason: HOSPADM

## 2017-02-20 RX ORDER — TACROLIMUS 1 MG/1
CAPSULE ORAL
Qty: 150 CAPSULE | Refills: 11 | Status: SHIPPED | OUTPATIENT
Start: 2017-02-20 | End: 2017-03-09 | Stop reason: SDUPTHER

## 2017-02-20 RX ORDER — FUROSEMIDE 40 MG/1
40 TABLET ORAL DAILY
Status: DISCONTINUED | OUTPATIENT
Start: 2017-02-20 | End: 2017-02-20

## 2017-02-20 RX ORDER — FERROUS SULFATE, DRIED 160(50) MG
1 TABLET, EXTENDED RELEASE ORAL 2 TIMES DAILY WITH MEALS
Qty: 60 TABLET | Refills: 11 | Status: SHIPPED | OUTPATIENT
Start: 2017-02-20 | End: 2017-07-12

## 2017-02-20 RX ORDER — OXYCODONE HYDROCHLORIDE 10 MG/1
10 TABLET ORAL EVERY 4 HOURS PRN
Qty: 56 TABLET | Refills: 0 | Status: ON HOLD | OUTPATIENT
Start: 2017-02-20 | End: 2017-04-10 | Stop reason: HOSPADM

## 2017-02-20 RX ORDER — ALENDRONATE SODIUM 70 MG/1
TABLET ORAL
Qty: 12 TABLET | Refills: 11 | Status: SHIPPED | OUTPATIENT
Start: 2017-02-20 | End: 2017-11-17

## 2017-02-20 RX ORDER — LANOLIN ALCOHOL/MO/W.PET/CERES
400 CREAM (GRAM) TOPICAL 2 TIMES DAILY
Qty: 270 TABLET | Refills: 3 | Status: SHIPPED | OUTPATIENT
Start: 2017-02-20 | End: 2017-03-01 | Stop reason: SDUPTHER

## 2017-02-20 RX ORDER — TERBUTALINE SULFATE 5 MG/1
5 TABLET ORAL 3 TIMES DAILY
Qty: 90 TABLET | Refills: 11 | Status: ON HOLD | OUTPATIENT
Start: 2017-02-20 | End: 2017-04-10 | Stop reason: HOSPADM

## 2017-02-20 RX ORDER — INSULIN ASPART 100 [IU]/ML
20 INJECTION, SOLUTION INTRAVENOUS; SUBCUTANEOUS 3 TIMES DAILY
Qty: 1 BOX | Refills: 0
Start: 2017-02-20 | End: 2017-02-22

## 2017-02-20 RX ORDER — ASPIRIN 81 MG/1
81 TABLET ORAL DAILY
Refills: 0 | COMMUNITY
Start: 2017-02-20 | End: 2023-01-13

## 2017-02-20 RX ORDER — INSULIN GLARGINE 100 [IU]/ML
28 INJECTION, SOLUTION SUBCUTANEOUS NIGHTLY
Status: DISCONTINUED | OUTPATIENT
Start: 2017-02-21 | End: 2017-02-22 | Stop reason: HOSPADM

## 2017-02-20 RX ORDER — VALGANCICLOVIR 450 MG/1
900 TABLET, FILM COATED ORAL DAILY
Qty: 60 TABLET | Refills: 2 | Status: SHIPPED | OUTPATIENT
Start: 2017-02-20 | End: 2017-03-17 | Stop reason: SDUPTHER

## 2017-02-20 RX ORDER — FUROSEMIDE 80 MG/1
80 TABLET ORAL 2 TIMES DAILY
Qty: 60 TABLET | Refills: 11 | Status: SHIPPED | OUTPATIENT
Start: 2017-02-20 | End: 2017-02-22

## 2017-02-20 RX ORDER — INSULIN GLARGINE 100 [IU]/ML
28 INJECTION, SOLUTION SUBCUTANEOUS NIGHTLY
Status: DISCONTINUED | OUTPATIENT
Start: 2017-02-20 | End: 2017-02-20

## 2017-02-20 RX ORDER — PREDNISONE 10 MG/1
20 TABLET ORAL 2 TIMES DAILY
Qty: 120 TABLET | Refills: 11 | Status: SHIPPED | OUTPATIENT
Start: 2017-02-20 | End: 2017-02-22

## 2017-02-20 RX ADMIN — IPRATROPIUM BROMIDE AND ALBUTEROL SULFATE 3 ML: .5; 3 SOLUTION RESPIRATORY (INHALATION) at 04:02

## 2017-02-20 RX ADMIN — MYCOPHENOLATE MOFETIL 1500 MG: 250 CAPSULE ORAL at 08:02

## 2017-02-20 RX ADMIN — IPRATROPIUM BROMIDE AND ALBUTEROL SULFATE 3 ML: .5; 3 SOLUTION RESPIRATORY (INHALATION) at 08:02

## 2017-02-20 RX ADMIN — MUPIROCIN: 20 OINTMENT TOPICAL at 08:02

## 2017-02-20 RX ADMIN — IPRATROPIUM BROMIDE AND ALBUTEROL SULFATE 3 ML: .5; 3 SOLUTION RESPIRATORY (INHALATION) at 03:02

## 2017-02-20 RX ADMIN — DOCUSATE SODIUM 100 MG: 100 CAPSULE, LIQUID FILLED ORAL at 08:02

## 2017-02-20 RX ADMIN — POLYETHYLENE GLYCOL 3350 17 G: 17 POWDER, FOR SOLUTION ORAL at 08:02

## 2017-02-20 RX ADMIN — NYSTATIN 500000 UNITS: 500000 SUSPENSION ORAL at 08:02

## 2017-02-20 RX ADMIN — PREDNISONE 20 MG: 10 TABLET ORAL at 08:02

## 2017-02-20 RX ADMIN — INSULIN ASPART 20 UNITS: 100 INJECTION, SOLUTION INTRAVENOUS; SUBCUTANEOUS at 08:02

## 2017-02-20 RX ADMIN — OXYCODONE HYDROCHLORIDE 10 MG: 5 TABLET ORAL at 03:02

## 2017-02-20 RX ADMIN — MAGNESIUM OXIDE TAB 400 MG (241.3 MG ELEMENTAL MG) 400 MG: 400 (241.3 MG) TAB at 08:02

## 2017-02-20 RX ADMIN — TERBUTALINE SULFATE 5 MG: 5 TABLET ORAL at 05:02

## 2017-02-20 RX ADMIN — TACROLIMUS 3 MG: 1 CAPSULE, GELATIN COATED ORAL at 06:02

## 2017-02-20 RX ADMIN — OXYCODONE HYDROCHLORIDE 10 MG: 5 TABLET ORAL at 08:02

## 2017-02-20 RX ADMIN — NYSTATIN 500000 UNITS: 500000 SUSPENSION ORAL at 06:02

## 2017-02-20 RX ADMIN — INSULIN ASPART 2 UNITS: 100 INJECTION, SOLUTION INTRAVENOUS; SUBCUTANEOUS at 12:02

## 2017-02-20 RX ADMIN — IPRATROPIUM BROMIDE AND ALBUTEROL SULFATE 3 ML: .5; 3 SOLUTION RESPIRATORY (INHALATION) at 12:02

## 2017-02-20 RX ADMIN — SULFAMETHOXAZOLE AND TRIMETHOPRIM 1 TABLET: 400; 80 TABLET ORAL at 08:02

## 2017-02-20 RX ADMIN — PREDNISONE 25 MG: 5 TABLET ORAL at 08:02

## 2017-02-20 RX ADMIN — VALGANCICLOVIR 900 MG: 450 TABLET, FILM COATED ORAL at 08:02

## 2017-02-20 RX ADMIN — TERBUTALINE SULFATE 5 MG: 5 TABLET ORAL at 01:02

## 2017-02-20 RX ADMIN — MUPIROCIN: 20 OINTMENT TOPICAL at 01:02

## 2017-02-20 RX ADMIN — NYSTATIN 500000 UNITS: 500000 SUSPENSION ORAL at 11:02

## 2017-02-20 RX ADMIN — FUROSEMIDE 40 MG: 40 TABLET ORAL at 08:02

## 2017-02-20 RX ADMIN — INSULIN ASPART 20 UNITS: 100 INJECTION, SOLUTION INTRAVENOUS; SUBCUTANEOUS at 12:02

## 2017-02-20 RX ADMIN — AMLODIPINE BESYLATE 10 MG: 5 TABLET ORAL at 08:02

## 2017-02-20 RX ADMIN — PANTOPRAZOLE SODIUM 40 MG: 40 TABLET, DELAYED RELEASE ORAL at 08:02

## 2017-02-20 RX ADMIN — MUPIROCIN: 20 OINTMENT TOPICAL at 05:02

## 2017-02-20 RX ADMIN — TERBUTALINE SULFATE 5 MG: 5 TABLET ORAL at 08:02

## 2017-02-20 RX ADMIN — FUROSEMIDE 80 MG: 10 INJECTION, SOLUTION INTRAMUSCULAR; INTRAVENOUS at 06:02

## 2017-02-20 RX ADMIN — MUPIROCIN: 20 OINTMENT TOPICAL at 12:02

## 2017-02-20 RX ADMIN — INSULIN ASPART 20 UNITS: 100 INJECTION, SOLUTION INTRAVENOUS; SUBCUTANEOUS at 06:02

## 2017-02-20 RX ADMIN — TACROLIMUS 3 MG: 1 CAPSULE, GELATIN COATED ORAL at 08:02

## 2017-02-20 NOTE — NURSING
Rounds Report: Attended interdisciplinary rounds this morning with the transplant team including SW, physicians, fellows,  mid-level providers, and transplant coordinators.  Discussed plan of care, including DC date today possibly , and current plan to proceed with pacer out today. Will continue to monitor updates on pt.

## 2017-02-20 NOTE — PLAN OF CARE
Problem: SLP Goal  Goal: SLP Goal  Speech Language Pathology Goals  Goals expected to be met by 2/21  1. Pt will tolerate puree & honey thickened liquid with no s/s aspiration  2. Pt will tolerate trials of soft solid with no s/s aspiration & adequate oral phase of swallow  3. Pt will tolerate trials of less restrictive liquid consistencies to determine if safe for diet upgrades  4. Pt will complete dysphagia exercises given min A      Goals to be met 2/19  1. Pt will participate in ongoing swallow assessment to determine safest po diet GOAL MET        Pt with good participation in dysphagia tx this date.  Continue current plan of care.      RODRI Wise, CCC-SLP  Speech Language Pathologist  [phone]  2/20/2017

## 2017-02-20 NOTE — PLAN OF CARE
Problem: Physical Therapy Goal  Goal: Physical Therapy Goal  Goals to be met by: 17    Patient will increase functional independence with mobility by performin. Supine to sit with Stand-by Assistance - GOAL MET  2. Sit to stand transfer with Supervision - GOAL MET  3. Gait x 220 feet with Supervision - GOAL MET    4. Pt to amb up/down 7 steps with use of 1 HR, with S.    Outcome: Ongoing (interventions implemented as appropriate)  Pt has met all goals at this time.  New goal added.

## 2017-02-20 NOTE — PT/OT/SLP PROGRESS
Physical Therapy  Treatment    Mick Barriga   MRN: [id number]   Admitting Diagnosis: Heart transplanted    PT Received On: 17  PT Start Time: 1046     PT Stop Time: 1118    PT Total Time (min): 32 min       Billable Minutes:  Gait Training 14 and Therapeutic Exercise 18    Treatment Type: Treatment  PT/PTA: PT             General Precautions: Standard, aspiration, sternal, honey thick, fall  Orthopedic Precautions: N/A   Braces: N/A    Do you have any cultural, spiritual, Scientologist conflicts, given your current situation?:  (none)    Subjective:  Communicated with nursing prior to session.      Pain Ratin/10  Location - Side: Right     Location: hand  Pain Addressed: Pre-medicate for activity, Distraction       Objective:   Patient found with: central line, peripheral IV    Functional Mobility:  Bed Mobility:        Transfers:  Sit <> Stand Assistance: Independent  Sit <> Stand Assistive Device: No Assistive Device    Gait:   Gait Distance: Pt amb 378', 1 standing rest break, no SOB  Assistance 1: Supervision  Gait Assistive Device: Rolling walker  Gait Pattern: swing-through gait  Gait Deviation(s): decreased amelia, decreased step length    Balance:   Static Sit: NORMAL: No deviations seen in posture held statically  Dynamic Sit: NORMAL: No deviations seen in posture held dynamically  Static Stand: FAIR+: Takes MINIMAL challenges from all directions  Dynamic stand: GOOD-: Needs SUPERVISION only during gait and able to self right with moderate      Therapeutic Activities and Exercises:  8x sit <--> stand: 47.44 sec, 7/10 RPE reported, attempted 10xs, pt unable   Ankle Pumps: 20 reps  Hip abd/add: 20 reps  Heel slides: 20 reps        AM-PAC 6 CLICK MOBILITY  How much help from another person does this patient currently need?   1 = Unable, Total/Dependent Assistance  2 = A lot, Maximum/Moderate Assistance  3 = A little, Minimum/Contact Guard/Supervision  4 = None, Modified  [place]/Independent    Turning over in bed (including adjusting bedclothes, sheets and blankets)?: 4  Sitting down on and standing up from a chair with arms (e.g., wheelchair, bedside commode, etc.): 4  Moving from lying on back to sitting on the side of the bed?: 4  Moving to and from a bed to a chair (including a wheelchair)?: 4  Need to walk in hospital room?: 4  Climbing 3-5 steps with a railing?: 3  Total Score: 23    AM-PAC Raw Score CMS G-Code Modifier Level of Impairment Assistance   6 % Total / Unable   7 - 9 CM 80 - 100% Maximal Assist   10 - 14 CL 60 - 80% Moderate Assist   15 - 19 CK 40 - 60% Moderate Assist   20 - 22 CJ 20 - 40% Minimal Assist   23 CI 1-20% SBA / CGA   24 CH 0% Independent/ Mod I     Patient left up in chair with all lines intact and call button in reach.    Assessment:  Mick Barriga is a 56 y.o. male with a medical diagnosis of Heart transplanted and presents with improvements in gait and balance.  Continues to require use of RW for balance, therefore requesting RW for pt as part of discharge DME.  Improvements in endurance.  Met goal.  PT added new goal to perf stair amb.  Unable to complete [place] 10x sit to/from stand, demo impairments in LE mm strength.  Continuous edema throughout LEs and feet.     Rehab identified problem list/impairments: Rehab identified problem list/impairments: weakness, impaired endurance, impaired balance, decreased lower extremity function, pain, impaired cardiopulmonary response to activity, edema, impaired skin    Rehab potential is good.    Activity tolerance: Good    Discharge recommendations: Discharge Facility/Level Of Care Needs: home health PT     Barriers to discharge:      Equipment recommendations: Equipment Needed After Discharge: walker, rolling     GOALS:   Physical Therapy Goals        Problem: Physical Therapy Goal    Goal Priority Disciplines Outcome Goal Variances Interventions   Physical Therapy Goal     PT/OT, PT Ongoing  (interventions implemented as appropriate)     Description:  Goals to be met by: 17    Patient will increase functional independence with mobility by performin. Supine to sit with Stand-by Assistance - GOAL MET  2. Sit to stand transfer with Supervision - GOAL MET  3. Gait  x 220 feet with Supervision  - GOAL MET    4. Pt to amb up/down 7 steps with use of 1 HR, with S.                    PLAN:    Patient to be seen 5 x/week  to address the above listed problems via gait training, therapeutic activities, therapeutic exercises, neuromuscular re-education  Plan of Care expires: 17  Plan of Care reviewed with: patient         Mary Brown, PT  2017

## 2017-02-20 NOTE — PHYSICIAN QUERY
PT Name: Mick Barriga  MR #: [id number]    Physician Query Form - Consultant Diagnosis Clarification     Reviewer Oxana NICOLAS  Ext [phone]  This form is a permanent document in the medical record.     Query Date: February 20, 2017    Dear Provider,   By submitting this query, we are merely seeking further clarification of documentation.  Please utilize your independent clinical judgment when addressing the question(s) below.      The Medical record reflects the following:   Diagnosis Supporting Clinical Information Location in Medical Record   Right heel suspected deep tissue injury                        Pressure Ulcer 02/10/17 0705 Left lateral heel Stage I         Date First Assessed/Time First Assessed: 02/10/17 0705 Pressure Ulcer Present on Admission: no Side: Right Location: heel Staging: suspected deep tissue injury Wound Length (cm): 8 Wound Width (cm): 4                    Date First Assessed/Time First Assessed: 02/10/17 0705 Pressure Ulcer Present on Admission: no Side: Left Orientation: lateral Location: heel Staging: Stage I Wound Length (cm): 8 Wound Width (cm):    Wound Care PN 2/10        Wound Care PN 2/10                Wound Care PN 2/10            Wound Care PN 2/10         Do you agree with the Consultants diagnosis of _Right heel Deep tissue injury, not POA_?                 [ x  ]   Yes               [   ]   Yes, but it resolved prior to my assessment of the patient               [   ]   No                [   ]   Clinically insignificant               [   ]   Clinically undetermined               [   ]   Other/Clarification of findings: ___________________________________________              Do you agree with the Consultants diagnosis of _Left heel stage1 not, POA_?                 [  x ]   Yes               [   ]   Yes, but it resolved prior to my assessment of the patient               [   ]   No                [   ]   Clinically insignificant               [   ]   Clinically undetermined                [   ]   Other/Clarification of findings: ___________________________________________

## 2017-02-20 NOTE — ASSESSMENT & PLAN NOTE
BG goal 140-180. Increase Lantus to 28 units nightly, continue Novolog 20 units AC. Continue Novolog 6 units if eats nightly snack. BG monitoring AC/HS with moderate dose correction scale.     Pt is allergic to Levemir with reaction of severe itching. Pt to use home Lantus pens.     DISCHARGE PLANNING:   Will be discharged on MDI. Has Lantus and Novolog pens and has supplies.  Needs BG meter, strips, lancets rx - sent to [facility] per pt request   He is followed by Fabián Navarrete NP with [facility] ([place]) for his DM management. Pt wishes to continue f/u with Rosalba MENJIVAR  No f/u appts needed

## 2017-02-20 NOTE — SUBJECTIVE & OBJECTIVE
"Interval HPI:   No acute events overnight. Prandial BG improving, but continues with elevations in fasting BG. On Prednisone 25 mg bid. On dental soft ADA diet, eating 50-75%. No hypoglycemia, fever, or nausea. Admits to sometimes having juice between meals and noticed he has chocolates at his bedside.     Visit Vitals    BP (!) 147/67 (BP Location: Left leg, Patient Position: Sitting, BP Method: Automatic)    Pulse 77    Temp 97.7 °F (36.5 °C) (Oral)    Resp 18    Ht 5' 8" (1.727 m)    Wt 95.1 kg (209 lb 9.6 oz)    SpO2 99%    BMI 31.87 kg/m2       Labs Reviewed and Include      Recent Labs  Lab 02/20/17  0455   *   CALCIUM 8.8   ALBUMIN 2.7*   PROT 5.8*   *   K 4.8   CO2 28   CL 96   BUN 25*   CREATININE 1.2   ALKPHOS 71   ALT 19   AST 11   BILITOT 0.6     Lab Results   Component Value Date    WBC 22.55 (H) 02/20/2017    HGB 8.8 (L) 02/20/2017    HCT 26.8 (L) 02/20/2017    MCV 69 (L) 02/20/2017     02/19/2017     No results for input(s): TSH, FREET4 in the last 168 hours.  Lab Results   Component Value Date    HGBA1C 6.8 (H) 02/09/2017       Nutritional status:   Body mass index is 31.87 kg/(m^2).  Lab Results   Component Value Date    ALBUMIN 2.7 (L) 02/20/2017    ALBUMIN 2.4 (L) 02/19/2017    ALBUMIN 2.5 (L) 02/18/2017     Lab Results   Component Value Date    PREALBUMIN 19 (L) 02/08/2017    PREALBUMIN 14 (L) 02/06/2017    PREALBUMIN 14 (L) 02/03/2017       Estimated Creatinine Clearance: 78.7 mL/min (based on Cr of 1.2).    Accu-Checks  Recent Labs      02/17/17   2121  02/18/17   0225  02/18/17   0758  02/18/17   1240  02/18/17   1843  02/18/17   2125  02/19/17   0822  02/19/17   1210  02/19/17   1941  02/20/17   0804   POCTGLUCOSE  130*  239*  139*  165*  215*  176*  255*  117*  225*  217*       Current Medications and/or Treatments Impacting Glycemic Control  Immunotherapy:  Immunosuppressants     Start     Stop Route Frequency Ordered    02/13/17 2100  mycophenolate capsule 1,500 " mg      -- Oral 2 times daily 02/13/17 0937    02/20/17 0800  tacrolimus capsule 3 mg      -- Oral 2 times daily 02/19/17 2042        Steroids:   Hormones     Start     Stop Route Frequency Ordered    02/17/17 0900  predniSONE tablet 25 mg      -- Oral 2 times daily 02/16/17 1159        Pressors:    Autonomic Drugs     None        Hyperglycemia/Diabetes Medications: Antihyperglycemics     Start     Stop Route Frequency Ordered    02/17/17 2100  insulin glargine pen 24 Units     Question Answer Comment   Brand Name: Lantus    Generic name: glargine    Form: Pen Injector insulin pen from home   Length of Therapy: Indefinite    Reason for Non-Formulary allergy to formulary alternative    How soon needed? (normally 72 hrs needed to procure): 0-24 hrs Pt will have family member bring Lantus pen from home; staff will bringto pharmacy to [facility].    Is the patient competent? Yes        -- SubQ Nightly 02/17/17 1640    02/18/17 1019  insulin aspart pen 6 Units      -- SubQ As needed (PRN) 02/18/17 1019    02/18/17 1120  insulin aspart pen 1-10 Units      -- SubQ Before meals & nightly PRN 02/18/17 1020    02/19/17 1130  insulin aspart pen 20 Units      -- SubQ 3 times daily with meals 02/19/17 1033

## 2017-02-20 NOTE — PT/OT/SLP PROGRESS
Occupational Therapy      Gerajean pierre Barriga  MRN: [id number]    Patient not seen today secondary to Patient fatigue. Will follow-up on next available day per patient request.    Ruth Cullen OT  2/20/2017

## 2017-02-20 NOTE — PROGRESS NOTES
Asked to see for multiple skin issues:    Eschar to right cheek 1.5x1cm    DTI 4x0.5cm within hairline- left CHARLES    Right lateral heel    Left lateral heel    Right groin weeping incision line     02/20/17 1030       Incision/Site 02/19/17 1950 Right groin transverse   Date First Assessed/Time First Assessed: 02/19/17 1950   Present Prior to Hospital Arrival?: No  Side: Right  Location: groin  Incision Type: transverse  Closure Method: other (see comments)  Additional Comments: no closure method noted   Incision WDL ex   Dressing Appearance copious drainage   Appearance edges approximated   Periwound Area normal skin tone;swelling   Drainage Characteristics/Odor serous   Drainage Amount copious   Wound Edges intact   Cleansed W/ sterile normal saline   Dressing calcium alginate;telfa island dressing       Pressure Ulcer 02/10/17 0705 Right heel suspected deep tissue injury   Date First Assessed/Time First Assessed: 02/10/17 0705   Pressure Ulcer Present on Admission: no  Side: Right  Location: heel  Staging: suspected deep tissue injury  Wound Length (cm): 8  Wound Width (cm): 4   Staging suspected deep tissue injury   Healing Pressure Ulcer no   Pressure Ulcer Risk Factors activity;mobility;nutrition;shear/friction;moisture   Dressing Appearance no dressing   Drainage Amount none   Appearance purple   Periwound Area normal skin tone   Wound Edges intact   Wound Length (cm) 4   Wound Width (cm) 6   Cleansed W/ sterile normal saline   Dressing foam       Pressure Ulcer 02/10/17 0705 Left lateral heel Stage I   Date First Assessed/Time First Assessed: 02/10/17 0705   Pressure Ulcer Present on Admission: no  Side: Left  Orientation: lateral  Location: heel  Staging: Stage I  Wound Length (cm): 8  Wound Width (cm): 4   Staging suspected deep tissue injury   Healing Pressure Ulcer no   Pressure Ulcer Risk Factors activity;mobility;nutrition;shear/friction;moisture   Dressing Appearance no dressing   Drainage Amount none    Appearance purple   Periwound Area normal skin tone   Wound Edges intact   Wound Length (cm) 3   Wound Width (cm) 5   Cleansed W/ sterile normal saline   Dressing foam       Pressure Ulcer 02/14/17 0700 Left medial cheek, right suspected deep tissue injury   Date First Assessed/Time First Assessed: 02/14/17 0700   Pressure Ulcer Present on Admission: no  Side: Left  Orientation: (c) medial  Location: cheek, right  Staging: suspected deep tissue injury  Wound Length (cm): 5  Wound Width (cm): 4   Staging Unstageable   Healing Pressure Ulcer no   Pressure Ulcer Risk Factors (device related)   Dressing Appearance no dressing   Drainage Amount none   Appearance black eschar   Periwound Area normal skin tone   Wound Edges intact   Wound Length (cm) 1.5   Wound Width (cm) 1   Depth (cm) obscured   Cleansed W/ sterile normal saline   Interventions (triad hydrophillic cream)     Recommendations:  1. Mepilex border dressing to bilateral heel DTI  2. Triad hydrophilic cream to right cheek wound BID and prn  3. Melgisorb AG and Mepore dressing to right groin incision BID    Isaiah Reyes RN,CWON  [id number]

## 2017-02-20 NOTE — PROGRESS NOTES
"[facility]  Endocrinology  Progress Note    Admit Date: 1/30/2017     Reason for Consult: Management of T2DM     Surgical Procedure and Date: 2/9/17 s/p heart txp    Diabetes diagnosis year: 1996    Home Diabetes Medications:  Lantus 16 u qam, Novolog 20 U AC     Diabetes Complications include:  Hyperglycemia, Diabetic chronic kidney disease and Diabetic gastroparesis      Complicating diabetes co morbidities: CHF and CKD    HPI: Patient is a 56 y.o. male with T2DM, ischemic cardiomyopathy, s/p LVAD placement in 8/2016. He is now s/p heart transplantation. Endocrine consulted for bg management.       Interval HPI:   No acute events overnight. Prandial BG improving, but continues with elevations in fasting BG. On Prednisone 25 mg bid. On dental soft ADA diet, eating 50-75%. No hypoglycemia, fever, or nausea. Admits to sometimes having juice between meals and noticed he has chocolates at his bedside.     Visit Vitals    BP (!) 147/67 (BP Location: Left leg, Patient Position: Sitting, BP Method: Automatic)    Pulse 77    Temp 97.7 °F (36.5 °C) (Oral)    Resp 18    Ht 5' 8" (1.727 m)    Wt 95.1 kg (209 lb 9.6 oz)    SpO2 99%    BMI 31.87 kg/m2       Labs Reviewed and Include      Recent Labs  Lab 02/20/17  0455   *   CALCIUM 8.8   ALBUMIN 2.7*   PROT 5.8*   *   K 4.8   CO2 28   CL 96   BUN 25*   CREATININE 1.2   ALKPHOS 71   ALT 19   AST 11   BILITOT 0.6     Lab Results   Component Value Date    WBC 22.55 (H) 02/20/2017    HGB 8.8 (L) 02/20/2017    HCT 26.8 (L) 02/20/2017    MCV 69 (L) 02/20/2017     02/19/2017     No results for input(s): TSH, FREET4 in the last 168 hours.  Lab Results   Component Value Date    HGBA1C 6.8 (H) 02/09/2017       Nutritional status:   Body mass index is 31.87 kg/(m^2).  Lab Results   Component Value Date    ALBUMIN 2.7 (L) 02/20/2017    ALBUMIN 2.4 (L) 02/19/2017    ALBUMIN 2.5 (L) 02/18/2017     Lab Results   Component Value Date    " PREALBUMIN 19 (L) 02/08/2017    PREALBUMIN 14 (L) 02/06/2017    PREALBUMIN 14 (L) 02/03/2017       Estimated Creatinine Clearance: 78.7 mL/min (based on Cr of 1.2).    Accu-Checks  Recent Labs      02/17/17   2121  02/18/17   0225  02/18/17   0758  02/18/17   1240  02/18/17   1843  02/18/17   2125  02/19/17   0822  02/19/17   1210  02/19/17   1941  02/20/17   0804   POCTGLUCOSE  130*  239*  139*  165*  215*  176*  255*  117*  225*  217*       Current Medications and/or Treatments Impacting Glycemic Control  Immunotherapy:  Immunosuppressants     Start     Stop Route Frequency Ordered    02/13/17 2100  mycophenolate capsule 1,500 mg      -- Oral 2 times daily 02/13/17 0937    02/20/17 0800  tacrolimus capsule 3 mg      -- Oral 2 times daily 02/19/17 2042        Steroids:   Hormones     Start     Stop Route Frequency Ordered    02/17/17 0900  predniSONE tablet 25 mg      -- Oral 2 times daily 02/16/17 1159        Pressors:    Autonomic Drugs     None        Hyperglycemia/Diabetes Medications: Antihyperglycemics     Start     Stop Route Frequency Ordered    02/17/17 2100  insulin glargine pen 24 Units     Question Answer Comment   Brand Name: Lantus    Generic name: glargine    Form: Pen Injector insulin pen from home   Length of Therapy: Indefinite    Reason for Non-Formulary allergy to formulary alternative    How soon needed? (normally 72 hrs needed to procure): 0-24 hrs Pt will have family member bring Lantus pen from home; [facility]to [facility] to [facility].    Is the patient competent? Yes        -- SubQ Nightly 02/17/17 1640    02/18/17 1019  insulin aspart pen 6 Units      -- SubQ As needed (PRN) 02/18/17 1019    02/18/17 1120  insulin aspart pen 1-10 Units      -- SubQ Before meals & nightly PRN 02/18/17 1020    02/19/17 1130  insulin aspart pen 20 Units      -- SubQ 3 times daily with meals 02/19/17 1033          ASSESSMENT and PLAN    Type 2 diabetes mellitus with stage 3 chronic kidney disease, with  long-term current use of insulin  BG goal 140-180. Increase Lantus to 28 units nightly, continue Novolog 20 units AC. Continue Novolog 6 units if eats nightly snack. BG monitoring AC/HS with moderate dose correction scale.     Pt is allergic to Levemir with reaction of severe itching. Pt to use home Lantus pens.     DISCHARGE PLANNING:   Will be discharged on MDI. Has Lantus and Novolog pens and has supplies.  Needs BG meter, strips, lancets rx - sent to [facility] per pt request   He is followed by Fabián Navarrete NP with [facility] ([place], [place]) for his DM management. Pt wishes to continue f/u with Rosalba MENJIVAR  No f/u appts needed         * Heart transplanted  Per CTS, HTS      Ischemic cardiomyopathy  S/p heart txp        Chronic combined systolic and diastolic heart failure  S/p heart transplant       Adrenal cortical steroids causing adverse effect in therapeutic use  Increasing insulin needs  On Prednisone 25 mg BID.     Immunosuppression  May increase insulin resistance       Obesity (BMI 30-39.9)  Increases insulin resistance   Body mass index is 31.87 kg/(m^2).          KIMBERLEY Lassiter,ANP-C  Endocrinology  [facility]

## 2017-02-20 NOTE — PT/OT/SLP PROGRESS
"Speech Language Pathology  Treatment    Mick Barriga   MRN: [id number]   Admitting Diagnosis: Heart transplanted    Diet recommendations: Solid Diet Level: Dental Soft  Liquid Diet Level: Honey Thick   Aspiration Precautions:  -No straws, HOB to 90 degrees, Small bites/sips, Alternating bites/sips, 1 bite/sip at a time and Assistance with thickening liquids   -Monitor closely for any s/s aspiration    SLP Treatment Date: 17  Speech Start Time: 1120     Speech Stop Time: 1143     Speech Total (min): 23 min       TREATMENT BILLABLE MINUTES:  Treatment Swallowing Dysfunction 23    Has the patient been evaluated by SLP for swallowing? : Yes  Keep patient NPO?: No   General Precautions: Standard, fall, honey thick, sternal  Current Respiratory Status: non-rebreather mask       Subjective:  "Maybe tomorrow" (Pt stated regarding potential discharge plan)      Pain Ratin/10     Pain Rating Post-Intervention: 0/10    Objective:    Pt seen this am with no family present.  Mr. Barriga was awake/alert and cooperative with session.   Pt found sitting on couch, thickened liquids for consumption.  Liquid found to be appropriately thickened to honey consistency.  Pt verbalized good tolerance of intake at recommended consistencies.  Vocal quality remains somewhat hoarse; however, pt reports some slow improvement.  Mr. Barriga also endorsed independent completion of exercises.  Dysphagia exercise handout found within pt reach.  Mr. Barriga completed Falsetto Exercises, simple tongue base retraction, Effortful Swallows, Supraglottic Swallows, Mehendelsohn Maneuver, and Masaka Exercises all with good ability given clinician guide.  Encouraged continued independent exercise and close following of swallow precautions.  Pt verbalized understanding.  Education also provided regarding recommendation for continued home health speech therapy to further address dysphagia upon discharge.  Recommend repeat Modified Barium Swallow " Study as an outpatient prior to reducing or discontinuing use of thickener. Pt again verbalized understanding of ST recommendations.     FIM:  Social Interaction: 7 Complete [place]--The patient interacts appropriately with staff, other patients, and family members (e.g., controls temper, accepts criticism, is aware that words and actions have an impact on others), and does not require medication for                            Assessment:  Mick Barriga is a 56 y.o. male with a medical diagnosis of Heart transplanted and presents with dysphagia and dysphonia.     Discharge recommendations: Discharge Facility/Level Of Care Needs: home health speech therapy   Consider ENT follow-up in future if dysphonia persists.    Goals:   SLP Goals        Problem: SLP Goal    Goal Priority Disciplines Outcome   SLP Goal     SLP Ongoing (interventions implemented as appropriate)   Description:  Speech Language Pathology Goals  Goals expected to be met by 2/21  1. Pt will tolerate puree & honey thickened liquid with no s/s aspiration  2. Pt will tolerate trials of soft solid with no s/s aspiration & adequate oral phase of swallow  3. Pt will tolerate trials of less restrictive liquid consistencies to determine if safe for diet upgrades  4. Pt will complete dysphagia exercises given min A      Goals to be met 2/19  1. Pt will participate in ongoing swallow assessment to determine safest po diet GOAL MET                   Plan:   Patient to be seen Therapy Frequency: 5 x/week   Plan of Care expires: 03/14/17  Plan of Care reviewed with: patient  SLP Follow-up?: Yes               RODRI Wise, CCC-SLP  Speech Language Pathologist  [phone]  2/20/2017

## 2017-02-20 NOTE — PROGRESS NOTES
Progress Note  Heart Transplant Service    Admit Date: 1/30/2017   LOS: 21 days     SUBJECTIVE:     Follow up for: Heart transplanted    Interval History:  Feeling great. Still with some numbness in fingers     Scheduled Meds:   albuterol-ipratropium 2.5mg-0.5mg/3mL  3 mL Nebulization Q4H    amlodipine  10 mg Oral Daily    docusate sodium  100 mg Oral BID    furosemide  80 mg Intravenous BID    insulin aspart  20 Units Subcutaneous TIDWM    insulin glargine  24 Units Subcutaneous QHS    magnesium oxide  400 mg Oral BID    mupirocin   Topical (Top) TID    mycophenolate  1,500 mg Oral BID    nystatin  500,000 Units Mouth/Throat QID (WM & HS)    pantoprazole  40 mg Oral Daily    polyethylene glycol  17 g Oral Daily    predniSONE  20 mg Oral BID    sulfamethoxazole-trimethoprim 400-80mg  1 tablet Oral Daily    tacrolimus  3 mg Oral BID    terbutaline  5 mg Oral TID    valganciclovir  900 mg Oral Daily     Continuous Infusions:   sodium chloride 0.45% 10 mL/hr (02/10/17 2000)     PRN Meds:sodium chloride, sodium chloride, acetaminophen, dextrose 50%, dextrose 50%, glucagon (human recombinant), glucose, glucose, HYDROmorphone, insulin aspart, insulin aspart, magnesium sulfate IVPB, metoclopramide HCl, ondansetron, oxycodone, potassium chloride **AND** potassium chloride **AND** potassium chloride, sodium phosphate IVPB, sodium phosphate IVPB, sodium phosphate IVPB    Immunosuppressants     Start     Stop Route Frequency Ordered    02/13/17 2100  mycophenolate capsule 1,500 mg      -- Oral 2 times daily 02/13/17 0937    02/20/17 0800  tacrolimus capsule 3 mg      -- Oral 2 times daily 02/19/17 2042          Review of patient's allergies indicates:   Allergen Reactions    Levemir [insulin detemir] Itching    Pork/porcine containing products     Ranexa [ranolazine] Nausea Only       OBJECTIVE:     Vital Signs (Most Recent)  Temp: 97.5 °F (36.4 °C) (02/20/17 0815)  Pulse: 81 (02/20/17 1203)  Resp: 16  (02/20/17 1203)  BP: (!) 140/63 (02/20/17 0815)  SpO2: 97 % (02/20/17 1203)    Vital Signs Range (Last 24H):  Temp:  [97.5 °F (36.4 °C)-98.2 °F (36.8 °C)]   Pulse:  [74-91]   Resp:  [16-20]   BP: (116-147)/(56-67)   SpO2:  [96 %-99 %]     I & O (Last 24H):    Intake/Output Summary (Last 24 hours) at 02/20/17 1223  Last data filed at 02/20/17 0500   Gross per 24 hour   Intake             1695 ml   Output             2490 ml   Net             -795 ml            Telemetry: sinus    Physical Exam   Constitutional: He is oriented to person, place, and time. He appears well-developed and well-nourished.   HENT:   Head: Normocephalic and atraumatic. 2cm well circumscribed plaque to right cheek, dry and crusted.  Eyes: EOM are normal. Pupils are equal, round, and reactive to light.   Neck: Normal range of motion. Neck supple. No JVD present.   Cardiovascular: S1 S2 audible, regular, tachycardic    Pulmonary/Chest: Bronchial breath sounds on left  Abdominal: Soft. Bowel sounds are normal. He exhibits no distension. There is no tenderness.   Musculoskeletal: Normal range of motion. He exhibits no edema.   Neurological: He is alert and oriented to person, place, and time.   Skin: Skin is warm and dry.   Extremities: BUE edematous. LUE slightly weaker than RUE. 2+ peripheral pulses.      Labs:       Recent Labs  Lab 02/18/17  0449 02/19/17  0450 02/20/17  0455   WBC 13.71* 15.58* 22.55*   HGB 8.7* 8.1* 8.8*   HCT 26.4* 25.0* 26.8*    278 345   LYMPH 5.0* 4.0*  CANCELED 4.0*  CANCELED   MONO 3.0* 0.0*  CANCELED 3.0*  CANCELED   EOSINOPHIL 0.0 0.0 1.0         Recent Labs  Lab 02/14/17  0425 02/15/17  0417   INR 1.4* 1.6*          Recent Labs  Lab 02/14/17  1831 02/15/17  0014  02/15/17  0417  02/16/17  0444  02/18/17  0449 02/19/17  0450 02/20/17  0455   GLU  --   --   --  203*  < > 177*  < > 233* 207* 243*   CALCIUM  --   --   --  8.6*  < > 8.7  < > 8.6* 8.4* 8.8   ALBUMIN  --   --   --  2.6*  --  2.5*  < > 2.5* 2.4*  2.7*   PROT  --   --   --  5.7*  --  5.3*  < > 5.3* 5.0* 5.8*   NA  --   --   --  137  < > 138  < > 137 135* 134*   K  --   --   < > 4.0  4.0  < > 4.4  < > 4.9 4.9 4.8   CO2  --   --   --  30*  < > 33*  < > 27 29 28   CL  --   --   --  95  < > 97  < > 100 97 96   BUN  --   --   --  40*  < > 30*  < > 23* 22* 25*   CREATININE  --   --   --  0.9  < > 0.8  < > 0.9 0.9 1.2   ALKPHOS  --   --   --  65  --  65  < > 64 60 71   ALT  --   --   --  26  --  21  < > 19 17 19   AST  --   --   --  20  --  17  < > 12 9* 11   BILITOT  --   --   --  0.6  --  0.6  < > 0.6 0.5 0.6   MG 1.6 1.4*  --  1.7  --  1.2*  --   --   --   --    PHOS 4.7* 4.4  --  4.5  --   --   --   --   --   --    < > = values in this interval not displayed.  Estimated Creatinine Clearance: 78.7 mL/min (based on Cr of 1.2).      Recent Labs  Lab 02/15/17  1357   *       No results for input(s): LDH in the last 168 hours.    Microbiology Results (last 7 days)     Procedure Component Value Units Date/Time    Blood culture [[id number]] Collected:  02/20/17 1124    Order Status:  Sent Specimen:  Blood Updated:  02/20/17 1124    Blood culture [[id number]] Collected:  02/20/17 1124    Order Status:  Sent Specimen:  Blood Updated:  02/20/17 1124    Urine culture [[id number]]     Order Status:  No result Specimen:  Urine             ASSESSMENT:     57 yo M with PMHx of NICM s/p HM II (8/24/16), HLD, HTN, VT s/p ICD who presented to the hospital for elevated LDH in the setting of subtherapeutic INR with lower extremity swelling concerning for LVAD pump thrombosis, now s/p OHTx 2/9/17.       PLAN:     S/P Orthotopic Heart Transplantation 2/9/2017  -HTS primary  -Moderate Risk of Rejection: cPRA 0%, Negative Crossmatch with B Channel Shifts  -CMV Status: D-/R+: Will require valcyte x 3 months  -Immunosuppression: MMF 1500 mg BID, Prednisone 25 mg bid. Tacro 3/3  -On full OI prophylaxis  -EGBx #1 done   -ST following. Tolerating pureed diet with honey-thick  liquids  -Right CT out 2/17    DM  -Insulin gtt per off overnight, appreciate endocrine management   -transitioned to lantus(home) and pre meal, has an allergy to detemir so has to use home lantus    Right face lesion  - Appreciate Dermatology recommendations  - Mupirocin 2% to lesion TID x 5 days, biopsy only if no improvement, will likely refer for OP follow up    Bacteruria, Citrobacter Koseri  -UCx 1/19/2017 with 10-49k citrobacter  -Repeat UCx 2/8/17 with >100,000 citrobacter koseri, still with WBC, will culture     Improving LUE weakness and numbness  -Appreciate Neurology's help. Signed off

## 2017-02-21 ENCOUNTER — TELEPHONE (OUTPATIENT)
Dept: TRANSPLANT | Facility: CLINIC | Age: 57
End: 2017-02-21

## 2017-02-21 LAB
ALBUMIN SERPL BCP-MCNC: 2.6 G/DL
ALP SERPL-CCNC: 68 U/L
ALT SERPL W/O P-5'-P-CCNC: 20 U/L
ANION GAP SERPL CALC-SCNC: 6 MMOL/L
ANISOCYTOSIS BLD QL SMEAR: SLIGHT
AST SERPL-CCNC: 11 U/L
BASOPHILS # BLD AUTO: 0 K/UL
BASOPHILS NFR BLD: 0 %
BILIRUB SERPL-MCNC: 0.6 MG/DL
BUN SERPL-MCNC: 26 MG/DL
CALCIUM SERPL-MCNC: 8.5 MG/DL
CHLORIDE SERPL-SCNC: 97 MMOL/L
CO2 SERPL-SCNC: 31 MMOL/L
CREAT SERPL-MCNC: 1.1 MG/DL
DIFFERENTIAL METHOD: ABNORMAL
EOSINOPHIL # BLD AUTO: 0 K/UL
EOSINOPHIL NFR BLD: 0 %
ERYTHROCYTE [DISTWIDTH] IN BLOOD BY AUTOMATED COUNT: 27.7 %
EST. GFR  (AFRICAN AMERICAN): >60 ML/MIN/1.73 M^2
EST. GFR  (NON AFRICAN AMERICAN): >60 ML/MIN/1.73 M^2
GLUCOSE SERPL-MCNC: 304 MG/DL
HCT VFR BLD AUTO: 24.9 %
HGB BLD-MCNC: 8.3 G/DL
HYPOCHROMIA BLD QL SMEAR: ABNORMAL
LYMPHOCYTES # BLD AUTO: 0.7 K/UL
LYMPHOCYTES NFR BLD: 4.6 %
MCH RBC QN AUTO: 22.8 PG
MCHC RBC AUTO-ENTMCNC: 33.3 %
MCV RBC AUTO: 68 FL
MONOCYTES # BLD AUTO: 0.6 K/UL
MONOCYTES NFR BLD: 4 %
NEUTROPHILS # BLD AUTO: 14.1 K/UL
NEUTROPHILS NFR BLD: 91.4 %
OVALOCYTES BLD QL SMEAR: ABNORMAL
PLATELET # BLD AUTO: 286 K/UL
PLATELET BLD QL SMEAR: ABNORMAL
PMV BLD AUTO: ABNORMAL FL
POCT GLUCOSE: 106 MG/DL (ref 70–110)
POCT GLUCOSE: 151 MG/DL (ref 70–110)
POCT GLUCOSE: 203 MG/DL (ref 70–110)
POCT GLUCOSE: 217 MG/DL (ref 70–110)
POCT GLUCOSE: 314 MG/DL (ref 70–110)
POCT GLUCOSE: 97 MG/DL (ref 70–110)
POIKILOCYTOSIS BLD QL SMEAR: SLIGHT
POLYCHROMASIA BLD QL SMEAR: ABNORMAL
POTASSIUM SERPL-SCNC: 5.5 MMOL/L
PROT SERPL-MCNC: 5.2 G/DL
RBC # BLD AUTO: 3.64 M/UL
SCHISTOCYTES BLD QL SMEAR: ABNORMAL
SODIUM SERPL-SCNC: 134 MMOL/L
TACROLIMUS BLD-MCNC: 11.7 NG/ML
WBC # BLD AUTO: 15.75 K/UL

## 2017-02-21 PROCEDURE — 25000003 PHARM REV CODE 250: Performed by: THORACIC SURGERY (CARDIOTHORACIC VASCULAR SURGERY)

## 2017-02-21 PROCEDURE — 25000003 PHARM REV CODE 250: Performed by: INTERNAL MEDICINE

## 2017-02-21 PROCEDURE — 80197 ASSAY OF TACROLIMUS: CPT

## 2017-02-21 PROCEDURE — 63600175 PHARM REV CODE 636 W HCPCS: Performed by: INTERNAL MEDICINE

## 2017-02-21 PROCEDURE — 25000003 PHARM REV CODE 250: Performed by: PHYSICIAN ASSISTANT

## 2017-02-21 PROCEDURE — 25000003 PHARM REV CODE 250: Performed by: STUDENT IN AN ORGANIZED HEALTH CARE EDUCATION/TRAINING PROGRAM

## 2017-02-21 PROCEDURE — 94761 N-INVAS EAR/PLS OXIMETRY MLT: CPT

## 2017-02-21 PROCEDURE — 25000242 PHARM REV CODE 250 ALT 637 W/ HCPCS: Performed by: INTERNAL MEDICINE

## 2017-02-21 PROCEDURE — 20600001 HC STEP DOWN PRIVATE ROOM

## 2017-02-21 PROCEDURE — 94664 DEMO&/EVAL PT USE INHALER: CPT

## 2017-02-21 PROCEDURE — 94640 AIRWAY INHALATION TREATMENT: CPT

## 2017-02-21 PROCEDURE — 63600175 PHARM REV CODE 636 W HCPCS: Performed by: PHYSICIAN ASSISTANT

## 2017-02-21 PROCEDURE — 97116 GAIT TRAINING THERAPY: CPT

## 2017-02-21 PROCEDURE — 99232 SBSQ HOSP IP/OBS MODERATE 35: CPT | Mod: ,,, | Performed by: NURSE PRACTITIONER

## 2017-02-21 PROCEDURE — 97110 THERAPEUTIC EXERCISES: CPT

## 2017-02-21 PROCEDURE — 80053 COMPREHEN METABOLIC PANEL: CPT

## 2017-02-21 PROCEDURE — 92526 ORAL FUNCTION THERAPY: CPT

## 2017-02-21 PROCEDURE — 99232 SBSQ HOSP IP/OBS MODERATE 35: CPT | Mod: ,,, | Performed by: INTERNAL MEDICINE

## 2017-02-21 PROCEDURE — 25000003 PHARM REV CODE 250: Performed by: NURSE PRACTITIONER

## 2017-02-21 PROCEDURE — 85025 COMPLETE CBC W/AUTO DIFF WBC: CPT

## 2017-02-21 RX ORDER — FUROSEMIDE 40 MG/1
40 TABLET ORAL 2 TIMES DAILY
Status: DISCONTINUED | OUTPATIENT
Start: 2017-02-21 | End: 2017-02-22 | Stop reason: HOSPADM

## 2017-02-21 RX ADMIN — PREDNISONE 20 MG: 10 TABLET ORAL at 09:02

## 2017-02-21 RX ADMIN — PREDNISONE 20 MG: 10 TABLET ORAL at 08:02

## 2017-02-21 RX ADMIN — IPRATROPIUM BROMIDE AND ALBUTEROL SULFATE 3 ML: .5; 3 SOLUTION RESPIRATORY (INHALATION) at 03:02

## 2017-02-21 RX ADMIN — INSULIN ASPART 20 UNITS: 100 INJECTION, SOLUTION INTRAVENOUS; SUBCUTANEOUS at 11:02

## 2017-02-21 RX ADMIN — IPRATROPIUM BROMIDE AND ALBUTEROL SULFATE 3 ML: .5; 3 SOLUTION RESPIRATORY (INHALATION) at 11:02

## 2017-02-21 RX ADMIN — TACROLIMUS 3 MG: 1 CAPSULE, GELATIN COATED ORAL at 05:02

## 2017-02-21 RX ADMIN — TERBUTALINE SULFATE 5 MG: 5 TABLET ORAL at 05:02

## 2017-02-21 RX ADMIN — FUROSEMIDE 80 MG: 10 INJECTION, SOLUTION INTRAMUSCULAR; INTRAVENOUS at 09:02

## 2017-02-21 RX ADMIN — MAGNESIUM OXIDE TAB 400 MG (241.3 MG ELEMENTAL MG) 400 MG: 400 (241.3 MG) TAB at 08:02

## 2017-02-21 RX ADMIN — INSULIN GLARGINE 28 UNITS: 100 INJECTION, SOLUTION SUBCUTANEOUS at 09:02

## 2017-02-21 RX ADMIN — OXYCODONE HYDROCHLORIDE 10 MG: 5 TABLET ORAL at 05:02

## 2017-02-21 RX ADMIN — FUROSEMIDE 40 MG: 40 TABLET ORAL at 05:02

## 2017-02-21 RX ADMIN — NYSTATIN 500000 UNITS: 500000 SUSPENSION ORAL at 09:02

## 2017-02-21 RX ADMIN — NYSTATIN 500000 UNITS: 500000 SUSPENSION ORAL at 05:02

## 2017-02-21 RX ADMIN — IPRATROPIUM BROMIDE AND ALBUTEROL SULFATE 3 ML: .5; 3 SOLUTION RESPIRATORY (INHALATION) at 08:02

## 2017-02-21 RX ADMIN — OXYCODONE HYDROCHLORIDE 10 MG: 5 TABLET ORAL at 09:02

## 2017-02-21 RX ADMIN — MYCOPHENOLATE MOFETIL 1500 MG: 250 CAPSULE ORAL at 08:02

## 2017-02-21 RX ADMIN — MUPIROCIN: 20 OINTMENT TOPICAL at 02:02

## 2017-02-21 RX ADMIN — DOCUSATE SODIUM 100 MG: 100 CAPSULE, LIQUID FILLED ORAL at 08:02

## 2017-02-21 RX ADMIN — INSULIN ASPART 8 UNITS: 100 INJECTION, SOLUTION INTRAVENOUS; SUBCUTANEOUS at 07:02

## 2017-02-21 RX ADMIN — MAGNESIUM OXIDE TAB 400 MG (241.3 MG ELEMENTAL MG) 400 MG: 400 (241.3 MG) TAB at 09:02

## 2017-02-21 RX ADMIN — MUPIROCIN: 20 OINTMENT TOPICAL at 05:02

## 2017-02-21 RX ADMIN — PANTOPRAZOLE SODIUM 40 MG: 40 TABLET, DELAYED RELEASE ORAL at 09:02

## 2017-02-21 RX ADMIN — OXYCODONE HYDROCHLORIDE 10 MG: 5 TABLET ORAL at 08:02

## 2017-02-21 RX ADMIN — MYCOPHENOLATE MOFETIL 1500 MG: 250 CAPSULE ORAL at 09:02

## 2017-02-21 RX ADMIN — VALGANCICLOVIR 900 MG: 450 TABLET, FILM COATED ORAL at 09:02

## 2017-02-21 RX ADMIN — INSULIN ASPART 20 UNITS: 100 INJECTION, SOLUTION INTRAVENOUS; SUBCUTANEOUS at 07:02

## 2017-02-21 RX ADMIN — IPRATROPIUM BROMIDE AND ALBUTEROL SULFATE 3 ML: .5; 3 SOLUTION RESPIRATORY (INHALATION) at 09:02

## 2017-02-21 RX ADMIN — NYSTATIN 500000 UNITS: 500000 SUSPENSION ORAL at 08:02

## 2017-02-21 RX ADMIN — TACROLIMUS 3 MG: 1 CAPSULE, GELATIN COATED ORAL at 09:02

## 2017-02-21 RX ADMIN — TERBUTALINE SULFATE 5 MG: 5 TABLET ORAL at 02:02

## 2017-02-21 RX ADMIN — NYSTATIN 500000 UNITS: 500000 SUSPENSION ORAL at 12:02

## 2017-02-21 RX ADMIN — INSULIN ASPART 20 UNITS: 100 INJECTION, SOLUTION INTRAVENOUS; SUBCUTANEOUS at 04:02

## 2017-02-21 RX ADMIN — OXYCODONE HYDROCHLORIDE 10 MG: 5 TABLET ORAL at 02:02

## 2017-02-21 RX ADMIN — INSULIN ASPART 4 UNITS: 100 INJECTION, SOLUTION INTRAVENOUS; SUBCUTANEOUS at 11:02

## 2017-02-21 RX ADMIN — DOCUSATE SODIUM 100 MG: 100 CAPSULE, LIQUID FILLED ORAL at 09:02

## 2017-02-21 RX ADMIN — TERBUTALINE SULFATE 5 MG: 5 TABLET ORAL at 08:02

## 2017-02-21 RX ADMIN — MUPIROCIN: 20 OINTMENT TOPICAL at 08:02

## 2017-02-21 RX ADMIN — POLYETHYLENE GLYCOL 3350 17 G: 17 POWDER, FOR SOLUTION ORAL at 09:02

## 2017-02-21 RX ADMIN — SULFAMETHOXAZOLE AND TRIMETHOPRIM 1 TABLET: 400; 80 TABLET ORAL at 09:02

## 2017-02-21 NOTE — TELEPHONE ENCOUNTER
----- Message from Ander Avilez sent at 2/21/2017  3:54 PM CST -----  Contact: Tamela/Estephanie  Please call Tamela [phone]. Rx# [id number]/store#[id number] (Tacrolimus 1 mg) Need to confirm diagnosis and transplant date in order to approve medication. Dr Gupta pt    Thank you

## 2017-02-21 NOTE — PROGRESS NOTES
UPDATE    SW to pt's room for update.  Pt is s/p OHTx on 2/9/17 (12 days).  Pt presents as sitting up in bed, with wife at bedside.  Pt and wife both present as aao x4, calm, cooperative, and asking and answering questions appropriately.  Pt reports plan is for discharge to home tomorrow.  Pt verbalizes understanding and agreement with d/c tomorrow.  SW reviewed PT/OT recs for HH with pt; pt verbalizes agreement with HH and requests [facility].  SW will f/u for orders and HH arrangements.    SW provided pt with information on writing to donor family via [employer]  Pt verbalizes understanding.  Pt asking today about assistance with finding a dentist who takes Medicare/Medicaid, as pt needs new dentures.  SW will f/u with pt re: dental resources.  Pt denies any other needs or concerns at this time.  Pt and wife both report coping well at this time.  SW providing ongoing psychosocial and counseling support, education, resources, assistance, and discharge planning as indicated.  SW following and remains available.

## 2017-02-21 NOTE — TELEPHONE ENCOUNTER
Called back and gave same information for prior auth. __Medicare did cover transplant and date of transplant

## 2017-02-21 NOTE — PLAN OF CARE
Problem: Physical Therapy Goal  Goal: Physical Therapy Goal  Goals to be met by: 17    Patient will increase functional independence with mobility by performin. Supine to sit with Stand-by Assistance - GOAL MET  2. Sit to stand transfer with Supervision - GOAL MET  3. Gait x 220 feet with Supervision - GOAL MET    4. Pt to amb up/down 7 steps with use of 1 HR, with S.    Outcome: Ongoing (interventions implemented as appropriate)  Pt progressing towards goals.

## 2017-02-21 NOTE — PROGRESS NOTES
[facility]  Adult Nutrition  Progress Note    SUMMARY     Recommendations    Recommendation/Intervention: Pt advanced to diabetic dental soft 2000 kcal. Reports good appetite and consumption of 50-75% of his meals. Pt denied oral boost supplement. Post tx diet education provided, all questions addressed.   1. Continue Diabetic 2000 diet, texture per SLP.   2. Encourage po intake of >75% of his meals.      Goals: 1. PO intake of >75% of all meals  Nutrition Goal Status: progressing towards goal, new  Communication of RD Recs: discussed on rounds      Reason for Assessment    Reason for Assessment: RD follow-up  Diagnosis: transplant/postoperative complications (OHtx (2/9/17))  Relevent Medical History: HF s/p LVAD 8/2016, AICD, HTN, HLD, GERD, DMII, CKD stg 3   Interdisciplinary Rounds: attended     General Information Comments: Pt advanced to diabetic dental soft 2000 calorie diet since RD's last eval. Reports good appetite and consumption of 50-75% of his meals. Pt denied oral boost supplement, stating that his appetite is improving daily and he does not need it.  Post transplant nutrition education provided. Food safety/drug interactions emphasized. General healthy diet recommended. RD name/contact information, education material left. No other needs identified.     Nutrition Prescription Ordered    Current Diet Order: Dental soft diabetic 2000  Nutrition Order Comments: Honey thickend liquids         Nutrition Risk Screen     Nutrition Risk Screen: no indicators present    Nutrition/Diet History    Patient Reported Diet/Restrictions/Preferences: diabetic diet  Typical Food/Fluid Intake: Nutrition Discharge planning: Post transplant nutrition education provided. Food safety/drug interactions emphasized. General healthy diet recommended. RD name/contact information, education material left. No other needs identified. Caregiver present.    Food Preferences: Buddhist/cultural prefs from prior  admission noted; NO PORK        Factors Affecting Nutritional Intake: decreased appetite (pt reports being tired)       Labs/Tests/Procedures/Meds       Pertinent Labs Reviewed: reviewed  Pertinent Labs Comments: K+ 5.5, Na 134, Ca 8.5, BUN 26, glu 304  Pertinent Medications Reviewed: reviewed  Pertinent Medications Comments: lasix, insulin, prednisone, tacromilus    Physical Findings    Overall Physical Appearance: overweight     Oral/Mouth Cavity: tooth/teeth missing  Skin: pressure ulcer(s), other (see comments) (chest incisions)    Anthropometrics       Height (inches): 67.99 in  Weight Method: Bed Scale  Weight (kg): 96.2 kg  Ideal Body Weight (IBW), Male: 153.94 lb     % Ideal Body Weight, Male (lb): 137.77 lb     BMI (kg/m2): 32.25  BMI Grade: 30 - 34.9- obesity - grade I  Usual Body Weight (UBW), k kg  % Usual Body Weight: 111.35          Estimated/Assessed Needs    Weight Used For Calorie Calculations: 96.2 kg (212 lb 1.3 oz)   Height (cm): 172.7 cm     Energy Need Method: Titus-St Jeor (2224 kcal/day (1.25 PAL))  RMR (Titus-St. Jeor Equation): 1779.73        Weight Used For Protein Calculations: 96.2 kg (212 lb 1.3 oz)  Protein Requirements: 115-125 g/day (1.2-1.3 g/kg/day)  Fluid Need Method: RDA Method       Monitor and Evaluation    Food and Nutrient Intake: energy intake, food and beverage intake  Food and Nutrient Adminstration: diet order  Knowledge/Beliefs/Attitudes: food and nutrition knowledge/skill  Physical Activity and Function: nutrition-related ADLs and IADLs  Anthropometric Measurements: weight, weight change, body mass index  Biochemical Data, Medical Tests and Procedures: electrolyte and renal panel, gastrointestinal profile, glucose/endocrine profile, inflammatory profile, lipid profile  Nutrition-Focused Physical Findings: overall appearance    Nutrition Risk    Level of Risk: moderate    Nutrition Follow-Up    RD Follow-up?: Yes    Nutrition Diagnosis:    Increased protein  needs r/t physiological needs AEB s/p OHtx

## 2017-02-21 NOTE — PT/OT/SLP PROGRESS
Physical Therapy  Treatment    Mick Barriga   MRN: [id number]   Admitting Diagnosis: Heart transplanted    PT Received On: 17  PT Start Time: 1425     PT Stop Time: 1452    PT Total Time (min): 27 min       Billable Minutes:  Gait Training 13 and Therapeutic Exercise 14    Treatment Type: Treatment  PT/PTA: PT             General Precautions: Standard, aspiration, sternal, honey thick, fall  Orthopedic Precautions: N/A   Braces: N/A    Do you have any cultural, spiritual, Taoist conflicts, given your current situation?:  (none)    Subjective:  Communicated with nursing prior to session.      Pain Ratin/10  Location - Side: Left  Location - Orientation: generalized  Location: hand  Pain Addressed: Pre-medicate for activity, Distraction       Objective:   Patient found with: peripheral IV    Functional Mobility:  Bed Mobility:        Transfers:  Sit <> Stand Assistance: Independent  Sit <> Stand Assistive Device: No Assistive Device  Bed <> Chair Technique: Stand Pivot  Bed <> Chair Assistance: Independent  Bed <> Chair Assistive Device: No Assistive Device    Gait:   Gait Distance: Pt amb 360' 2 standing rest breaks  Assistance 1: Supervision  Gait Assistive Device: No device  Gait Pattern: swing-through gait  Gait Deviation(s): decreased amelia      Balance:   Static Sit: NORMAL: No deviations seen in posture held statically  Dynamic Sit: NORMAL: No deviations seen in posture held dynamically  Static Stand: FAIR+: Takes MINIMAL challenges from all directions  Dynamic stand: GOOD: Needs SUPERVISION only during gait and able to self right with moderate      Therapeutic Activities and Exercises:  10xs sit<-->stand as part of HEP: 54.27 sec, 7/10 RPE following  Ankle pumps in reclined sit: verbal cuing to slow reps, 20 reps, B  LAQs: 20 reps  Hip ER/IR: 20 reps  Hip add/abd: 20 reps  Ed on proper positioning for heels floating, and modA to position with pillows 2xs       AM-PAC 6 CLICK MOBILITY  How much  help from another person does this patient currently need?   1 = Unable, Total/Dependent Assistance  2 = A lot, Maximum/Moderate Assistance  3 = A little, Minimum/Contact Guard/Supervision  4 = None, Modified [place]/Independent    Turning over in bed (including adjusting bedclothes, sheets and blankets)?: 4  Sitting down on and standing up from a chair with arms (e.g., wheelchair, bedside commode, etc.): 4  Moving from lying on back to sitting on the side of the bed?: 4  Moving to and from a bed to a chair (including a wheelchair)?: 4  Need to walk in hospital room?: 4  Climbing 3-5 steps with a railing?: 3  Total Score: 23    AM-PAC Raw Score CMS G-Code Modifier Level of Impairment Assistance   6 % Total / Unable   7 - 9 CM 80 - 100% Maximal Assist   10 - 14 CL 60 - 80% Moderate Assist   15 - 19 CK 40 - 60% Moderate Assist   20 - 22 CJ 20 - 40% Minimal Assist   23 CI 1-20% SBA / CGA   24 CH 0% Independent/ Mod I     Patient left up in chair with all lines intact and call button in reach.    Assessment:  Mick Barriga is a 56 y.o. male with a medical diagnosis of Heart transplanted and presents with impairments in ex and gait tolerance.  Pt requires multiple standing rest breaks, with use of HRs during gait training sec to r/o fatigue.  No SOB noted.  Pt continues to present with B stage 1 lateral heel pressure ulcers.  L foot is improving better than R.  Pt is currently able to amb without an AD, with S, although requires a AD for the home, for I amb, and in case of drop in function.     Rehab identified problem list/impairments: Rehab identified problem list/impairments: weakness, impaired endurance, impaired cardiopulmonary response to activity, pain, edema, impaired skin, gait instability    Rehab potential is good.    Activity tolerance: Good    Discharge recommendations: Discharge Facility/Level Of Care Needs: home health PT     Barriers to discharge:      Equipment recommendations: Equipment  Needed After Discharge: walker, rolling     GOALS:   Physical Therapy Goals        Problem: Physical Therapy Goal    Goal Priority Disciplines Outcome Goal Variances Interventions   Physical Therapy Goal     PT/OT, PT Ongoing (interventions implemented as appropriate)     Description:  Goals to be met by: 17    Patient will increase functional independence with mobility by performin. Supine to sit with Stand-by Assistance - GOAL MET  2. Sit to stand transfer with Supervision - GOAL MET  3. Gait  x 220 feet with Supervision  - GOAL MET    4. Pt to amb up/down 7 steps with use of 1 HR, with S.                    PLAN:    Patient to be seen 5 x/week  to address the above listed problems via gait training, therapeutic activities, therapeutic exercises, neuromuscular re-education  Plan of Care expires: 17  Plan of Care reviewed with: patient, spouse         Mary Reidin, PT  2017

## 2017-02-21 NOTE — TELEPHONE ENCOUNTER
----- Message from May Peter MA sent at 2/21/2017  4:35 PM CST -----  Contact: Jerilyn  Please call Jerilyn from [facility] at [phone] she need to talk to the nurse about the patient medication Mycophenolate RX. Number [phone]. Thank you.

## 2017-02-21 NOTE — NURSING
Pt AC/HS mealtime plus SSI. Despite my attempt to have pt call me before eating for both breakfast and dinner he had already eaten ~50% of his meal.  -Am BG-215   -PM BG-203   Mealtime insulin given but no coverage. Will pass it on to night RN. Care on going. Will continue to monitor.     *Self med teaching wasn't done today as the wife was not here. She is coming tonight.

## 2017-02-21 NOTE — PROGRESS NOTES
"[facility]  Endocrinology  Progress Note    Admit Date: 2017     Reason for Consult: Management of T2DM     Surgical Procedure and Date: 17 s/p heart txp    Diabetes diagnosis year:     Home Diabetes Medications:  Lantus 16 u qam, Novolog 20 U AC     Diabetes Complications include:  Hyperglycemia, Diabetic chronic kidney disease and Diabetic gastroparesis      Complicating diabetes co morbidities: CHF and CKD    HPI: Patient is a 56 y.o. male with T2DM, ischemic cardiomyopathy, s/p LVAD placement in 2016. He is now s/p heart transplantation. Endocrine consulted for bg management.       Interval HPI:   Overnight events: BG slightly above goal yesterday with slightly uncontrolled prandial excursions. BG very elevated this AM - patient ate large carb snack (cup of grapes, 2 bananas, sugar sweetened aloe beverage) without his bedtime snack insulin . Potential d/c home today or Wednesday. On prednisone 20 mg BID since   Eatin%  Nausea: No  Hypoglycemia and intervention: No  Fever: No  TPN and/or TF: No  If yes, type of TF/TPN and rate:     Visit Vitals    BP (!) 95/46 (BP Location: Right arm, Patient Position: Lying, BP Method: Automatic)    Pulse 75    Temp 97.8 °F (36.6 °C) (Oral)    Resp 18    Ht 5' 8" (1.727 m)    Wt 96.2 kg (212 lb 1.3 oz)    SpO2 99%    BMI 32.25 kg/m2       Labs Reviewed and Include      Recent Labs  Lab 17  0641   *   CALCIUM 8.5*   ALBUMIN 2.6*   PROT 5.2*   *   K 5.5*   CO2 31*   CL 97   BUN 26*   CREATININE 1.1   ALKPHOS 68   ALT 20   AST 11   BILITOT 0.6     Lab Results   Component Value Date    WBC 15.75 (H) 2017    HGB 8.3 (L) 2017    HCT 24.9 (L) 2017    MCV 68 (L) 2017     2017     No results for input(s): TSH, FREET4 in the last 168 hours.  Lab Results   Component Value Date    HGBA1C 6.8 (H) 2017       Nutritional status:   Body mass index is 32.25 kg/(m^2).  Lab Results "   Component Value Date    ALBUMIN 2.6 (L) 02/21/2017    ALBUMIN 2.7 (L) 02/20/2017    ALBUMIN 2.4 (L) 02/19/2017     Lab Results   Component Value Date    PREALBUMIN 19 (L) 02/08/2017    PREALBUMIN 14 (L) 02/06/2017    PREALBUMIN 14 (L) 02/03/2017       Estimated Creatinine Clearance: 86.3 mL/min (based on Cr of 1.1).    Accu-Checks  Recent Labs      02/18/17   1843  02/18/17   2125  02/19/17   0822  02/19/17   1210  02/19/17   1941  02/20/17   0804  02/20/17   1207  02/20/17   1819  02/20/17   2059  02/21/17   0747   POCTGLUCOSE  215*  176*  255*  117*  225*  217*  153*  203*  151*  314*       Current Medications and/or Treatments Impacting Glycemic Control  Immunotherapy:  Immunosuppressants     Start     Stop Route Frequency Ordered    02/13/17 2100  mycophenolate capsule 1,500 mg      -- Oral 2 times daily 02/13/17 0937    02/20/17 0800  tacrolimus capsule 3 mg      -- Oral 2 times daily 02/19/17 2042        Steroids:   Hormones     Start     Stop Route Frequency Ordered    02/20/17 2100  predniSONE tablet 20 mg      -- Oral 2 times daily 02/20/17 1117        Pressors:    Autonomic Drugs     None        Hyperglycemia/Diabetes Medications: Antihyperglycemics     Start     Stop Route Frequency Ordered    02/18/17 1019  insulin aspart pen 6 Units      -- SubQ As needed (PRN) 02/18/17 1019    02/18/17 1120  insulin aspart pen 1-10 Units      -- SubQ Before meals & nightly PRN 02/18/17 1020    02/19/17 1130  insulin aspart pen 20 Units      -- SubQ 3 times daily with meals 02/19/17 1033    02/21/17 2100  insulin glargine pen 28 Units  (pt's own supply)     Question Answer Comment   Brand Name: Lantus    Generic name: glargine    Form: Pen Injector insulin pen from home   Length of Therapy: Indefinite    Reason for Non-Formulary allergy to formulary alternative    How soon needed? (normally 72 hrs needed to procure): 0-24 hrs Pt will have family member bring Lantus pen from home; staff will bringto pharmacy to  lashawn.    Is the patient competent? Yes        -- SubQ Nightly 02/20/17 2114          ASSESSMENT and PLAN    Type 2 diabetes mellitus with stage 3 chronic kidney disease, with long-term current use of insulin  BG goal 140-180. Continue Lantus 28 units nightly, continue Novolog 20 units AC. Continue Novolog 6 units if eats nightly snack - reiterated with patient importance of requesting this insulin dose from nurse for overnight snack. BG monitoring AC/HS with moderate dose correction scale.     Pt is allergic to Levemir with reaction of severe itching. Pt to use home Lantus pens.     DISCHARGE PLANNING:   Will be discharged on MDI. Has Lantus and Novolog pens and has supplies.  Needs BG meter, strips, lancets rx - sent to [facility] per pt request   He is followed by Fabián Navarrete NP with [facility] ([place], [place]) for his DM management. Pt wishes to continue f/u with Rosalba MENJIVAR  No f/u appts needed     * Heart transplanted  Per CTS, HTS      Ischemic cardiomyopathy  S/p heart txp        Chronic combined systolic and diastolic heart failure  S/p heart transplant       Adrenal cortical steroids causing adverse effect in therapeutic use  Increasing insulin needs  On Prednisone 25 mg BID.     Immunosuppression  May increase insulin resistance       Obesity (BMI 30-39.9)  Increases insulin resistance   Body mass index is 32.25 kg/(m^2).          Lorna Emerson NP  Endocrinology  [facility]

## 2017-02-21 NOTE — ASSESSMENT & PLAN NOTE
BG goal 140-180. Continue Lantus 28 units nightly, continue Novolog 20 units AC. Continue Novolog 6 units if eats nightly snack - reiterated with patient importance of requesting this insulin dose from nurse for overnight snack. BG monitoring AC/HS with moderate dose correction scale.     Pt is allergic to Levemir with reaction of severe itching. Pt to use home Lantus pens.     DISCHARGE PLANNING:   Will be discharged on MDI. Has Lantus and Novolog pens and has supplies.  Needs BG meter, strips, lancets rx - sent to [facility] per pt request   He is followed by Fabián Navarrete NP with [facility] ([place]) for his DM management. Pt wishes to continue f/u with Rosalba MENJIVAR  No f/u appts needed

## 2017-02-21 NOTE — PROGRESS NOTES
Progress Note  Heart Transplant Service    Admit Date: 1/30/2017   LOS: 22 days     SUBJECTIVE:     Follow up for: Heart transplanted    Interval History:  Feeling great. No more numbness in fingers, walking in room     Scheduled Meds:   albuterol-ipratropium 2.5mg-0.5mg/3mL  3 mL Nebulization Q4H    docusate sodium  100 mg Oral BID    furosemide  40 mg Oral BID    insulin aspart  20 Units Subcutaneous TIDWM    insulin glargine  28 Units Subcutaneous QHS    magnesium oxide  400 mg Oral BID    mupirocin   Topical (Top) TID    mycophenolate  1,500 mg Oral BID    nystatin  500,000 Units Mouth/Throat QID (WM & HS)    pantoprazole  40 mg Oral Daily    polyethylene glycol  17 g Oral Daily    predniSONE  20 mg Oral BID    sulfamethoxazole-trimethoprim 400-80mg  1 tablet Oral Daily    tacrolimus  3 mg Oral BID    terbutaline  5 mg Oral TID    valganciclovir  900 mg Oral Daily     Continuous Infusions:   sodium chloride 0.45% 10 mL/hr (02/10/17 2000)     PRN Meds:sodium chloride, sodium chloride, acetaminophen, dextrose 50%, dextrose 50%, glucagon (human recombinant), glucose, glucose, HYDROmorphone, insulin aspart, insulin aspart, magnesium sulfate IVPB, metoclopramide HCl, ondansetron, oxycodone, potassium chloride **AND** potassium chloride **AND** potassium chloride, sodium phosphate IVPB, sodium phosphate IVPB, sodium phosphate IVPB    Immunosuppressants     Start     Stop Route Frequency Ordered    02/13/17 2100  mycophenolate capsule 1,500 mg      -- Oral 2 times daily 02/13/17 0937    02/20/17 0800  tacrolimus capsule 3 mg      -- Oral 2 times daily 02/19/17 2042          Review of patient's allergies indicates:   Allergen Reactions    Levemir [insulin detemir] Itching    Pork/porcine containing products     Ranexa [ranolazine] Nausea Only       OBJECTIVE:     Vital Signs (Most Recent)  Temp: 97.7 °F (36.5 °C) (02/21/17 1145)  Pulse: 82 (02/21/17 1145)  Resp: 18 (02/21/17 1145)  BP: (!) 109/55  (02/21/17 1145)  SpO2: 97 % (02/21/17 1145)    Vital Signs Range (Last 24H):  Temp:  [97.7 °F (36.5 °C)-98.2 °F (36.8 °C)]   Pulse:  [75-82]   Resp:  [17-20]   BP: ()/(46-59)   SpO2:  [95 %-99 %]     I & O (Last 24H):    Intake/Output Summary (Last 24 hours) at 02/21/17 1300  Last data filed at 02/21/17 0900   Gross per 24 hour   Intake             1620 ml   Output             3950 ml   Net            -2330 ml            Telemetry: sinus    Physical Exam   Constitutional: He is oriented to person, place, and time. He appears well-developed and well-nourished.   HENT:   Head: Normocephalic and atraumatic. 2cm well circumscribed plaque to right cheek, dry and crusted.  Eyes: EOM are normal. Pupils are equal, round, and reactive to light.   Neck: Normal range of motion. Neck supple. No JVD present.   Cardiovascular: S1 S2 audible, regular, tachycardic    Pulmonary/Chest: Bronchial breath sounds on left  Abdominal: Soft. Bowel sounds are normal. He exhibits no distension. There is no tenderness.   Musculoskeletal: Normal range of motion. He exhibits no edema.   Neurological: He is alert and oriented to person, place, and time.   Skin: Skin is warm and dry.   Extremities: BUE edematous. LUE slightly weaker than RUE. 2+ peripheral pulses.      Labs:       Recent Labs  Lab 02/19/17  0450 02/20/17  0455 02/21/17  0641   WBC 15.58* 22.55* 15.75*   HGB 8.1* 8.8* 8.3*   HCT 25.0* 26.8* 24.9*    345 286   LYMPH 4.0*  CANCELED 4.0*  CANCELED 4.6*  0.7*   MONO 0.0*  CANCELED 3.0*  CANCELED 4.0  0.6   EOSINOPHIL 0.0 1.0 0.0         Recent Labs  Lab 02/15/17  0417   INR 1.6*          Recent Labs  Lab 02/14/17  1831 02/15/17  0014  02/15/17  0417  02/16/17  0444  02/19/17  0450 02/20/17  0455 02/21/17  0641   GLU  --   --   --  203*  < > 177*  < > 207* 243* 304*   CALCIUM  --   --   --  8.6*  < > 8.7  < > 8.4* 8.8 8.5*   ALBUMIN  --   --   < > 2.6*  --  2.5*  < > 2.4* 2.7* 2.6*   PROT  --   --   < > 5.7*  --  5.3*   < > 5.0* 5.8* 5.2*   NA  --   --   --  137  < > 138  < > 135* 134* 134*   K  --   --   < > 4.0  4.0  < > 4.4  < > 4.9 4.8 5.5*   CO2  --   --   --  30*  < > 33*  < > 29 28 31*   CL  --   --   --  95  < > 97  < > 97 96 97   BUN  --   --   --  40*  < > 30*  < > 22* 25* 26*   CREATININE  --   --   --  0.9  < > 0.8  < > 0.9 1.2 1.1   ALKPHOS  --   --   < > 65  --  65  < > 60 71 68   ALT  --   --   < > 26  --  21  < > 17 19 20   AST  --   --   < > 20  --  17  < > 9* 11 11   BILITOT  --   --   < > 0.6  --  0.6  < > 0.5 0.6 0.6   MG 1.6 1.4*  --  1.7  --  1.2*  --   --   --   --    PHOS 4.7* 4.4  --  4.5  --   --   --   --   --   --    < > = values in this interval not displayed.  Estimated Creatinine Clearance: 86.3 mL/min (based on Cr of 1.1).      Recent Labs  Lab 02/15/17  1357   *       No results for input(s): LDH in the last 168 hours.    Microbiology Results (last 7 days)     Procedure Component Value Units Date/Time    Blood culture [[id number]] Collected:  02/20/17 1124    Order Status:  Completed Specimen:  Blood Updated:  02/20/17 2115     Blood Culture, Routine No Growth to date    Blood culture [[id number]] Collected:  02/20/17 1124    Order Status:  Completed Specimen:  Blood Updated:  02/20/17 2115     Blood Culture, Routine No Growth to date    Urine culture [[id number]]     Order Status:  No result Specimen:  Urine             ASSESSMENT:     57 yo M with PMHx of NICM s/p HM II (8/24/16), HLD, HTN, VT s/p ICD who presented to the hospital for elevated LDH in the setting of subtherapeutic INR with lower extremity swelling concerning for LVAD pump thrombosis, now s/p OHTx 2/9/17.       PLAN:     S/P Orthotopic Heart Transplantation 2/9/2017  -HTS primary  -Moderate Risk of Rejection: cPRA 0%, Negative Crossmatch with B Channel Shifts  -CMV Status: D-/R+: Will require valcyte x 3 months  -Immunosuppression: MMF 1500 mg BID, Prednisone 20 mg bid. [place] 3/3  -On full OI prophylaxis  -EGBx #1 done   -ST  following. Tolerating pureed diet with honey-thick liquids  -Right CT out 2/17    DM  - appreciate endocrine management   -transitioned to lantus (home) and pre meal, has an allergy to detemir so has to use home lantus    Right face lesion  - Appreciate Dermatology recommendations  - Mupirocin 2% to lesion TID x 5 days, biopsy only if no improvement, will likely refer for OP follow up    Bacteruria, Citrobacter Koseri  -UCx 1/19/2017 with 10-49k citrobacter  -Repeat UCx 2/8/17 with >100,000 citrobacter koseri, WBC improved      Improving LUE weakness and numbness  -Appreciate Neurology's help. Signed off

## 2017-02-21 NOTE — TELEPHONE ENCOUNTER
Called and spoke to someone at [facility] who confirmed the information regarding date of tx and diagnosis and that Medicare did cover transplant.

## 2017-02-21 NOTE — PLAN OF CARE
Problem: Patient Care Overview  Goal: Plan of Care Review  Dx: heart transplant (2/9), 1 PRBC, 250cc Albumin    PMH: AICD, HTN, HLD, GERD, T2DM, CKD (3), ischemic CM, systolic and diastolic HF, low Na, non morbid obesity, BPH    PSH: cholecystectomy, LVAD (8/24/16)    2/9: Heart Tx (out @ 1405); 2 units PRBC post op  2/11: Post-op day 2, Extubated    Nursing:  - MAP 60-80  - V paced at 105  - ACHS accuchecks  - Q6 hour K, Mg, Phos     Outcome: Ongoing (interventions implemented as appropriate)  Patient AAOx4 and VSS. He has remained afebrile. BG is being monitored ac/hs and has been 314 and 217. He has telemetry monitor on and HR has been 70s-80s, NS. He has mid sternal incision that's CHARLES w/dermabond, RLQ incision, and right upper abdomin incision that's being packed with iodoform daily. Self beds are set up in the room and his wife pulls them 100%. He's able to ambulate without assistance and has experienced no falls. His wife is at the bedside and they have no questions or concerns at this time. Patient is stable and will continue to monitor.

## 2017-02-21 NOTE — PLAN OF CARE
Problem: Patient Care Overview  Goal: Plan of Care Review  Dx: heart transplant (2/9), 1 PRBC, 250cc Albumin    PMH: AICD, HTN, HLD, GERD, T2DM, CKD (3), ischemic CM, systolic and diastolic HF, low Na, non morbid obesity, BPH    PSH: cholecystectomy, LVAD (8/24/16)    2/9: Heart Tx (out @ 1405); 2 units PRBC post op  2/11: Post-op day 2, Extubated    Nursing:  - MAP 60-80  - V paced at 105  - ACHS accuchecks  - Q6 hour K, Mg, Phos     Outcome: Ongoing (interventions implemented as appropriate)  AAOX4.  VSS.  PIV is intact and free of infection.  Changed dressings on abdomen.  Voiding per urinal.  Wife is at the bedside.  Bed is in lowest position, call bell is in reach, and pt instructed to call nurse when needing assistance.  Will continue to monitor.

## 2017-02-21 NOTE — SUBJECTIVE & OBJECTIVE
"Interval HPI:   Overnight events: BG slightly above goal yesterday with slightly uncontrolled prandial excursions. BG very elevated this AM - patient ate large carb snack (cup of grapes, 2 bananas, sugar sweetened aloe beverage) without his bedtime snack insulin . Potential d/c home today or Wednesday. On prednisone 20 mg BID since   Eatin%  Nausea: No  Hypoglycemia and intervention: No  Fever: No  TPN and/or TF: No  If yes, type of TF/TPN and rate:     Visit Vitals    BP (!) 95/46 (BP Location: Right arm, Patient Position: Lying, BP Method: Automatic)    Pulse 75    Temp 97.8 °F (36.6 °C) (Oral)    Resp 18    Ht 5' 8" (1.727 m)    Wt 96.2 kg (212 lb 1.3 oz)    SpO2 99%    BMI 32.25 kg/m2       Labs Reviewed and Include      Recent Labs  Lab 17  0641   *   CALCIUM 8.5*   ALBUMIN 2.6*   PROT 5.2*   *   K 5.5*   CO2 31*   CL 97   BUN 26*   CREATININE 1.1   ALKPHOS 68   ALT 20   AST 11   BILITOT 0.6     Lab Results   Component Value Date    WBC 15.75 (H) 2017    HGB 8.3 (L) 2017    HCT 24.9 (L) 2017    MCV 68 (L) 2017     2017     No results for input(s): TSH, FREET4 in the last 168 hours.  Lab Results   Component Value Date    HGBA1C 6.8 (H) 2017       Nutritional status:   Body mass index is 32.25 kg/(m^2).  Lab Results   Component Value Date    ALBUMIN 2.6 (L) 2017    ALBUMIN 2.7 (L) 2017    ALBUMIN 2.4 (L) 2017     Lab Results   Component Value Date    PREALBUMIN 19 (L) 2017    PREALBUMIN 14 (L) 2017    PREALBUMIN 14 (L) 2017       Estimated Creatinine Clearance: 86.3 mL/min (based on Cr of 1.1).    Accu-Checks  Recent Labs      17   1843  17   2125  17   0822  17   1210  17   1941  17   0804  17   1207  17   1819  17   0747   POCTGLUCOSE  215*  176*  255*  117*  225*  217*  153*  203*  151*  314*       Current Medications and/or " Treatments Impacting Glycemic Control  Immunotherapy:  Immunosuppressants     Start     Stop Route Frequency Ordered    02/13/17 2100  mycophenolate capsule 1,500 mg      -- Oral 2 times daily 02/13/17 0937    02/20/17 0800  tacrolimus capsule 3 mg      -- Oral 2 times daily 02/19/17 2042        Steroids:   Hormones     Start     Stop Route Frequency Ordered    02/20/17 2100  predniSONE tablet 20 mg      -- Oral 2 times daily 02/20/17 1117        Pressors:    Autonomic Drugs     None        Hyperglycemia/Diabetes Medications: Antihyperglycemics     Start     Stop Route Frequency Ordered    02/18/17 1019  insulin aspart pen 6 Units      -- SubQ As needed (PRN) 02/18/17 1019    02/18/17 1120  insulin aspart pen 1-10 Units      -- SubQ Before meals & nightly PRN 02/18/17 1020    02/19/17 1130  insulin aspart pen 20 Units      -- SubQ 3 times daily with meals 02/19/17 1033    02/21/17 2100  insulin glargine pen 28 Units  (pt's own supply)     Question Answer Comment   Brand Name: Lantus    Generic name: glargine    Form: Pen Injector insulin pen from home   Length of Therapy: Indefinite    Reason for Non-Formulary allergy to formulary alternative    How soon needed? (normally 72 hrs needed to procure): 0-24 hrs Pt will have family member bring Lantus pen from home; staff will bringto pharmacy to [facility].    Is the patient competent? Yes        -- SubQ Nightly 02/20/17 3965

## 2017-02-21 NOTE — NURSING
Post heart transplant education::  DISCHARGE INSTRUCTIONS: Met with patient and/or caregivers. Reviewed medications, activity, how to contact team ,on-call physician and pharmacy, when to call to post heart transplant team, sign and symptoms to report  and upcoming appointments. Patient and/ or caregivers verbalized understanding. Answered all questions and concerns, asked by patient and / or caregivers. Patient and family verbalized understanding.   Written material and f/u schedule handed to pt, plan is for pt to f/u Thursday 2/23/17, pt will bring home meds and blue card with him.

## 2017-02-21 NOTE — PLAN OF CARE
Problem: SLP Goal  Goal: SLP Goal  Speech Language Pathology Goals  Goals expected to be met by 2/28  1. Pt will tolerate dental soft & honey thickened liquid with no s/s aspiration   2. Pt will tolerate therapeutic trials with SLP only of less restrictive consistencies to determine if safe for diet upgrades   3. Pt will complete dysphagia exercises INDly     Speech Language Pathology Goals  Goals expected to be met by 2/21  1. Pt will tolerate puree & honey thickened liquid with no s/s aspiration GOAL MET   2. Pt will tolerate trials of soft solid with no s/s aspiration & adequate oral phase of swallow GOAL MET  3. Pt will tolerate trials of less restrictive liquid consistencies to determine if safe for diet upgrades CONTINUE  4. Pt will complete dysphagia exercises given min A CONTINUE, REVISE    Goals to be met 2/19  1. Pt will participate in ongoing swallow assessment to determine safest po diet GOAL MET     Outcome: Ongoing (interventions implemented as appropriate)  Pt was seen for ST session. SLP recs continue current diet of dental soft & honey thick liquids. Formal note to follow.      Lakesha Headley MS, CCC-SLP  Speech Language Pathologist  Pager: [phone]  2/21/2017

## 2017-02-22 ENCOUNTER — TELEPHONE (OUTPATIENT)
Dept: TRANSPLANT | Facility: CLINIC | Age: 57
End: 2017-02-22

## 2017-02-22 VITALS
RESPIRATION RATE: 18 BRPM | DIASTOLIC BLOOD PRESSURE: 56 MMHG | WEIGHT: 214.31 LBS | SYSTOLIC BLOOD PRESSURE: 118 MMHG | OXYGEN SATURATION: 96 % | BODY MASS INDEX: 32.48 KG/M2 | HEIGHT: 68 IN | HEART RATE: 80 BPM | TEMPERATURE: 98 F

## 2017-02-22 LAB
ALBUMIN SERPL BCP-MCNC: 2.5 G/DL
ALP SERPL-CCNC: 72 U/L
ALT SERPL W/O P-5'-P-CCNC: 23 U/L
ANION GAP SERPL CALC-SCNC: 11 MMOL/L
ANISOCYTOSIS BLD QL SMEAR: SLIGHT
AST SERPL-CCNC: 14 U/L
BASOPHILS # BLD AUTO: 0.01 K/UL
BASOPHILS NFR BLD: 0.1 %
BILIRUB SERPL-MCNC: 0.6 MG/DL
BUN SERPL-MCNC: 30 MG/DL
BURR CELLS BLD QL SMEAR: ABNORMAL
CALCIUM SERPL-MCNC: 8.5 MG/DL
CHLORIDE SERPL-SCNC: 98 MMOL/L
CO2 SERPL-SCNC: 26 MMOL/L
CREAT SERPL-MCNC: 1 MG/DL
DIFFERENTIAL METHOD: ABNORMAL
EOSINOPHIL # BLD AUTO: 0 K/UL
EOSINOPHIL NFR BLD: 0.1 %
ERYTHROCYTE [DISTWIDTH] IN BLOOD BY AUTOMATED COUNT: 28.1 %
EST. GFR  (AFRICAN AMERICAN): >60 ML/MIN/1.73 M^2
EST. GFR  (NON AFRICAN AMERICAN): >60 ML/MIN/1.73 M^2
GLUCOSE SERPL-MCNC: 306 MG/DL
HCT VFR BLD AUTO: 24 %
HGB BLD-MCNC: 7.8 G/DL
HYPOCHROMIA BLD QL SMEAR: ABNORMAL
LYMPHOCYTES # BLD AUTO: 0.6 K/UL
LYMPHOCYTES NFR BLD: 3.4 %
MCH RBC QN AUTO: 22.4 PG
MCHC RBC AUTO-ENTMCNC: 32.5 %
MCV RBC AUTO: 69 FL
MONOCYTES # BLD AUTO: 0.7 K/UL
MONOCYTES NFR BLD: 3.6 %
NEUTROPHILS # BLD AUTO: 16.9 K/UL
NEUTROPHILS NFR BLD: 91.5 %
OVALOCYTES BLD QL SMEAR: ABNORMAL
PLATELET # BLD AUTO: 259 K/UL
PLATELET BLD QL SMEAR: ABNORMAL
PMV BLD AUTO: ABNORMAL FL
POCT GLUCOSE: 199 MG/DL (ref 70–110)
POIKILOCYTOSIS BLD QL SMEAR: SLIGHT
POLYCHROMASIA BLD QL SMEAR: ABNORMAL
POTASSIUM SERPL-SCNC: 5.3 MMOL/L
PROT SERPL-MCNC: 5.2 G/DL
RBC # BLD AUTO: 3.48 M/UL
SCHISTOCYTES BLD QL SMEAR: ABNORMAL
SODIUM SERPL-SCNC: 135 MMOL/L
TACROLIMUS BLD-MCNC: 13.1 NG/ML
TARGETS BLD QL SMEAR: ABNORMAL
WBC # BLD AUTO: 18.48 K/UL

## 2017-02-22 PROCEDURE — 25000003 PHARM REV CODE 250: Performed by: NURSE PRACTITIONER

## 2017-02-22 PROCEDURE — 63600175 PHARM REV CODE 636 W HCPCS: Performed by: INTERNAL MEDICINE

## 2017-02-22 PROCEDURE — 25000003 PHARM REV CODE 250: Performed by: STUDENT IN AN ORGANIZED HEALTH CARE EDUCATION/TRAINING PROGRAM

## 2017-02-22 PROCEDURE — 25000003 PHARM REV CODE 250: Performed by: PHYSICIAN ASSISTANT

## 2017-02-22 PROCEDURE — G8980 MOBILITY D/C STATUS: HCPCS | Mod: CI

## 2017-02-22 PROCEDURE — 80197 ASSAY OF TACROLIMUS: CPT

## 2017-02-22 PROCEDURE — 85025 COMPLETE CBC W/AUTO DIFF WBC: CPT

## 2017-02-22 PROCEDURE — 99232 SBSQ HOSP IP/OBS MODERATE 35: CPT | Mod: ,,, | Performed by: NURSE PRACTITIONER

## 2017-02-22 PROCEDURE — 63600175 PHARM REV CODE 636 W HCPCS: Performed by: NURSE PRACTITIONER

## 2017-02-22 PROCEDURE — 99232 SBSQ HOSP IP/OBS MODERATE 35: CPT | Mod: ,,, | Performed by: INTERNAL MEDICINE

## 2017-02-22 PROCEDURE — 25000242 PHARM REV CODE 250 ALT 637 W/ HCPCS: Performed by: INTERNAL MEDICINE

## 2017-02-22 PROCEDURE — 80053 COMPREHEN METABOLIC PANEL: CPT

## 2017-02-22 PROCEDURE — 94640 AIRWAY INHALATION TREATMENT: CPT

## 2017-02-22 PROCEDURE — 25000003 PHARM REV CODE 250: Performed by: THORACIC SURGERY (CARDIOTHORACIC VASCULAR SURGERY)

## 2017-02-22 PROCEDURE — 25000003 PHARM REV CODE 250: Performed by: INTERNAL MEDICINE

## 2017-02-22 RX ORDER — PREDNISONE 10 MG/1
15 TABLET ORAL 2 TIMES DAILY
Qty: 120 TABLET | Refills: 11 | Status: SHIPPED | OUTPATIENT
Start: 2017-02-22 | End: 2017-03-09 | Stop reason: SDUPTHER

## 2017-02-22 RX ORDER — INSULIN ASPART 100 [IU]/ML
15 INJECTION, SOLUTION INTRAVENOUS; SUBCUTANEOUS 3 TIMES DAILY
Qty: 1 BOX | Refills: 0 | Status: ON HOLD
Start: 2017-02-22 | End: 2017-11-30

## 2017-02-22 RX ORDER — FUROSEMIDE 80 MG/1
40 TABLET ORAL 2 TIMES DAILY
Qty: 60 TABLET | Refills: 11
Start: 2017-02-22 | End: 2017-04-18 | Stop reason: SDUPTHER

## 2017-02-22 RX ORDER — INSULIN GLARGINE 100 [IU]/ML
28 INJECTION, SOLUTION SUBCUTANEOUS NIGHTLY
Qty: 15 ML | Refills: 3 | Status: ON HOLD
Start: 2017-02-22 | End: 2017-04-10

## 2017-02-22 RX ORDER — INSULIN ASPART 100 [IU]/ML
15 INJECTION, SOLUTION INTRAVENOUS; SUBCUTANEOUS
Status: DISCONTINUED | OUTPATIENT
Start: 2017-02-22 | End: 2017-02-22 | Stop reason: HOSPADM

## 2017-02-22 RX ADMIN — TACROLIMUS 3 MG: 1 CAPSULE, GELATIN COATED ORAL at 09:02

## 2017-02-22 RX ADMIN — TERBUTALINE SULFATE 5 MG: 5 TABLET ORAL at 05:02

## 2017-02-22 RX ADMIN — INSULIN ASPART 15 UNITS: 100 INJECTION, SOLUTION INTRAVENOUS; SUBCUTANEOUS at 11:02

## 2017-02-22 RX ADMIN — PANTOPRAZOLE SODIUM 40 MG: 40 TABLET, DELAYED RELEASE ORAL at 09:02

## 2017-02-22 RX ADMIN — MUPIROCIN: 20 OINTMENT TOPICAL at 05:02

## 2017-02-22 RX ADMIN — OXYCODONE HYDROCHLORIDE 10 MG: 5 TABLET ORAL at 12:02

## 2017-02-22 RX ADMIN — INSULIN ASPART 15 UNITS: 100 INJECTION, SOLUTION INTRAVENOUS; SUBCUTANEOUS at 07:02

## 2017-02-22 RX ADMIN — MYCOPHENOLATE MOFETIL 1500 MG: 250 CAPSULE ORAL at 09:02

## 2017-02-22 RX ADMIN — DOCUSATE SODIUM 100 MG: 100 CAPSULE, LIQUID FILLED ORAL at 09:02

## 2017-02-22 RX ADMIN — POLYETHYLENE GLYCOL 3350 17 G: 17 POWDER, FOR SOLUTION ORAL at 09:02

## 2017-02-22 RX ADMIN — PREDNISONE 20 MG: 10 TABLET ORAL at 09:02

## 2017-02-22 RX ADMIN — NYSTATIN 500000 UNITS: 500000 SUSPENSION ORAL at 11:02

## 2017-02-22 RX ADMIN — FUROSEMIDE 40 MG: 40 TABLET ORAL at 09:02

## 2017-02-22 RX ADMIN — NYSTATIN 500000 UNITS: 500000 SUSPENSION ORAL at 09:02

## 2017-02-22 RX ADMIN — OXYCODONE HYDROCHLORIDE 10 MG: 5 TABLET ORAL at 09:02

## 2017-02-22 RX ADMIN — IPRATROPIUM BROMIDE AND ALBUTEROL SULFATE 3 ML: .5; 3 SOLUTION RESPIRATORY (INHALATION) at 03:02

## 2017-02-22 RX ADMIN — OXYCODONE HYDROCHLORIDE 10 MG: 5 TABLET ORAL at 05:02

## 2017-02-22 RX ADMIN — VALGANCICLOVIR 900 MG: 450 TABLET, FILM COATED ORAL at 09:02

## 2017-02-22 RX ADMIN — SULFAMETHOXAZOLE AND TRIMETHOPRIM 1 TABLET: 400; 80 TABLET ORAL at 09:02

## 2017-02-22 RX ADMIN — MAGNESIUM OXIDE TAB 400 MG (241.3 MG ELEMENTAL MG) 400 MG: 400 (241.3 MG) TAB at 09:02

## 2017-02-22 NOTE — ASSESSMENT & PLAN NOTE
BG goal 140-180. Continue Lantus 28 units nightly. BG below goal during day, decrease Novolog to 15 units AC. AGAIN reiterated need for Novolog 6 units if eats nightly snack - pt STILL not administering appropriately.   reiterated with patient importance of requesting this insulin dose from nurse for overnight snack. BG monitoring AC/HS with moderate dose correction scale.     Pt is allergic to Levemir with reaction of severe itching. Pt to use home Lantus pens.     DISCHARGE PLANNING: Discharge today likely, recommend to d/c on Lantus 28 units nightly, Novolog 15 units AC, Novolog 6 with bedtime snack - pt given updated BG logs with all recommended doses, pt able to verbalized correct dosing, timing, MOA of both lantus and Novolog. Pt reports he has an adequate supply of Lantus, Novolog, pen needles and does not need refills.    BG meter, strips, lancets rx sent to [facility] per pt request   He is followed by Fabián Navarrete NP with [facility] ([place]) for his DM management. Pt wishes to continue f/u with Rsoalba MENJIVAR  No f/u appts needed

## 2017-02-22 NOTE — TELEPHONE ENCOUNTER
Spoke with Pharmacist at [facility] and the pt should be able to  tacrolimus at their local pharmacy as long as it is billed under Medicare B.

## 2017-02-22 NOTE — SUBJECTIVE & OBJECTIVE
"Interval HPI:   Overnight events: BG elevated again this AM, likely due to bedtime snack overnight. Pt continues to eat bedtime snack without notify nurse or obtaining Novolog with snack. BG below goal yesterday after meals.  Potential d/c today   Eatin% + bedtime snack  Nausea: No  Hypoglycemia and intervention: No  Fever: No  TPN and/or TF: No  If yes, type of TF/TPN and rate:     Visit Vitals    /60 (BP Location: Right arm, Patient Position: Lying, BP Method: Automatic)    Pulse 80    Temp 97.5 °F (36.4 °C) (Oral)    Resp 18    Ht 5' 8" (1.727 m)    Wt 97.2 kg (214 lb 4.6 oz)    SpO2 97%    BMI 32.58 kg/m2       Labs Reviewed and Include      Recent Labs  Lab 17  0452   *   CALCIUM 8.5*   ALBUMIN 2.5*   PROT 5.2*   *   K 5.3*   CO2 26   CL 98   BUN 30*   CREATININE 1.0   ALKPHOS 72   ALT 23   AST 14   BILITOT 0.6     Lab Results   Component Value Date    WBC 18.48 (H) 2017    HGB 7.8 (L) 2017    HCT 24.0 (L) 2017    MCV 69 (L) 2017     2017     No results for input(s): TSH, FREET4 in the last 168 hours.  Lab Results   Component Value Date    HGBA1C 6.8 (H) 2017       Nutritional status:   Body mass index is 32.58 kg/(m^2).  Lab Results   Component Value Date    ALBUMIN 2.5 (L) 2017    ALBUMIN 2.6 (L) 2017    ALBUMIN 2.7 (L) 2017     Lab Results   Component Value Date    PREALBUMIN 19 (L) 2017    PREALBUMIN 14 (L) 2017    PREALBUMIN 14 (L) 2017       Estimated Creatinine Clearance: 95.4 mL/min (based on Cr of 1).    Accu-Checks  Recent Labs      17   1210  17   1941  17   0804  17   1207  17   1819  17   2059  17   0747  17   1158  17   1632  17   2042   POCTGLUCOSE  117*  225*  217*  153*  203*  151*  314*  217*  106  97       Current Medications and/or Treatments Impacting Glycemic Control  Immunotherapy:  Immunosuppressants     Start  "    Stop Route Frequency Ordered    02/13/17 2100  mycophenolate capsule 1,500 mg      -- Oral 2 times daily 02/13/17 0937    02/20/17 0800  tacrolimus capsule 3 mg      -- Oral 2 times daily 02/19/17 2042        Steroids:   Hormones     Start     Stop Route Frequency Ordered    02/20/17 2100  predniSONE tablet 20 mg      -- Oral 2 times daily 02/20/17 1117        Pressors:    Autonomic Drugs     None        Hyperglycemia/Diabetes Medications: Antihyperglycemics     Start     Stop Route Frequency Ordered    02/18/17 1019  insulin aspart pen 6 Units      -- SubQ As needed (PRN) 02/18/17 1019    02/18/17 1120  insulin aspart pen 1-10 Units      -- SubQ Before meals & nightly PRN 02/18/17 1020    02/19/17 1130  insulin aspart pen 20 Units      -- SubQ 3 times daily with meals 02/19/17 1033    02/21/17 2100  insulin glargine pen 28 Units  (pt's own supply)     Question Answer Comment   Brand Name: Lantus    Generic name: glargine    Form: Pen Injector insulin pen from home   Length of Therapy: Indefinite    Reason for Non-Formulary allergy to formulary alternative    How soon needed? (normally 72 hrs needed to procure): 0-24 hrs Pt will have family member bring Lantus pen from home; staff will [facility]to pharmacy to [facility].    Is the patient competent? Yes        -- SubQ Nightly 02/20/17 6589

## 2017-02-22 NOTE — PROGRESS NOTES
"[facility]  Endocrinology  Progress Note    Admit Date: 2017     Reason for Consult: Management of T2DM     Surgical Procedure and Date: 17 s/p heart txp    Diabetes diagnosis year:     Home Diabetes Medications:  Lantus 16 u qam, Novolog 20 U AC     Diabetes Complications include:  Hyperglycemia, Diabetic chronic kidney disease and Diabetic gastroparesis      Complicating diabetes co morbidities: CHF and CKD    HPI: Patient is a 56 y.o. male with T2DM, ischemic cardiomyopathy, s/p LVAD placement in 2016. He is now s/p heart transplantation. Endocrine consulted for bg management.       Interval HPI:   Overnight events: BG elevated again this AM, likely due to bedtime snack overnight. Pt continues to eat bedtime snack without notify nurse or obtaining Novolog with snack. BG below goal yesterday after meals.  Potential d/c today   Eatin% + bedtime snack  Nausea: No  Hypoglycemia and intervention: No  Fever: No  TPN and/or TF: No  If yes, type of TF/TPN and rate:     Visit Vitals    /60 (BP Location: Right arm, Patient Position: Lying, BP Method: Automatic)    Pulse 80    Temp 97.5 °F (36.4 °C) (Oral)    Resp 18    Ht 5' 8" (1.727 m)    Wt 97.2 kg (214 lb 4.6 oz)    SpO2 97%    BMI 32.58 kg/m2       Labs Reviewed and Include      Recent Labs  Lab 17  0452   *   CALCIUM 8.5*   ALBUMIN 2.5*   PROT 5.2*   *   K 5.3*   CO2 26   CL 98   BUN 30*   CREATININE 1.0   ALKPHOS 72   ALT 23   AST 14   BILITOT 0.6     Lab Results   Component Value Date    WBC 18.48 (H) 2017    HGB 7.8 (L) 2017    HCT 24.0 (L) 2017    MCV 69 (L) 2017     2017     No results for input(s): TSH, FREET4 in the last 168 hours.  Lab Results   Component Value Date    HGBA1C 6.8 (H) 2017       Nutritional status:   Body mass index is 32.58 kg/(m^2).  Lab Results   Component Value Date    ALBUMIN 2.5 (L) 2017    ALBUMIN 2.6 (L) " 02/21/2017    ALBUMIN 2.7 (L) 02/20/2017     Lab Results   Component Value Date    PREALBUMIN 19 (L) 02/08/2017    PREALBUMIN 14 (L) 02/06/2017    PREALBUMIN 14 (L) 02/03/2017       Estimated Creatinine Clearance: 95.4 mL/min (based on Cr of 1).    Accu-Checks  Recent Labs      02/19/17   1210  02/19/17   1941  02/20/17   0804  02/20/17   1207  02/20/17   1819  02/20/17   2059  02/21/17   0747  02/21/17   1158  02/21/17   1632  02/21/17   2042   POCTGLUCOSE  117*  225*  217*  153*  203*  151*  314*  217*  106  97       Current Medications and/or Treatments Impacting Glycemic Control  Immunotherapy:  Immunosuppressants     Start     Stop Route Frequency Ordered    02/13/17 2100  mycophenolate capsule 1,500 mg      -- Oral 2 times daily 02/13/17 0937    02/20/17 0800  tacrolimus capsule 3 mg      -- Oral 2 times daily 02/19/17 2042        Steroids:   Hormones     Start     Stop Route Frequency Ordered    02/20/17 2100  predniSONE tablet 20 mg      -- Oral 2 times daily 02/20/17 1117        Pressors:    Autonomic Drugs     None        Hyperglycemia/Diabetes Medications: Antihyperglycemics     Start     Stop Route Frequency Ordered    02/18/17 1019  insulin aspart pen 6 Units      -- SubQ As needed (PRN) 02/18/17 1019    02/18/17 1120  insulin aspart pen 1-10 Units      -- SubQ Before meals & nightly PRN 02/18/17 1020    02/19/17 1130  insulin aspart pen 20 Units      -- SubQ 3 times daily with meals 02/19/17 1033    02/21/17 2100  insulin glargine pen 28 Units  (pt's own supply)     Question Answer Comment   Brand Name: Lantus    Generic name: glargine    Form: Pen Injector insulin pen from home   Length of Therapy: Indefinite    Reason for Non-Formulary allergy to formulary alternative    How soon needed? (normally 72 hrs needed to procure): 0-24 hrs Pt will have family member bring Lantus pen from home; staff [facility]to [facility].    Is the patient competent? Yes        -- SubQ Nightly 02/20/17 2114           ASSESSMENT and PLAN    Type 2 diabetes mellitus with stage 3 chronic kidney disease, with long-term current use of insulin  BG goal 140-180. Continue Lantus 28 units nightly. BG below goal during day, decrease Novolog to 15 units AC. AGAIN reiterated need for Novolog 6 units if eats nightly snack - pt STILL not administering appropriately.   reiterated with patient importance of requesting this insulin dose from nurse for overnight snack. BG monitoring AC/HS with moderate dose correction scale.     Pt is allergic to Levemir with reaction of severe itching. Pt to use home Lantus pens.     DISCHARGE PLANNING: Discharge today likely, recommend to d/c on Lantus 28 units nightly, Novolog 15 units AC, Novolog 6 with bedtime snack - pt given updated BG logs with all recommended doses, pt able to verbalized correct dosing, timing, MOA of both lantus and Novolog. Pt reports he has an adequate supply of Lantus, Novolog, pen needles and does not need refills.    BG meter, strips, lancets rx sent to [facility] per pt request   He is followed by Fabián Navarrete NP with [facility] ([place], [place]) for his DM management. Pt wishes to continue f/u with Rosalba MENJIVAR  No f/u appts needed     * Heart transplanted  Per CTS, HTS      Ischemic cardiomyopathy  S/p heart txp        Chronic combined systolic and diastolic heart failure  S/p heart transplant       Adrenal cortical steroids causing adverse effect in therapeutic use  Increasing insulin needs  On Prednisone 20 mg BID.     Immunosuppression  May increase insulin resistance       Obesity (BMI 30-39.9)  Increases insulin resistance   Body mass index is 32.58 kg/(m^2).          Lorna Emerson NP  Endocrinology  [facility]

## 2017-02-22 NOTE — PROGRESS NOTES
DISCHARGE NOTE:    Mick Barriga is a 56 y.o. male s/p heart   transplant on 2/9/2017      Past Medical History   Diagnosis Date    CHF (congestive heart failure)     Coronary artery disease     GERD (gastroesophageal reflux disease)     Hyperlipidemia     Hypertension     LVAD (left ventricular assist device) present 2/13/2017    Type 2 diabetes mellitus with hyperglycemia        Hospital Course: Mr. Barriga was transplanted with standard immunosuppression as moderate risk for rejection. He was started on prograf, MMF and prednisone.     Allergies:   Review of patient's allergies indicates:   Allergen Reactions    Levemir [insulin detemir] Itching    Pork/porcine containing products     Ranexa [ranolazine] Nausea Only       Patient Pharmacy: [facility]     Discharge Medications:   Mick Barriga   Home Medication Instructions TRINH:[id number]    Printed on:02/22/17 1516   Medication Information                      alendronate (FOSAMAX) 70 MG tablet  TAKE 1 TABLET BY MOUTH EVERY 7 DAYS             amlodipine (NORVASC) 10 MG tablet  Take 1 tablet (10 mg total) by mouth once daily.             aspirin (ECOTRIN) 81 MG EC tablet  Take 1 tablet (81 mg total) by mouth once daily.             blood sugar diagnostic Strp  To use to check BG 4 times daily, to use with insurance preferred meter             blood-glucose meter kit  To use to check BG 4 times daily, to use with insurance preferred meter             calcium-vitamin D3 500 mg(1,250mg) -200 unit per tablet  Take 1 tablet by mouth 2 (two) times daily with meals.             docusate sodium (COLACE) 100 MG capsule  Take 2 capsules (200 mg total) by mouth every evening.             esomeprazole (NEXIUM) 40 MG capsule  Take 1 capsule (40 mg total) by mouth before breakfast.             ferrous gluconate (FERGON) 324 MG tablet  Take 1 tablet (324 mg total) by mouth daily with breakfast.             furosemide (LASIX) 80 MG tablet  Take 0.5 tablets (40  mg total) by mouth 2 (two) times daily.             insulin aspart (NOVOLOG) 100 unit/mL InPn pen  Inject 15 Units into the skin 3 (three) times daily.             insulin glargine (LANTUS SOLOSTAR) 100 unit/mL (3 mL) InPn pen  Inject 28 Units into the skin every evening.             lancets Misc  To use to check BG 4 times daily, to use with insurance preferred meter             magnesium oxide (MAG-OX) 400 mg tablet  Take 1 tablet (400 mg total) by mouth 2 (two) times daily.             mycophenolate (CELLCEPT) 250 mg Cap  Take 6 capsules (1,500 mg total) by mouth 2 (two) times daily.             nystatin (MYCOSTATIN) 100,000 unit/mL suspension  Take 5 mLs (500,000 Units total) by mouth 4 (four) times daily with meals and nightly.             oxycodone (ROXICODONE) 10 mg Tab immediate release tablet  Take 1 tablet (10 mg total) by mouth every 4 (four) hours as needed (pain).             pravastatin (PRAVACHOL) 40 MG tablet  Take 1 tablet (40 mg total) by mouth once daily.             predniSONE (DELTASONE) 10 MG tablet  Take 1.5 tablets (15 mg total) by mouth 2 (two) times daily.             sulfamethoxazole-trimethoprim 400-80mg (BACTRIM,SEPTRA) 400-80 mg per tablet  Take 1 tablet by mouth once daily.             tacrolimus (PROGRAF) 1 MG Cap  Take 3mg orally in the morning and 2mg orally in the evening             terbutaline (BRETHINE) 5 mg Tab  Take 1 tablet (5 mg total) by mouth 3 (three) times daily.             valganciclovir (VALCYTE) 450 mg Tab  Take 2 tablets (900 mg total) by mouth once daily.                 Pharmacy Interventions/Recommendations:  1) Transplant Immunosuppression: FK 3/2mg, MMF 1500mg bid and prednsione 15mg twice daily    2) Opportunistic Infection prophylaxis: valcyte 900mg daily, bactrim SS daily, and nystatin    3) Patient Counseling/Education: N/A    4) Follow-Up/Discharge Needs:  Follow up labs and nurse visit as scheduled    5) Patient received prescriptions for: a month supply  was available for 0 copay for the [facility]. Patient requested to fill medications at home [facility].     6) Patient Assistance Information: NA     7) The following medications have been placed on HOLD and should be restarted in the outpatient setting (when appropriate): none    Mick and his caregiver verbalized their understanding and had the opportunity to ask questions.

## 2017-02-22 NOTE — PLAN OF CARE
Problem: Patient Care Overview  Goal: Plan of Care Review  Dx: heart transplant (2/9), 1 PRBC, 250cc Albumin    PMH: AICD, HTN, HLD, GERD, T2DM, CKD (3), ischemic CM, systolic and diastolic HF, low Na, non morbid obesity, BPH    PSH: cholecystectomy, LVAD (8/24/16)    2/9: Heart Tx (out @ 1405); 2 units PRBC post op  2/11: Post-op day 2, Extubated    Nursing:  - MAP 60-80  - V paced at 105  - ACHS accuchecks  - Q6 hour K, Mg, Phos     Outcome: Ongoing (interventions implemented as appropriate)  AAOX4.  VSS.  Pt voiding per urinal.  PIV is intact and free of infection.  Spouse is at the bedside. Bed is in lowest position, call bell is in reach, and pt instructed to call nurse when needing assistance.  Will continue to monitor.

## 2017-02-22 NOTE — TELEPHONE ENCOUNTER
----- Message from Mary Alice Lopez sent at 2/22/2017 10:24 AM CST -----  Contact: JULIO CÉSAR from [employer]   JULIO CÉSAR from [employer] called, states he is following up in regards to a prior authorization for RX tacrolimus.  [phone] and case reference number is [id number]. Thank you

## 2017-02-22 NOTE — DISCHARGE SUMMARY
[facility]  Discharge Summary        Admit Date: 1/30/2017    Discharge Date and Time: 2/22/2017 200    Attending Physician: Peewee Romero MD     Discharge Provider: Maira Salazar    Reason for Admission: OHT    Procedures Performed: Procedure(s) (LRB):  TRANSPLANT-HEART (N/A)  REMOVAL-PACEMAKER (Left)  Removal-LEFT VENTRICULAR ASSIST DEVICE (N/A)    Hospital Course    56 year old male admitted to [facility] for elevated LDH and low INR. He was made status 1A on Transplat list and underwent OHT on 2/9/2017. Post op course complicated by bradycardia and he was started on terbutaline 5 mg TID. He will dc home locally to be seen tomorrow with an echo and labs       Final Diagnoses:   Principal Problem: Heart transplanted       Discharged Condition: good    Disposition: Home or Self Care    Follow Up/Patient Instructions:     Diet normal    Provisions none    Medications:  Reconciled Home Medications:   Current Discharge Medication List      START taking these medications    Details   amlodipine (NORVASC) 10 MG tablet Take 1 tablet (10 mg total) by mouth once daily.  Qty: 30 tablet, Refills: 11      aspirin (ECOTRIN) 81 MG EC tablet Take 1 tablet (81 mg total) by mouth once daily.  Refills: 0      blood sugar diagnostic Strp To use to check BG 4 times daily, to use with insurance preferred meter  Qty: 350 strip, Refills: 3      blood-glucose meter kit To use to check BG 4 times daily, to use with insurance preferred meter  Qty: 1 each, Refills: 0      calcium-vitamin D3 500 mg(1,250mg) -200 unit per tablet Take 1 tablet by mouth 2 (two) times daily with meals.  Qty: 60 tablet, Refills: 11      furosemide (LASIX) 80 MG tablet Take 0.5 tablets (40 mg total) by mouth 2 (two) times daily.  Qty: 60 tablet, Refills: 11      insulin aspart (NOVOLOG) 100 unit/mL InPn pen Inject 15 Units into the skin 3 (three) times daily.  Qty: 1 Box, Refills: 0      lancets Misc To use to check BG 4 times daily, to use with  insurance preferred meter  Qty: 350 each, Refills: 3      mycophenolate (CELLCEPT) 250 mg Cap Take 6 capsules (1,500 mg total) by mouth 2 (two) times daily.  Qty: 360 capsule, Refills: 11      nystatin (MYCOSTATIN) 100,000 unit/mL suspension Take 5 mLs (500,000 Units total) by mouth 4 (four) times daily with meals and nightly.  Qty: 473 mL, Refills: 5      oxycodone (ROXICODONE) 10 mg Tab immediate release tablet Take 1 tablet (10 mg total) by mouth every 4 (four) hours as needed (pain).  Qty: 56 tablet, Refills: 0      pravastatin (PRAVACHOL) 40 MG tablet Take 1 tablet (40 mg total) by mouth once daily.  Qty: 30 tablet, Refills: 11      predniSONE (DELTASONE) 10 MG tablet Take 1.5 tablets (15 mg total) by mouth 2 (two) times daily.  Qty: 120 tablet, Refills: 11      sulfamethoxazole-trimethoprim 400-80mg (BACTRIM,SEPTRA) 400-80 mg per tablet Take 1 tablet by mouth once daily.  Qty: 30 tablet, Refills: 11      tacrolimus (PROGRAF) 1 MG Cap Take 3mg orally in the morning and 2mg orally in the evening  Qty: 150 capsule, Refills: 11      terbutaline (BRETHINE) 5 mg Tab Take 1 tablet (5 mg total) by mouth 3 (three) times daily.  Qty: 90 tablet, Refills: 11      valganciclovir (VALCYTE) 450 mg Tab Take 2 tablets (900 mg total) by mouth once daily.  Qty: 60 tablet, Refills: 2         CONTINUE these medications which have CHANGED    Details   insulin glargine (LANTUS SOLOSTAR) 100 unit/mL (3 mL) InPn pen Inject 28 Units into the skin every evening.  Qty: 15 mL, Refills: 3      magnesium oxide (MAG-OX) 400 mg tablet Take 1 tablet (400 mg total) by mouth 2 (two) times daily.  Qty: 270 tablet, Refills: 3         CONTINUE these medications which have NOT CHANGED    Details   alendronate (FOSAMAX) 70 MG tablet TAKE 1 TABLET BY MOUTH EVERY 7 DAYS  Qty: 12 tablet, Refills: 11    Comments: **Patient requests 90 days supply**      docusate sodium (COLACE) 100 MG capsule Take 2 capsules (200 mg total) by mouth every evening.  Qty:  180 capsule, Refills: 3      esomeprazole (NEXIUM) 40 MG capsule Take 1 capsule (40 mg total) by mouth before breakfast.  Qty: 90 capsule, Refills: 3      ferrous gluconate (FERGON) 324 MG tablet Take 1 tablet (324 mg total) by mouth daily with breakfast.  Qty: 90 tablet, Refills: 3           No discharge procedures on file.

## 2017-02-22 NOTE — PROGRESS NOTES
[facility]   Heart Transplant Clinic  [address] [place] [address]   [phone] after hours        HOME  HEALTH ORDERS      Admit to Home Health    Diagnosis:   Patient Active Problem List   Diagnosis    Essential hypertension    Hyperlipidemia    GERD (gastroesophageal reflux disease)    Type 2 diabetes mellitus with stage 3 chronic kidney disease, with long-term current use of insulin    Ischemic cardiomyopathy    Chronic combined systolic and diastolic heart failure    Hyponatremia    Cardiomyopathy    Obesity (BMI 30-39.9)    Chronic anticoagulation    Elevated LDH    Cardiomyopathy, dilated, nonischemic    Benign prostatic hyperplasia    Heart transplanted    Adrenal cortical steroids causing adverse effect in therapeutic use    Immunosuppression    Heart transplant, orthotopic, status    Numbness of left hand       Patient is homebound due to: OHT    Diet: pureed     Acitivities: as tolerted     Nursing:   SN to complete comprehensive assessment including routine vital signs. Instruct on disease process and s/s of complications to report to MD. Review/verify medication list sent home with the patient at time of discharge  and instruct patient/caregiver as needed. Frequency may be adjusted depending on start of care date.    Notify MD if SBP > 160 or < 90; DBP > 90 or < 50; HR > 120 or < 50; Temp > 101; Weight gain >3lbs in 1 day or 5lbs in 1 week.   Other:                   CONSULTS:      Physical Therapy to evaluate and treat. Evaluate for home safety and equipment needs; Establish/upgrade home exercise program. Perform / instruct on therapeutic exercises, gait training, transfer training, and Range of Motion.    Occupational Therapy to evaluate and treat. Evaluate home environment for safety and equipment needs. Perform/Instruct on transfers, ADL training, ROM, and therapeutic exercises.    Speech Therapy  to evaluate and treat for:   Language  Swallowing  Cognition                                                   Send initial Home Health orders to HTS attending physician on call.  Send follow up questions to [phone] or fax:                     Post Transplant: [phone]

## 2017-02-22 NOTE — PROGRESS NOTES
[facility]   Heart Transplant Clinic  [address] [place] [address]   [phone] after hours        HOME  HEALTH ORDERS      Admit to Home Health    Diagnosis:   Patient Active Problem List   Diagnosis    Essential hypertension    Hyperlipidemia    GERD (gastroesophageal reflux disease)    Type 2 diabetes mellitus with stage 3 chronic kidney disease, with long-term current use of insulin    Ischemic cardiomyopathy    Chronic combined systolic and diastolic heart failure    Hyponatremia    Cardiomyopathy    Obesity (BMI 30-39.9)    Chronic anticoagulation    Elevated LDH    Cardiomyopathy, dilated, nonischemic    Benign prostatic hyperplasia    Heart transplanted    Adrenal cortical steroids causing adverse effect in therapeutic use    Immunosuppression    Heart transplant, orthotopic, status    Numbness of left hand       Patient is homebound due to: OHT    Diet: pureed     Acitivities: as tolerted     Nursing:   SN to complete comprehensive assessment including routine vital signs. Instruct on disease process and s/s of complications to report to MD. Review/verify medication list sent home with the patient at time of discharge  and instruct patient/caregiver as needed. Frequency may be adjusted depending on start of care date.    Notify MD if SBP > 160 or < 90; DBP > 90 or < 50; HR > 120 or < 50; Temp > 101; Weight gain >3lbs in 1 day or 5lbs in 1 week.   Other:                   CONSULTS:      Physical Therapy to evaluate and treat. Evaluate for home safety and equipment needs; Establish/upgrade home exercise program. Perform / instruct on therapeutic exercises, gait training, transfer training, and Range of Motion.    Occupational Therapy to evaluate and treat. Evaluate home environment for safety and equipment needs. Perform/Instruct on transfers, ADL training, ROM, and therapeutic exercises.    Speech Therapy  to evaluate and treat for:   Language  Swallowing  Cognition                                                   Send initial Home Health orders to HTS attending physician on call.  Send follow up questions to [phone] or fax:                     Post Transplant: [phone]        Heart Failure:      [phone]

## 2017-02-22 NOTE — PROGRESS NOTES
DISCHARGE    SW to pt's room for discharge.  Pt is s/p OHTx on 2/9/17.  Pt presents as sitting up in bedside chair with wife at bedside.  Pt and wife both present as aao x4, calm, cooperative, and asking and answering questions appropriately.  Pt and wife verbalize understanding and agreement with plan to d/c to home today.  Pt and wife both report coping well at this time.  SW faxed  orders for SN/PT/OT/ST to [facility] (ph: [phone], f: [phone]); per Analisa with [facility], pt is accepted.  Patient and wife verbalize understanding and agreement with information reviewed,  availability, and how to access available resources as needed.  Patient and wife deny additional needs or concerns at this time.  SW providing ongoing psychosocial and counseling support, education, resources, assistance, and discharge planning as indicated.  SW remains available.

## 2017-02-22 NOTE — PROGRESS NOTES
Progress Note  Heart Transplant Service    Admit Date: 1/30/2017   LOS: 23 days     SUBJECTIVE:     Follow up for: Heart transplanted    Interval History:  Feeling great.      Scheduled Meds:   albuterol-ipratropium 2.5mg-0.5mg/3mL  3 mL Nebulization Q4H    docusate sodium  100 mg Oral BID    furosemide  40 mg Oral BID    insulin aspart  15 Units Subcutaneous TIDWM    insulin glargine  28 Units Subcutaneous QHS    magnesium oxide  400 mg Oral BID    mupirocin   Topical (Top) TID    mycophenolate  1,500 mg Oral BID    nystatin  500,000 Units Mouth/Throat QID (WM & HS)    pantoprazole  40 mg Oral Daily    polyethylene glycol  17 g Oral Daily    predniSONE  20 mg Oral BID    sulfamethoxazole-trimethoprim 400-80mg  1 tablet Oral Daily    tacrolimus  3 mg Oral BID    terbutaline  5 mg Oral TID    valganciclovir  900 mg Oral Daily     Continuous Infusions:     PRN Meds:sodium chloride, sodium chloride, acetaminophen, dextrose 50%, dextrose 50%, glucagon (human recombinant), glucose, glucose, HYDROmorphone, insulin aspart, insulin aspart, magnesium sulfate IVPB, metoclopramide HCl, ondansetron, oxycodone, potassium chloride **AND** potassium chloride **AND** potassium chloride, sodium phosphate IVPB, sodium phosphate IVPB, sodium phosphate IVPB    Immunosuppressants     Start     Stop Route Frequency Ordered    02/13/17 2100  mycophenolate capsule 1,500 mg      -- Oral 2 times daily 02/13/17 0937    02/20/17 0800  tacrolimus capsule 3 mg      -- Oral 2 times daily 02/19/17 2042          Review of patient's allergies indicates:   Allergen Reactions    Levemir [insulin detemir] Itching    Pork/porcine containing products     Ranexa [ranolazine] Nausea Only       OBJECTIVE:     Vital Signs (Most Recent)  Temp: 97.6 °F (36.4 °C) (02/22/17 1145)  Pulse: 80 (02/22/17 1145)  Resp: 18 (02/22/17 1145)  BP: (!) 118/56 (02/22/17 1145)  SpO2: 96 % (02/22/17 1145)    Vital Signs Range (Last 24H):  Temp:  [97.5 °F  (36.4 °C)-98.4 °F (36.9 °C)]   Pulse:  [72-84]   Resp:  [16-18]   BP: (118-136)/(53-61)   SpO2:  [95 %-100 %]     I & O (Last 24H):    Intake/Output Summary (Last 24 hours) at 02/22/17 1342  Last data filed at 02/22/17 0900   Gross per 24 hour   Intake             1240 ml   Output             4300 ml   Net            -3060 ml            Telemetry: sinus    Physical Exam   Constitutional: He is oriented to person, place, and time. He appears well-developed and well-nourished.   HENT:   Head: Normocephalic and atraumatic. 2cm well circumscribed plaque to right cheek, dry and crusted.  Eyes: EOM are normal. Pupils are equal, round, and reactive to light.   Neck: Normal range of motion. Neck supple. No JVD present.   Cardiovascular: S1 S2 audible, regular, tachycardic    Pulmonary/Chest: Bronchial breath sounds on left  Abdominal: Soft. Bowel sounds are normal. He exhibits no distension. There is no tenderness.   Musculoskeletal: Normal range of motion. He exhibits no edema.   Neurological: He is alert and oriented to person, place, and time.   Skin: Skin is warm and dry.   Extremities: BUE edematous. LUE slightly weaker than RUE. 2+ peripheral pulses.      Labs:       Recent Labs  Lab 02/20/17  0455 02/21/17  0641 02/22/17  0452   WBC 22.55* 15.75* 18.48*   HGB 8.8* 8.3* 7.8*   HCT 26.8* 24.9* 24.0*    286 259   LYMPH 4.0*  CANCELED 4.6*  0.7* 3.4*  0.6*   MONO 3.0*  CANCELED 4.0  0.6 3.6*  0.7   EOSINOPHIL 1.0 0.0 0.1       No results for input(s): APTT, INR, PTT in the last 168 hours.       Recent Labs  Lab 02/16/17  0444  02/20/17  0455 02/21/17  0641 02/22/17  0452   *  < > 243* 304* 306*   CALCIUM 8.7  < > 8.8 8.5* 8.5*   ALBUMIN 2.5*  < > 2.7* 2.6* 2.5*   PROT 5.3*  < > 5.8* 5.2* 5.2*     < > 134* 134* 135*   K 4.4  < > 4.8 5.5* 5.3*   CO2 33*  < > 28 31* 26   CL 97  < > 96 97 98   BUN 30*  < > 25* 26* 30*   CREATININE 0.8  < > 1.2 1.1 1.0   ALKPHOS 65  < > 71 68 72   ALT 21  < > 19 20  23   AST 17  < > 11 11 14   BILITOT 0.6  < > 0.6 0.6 0.6   MG 1.2*  --   --   --   --    < > = values in this interval not displayed.  Estimated Creatinine Clearance: 95.4 mL/min (based on Cr of 1).      Recent Labs  Lab 02/15/17  1357   *       No results for input(s): LDH in the last 168 hours.    Microbiology Results (last 7 days)     Procedure Component Value Units Date/Time    Blood culture [[id number]] Collected:  02/20/17 1124    Order Status:  Completed Specimen:  Blood Updated:  02/21/17 1422     Blood Culture, Routine No Growth to date     Blood Culture, Routine No Growth to date    Blood culture [[id number]] Collected:  02/20/17 1124    Order Status:  Completed Specimen:  Blood Updated:  02/21/17 1422     Blood Culture, Routine No Growth to date     Blood Culture, Routine No Growth to date    Urine culture [[id number]]     Order Status:  No result Specimen:  Urine             ASSESSMENT:     55 yo M with PMHx of NICM s/p HM II (8/24/16), HLD, HTN, VT s/p ICD who presented to the hospital for elevated LDH in the setting of subtherapeutic INR with lower extremity swelling concerning for LVAD pump thrombosis, now s/p OHTx 2/9/17.       PLAN:     S/P Orthotopic Heart Transplantation 2/9/2017  -[facility] primary  -Moderate Risk of Rejection: cPRA 0%, Negative Crossmatch with B Channel Shifts  -CMV Status: D-/R+: Will require valcyte x 3 months  -Immunosuppression: MMF 1500 mg BID, Prednisone 20 mg bid. Tacro 3/3  -On full OI prophylaxis  -EGBx #1 done   -ST following. Tolerating pureed diet with honey-thick liquids  DC today

## 2017-02-22 NOTE — NURSING
Patient is being discharged from the hospital. IV removed, discharge instructions given, and all questions answered prior to patient leaving. Home health will come to his house tomorrow to change is wound dressing. Patient's wife was educated on how to preform the dressing change if necessary. Supplies to change dressing was provided. They have no questions or concerns at this time.

## 2017-02-22 NOTE — PT/OT/SLP PROGRESS
Speech Language Pathology  Treatment    Mick Barriga   MRN: [id number]   Admitting Diagnosis: Heart transplanted    Diet recommendations: Solid Diet Level: Dental Soft  Liquid Diet Level: Honey Thick No straws, HOB to 90 degrees, Small bites/sips, 1 bite/sip at a time and Assistance with thickening liquids    SLP Treatment Date: 02/21/17  Speech Start Time: 1645     Speech Stop Time: 1655     Speech Total (min): 10 min       TREATMENT BILLABLE MINUTES:  Treatment Swallowing Dysfunction 10    Has the patient been evaluated by SLP for swallowing? : Yes  Keep patient NPO?: No   General Precautions: Standard, aspiration, sternal, honey thick  Current Respiratory Status: non-rebreather mask       Subjective:  Awake & alert. Wife at bedside.                         Objective:    Pt with increased vocal hoarseness, pt reports that his voice is usually hoarse in the evenings. Pt with honey thickened liquids at bedside. Pt tolerated sips of honey thick liquid via cup sips x5 with no s/s aspiration. Given cup sips of nectar thick liquid, pt with throat clearing following 2 out of 3 sips. All swallow precautions reviewed & sign located in room that pt referred to. Pt located list of dysphagia exercises & was able to complete all IND'ly x2 each.       Assessment:  Mick Barriga is a 56 y.o. male with a medical diagnosis of Heart transplanted and presents with dysphagia    Discharge recommendations: Discharge Facility/Level Of Care Needs: home health speech therapy     Goals:   SLP Goals        Problem: SLP Goal    Goal Priority Disciplines Outcome   SLP Goal     SLP Ongoing (interventions implemented as appropriate)   Description:  Speech Language Pathology Goals  Goals expected to be met by 2/28  1. Pt will tolerate dental soft & honey thickened liquid with no s/s aspiration   2. Pt will tolerate therapeutic trials with SLP only of less restrictive consistencies to determine if safe for diet upgrades   3. Pt will complete  dysphagia exercises IND'ly     Speech Language Pathology Goals  Goals expected to be met by 2/21  1. Pt will tolerate puree & honey thickened liquid with no s/s aspiration GOAL MET   2. Pt will tolerate trials of soft solid with no s/s aspiration & adequate oral phase of swallow GOAL MET  3. Pt will tolerate trials of less restrictive liquid consistencies to determine if safe for diet upgrades CONTINUE  4. Pt will complete dysphagia exercises given min A CONTINUE, REVISE    Goals to be met 2/19  1. Pt will participate in ongoing swallow assessment to determine safest po diet GOAL MET                    Plan:   Patient to be seen Therapy Frequency: 5 x/week   Plan of Care expires: 03/14/17  Plan of Care reviewed with: patient, spouse  SLP Follow-up?: Yes              Lakesha Headley, CCC-SLP  02/21/2017  [phone]

## 2017-02-22 NOTE — PROGRESS NOTES
EDUCATION NOTE:    Met with Mick Barriga and his caregivers to provide teaching re: immunosuppressant medications.  Reviewed medication section of the Heart Transplant Education book that was provided.  Emphasized the importance of compliance, role of the blue medication card, concerns for drug interactions, and process of obtaining refills.  Counseled regarding Prograf, Cellcept , prednisone, including directions for use, monitoring, how to handle missed doses, and side effects.  They verbalized understanding and had the opportunity to ask questions.

## 2017-02-23 ENCOUNTER — TELEPHONE (OUTPATIENT)
Dept: DIABETES | Facility: CLINIC | Age: 57
End: 2017-02-23

## 2017-02-23 DIAGNOSIS — D84.9 IMMUNOSUPPRESSED STATUS: Primary | ICD-10-CM

## 2017-02-23 LAB — POCT GLUCOSE: 133 MG/DL (ref 70–110)

## 2017-02-23 RX ORDER — NYSTATIN 100000 [USP'U]/ML
5 SUSPENSION ORAL
Qty: 473 ML | Refills: 5 | Status: ON HOLD | OUTPATIENT
Start: 2017-02-23 | End: 2017-04-10 | Stop reason: HOSPADM

## 2017-02-23 NOTE — TELEPHONE ENCOUNTER
----- Message from Maribeth Mora MA sent at 2/23/2017 10:01 AM CST -----  Contact: patient   Do you want to speak to him before I reschedule this appt. Thanks   ----- Message -----     From: Ander Avilez     Sent: 2/23/2017   9:46 AM       To: [facility] Heart Transplant Schedulers    Please call pt at [phone] today per pt. Need to reschedule the nurse appt from today    Thank you

## 2017-02-23 NOTE — PT/OT/SLP DISCHARGE
Physical Therapy Discharge Summary    Mick Barriga  MRN: [id number]   Heart transplanted   Patient Discharged from acute Physical Therapy on 17.  Please refer to prior PT noted date on 17 for functional status.     Assessment:   Patient appropriate for care in another setting.  GOALS:   Physical Therapy Goals        Problem: Physical Therapy Goal    Goal Priority Disciplines Outcome Goal Variances Interventions   Physical Therapy Goal     PT/OT, PT Ongoing (interventions implemented as appropriate)     Description:  Goals to be met by: 17    Patient will increase functional independence with mobility by performin. Supine to sit with Stand-by Assistance - GOAL MET  2. Sit to stand transfer with Supervision - GOAL MET  3. Gait  x 220 feet with Supervision  - GOAL MET    4. Pt to amb up/down 7 steps with use of 1 HR, with S.                  Reasons for Discontinuation of Therapy Services  Transfer to alternate level of care.      Plan:  Patient Discharged to: Home with Home Health Service.    Dr. Mary Brown, PT, DPT, GCS  2017

## 2017-02-23 NOTE — TELEPHONE ENCOUNTER
----- Message from Mckenzie Lawrence sent at 2/23/2017 10:53 AM CST -----  Hi,     Mr Mick Barriga tests strips, monitor and supplies  Need a medicare form filled out and I asked the pharmacy to send me the paper   Work and I could fax it to you.    I am faxing to [phone]    Thank you,    Mckenzie SAMUELS, RN  Ext [phone]

## 2017-02-23 NOTE — TELEPHONE ENCOUNTER
----- Message from Brigette Tran sent at 2/23/2017  3:54 PM CST -----  Contact: reji    [phone]  Ki   -   Pharmacy stated that the need the dr to answer number 4 question on the form that was faxed over to the pharmacy   Call back number [phone]  Thanks

## 2017-02-23 NOTE — TELEPHONE ENCOUNTER
Pt needed to reschedule, I am off on Friday and will meet with him on Monday 2/27/17 and he will get his labs drawn   Monday 2/27/17. Pt stated he is doing well and does look at My Chart.    Having some trouble getting diabetic supplies.  Estephanie sending us forms and will be sent to Lorna Emerson NP, In Endocrine.

## 2017-02-24 ENCOUNTER — ANTI-COAG VISIT (OUTPATIENT)
Dept: CARDIOLOGY | Facility: CLINIC | Age: 57
End: 2017-02-24

## 2017-02-24 NOTE — PHYSICIAN QUERY
PT Name: Mick Barriga  MR #: [id number]     Physician Query Form - Documentation Clarification    Reviewer  Ext   [facility]  EXT [phone]  This form is a permanent document in the medical record.     Query Date: February 24, 2017  By submitting this query, we are merely seeking further clarification of documentation to reflect the severity of illness of your patient. Please utilize your independent clinical judgment when addressing the question(s) below.    (The Medical record reflects the following:)      Supporting Clinical Findings Location in Medical Record   56 year old male admitted to [facility] for elevated LDH and low INR.       Currently being worked up for pump thrombosis.Currently on IV heparin gtt for subtherapeutic INR.  presented to the hospital for elevated LDH in the setting of subtherapeutic INR with lower extremity swelling concerning for LVAD pump thrombosis/ failure  documented in the discharge summary          Documented in progress note dated 1/31/17                                                                                      Doctor Romero: Can you please clarify if pump thrombosis /failure was ruled in or ruled out at discharge. Thank You    Pump thrombosis/failure ruled in at discharge -------------------  Pump thrombosis /failure ruled out at discharge ---------------------  Other ------------------ ( please document cause)   Unable to determine ----------------------------  Physician Use Only  I have never seen this patient.                                                                                                                             [  ] Unable to determine

## 2017-02-24 NOTE — PROGRESS NOTES
Patient underwent a heart transplant and discharged off Lovenox. He is now discharged from our services.

## 2017-02-24 NOTE — PT/OT/SLP DISCHARGE
Occupational Therapy Discharge Summary    Mick Barriga  MRN: [id number]   <principal problem not specified>   Patient Discharged from acute Occupational Therapy on 2/22/2017.  Please refer to prior OT note dated on 2/16/2017 for functional status.     Assessment:   Patient was discharge unexpectedly.  Information required to complete and accurate discharge summary is unknown.  Refer to therapy initial evaluation and last progress note for initial and most recent functional status and goal achievement.  Recommendations made may be found in medical record.  GOALS:   Reasons for Discontinuation of Therapy Services  Transfer to alternate level of care.      Plan:  Patient Discharged to: Home with Home Health Service.    Sal Madrid OTR/L  2/24/2017

## 2017-02-24 NOTE — PROGRESS NOTES
"Per discharge note "56 year old male admitted to [facility] for elevated LDH and low INR. He was made status 1A on Transplat list and underwent OHT on 2/9/2017. Post op course complicated by bradycardia and he was started on terbutaline 5 mg TID. He will dc home locally to be seen tomorrow with an echo and labs " Patient was discharged home off warfarin since he underwent an OHT.  "

## 2017-02-25 LAB
BACTERIA BLD CULT: NORMAL
BACTERIA BLD CULT: NORMAL

## 2017-02-27 ENCOUNTER — PATIENT MESSAGE (OUTPATIENT)
Dept: TRANSPLANT | Facility: CLINIC | Age: 57
End: 2017-02-27

## 2017-02-27 ENCOUNTER — TELEPHONE (OUTPATIENT)
Dept: TRANSPLANT | Facility: CLINIC | Age: 57
End: 2017-02-27

## 2017-02-27 ENCOUNTER — LAB VISIT (OUTPATIENT)
Dept: LAB | Facility: HOSPITAL | Age: 57
End: 2017-02-27
Attending: INTERNAL MEDICINE
Payer: MEDICARE

## 2017-02-27 DIAGNOSIS — D84.9 IMMUNOCOMPROMISED STATE: ICD-10-CM

## 2017-02-27 DIAGNOSIS — Z94.1 S/P ORTHOTOPIC HEART TRANSPLANT: Primary | ICD-10-CM

## 2017-02-27 DIAGNOSIS — I10 ESSENTIAL HYPERTENSION: ICD-10-CM

## 2017-02-27 DIAGNOSIS — Z79.899 ENCOUNTER FOR LONG-TERM (CURRENT) USE OF MEDICATIONS: ICD-10-CM

## 2017-02-27 DIAGNOSIS — Z79.52 LONG TERM CURRENT USE OF SYSTEMIC STEROIDS: ICD-10-CM

## 2017-02-27 DIAGNOSIS — E78.2 MIXED HYPERLIPIDEMIA: ICD-10-CM

## 2017-02-27 DIAGNOSIS — Z94.1 STATUS POST HEART TRANSPLANT: ICD-10-CM

## 2017-02-27 DIAGNOSIS — R35.0 URINE FREQUENCY: ICD-10-CM

## 2017-02-27 DIAGNOSIS — R06.02 SHORTNESS OF BREATH: ICD-10-CM

## 2017-02-27 DIAGNOSIS — T86.20 COMPLICATION OF HEART TRANSPLANT, UNSPECIFIED COMPLICATION: ICD-10-CM

## 2017-02-27 DIAGNOSIS — I10 ESSENTIAL (PRIMARY) HYPERTENSION: ICD-10-CM

## 2017-02-27 LAB
ALBUMIN SERPL BCP-MCNC: 2.8 G/DL
ALP SERPL-CCNC: 71 U/L
ALT SERPL W/O P-5'-P-CCNC: 17 U/L
ANION GAP SERPL CALC-SCNC: 9 MMOL/L
AST SERPL-CCNC: 11 U/L
BILIRUB SERPL-MCNC: 0.6 MG/DL
BNP SERPL-MCNC: 596 PG/ML
BUN SERPL-MCNC: 33 MG/DL
CALCIUM SERPL-MCNC: 9 MG/DL
CHLORIDE SERPL-SCNC: 101 MMOL/L
CHOLEST/HDLC SERPL: 2 {RATIO}
CO2 SERPL-SCNC: 26 MMOL/L
CREAT SERPL-MCNC: 1.1 MG/DL
EST. GFR  (AFRICAN AMERICAN): >60 ML/MIN/1.73 M^2
EST. GFR  (NON AFRICAN AMERICAN): >60 ML/MIN/1.73 M^2
GLUCOSE SERPL-MCNC: 123 MG/DL
HDL/CHOLESTEROL RATIO: 50.6 %
HDLC SERPL-MCNC: 156 MG/DL
HDLC SERPL-MCNC: 79 MG/DL
LDLC SERPL CALC-MCNC: 61.8 MG/DL
MAGNESIUM SERPL-MCNC: 1.2 MG/DL
NONHDLC SERPL-MCNC: 77 MG/DL
POTASSIUM SERPL-SCNC: 4.6 MMOL/L
PROT SERPL-MCNC: 6 G/DL
SODIUM SERPL-SCNC: 136 MMOL/L
TACROLIMUS BLD-MCNC: 15 NG/ML
TRIGL SERPL-MCNC: 76 MG/DL

## 2017-02-27 PROCEDURE — 86832 HLA CLASS I HIGH DEFIN QUAL: CPT | Mod: PO

## 2017-02-27 PROCEDURE — 86833 HLA CLASS II HIGH DEFIN QUAL: CPT | Mod: 91,PO

## 2017-02-27 PROCEDURE — 86832 HLA CLASS I HIGH DEFIN QUAL: CPT | Mod: 91,PO

## 2017-02-27 PROCEDURE — 86977 RBC SERUM PRETX INCUBJ/INHIB: CPT | Mod: 91,PO

## 2017-02-27 NOTE — TELEPHONE ENCOUNTER
----- Message from Ander Avilez sent at 2/24/2017 11:58 AM CST -----  Contact: [facility]  Please call Sharla at [phone]. Requesting a shower chair and rollator. Send order to [employer] at fax# [phone]    Thank you

## 2017-02-27 NOTE — TELEPHONE ENCOUNTER
Post heart transplant education in the clinic area.  Met with pt and his wife: reviewed his medication list and times of medication, went medication one at a time and side effects of each, When to call.  Pt denies fever or chills but having trouble with when he urinates, he states he needs to go very often and only urinates a small amount. Urinalysis and urine culture ordered, pt stated understanding would like ti bring the urine sample to the [place] lab.    Pt standing and walking slowly, his feet continue to cause pain but are not as bad as when he was in the Hospital.  Reviewed sternal precautions, infection and what s/s to look for. Reviewed when some medications are planned to be discontinued.

## 2017-02-27 NOTE — PHYSICIAN QUERY
PT Name: Mick Barriga  MR #: [id number]     Physician Query Form - Documentation Clarification    Reviewer  Ext   [facility]  EXT [id number]  This form is a permanent document in the medical record.     Query Date: February 27, 2017  By submitting this query, we are merely seeking further clarification of documentation to reflect the severity of illness of your patient. Please utilize your independent clinical judgment when addressing the question(s) below.    (The Medical record reflects the following:)      Supporting Clinical Findings Location in Medical Record   56 year old male admitted to [facility] for elevated LDH and low INR.       Currently being worked up for pump thrombosis.Currently on IV heparin gtt for subtherapeutic INR.  presented to the hospital for elevated LDH in the setting of subtherapeutic INR with lower extremity swelling concerning for LVAD pump thrombosis/ failure  documented in the discharge summary          Documented in progress note dated 1/31/17                                                                                      Doctor Romero: Can you please clarify if pump thrombosis /failure was ruled in or ruled out at discharge. Thank You    Pump thrombosis/failure ruled in at discharge -----x--------------  Pump thrombosis /failure ruled out at discharge ---------------------  Other ------------------ ( please document cause)   Unable to determine ----------------------------  Physician Use Only                                                                                                                               [  ] Unable to determine

## 2017-03-01 ENCOUNTER — TELEPHONE (OUTPATIENT)
Dept: TRANSPLANT | Facility: CLINIC | Age: 57
End: 2017-03-01

## 2017-03-01 ENCOUNTER — OFFICE VISIT (OUTPATIENT)
Dept: TRANSPLANT | Facility: CLINIC | Age: 57
End: 2017-03-01
Payer: MEDICARE

## 2017-03-01 ENCOUNTER — HOSPITAL ENCOUNTER (OUTPATIENT)
Facility: HOSPITAL | Age: 57
Discharge: HOME | End: 2017-03-01
Attending: INTERNAL MEDICINE | Admitting: INTERNAL MEDICINE
Payer: MEDICARE

## 2017-03-01 ENCOUNTER — SURGERY (OUTPATIENT)
Age: 57
End: 2017-03-01

## 2017-03-01 VITALS
DIASTOLIC BLOOD PRESSURE: 71 MMHG | SYSTOLIC BLOOD PRESSURE: 146 MMHG | WEIGHT: 194.44 LBS | HEART RATE: 88 BPM | HEIGHT: 68 IN | BODY MASS INDEX: 29.47 KG/M2

## 2017-03-01 DIAGNOSIS — I10 ESSENTIAL HYPERTENSION: Chronic | ICD-10-CM

## 2017-03-01 DIAGNOSIS — Z94.1 HEART TRANSPLANTED: Primary | ICD-10-CM

## 2017-03-01 DIAGNOSIS — E83.42 HYPOMAGNESEMIA: Primary | ICD-10-CM

## 2017-03-01 DIAGNOSIS — D84.9 IMMUNOSUPPRESSION: ICD-10-CM

## 2017-03-01 DIAGNOSIS — Z94.1 HEART TRANSPLANT, ORTHOTOPIC, STATUS: ICD-10-CM

## 2017-03-01 PROBLEM — I42.0 CARDIOMYOPATHY, DILATED, NONISCHEMIC: Status: RESOLVED | Noted: 2017-01-05 | Resolved: 2017-03-01

## 2017-03-01 LAB
DIASTOLIC DYSFUNCTION: NO
RETIRED EF AND QEF - SEE NOTES: 55 (ref 55–65)
TRICUSPID VALVE REGURGITATION: NORMAL

## 2017-03-01 PROCEDURE — 99214 OFFICE O/P EST MOD 30 MIN: CPT | Mod: S$PBB,,, | Performed by: PHYSICIAN ASSISTANT

## 2017-03-01 PROCEDURE — 93505 ENDOMYOCARDIAL BIOPSY: CPT

## 2017-03-01 PROCEDURE — 25000003 PHARM REV CODE 250

## 2017-03-01 PROCEDURE — 88341 IMHCHEM/IMCYTCHM EA ADD ANTB: CPT | Performed by: PATHOLOGY

## 2017-03-01 PROCEDURE — 88341 IMHCHEM/IMCYTCHM EA ADD ANTB: CPT | Mod: 26,,, | Performed by: PATHOLOGY

## 2017-03-01 PROCEDURE — 93306 TTE W/DOPPLER COMPLETE: CPT | Mod: 26,,, | Performed by: INTERNAL MEDICINE

## 2017-03-01 PROCEDURE — 93306 TTE W/DOPPLER COMPLETE: CPT

## 2017-03-01 PROCEDURE — 99999 PR PBB SHADOW E&M-EST. PATIENT-LVL II: CPT | Mod: PBBFAC,,, | Performed by: PHYSICIAN ASSISTANT

## 2017-03-01 PROCEDURE — 93505 ENDOMYOCARDIAL BIOPSY: CPT | Mod: 26,,, | Performed by: INTERNAL MEDICINE

## 2017-03-01 PROCEDURE — 88342 IMHCHEM/IMCYTCHM 1ST ANTB: CPT | Mod: 26,,, | Performed by: PATHOLOGY

## 2017-03-01 PROCEDURE — 88307 TISSUE EXAM BY PATHOLOGIST: CPT | Mod: 26,,, | Performed by: PATHOLOGY

## 2017-03-01 RX ORDER — LANOLIN ALCOHOL/MO/W.PET/CERES
800 CREAM (GRAM) TOPICAL 2 TIMES DAILY
Qty: 270 TABLET | Refills: 3 | Status: SHIPPED | OUTPATIENT
Start: 2017-03-01 | End: 2017-03-22 | Stop reason: DRUGHIGH

## 2017-03-01 RX ORDER — LANOLIN ALCOHOL/MO/W.PET/CERES
800 CREAM (GRAM) TOPICAL 2 TIMES DAILY
Qty: 270 TABLET | Refills: 3
Start: 2017-03-01 | End: 2017-03-01 | Stop reason: SDUPTHER

## 2017-03-01 NOTE — PROGRESS NOTES
"Subjective:   Patient ID:  Mick Barriga is a 56 y.o. male who presents for heart transplant follow-up.    CMV Donor: -  CMV Recipient: +    HPI   56 year old male with PMHx of NICM s/p HM II (8/24/16), HLD, HTN, VT s/p ICD now s/p OHTx 2/9/17. Pt  presented to the hospital for elevated LDH in the setting of subtherapeutic INR with lower extremity swelling concerning for LVAD pump thrombosis, made status 1a and underwent OHTx 2/9/17. Post op course complicated by bradycardia and he was started on terbutaline 5 mg TID.   Here for 3 week f/u and hopsital d/c f/u. This is his first clinic visit post OHTx. Bx done today and pending.     Pt has BLE pressure wounds- says he was told to elevate leg. Has pain in those areas and in LUE. LE wounds are what is limiting his mobility. SBP at home mostly 120s. Still using incentive spirometry. No CP, SOB/MARTINO, F/C, N/V/D.    Immunosuppression: tacro 2/2, cellcept 1500 BID, pred 15 BID    Review of Systems   Constitution: Negative for chills and fever.   Cardiovascular: Positive for leg swelling. Negative for chest pain and dyspnea on exertion.   Respiratory: Negative for cough and shortness of breath.    Gastrointestinal: Negative for diarrhea, nausea and vomiting.   Neurological: Negative for dizziness and light-headedness.       Objective:   BP (!) 146/71  Pulse 88  Ht 5' 8" (1.727 m)  Wt 88.2 kg (194 lb 7.1 oz)  BMI 29.57 kg/m2    Physical Exam   Constitutional: He is oriented to person, place, and time. He appears well-developed and well-nourished.   BP (!) 146/71  Pulse 88  Ht 5' 8" (1.727 m)  Wt 88.2 kg (194 lb 7.1 oz)  BMI 29.57 kg/m2     Neck: No JVD present.   Cardiovascular: Normal rate, regular rhythm and normal heart sounds.    Pulses:       Radial pulses are 2+ on the right side, and 2+ on the left side.   Pulmonary/Chest: Effort normal and breath sounds normal.   Abdominal: Soft. Bowel sounds are normal. There is no tenderness.   Musculoskeletal: He " exhibits no edema.   Bilat feet- small healing wounds/areas outer heels   Neurological: He is alert and oriented to person, place, and time.   Skin: Skin is warm and dry.         Chemistry        Component Value Date/Time     03/01/2017 0805    K 4.4 03/01/2017 0805     03/01/2017 0805    CO2 27 03/01/2017 0805    BUN 30 (H) 03/01/2017 0805    CREATININE 1.0 03/01/2017 0805    GLU 84 03/01/2017 0805        Component Value Date/Time    CALCIUM 9.0 03/01/2017 0805    ALKPHOS 69 03/01/2017 0805    AST 12 03/01/2017 0805    ALT 16 03/01/2017 0805    BILITOT 0.4 03/01/2017 0805            Magnesium   Date Value Ref Range Status   03/01/2017 1.4 (L) 1.6 - 2.6 mg/dL Final       Lab Results   Component Value Date    WBC 14.95 (H) 03/01/2017    HGB 8.8 (L) 03/01/2017    HCT 27.9 (L) 03/01/2017    MCV 69 (L) 03/01/2017     02/22/2017       Lab Results   Component Value Date    INR 1.6 (H) 02/15/2017    INR 1.4 (H) 02/14/2017    INR 1.5 (H) 02/13/2017       BNP   Date Value Ref Range Status   03/01/2017 687 (H) 0 - 99 pg/mL Final     Comment:     Values of less than 100 pg/ml are consistent with non-CHF populations.   02/27/2017 596 (H) 0 - 99 pg/mL Final     Comment:     Values of less than 100 pg/ml are consistent with non-CHF populations.   02/15/2017 987 (H) 0 - 99 pg/mL Final     Comment:     Values of less than 100 pg/ml are consistent with non-CHF populations.       LD   Date Value Ref Range Status   02/08/2017 523 (H) 110 - 260 U/L Final     Comment:     Results are increased in hemolyzed samples.   02/07/2017 536 (H) 110 - 260 U/L Final     Comment:     Results are increased in hemolyzed samples.   02/06/2017 584 (H) 110 - 260 U/L Final     Comment:     Results are increased in hemolyzed samples.       Tacrolimus Lvl   Date Value Ref Range Status   02/27/2017 15.0 5.0 - 15.0 ng/mL Final     Comment:     Testing performed by Liquid Chromatography-Tandem  Mass Spectrometry (LC-MS/MS).  This test was  developed and its performance characteristics  determined by [facility], Department of Pathology  and Laboratory Medicine in a manner consistent with CLIA  requirements. It has not been cleared or approved by the [place]  Food and Drug Administration.  This test is used for clinical   purposes.  It should not be regarded as investigational or for  research.       No results found for: SIROLIMUS  No results found for: CYCLOSPORINE    Assessment:     1. Heart transplanted    2. Heart transplant, orthotopic, status    3. Immunosuppression    4. Essential hypertension        Plan:   F/u results of Bx, Echo, tacro level from today and make adjustments to immunosuppression depending on this- will discuss in chart review  Refer to wound care for BLE wounds  Gave pt refill of Oxycodone- 2 week supply. He will make an appt with his Pain Management physician in the next 2 weeks  RTC in 1 week as scheduled     Return instructions as set forth by post transplant schedule or as needed:    Clinic: Return for labs and/or biopsy weekly the first month, every two weeks during month 2 and then monthly for the first year at the provider or coordinator's discretion. During the second year, return to clinic every 3 months. Post transplant year 3-5 return every 6 months. There will be a comprehensive post transplant evaluation every year that may include LHC/RHC/biopsy, stress test, echo, CXR, and other health screening exams.    In addition to the clinical assessment, I have ordered Allomap testing for this patient to assist in identification of moderate/severe acute cellular rejection (ACR) in a pt with stable Allograft function instead of endomyocardial biopsy.     Patient is reminded to call with any health changes as these can be early signs of transplant complications. Patient is advised to make sure any new medications or changes of old medications are discussed with a pharmacist or physician knowledgeable with transplant  to avoid rejection/drug toxicity related to significant drug interactions.    UNOS Patient Status  Functional Status: 80% - Normal activity with effort: some symptoms of disease  Physical Capacity: Limited Mobility  Working for Income: No  If no, reason not working: Unknown

## 2017-03-01 NOTE — TELEPHONE ENCOUNTER
Spoke with pt regarding tacrolimus level 15 today.  Pt stated he took dose at 8 pm on 2/28/17 and did not take this morning until after the labs where drawn. Dose was just adjusted on 2/27/17 from 3 mg in am and 2 mg in pm to 2 mg bid.

## 2017-03-01 NOTE — H&P (VIEW-ONLY)
Progress Note  Heart Transplant Service    Admit Date: 1/30/2017   LOS: 23 days     SUBJECTIVE:     Follow up for: Heart transplanted    Interval History:  Feeling great.      Scheduled Meds:   albuterol-ipratropium 2.5mg-0.5mg/3mL  3 mL Nebulization Q4H    docusate sodium  100 mg Oral BID    furosemide  40 mg Oral BID    insulin aspart  15 Units Subcutaneous TIDWM    insulin glargine  28 Units Subcutaneous QHS    magnesium oxide  400 mg Oral BID    mupirocin   Topical (Top) TID    mycophenolate  1,500 mg Oral BID    nystatin  500,000 Units Mouth/Throat QID (WM & HS)    pantoprazole  40 mg Oral Daily    polyethylene glycol  17 g Oral Daily    predniSONE  20 mg Oral BID    sulfamethoxazole-trimethoprim 400-80mg  1 tablet Oral Daily    tacrolimus  3 mg Oral BID    terbutaline  5 mg Oral TID    valganciclovir  900 mg Oral Daily     Continuous Infusions:     PRN Meds:sodium chloride, sodium chloride, acetaminophen, dextrose 50%, dextrose 50%, glucagon (human recombinant), glucose, glucose, HYDROmorphone, insulin aspart, insulin aspart, magnesium sulfate IVPB, metoclopramide HCl, ondansetron, oxycodone, potassium chloride **AND** potassium chloride **AND** potassium chloride, sodium phosphate IVPB, sodium phosphate IVPB, sodium phosphate IVPB    Immunosuppressants     Start     Stop Route Frequency Ordered    02/13/17 2100  mycophenolate capsule 1,500 mg      -- Oral 2 times daily 02/13/17 0937    02/20/17 0800  tacrolimus capsule 3 mg      -- Oral 2 times daily 02/19/17 2042          Review of patient's allergies indicates:   Allergen Reactions    Levemir [insulin detemir] Itching    Pork/porcine containing products     Ranexa [ranolazine] Nausea Only       OBJECTIVE:     Vital Signs (Most Recent)  Temp: 97.6 °F (36.4 °C) (02/22/17 1145)  Pulse: 80 (02/22/17 1145)  Resp: 18 (02/22/17 1145)  BP: (!) 118/56 (02/22/17 1145)  SpO2: 96 % (02/22/17 1145)    Vital Signs Range (Last 24H):  Temp:  [97.5 °F  (36.4 °C)-98.4 °F (36.9 °C)]   Pulse:  [72-84]   Resp:  [16-18]   BP: (118-136)/(53-61)   SpO2:  [95 %-100 %]     I & O (Last 24H):    Intake/Output Summary (Last 24 hours) at 02/22/17 1342  Last data filed at 02/22/17 0900   Gross per 24 hour   Intake             1240 ml   Output             4300 ml   Net            -3060 ml            Telemetry: sinus    Physical Exam   Constitutional: He is oriented to person, place, and time. He appears well-developed and well-nourished.   HENT:   Head: Normocephalic and atraumatic. 2cm well circumscribed plaque to right cheek, dry and crusted.  Eyes: EOM are normal. Pupils are equal, round, and reactive to light.   Neck: Normal range of motion. Neck supple. No JVD present.   Cardiovascular: S1 S2 audible, regular, tachycardic    Pulmonary/Chest: Bronchial breath sounds on left  Abdominal: Soft. Bowel sounds are normal. He exhibits no distension. There is no tenderness.   Musculoskeletal: Normal range of motion. He exhibits no edema.   Neurological: He is alert and oriented to person, place, and time.   Skin: Skin is warm and dry.   Extremities: BUE edematous. LUE slightly weaker than RUE. 2+ peripheral pulses.      Labs:       Recent Labs  Lab 02/20/17  0455 02/21/17  0641 02/22/17  0452   WBC 22.55* 15.75* 18.48*   HGB 8.8* 8.3* 7.8*   HCT 26.8* 24.9* 24.0*    286 259   LYMPH 4.0*  CANCELED 4.6*  0.7* 3.4*  0.6*   MONO 3.0*  CANCELED 4.0  0.6 3.6*  0.7   EOSINOPHIL 1.0 0.0 0.1       No results for input(s): APTT, INR, PTT in the last 168 hours.       Recent Labs  Lab 02/16/17  0444  02/20/17  0455 02/21/17  0641 02/22/17  0452   *  < > 243* 304* 306*   CALCIUM 8.7  < > 8.8 8.5* 8.5*   ALBUMIN 2.5*  < > 2.7* 2.6* 2.5*   PROT 5.3*  < > 5.8* 5.2* 5.2*     < > 134* 134* 135*   K 4.4  < > 4.8 5.5* 5.3*   CO2 33*  < > 28 31* 26   CL 97  < > 96 97 98   BUN 30*  < > 25* 26* 30*   CREATININE 0.8  < > 1.2 1.1 1.0   ALKPHOS 65  < > 71 68 72   ALT 21  < > 19 20  23   AST 17  < > 11 11 14   BILITOT 0.6  < > 0.6 0.6 0.6   MG 1.2*  --   --   --   --    < > = values in this interval not displayed.  Estimated Creatinine Clearance: 95.4 mL/min (based on Cr of 1).      Recent Labs  Lab 02/15/17  1357   *       No results for input(s): LDH in the last 168 hours.    Microbiology Results (last 7 days)     Procedure Component Value Units Date/Time    Blood culture [[id number]] Collected:  02/20/17 1124    Order Status:  Completed Specimen:  Blood Updated:  02/21/17 1422     Blood Culture, Routine No Growth to date     Blood Culture, Routine No Growth to date    Blood culture [[id number]] Collected:  02/20/17 1124    Order Status:  Completed Specimen:  Blood Updated:  02/21/17 1422     Blood Culture, Routine No Growth to date     Blood Culture, Routine No Growth to date    Urine culture [[id number]]     Order Status:  No result Specimen:  Urine             ASSESSMENT:     55 yo M with PMHx of NICM s/p HM II (8/24/16), HLD, HTN, VT s/p ICD who presented to the hospital for elevated LDH in the setting of subtherapeutic INR with lower extremity swelling concerning for LVAD pump thrombosis, now s/p OHTx 2/9/17.       PLAN:     S/P Orthotopic Heart Transplantation 2/9/2017  -[facility] primary  -Moderate Risk of Rejection: cPRA 0%, Negative Crossmatch with B Channel Shifts  -CMV Status: D-/R+: Will require valcyte x 3 months  -Immunosuppression: MMF 1500 mg BID, Prednisone 20 mg bid. Tacro 3/3  -On full OI prophylaxis  -EGBx #1 done   -ST following. Tolerating pureed diet with honey-thick liquids  DC today

## 2017-03-01 NOTE — TELEPHONE ENCOUNTER
Met in clinic and reviewed medication list, Pt denies fever, chills  Pt does c/o pain on the bottom of his feet and the wound area of both feet on the outside area of both feet.    Pt stated that he has to take the pain medicine 6 x day and he has been taking 20 mg in am, 20 mg in early afternoon and 20 mg at bedtime.  I explained that he should not take 20 mg at a time and will review with Sidra ROMERO.    Pt has a pain management team he has worked with in the past and would like to see them, he will contact them.  Sidra gave him a 2 week supply of his pain medication.

## 2017-03-01 NOTE — DISCHARGE SUMMARY
[facility]  Discharge Note  Short Stay    Admit Date: 3/1/2017    Discharge Date and Time: No discharge date for patient encounter.     Attending Physician: Peewee Romero MD     Discharge Provider: Peewee Romero MD    Diagnoses: s/p OHTx      Discharged Condition: good    Hospital Course: Patient was admitted for an outpatient procedure and tolerated the procedure well with no complications.    Final Diagnoses: Same as principal problem.    Disposition: Home or Self Care    Follow up/Patient Instructions:    Medications:  Reconciled Home Medications:   Current Discharge Medication List      CONTINUE these medications which have NOT CHANGED    Details   alendronate (FOSAMAX) 70 MG tablet TAKE 1 TABLET BY MOUTH EVERY 7 DAYS  Qty: 12 tablet, Refills: 11    Comments: **Patient requests 90 days supply**      amlodipine (NORVASC) 10 MG tablet Take 1 tablet (10 mg total) by mouth once daily.  Qty: 30 tablet, Refills: 11      aspirin (ECOTRIN) 81 MG EC tablet Take 1 tablet (81 mg total) by mouth once daily.  Refills: 0      blood sugar diagnostic Strp To use to check BG 4 times daily, to use with insurance preferred meter  Qty: 350 strip, Refills: 3      blood-glucose meter kit To use to check BG 4 times daily, to use with insurance preferred meter  Qty: 1 each, Refills: 0      calcium-vitamin D3 500 mg(1,250mg) -200 unit per tablet Take 1 tablet by mouth 2 (two) times daily with meals.  Qty: 60 tablet, Refills: 11      docusate sodium (COLACE) 100 MG capsule Take 2 capsules (200 mg total) by mouth every evening.  Qty: 180 capsule, Refills: 3      esomeprazole (NEXIUM) 40 MG capsule Take 1 capsule (40 mg total) by mouth before breakfast.  Qty: 90 capsule, Refills: 3      ferrous gluconate (FERGON) 324 MG tablet Take 1 tablet (324 mg total) by mouth daily with breakfast.  Qty: 90 tablet, Refills: 3      furosemide (LASIX) 80 MG tablet Take 0.5 tablets (40 mg total) by mouth 2 (two) times daily.  Qty: 60 tablet,  Refills: 11      insulin aspart (NOVOLOG) 100 unit/mL InPn pen Inject 15 Units into the skin 3 (three) times daily.  Qty: 1 Box, Refills: 0      insulin glargine (LANTUS SOLOSTAR) 100 unit/mL (3 mL) InPn pen Inject 28 Units into the skin every evening.  Qty: 15 mL, Refills: 3      lancets Misc To use to check BG 4 times daily, to use with insurance preferred meter  Qty: 350 each, Refills: 3      magnesium oxide (MAG-OX) 400 mg tablet Take 1 tablet (400 mg total) by mouth 2 (two) times daily.  Qty: 270 tablet, Refills: 3      mycophenolate (CELLCEPT) 250 mg Cap Take 6 capsules (1,500 mg total) by mouth 2 (two) times daily.  Qty: 360 capsule, Refills: 11      nystatin (MYCOSTATIN) 100,000 unit/mL suspension Take 5 mLs (500,000 Units total) by mouth 4 (four) times daily with meals and nightly.  Qty: 473 mL, Refills: 5    Associated Diagnoses: Immunosuppressed status      oxycodone (ROXICODONE) 10 mg Tab immediate release tablet Take 1 tablet (10 mg total) by mouth every 4 (four) hours as needed (pain).  Qty: 56 tablet, Refills: 0      pravastatin (PRAVACHOL) 40 MG tablet Take 1 tablet (40 mg total) by mouth once daily.  Qty: 30 tablet, Refills: 11      predniSONE (DELTASONE) 10 MG tablet Take 1.5 tablets (15 mg total) by mouth 2 (two) times daily.  Qty: 120 tablet, Refills: 11      sulfamethoxazole-trimethoprim 400-80mg (BACTRIM,SEPTRA) 400-80 mg per tablet Take 1 tablet by mouth once daily.  Qty: 30 tablet, Refills: 11      tacrolimus (PROGRAF) 1 MG Cap Take 3mg orally in the morning and 2mg orally in the evening  Qty: 150 capsule, Refills: 11      terbutaline (BRETHINE) 5 mg Tab Take 1 tablet (5 mg total) by mouth 3 (three) times daily.  Qty: 90 tablet, Refills: 11      valganciclovir (VALCYTE) 450 mg Tab Take 2 tablets (900 mg total) by mouth once daily.  Qty: 60 tablet, Refills: 2               Discharge Procedure Orders (must include Diet, Follow-up, Activity):    Heart healthy diet    Activity as  tolerated    Follow-up with Tx clinic today    Peewee Romero MD

## 2017-03-01 NOTE — IP AVS SNAPSHOT
[facility]  [address]  [facility] [address]  Phone: [phone]           Patient Discharge Instructions     Our goal is to set you up for success. This packet includes information on your condition, medications, and your home care. It will help you to care for yourself so you don't get sicker and need to go back to the hospital.     Please ask your nurse if you have any questions.        There are many details to remember when preparing to leave the hospital. Here is what you will need to do:    1. Take your medicine. If you are prescribed medications, review your Medication List in the following pages. You may have new medications to  at the pharmacy and others that you'll need to stop taking. Review the instructions for how and when to take your medications. Talk with your doctor or nurses if you are unsure of what to do.     2. Go to your follow-up appointments. Specific follow-up information is listed in the following pages. Your may be contacted by a transition nurse or clinical provider about future appointments. Be sure we have all of the phone numbers to reach you, if needed. Please contact your provider's office if you are unable to make an appointment.     3. Watch for warning signs. Your doctor or nurse will give you detailed warning signs to watch for and when to call for assistance. These instructions may also include educational information about your condition. If you experience any of warning signs to your health, call your doctor.               [facility]  Unless otherwise directed by your provider, please contact [facility], our nurse care line that is available for 24/7 assistance.     [phone] (toll-free)    Registered nurses in the [facility] provide clinical advisement, health education, appointment booking, and other advisory services.                    ** Verify the list of medication(s) below is accurate and up  to date. Carry this with you in case of emergency. If your medications have changed, please notify your healthcare provider.             Medication List      ASK your doctor about these medications        Additional Info                      alendronate 70 MG tablet   Commonly known as:  FOSAMAX   Quantity:  12 tablet   Refills:  11   Comments:  **Patient requests 90 days supply**    Instructions:  TAKE 1 TABLET BY MOUTH EVERY 7 DAYS     Begin Date    AM    Noon    PM    Bedtime       amlodipine 10 MG tablet   Commonly known as:  NORVASC   Quantity:  30 tablet   Refills:  11   Dose:  10 mg    Instructions:  Take 1 tablet (10 mg total) by mouth once daily.     Begin Date    AM    Noon    PM    Bedtime       aspirin 81 MG EC tablet   Commonly known as:  ECOTRIN   Refills:  0   Dose:  81 mg    Instructions:  Take 1 tablet (81 mg total) by mouth once daily.     Begin Date    AM    Noon    PM    Bedtime       blood sugar diagnostic Strp   Quantity:  350 strip   Refills:  3    Instructions:  To use to check BG 4 times daily, to use with insurance preferred meter     Begin Date    AM    Noon    PM    Bedtime       blood-glucose meter kit   Quantity:  1 each   Refills:  0    Instructions:  To use to check BG 4 times daily, to use with insurance preferred meter     Begin Date    AM    Noon    PM    Bedtime       calcium-vitamin D3 500 mg(1,250mg) -200 unit per tablet   Quantity:  60 tablet   Refills:  11   Dose:  1 tablet    Instructions:  Take 1 tablet by mouth 2 (two) times daily with meals.     Begin Date    AM    Noon    PM    Bedtime       docusate sodium 100 MG capsule   Commonly known as:  COLACE   Quantity:  180 capsule   Refills:  3   Dose:  180 mg    Instructions:  Take 2 capsules (200 mg total) by mouth every evening.     Begin Date    AM    Noon    PM    Bedtime       esomeprazole 40 MG capsule   Commonly known as:  NEXIUM   Quantity:  90 capsule   Refills:  3   Dose:  40 mg    Instructions:  Take 1 capsule (40  mg total) by mouth before breakfast.     Begin Date    AM    Noon    PM    Bedtime       ferrous gluconate 324 MG tablet   Commonly known as:  FERGON   Quantity:  90 tablet   Refills:  3   Dose:  324 mg    Instructions:  Take 1 tablet (324 mg total) by mouth daily with breakfast.     Begin Date    AM    Noon    PM    Bedtime       furosemide 80 MG tablet   Commonly known as:  LASIX   Quantity:  60 tablet   Refills:  11   Dose:  40 mg    Instructions:  Take 0.5 tablets (40 mg total) by mouth 2 (two) times daily.     Begin Date    AM    Noon    PM    Bedtime       insulin aspart 100 unit/mL Inpn pen   Commonly known as:  NovoLOG   Quantity:  1 Box   Refills:  0   Dose:  15 Units    Instructions:  Inject 15 Units into the skin 3 (three) times daily.     Begin Date    AM    Noon    PM    Bedtime       insulin glargine 100 unit/mL (3 mL) Inpn pen   Commonly known as:  LANTUS SOLOSTAR   Quantity:  15 mL   Refills:  3   Dose:  28 Units    Instructions:  Inject 28 Units into the skin every evening.     Begin Date    AM    Noon    PM    Bedtime       lancets Misc   Quantity:  350 each   Refills:  3    Instructions:  To use to check BG 4 times daily, to use with insurance preferred meter     Begin Date    AM    Noon    PM    Bedtime       magnesium oxide 400 mg tablet   Commonly known as:  MAG-OX   Quantity:  270 tablet   Refills:  3   Dose:  400 mg    Instructions:  Take 1 tablet (400 mg total) by mouth 2 (two) times daily.     Begin Date    AM    Noon    PM    Bedtime       mycophenolate 250 mg Cap   Commonly known as:  CELLCEPT   Quantity:  360 capsule   Refills:  11   Dose:  1500 mg    Instructions:  Take 6 capsules (1,500 mg total) by mouth 2 (two) times daily.     Begin Date    AM    Noon    PM    Bedtime       nystatin 100,000 unit/mL suspension   Commonly known as:  MYCOSTATIN   Quantity:  473 mL   Refills:  5   Dose:  5 mL    Instructions:  Take 5 mLs (500,000 Units total) by mouth 4 (four) times daily with meals and  nightly.     Begin Date    AM    Noon    PM    Bedtime       oxycodone 10 mg Tab immediate release tablet   Commonly known as:  ROXICODONE   Quantity:  56 tablet   Refills:  0   Dose:  10 mg    Instructions:  Take 1 tablet (10 mg total) by mouth every 4 (four) hours as needed (pain).     Begin Date    AM    Noon    PM    Bedtime       pravastatin 40 MG tablet   Commonly known as:  PRAVACHOL   Quantity:  30 tablet   Refills:  11   Dose:  40 mg    Instructions:  Take 1 tablet (40 mg total) by mouth once daily.     Begin Date    AM    Noon    PM    Bedtime       predniSONE 10 MG tablet   Commonly known as:  DELTASONE   Quantity:  120 tablet   Refills:  11   Dose:  15 mg    Instructions:  Take 1.5 tablets (15 mg total) by mouth 2 (two) times daily.     Begin Date    AM    Noon    PM    Bedtime       sulfamethoxazole-trimethoprim 400-80mg 400-80 mg per tablet   Commonly known as:  BACTRIM,SEPTRA   Quantity:  30 tablet   Refills:  11   Dose:  1 tablet   Indications:  Opportunistic infection prophylaxis    Instructions:  Take 1 tablet by mouth once daily.     Begin Date    AM    Noon    PM    Bedtime       tacrolimus 1 MG Cap   Commonly known as:  PROGRAF   Quantity:  150 capsule   Refills:  11    Instructions:  Take 3mg orally in the morning and 2mg orally in the evening     Begin Date    AM    Noon    PM    Bedtime       terbutaline 5 mg Tab   Commonly known as:  BRETHINE   Quantity:  90 tablet   Refills:  11   Dose:  5 mg    Instructions:  Take 1 tablet (5 mg total) by mouth 3 (three) times daily.     Begin Date    AM    Noon    PM    Bedtime       valganciclovir 450 mg Tab   Commonly known as:  VALCYTE   Quantity:  60 tablet   Refills:  2   Dose:  900 mg    Instructions:  Take 2 tablets (900 mg total) by mouth once daily.     Begin Date    AM    Noon    PM    Bedtime                  Please bring to all follow up appointments:    1. A copy of your discharge instructions.  2. All medicines you are currently taking in  their original bottles.  3. Identification and insurance card.    Please arrive 15 minutes ahead of scheduled appointment time.    Please call 24 hours in advance if you must reschedule your appointment and/or time.        Your Scheduled Appointments     Mar 01, 2017 11:00 AM CST   Established Patient Visit with Joan Benjamin PA-C   [facility]    [address] [place] [address]   [phone]            Mar 07, 2017  7:50 AM CST   Fasting Lab with LAB, APPOINTMENT [place] [facility]    [address] [place] [address]   [phone]            Mar 07, 2017 10:00 AM CST   Heart Transplant  Visit with GERARD,    [facility] [address] [place] [address]   [phone]            Mar 07, 2017  4:00 PM CST   Established Patient Visit with Ryan Dawn MD   [facility]    [address] [place] [address]   [phone]            Mar 21, 2017 11:30 AM CDT   Established Patient Visit with Julia Gupta MD   [facility] [address] [place] [address]   [phone]              Your Future Surgeries/Procedures     Mar 07, 2017   Surgery with Trixie Marx MD   [facility]    [address] [place] [address]   [phone]            Mar 21, 2017   Surgery with Josefina Saul MD   [facility])    [address] [place] [address]   [phone]            Apr 11, 2017   Surgery with Peewee Romero MD   [facility] ([facility])    [address] [place] [address]   [phone]            May 02, 2017   Surgery with Josefina Saul MD   [facility]    [address]  [facility] [address] [phone]                  Discharge Instructions       AFTER THE PROCEDURE:  -You may remove the bandage in 24 hours and wash with soap and water.  -You may shower, but do not soak in a tub for three days.   PRECAUTIONS FOR THE NEXT 24 HOURS:  -If you need to cough, sneeze, have a bowel movement, or bear down, hold pressure over your bandage.  -Do not  anything heavier than a gallon of milk(about 5 pounds)  -Avoid excessive bending over.  SYMPTOMS TO WATCH FOR AND REPORT TO YOUR DOCTOR:  -BLEEDING: hold pressure over the site until bleeding stops. Proceed to Emergency Room by ambulance (do not drive yourself) if unable to stop bleeding. Notify your doctor.  -HEMATOMA(hard bruise under the skin): Dioni around the bruise if one develops. Call your doctor if it increases in size or if you have difficulty talking, swallowing, breathing or anything unusual.  SIGNS OF INFECTION:Fever (temperature over 100.5 F), pus or redness  -RASH  -CHEST PAIN OR SHORTNESS OF BREATH    You may call you coordinator in the Heart Failure/Heart Transplant/Pulmonary Hypertension Clinic at [phone] during normal business hours(Monday through Friday from 8 A.M. to 5 P.M.) After hours, call the Heart Transplant Service doctor on call at [phone].        Admission Information     Date & Time Provider Department CSN    3/1/2017  8:59 AM Peewee Romero MD [facility] [id number]      Care Providers     Provider Role Specialty Primary office phone    Peewee Romero MD Attending Provider Cardiology [phone]      Recent Lab Values        6/15/2015 8/20/2016 8/22/2016 8/23/2016 12/28/2016 2/9/2017            5:29 AM 12:00 AM  2:50 PM  4:00 AM 10:41 AM  2:17 PM      A1C 7.9 (H) 7.8 (H) 8.1 (H) 8.3 (H) 7.8 (H) 6.8 (H)      Comment for A1C at 12:00 AM on 8/20/2016:  According to ADA guidelines, hemoglobin A1C <7.0% represents  optimal control in  non-pregnant diabetic patients.  Different  metrics may apply to specific populations.   Standards of Medical Care in Diabetes - 2016.  For the purpose of screening for the presence of diabetes:  <5.7%     Consistent with the absence of diabetes  5.7-6.4%  Consistent with increasing risk for diabetes   (prediabetes)  >or=6.5%  Consistent with diabetes  Currently no consensus exists for use of hemoglobin A1C  for diagnosis of diabetes for children.      Comment for A1C at  2:50 PM on 8/22/2016:  According to ADA guidelines, hemoglobin A1C <7.0% represents  optimal control in non-pregnant diabetic patients.  Different  metrics may apply to specific populations.   Standards of Medical Care in Diabetes - 2016.  For the purpose of screening for the presence of diabetes:  <5.7%     Consistent with the absence of diabetes  5.7-6.4%  Consistent with increasing risk for diabetes   (prediabetes)  >or=6.5%  Consistent with diabetes  Currently no consensus exists for use of hemoglobin A1C  for diagnosis of diabetes for children.      Comment for A1C at  4:00 AM on 8/23/2016:  According to ADA guidelines, hemoglobin A1C <7.0% represents  optimal control in non-pregnant diabetic patients.  Different  metrics may apply to specific populations.   Standards of Medical Care in Diabetes - 2016.  For the purpose of screening for the presence of diabetes:  <5.7%     Consistent with the absence of diabetes  5.7-6.4%  Consistent with increasing risk for diabetes   (prediabetes)  >or=6.5%  Consistent with diabetes  Currently no consensus exists for use of hemoglobin A1C  for diagnosis of diabetes for children.      Comment for A1C at 10:41 AM on 12/28/2016:  According to ADA guidelines, hemoglobin A1C <7.0% represents  optimal control in non-pregnant diabetic patients.  Different  metrics may apply to specific populations.   Standards of Medical Care in Diabetes - 2016.  For the purpose of screening for the presence of diabetes:  <5.7%      Consistent with the absence of diabetes  5.7-6.4%  Consistent with increasing risk for diabetes   (prediabetes)  >or=6.5%  Consistent with diabetes  Currently no consensus exists for use of hemoglobin A1C  for diagnosis of diabetes for children.      Comment for A1C at  2:17 PM on 2/9/2017:  According to ADA guidelines, hemoglobin A1C <7.0% represents  optimal control in non-pregnant diabetic patients.  Different  metrics may apply to specific populations.   Standards of Medical Care in Diabetes - 2016.  For the purpose of screening for the presence of diabetes:  <5.7%     Consistent with the absence of diabetes  5.7-6.4%  Consistent with increasing risk for diabetes   (prediabetes)  >or=6.5%  Consistent with diabetes  Currently no consensus exists for use of hemoglobin A1C  for diagnosis of diabetes for children.        Pending Labs     Order Current Status    Specimen to Pathology - Surgery Collected (03/01/17 1000)      Allergies as of 3/1/2017        Reactions    Levemir [Insulin Detemir] Itching    Pork/porcine Containing Products     Ranexa [Ranolazine] Nausea Only      Advance Directives     An advance directive is a document which, in the event you are no longer able to make decisions for yourself, tells your healthcare team what kind of treatment you do or do not want to receive, or who you would like to make those decisions for you.  If you do not currently have an advance directive, [facility] encourages you to create one.  For more information call:  [phone]), [phone] ([phone]),  or log on to [email or web address].        Smoking Cessation     If you would like to quit smoking:   You may be eligible for free services if you are a [place] resident and started smoking cigarettes before September 1, 1988.  Call the Smoking Cessation Trust (SCT) toll free at (516) 833-7798 or (750) 905-8401.   Call 800-QUIT-NOW if you do not meet the above criteria.            Language  Assistance Services     ATTENTION: Language assistance services are available, free of charge. Please call [phone].      ATENCIÓN: Si lisa perales, tiene a cowart disposición servicios gratuitos de asistencia lingüística. Llveronika al [phone].     CHÚ Ý: N?u b?n nói Ti?ng Vi?t, có các d?ch v? h? tr? ngôn ng? mi?n phí dành cho b?n. G?i s? [phone].        Heart Failure Education       Heart Failure: Being Active  You have a condition called heart failure. Being active doesnt mean that you have to wear yourself out. Even a little movement each day helps to strengthen your heart. If you cant get out to exercise, you can do simple stretching and strengthening exercises at home. These are good ways to keep you well-conditioned and prevent you and your heart from becoming excessively weak.    Ideas to get you started  · Add a little movement to things you do now. Walk to mail letters. Park your car at the far end of the parking lot and walk to the store. Walk up a flight of stairs instead of taking the elevator.  · Choose activities you enjoy. You might walk, swim, or ride an exercise bike. Things like gardening and washing the car count, too. Other possibilities include: washing dishes, walking the dog, walking around the mall, and doing aerobic activities with friends.  · Join a group exercise program at a [facility] or [facility], a senior center, or a community center. Or look into a hospital cardiac rehabilitation program. Ask your doctor if you qualify.  Tips to keep you going  · Get up and get dressed each day. Go to a coffee shop and read a newspaper or go somewhere that you'll be in the presence of other active people. Youll feel more like being active.  · Make a plan. Choose one or more activities that you enjoy and that you can easily do. Then plan to do at least one each day. You might write your plan on a calendar.  · Go with a friend or a group if you like company. This can help you feel supported and  stay motivated, too.  · Plan social events that you enjoy. This will keep you mentally engaged as well as physically motivated to do things you find pleasure in.  For your safety  · Talk with your healthcare provider before starting an exercise program.  · Exercise indoors when its too hot or too cold outside, or when the air quality is poor. Try walking at a shopping mall.  · Wear socks and sturdy shoes to maintain your balance and prevent falls.  · Start slowly. Do a few minutes several times a day at first. Increase your time and speed little by little.  · Stop and rest whenever you feel tired or get short of breath.  · Dont push yourself on days when you dont feel well.  Date Last Reviewed: 3/20/2016  © 3383-4792 [employer]. [address] [place] [address], [place] [address]. All rights reserved. This information is not intended as a substitute for professional medical care. Always follow your healthcare professional's instructions.              Heart Failure: Evaluating Your Heart  You have a condition called heart failure. To evaluate your condition, your doctor will examine you, ask questions, and do some tests. Along with looking for signs of heart failure, the doctor looks for any other health problems that may have led to heart failure. The results of your evaluation will help your doctor form a treatment plan.  Health history and physical exam  Your visit will start with a health history. Tell the doctor about any symptoms youve noticed and about all medicines you take. Then youll have a physical exam. This includes listening to your heartbeat and breathing. Youll also be checked for swelling (edema) in your legs and neck. When you have fluid buildup or fluid in the lungs, it may be called congestive heart failure.  Diagnosing heart failure     During an echocardiogram, sound waves bounce off the heart. These are converted into a picture on the screen.   The following may be done to help your  doctor form a diagnosis:  · X-rays show the size and shape of your heart. These pictures can also show fluid in your lungs.  · An electrocardiogram (ECG or EKG) shows the pattern of your heartbeat. Small pads (electrodes) are placed on your chest, arms, and legs. Wires connect the pads to the ECG machine, which records your hearts electrical signals. This can give the doctor information about heart function.  · An echocardiogram uses ultrasound waves to show the structure and movement of your heart muscle. This shows how well the heart pumps. It also shows the thickness of the heart walls, and if the heart is enlarged. It is one of the most useful, non-invasive tests as it provides information about the heart's general function. This helps your doctor make treatment decisions.  · Lab tests evaluate small amounts of blood or urine for signs of problems. A BNP lab test can help diagnose and evaluate heart failure. BNP stands for B-type natriuretic peptide. The ventricles secrete more BNP when heart failure worsens. Lab tests can also provide information about metabolic dysfunction or heart dysfunction.  Your treatment plan  Based on the results of your evaluation and tests, your doctor will develop a treatment plan. This plan is designed to relieve some of your heart failure symptoms and help make you more comfortable. Your treatment plan may include:  · Medicine to help your heart work better and improve your quality of life  · Changes in what you eat and drink to help prevent fluid from backing up in your body  · Daily monitoring of your weight and heart failure symptoms to see how well your treatment plan is working  · Exercise to help you stay healthy  · Help with quitting smoking  · Emotional and psychological support to help adjust to the changes  · Referrals to other specialists to make sure you are being treated comprehensively  Date Last Reviewed: 3/21/2016  © 6622-5732 [employer]. [address]  [place] [address]. All rights reserved. This information is not intended as a substitute for professional medical care. Always follow your healthcare professional's instructions.              Heart Failure: Making Changes to Your Diet  You have a condition called heart failure. When you have heart failure, excess fluid is more likely to build up in your body because your heart isn't working well. This makes the heart work harder to pump blood. Fluid buildup causes symptoms such as shortness of breath and swelling (edema). This is often referred to as congestive heart failure or CHF. Controlling the amount of salt (sodium) you eat may help stop fluid from building up. Your doctor may also tell you to reduce the amount of fluid you drink.  Reading food labels    Your healthcare provider will tell you how much sodium you can eat each day. Read food labels to keep track. Keep in mind that certain foods are high in salt. These include canned, frozen, and processed foods. Check the amount of sodium in each serving. Watch out for high-sodium ingredients. These include MSG (monosodium glutamate), baking soda, and sodium phosphate.   Eating less salt  Give yourself time to get used to eating less salt. It may take a little while. Here are some tips to help:  · Take the saltshaker off the table. Replace it with salt-free herb mixes and spices.  · Eat fresh or plain frozen vegetables. These have much less salt than canned vegetables.  · Choose low-sodium snacks like sodium-free pretzels, crackers, or air-popped popcorn.  · Dont add salt to your food when youre cooking. Instead, season your foods with pepper, lemon, garlic, or onion.  · When you eat out, ask that your food be cooked without added salt.  · Avoid eating fried foods as these often have a great deal of salt.  If youre told to limit fluids  You may need to limit how much fluid you have to help prevent swelling. This includes anything that is  liquid at room temperature, such as ice cream and soup. If your doctor tells you to limit fluid, try these tips:  · Measure drinks in a measuring cup before you drink them. This will help you meet daily goals.  · Chill drinks to make them more refreshing.  · Suck on frozen lemon wedges to quench thirst.  · Only drink when youre thirsty.  · Chew sugarless gum or suck on hard candy to keep your mouth moist.  · Weigh yourself daily to know if your body's fluid content is rising.  My sodium goal  Your healthcare provider may give you a sodium goal to meet each day. This includes sodium found in food as well as salt that you add. My goal is to eat no more than ___________ mg of sodium per day.     When to call your doctor  Call your doctor right away if you have any symptoms of worsening heart failure. These can include:  · Sudden weight gain  · Increased swelling of your legs or ankles  · Trouble breathing when youre resting or at night  · Increase in the number of pillows you have to sleep on  · Chest pain, pressure, discomfort, or pain in the jaw, neck, or back   Date Last Reviewed: 3/21/2016  © 4578-0355 [employer]. [address] [place] [address], [place] [address]. All rights reserved. This information is not intended as a substitute for professional medical care. Always follow your healthcare professional's instructions.              Heart Failure: Medicines to Help Your Heart    You have a condition called heart failure (also known as congestive heart failure, or CHF). Your doctor will likely prescribe medicines for heart failure and any underlying health problems you have. Most heart failure patients take one or more types of medicinen. Your healthcare provider will work to find the combination of medicines that works best for you.  Heart failure medicines  Here are the most common heart failure medicines:  · ACE inhibitors lower blood pressure and decrease strain on the heart. This makes it easier for  the heart to pump. Angiotensin receptor blockers have similar effects. These are prescribed for some patients instead of ACE inhibitors.  · Beta-blockers relieve stress on the heart. They also improve symptoms. They may also improve the heart's pumping action over time.  · Diuretics (also called water pills) help rid your body of excess water. This can help rid your body of swelling (edema). Having less fluid to pump means your heart doesnt have to work as hard. Some diuretics make your body lose a mineral called potassium. Your doctor will tell you if you need to take supplements or eat more foods high in potassium.  · Digoxin helps your heart pump with more strength. This helps your heart pump more blood with each beat. So, more oxygen-rich blood travels to the rest of the body.  · Aldosterone antagonists help alter hormones and decrease strain on the heart.  · Hydralazine and nitrates are two separate medicines used together to treat heart failure. They may come in one combination pill. They lower blood pressure and decrease how hard the heart has to pump.  Medicines for related conditions  Controlling other heart problems helps keep heart failure under control, too. Depending on other heart problems you have, medicines may be prescribed to:  · Lower blood pressure (antihypertensives).  · Lower cholesterol levels (statins).  · Prevent blood clots (anticoagulants or aspirin).  · Keep the heartbeat steady (antiarrhythmics).  Date Last Reviewed: 3/5/2016  © 5518-9316 [employer]. [address] [place] [address]. All rights reserved. This information is not intended as a substitute for professional medical care. Always follow your healthcare professional's instructions.              Heart Failure: Procedures That May Help    The heart is a muscle that pumps oxygen-rich blood to all parts of the body. When you have heart failure, the heart is not able to pump as well as it should. Blood  and fluid may back up into the lungs (congestive heart failure), and some parts of the body dont get enough oxygen-rich blood to work normally. These problems lead to the symptoms of heart failure.     Certain procedures may help the heart pump better in some cases of heart failure. Some procedures are done to treat health problems that may have caused the heart failure such as coronary artery disease or heart rhythm problems. For more serious heart failure, other options are available.  Treating artery and valve problems  If you have coronary artery disease or valve disease, procedures may be done to improve blood flow. This helps the heart pump better, which can improve heart failure symptoms. First, your doctor may do a cardiac catheterization to help detect clogged blood vessels or valve damage. During this procedure, a  thin tube (catheter) in inserted into a blood vessel and guided to the heart. There a dye is injected and a special type of X-ray (angiogram) is taken of the blood vessels. Procedures to open a blocked artery or fix damaged valves can also be done using catheterization.  · Angioplasty uses a balloon-tipped instrument at the end of the catheter. The balloon is inflated to widen the narrowed artery. In many cases, a stent is expanded to further support the narrowed artery. A stent is a metal mesh tube.  · Valve surgery repairs or replacement of faulty valves can also be done during catheterization so blood can flow properly through the chambers of the heart.  Bypass surgery is another option to help treat blocked arteries. It uses a healthy blood vessel from elsewhere in the body. The healthy blood vessel is attached above and below the blocked area so that blood can flow around the blocked artery.  Treating heart rhythm problems  A device may be placed in the chest to help a weak heart maintain a healthy, heartbeat so the heart can pump more effectively:  · Pacemaker. A pacemaker is an implanted  device that regulates your heartbeat electronically. It monitors your heart's rhythm and generates a painless electric impulse that helps the heart beat in a regular rhythm. A pacemaker is programmed to meet your specific heart rhythm needs.  · Biventricular pacing/cardiac resynchronization therapy. A type of pacemaker that paces both pumping chambers of the heart at the same time to coordinate contractions and to improve the heart's function. Some people with heart failure are candidates for this therapy.  · Implantable cardioverter defibrillator. A device similar to a pacemaker that senses when the heart is beating too fast and delivers an electrical shock to convert the fast rhythm to a normal rhythm. This can be a life saving device.  In severe cases  In more serious cases of heart failure when other treatments no longer work, other options may include:  · Ventricular assist devices (VADs). These are mechanical devices used to take over the pumping function for one or both of the heart's ventricles, or pumping chambers. A VAD may be necessary when heart failure progresses to the point that medicines and other treatments no longer help. In some cases, a VAD may be used as a bridge to transplant.  · Heart transplant. This is replacing the diseased heart with a healthy one from a donor. This is an option for a few people who are very sick. A heart transplant is very serious and not an option for all patients. Your doctor can tell you more.  Date Last Reviewed: 3/20/2016  © 1699-4594 [email or web address]. [address] [place] [address]. All rights reserved. This information is not intended as a substitute for professional medical care. Always follow your healthcare professional's instructions.              Heart Failure: Tracking Your Weight  You have a condition called heart failure. When you have heart failure, a sudden weight gain or a steady rise in weight is a warning sign that your body is  retaining too much water and salt. This could mean your heart failure is getting worse. If left untreated, it can cause problems for your lungs and result in shortness of breath. Weighing yourself each day is the best way to know if youre retaining water. If your weight goes up quickly, call your doctor. You will be given instructions on how to get rid of the excess water. You will likely need medicines and to avoid salt. This will help your heart work better.  Call your doctor if you gain more than 2 pounds in 1 day, more than 5 pounds in 1 week, or whatever weight gain you were told to report by your doctor. This is often a sign of worsening heart failure and needs to be evaluated and treated. Your doctor will tell you what to do next.   Tips for weighing yourself    · Weigh yourself at the same time each morning, wearing the same clothes. Weigh yourself after urinating and before eating.  · Use the same scale each day. Make sure the numbers are easy to read. Put the scale on a flat, hard surface -- not on a rug or carpet.  · Do not stop weighing yourself. If you forget one day, weigh again the next morning.  How to use your weight chart  · Keep your weight chart near the scale. Write your weight on the chart as soon as you get off the scale.  · Fill in the month and the start date on the chart. Then write down your weight each day. Your chart will look like this:    · If you miss a day, leave the space blank. Weigh yourself the next day and write your weight in the next space.  · Take your weight chart with you when you go to see your doctor.  Date Last Reviewed: 3/20/2016  © 8223-6118 [employer]. [address] [place] [address], [place] [address]. All rights reserved. This information is not intended as a substitute for professional medical care. Always follow your healthcare professional's instructions.              Heart Failure: Warning Signs of a Flare-Up  You have a condition called heart failure.  Once you have heart failure, flare-ups can happen. Below are signs that can mean your heart failure is getting worse. If you notice any of these warning signs, call your healthcare provider.  Swelling    · Your feet, ankles, or lower legs get puffier.  · You notice skin changes on your lower legs.  · Your shoes feel too tight.  · Your clothes are tighter in the waist.  · You have trouble getting rings on or off your fingers.  Shortness of breath  · You have to breathe harder even when youre doing your normal activities or when youre resting.  · You are short of breath walking up stairs or even short distances.  · You wake up at night short of breath or coughing.  · You need to use more pillows or sit up to sleep.  · You wake up tired or restless.  Other warning signs  · You feel weaker, dizzy, or more tired.  · You have chest pain or changes in your heartbeat.  · You have a cough that wont go away.  · You cant remember things or dont feel like eating.  Tracking your weight  Gaining weight is often the first warning sign that heart failure is getting worse. Gaining even a few pounds can be a sign that your body is retaining excess water and salt. Weighing yourself each day in the morning after you urinate and before you eat, is the best way to know if you're retaining water. Get a scale that is easy to read and make sure you wear the same clothes and use the same scale every time you weigh. Your healthcare provider will show you how to track your weight. Call your doctor if you gain more than 2 pounds in 1 day, 5 pounds in 1 week, or whatever weight gain you were told to report by your doctor. This is often a sign of worsening heart failure and needs to be evaluated and treated before it compromises your breathing. Your doctor will tell you what to do next.    Date Last Reviewed: 3/15/2016  © 3004-3001 [employer]. [address] [place] [address], [place] [address]. All rights reserved. This information is  not intended as a substitute for professional medical care. Always follow your healthcare professional's instructions.              Chronic Kindey Disease Education             Diabetes Discharge Instructions                                    [facility] complies with applicable Federal civil rights laws and does not discriminate on the basis of race, color, national origin, age, disability, or sex.

## 2017-03-01 NOTE — INTERVAL H&P NOTE
Patient seen and examined. No interval change since last H&P. Here for an echo guided RV biopsy.  Access via the right IJ  7 Fr venous sheath  Risks and benefits of the procedure were explained to the patient. All questions were answered.  Consents were signed and in the chart.    Peewee Romero MD

## 2017-03-01 NOTE — DISCHARGE INSTRUCTIONS

## 2017-03-01 NOTE — LETTER
March 1, 2017        Fox Quinn  [address] [place] [address] [address]  [address]  [facility]  [place] [address]  Phone: [phone]  Fax: [phone]             [facility] [address]  [facility] [address]  Phone: [phone]   Patient: Mick Barriga   MR Number: [id number]   YOB: 1960   Date of Visit: 3/1/2017       Dear Dr. Fox Quinn    Thank you for referring Mick Barriga to me for evaluation. Attached you will find relevant portions of my assessment and plan of care.    If you have questions, please do not hesitate to call me. I look forward to following Mick Barriga along with you.    Sincerely,    INDRA Guzmanosure    If you would like to receive this communication electronically, please contact [email or web address] or [phone] to request [employer] access.    [employer] is a tool which provides read-only access to select patient information with whom you have a relationship. Its easy to use and provides real time access to review your patients record including encounter summaries, notes, results, and demographic information.    If you feel you have received this communication in error or would no longer like to receive these types of communications, please e-mail [email or web address]

## 2017-03-01 NOTE — BRIEF OP NOTE
[facility]  Brief Operative Note  Cardiology    SUMMARY     Surgery Date: 3/1/2017     Surgeon(s) and Role:     * Peewee Romero MD - Primary    Assisting Surgeon: None    Pre-op Diagnosis:  S/p OHTx    Post-op Diagnosis: s/p OHTx    Procedure Performed: Echo guided RV biopsy    Description of the findings of the procedure: Echo guided RV biopsy performed via the right IJ. Patient tolerated the procedure well. 7 myocardial pieces obtained. 6 sent for H&E and 1 sent for Immuno.    Estimated Blood Loss: 10 mL         Specimens:   Specimen (12h ago through future)    Start     Ordered    03/01/17 0952  Specimen to Pathology - Surgery  Once     Comments:  S/p OHTX to rule out rejection  7 pieces from right ventricle  6 pieces for H&E  1 piece for immunoflourescence    03/01/17 1000        Peewee Romero MD

## 2017-03-02 NOTE — TELEPHONE ENCOUNTER
Spoke with pt and his wife regarding biopsy result Grade 0, pAMR 0, C4D 0, prednisone weaned from 15 mg nid to 10 mg bid, pt will repeat tacro level in am at [facility].

## 2017-03-03 ENCOUNTER — LAB VISIT (OUTPATIENT)
Dept: LAB | Facility: HOSPITAL | Age: 57
End: 2017-03-03
Attending: INTERNAL MEDICINE
Payer: MEDICARE

## 2017-03-03 DIAGNOSIS — I10 ESSENTIAL HYPERTENSION: ICD-10-CM

## 2017-03-03 DIAGNOSIS — R06.02 SHORTNESS OF BREATH: ICD-10-CM

## 2017-03-03 DIAGNOSIS — E78.2 MIXED HYPERLIPIDEMIA: ICD-10-CM

## 2017-03-03 DIAGNOSIS — Z94.1 STATUS POST HEART TRANSPLANT: ICD-10-CM

## 2017-03-03 DIAGNOSIS — Z79.899 ENCOUNTER FOR LONG-TERM (CURRENT) USE OF MEDICATIONS: ICD-10-CM

## 2017-03-03 DIAGNOSIS — Z79.52 LONG TERM CURRENT USE OF SYSTEMIC STEROIDS: ICD-10-CM

## 2017-03-03 DIAGNOSIS — T86.20 COMPLICATION OF HEART TRANSPLANT, UNSPECIFIED COMPLICATION: ICD-10-CM

## 2017-03-03 LAB
ALBUMIN SERPL BCP-MCNC: 3.1 G/DL
ALP SERPL-CCNC: 71 U/L
ALT SERPL W/O P-5'-P-CCNC: 20 U/L
ANION GAP SERPL CALC-SCNC: 8 MMOL/L
ANISOCYTOSIS BLD QL SMEAR: ABNORMAL
AST SERPL-CCNC: 13 U/L
BASOPHILS # BLD AUTO: 0 K/UL
BASOPHILS NFR BLD: 0 %
BILIRUB SERPL-MCNC: 0.5 MG/DL
BUN SERPL-MCNC: 34 MG/DL
BURR CELLS BLD QL SMEAR: ABNORMAL
C1Q1 TESTING DATE: NORMAL
C1Q2 TESTING DATE: NORMAL
CALCIUM SERPL-MCNC: 9.1 MG/DL
CHLORIDE SERPL-SCNC: 100 MMOL/L
CLASS I ANTIBODY COMMENTS - LUMINEX: NORMAL
CLASS I ANTIBODY COMMENTS - LUMINEX: NORMAL
CLASS II ANTIBODY COMMENTS - LUMINEX: NORMAL
CLASS II ANTIBODY COMMENTS - LUMINEX: NORMAL
CO2 SERPL-SCNC: 28 MMOL/L
CREAT SERPL-MCNC: 1.1 MG/DL
DACRYOCYTES BLD QL SMEAR: ABNORMAL
DIFFERENTIAL METHOD: ABNORMAL
DSA1 TESTING DATE: NORMAL
DSA2 TESTING DATE: NORMAL
EOSINOPHIL # BLD AUTO: 0.1 K/UL
EOSINOPHIL NFR BLD: 0.4 %
ERYTHROCYTE [DISTWIDTH] IN BLOOD BY AUTOMATED COUNT: 25.8 %
EST. GFR  (AFRICAN AMERICAN): >60 ML/MIN/1.73 M^2
EST. GFR  (NON AFRICAN AMERICAN): >60 ML/MIN/1.73 M^2
GLUCOSE SERPL-MCNC: 167 MG/DL
HCT VFR BLD AUTO: 27.7 %
HGB BLD-MCNC: 9.1 G/DL
HYPOCHROMIA BLD QL SMEAR: ABNORMAL
LYMPHOCYTES # BLD AUTO: 1.1 K/UL
LYMPHOCYTES NFR BLD: 8.8 %
MCH RBC QN AUTO: 22.6 PG
MCHC RBC AUTO-ENTMCNC: 32.9 %
MCV RBC AUTO: 69 FL
MONOCYTES # BLD AUTO: 0.3 K/UL
MONOCYTES NFR BLD: 2.8 %
NEUTROPHILS # BLD AUTO: 10.4 K/UL
NEUTROPHILS NFR BLD: 88.3 %
OVALOCYTES BLD QL SMEAR: ABNORMAL
PLATELET # BLD AUTO: 238 K/UL
PMV BLD AUTO: ABNORMAL FL
POIKILOCYTOSIS BLD QL SMEAR: ABNORMAL
POLYCHROMASIA BLD QL SMEAR: ABNORMAL
POTASSIUM SERPL-SCNC: 4.6 MMOL/L
PROT SERPL-MCNC: 5.9 G/DL
RBC # BLD AUTO: 4.02 M/UL
SCHISTOCYTES BLD QL SMEAR: PRESENT
SERUM COLLECTION DT - LUMINEX CLASS I: NORMAL
SERUM COLLECTION DT - LUMINEX CLASS I: NORMAL
SERUM COLLECTION DT - LUMINEX CLASS II: NORMAL
SERUM COLLECTION DT - LUMINEX CLASS II: NORMAL
SODIUM SERPL-SCNC: 136 MMOL/L
SPHEROCYTES BLD QL SMEAR: ABNORMAL
TARGETS BLD QL SMEAR: ABNORMAL
WBC # BLD AUTO: 11.87 K/UL

## 2017-03-03 PROCEDURE — 85025 COMPLETE CBC W/AUTO DIFF WBC: CPT

## 2017-03-03 PROCEDURE — 80197 ASSAY OF TACROLIMUS: CPT

## 2017-03-03 PROCEDURE — 80053 COMPREHEN METABOLIC PANEL: CPT

## 2017-03-03 PROCEDURE — 36415 COLL VENOUS BLD VENIPUNCTURE: CPT

## 2017-03-04 LAB — TACROLIMUS BLD-MCNC: 13.6 NG/ML

## 2017-03-06 ENCOUNTER — TELEPHONE (OUTPATIENT)
Dept: TRANSPLANT | Facility: CLINIC | Age: 57
End: 2017-03-06

## 2017-03-06 DIAGNOSIS — I10 ESSENTIAL (PRIMARY) HYPERTENSION: ICD-10-CM

## 2017-03-06 DIAGNOSIS — Z94.1 S/P ORTHOTOPIC HEART TRANSPLANT: Primary | ICD-10-CM

## 2017-03-07 ENCOUNTER — OFFICE VISIT (OUTPATIENT)
Dept: WOUND CARE | Facility: CLINIC | Age: 57
End: 2017-03-07
Payer: MEDICARE

## 2017-03-07 ENCOUNTER — OFFICE VISIT (OUTPATIENT)
Dept: TRANSPLANT | Facility: CLINIC | Age: 57
End: 2017-03-07
Payer: MEDICARE

## 2017-03-07 ENCOUNTER — HOSPITAL ENCOUNTER (OUTPATIENT)
Facility: HOSPITAL | Age: 57
Discharge: HOME | End: 2017-03-07
Attending: INTERNAL MEDICINE | Admitting: INTERNAL MEDICINE
Payer: MEDICARE

## 2017-03-07 ENCOUNTER — SOCIAL WORK (OUTPATIENT)
Dept: TRANSPLANT | Facility: CLINIC | Age: 57
End: 2017-03-07
Payer: MEDICARE

## 2017-03-07 ENCOUNTER — SURGERY (OUTPATIENT)
Age: 57
End: 2017-03-07

## 2017-03-07 DIAGNOSIS — Z79.4 TYPE 2 DIABETES MELLITUS WITH STAGE 3 CHRONIC KIDNEY DISEASE, WITH LONG-TERM CURRENT USE OF INSULIN: Chronic | ICD-10-CM

## 2017-03-07 DIAGNOSIS — Z94.1 HEART TRANSPLANT, ORTHOTOPIC, STATUS: Primary | ICD-10-CM

## 2017-03-07 DIAGNOSIS — D84.9 IMMUNOSUPPRESSION: ICD-10-CM

## 2017-03-07 DIAGNOSIS — N18.30 TYPE 2 DIABETES MELLITUS WITH STAGE 3 CHRONIC KIDNEY DISEASE, WITH LONG-TERM CURRENT USE OF INSULIN: Chronic | ICD-10-CM

## 2017-03-07 DIAGNOSIS — T38.0X5D ADRENAL CORTICAL STEROIDS CAUSING ADVERSE EFFECT IN THERAPEUTIC USE, SUBSEQUENT ENCOUNTER: ICD-10-CM

## 2017-03-07 DIAGNOSIS — T81.89XA NON-HEALING SURGICAL WOUND, INITIAL ENCOUNTER: Primary | ICD-10-CM

## 2017-03-07 DIAGNOSIS — E11.22 TYPE 2 DIABETES MELLITUS WITH STAGE 3 CHRONIC KIDNEY DISEASE, WITH LONG-TERM CURRENT USE OF INSULIN: Chronic | ICD-10-CM

## 2017-03-07 DIAGNOSIS — Z94.1 HEART TRANSPLANT, ORTHOTOPIC, STATUS: ICD-10-CM

## 2017-03-07 LAB
DIASTOLIC DYSFUNCTION: NO
ESTIMATED PA SYSTOLIC PRESSURE: 20
RETIRED EF AND QEF - SEE NOTES: 65 (ref 55–65)
TRICUSPID VALVE REGURGITATION: NORMAL

## 2017-03-07 PROCEDURE — 93306 TTE W/DOPPLER COMPLETE: CPT | Mod: 26,,, | Performed by: INTERNAL MEDICINE

## 2017-03-07 PROCEDURE — 99999 PR PBB SHADOW E&M-EST. PATIENT-LVL III: CPT | Mod: PBBFAC,,, | Performed by: CLINICAL NURSE SPECIALIST

## 2017-03-07 PROCEDURE — 88342 IMHCHEM/IMCYTCHM 1ST ANTB: CPT | Mod: 26,,, | Performed by: PATHOLOGY

## 2017-03-07 PROCEDURE — 93505 ENDOMYOCARDIAL BIOPSY: CPT | Mod: 26,,, | Performed by: INTERNAL MEDICINE

## 2017-03-07 PROCEDURE — 99214 OFFICE O/P EST MOD 30 MIN: CPT | Mod: S$PBB,,, | Performed by: CLINICAL NURSE SPECIALIST

## 2017-03-07 PROCEDURE — 88307 TISSUE EXAM BY PATHOLOGIST: CPT | Mod: 26,,, | Performed by: PATHOLOGY

## 2017-03-07 PROCEDURE — 99215 OFFICE O/P EST HI 40 MIN: CPT | Mod: S$PBB,,, | Performed by: INTERNAL MEDICINE

## 2017-03-07 NOTE — PROGRESS NOTES
Clinic:    SW met with pt and wife in clinic for 1 month post TX follow up. Pt and wife aaox4, calm, and pleasant. Pt and wife report coping well at this time. Pt reports needing new dentures and not having dental coverage. SW provided information on [facility] ([facility],[address] [place] [address]). SW reminded pt to check with Heart tX physicians before scheduling any dental procedures. Pt and wife report plan to follow up with [facility] soon. Pt and wife report no other questions or concerns at this time. Pt and wife report no other needs and none identified. SW providing psychosocial counseling and support, education, assistance, and resources as indicated. SW remains available.

## 2017-03-07 NOTE — IP AVS SNAPSHOT
[facility]  [address]  [facility] [address]  Phone: [phone]           Patient Discharge Instructions     Our goal is to set you up for success. This packet includes information on your condition, medications, and your home care. It will help you to care for yourself so you don't get sicker and need to go back to the hospital.     Please ask your nurse if you have any questions.        There are many details to remember when preparing to leave the hospital. Here is what you will need to do:    1. Take your medicine. If you are prescribed medications, review your Medication List in the following pages. You may have new medications to  at the pharmacy and others that you'll need to stop taking. Review the instructions for how and when to take your medications. Talk with your doctor or nurses if you are unsure of what to do.     2. Go to your follow-up appointments. Specific follow-up information is listed in the following pages. Your may be contacted by a transition nurse or clinical provider about future appointments. Be sure we have all of the phone numbers to reach you, if needed. Please contact your provider's office if you are unable to make an appointment.     3. Watch for warning signs. Your doctor or nurse will give you detailed warning signs to watch for and when to call for assistance. These instructions may also include educational information about your condition. If you experience any of warning signs to your health, call your doctor.               [facility]  Unless otherwise directed by your provider, please contact [facility], our nurse care line that is available for 24/7 assistance.     [phone] (toll-free)    Registered nurses in the [facility] provide clinical advisement, health education, appointment booking, and other advisory services.                    ** Verify the list of medication(s) below is accurate and up  to date. Carry this with you in case of emergency. If your medications have changed, please notify your healthcare provider.             Medication List      ASK your doctor about these medications        Additional Info                      alendronate 70 MG tablet   Commonly known as:  FOSAMAX   Quantity:  12 tablet   Refills:  11   Comments:  **Patient requests 90 days supply**    Instructions:  TAKE 1 TABLET BY MOUTH EVERY 7 DAYS     Begin Date    AM    Noon    PM    Bedtime       amlodipine 10 MG tablet   Commonly known as:  NORVASC   Quantity:  30 tablet   Refills:  11   Dose:  10 mg    Instructions:  Take 1 tablet (10 mg total) by mouth once daily.     Begin Date    AM    Noon    PM    Bedtime       aspirin 81 MG EC tablet   Commonly known as:  ECOTRIN   Refills:  0   Dose:  81 mg    Instructions:  Take 1 tablet (81 mg total) by mouth once daily.     Begin Date    AM    Noon    PM    Bedtime       blood sugar diagnostic Strp   Quantity:  350 strip   Refills:  3    Instructions:  To use to check BG 4 times daily, to use with insurance preferred meter     Begin Date    AM    Noon    PM    Bedtime       blood-glucose meter kit   Quantity:  1 each   Refills:  0    Instructions:  To use to check BG 4 times daily, to use with insurance preferred meter     Begin Date    AM    Noon    PM    Bedtime       calcium-vitamin D3 500 mg(1,250mg) -200 unit per tablet   Quantity:  60 tablet   Refills:  11   Dose:  1 tablet    Instructions:  Take 1 tablet by mouth 2 (two) times daily with meals.     Begin Date    AM    Noon    PM    Bedtime       docusate sodium 100 MG capsule   Commonly known as:  COLACE   Quantity:  180 capsule   Refills:  3   Dose:  180 mg    Instructions:  Take 2 capsules (200 mg total) by mouth every evening.     Begin Date    AM    Noon    PM    Bedtime       esomeprazole 40 MG capsule   Commonly known as:  NEXIUM   Quantity:  90 capsule   Refills:  3   Dose:  40 mg    Instructions:  Take 1 capsule (40  mg total) by mouth before breakfast.     Begin Date    AM    Noon    PM    Bedtime       ferrous gluconate 324 MG tablet   Commonly known as:  FERGON   Quantity:  90 tablet   Refills:  3   Dose:  324 mg    Instructions:  Take 1 tablet (324 mg total) by mouth daily with breakfast.     Begin Date    AM    Noon    PM    Bedtime       furosemide 80 MG tablet   Commonly known as:  LASIX   Quantity:  60 tablet   Refills:  11   Dose:  40 mg    Instructions:  Take 0.5 tablets (40 mg total) by mouth 2 (two) times daily.     Begin Date    AM    Noon    PM    Bedtime       insulin aspart 100 unit/mL Inpn pen   Commonly known as:  NovoLOG   Quantity:  1 Box   Refills:  0   Dose:  15 Units    Instructions:  Inject 15 Units into the skin 3 (three) times daily.     Begin Date    AM    Noon    PM    Bedtime       insulin glargine 100 unit/mL (3 mL) Inpn pen   Commonly known as:  LANTUS SOLOSTAR   Quantity:  15 mL   Refills:  3   Dose:  28 Units    Instructions:  Inject 28 Units into the skin every evening.     Begin Date    AM    Noon    PM    Bedtime       lancets Misc   Quantity:  350 each   Refills:  3    Instructions:  To use to check BG 4 times daily, to use with insurance preferred meter     Begin Date    AM    Noon    PM    Bedtime       magnesium oxide 400 mg tablet   Commonly known as:  MAG-OX   Quantity:  270 tablet   Refills:  3   Dose:  800 mg    Instructions:  Take 2 tablets (800 mg total) by mouth 2 (two) times daily.     Begin Date    AM    Noon    PM    Bedtime       mycophenolate 250 mg Cap   Commonly known as:  CELLCEPT   Quantity:  360 capsule   Refills:  11   Dose:  1500 mg    Instructions:  Take 6 capsules (1,500 mg total) by mouth 2 (two) times daily.     Begin Date    AM    Noon    PM    Bedtime       nystatin 100,000 unit/mL suspension   Commonly known as:  MYCOSTATIN   Quantity:  473 mL   Refills:  5   Dose:  5 mL    Instructions:  Take 5 mLs (500,000 Units total) by mouth 4 (four) times daily with meals  and nightly.     Begin Date    AM    Noon    PM    Bedtime       oxycodone 10 mg Tab immediate release tablet   Commonly known as:  ROXICODONE   Quantity:  56 tablet   Refills:  0   Dose:  10 mg    Instructions:  Take 1 tablet (10 mg total) by mouth every 4 (four) hours as needed (pain).     Begin Date    AM    Noon    PM    Bedtime       pravastatin 40 MG tablet   Commonly known as:  PRAVACHOL   Quantity:  30 tablet   Refills:  11   Dose:  40 mg    Instructions:  Take 1 tablet (40 mg total) by mouth once daily.     Begin Date    AM    Noon    PM    Bedtime       predniSONE 10 MG tablet   Commonly known as:  DELTASONE   Quantity:  120 tablet   Refills:  11   Dose:  15 mg    Instructions:  Take 1.5 tablets (15 mg total) by mouth 2 (two) times daily.     Begin Date    AM    Noon    PM    Bedtime       sulfamethoxazole-trimethoprim 400-80mg 400-80 mg per tablet   Commonly known as:  BACTRIM,SEPTRA   Quantity:  30 tablet   Refills:  11   Dose:  1 tablet   Indications:  Opportunistic infection prophylaxis    Instructions:  Take 1 tablet by mouth once daily.     Begin Date    AM    Noon    PM    Bedtime       tacrolimus 1 MG Cap   Commonly known as:  PROGRAF   Quantity:  150 capsule   Refills:  11    Instructions:  Take 3mg orally in the morning and 2mg orally in the evening     Begin Date    AM    Noon    PM    Bedtime       terbutaline 5 mg Tab   Commonly known as:  BRETHINE   Quantity:  90 tablet   Refills:  11   Dose:  5 mg    Instructions:  Take 1 tablet (5 mg total) by mouth 3 (three) times daily.     Begin Date    AM    Noon    PM    Bedtime       valganciclovir 450 mg Tab   Commonly known as:  VALCYTE   Quantity:  60 tablet   Refills:  2   Dose:  900 mg    Instructions:  Take 2 tablets (900 mg total) by mouth once daily.     Begin Date    AM    Noon    PM    Bedtime                  Please bring to all follow up appointments:    1. A copy of your discharge instructions.  2. All medicines you are currently taking  in their original bottles.  3. Identification and insurance card.    Please arrive 15 minutes ahead of scheduled appointment time.    Please call 24 hours in advance if you must reschedule your appointment and/or time.        Your Scheduled Appointments     Mar 07, 2017  3:15 PM CST   New Patient with JESSICA Pérez   [facility] ([facility] )    [address] [place] [address]   [phone]            Mar 07, 2017  4:00 PM CST   Established Patient Visit with Ryan Dawn MD   [facility]    [address] [place] [address]   [phone]            Mar 21, 2017  7:50 AM CDT   Fasting Lab with LAB, [place]POINT[place] [facility])    [address] [place] [address]   [phone]            Mar 21, 2017 11:30 AM CDT   Established Patient Visit with Julia Gupta MD   [facility]    [address] [place] [address]   [phone]            Apr 11, 2017  8:00 AM CDT   Fasting Lab with LAB, APPOINT[place] [facility] ([facility])    [address] [place] [address]   [phone]              Your Future Surgeries/Procedures     Mar 21, 2017   Surgery with Josefina Saul MD   [facility] ([facility])    [address] [place] [address]   [phone]            Apr 11, 2017   Surgery with Peewee Romero MD   [facility] ([facility])    [address] [place] [address]   [phone]            May 02, 2017   Surgery with Josefina Saul MD   [facility] ([facility])    [address] [place] [address]   [phone]                  Discharge Instructions       AFTER THE PROCEDURE:  -You may remove the bandage in 24 hours and wash with  soap and water.  -You may shower, but do not soak in a tub for three days.   PRECAUTIONS FOR THE NEXT 24 HOURS:  -If you need to cough, sneeze, have a bowel movement, or bear down, hold pressure over your bandage.  -Do not  anything heavier than a gallon of milk(about 5 pounds)  -Avoid excessive bending over.  SYMPTOMS TO WATCH FOR AND REPORT TO YOUR DOCTOR:  -BLEEDING: hold pressure over the site until bleeding stops. Proceed to Emergency Room by ambulance (do not drive yourself) if unable to stop bleeding. Notify your doctor.  -HEMATOMA(hard bruise under the skin): Dioni around the bruise if one develops. Call your doctor if it increases in size or if you have difficulty talking, swallowing, breathing or anything unusual.  SIGNS OF INFECTION:Fever (temperature over 100.5 F), pus or redness  -RASH  -CHEST PAIN OR SHORTNESS OF BREATH    You may call you coordinator in the Heart Failure/Heart Transplant/Pulmonary Hypertension Clinic at [phone] during normal business hours(Monday through Friday from 8 A.M. to 5 P.M.) After hours, call the Heart Transplant Service doctor on call at [phone].        Admission Information     Date & Time Provider Department CSN    3/7/2017  8:48 AM Trixie Marx MD [facility] [id number]      Care Providers     Provider Role Specialty Primary office phone    Trixie Marx MD Attending Provider Cardiology [phone]      Recent Lab Values        6/15/2015 8/20/2016 8/22/2016 8/23/2016 12/28/2016 2/9/2017            5:29 AM 12:00 AM  2:50 PM  4:00 AM 10:41 AM  2:17 PM      A1C 7.9 (H) 7.8 (H) 8.1 (H) 8.3 (H) 7.8 (H) 6.8 (H)      Comment for A1C at 12:00 AM on 8/20/2016:  According to ADA guidelines, hemoglobin A1C <7.0% represents  optimal control in non-pregnant diabetic patients.  Different  metrics may apply to specific populations.   Standards of Medical Care in Diabetes - 2016.  For the purpose of screening for the presence of  diabetes:  <5.7%     Consistent with the absence of diabetes  5.7-6.4%  Consistent with increasing risk for diabetes   (prediabetes)  >or=6.5%  Consistent with diabetes  Currently no consensus exists for use of hemoglobin A1C  for diagnosis of diabetes for children.      Comment for A1C at  2:50 PM on 8/22/2016:  According to ADA guidelines, hemoglobin A1C <7.0% represents  optimal control in non-pregnant diabetic patients.  Different  metrics may apply to specific populations.   Standards of Medical Care in Diabetes - 2016.  For the purpose of screening for the presence of diabetes:  <5.7%     Consistent with the absence of diabetes  5.7-6.4%  Consistent with increasing risk for diabetes   (prediabetes)  >or=6.5%  Consistent with diabetes  Currently no consensus exists for use of hemoglobin A1C  for diagnosis of diabetes for children.      Comment for A1C at  4:00 AM on 8/23/2016:  According to ADA guidelines, hemoglobin A1C <7.0% represents  optimal control in non-pregnant diabetic patients.  Different  metrics may apply to specific populations.   Standards of Medical Care in Diabetes - 2016.  For the purpose of screening for the presence of diabetes:  <5.7%     Consistent with the absence of diabetes  5.7-6.4%  Consistent with increasing risk for diabetes   (prediabetes)  >or=6.5%  Consistent with diabetes  Currently no consensus exists for use of hemoglobin A1C  for diagnosis of diabetes for children.      Comment for A1C at 10:41 AM on 12/28/2016:  According to ADA guidelines, hemoglobin A1C <7.0% represents  optimal control in non-pregnant diabetic patients.  Different  metrics may apply to specific populations.   Standards of Medical Care in Diabetes - 2016.  For the purpose of screening for the presence of diabetes:  <5.7%     Consistent with the absence of diabetes  5.7-6.4%  Consistent with increasing risk for diabetes   (prediabetes)  >or=6.5%  Consistent with diabetes  Currently no consensus exists for  use of hemoglobin A1C  for diagnosis of diabetes for children.      Comment for A1C at  2:17 PM on 2/9/2017:  According to ADA guidelines, hemoglobin A1C <7.0% represents  optimal control in non-pregnant diabetic patients.  Different  metrics may apply to specific populations.   Standards of Medical Care in Diabetes - 2016.  For the purpose of screening for the presence of diabetes:  <5.7%     Consistent with the absence of diabetes  5.7-6.4%  Consistent with increasing risk for diabetes   (prediabetes)  >or=6.5%  Consistent with diabetes  Currently no consensus exists for use of hemoglobin A1C  for diagnosis of diabetes for children.        Pending Labs     Order Current Status    Specimen to Pathology - Surgery Collected (03/07/17 1022)      Allergies as of 3/7/2017        Reactions    Levemir [Insulin Detemir] Itching    Pork/porcine Containing Products     Ranexa [Ranolazine] Nausea Only      Advance Directives     An advance directive is a document which, in the event you are no longer able to make decisions for yourself, tells your healthcare team what kind of treatment you do or do not want to receive, or who you would like to make those decisions for you.  If you do not currently have an advance directive, [facility] encourages you to create one.  For more information call:  [phone]), [phone] ([phone]),  or log on to [email or web address].        Smoking Cessation     If you would like to quit smoking:   You may be eligible for free services if you are a [place] resident and started smoking cigarettes before September 1, 1988.  Call the Smoking Cessation Trust (SCT) toll free at (788) 325-2222 or (202) 162-7995.   Call 3-576-QUIT-NOW if you do not meet the above criteria.            Language Assistance Services     ATTENTION: Language assistance services are available, free of charge. Please call [phone].      ATENCIÓN: Si alex samuel a cowart disposición  servicios gratuitos de asistencia lingüística. Edmund reyes [phone].     [facility] Ý: N?u b?n nói Ti?ng Vi?t, có các d?ch v? h? tr? ngôn ng? mi?n phí dành cho b?n. G?i s? [phone].        Chronic Kindey Disease Education             Diabetes Discharge Instructions                                    [facility] complies with applicable Federal civil rights laws and does not discriminate on the basis of race, color, national origin, age, disability, or sex.

## 2017-03-07 NOTE — LETTER
March 7, 2017      Joan Benjamin PA-C  [address]  [facility] [address]           [facility]  [address]  [facility] [address]  Phone: [phone]          Patient: Mick Barriga   MR Number: [id number]   YOB: 1960   Date of Visit: 3/7/2017       Dear Joan Benjamin:    Thank you for referring Mick Barriga to me for evaluation. Attached you will find relevant portions of my assessment and plan of care.    If you have questions, please do not hesitate to call me. I look forward to following Mick Barriga along with you.    Sincerely,    Renee Amaya, CNS    Enclosure  CC:  No Recipients    If you would like to receive this communication electronically, please contact [email or web address] or [phone] to request more information on [employer] access.    For providers and/or their staff who would like to refer a patient to [facility], please contact us through our one-stop-shop provider referral line, [facility] , at [phone].    If you feel you have received this communication in error or would no longer like to receive these types of communications, please e-mail [email or web address]

## 2017-03-07 NOTE — PROGRESS NOTES
"Subjective:       Patient ID: Mick Barriga is a 56 y.o. male.    Chief Complaint: Wound Check (Chest and groin)    HPI Comments: This is new pt to wound care and was sent from heart transplant team, he has several wounds to be evaluated post tx.  His old LVAD site and old chest tube sites. His biggest issue is his right groin where the incision has a 1 cm opening and it is "a steady stream" of clear to light yellow fluid. No pain at any of the sites to report     Wound Check       Review of Systems   Constitutional: Negative for activity change, appetite change and fever.   Respiratory: Negative for cough and shortness of breath.    Cardiovascular: Negative for chest pain and leg swelling.   Gastrointestinal: Negative for abdominal distention and constipation.   Genitourinary: Negative for dysuria, flank pain and hematuria.   Musculoskeletal: Negative.    Skin: Positive for wound. Negative for color change and rash.   Psychiatric/Behavioral: Negative.        Objective:      Physical Exam   Constitutional: He is oriented to person, place, and time. He appears well-developed and well-nourished.   Pulmonary/Chest: Effort normal. No respiratory distress. He has no wheezes.   Abdominal: Soft. Bowel sounds are normal. He exhibits no distension.   Musculoskeletal: Normal range of motion. He exhibits no edema.   Neurological: He is alert and oriented to person, place, and time.   Skin: Skin is warm and dry. No erythema.                Wound cleansed and applied MEDIHONEY to each site and packed the LVAD site with 1/2 " strip packing           Placed a pediatric pouch to the right groin to keep him dry and quantify the output, currently changing dressings saturated 4 time or more a day . Told wife to notify heart team of amt of drainage   Wife watched as pedi pouch and ring applied  Extra supply given  And measuring container    Assessment:       1. Non-healing surgical wound, initial encounter    2. Immunosuppression  "   3. Heart transplant, orthotopic, status        Plan:       New orders for wound care called to [facility] for pouching of groin and application of Medihoney to abd wound and pack the tunnel loosely with strip   Wife states she can do and understands  Told to call for any problems or questions  return in 2 weeks  I have reviewed the plan of care with the patient and/  Wife and they express understanding. I spent over 50% of this 30 minute visit in face to face counseling.

## 2017-03-07 NOTE — DISCHARGE SUMMARY
Admit 03/07/2017  Discharge  03/07/2017  Discharge / Principle diagnosis heart transplant  Discharge attending Trixie Marx MD  Hospital course.  Came in for echo biopsy. Tolerated procedure well (see full results in cardiac procedure section).  Results discussed with patient / caregiver.   Discharge condition / disposition Good / home   discharge activity activity as tolerated    Discharge diet diet as tolerated / unchanged from admission  Discharge medication   No current facility-administered medications on file prior to encounter.      Current Outpatient Prescriptions on File Prior to Encounter   Medication Sig    alendronate (FOSAMAX) 70 MG tablet TAKE 1 TABLET BY MOUTH EVERY 7 DAYS    amlodipine (NORVASC) 10 MG tablet Take 1 tablet (10 mg total) by mouth once daily.    aspirin (ECOTRIN) 81 MG EC tablet Take 1 tablet (81 mg total) by mouth once daily.    blood sugar diagnostic Strp To use to check BG 4 times daily, to use with insurance preferred meter    blood-glucose meter kit To use to check BG 4 times daily, to use with insurance preferred meter    calcium-vitamin D3 500 mg(1,250mg) -200 unit per tablet Take 1 tablet by mouth 2 (two) times daily with meals.    docusate sodium (COLACE) 100 MG capsule Take 2 capsules (200 mg total) by mouth every evening.    esomeprazole (NEXIUM) 40 MG capsule Take 1 capsule (40 mg total) by mouth before breakfast.    ferrous gluconate (FERGON) 324 MG tablet Take 1 tablet (324 mg total) by mouth daily with breakfast.    furosemide (LASIX) 80 MG tablet Take 0.5 tablets (40 mg total) by mouth 2 (two) times daily.    insulin aspart (NOVOLOG) 100 unit/mL InPn pen Inject 15 Units into the skin 3 (three) times daily.    insulin glargine (LANTUS SOLOSTAR) 100 unit/mL (3 mL) InPn pen Inject 28 Units into the skin every evening.    lancets Misc To use to check BG 4 times daily, to use with insurance preferred meter    mycophenolate (CELLCEPT) 250 mg Cap Take 6 capsules  (1,500 mg total) by mouth 2 (two) times daily.    oxycodone (ROXICODONE) 10 mg Tab immediate release tablet Take 1 tablet (10 mg total) by mouth every 4 (four) hours as needed (pain).    pravastatin (PRAVACHOL) 40 MG tablet Take 1 tablet (40 mg total) by mouth once daily.    predniSONE (DELTASONE) 10 MG tablet Take 1.5 tablets (15 mg total) by mouth 2 (two) times daily.    sulfamethoxazole-trimethoprim 400-80mg (BACTRIM,SEPTRA) 400-80 mg per tablet Take 1 tablet by mouth once daily.    tacrolimus (PROGRAF) 1 MG Cap Take 3mg orally in the morning and 2mg orally in the evening    terbutaline (BRETHINE) 5 mg Tab Take 1 tablet (5 mg total) by mouth 3 (three) times daily.    valganciclovir (VALCYTE) 450 mg Tab Take 2 tablets (900 mg total) by mouth once daily.     Followup in clinic as scheduled

## 2017-03-07 NOTE — H&P
Subjective:      Mick Barriga is a 56 y.o. male with a who needs echo  biopsy for evlauation of cardiac rejection.  Currently able to lay flat.      Past Medical History:   Diagnosis Date    CHF (congestive heart failure)     Coronary artery disease     GERD (gastroesophageal reflux disease)     Hyperlipidemia     Hypertension     LVAD (left ventricular assist device) present 2/13/2017    Type 2 diabetes mellitus with hyperglycemia      Past Surgical History:   Procedure Laterality Date    CARDIAC PACEMAKER PLACEMENT      CHOLECYSTECTOMY      COLONOSCOPY N/A 1/11/2017    Procedure: COLONOSCOPY;  Surgeon: Petr Almanzar MD;  [facility]  Service: Endoscopy;  Laterality: N/A;    COLONOSCOPY N/A 1/12/2017    Procedure: COLONOSCOPY;  Surgeon: Petr Almanzar MD;  Location: [facility] ENDO ([facility]);  Service: Endoscopy;  Laterality: N/A;    HEART TRANSPLANT  02/2017    LEFT VENTRICULAR ASSIST DEVICE      x 3 months ago     Social History     Social History    Marital status:      Spouse name: N/A    Number of children: N/A    Years of education: N/A     Occupational History    Not on file.     Social History Main Topics    Smoking status: Former Smoker     Packs/day: 0.50     Years: 15.00     Quit date: 6/14/2006    Smokeless tobacco: Never Used    Alcohol use No    Drug use: Not on file    Sexual activity: Yes     Partners: Female     Other Topics Concern    Not on file     Social History Narrative     Family History   Problem Relation Age of Onset    Heart disease Mother     Hypertension Mother     Heart attack Mother     Heart disease Father     Hypertension Father     Heart attack Father     Cancer Brother            Other significant clinical information:  Review of patient's allergies indicates:   Allergen Reactions    Levemir [insulin detemir] Itching    Pork/porcine containing products     Ranexa [ranolazine] Nausea Only     No current  facility-administered medications on file prior to encounter.      Current Outpatient Prescriptions on File Prior to Encounter   Medication Sig    alendronate (FOSAMAX) 70 MG tablet TAKE 1 TABLET BY MOUTH EVERY 7 DAYS    amlodipine (NORVASC) 10 MG tablet Take 1 tablet (10 mg total) by mouth once daily.    aspirin (ECOTRIN) 81 MG EC tablet Take 1 tablet (81 mg total) by mouth once daily.    blood sugar diagnostic Strp To use to check BG 4 times daily, to use with insurance preferred meter    blood-glucose meter kit To use to check BG 4 times daily, to use with insurance preferred meter    calcium-vitamin D3 500 mg(1,250mg) -200 unit per tablet Take 1 tablet by mouth 2 (two) times daily with meals.    docusate sodium (COLACE) 100 MG capsule Take 2 capsules (200 mg total) by mouth every evening.    esomeprazole (NEXIUM) 40 MG capsule Take 1 capsule (40 mg total) by mouth before breakfast.    ferrous gluconate (FERGON) 324 MG tablet Take 1 tablet (324 mg total) by mouth daily with breakfast.    furosemide (LASIX) 80 MG tablet Take 0.5 tablets (40 mg total) by mouth 2 (two) times daily.    insulin aspart (NOVOLOG) 100 unit/mL InPn pen Inject 15 Units into the skin 3 (three) times daily.    insulin glargine (LANTUS SOLOSTAR) 100 unit/mL (3 mL) InPn pen Inject 28 Units into the skin every evening.    lancets Misc To use to check BG 4 times daily, to use with insurance preferred meter    mycophenolate (CELLCEPT) 250 mg Cap Take 6 capsules (1,500 mg total) by mouth 2 (two) times daily.    oxycodone (ROXICODONE) 10 mg Tab immediate release tablet Take 1 tablet (10 mg total) by mouth every 4 (four) hours as needed (pain).    pravastatin (PRAVACHOL) 40 MG tablet Take 1 tablet (40 mg total) by mouth once daily.    predniSONE (DELTASONE) 10 MG tablet Take 1.5 tablets (15 mg total) by mouth 2 (two) times daily.    sulfamethoxazole-trimethoprim 400-80mg (BACTRIM,SEPTRA) 400-80 mg per tablet Take 1 tablet by mouth  once daily.    tacrolimus (PROGRAF) 1 MG Cap Take 3mg orally in the morning and 2mg orally in the evening    terbutaline (BRETHINE) 5 mg Tab Take 1 tablet (5 mg total) by mouth 3 (three) times daily.    valganciclovir (VALCYTE) 450 mg Tab Take 2 tablets (900 mg total) by mouth once daily.            Objective:      Physical Exam    / 80 P 98    General Appearance:    Alert, cooperative, no distress, appears stated age   Head:    Normocephalic, without obvious abnormality, atraumatic   Eyes:    PERRL, conjunctiva/corneas clear, EOM's intact, fundi     benign, both eyes        Ears:    Normal TM's and external ear canals, both ears   Nose:   Nares normal, septum midline, mucosa normal, no drainage    or sinus tenderness   Throat:   Lips, mucosa, and tongue normal; teeth and gums normal   Neck:   Supple, symmetrical, trachea midline, no adenopathy;        thyroid:  No enlargement/tenderness/nodules; no carotid    bruit or JVD   Back:     Symmetric, no curvature, ROM normal, no CVA tenderness   Lungs:     Clear to auscultation bilaterally, respirations unlabored   Chest wall:    No tenderness or deformity   Heart:    Regular rate and rhythm, S1 and S2 normal, no murmur, rub   or gallop   Abdomen:     Soft, non-tender, bowel sounds active all four quadrants,     no masses, no organomegaly   Genitalia:    Normal male without lesion, discharge or tenderness   Rectal:    Normal tone, normal prostate, no masses or tenderness;    guaiac negative stool   Extremities:   Extremities normal, atraumatic, no cyanosis or edema   Pulses:   2+ and symmetric all extremities   Skin:   Skin color, texture, turgor normal, no rashes or lesions   Lymph nodes:   Cervical, supraclavicular, and axillary nodes normal   Neurologic:   CNII-XII intact. Normal strength, sensation and reflexes       throughout         Lab Review   Lab Results   Component Value Date    WBC 9.23 03/07/2017    HGB 9.6 (L) 03/07/2017    HCT 29.8 (L) 03/07/2017     MCV 68 (L) 03/07/2017     03/03/2017     Lab Results   Component Value Date    INR 1.6 (H) 02/15/2017    INR 1.4 (H) 02/14/2017    INR 1.5 (H) 02/13/2017           Assessment:       Plan:     I have explained the risks, benefits, and alternatives of the procedure in detail.  The patient expresses understanding and all questions have been answered.  The patient agrees to the proceed as planned.  Echo biopsy via RIJ   Micropuncture access needle will be used to minimize bleeding risk.

## 2017-03-07 NOTE — DISCHARGE INSTRUCTIONS

## 2017-03-07 NOTE — PROGRESS NOTES
Subjective:   Patient ID:  Mick Barriga is a 56 y.o. male who presents for heart transplant follow-up.    CMV Donor: -  CMV Recipient: +    HPI     56 year old male with PMHx of NICM s/p HM II (8/24/16), HLD, HTN, VT s/p ICD now s/p OHTx 2/9/17. Pt  presented to the hospital for elevated LDH in the setting of subtherapeutic INR with lower extremity swelling concerning for LVAD pump thrombosis, made status 1a and underwent OHTx 2/9/17. Post op course complicated by bradycardia and he was started on terbutaline 5 mg TID. Here for 4 week f/u and hopsital d/c f/u.     Pt has BLE pressure wounds- says he was told to elevate leg. Has pain in those areas and in LUE. LE wounds are what is limiting his mobility. SBP at home mostly 120s. Still using incentive spirometry. No CP, SOB/MARTINO, F/C, N/V/D.    Immunosuppression: tacro 2/2, cellcept 1500 BID, pred 10 BID    Review of Systems   Constitution: Negative for chills and fever.   Cardiovascular: Positive for leg swelling. Negative for chest pain and dyspnea on exertion.   Respiratory: Negative for cough and shortness of breath.    Gastrointestinal: Negative for diarrhea, nausea and vomiting.   Neurological: Negative for dizziness and light-headedness.       Objective:   There were no vitals taken for this visit.    Physical Exam   Constitutional: He is oriented to person, place, and time. He appears well-developed and well-nourished.        Neck: No JVD present.   Cardiovascular: Normal rate, regular rhythm and normal heart sounds.    Pulses:       Radial pulses are 2+ on the right side, and 2+ on the left side.   Pulmonary/Chest: Effort normal and breath sounds normal.   Abdominal: Soft. Bowel sounds are normal. There is no tenderness.   Musculoskeletal: He exhibits no edema.   Bilat feet- small healing wounds/areas outer heels   Neurological: He is alert and oriented to person, place, and time.   Skin: Skin is warm and dry.         Chemistry        Component Value  Date/Time     03/07/2017 0830    K 3.9 03/07/2017 0830    CL 99 03/07/2017 0830    CO2 29 03/07/2017 0830    BUN 26 (H) 03/07/2017 0830    CREATININE 1.1 03/07/2017 0830    GLU 61 (L) 03/07/2017 0830        Component Value Date/Time    CALCIUM 9.0 03/07/2017 0830    ALKPHOS 79 03/07/2017 0830    AST 17 03/07/2017 0830    ALT 24 03/07/2017 0830    BILITOT 0.5 03/07/2017 0830            Magnesium   Date Value Ref Range Status   03/07/2017 1.5 (L) 1.6 - 2.6 mg/dL Final       Lab Results   Component Value Date    WBC 9.23 03/07/2017    HGB 9.6 (L) 03/07/2017    HCT 29.8 (L) 03/07/2017    MCV 68 (L) 03/07/2017     03/07/2017       Lab Results   Component Value Date    INR 1.6 (H) 02/15/2017    INR 1.4 (H) 02/14/2017    INR 1.5 (H) 02/13/2017       BNP   Date Value Ref Range Status   03/07/2017 329 (H) 0 - 99 pg/mL Final     Comment:     Values of less than 100 pg/ml are consistent with non-CHF populations.   03/01/2017 687 (H) 0 - 99 pg/mL Final     Comment:     Values of less than 100 pg/ml are consistent with non-CHF populations.   02/27/2017 596 (H) 0 - 99 pg/mL Final     Comment:     Values of less than 100 pg/ml are consistent with non-CHF populations.       LD   Date Value Ref Range Status   02/08/2017 523 (H) 110 - 260 U/L Final     Comment:     Results are increased in hemolyzed samples.   02/07/2017 536 (H) 110 - 260 U/L Final     Comment:     Results are increased in hemolyzed samples.   02/06/2017 584 (H) 110 - 260 U/L Final     Comment:     Results are increased in hemolyzed samples.       Tacrolimus Lvl   Date Value Ref Range Status   03/03/2017 13.6 5.0 - 15.0 ng/mL Final     Comment:     Testing performed by Liquid Chromatography-Tandem  Mass Spectrometry (LC-MS/MS).  This test was developed and its performance characteristics  determined by [facility], Department of Pathology  and Laboratory Medicine in a manner consistent with CLIA  requirements. It has not been cleared or  approved by the [place]  Food and Drug Administration.  This test is used for clinical   purposes.  It should not be regarded as investigational or for  research.       No results found for: SIROLIMUS  No results found for: CYCLOSPORINE    Assessment:     1. Heart transplant, orthotopic, status    2. Type 2 diabetes mellitus with stage 3 chronic kidney disease, with long-term current use of insulin    3. 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 Immunosuppression        Plan:   F/u results of Bx, Echo, tacro level from today     RTc 2 weeks  Return instructions as set forth by post transplant schedule or as needed:    Clinic: Return for labs and/or biopsy weekly the first month, every two weeks during month 2 and then monthly for the first year at the provider or coordinator's discretion. During the second year, return to clinic every 3 months. Post transplant year 3-5 return every 6 months. There will be a comprehensive post transplant evaluation every year that may include LHC/RHC/biopsy, stress test, echo, CXR, and other health screening exams.    In addition to the clinical assessment, I have ordered Allomap testing for this patient to assist in identification of moderate/severe acute cellular rejection (ACR) in a pt with stable Allograft function instead of endomyocardial biopsy.     Patient is reminded to call with any health changes as these can be early signs of transplant complications. Patient is advised to make sure any new medications or changes of old medications are discussed with a pharmacist or physician knowledgeable with transplant to avoid rejection/drug toxicity related to significant drug interactions.    UNOS Patient Status  Functional Status: 80% - Normal activity with effort: some symptoms of disease  Physical Capacity: Limited Mobility  Working for Income: No  If no, reason not working: Unknown

## 2017-03-07 NOTE — LETTER
March 7, 2017        Fox Quinn  [address] [place] [address] [address]  [address]  [facility]  [place] [address]  Phone: [phone]  Fax: [phone]             [facility] [address]  [facility] [address]  Phone: [phone]   Patient: Mick Barriga   MR Number: [id number]   YOB: 1960   Date of Visit: 3/7/2017       Dear Dr. Fox Quinn    Thank you for referring Mick Barriga to me for evaluation. Attached you will find relevant portions of my assessment and plan of care.    If you have questions, please do not hesitate to call me. I look forward to following Mick Barriga along with you.    Sincerely,    Ryan Dawn MD    Enclosure    If you would like to receive this communication electronically, please contact [email or web address] or [phone] to request [employer] access.    [employer] is a tool which provides read-only access to select patient information with whom you have a relationship. Its easy to use and provides real time access to review your patients record including encounter summaries, notes, results, and demographic information.    If you feel you have received this communication in error or would no longer like to receive these types of communications, please e-mail [email or web address]

## 2017-03-09 ENCOUNTER — TELEPHONE (OUTPATIENT)
Dept: TRANSPLANT | Facility: CLINIC | Age: 57
End: 2017-03-09

## 2017-03-09 DIAGNOSIS — Z94.1 STATUS POST HEART TRANSPLANT: Primary | ICD-10-CM

## 2017-03-09 DIAGNOSIS — Z94.1 STATUS POST HEART TRANSPLANT: ICD-10-CM

## 2017-03-09 RX ORDER — TACROLIMUS 1 MG/1
CAPSULE ORAL
Qty: 150 CAPSULE | Refills: 11 | Status: ON HOLD | OUTPATIENT
Start: 2017-03-09 | End: 2017-04-10

## 2017-03-09 RX ORDER — PREDNISONE 10 MG/1
10 TABLET ORAL 2 TIMES DAILY
Qty: 120 TABLET | Refills: 11 | Status: SHIPPED | OUTPATIENT
Start: 2017-03-09 | End: 2017-03-23 | Stop reason: SDUPTHER

## 2017-03-09 NOTE — TELEPHONE ENCOUNTER
Labs and Path reviewed. Biopsy negative at 0/0. Tac 15.3 will decrease to 2/1 and will decrease prednisone to 10mg daily.     RUBY Childs MD  [facility] Cardiology Fellow

## 2017-03-09 NOTE — TELEPHONE ENCOUNTER
Spoke with pt after reviewing chart in chart review with results from EGBX, labs, instructed pt to decrease prograf from 2 mg bid to 2 mg in am and 1 mg in pm  Will repeat labs Monday, if tacro within goal 8-12 will decrease prednisone to 10 mg daily.

## 2017-03-13 ENCOUNTER — LAB VISIT (OUTPATIENT)
Dept: LAB | Facility: HOSPITAL | Age: 57
End: 2017-03-13
Attending: INTERNAL MEDICINE
Payer: MEDICARE

## 2017-03-13 ENCOUNTER — TELEPHONE (OUTPATIENT)
Dept: TRANSPLANT | Facility: CLINIC | Age: 57
End: 2017-03-13

## 2017-03-13 DIAGNOSIS — Z94.1 STATUS POST ORTHOTOPIC HEART TRANSPLANT: ICD-10-CM

## 2017-03-13 DIAGNOSIS — Z79.60 LONG-TERM USE OF IMMUNOSUPPRESSANT MEDICATION: ICD-10-CM

## 2017-03-13 DIAGNOSIS — Z79.899 HIGH RISK MEDICATIONS (NOT ANTICOAGULANTS) LONG-TERM USE: ICD-10-CM

## 2017-03-13 DIAGNOSIS — Z79.52 LONG TERM CURRENT USE OF SYSTEMIC STEROIDS: ICD-10-CM

## 2017-03-13 DIAGNOSIS — T86.20 COMPLICATION OF HEART TRANSPLANT, UNSPECIFIED COMPLICATION: ICD-10-CM

## 2017-03-13 DIAGNOSIS — Z11.4 SCREENING FOR HUMAN IMMUNODEFICIENCY VIRUS: ICD-10-CM

## 2017-03-13 DIAGNOSIS — E78.2 MIXED HYPERLIPIDEMIA: ICD-10-CM

## 2017-03-13 DIAGNOSIS — Z79.899 ENCOUNTER FOR LONG-TERM (CURRENT) USE OF MEDICATIONS: ICD-10-CM

## 2017-03-13 DIAGNOSIS — I10 ESSENTIAL HYPERTENSION: ICD-10-CM

## 2017-03-13 DIAGNOSIS — R06.02 SHORTNESS OF BREATH: ICD-10-CM

## 2017-03-13 DIAGNOSIS — T86.90 COMPLICATIONS, ORGAN TRANSPLANT: Primary | ICD-10-CM

## 2017-03-13 DIAGNOSIS — Z94.1 STATUS POST HEART TRANSPLANT: ICD-10-CM

## 2017-03-13 LAB
ALBUMIN SERPL BCP-MCNC: 3.3 G/DL
ALP SERPL-CCNC: 76 U/L
ALT SERPL W/O P-5'-P-CCNC: 25 U/L
ANION GAP SERPL CALC-SCNC: 11 MMOL/L
AST SERPL-CCNC: 49 U/L
BILIRUB SERPL-MCNC: 0.4 MG/DL
BUN SERPL-MCNC: 21 MG/DL
CALCIUM SERPL-MCNC: 9 MG/DL
CHLORIDE SERPL-SCNC: 103 MMOL/L
CO2 SERPL-SCNC: 23 MMOL/L
CREAT SERPL-MCNC: 1.1 MG/DL
EST. GFR  (AFRICAN AMERICAN): >60 ML/MIN/1.73 M^2
EST. GFR  (NON AFRICAN AMERICAN): >60 ML/MIN/1.73 M^2
GLUCOSE SERPL-MCNC: 146 MG/DL
POTASSIUM SERPL-SCNC: 6.5 MMOL/L
PROT SERPL-MCNC: 6.6 G/DL
SODIUM SERPL-SCNC: 137 MMOL/L

## 2017-03-13 PROCEDURE — 80053 COMPREHEN METABOLIC PANEL: CPT

## 2017-03-13 PROCEDURE — 36415 COLL VENOUS BLD VENIPUNCTURE: CPT

## 2017-03-13 PROCEDURE — 80197 ASSAY OF TACROLIMUS: CPT

## 2017-03-13 NOTE — TELEPHONE ENCOUNTER
Spoke with pt after getting abnormal potassium level hemolyzed blood sample. Pt will repeat level in am.

## 2017-03-14 ENCOUNTER — PATIENT MESSAGE (OUTPATIENT)
Dept: TRANSPLANT | Facility: CLINIC | Age: 57
End: 2017-03-14

## 2017-03-14 ENCOUNTER — LAB VISIT (OUTPATIENT)
Dept: LAB | Facility: HOSPITAL | Age: 57
End: 2017-03-14
Attending: FAMILY MEDICINE
Payer: MEDICARE

## 2017-03-14 DIAGNOSIS — Z79.52 LONG TERM CURRENT USE OF SYSTEMIC STEROIDS: ICD-10-CM

## 2017-03-14 DIAGNOSIS — Z79.899 ENCOUNTER FOR LONG-TERM (CURRENT) USE OF MEDICATIONS: ICD-10-CM

## 2017-03-14 DIAGNOSIS — R06.02 SHORTNESS OF BREATH: ICD-10-CM

## 2017-03-14 DIAGNOSIS — I10 ESSENTIAL HYPERTENSION: ICD-10-CM

## 2017-03-14 DIAGNOSIS — Z94.1 STATUS POST HEART TRANSPLANT: ICD-10-CM

## 2017-03-14 DIAGNOSIS — E78.2 MIXED HYPERLIPIDEMIA: ICD-10-CM

## 2017-03-14 DIAGNOSIS — T86.20 COMPLICATION OF HEART TRANSPLANT, UNSPECIFIED COMPLICATION: ICD-10-CM

## 2017-03-14 LAB
ALBUMIN SERPL BCP-MCNC: 3.2 G/DL
ALP SERPL-CCNC: 76 U/L
ALT SERPL W/O P-5'-P-CCNC: 20 U/L
ANION GAP SERPL CALC-SCNC: 9 MMOL/L
AST SERPL-CCNC: 12 U/L
BILIRUB SERPL-MCNC: 0.4 MG/DL
BUN SERPL-MCNC: 22 MG/DL
CALCIUM SERPL-MCNC: 9.1 MG/DL
CHLORIDE SERPL-SCNC: 106 MMOL/L
CO2 SERPL-SCNC: 26 MMOL/L
CREAT SERPL-MCNC: 1.1 MG/DL
EST. GFR  (AFRICAN AMERICAN): >60 ML/MIN/1.73 M^2
EST. GFR  (NON AFRICAN AMERICAN): >60 ML/MIN/1.73 M^2
GLUCOSE SERPL-MCNC: 145 MG/DL
POTASSIUM SERPL-SCNC: 4.6 MMOL/L
PROT SERPL-MCNC: 6.2 G/DL
SODIUM SERPL-SCNC: 141 MMOL/L
TACROLIMUS BLD-MCNC: 12.4 NG/ML

## 2017-03-14 PROCEDURE — 80053 COMPREHEN METABOLIC PANEL: CPT

## 2017-03-14 PROCEDURE — 36415 COLL VENOUS BLD VENIPUNCTURE: CPT

## 2017-03-16 NOTE — TELEPHONE ENCOUNTER
Pt c/o rt groin still draining a lot of serous thin fluid.  Will try and get Pt in With Dr. Kacy SAN

## 2017-03-20 ENCOUNTER — OFFICE VISIT (OUTPATIENT)
Dept: CARDIOTHORACIC SURGERY | Facility: CLINIC | Age: 57
End: 2017-03-20
Payer: MEDICARE

## 2017-03-20 VITALS
RESPIRATION RATE: 20 BRPM | OXYGEN SATURATION: 99 % | SYSTOLIC BLOOD PRESSURE: 144 MMHG | WEIGHT: 202.63 LBS | BODY MASS INDEX: 30.71 KG/M2 | TEMPERATURE: 98 F | HEART RATE: 100 BPM | DIASTOLIC BLOOD PRESSURE: 71 MMHG | HEIGHT: 68 IN

## 2017-03-20 DIAGNOSIS — I89.9 LYMPH LEAK: Primary | ICD-10-CM

## 2017-03-20 PROCEDURE — 99213 OFFICE O/P EST LOW 20 MIN: CPT | Mod: PBBFAC | Performed by: THORACIC SURGERY (CARDIOTHORACIC VASCULAR SURGERY)

## 2017-03-20 PROCEDURE — 99999 PR PBB SHADOW E&M-EST. PATIENT-LVL III: CPT | Mod: PBBFAC,,, | Performed by: THORACIC SURGERY (CARDIOTHORACIC VASCULAR SURGERY)

## 2017-03-20 PROCEDURE — 99024 POSTOP FOLLOW-UP VISIT: CPT | Mod: ,,, | Performed by: THORACIC SURGERY (CARDIOTHORACIC VASCULAR SURGERY)

## 2017-03-20 RX ORDER — VALGANCICLOVIR 450 MG/1
TABLET, FILM COATED ORAL
Qty: 180 TABLET | Refills: 2 | Status: SHIPPED | OUTPATIENT
Start: 2017-03-20 | End: 2017-07-12 | Stop reason: ALTCHOICE

## 2017-03-20 NOTE — MR AVS SNAPSHOT
[facility] Cardiovascular Surg  [address]  [place] [address]  Phone: [phone]                  Mick Barriga   3/20/2017 11:00 AM   Appointment    Description:  Male : 1960   Provider:  Heber Woodruff MD   Department:  [facility] Cardi[facility]urg                To Do List           Future Appointments        Provider Department Dept Phone    3/20/2017 11:00 AM Heber Woodruff MD [facility] Cardi[facility] [phone]    3/21/2017 7:50 AM LAB, APPOINTMENT [place] [facility] [phone]    3/21/2017 11:30 AM Julia Gupta MD [facility] [phone]    3/21/2017 1:45 PM JESSICA Pérez [facility] [phone]    2017 8:00 AM LAB, APPOINTMENT [place] [facility] [phone]      Your Future Surgeries/Procedures     Mar 21, 2017   Surgery with Josefina Saul MD   [facility]    [facility] [address]   [phone]            2017   Surgery with Peewee Romero MD   [facility] ([facility]    [facility] [address]   [phone]            May 02, 2017   Surgery with Josefina Saul MD   [facility] ([facility])    [facility] [address]   [phone]              Goals (5 Years of Data)     None      [facility] -  Assistance  Registered nurses in the [facility] provide clinical advisement, health education, appointment booking, and other advisory services.  Call for this free service at [phone].             Medications           Message regarding Medications     Verify the changes and/or additions to your medication regime listed below are the same as discussed with your clinician today.  If any of these changes or additions are incorrect,  please notify your healthcare provider.             Verify that the below list of medications is an accurate representation of the medications you are currently taking.  If none reported, the list may be blank. If incorrect, please contact your healthcare provider. Carry this list with you in case of emergency.           Current Medications     alendronate (FOSAMAX) 70 MG tablet TAKE 1 TABLET BY MOUTH EVERY 7 DAYS    amlodipine (NORVASC) 10 MG tablet Take 1 tablet (10 mg total) by mouth once daily.    aspirin (ECOTRIN) 81 MG EC tablet Take 1 tablet (81 mg total) by mouth once daily.    blood sugar diagnostic Strp To use to check BG 4 times daily, to use with insurance preferred meter    blood-glucose meter kit To use to check BG 4 times daily, to use with insurance preferred meter    calcium-vitamin D3 500 mg(1,250mg) -200 unit per tablet Take 1 tablet by mouth 2 (two) times daily with meals.    docusate sodium (COLACE) 100 MG capsule Take 2 capsules (200 mg total) by mouth every evening.    esomeprazole (NEXIUM) 40 MG capsule Take 1 capsule (40 mg total) by mouth before breakfast.    ferrous gluconate (FERGON) 324 MG tablet Take 1 tablet (324 mg total) by mouth daily with breakfast.    furosemide (LASIX) 80 MG tablet Take 0.5 tablets (40 mg total) by mouth 2 (two) times daily.    insulin aspart (NOVOLOG) 100 unit/mL InPn pen Inject 15 Units into the skin 3 (three) times daily.    insulin glargine (LANTUS SOLOSTAR) 100 unit/mL (3 mL) InPn pen Inject 28 Units into the skin every evening.    lancets Misc To use to check BG 4 times daily, to use with insurance preferred meter    magnesium oxide (MAG-OX) 400 mg tablet Take 2 tablets (800 mg total) by mouth 2 (two) times daily.    mycophenolate (CELLCEPT) 250 mg Cap Take 6 capsules (1,500 mg total) by mouth 2 (two) times daily.    nystatin (MYCOSTATIN) 100,000 unit/mL suspension Take 5 mLs (500,000 Units total) by mouth 4 (four) times daily with meals and nightly.     oxycodone (ROXICODONE) 10 mg Tab immediate release tablet Take 1 tablet (10 mg total) by mouth every 4 (four) hours as needed (pain).    pravastatin (PRAVACHOL) 40 MG tablet Take 1 tablet (40 mg total) by mouth once daily.    predniSONE (DELTASONE) 10 MG tablet Take 1 tablet (10 mg total) by mouth 2 (two) times daily.    sulfamethoxazole-trimethoprim 400-80mg (BACTRIM,SEPTRA) 400-80 mg per tablet Take 1 tablet by mouth once daily.    tacrolimus (PROGRAF) 1 MG Cap Take 2mg orally in the morning and 1mg orally in the evening    terbutaline (BRETHINE) 5 mg Tab Take 1 tablet (5 mg total) by mouth 3 (three) times daily.    valganciclovir (VALCYTE) 450 mg Tab Take 2 tablets (900 mg total) by mouth once daily.           Clinical Reference Information           Allergies as of 3/20/2017     Levemir [Insulin Detemir]    Pork/porcine Containing Products    Ranexa [Ranolazine]      Immunizations Administered on Date of Encounter - 3/20/2017     None      Maintenance Dialysis History     Patient has no recorded history of maintenance dialysis.      Transplant Information        Txp Date Organ Coordinator Care Team    2/9/2017 Heart [facility] Referring Physician:  Fox Quinn MD   Corresponding Physician:  Fox Quinn MD   Surgeon:  Heber Woodruff MD         Language Assistance Services     ATTENTION: Language assistance services are available, free of charge. Please call [phone].      ATENCIÓN: Si habla español, tiene a cowart disposición servicios gratuitos de asistencia lingüística. Llame al [phone].     [facility] Ý: N?u b?n nói Ti?ng Vi?t, có các d?ch v? h? tr? ngôn ng? mi?n phí dành cho b?n. G?i s? [phone].         [facility] complies with applicable Federal civil rights laws and does not discriminate on the basis of race, color, national origin, age, disability, or sex.

## 2017-03-20 NOTE — PROGRESS NOTES
Patient seen and examined.  He is here post op heart transplant.    He complains of drainage from his right groin intermittently with clear fluid    Sternum: stable    Assessment;  S/P heart transplant  S/p LVAD explant    Plan:  Has a lymphocele in the right groin, needs to be fixed . Will get to see Dr Palomino for the lymph leak

## 2017-03-21 ENCOUNTER — PATIENT MESSAGE (OUTPATIENT)
Dept: TRANSPLANT | Facility: CLINIC | Age: 57
End: 2017-03-21

## 2017-03-21 ENCOUNTER — OFFICE VISIT (OUTPATIENT)
Dept: TRANSPLANT | Facility: CLINIC | Age: 57
End: 2017-03-21
Payer: MEDICARE

## 2017-03-21 ENCOUNTER — HOSPITAL ENCOUNTER (OUTPATIENT)
Facility: HOSPITAL | Age: 57
Discharge: HOME | End: 2017-03-21
Attending: INTERNAL MEDICINE | Admitting: INTERNAL MEDICINE
Payer: MEDICARE

## 2017-03-21 VITALS
HEIGHT: 68 IN | HEART RATE: 96 BPM | BODY MASS INDEX: 30.91 KG/M2 | WEIGHT: 203.94 LBS | DIASTOLIC BLOOD PRESSURE: 70 MMHG | SYSTOLIC BLOOD PRESSURE: 156 MMHG

## 2017-03-21 DIAGNOSIS — Z94.1 HEART TRANSPLANTED: ICD-10-CM

## 2017-03-21 DIAGNOSIS — I10 ESSENTIAL (PRIMARY) HYPERTENSION: ICD-10-CM

## 2017-03-21 DIAGNOSIS — E78.5 HYPERLIPIDEMIA, UNSPECIFIED HYPERLIPIDEMIA TYPE: Chronic | ICD-10-CM

## 2017-03-21 DIAGNOSIS — Z79.4 TYPE 2 DIABETES MELLITUS WITH STAGE 3 CHRONIC KIDNEY DISEASE, WITH LONG-TERM CURRENT USE OF INSULIN: Chronic | ICD-10-CM

## 2017-03-21 DIAGNOSIS — D84.9 IMMUNOSUPPRESSION: ICD-10-CM

## 2017-03-21 DIAGNOSIS — Z94.1 HEART TRANSPLANT, ORTHOTOPIC, STATUS: Primary | ICD-10-CM

## 2017-03-21 DIAGNOSIS — Z94.1 HEART TRANSPLANTED: Primary | ICD-10-CM

## 2017-03-21 DIAGNOSIS — T38.0X5D ADRENAL CORTICAL STEROIDS CAUSING ADVERSE EFFECT IN THERAPEUTIC USE, SUBSEQUENT ENCOUNTER: ICD-10-CM

## 2017-03-21 DIAGNOSIS — E66.9 OBESITY (BMI 30-39.9): Chronic | ICD-10-CM

## 2017-03-21 DIAGNOSIS — E11.22 TYPE 2 DIABETES MELLITUS WITH STAGE 3 CHRONIC KIDNEY DISEASE, WITH LONG-TERM CURRENT USE OF INSULIN: Chronic | ICD-10-CM

## 2017-03-21 DIAGNOSIS — K21.9 GASTROESOPHAGEAL REFLUX DISEASE, ESOPHAGITIS PRESENCE NOT SPECIFIED: Chronic | ICD-10-CM

## 2017-03-21 DIAGNOSIS — I10 ESSENTIAL HYPERTENSION: Chronic | ICD-10-CM

## 2017-03-21 DIAGNOSIS — N18.30 TYPE 2 DIABETES MELLITUS WITH STAGE 3 CHRONIC KIDNEY DISEASE, WITH LONG-TERM CURRENT USE OF INSULIN: Chronic | ICD-10-CM

## 2017-03-21 LAB
DIASTOLIC DYSFUNCTION: NO
GLOBAL PERICARDIAL EFFUSION: ABNORMAL
MITRAL VALVE MOBILITY: NORMAL
RETIRED EF AND QEF - SEE NOTES: 60 (ref 55–65)

## 2017-03-21 PROCEDURE — 99213 OFFICE O/P EST LOW 20 MIN: CPT | Mod: PBBFAC | Performed by: INTERNAL MEDICINE

## 2017-03-21 PROCEDURE — 88342 IMHCHEM/IMCYTCHM 1ST ANTB: CPT | Mod: 26,,, | Performed by: PATHOLOGY

## 2017-03-21 PROCEDURE — 88307 TISSUE EXAM BY PATHOLOGIST: CPT | Mod: 26,,, | Performed by: PATHOLOGY

## 2017-03-21 PROCEDURE — 93505 ENDOMYOCARDIAL BIOPSY: CPT | Mod: 26,,, | Performed by: INTERNAL MEDICINE

## 2017-03-21 PROCEDURE — 93306 TTE W/DOPPLER COMPLETE: CPT | Mod: 26,,, | Performed by: INTERNAL MEDICINE

## 2017-03-21 PROCEDURE — 99214 OFFICE O/P EST MOD 30 MIN: CPT | Mod: S$PBB,,, | Performed by: INTERNAL MEDICINE

## 2017-03-21 PROCEDURE — 99999 PR PBB SHADOW E&M-EST. PATIENT-LVL III: CPT | Mod: PBBFAC,,, | Performed by: INTERNAL MEDICINE

## 2017-03-21 NOTE — LETTER
March 26, 2017        Fox Quinn  [address] [place] [address] [address]  [address]  C[facility]OLOGY CENTER  [place] [address]  Phone: [phone]  Fax: [phone]             [facility] [address]  [facility] [address]  Phone: [phone]   Patient: Mick Barriga   MR Number: [id number]   YOB: 1960   Date of Visit: 3/21/2017       Dear Dr. Fox Quinn    Thank you for referring Mick Barriga to me for evaluation. Attached you will find relevant portions of my assessment and plan of care.    If you have questions, please do not hesitate to call me. I look forward to following Mick Barriga along with you.    Sincerely,    Julia Gupta MD    Enclosure    If you would like to receive this communication electronically, please contact [email or web address] or [phone] to request [employer] access.    [employer] is a tool which provides read-only access to select patient information with whom you have a relationship. Its easy to use and provides real time access to review your patients record including encounter summaries, notes, results, and demographic information.    If you feel you have received this communication in error or would no longer like to receive these types of communications, please e-mail [email or web address]

## 2017-03-21 NOTE — IP AVS SNAPSHOT
[facility]  [address]  [facility] [address]  Phone: [phone]           Patient Discharge Instructions     Our goal is to set you up for success. This packet includes information on your condition, medications, and your home care. It will help you to care for yourself so you don't get sicker and need to go back to the hospital.     Please ask your nurse if you have any questions.        There are many details to remember when preparing to leave the hospital. Here is what you will need to do:    1. Take your medicine. If you are prescribed medications, review your Medication List in the following pages. You may have new medications to  at the pharmacy and others that you'll need to stop taking. Review the instructions for how and when to take your medications. Talk with your doctor or nurses if you are unsure of what to do.     2. Go to your follow-up appointments. Specific follow-up information is listed in the following pages. Your may be contacted by a transition nurse or clinical provider about future appointments. Be sure we have all of the phone numbers to reach you, if needed. Please contact your provider's office if you are unable to make an appointment.     3. Watch for warning signs. Your doctor or nurse will give you detailed warning signs to watch for and when to call for assistance. These instructions may also include educational information about your condition. If you experience any of warning signs to your health, call your doctor.               [facility]  Unless otherwise directed by your provider, please contact [facility], our nurse care line that is available for 24/7 assistance.     [phone] (toll-free)    Registered nurses in the [facility] provide clinical advisement, health education, appointment booking, and other advisory services.                    ** Verify the list of medication(s) below is accurate and up  to date. Carry this with you in case of emergency. If your medications have changed, please notify your healthcare provider.             Medication List      ASK your doctor about these medications        Additional Info                      alendronate 70 MG tablet   Commonly known as:  FOSAMAX   Quantity:  12 tablet   Refills:  11   Comments:  **Patient requests 90 days supply**    Instructions:  TAKE 1 TABLET BY MOUTH EVERY 7 DAYS     Begin Date    AM    Noon    PM    Bedtime       amlodipine 10 MG tablet   Commonly known as:  NORVASC   Quantity:  30 tablet   Refills:  11   Dose:  10 mg    Instructions:  Take 1 tablet (10 mg total) by mouth once daily.     Begin Date    AM    Noon    PM    Bedtime       aspirin 81 MG EC tablet   Commonly known as:  ECOTRIN   Refills:  0   Dose:  81 mg    Instructions:  Take 1 tablet (81 mg total) by mouth once daily.     Begin Date    AM    Noon    PM    Bedtime       blood sugar diagnostic Strp   Quantity:  350 strip   Refills:  3    Instructions:  To use to check BG 4 times daily, to use with insurance preferred meter     Begin Date    AM    Noon    PM    Bedtime       blood-glucose meter kit   Quantity:  1 each   Refills:  0    Instructions:  To use to check BG 4 times daily, to use with insurance preferred meter     Begin Date    AM    Noon    PM    Bedtime       calcium-vitamin D3 500 mg(1,250mg) -200 unit per tablet   Quantity:  60 tablet   Refills:  11   Dose:  1 tablet    Instructions:  Take 1 tablet by mouth 2 (two) times daily with meals.     Begin Date    AM    Noon    PM    Bedtime       docusate sodium 100 MG capsule   Commonly known as:  COLACE   Quantity:  180 capsule   Refills:  3   Dose:  180 mg    Instructions:  Take 2 capsules (200 mg total) by mouth every evening.     Begin Date    AM    Noon    PM    Bedtime       esomeprazole 40 MG capsule   Commonly known as:  NEXIUM   Quantity:  90 capsule   Refills:  3   Dose:  40 mg    Instructions:  Take 1 capsule (40  mg total) by mouth before breakfast.     Begin Date    AM    Noon    PM    Bedtime       ferrous gluconate 324 MG tablet   Commonly known as:  FERGON   Quantity:  90 tablet   Refills:  3   Dose:  324 mg    Instructions:  Take 1 tablet (324 mg total) by mouth daily with breakfast.     Begin Date    AM    Noon    PM    Bedtime       furosemide 80 MG tablet   Commonly known as:  LASIX   Quantity:  60 tablet   Refills:  11   Dose:  40 mg    Instructions:  Take 0.5 tablets (40 mg total) by mouth 2 (two) times daily.     Begin Date    AM    Noon    PM    Bedtime       insulin aspart 100 unit/mL Inpn pen   Commonly known as:  NovoLOG   Quantity:  1 Box   Refills:  0   Dose:  15 Units    Instructions:  Inject 15 Units into the skin 3 (three) times daily.     Begin Date    AM    Noon    PM    Bedtime       insulin glargine 100 unit/mL (3 mL) Inpn pen   Commonly known as:  LANTUS SOLOSTAR   Quantity:  15 mL   Refills:  3   Dose:  28 Units    Instructions:  Inject 28 Units into the skin every evening.     Begin Date    AM    Noon    PM    Bedtime       lancets Misc   Quantity:  350 each   Refills:  3    Instructions:  To use to check BG 4 times daily, to use with insurance preferred meter     Begin Date    AM    Noon    PM    Bedtime       magnesium oxide 400 mg tablet   Commonly known as:  MAG-OX   Quantity:  270 tablet   Refills:  3   Dose:  800 mg    Instructions:  Take 2 tablets (800 mg total) by mouth 2 (two) times daily.     Begin Date    AM    Noon    PM    Bedtime       mycophenolate 250 mg Cap   Commonly known as:  CELLCEPT   Quantity:  360 capsule   Refills:  11   Dose:  1500 mg    Instructions:  Take 6 capsules (1,500 mg total) by mouth 2 (two) times daily.     Begin Date    AM    Noon    PM    Bedtime       nystatin 100,000 unit/mL suspension   Commonly known as:  MYCOSTATIN   Quantity:  473 mL   Refills:  5   Dose:  5 mL    Instructions:  Take 5 mLs (500,000 Units total) by mouth 4 (four) times daily with meals  and nightly.     Begin Date    AM    Noon    PM    Bedtime       oxycodone 10 mg Tab immediate release tablet   Commonly known as:  ROXICODONE   Quantity:  56 tablet   Refills:  0   Dose:  10 mg    Instructions:  Take 1 tablet (10 mg total) by mouth every 4 (four) hours as needed (pain).     Begin Date    AM    Noon    PM    Bedtime       pravastatin 40 MG tablet   Commonly known as:  PRAVACHOL   Quantity:  30 tablet   Refills:  11   Dose:  40 mg    Instructions:  Take 1 tablet (40 mg total) by mouth once daily.     Begin Date    AM    Noon    PM    Bedtime       predniSONE 10 MG tablet   Commonly known as:  DELTASONE   Quantity:  120 tablet   Refills:  11   Dose:  10 mg    Instructions:  Take 1 tablet (10 mg total) by mouth 2 (two) times daily.     Begin Date    AM    Noon    PM    Bedtime       sulfamethoxazole-trimethoprim 400-80mg 400-80 mg per tablet   Commonly known as:  BACTRIM,SEPTRA   Quantity:  30 tablet   Refills:  11   Dose:  1 tablet   Indications:  Opportunistic infection prophylaxis    Instructions:  Take 1 tablet by mouth once daily.     Begin Date    AM    Noon    PM    Bedtime       tacrolimus 1 MG Cap   Commonly known as:  PROGRAF   Quantity:  150 capsule   Refills:  11    Instructions:  Take 2mg orally in the morning and 1mg orally in the evening     Begin Date    AM    Noon    PM    Bedtime       terbutaline 5 mg Tab   Commonly known as:  BRETHINE   Quantity:  90 tablet   Refills:  11   Dose:  5 mg    Instructions:  Take 1 tablet (5 mg total) by mouth 3 (three) times daily.     Begin Date    AM    Noon    PM    Bedtime       valganciclovir 450 mg Tab   Commonly known as:  VALCYTE   Quantity:  180 tablet   Refills:  2   Comments:  **Patient requests 90 days supply**    Instructions:  TAKE 2 TABLETS(900 MG) BY MOUTH EVERY DAY     Begin Date    AM    Noon    PM    Bedtime                  Please bring to all follow up appointments:    1. A copy of your discharge instructions.  2. All medicines you  are currently taking in their original bottles.  3. Identification and insurance card.    Please arrive 15 minutes ahead of scheduled appointment time.    Please call 24 hours in advance if you must reschedule your appointment and/or time.        Your Scheduled Appointments     Mar 21, 2017 11:30 AM CDT   Established Patient Visit with Julia Gupta MD   [facility] ([facility] )    [address]  [place] [address]   [phone]            Mar 22, 2017  4:00 PM CDT   Plastic Surgery Consult with Aravind Palomino MD   [facility]    [address] [address]   [phone]            Apr 11, 2017  8:00 AM CDT   Fasting Lab with LAB, APPOINTMENT [place] [facility] ([facility])    [address] [address]   [phone]            Apr 11, 2017 11:30 AM CDT   Established Patient Visit with HEATHER Diallo   [facility] ([facility] )    [address]  [place] [address]   [phone]            May 02, 2017 11:00 AM CDT   Established Patient Visit with Peewee Romero MD   [facility] ([facility] )    [address]  [place] [address]   [phone]              Your Future Surgeries/Procedures     Apr 11, 2017   Surgery with Peewee Romero MD   [facility] ([facility])    [address] [address]   [phone]            May 02, 2017   Surgery with Josefina Saul MD   [facility] ([facility])    [facility] [address]   [phone]                  Discharge Instructions       AFTER THE PROCEDURE:  -You may remove the bandage in 24 hours and wash with soap and water.  -You may shower, but do not soak in a tub for three days.   PRECAUTIONS FOR THE NEXT 24 HOURS:  -If you need to cough, sneeze, have a bowel  movement, or bear down, hold pressure over your bandage.  -Do not  anything heavier than a gallon of milk(about 5 pounds)  -Avoid excessive bending over.  SYMPTOMS TO WATCH FOR AND REPORT TO YOUR DOCTOR:  -BLEEDING: hold pressure over the site until bleeding stops. Proceed to Emergency Room by ambulance (do not drive yourself) if unable to stop bleeding. Notify your doctor.  -HEMATOMA(hard bruise under the skin): Idoni around the bruise if one develops. Call your doctor if it increases in size or if you have difficulty talking, swallowing, breathing or anything unusual.  SIGNS OF INFECTION:Fever (temperature over 100.5 F), pus or redness  -RASH  -CHEST PAIN OR SHORTNESS OF BREATH    You may call you coordinator in the Heart Failure/Heart Transplant/Pulmonary Hypertension Clinic at [phone] during normal business hours(Monday through Friday from 8 A.M. to 5 P.M.) After hours, call the Heart Transplant Service doctor on call at [phone].        Admission Information     Date & Time Provider Department CSN    3/21/2017  8:44 AM Josefina Saul MD [facility] [id number]      Care Providers     Provider Role Specialty Primary office phone    Josefina Saul MD Attending Provider Cardiology [phone]      Recent Lab Values        6/15/2015 8/20/2016 8/22/2016 8/23/2016 12/28/2016 2/9/2017            5:29 AM 12:00 AM  2:50 PM  4:00 AM 10:41 AM  2:17 PM      A1C 7.9 (H) 7.8 (H) 8.1 (H) 8.3 (H) 7.8 (H) 6.8 (H)      Comment for A1C at 12:00 AM on 8/20/2016:  According to ADA guidelines, hemoglobin A1C <7.0% represents  optimal control in non-pregnant diabetic patients.  Different  metrics may apply to specific populations.   Standards of Medical Care in Diabetes - 2016.  For the purpose of screening for the presence of diabetes:  <5.7%     Consistent with the absence of diabetes  5.7-6.4%  Consistent with increasing risk for diabetes   (prediabetes)  >or=6.5%  Consistent with  diabetes  Currently no consensus exists for use of hemoglobin A1C  for diagnosis of diabetes for children.      Comment for A1C at  2:50 PM on 8/22/2016:  According to ADA guidelines, hemoglobin A1C <7.0% represents  optimal control in non-pregnant diabetic patients.  Different  metrics may apply to specific populations.   Standards of Medical Care in Diabetes - 2016.  For the purpose of screening for the presence of diabetes:  <5.7%     Consistent with the absence of diabetes  5.7-6.4%  Consistent with increasing risk for diabetes   (prediabetes)  >or=6.5%  Consistent with diabetes  Currently no consensus exists for use of hemoglobin A1C  for diagnosis of diabetes for children.      Comment for A1C at  4:00 AM on 8/23/2016:  According to ADA guidelines, hemoglobin A1C <7.0% represents  optimal control in non-pregnant diabetic patients.  Different  metrics may apply to specific populations.   Standards of Medical Care in Diabetes - 2016.  For the purpose of screening for the presence of diabetes:  <5.7%     Consistent with the absence of diabetes  5.7-6.4%  Consistent with increasing risk for diabetes   (prediabetes)  >or=6.5%  Consistent with diabetes  Currently no consensus exists for use of hemoglobin A1C  for diagnosis of diabetes for children.      Comment for A1C at 10:41 AM on 12/28/2016:  According to ADA guidelines, hemoglobin A1C <7.0% represents  optimal control in non-pregnant diabetic patients.  Different  metrics may apply to specific populations.   Standards of Medical Care in Diabetes - 2016.  For the purpose of screening for the presence of diabetes:  <5.7%     Consistent with the absence of diabetes  5.7-6.4%  Consistent with increasing risk for diabetes   (prediabetes)  >or=6.5%  Consistent with diabetes  Currently no consensus exists for use of hemoglobin A1C  for diagnosis of diabetes for children.      Comment for A1C at  2:17 PM on 2/9/2017:  According to ADA guidelines, hemoglobin A1C <7.0%  represents  optimal control in non-pregnant diabetic patients.  Different  metrics may apply to specific populations.   Standards of Medical Care in Diabetes - 2016.  For the purpose of screening for the presence of diabetes:  <5.7%     Consistent with the absence of diabetes  5.7-6.4%  Consistent with increasing risk for diabetes   (prediabetes)  >or=6.5%  Consistent with diabetes  Currently no consensus exists for use of hemoglobin A1C  for diagnosis of diabetes for children.        Pending Labs     Order Current Status    Specimen to Pathology - Surgery Collected (03/21/17 5568)      Allergies as of 3/21/2017        Reactions    Levemir [Insulin Detemir] Itching    Pork/porcine Containing Products     Ranexa [Ranolazine] Nausea Only      Advance Directives     An advance directive is a document which, in the event you are no longer able to make decisions for yourself, tells your healthcare team what kind of treatment you do or do not want to receive, or who you would like to make those decisions for you.  If you do not currently have an advance directive, [facility] encourages you to create one.  For more information call:  [phone]), [phone] ([phone]),  or log on to [email or web address].        Smoking Cessation     If you would like to quit smoking:   You may be eligible for free services if you are a [place] resident and started smoking cigarettes before September 1, 1988.  Call the Smoking Cessation Trust ([facility]) toll free at (411) 928-8247 or (928) 091-9277.   Call 8-451-QUIT-NOW if you do not meet the above criteria.            Language Assistance Services     ATTENTION: Language assistance services are available, free of charge. Please call [phone].      ATENCIÓN: Si habla español, tiene a cowart disposición servicios gratuitos de asistencia lingüística. Llame al [phone].     CHÚ Ý: N?u b?n nói Ti?ng Vi?t, có các d?ch v? h? tr? ngôn ng? mi?n phí dành cho b?n. G?i s?  [phone].        Chronic Kindey Disease Education             Diabetes Discharge Instructions                                    [facility] complies with applicable Federal civil rights laws and does not discriminate on the basis of race, color, national origin, age, disability, or sex.

## 2017-03-21 NOTE — DISCHARGE INSTRUCTIONS

## 2017-03-21 NOTE — DISCHARGE SUMMARY
Date of admit to cath lab: 3/21/2017      Date of discharge from cath lab: 3/21/2017      Principal diagnosis: s/p OHT    Discharge attending physician: Josefina Saul MD    Hospital Course/Outcome of the treatment, procedures or surgery: Pt admitted for EGBx.   See CVIS/cath lab procedure report in EPIC  for full report of today's procedure.    Disposition of the case (d/c disposition): Home    Discharge Medication List: see med card    Plan for follow up care, diet, activity level: F/U as scheduled. Resume low Na diet.  Activity as tolerated    Condition on discharge from Cath lab: Stable.

## 2017-03-21 NOTE — PROGRESS NOTES
Subjective:   Patient ID:  Mick Barriga is a 56 y.o. male who presents for heart transplant follow-up.    CMV Donor: -  CMV Recipient: +    HPI     56 year old male with PMHx of NICM s/p HM II (8/24/16), HLD, HTN, VT s/p ICD now s/p OHTx 2/9/17. Pt  presented to the hospital for elevated LDH in the setting of subtherapeutic INR with lower extremity swelling concerning for LVAD pump thrombosis, made status 1a and underwent OHTx 2/9/17. Post op course complicated by bradycardia and he was started on terbutaline 5 mg TID. Here for 6 week f/u and hopsital d/c f/u.     Patient continues to do very well, slowly improving, healing slowly but steadily. Was seen by plastic surgery yesterday, with plan for possible flap if it does not heal soon. DLES is healing per patient, was a lot bigger before, continues to pack it and has good pink granulation tissue, no odor on evaluation by myself. Followed by wound care for all of his wounds. Walking is limited by the pressure DTI on his heels, which are improving slowly per patient and wife, but definitely better than before.     Immunosuppression: tacro 2/1, cellcept 1000 BID, pred 10/5 BID    Review of Systems   Constitution: Negative for chills, decreased appetite, diaphoresis, fever, weakness, malaise/fatigue, weight gain and weight loss.   HENT: Negative for headaches.    Eyes: Negative for visual disturbance.   Cardiovascular: Positive for leg swelling. Negative for chest pain, dyspnea on exertion, irregular heartbeat, near-syncope, orthopnea and palpitations.   Respiratory: Negative for cough, shortness of breath, sleep disturbances due to breathing, snoring, sputum production and wheezing.    Hematologic/Lymphatic: Negative for adenopathy and bleeding problem. Does not bruise/bleed easily.   Skin: Negative for color change, poor wound healing, rash, skin cancer and suspicious lesions.   Musculoskeletal: Negative for back pain, falls, gout, joint pain and muscle weakness.  "  Gastrointestinal: Negative for bloating, abdominal pain, anorexia, constipation, diarrhea, heartburn, hematemesis, hematochezia, hemorrhoids, melena, nausea and vomiting.   Genitourinary: Negative for nocturia.   Neurological: Negative for excessive daytime sleepiness, dizziness, focal weakness, light-headedness, paresthesias and tremors.   Psychiatric/Behavioral: Negative for depression and memory loss. The patient does not have insomnia and is not nervous/anxious.        Objective:   BP (!) 156/70  Pulse 96  Ht 5' 8" (1.727 m)  Wt 92.5 kg (203 lb 14.8 oz)  BMI 31.01 kg/m2    Physical Exam   Constitutional: He is oriented to person, place, and time. He appears well-developed and well-nourished. No distress.        HENT:   Head: Normocephalic and atraumatic.   Nose: Nose normal.   Mouth/Throat: Oropharynx is clear and moist. No oropharyngeal exudate.   Eyes: Conjunctivae and EOM are normal. Pupils are equal, round, and reactive to light. No scleral icterus.   Neck: Normal range of motion. Neck supple. No JVD present. No tracheal deviation present. No thyromegaly present.   Cardiovascular: Normal rate, regular rhythm, S1 normal, S2 normal and normal heart sounds.  Exam reveals no gallop and no friction rub.    No murmur heard.  Pulses:       Radial pulses are 2+ on the right side, and 2+ on the left side.        Dorsalis pedis pulses are 1+ on the right side, and 0 on the left side.   Pulmonary/Chest: Effort normal and breath sounds normal. No respiratory distress. He has no wheezes. He has no rales. He exhibits no tenderness.   Abdominal: Soft. Bowel sounds are normal. He exhibits no distension and no mass. There is no tenderness. There is no rebound and no guarding.   Musculoskeletal: Normal range of motion. He exhibits no edema or tenderness.   Bilat feet- small healing wounds/areas outer heels   Neurological: He is alert and oriented to person, place, and time. Coordination normal.   Skin: Skin is warm and " dry. No rash noted. He is not diaphoretic. No erythema. No pallor.   Psychiatric: He has a normal mood and affect. His behavior is normal. Judgment and thought content normal.   Nursing note and vitals reviewed.        Chemistry        Component Value Date/Time     03/22/2017 0730    K 4.3 03/22/2017 0730     03/22/2017 0730    CO2 26 03/22/2017 0730    BUN 28 (H) 03/22/2017 0730    CREATININE 1.2 03/22/2017 0730     (H) 03/22/2017 0730        Component Value Date/Time    CALCIUM 9.5 03/22/2017 0730    ALKPHOS 79 03/22/2017 0730    AST 17 03/22/2017 0730    ALT 27 03/22/2017 0730    BILITOT 0.5 03/22/2017 0730          Magnesium   Date Value Ref Range Status   03/22/2017 1.3 (L) 1.6 - 2.6 mg/dL Final       Lab Results   Component Value Date    WBC 11.73 03/22/2017    HGB 10.4 (L) 03/22/2017    HCT 32.3 (L) 03/22/2017    MCV 65 (L) 03/22/2017     03/22/2017       Lab Results   Component Value Date    INR 1.6 (H) 02/15/2017    INR 1.4 (H) 02/14/2017    INR 1.5 (H) 02/13/2017       BNP   Date Value Ref Range Status   03/22/2017 333 (H) 0 - 99 pg/mL Final     Comment:     Values of less than 100 pg/ml are consistent with non-CHF populations.   03/07/2017 329 (H) 0 - 99 pg/mL Final     Comment:     Values of less than 100 pg/ml are consistent with non-CHF populations.   03/01/2017 687 (H) 0 - 99 pg/mL Final     Comment:     Values of less than 100 pg/ml are consistent with non-CHF populations.       Tacrolimus Lvl   Date Value Ref Range Status   03/24/2017 14.3 5.0 - 15.0 ng/mL Final     Comment:     Testing performed by Liquid Chromatography-Tandem  Mass Spectrometry (LC-MS/MS).  This test was developed and its performance characteristics  determined by [facility], Department of Pathology  and Laboratory Medicine in a manner consistent with CLIA  requirements. It has not been cleared or approved by the [place]  Food and Drug Administration.  This test is used for clinical   purposes.  It  should not be regarded as investigational or for  research.           Assessment:     1. Heart transplant, orthotopic, status    2. Immunosuppression    3. Gastroesophageal reflux disease, esophagitis presence not specified    4. Adrenal cortical steroids causing adverse effect in therapeutic use, subsequent encounter    5. Hyperlipidemia, unspecified hyperlipidemia type    6. Obesity (BMI 30-39.9)    7. Type 2 diabetes mellitus with stage 3 chronic kidney disease, with long-term current use of insulin    8. Heart transplanted    9. Essential hypertension        Plan:     Patient continues to heal from his DLES and his heels, seen by plastic surgery regarding his groin lymphocele after seeing Dr. Woodruff. Overall happy with how he is doing and very grateful. Will follow closely, if biopsy negative decrease prednisone to 10mg po daily.     Return instructions as set forth by post transplant schedule or as needed:    Clinic: Return for labs and/or biopsy weekly the first month, every two weeks during month 2 and then monthly for the first year at the provider or coordinator's discretion. During the second year, return to clinic every 3 months. Post transplant year 3-5 return every 6 months. There will be a comprehensive post transplant evaluation every year that may include LHC/RHC/biopsy, stress test, echo, CXR, and other health screening exams.    In addition to the clinical assessment, I have ordered Allomap testing for this patient to assist in identification of moderate/severe acute cellular rejection (ACR) in a pt with stable Allograft function instead of endomyocardial biopsy.     Patient is reminded to call with any health changes as these can be early signs of transplant complications. Patient is advised to make sure any new medications or changes of old medications are discussed with a pharmacist or physician knowledgeable with transplant to avoid rejection/drug toxicity related to significant drug  interactions.    UNOS Patient Status  Functional Status: 80% - Normal activity with effort: some symptoms of disease  Physical Capacity: Limited Mobility  Working for Income: No  If no, reason not working: Unknown

## 2017-03-21 NOTE — INTERVAL H&P NOTE
Here for routine surveillance EGBx    EGBx R IJ, 7 Fr sheath with local lidocaine, micropuncture kit and US guidance.    I have explained the risks, benefits and alternatives of the procedure in detail. The patient voices understanding and all questions have been answered,. The patient agrees to proceed as planned.

## 2017-03-22 ENCOUNTER — PATIENT MESSAGE (OUTPATIENT)
Dept: TRANSPLANT | Facility: CLINIC | Age: 57
End: 2017-03-22

## 2017-03-22 ENCOUNTER — OFFICE VISIT (OUTPATIENT)
Dept: PLASTIC SURGERY | Facility: CLINIC | Age: 57
End: 2017-03-22
Payer: MEDICARE

## 2017-03-22 ENCOUNTER — LAB VISIT (OUTPATIENT)
Dept: LAB | Facility: HOSPITAL | Age: 57
End: 2017-03-22
Attending: FAMILY MEDICINE
Payer: MEDICARE

## 2017-03-22 VITALS
SYSTOLIC BLOOD PRESSURE: 129 MMHG | WEIGHT: 202.94 LBS | BODY MASS INDEX: 30.76 KG/M2 | HEART RATE: 95 BPM | HEIGHT: 68 IN | TEMPERATURE: 98 F | DIASTOLIC BLOOD PRESSURE: 70 MMHG

## 2017-03-22 DIAGNOSIS — S31.103A NON-HEALING OPEN WOUND OF RIGHT GROIN, INITIAL ENCOUNTER: Primary | ICD-10-CM

## 2017-03-22 DIAGNOSIS — E83.42 HYPOMAGNESEMIA: ICD-10-CM

## 2017-03-22 DIAGNOSIS — Z94.1 STATUS POST HEART TRANSPLANT: ICD-10-CM

## 2017-03-22 DIAGNOSIS — R06.02 SHORTNESS OF BREATH: ICD-10-CM

## 2017-03-22 DIAGNOSIS — Z79.899 ENCOUNTER FOR LONG-TERM (CURRENT) USE OF MEDICATIONS: ICD-10-CM

## 2017-03-22 DIAGNOSIS — Z79.52 LONG TERM CURRENT USE OF SYSTEMIC STEROIDS: ICD-10-CM

## 2017-03-22 DIAGNOSIS — T86.20 COMPLICATION OF HEART TRANSPLANT, UNSPECIFIED COMPLICATION: ICD-10-CM

## 2017-03-22 DIAGNOSIS — E78.2 MIXED HYPERLIPIDEMIA: ICD-10-CM

## 2017-03-22 DIAGNOSIS — I10 ESSENTIAL HYPERTENSION: ICD-10-CM

## 2017-03-22 LAB
ALBUMIN SERPL BCP-MCNC: 3.6 G/DL
ALP SERPL-CCNC: 79 U/L
ALT SERPL W/O P-5'-P-CCNC: 27 U/L
ANION GAP SERPL CALC-SCNC: 10 MMOL/L
ANISOCYTOSIS BLD QL SMEAR: SLIGHT
AST SERPL-CCNC: 17 U/L
BASOPHILS # BLD AUTO: 0.04 K/UL
BASOPHILS NFR BLD: 0.3 %
BILIRUB SERPL-MCNC: 0.5 MG/DL
BNP SERPL-MCNC: 333 PG/ML
BUN SERPL-MCNC: 28 MG/DL
BURR CELLS BLD QL SMEAR: ABNORMAL
CALCIUM SERPL-MCNC: 9.5 MG/DL
CHLORIDE SERPL-SCNC: 103 MMOL/L
CO2 SERPL-SCNC: 26 MMOL/L
CREAT SERPL-MCNC: 1.2 MG/DL
DACRYOCYTES BLD QL SMEAR: ABNORMAL
DIFFERENTIAL METHOD: ABNORMAL
EOSINOPHIL # BLD AUTO: 0 K/UL
EOSINOPHIL NFR BLD: 0.2 %
ERYTHROCYTE [DISTWIDTH] IN BLOOD BY AUTOMATED COUNT: 23.1 %
EST. GFR  (AFRICAN AMERICAN): >60 ML/MIN/1.73 M^2
EST. GFR  (NON AFRICAN AMERICAN): >60 ML/MIN/1.73 M^2
GLUCOSE SERPL-MCNC: 125 MG/DL
HCT VFR BLD AUTO: 32.3 %
HGB BLD-MCNC: 10.4 G/DL
HYPOCHROMIA BLD QL SMEAR: ABNORMAL
LYMPHOCYTES # BLD AUTO: 1.4 K/UL
LYMPHOCYTES NFR BLD: 12.3 %
MAGNESIUM SERPL-MCNC: 1.3 MG/DL
MCH RBC QN AUTO: 20.8 PG
MCHC RBC AUTO-ENTMCNC: 32.2 %
MCV RBC AUTO: 65 FL
MONOCYTES # BLD AUTO: 0.6 K/UL
MONOCYTES NFR BLD: 4.9 %
NEUTROPHILS # BLD AUTO: 9.5 K/UL
NEUTROPHILS NFR BLD: 82.3 %
OVALOCYTES BLD QL SMEAR: ABNORMAL
PLATELET # BLD AUTO: 312 K/UL
PMV BLD AUTO: ABNORMAL FL
POIKILOCYTOSIS BLD QL SMEAR: SLIGHT
POTASSIUM SERPL-SCNC: 4.3 MMOL/L
PROT SERPL-MCNC: 6.7 G/DL
RBC # BLD AUTO: 5.01 M/UL
SCHISTOCYTES BLD QL SMEAR: PRESENT
SODIUM SERPL-SCNC: 139 MMOL/L
TACROLIMUS BLD-MCNC: 21.1 NG/ML
TARGETS BLD QL SMEAR: ABNORMAL
WBC # BLD AUTO: 11.73 K/UL

## 2017-03-22 PROCEDURE — 99213 OFFICE O/P EST LOW 20 MIN: CPT | Mod: PBBFAC,PO | Performed by: SURGERY

## 2017-03-22 PROCEDURE — 99999 PR PBB SHADOW E&M-EST. PATIENT-LVL III: CPT | Mod: PBBFAC,,, | Performed by: SURGERY

## 2017-03-22 PROCEDURE — 99204 OFFICE O/P NEW MOD 45 MIN: CPT | Mod: S$PBB,,, | Performed by: SURGERY

## 2017-03-22 NOTE — TELEPHONE ENCOUNTER
Spoke with pt regarding results of tacrolimus level of 21.1, pt stated he took medication after lab work. He will repeat level in the am. Increased mag oxide from bid to tid dosing.

## 2017-03-22 NOTE — LETTER
[facility]  [address]  [facility] [address]  Phone: [phone]  Fax: [phone] March 27, 2017      Heber Woodruff MD  [address]  [facility] [address]    Patient: Mick Barriga   MR Number: [id number]   YOB: 1960   Date of Visit: 3/22/2017     Dear Dr. Woodruff:    Thank you for referring Mick Barriga to me for evaluation. Below are the relevant portions of my assessment and plan of care.    Mr. Barriga is a 56-year-old male who recently underwent a heart transplant by Dr. Trevino.  He has a non-healing wound of the right groin from his cardiac bypass cannulation. He has a suspected lymphocele in this area. He states the wound had almost healed but has been draining some serous fluid since discharge from the hospital. He has been covering the area with 4x4 gauze to collect the drainage. He states the drainage has not been foul smelling or purulent.     We will continue local wound care for now with dressing changes. We will discuss with Dr. Trevino. If the wound does not resolve, we will plan for wound exploration with likely local muscle flap coverage. Risks benefits and alternatives of the procedure were explained. Specific risks, including but not limited to, bleeding, infection, skin necrosis, skin loss, muscle flap loss, wound healing problems, seroma, hematoma, need for drain placement, scarring, pain, heart issues, and death. She understands these risks.    If you have questions, please do not hesitate to call me. I look forward to following Mick along with you.    Sincerely,         Aravind Palomino MD  Section of Plastic & Reconstructive Surgery  [facility]     CRNU/dr JEANNINE Alan MD

## 2017-03-22 NOTE — PROGRESS NOTES
GENERAL SURGERY CLINIC NOTE    Mick Barriga is a 56 y.o.  male who recently underwent a heart transplant by Dr. Trevino who has a non-healing wound of the right groin from his cardiac bypass cannulation. He has a suspected lymphocele in this area. He states the wound had almost healed but has been draining some serous fluid since discharge from the hospital. He has been covering the area with 4x4 gauze to collect the drainage. He states the drainage has not bee foul smelling or purulent.     Past Medical History:   Diagnosis Date    CHF (congestive heart failure)     Coronary artery disease     GERD (gastroesophageal reflux disease)     Hyperlipidemia     Hypertension     LVAD (left ventricular assist device) present 2/13/2017    Type 2 diabetes mellitus with hyperglycemia        Past Surgical History:   Procedure Laterality Date    CARDIAC PACEMAKER PLACEMENT      CHOLECYSTECTOMY      COLONOSCOPY N/A 1/11/2017    Procedure: COLONOSCOPY;  Surgeon: Petr Almanzar MD;  Location: [facility] ENDO ([address] [place] [address]);  Service: Endoscopy;  Laterality: N/A;    COLONOSCOPY N/A 1/12/2017    Procedure: COLONOSCOPY;  Surgeon: Petr Almanzar MD;  Location: [facility] ENDO ([address] [place] [address]);  Service: Endoscopy;  Laterality: N/A;    HEART TRANSPLANT  02/2017    LEFT VENTRICULAR ASSIST DEVICE      x 3 months ago       Social History     Social History    Marital status:      Spouse name: N/A    Number of children: N/A    Years of education: N/A     Occupational History    Not on file.     Social History Main Topics    Smoking status: Former Smoker     Packs/day: 0.50     Years: 15.00     Quit date: 6/14/2006    Smokeless tobacco: Never Used    Alcohol use No    Drug use: Not on file    Sexual activity: Yes     Partners: Female     Other Topics Concern    Not on file     Social History Narrative       Review of patient's allergies indicates:   Allergen Reactions    Levemir [insulin detemir] Itching    Pork/porcine  containing products     Ranexa [ranolazine] Nausea Only         PHYSICAL EXAM:  Vitals:    03/22/17 1652   BP: 129/70   Pulse: 95   Temp: 98.1 °F (36.7 °C)       General: NAD  Ext: Right groin with 3cmx0.5cm open area of incision with serous drainage that tracks about 3cm deep      ASSESSMENT/PLAN:  56 y.o. male with nonhealing wound of right groin and likely lymphocele.  - Will continue local wound care for now with dressing changes. Will discuss with Dr. Trevino.  - If the wound does not resolve, will plan for wound exploration with likely local muscle flap coverage. Risks benefits and alternatives of the procedure were explained to the patient. Specific risks including but not limited to bleeding, infection, skin necrosis, skin loss, muscle flap loss, wound healing problems, seroma, hematoma, need for drain placement, scarring, pain, PE/DVT, heart issues, and death. Patient understands these risks.

## 2017-03-22 NOTE — LETTER
March 22, 2017      Heber Woodruff MD  [address]  [facility] [address]           [facility]  [address]  [facility] [address]  Phone: [phone]  Fax: [phone]          Patient: Mick Barriga   MR Number: [id number]   YOB: 1960   Date of Visit: 3/22/2017       Dear Dr. Heber Woodruff:    Thank you for referring Mick Barriga to me for evaluation. Attached you will find relevant portions of my assessment and plan of care.    If you have questions, please do not hesitate to call me. I look forward to following Mick Barriga along with you.    Sincerely,    Aravind Palomino MD    Enclosure  CC:  No Recipients    If you would like to receive this communication electronically, please contact [email or web address] or [phone] to request more information on [employer] Link access.    For providers and/or their staff who would like to refer a patient to [facility], please contact us through our one-stop-shop provider referral line, [facility], at [phone].    If you feel you have received this communication in error or would no longer like to receive these types of communications, please e-mail [email or web address]

## 2017-03-23 ENCOUNTER — CONFERENCE (OUTPATIENT)
Dept: TRANSPLANT | Facility: CLINIC | Age: 57
End: 2017-03-23
Payer: MEDICARE

## 2017-03-23 DIAGNOSIS — Z94.1 STATUS POST HEART TRANSPLANT: ICD-10-CM

## 2017-03-23 RX ORDER — LANOLIN ALCOHOL/MO/W.PET/CERES
800 CREAM (GRAM) TOPICAL 3 TIMES DAILY
Qty: 540 TABLET | Refills: 3 | Status: SHIPPED | OUTPATIENT
Start: 2017-03-23 | End: 2017-05-05 | Stop reason: SDUPTHER

## 2017-03-23 RX ORDER — PREDNISONE 10 MG/1
10 TABLET ORAL DAILY
Qty: 120 TABLET | Refills: 11 | Status: ON HOLD | OUTPATIENT
Start: 2017-03-23 | End: 2017-04-10

## 2017-03-23 NOTE — TELEPHONE ENCOUNTER
Reviewed patient in chart review.     If prograf level is within goal range. Biopsy returned negative, will decrease prednisone to 10mg po daily.

## 2017-03-24 ENCOUNTER — LAB VISIT (OUTPATIENT)
Dept: LAB | Facility: HOSPITAL | Age: 57
End: 2017-03-24
Attending: INTERNAL MEDICINE
Payer: MEDICARE

## 2017-03-24 DIAGNOSIS — Z79.899 ENCOUNTER FOR LONG-TERM (CURRENT) USE OF MEDICATIONS: ICD-10-CM

## 2017-03-24 DIAGNOSIS — T86.20 COMPLICATION OF HEART TRANSPLANT, UNSPECIFIED COMPLICATION: ICD-10-CM

## 2017-03-24 DIAGNOSIS — Z94.1 STATUS POST HEART TRANSPLANT: ICD-10-CM

## 2017-03-24 DIAGNOSIS — E78.2 MIXED HYPERLIPIDEMIA: ICD-10-CM

## 2017-03-24 DIAGNOSIS — I10 ESSENTIAL HYPERTENSION: ICD-10-CM

## 2017-03-24 DIAGNOSIS — R06.02 SHORTNESS OF BREATH: ICD-10-CM

## 2017-03-24 DIAGNOSIS — Z79.52 LONG TERM CURRENT USE OF SYSTEMIC STEROIDS: ICD-10-CM

## 2017-03-24 PROCEDURE — 80197 ASSAY OF TACROLIMUS: CPT

## 2017-03-24 PROCEDURE — 36415 COLL VENOUS BLD VENIPUNCTURE: CPT

## 2017-03-25 LAB — TACROLIMUS BLD-MCNC: 14.3 NG/ML

## 2017-03-27 ENCOUNTER — TELEPHONE (OUTPATIENT)
Dept: TRANSPLANT | Facility: CLINIC | Age: 57
End: 2017-03-27

## 2017-03-27 DIAGNOSIS — I89.8 LYMPHOCELE: ICD-10-CM

## 2017-03-27 NOTE — TELEPHONE ENCOUNTER
----- Message from Mary Alice Lopez sent at 3/27/2017  2:22 PM CDT -----  Contact: pt called  Pt called, states his wound is oozing clear water and now a smell.Ph [phone]. Thank you

## 2017-03-27 NOTE — TELEPHONE ENCOUNTER
Spoke with pt and he denies fever or chills at this time, drainage amount has not changed, but does notice a smell with it at this time.

## 2017-03-28 ENCOUNTER — HOSPITAL ENCOUNTER (INPATIENT)
Facility: HOSPITAL | Age: 57
LOS: 13 days | Discharge: HOME | DRG: 803 | End: 2017-04-10
Attending: INTERNAL MEDICINE | Admitting: INTERNAL MEDICINE
Payer: MEDICARE

## 2017-03-28 DIAGNOSIS — I89.8 LYMPHOCELE AFTER SURGICAL PROCEDURE: ICD-10-CM

## 2017-03-28 DIAGNOSIS — D84.9 IMMUNOSUPPRESSION: Primary | ICD-10-CM

## 2017-03-28 DIAGNOSIS — Z94.1 STATUS POST HEART TRANSPLANT: ICD-10-CM

## 2017-03-28 DIAGNOSIS — I89.8 LYMPHOCELE: ICD-10-CM

## 2017-03-28 DIAGNOSIS — Z94.1 HEART TRANSPLANT, ORTHOTOPIC, STATUS: ICD-10-CM

## 2017-03-28 DIAGNOSIS — T81.89XA LYMPHOCELE AFTER SURGICAL PROCEDURE: ICD-10-CM

## 2017-03-28 LAB
ANION GAP SERPL CALC-SCNC: 13 MMOL/L
ANISOCYTOSIS BLD QL SMEAR: SLIGHT
BASOPHILS # BLD AUTO: 0.02 K/UL
BASOPHILS NFR BLD: 0.2 %
BUN SERPL-MCNC: 27 MG/DL
CALCIUM SERPL-MCNC: 8.9 MG/DL
CHLORIDE SERPL-SCNC: 104 MMOL/L
CO2 SERPL-SCNC: 20 MMOL/L
CREAT SERPL-MCNC: 1.1 MG/DL
DACRYOCYTES BLD QL SMEAR: ABNORMAL
DIFFERENTIAL METHOD: ABNORMAL
EOSINOPHIL # BLD AUTO: 0 K/UL
EOSINOPHIL NFR BLD: 0.1 %
ERYTHROCYTE [DISTWIDTH] IN BLOOD BY AUTOMATED COUNT: 21 %
EST. GFR  (AFRICAN AMERICAN): >60 ML/MIN/1.73 M^2
EST. GFR  (NON AFRICAN AMERICAN): >60 ML/MIN/1.73 M^2
GLUCOSE SERPL-MCNC: 284 MG/DL
HCT VFR BLD AUTO: 33.6 %
HGB BLD-MCNC: 11 G/DL
HYPOCHROMIA BLD QL SMEAR: ABNORMAL
LYMPHOCYTES # BLD AUTO: 0.9 K/UL
LYMPHOCYTES NFR BLD: 7.3 %
MCH RBC QN AUTO: 21.1 PG
MCHC RBC AUTO-ENTMCNC: 32.7 %
MCV RBC AUTO: 65 FL
MONOCYTES # BLD AUTO: 0.4 K/UL
MONOCYTES NFR BLD: 3.4 %
NEUTROPHILS # BLD AUTO: 10.2 K/UL
NEUTROPHILS NFR BLD: 89 %
OVALOCYTES BLD QL SMEAR: ABNORMAL
PLATELET # BLD AUTO: 299 K/UL
PLATELET BLD QL SMEAR: ABNORMAL
PMV BLD AUTO: ABNORMAL FL
POCT GLUCOSE: 236 MG/DL (ref 70–110)
POIKILOCYTOSIS BLD QL SMEAR: SLIGHT
POLYCHROMASIA BLD QL SMEAR: ABNORMAL
POTASSIUM SERPL-SCNC: 4.8 MMOL/L
RBC # BLD AUTO: 5.21 M/UL
SCHISTOCYTES BLD QL SMEAR: ABNORMAL
SCHISTOCYTES BLD QL SMEAR: PRESENT
SODIUM SERPL-SCNC: 137 MMOL/L
WBC # BLD AUTO: 11.6 K/UL

## 2017-03-28 PROCEDURE — 87186 SC STD MICRODIL/AGAR DIL: CPT

## 2017-03-28 PROCEDURE — 87070 CULTURE OTHR SPECIMN AEROBIC: CPT

## 2017-03-28 PROCEDURE — 85025 COMPLETE CBC W/AUTO DIFF WBC: CPT

## 2017-03-28 PROCEDURE — 25000003 PHARM REV CODE 250: Performed by: NURSE PRACTITIONER

## 2017-03-28 PROCEDURE — 20600001 HC STEP DOWN PRIVATE ROOM

## 2017-03-28 PROCEDURE — 63600175 PHARM REV CODE 636 W HCPCS: Performed by: INTERNAL MEDICINE

## 2017-03-28 PROCEDURE — 87040 BLOOD CULTURE FOR BACTERIA: CPT

## 2017-03-28 PROCEDURE — 87077 CULTURE AEROBIC IDENTIFY: CPT

## 2017-03-28 PROCEDURE — 63600175 PHARM REV CODE 636 W HCPCS: Performed by: NURSE PRACTITIONER

## 2017-03-28 PROCEDURE — 80048 BASIC METABOLIC PNL TOTAL CA: CPT

## 2017-03-28 RX ORDER — VALGANCICLOVIR 450 MG/1
900 TABLET, FILM COATED ORAL DAILY
Status: DISCONTINUED | OUTPATIENT
Start: 2017-03-29 | End: 2017-04-10 | Stop reason: HOSPADM

## 2017-03-28 RX ORDER — AMLODIPINE BESYLATE 10 MG/1
10 TABLET ORAL DAILY
Status: DISCONTINUED | OUTPATIENT
Start: 2017-03-29 | End: 2017-04-10 | Stop reason: HOSPADM

## 2017-03-28 RX ORDER — IBUPROFEN 200 MG
24 TABLET ORAL
Status: DISCONTINUED | OUTPATIENT
Start: 2017-03-28 | End: 2017-04-10 | Stop reason: HOSPADM

## 2017-03-28 RX ORDER — TERBUTALINE SULFATE 5 MG/1
5 TABLET ORAL 3 TIMES DAILY
Status: DISCONTINUED | OUTPATIENT
Start: 2017-03-28 | End: 2017-03-30

## 2017-03-28 RX ORDER — MYCOPHENOLATE MOFETIL 250 MG/1
1500 CAPSULE ORAL 2 TIMES DAILY
Status: DISCONTINUED | OUTPATIENT
Start: 2017-03-28 | End: 2017-03-28

## 2017-03-28 RX ORDER — PANTOPRAZOLE SODIUM 40 MG/1
40 TABLET, DELAYED RELEASE ORAL DAILY
Status: DISCONTINUED | OUTPATIENT
Start: 2017-03-29 | End: 2017-04-10 | Stop reason: HOSPADM

## 2017-03-28 RX ORDER — ASPIRIN 81 MG/1
81 TABLET ORAL DAILY
Status: DISCONTINUED | OUTPATIENT
Start: 2017-03-29 | End: 2017-03-30

## 2017-03-28 RX ORDER — INSULIN ASPART 100 [IU]/ML
15 INJECTION, SOLUTION INTRAVENOUS; SUBCUTANEOUS 3 TIMES DAILY
Status: DISCONTINUED | OUTPATIENT
Start: 2017-03-28 | End: 2017-03-28

## 2017-03-28 RX ORDER — TACROLIMUS 1 MG/1
1 CAPSULE ORAL EVERY EVENING
Status: DISCONTINUED | OUTPATIENT
Start: 2017-03-28 | End: 2017-03-30

## 2017-03-28 RX ORDER — IBUPROFEN 200 MG
16 TABLET ORAL
Status: DISCONTINUED | OUTPATIENT
Start: 2017-03-28 | End: 2017-04-10 | Stop reason: HOSPADM

## 2017-03-28 RX ORDER — MYCOPHENOLATE MOFETIL 250 MG/1
1000 CAPSULE ORAL 2 TIMES DAILY
Status: DISCONTINUED | OUTPATIENT
Start: 2017-03-28 | End: 2017-04-10 | Stop reason: HOSPADM

## 2017-03-28 RX ORDER — NYSTATIN 100000 [USP'U]/ML
5 SUSPENSION ORAL
Status: DISCONTINUED | OUTPATIENT
Start: 2017-03-28 | End: 2017-03-30

## 2017-03-28 RX ORDER — INSULIN ASPART 100 [IU]/ML
15 INJECTION, SOLUTION INTRAVENOUS; SUBCUTANEOUS
Status: DISCONTINUED | OUTPATIENT
Start: 2017-03-28 | End: 2017-04-10 | Stop reason: HOSPADM

## 2017-03-28 RX ORDER — LANOLIN ALCOHOL/MO/W.PET/CERES
800 CREAM (GRAM) TOPICAL 3 TIMES DAILY
Status: DISCONTINUED | OUTPATIENT
Start: 2017-03-28 | End: 2017-04-10 | Stop reason: HOSPADM

## 2017-03-28 RX ORDER — PREDNISONE 10 MG/1
10 TABLET ORAL DAILY
Status: DISCONTINUED | OUTPATIENT
Start: 2017-03-29 | End: 2017-03-30

## 2017-03-28 RX ORDER — INSULIN ASPART 100 [IU]/ML
15 INJECTION, SOLUTION INTRAVENOUS; SUBCUTANEOUS
Status: DISCONTINUED | OUTPATIENT
Start: 2017-03-29 | End: 2017-03-28

## 2017-03-28 RX ORDER — TACROLIMUS 1 MG/1
2 CAPSULE ORAL EVERY MORNING
Status: DISCONTINUED | OUTPATIENT
Start: 2017-03-29 | End: 2017-03-30

## 2017-03-28 RX ORDER — GLUCAGON 1 MG
1 KIT INJECTION
Status: DISCONTINUED | OUTPATIENT
Start: 2017-03-28 | End: 2017-04-10 | Stop reason: HOSPADM

## 2017-03-28 RX ORDER — FUROSEMIDE 40 MG/1
40 TABLET ORAL 2 TIMES DAILY
Status: DISCONTINUED | OUTPATIENT
Start: 2017-03-28 | End: 2017-04-10 | Stop reason: HOSPADM

## 2017-03-28 RX ORDER — FERROUS GLUCONATE 324(38)MG
324 TABLET ORAL
Status: DISCONTINUED | OUTPATIENT
Start: 2017-03-29 | End: 2017-04-10 | Stop reason: HOSPADM

## 2017-03-28 RX ORDER — FERROUS SULFATE, DRIED 160(50) MG
1 TABLET, EXTENDED RELEASE ORAL 2 TIMES DAILY WITH MEALS
Status: DISCONTINUED | OUTPATIENT
Start: 2017-03-28 | End: 2017-03-30

## 2017-03-28 RX ORDER — SULFAMETHOXAZOLE AND TRIMETHOPRIM 400; 80 MG/1; MG/1
1 TABLET ORAL DAILY
Status: DISCONTINUED | OUTPATIENT
Start: 2017-03-29 | End: 2017-04-10 | Stop reason: HOSPADM

## 2017-03-28 RX ORDER — PRAVASTATIN SODIUM 40 MG/1
40 TABLET ORAL DAILY
Status: DISCONTINUED | OUTPATIENT
Start: 2017-03-29 | End: 2017-04-10 | Stop reason: HOSPADM

## 2017-03-28 RX ORDER — INSULIN ASPART 100 [IU]/ML
0-5 INJECTION, SOLUTION INTRAVENOUS; SUBCUTANEOUS
Status: DISCONTINUED | OUTPATIENT
Start: 2017-03-28 | End: 2017-04-10 | Stop reason: HOSPADM

## 2017-03-28 RX ORDER — OXYCODONE HYDROCHLORIDE 5 MG/1
10 TABLET ORAL EVERY 4 HOURS PRN
Status: DISCONTINUED | OUTPATIENT
Start: 2017-03-28 | End: 2017-03-29

## 2017-03-28 RX ORDER — DOCUSATE SODIUM 100 MG/1
200 CAPSULE, LIQUID FILLED ORAL NIGHTLY
Status: DISCONTINUED | OUTPATIENT
Start: 2017-03-28 | End: 2017-04-02

## 2017-03-28 RX ADMIN — INSULIN ASPART 15 UNITS: 100 INJECTION, SOLUTION INTRAVENOUS; SUBCUTANEOUS at 07:03

## 2017-03-28 RX ADMIN — DOCUSATE SODIUM 200 MG: 100 CAPSULE, LIQUID FILLED ORAL at 08:03

## 2017-03-28 RX ADMIN — OXYCODONE HYDROCHLORIDE 10 MG: 5 TABLET ORAL at 10:03

## 2017-03-28 RX ADMIN — FUROSEMIDE 40 MG: 40 TABLET ORAL at 08:03

## 2017-03-28 RX ADMIN — NYSTATIN 500000 UNITS: 500000 SUSPENSION ORAL at 08:03

## 2017-03-28 RX ADMIN — TACROLIMUS 1 MG: 1 CAPSULE, GELATIN COATED ORAL at 05:03

## 2017-03-28 RX ADMIN — Medication 800 MG: at 08:03

## 2017-03-28 RX ADMIN — OYSTER SHELL CALCIUM WITH VITAMIN D 1 TABLET: 500; 200 TABLET, FILM COATED ORAL at 05:03

## 2017-03-28 RX ADMIN — OXYCODONE HYDROCHLORIDE 10 MG: 5 TABLET ORAL at 05:03

## 2017-03-28 RX ADMIN — INSULIN ASPART 2 UNITS: 100 INJECTION, SOLUTION INTRAVENOUS; SUBCUTANEOUS at 07:03

## 2017-03-28 RX ADMIN — MYCOPHENOLATE MOFETIL 1000 MG: 250 CAPSULE ORAL at 08:03

## 2017-03-28 RX ADMIN — TERBUTALINE SULFATE 5 MG: 5 TABLET ORAL at 08:03

## 2017-03-28 NOTE — PROGRESS NOTES
Levamir ordered, but pt is allergic to it. Pt says takes Lantus instead of levamir. Notified Dr. Coles, Kailey Avila and MD states ok for pt to take home Lantus. Novolog frequency also changed to with meals, instead of tid. Will bring Lantus to pharmacy for proper labeling.

## 2017-03-28 NOTE — IP AVS SNAPSHOT
[facility]  [address]  [facility] [address]  Phone: [phone]           Patient Discharge Instructions   Our goal is to set you up for success. This packet includes information on your condition, medications, and your home care.  It will help you care for yourself to prevent having to return to the hospital.     Please ask your nurse if you have any questions.      There are many details to remember when preparing to leave the hospital. Here is what you will need to do:    1. Take your medicine. If you are prescribed medications, review your Medication List on the following pages. You may have new medications to  at the pharmacy and others that you'll need to stop taking. Review the instructions for how and when to take your medications. Talk with your doctor or nurses if you are unsure of what to do.     2. Go to your follow-up appointments. Specific follow-up information is listed in the following pages. Your may be contacted by a nurse or clinical provider about future appointments. Be sure we have all of the phone numbers to reach you. Please contact your provider's office if you are unable to make an appointment.     3. Watch for warning signs. Your doctor or nurse will give you detailed warning signs to watch for and when to call for assistance. These instructions may also include educational information about your condition. If you experience any of warning signs to your health, call your doctor.           [facility]  Unless otherwise directed by your provider, please   contact [facility], our nurse care line   that is available for 24/7 assistance.     [phone] (toll-free)     Registered nurses in the [facility]   provide: appointment scheduling, clinical advisement, health education, and other advisory services.                  ** Verify the list of medication(s) below is accurate and up to date. Carry this with you in case of  emergency. If your medications have changed, please notify your healthcare provider.             Medication List      START taking these medications        Additional Info                      amoxicillin 500 MG capsule   Commonly known as:  AMOXIL   Quantity:  30 capsule   Refills:  0   Dose:  500 mg   Indications:  Skin & soft tissue    Last time this was given:  500 mg on 4/10/2017  1:30 PM   Instructions:  Take 1 capsule (500 mg total) by mouth every 8 (eight) hours.     Begin Date    AM    Noon    PM    Bedtime       ergocalciferol 50,000 unit Cap   Commonly known as:  ERGOCALCIFEROL   Quantity:  4 capsule   Refills:  11   Dose:  61766 Units    Last time this was given:  50,000 Units on 4/8/2017  8:48 AM   Instructions:  Take 1 capsule (50,000 Units total) by mouth every 7 days.     Begin Date    AM    Noon    PM    Bedtime       * oxycodone 40 mg 12 hr tablet   Commonly known as:  OXYCONTIN   Quantity:  28 tablet   Refills:  0   Dose:  40 mg    Last time this was given:  40 mg on 4/10/2017  9:46 AM   Instructions:  Take 1 tablet (40 mg total) by mouth every 12 (twelve) hours.     Begin Date    AM    Noon    PM    Bedtime       * oxycodone 15 MG Tab   Commonly known as:  ROXICODONE   Quantity:  45 tablet   Refills:  0   Dose:  15 mg    Last time this was given:  15 mg on 4/10/2017  1:43 PM   Instructions:  Take 1 tablet (15 mg total) by mouth every 6 (six) hours as needed.     Begin Date    AM    Noon    PM    Bedtime       * Notice:  This list has 2 medication(s) that are the same as other medications prescribed for you. Read the directions carefully, and ask your doctor or other care provider to review them with you.      CHANGE how you take these medications        Additional Info                      docusate sodium 100 MG capsule   Commonly known as:  COLACE   Quantity:  180 capsule   Refills:  3   Dose:  180 mg   What changed:    - how much to take  - when to take this    Last time this was given:  200 mg  on 4/1/2017  9:21 PM   Instructions:  Take 2 capsules (200 mg total) by mouth every evening.     Begin Date    AM    Noon    PM    Bedtime       insulin glargine 100 unit/mL (3 mL) Inpn pen   Commonly known as:  LANTUS SOLOSTAR   Quantity:  15 mL   Refills:  3   Dose:  20 Units   What changed:    - how much to take  - when to take this    Instructions:  Inject 20 Units into the skin once daily.     Begin Date    AM    Noon    PM    Bedtime       mycophenolate 250 mg Cap   Commonly known as:  CELLCEPT   Quantity:  360 capsule   Refills:  11   Dose:  1000 mg   What changed:  how much to take    Last time this was given:  1,000 mg on 4/10/2017  9:45 AM   Instructions:  Take 4 capsules (1,000 mg total) by mouth 2 (two) times daily.     Begin Date    AM    Noon    PM    Bedtime       predniSONE 5 MG tablet   Commonly known as:  DELTASONE   Quantity:  30 tablet   Refills:  11   Dose:  10 mg   What changed:  medication strength    Last time this was given:  5 mg on 4/10/2017  9:45 AM   Instructions:  Take 2 tablets (10 mg total) by mouth once daily.     Begin Date    AM    Noon    PM    Bedtime       tacrolimus 1 MG Cap   Commonly known as:  PROGRAF   Quantity:  60 capsule   Refills:  11   What changed:  additional instructions    Last time this was given:  1 mg on 4/10/2017  9:45 AM   Instructions:  Take 1mg orally in the morning and 1mg orally in the evening     Begin Date    AM    Noon    PM    Bedtime         CONTINUE taking these medications        Additional Info                      alendronate 70 MG tablet   Commonly known as:  FOSAMAX   Quantity:  12 tablet   Refills:  11   Comments:  **Patient requests 90 days supply**    Instructions:  TAKE 1 TABLET BY MOUTH EVERY 7 DAYS     Begin Date    AM    Noon    PM    Bedtime       amlodipine 10 MG tablet   Commonly known as:  NORVASC   Quantity:  30 tablet   Refills:  11   Dose:  10 mg    Last time this was given:  10 mg on 4/10/2017  9:45 AM   Instructions:  Take 1  tablet (10 mg total) by mouth once daily.     Begin Date    AM    Noon    PM    Bedtime       aspirin 81 MG EC tablet   Commonly known as:  ECOTRIN   Refills:  0   Dose:  81 mg    Last time this was given:  81 mg on 3/29/2017  8:11 AM   Instructions:  Take 1 tablet (81 mg total) by mouth once daily.     Begin Date    AM    Noon    PM    Bedtime       blood sugar diagnostic Strp   Quantity:  350 strip   Refills:  3    Instructions:  To use to check BG 4 times daily, to use with insurance preferred meter     Begin Date    AM    Noon    PM    Bedtime       blood-glucose meter kit   Quantity:  1 each   Refills:  0    Instructions:  To use to check BG 4 times daily, to use with insurance preferred meter     Begin Date    AM    Noon    PM    Bedtime       calcium-vitamin D3 500 mg(1,250mg) -200 unit per tablet   Quantity:  60 tablet   Refills:  11   Dose:  1 tablet    Last time this was given:  1 tablet on 3/29/2017  5:10 PM   Instructions:  Take 1 tablet by mouth 2 (two) times daily with meals.     Begin Date    AM    Noon    PM    Bedtime       esomeprazole 40 MG capsule   Commonly known as:  NEXIUM   Quantity:  90 capsule   Refills:  3   Dose:  40 mg    Instructions:  Take 1 capsule (40 mg total) by mouth before breakfast.     Begin Date    AM    Noon    PM    Bedtime       ferrous gluconate 324 MG tablet   Commonly known as:  FERGON   Quantity:  90 tablet   Refills:  3   Dose:  324 mg    Last time this was given:  324 mg on 4/10/2017  7:45 AM   Instructions:  Take 1 tablet (324 mg total) by mouth daily with breakfast.     Begin Date    AM    Noon    PM    Bedtime       furosemide 80 MG tablet   Commonly known as:  LASIX   Quantity:  60 tablet   Refills:  11   Dose:  40 mg    Last time this was given:  40 mg on 4/10/2017  9:45 AM   Instructions:  Take 0.5 tablets (40 mg total) by mouth 2 (two) times daily.     Begin Date    AM    Noon    PM    Bedtime       insulin aspart 100 unit/mL Inpn pen   Commonly known as:   NovoLOG   Quantity:  1 Box   Refills:  0   Dose:  15 Units    Last time this was given:  18 Units on 4/10/2017  1:31 PM   Instructions:  Inject 15 Units into the skin 3 (three) times daily.     Begin Date    AM    Noon    PM    Bedtime       lancets Misc   Quantity:  350 each   Refills:  3    Instructions:  To use to check BG 4 times daily, to use with insurance preferred meter     Begin Date    AM    Noon    PM    Bedtime       magnesium oxide 400 mg tablet   Commonly known as:  MAG-OX   Quantity:  540 tablet   Refills:  3   Dose:  800 mg    Last time this was given:  800 mg on 4/10/2017  1:30 PM   Instructions:  Take 2 tablets (800 mg total) by mouth 3 (three) times daily.     Begin Date    AM    Noon    PM    Bedtime       pravastatin 40 MG tablet   Commonly known as:  PRAVACHOL   Quantity:  30 tablet   Refills:  11   Dose:  40 mg    Last time this was given:  40 mg on 4/10/2017  9:45 AM   Instructions:  Take 1 tablet (40 mg total) by mouth once daily.     Begin Date    AM    Noon    PM    Bedtime       sulfamethoxazole-trimethoprim 400-80mg 400-80 mg per tablet   Commonly known as:  BACTRIM,SEPTRA   Quantity:  30 tablet   Refills:  11   Dose:  1 tablet   Indications:  Opportunistic infection prophylaxis    Last time this was given:  1 tablet on 4/10/2017  9:45 AM   Instructions:  Take 1 tablet by mouth once daily.     Begin Date    AM    Noon    PM    Bedtime       valganciclovir 450 mg Tab   Commonly known as:  VALCYTE   Quantity:  180 tablet   Refills:  2   Comments:  **Patient requests 90 days supply**    Last time this was given:  900 mg on 4/10/2017  9:45 AM   Instructions:  TAKE 2 TABLETS(900 MG) BY MOUTH EVERY DAY     Begin Date    AM    Noon    PM    Bedtime            Where to Get Your Medications      These medications were sent to [id number] - [place] - [address] AT [facility] OF [address] [address]    Hours:  24-hours Phone:   [phone]     amoxicillin 500 MG capsule    ergocalciferol 50,000 unit Cap    mycophenolate 250 mg Cap    predniSONE 5 MG tablet    tacrolimus 1 MG Cap         You can get these medications from any pharmacy     Bring a paper prescription for each of these medications     oxycodone 15 MG Tab    oxycodone 40 mg 12 hr tablet         Information about where to get these medications is not yet available     ! Ask your nurse or doctor about these medications     insulin glargine 100 unit/mL (3 mL) Inpn pen                  Please bring to all follow up appointments:    1. A copy of your discharge instructions.  2. All medicines you are currently taking in their original bottles.  3. Identification and insurance card.    Please arrive 15 minutes ahead of scheduled appointment time.    Please call 24 hours in advance if you must reschedule your appointment and/or time.        Your Scheduled Appointments     Apr 18, 2017  8:00 AM CDT   Fasting Lab with LAB, APPOINT[place] [facility] ([facility])    [address] [facility] [id number] [phone]            May 02, 2017  8:00 AM CDT   Fasting Lab with LAB, APPOINTMENT [place] [facility] ([facility])    [address] [facility] [id number] [phone]            May 02, 2017 11:00 AM CDT   Established Patient Visit with Peewee Romero MD   [facility]    [address]  [place] [address]   [phone]            Jun 12, 2017  8:05 AM CDT   Fasting Lab with LAB, APPOINTMENT [place] [facility] ([facility])    [address] [facility] [address]   [phone]            Jun 12, 2017 10:30 AM CDT   Established Patient Visit with Peewee Romero MD   [facility]    [address]  [place] [address]    [phone]              Your Future Surgeries/Procedures     Apr 18, 2017   Surgery with Peewee Romero MD   [facility] ([facility])    [address] [place] [address] [phone]            May 02, 2017   Surgery with Josefina Saul MD   [facility] ([facility])    [address] [place] [address] [phone]              Follow-up Information     Follow up with Aravind Palomino MD. Call in 1 week.    Specialty:  Plastic Surgery    Contact information:    [address] [place] [address] [phone]          Follow up with [facility] On 4/18/2017.    Specialty:  Transplant    Why:  as scheduled for biospy, labwork and appointment,     Contact information:    [address] [place] [address] [phone]    Additional information:    3rd floor        Discharge Instructions     Future Orders    Activity as tolerated     Call MD for:  redness, tenderness, or signs of infection (pain, swelling, redness, odor or green/yellow discharge around incision site)     Call MD for:  temperature >100.4     Call MD for:     Comments:    Chest pain, shortness of breath    Diet general     Questions:    Total calories:      Fat restriction, if any:      Protein restriction, if any:      Na restriction, if any:      Fluid restriction:      Additional restrictions:  Cardiac (Low Na/Chol)        Primary Diagnosis     Your primary diagnosis was:  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      Admission Information     Date & Time Provider Department CSN    3/28/2017  4:19 PM Josefina Saul MD [facility] [id number]      Care Providers     Provider Role Specialty Primary office phone    Josefina Saul MD Attending Provider Cardiology [phone]    Julia Gupta MD Team Attending  Cardiology [phone]    Peewee Romero MD Consulting Physician   "Cardiology [phone]    Josefina Saul MD Team Attending  Cardiology [phone]    Trixie Marx MD Team Attending  Cardiology [phone]    Ryan Dawn MD Team Attending  Cardiology [phone]    Aravind Palomino MD Surgeon  Plastic Surgery [phone]      Your Vitals Were     BP Pulse Temp Resp Height Weight    136/68 (BP Location: Left arm, Patient Position: Lying, BP Method: Automatic) 88 98.2 °F (36.8 °C) (Oral) 18 5' 8" (1.727 m) 93 kg (205 lb 0.4 oz)    SpO2 BMI             97% 31.17 kg/m2         Recent Lab Values        6/15/2015 8/20/2016 8/22/2016 8/23/2016 12/28/2016 2/9/2017            5:29 AM 12:00 AM  2:50 PM  4:00 AM 10:41 AM  2:17 PM      A1C 7.9 (H) 7.8 (H) 8.1 (H) 8.3 (H) 7.8 (H) 6.8 (H)      Comment for A1C at 12:00 AM on 8/20/2016:  According to ADA guidelines, hemoglobin A1C <7.0% represents  optimal control in non-pregnant diabetic patients.  Different  metrics may apply to specific populations.   Standards of Medical Care in Diabetes - 2016.  For the purpose of screening for the presence of diabetes:  <5.7%     Consistent with the absence of diabetes  5.7-6.4%  Consistent with increasing risk for diabetes   (prediabetes)  >or=6.5%  Consistent with diabetes  Currently no consensus exists for use of hemoglobin A1C  for diagnosis of diabetes for children.      Comment for A1C at  2:50 PM on 8/22/2016:  According to ADA guidelines, hemoglobin A1C <7.0% represents  optimal control in non-pregnant diabetic patients.  Different  metrics may apply to specific populations.   Standards of Medical Care in Diabetes - 2016.  For the purpose of screening for the presence of diabetes:  <5.7%     Consistent with the absence of diabetes  5.7-6.4%  Consistent with increasing risk for diabetes   (prediabetes)  >or=6.5%  Consistent with diabetes  Currently no consensus exists for use of hemoglobin A1C  for diagnosis of diabetes for children.      Comment for A1C at  4:00 AM on " 8/23/2016:  According to ADA guidelines, hemoglobin A1C <7.0% represents  optimal control in non-pregnant diabetic patients.  Different  metrics may apply to specific populations.   Standards of Medical Care in Diabetes - 2016.  For the purpose of screening for the presence of diabetes:  <5.7%     Consistent with the absence of diabetes  5.7-6.4%  Consistent with increasing risk for diabetes   (prediabetes)  >or=6.5%  Consistent with diabetes  Currently no consensus exists for use of hemoglobin A1C  for diagnosis of diabetes for children.      Comment for A1C at 10:41 AM on 12/28/2016:  According to ADA guidelines, hemoglobin A1C <7.0% represents  optimal control in non-pregnant diabetic patients.  Different  metrics may apply to specific populations.   Standards of Medical Care in Diabetes - 2016.  For the purpose of screening for the presence of diabetes:  <5.7%     Consistent with the absence of diabetes  5.7-6.4%  Consistent with increasing risk for diabetes   (prediabetes)  >or=6.5%  Consistent with diabetes  Currently no consensus exists for use of hemoglobin A1C  for diagnosis of diabetes for children.      Comment for A1C at  2:17 PM on 2/9/2017:  According to ADA guidelines, hemoglobin A1C <7.0% represents  optimal control in non-pregnant diabetic patients.  Different  metrics may apply to specific populations.   Standards of Medical Care in Diabetes - 2016.  For the purpose of screening for the presence of diabetes:  <5.7%     Consistent with the absence of diabetes  5.7-6.4%  Consistent with increasing risk for diabetes   (prediabetes)  >or=6.5%  Consistent with diabetes  Currently no consensus exists for use of hemoglobin A1C  for diagnosis of diabetes for children.        Pending Labs     Order Current Status    CBC auto differential Preliminary result    Fungus culture Preliminary result      Allergies as of 4/10/2017        Reactions    Levemir [Insulin Detemir] Itching    Pork/porcine Containing  Products     Ranexa [Ranolazine] Nausea Only      Advance Directives     An advance directive is a document which, in the event you are no longer able to make decisions for yourself, tells your healthcare team what kind of treatment you do or do not want to receive, or who you would like to make those decisions for you.  If you do not currently have an advance directive, [facility] encourages you to create one.  For more information call:  [phone]), [phone] ([phone]),  or log on to [email or web address].        Smoking Cessation     If you would like to quit smoking:   You may be eligible for free services if you are a [place] resident and started smoking cigarettes before September 1, 1988.  Call the Smoking Cessation Trust (SCT) toll free at (135) 071-5874 or (808) 250-2504.   Call 6-900-QUIT-NOW if you do not meet the above criteria.   Contact us via email: [email or web address]   View our website for more information: [email or web address]        Language Assistance Services     ATTENTION: Language assistance services are available, free of charge. Please call [phone].      ATENCIÓN: Si habla diaz, tiene a cowart disposición servicios gratuitos de asistencia lingüística. Llame al [phone].     CHÚ Ý: N?u b?n nói Ti?ng Vi?t, có các d?ch v? h? tr? ngôn ng? mi?n phí dành cho b?n. G?i s? [phone].        Chronic Kindey Disease Education             Diabetes Discharge Instructions                                    [facility] complies with applicable Federal civil rights laws and does not discriminate on the basis of race, color, national origin, age, disability, or sex.

## 2017-03-28 NOTE — INTERVAL H&P NOTE
The patient has been examined and the H&P has been reviewed:    I concur with the findings and changes have been noted since the H&P was written: :     57 yo M with PMHx of NICM s/p HM II (8/24/16), now s/p OHTx 2/9/17 who is admitted secondary to lymphocele foul smell/draining more.       PLAN:      S/P Orthotopic Heart Transplantation 2/9/2017  -Moderate Risk of Rejection: cPRA 0%, Negative Crossmatch with B Channel Shifts  -CMV Status: D-/R+: Continue valcyte.  -Immunosuppression: MMF 1000 mg BID, Prednisone 10 mg daily. [place] 2/1     DM  - SSI/Levimir     R groin Lymphocele-  - Followed by plastic Sx as outpt.  - Culture.  - Wound care cx.

## 2017-03-28 NOTE — PROGRESS NOTES
Pt arrived from home, via wheelchair. Ambulated from wheelchair to bed with no assistance. Wife at the BS. Vitals and assessment complete, see epic. Pt rates pain 0/10 to. DTI noted to right cheek, and R & L heel. Dressing noted to old CT site and R abdomen (LVAD drive line site), dressing CDI, pt states changed already today. Wound to R groin cleansed with NS, and dressing placed, wound drainage sent for cultures. Blood cx sent, results pending. Wound care consulted, and will see pt tomorrow. Urinal placed in the bathroom, instructed pt to measure output. Oriented to room and call bell. Fall education reviewed with pt. Instructed to call if assistance needed, verbalized understanding.

## 2017-03-28 NOTE — TELEPHONE ENCOUNTER
Spoke with Dr. Woodruff and he would like to admit pt and treat with Antibiotics and they plan on repair if needed.

## 2017-03-29 ENCOUNTER — ANESTHESIA EVENT (OUTPATIENT)
Dept: SURGERY | Facility: HOSPITAL | Age: 57
DRG: 803 | End: 2017-03-29
Payer: MEDICARE

## 2017-03-29 LAB
ABO + RH BLD: NORMAL
ALBUMIN SERPL BCP-MCNC: 3 G/DL
ALP SERPL-CCNC: 74 U/L
ALT SERPL W/O P-5'-P-CCNC: 27 U/L
ANION GAP SERPL CALC-SCNC: 10 MMOL/L
ANISOCYTOSIS BLD QL SMEAR: SLIGHT
AST SERPL-CCNC: 15 U/L
BASOPHILS # BLD AUTO: 0.03 K/UL
BASOPHILS NFR BLD: 0.3 %
BILIRUB SERPL-MCNC: 0.4 MG/DL
BLD GP AB SCN CELLS X3 SERPL QL: NORMAL
BUN SERPL-MCNC: 23 MG/DL
CALCIUM SERPL-MCNC: 8.8 MG/DL
CHLORIDE SERPL-SCNC: 105 MMOL/L
CO2 SERPL-SCNC: 25 MMOL/L
CREAT SERPL-MCNC: 0.8 MG/DL
DACRYOCYTES BLD QL SMEAR: ABNORMAL
DIFFERENTIAL METHOD: ABNORMAL
EOSINOPHIL # BLD AUTO: 0 K/UL
EOSINOPHIL NFR BLD: 0.4 %
ERYTHROCYTE [DISTWIDTH] IN BLOOD BY AUTOMATED COUNT: 21.4 %
EST. GFR  (AFRICAN AMERICAN): >60 ML/MIN/1.73 M^2
EST. GFR  (NON AFRICAN AMERICAN): >60 ML/MIN/1.73 M^2
GLUCOSE SERPL-MCNC: 161 MG/DL
HCT VFR BLD AUTO: 33.3 %
HGB BLD-MCNC: 10.6 G/DL
HYPOCHROMIA BLD QL SMEAR: ABNORMAL
LYMPHOCYTES # BLD AUTO: 2.1 K/UL
LYMPHOCYTES NFR BLD: 21.8 %
MAGNESIUM SERPL-MCNC: 1.4 MG/DL
MCH RBC QN AUTO: 20.9 PG
MCHC RBC AUTO-ENTMCNC: 31.8 %
MCV RBC AUTO: 66 FL
MONOCYTES # BLD AUTO: 0.4 K/UL
MONOCYTES NFR BLD: 3.9 %
NEUTROPHILS # BLD AUTO: 7 K/UL
NEUTROPHILS NFR BLD: 73.6 %
OVALOCYTES BLD QL SMEAR: ABNORMAL
PLATELET # BLD AUTO: 283 K/UL
PLATELET BLD QL SMEAR: ABNORMAL
PMV BLD AUTO: ABNORMAL FL
POCT GLUCOSE: 155 MG/DL (ref 70–110)
POCT GLUCOSE: 206 MG/DL (ref 70–110)
POCT GLUCOSE: 208 MG/DL (ref 70–110)
POCT GLUCOSE: 241 MG/DL (ref 70–110)
POIKILOCYTOSIS BLD QL SMEAR: SLIGHT
POLYCHROMASIA BLD QL SMEAR: ABNORMAL
POTASSIUM SERPL-SCNC: 4.3 MMOL/L
PROT SERPL-MCNC: 5.8 G/DL
RBC # BLD AUTO: 5.07 M/UL
SCHISTOCYTES BLD QL SMEAR: ABNORMAL
SODIUM SERPL-SCNC: 140 MMOL/L
TACROLIMUS BLD-MCNC: 13.7 NG/ML
TARGETS BLD QL SMEAR: ABNORMAL
WBC # BLD AUTO: 9.67 K/UL

## 2017-03-29 PROCEDURE — 86900 BLOOD TYPING SEROLOGIC ABO: CPT

## 2017-03-29 PROCEDURE — 25000003 PHARM REV CODE 250: Performed by: HOSPITALIST

## 2017-03-29 PROCEDURE — 80197 ASSAY OF TACROLIMUS: CPT

## 2017-03-29 PROCEDURE — 99232 SBSQ HOSP IP/OBS MODERATE 35: CPT | Mod: ,,, | Performed by: INTERNAL MEDICINE

## 2017-03-29 PROCEDURE — 85025 COMPLETE CBC W/AUTO DIFF WBC: CPT

## 2017-03-29 PROCEDURE — 83735 ASSAY OF MAGNESIUM: CPT

## 2017-03-29 PROCEDURE — 63600175 PHARM REV CODE 636 W HCPCS: Performed by: NURSE PRACTITIONER

## 2017-03-29 PROCEDURE — 25000003 PHARM REV CODE 250: Performed by: NURSE PRACTITIONER

## 2017-03-29 PROCEDURE — 86850 RBC ANTIBODY SCREEN: CPT

## 2017-03-29 PROCEDURE — 20600001 HC STEP DOWN PRIVATE ROOM

## 2017-03-29 PROCEDURE — 80053 COMPREHEN METABOLIC PANEL: CPT

## 2017-03-29 PROCEDURE — 36415 COLL VENOUS BLD VENIPUNCTURE: CPT

## 2017-03-29 RX ORDER — INSULIN GLARGINE 100 [IU]/ML
20 INJECTION, SOLUTION SUBCUTANEOUS DAILY
Status: DISCONTINUED | OUTPATIENT
Start: 2017-03-29 | End: 2017-04-10 | Stop reason: HOSPADM

## 2017-03-29 RX ORDER — OXYCODONE HCL 10 MG/1
20 TABLET, FILM COATED, EXTENDED RELEASE ORAL EVERY 12 HOURS
Status: DISCONTINUED | OUTPATIENT
Start: 2017-03-29 | End: 2017-04-04

## 2017-03-29 RX ADMIN — TERBUTALINE SULFATE 5 MG: 5 TABLET ORAL at 01:03

## 2017-03-29 RX ADMIN — VALGANCICLOVIR 900 MG: 450 TABLET, FILM COATED ORAL at 08:03

## 2017-03-29 RX ADMIN — FUROSEMIDE 40 MG: 40 TABLET ORAL at 08:03

## 2017-03-29 RX ADMIN — NYSTATIN 500000 UNITS: 500000 SUSPENSION ORAL at 08:03

## 2017-03-29 RX ADMIN — OXYCODONE HYDROCHLORIDE 20 MG: 10 TABLET, FILM COATED, EXTENDED RELEASE ORAL at 08:03

## 2017-03-29 RX ADMIN — NYSTATIN 500000 UNITS: 500000 SUSPENSION ORAL at 05:03

## 2017-03-29 RX ADMIN — TACROLIMUS 2 MG: 1 CAPSULE, GELATIN COATED ORAL at 08:03

## 2017-03-29 RX ADMIN — INSULIN ASPART 15 UNITS: 100 INJECTION, SOLUTION INTRAVENOUS; SUBCUTANEOUS at 08:03

## 2017-03-29 RX ADMIN — INSULIN ASPART 15 UNITS: 100 INJECTION, SOLUTION INTRAVENOUS; SUBCUTANEOUS at 06:03

## 2017-03-29 RX ADMIN — PREDNISONE 10 MG: 10 TABLET ORAL at 08:03

## 2017-03-29 RX ADMIN — INSULIN ASPART 15 UNITS: 100 INJECTION, SOLUTION INTRAVENOUS; SUBCUTANEOUS at 11:03

## 2017-03-29 RX ADMIN — INSULIN GLARGINE 20 UNITS: 100 INJECTION, SOLUTION SUBCUTANEOUS at 09:03

## 2017-03-29 RX ADMIN — PRAVASTATIN SODIUM 40 MG: 40 TABLET ORAL at 08:03

## 2017-03-29 RX ADMIN — MYCOPHENOLATE MOFETIL 1000 MG: 250 CAPSULE ORAL at 08:03

## 2017-03-29 RX ADMIN — OXYCODONE HYDROCHLORIDE 20 MG: 10 TABLET, FILM COATED, EXTENDED RELEASE ORAL at 09:03

## 2017-03-29 RX ADMIN — OYSTER SHELL CALCIUM WITH VITAMIN D 1 TABLET: 500; 200 TABLET, FILM COATED ORAL at 05:03

## 2017-03-29 RX ADMIN — DOCUSATE SODIUM 200 MG: 100 CAPSULE, LIQUID FILLED ORAL at 08:03

## 2017-03-29 RX ADMIN — FERROUS GLUCONATE TAB 324 MG (37.5 MG ELEMENTAL IRON) 324 MG: 324 (37.5 FE) TAB at 07:03

## 2017-03-29 RX ADMIN — INSULIN ASPART 2 UNITS: 100 INJECTION, SOLUTION INTRAVENOUS; SUBCUTANEOUS at 11:03

## 2017-03-29 RX ADMIN — OYSTER SHELL CALCIUM WITH VITAMIN D 1 TABLET: 500; 200 TABLET, FILM COATED ORAL at 08:03

## 2017-03-29 RX ADMIN — INSULIN ASPART 2 UNITS: 100 INJECTION, SOLUTION INTRAVENOUS; SUBCUTANEOUS at 08:03

## 2017-03-29 RX ADMIN — Medication 800 MG: at 05:03

## 2017-03-29 RX ADMIN — TERBUTALINE SULFATE 5 MG: 5 TABLET ORAL at 08:03

## 2017-03-29 RX ADMIN — SULFAMETHOXAZOLE AND TRIMETHOPRIM 1 TABLET: 400; 80 TABLET ORAL at 08:03

## 2017-03-29 RX ADMIN — Medication 800 MG: at 01:03

## 2017-03-29 RX ADMIN — ASPIRIN 81 MG: 81 TABLET, COATED ORAL at 08:03

## 2017-03-29 RX ADMIN — PANTOPRAZOLE SODIUM 40 MG: 40 TABLET, DELAYED RELEASE ORAL at 08:03

## 2017-03-29 RX ADMIN — Medication 800 MG: at 08:03

## 2017-03-29 RX ADMIN — NYSTATIN 500000 UNITS: 500000 SUSPENSION ORAL at 11:03

## 2017-03-29 RX ADMIN — TERBUTALINE SULFATE 5 MG: 5 TABLET ORAL at 05:03

## 2017-03-29 RX ADMIN — TACROLIMUS 1 MG: 1 CAPSULE, GELATIN COATED ORAL at 05:03

## 2017-03-29 RX ADMIN — AMLODIPINE BESYLATE 10 MG: 10 TABLET ORAL at 08:03

## 2017-03-29 RX ADMIN — OXYCODONE HYDROCHLORIDE 10 MG: 5 TABLET ORAL at 05:03

## 2017-03-29 NOTE — PLAN OF CARE
"Problem: Patient Care Overview  Goal: Plan of Care Review  Outcome: Ongoing (interventions implemented as appropriate)  AAOX4. VSS, afebrile. DTI to R cheek, and bilateral heels. Dressings to old CT sites and old LVAD site. Wound to R groin dressed CDI. Wound care consulted. Orders on how to cleanse wounds are under the "orders section". Fall precautions maintained and pt repositions self. See flowsheet for assessment findings. Will continue to monitor.      "

## 2017-03-29 NOTE — PROGRESS NOTES
Admit Note     Met with patient to assess needs. Patient is a 56 y.o.  male, admitted for drainage from right groin wound per medical record.  Pt is s/p OHTx on 2/9/17.      Patient admitted from home on 3/28/2017 .  At this time, patient presents as alert and oriented x 4, pleasant, calm, cooperative and asking and answering questions appropriately.  At this time, patients caregiver is not present.    Household/Family Systems     Patient resides with patient's wife Ivette, at:    [address] [facility] [place] [address]      Home phone: [phone]  Pt cell:  [phone]  Manny (son): [phone]    Support system includes wife and three adult children.  Patient does not have dependents that are need of being cared for.    Patients primary caregiver is Ivette, clinton wife.  During admission, patient's caregiver plans to stay at home.  Confirmed patient and patients caregivers do have access to reliable transportation.    Cognitive Status/Learning     Patient reports reading ability as 8th grade and states patient does have difficulty with reading and writing in English.  The pt does not have issues with hearing, eye sight, learning or memory.  Patient reports patient learns best by one-on-one.   Needed: No, pt declines  services   Highest education level: Grade School (0-8)    Vocation/Disability     Working for Income: No  If no, reason not working: Disability  Patient is disabled due to heart failure.     Adherence     Patient reports a high level of adherence to patients health care regimen.  Adherence counseling and education provided. Patient verbalizes understanding.    Substance Use    Patient reports the following substance usage.    Tobacco: none.  Pt quit smoking in 2006.  Alcohol: none, patient denies any use.  Illicit Drugs/Non-prescribed Medications: none, patient denies any use.  Patient states clear understanding of the potential impact of substance use.   Substance abstinence/cessation counseling, education and resources provided and reviewed.     Services Utilizing/ADLS    Infusion Service: Prior to admission, patient utilizing? no  Home Health: Prior to admission, patient utilizing? yes, pt has [facility] (ph: [phone], f: [phone]) for skilled nrsg  DME: Prior to admission? no  Pulmonary/Cardiac Rehab: Prior to admission, no  Dialysis:  Prior to admission, no  Transplant Specialty Pharmacy:  Prior to admission, yes; pt uses [facility].    Prior to admission, patient reports patient was independent with ADLS and was not driving.  Patient reports patient able to care for himself at this time. Patient indicates a willingness to care for self once medically cleared to do so.    Insurance/Medications    Insured by   Payor/Plan Subscr  Sex Relation Sub. Ins. ID Effective Group Num   1. [place] - [place]* PHIL KULKARNI 1960 Male  [id number] 3/1/06                                    [address]   2. [place] PHIL KULKARNI 1960 Male  [id number]* 06                                    [address] [address]      Primary Insurance (for UNOS reporting): Public Insurance - Medicare FFS (Fee For Service)  Secondary Insurance (for UNOS reporting): Public Insurance - Medicaid    Patient reports patient is able to obtain and afford medications at this time and at time of discharge.    Living Will/Healthcare Power of     Patient states patient does not have a LW and/or HCPA.   provided education regarding LW and HCPA and the completion of forms.    Coping/Mental Health    Patient is coping adequately with the aid of  family members.  Patient indicates mental health difficulties.  Pt reports a history of depression and has been taking Zoloft for over 14 years.  Pt reports no current issues with depression.    Discharge Planning    At time of discharge, patient plans to return to patient's home under the care of self and wife.   Patients family will transport patient.  Per rounds today, expected discharge date has not been medically determined at this time. Patient verbalizes understanding and is involved in treatment planning and discharge process.    Additional Concerns    Patient verbalizes understanding and agreement with information reviewed,  availability, and how to access available resources as needed.  Patient denies additional needs or concerns at this time.  SW providing ongoing psychosocial and counseling support, education, resources, assistance, and discharge planning as indicated.  SW following and remains available.

## 2017-03-29 NOTE — PROGRESS NOTES
Progress Note  Cardiology    Admit Date: 3/28/2017   LOS: 1 day     SUBJECTIVE:     Patient seen and examined. Denies chest pain or shortness of breath. Pt denies any other complaints.     Scheduled Meds:   amlodipine  10 mg Oral Daily    aspirin  81 mg Oral Daily    calcium-vitamin D3  1 tablet Oral BID WM    docusate sodium  200 mg Oral QHS    ferrous gluconate  324 mg Oral Daily with breakfast    furosemide  40 mg Oral BID    insulin aspart  15 Units Subcutaneous TIDWM    insulin glargine  20 Units Subcutaneous Daily    magnesium oxide  800 mg Oral TID    mycophenolate  1,000 mg Oral BID    nystatin  5 mL Oral QID (WM & HS)    oxycodone  20 mg Oral Q12H    pantoprazole  40 mg Oral Daily    pravastatin  40 mg Oral Daily    predniSONE  10 mg Oral Daily    sulfamethoxazole-trimethoprim 400-80mg  1 tablet Oral Daily    tacrolimus  1 mg Oral Daily    tacrolimus  2 mg Oral Daily    terbutaline  5 mg Oral TID    valganciclovir  900 mg Oral Daily     Continuous Infusions:   PRN Meds:dextrose 50%, dextrose 50%, glucagon (human recombinant), glucose, glucose, insulin aspart    Review of patient's allergies indicates:   Allergen Reactions    Levemir [insulin detemir] Itching    Pork/porcine containing products     Ranexa [ranolazine] Nausea Only       OBJECTIVE:     Vital Signs (Most Recent)  Temp: 98 °F (36.7 °C) (03/29/17 0800)  Pulse: 96 (03/29/17 0800)  Resp: 18 (03/29/17 0800)  BP: (!) 140/82 (03/29/17 0800)  SpO2: 98 % (03/29/17 0800)    Vital Signs Range (Last 24H):  Temp:  [97.6 °F (36.4 °C)-98 °F (36.7 °C)]   Pulse:  [87-98]   Resp:  [16-19]   BP: (135-140)/(66-82)   SpO2:  [98 %]     I & O (Last 24H):  Intake/Output Summary (Last 24 hours) at 03/29/17 1108  Last data filed at 03/29/17 0800   Gross per 24 hour   Intake             1220 ml   Output             2600 ml   Net            -1380 ml       Physical Exam:   General: Patient in no acute distress or discomfort  HEENT: No JVD, moist  mucous membranes  Cardiac: S1S2 RRR no GMR  Chest: CTABL, no wheezing or rales  Abd:Soft NTND  Ext: No Edema No swelling, R groin wound  Neuro: A and O X 3, non focal    LABS  CBC with Diff:     Recent Labs  Lab 03/28/17  1650 03/29/17  0503   WBC 11.60 9.67   HGB 11.0* 10.6*   HCT 33.6* 33.3*    283   LYMPH 7.3*  0.9* 21.8  2.1   MONO 3.4*  0.4 3.9*  0.4   EOSINOPHIL 0.1 0.4       COAG:  No results for input(s): APTT, INR, PTT in the last 168 hours.    CMP:   Recent Labs  Lab 03/28/17  1650 03/29/17  0503   * 161*   CALCIUM 8.9 8.8   ALBUMIN  --  3.0*   PROT  --  5.8*    140   K 4.8 4.3   CO2 20* 25    105   BUN 27* 23*   CREATININE 1.1 0.8   ALKPHOS  --  74   ALT  --  27   AST  --  15   BILITOT  --  0.4   MG  --  1.4*     Estimated Creatinine Clearance: 115.6 mL/min (based on Cr of 0.8).    .No results for input(s): CPK, TROPONINI, MB, BNP in the last 168 hours.      Present on Admission:   Lymphocele after surgical procedure      ASSESSMENT / PLAN:   55 yo M with PMHx of NICM s/p HM II (8/24/16), now s/p OHTx 2/9/17 who is admitted secondary to lymphocele foul smell/draining more.         S/P Orthotopic Heart Transplantation 2/9/2017  -Moderate Risk of Rejection: cPRA 0%, Negative Crossmatch with B Channel Shifts  -CMV Status: D-/R+: Continue valcyte.  -Immunosuppression: MMF 1000 mg BID, Prednisone 10 mg daily. Tacro 2/1      DM  - SSI/Levimir      R groin Lymphocele-  -Appreciate wound care, has deep wound 5 cm deep, Consulted plastic Surgery  - Culture sent and negative to date.  - Wound care cx.     Jona Chaudhari MD  Cardiology Fellow  3/29/2017 11:08 AM

## 2017-03-29 NOTE — PROGRESS NOTES
Wound care consulted to see for right groin wound:    Right groin wound    RLQ abdominal wound    Left heel stable eschar    Right heel stable eschar    Upper abdominal wounds which are essentially resolved     03/29/17 1100       Incision/Site 02/19/17 1950 Right groin transverse   Date First Assessed/Time First Assessed: 02/19/17 1950   Present Prior to Hospital Arrival?: No  Side: Right  Location: groin  Incision Type: transverse  Closure Method: other (see comments)  Additional Comments: no closure method noted   Incision WDL ex   Dressing Appearance copious drainage;intact   Appearance dehisced;clean  (red with 5cm depth which has not been packed)   Periwound Area normal skin tone   Drainage Characteristics/Odor serous   Drainage Amount copious   Wound Length (cm) 2.4   Wound Width (cm) 0.4   Depth (cm) 5   Wound Edges rolled   Cleansed W/ sterile normal saline   Irrigated W/ sterile normal saline   Dressing hydrofiber;telfa island dressing   Packing other (see comments)  (used a ribbon of aquacel AG)       Incision/Site 09/21/16 1317 Right abdomen midline   Date First Assessed/Time First Assessed: 09/21/16 1317   Present Prior to Hospital Arrival?: No  Side: Right  Location: abdomen  Orientation: midline  Closure Method: sutures   Incision WDL ex   Dressing Appearance dry;intact   Appearance pink;granulating;yellow;slough   Periwound Area normal skin tone   Drainage Characteristics/Odor serous   Drainage Amount moderate   Wound Length (cm) 2   Wound Width (cm) 5   Depth (cm) 2   Wound Edges open   Cleansed W/ sterile normal saline   Irrigated W/ sterile normal saline   Dressing hydrofiber;telfa island dressing       Pressure Ulcer 02/10/17 0705 Right heel Unstageable   Date First Assessed/Time First Assessed: 02/10/17 0705   Pressure Ulcer Present on Admission: no  Side: Right  Location: heel  Staging: Unstageable  Wound Length (cm): 8  Wound Width (cm): 4   Staging Unstageable   Healing Pressure Ulcer yes    Pressure Ulcer Risk Factors activity;mobility;shear/friction;nutrition   Dressing Appearance no dressing   Drainage Amount none   Appearance black eschar   Periwound Area normal skin tone   Wound Edges fixed   Wound Length (cm) 5   Wound Width (cm) 3   Interventions povidone-iodine applied       Pressure Ulcer 02/10/17 0705 Left lateral heel Unstageable   Date First Assessed/Time First Assessed: 02/10/17 0705   Pressure Ulcer Present on Admission: no  Side: Left  Orientation: lateral  Location: heel  Staging: Unstageable  Wound Length (cm): 8  Wound Width (cm): 4   Staging Unstageable   Healing Pressure Ulcer yes   Pressure Ulcer Risk Factors activity;mobility;nutrition;shear/friction   Dressing Appearance no dressing   Drainage Amount none   Appearance black eschar   Periwound Area normal skin tone   Wound Edges fixed   Wound Length (cm) 3   Wound Width (cm) 2   Interventions povidone-iodine applied     Recommendations:  1. Right groin wound may need to be opened up and edges excised as they are becoming rolled. Significant depth of 5cm and pocketing under wound exterior. Has not been sufficiently packed at home and pt is reluctant to have packing introduced.Plastics has been consulted for this site.  Discussed with Dr Marx and his team on rounds.  Aquacel AG hydrofiber utilized as it is a super absorber and he is draining copiously  2. RLQ abdominal wound has been receiving Aquacel AG packing and healing well- would continue twice weekly  3. Betadine daily to bilateral heel stable eschar  4. Keep heels offloaded as much as possible  Isaiah Reyes RN,CWON  [id number]

## 2017-03-29 NOTE — PLAN OF CARE
AAO x 4. VSS, afebrile, SpO2>95% on RA. Telemetry monitoring-SR. BG monitoring-no coverage indicated. DTI to R cheek, and bilateral heels. Dressings to old CT sites and old LVAD site CDI. Wound to R groin dressed CDI. Wound care consulted. PRN oxycodone for pain management. Fall precautions maintained and pt repositions self. See flowsheet for assessment findings. Will continue to monitor.

## 2017-03-30 ENCOUNTER — SURGERY (OUTPATIENT)
Age: 57
End: 2017-03-30

## 2017-03-30 ENCOUNTER — ANESTHESIA (OUTPATIENT)
Dept: SURGERY | Facility: HOSPITAL | Age: 57
DRG: 803 | End: 2017-03-30
Payer: MEDICARE

## 2017-03-30 LAB
ALBUMIN SERPL BCP-MCNC: 3.1 G/DL
ALP SERPL-CCNC: 76 U/L
ALT SERPL W/O P-5'-P-CCNC: 27 U/L
ANION GAP SERPL CALC-SCNC: 11 MMOL/L
AST SERPL-CCNC: 17 U/L
BASOPHILS # BLD AUTO: 0.03 K/UL
BASOPHILS NFR BLD: 0.3 %
BILIRUB SERPL-MCNC: 0.4 MG/DL
BUN SERPL-MCNC: 23 MG/DL
CALCIUM SERPL-MCNC: 8.8 MG/DL
CHLORIDE SERPL-SCNC: 104 MMOL/L
CO2 SERPL-SCNC: 27 MMOL/L
CREAT SERPL-MCNC: 0.9 MG/DL
DIFFERENTIAL METHOD: ABNORMAL
EOSINOPHIL # BLD AUTO: 0 K/UL
EOSINOPHIL NFR BLD: 0.3 %
ERYTHROCYTE [DISTWIDTH] IN BLOOD BY AUTOMATED COUNT: 20.6 %
EST. GFR  (AFRICAN AMERICAN): >60 ML/MIN/1.73 M^2
EST. GFR  (NON AFRICAN AMERICAN): >60 ML/MIN/1.73 M^2
GLUCOSE SERPL-MCNC: 90 MG/DL
GRAM STN SPEC: NORMAL
GRAM STN SPEC: NORMAL
HCT VFR BLD AUTO: 32.5 %
HGB BLD-MCNC: 10.7 G/DL
LYMPHOCYTES # BLD AUTO: 1.9 K/UL
LYMPHOCYTES NFR BLD: 20.4 %
MAGNESIUM SERPL-MCNC: 1.4 MG/DL
MCH RBC QN AUTO: 21 PG
MCHC RBC AUTO-ENTMCNC: 32.9 %
MCV RBC AUTO: 64 FL
MONOCYTES # BLD AUTO: 0.4 K/UL
MONOCYTES NFR BLD: 4 %
NEUTROPHILS # BLD AUTO: 7 K/UL
NEUTROPHILS NFR BLD: 75 %
PLATELET # BLD AUTO: 271 K/UL
PMV BLD AUTO: ABNORMAL FL
POCT GLUCOSE: 139 MG/DL (ref 70–110)
POCT GLUCOSE: 139 MG/DL (ref 70–110)
POCT GLUCOSE: 189 MG/DL (ref 70–110)
POCT GLUCOSE: 196 MG/DL (ref 70–110)
POCT GLUCOSE: 199 MG/DL (ref 70–110)
POCT GLUCOSE: 212 MG/DL (ref 70–110)
POCT GLUCOSE: 228 MG/DL (ref 70–110)
POTASSIUM SERPL-SCNC: 4 MMOL/L
PROT SERPL-MCNC: 5.9 G/DL
RBC # BLD AUTO: 5.1 M/UL
SODIUM SERPL-SCNC: 142 MMOL/L
TACROLIMUS BLD-MCNC: 18.3 NG/ML
WBC # BLD AUTO: 9.42 K/UL

## 2017-03-30 PROCEDURE — 63600175 PHARM REV CODE 636 W HCPCS: Performed by: PAIN MEDICINE

## 2017-03-30 PROCEDURE — 0JBL0ZZ EXCISION OF RIGHT UPPER LEG SUBCUTANEOUS TISSUE AND FASCIA, OPEN APPROACH: ICD-10-PCS | Performed by: SURGERY

## 2017-03-30 PROCEDURE — 25000003 PHARM REV CODE 250: Performed by: HOSPITALIST

## 2017-03-30 PROCEDURE — D9220A PRA ANESTHESIA: Mod: ,,, | Performed by: ANESTHESIOLOGY

## 2017-03-30 PROCEDURE — 99232 SBSQ HOSP IP/OBS MODERATE 35: CPT | Mod: ,,, | Performed by: INTERNAL MEDICINE

## 2017-03-30 PROCEDURE — 36000706: Performed by: SURGERY

## 2017-03-30 PROCEDURE — 63600175 PHARM REV CODE 636 W HCPCS: Performed by: INTERNAL MEDICINE

## 2017-03-30 PROCEDURE — 87102 FUNGUS ISOLATION CULTURE: CPT

## 2017-03-30 PROCEDURE — 87070 CULTURE OTHR SPECIMN AEROBIC: CPT

## 2017-03-30 PROCEDURE — 63600175 PHARM REV CODE 636 W HCPCS: Performed by: ANESTHESIOLOGY

## 2017-03-30 PROCEDURE — 25000003 PHARM REV CODE 250: Performed by: STUDENT IN AN ORGANIZED HEALTH CARE EDUCATION/TRAINING PROGRAM

## 2017-03-30 PROCEDURE — 85025 COMPLETE CBC W/AUTO DIFF WBC: CPT

## 2017-03-30 PROCEDURE — 87176 TISSUE HOMOGENIZATION CULTR: CPT

## 2017-03-30 PROCEDURE — 87205 SMEAR GRAM STAIN: CPT

## 2017-03-30 PROCEDURE — 36000707: Performed by: SURGERY

## 2017-03-30 PROCEDURE — 25000003 PHARM REV CODE 250: Performed by: NURSE PRACTITIONER

## 2017-03-30 PROCEDURE — 87077 CULTURE AEROBIC IDENTIFY: CPT

## 2017-03-30 PROCEDURE — 87186 SC STD MICRODIL/AGAR DIL: CPT

## 2017-03-30 PROCEDURE — 71000033 HC RECOVERY, INTIAL HOUR: Performed by: SURGERY

## 2017-03-30 PROCEDURE — 27000221 HC OXYGEN, UP TO 24 HOURS

## 2017-03-30 PROCEDURE — 80197 ASSAY OF TACROLIMUS: CPT

## 2017-03-30 PROCEDURE — 71000039 HC RECOVERY, EACH ADD'L HOUR: Performed by: SURGERY

## 2017-03-30 PROCEDURE — 83735 ASSAY OF MAGNESIUM: CPT

## 2017-03-30 PROCEDURE — 0KXQ0ZZ TRANSFER RIGHT UPPER LEG MUSCLE, OPEN APPROACH: ICD-10-PCS | Performed by: SURGERY

## 2017-03-30 PROCEDURE — 25000003 PHARM REV CODE 250: Performed by: PAIN MEDICINE

## 2017-03-30 PROCEDURE — 13122 CMPLX RPR S/A/L ADDL 5 CM/>: CPT | Mod: ,,, | Performed by: SURGERY

## 2017-03-30 PROCEDURE — 63600175 PHARM REV CODE 636 W HCPCS: Performed by: NURSE PRACTITIONER

## 2017-03-30 PROCEDURE — 80053 COMPREHEN METABOLIC PANEL: CPT

## 2017-03-30 PROCEDURE — 15738 MUSCLE-SKIN GRAFT LEG: CPT | Mod: ,,, | Performed by: SURGERY

## 2017-03-30 PROCEDURE — 20600001 HC STEP DOWN PRIVATE ROOM

## 2017-03-30 PROCEDURE — 37000009 HC ANESTHESIA EA ADD 15 MINS: Performed by: SURGERY

## 2017-03-30 PROCEDURE — 63600175 PHARM REV CODE 636 W HCPCS

## 2017-03-30 PROCEDURE — 13121 CMPLX RPR S/A/L 2.6-7.5 CM: CPT | Mod: 59,,, | Performed by: SURGERY

## 2017-03-30 PROCEDURE — 37000008 HC ANESTHESIA 1ST 15 MINUTES: Performed by: SURGERY

## 2017-03-30 PROCEDURE — 15002 WOUND PREP TRK/ARM/LEG: CPT | Mod: ,,, | Performed by: SURGERY

## 2017-03-30 PROCEDURE — 87075 CULTR BACTERIA EXCEPT BLOOD: CPT

## 2017-03-30 PROCEDURE — 36415 COLL VENOUS BLD VENIPUNCTURE: CPT

## 2017-03-30 RX ORDER — FENTANYL CITRATE 50 UG/ML
25 INJECTION, SOLUTION INTRAMUSCULAR; INTRAVENOUS EVERY 5 MIN PRN
Status: COMPLETED | OUTPATIENT
Start: 2017-03-30 | End: 2017-03-30

## 2017-03-30 RX ORDER — PROPOFOL 10 MG/ML
VIAL (ML) INTRAVENOUS
Status: DISCONTINUED | OUTPATIENT
Start: 2017-03-30 | End: 2017-03-30

## 2017-03-30 RX ORDER — HYDROCODONE BITARTRATE AND ACETAMINOPHEN 7.5; 325 MG/1; MG/1
1 TABLET ORAL EVERY 4 HOURS PRN
Status: DISCONTINUED | OUTPATIENT
Start: 2017-03-30 | End: 2017-04-03

## 2017-03-30 RX ORDER — HYDROMORPHONE HYDROCHLORIDE 1 MG/ML
0.2 INJECTION, SOLUTION INTRAMUSCULAR; INTRAVENOUS; SUBCUTANEOUS ONCE
Status: COMPLETED | OUTPATIENT
Start: 2017-03-31 | End: 2017-03-31

## 2017-03-30 RX ORDER — PREDNISONE 5 MG/1
5 TABLET ORAL DAILY
Status: DISCONTINUED | OUTPATIENT
Start: 2017-03-31 | End: 2017-04-10 | Stop reason: HOSPADM

## 2017-03-30 RX ORDER — HYDROMORPHONE HYDROCHLORIDE 1 MG/ML
0.2 INJECTION, SOLUTION INTRAMUSCULAR; INTRAVENOUS; SUBCUTANEOUS ONCE
Status: COMPLETED | OUTPATIENT
Start: 2017-03-30 | End: 2017-03-30

## 2017-03-30 RX ORDER — FENTANYL CITRATE 50 UG/ML
INJECTION, SOLUTION INTRAMUSCULAR; INTRAVENOUS
Status: COMPLETED
Start: 2017-03-30 | End: 2017-03-30

## 2017-03-30 RX ORDER — KETAMINE HYDROCHLORIDE 100 MG/ML
INJECTION, SOLUTION INTRAMUSCULAR; INTRAVENOUS
Status: DISCONTINUED | OUTPATIENT
Start: 2017-03-30 | End: 2017-03-30

## 2017-03-30 RX ORDER — ONDANSETRON 2 MG/ML
INJECTION INTRAMUSCULAR; INTRAVENOUS
Status: DISCONTINUED | OUTPATIENT
Start: 2017-03-30 | End: 2017-03-30

## 2017-03-30 RX ORDER — NEOSTIGMINE METHYLSULFATE 1 MG/ML
INJECTION, SOLUTION INTRAVENOUS
Status: DISCONTINUED | OUTPATIENT
Start: 2017-03-30 | End: 2017-03-30

## 2017-03-30 RX ORDER — CLINDAMYCIN PHOSPHATE 900 MG/50ML
INJECTION, SOLUTION INTRAVENOUS
Status: DISCONTINUED | OUTPATIENT
Start: 2017-03-30 | End: 2017-03-30

## 2017-03-30 RX ORDER — HYDROMORPHONE HYDROCHLORIDE 1 MG/ML
INJECTION, SOLUTION INTRAMUSCULAR; INTRAVENOUS; SUBCUTANEOUS
Status: DISCONTINUED
Start: 2017-03-30 | End: 2017-03-30 | Stop reason: WASHOUT

## 2017-03-30 RX ORDER — ROCURONIUM BROMIDE 10 MG/ML
INJECTION, SOLUTION INTRAVENOUS
Status: DISCONTINUED | OUTPATIENT
Start: 2017-03-30 | End: 2017-03-30

## 2017-03-30 RX ORDER — LIDOCAINE HCL/PF 100 MG/5ML
SYRINGE (ML) INTRAVENOUS
Status: DISCONTINUED | OUTPATIENT
Start: 2017-03-30 | End: 2017-03-30

## 2017-03-30 RX ORDER — MIDAZOLAM HYDROCHLORIDE 1 MG/ML
INJECTION, SOLUTION INTRAMUSCULAR; INTRAVENOUS
Status: DISCONTINUED | OUTPATIENT
Start: 2017-03-30 | End: 2017-03-30

## 2017-03-30 RX ORDER — SODIUM CHLORIDE 9 MG/ML
INJECTION, SOLUTION INTRAVENOUS CONTINUOUS
Status: DISCONTINUED | OUTPATIENT
Start: 2017-03-30 | End: 2017-04-10 | Stop reason: HOSPADM

## 2017-03-30 RX ORDER — FENTANYL CITRATE 50 UG/ML
INJECTION, SOLUTION INTRAMUSCULAR; INTRAVENOUS
Status: DISCONTINUED | OUTPATIENT
Start: 2017-03-30 | End: 2017-03-30

## 2017-03-30 RX ORDER — HYDROMORPHONE HYDROCHLORIDE 1 MG/ML
INJECTION, SOLUTION INTRAMUSCULAR; INTRAVENOUS; SUBCUTANEOUS
Status: DISPENSED
Start: 2017-03-30 | End: 2017-03-31

## 2017-03-30 RX ORDER — SODIUM CHLORIDE 9 MG/ML
INJECTION, SOLUTION INTRAVENOUS CONTINUOUS PRN
Status: DISCONTINUED | OUTPATIENT
Start: 2017-03-30 | End: 2017-03-30

## 2017-03-30 RX ORDER — ONDANSETRON 2 MG/ML
4 INJECTION INTRAMUSCULAR; INTRAVENOUS DAILY PRN
Status: DISCONTINUED | OUTPATIENT
Start: 2017-03-30 | End: 2017-03-30 | Stop reason: HOSPADM

## 2017-03-30 RX ORDER — HYDROMORPHONE HYDROCHLORIDE 1 MG/ML
0.3 INJECTION, SOLUTION INTRAMUSCULAR; INTRAVENOUS; SUBCUTANEOUS ONCE
Status: COMPLETED | OUTPATIENT
Start: 2017-03-30 | End: 2017-03-30

## 2017-03-30 RX ORDER — HYDROMORPHONE HYDROCHLORIDE 1 MG/ML
0.2 INJECTION, SOLUTION INTRAMUSCULAR; INTRAVENOUS; SUBCUTANEOUS EVERY 5 MIN PRN
Status: DISCONTINUED | OUTPATIENT
Start: 2017-03-30 | End: 2017-03-30 | Stop reason: HOSPADM

## 2017-03-30 RX ORDER — SODIUM CHLORIDE 0.9 % (FLUSH) 0.9 %
3 SYRINGE (ML) INJECTION
Status: DISCONTINUED | OUTPATIENT
Start: 2017-03-30 | End: 2017-03-30 | Stop reason: HOSPADM

## 2017-03-30 RX ORDER — MAGNESIUM SULFATE HEPTAHYDRATE 40 MG/ML
2 INJECTION, SOLUTION INTRAVENOUS ONCE
Status: DISCONTINUED | OUTPATIENT
Start: 2017-03-30 | End: 2017-04-04

## 2017-03-30 RX ORDER — GLYCOPYRROLATE 0.2 MG/ML
INJECTION INTRAMUSCULAR; INTRAVENOUS
Status: DISCONTINUED | OUTPATIENT
Start: 2017-03-30 | End: 2017-03-30

## 2017-03-30 RX ADMIN — HYDROMORPHONE HYDROCHLORIDE 0.2 MG: 1 INJECTION, SOLUTION INTRAMUSCULAR; INTRAVENOUS; SUBCUTANEOUS at 10:03

## 2017-03-30 RX ADMIN — SULFAMETHOXAZOLE AND TRIMETHOPRIM 1 TABLET: 400; 80 TABLET ORAL at 08:03

## 2017-03-30 RX ADMIN — MIDAZOLAM HYDROCHLORIDE 2 MG: 1 INJECTION, SOLUTION INTRAMUSCULAR; INTRAVENOUS at 01:03

## 2017-03-30 RX ADMIN — HYDROMORPHONE HYDROCHLORIDE 0.2 MG: 1 INJECTION, SOLUTION INTRAMUSCULAR; INTRAVENOUS; SUBCUTANEOUS at 04:03

## 2017-03-30 RX ADMIN — OXYCODONE HYDROCHLORIDE 20 MG: 10 TABLET, FILM COATED, EXTENDED RELEASE ORAL at 08:03

## 2017-03-30 RX ADMIN — FENTANYL CITRATE 100 MCG: 50 INJECTION, SOLUTION INTRAMUSCULAR; INTRAVENOUS at 01:03

## 2017-03-30 RX ADMIN — FENTANYL CITRATE 25 MCG: 50 INJECTION INTRAMUSCULAR; INTRAVENOUS at 04:03

## 2017-03-30 RX ADMIN — HYDROCODONE BITARTRATE AND ACETAMINOPHEN 1 TABLET: 7.5; 325 TABLET ORAL at 03:03

## 2017-03-30 RX ADMIN — PROPOFOL 100 MG: 10 INJECTION, EMULSION INTRAVENOUS at 01:03

## 2017-03-30 RX ADMIN — AMLODIPINE BESYLATE 10 MG: 10 TABLET ORAL at 08:03

## 2017-03-30 RX ADMIN — NEOSTIGMINE METHYLSULFATE 5 MG: 1 INJECTION INTRAVENOUS at 03:03

## 2017-03-30 RX ADMIN — PRAVASTATIN SODIUM 40 MG: 40 TABLET ORAL at 08:03

## 2017-03-30 RX ADMIN — GLYCOPYRROLATE 0.6 MG: 0.2 INJECTION, SOLUTION INTRAMUSCULAR; INTRAVENOUS at 03:03

## 2017-03-30 RX ADMIN — PANTOPRAZOLE SODIUM 40 MG: 40 TABLET, DELAYED RELEASE ORAL at 08:03

## 2017-03-30 RX ADMIN — SODIUM CHLORIDE, SODIUM GLUCONATE, SODIUM ACETATE, POTASSIUM CHLORIDE, MAGNESIUM CHLORIDE, SODIUM PHOSPHATE, DIBASIC, AND POTASSIUM PHOSPHATE: .53; .5; .37; .037; .03; .012; .00082 INJECTION, SOLUTION INTRAVENOUS at 01:03

## 2017-03-30 RX ADMIN — HYDROMORPHONE HYDROCHLORIDE 0.2 MG: 1 INJECTION, SOLUTION INTRAMUSCULAR; INTRAVENOUS; SUBCUTANEOUS at 05:03

## 2017-03-30 RX ADMIN — ONDANSETRON 4 MG: 2 INJECTION INTRAMUSCULAR; INTRAVENOUS at 02:03

## 2017-03-30 RX ADMIN — FUROSEMIDE 40 MG: 40 TABLET ORAL at 08:03

## 2017-03-30 RX ADMIN — HYDROMORPHONE HYDROCHLORIDE 0.3 MG: 1 INJECTION, SOLUTION INTRAMUSCULAR; INTRAVENOUS; SUBCUTANEOUS at 07:03

## 2017-03-30 RX ADMIN — MYCOPHENOLATE MOFETIL 1000 MG: 250 CAPSULE ORAL at 08:03

## 2017-03-30 RX ADMIN — DOCUSATE SODIUM 200 MG: 100 CAPSULE, LIQUID FILLED ORAL at 08:03

## 2017-03-30 RX ADMIN — INSULIN GLARGINE 20 UNITS: 100 INJECTION, SOLUTION SUBCUTANEOUS at 09:03

## 2017-03-30 RX ADMIN — HYDROCODONE BITARTRATE AND ACETAMINOPHEN 1 TABLET: 7.5; 325 TABLET ORAL at 08:03

## 2017-03-30 RX ADMIN — KETAMINE HYDROCHLORIDE 10 MG: 100 INJECTION, SOLUTION, CONCENTRATE INTRAMUSCULAR; INTRAVENOUS at 02:03

## 2017-03-30 RX ADMIN — FENTANYL CITRATE 25 MCG: 50 INJECTION INTRAMUSCULAR; INTRAVENOUS at 03:03

## 2017-03-30 RX ADMIN — FENTANYL CITRATE 50 MCG: 50 INJECTION, SOLUTION INTRAMUSCULAR; INTRAVENOUS at 01:03

## 2017-03-30 RX ADMIN — FENTANYL CITRATE 50 MCG: 50 INJECTION, SOLUTION INTRAMUSCULAR; INTRAVENOUS at 02:03

## 2017-03-30 RX ADMIN — TACROLIMUS 2 MG: 1 CAPSULE, GELATIN COATED ORAL at 08:03

## 2017-03-30 RX ADMIN — SODIUM CHLORIDE: 0.9 INJECTION, SOLUTION INTRAVENOUS at 01:03

## 2017-03-30 RX ADMIN — ROCURONIUM BROMIDE 40 MG: 10 INJECTION, SOLUTION INTRAVENOUS at 01:03

## 2017-03-30 RX ADMIN — Medication 800 MG: at 08:03

## 2017-03-30 RX ADMIN — PREDNISONE 10 MG: 10 TABLET ORAL at 08:03

## 2017-03-30 RX ADMIN — LIDOCAINE HYDROCHLORIDE 100 MG: 20 INJECTION, SOLUTION INTRAVENOUS at 01:03

## 2017-03-30 RX ADMIN — INSULIN ASPART 15 UNITS: 100 INJECTION, SOLUTION INTRAVENOUS; SUBCUTANEOUS at 07:03

## 2017-03-30 RX ADMIN — VALGANCICLOVIR 900 MG: 450 TABLET, FILM COATED ORAL at 08:03

## 2017-03-30 RX ADMIN — TERBUTALINE SULFATE 5 MG: 5 TABLET ORAL at 06:03

## 2017-03-30 RX ADMIN — KETAMINE HYDROCHLORIDE 20 MG: 100 INJECTION, SOLUTION, CONCENTRATE INTRAMUSCULAR; INTRAVENOUS at 01:03

## 2017-03-30 RX ADMIN — CLINDAMYCIN PHOSPHATE 900 MG: 18 INJECTION, SOLUTION INTRAVENOUS at 01:03

## 2017-03-30 RX ADMIN — Medication 800 MG: at 06:03

## 2017-03-30 RX ADMIN — INSULIN ASPART 2 UNITS: 100 INJECTION, SOLUTION INTRAVENOUS; SUBCUTANEOUS at 04:03

## 2017-03-30 NOTE — ANESTHESIA RELEASE NOTE
"Anesthesia Release from PACU Note    Patient: Mick Barriga    Procedure(s) Performed: Procedure(s) (LRB):  Repair/ligation of leaking lymphatic with muscle flap coverage.  R groin (Right)    Anesthesia type: general    Post pain: Adequate analgesia    Post assessment: no apparent anesthetic complications, tolerated procedure well and no evidence of recall    Last Vitals:   Visit Vitals    BP (!) 142/77    Pulse 89    Temp 36.7 °C (98.1 °F) (Axillary)    Resp 13    Ht 5' 8" (1.727 m)    Wt 90.7 kg (200 lb)    SpO2 96%    BMI 30.41 kg/m2       Post vital signs: stable    Level of consciousness: awake, alert  and oriented    Nausea/Vomiting: no nausea/no vomiting    Complications: none    Airway Patency: patent    Respiratory: unassisted, spontaneous ventilation, room air    Cardiovascular: stable and blood pressure at baseline    Hydration: euvolemic  "

## 2017-03-30 NOTE — NURSING TRANSFER
Nursing Transfer Note      3/30/2017     Transfer To: [id number]     Transfer via stretcher    Transfer with cardiac monitoring (tele tech notified); wound vac @ 75mmHg    Transported by Sonia PCT    Medicines sent: insulin pen    Chart send with patient: Yes    Notified: spouse    Patient reassessed at: 3/30/17

## 2017-03-30 NOTE — CONSULTS
[facility]  Adult Nutrition  Consult Note    SUMMARY     Recommendations    Recommendation/Intervention: Pt in PACU at time of assessment.   1. Once medically able, advance diet as tolerated to diabetic 2200 kcal, texture per SLP.   2. Order boost glucose control OS  TID to supplement meals for increased protein needs.    3. Recommend arginaid BID, 500 mg vitamin C, 220 mg of zinc to promote wound healing.    RD following.     Goals: Pt to receive nutrition within 48 hours  Nutrition Goal Status: new  Communication of RD Recs: reviewed with RN      Reason for Assessment    Reason for Assessment: nurse/nurse practitioner consult  Diagnosis: surgery/postoperative complications (lymphocele after surgical procedure, DM2, GERD )  Relevent Medical History: Heart tx         General Information Comments: Nutrition Discharge planning: unable to determine at this time    Nutrition Prescription Ordered    Current Diet Order: NPO           Evaluation of Received Nutrients/Fluid Intake       Comments: LBM 3/29/17        Nutrition Risk Screen     Nutrition Risk Screen: no indicators present    Nutrition/Diet History    Patient Reported Diet/Restrictions/Preferences:  (STEVEN)  Typical Food/Fluid Intake: Pt in PACU, unable to speak to pt today.  Pt is NPO with wounds.  Food Preferences: Zoroastrian/cultural prefs from prior admission noted; NO PORK        Factors Affecting Nutritional Intake: NPO       Labs/Tests/Procedures/Meds       Pertinent Labs Reviewed: reviewed, pertinent  Pertinent Labs Comments: BUN-23, mg-1.4, protein 5.9, albumin-3.1     Pertinent Medications Comments: lasix, insulin, mg, pantoprazole, statin, prednisone. tacrolimus    Physical Findings    Overall Physical Appearance: overweight        Skin: pressure ulcer(s) (multiple wounds)    Anthropometrics       Height (inches): 67.99 in  Weight Method: Stated  Weight (kg): 90.7 kg  Ideal Body Weight (IBW), Male: 153.94 lb     % Ideal Body Weight,  Male (lb): 129.89 lb     BMI (kg/m2): 30.41  BMI Grade: 30 - 34.9- obesity - grade I  Usual Body Weight (UBW), k kg (from previous admission)  % Usual Body Weight: 101.91         Estimated/Assessed Needs    Weight Used For Calorie Calculations: 90.7 kg (199 lb 15.3 oz)   Height (cm): 172.7 cm     Energy Need Method: Glasscock-St Jeor (2155 kcal/day (1.25 PAL))  RMR (Glasscock-St. Jeor Equation): 1724.79        Weight Used For Protein Calculations: 90.7 kg (199 lb 15.3 oz)  Protein Requirements: 118 g/day  Fluid Need Method: RDA Method (or per MD)         Monitor and Evaluation    Food and Nutrient Intake: energy intake, food and beverage intake  Food and Nutrient Adminstration: diet order        Anthropometric Measurements: weight, weight change, body mass index  Biochemical Data, Medical Tests and Procedures: electrolyte and renal panel, gastrointestinal profile, glucose/endocrine profile  Nutrition-Focused Physical Findings: overall appearance    Nutrition Risk    Level of Risk:  (1 x per week)    Nutrition Follow-Up    RD Follow-up?: Yes    Nutrition Diagnosis:    Inadequate energy intake r/t inability to consume adequate nutrition- surgical procedure AEB NPO status. - New

## 2017-03-30 NOTE — ANESTHESIA POSTPROCEDURE EVALUATION
"Anesthesia Post Evaluation    Patient: Mick Barriga    Procedure(s) Performed: Procedure(s) (LRB):  Repair/ligation of leaking lymphatic with muscle flap coverage.  R groin (Right)    Final Anesthesia Type: general  Patient location during evaluation: PACU  Patient participation: Yes- Able to Participate  Level of consciousness: awake and alert and oriented  Post-procedure vital signs: reviewed and stable  Pain management: adequate  Airway patency: patent  PONV status at discharge: No PONV  Anesthetic complications: no      Cardiovascular status: blood pressure returned to baseline, stable and hemodynamically stable  Respiratory status: unassisted, spontaneous ventilation and room air  Hydration status: euvolemic  Follow-up not needed.        Visit Vitals    /76    Pulse 87    Temp 36.7 °C (98.1 °F) (Axillary)    Resp 13    Ht 5' 8" (1.727 m)    Wt 90.7 kg (200 lb)    SpO2 98%    BMI 30.41 kg/m2       Pain/Hugo Score: Pain Assessment Performed: Yes (3/30/2017  3:25 PM)  Presence of Pain: complains of pain/discomfort (3/30/2017  3:25 PM)  Pain Rating Prior to Med Admin: 5 (3/30/2017  5:00 PM)  Hugo Score: 8 (3/30/2017  3:25 PM)      "

## 2017-03-30 NOTE — TRANSFER OF CARE
"Anesthesia Transfer of Care Note    Patient: Mick Barriga    Procedure(s) Performed: Procedure(s) (LRB):  Repair/ligation of leaking lymphatic with muscle flap coverage.  R groin (Right)    Patient location: PACU    Anesthesia Type: general    Transport from OR: Transported from OR on 6-10 L/min O2 by face mask with adequate spontaneous ventilation    Post pain: adequate analgesia    Post assessment: no apparent anesthetic complications    Post vital signs: stable    Level of consciousness: lethargic    Nausea/Vomiting: no nausea/vomiting    Complications: none          Last vitals:   Visit Vitals    BP (!) 143/70 (BP Location: Right arm, Patient Position: Lying, BP Method: Automatic)    Pulse 89    Temp 36.7 °C (98.1 °F) (Axillary)    Resp 20    Ht 5' 8" (1.727 m)    Wt 90.7 kg (200 lb)    SpO2 100%    BMI 30.41 kg/m2     "

## 2017-03-30 NOTE — NURSING
Spoke to GARDENIA Roach NP re: no IV pump available at this time to administer mag sulfate.  NP to order PO supplement.

## 2017-03-30 NOTE — ANESTHESIA PREPROCEDURE EVALUATION
03/29/2017  Mick Barriga is a 56 y.o., male with PMHx of NICM s/p HM II (8/24/16), now s/p OHTx 2/9/17, DM2 (inuslin), GERD, who is admitted secondary to lymphocele, scheduled for following procedure.     LDAs  PIV 22G R hand    INFUSIONS  None    Previous airway  Direct laryngoscopy; Inserted by: Anesthesia MD; Airway Device: Endotracheal Tube; Mask Ventilation: Easy; Intubated: Postinduction; Blade: Salazar #2; Airway Device Size: 8.0; Style: Cuffed; Cuff Inflation: Minimal occlusive pressure; Inflation Amount: 5; Placement Verified By: Auscultation, Capnometry; Grade: Grade I; Complicating Factors: None; Intubation Findings: Positive EtCO2, Bilateral breath sounds, Atraumatic/Condition of teeth unchanged;  Depth of Insertion: 22; Securment: Lips; Complications: None; Breath Sounds: Equal Bilateral; Insertion Attempts: 1    Pre-operative evaluation for Procedure(s) (LRB):  Repair/ligation of leaking lymphatic with muscle flap coverage.  R groin (N/A)  Repair/ligation of leaking lymphatic with muscle flap coverage.  R groin (Right)    Mick Barriga is a 56 y.o. male     Patient Active Problem List   Diagnosis    Essential hypertension    Hyperlipidemia    GERD (gastroesophageal reflux disease)    Type 2 diabetes mellitus with stage 3 chronic kidney disease, with long-term current use of insulin    Hyponatremia    Obesity (BMI 30-39.9)    Benign prostatic hyperplasia    Heart transplanted    Adrenal cortical steroids causing adverse effect in therapeutic use    Immunosuppression    Heart transplant, orthotopic, status    Numbness of left hand    Lymphocele    Lymphocele after surgical procedure       Review of patient's allergies indicates:   Allergen Reactions    Levemir [insulin detemir] Itching    Pork/porcine containing products     Ranexa [ranolazine] Nausea Only       No current  facility-administered medications on file prior to encounter.      Current Outpatient Prescriptions on File Prior to Encounter   Medication Sig Dispense Refill    amlodipine (NORVASC) 10 MG tablet Take 1 tablet (10 mg total) by mouth once daily. 30 tablet 11    aspirin (ECOTRIN) 81 MG EC tablet Take 1 tablet (81 mg total) by mouth once daily.  0    calcium-vitamin D3 500 mg(1,250mg) -200 unit per tablet Take 1 tablet by mouth 2 (two) times daily with meals. 60 tablet 11    esomeprazole (NEXIUM) 40 MG capsule Take 1 capsule (40 mg total) by mouth before breakfast. 90 capsule 3    ferrous gluconate (FERGON) 324 MG tablet Take 1 tablet (324 mg total) by mouth daily with breakfast. 90 tablet 3    furosemide (LASIX) 80 MG tablet Take 0.5 tablets (40 mg total) by mouth 2 (two) times daily. 60 tablet 11    magnesium oxide (MAG-OX) 400 mg tablet Take 2 tablets (800 mg total) by mouth 3 (three) times daily. 540 tablet 3    mycophenolate (CELLCEPT) 250 mg Cap Take 6 capsules (1,500 mg total) by mouth 2 (two) times daily. 360 capsule 11    nystatin (MYCOSTATIN) 100,000 unit/mL suspension Take 5 mLs (500,000 Units total) by mouth 4 (four) times daily with meals and nightly. 473 mL 5    oxycodone (ROXICODONE) 10 mg Tab immediate release tablet Take 1 tablet (10 mg total) by mouth every 4 (four) hours as needed (pain). 56 tablet 0    pravastatin (PRAVACHOL) 40 MG tablet Take 1 tablet (40 mg total) by mouth once daily. 30 tablet 11    predniSONE (DELTASONE) 10 MG tablet Take 1 tablet (10 mg total) by mouth once daily. 120 tablet 11    sulfamethoxazole-trimethoprim 400-80mg (BACTRIM,SEPTRA) 400-80 mg per tablet Take 1 tablet by mouth once daily. 30 tablet 11    tacrolimus (PROGRAF) 1 MG Cap Take 2mg orally in the morning and 1mg orally in the evening 150 capsule 11    terbutaline (BRETHINE) 5 mg Tab Take 1 tablet (5 mg total) by mouth 3 (three) times daily. 90 tablet 11    valganciclovir (VALCYTE) 450 mg Tab TAKE 2  TABLETS(900 MG) BY MOUTH EVERY  tablet 2    alendronate (FOSAMAX) 70 MG tablet TAKE 1 TABLET BY MOUTH EVERY 7 DAYS 12 tablet 11    blood sugar diagnostic Strp To use to check BG 4 times daily, to use with insurance preferred meter 350 strip 3    blood-glucose meter kit To use to check BG 4 times daily, to use with insurance preferred meter 1 each 0    docusate sodium (COLACE) 100 MG capsule Take 2 capsules (200 mg total) by mouth every evening. (Patient taking differently: Take 100 mg by mouth 2 (two) times daily. ) 180 capsule 3    insulin aspart (NOVOLOG) 100 unit/mL InPn pen Inject 15 Units into the skin 3 (three) times daily. 1 Box 0    insulin glargine (LANTUS SOLOSTAR) 100 unit/mL (3 mL) InPn pen Inject 28 Units into the skin every evening. 15 mL 3    lancets Misc To use to check BG 4 times daily, to use with insurance preferred meter 350 each 3       Past Surgical History:   Procedure Laterality Date    CARDIAC PACEMAKER PLACEMENT      CHOLECYSTECTOMY      COLONOSCOPY N/A 1/11/2017    Procedure: COLONOSCOPY;  Surgeon: Petr Almanzar MD;  Location: [address]);  Service: Endoscopy;  Laterality: N/A;    COLONOSCOPY N/A 1/12/2017    Procedure: COLONOSCOPY;  Surgeon: Petr Almanzar MD;  Location: [address]);  Service: Endoscopy;  Laterality: N/A;    HEART TRANSPLANT  02/2017    LEFT VENTRICULAR ASSIST DEVICE      x 3 months ago       Social History     Social History    Marital status:      Spouse name: N/A    Number of children: N/A    Years of education: N/A     Occupational History    Not on file.     Social History Main Topics    Smoking status: Former Smoker     Packs/day: 0.50     Years: 15.00     Quit date: 6/14/2006    Smokeless tobacco: Never Used    Alcohol use No    Drug use: Not on file    Sexual activity: Yes     Partners: Female     Other Topics Concern    Not on file     Social History Narrative         Vital Signs Range (Last 24H):  Temp:   [36.4 °C (97.6 °F)-36.7 °C (98 °F)]   Pulse:  []   Resp:  [16-18]   BP: (135-140)/(70-82)   SpO2:  [98 %]       CBC:   Recent Labs      03/28/17   1650  03/29/17   0503   WBC  11.60  9.67   RBC  5.21  5.07   HGB  11.0*  10.6*   HCT  33.6*  33.3*   PLT  299  283   MCV  65*  66*   MCH  21.1*  20.9*   MCHC  32.7  31.8*       CMP:   Recent Labs      03/28/17   1650  03/29/17   0503   NA  137  140   K  4.8  4.3   CL  104  105   CO2  20*  25   BUN  27*  23*   CREATININE  1.1  0.8   GLU  284*  161*   MG   --   1.4*   CALCIUM  8.9  8.8   ALBUMIN   --   3.0*   PROT   --   5.8*   ALKPHOS   --   74   ALT   --   27   AST   --   15   BILITOT   --   0.4       INR  No results for input(s): INR, PROTIME, APTT in the last 72 hours.    Invalid input(s): PT        Diagnostic Studies:      EKG:  Sinus tachycardia versus svt with short RP  Right bundle branch block  Abnormal ECG  When compared with ECG of 11-JAN-2017 21:43,  current rhythm has replaced Electronic ventricular pacemaker  Vent. rate has increased BY 41 BPM  Confirmed by NIRANJAN FORRESTER, JEOVANNY ([id number]) on 2/9/2017 5:05:38 PM     2D Echo 3/21/17:  CONCLUSIONS     1 - Post-cardiac transplantation study.     2 - Normal left ventricular systolic function (EF 60-65%).     3 - Concentric hypertrophy.     4 - Normal left ventricular diastolic function.     5 - Normal right ventricular systolic function .     6 - Small pericardial effusion.     7 - This study was performed in the setting of RV biopsy. No significant changes noted pre and post biopsy.       [facility] Anesthesia Evaluation    I have reviewed the Patient Summary Reports.     I have reviewed the Medications.   Steroids Taken In Past Year: Prednisone    Review of Systems  Anesthesia Hx:  No problems with previous Anesthesia  History of prior surgery of interest to airway management or planning: Denies Family Hx of Anesthesia complications.   Denies Personal Hx of Anesthesia complications.   Social:  Former Smoker, No Alcohol  Use    Hematology/Oncology:     Oncology Normal    -- Anemia:   EENT/Dental:EENT/Dental Normal   Cardiovascular:   Hypertension, well controlled CAD   CHF S/p heart transplant 2/2017; EF 60%   Pulmonary:  Pulmonary Normal    Renal/:   Chronic Renal Disease    Hepatic/GI:   GERD Denies Liver Disease. Denies Hepatitis.    Neurological:  Neurology Normal    Endocrine:   Diabetes, poorly controlled, type 2        Physical Exam  General:  Well nourished    Airway/Jaw/Neck:  Airway Findings: Mouth Opening: Normal Tongue: Normal  General Airway Assessment: Adult  Mallampati: III  Improves to II with phonation.  TM Distance: Normal, at least 6 cm  Jaw/Neck Findings:  Neck ROM: Normal ROM      Dental:  Dental Findings: lower partial dentures    Chest/Lungs:  Chest/Lungs Findings: Clear to auscultation, Normal Respiratory Rate  Dyspneic.     Heart/Vascular:  Heart Findings: Rate: Normal  Rhythm: Regular Rhythm  LVAD heard throughout precordium, Trace pitting edema BLE   Abdomen:  Abdomen Findings: Normal    Musculoskeletal:  Musculoskeletal Findings: Normal   Skin:  Skin Findings: Normal    Mental Status:  Mental Status Findings:  Cooperative, Alert and Oriented         Anesthesia Plan  Type of Anesthesia, risks & benefits discussed:  Anesthesia Type:  general  Patient's Preference: General   Intra-op Monitoring Plan: standard ASA monitors  Intra-op Monitoring Plan Comments:   Post Op Pain Control Plan:   Post Op Pain Control Plan Comments:   Induction:   IV  Beta Blocker:  Patient is not currently on a Beta-Blocker (No further documentation required).       Informed Consent: Patient understands risks and agrees with Anesthesia plan.  Questions answered. Anesthesia consent signed with patient.  ASA Score: 3     Day of Surgery Review of History & Physical: I have interviewed and examined the patient. I have reviewed the patient's H&P dated:  There are no significant changes.  H&P update referred to the surgeon.      Anesthesia Plan Notes: Pt describes ulnar neuropathy of left arm which has been present prior to surgery.   Plan 2nd PIV. No invasive monitoring required per discussions with Dr. Woodruff.         Ready For Surgery From Anesthesia Perspective.

## 2017-03-30 NOTE — NURSING
Spoke to GARDENIA Roach NP re: patient's microbiology from [facility] grew enterococcus.  NP to review with PharmMD

## 2017-03-30 NOTE — PLAN OF CARE
Problem: Patient Care Overview  Goal: Plan of Care Review  Outcome: Ongoing (interventions implemented as appropriate)     Pt AAOx4     NPO after midnight for repair/ligation of leaking lymphatic w/ muscle flap coverage scheduled for 12:50 PM today.     VSS.  Pt on tele running sinus rhythm to ST with hr  80's-low 100's  's-140's/60's-70's  Satting 98%+ on room air.  Afebrile.     Accuchecks being monitored AC/HS with bedtime sugar of  199 requiring no sliding scale insulin coverage.     Pt has voided 1,225 mL clear yellow urine thus far. No BM.     Midsternal incision healed.     R Cheek DTI healing     Bilateral heels with eschar.     R groin packed with aquacel AG and covered with Mepore tape.  Dressing changed.     RLQ with foam dressing CDI     Upper abdomen with foam dressing CDI.     Pt remained free from falls or injury thus far.   Bed is in low/ locked position, side rails are up x 2, call light is in reach.   Will continue to monitor.

## 2017-03-30 NOTE — PROGRESS NOTES
Progress Note  Cardiology    Admit Date: 3/28/2017   LOS: 2 days     SUBJECTIVE:     Patient seen and examined. Denies chest pain or shortness of breath.    Scheduled Meds:   amlodipine  10 mg Oral Daily    aspirin  81 mg Oral Daily    calcium-vitamin D3  1 tablet Oral BID WM    docusate sodium  200 mg Oral QHS    ferrous gluconate  324 mg Oral Daily with breakfast    furosemide  40 mg Oral BID    insulin aspart  15 Units Subcutaneous TIDWM    insulin glargine  20 Units Subcutaneous Daily    magnesium oxide  800 mg Oral TID    magnesium sulfate IVPB  2 g Intravenous Once    mycophenolate  1,000 mg Oral BID    nystatin  5 mL Oral QID (WM & HS)    oxycodone  20 mg Oral Q12H    pantoprazole  40 mg Oral Daily    pravastatin  40 mg Oral Daily    predniSONE  10 mg Oral Daily    sulfamethoxazole-trimethoprim 400-80mg  1 tablet Oral Daily    tacrolimus  1 mg Oral Daily    tacrolimus  2 mg Oral Daily    terbutaline  5 mg Oral TID    valganciclovir  900 mg Oral Daily     Continuous Infusions:   PRN Meds:dextrose 50%, dextrose 50%, glucagon (human recombinant), glucose, glucose, insulin aspart    Review of patient's allergies indicates:   Allergen Reactions    Levemir [insulin detemir] Itching    Pork/porcine containing products     Ranexa [ranolazine] Nausea Only       OBJECTIVE:     Vital Signs (Most Recent)  Temp: 97.7 °F (36.5 °C) (03/30/17 0800)  Pulse: 95 (03/30/17 0800)  Resp: 18 (03/30/17 0800)  BP: 137/72 (03/30/17 0800)  SpO2: 99 % (03/30/17 0800)    Vital Signs Range (Last 24H):  Temp:  [97.7 °F (36.5 °C)-98 °F (36.7 °C)]   Pulse:  []   Resp:  [16-18]   BP: (122-140)/(61-81)   SpO2:  [98 %-99 %]     I & O (Last 24H):    Intake/Output Summary (Last 24 hours) at 03/30/17 0838  Last data filed at 03/30/17 0600   Gross per 24 hour   Intake              980 ml   Output             2825 ml   Net            -1845 ml       Physical Exam:   General: Patient in no acute distress or  discomfort  HEENT: No JVD, moist mucous membranes  Cardiac: S1S2 RRR no GMR  Chest: CTABL, no wheezing or rales  Abd:Soft NTND  Ext: No Edema No swelling, R groin wound  Neuro: A and O X 3, non focal    LABS  CBC with Diff:     Recent Labs  Lab 03/28/17  1650 03/29/17  0503 03/30/17  0535   WBC 11.60 9.67 9.42   HGB 11.0* 10.6* 10.7*   HCT 33.6* 33.3* 32.5*    283  --    LYMPH 7.3*  0.9* 21.8  2.1  --    MONO 3.4*  0.4 3.9*  0.4  --    EOSINOPHIL 0.1 0.4  --        COAG:  No results for input(s): APTT, INR, PTT in the last 168 hours.    CMP:     Recent Labs  Lab 03/28/17  1650 03/29/17  0503 03/30/17  0535   * 161* 90   CALCIUM 8.9 8.8 8.8   ALBUMIN  --  3.0* 3.1*   PROT  --  5.8* 5.9*    140 142   K 4.8 4.3 4.0   CO2 20* 25 27    105 104   BUN 27* 23* 23*   CREATININE 1.1 0.8 0.9   ALKPHOS  --  74 76   ALT  --  27 27   AST  --  15 17   BILITOT  --  0.4 0.4   MG  --  1.4* 1.4*     Estimated Creatinine Clearance: 102.7 mL/min (based on Cr of 0.9).    .No results for input(s): CPK, TROPONINI, MB, BNP in the last 168 hours.      Present on Admission:   Lymphocele after surgical procedure      ASSESSMENT / PLAN:   55 yo M with PMHx of NICM s/p HM II (8/24/16), now s/p OHTx 2/9/17 who is admitted secondary to lymphocele foul smell/draining more.         S/P Orthotopic Heart Transplantation 2/9/2017  -Moderate Risk of Rejection: cPRA 0%, Negative Crossmatch with B Channel Shifts  -CMV Status: D-/R+: Continue valcyte.  -Immunosuppression: MMF 1000 mg BID, Prednisone 10 mg daily. Tacro 2/1      DM  - SSI/Levimir      R groin Lymphocele-  - Appreciate wound care, has deep wound 5 cm deep, Consulted plastic Surgery. Plan to go to OR today with [facility].   - Culture sent and negative to date.  - Wound care cx.

## 2017-03-30 NOTE — PROGRESS NOTES
Lymphocele remains despite conservative management.    R groin: Packing changed.  Lymphatic fluid on Aquacel.  Replaced with 1 2x2.    Plan:  To OR with Dr. Woodruff for groin exploration, right, lymphatic ligation versus repair, muscle flap by Dr. Palomino  Plan explained to patient, who agrees with procedure and has signed consent

## 2017-03-31 LAB
ALBUMIN SERPL BCP-MCNC: 3 G/DL
ALP SERPL-CCNC: 77 U/L
ALT SERPL W/O P-5'-P-CCNC: 21 U/L
ANION GAP SERPL CALC-SCNC: 7 MMOL/L
ANISOCYTOSIS BLD QL SMEAR: SLIGHT
AST SERPL-CCNC: 18 U/L
BACTERIA SPEC AEROBE CULT: NORMAL
BACTERIA SPEC AEROBE CULT: NORMAL
BASOPHILS # BLD AUTO: 0.02 K/UL
BASOPHILS NFR BLD: 0.2 %
BILIRUB SERPL-MCNC: 0.5 MG/DL
BUN SERPL-MCNC: 19 MG/DL
CALCIUM SERPL-MCNC: 8.6 MG/DL
CHLORIDE SERPL-SCNC: 100 MMOL/L
CO2 SERPL-SCNC: 27 MMOL/L
CREAT SERPL-MCNC: 0.9 MG/DL
DIFFERENTIAL METHOD: ABNORMAL
EOSINOPHIL # BLD AUTO: 0 K/UL
EOSINOPHIL NFR BLD: 0.3 %
ERYTHROCYTE [DISTWIDTH] IN BLOOD BY AUTOMATED COUNT: 21 %
EST. GFR  (AFRICAN AMERICAN): >60 ML/MIN/1.73 M^2
EST. GFR  (NON AFRICAN AMERICAN): >60 ML/MIN/1.73 M^2
GLUCOSE SERPL-MCNC: 156 MG/DL
HCT VFR BLD AUTO: 32.7 %
HGB BLD-MCNC: 10.4 G/DL
HYPOCHROMIA BLD QL SMEAR: ABNORMAL
LYMPHOCYTES # BLD AUTO: 1.6 K/UL
LYMPHOCYTES NFR BLD: 13.4 %
MAGNESIUM SERPL-MCNC: 1.4 MG/DL
MCH RBC QN AUTO: 20.8 PG
MCHC RBC AUTO-ENTMCNC: 31.8 %
MCV RBC AUTO: 65 FL
MONOCYTES # BLD AUTO: 0.6 K/UL
MONOCYTES NFR BLD: 4.7 %
NEUTROPHILS # BLD AUTO: 9.6 K/UL
NEUTROPHILS NFR BLD: 80.7 %
OVALOCYTES BLD QL SMEAR: ABNORMAL
PLATELET # BLD AUTO: 263 K/UL
PMV BLD AUTO: ABNORMAL FL
POCT GLUCOSE: 100 MG/DL (ref 70–110)
POCT GLUCOSE: 148 MG/DL (ref 70–110)
POCT GLUCOSE: 174 MG/DL (ref 70–110)
POCT GLUCOSE: 244 MG/DL (ref 70–110)
POCT GLUCOSE: 57 MG/DL (ref 70–110)
POCT GLUCOSE: 62 MG/DL (ref 70–110)
POCT GLUCOSE: 68 MG/DL (ref 70–110)
POCT GLUCOSE: 89 MG/DL (ref 70–110)
POIKILOCYTOSIS BLD QL SMEAR: SLIGHT
POLYCHROMASIA BLD QL SMEAR: ABNORMAL
POTASSIUM SERPL-SCNC: 4.7 MMOL/L
PROT SERPL-MCNC: 5.6 G/DL
RBC # BLD AUTO: 5 M/UL
SCHISTOCYTES BLD QL SMEAR: ABNORMAL
SODIUM SERPL-SCNC: 134 MMOL/L
SPHEROCYTES BLD QL SMEAR: ABNORMAL
TACROLIMUS BLD-MCNC: 13.2 NG/ML
WBC # BLD AUTO: 11.89 K/UL

## 2017-03-31 PROCEDURE — 80197 ASSAY OF TACROLIMUS: CPT

## 2017-03-31 PROCEDURE — 25000003 PHARM REV CODE 250: Performed by: INTERNAL MEDICINE

## 2017-03-31 PROCEDURE — 83735 ASSAY OF MAGNESIUM: CPT

## 2017-03-31 PROCEDURE — 85025 COMPLETE CBC W/AUTO DIFF WBC: CPT

## 2017-03-31 PROCEDURE — 63600175 PHARM REV CODE 636 W HCPCS: Performed by: STUDENT IN AN ORGANIZED HEALTH CARE EDUCATION/TRAINING PROGRAM

## 2017-03-31 PROCEDURE — 63600175 PHARM REV CODE 636 W HCPCS: Performed by: INTERNAL MEDICINE

## 2017-03-31 PROCEDURE — 25000003 PHARM REV CODE 250: Performed by: STUDENT IN AN ORGANIZED HEALTH CARE EDUCATION/TRAINING PROGRAM

## 2017-03-31 PROCEDURE — 63600175 PHARM REV CODE 636 W HCPCS

## 2017-03-31 PROCEDURE — 99232 SBSQ HOSP IP/OBS MODERATE 35: CPT | Mod: ,,, | Performed by: INTERNAL MEDICINE

## 2017-03-31 PROCEDURE — 36415 COLL VENOUS BLD VENIPUNCTURE: CPT

## 2017-03-31 PROCEDURE — 99223 1ST HOSP IP/OBS HIGH 75: CPT | Mod: ,,, | Performed by: INTERNAL MEDICINE

## 2017-03-31 PROCEDURE — 20600001 HC STEP DOWN PRIVATE ROOM

## 2017-03-31 PROCEDURE — 63600175 PHARM REV CODE 636 W HCPCS: Performed by: NURSE PRACTITIONER

## 2017-03-31 PROCEDURE — 25000003 PHARM REV CODE 250: Performed by: NURSE PRACTITIONER

## 2017-03-31 PROCEDURE — 25000003 PHARM REV CODE 250: Performed by: HOSPITALIST

## 2017-03-31 PROCEDURE — 80053 COMPREHEN METABOLIC PANEL: CPT

## 2017-03-31 RX ORDER — HYDROMORPHONE HYDROCHLORIDE 1 MG/ML
0.2 INJECTION, SOLUTION INTRAMUSCULAR; INTRAVENOUS; SUBCUTANEOUS EVERY 5 MIN PRN
Status: DISCONTINUED | OUTPATIENT
Start: 2017-03-31 | End: 2017-04-03

## 2017-03-31 RX ORDER — HYDROMORPHONE HYDROCHLORIDE 1 MG/ML
INJECTION, SOLUTION INTRAMUSCULAR; INTRAVENOUS; SUBCUTANEOUS
Status: COMPLETED
Start: 2017-03-31 | End: 2017-03-31

## 2017-03-31 RX ORDER — NALOXONE HCL 0.4 MG/ML
0.02 VIAL (ML) INJECTION
Status: DISCONTINUED | OUTPATIENT
Start: 2017-03-31 | End: 2017-04-10 | Stop reason: HOSPADM

## 2017-03-31 RX ORDER — HYDROMORPHONE HYDROCHLORIDE 1 MG/ML
0.3 INJECTION, SOLUTION INTRAMUSCULAR; INTRAVENOUS; SUBCUTANEOUS ONCE
Status: COMPLETED | OUTPATIENT
Start: 2017-03-31 | End: 2017-03-31

## 2017-03-31 RX ORDER — TACROLIMUS 1 MG/1
1 CAPSULE ORAL 2 TIMES DAILY
Status: DISCONTINUED | OUTPATIENT
Start: 2017-03-31 | End: 2017-04-10 | Stop reason: HOSPADM

## 2017-03-31 RX ORDER — ERGOCALCIFEROL 1.25 MG/1
50000 CAPSULE ORAL
Status: DISCONTINUED | OUTPATIENT
Start: 2017-04-01 | End: 2017-04-10 | Stop reason: HOSPADM

## 2017-03-31 RX ORDER — HYDROMORPHONE HYDROCHLORIDE 1 MG/ML
0.3 INJECTION, SOLUTION INTRAMUSCULAR; INTRAVENOUS; SUBCUTANEOUS ONCE
Status: DISCONTINUED | OUTPATIENT
Start: 2017-03-31 | End: 2017-03-31

## 2017-03-31 RX ORDER — HYDROMORPHONE HCL IN 0.9% NACL 6 MG/30 ML
PATIENT CONTROLLED ANALGESIA SYRINGE INTRAVENOUS CONTINUOUS
Status: DISCONTINUED | OUTPATIENT
Start: 2017-03-31 | End: 2017-04-03

## 2017-03-31 RX ORDER — HYDROMORPHONE HYDROCHLORIDE 1 MG/ML
1 INJECTION, SOLUTION INTRAMUSCULAR; INTRAVENOUS; SUBCUTANEOUS
Status: DISCONTINUED | OUTPATIENT
Start: 2017-03-31 | End: 2017-03-31

## 2017-03-31 RX ADMIN — INSULIN ASPART 15 UNITS: 100 INJECTION, SOLUTION INTRAVENOUS; SUBCUTANEOUS at 09:03

## 2017-03-31 RX ADMIN — Medication 800 MG: at 04:03

## 2017-03-31 RX ADMIN — DOCUSATE SODIUM 200 MG: 100 CAPSULE, LIQUID FILLED ORAL at 08:03

## 2017-03-31 RX ADMIN — INSULIN GLARGINE 20 UNITS: 100 INJECTION, SOLUTION SUBCUTANEOUS at 09:03

## 2017-03-31 RX ADMIN — HYDROMORPHONE HYDROCHLORIDE 1 MG: 1 INJECTION, SOLUTION INTRAMUSCULAR; INTRAVENOUS; SUBCUTANEOUS at 02:03

## 2017-03-31 RX ADMIN — HYDROMORPHONE HYDROCHLORIDE 0.3 MG: 1 INJECTION, SOLUTION INTRAMUSCULAR; INTRAVENOUS; SUBCUTANEOUS at 01:03

## 2017-03-31 RX ADMIN — SULFAMETHOXAZOLE AND TRIMETHOPRIM 1 TABLET: 400; 80 TABLET ORAL at 09:03

## 2017-03-31 RX ADMIN — AMPICILLIN SODIUM 4000 MG: 2 INJECTION, POWDER, FOR SOLUTION INTRAMUSCULAR; INTRAVENOUS at 02:03

## 2017-03-31 RX ADMIN — HYDROMORPHONE HYDROCHLORIDE 0.2 MG: 1 INJECTION, SOLUTION INTRAMUSCULAR; INTRAVENOUS; SUBCUTANEOUS at 12:03

## 2017-03-31 RX ADMIN — PRAVASTATIN SODIUM 40 MG: 40 TABLET ORAL at 09:03

## 2017-03-31 RX ADMIN — MYCOPHENOLATE MOFETIL 1000 MG: 250 CAPSULE ORAL at 09:03

## 2017-03-31 RX ADMIN — Medication 800 MG: at 02:03

## 2017-03-31 RX ADMIN — AMLODIPINE BESYLATE 10 MG: 10 TABLET ORAL at 09:03

## 2017-03-31 RX ADMIN — Medication 800 MG: at 08:03

## 2017-03-31 RX ADMIN — OXYCODONE HYDROCHLORIDE 20 MG: 10 TABLET, FILM COATED, EXTENDED RELEASE ORAL at 08:03

## 2017-03-31 RX ADMIN — HYDROMORPHONE HYDROCHLORIDE 1 MG: 1 INJECTION, SOLUTION INTRAMUSCULAR; INTRAVENOUS; SUBCUTANEOUS at 08:03

## 2017-03-31 RX ADMIN — HYDROCODONE BITARTRATE AND ACETAMINOPHEN 1 TABLET: 7.5; 325 TABLET ORAL at 01:03

## 2017-03-31 RX ADMIN — TACROLIMUS 1 MG: 1 CAPSULE, GELATIN COATED ORAL at 10:03

## 2017-03-31 RX ADMIN — INSULIN ASPART 2 UNITS: 100 INJECTION, SOLUTION INTRAVENOUS; SUBCUTANEOUS at 06:03

## 2017-03-31 RX ADMIN — Medication: at 05:03

## 2017-03-31 RX ADMIN — INSULIN ASPART 15 UNITS: 100 INJECTION, SOLUTION INTRAVENOUS; SUBCUTANEOUS at 06:03

## 2017-03-31 RX ADMIN — FERROUS GLUCONATE TAB 324 MG (37.5 MG ELEMENTAL IRON) 324 MG: 324 (37.5 FE) TAB at 09:03

## 2017-03-31 RX ADMIN — FUROSEMIDE 40 MG: 40 TABLET ORAL at 08:03

## 2017-03-31 RX ADMIN — TACROLIMUS 1 MG: 1 CAPSULE, GELATIN COATED ORAL at 05:03

## 2017-03-31 RX ADMIN — OXYCODONE HYDROCHLORIDE 20 MG: 10 TABLET, FILM COATED, EXTENDED RELEASE ORAL at 09:03

## 2017-03-31 RX ADMIN — Medication: at 11:03

## 2017-03-31 RX ADMIN — HYDROMORPHONE HYDROCHLORIDE 1 MG: 1 INJECTION, SOLUTION INTRAMUSCULAR; INTRAVENOUS; SUBCUTANEOUS at 04:03

## 2017-03-31 RX ADMIN — FUROSEMIDE 40 MG: 40 TABLET ORAL at 09:03

## 2017-03-31 RX ADMIN — PREDNISONE 5 MG: 5 TABLET ORAL at 09:03

## 2017-03-31 RX ADMIN — PANTOPRAZOLE SODIUM 40 MG: 40 TABLET, DELAYED RELEASE ORAL at 09:03

## 2017-03-31 RX ADMIN — VALGANCICLOVIR 900 MG: 450 TABLET, FILM COATED ORAL at 09:03

## 2017-03-31 RX ADMIN — AMPICILLIN SODIUM 4000 MG: 2 INJECTION, POWDER, FOR SOLUTION INTRAMUSCULAR; INTRAVENOUS at 08:03

## 2017-03-31 NOTE — PROGRESS NOTES
"Dr. Seymour notified of pt writhing in pain. Stating, " Please just make this pain go away. Just give me something to put me to sleep. I can't take the pain anymore.".   I stated that since I last spoke with him that I had given pt an addition dose of 0.2 mg dilaudid IV at 12:30 AM and that Dr. Balderas came to see pt and stated that pain was not from post op complication but likely due to the extent of work that had been done to pt.  Vitals/ respirations stable. Pt stated that he has only been able to doze off to sleep for about 5 minutes.    MD placed an order for an addition dose of IV dilaudid 0.3 mg IV once.     Will administer and continue to monitor.   "

## 2017-03-31 NOTE — PROGRESS NOTES
19:45 MD on call for HTS, Dr. Seymour, contacted regarding pt c/o 10/10 pain to R groin. Pt has 7.5 mg norco available but states that this did not even touch his pain when he last received it earlier.    Pt last received pain medication at 5:40 in PACU. Pt received total of 1.4 mg of dilaudid total in PACU.     MD ordered one time dose of 0.3 mg dilaudid IV.  -------------------------------------------------------------------------------------------------------------------  22:00 Dr. Seymour contacted again for continued c/o 101/10 paint to R groin despite receiving his 20 mg Oxycontin at 8PM and 7.5 mg Norco at  8:46.    MD ordered an additional 0.2mg IV dilaudid IV.  ----------------------------------------------------------------------------------------------------------------------  22:30 Pt still in pain, crying and moaning. Dr. Seymour notified.   MD recommended getting surgery to come to look at R groin to assess if pt pain r/t post op complication VS expected post op pain. MD stated that he does not want to keep loading pt with IV pain medication.    MD on call for surgery, Dr. Balderas, notified of situation. Stated that he would be up to assess pt when he can.     Continuing to monitor.  ---------------------------------------------------------------------------------------------------------------------  23:10 Dr. Balderas at the bedside to see pt. MD stated that does not appear to be any post op complication, but pt pain to be expected with the extent of work that was done on pt.   MD stated that he would recommend IV pain medication for BPT ,but did not feel comfortable putting in order as it is ultimately the primary teams decision.

## 2017-03-31 NOTE — CONSULTS
Consult received. Full note to follow.    Please call for questions.    Thanks,  Ezio Ivan, PGY -4  ID Fellow  Pager: [phone]

## 2017-03-31 NOTE — PROGRESS NOTES
Progress Note  Cardiology    Admit Date: 3/28/2017   LOS: 3 days     SUBJECTIVE:     Lots of surgical site pain overnight.     Scheduled Meds:   amlodipine  10 mg Oral Daily    docusate sodium  200 mg Oral QHS    ferrous gluconate  324 mg Oral Daily with breakfast    furosemide  40 mg Oral BID    insulin aspart  15 Units Subcutaneous TIDWM    insulin glargine  20 Units Subcutaneous Daily    magnesium oxide  800 mg Oral TID    magnesium sulfate IVPB  2 g Intravenous Once    mycophenolate  1,000 mg Oral BID    oxycodone  20 mg Oral Q12H    pantoprazole  40 mg Oral Daily    pravastatin  40 mg Oral Daily    predniSONE  5 mg Oral Daily    sulfamethoxazole-trimethoprim 400-80mg  1 tablet Oral Daily    valganciclovir  900 mg Oral Daily     Continuous Infusions:   sodium chloride 0.9%       PRN Meds:dextrose 50%, dextrose 50%, glucagon (human recombinant), glucose, glucose, hydrocodone-acetaminophen 7.5-325mg, HYDROmorphone, insulin aspart    Review of patient's allergies indicates:   Allergen Reactions    Levemir [insulin detemir] Itching    Pork/porcine containing products     Ranexa [ranolazine] Nausea Only       OBJECTIVE:     Vital Signs (Most Recent)  Temp: 98.1 °F (36.7 °C) (03/31/17 0400)  Pulse: 89 (03/31/17 0700)  Resp: 18 (03/31/17 0400)  BP: (!) 154/82 (03/31/17 0400)  SpO2: 99 % (03/31/17 0400)    Vital Signs Range (Last 24H):  Temp:  [97.5 °F (36.4 °C)-98.7 °F (37.1 °C)]   Pulse:  []   Resp:  [11-20]   BP: (125-154)/(66-83)   SpO2:  [96 %-100 %]     I & O (Last 24H):    Intake/Output Summary (Last 24 hours) at 03/31/17 0746  Last data filed at 03/31/17 0551   Gross per 24 hour   Intake             1950 ml   Output             2085 ml   Net             -135 ml       Physical Exam:   General: Patient in no acute distress or discomfort  HEENT: No JVD, moist mucous membranes  Cardiac: S1S2 RRR no GMR  Chest: CTABL, no wheezing or rales  Abd:Soft NTND  Ext: No Edema No swelling, R groin  wound  Neuro: A and O X 3, non focal    LABS  CBC with Diff:     Recent Labs  Lab 03/28/17  1650 03/29/17  0503 03/30/17  0535 03/31/17  0442   WBC 11.60 9.67 9.42 11.89   HGB 11.0* 10.6* 10.7* 10.4*   HCT 33.6* 33.3* 32.5* 32.7*    283 271  --    LYMPH 7.3*  0.9* 21.8  2.1 20.4  1.9 13.4*  1.6   MONO 3.4*  0.4 3.9*  0.4 4.0  0.4 4.7  0.6   EOSINOPHIL 0.1 0.4 0.3 0.3       COAG:  No results for input(s): APTT, INR, PTT in the last 168 hours.    CMP:     Recent Labs  Lab 03/29/17  0503 03/30/17  0535 03/31/17  0442   * 90 156*   CALCIUM 8.8 8.8 8.6*   ALBUMIN 3.0* 3.1* 3.0*   PROT 5.8* 5.9* 5.6*    142 134*   K 4.3 4.0 4.7   CO2 25 27 27    104 100   BUN 23* 23* 19   CREATININE 0.8 0.9 0.9   ALKPHOS 74 76 77   ALT 27 27 21   AST 15 17 18   BILITOT 0.4 0.4 0.5   MG 1.4* 1.4* 1.4*     Estimated Creatinine Clearance: 102.6 mL/min (based on Cr of 0.9).    .No results for input(s): CPK, TROPONINI, MB, BNP in the last 168 hours.      Present on Admission:   Lymphocele after surgical procedure      ASSESSMENT / PLAN:   57 yo M with PMHx of NICM s/p HM II (8/24/16), now s/p OHTx 2/9/17 who is admitted secondary to lymphocele foul smell/draining more.         S/P Orthotopic Heart Transplantation 2/9/2017  -Moderate Risk of Rejection: cPRA 0%, Negative Crossmatch with B Channel Shifts  -CMV Status: D-/R+: Continue valcyte.  -Immunosuppression: MMF 1000 mg BID, Prednisone 10 mg daily. Tacro 1/1      DM  - SSI/Levimir      R groin Lymphocele- S/P I&D of right groin/Rectus femoris muscle flap to the groin/placement of wound VAC.  - Appreciate wound care.  - Culture sent and growing enterococcus. Awaiting on sensitivities.

## 2017-03-31 NOTE — PROGRESS NOTES
Called by RN on multiple occasions regarding Mr. Barriga experiencing right groin pain at the site right groin surgery. The pain is making it difficult for him to get comfortable. He has been noted to in tears because of the pain multiple times. He has been given dilaudid 0.2 mg IV X 2 doses and 0.3 mg IV x 2 doses. All the relief he got was short-lived.    We involved the surgery team to come and evaluate for any post-op complications for which they say there is none and that the pain is expected after this surgery.    Plan:  Start dilaudid 1 mg IV Q3H PRN for breakthrough severe pain  C/w long-acting oxycodone 20 mg BID  Consider pain management consult in AM if pain uncontrolled    Renee Seymour MD  Cardiology Fellow  Pager: [phone]  3/31/2017 1:58 AM

## 2017-03-31 NOTE — PROGRESS NOTES
Progress Note  Plastic Surgery    Admit Date: 3/28/2017  Attending:   S/P: Procedure(s) (LRB):  Repair/ligation of leaking lymphatic with muscle flap coverage.  R groin (Right)    Post-operative Day: 1 Day Post-Op    Hospital Day: 4    SUBJECTIVE:     Patient complains of surgical site pain overnight. Says better controlled this morning. No n/v.     OBJECTIVE:     Vital Signs (Most Recent)  Temp: 98.1 °F (36.7 °C) (03/31/17 0800)  Pulse: 91 (03/31/17 0800)  Resp: 16 (03/31/17 0800)  BP: 132/71 (03/31/17 0800)  SpO2: 98 % (03/31/17 0800)    Vital Signs Range (Last 24H):  Temp:  [97.5 °F (36.4 °C)-98.7 °F (37.1 °C)]   Pulse:  []   Resp:  [11-20]   BP: (125-154)/(66-83)   SpO2:  [96 %-100 %]     I & O (Last 24H):  Intake/Output Summary (Last 24 hours) at 03/31/17 1030  Last data filed at 03/31/17 0551   Gross per 24 hour   Intake             1950 ml   Output             2085 ml   Net             -135 ml     Physical Exam:  General: well developed, well nourished in no distress  Heart: regular rate and rhythm  Pulm: lungs ctab, non labored breathing, good respiratory effort  Abd: soft, non tender, non distended  Ext: R groin with wound vac in place, R thigh incision c/d/i, DARIN w ss output    Laboratory:  CBC:   Recent Labs  Lab 03/31/17  0442   WBC 11.89   RBC 5.00   HGB 10.4*   HCT 32.7*      MCV 65*   MCH 20.8*   MCHC 31.8*     BMP:   Recent Labs  Lab 03/31/17  0442   *   *   K 4.7      CO2 27   BUN 19   CREATININE 0.9   CALCIUM 8.6*     CMP:   Recent Labs  Lab 03/31/17  0442   *   CALCIUM 8.6*   ALBUMIN 3.0*   PROT 5.6*   *   K 4.7   CO2 27      BUN 19   CREATININE 0.9   ALKPHOS 77   ALT 21   AST 18   BILITOT 0.5     Labs within the past 24 hours have been reviewed.    ASSESSMENT/PLAN:     56 year old male with history of heart transplant in Feb 2017 who was found to have lymphocele and non healing R groin wound who is s/p R wound revision with muscle flap coverage  on 3/30/17.     - Pain control, increase dilaudid for BTP vs PCA pump  - Wound care consult for vac change Monday  - Keep DARIN drain to bulb suction, plastic surgery will manage drain  - No restrictions for activity from a plastic surgery perspective  - additional management per primary team    Ethan Blake MD  General Surgery PGY1

## 2017-03-31 NOTE — PROGRESS NOTES
Dr. Seymour at the bedside to see assess pt. MD put in order for 1 mg IV dilaudid Q3 for severe pain.

## 2017-03-31 NOTE — SUBJECTIVE & OBJECTIVE
Past Medical History:   Diagnosis Date    CHF (congestive heart failure)     Coronary artery disease     GERD (gastroesophageal reflux disease)     Hyperlipidemia     Hypertension     LVAD (left ventricular assist device) present 2/13/2017    Type 2 diabetes mellitus with hyperglycemia        Past Surgical History:   Procedure Laterality Date    CARDIAC PACEMAKER PLACEMENT      CHOLECYSTECTOMY      COLONOSCOPY N/A 1/11/2017    Procedure: COLONOSCOPY;  Surgeon: Petr Almanzar MD;  [facility]  Service: Endoscopy;  Laterality: N/A;    COLONOSCOPY N/A 1/12/2017    Procedure: COLONOSCOPY;  Surgeon: Petr Almanzar MD;  Location: [facility] ([address] [place] [address]);  Service: Endoscopy;  Laterality: N/A;    HEART TRANSPLANT  02/2017    LEFT VENTRICULAR ASSIST DEVICE      x 3 months ago       Review of patient's allergies indicates:   Allergen Reactions    Levemir [insulin detemir] Itching    Pork/porcine containing products     Ranexa [ranolazine] Nausea Only       Medications:  Prescriptions Prior to Admission   Medication Sig    amlodipine (NORVASC) 10 MG tablet Take 1 tablet (10 mg total) by mouth once daily.    aspirin (ECOTRIN) 81 MG EC tablet Take 1 tablet (81 mg total) by mouth once daily.    calcium-vitamin D3 500 mg(1,250mg) -200 unit per tablet Take 1 tablet by mouth 2 (two) times daily with meals.    esomeprazole (NEXIUM) 40 MG capsule Take 1 capsule (40 mg total) by mouth before breakfast.    ferrous gluconate (FERGON) 324 MG tablet Take 1 tablet (324 mg total) by mouth daily with breakfast.    furosemide (LASIX) 80 MG tablet Take 0.5 tablets (40 mg total) by mouth 2 (two) times daily.    magnesium oxide (MAG-OX) 400 mg tablet Take 2 tablets (800 mg total) by mouth 3 (three) times daily.    mycophenolate (CELLCEPT) 250 mg Cap Take 6 capsules (1,500 mg total) by mouth 2 (two) times daily.    nystatin (MYCOSTATIN) 100,000 unit/mL suspension Take 5 mLs (500,000 Units total) by  mouth 4 (four) times daily with meals and nightly.    oxycodone (ROXICODONE) 10 mg Tab immediate release tablet Take 1 tablet (10 mg total) by mouth every 4 (four) hours as needed (pain).    pravastatin (PRAVACHOL) 40 MG tablet Take 1 tablet (40 mg total) by mouth once daily.    predniSONE (DELTASONE) 10 MG tablet Take 1 tablet (10 mg total) by mouth once daily.    sulfamethoxazole-trimethoprim 400-80mg (BACTRIM,SEPTRA) 400-80 mg per tablet Take 1 tablet by mouth once daily.    tacrolimus (PROGRAF) 1 MG Cap Take 2mg orally in the morning and 1mg orally in the evening    terbutaline (BRETHINE) 5 mg Tab Take 1 tablet (5 mg total) by mouth 3 (three) times daily.    valganciclovir (VALCYTE) 450 mg Tab TAKE 2 TABLETS(900 MG) BY MOUTH EVERY DAY    alendronate (FOSAMAX) 70 MG tablet TAKE 1 TABLET BY MOUTH EVERY 7 DAYS    blood sugar diagnostic Strp To use to check BG 4 times daily, to use with insurance preferred meter    blood-glucose meter kit To use to check BG 4 times daily, to use with insurance preferred meter    docusate sodium (COLACE) 100 MG capsule Take 2 capsules (200 mg total) by mouth every evening. (Patient taking differently: Take 100 mg by mouth 2 (two) times daily. )    insulin aspart (NOVOLOG) 100 unit/mL InPn pen Inject 15 Units into the skin 3 (three) times daily.    insulin glargine (LANTUS SOLOSTAR) 100 unit/mL (3 mL) InPn pen Inject 28 Units into the skin every evening.    lancets [facility] To use to check BG 4 times daily, to use with insurance preferred meter     Antibiotics     Start     Stop Route Frequency Ordered    03/29/17 0900  sulfamethoxazole-trimethoprim 400-80mg per tablet 1 tablet      -- Oral Daily 03/28/17 1622    03/31/17 1300  ampicillin (OMNIPEN) 4,000 mg in sodium chloride 0.9% 250 mL infusion      -- IV Every 8 hours (non-standard times) 03/31/17 1157        Antifungals     None        Antivirals         Stop Route Frequency     valganciclovir      -- Oral Daily            Immunization History   Administered Date(s) Administered    Hepatitis B, Adult 12/31/2016    Pneumococcal Conjugate - 13 Valent 08/24/2016    Tdap 08/24/2016       Family History     Problem Relation (Age of Onset)    Cancer Brother    Heart attack Mother, Father    Heart disease Mother, Father    Hypertension Mother, Father        Social History     Social History    Marital status:      Spouse name: N/A    Number of children: N/A    Years of education: N/A     Social History Main Topics    Smoking status: Former Smoker     Packs/day: 0.50     Years: 15.00     Quit date: 6/14/2006    Smokeless tobacco: Never Used    Alcohol use No    Drug use: None    Sexual activity: Yes     Partners: Female     Other Topics Concern    None     Social History Narrative     Review of Systems   Constitutional: Negative for chills and fever.   HENT: Negative for sore throat.    Respiratory: Negative for cough, chest tightness and shortness of breath.    Cardiovascular: Negative for chest pain, palpitations and leg swelling.   Gastrointestinal: Negative for abdominal distention, abdominal pain, diarrhea, nausea and vomiting.   Genitourinary: Negative for dysuria, flank pain and urgency.   Skin: Positive for wound (right inguinal area). Negative for rash.   Neurological: Negative for dizziness, light-headedness and numbness.   Psychiatric/Behavioral: Negative for confusion.     Objective:     Vital Signs (Most Recent):  Temp: 98.1 °F (36.7 °C) (03/31/17 1600)  Pulse: 90 (03/31/17 1600)  Resp: 18 (03/31/17 1600)  BP: 118/73 (03/31/17 1600)  SpO2: 96 % (03/31/17 1600) Vital Signs (24h Range):  Temp:  [97.5 °F (36.4 °C)-98.3 °F (36.8 °C)] 98.1 °F (36.7 °C)  Pulse:  [] 90  Resp:  [11-20] 18  SpO2:  [96 %-100 %] 96 %  BP: (118-154)/(66-82) 118/73     Weight: 90.7 kg (200 lb)  Body mass index is 30.41 kg/(m^2).    Estimated Creatinine Clearance: 102.6 mL/min (based on Cr of 0.9).    Physical Exam    Constitutional: He is oriented to person, place, and time. No distress.   HENT:   Head: Normocephalic and atraumatic.   Mouth/Throat: No oropharyngeal exudate.   Eyes: No scleral icterus.   Cardiovascular: Normal rate, regular rhythm and normal heart sounds.  Exam reveals no gallop and no friction rub.    No murmur heard.  Sternotomy healed   Pulmonary/Chest: Effort normal and breath sounds normal. No respiratory distress. He has no wheezes. He has no rales.   Abdominal: Soft. Bowel sounds are normal. He exhibits no distension. There is no tenderness. There is no rebound and no guarding.   Genitourinary:   Genitourinary Comments: Wound vac on right inguinal area   Musculoskeletal: He exhibits no edema.   Neurological: He is alert and oriented to person, place, and time. No cranial nerve deficit.   Vitals reviewed.      Significant Labs:   Blood Culture:   Recent Labs  Lab 01/19/17  1012 02/20/17  1124 03/28/17  1650 03/28/17  1651   LABBLOO No growth after 5 days.  No growth after 5 days. No growth after 5 days.  No growth after 5 days. No Growth to date  No Growth to date  No Growth to date No Growth to date  No Growth to date  No Growth to date     BMP:   Recent Labs  Lab 03/31/17  0442   *   *   K 4.7      CO2 27   BUN 19   CREATININE 0.9   CALCIUM 8.6*   MG 1.4*     CBC:   Recent Labs  Lab 03/30/17  0535 03/31/17  0442   WBC 9.42 11.89   HGB 10.7* 10.4*   HCT 32.5* 32.7*    263     CMP:   Recent Labs  Lab 03/30/17  0535 03/31/17  0442    134*   K 4.0 4.7    100   CO2 27 27   GLU 90 156*   BUN 23* 19   CREATININE 0.9 0.9   CALCIUM 8.8 8.6*   PROT 5.9* 5.6*   ALBUMIN 3.1* 3.0*   BILITOT 0.4 0.5   ALKPHOS 76 77   AST 17 18   ALT 27 21   ANIONGAP 11 7*   EGFRNONAA >60.0 >60.0     Urine Culture:   Recent Labs  Lab 01/19/17  1156 02/08/17  2000   LABURIN CITROBACTER [id number],000 - 49,999 cfu/ml CITROBACTER KOSERI>100,000 cfu/ml     Urine Studies:   Recent Labs  Lab  01/10/17  1402  02/27/17  0941   COLORU Yellow  < > Yellow   APPEARANCEUA Clear  < > Clear   PHUR 5.0  < > 5.0   SPECGRAV 1.005  < > 1.015   PROTEINUA Negative  < > Negative   GLUCUA 4+*  < > Negative   KETONESU Negative  < > Negative   BILIRUBINUA Negative  < > Negative   OCCULTUA Negative  < > Negative   NITRITE Negative  < > Negative   UROBILINOGEN Negative  < > Negative   LEUKOCYTESUR Negative  < > Negative   RBCUA 1  --   --    BACTERIA None  --   --    < > = values in this interval not displayed.  Wound Culture:   Recent Labs  Lab 03/28/17  1732 03/30/17  1414   LABAERO ENTEROCOCCUS FAECALISModerate  STREPTOCOCCUS MITIS/ORALIS Moderate ENTEROCOCCUS SPECIESFewIdentification and susceptibility pending       Significant Imaging: I have reviewed all pertinent imaging results/findings within the past 24 hours.

## 2017-03-31 NOTE — PLAN OF CARE
Problem: Patient Care Overview  Goal: Plan of Care Review  Outcome: Ongoing (interventions implemented as appropriate)  Patient's VSS. Patient complaining of constant pain to R groin area. Patient placed on continuous Dilaudid PCA with continuous 04 mg/hr with a 2 mg limit over an hour. Patient's wound vac to right groin area now set at -125 mmHg. Otherwise patient AAOx4, calm, and cooperative. Patient able to turn himself in bed. Free from falls, injury, and skin breakdown.

## 2017-03-31 NOTE — PROGRESS NOTES
Follow up on wound vac. Plan to change on Monday and notify plastics when wound is open so they can come to assess. Ordered white sponge and granufoam for dressing change.   Isaiah Reyes RN,CWON  [id number]

## 2017-03-31 NOTE — CONSULTS
[facility]  Infectious Disease  Consult Note    Patient Name: Mick Barriga  MRN: [id number]  Admission Date: 3/28/2017  Hospital Length of Stay: 3 days  Attending Physician: Josefina Saul MD  Primary Care Provider: uSkhdev Alan MD     Isolation Status: No active isolations    Patient information was obtained from patient, past medical records and ER records.      Consults  Assessment/Plan:     * Lymphocele after surgical procedure  57 yo M with PMHx of NICM s/p HM II (8/24/16), HLD, HTN, VT s/p ICD; s/p OHT 2/9/17 CMV D-/R+ on maintenance with MMF and Prednisone; admitted 3/28/17 due to lymphocele foul smell/draining more on left inguinal area. Otherwise patient feeling well, afebrile. Patient went to OR 3/30/17 for: Irrigation and debridement of the right groin, including skin, subcutaneous tissue and fascia; measuring approximately 7 cm x 4 cm and Rectus femoris muscle flap to the groin.  - one wound culture positive for E faecalis; other wound culture also positive for Enterococcus, will follow until final  - continue Ampicillin   - follow cultures until final    Thank you for your consult. I will follow-up with patient. Please contact us if you have any additional questions.    Ezio Giron MD  Infectious Disease Fellow, PGY-4  Pager: [phone]  [facility]    Subjective:     Principal Problem: Lymphocele after surgical procedure    HPI: 57 yo M with PMHx of NICM s/p HM II (8/24/16), HLD, HTN, VT s/p ICD; s/p OHT 2/9/17 CMV D-/R+ on maintenance with MMF and Prednisone; admitted 3/28/17 due to lymphocele foul smell/draining more on left inguinal area. Otherwise patient feeling well, afebrile.    Past Medical History:   Diagnosis Date    CHF (congestive heart failure)     Coronary artery disease     GERD (gastroesophageal reflux disease)     Hyperlipidemia     Hypertension     LVAD (left ventricular assist device) present 2/13/2017    Type 2 diabetes  mellitus with hyperglycemia        Past Surgical History:   Procedure Laterality Date    CARDIAC PACEMAKER PLACEMENT      CHOLECYSTECTOMY      COLONOSCOPY N/A 1/11/2017    Procedure: COLONOSCOPY;  Surgeon: Petr Almanzar MD;  [facility]  Service: Endoscopy;  Laterality: N/A;    COLONOSCOPY N/A 1/12/2017    Procedure: COLONOSCOPY;  Surgeon: Petr Almanzar MD;  [facility]  Service: Endoscopy;  Laterality: N/A;    HEART TRANSPLANT  02/2017    LEFT VENTRICULAR ASSIST DEVICE      x 3 months ago       Review of patient's allergies indicates:   Allergen Reactions    Levemir [insulin detemir] Itching    Pork/porcine containing products     Ranexa [ranolazine] Nausea Only       Medications:  Prescriptions Prior to Admission   Medication Sig    amlodipine (NORVASC) 10 MG tablet Take 1 tablet (10 mg total) by mouth once daily.    aspirin (ECOTRIN) 81 MG EC tablet Take 1 tablet (81 mg total) by mouth once daily.    calcium-vitamin D3 500 mg(1,250mg) -200 unit per tablet Take 1 tablet by mouth 2 (two) times daily with meals.    esomeprazole (NEXIUM) 40 MG capsule Take 1 capsule (40 mg total) by mouth before breakfast.    ferrous gluconate (FERGON) 324 MG tablet Take 1 tablet (324 mg total) by mouth daily with breakfast.    furosemide (LASIX) 80 MG tablet Take 0.5 tablets (40 mg total) by mouth 2 (two) times daily.    magnesium oxide (MAG-OX) 400 mg tablet Take 2 tablets (800 mg total) by mouth 3 (three) times daily.    mycophenolate (CELLCEPT) 250 mg Cap Take 6 capsules (1,500 mg total) by mouth 2 (two) times daily.    nystatin (MYCOSTATIN) 100,000 unit/mL suspension Take 5 mLs (500,000 Units total) by mouth 4 (four) times daily with meals and nightly.    oxycodone (ROXICODONE) 10 mg Tab immediate release tablet Take 1 tablet (10 mg total) by mouth every 4 (four) hours as needed (pain).    pravastatin (PRAVACHOL) 40 MG tablet Take 1 tablet (40 mg total) by mouth  once daily.    predniSONE (DELTASONE) 10 MG tablet Take 1 tablet (10 mg total) by mouth once daily.    sulfamethoxazole-trimethoprim 400-80mg (BACTRIM,SEPTRA) 400-80 mg per tablet Take 1 tablet by mouth once daily.    tacrolimus (PROGRAF) 1 MG Cap Take 2mg orally in the morning and 1mg orally in the evening    terbutaline (BRETHINE) 5 mg Tab Take 1 tablet (5 mg total) by mouth 3 (three) times daily.    valganciclovir (VALCYTE) 450 mg Tab TAKE 2 TABLETS(900 MG) BY MOUTH EVERY DAY    alendronate (FOSAMAX) 70 MG tablet TAKE 1 TABLET BY MOUTH EVERY 7 DAYS    blood sugar diagnostic Strp To use to check BG 4 times daily, to use with insurance preferred meter    blood-glucose meter kit To use to check BG 4 times daily, to use with insurance preferred meter    docusate sodium (COLACE) 100 MG capsule Take 2 capsules (200 mg total) by mouth every evening. (Patient taking differently: Take 100 mg by mouth 2 (two) times daily. )    insulin aspart (NOVOLOG) 100 unit/mL InPn pen Inject 15 Units into the skin 3 (three) times daily.    insulin glargine (LANTUS SOLOSTAR) 100 unit/mL (3 mL) InPn pen Inject 28 Units into the skin every evening.    lancets Misc To use to check BG 4 times daily, to use with insurance preferred meter     Antibiotics     Start     Stop Route Frequency Ordered    03/29/17 0900  sulfamethoxazole-trimethoprim 400-80mg per tablet 1 tablet      -- Oral Daily 03/28/17 1622    03/31/17 1300  ampicillin (OMNIPEN) 4,000 mg in sodium chloride 0.9% 250 mL infusion      -- IV Every 8 hours (non-standard times) 03/31/17 1157        Antifungals     None        Antivirals         Stop Route Frequency     valganciclovir      -- Oral Daily           Immunization History   Administered Date(s) Administered    Hepatitis B, Adult 12/31/2016    Pneumococcal Conjugate - 13 Valent 08/24/2016    Tdap 08/24/2016       Family History     Problem Relation (Age of Onset)    Cancer Brother    Heart attack Mother,  Father    Heart disease Mother, Father    Hypertension Mother, Father        Social History     Social History    Marital status:      Spouse name: N/A    Number of children: N/A    Years of education: N/A     Social History Main Topics    Smoking status: Former Smoker     Packs/day: 0.50     Years: 15.00     Quit date: 6/14/2006    Smokeless tobacco: Never Used    Alcohol use No    Drug use: None    Sexual activity: Yes     Partners: Female     Other Topics Concern    None     Social History Narrative     Review of Systems   Constitutional: Negative for chills and fever.   HENT: Negative for sore throat.    Respiratory: Negative for cough, chest tightness and shortness of breath.    Cardiovascular: Negative for chest pain, palpitations and leg swelling.   Gastrointestinal: Negative for abdominal distention, abdominal pain, diarrhea, nausea and vomiting.   Genitourinary: Negative for dysuria, flank pain and urgency.   Skin: Positive for wound (right inguinal area). Negative for rash.   Neurological: Negative for dizziness, light-headedness and numbness.   Psychiatric/Behavioral: Negative for confusion.     Objective:     Vital Signs (Most Recent):  Temp: 98.1 °F (36.7 °C) (03/31/17 1600)  Pulse: 90 (03/31/17 1600)  Resp: 18 (03/31/17 1600)  BP: 118/73 (03/31/17 1600)  SpO2: 96 % (03/31/17 1600) Vital Signs (24h Range):  Temp:  [97.5 °F (36.4 °C)-98.3 °F (36.8 °C)] 98.1 °F (36.7 °C)  Pulse:  [] 90  Resp:  [11-20] 18  SpO2:  [96 %-100 %] 96 %  BP: (118-154)/(66-82) 118/73     Weight: 90.7 kg (200 lb)  Body mass index is 30.41 kg/(m^2).    Estimated Creatinine Clearance: 102.6 mL/min (based on Cr of 0.9).    Physical Exam   Constitutional: He is oriented to person, place, and time. No distress.   HENT:   Head: Normocephalic and atraumatic.   Mouth/Throat: No oropharyngeal exudate.   Eyes: No scleral icterus.   Cardiovascular: Normal rate, regular rhythm and normal heart sounds.  Exam reveals no  gallop and no friction rub.    No murmur heard.  Sternotomy healed   Pulmonary/Chest: Effort normal and breath sounds normal. No respiratory distress. He has no wheezes. He has no rales.   Abdominal: Soft. Bowel sounds are normal. He exhibits no distension. There is no tenderness. There is no rebound and no guarding.   Genitourinary:   Genitourinary Comments: Wound vac on right inguinal area   Musculoskeletal: He exhibits no edema.   Neurological: He is alert and oriented to person, place, and time. No cranial nerve deficit.   Vitals reviewed.      Significant Labs:   Blood Culture:   Recent Labs  Lab 01/19/17  1012 02/20/17  1124 03/28/17  1650 03/28/17  1651   LABBLOO No growth after 5 days.  No growth after 5 days. No growth after 5 days.  No growth after 5 days. No Growth to date  No Growth to date  No Growth to date No Growth to date  No Growth to date  No Growth to date     BMP:   Recent Labs  Lab 03/31/17  0442   *   *   K 4.7      CO2 27   BUN 19   CREATININE 0.9   CALCIUM 8.6*   MG 1.4*     CBC:   Recent Labs  Lab 03/30/17  0535 03/31/17  0442   WBC 9.42 11.89   HGB 10.7* 10.4*   HCT 32.5* 32.7*    263     CMP:   Recent Labs  Lab 03/30/17  0535 03/31/17  0442    134*   K 4.0 4.7    100   CO2 27 27   GLU 90 156*   BUN 23* 19   CREATININE 0.9 0.9   CALCIUM 8.8 8.6*   PROT 5.9* 5.6*   ALBUMIN 3.1* 3.0*   BILITOT 0.4 0.5   ALKPHOS 76 77   AST 17 18   ALT 27 21   ANIONGAP 11 7*   EGFRNONAA >60.0 >60.0     Urine Culture:   Recent Labs  Lab 01/19/17  1156 02/08/17  2000   LABURIN CITROBACTER [id number],000 - 49,999 cfu/ml CITROBACTER KOSERI>100,000 cfu/ml     Urine Studies:   Recent Labs  Lab 01/10/17  1402  02/27/17  0941   COLORU Yellow  < > Yellow   APPEARANCEUA Clear  < > Clear   PHUR 5.0  < > 5.0   SPECGRAV 1.005  < > 1.015   PROTEINUA Negative  < > Negative   GLUCUA 4+*  < > Negative   KETONESU Negative  < > Negative   BILIRUBINUA Negative  < > Negative   OCCULTUA  Negative  < > Negative   NITRITE Negative  < > Negative   UROBILINOGEN Negative  < > Negative   LEUKOCYTESUR Negative  < > Negative   RBCUA 1  --   --    BACTERIA None  --   --    < > = values in this interval not displayed.  Wound Culture:   Recent Labs  Lab 03/28/17  1732 03/30/17  1414   LABAERO ENTEROCOCCUS FAECALISModerate  STREPTOCOCCUS MITIS/ORALIS Moderate ENTEROCOCCUS SPECIESFewIdentification and susceptibility pending       Significant Imaging: I have reviewed all pertinent imaging results/findings within the past 24 hours.

## 2017-03-31 NOTE — ASSESSMENT & PLAN NOTE
55 yo M with PMHx of NICM s/p HM II (8/24/16), HLD, HTN, VT s/p ICD; s/p OHT 2/9/17 CMV D-/R+ on maintenance with MMF and Prednisone; admitted 3/28/17 due to lymphocele foul smell/draining more on left inguinal area. Otherwise patient feeling well, afebrile. Patient went to OR 3/30/17 for: Irrigation and debridement of the right groin, including skin, subcutaneous tissue and fascia; measuring approximately 7 cm x 4 cm and Rectus femoris muscle flap to the groin.  - one wound culture positive for E faecalis; other wound culture also positive for Enterococcus, will follow until final  - continue Ampicillin   - follow cultures until final

## 2017-03-31 NOTE — OP NOTE
DATE OF PROCEDURE:  03/30/2017    PREOPERATIVE DIAGNOSIS:  Chronic lymphocele of the right groin.    POSTOPERATIVE DIAGNOSIS:  Chronic lymphocele of the right groin.    PROCEDURE PERFORMED:  1.  Irrigation and debridement of the right groin, including skin, subcutaneous   tissue and fascia; measuring approximately 7 cm x 4 cm.  2.  Rectus femoris muscle flap to the groin.  3.  Complex closure measuring approximately 6 x 4 cm and placement of wound VAC.    SURGEON:  Aravind Palomino M.D., F.A.C.S.    ANESTHESIA:  General.    DESCRIPTION OF PROCEDURE:  The patient was placed in the supine position, after   adequate general endotracheal anesthesia, was prepped and draped in normal   sterile fashion.  The patient had about a 2 x 2 cm opening, leading directly   down to his femoral artery and vein.  This area was completely reexcised.  All   nonviable tissue including skin, subcutaneous tissue and fascia was removed.    All granulation tissue was removed.  The wound was then irrigated.  Next, an   incision in the mid thigh was then made.  The rectus femoris muscle was then   isolated.  It was dissected proximally and distally.  The groin incision was   actually connected to that one single incision in the anterior thigh.  The   muscle was divided distally.  Ring forceps was then passed and the muscle was   then pulled through the subcutaneous tunnel into the groin wound.  It was   sutured down over the deep artery and vein using interrupted 2-0 Vicryl.  The   skin was then marsupialized circumferentially around the flap using a running   dermis to fascial suture.  This obliterated all dead space.  Next, the   superficial fascia was then removed from the muscle.  The muscle was noted to be   pink and viable.  A wound VAC was then placed.  The groin wound was then closed   over a drain using 2-0 Vicryl, 3-0 Monocryl, and a running 3-0 Monocryl   subcuticular suture.  Dressings were applied.  There were no  complications.      CRB/HN  dd: 03/30/2017 15:57:06 (CDT)  td: 03/30/2017 22:34:15 (CDT)  Doc ID   #[id number]  Job ID #[id number]    CC:

## 2017-04-01 LAB
ALBUMIN SERPL BCP-MCNC: 2.8 G/DL
ALP SERPL-CCNC: 70 U/L
ALT SERPL W/O P-5'-P-CCNC: 17 U/L
ANION GAP SERPL CALC-SCNC: 7 MMOL/L
ANISOCYTOSIS BLD QL SMEAR: SLIGHT
AST SERPL-CCNC: 14 U/L
BASOPHILS # BLD AUTO: 0.03 K/UL
BASOPHILS NFR BLD: 0.2 %
BILIRUB SERPL-MCNC: 0.6 MG/DL
BUN SERPL-MCNC: 14 MG/DL
BURR CELLS BLD QL SMEAR: ABNORMAL
CALCIUM SERPL-MCNC: 8.8 MG/DL
CHLORIDE SERPL-SCNC: 99 MMOL/L
CO2 SERPL-SCNC: 31 MMOL/L
CREAT SERPL-MCNC: 0.8 MG/DL
DIFFERENTIAL METHOD: ABNORMAL
EOSINOPHIL # BLD AUTO: 0.1 K/UL
EOSINOPHIL NFR BLD: 0.4 %
ERYTHROCYTE [DISTWIDTH] IN BLOOD BY AUTOMATED COUNT: 20.7 %
EST. GFR  (AFRICAN AMERICAN): >60 ML/MIN/1.73 M^2
EST. GFR  (NON AFRICAN AMERICAN): >60 ML/MIN/1.73 M^2
GLUCOSE SERPL-MCNC: 172 MG/DL
HCT VFR BLD AUTO: 33.1 %
HGB BLD-MCNC: 10.4 G/DL
LYMPHOCYTES # BLD AUTO: 1.8 K/UL
LYMPHOCYTES NFR BLD: 14.2 %
MAGNESIUM SERPL-MCNC: 1.4 MG/DL
MCH RBC QN AUTO: 20.5 PG
MCHC RBC AUTO-ENTMCNC: 31.4 %
MCV RBC AUTO: 65 FL
MONOCYTES # BLD AUTO: 0.6 K/UL
MONOCYTES NFR BLD: 4.8 %
NEUTROPHILS # BLD AUTO: 10.1 K/UL
NEUTROPHILS NFR BLD: 79.4 %
PLATELET # BLD AUTO: 240 K/UL
PLATELET BLD QL SMEAR: ABNORMAL
PMV BLD AUTO: ABNORMAL FL
POCT GLUCOSE: 109 MG/DL (ref 70–110)
POCT GLUCOSE: 151 MG/DL (ref 70–110)
POCT GLUCOSE: 153 MG/DL (ref 70–110)
POCT GLUCOSE: 166 MG/DL (ref 70–110)
POCT GLUCOSE: 176 MG/DL (ref 70–110)
POIKILOCYTOSIS BLD QL SMEAR: SLIGHT
POLYCHROMASIA BLD QL SMEAR: ABNORMAL
POTASSIUM SERPL-SCNC: 4 MMOL/L
PROT SERPL-MCNC: 5.8 G/DL
RBC # BLD AUTO: 5.07 M/UL
SCHISTOCYTES BLD QL SMEAR: PRESENT
SODIUM SERPL-SCNC: 137 MMOL/L
TACROLIMUS BLD-MCNC: 11.4 NG/ML
WBC # BLD AUTO: 12.75 K/UL

## 2017-04-01 PROCEDURE — 25000003 PHARM REV CODE 250: Performed by: INTERNAL MEDICINE

## 2017-04-01 PROCEDURE — 80053 COMPREHEN METABOLIC PANEL: CPT

## 2017-04-01 PROCEDURE — 85025 COMPLETE CBC W/AUTO DIFF WBC: CPT

## 2017-04-01 PROCEDURE — 36415 COLL VENOUS BLD VENIPUNCTURE: CPT

## 2017-04-01 PROCEDURE — 20600001 HC STEP DOWN PRIVATE ROOM

## 2017-04-01 PROCEDURE — 99233 SBSQ HOSP IP/OBS HIGH 50: CPT | Mod: ,,, | Performed by: INTERNAL MEDICINE

## 2017-04-01 PROCEDURE — 83735 ASSAY OF MAGNESIUM: CPT

## 2017-04-01 PROCEDURE — 25000003 PHARM REV CODE 250: Performed by: STUDENT IN AN ORGANIZED HEALTH CARE EDUCATION/TRAINING PROGRAM

## 2017-04-01 PROCEDURE — 63600175 PHARM REV CODE 636 W HCPCS: Performed by: INTERNAL MEDICINE

## 2017-04-01 PROCEDURE — 25000003 PHARM REV CODE 250: Performed by: NURSE PRACTITIONER

## 2017-04-01 PROCEDURE — 63600175 PHARM REV CODE 636 W HCPCS: Performed by: NURSE PRACTITIONER

## 2017-04-01 PROCEDURE — 25000003 PHARM REV CODE 250: Performed by: HOSPITALIST

## 2017-04-01 PROCEDURE — 80197 ASSAY OF TACROLIMUS: CPT

## 2017-04-01 PROCEDURE — 99232 SBSQ HOSP IP/OBS MODERATE 35: CPT | Mod: ,,, | Performed by: INTERNAL MEDICINE

## 2017-04-01 RX ORDER — HEPARIN SODIUM 5000 [USP'U]/ML
5000 INJECTION, SOLUTION INTRAVENOUS; SUBCUTANEOUS EVERY 8 HOURS
Status: DISCONTINUED | OUTPATIENT
Start: 2017-04-01 | End: 2017-04-10 | Stop reason: HOSPADM

## 2017-04-01 RX ORDER — MAGNESIUM SULFATE HEPTAHYDRATE 40 MG/ML
2 INJECTION, SOLUTION INTRAVENOUS ONCE
Status: COMPLETED | OUTPATIENT
Start: 2017-04-01 | End: 2017-04-01

## 2017-04-01 RX ORDER — POLYETHYLENE GLYCOL 3350 17 G/17G
17 POWDER, FOR SOLUTION ORAL ONCE
Status: COMPLETED | OUTPATIENT
Start: 2017-04-01 | End: 2017-04-01

## 2017-04-01 RX ADMIN — FERROUS GLUCONATE TAB 324 MG (37.5 MG ELEMENTAL IRON) 324 MG: 324 (37.5 FE) TAB at 07:04

## 2017-04-01 RX ADMIN — FUROSEMIDE 40 MG: 40 TABLET ORAL at 09:04

## 2017-04-01 RX ADMIN — AMPICILLIN SODIUM 4000 MG: 2 INJECTION, POWDER, FOR SOLUTION INTRAMUSCULAR; INTRAVENOUS at 09:04

## 2017-04-01 RX ADMIN — PANTOPRAZOLE SODIUM 40 MG: 40 TABLET, DELAYED RELEASE ORAL at 08:04

## 2017-04-01 RX ADMIN — AMPICILLIN SODIUM 4000 MG: 2 INJECTION, POWDER, FOR SOLUTION INTRAMUSCULAR; INTRAVENOUS at 04:04

## 2017-04-01 RX ADMIN — OXYCODONE HYDROCHLORIDE 20 MG: 10 TABLET, FILM COATED, EXTENDED RELEASE ORAL at 08:04

## 2017-04-01 RX ADMIN — HEPARIN SODIUM 5000 UNITS: 5000 INJECTION, SOLUTION INTRAVENOUS; SUBCUTANEOUS at 09:04

## 2017-04-01 RX ADMIN — INSULIN ASPART 15 UNITS: 100 INJECTION, SOLUTION INTRAVENOUS; SUBCUTANEOUS at 01:04

## 2017-04-01 RX ADMIN — Medication 800 MG: at 09:04

## 2017-04-01 RX ADMIN — Medication 800 MG: at 04:04

## 2017-04-01 RX ADMIN — MAGNESIUM SULFATE IN WATER 2 G: 40 INJECTION, SOLUTION INTRAVENOUS at 08:04

## 2017-04-01 RX ADMIN — HEPARIN SODIUM 5000 UNITS: 5000 INJECTION, SOLUTION INTRAVENOUS; SUBCUTANEOUS at 02:04

## 2017-04-01 RX ADMIN — MYCOPHENOLATE MOFETIL 1000 MG: 250 CAPSULE ORAL at 09:04

## 2017-04-01 RX ADMIN — DOCUSATE SODIUM 200 MG: 100 CAPSULE, LIQUID FILLED ORAL at 09:04

## 2017-04-01 RX ADMIN — FUROSEMIDE 40 MG: 40 TABLET ORAL at 08:04

## 2017-04-01 RX ADMIN — ERGOCALCIFEROL 50000 UNITS: 1.25 CAPSULE ORAL at 08:04

## 2017-04-01 RX ADMIN — MYCOPHENOLATE MOFETIL 1000 MG: 250 CAPSULE ORAL at 08:04

## 2017-04-01 RX ADMIN — PREDNISONE 5 MG: 5 TABLET ORAL at 08:04

## 2017-04-01 RX ADMIN — INSULIN GLARGINE 20 UNITS: 100 INJECTION, SOLUTION SUBCUTANEOUS at 09:04

## 2017-04-01 RX ADMIN — Medication: at 12:04

## 2017-04-01 RX ADMIN — HYDROCODONE BITARTRATE AND ACETAMINOPHEN 1 TABLET: 7.5; 325 TABLET ORAL at 07:04

## 2017-04-01 RX ADMIN — POLYETHYLENE GLYCOL 3350 17 G: 17 POWDER, FOR SOLUTION ORAL at 07:04

## 2017-04-01 RX ADMIN — TACROLIMUS 1 MG: 1 CAPSULE, GELATIN COATED ORAL at 08:04

## 2017-04-01 RX ADMIN — AMLODIPINE BESYLATE 10 MG: 10 TABLET ORAL at 08:04

## 2017-04-01 RX ADMIN — TACROLIMUS 1 MG: 1 CAPSULE, GELATIN COATED ORAL at 05:04

## 2017-04-01 RX ADMIN — AMPICILLIN SODIUM 4000 MG: 2 INJECTION, POWDER, FOR SOLUTION INTRAMUSCULAR; INTRAVENOUS at 01:04

## 2017-04-01 RX ADMIN — INSULIN ASPART 15 UNITS: 100 INJECTION, SOLUTION INTRAVENOUS; SUBCUTANEOUS at 08:04

## 2017-04-01 RX ADMIN — VALGANCICLOVIR 900 MG: 450 TABLET, FILM COATED ORAL at 08:04

## 2017-04-01 RX ADMIN — SULFAMETHOXAZOLE AND TRIMETHOPRIM 1 TABLET: 400; 80 TABLET ORAL at 08:04

## 2017-04-01 RX ADMIN — INSULIN ASPART 15 UNITS: 100 INJECTION, SOLUTION INTRAVENOUS; SUBCUTANEOUS at 05:04

## 2017-04-01 RX ADMIN — Medication 800 MG: at 02:04

## 2017-04-01 RX ADMIN — OXYCODONE HYDROCHLORIDE 20 MG: 10 TABLET, FILM COATED, EXTENDED RELEASE ORAL at 09:04

## 2017-04-01 RX ADMIN — PRAVASTATIN SODIUM 40 MG: 40 TABLET ORAL at 08:04

## 2017-04-01 NOTE — PLAN OF CARE
Problem: Patient Care Overview  Goal: Plan of Care Review  Outcome: Ongoing (interventions implemented as appropriate)  Plan of care reviewed with patient. Pt verbalized understanding. Pt AAOX4. Pt free from falls, injuries and trauma.  Pt free from skin breakdown.  Pt VSS and in NAD.  Pt afebrile.  Pt had no complaints of SOB, N/V or CP.  Pts pain moderately controlled with scheduled pain medications and PCA pump. Adequate UOP this shift. Pt passing gas. No BM this shift. Wound vac at -125mm/hg with scant output. DARIN drain intact. Pt had uneventful evening. Pt in low locked bed with personal items and call light within reach. Fall precautions maintained. Wife remained at bedside.

## 2017-04-01 NOTE — PROGRESS NOTES
Progress Note  Cardiology    Admit Date: 3/28/2017   LOS: 4 days     SUBJECTIVE:     Pain improved since yesterday. PCA in use.    Scheduled Meds:   amlodipine  10 mg Oral Daily    ampicillin continuous infusion  4,000 mg Intravenous Q8H    docusate sodium  200 mg Oral QHS    ergocalciferol  50,000 Units Oral Q7 Days    ferrous gluconate  324 mg Oral Daily with breakfast    furosemide  40 mg Oral BID    heparin (porcine)  5,000 Units Subcutaneous Q8H    insulin aspart  15 Units Subcutaneous TIDWM    insulin glargine  20 Units Subcutaneous Daily    magnesium oxide  800 mg Oral TID    magnesium sulfate IVPB  2 g Intravenous Once    magnesium sulfate IVPB  2 g Intravenous Once    mycophenolate  1,000 mg Oral BID    oxycodone  20 mg Oral Q12H    pantoprazole  40 mg Oral Daily    pravastatin  40 mg Oral Daily    predniSONE  5 mg Oral Daily    sulfamethoxazole-trimethoprim 400-80mg  1 tablet Oral Daily    tacrolimus  1 mg Oral BID    valganciclovir  900 mg Oral Daily     Continuous Infusions:   sodium chloride 0.9%      hydromorphone in 0.9 % NaCl 6 mg/30 ml       PRN Meds:dextrose 50%, dextrose 50%, glucagon (human recombinant), glucose, glucose, hydrocodone-acetaminophen 7.5-325mg, HYDROmorphone, insulin aspart, naloxone    Review of patient's allergies indicates:   Allergen Reactions    Levemir [insulin detemir] Itching    Pork/porcine containing products     Ranexa [ranolazine] Nausea Only       OBJECTIVE:     Vital Signs (Most Recent)  Temp: 98.3 °F (36.8 °C) (04/01/17 0757)  Pulse: 91 (04/01/17 0757)  Resp: 18 (04/01/17 0757)  BP: 132/67 (04/01/17 0757)  SpO2: 96 % (04/01/17 0757)    Vital Signs Range (Last 24H):  Temp:  [98.1 °F (36.7 °C)-98.7 °F (37.1 °C)]   Pulse:  []   Resp:  [18-20]   BP: (118-136)/(67-73)   SpO2:  [95 %-97 %]     I & O (Last 24H):    Intake/Output Summary (Last 24 hours) at 04/01/17 0912  Last data filed at 04/01/17 0800   Gross per 24 hour   Intake               740 ml   Output             1420 ml   Net             -680 ml       Physical Exam:   General: Patient in no acute distress or discomfort  HEENT: No JVD, moist mucous membranes  Cardiac: S1S2 RRR no GMR  Chest: CTABL, no wheezing or rales  Abd:Soft NTND  Ext: No Edema No swelling, R groin wound  Neuro: A and O X 3, non focal    LABS  CBC with Diff:     Recent Labs  Lab 03/29/17  0503 03/30/17  0535 03/31/17  0442 04/01/17  0614   WBC 9.67 9.42 11.89 12.75*   HGB 10.6* 10.7* 10.4* 10.4*   HCT 33.3* 32.5* 32.7* 33.1*    271 263  --    LYMPH 21.8  2.1 20.4  1.9 13.4*  1.6 14.2*  1.8   MONO 3.9*  0.4 4.0  0.4 4.7  0.6 4.8  0.6   EOSINOPHIL 0.4 0.3 0.3 0.4       COAG:  No results for input(s): APTT, INR, PTT in the last 168 hours.    CMP:     Recent Labs  Lab 03/30/17  0535 03/31/17  0442 04/01/17  0615   GLU 90 156* 172*   CALCIUM 8.8 8.6* 8.8   ALBUMIN 3.1* 3.0* 2.8*   PROT 5.9* 5.6* 5.8*    134* 137   K 4.0 4.7 4.0   CO2 27 27 31*    100 99   BUN 23* 19 14   CREATININE 0.9 0.9 0.8   ALKPHOS 76 77 70   ALT 27 21 17   AST 17 18 14   BILITOT 0.4 0.5 0.6   MG 1.4* 1.4* 1.4*     Estimated Creatinine Clearance: 116.5 mL/min (based on Cr of 0.8).    .No results for input(s): CPK, TROPONINI, MB, BNP in the last 168 hours.      Present on Admission:   Lymphocele after surgical procedure    ASSESSMENT / PLAN:   57 yo M with PMHx of NICM s/p HM II (8/24/16), now s/p OHTx 2/9/17 who is admitted secondary to lymphocele foul smell/draining more.         S/P Orthotopic Heart Transplantation 2/9/2017  -Moderate Risk of Rejection: cPRA 0%, Negative Crossmatch with B Channel Shifts  -CMV Status: D-/R+: Continue valcyte.  -Immunosuppression: MMF 1000 mg BID, Prednisone 5 mg daily. Tacro 1/1      DM  - SSI/Levimir      R groin Lymphocele- S/P I&D of right groin/Rectus femoris muscle flap to the groin/placement of wound VAC.  - Appreciate wound care.  - Culture sent and growing enterococcus. Awaiting on  sensitivities.    - Appreciate ID Cx. Continue Ampicillin  - Continue PCA/Oxy for now.

## 2017-04-01 NOTE — SUBJECTIVE & OBJECTIVE
Interval History: Feels well. Some pain from vac. No fever or chills.    Review of Systems   Constitutional: Negative for chills and fever.   All other systems reviewed and are negative.    Objective:     Vital Signs (Most Recent):  Temp: 98.3 °F (36.8 °C) (04/01/17 1200)  Pulse: 90 (04/01/17 1200)  Resp: 18 (04/01/17 1200)  BP: 130/66 (04/01/17 1200)  SpO2: 96 % (04/01/17 1200) Vital Signs (24h Range):  Temp:  [98.1 °F (36.7 °C)-98.7 °F (37.1 °C)] 98.3 °F (36.8 °C)  Pulse:  [55-95] 90  Resp:  [18-20] 18  SpO2:  [95 %-97 %] 96 %  BP: (118-136)/(66-73) 130/66     Weight: 92.5 kg (203 lb 14.8 oz)  Body mass index is 31.01 kg/(m^2).    Estimated Creatinine Clearance: 116.5 mL/min (based on Cr of 0.8).    Physical Exam   Constitutional: He is oriented to person, place, and time. He appears well-developed and well-nourished.   HENT:   Head: Normocephalic and atraumatic.   Right Ear: External ear normal.   Left Ear: External ear normal.   Mouth/Throat: Oropharynx is clear and moist.   Eyes: Conjunctivae and EOM are normal. Pupils are equal, round, and reactive to light.   Neck: Normal range of motion. Neck supple. No thyromegaly present.   Cardiovascular: Normal rate, regular rhythm and normal heart sounds.    No murmur heard.  Pulmonary/Chest: Effort normal and breath sounds normal. He has no wheezes. He has no rales.   Abdominal: Soft. Bowel sounds are normal. He exhibits no mass. There is no tenderness. There is no rebound.   Musculoskeletal: Normal range of motion.   Vac right groin   Lymphadenopathy:     He has no cervical adenopathy.   Neurological: He is alert and oriented to person, place, and time.   Skin: Skin is warm and dry.   Psychiatric: He has a normal mood and affect. His behavior is normal.   Vitals reviewed.      Significant Labs:   Wound Culture:   Recent Labs  Lab 03/28/17  1732 03/30/17  1414   LABAERO ENTEROCOCCUS FAECALISModerate  STREPTOCOCCUS MITIS/ORALIS Moderate ENTEROCOCCUS  SPECIESFewIdentification and susceptibility pending       Significant Imaging: None

## 2017-04-01 NOTE — ASSESSMENT & PLAN NOTE
55 yo M with PMHx of NICM s/p HM II (8/24/16), HLD, HTN, VT s/p ICD; s/p OHT 2/9/17 CMV D-/R+ on maintenance with MMF and Prednisone; admitted 3/28/17 due to lymphocele foul smell/draining more on left inguinal area. Otherwise patient feeling well, afebrile. Patient went to OR 3/30/17 for: Irrigation and debridement of the right groin, including skin, subcutaneous tissue and fascia; measuring approximately 7 cm x 4 cm and Rectus femoris muscle flap to the groin.    - one wound culture positive for E faecalis and Strep; other wound culture also positive for Enterococcus (AST pending)  - stable on ampicillin  - follow cultures until final  - anticipate change to po abx soon

## 2017-04-01 NOTE — PROGRESS NOTES
[facility]  Infectious Disease  Progress Note    Patient Name: Mick Barriga  MRN: [id number]  Admission Date: 3/28/2017  Length of Stay: 4 days  Attending Physician: Josefina Saul MD  Primary Care Provider: Sukhdev Alan MD    Isolation Status: No active isolations  Assessment/Plan:      * Lymphocele after surgical procedure  57 yo M with PMHx of NICM s/p HM II (8/24/16), HLD, HTN, VT s/p ICD; s/p OHT 2/9/17 CMV D-/R+ on maintenance with MMF and Prednisone; admitted 3/28/17 due to lymphocele foul smell/draining more on left inguinal area. Otherwise patient feeling well, afebrile. Patient went to OR 3/30/17 for: Irrigation and debridement of the right groin, including skin, subcutaneous tissue and fascia; measuring approximately 7 cm x 4 cm and Rectus femoris muscle flap to the groin.    - one wound culture positive for E faecalis and Strep; other wound culture also positive for Enterococcus (AST pending)  - stable on ampicillin  - follow cultures until final  - anticipate change to po abx soon    I will follow-up with patient. Please contact us if you have any additional questions.    Aravind Gutierrez MD  Infectious Disease  [facility]    Subjective:     Principal Problem:Lymphocele after surgical procedure    HPI: 57 yo M with PMHx of NICM s/p HM II (8/24/16), HLD, HTN, VT s/p ICD; s/p OHT 2/9/17 CMV D-/R+ on maintenance with MMF and Prednisone; admitted 3/28/17 due to lymphocele foul smell/draining more on left inguinal area. Otherwise patient feeling well, afebrile.  Interval History: Feels well. Some pain from vac. No fever or chills.    Review of Systems   Constitutional: Negative for chills and fever.   All other systems reviewed and are negative.    Objective:     Vital Signs (Most Recent):  Temp: 98.3 °F (36.8 °C) (04/01/17 1200)  Pulse: 90 (04/01/17 1200)  Resp: 18 (04/01/17 1200)  BP: 130/66 (04/01/17 1200)  SpO2: 96 % (04/01/17 1200) Vital Signs (24h  Range):  Temp:  [98.1 °F (36.7 °C)-98.7 °F (37.1 °C)] 98.3 °F (36.8 °C)  Pulse:  [55-95] 90  Resp:  [18-20] 18  SpO2:  [95 %-97 %] 96 %  BP: (118-136)/(66-73) 130/66     Weight: 92.5 kg (203 lb 14.8 oz)  Body mass index is 31.01 kg/(m^2).    Estimated Creatinine Clearance: 116.5 mL/min (based on Cr of 0.8).    Physical Exam   Constitutional: He is oriented to person, place, and time. He appears well-developed and well-nourished.   HENT:   Head: Normocephalic and atraumatic.   Right Ear: External ear normal.   Left Ear: External ear normal.   Mouth/Throat: Oropharynx is clear and moist.   Eyes: Conjunctivae and EOM are normal. Pupils are equal, round, and reactive to light.   Neck: Normal range of motion. Neck supple. No thyromegaly present.   Cardiovascular: Normal rate, regular rhythm and normal heart sounds.    No murmur heard.  Pulmonary/Chest: Effort normal and breath sounds normal. He has no wheezes. He has no rales.   Abdominal: Soft. Bowel sounds are normal. He exhibits no mass. There is no tenderness. There is no rebound.   Musculoskeletal: Normal range of motion.   Vac right groin   Lymphadenopathy:     He has no cervical adenopathy.   Neurological: He is alert and oriented to person, place, and time.   Skin: Skin is warm and dry.   Psychiatric: He has a normal mood and affect. His behavior is normal.   Vitals reviewed.      Significant Labs:   Wound Culture:   Recent Labs  Lab 03/28/17  1732 03/30/17  1414   LABAERO ENTEROCOCCUS FAECALISModerate  STREPTOCOCCUS MITIS/ORALIS Moderate ENTEROCOCCUS SPECIESFewIdentification and susceptibility pending       Significant Imaging: None

## 2017-04-01 NOTE — PROGRESS NOTES
Progress Note  Plastic Surgery    Admit Date: 3/28/2017  Attending:   S/P: Procedure(s) (LRB):  Repair/ligation of leaking lymphatic with muscle flap coverage.  R groin (Right)    Post-operative Day: 2 Days Post-Op    Hospital Day: 5    SUBJECTIVE:     Pain controlled overnight. Wound vac in place holding suction.     OBJECTIVE:     Vital Signs (Most Recent)  Temp: 98.3 °F (36.8 °C) (04/01/17 0757)  Pulse: 91 (04/01/17 0757)  Resp: 18 (04/01/17 0757)  BP: 132/67 (04/01/17 0757)  SpO2: 96 % (04/01/17 0757)    Vital Signs Range (Last 24H):  Temp:  [98.1 °F (36.7 °C)-98.7 °F (37.1 °C)]   Pulse:  []   Resp:  [18-20]   BP: (118-136)/(67-73)   SpO2:  [95 %-97 %]     I & O (Last 24H):    Intake/Output Summary (Last 24 hours) at 04/01/17 1027  Last data filed at 04/01/17 0800   Gross per 24 hour   Intake              740 ml   Output             1420 ml   Net             -680 ml     Physical Exam:  General: well developed, well nourished in no distress  Heart: regular rate and rhythm  Pulm: lungs ctab, non labored breathing, good respiratory effort  Abd: soft, non tender, non distended  Ext: R groin with wound vac in place, R thigh incision c/d/i, DARIN w ss output. No signs of infection around either thigh incision or wound vac. Wound vac in place in right groin holding suction.     Laboratory:  CBC:     Recent Labs  Lab 03/31/17  0442 04/01/17  0614   WBC 11.89 12.75*   RBC 5.00 5.07   HGB 10.4* 10.4*   HCT 32.7* 33.1*     --    MCV 65* 65*   MCH 20.8* 20.5*   MCHC 31.8* 31.4*     BMP:     Recent Labs  Lab 04/01/17  0615   *      K 4.0   CL 99   CO2 31*   BUN 14   CREATININE 0.8   CALCIUM 8.8     CMP:     Recent Labs  Lab 04/01/17  0615   *   CALCIUM 8.8   ALBUMIN 2.8*   PROT 5.8*      K 4.0   CO2 31*   CL 99   BUN 14   CREATININE 0.8   ALKPHOS 70   ALT 17   AST 14   BILITOT 0.6     Labs within the past 24 hours have been reviewed.    ASSESSMENT/PLAN:     56 year old male with history  of heart transplant in Feb 2017 who was found to have lymphocele and non healing R groin wound who is s/p R wound revision with muscle flap coverage on 3/30/17.     - Pain control  - Wound care consult for vac change Monday  - Keep ADRIN drain to bulb suction, plastic surgery will manage drain  - No restrictions for activity from a plastic surgery perspective  - additional management per primary team    Abigail Navarro MD, PGY-1  General Surgery  [phone]

## 2017-04-02 LAB
ALBUMIN SERPL BCP-MCNC: 2.4 G/DL
ALP SERPL-CCNC: 63 U/L
ALT SERPL W/O P-5'-P-CCNC: 11 U/L
ANION GAP SERPL CALC-SCNC: 8 MMOL/L
ANISOCYTOSIS BLD QL SMEAR: ABNORMAL
AST SERPL-CCNC: 11 U/L
BACTERIA BLD CULT: NORMAL
BACTERIA BLD CULT: NORMAL
BACTERIA SPEC AEROBE CULT: NORMAL
BASOPHILS # BLD AUTO: 0.02 K/UL
BASOPHILS NFR BLD: 0.2 %
BILIRUB SERPL-MCNC: 0.4 MG/DL
BUN SERPL-MCNC: 14 MG/DL
CALCIUM SERPL-MCNC: 8.5 MG/DL
CHLORIDE SERPL-SCNC: 100 MMOL/L
CO2 SERPL-SCNC: 29 MMOL/L
CREAT SERPL-MCNC: 0.8 MG/DL
DIFFERENTIAL METHOD: ABNORMAL
EOSINOPHIL # BLD AUTO: 0.1 K/UL
EOSINOPHIL NFR BLD: 0.7 %
ERYTHROCYTE [DISTWIDTH] IN BLOOD BY AUTOMATED COUNT: 20.2 %
EST. GFR  (AFRICAN AMERICAN): >60 ML/MIN/1.73 M^2
EST. GFR  (NON AFRICAN AMERICAN): >60 ML/MIN/1.73 M^2
GLUCOSE SERPL-MCNC: 232 MG/DL
HCT VFR BLD AUTO: 30.4 %
HGB BLD-MCNC: 9.6 G/DL
HYPOCHROMIA BLD QL SMEAR: ABNORMAL
LYMPHOCYTES # BLD AUTO: 1.1 K/UL
LYMPHOCYTES NFR BLD: 12.2 %
MAGNESIUM SERPL-MCNC: 1.4 MG/DL
MCH RBC QN AUTO: 20.7 PG
MCHC RBC AUTO-ENTMCNC: 31.6 %
MCV RBC AUTO: 66 FL
MONOCYTES # BLD AUTO: 0.4 K/UL
MONOCYTES NFR BLD: 4.8 %
NEUTROPHILS # BLD AUTO: 7.5 K/UL
NEUTROPHILS NFR BLD: 82.1 %
OVALOCYTES BLD QL SMEAR: ABNORMAL
PLATELET # BLD AUTO: 258 K/UL
PLATELET BLD QL SMEAR: ABNORMAL
PMV BLD AUTO: ABNORMAL FL
POCT GLUCOSE: 176 MG/DL (ref 70–110)
POCT GLUCOSE: 181 MG/DL (ref 70–110)
POCT GLUCOSE: 207 MG/DL (ref 70–110)
POCT GLUCOSE: 299 MG/DL (ref 70–110)
POIKILOCYTOSIS BLD QL SMEAR: SLIGHT
POTASSIUM SERPL-SCNC: 4.4 MMOL/L
PROT SERPL-MCNC: 5.4 G/DL
RBC # BLD AUTO: 4.64 M/UL
SCHISTOCYTES BLD QL SMEAR: PRESENT
SODIUM SERPL-SCNC: 137 MMOL/L
TACROLIMUS BLD-MCNC: 12.6 NG/ML
WBC # BLD AUTO: 9.21 K/UL

## 2017-04-02 PROCEDURE — 85025 COMPLETE CBC W/AUTO DIFF WBC: CPT

## 2017-04-02 PROCEDURE — 25000003 PHARM REV CODE 250: Performed by: STUDENT IN AN ORGANIZED HEALTH CARE EDUCATION/TRAINING PROGRAM

## 2017-04-02 PROCEDURE — 63600175 PHARM REV CODE 636 W HCPCS: Performed by: NURSE PRACTITIONER

## 2017-04-02 PROCEDURE — 63600175 PHARM REV CODE 636 W HCPCS: Performed by: INTERNAL MEDICINE

## 2017-04-02 PROCEDURE — 87070 CULTURE OTHR SPECIMN AEROBIC: CPT

## 2017-04-02 PROCEDURE — 25000003 PHARM REV CODE 250: Performed by: INTERNAL MEDICINE

## 2017-04-02 PROCEDURE — 80197 ASSAY OF TACROLIMUS: CPT

## 2017-04-02 PROCEDURE — 80053 COMPREHEN METABOLIC PANEL: CPT

## 2017-04-02 PROCEDURE — 83735 ASSAY OF MAGNESIUM: CPT

## 2017-04-02 PROCEDURE — 36415 COLL VENOUS BLD VENIPUNCTURE: CPT

## 2017-04-02 PROCEDURE — 25000003 PHARM REV CODE 250: Performed by: HOSPITALIST

## 2017-04-02 PROCEDURE — 99232 SBSQ HOSP IP/OBS MODERATE 35: CPT | Mod: ,,, | Performed by: INTERNAL MEDICINE

## 2017-04-02 PROCEDURE — 25000003 PHARM REV CODE 250: Performed by: NURSE PRACTITIONER

## 2017-04-02 PROCEDURE — 20600001 HC STEP DOWN PRIVATE ROOM

## 2017-04-02 RX ORDER — AMOXICILLIN 250 MG
2 CAPSULE ORAL 2 TIMES DAILY
Status: DISCONTINUED | OUTPATIENT
Start: 2017-04-02 | End: 2017-04-10 | Stop reason: HOSPADM

## 2017-04-02 RX ORDER — MAGNESIUM SULFATE HEPTAHYDRATE 40 MG/ML
2 INJECTION, SOLUTION INTRAVENOUS ONCE
Status: COMPLETED | OUTPATIENT
Start: 2017-04-02 | End: 2017-04-02

## 2017-04-02 RX ADMIN — FUROSEMIDE 40 MG: 40 TABLET ORAL at 08:04

## 2017-04-02 RX ADMIN — AMPICILLIN SODIUM 4000 MG: 2 INJECTION, POWDER, FOR SOLUTION INTRAMUSCULAR; INTRAVENOUS at 05:04

## 2017-04-02 RX ADMIN — HEPARIN SODIUM 5000 UNITS: 5000 INJECTION, SOLUTION INTRAVENOUS; SUBCUTANEOUS at 05:04

## 2017-04-02 RX ADMIN — HYDROCODONE BITARTRATE AND ACETAMINOPHEN 1 TABLET: 7.5; 325 TABLET ORAL at 05:04

## 2017-04-02 RX ADMIN — AMPICILLIN SODIUM 4000 MG: 2 INJECTION, POWDER, FOR SOLUTION INTRAMUSCULAR; INTRAVENOUS at 08:04

## 2017-04-02 RX ADMIN — STANDARDIZED SENNA CONCENTRATE AND DOCUSATE SODIUM 2 TABLET: 8.6; 5 TABLET, FILM COATED ORAL at 08:04

## 2017-04-02 RX ADMIN — PANTOPRAZOLE SODIUM 40 MG: 40 TABLET, DELAYED RELEASE ORAL at 08:04

## 2017-04-02 RX ADMIN — SULFAMETHOXAZOLE AND TRIMETHOPRIM 1 TABLET: 400; 80 TABLET ORAL at 08:04

## 2017-04-02 RX ADMIN — FERROUS GLUCONATE TAB 324 MG (37.5 MG ELEMENTAL IRON) 324 MG: 324 (37.5 FE) TAB at 07:04

## 2017-04-02 RX ADMIN — OXYCODONE HYDROCHLORIDE 20 MG: 10 TABLET, FILM COATED, EXTENDED RELEASE ORAL at 08:04

## 2017-04-02 RX ADMIN — TACROLIMUS 1 MG: 1 CAPSULE, GELATIN COATED ORAL at 05:04

## 2017-04-02 RX ADMIN — HYDROCODONE BITARTRATE AND ACETAMINOPHEN 1 TABLET: 7.5; 325 TABLET ORAL at 01:04

## 2017-04-02 RX ADMIN — MYCOPHENOLATE MOFETIL 1000 MG: 250 CAPSULE ORAL at 08:04

## 2017-04-02 RX ADMIN — AMPICILLIN SODIUM 4000 MG: 2 INJECTION, POWDER, FOR SOLUTION INTRAMUSCULAR; INTRAVENOUS at 01:04

## 2017-04-02 RX ADMIN — PREDNISONE 5 MG: 5 TABLET ORAL at 08:04

## 2017-04-02 RX ADMIN — MAGNESIUM SULFATE IN WATER 2 G: 40 INJECTION, SOLUTION INTRAVENOUS at 08:04

## 2017-04-02 RX ADMIN — Medication 800 MG: at 01:04

## 2017-04-02 RX ADMIN — Medication 800 MG: at 05:04

## 2017-04-02 RX ADMIN — HEPARIN SODIUM 5000 UNITS: 5000 INJECTION, SOLUTION INTRAVENOUS; SUBCUTANEOUS at 01:04

## 2017-04-02 RX ADMIN — Medication: at 02:04

## 2017-04-02 RX ADMIN — VALGANCICLOVIR 900 MG: 450 TABLET, FILM COATED ORAL at 08:04

## 2017-04-02 RX ADMIN — HEPARIN SODIUM 5000 UNITS: 5000 INJECTION, SOLUTION INTRAVENOUS; SUBCUTANEOUS at 08:04

## 2017-04-02 RX ADMIN — AMLODIPINE BESYLATE 10 MG: 10 TABLET ORAL at 08:04

## 2017-04-02 RX ADMIN — Medication 800 MG: at 08:04

## 2017-04-02 RX ADMIN — INSULIN ASPART 15 UNITS: 100 INJECTION, SOLUTION INTRAVENOUS; SUBCUTANEOUS at 01:04

## 2017-04-02 RX ADMIN — INSULIN ASPART 3 UNITS: 100 INJECTION, SOLUTION INTRAVENOUS; SUBCUTANEOUS at 01:04

## 2017-04-02 RX ADMIN — TACROLIMUS 1 MG: 1 CAPSULE, GELATIN COATED ORAL at 08:04

## 2017-04-02 RX ADMIN — PRAVASTATIN SODIUM 40 MG: 40 TABLET ORAL at 08:04

## 2017-04-02 RX ADMIN — INSULIN ASPART 15 UNITS: 100 INJECTION, SOLUTION INTRAVENOUS; SUBCUTANEOUS at 06:04

## 2017-04-02 RX ADMIN — INSULIN ASPART 15 UNITS: 100 INJECTION, SOLUTION INTRAVENOUS; SUBCUTANEOUS at 08:04

## 2017-04-02 RX ADMIN — INSULIN GLARGINE 20 UNITS: 100 INJECTION, SOLUTION SUBCUTANEOUS at 09:04

## 2017-04-02 NOTE — PROGRESS NOTES
Pt's RLQ dressing is ordered in Epic to be changed on Tuesday's and Friday's and packed with Aquacel. Pt insisted that something was wrong with the wound, and begged to have it changed now. The dressing was taken off, and there's a rather large wound that's open with a white/yellowish, cream like discharge, coming from, and around the packing. No odor. Dr. Gonzalez with [facility] was contacted, and asked if we could go back to changing the dressing, and cleansing the wound how it was done, prior to the new order. Also, if we could send off a wound culture. He agreed to both. New orders are in epic. The RN communication reads: Remove current packed dressing. Cleanse wound. Pack with plain packing strip and cover with gauze. Ask wound care to see patient tomorrow 4/3/17.   Pt was very upset, and highly anxious about there being a repeat of what happened to his R groin. Wife expressed frustration, also. Pt and wife are now calm. Charge RN made aware, and assisted with care. Will continue to monitor. All dressings changed today.

## 2017-04-02 NOTE — PROGRESS NOTES
Progress Note  Cardiology    Admit Date: 3/28/2017   LOS: 5 days     SUBJECTIVE:     Pain improved since yesterday. PCA in use.    Scheduled Meds:   amlodipine  10 mg Oral Daily    ampicillin continuous infusion  4,000 mg Intravenous Q8H    ergocalciferol  50,000 Units Oral Q7 Days    ferrous gluconate  324 mg Oral Daily with breakfast    furosemide  40 mg Oral BID    heparin (porcine)  5,000 Units Subcutaneous Q8H    insulin aspart  15 Units Subcutaneous TIDWM    insulin glargine  20 Units Subcutaneous Daily    magnesium oxide  800 mg Oral TID    magnesium sulfate IVPB  2 g Intravenous Once    magnesium sulfate IVPB  2 g Intravenous Once    mycophenolate  1,000 mg Oral BID    oxycodone  20 mg Oral Q12H    pantoprazole  40 mg Oral Daily    pravastatin  40 mg Oral Daily    predniSONE  5 mg Oral Daily    senna-docusate 8.6-50 mg  2 tablet Oral BID    sulfamethoxazole-trimethoprim 400-80mg  1 tablet Oral Daily    tacrolimus  1 mg Oral BID    valganciclovir  900 mg Oral Daily     Continuous Infusions:   sodium chloride 0.9%      hydromorphone in 0.9 % NaCl 6 mg/30 ml       PRN Meds:dextrose 50%, dextrose 50%, glucagon (human recombinant), glucose, glucose, hydrocodone-acetaminophen 7.5-325mg, HYDROmorphone, insulin aspart, naloxone    Review of patient's allergies indicates:   Allergen Reactions    Levemir [insulin detemir] Itching    Pork/porcine containing products     Ranexa [ranolazine] Nausea Only       OBJECTIVE:     Vital Signs (Most Recent)  Temp: 98.3 °F (36.8 °C) (04/02/17 0807)  Pulse: 90 (04/02/17 0807)  Resp: 18 (04/02/17 0807)  BP: (!) 140/77 (04/02/17 0807)  SpO2: 97 % (04/02/17 0807)    Vital Signs Range (Last 24H):  Temp:  [98.2 °F (36.8 °C)-99.4 °F (37.4 °C)]   Pulse:  []   Resp:  [18]   BP: (130-140)/(62-77)   SpO2:  [95 %-97 %]     I & O (Last 24H):    Intake/Output Summary (Last 24 hours) at 04/02/17 0900  Last data filed at 04/02/17 0807   Gross per 24 hour   Intake              2340 ml   Output             2085 ml   Net              255 ml       Physical Exam:   General: Patient in no acute distress or discomfort  HEENT: No JVD, moist mucous membranes  Cardiac: S1S2 RRR no GMR  Chest: CTABL, no wheezing or rales  Abd:Soft NTND  Ext: No Edema No swelling, R groin wound  Neuro: A and O X 3, non focal    LABS  CBC with Diff:     Recent Labs  Lab 03/31/17 0442 04/01/17  0614 04/02/17  0459   WBC 11.89 12.75* 9.21   HGB 10.4* 10.4* 9.6*   HCT 32.7* 33.1* 30.4*    240 258   LYMPH 13.4*  1.6 14.2*  1.8 12.2*  1.1   MONO 4.7  0.6 4.8  0.6 4.8  0.4   EOSINOPHIL 0.3 0.4 0.7       COAG:  No results for input(s): APTT, INR, PTT in the last 168 hours.    CMP:     Recent Labs  Lab 03/31/17 0442 04/01/17  0615 04/02/17  0459   * 172* 232*   CALCIUM 8.6* 8.8 8.5*   ALBUMIN 3.0* 2.8* 2.4*   PROT 5.6* 5.8* 5.4*   * 137 137   K 4.7 4.0 4.4   CO2 27 31* 29    99 100   BUN 19 14 14   CREATININE 0.9 0.8 0.8   ALKPHOS 77 70 63   ALT 21 17 11   AST 18 14 11   BILITOT 0.5 0.6 0.4   MG 1.4* 1.4* 1.4*     Estimated Creatinine Clearance: 116.2 mL/min (based on Cr of 0.8).    .No results for input(s): CPK, TROPONINI, MB, BNP in the last 168 hours.      Present on Admission:   Lymphocele after surgical procedure    ASSESSMENT / PLAN:   57 yo M with PMHx of NICM s/p HM II (8/24/16), now s/p OHTx 2/9/17 who is admitted secondary to lymphocele foul smell/draining more.         S/P Orthotopic Heart Transplantation 2/9/2017  -Moderate Risk of Rejection: cPRA 0%, Negative Crossmatch with B Channel Shifts  -CMV Status: D-/R+: Continue valcyte.  -Immunosuppression: MMF 1000 mg BID, Prednisone 5 mg daily. Tacro 1/1      DM  - SSI/Levimir      R groin Lymphocele- S/P I&D of right groin/Rectus femoris muscle flap to the groin/placement of wound VAC.  - Appreciate wound care.  - Culture sent and growing enterococcus. Awaiting on sensitivities.    - Appreciate ID Cx. Continue  Ampicillin  - Continue PCA/Oxy for now. Will cut PCA dose in half today and possibly stop tomorrow.   - PT/OT tomorrow.

## 2017-04-02 NOTE — SUBJECTIVE & OBJECTIVE
Interval History: Afebrile, WBC down to 9    Review of Systems   Constitutional: Negative for chills and fever.   HENT: Negative for sore throat.    Respiratory: Negative for cough, chest tightness and shortness of breath.    Cardiovascular: Negative for chest pain, palpitations and leg swelling.   Gastrointestinal: Negative for abdominal distention, abdominal pain, diarrhea, nausea and vomiting.   Genitourinary: Negative for dysuria, flank pain and urgency.   Skin: Negative for rash.   Neurological: Negative for dizziness, light-headedness and numbness.   Psychiatric/Behavioral: Negative for confusion.     Objective:     Vital Signs (Most Recent):  Temp: 98.3 °F (36.8 °C) (04/02/17 0400)  Pulse: 87 (04/02/17 0400)  Resp: 18 (04/02/17 0400)  BP: 133/65 (04/02/17 0400)  SpO2: 96 % (04/02/17 0400) Vital Signs (24h Range):  Temp:  [98.2 °F (36.8 °C)-99.4 °F (37.4 °C)] 98.3 °F (36.8 °C)  Pulse:  [] 87  Resp:  [18] 18  SpO2:  [95 %-96 %] 96 %  BP: (130-136)/(62-70) 133/65     Weight: 92 kg (202 lb 13.2 oz)  Body mass index is 30.84 kg/(m^2).    Estimated Creatinine Clearance: 116.2 mL/min (based on Cr of 0.8).    Physical Exam   Constitutional: He is oriented to person, place, and time. He appears well-developed and well-nourished.   HENT:   Head: Normocephalic and atraumatic.   Right Ear: External ear normal.   Left Ear: External ear normal.   Mouth/Throat: Oropharynx is clear and moist.   Eyes: Conjunctivae and EOM are normal. Pupils are equal, round, and reactive to light.   Neck: Normal range of motion. Neck supple. No thyromegaly present.   Cardiovascular: Normal rate, regular rhythm and normal heart sounds.    No murmur heard.  Pulmonary/Chest: Effort normal and breath sounds normal. He has no wheezes. He has no rales.   Abdominal: Soft. Bowel sounds are normal. He exhibits no mass. There is no tenderness. There is no rebound.   Musculoskeletal: Normal range of motion.   Vac right groin   Lymphadenopathy:      He has no cervical adenopathy.   Neurological: He is alert and oriented to person, place, and time.   Skin: Skin is warm and dry.   Psychiatric: He has a normal mood and affect. His behavior is normal.   Vitals reviewed.      Significant Labs:   Wound Culture:     Recent Labs  Lab 03/28/17  1732 03/30/17  1414   LABAERO ENTEROCOCCUS FAECALISModerate  STREPTOCOCCUS MITIS/ORALIS Moderate ENTEROCOCCUS FAECALISFew       Significant Imaging: None

## 2017-04-02 NOTE — ASSESSMENT & PLAN NOTE
57 yo M with PMHx of NICM s/p HM II (8/24/16), HLD, HTN, VT s/p ICD; s/p OHT 2/9/17 CMV D-/R+ on maintenance with MMF and Prednisone; admitted 3/28/17 due to lymphocele foul smell/draining more on left inguinal area. Otherwise patient feeling well, afebrile. Patient went to OR 3/30/17 for: Irrigation and debridement of the right groin, including skin, subcutaneous tissue and fascia; measuring approximately 7 cm x 4 cm and Rectus femoris muscle flap to the groin.    - both wound culture positive for E faecalis (ampicillin-S)  - may switch to Amoxicillin 500 mg PO TID

## 2017-04-02 NOTE — PROGRESS NOTES
Pt did not want to order dinner until after 6 pm, and said he preferred to have his BG taken when his tray arrived. The pt's 1645 dose of 15 units of insulin was held, until he does in fact order his meal, and it's delivered.

## 2017-04-02 NOTE — PROGRESS NOTES
[facility]  Infectious Disease  Progress Note    Patient Name: Mick Barriga  MRN: [id number]  Admission Date: 3/28/2017  Length of Stay: 5 days  Attending Physician: Josefina Saul MD  Primary Care Provider: Sukhdev Alan MD    Isolation Status: No active isolations  Assessment/Plan:      * Lymphocele after surgical procedure  57 yo M with PMHx of NICM s/p HM II (8/24/16), HLD, HTN, VT s/p ICD; s/p OHT 2/9/17 CMV D-/R+ on maintenance with MMF and Prednisone; admitted 3/28/17 due to lymphocele foul smell/draining more on left inguinal area. Otherwise patient feeling well, afebrile. Patient went to OR 3/30/17 for: Irrigation and debridement of the right groin, including skin, subcutaneous tissue and fascia; measuring approximately 7 cm x 4 cm and Rectus femoris muscle flap to the groin.    - both wound culture positive for E faecalis (ampicillin-S)  - may switch to Amoxicillin 500 mg PO TID    Anticipated Disposition: Amoxicillin 500 mg PO TID    Thank you for your consult. I will follow-up with patient. Please contact us if you have any additional questions.    Ezio Giron MD  Infectious Disease Fellow, PGY-4  Pager: [phone]  [facility]    Subjective:     Principal Problem:Lymphocele after surgical procedure    HPI: 57 yo M with PMHx of NICM s/p HM II (8/24/16), HLD, HTN, VT s/p ICD; s/p OHT 2/9/17 CMV D-/R+ on maintenance with MMF and Prednisone; admitted 3/28/17 due to lymphocele foul smell/draining more on left inguinal area. Otherwise patient feeling well, afebrile.  Interval History: Afebrile, WBC down to 9    Review of Systems   Constitutional: Negative for chills and fever.   HENT: Negative for sore throat.    Respiratory: Negative for cough, chest tightness and shortness of breath.    Cardiovascular: Negative for chest pain, palpitations and leg swelling.   Gastrointestinal: Negative for abdominal distention, abdominal pain, diarrhea, nausea and  vomiting.   Genitourinary: Negative for dysuria, flank pain and urgency.   Skin: Negative for rash.   Neurological: Negative for dizziness, light-headedness and numbness.   Psychiatric/Behavioral: Negative for confusion.     Objective:     Vital Signs (Most Recent):  Temp: 98.3 °F (36.8 °C) (04/02/17 0400)  Pulse: 87 (04/02/17 0400)  Resp: 18 (04/02/17 0400)  BP: 133/65 (04/02/17 0400)  SpO2: 96 % (04/02/17 0400) Vital Signs (24h Range):  Temp:  [98.2 °F (36.8 °C)-99.4 °F (37.4 °C)] 98.3 °F (36.8 °C)  Pulse:  [] 87  Resp:  [18] 18  SpO2:  [95 %-96 %] 96 %  BP: (130-136)/(62-70) 133/65     Weight: 92 kg (202 lb 13.2 oz)  Body mass index is 30.84 kg/(m^2).    Estimated Creatinine Clearance: 116.2 mL/min (based on Cr of 0.8).    Physical Exam   Constitutional: He is oriented to person, place, and time. He appears well-developed and well-nourished.   HENT:   Head: Normocephalic and atraumatic.   Right Ear: External ear normal.   Left Ear: External ear normal.   Mouth/Throat: Oropharynx is clear and moist.   Eyes: Conjunctivae and EOM are normal. Pupils are equal, round, and reactive to light.   Neck: Normal range of motion. Neck supple. No thyromegaly present.   Cardiovascular: Normal rate, regular rhythm and normal heart sounds.    No murmur heard.  Pulmonary/Chest: Effort normal and breath sounds normal. He has no wheezes. He has no rales.   Abdominal: Soft. Bowel sounds are normal. He exhibits no mass. There is no tenderness. There is no rebound.   Musculoskeletal: Normal range of motion.   Vac right groin   Lymphadenopathy:     He has no cervical adenopathy.   Neurological: He is alert and oriented to person, place, and time.   Skin: Skin is warm and dry.   Psychiatric: He has a normal mood and affect. His behavior is normal.   Vitals reviewed.      Significant Labs:   Wound Culture:     Recent Labs  Lab 03/28/17  1732 03/30/17  1414   LABAERO ENTEROCOCCUS FAECALISModerate  STREPTOCOCCUS MITIS/ORALIS Moderate  ENTEROCOCCUS FAECALISFew       Significant Imaging: None

## 2017-04-02 NOTE — PLAN OF CARE
Problem: Patient Care Overview  Goal: Plan of Care Review  Outcome: Ongoing (interventions implemented as appropriate)  Pt AAOx4.  Pt refused to press PCA pump throughout night.  Pt given prn PO [place] with mild relief. Urine output 1650cc clear yellow. 2 BM during shift.  Pt wife remain at bedside.  Fall risk precautions initiated.  Pt in lowest bed position setting, lighting adjusted, urinal at bedside, side rails up x2.  Pt remain free from falls during shift.   Pt verbalize understanding to call when needed assistance. Will continue to monitor.

## 2017-04-03 LAB
ALBUMIN SERPL BCP-MCNC: 2.4 G/DL
ALP SERPL-CCNC: 56 U/L
ALT SERPL W/O P-5'-P-CCNC: 11 U/L
ANION GAP SERPL CALC-SCNC: 8 MMOL/L
ANISOCYTOSIS BLD QL SMEAR: SLIGHT
AST SERPL-CCNC: 11 U/L
BASOPHILS # BLD AUTO: 0.03 K/UL
BASOPHILS NFR BLD: 0.4 %
BILIRUB SERPL-MCNC: 0.2 MG/DL
BUN SERPL-MCNC: 12 MG/DL
CALCIUM SERPL-MCNC: 8.7 MG/DL
CHLORIDE SERPL-SCNC: 101 MMOL/L
CO2 SERPL-SCNC: 27 MMOL/L
CREAT SERPL-MCNC: 0.8 MG/DL
DIFFERENTIAL METHOD: ABNORMAL
EOSINOPHIL # BLD AUTO: 0.2 K/UL
EOSINOPHIL NFR BLD: 1.9 %
ERYTHROCYTE [DISTWIDTH] IN BLOOD BY AUTOMATED COUNT: 19.6 %
EST. GFR  (AFRICAN AMERICAN): >60 ML/MIN/1.73 M^2
EST. GFR  (NON AFRICAN AMERICAN): >60 ML/MIN/1.73 M^2
GLUCOSE SERPL-MCNC: 188 MG/DL
HCT VFR BLD AUTO: 29.6 %
HGB BLD-MCNC: 9.5 G/DL
HYPOCHROMIA BLD QL SMEAR: ABNORMAL
LYMPHOCYTES # BLD AUTO: 1.6 K/UL
LYMPHOCYTES NFR BLD: 20 %
MAGNESIUM SERPL-MCNC: 1.3 MG/DL
MCH RBC QN AUTO: 20.4 PG
MCHC RBC AUTO-ENTMCNC: 32.1 %
MCV RBC AUTO: 64 FL
MONOCYTES # BLD AUTO: 0.4 K/UL
MONOCYTES NFR BLD: 5.3 %
NEUTROPHILS # BLD AUTO: 5.6 K/UL
NEUTROPHILS NFR BLD: 71.3 %
OVALOCYTES BLD QL SMEAR: ABNORMAL
PLATELET # BLD AUTO: 248 K/UL
PMV BLD AUTO: ABNORMAL FL
POCT GLUCOSE: 135 MG/DL (ref 70–110)
POCT GLUCOSE: 170 MG/DL (ref 70–110)
POCT GLUCOSE: 224 MG/DL (ref 70–110)
POCT GLUCOSE: 226 MG/DL (ref 70–110)
POIKILOCYTOSIS BLD QL SMEAR: SLIGHT
POLYCHROMASIA BLD QL SMEAR: ABNORMAL
POTASSIUM SERPL-SCNC: 4.3 MMOL/L
PROT SERPL-MCNC: 5.5 G/DL
RBC # BLD AUTO: 4.65 M/UL
SCHISTOCYTES BLD QL SMEAR: ABNORMAL
SODIUM SERPL-SCNC: 136 MMOL/L
TACROLIMUS BLD-MCNC: 12.3 NG/ML
WBC # BLD AUTO: 7.86 K/UL

## 2017-04-03 PROCEDURE — 25000003 PHARM REV CODE 250: Performed by: NURSE PRACTITIONER

## 2017-04-03 PROCEDURE — 76937 US GUIDE VASCULAR ACCESS: CPT

## 2017-04-03 PROCEDURE — 20600001 HC STEP DOWN PRIVATE ROOM

## 2017-04-03 PROCEDURE — 25000003 PHARM REV CODE 250: Performed by: PHYSICIAN ASSISTANT

## 2017-04-03 PROCEDURE — 83735 ASSAY OF MAGNESIUM: CPT

## 2017-04-03 PROCEDURE — 85025 COMPLETE CBC W/AUTO DIFF WBC: CPT

## 2017-04-03 PROCEDURE — 25000003 PHARM REV CODE 250: Performed by: HOSPITALIST

## 2017-04-03 PROCEDURE — 36415 COLL VENOUS BLD VENIPUNCTURE: CPT

## 2017-04-03 PROCEDURE — 36569 INSJ PICC 5 YR+ W/O IMAGING: CPT

## 2017-04-03 PROCEDURE — 63600175 PHARM REV CODE 636 W HCPCS: Performed by: INTERNAL MEDICINE

## 2017-04-03 PROCEDURE — 99233 SBSQ HOSP IP/OBS HIGH 50: CPT | Mod: ,,, | Performed by: INTERNAL MEDICINE

## 2017-04-03 PROCEDURE — 63600175 PHARM REV CODE 636 W HCPCS: Performed by: NURSE PRACTITIONER

## 2017-04-03 PROCEDURE — 97162 PT EVAL MOD COMPLEX 30 MIN: CPT

## 2017-04-03 PROCEDURE — 80053 COMPREHEN METABOLIC PANEL: CPT

## 2017-04-03 PROCEDURE — C1751 CATH, INF, PER/CENT/MIDLINE: HCPCS

## 2017-04-03 PROCEDURE — 25000003 PHARM REV CODE 250: Performed by: STUDENT IN AN ORGANIZED HEALTH CARE EDUCATION/TRAINING PROGRAM

## 2017-04-03 PROCEDURE — 25000003 PHARM REV CODE 250: Performed by: INTERNAL MEDICINE

## 2017-04-03 PROCEDURE — 97607 NEG PRS WND THR NDME<=50SQCM: CPT

## 2017-04-03 PROCEDURE — 80197 ASSAY OF TACROLIMUS: CPT

## 2017-04-03 RX ORDER — HYDROCODONE BITARTRATE AND ACETAMINOPHEN 7.5; 325 MG/1; MG/1
1 TABLET ORAL EVERY 4 HOURS PRN
Status: DISCONTINUED | OUTPATIENT
Start: 2017-04-03 | End: 2017-04-04

## 2017-04-03 RX ORDER — HYDROMORPHONE HYDROCHLORIDE 1 MG/ML
0.5 INJECTION, SOLUTION INTRAMUSCULAR; INTRAVENOUS; SUBCUTANEOUS EVERY 6 HOURS PRN
Status: DISCONTINUED | OUTPATIENT
Start: 2017-04-03 | End: 2017-04-04

## 2017-04-03 RX ORDER — BISACODYL 10 MG
10 SUPPOSITORY, RECTAL RECTAL DAILY PRN
Status: DISCONTINUED | OUTPATIENT
Start: 2017-04-03 | End: 2017-04-10 | Stop reason: HOSPADM

## 2017-04-03 RX ADMIN — HYDROCODONE BITARTRATE AND ACETAMINOPHEN 1 TABLET: 7.5; 325 TABLET ORAL at 12:04

## 2017-04-03 RX ADMIN — PANTOPRAZOLE SODIUM 40 MG: 40 TABLET, DELAYED RELEASE ORAL at 08:04

## 2017-04-03 RX ADMIN — STANDARDIZED SENNA CONCENTRATE AND DOCUSATE SODIUM 2 TABLET: 8.6; 5 TABLET, FILM COATED ORAL at 09:04

## 2017-04-03 RX ADMIN — TACROLIMUS 1 MG: 1 CAPSULE, GELATIN COATED ORAL at 05:04

## 2017-04-03 RX ADMIN — INSULIN GLARGINE 20 UNITS: 100 INJECTION, SOLUTION SUBCUTANEOUS at 09:04

## 2017-04-03 RX ADMIN — HYDROCODONE BITARTRATE AND ACETAMINOPHEN 1 TABLET: 7.5; 325 TABLET ORAL at 08:04

## 2017-04-03 RX ADMIN — FERROUS GLUCONATE TAB 324 MG (37.5 MG ELEMENTAL IRON) 324 MG: 324 (37.5 FE) TAB at 08:04

## 2017-04-03 RX ADMIN — STANDARDIZED SENNA CONCENTRATE AND DOCUSATE SODIUM 2 TABLET: 8.6; 5 TABLET, FILM COATED ORAL at 08:04

## 2017-04-03 RX ADMIN — HEPARIN SODIUM 5000 UNITS: 5000 INJECTION, SOLUTION INTRAVENOUS; SUBCUTANEOUS at 09:04

## 2017-04-03 RX ADMIN — PRAVASTATIN SODIUM 40 MG: 40 TABLET ORAL at 08:04

## 2017-04-03 RX ADMIN — HEPARIN SODIUM 5000 UNITS: 5000 INJECTION, SOLUTION INTRAVENOUS; SUBCUTANEOUS at 01:04

## 2017-04-03 RX ADMIN — Medication 800 MG: at 04:04

## 2017-04-03 RX ADMIN — AMPICILLIN SODIUM 4000 MG: 2 INJECTION, POWDER, FOR SOLUTION INTRAMUSCULAR; INTRAVENOUS at 04:04

## 2017-04-03 RX ADMIN — TACROLIMUS 1 MG: 1 CAPSULE, GELATIN COATED ORAL at 08:04

## 2017-04-03 RX ADMIN — SULFAMETHOXAZOLE AND TRIMETHOPRIM 1 TABLET: 400; 80 TABLET ORAL at 08:04

## 2017-04-03 RX ADMIN — AMLODIPINE BESYLATE 10 MG: 10 TABLET ORAL at 08:04

## 2017-04-03 RX ADMIN — HYDROMORPHONE HYDROCHLORIDE 0.5 MG: 1 INJECTION, SOLUTION INTRAMUSCULAR; INTRAVENOUS; SUBCUTANEOUS at 10:04

## 2017-04-03 RX ADMIN — OXYCODONE HYDROCHLORIDE 20 MG: 10 TABLET, FILM COATED, EXTENDED RELEASE ORAL at 12:04

## 2017-04-03 RX ADMIN — INSULIN ASPART 2 UNITS: 100 INJECTION, SOLUTION INTRAVENOUS; SUBCUTANEOUS at 08:04

## 2017-04-03 RX ADMIN — HYDROCODONE BITARTRATE AND ACETAMINOPHEN 1 TABLET: 7.5; 325 TABLET ORAL at 03:04

## 2017-04-03 RX ADMIN — INSULIN ASPART 15 UNITS: 100 INJECTION, SOLUTION INTRAVENOUS; SUBCUTANEOUS at 08:04

## 2017-04-03 RX ADMIN — AMPICILLIN SODIUM 4000 MG: 2 INJECTION, POWDER, FOR SOLUTION INTRAMUSCULAR; INTRAVENOUS at 01:04

## 2017-04-03 RX ADMIN — Medication 800 MG: at 01:04

## 2017-04-03 RX ADMIN — PREDNISONE 5 MG: 5 TABLET ORAL at 08:04

## 2017-04-03 RX ADMIN — INSULIN ASPART 2 UNITS: 100 INJECTION, SOLUTION INTRAVENOUS; SUBCUTANEOUS at 02:04

## 2017-04-03 RX ADMIN — INSULIN ASPART 15 UNITS: 100 INJECTION, SOLUTION INTRAVENOUS; SUBCUTANEOUS at 02:04

## 2017-04-03 RX ADMIN — VALGANCICLOVIR 900 MG: 450 TABLET, FILM COATED ORAL at 08:04

## 2017-04-03 RX ADMIN — INSULIN ASPART 15 UNITS: 100 INJECTION, SOLUTION INTRAVENOUS; SUBCUTANEOUS at 06:04

## 2017-04-03 RX ADMIN — FUROSEMIDE 40 MG: 40 TABLET ORAL at 08:04

## 2017-04-03 RX ADMIN — OXYCODONE HYDROCHLORIDE 20 MG: 10 TABLET, FILM COATED, EXTENDED RELEASE ORAL at 09:04

## 2017-04-03 RX ADMIN — Medication 800 MG: at 09:04

## 2017-04-03 RX ADMIN — BISACODYL 10 MG: 10 SUPPOSITORY RECTAL at 03:04

## 2017-04-03 RX ADMIN — HEPARIN SODIUM 5000 UNITS: 5000 INJECTION, SOLUTION INTRAVENOUS; SUBCUTANEOUS at 04:04

## 2017-04-03 RX ADMIN — HYDROCODONE BITARTRATE AND ACETAMINOPHEN 1 TABLET: 7.5; 325 TABLET ORAL at 11:04

## 2017-04-03 RX ADMIN — HYDROMORPHONE HYDROCHLORIDE 0.5 MG: 1 INJECTION, SOLUTION INTRAMUSCULAR; INTRAVENOUS; SUBCUTANEOUS at 04:04

## 2017-04-03 RX ADMIN — MYCOPHENOLATE MOFETIL 1000 MG: 250 CAPSULE ORAL at 08:04

## 2017-04-03 RX ADMIN — FUROSEMIDE 40 MG: 40 TABLET ORAL at 09:04

## 2017-04-03 RX ADMIN — MYCOPHENOLATE MOFETIL 1000 MG: 250 CAPSULE ORAL at 09:04

## 2017-04-03 RX ADMIN — HYDROMORPHONE HYDROCHLORIDE 0.5 MG: 1 INJECTION, SOLUTION INTRAMUSCULAR; INTRAVENOUS; SUBCUTANEOUS at 09:04

## 2017-04-03 NOTE — PROGRESS NOTES
[facility]  Infectious Disease  Progress Note    Patient Name: Mick Barriga  MRN: [id number]  Admission Date: 3/28/2017  Length of Stay: 6 days  Attending Physician: Josefina Saul MD  Primary Care Provider: Sukhdev Alan MD    Isolation Status: No active isolations  Assessment/Plan:      * Lymphocele after surgical procedure  57 yo M with PMHx of NICM s/p HM II (8/24/16), HLD, HTN, VT s/p ICD; s/p OHT 2/9/17 CMV D-/R+ on maintenance with MMF and Prednisone; admitted 3/28/17 due to lymphocele foul smell/draining more on left inguinal area. Otherwise patient feeling well, afebrile. Patient went to OR 3/30/17 for: Irrigation and debridement of the right groin, including skin, subcutaneous tissue and fascia; measuring approximately 7 cm x 4 cm and Rectus femoris muscle flap to the groin.    - both wound culture positive for E faecalis (ampicillin-S)  - may switch to Amoxicillin 500 mg PO TID 10-14 days  - may follow at ID clinic next week if needed    Anticipated Disposition: Amoxicillin 500 mg PO TID until 4/13/17    Thank you for your consult. I will follow-up with patient. Please contact us if you have any additional questions.    Ezio Giron MD  Infectious Disease Fellow, PGY-4  Pager: [phone]  [facility]    Subjective:     Principal Problem:Lymphocele after surgical procedure    HPI: 57 yo M with PMHx of NICM s/p HM II (8/24/16), HLD, HTN, VT s/p ICD; s/p OHT 2/9/17 CMV D-/R+ on maintenance with MMF and Prednisone; admitted 3/28/17 due to lymphocele foul smell/draining more on left inguinal area. Otherwise patient feeling well, afebrile.  Interval History: Afebrile, WBC down to 7.8, still with soreness in right inguinal area    Review of Systems   Constitutional: Negative for chills and fever.   HENT: Negative for sore throat.    Respiratory: Negative for cough, chest tightness and shortness of breath.    Cardiovascular: Negative for chest pain,  palpitations and leg swelling.   Gastrointestinal: Negative for abdominal distention, abdominal pain, diarrhea, nausea and vomiting.   Genitourinary: Negative for dysuria, flank pain and urgency.   Skin: Negative for rash.   Neurological: Negative for dizziness, light-headedness and numbness.   Psychiatric/Behavioral: Negative for confusion.     Objective:     Vital Signs (Most Recent):  Temp: 98.1 °F (36.7 °C) (04/03/17 0500)  Pulse: 83 (04/03/17 0700)  Resp: 18 (04/03/17 0500)  BP: (!) 149/70 (04/03/17 0805)  SpO2: 96 % (04/03/17 0500) Vital Signs (24h Range):  Temp:  [98.1 °F (36.7 °C)-98.4 °F (36.9 °C)] 98.1 °F (36.7 °C)  Pulse:  [82-95] 83  Resp:  [18] 18  SpO2:  [96 %-97 %] 96 %  BP: (132-149)/(62-77) 149/70     Weight: 93.6 kg (206 lb 5.6 oz)  Body mass index is 31.38 kg/(m^2).    Estimated Creatinine Clearance: 117.2 mL/min (based on Cr of 0.8).    Physical Exam   Constitutional: He is oriented to person, place, and time. He appears well-developed and well-nourished.   HENT:   Head: Normocephalic and atraumatic.   Right Ear: External ear normal.   Left Ear: External ear normal.   Mouth/Throat: Oropharynx is clear and moist.   Eyes: Conjunctivae and EOM are normal. Pupils are equal, round, and reactive to light.   Neck: Normal range of motion. Neck supple. No thyromegaly present.   Cardiovascular: Normal rate, regular rhythm and normal heart sounds.    No murmur heard.  Pulmonary/Chest: Effort normal and breath sounds normal. He has no wheezes. He has no rales.   Abdominal: Soft. Bowel sounds are normal. He exhibits no mass. There is no tenderness. There is no rebound.   Musculoskeletal: Normal range of motion.   Vac right groin   Lymphadenopathy:     He has no cervical adenopathy.   Neurological: He is alert and oriented to person, place, and time.   Skin: Skin is warm and dry.   Psychiatric: He has a normal mood and affect. His behavior is normal.   Vitals reviewed.      Significant Labs:   Wound Culture:      Recent Labs  Lab 03/28/17  1732 03/30/17  1414 04/02/17  1521   LABAERO ENTEROCOCCUS FAECALISModerate  STREPTOCOCCUS MITIS/ORALIS Moderate ENTEROCOCCUS FAECALISFew No growth       Significant Imaging: None

## 2017-04-03 NOTE — SUBJECTIVE & OBJECTIVE
Interval History: Afebrile, WBC down to 7.8, still with soreness in right inguinal area    Review of Systems   Constitutional: Negative for chills and fever.   HENT: Negative for sore throat.    Respiratory: Negative for cough, chest tightness and shortness of breath.    Cardiovascular: Negative for chest pain, palpitations and leg swelling.   Gastrointestinal: Negative for abdominal distention, abdominal pain, diarrhea, nausea and vomiting.   Genitourinary: Negative for dysuria, flank pain and urgency.   Skin: Negative for rash.   Neurological: Negative for dizziness, light-headedness and numbness.   Psychiatric/Behavioral: Negative for confusion.     Objective:     Vital Signs (Most Recent):  Temp: 98.1 °F (36.7 °C) (04/03/17 0500)  Pulse: 83 (04/03/17 0700)  Resp: 18 (04/03/17 0500)  BP: (!) 149/70 (04/03/17 0805)  SpO2: 96 % (04/03/17 0500) Vital Signs (24h Range):  Temp:  [98.1 °F (36.7 °C)-98.4 °F (36.9 °C)] 98.1 °F (36.7 °C)  Pulse:  [82-95] 83  Resp:  [18] 18  SpO2:  [96 %-97 %] 96 %  BP: (132-149)/(62-77) 149/70     Weight: 93.6 kg (206 lb 5.6 oz)  Body mass index is 31.38 kg/(m^2).    Estimated Creatinine Clearance: 117.2 mL/min (based on Cr of 0.8).    Physical Exam   Constitutional: He is oriented to person, place, and time. He appears well-developed and well-nourished.   HENT:   Head: Normocephalic and atraumatic.   Right Ear: External ear normal.   Left Ear: External ear normal.   Mouth/Throat: Oropharynx is clear and moist.   Eyes: Conjunctivae and EOM are normal. Pupils are equal, round, and reactive to light.   Neck: Normal range of motion. Neck supple. No thyromegaly present.   Cardiovascular: Normal rate, regular rhythm and normal heart sounds.    No murmur heard.  Pulmonary/Chest: Effort normal and breath sounds normal. He has no wheezes. He has no rales.   Abdominal: Soft. Bowel sounds are normal. He exhibits no mass. There is no tenderness. There is no rebound.   Musculoskeletal: Normal range  of motion.   Vac right groin   Lymphadenopathy:     He has no cervical adenopathy.   Neurological: He is alert and oriented to person, place, and time.   Skin: Skin is warm and dry.   Psychiatric: He has a normal mood and affect. His behavior is normal.   Vitals reviewed.      Significant Labs:   Wound Culture:     Recent Labs  Lab 03/28/17  1732 03/30/17  1414 04/02/17  1521   LABAERO ENTEROCOCCUS FAECALISModerate  STREPTOCOCCUS MITIS/ORALIS Moderate ENTEROCOCCUS FAECALISFew No growth       Significant Imaging: None

## 2017-04-03 NOTE — PLAN OF CARE
Problem: Physical Therapy Goal  Goal: Physical Therapy Goal  Goals to be met by: 4/10/2017     Patient will increase functional independence with mobility by performin. Supine to sit with Set-up [place]  2. Sit to stand transfer with Supervision  3. Bed to chair transfer with Stand-by Assistance using Rolling Walker  4. Gait x 100 feet with Contact Guard Assistance using Rolling Walker.   5. Lower extremity exercise program x15 reps per handout, with supervision  PT evaluation completed

## 2017-04-03 NOTE — PT/OT/SLP EVAL
Physical Therapy  Evaluation    Mick Barriga   MRN: [id number]   Admitting Diagnosis: Lymphocele after surgical procedure    PT Received On: 04/03/17  PT Start Time: 0937     PT Stop Time: 0953    PT Total Time (min): 16 min       Billable Minutes:  Evaluation 16    Diagnosis: Lymphocele after surgical procedure    Past Medical History:   Diagnosis Date    CHF (congestive heart failure)     Coronary artery disease     GERD (gastroesophageal reflux disease)     Hyperlipidemia     Hypertension     LVAD (left ventricular assist device) present 2/13/2017    Type 2 diabetes mellitus with hyperglycemia       Past Surgical History:   Procedure Laterality Date    CARDIAC PACEMAKER PLACEMENT      CHOLECYSTECTOMY      COLONOSCOPY N/A 1/11/2017    Procedure: COLONOSCOPY;  Surgeon: Petr Almanzar MD;  Location: [facility] ENDO (2ND FLR);  Service: Endoscopy;  Laterality: N/A;    COLONOSCOPY N/A 1/12/2017    Procedure: COLONOSCOPY;  Surgeon: Petr Almanzar MD;  Location: [facility] ENDO (2ND FLR);  Service: Endoscopy;  Laterality: N/A;    HEART TRANSPLANT  02/2017    LEFT VENTRICULAR ASSIST DEVICE      x 3 months ago       Referring physician: Doc  Date referred to PT: 4/2/2017    General Precautions: Standard, fall  Orthopedic Precautions: N/A   Braces: N/A       Patient History:  Lives With: spouse  Living Arrangements: condominium  Home Accessibility: stairs within home  Home Layout: Able to live on 1st floor  Number of Stairs Within Home:  (1 flight)  Transportation Available: family or friend will provide  Living Environment Comment: Pt lives with spouse in [facility], but is able to stay on first floor. PTA, pt was requiring Rollator for mobility.   Equipment Currently Used at Home: rollator, shower chair    Previous Level of Function:  Ambulation Skills: needs device  Transfer Skills: needs device    Subjective:  Communicated with RN prior to session.  Chief Complaint: pain  Patient goals: return to PLOF    Pain  Ratin/10   Location - Side: Right     Location: leg     Pain Rating Post-Intervention: 7/10    Objective:   Patient found with: wound vac, DARIN drain, peripheral IV     Cognitive Exam:  Oriented to: Person, Place, Time and Situation    Follows Commands/attention: Follows multistep  commands  Communication: clear/fluent  Safety awareness/insight to disability: intact    Physical Exam:    Skin integrity: Wounds    Lower Extremity Range of Motion:  Right Lower Extremity: WFL  Left Lower Extremity: WFL    Lower Extremity Strength:  Right Lower Extremity: unable to accurately assess due to pain  Left Lower Extremity: WFL       Functional Mobility:  Bed Mobility:  Supine to Sit: Moderate Assistance (with trunk and RLE)    Transfers:  Sit <> Stand Assistance: Minimum Assistance  Sit <> Stand Assistive Device: No Assistive Device (pt refusing to use RW)    Gait:   Gait Distance: 5 sliding steps from bed to chair, pt unable to bear weight through R LE due to pain  Assistance 1: Minimum assistance  Gait Assistive Device: No device  Gait Deviation(s): decreased toe-to-floor clearance, decreased amelia, decreased step length, decreased stride length      Therapeutic Activities and Exercises:  Pt educated on PT role and POC    AM-PAC 6 CLICK MOBILITY  How much help from another person does this patient currently need?   1 = Unable, Total/Dependent Assistance  2 = A lot, Maximum/Moderate Assistance  3 = A little, Minimum/Contact Guard/Supervision  4 = None, Modified [place]/Independent    Turning over in bed (including adjusting bedclothes, sheets and blankets)?: 3  Sitting down on and standing up from a chair with arms (e.g., wheelchair, bedside commode, etc.): 3  Moving from lying on back to sitting on the side of the bed?: 3  Moving to and from a bed to a chair (including a wheelchair)?: 3  Need to walk in hospital room?: 2  Climbing 3-5 steps with a railing?: 1  Total Score: 15     AM-PAC Raw Score CMS G-Code  Modifier Level of Impairment Assistance   6 % Total / Unable   7 - 9 CM 80 - 100% Maximal Assist   10 - 14 CL 60 - 80% Moderate Assist   15 - 19 CK 40 - 60% Moderate Assist   20 - 22 CJ 20 - 40% Minimal Assist   23 CI 1-20% SBA / CGA   24 CH 0% Independent/ Mod I     Patient left up in chair with all lines intact and call button in reach.    Assessment:   Mick Barriga is a 56 y.o. male with a medical diagnosis of Lymphocele after surgical procedure and presents with below deficits. Pt tolerated treatment well and would benefit from continued skilled PT in order to return to PLOF.    Criteria:    History:  >3 comorbidities   Examination of Body System:  > 3 personal factors   Clinical Presentation:  evolving   Clinical Decision Making (Complexity):  moderate      Rehab identified problem list/impairments: Rehab identified problem list/impairments: pain, decreased lower extremity function, gait instability, impaired functional mobilty, weakness    Rehab potential is good.    Activity tolerance: Good    Discharge recommendations: Discharge Facility/Level Of Care Needs: nursing facility, skilled (pending progress)     Barriers to discharge: Barriers to Discharge: None    Equipment recommendations: Equipment Needed After Discharge: walker, rolling     GOALS:   Physical Therapy Goals        Problem: Physical Therapy Goal    Goal Priority Disciplines Outcome Goal Variances Interventions   Physical Therapy Goal     PT/OT, PT      Description:  Goals to be met by: 4/10/2017     Patient will increase functional independence with mobility by performin. Supine to sit with Set-up [place]  2. Sit to stand transfer with Supervision  3. Bed to chair transfer with Stand-by Assistance using Rolling Walker  4. Gait  x 100 feet with Contact Guard Assistance using Rolling Walker.   5. Lower extremity exercise program x15 reps per handout, with supervision                PLAN:    Patient to be seen 5 x/week to  address the above listed problems via therapeutic activities, gait training, therapeutic exercises, neuromuscular re-education  Plan of Care expires: 05/03/17  Plan of Care reviewed with: patient          Katrin BLAYNE Angelique, PT  04/03/2017

## 2017-04-03 NOTE — PLAN OF CARE
Problem: Patient Care Overview  Goal: Plan of Care Review  Outcome: Ongoing (interventions implemented as appropriate)  Plan of care reviewed with patient. Pt verbalized understanding. Pt AAOX4. Pt free from falls, injuries and trauma.  Pt free from further skin breakdown. Pt VSS and in NAD.  Pt afebrile.  Pt had no complaints of SOB, N/V or CP.  Pts pain moderately controlled with with PCA and prn pain medications. Heels floated off bed. Pt turning self independently. Adequate UOP this shift. No BM this shift. All dressing intact. Minimal output from wound vac and dayron drain. Pt had uneventful evening. Pt in low locked bed with personal items and call light within reach. Fall precautions maintained. Wife at bedside.

## 2017-04-03 NOTE — PROGRESS NOTES
Was very concerned over yellow creamy drainage noted over the weekend to right abdominal wound.  Prefers to go back to daily wet to dry dressings with plain gauze packing and refusing to utilize Aquacel AG packing strip as he believes it is causing an infection.  Arrived to room to change wound vac and requested pain medications prior to the procedure     04/03/17 1030       Incision/Site 02/19/17 1950 Right groin transverse   Date First Assessed/Time First Assessed: 02/19/17 1950   Present Prior to Hospital Arrival?: No  Side: Right  Location: groin  Incision Type: transverse  Closure Method: other (see comments)  Additional Comments: no closure method noted   Incision WDL ex   Dressing Appearance dry;intact   Appearance pink;moist;granulating;yellow;slough   Periwound Area normal skin tone  (suction trauma)   Drainage Characteristics/Odor serosanguineous   Drainage Amount moderate   Wound Length (cm) 6   Wound Width (cm) 2.5   Depth (cm) 0.3   Wound Edges open   Cleansed W/ sterile normal saline   Therapy Setting continuous therapy   Pressure Setting 125 mmHg   Negative Pressure Wound Therapy Dressing foam, white;foam, black  (picture framed with drape)   Black Sponges Inserted 1   White Sponges Inserted 1   Black Sponges Removed 1   White Sponges Removed 1   Dressing Change Due 04/06/17       Incision/Site 09/21/16 1317 Right abdomen midline   Date First Assessed/Time First Assessed: 09/21/16 1317   Present Prior to Hospital Arrival?: No  Side: Right  Location: abdomen  Orientation: midline  Closure Method: sutures   Incision WDL ex   Dressing Appearance dry;intact   Appearance moist;pink;granulating  (decreased slough)   Periwound Area normal skin tone   Drainage Characteristics/Odor serous   Drainage Amount scant   Wound Length (cm) 2   Wound Width (cm) 5   Depth (cm) 2   Wound Edges open   Cleansed W/ sterile normal saline   Dressing gauze, wet-to-dry;telfa island dressing     Will discuss with team regarding  the dressing change request for abdominal wound.  Isaiah Reyes RN,CWON  [phone]

## 2017-04-03 NOTE — CONSULTS
Midline placed in right basilic vein,23x36tu catheter length. Lot#[id number]. Used real time ultrasound. Image recorded and saved.

## 2017-04-04 LAB
ALBUMIN SERPL BCP-MCNC: 2.5 G/DL
ALP SERPL-CCNC: 56 U/L
ALT SERPL W/O P-5'-P-CCNC: 12 U/L
ANION GAP SERPL CALC-SCNC: 9 MMOL/L
ANISOCYTOSIS BLD QL SMEAR: SLIGHT
AST SERPL-CCNC: 37 U/L
BACTERIA SPEC AEROBE CULT: NORMAL
BASOPHILS # BLD AUTO: 0.02 K/UL
BASOPHILS NFR BLD: 0.3 %
BILIRUB SERPL-MCNC: 0.4 MG/DL
BUN SERPL-MCNC: 13 MG/DL
BURR CELLS BLD QL SMEAR: ABNORMAL
CALCIUM SERPL-MCNC: 8.8 MG/DL
CHLORIDE SERPL-SCNC: 101 MMOL/L
CO2 SERPL-SCNC: 27 MMOL/L
CREAT SERPL-MCNC: 0.8 MG/DL
DIFFERENTIAL METHOD: ABNORMAL
EOSINOPHIL # BLD AUTO: 0.2 K/UL
EOSINOPHIL NFR BLD: 2 %
ERYTHROCYTE [DISTWIDTH] IN BLOOD BY AUTOMATED COUNT: 19.4 %
EST. GFR  (AFRICAN AMERICAN): >60 ML/MIN/1.73 M^2
EST. GFR  (NON AFRICAN AMERICAN): >60 ML/MIN/1.73 M^2
GLUCOSE SERPL-MCNC: 141 MG/DL
HCT VFR BLD AUTO: 30.1 %
HGB BLD-MCNC: 9.7 G/DL
HYPOCHROMIA BLD QL SMEAR: ABNORMAL
LYMPHOCYTES # BLD AUTO: 1.6 K/UL
LYMPHOCYTES NFR BLD: 21.8 %
MAGNESIUM SERPL-MCNC: 1.4 MG/DL
MCH RBC QN AUTO: 20.4 PG
MCHC RBC AUTO-ENTMCNC: 32.2 %
MCV RBC AUTO: 63 FL
MONOCYTES # BLD AUTO: 0.4 K/UL
MONOCYTES NFR BLD: 5.4 %
NEUTROPHILS # BLD AUTO: 5.1 K/UL
NEUTROPHILS NFR BLD: 70.5 %
OVALOCYTES BLD QL SMEAR: ABNORMAL
PLATELET # BLD AUTO: 289 K/UL
PMV BLD AUTO: ABNORMAL FL
POCT GLUCOSE: 138 MG/DL (ref 70–110)
POCT GLUCOSE: 181 MG/DL (ref 70–110)
POCT GLUCOSE: 187 MG/DL (ref 70–110)
POCT GLUCOSE: 244 MG/DL (ref 70–110)
POIKILOCYTOSIS BLD QL SMEAR: SLIGHT
POLYCHROMASIA BLD QL SMEAR: ABNORMAL
POTASSIUM SERPL-SCNC: 5.2 MMOL/L
PROT SERPL-MCNC: 6.2 G/DL
RBC # BLD AUTO: 4.76 M/UL
SCHISTOCYTES BLD QL SMEAR: ABNORMAL
SODIUM SERPL-SCNC: 137 MMOL/L
TACROLIMUS BLD-MCNC: 14 NG/ML
WBC # BLD AUTO: 7.35 K/UL

## 2017-04-04 PROCEDURE — 63600175 PHARM REV CODE 636 W HCPCS: Performed by: INTERNAL MEDICINE

## 2017-04-04 PROCEDURE — 80053 COMPREHEN METABOLIC PANEL: CPT

## 2017-04-04 PROCEDURE — 85025 COMPLETE CBC W/AUTO DIFF WBC: CPT

## 2017-04-04 PROCEDURE — 25000003 PHARM REV CODE 250: Performed by: INTERNAL MEDICINE

## 2017-04-04 PROCEDURE — 25000003 PHARM REV CODE 250: Performed by: PHYSICIAN ASSISTANT

## 2017-04-04 PROCEDURE — 20600001 HC STEP DOWN PRIVATE ROOM

## 2017-04-04 PROCEDURE — 25000003 PHARM REV CODE 250: Performed by: STUDENT IN AN ORGANIZED HEALTH CARE EDUCATION/TRAINING PROGRAM

## 2017-04-04 PROCEDURE — 63600175 PHARM REV CODE 636 W HCPCS: Performed by: PHYSICIAN ASSISTANT

## 2017-04-04 PROCEDURE — 25000003 PHARM REV CODE 250: Performed by: NURSE PRACTITIONER

## 2017-04-04 PROCEDURE — 80197 ASSAY OF TACROLIMUS: CPT

## 2017-04-04 PROCEDURE — 97166 OT EVAL MOD COMPLEX 45 MIN: CPT

## 2017-04-04 PROCEDURE — 63600175 PHARM REV CODE 636 W HCPCS

## 2017-04-04 PROCEDURE — 99232 SBSQ HOSP IP/OBS MODERATE 35: CPT | Mod: ,,, | Performed by: INTERNAL MEDICINE

## 2017-04-04 PROCEDURE — 25000003 PHARM REV CODE 250: Performed by: HOSPITALIST

## 2017-04-04 PROCEDURE — 63600175 PHARM REV CODE 636 W HCPCS: Performed by: NURSE PRACTITIONER

## 2017-04-04 PROCEDURE — 83735 ASSAY OF MAGNESIUM: CPT

## 2017-04-04 RX ORDER — MAGNESIUM SULFATE HEPTAHYDRATE 40 MG/ML
2 INJECTION, SOLUTION INTRAVENOUS
Status: COMPLETED | OUTPATIENT
Start: 2017-04-04 | End: 2017-04-04

## 2017-04-04 RX ORDER — OXYCODONE HYDROCHLORIDE 5 MG/1
10 TABLET ORAL EVERY 6 HOURS PRN
Status: DISCONTINUED | OUTPATIENT
Start: 2017-04-04 | End: 2017-04-05

## 2017-04-04 RX ORDER — BISACODYL 10 MG
10 SUPPOSITORY, RECTAL RECTAL ONCE
Status: COMPLETED | OUTPATIENT
Start: 2017-04-04 | End: 2017-04-04

## 2017-04-04 RX ORDER — HYDROMORPHONE HYDROCHLORIDE 1 MG/ML
0.5 INJECTION, SOLUTION INTRAMUSCULAR; INTRAVENOUS; SUBCUTANEOUS ONCE
Status: DISCONTINUED | OUTPATIENT
Start: 2017-04-04 | End: 2017-04-10 | Stop reason: HOSPADM

## 2017-04-04 RX ORDER — HYDROMORPHONE HYDROCHLORIDE 1 MG/ML
0.2 INJECTION, SOLUTION INTRAMUSCULAR; INTRAVENOUS; SUBCUTANEOUS ONCE
Status: COMPLETED | OUTPATIENT
Start: 2017-04-04 | End: 2017-04-04

## 2017-04-04 RX ORDER — AMOXICILLIN 500 MG/1
500 CAPSULE ORAL EVERY 8 HOURS
Status: DISCONTINUED | OUTPATIENT
Start: 2017-04-04 | End: 2017-04-10 | Stop reason: HOSPADM

## 2017-04-04 RX ORDER — HYDROMORPHONE HYDROCHLORIDE 1 MG/ML
INJECTION, SOLUTION INTRAMUSCULAR; INTRAVENOUS; SUBCUTANEOUS
Status: COMPLETED
Start: 2017-04-04 | End: 2017-04-04

## 2017-04-04 RX ORDER — HYDROMORPHONE HYDROCHLORIDE 1 MG/ML
0.5 INJECTION, SOLUTION INTRAMUSCULAR; INTRAVENOUS; SUBCUTANEOUS ONCE
Status: COMPLETED | OUTPATIENT
Start: 2017-04-04 | End: 2017-04-04

## 2017-04-04 RX ORDER — OXYCODONE HCL 10 MG/1
30 TABLET, FILM COATED, EXTENDED RELEASE ORAL EVERY 12 HOURS
Status: DISCONTINUED | OUTPATIENT
Start: 2017-04-04 | End: 2017-04-05

## 2017-04-04 RX ADMIN — AMOXICILLIN 500 MG: 500 CAPSULE ORAL at 02:04

## 2017-04-04 RX ADMIN — HEPARIN SODIUM 5000 UNITS: 5000 INJECTION, SOLUTION INTRAVENOUS; SUBCUTANEOUS at 02:04

## 2017-04-04 RX ADMIN — FUROSEMIDE 40 MG: 40 TABLET ORAL at 08:04

## 2017-04-04 RX ADMIN — VALGANCICLOVIR 900 MG: 450 TABLET, FILM COATED ORAL at 08:04

## 2017-04-04 RX ADMIN — Medication 800 MG: at 05:04

## 2017-04-04 RX ADMIN — HEPARIN SODIUM 5000 UNITS: 5000 INJECTION, SOLUTION INTRAVENOUS; SUBCUTANEOUS at 09:04

## 2017-04-04 RX ADMIN — INSULIN ASPART 15 UNITS: 100 INJECTION, SOLUTION INTRAVENOUS; SUBCUTANEOUS at 02:04

## 2017-04-04 RX ADMIN — Medication 800 MG: at 09:04

## 2017-04-04 RX ADMIN — HYDROCODONE BITARTRATE AND ACETAMINOPHEN 1 TABLET: 7.5; 325 TABLET ORAL at 12:04

## 2017-04-04 RX ADMIN — MAGNESIUM SULFATE IN WATER 2 G: 40 INJECTION, SOLUTION INTRAVENOUS at 08:04

## 2017-04-04 RX ADMIN — TACROLIMUS 1 MG: 1 CAPSULE, GELATIN COATED ORAL at 08:04

## 2017-04-04 RX ADMIN — OXYCODONE HYDROCHLORIDE 10 MG: 5 TABLET ORAL at 11:04

## 2017-04-04 RX ADMIN — AMOXICILLIN 500 MG: 500 CAPSULE ORAL at 09:04

## 2017-04-04 RX ADMIN — PANTOPRAZOLE SODIUM 40 MG: 40 TABLET, DELAYED RELEASE ORAL at 08:04

## 2017-04-04 RX ADMIN — HYDROMORPHONE HYDROCHLORIDE 0.5 MG: 1 INJECTION, SOLUTION INTRAMUSCULAR; INTRAVENOUS; SUBCUTANEOUS at 04:04

## 2017-04-04 RX ADMIN — STANDARDIZED SENNA CONCENTRATE AND DOCUSATE SODIUM 2 TABLET: 8.6; 5 TABLET, FILM COATED ORAL at 08:04

## 2017-04-04 RX ADMIN — INSULIN GLARGINE 20 UNITS: 100 INJECTION, SOLUTION SUBCUTANEOUS at 09:04

## 2017-04-04 RX ADMIN — INSULIN ASPART 2 UNITS: 100 INJECTION, SOLUTION INTRAVENOUS; SUBCUTANEOUS at 06:04

## 2017-04-04 RX ADMIN — INSULIN ASPART 15 UNITS: 100 INJECTION, SOLUTION INTRAVENOUS; SUBCUTANEOUS at 06:04

## 2017-04-04 RX ADMIN — MYCOPHENOLATE MOFETIL 1000 MG: 250 CAPSULE ORAL at 09:04

## 2017-04-04 RX ADMIN — OXYCODONE HYDROCHLORIDE 20 MG: 10 TABLET, FILM COATED, EXTENDED RELEASE ORAL at 08:04

## 2017-04-04 RX ADMIN — OXYCODONE HYDROCHLORIDE 10 MG: 5 TABLET ORAL at 05:04

## 2017-04-04 RX ADMIN — STANDARDIZED SENNA CONCENTRATE AND DOCUSATE SODIUM 2 TABLET: 8.6; 5 TABLET, FILM COATED ORAL at 09:04

## 2017-04-04 RX ADMIN — FERROUS GLUCONATE TAB 324 MG (37.5 MG ELEMENTAL IRON) 324 MG: 324 (37.5 FE) TAB at 08:04

## 2017-04-04 RX ADMIN — PRAVASTATIN SODIUM 40 MG: 40 TABLET ORAL at 08:04

## 2017-04-04 RX ADMIN — INSULIN ASPART 15 UNITS: 100 INJECTION, SOLUTION INTRAVENOUS; SUBCUTANEOUS at 08:04

## 2017-04-04 RX ADMIN — MYCOPHENOLATE MOFETIL 1000 MG: 250 CAPSULE ORAL at 08:04

## 2017-04-04 RX ADMIN — MAGNESIUM SULFATE IN WATER 2 G: 40 INJECTION, SOLUTION INTRAVENOUS at 11:04

## 2017-04-04 RX ADMIN — HEPARIN SODIUM 5000 UNITS: 5000 INJECTION, SOLUTION INTRAVENOUS; SUBCUTANEOUS at 05:04

## 2017-04-04 RX ADMIN — HYDROMORPHONE HYDROCHLORIDE 0.2 MG: 1 INJECTION, SOLUTION INTRAMUSCULAR; INTRAVENOUS; SUBCUTANEOUS at 06:04

## 2017-04-04 RX ADMIN — HYDROMORPHONE HYDROCHLORIDE 0.5 MG: 1 INJECTION, SOLUTION INTRAMUSCULAR; INTRAVENOUS; SUBCUTANEOUS at 01:04

## 2017-04-04 RX ADMIN — SULFAMETHOXAZOLE AND TRIMETHOPRIM 1 TABLET: 400; 80 TABLET ORAL at 08:04

## 2017-04-04 RX ADMIN — HYDROMORPHONE HYDROCHLORIDE 0.5 MG: 1 INJECTION, SOLUTION INTRAMUSCULAR; INTRAVENOUS; SUBCUTANEOUS at 08:04

## 2017-04-04 RX ADMIN — AMPICILLIN SODIUM 4000 MG: 2 INJECTION, POWDER, FOR SOLUTION INTRAMUSCULAR; INTRAVENOUS at 12:04

## 2017-04-04 RX ADMIN — BISACODYL 10 MG: 10 SUPPOSITORY RECTAL at 08:04

## 2017-04-04 RX ADMIN — FUROSEMIDE 40 MG: 40 TABLET ORAL at 09:04

## 2017-04-04 RX ADMIN — OXYCODONE HYDROCHLORIDE 30 MG: 10 TABLET, FILM COATED, EXTENDED RELEASE ORAL at 09:04

## 2017-04-04 RX ADMIN — PREDNISONE 5 MG: 5 TABLET ORAL at 08:04

## 2017-04-04 RX ADMIN — AMLODIPINE BESYLATE 10 MG: 10 TABLET ORAL at 08:04

## 2017-04-04 RX ADMIN — TACROLIMUS 1 MG: 1 CAPSULE, GELATIN COATED ORAL at 05:04

## 2017-04-04 RX ADMIN — Medication 800 MG: at 02:04

## 2017-04-04 NOTE — PLAN OF CARE
Problem: Patient Care Overview  Goal: Plan of Care Review  Outcome: Ongoing (interventions implemented as appropriate)     Pt AAOx4 with spouse at th bedside.  Pt with c/o burning pain 8/10 to R groin.  Pt receiving Norco 7.5 mg Q4h and dilaudid 0.5 mg IV Q6H PRN in addition to scheduled OxyContin Q12h.     VSS.  Pt on tele running sinus rhythm with hr  80's-90's  's-150's/60's-70's  Satting 94-95% on room air.  Afebrile.     Accuchecks being monitored AC/HS. HS sugar 135. No coverage needed.     Pt has voided > 2 L clear yellow urine thus far. No BM. Last BM 4/3 post suppository.     Midsternal incision healed.     R Cheek DTI healing     Bilateral heels with eschar. Being painted with betadine daily.  Heels elevated.     R groin with [place] continuous @ 125. Small amount serosanguinous drainage.     RLQ wound with telfa island dressing CDI.     Upper abdomen ( Old CT sites) with telfa island dressing CDI.     DARIN to R Upper leg with 30 mL serosanguinous drainage thus far.    Incision to R upper leg with dermabond and clear dressing CDI.  Pt receiving ampicillin Q8h to R UA midline.    Per team, pt likely D/C end of week pending better pain control, set up with [place] for home, and PO abx.     Pt remained free from falls or injury thus far.   Bed is in low/ locked position, side rails are up x 2, call light is in reach.   Will continue to monitor.

## 2017-04-04 NOTE — PLAN OF CARE
Problem: Occupational Therapy Goal  Goal: Occupational Therapy Goal  Goals to be met by: 04/13/17     Patient will increase functional independence with ADLs by performing:    UE Dressing with Set-up Assistance.  LE Dressing with Moderate Assistance.  Grooming while standing with Contact Guard Assistance.  Toileting from bedside commode with Moderate Assistance for hygiene and clothing management.   Supine to sit with Contact Guard Assistance.  Toilet transfer to bedside commode with Moderate Assistance.  Upper extremity exercise program x12 reps per handout, with supervision.  Outcome: Ongoing (interventions implemented as appropriate)  Pt was agreeable to OT evaluation but declined getting out of bed due to pain in R LE.  Goals established in order to improve pt's level of independence and safety with ADLs and func mobility.      Rupinder Warner, OT  4/4/2017

## 2017-04-04 NOTE — NURSING
Rounds Report: Attended interdisciplinary rounds this morning with the transplant team including SW, physicians, fellows,  mid-level providers, and transplant coordinators.  Discussed plan of care which centered around pain control at present. No definitive date of discharge was noted yet.

## 2017-04-04 NOTE — PROGRESS NOTES
UPDATE    SW to pt's room for update.  Pt presents as lying flat in bed with wife at bedside.  Pt and wife both present as aao x4, calm, cooperative, and asking and answering questions appropriately.  Pt reports he is still experiencing a lot of pain since surgery & wound vac placement last week.  Pt asking what d/c plan is.  SW discussed with pt that, per multidisciplinary rounds this morning, d/c will depend on achieving adequate pain control for pt and on pt's progress with PT/OT.  Pt verbalizes understanding.  Pt states he wants to get out of bed and work with PT, but he declined PT today due to current pain and amount of pain he had yesterday during/after working with PT.      SW discussed home wound vac arrangements with pt.  [employer] wound vac form is completed and referral ready to send once form is signed by MD.  SW will f/u for MD signature and fax referral ASAP.  Pt and wife both report coping adequately at this time, and deny any other questions or concerns.  SW providing ongoing psychosocial and counseling support, education, resources, assistance, and discharge planning as indicated.  SW following and remains available.

## 2017-04-04 NOTE — PLAN OF CARE
Pt is AAOx4; afebrile; vital signs stable. Wound care changed wound vac today, it is draining very little serosanguinous fluid. RLQ dressing packed and to be changed q1d with saline packing. DARIN drain on rt thigh draining serosanguinous fluid. Heels cleaned w/ betadine q1d. Pain moderately controlled w/ IV dilaudid, q4h norco, and q12h oxycontin. Midline placed today. BG ranged from 228-170. Wife at bedside, attentive to pt. He is up to chair with one assist and a walker. Suppository given today and he did have a BM.

## 2017-04-04 NOTE — PROGRESS NOTES
Progress Note  Plastic Surgery    Admit Date: 3/28/2017  Attending:   S/P: Procedure(s) (LRB):  Repair/ligation of leaking lymphatic with muscle flap coverage.  R groin (Right)    Post-operative Day: 5 Days Post-Op    Hospital Day: 8    SUBJECTIVE:     Pain well controlled overnight. Wound vac in place holding suction. Drain in place.    OBJECTIVE:     Vital Signs (Most Recent)  Temp: 98.1 °F (36.7 °C) (04/04/17 0400)  Pulse: 83 (04/04/17 0400)  Resp: 18 (04/04/17 0400)  BP: (!) 155/74 (04/04/17 0400)  SpO2: 95 % (04/04/17 0400)    Vital Signs Range (Last 24H):  Temp:  [98 °F (36.7 °C)-98.3 °F (36.8 °C)]   Pulse:  [81-97]   Resp:  [14-18]   BP: (127-155)/(63-74)   SpO2:  [94 %-96 %]     I & O (Last 24H):    Intake/Output Summary (Last 24 hours) at 04/04/17 0657  Last data filed at 04/03/17 2359   Gross per 24 hour   Intake             1600 ml   Output             1930 ml   Net             -330 ml     Physical Exam:  NAD  R groin with wound vac in place, R thigh incision c/d/i, DARIN w ss output.     Laboratory:  CBC:     Recent Labs  Lab 04/04/17 0400   WBC 7.35   RBC 4.76   HGB 9.7*   HCT 30.1*      MCV 63*   MCH 20.4*   MCHC 32.2     BMP:     Recent Labs  Lab 04/04/17 0400   *      K 5.2*      CO2 27   BUN 13   CREATININE 0.8   CALCIUM 8.8     CMP:     Recent Labs  Lab 04/04/17 0400   *   CALCIUM 8.8   ALBUMIN 2.5*   PROT 6.2      K 5.2*   CO2 27      BUN 13   CREATININE 0.8   ALKPHOS 56   ALT 12   AST 37   BILITOT 0.4     Labs within the past 24 hours have been reviewed.    ASSESSMENT/PLAN:     56 year old male with history of heart transplant in Feb 2017 who was found to have lymphocele and non healing R groin wound who is s/p R wound revision with muscle flap coverage on 3/30/17.   - Continue wound vac changes MWF, appreciate wound care assistance with this patient.  - Continue thigh drain, patient may be discharged with drain and follow up in clinic 1 week  -  Additional management per primary service.

## 2017-04-04 NOTE — PROGRESS NOTES
Progress Note  Heart Transplant Service    Admit Date: 3/28/2017   LOS: 7 days     SUBJECTIVE:     Follow up for: Lymphocele after surgical procedure    Interval History: Pain is still not controlled. Patient sat on side of the bed yesterday but was unable to do much more than this. Had a BM, but would like another suppository to try to have another today.     Scheduled Meds:   amlodipine  10 mg Oral Daily    amoxicillin  500 mg Oral Q8H    ergocalciferol  50,000 Units Oral Q7 Days    ferrous gluconate  324 mg Oral Daily with breakfast    furosemide  40 mg Oral BID    heparin (porcine)  5,000 Units Subcutaneous Q8H    insulin aspart  15 Units Subcutaneous TIDWM    insulin glargine  20 Units Subcutaneous Daily    magnesium oxide  800 mg Oral TID    magnesium sulfate IVPB  4 g Intravenous Once    mycophenolate  1,000 mg Oral BID    oxycodone  20 mg Oral Q12H    pantoprazole  40 mg Oral Daily    pravastatin  40 mg Oral Daily    predniSONE  5 mg Oral Daily    senna-docusate 8.6-50 mg  2 tablet Oral BID    sulfamethoxazole-trimethoprim 400-80mg  1 tablet Oral Daily    tacrolimus  1 mg Oral BID    valganciclovir  900 mg Oral Daily     Continuous Infusions:   sodium chloride 0.9%       PRN Meds:bisacodyl, dextrose 50%, dextrose 50%, glucagon (human recombinant), glucose, glucose, hydrocodone-acetaminophen 7.5-325mg, HYDROmorphone, insulin aspart, naloxone    Immunosuppressants     Start     Stop Route Frequency Ordered    03/28/17 2100  mycophenolate capsule 1,000 mg      -- Oral 2 times daily 03/28/17 1634    03/31/17 1045  tacrolimus capsule 1 mg      -- Oral 2 times daily 03/31/17 0941          Review of patient's allergies indicates:   Allergen Reactions    Levemir [insulin detemir] Itching    Pork/porcine containing products     Ranexa [ranolazine] Nausea Only       OBJECTIVE:     Vital Signs (Most Recent)  Temp: 98.1 °F (36.7 °C) (04/04/17 0400)  Pulse: 83 (04/04/17 0400)  Resp: 18 (04/04/17  0400)  BP: (!) 155/74 (04/04/17 0400)  SpO2: 95 % (04/04/17 0400)    Vital Signs Range (Last 24H):  Temp:  [98 °F (36.7 °C)-98.3 °F (36.8 °C)]   Pulse:  [81-97]   Resp:  [14-18]   BP: (127-155)/(63-74)   SpO2:  [94 %-96 %]     I & O (Last 24H):    Intake/Output Summary (Last 24 hours) at 04/04/17 0714  Last data filed at 04/03/17 2359   Gross per 24 hour   Intake             1600 ml   Output             1930 ml   Net             -330 ml            Telemetry: 88 bpm NSR    Physical Exam   Constitutional: He is oriented to person, place, and time. He appears well-developed and well-nourished.   HENT:   Head: Normocephalic and atraumatic.   Neck: Normal range of motion. Neck supple. No JVD present.   Cardiovascular: Normal rate and regular rhythm.    Pulmonary/Chest: Effort normal and breath sounds normal.   Abdominal: Soft. Bowel sounds are normal. He exhibits no distension. There is no tenderness.   Musculoskeletal: Normal range of motion. He exhibits no edema.   Neurological: He is alert and oriented to person, place, and time.   Skin: Skin is warm and dry.        Psychiatric: He has a normal mood and affect. His behavior is normal. Judgment and thought content normal.       Labs:       Recent Labs  Lab 04/02/17 0459 04/03/17  0456 04/04/17  0400   WBC 9.21 7.86 7.35   HGB 9.6* 9.5* 9.7*   HCT 30.4* 29.6* 30.1*    248 289   LYMPH 12.2*  1.1 20.0  1.6 21.8  1.6   MONO 4.8  0.4 5.3  0.4 5.4  0.4   EOSINOPHIL 0.7 1.9 2.0       No results for input(s): APTT, INR, PTT in the last 168 hours.       Recent Labs  Lab 04/02/17 0459 04/03/17  0456 04/04/17  0400   * 188* 141*   CALCIUM 8.5* 8.7 8.8   ALBUMIN 2.4* 2.4* 2.5*   PROT 5.4* 5.5* 6.2    136 137   K 4.4 4.3 5.2*   CO2 29 27 27    101 101   BUN 14 12 13   CREATININE 0.8 0.8 0.8   ALKPHOS 63 56 56   ALT 11 11 12   AST 11 11 37   BILITOT 0.4 0.2 0.4   MG 1.4* 1.3* 1.4*     Estimated Creatinine Clearance: 117.2 mL/min (based on Cr of  0.8).    No results for input(s): BNP, BNPTRIAGEBLO in the last 168 hours.    No results for input(s): LDH in the last 168 hours.    Microbiology Results (last 7 days)     Procedure Component Value Units Date/Time    Culture, Anaerobe [[id number]] Collected:  03/30/17 1414    Order Status:  Completed Specimen:  Wound from Groin Updated:  04/03/17 1328     Anaerobic Culture Culture in progress    Narrative:       Groin wound    Fungus culture [[id number]] Collected:  03/30/17 1414    Order Status:  Completed Specimen:  Wound from Groin Updated:  04/03/17 0940     Fungus (Mycology) Culture Culture in progress    Narrative:       Groin wound    Aerobic culture [[id number]] Collected:  04/02/17 1521    Order Status:  Completed Specimen:  Wound from Abdomen Updated:  04/03/17 0713     Aerobic Bacterial Culture No growth    Blood culture [[id number]] Collected:  03/28/17 1650    Order Status:  Completed Specimen:  Blood Updated:  04/02/17 2012     Blood Culture, Routine No growth after 5 days.    Blood culture [[id number]] Collected:  03/28/17 1651    Order Status:  Completed Specimen:  Blood Updated:  04/02/17 2012     Blood Culture, Routine No growth after 5 days.    Aerobic culture [[id number]]  (Susceptibility) Collected:  03/30/17 1414    Order Status:  Completed Specimen:  Wound from Groin Updated:  04/02/17 0707     Aerobic Bacterial Culture --     ENTEROCOCCUS FAECALIS  Few      Narrative:       Groin wound    Aerobic culture [[id number]]  (Susceptibility) Collected:  03/28/17 1732    Order Status:  Completed Specimen:  Skin from Groin Updated:  03/31/17 1321     Aerobic Bacterial Culture --     ENTEROCOCCUS FAECALIS  Moderate       Aerobic Bacterial Culture --     STREPTOCOCCUS MITIS/ORALIS   Moderate      Narrative:       Culture lymphocele site    Gram stain [[id number]] Collected:  03/30/17 1414    Order Status:  Completed Specimen:  Wound from Groin Updated:  03/30/17 2203     Gram Stain Result Few WBC's      Few  Gram positive cocci    Narrative:       Groin wound            ASSESSMENT:     55 yo M with PMHx of NICM s/p HM II (8/24/16), now s/p OHTx 2/9/17 who is admitted secondary to lymphocele foul smell/draining more.     PLAN:     S/P Orthotopic Heart Transplantation 2/9/17  -CMV D-/R+ Continue valcyte  immunosuppression with  mg BID, [place] 1/1, Pred 5 mg QD    Right Groin Lymphocele  S/P I &D R groin & Rectus Femoris Muscle Flap   -Wound vac placed  -Wound care following  -Wound cultures grew Enterococcus Faecalis sensitive to ampicillin  -ID consulted, recommend transition to PO amoxicillin. Orders placed  -Pain control  -DARIN drain per surgery; per note by plastics, drain can be left in if discharged and he needs 1 week f/u (4/4/17 note)    HTN  -Continue amlodipine 10 mg    DM2  -Continue insulin aspart, insulin glargine    HLD  -Continue pravastatin    DLES Drainage  -Wound care following    Constipation  -Patient reports he is constipated and is worsening his post-operative pain  -Requests another suppository    Post-Operative Pain  -Increase long acting oxycodone today to 30 mg BID, and change hydrocodone to oxycodone for breakthrough pain    Neelima Navarrete PA-C  [facility]ink #[id number]

## 2017-04-04 NOTE — PLAN OF CARE
Problem: Patient Care Overview  Goal: Plan of Care Review  Outcome: Ongoing (interventions implemented as appropriate)  Patient acknowledges understanding with pain scale. Appropriate pain medications have been dispensed. Adjusted pain meds, d.c'd iv diluadid however given x1 dose of pain 10 out of 10 in afternoon. Non slip footwear in place. Bed lowered. Rails up x 2. Call bell in easy reach. Pt is afebrile. Hand washing per nursing guidelines. Encourage physical activity such as sitting up in chair. Pt complains of right thigh pain with any activity. Discontinued iv antibiotics and started po tid. Wife remains at beside. Adequate po intake. Blood glucose controlled. Small amount of serosangineous drainage from dayron. Old CT tubes healing covered with meipore dressing. Old lvad site packed with moistened gauze strip, scant amount of drainage noted in wound bed. Painted heels with betadine, dti worse on right than left heel. Both heels elevated off of bed. Pt declined working with OT and PT today due to pain with activity of right leg.

## 2017-04-04 NOTE — PT/OT/SLP EVAL
Occupational Therapy  Evaluation    Mick Barriga   MRN: [id number]   Admitting Diagnosis: Lymphocele after surgical procedure    OT Date of Treatment: 04/04/17   OT Start Time: 1258  OT Stop Time: 1312  OT Total Time (min): 14 min    Billable Minutes:  Evaluation 14 min    Diagnosis: Lymphocele after surgical procedure       Past Medical History:   Diagnosis Date    CHF (congestive heart failure)     Coronary artery disease     GERD (gastroesophageal reflux disease)     Hyperlipidemia     Hypertension     LVAD (left ventricular assist device) present 2/13/2017    Type 2 diabetes mellitus with hyperglycemia       Past Surgical History:   Procedure Laterality Date    CARDIAC PACEMAKER PLACEMENT      CHOLECYSTECTOMY      COLONOSCOPY N/A 1/11/2017    Procedure: COLONOSCOPY;  Surgeon: Petr Almanzar MD;  [facility]  Service: Endoscopy;  Laterality: N/A;    COLONOSCOPY N/A 1/12/2017    Procedure: COLONOSCOPY;  Surgeon: Petr Almanzar MD;  [facility]  Service: Endoscopy;  Laterality: N/A;    HEART TRANSPLANT  02/2017    LEFT VENTRICULAR ASSIST DEVICE      x 3 months ago       Referring physician: Doc  Date referred to OT: 04/02/17    General Precautions: Standard, fall  Orthopedic Precautions: N/A  Braces: N/A          Patient History:  Living Environment  Lives With: spouse  Living Arrangements: condominium  Home Accessibility: stairs within home  Transportation Available: family or friend will provide  Living Environment Comment:  (Pt lives with wife in 2 story condo but is able to stay on first floor for all needs - pt was I at [facility] using rollator - has tub/shower combo on first floor and walk in shower on second - has 24 hrs assist at d/c)  Equipment Currently Used at Home: rollator, shower chair    Prior level of function:   Bed Mobility/Transfers: needs device  Grooming: independent  Bathing: independent  Upper Body Dressing: independent  Lower Body  "Dressing: independent  Toileting: independent  Driving License: Yes  Mode of Transportation: Family     Dominant hand: left    Subjective:  Communicated with nurse prior to session.  "Please don't make me get upset.  They got me up yesterday and I'm still hurting from it.  I can't get up right now."  Chief Complaint: pain  Patient/Family stated goals: return to PLOF    Pain Ratin10   Location:  (R leg )  Pain Addressed: Pre-medicate for activity, Reposition, Distraction, Cessation of Activity  Pain Rating Post-Intervention: 8/10    Objective:  Patient found with: telemetry, wound vac, DARIN drain    Cognitive Exam:  Oriented to: Person, Place, Time and Situation  Follows Commands/attention: Easily distracted  Communication: clear/fluent  Memory:  No Deficits noted  Safety awareness/insight to disability: impaired  Coping skills/emotional control: Pt in pain and declining any movement for fear of increasing pain    Visual/perceptual:  Intact    Physical Exam:  Postural examination/scapula alignment: Difficult to assess - pt declined sitting on EOB  Skin integrity: wound vac on R LE  Edema: B LEs and L hand    Sensation:   Pt reports decreased light touch sensation in L UE (since surgery ~2 months ago)    Upper Extremity Range of Motion:  Right Upper Extremity: WFL  Left Upper Extremity: WFL    Upper Extremity Strength:  Right Upper Extremity: WFL  Left Upper Extremity: fair   Strength: good    Fine motor coordination:   Decreased on L due to decreased sensation and edema    Gross motor coordination: WFL    Functional Mobility:  Bed Mobility: Pt declined all movement - OT offered to assist pt with scooting up in bed as he slid down, but pt stated he is able to scoot himself up in bed using his L leg        Transfers: Pt declined getting out of bed       Activities of Daily Living:  Feeding Level of Assistance: Set-up Assistance    UE Dressing Level of Assistance: Minimum assistance    LE Dressing Level of " "Assistance: Total assistance - pt declined attempt to david L sock - stated he was unable to david R sock due to wound vac making it difficult to move leg    Grooming Position: other (bed level)  Grooming Level of Assistance: Supervision     Balance:   Balance not assessed due to pt decline sitting up on EOB    Therapeutic Activities and Exercises:  · Pt seen for evaluation only - pt declined to participate in the functional mobility portion of evaluation due to pain  · Pt and spouse educated on role of OT in acute setting and POC  · Discussed DME equipment possibilities with pt and spouse    AM-PAC 6 CLICK ADL  How much help from another person does this patient currently need?  1 = Unable, Total/Dependent Assistance  2 = A lot, Maximum/Moderate Assistance  3 = A little, Minimum/Contact Guard/Supervision  4 = None, Modified [place]y/Independent    Putting on and taking off regular lower body clothing? : 2  Bathing (including washing, rinsing, drying)?: 2  Toileting, which includes using toilet, bedpan, or urinal? : 2  Putting on and taking off regular upper body clothing?: 3  Eating meals?: 3    AM-PAC Raw Score CMS "G-Code Modifier Level of Impairment Assistance   6 % Total / Unable   7 - 9 CM 80 - 100% Maximal Assist   10 - 14 CL 60 - 80% Moderate Assist   15 - 19 CK 40 - 60% Moderate Assist   20 - 22 CJ 20 - 40% Minimal Assist   23 CI 1-20% SBA / CGA   24 CH 0% Independent/ Mod I       Patient left HOB elevated with all lines intact, call button in reach and wife present    Assessment:  Mick Barriga is a 56 y.o. male with a medical diagnosis of Lymphocele after surgical procedure and presents with decreased sensation, decreased func mobility, pain and decreased self care.  Pt declined functional mobility portion of assessment due to increased pain.  Pt will benefit from skilled OT services in order to increase his safety and level of independence with func mobility and self care skills.  Pt was I " with DME at [facility] and will have 24 hours assistance at home. At this time, OT recommends SNF to meet his post acute therapy needs due to his decline in functional status.  OT recommends a BSC for DME needs due to pt's report of difficulty with toilet transfers.    Rehab identified problem list/impairments: Rehab identified problem list/impairments: weakness, impaired endurance, impaired sensation, impaired self care skills, impaired functional mobilty, gait instability, impaired balance, decreased coordination, decreased lower extremity function, decreased upper extremity function, decreased safety awareness, pain, impaired skin    Rehab potential is fair.    Activity tolerance: Fair - limited by pain    Discharge recommendations: Discharge Facility/Level Of Care Needs: nursing facility, skilled     Barriers to discharge: Barriers to Discharge: None    Equipment recommendations: bedside commode     GOALS:   Occupational Therapy Goals        Problem: Occupational Therapy Goal    Goal Priority Disciplines Outcome Interventions   Occupational Therapy Goal     OT, PT/OT Ongoing (interventions implemented as appropriate)    Description:  Goals to be met by: 04/13/17     Patient will increase functional independence with ADLs by performing:    UE Dressing with Set-up Assistance.  LE Dressing with Moderate Assistance.  Grooming while standing with Contact Guard Assistance.  Toileting from bedside commode with Moderate Assistance for hygiene and clothing management.   Supine to sit with Contact Guard Assistance.  Toilet transfer to bedside commode with Moderate Assistance.  Upper extremity exercise program x12 reps per handout, with supervision.                PLAN:  Patient to be seen 5 x/week to address the above listed problems via self-care/home management, therapeutic activities, therapeutic exercises  Plan of Care expires: 05/01/17  Plan of Care reviewed with: patient, spouse         Rupinder BILLY Timmy, OT  04/04/2017

## 2017-04-04 NOTE — PT/OT/SLP PROGRESS
"Physical Therapy      Mick Barriga  MRN: [id number]    Patient not seen today secondary to refusal in AM. Pt stated "I am not refusing, I just cannot do it." Pt educated on role of PT and techniques to make moving easier/less painful, but pt continued to state "I cannot do it. Maybe in a couple of days". Will follow-up at next available date.    Katrin Merino, PT   4/4/2017      "

## 2017-04-05 LAB
ALBUMIN SERPL BCP-MCNC: 2.6 G/DL
ALP SERPL-CCNC: 58 U/L
ALT SERPL W/O P-5'-P-CCNC: 9 U/L
ANION GAP SERPL CALC-SCNC: 8 MMOL/L
ANISOCYTOSIS BLD QL SMEAR: SLIGHT
AST SERPL-CCNC: 13 U/L
BACTERIA SPEC ANAEROBE CULT: NORMAL
BASOPHILS # BLD AUTO: 0.03 K/UL
BASOPHILS NFR BLD: 0.4 %
BILIRUB SERPL-MCNC: 0.4 MG/DL
BUN SERPL-MCNC: 13 MG/DL
CALCIUM SERPL-MCNC: 9 MG/DL
CHLORIDE SERPL-SCNC: 102 MMOL/L
CO2 SERPL-SCNC: 27 MMOL/L
CREAT SERPL-MCNC: 0.9 MG/DL
DACRYOCYTES BLD QL SMEAR: ABNORMAL
DIFFERENTIAL METHOD: ABNORMAL
EOSINOPHIL # BLD AUTO: 0.1 K/UL
EOSINOPHIL NFR BLD: 1.6 %
ERYTHROCYTE [DISTWIDTH] IN BLOOD BY AUTOMATED COUNT: 19.1 %
EST. GFR  (AFRICAN AMERICAN): >60 ML/MIN/1.73 M^2
EST. GFR  (NON AFRICAN AMERICAN): >60 ML/MIN/1.73 M^2
GLUCOSE SERPL-MCNC: 159 MG/DL
HCT VFR BLD AUTO: 30.5 %
HGB BLD-MCNC: 10 G/DL
HYPOCHROMIA BLD QL SMEAR: ABNORMAL
LYMPHOCYTES # BLD AUTO: 1.9 K/UL
LYMPHOCYTES NFR BLD: 24.2 %
MAGNESIUM SERPL-MCNC: 1.4 MG/DL
MCH RBC QN AUTO: 20.5 PG
MCHC RBC AUTO-ENTMCNC: 32.8 %
MCV RBC AUTO: 63 FL
MONOCYTES # BLD AUTO: 0.4 K/UL
MONOCYTES NFR BLD: 5.5 %
NEUTROPHILS # BLD AUTO: 5.1 K/UL
NEUTROPHILS NFR BLD: 68.3 %
OVALOCYTES BLD QL SMEAR: ABNORMAL
PLATELET # BLD AUTO: 290 K/UL
PLATELET BLD QL SMEAR: ABNORMAL
PMV BLD AUTO: ABNORMAL FL
POCT GLUCOSE: 146 MG/DL (ref 70–110)
POCT GLUCOSE: 156 MG/DL (ref 70–110)
POCT GLUCOSE: 182 MG/DL (ref 70–110)
POCT GLUCOSE: 263 MG/DL (ref 70–110)
POIKILOCYTOSIS BLD QL SMEAR: SLIGHT
POLYCHROMASIA BLD QL SMEAR: ABNORMAL
POTASSIUM SERPL-SCNC: 4.2 MMOL/L
PROT SERPL-MCNC: 5.9 G/DL
RBC # BLD AUTO: 4.88 M/UL
SCHISTOCYTES BLD QL SMEAR: ABNORMAL
SODIUM SERPL-SCNC: 137 MMOL/L
TACROLIMUS BLD-MCNC: 12.1 NG/ML
WBC # BLD AUTO: 7.65 K/UL

## 2017-04-05 PROCEDURE — 63600175 PHARM REV CODE 636 W HCPCS: Performed by: NURSE PRACTITIONER

## 2017-04-05 PROCEDURE — 20600001 HC STEP DOWN PRIVATE ROOM

## 2017-04-05 PROCEDURE — 25000003 PHARM REV CODE 250: Performed by: INTERNAL MEDICINE

## 2017-04-05 PROCEDURE — 80197 ASSAY OF TACROLIMUS: CPT

## 2017-04-05 PROCEDURE — 94760 N-INVAS EAR/PLS OXIMETRY 1: CPT

## 2017-04-05 PROCEDURE — 25000003 PHARM REV CODE 250: Performed by: NURSE PRACTITIONER

## 2017-04-05 PROCEDURE — 63600175 PHARM REV CODE 636 W HCPCS: Performed by: INTERNAL MEDICINE

## 2017-04-05 PROCEDURE — 63600175 PHARM REV CODE 636 W HCPCS: Performed by: PHYSICIAN ASSISTANT

## 2017-04-05 PROCEDURE — 25000003 PHARM REV CODE 250: Performed by: STUDENT IN AN ORGANIZED HEALTH CARE EDUCATION/TRAINING PROGRAM

## 2017-04-05 PROCEDURE — 25000003 PHARM REV CODE 250: Performed by: PHYSICIAN ASSISTANT

## 2017-04-05 PROCEDURE — 85025 COMPLETE CBC W/AUTO DIFF WBC: CPT

## 2017-04-05 PROCEDURE — 83735 ASSAY OF MAGNESIUM: CPT

## 2017-04-05 PROCEDURE — 99232 SBSQ HOSP IP/OBS MODERATE 35: CPT | Mod: ,,, | Performed by: INTERNAL MEDICINE

## 2017-04-05 PROCEDURE — 80053 COMPREHEN METABOLIC PANEL: CPT

## 2017-04-05 PROCEDURE — 97530 THERAPEUTIC ACTIVITIES: CPT

## 2017-04-05 PROCEDURE — 97110 THERAPEUTIC EXERCISES: CPT

## 2017-04-05 RX ORDER — OXYCODONE HCL 10 MG/1
40 TABLET, FILM COATED, EXTENDED RELEASE ORAL EVERY 12 HOURS
Status: DISCONTINUED | OUTPATIENT
Start: 2017-04-05 | End: 2017-04-10 | Stop reason: HOSPADM

## 2017-04-05 RX ORDER — MAGNESIUM SULFATE HEPTAHYDRATE 40 MG/ML
2 INJECTION, SOLUTION INTRAVENOUS
Status: COMPLETED | OUTPATIENT
Start: 2017-04-05 | End: 2017-04-05

## 2017-04-05 RX ORDER — HYDROMORPHONE HYDROCHLORIDE 1 MG/ML
0.5 INJECTION, SOLUTION INTRAMUSCULAR; INTRAVENOUS; SUBCUTANEOUS ONCE
Status: COMPLETED | OUTPATIENT
Start: 2017-04-05 | End: 2017-04-05

## 2017-04-05 RX ORDER — OXYCODONE HYDROCHLORIDE 5 MG/1
15 TABLET ORAL EVERY 6 HOURS PRN
Status: DISCONTINUED | OUTPATIENT
Start: 2017-04-05 | End: 2017-04-10 | Stop reason: HOSPADM

## 2017-04-05 RX ADMIN — HEPARIN SODIUM 5000 UNITS: 5000 INJECTION, SOLUTION INTRAVENOUS; SUBCUTANEOUS at 06:04

## 2017-04-05 RX ADMIN — OXYCODONE HYDROCHLORIDE 10 MG: 5 TABLET ORAL at 12:04

## 2017-04-05 RX ADMIN — Medication 800 MG: at 02:04

## 2017-04-05 RX ADMIN — AMOXICILLIN 500 MG: 500 CAPSULE ORAL at 08:04

## 2017-04-05 RX ADMIN — HEPARIN SODIUM 5000 UNITS: 5000 INJECTION, SOLUTION INTRAVENOUS; SUBCUTANEOUS at 08:04

## 2017-04-05 RX ADMIN — PREDNISONE 5 MG: 5 TABLET ORAL at 08:04

## 2017-04-05 RX ADMIN — MAGNESIUM SULFATE IN WATER 2 G: 40 INJECTION, SOLUTION INTRAVENOUS at 06:04

## 2017-04-05 RX ADMIN — INSULIN ASPART 15 UNITS: 100 INJECTION, SOLUTION INTRAVENOUS; SUBCUTANEOUS at 08:04

## 2017-04-05 RX ADMIN — AMOXICILLIN 500 MG: 500 CAPSULE ORAL at 02:04

## 2017-04-05 RX ADMIN — OXYCODONE HYDROCHLORIDE 30 MG: 10 TABLET, FILM COATED, EXTENDED RELEASE ORAL at 08:04

## 2017-04-05 RX ADMIN — INSULIN ASPART 15 UNITS: 100 INJECTION, SOLUTION INTRAVENOUS; SUBCUTANEOUS at 02:04

## 2017-04-05 RX ADMIN — MYCOPHENOLATE MOFETIL 1000 MG: 250 CAPSULE ORAL at 08:04

## 2017-04-05 RX ADMIN — OXYCODONE HYDROCHLORIDE 40 MG: 10 TABLET, FILM COATED, EXTENDED RELEASE ORAL at 08:04

## 2017-04-05 RX ADMIN — INSULIN ASPART 15 UNITS: 100 INJECTION, SOLUTION INTRAVENOUS; SUBCUTANEOUS at 06:04

## 2017-04-05 RX ADMIN — VALGANCICLOVIR 900 MG: 450 TABLET, FILM COATED ORAL at 08:04

## 2017-04-05 RX ADMIN — Medication 800 MG: at 08:04

## 2017-04-05 RX ADMIN — FUROSEMIDE 40 MG: 40 TABLET ORAL at 08:04

## 2017-04-05 RX ADMIN — OXYCODONE HYDROCHLORIDE 15 MG: 5 TABLET ORAL at 04:04

## 2017-04-05 RX ADMIN — AMOXICILLIN 500 MG: 500 CAPSULE ORAL at 06:04

## 2017-04-05 RX ADMIN — INSULIN GLARGINE 20 UNITS: 100 INJECTION, SOLUTION SUBCUTANEOUS at 09:04

## 2017-04-05 RX ADMIN — STANDARDIZED SENNA CONCENTRATE AND DOCUSATE SODIUM 2 TABLET: 8.6; 5 TABLET, FILM COATED ORAL at 08:04

## 2017-04-05 RX ADMIN — PRAVASTATIN SODIUM 40 MG: 40 TABLET ORAL at 08:04

## 2017-04-05 RX ADMIN — PANTOPRAZOLE SODIUM 40 MG: 40 TABLET, DELAYED RELEASE ORAL at 08:04

## 2017-04-05 RX ADMIN — SULFAMETHOXAZOLE AND TRIMETHOPRIM 1 TABLET: 400; 80 TABLET ORAL at 08:04

## 2017-04-05 RX ADMIN — OXYCODONE HYDROCHLORIDE 15 MG: 5 TABLET ORAL at 10:04

## 2017-04-05 RX ADMIN — INSULIN ASPART 3 UNITS: 100 INJECTION, SOLUTION INTRAVENOUS; SUBCUTANEOUS at 02:04

## 2017-04-05 RX ADMIN — OXYCODONE HYDROCHLORIDE 15 MG: 5 TABLET ORAL at 11:04

## 2017-04-05 RX ADMIN — MAGNESIUM SULFATE IN WATER 2 G: 40 INJECTION, SOLUTION INTRAVENOUS at 08:04

## 2017-04-05 RX ADMIN — AMLODIPINE BESYLATE 10 MG: 10 TABLET ORAL at 08:04

## 2017-04-05 RX ADMIN — Medication 800 MG: at 06:04

## 2017-04-05 RX ADMIN — BISACODYL 10 MG: 10 SUPPOSITORY RECTAL at 08:04

## 2017-04-05 RX ADMIN — TACROLIMUS 1 MG: 1 CAPSULE, GELATIN COATED ORAL at 08:04

## 2017-04-05 RX ADMIN — HEPARIN SODIUM 5000 UNITS: 5000 INJECTION, SOLUTION INTRAVENOUS; SUBCUTANEOUS at 02:04

## 2017-04-05 RX ADMIN — HYDROMORPHONE HYDROCHLORIDE 0.5 MG: 1 INJECTION, SOLUTION INTRAMUSCULAR; INTRAVENOUS; SUBCUTANEOUS at 12:04

## 2017-04-05 RX ADMIN — FERROUS GLUCONATE TAB 324 MG (37.5 MG ELEMENTAL IRON) 324 MG: 324 (37.5 FE) TAB at 08:04

## 2017-04-05 RX ADMIN — OXYCODONE HYDROCHLORIDE 10 MG: 5 TABLET ORAL at 06:04

## 2017-04-05 RX ADMIN — TACROLIMUS 1 MG: 1 CAPSULE, GELATIN COATED ORAL at 05:04

## 2017-04-05 NOTE — PLAN OF CARE
Problem: Patient Care Overview  Goal: Plan of Care Review  Outcome: Ongoing (interventions implemented as appropriate)  Pt AAOx4, VSS, afebrile.  O2 sats >96% on room air. C/o R incisonal pain with no relief with PRN PO pain medications.  Pt given 2 doses 0.5mg IV dilaudid thorughout night breakthrough pain.  MD Egan notified of unrelieved pain and request pain management to be re-evaluated in AM.  Will notify oncoming nurse of unrelieved pain.  Cardiac monitoring continued. Pt SR/ST. HR 80s.  Pt BG ACHS 138 at midnight.  No SSI administered.  Wife remain at pt bedside.  Fall risk precautions initiated.  Pt in lowest bed position setting, lighting adjusted, pt to wear nonskid socks when ambulating, side rails up x2, urinal remain at bedside within reach.  Pt remain free from falls during shift. Pt verbalize understanding to call when needed assistance. Call light within reach. Will continue to monitor.

## 2017-04-05 NOTE — PROGRESS NOTES
Progress Note  Heart Transplant Service    Admit Date: 3/28/2017   LOS: 8 days     SUBJECTIVE:     Follow up for: Lymphocele after surgical procedure    Interval History: Pain is still very sevre, but overall he does feel his leg pain and function is improving. Reports that the long acting pain medication adjustment helped, but he is having a lot of break through pain. He hopes to get up with PT/OT today.     Scheduled Meds:   amlodipine  10 mg Oral Daily    amoxicillin  500 mg Oral Q8H    ergocalciferol  50,000 Units Oral Q7 Days    ferrous gluconate  324 mg Oral Daily with breakfast    furosemide  40 mg Oral BID    heparin (porcine)  5,000 Units Subcutaneous Q8H    HYDROmorphone  0.5 mg Intravenous Once    insulin aspart  15 Units Subcutaneous TIDWM    insulin glargine  20 Units Subcutaneous Daily    magnesium oxide  800 mg Oral TID    mycophenolate  1,000 mg Oral BID    oxycodone  30 mg Oral Q12H    pantoprazole  40 mg Oral Daily    pravastatin  40 mg Oral Daily    predniSONE  5 mg Oral Daily    senna-docusate 8.6-50 mg  2 tablet Oral BID    sulfamethoxazole-trimethoprim 400-80mg  1 tablet Oral Daily    tacrolimus  1 mg Oral BID    valganciclovir  900 mg Oral Daily     Continuous Infusions:   sodium chloride 0.9%       PRN Meds:bisacodyl, dextrose 50%, dextrose 50%, glucagon (human recombinant), glucose, glucose, insulin aspart, naloxone, oxycodone    Immunosuppressants     Start     Stop Route Frequency Ordered    03/28/17 2100  mycophenolate capsule 1,000 mg      -- Oral 2 times daily 03/28/17 1634    03/31/17 1045  tacrolimus capsule 1 mg      -- Oral 2 times daily 03/31/17 0941          Review of patient's allergies indicates:   Allergen Reactions    Levemir [insulin detemir] Itching    Pork/porcine containing products     Ranexa [ranolazine] Nausea Only       OBJECTIVE:     Vital Signs (Most Recent)  Temp: 98.2 °F (36.8 °C) (04/05/17 0400)  Pulse: 86 (04/05/17 0400)  Resp: 18 (04/05/17  0400)  BP: 137/75 (04/05/17 0400)  SpO2: 97 % (04/05/17 0400)    Vital Signs Range (Last 24H):  Temp:  [97.9 °F (36.6 °C)-98.5 °F (36.9 °C)]   Pulse:  [82-91]   Resp:  [17-18]   BP: (130-140)/(63-75)   SpO2:  [95 %-98 %]     I & O (Last 24H):    Intake/Output Summary (Last 24 hours) at 04/05/17 0708  Last data filed at 04/05/17 0545   Gross per 24 hour   Intake             1640 ml   Output              665 ml   Net              975 ml            Telemetry: 88 bpm NSR    Physical Exam   Constitutional: He is oriented to person, place, and time. He appears well-developed and well-nourished.   HENT:   Head: Normocephalic and atraumatic.   Neck: Normal range of motion. Neck supple. No JVD present.   Cardiovascular: Normal rate and regular rhythm.    Pulmonary/Chest: Effort normal and breath sounds normal.   Abdominal: Soft. Bowel sounds are normal. He exhibits no distension. There is no tenderness.   Musculoskeletal: Normal range of motion. He exhibits no edema.   Neurological: He is alert and oriented to person, place, and time.   Skin: Skin is warm and dry.        Psychiatric: He has a normal mood and affect. His behavior is normal. Judgment and thought content normal.       Labs:       Recent Labs  Lab 04/02/17 0459 04/03/17 0456 04/04/17 0400 04/05/17  0550   WBC 9.21 7.86 7.35 7.65   HGB 9.6* 9.5* 9.7* 10.0*   HCT 30.4* 29.6* 30.1* 30.5*    248 289  --    LYMPH 12.2*  1.1 20.0  1.6 21.8  1.6  --    MONO 4.8  0.4 5.3  0.4 5.4  0.4  --    EOSINOPHIL 0.7 1.9 2.0  --        No results for input(s): APTT, INR, PTT in the last 168 hours.       Recent Labs  Lab 04/03/17 0456 04/04/17 0400 04/05/17  0550   * 141* 159*   CALCIUM 8.7 8.8 9.0   ALBUMIN 2.4* 2.5* 2.6*   PROT 5.5* 6.2 5.9*    137 137   K 4.3 5.2* 4.2   CO2 27 27 27    101 102   BUN 12 13 13   CREATININE 0.8 0.8 0.9   ALKPHOS 56 56 58   ALT 11 12 9*   AST 11 37 13   BILITOT 0.2 0.4 0.4   MG 1.3* 1.4* 1.4*     Estimated  Creatinine Clearance: 103.3 mL/min (based on Cr of 0.9).    No results for input(s): BNP, BNPTRIAGEBLO in the last 168 hours.    No results for input(s): LDH in the last 168 hours.    Microbiology Results (last 7 days)     Procedure Component Value Units Date/Time    Aerobic culture [[id number]] Collected:  04/02/17 1521    Order Status:  Completed Specimen:  Wound from Abdomen Updated:  04/04/17 1020     Aerobic Bacterial Culture Skin geo,  no predominant organism    Culture, Anaerobe [[id number]] Collected:  03/30/17 1414    Order Status:  Completed Specimen:  Wound from Groin Updated:  04/03/17 1328     Anaerobic Culture Culture in progress    Narrative:       Groin wound    Fungus culture [[id number]] Collected:  03/30/17 1414    Order Status:  Completed Specimen:  Wound from Groin Updated:  04/03/17 0940     Fungus (Mycology) Culture Culture in progress    Narrative:       Groin wound    Blood culture [[id number]] Collected:  03/28/17 1650    Order Status:  Completed Specimen:  Blood Updated:  04/02/17 2012     Blood Culture, Routine No growth after 5 days.    Blood culture [[id number]] Collected:  03/28/17 1651    Order Status:  Completed Specimen:  Blood Updated:  04/02/17 2012     Blood Culture, Routine No growth after 5 days.    Aerobic culture [[id number]]  (Susceptibility) Collected:  03/30/17 1414    Order Status:  Completed Specimen:  Wound from Groin Updated:  04/02/17 0707     Aerobic Bacterial Culture --     ENTEROCOCCUS FAECALIS  Few      Narrative:       Groin wound    Aerobic culture [[id number]]  (Susceptibility) Collected:  03/28/17 1732    Order Status:  Completed Specimen:  Skin from Groin Updated:  03/31/17 1321     Aerobic Bacterial Culture --     ENTEROCOCCUS FAECALIS  Moderate       Aerobic Bacterial Culture --     STREPTOCOCCUS MITIS/ORALIS   Moderate      Narrative:       Culture lymphocele site    Gram stain [[id number]] Collected:  03/30/17 1414    Order Status:  Completed Specimen:  Wound  from Groin Updated:  03/30/17 2676     Gram Stain Result Few WBC's      Few Gram positive cocci    Narrative:       Groin wound            ASSESSMENT:     55 yo M with PMHx of NICM s/p HM II (8/24/16), now s/p OHTx 2/9/17 who is admitted secondary to lymphocele foul smell/draining more.     PLAN:     S/P Orthotopic Heart Transplantation 2/9/17  -CMV D-/R+ Continue valcyte  immunosuppression with  mg BID, Tacro 1/1, Pred 5 mg QD  -Tacro level stable    Right Groin Lymphocele  S/P I &D R groin & Rectus Femoris Muscle Flap   -Wound vac placed  -Wound care following  -Wound cultures grew Enterococcus Faecalis sensitive to ampicillin  -ID consulted, recommend transition to PO amoxicillin. Changed to PO 4/4/17  -Pain control  -DARIN drain per surgery; per note by plastics, drain can be left in if discharged and he needs 1 week f/u (4/4/17 note)    HTN  -Continue amlodipine 10 mg    DM2  -Continue insulin aspart, insulin glargine    HLD  -Continue pravastatin    DLES Drainage  -Wound care following    Constipation  -Improving, continue bowel regimen    Post-Operative Pain  -Increase long acting oxycodone today to 40 mg BID, and increase oxycodone dosing for breakthrough    Neelima Navarrete PA-C  [phone]

## 2017-04-05 NOTE — NURSING
Rounds Report: Attended interdisciplinary rounds this morning with the transplant team including SW, physicians, fellows,  mid-level providers, and transplant coordinators.  Discussed plan of care, including DC date, current medications and current plan to proceed with pain management.

## 2017-04-05 NOTE — PT/OT/SLP PROGRESS
Physical Therapy  Treatment    Mick Barriga   MRN: [id number]   Admitting Diagnosis: Lymphocele after surgical procedure    PT Received On: 17  PT Start Time: 1139     PT Stop Time: 1203    PT Total Time (min): 24 min       Billable Minutes:  Therapeutic Activity 16 and Therapeutic Exercise 8    Treatment Type: Treatment  PT/PTA: PT             General Precautions: Standard, fall  Orthopedic Precautions: N/A   Braces: N/A         Subjective:  Communicated with nursing prior to session.      Pain Ratin/10  Location - Side: Right     Location: groin  Pain Addressed: Pre-medicate for activity, Reposition, Distraction, Cessation of Activity       Objective:   Patient found with: telemetry, wound vac, peripheral IV, DARIN drain (pressure relief foam)    Functional Mobility:  Bed Mobility:   Rolling/Turning to Left: Independent (perf mult times)  Rolling/Turning Right: Independent (perf mult times)  Scooting/Bridging: Modified Independent (using L LE bridging tech with use of bed rails to scoot to HOB)    Transfers:       Gait:         Balance:   Static Sit: FAIR-: Maintains without assist but inconsistent   - pt was in bed with back supported throughout tx session  Dynamic Sit: POOR: N/A  Static Stand: 0: Needs MAXIMAL assist to maintain   Dynamic stand: 0: N/A     Therapeutic Activities and Exercises:  Pressure relief ed and demo for positioning in bed. Pt demo with rolling, I, with A to prop with pillows.  Brother ed on tech and frequency.  LE positioning in bed sec to B lat heel pressure ulcers - R is non-healing  Ankle pumps in the bed: 50 reps with Rosa M to perf     AM-PAC 6 CLICK MOBILITY  How much help from another person does this patient currently need?   1 = Unable, Total/Dependent Assistance  2 = A lot, Maximum/Moderate Assistance  3 = A little, Minimum/Contact Guard/Supervision  4 = None, Modified [place]/Independent    Turning over in bed (including adjusting bedclothes, sheets and blankets)?:  4  Sitting down on and standing up from a chair with arms (e.g., wheelchair, bedside commode, etc.): 1  Moving from lying on back to sitting on the side of the bed?: 1  Moving to and from a bed to a chair (including a wheelchair)?: 2  Need to walk in hospital room?: 2  Climbing 3-5 steps with a railing?: 1  Total Score: 11    AM-PAC Raw Score CMS G-Code Modifier Level of Impairment Assistance   6 % Total / Unable   7 - 9 CM 80 - 100% Maximal Assist   10 - 14 CL 60 - 80% Moderate Assist   15 - 19 CK 40 - 60% Moderate Assist   20 - 22 CJ 20 - 40% Minimal Assist   23 CI 1-20% SBA / CGA   24 CH 0% Independent/ Mod I     Patient left 1/4 turn from supine towards L SL with all lines intact, call button in reach and brother present.    Assessment:  Mick Barriga is a 56 y.o. male with a medical diagnosis of Lymphocele after surgical procedure and presents with significant pain in the R groin/LE, that is adversely affecting the patients ability to perf any functional transfers or gait.  Pt has been bed bound for the past 8 days.  Therefore requires ed on pressure relief to prevent formation of wounds.  Pt has a history of wounds and therefore is more likely to develop more.  Discussed upright mobility and the pt getting himself mentally ready to get out of bed, although pt is very cautious and worried about excessive pain. Since most recent discharge from the hospital pt requires A with all transfers, bathing, dressing, and showering.  Pts wife is showering the pt in bed.  The following tx sessions to f/u with HEP to be perf in bed and to encourage upright mobility for strengthening, improving vascularity and facilitate wound healing.      Rehab identified problem list/impairments: Rehab identified problem list/impairments: pain, weakness, edema, impaired skin    Rehab potential is good.    Activity tolerance: Fair    Discharge recommendations: Discharge Facility/Level Of Care Needs: nursing facility, skilled      Barriers to discharge: Barriers to Discharge: Decreased caregiver support    Equipment recommendations: Equipment Needed After Discharge: bedside commode     GOALS:   Physical Therapy Goals        Problem: Physical Therapy Goal    Goal Priority Disciplines Outcome Goal Variances Interventions   Physical Therapy Goal     PT/OT, PT Ongoing (interventions implemented as appropriate)     Description:  Goals to be met by: 4/10/2017     Patient will increase functional independence with mobility by performin. Supine to sit with Set-up [place]  2. Sit to stand transfer with Supervision  3. Bed to chair transfer with Stand-by Assistance using Rolling Walker  4. Gait  x 100 feet with Contact Guard Assistance using Rolling Walker.   5. Lower extremity exercise program x15 reps per handout, with supervision                PLAN:    Patient to be seen 5 x/week  to address the above listed problems via gait training, therapeutic activities, therapeutic exercises, neuromuscular re-education  Plan of Care expires: 17  Plan of Care reviewed with: family, patient         Mary Kevin, PT  2017

## 2017-04-05 NOTE — PROGRESS NOTES
Progress Note  Plastic Surgery    Admit Date: 3/28/2017  Attending:   S/P: Procedure(s) (LRB):  Repair/ligation of leaking lymphatic with muscle flap coverage.  R groin (Right)    Post-operative Day: 6 Days Post-Op    Hospital Day: 9    SUBJECTIVE:     Patient doing well. Wound vac in place holding suction. Drain in place.    OBJECTIVE:     Vital Signs (Most Recent)  Temp: 98.1 °F (36.7 °C) (04/05/17 0730)  Pulse: 88 (04/05/17 0730)  Resp: 16 (04/05/17 0730)  BP: 138/69 (04/05/17 0730)  SpO2: 97 % (04/05/17 0730)    Vital Signs Range (Last 24H):  Temp:  [97.9 °F (36.6 °C)-98.5 °F (36.9 °C)]   Pulse:  [82-90]   Resp:  [16-18]   BP: (130-140)/(63-75)   SpO2:  [95 %-98 %]     I & O (Last 24H):    Intake/Output Summary (Last 24 hours) at 04/05/17 1110  Last data filed at 04/05/17 0545   Gross per 24 hour   Intake             1640 ml   Output              665 ml   Net              975 ml     Physical Exam:  NAD  R groin with wound vac in place, R thigh incision c/d/i, DARIN w ss output.     Laboratory:  CBC:     Recent Labs  Lab 04/05/17  0550   WBC 7.65   RBC 4.88   HGB 10.0*   HCT 30.5*      MCV 63*   MCH 20.5*   MCHC 32.8     BMP:     Recent Labs  Lab 04/05/17  0550   *      K 4.2      CO2 27   BUN 13   CREATININE 0.9   CALCIUM 9.0     CMP:     Recent Labs  Lab 04/05/17  0550   *   CALCIUM 9.0   ALBUMIN 2.6*   PROT 5.9*      K 4.2   CO2 27      BUN 13   CREATININE 0.9   ALKPHOS 58   ALT 9*   AST 13   BILITOT 0.4     Labs within the past 24 hours have been reviewed.    ASSESSMENT/PLAN:     56 year old male with history of heart transplant in Feb 2017 who was found to have lymphocele and non healing R groin wound who is s/p R wound revision with muscle flap coverage on 3/30/17.   - Continue wound vac changes MWF, appreciate wound care assistance with this patient.  - Continue thigh drain, Will likely be ready to be removed tomorrow AM.  - Additional management per primary  service.

## 2017-04-05 NOTE — PLAN OF CARE
Problem: Patient Care Overview  Goal: Plan of Care Review  Patients pain was not controlled overnight.  PO pain medication increased today, primary team did not want to go back to PCA.  Patient is bed bound, unable to moved right leg.  Changed dressings to Vad site, and groin drain site.  Betadine applied to DTI on B feet.  Patients vitals stable today, BG range 182-263, insulin given.  Wound vac had zero output will be changed tomorrow. Drain OP minimal, SS.  UO great.  SR per tele. Patients wife at the bedside this am attentive to patient involved in patients care.

## 2017-04-05 NOTE — PLAN OF CARE
Problem: Physical Therapy Goal  Goal: Physical Therapy Goal  Goals to be met by: 4/10/2017     Patient will increase functional independence with mobility by performin. Supine to sit with Set-up [place]  2. Sit to stand transfer with Supervision  3. Bed to chair transfer with Stand-by Assistance using Rolling Walker  4. Gait x 100 feet with Contact Guard Assistance using Rolling Walker.   5. Lower extremity exercise program x15 reps per handout, with supervision   Outcome: Ongoing (interventions implemented as appropriate)  Working to HEP goal

## 2017-04-05 NOTE — PT/OT/SLP PROGRESS
Occupational Therapy  Pt Not Seen    Mick Barriga  MRN: [id number]    OT treatment attempted today. Patient not seen today secondary to no active OT treatment orders. Will follow-up once orders are signed.    SANTIAGO Galicia  4/5/2017

## 2017-04-05 NOTE — PROGRESS NOTES
[facility]  Adult Nutrition  Progress Note    SUMMARY     Recommendations    Recommendation/Intervention: Pt currently eating 50% of meals.  Please modify order from diabetic 1800 kcal to diabetic 2200 kcal to better meet pt needs.  Please order boost glucose control vanilla OS TID.  RD following.     Goals: Pt to receive nutrition within 48 hours  Nutrition Goal Status: goal met  Communication of RD Recs: reviewed with RN      Reason for Assessment    Reason for Assessment: RD follow-up  Diagnosis: surgery/postoperative complications (lymphocele after surgical procedure, DM2, GERD )  Relevent Medical History: Heart tx         General Information Comments: Nutrition Discharge planning: unable to determine at this time    Nutrition Prescription Ordered    Current Diet Order: Diabetic 1800  Nutrition Order Comments: pt consuming 50% of meals          Nutrition Risk Screen     Nutrition Risk Screen: no indicators present    Nutrition/Diet History    Patient Reported Diet/Restrictions/Preferences: general  Typical Food/Fluid Intake: Pt reports a fair appetite, consuming 50% of meals.  Pt has been constipated, taking suppositorys to relieve himself.  Food Preferences: Moravian/cultural prefs from prior admission noted; NO PORK        Factors Affecting Nutritional Intake: decreased appetite, early satiety                Labs/Tests/Procedures/Meds       Pertinent Labs Reviewed: reviewed, pertinent  Pertinent Labs Comments: Glu-188, mg-1.3     Pertinent Medications Comments: lasix, insulin, mg, pantoprazole, statin, prednisone. tacrolimus    Physical Findings    Overall Physical Appearance: overweight  Tubes:  (drains 30 output)     Skin: pressure ulcer(s) (multiple wounds)    Anthropometrics       Height (inches): 67.99 in  Weight Method: Bed Scale  Weight (kg): 92 kg  Ideal Body Weight (IBW), Male: 153.94 lb     % Ideal Body Weight, Male (lb): 131.76 lb     BMI (kg/m2): 30.85  BMI Grade: 30 - 34.9-  obesity - grade I  Usual Body Weight (UBW), k kg  % Usual Body Weight: 103.37    Estimated/Assessed Needs    Weight Used For Calorie Calculations: 92 kg (202 lb 13.2 oz)   Height (cm): 172.7 cm     Energy Need Method: [place]-St Jeor (2171 kcal/day (1.25 PAL))  RMR ([place]-St. Jeor Equation): 1737.78        Weight Used For Protein Calculations: 92 kg (202 lb 13.2 oz)  Protein Requirements: 120 g/day  Fluid Need Method: RDA Method (or per MD)             Monitor and Evaluation    Food and Nutrient Intake: energy intake, food and beverage intake  Food and Nutrient Adminstration: diet order        Anthropometric Measurements: weight, weight change, body mass index  Biochemical Data, Medical Tests and Procedures: electrolyte and renal panel, gastrointestinal profile, glucose/endocrine profile  Nutrition-Focused Physical Findings: overall appearance    Nutrition Risk    Level of Risk:  (1 x per week)    Nutrition Follow-Up    RD Follow-up?: Yes    Nutrition Diagnosis:    Inadequate energy intake r/t decreased appetite AEB pt consuming 50% of meals.

## 2017-04-06 LAB
ALBUMIN SERPL BCP-MCNC: 2.8 G/DL
ALP SERPL-CCNC: 65 U/L
ALT SERPL W/O P-5'-P-CCNC: 12 U/L
ANION GAP SERPL CALC-SCNC: 9 MMOL/L
AST SERPL-CCNC: 15 U/L
BASOPHILS # BLD AUTO: 0.04 K/UL
BASOPHILS NFR BLD: 0.4 %
BILIRUB SERPL-MCNC: 0.5 MG/DL
BUN SERPL-MCNC: 17 MG/DL
CALCIUM SERPL-MCNC: 9.1 MG/DL
CHLORIDE SERPL-SCNC: 97 MMOL/L
CO2 SERPL-SCNC: 29 MMOL/L
CREAT SERPL-MCNC: 1.1 MG/DL
DIFFERENTIAL METHOD: ABNORMAL
EOSINOPHIL # BLD AUTO: 0.1 K/UL
EOSINOPHIL NFR BLD: 1.4 %
ERYTHROCYTE [DISTWIDTH] IN BLOOD BY AUTOMATED COUNT: 19.4 %
EST. GFR  (AFRICAN AMERICAN): >60 ML/MIN/1.73 M^2
EST. GFR  (NON AFRICAN AMERICAN): >60 ML/MIN/1.73 M^2
GLUCOSE SERPL-MCNC: 226 MG/DL
HCT VFR BLD AUTO: 31.4 %
HGB BLD-MCNC: 10.3 G/DL
LYMPHOCYTES # BLD AUTO: 1.9 K/UL
LYMPHOCYTES NFR BLD: 20.6 %
MAGNESIUM SERPL-MCNC: 2 MG/DL
MCH RBC QN AUTO: 20.6 PG
MCHC RBC AUTO-ENTMCNC: 32.8 %
MCV RBC AUTO: 63 FL
MONOCYTES # BLD AUTO: 0.5 K/UL
MONOCYTES NFR BLD: 5.2 %
NEUTROPHILS # BLD AUTO: 6.3 K/UL
NEUTROPHILS NFR BLD: 72.4 %
PLATELET # BLD AUTO: 331 K/UL
PLATELET BLD QL SMEAR: ABNORMAL
PMV BLD AUTO: ABNORMAL FL
POCT GLUCOSE: 162 MG/DL (ref 70–110)
POCT GLUCOSE: 205 MG/DL (ref 70–110)
POCT GLUCOSE: 227 MG/DL (ref 70–110)
POTASSIUM SERPL-SCNC: 4.3 MMOL/L
PROT SERPL-MCNC: 6.1 G/DL
RBC # BLD AUTO: 4.99 M/UL
SODIUM SERPL-SCNC: 135 MMOL/L
TACROLIMUS BLD-MCNC: 12.7 NG/ML
WBC # BLD AUTO: 9 K/UL

## 2017-04-06 PROCEDURE — 83735 ASSAY OF MAGNESIUM: CPT

## 2017-04-06 PROCEDURE — 63600175 PHARM REV CODE 636 W HCPCS: Performed by: NURSE PRACTITIONER

## 2017-04-06 PROCEDURE — 80197 ASSAY OF TACROLIMUS: CPT

## 2017-04-06 PROCEDURE — 80053 COMPREHEN METABOLIC PANEL: CPT

## 2017-04-06 PROCEDURE — 99232 SBSQ HOSP IP/OBS MODERATE 35: CPT | Mod: ,,, | Performed by: INTERNAL MEDICINE

## 2017-04-06 PROCEDURE — 25000003 PHARM REV CODE 250: Performed by: NURSE PRACTITIONER

## 2017-04-06 PROCEDURE — 97607 NEG PRS WND THR NDME<=50SQCM: CPT

## 2017-04-06 PROCEDURE — 85025 COMPLETE CBC W/AUTO DIFF WBC: CPT

## 2017-04-06 PROCEDURE — 25000003 PHARM REV CODE 250: Performed by: INTERNAL MEDICINE

## 2017-04-06 PROCEDURE — 25000003 PHARM REV CODE 250: Performed by: PHYSICIAN ASSISTANT

## 2017-04-06 PROCEDURE — 20600001 HC STEP DOWN PRIVATE ROOM

## 2017-04-06 PROCEDURE — 97110 THERAPEUTIC EXERCISES: CPT

## 2017-04-06 PROCEDURE — 63600175 PHARM REV CODE 636 W HCPCS: Performed by: INTERNAL MEDICINE

## 2017-04-06 RX ADMIN — FUROSEMIDE 40 MG: 40 TABLET ORAL at 08:04

## 2017-04-06 RX ADMIN — OXYCODONE HYDROCHLORIDE 15 MG: 5 TABLET ORAL at 05:04

## 2017-04-06 RX ADMIN — FERROUS GLUCONATE TAB 324 MG (37.5 MG ELEMENTAL IRON) 324 MG: 324 (37.5 FE) TAB at 09:04

## 2017-04-06 RX ADMIN — HEPARIN SODIUM 5000 UNITS: 5000 INJECTION, SOLUTION INTRAVENOUS; SUBCUTANEOUS at 08:04

## 2017-04-06 RX ADMIN — INSULIN ASPART 15 UNITS: 100 INJECTION, SOLUTION INTRAVENOUS; SUBCUTANEOUS at 03:04

## 2017-04-06 RX ADMIN — AMOXICILLIN 500 MG: 500 CAPSULE ORAL at 08:04

## 2017-04-06 RX ADMIN — FUROSEMIDE 40 MG: 40 TABLET ORAL at 09:04

## 2017-04-06 RX ADMIN — SULFAMETHOXAZOLE AND TRIMETHOPRIM 1 TABLET: 400; 80 TABLET ORAL at 09:04

## 2017-04-06 RX ADMIN — TACROLIMUS 1 MG: 1 CAPSULE, GELATIN COATED ORAL at 06:04

## 2017-04-06 RX ADMIN — MYCOPHENOLATE MOFETIL 1000 MG: 250 CAPSULE ORAL at 08:04

## 2017-04-06 RX ADMIN — OXYCODONE HYDROCHLORIDE 40 MG: 10 TABLET, FILM COATED, EXTENDED RELEASE ORAL at 08:04

## 2017-04-06 RX ADMIN — INSULIN GLARGINE 20 UNITS: 100 INJECTION, SOLUTION SUBCUTANEOUS at 09:04

## 2017-04-06 RX ADMIN — HEPARIN SODIUM 5000 UNITS: 5000 INJECTION, SOLUTION INTRAVENOUS; SUBCUTANEOUS at 05:04

## 2017-04-06 RX ADMIN — VALGANCICLOVIR 900 MG: 450 TABLET, FILM COATED ORAL at 09:04

## 2017-04-06 RX ADMIN — INSULIN ASPART 2 UNITS: 100 INJECTION, SOLUTION INTRAVENOUS; SUBCUTANEOUS at 10:04

## 2017-04-06 RX ADMIN — Medication 800 MG: at 08:04

## 2017-04-06 RX ADMIN — OXYCODONE HYDROCHLORIDE 15 MG: 5 TABLET ORAL at 11:04

## 2017-04-06 RX ADMIN — STANDARDIZED SENNA CONCENTRATE AND DOCUSATE SODIUM 2 TABLET: 8.6; 5 TABLET, FILM COATED ORAL at 08:04

## 2017-04-06 RX ADMIN — PANTOPRAZOLE SODIUM 40 MG: 40 TABLET, DELAYED RELEASE ORAL at 09:04

## 2017-04-06 RX ADMIN — INSULIN ASPART 15 UNITS: 100 INJECTION, SOLUTION INTRAVENOUS; SUBCUTANEOUS at 10:04

## 2017-04-06 RX ADMIN — Medication 800 MG: at 05:04

## 2017-04-06 RX ADMIN — OXYCODONE HYDROCHLORIDE 40 MG: 10 TABLET, FILM COATED, EXTENDED RELEASE ORAL at 09:04

## 2017-04-06 RX ADMIN — AMLODIPINE BESYLATE 10 MG: 10 TABLET ORAL at 09:04

## 2017-04-06 RX ADMIN — STANDARDIZED SENNA CONCENTRATE AND DOCUSATE SODIUM 2 TABLET: 8.6; 5 TABLET, FILM COATED ORAL at 09:04

## 2017-04-06 RX ADMIN — PRAVASTATIN SODIUM 40 MG: 40 TABLET ORAL at 09:04

## 2017-04-06 RX ADMIN — TACROLIMUS 1 MG: 1 CAPSULE, GELATIN COATED ORAL at 09:04

## 2017-04-06 RX ADMIN — AMOXICILLIN 500 MG: 500 CAPSULE ORAL at 05:04

## 2017-04-06 RX ADMIN — Medication 800 MG: at 01:04

## 2017-04-06 RX ADMIN — MYCOPHENOLATE MOFETIL 1000 MG: 250 CAPSULE ORAL at 09:04

## 2017-04-06 RX ADMIN — PREDNISONE 5 MG: 5 TABLET ORAL at 09:04

## 2017-04-06 RX ADMIN — AMOXICILLIN 500 MG: 500 CAPSULE ORAL at 01:04

## 2017-04-06 RX ADMIN — INSULIN ASPART 1 UNITS: 100 INJECTION, SOLUTION INTRAVENOUS; SUBCUTANEOUS at 08:04

## 2017-04-06 RX ADMIN — BISACODYL 10 MG: 10 SUPPOSITORY RECTAL at 03:04

## 2017-04-06 RX ADMIN — HEPARIN SODIUM 5000 UNITS: 5000 INJECTION, SOLUTION INTRAVENOUS; SUBCUTANEOUS at 01:04

## 2017-04-06 NOTE — PT/OT/SLP PROGRESS
Physical Therapy  Treatment    Mick Barriga   MRN: [id number]   Admitting Diagnosis: Lymphocele after surgical procedure    PT Received On: 17  PT Start Time: 1353     PT Stop Time: 1412    PT Total Time (min): 19 min       Billable Minutes:  Therapeutic Exercise 17    Treatment Type: Treatment  PT/PTA: PT             General Precautions: Standard, fall  Orthopedic Precautions: N/A   Braces: N/A         Subjective:  Communicated with nursing prior to session.      Pain Ratin/10  Location - Side: Right     Location: groin          Objective:   Patient found with: telemetry, peripheral IV, wound vac, DARIN drain    Functional Mobility:  Bed Mobility:        Transfers:       Gait:          Balance:   Static Sit: POOR: Needs MODERATE assist to maintain  Dynamic Sit: POOR: N/A  Static Stand: 0: Needs MAXIMAL assist to maintain   Dynamic stand: 0: N/A     Therapeutic Activities and Exercises:  All therex perf in bed, in supine:   Ankle pumps: 25 reps, perf B  R hip IR/ER: 20 reps with Rosa M to initiate to isolate hip movement  L SLR: 12 reps  R SLR: 10 reps with maxA, pt able to initiate movement each rep  L hip Abd/Add: 15 reps in air  R hip Abd/Add: with Rosa M, on bed  R quad sets: 10 reps with tactile cuing     AM-PAC 6 CLICK MOBILITY  How much help from another person does this patient currently need?   1 = Unable, Total/Dependent Assistance  2 = A lot, Maximum/Moderate Assistance  3 = A little, Minimum/Contact Guard/Supervision  4 = None, Modified [place]/Independent    Turning over in bed (including adjusting bedclothes, sheets and blankets)?: 4  Sitting down on and standing up from a chair with arms (e.g., wheelchair, bedside commode, etc.): 1  Moving from lying on back to sitting on the side of the bed?: 1  Moving to and from a bed to a chair (including a wheelchair)?: 2  Need to walk in hospital room?: 2  Climbing 3-5 steps with a railing?: 1  Total Score: 11    AM-PAC Raw Score CMS G-Code Modifier  Level of Impairment Assistance   6 % Total / Unable   7 - 9 CM 80 - 100% Maximal Assist   10 - 14 CL 60 - 80% Moderate Assist   15 - 19 CK 40 - 60% Moderate Assist   20 - 22 CJ 20 - 40% Minimal Assist   23 CI 1-20% SBA / CGA   24 CH 0% Independent/ Mod I     Patient left / from supine towards R SL, propped with pillows, with all lines intact, call button in reach and nursing notified.    Assessment:  Mick Barriga is a 56 y.o. male with a medical diagnosis of Lymphocele after surgical procedure and presents with impairments in functional mobility sec to pain.  Pt has been in the bed for over a week, which has adversely affected the patients LE mm strength.  Also, pt has decreased use of R LE sec to non-healing wound, which has caused significant weakness.  Pt stated that he may be willing to get out of bed in the next tx session.  Pt requires skilled PT services to address the below impairments and to facilitate a return to walking.      Rehab identified problem list/impairments: Rehab identified problem list/impairments: weakness, pain, edema, impaired skin    Rehab potential is fair.    Activity tolerance: Poor    Discharge recommendations: Discharge Facility/Level Of Care Needs: nursing facility, skilled     Barriers to discharge: Barriers to Discharge: Decreased caregiver support    Equipment recommendations: Equipment Needed After Discharge: bedside commode     GOALS:   Physical Therapy Goals        Problem: Physical Therapy Goal    Goal Priority Disciplines Outcome Goal Variances Interventions   Physical Therapy Goal     PT/OT, PT Ongoing (interventions implemented as appropriate)     Description:  Goals to be met by: 4/10/2017     Patient will increase functional independence with mobility by performin. Supine to sit with Set-up [place]  2. Sit to stand transfer with Supervision  3. Bed to chair transfer with Stand-by Assistance using Rolling Walker  4. Gait  x 100 feet with Contact  Guard Assistance using Rolling Walker.   5. Lower extremity exercise program x15 reps per handout, with supervision                PLAN:    Patient to be seen 5 x/week  to address the above listed problems via gait training, therapeutic activities, therapeutic exercises, neuromuscular re-education  Plan of Care expires: 05/03/17  Plan of Care reviewed with: patient         Mary Reidin, PT  04/06/2017

## 2017-04-06 NOTE — PLAN OF CARE
Problem: Physical Therapy Goal  Goal: Physical Therapy Goal  Goals to be met by: 4/10/2017     Patient will increase functional independence with mobility by performin. Supine to sit with Set-up [place]  2. Sit to stand transfer with Supervision  3. Bed to chair transfer with Stand-by Assistance using Rolling Walker  4. Gait x 100 feet with Contact Guard Assistance using Rolling Walker.   5. Lower extremity exercise program x15 reps per handout, with supervision   Outcome: Ongoing (interventions implemented as appropriate)  Working towards independence with HEP in bed.

## 2017-04-06 NOTE — PLAN OF CARE
Problem: Patient Care Overview  Goal: Plan of Care Review  Outcome: Ongoing (interventions implemented as appropriate)   Bilateral heel DTI - paint daily with betadine   RLQ LVAD site - change dressing every night   Right groin wound - vac changed today by WC, scant output   CBG ACHS    - 15 units Novolog with meals + SSI    - 20 units Lantus BID   Patient worked with PT today, states he will sit up at edge of bed tomorrow   Turn Q2H with assist   Bed low & locked, call light in reach, non slip socks in use, calls for assistance   Pain better controlled now per patient (15 mg Oxy IR Q6H, 40 mg Oxy long acting BID)

## 2017-04-06 NOTE — PROGRESS NOTES
Progress Note  Heart Transplant Service    Admit Date: 3/28/2017   LOS: 9 days     SUBJECTIVE:     Follow up for: Lymphocele after surgical procedure    Interval History: Patient reports pain is better controlled.  He plans to get up with PT/OT today     Scheduled Meds:   amlodipine  10 mg Oral Daily    amoxicillin  500 mg Oral Q8H    ergocalciferol  50,000 Units Oral Q7 Days    ferrous gluconate  324 mg Oral Daily with breakfast    furosemide  40 mg Oral BID    heparin (porcine)  5,000 Units Subcutaneous Q8H    HYDROmorphone  0.5 mg Intravenous Once    insulin aspart  15 Units Subcutaneous TIDWM    insulin glargine  20 Units Subcutaneous Daily    magnesium oxide  800 mg Oral TID    mycophenolate  1,000 mg Oral BID    oxycodone  40 mg Oral Q12H    pantoprazole  40 mg Oral Daily    pravastatin  40 mg Oral Daily    predniSONE  5 mg Oral Daily    senna-docusate 8.6-50 mg  2 tablet Oral BID    sulfamethoxazole-trimethoprim 400-80mg  1 tablet Oral Daily    tacrolimus  1 mg Oral BID    valganciclovir  900 mg Oral Daily     Continuous Infusions:   sodium chloride 0.9%       PRN Meds:bisacodyl, dextrose 50%, dextrose 50%, glucagon (human recombinant), glucose, glucose, insulin aspart, naloxone, oxycodone    Review of patient's allergies indicates:   Allergen Reactions    Levemir [insulin detemir] Itching    Pork/porcine containing products     Ranexa [ranolazine] Nausea Only       OBJECTIVE:     Vital Signs (Most Recent)  Temp: 98.1 °F (36.7 °C) (04/06/17 0501)  Pulse: 80 (04/06/17 0700)  Resp: 16 (04/06/17 0501)  BP: (!) 140/78 (04/06/17 0501)  SpO2: 97 % (04/06/17 0501)    Vital Signs Range (Last 24H):  Temp:  [98 °F (36.7 °C)-98.3 °F (36.8 °C)]   Pulse:  [80-92]   Resp:  [16-18]   BP: (117-140)/(65-78)   SpO2:  [95 %-97 %]     I & O (Last 24H):  Intake/Output Summary (Last 24 hours) at 04/06/17 0730  Last data filed at 04/06/17 0500   Gross per 24 hour   Intake             1630 ml   Output              4465 ml   Net            -2835 ml          Constitutional: laying in bed NAD  Neck: JVD present.   Cardiovascular: Normal rate, regular rhythm and intact distal pulses.  No murmurs heard.   Pulmonary/Chest: CTA  Abdominal: + BS, exhibits no distension. There is no tenderness. There is no rebound.   Extremities: no cyanosis, clubbing or edema.  Groin wound in wound vac       Labs:       Recent Labs  Lab 04/03/17  0456 04/04/17  0400 04/05/17  0550 04/06/17  0524   WBC 7.86 7.35 7.65 9.00   HGB 9.5* 9.7* 10.0* 10.3*   HCT 29.6* 30.1* 30.5* 31.4*    289 290  --    LYMPH 20.0  1.6 21.8  1.6 24.2  1.9  --    MONO 5.3  0.4 5.4  0.4 5.5  0.4  --    EOSINOPHIL 1.9 2.0 1.6  --        No results for input(s): APTT, INR, PTT in the last 168 hours.     Recent Labs  Lab 04/04/17  0400 04/05/17  0550 04/06/17  0524   * 159* 226*   CALCIUM 8.8 9.0 9.1   ALBUMIN 2.5* 2.6* 2.8*   PROT 6.2 5.9* 6.1    137 135*   K 5.2* 4.2 4.3   CO2 27 27 29    102 97   BUN 13 13 17   CREATININE 0.8 0.9 1.1   ALKPHOS 56 58 65   ALT 12 9* 12   AST 37 13 15   BILITOT 0.4 0.4 0.5   MG 1.4* 1.4* 2.0     Estimated Creatinine Clearance: 84.5 mL/min (based on Cr of 1.1).    No results for input(s): BNP, BNPTRIAGEBLO in the last 168 hours.    No results for input(s): LDH in the last 168 hours.    Microbiology Results (last 7 days)     Procedure Component Value Units Date/Time    Culture, Anaerobe [[id number]] Collected:  03/30/17 1414    Order Status:  Completed Specimen:  Wound from Groin Updated:  04/05/17 1103     Anaerobic Culture --     ANAEROCOCCUS VAGINALIS  Few      Narrative:       Groin wound    Aerobic culture [[id number]] Collected:  04/02/17 1521    Order Status:  Completed Specimen:  Wound from Abdomen Updated:  04/04/17 1020     Aerobic Bacterial Culture Skin geo,  no predominant organism    Fungus culture [[id number]] Collected:  03/30/17 1414    Order Status:  Completed Specimen:  Wound from Groin Updated:   04/03/17 0940     Fungus (Mycology) Culture Culture in progress    Narrative:       Groin wound    Blood culture [[id number]] Collected:  03/28/17 1650    Order Status:  Completed Specimen:  Blood Updated:  04/02/17 2012     Blood Culture, Routine No growth after 5 days.    Blood culture [[id number]] Collected:  03/28/17 1651    Order Status:  Completed Specimen:  Blood Updated:  04/02/17 2012     Blood Culture, Routine No growth after 5 days.    Aerobic culture [[id number]]  (Susceptibility) Collected:  03/30/17 1414    Order Status:  Completed Specimen:  Wound from Groin Updated:  04/02/17 0707     Aerobic Bacterial Culture --     ENTEROCOCCUS FAECALIS  Few      Narrative:       Groin wound    Aerobic culture [[id number]]  (Susceptibility) Collected:  03/28/17 1732    Order Status:  Completed Specimen:  Skin from Groin Updated:  03/31/17 1321     Aerobic Bacterial Culture --     ENTEROCOCCUS FAECALIS  Moderate       Aerobic Bacterial Culture --     STREPTOCOCCUS MITIS/ORALIS   Moderate      Narrative:       Culture lymphocele site    Gram stain [[id number]] Collected:  03/30/17 1414    Order Status:  Completed Specimen:  Wound from Groin Updated:  03/30/17 2203     Gram Stain Result Few WBC's      Few Gram positive cocci    Narrative:       Groin wound            ASSESSMENT:     55 yo M with PMHx of NICM s/p HM II (8/24/16), now s/p OHTx 2/9/17 who is admitted secondary to lymphocele foul smell/draining more.     PLAN:     S/P Orthotopic Heart Transplantation 2/9/17  -CMV D-/R+ Continue valcyte  immunosuppression with  mg BID, [place]ro 1/1, Pred 5 mg QD  -Tacro level stable     Right Groin Lymphocele  S/P I &D R groin & Rectus Femoris Muscle Flap   -Wound vac placed  -Wound care following  -Wound cultures grew Enterococcus Faecalis sensitive to ampicillin  -ID consulted, recommend transition to PO amoxicillin. Changed to PO 4/4/17  -Pain control  -DARIN drain per surgery; per note by plastics, drain can be left in if  discharged and he needs 1 week f/u (4/4/17 note)     HTN  -Continue amlodipine 10 mg     DM2  -Continue insulin aspart, insulin glargine     HLD  -Continue pravastatin     DLES Drainage  -Wound care following     Constipation  -Improving, continue bowel regimen     Post-Operative Pain  -Increase long acting oxycodone to 40 mg BID, and increased oxycodone dosing for breakthrough    Laura Beaulieu PA-C  [facility] spectralink: [phone]

## 2017-04-06 NOTE — PROGRESS NOTES
D/C PLANNING UPDATE    [facility]ph[facility] [phone], f: [phone]) yesterday.  [facility] and will be delivered to pt's room by 6pm today.  ANAND notified pt's bedside nurse of delivery today.  ANAND following and remains available.

## 2017-04-06 NOTE — PROGRESS NOTES
Follow up visit for vac dressing change to right groin wound         04/06/17 1145       Incision/Site 02/19/17 1950 Right groin transverse   Date First Assessed/Time First Assessed: 02/19/17 1950   Present Prior to Hospital Arrival?: No  Side: Right  Location: groin  Incision Type: transverse  Closure Method: other (see comments)  Additional Comments: no closure method noted   Incision WDL ex   Dressing Appearance dry;intact   Appearance moist;granulating  (red , hypergranular )   Periwound Area macerated   Drainage Characteristics/Odor sanguineous   Drainage Amount small   Wound Length (cm) 5.3   Wound Width (cm) 1.8   Depth (cm) 0.3  (at medial aspect)   Wound Edges open   Cleansed W/ sterile normal saline   Irrigated W/ sterile normal saline   Interventions liquid bandage applied   Therapy Setting continuous therapy   Pressure Setting 125 mmHg   Negative Pressure Wound Therapy Dressing foam, white;foam, black   Black Sponges Inserted 1   White Sponges Inserted 1   Black Sponges Removed 1   White Sponges Removed 1   Dressing Change Due 04/10/17       Incision/Site 09/21/16 1317 Right abdomen midline   Date First Assessed/Time First Assessed: 09/21/16 1317   Present Prior to Hospital Arrival?: No  Side: Right  Location: abdomen  Orientation: midline  Closure Method: sutures   Incision WDL ex   Dressing Appearance dry;intact   Appearance moist;pink;granulating   Periwound Area normal skin tone   Drainage Characteristics/Odor serous   Drainage Amount scant   Wound Length (cm) 1.8   Wound Width (cm) 4   Depth (cm) 1.8   Wound Edges open   Cleansed W/ sterile normal saline   Irrigated W/ sterile normal saline   Dressing gauze, wet-to-dry;telfa island dressing     Have bordered wound edges with drape to prevent suction trauma .  Wound has some areas that are hypergranular. Medial edge of wound very macerated.   Plan next dressing change on 4/10/17  Isaiah Reyes RN,CWON  [id number]

## 2017-04-06 NOTE — PLAN OF CARE
Problem: Patient Care Overview  Goal: Plan of Care Review  Outcome: Ongoing (interventions implemented as appropriate)  Pt AAO. VSS, see flowsheet for further assessment data.     Telemetry monitoring in progress; NSR, HR 81-92.     ACX AC/HS. Last .     Mg given X 1. Repeat labs scheduled for the AM.    Suppository given X 1; pt c/o of constipation. Last BM documented on 4/4.    Pt chairfast but able to turn independently. No new skin breakdown noted. Dressings on VAD site and groin drain remain CDI. Wound vac remains patent, set to continuous suction.; wound vac to be changed 4/6. 0 mls drained overnight.    Fall precautions in place. Pt remains free of injury at this time. Daily labs monitored. No acute events overnight.

## 2017-04-07 LAB
ALBUMIN SERPL BCP-MCNC: 2.6 G/DL
ALP SERPL-CCNC: 66 U/L
ALT SERPL W/O P-5'-P-CCNC: 11 U/L
ANION GAP SERPL CALC-SCNC: 10 MMOL/L
ANISOCYTOSIS BLD QL SMEAR: SLIGHT
AST SERPL-CCNC: 13 U/L
BASOPHILS # BLD AUTO: ABNORMAL K/UL
BASOPHILS NFR BLD: 1 %
BILIRUB SERPL-MCNC: 0.3 MG/DL
BUN SERPL-MCNC: 19 MG/DL
CALCIUM SERPL-MCNC: 9.1 MG/DL
CHLORIDE SERPL-SCNC: 98 MMOL/L
CO2 SERPL-SCNC: 29 MMOL/L
CREAT SERPL-MCNC: 1 MG/DL
DIFFERENTIAL METHOD: ABNORMAL
EOSINOPHIL # BLD AUTO: ABNORMAL K/UL
EOSINOPHIL NFR BLD: 4 %
ERYTHROCYTE [DISTWIDTH] IN BLOOD BY AUTOMATED COUNT: 18.9 %
EST. GFR  (AFRICAN AMERICAN): >60 ML/MIN/1.73 M^2
EST. GFR  (NON AFRICAN AMERICAN): >60 ML/MIN/1.73 M^2
GLUCOSE SERPL-MCNC: 223 MG/DL
HCT VFR BLD AUTO: 30.8 %
HGB BLD-MCNC: 10.1 G/DL
LYMPHOCYTES # BLD AUTO: ABNORMAL K/UL
LYMPHOCYTES NFR BLD: 17 %
MAGNESIUM SERPL-MCNC: 1.3 MG/DL
MCH RBC QN AUTO: 20.5 PG
MCHC RBC AUTO-ENTMCNC: 32.8 %
MCV RBC AUTO: 63 FL
MONOCYTES # BLD AUTO: ABNORMAL K/UL
MONOCYTES NFR BLD: 3 %
MYELOCYTES NFR BLD MANUAL: 1 %
NEUTROPHILS NFR BLD: 73 %
NEUTS BAND NFR BLD MANUAL: 1 %
PLATELET # BLD AUTO: 273 K/UL
PLATELET BLD QL SMEAR: ABNORMAL
PMV BLD AUTO: ABNORMAL FL
POCT GLUCOSE: 104 MG/DL (ref 70–110)
POCT GLUCOSE: 125 MG/DL (ref 70–110)
POCT GLUCOSE: 199 MG/DL (ref 70–110)
POCT GLUCOSE: 212 MG/DL (ref 70–110)
POCT GLUCOSE: 265 MG/DL (ref 70–110)
POTASSIUM SERPL-SCNC: 4.5 MMOL/L
PROT SERPL-MCNC: 5.9 G/DL
RBC # BLD AUTO: 4.93 M/UL
SODIUM SERPL-SCNC: 137 MMOL/L
TACROLIMUS BLD-MCNC: 10.3 NG/ML
WBC # BLD AUTO: 7.6 K/UL

## 2017-04-07 PROCEDURE — 97110 THERAPEUTIC EXERCISES: CPT

## 2017-04-07 PROCEDURE — 97116 GAIT TRAINING THERAPY: CPT

## 2017-04-07 PROCEDURE — 99232 SBSQ HOSP IP/OBS MODERATE 35: CPT | Mod: ,,, | Performed by: INTERNAL MEDICINE

## 2017-04-07 PROCEDURE — 80197 ASSAY OF TACROLIMUS: CPT

## 2017-04-07 PROCEDURE — 63600175 PHARM REV CODE 636 W HCPCS: Performed by: INTERNAL MEDICINE

## 2017-04-07 PROCEDURE — 63600175 PHARM REV CODE 636 W HCPCS: Performed by: NURSE PRACTITIONER

## 2017-04-07 PROCEDURE — 25000003 PHARM REV CODE 250: Performed by: INTERNAL MEDICINE

## 2017-04-07 PROCEDURE — 83735 ASSAY OF MAGNESIUM: CPT

## 2017-04-07 PROCEDURE — 25000003 PHARM REV CODE 250: Performed by: PHYSICIAN ASSISTANT

## 2017-04-07 PROCEDURE — 25000003 PHARM REV CODE 250: Performed by: NURSE PRACTITIONER

## 2017-04-07 PROCEDURE — G8980 MOBILITY D/C STATUS: HCPCS | Mod: CK

## 2017-04-07 PROCEDURE — 85027 COMPLETE CBC AUTOMATED: CPT

## 2017-04-07 PROCEDURE — 20600001 HC STEP DOWN PRIVATE ROOM

## 2017-04-07 PROCEDURE — 85007 BL SMEAR W/DIFF WBC COUNT: CPT

## 2017-04-07 PROCEDURE — 80053 COMPREHEN METABOLIC PANEL: CPT

## 2017-04-07 RX ADMIN — OXYCODONE HYDROCHLORIDE 15 MG: 5 TABLET ORAL at 11:04

## 2017-04-07 RX ADMIN — TACROLIMUS 1 MG: 1 CAPSULE, GELATIN COATED ORAL at 05:04

## 2017-04-07 RX ADMIN — MYCOPHENOLATE MOFETIL 1000 MG: 250 CAPSULE ORAL at 08:04

## 2017-04-07 RX ADMIN — HEPARIN SODIUM 5000 UNITS: 5000 INJECTION, SOLUTION INTRAVENOUS; SUBCUTANEOUS at 08:04

## 2017-04-07 RX ADMIN — AMOXICILLIN 500 MG: 500 CAPSULE ORAL at 01:04

## 2017-04-07 RX ADMIN — AMLODIPINE BESYLATE 10 MG: 10 TABLET ORAL at 08:04

## 2017-04-07 RX ADMIN — PANTOPRAZOLE SODIUM 40 MG: 40 TABLET, DELAYED RELEASE ORAL at 08:04

## 2017-04-07 RX ADMIN — OXYCODONE HYDROCHLORIDE 15 MG: 5 TABLET ORAL at 05:04

## 2017-04-07 RX ADMIN — AMOXICILLIN 500 MG: 500 CAPSULE ORAL at 05:04

## 2017-04-07 RX ADMIN — BISACODYL 10 MG: 10 SUPPOSITORY RECTAL at 11:04

## 2017-04-07 RX ADMIN — AMOXICILLIN 500 MG: 500 CAPSULE ORAL at 08:04

## 2017-04-07 RX ADMIN — INSULIN ASPART 15 UNITS: 100 INJECTION, SOLUTION INTRAVENOUS; SUBCUTANEOUS at 09:04

## 2017-04-07 RX ADMIN — Medication 800 MG: at 01:04

## 2017-04-07 RX ADMIN — OXYCODONE HYDROCHLORIDE 40 MG: 10 TABLET, FILM COATED, EXTENDED RELEASE ORAL at 08:04

## 2017-04-07 RX ADMIN — HEPARIN SODIUM 5000 UNITS: 5000 INJECTION, SOLUTION INTRAVENOUS; SUBCUTANEOUS at 05:04

## 2017-04-07 RX ADMIN — PREDNISONE 5 MG: 5 TABLET ORAL at 08:04

## 2017-04-07 RX ADMIN — STANDARDIZED SENNA CONCENTRATE AND DOCUSATE SODIUM 2 TABLET: 8.6; 5 TABLET, FILM COATED ORAL at 08:04

## 2017-04-07 RX ADMIN — INSULIN ASPART 2 UNITS: 100 INJECTION, SOLUTION INTRAVENOUS; SUBCUTANEOUS at 01:04

## 2017-04-07 RX ADMIN — FERROUS GLUCONATE TAB 324 MG (37.5 MG ELEMENTAL IRON) 324 MG: 324 (37.5 FE) TAB at 09:04

## 2017-04-07 RX ADMIN — SULFAMETHOXAZOLE AND TRIMETHOPRIM 1 TABLET: 400; 80 TABLET ORAL at 08:04

## 2017-04-07 RX ADMIN — FUROSEMIDE 40 MG: 40 TABLET ORAL at 08:04

## 2017-04-07 RX ADMIN — INSULIN GLARGINE 20 UNITS: 100 INJECTION, SOLUTION SUBCUTANEOUS at 09:04

## 2017-04-07 RX ADMIN — PRAVASTATIN SODIUM 40 MG: 40 TABLET ORAL at 08:04

## 2017-04-07 RX ADMIN — HEPARIN SODIUM 5000 UNITS: 5000 INJECTION, SOLUTION INTRAVENOUS; SUBCUTANEOUS at 01:04

## 2017-04-07 RX ADMIN — VALGANCICLOVIR 900 MG: 450 TABLET, FILM COATED ORAL at 08:04

## 2017-04-07 RX ADMIN — Medication 800 MG: at 08:04

## 2017-04-07 RX ADMIN — INSULIN ASPART 15 UNITS: 100 INJECTION, SOLUTION INTRAVENOUS; SUBCUTANEOUS at 01:04

## 2017-04-07 RX ADMIN — TACROLIMUS 1 MG: 1 CAPSULE, GELATIN COATED ORAL at 08:04

## 2017-04-07 RX ADMIN — INSULIN ASPART 3 UNITS: 100 INJECTION, SOLUTION INTRAVENOUS; SUBCUTANEOUS at 09:04

## 2017-04-07 RX ADMIN — Medication 800 MG: at 05:04

## 2017-04-07 NOTE — PT/OT/SLP PROGRESS
Physical Therapy  Treatment    Mick Barriga   MRN: [id number]   Admitting Diagnosis: Lymphocele after surgical procedure    PT Received On: 17  PT Start Time: 1511     PT Stop Time: 1534    PT Total Time (min): 23 min       Billable Minutes:  Gait Training 13 and Therapeutic Exercise 10    Treatment Type: Treatment  PT/PTA: PT             General Precautions: Standard, fall  Orthopedic Precautions: N/A   Braces: N/A         Subjective:  Communicated with nursing prior to session.      Pain Ratin/10 (following gait training)  Location - Side: Right     Location: leg  Pain Addressed: Pre-medicate for activity, Reposition, Distraction, Cessation of Activity       Objective:   Patient found with: peripheral IV, telemetry, wound vac, DARIN drain    Functional Mobility:  Bed Mobility:   Scooting/Bridging: Independent  Supine to Sit: Independent    Transfers:  Sit <> Stand Assistance: Supervision  Sit <> Stand Assistive Device: No Assistive Device    Gait:   Gait Distance: Pt amb 42' in the room, dec WBing on R LE, inc WBing through UEs on RW  Assistance 1: Minimum assistance  Gait Assistive Device: Rolling walker  Gait Pattern: swing-to gait  Gait Deviation(s): decreased amelia, increased time in double stance, decreased step length, decreased stride length, forward lean, decreased weight-shifting ability      Balance:   Static Sit: NORMAL: No deviations seen in posture held statically  Dynamic Sit: NORMAL: No deviations seen in posture held dynamically  Static Stand: POOR+: Needs MINIMAL assist to maintain  Dynamic stand: POOR: N/A     Therapeutic Activities and Exercises:  Ankle pumps: perf in bed 30 reps, in sit 30 reps  Marching in place: in stance with Rosa M with RW, 20 reps with fatigued noted  Lat/ant/post wt shifting: Rosa M with RW, 10 reps     AM-PAC 6 CLICK MOBILITY  How much help from another person does this patient currently need?   1 = Unable, Total/Dependent Assistance  2 = A lot, Maximum/Moderate  Assistance  3 = A little, Minimum/Contact Guard/Supervision  4 = None, Modified [place]/Independent    Turning over in bed (including adjusting bedclothes, sheets and blankets)?: 4  Sitting down on and standing up from a chair with arms (e.g., wheelchair, bedside commode, etc.): 4  Moving from lying on back to sitting on the side of the bed?: 4  Moving to and from a bed to a chair (including a wheelchair)?: 3  Need to walk in hospital room?: 3  Climbing 3-5 steps with a railing?: 1  Total Score: 19    AM-PAC Raw Score CMS G-Code Modifier Level of Impairment Assistance   6 % Total / Unable   7 - 9 CM 80 - 100% Maximal Assist   10 - 14 CL 60 - 80% Moderate Assist   15 - 19 CK 40 - 60% Moderate Assist   20 - 22 CJ 20 - 40% Minimal Assist   23 CI 1-20% SBA / CGA   24 CH 0% Independent/ Mod I     Patient left up in chair with all lines intact, call button in reach and spouse present.    Assessment:  Mick Barriga is a 56 y.o. male with a medical diagnosis of Lymphocele after surgical procedure and presents with pain and fear of adversely affecting R groin wound healing.  These two factors are causing the patient to remain in bed throughout the day.  However, today, the pt was able to amb in the room without significant increases in pain.  If pt is able to continue with PT POC and cont amb, will change discharge recommendation to home with PT services, SNF will not be necessary.  Consulted with nursing to provide pt with a RW in the hospital room.  PT will cont to benefit from skilled PT services to address impairments in gait.      Rehab identified problem list/impairments: Rehab identified problem list/impairments: weakness, impaired endurance, impaired balance, impaired self care skills, pain, impaired skin    Rehab potential is good.    Activity tolerance: Good    Discharge recommendations: Discharge Facility/Level Of Care Needs: nursing facility, skilled     Barriers to discharge: Barriers to  Discharge: Decreased caregiver support    Equipment recommendations: Equipment Needed After Discharge: bedside commode     GOALS:   Physical Therapy Goals        Problem: Physical Therapy Goal    Goal Priority Disciplines Outcome Goal Variances Interventions   Physical Therapy Goal     PT/OT, PT Ongoing (interventions implemented as appropriate)     Description:  Goals to be met by: 4/10/2017     Patient will increase functional independence with mobility by performin. Supine to sit with Set-up [place] - GOAL MET  2. Sit to stand transfer with Supervision - GOAL MET  3. Bed to chair transfer with Stand-by Assistance using Rolling Walker  4. Gait  x 100 feet with Contact Guard Assistance using Rolling Walker.   5. Lower extremity exercise program x15 reps per handout, with supervision                 PLAN:    Patient to be seen 5 x/week  to address the above listed problems via gait training, therapeutic activities, therapeutic exercises, neuromuscular re-education  Plan of Care expires: 17  Plan of Care reviewed with: patient, spouse         Mary Reidin, PT  2017

## 2017-04-07 NOTE — PROGRESS NOTES
UPDATE    SW to pt's room for update.  Pt presents as sitting in bedside chair with wife at bedside.  Pt and wife both present as aao x4, calm, cooperative, and asking and answering questions appropriately.  Pt reports pain has been better controlled past few days and he has been working with PT.  Pt reports home wound vac was delivered to hospital room yesterday; [employer] box with wound vac is at bedside.  Pt and wife both report coping adequately at this time, and deny any needs or concerns.  SW providing ongoing psychosocial and counseling support, education, resources, assistance, and discharge planning as indicated.  SW following and remains available.

## 2017-04-07 NOTE — PROGRESS NOTES
Progress Note  Heart Transplant Service    Admit Date: 3/28/2017   LOS: 10 days     SUBJECTIVE:     Follow up for: Lymphocele after surgical procedure    Interval History: Patient reports pain is better controlled.  Has no complaints today.  He wants to go home     Scheduled Meds:   amlodipine  10 mg Oral Daily    amoxicillin  500 mg Oral Q8H    ergocalciferol  50,000 Units Oral Q7 Days    ferrous gluconate  324 mg Oral Daily with breakfast    furosemide  40 mg Oral BID    heparin (porcine)  5,000 Units Subcutaneous Q8H    HYDROmorphone  0.5 mg Intravenous Once    insulin aspart  15 Units Subcutaneous TIDWM    insulin glargine  20 Units Subcutaneous Daily    magnesium oxide  800 mg Oral TID    mycophenolate  1,000 mg Oral BID    oxycodone  40 mg Oral Q12H    pantoprazole  40 mg Oral Daily    pravastatin  40 mg Oral Daily    predniSONE  5 mg Oral Daily    senna-docusate 8.6-50 mg  2 tablet Oral BID    sulfamethoxazole-trimethoprim 400-80mg  1 tablet Oral Daily    tacrolimus  1 mg Oral BID    valganciclovir  900 mg Oral Daily     Continuous Infusions:   sodium chloride 0.9%       PRN Meds:bisacodyl, dextrose 50%, dextrose 50%, glucagon (human recombinant), glucose, glucose, insulin aspart, naloxone, oxycodone    Review of patient's allergies indicates:   Allergen Reactions    Levemir [insulin detemir] Itching    Pork/porcine containing products     Ranexa [ranolazine] Nausea Only       OBJECTIVE:     Vital Signs (Most Recent)  Temp: 97.8 °F (36.6 °C) (04/07/17 0722)  Pulse: 84 (04/07/17 0722)  Resp: 17 (04/07/17 0722)  BP: 131/72 (04/07/17 0722)  SpO2: 95 % (04/07/17 0722)    Vital Signs Range (Last 24H):  Temp:  [97.8 °F (36.6 °C)-98.3 °F (36.8 °C)]   Pulse:  [79-94]   Resp:  [16-18]   BP: (128-150)/(70-80)   SpO2:  [95 %-98 %]     I & O (Last 24H):    Intake/Output Summary (Last 24 hours) at 04/07/17 0729  Last data filed at 04/07/17 0430   Gross per 24 hour   Intake             2080 ml    Output             4050 ml   Net            -1970 ml          Constitutional: laying in bed NAD  Neck: JVD present.   Cardiovascular: Normal rate, regular rhythm and intact distal pulses.  No murmurs heard.   Pulmonary/Chest: CTA  Abdominal: + BS, exhibits no distension. There is no tenderness. There is no rebound.   Extremities: no cyanosis, clubbing or edema.  Groin wound in wound vac       Labs:       Recent Labs  Lab 04/04/17  0400 04/05/17  0550 04/06/17  0524 04/07/17  0423   WBC 7.35 7.65 9.00 7.60   HGB 9.7* 10.0* 10.3* 10.1*   HCT 30.1* 30.5* 31.4* 30.8*    290 331  --    LYMPH 21.8  1.6 24.2  1.9 20.6  1.9  --    MONO 5.4  0.4 5.5  0.4 5.2  0.5  --    EOSINOPHIL 2.0 1.6 1.4  --        No results for input(s): APTT, INR, PTT in the last 168 hours.       Recent Labs  Lab 04/05/17  0550 04/06/17  0524 04/07/17  0423   * 226* 223*   CALCIUM 9.0 9.1 9.1   ALBUMIN 2.6* 2.8* 2.6*   PROT 5.9* 6.1 5.9*    135* 137   K 4.2 4.3 4.5   CO2 27 29 29    97 98   BUN 13 17 19   CREATININE 0.9 1.1 1.0   ALKPHOS 58 65 66   ALT 9* 12 11   AST 13 15 13   BILITOT 0.4 0.5 0.3   MG 1.4* 2.0 1.3*     Estimated Creatinine Clearance: 92.9 mL/min (based on Cr of 1).    No results for input(s): BNP, BNPTRIAGEBLO in the last 168 hours.    No results for input(s): LDH in the last 168 hours.    Microbiology Results (last 7 days)     Procedure Component Value Units Date/Time    Culture, Anaerobe [[id number]] Collected:  03/30/17 1414    Order Status:  Completed Specimen:  Wound from Groin Updated:  04/05/17 1103     Anaerobic Culture --     ANAEROCOCCUS VAGINALIS  Few      Narrative:       Groin wound    Aerobic culture [[id number]] Collected:  04/02/17 1521    Order Status:  Completed Specimen:  Wound from Abdomen Updated:  04/04/17 1020     Aerobic Bacterial Culture Skin geo,  no predominant organism    Fungus culture [[id number]] Collected:  03/30/17 1414    Order Status:  Completed Specimen:  Wound  from Groin Updated:  04/03/17 0940     Fungus (Mycology) Culture Culture in progress    Narrative:       Groin wound    Blood culture [[id number]] Collected:  03/28/17 1650    Order Status:  Completed Specimen:  Blood Updated:  04/02/17 2012     Blood Culture, Routine No growth after 5 days.    Blood culture [[id number]] Collected:  03/28/17 1651    Order Status:  Completed Specimen:  Blood Updated:  04/02/17 2012     Blood Culture, Routine No growth after 5 days.    Aerobic culture [[id number]]  (Susceptibility) Collected:  03/30/17 1414    Order Status:  Completed Specimen:  Wound from Groin Updated:  04/02/17 0707     Aerobic Bacterial Culture --     ENTEROCOCCUS FAECALIS  Few      Narrative:       Groin wound    Aerobic culture [[id number]]  (Susceptibility) Collected:  03/28/17 1732    Order Status:  Completed Specimen:  Skin from Groin Updated:  03/31/17 1321     Aerobic Bacterial Culture --     ENTEROCOCCUS FAECALIS  Moderate       Aerobic Bacterial Culture --     STREPTOCOCCUS MITIS/ORALIS   Moderate      Narrative:       Culture lymphocele site            ASSESSMENT:     55 yo M with PMHx of NICM s/p HM II (8/24/16), now s/p OHTx 2/9/17 who is admitted secondary to lymphocele foul smell/draining more.     PLAN:     S/P Orthotopic Heart Transplantation 2/9/17  -CMV D-/R+ Continue valcyte  immunosuppression with  mg BID, [place]ro 1/1, Pred 5 mg QD  -Tacro level stable     Right Groin Lymphocele  S/P I &D R groin & Rectus Femoris Muscle Flap   -Wound vac placed  -Wound care following  -Wound cultures grew Enterococcus Faecalis sensitive to ampicillin  -ID consulted, recommend transition to PO amoxicillin. Changed to PO 4/4/17  -Pain control  -DARIN drain per surgery; per note by plastics, drain can be left in if discharged and he needs 1 week f/u (4/4/17 note)     HTN  -Continue amlodipine 10 mg     DM2  -Continue insulin aspart, insulin glargine     HLD  -Continue pravastatin     DLES Drainage  -Wound care  following     Constipation  -Improving, continue bowel regimen     Post-Operative Pain  -Increase long acting oxycodone to 40 mg BID, and increased oxycodone dosing for breakthrough    Dispo  -PT/OT    Laura Beaulieu PA-C  [facility] spectralink: [phone]

## 2017-04-07 NOTE — PLAN OF CARE
Problem: Patient Care Overview  Goal: Plan of Care Review  Outcome: Ongoing (interventions implemented as appropriate)  Pt remained free from injury during shift. VSS. Wound vac remains on continuous suction setting. Putting out minimal output. Dressing remains CDI. Dressing to old LVAD site changed and packed per orders. DTI to heels painted with betadine. Pain to wound vac site controlled with scheduled oxycodone and PRN oxycodone. Pt requested a suppository. Had 1BM during shift. Appetite good. ACHS monitoring in place. Coverage + SSI needed at meal time. Pt worked with PT today. Walker in room. Wife remains attentive to pt and assisting pt with ambulating to chair and commode. Call light in reach. Will continue to monitor.

## 2017-04-07 NOTE — PROGRESS NOTES
Pt called RN into room. Pt noticed right leg to where drain removed yesterday slightly raised from this am. No redness or drainage noted. RN agreed that site appears more raised that earlier. RN instructed pt to keep leg elevated. Charge nurse WENCESLAO Sherman RN assessed leg. Pt requesting MD to bedside to look at leg. RN paged MD. Awaiting callback. Will continue to reach MD.

## 2017-04-07 NOTE — PLAN OF CARE
Problem: Physical Therapy Goal  Goal: Physical Therapy Goal  Goals to be met by: 4/10/2017     Patient will increase functional independence with mobility by performin. Supine to sit with Set-up [place] - GOAL MET  2. Sit to stand transfer with Supervision - GOAL MET  3. Bed to chair transfer with Stand-by Assistance using Rolling Walker  4. Gait x 100 feet with Contact Guard Assistance using Rolling Walker.   5. Lower extremity exercise program x15 reps per handout, with supervision   Outcome: Ongoing (interventions implemented as appropriate)  Pt achieved 2/5 goals today.

## 2017-04-08 LAB
ALBUMIN SERPL BCP-MCNC: 2.7 G/DL
ALP SERPL-CCNC: 68 U/L
ALT SERPL W/O P-5'-P-CCNC: 12 U/L
ANION GAP SERPL CALC-SCNC: 9 MMOL/L
ANISOCYTOSIS BLD QL SMEAR: SLIGHT
AST SERPL-CCNC: 16 U/L
BASOPHILS # BLD AUTO: 0.04 K/UL
BASOPHILS NFR BLD: 0.5 %
BILIRUB SERPL-MCNC: 0.3 MG/DL
BUN SERPL-MCNC: 16 MG/DL
CALCIUM SERPL-MCNC: 8.9 MG/DL
CHLORIDE SERPL-SCNC: 99 MMOL/L
CO2 SERPL-SCNC: 28 MMOL/L
CREAT SERPL-MCNC: 1 MG/DL
DIFFERENTIAL METHOD: ABNORMAL
EOSINOPHIL # BLD AUTO: 0.1 K/UL
EOSINOPHIL NFR BLD: 1.3 %
ERYTHROCYTE [DISTWIDTH] IN BLOOD BY AUTOMATED COUNT: 24.1 %
EST. GFR  (AFRICAN AMERICAN): >60 ML/MIN/1.73 M^2
EST. GFR  (NON AFRICAN AMERICAN): >60 ML/MIN/1.73 M^2
GLUCOSE SERPL-MCNC: 221 MG/DL
HCT VFR BLD AUTO: 32.9 %
HGB BLD-MCNC: 9.5 G/DL
HYPOCHROMIA BLD QL SMEAR: ABNORMAL
LYMPHOCYTES # BLD AUTO: 1.7 K/UL
LYMPHOCYTES NFR BLD: 21.4 %
MAGNESIUM SERPL-MCNC: 1.4 MG/DL
MCH RBC QN AUTO: 20 PG
MCHC RBC AUTO-ENTMCNC: 28.9 %
MCV RBC AUTO: 69 FL
MONOCYTES # BLD AUTO: 0.5 K/UL
MONOCYTES NFR BLD: 5.8 %
NEUTROPHILS # BLD AUTO: 5.6 K/UL
NEUTROPHILS NFR BLD: 71 %
OVALOCYTES BLD QL SMEAR: ABNORMAL
PLATELET # BLD AUTO: 323 K/UL
PLATELET BLD QL SMEAR: ABNORMAL
PMV BLD AUTO: ABNORMAL FL
POCT GLUCOSE: 130 MG/DL (ref 70–110)
POCT GLUCOSE: 178 MG/DL (ref 70–110)
POCT GLUCOSE: 207 MG/DL (ref 70–110)
POCT GLUCOSE: 282 MG/DL (ref 70–110)
POIKILOCYTOSIS BLD QL SMEAR: SLIGHT
POLYCHROMASIA BLD QL SMEAR: ABNORMAL
POTASSIUM SERPL-SCNC: 4.7 MMOL/L
PROT SERPL-MCNC: 6 G/DL
RBC # BLD AUTO: 4.76 M/UL
SCHISTOCYTES BLD QL SMEAR: PRESENT
SODIUM SERPL-SCNC: 136 MMOL/L
SPHEROCYTES BLD QL SMEAR: ABNORMAL
TACROLIMUS BLD-MCNC: 10.2 NG/ML
WBC # BLD AUTO: 8 K/UL

## 2017-04-08 PROCEDURE — 63600175 PHARM REV CODE 636 W HCPCS: Performed by: INTERNAL MEDICINE

## 2017-04-08 PROCEDURE — 25000003 PHARM REV CODE 250: Performed by: INTERNAL MEDICINE

## 2017-04-08 PROCEDURE — 25000003 PHARM REV CODE 250: Performed by: PHYSICIAN ASSISTANT

## 2017-04-08 PROCEDURE — 85025 COMPLETE CBC W/AUTO DIFF WBC: CPT

## 2017-04-08 PROCEDURE — 63600175 PHARM REV CODE 636 W HCPCS: Performed by: NURSE PRACTITIONER

## 2017-04-08 PROCEDURE — 20600001 HC STEP DOWN PRIVATE ROOM

## 2017-04-08 PROCEDURE — 83735 ASSAY OF MAGNESIUM: CPT

## 2017-04-08 PROCEDURE — 36415 COLL VENOUS BLD VENIPUNCTURE: CPT

## 2017-04-08 PROCEDURE — 25000003 PHARM REV CODE 250: Performed by: NURSE PRACTITIONER

## 2017-04-08 PROCEDURE — 25000003 PHARM REV CODE 250: Performed by: STUDENT IN AN ORGANIZED HEALTH CARE EDUCATION/TRAINING PROGRAM

## 2017-04-08 PROCEDURE — 99232 SBSQ HOSP IP/OBS MODERATE 35: CPT | Mod: ,,, | Performed by: INTERNAL MEDICINE

## 2017-04-08 PROCEDURE — 80053 COMPREHEN METABOLIC PANEL: CPT

## 2017-04-08 PROCEDURE — 80197 ASSAY OF TACROLIMUS: CPT

## 2017-04-08 RX ADMIN — TACROLIMUS 1 MG: 1 CAPSULE, GELATIN COATED ORAL at 05:04

## 2017-04-08 RX ADMIN — STANDARDIZED SENNA CONCENTRATE AND DOCUSATE SODIUM 2 TABLET: 8.6; 5 TABLET, FILM COATED ORAL at 08:04

## 2017-04-08 RX ADMIN — AMOXICILLIN 500 MG: 500 CAPSULE ORAL at 01:04

## 2017-04-08 RX ADMIN — INSULIN GLARGINE 20 UNITS: 100 INJECTION, SOLUTION SUBCUTANEOUS at 09:04

## 2017-04-08 RX ADMIN — PANTOPRAZOLE SODIUM 40 MG: 40 TABLET, DELAYED RELEASE ORAL at 08:04

## 2017-04-08 RX ADMIN — INSULIN ASPART 15 UNITS: 100 INJECTION, SOLUTION INTRAVENOUS; SUBCUTANEOUS at 06:04

## 2017-04-08 RX ADMIN — MYCOPHENOLATE MOFETIL 1000 MG: 250 CAPSULE ORAL at 08:04

## 2017-04-08 RX ADMIN — AMOXICILLIN 500 MG: 500 CAPSULE ORAL at 05:04

## 2017-04-08 RX ADMIN — PRAVASTATIN SODIUM 40 MG: 40 TABLET ORAL at 08:04

## 2017-04-08 RX ADMIN — FERROUS GLUCONATE TAB 324 MG (37.5 MG ELEMENTAL IRON) 324 MG: 324 (37.5 FE) TAB at 08:04

## 2017-04-08 RX ADMIN — Medication 800 MG: at 08:04

## 2017-04-08 RX ADMIN — PREDNISONE 5 MG: 5 TABLET ORAL at 08:04

## 2017-04-08 RX ADMIN — INSULIN ASPART 15 UNITS: 100 INJECTION, SOLUTION INTRAVENOUS; SUBCUTANEOUS at 01:04

## 2017-04-08 RX ADMIN — AMOXICILLIN 500 MG: 500 CAPSULE ORAL at 08:04

## 2017-04-08 RX ADMIN — FUROSEMIDE 40 MG: 40 TABLET ORAL at 08:04

## 2017-04-08 RX ADMIN — INSULIN ASPART 1 UNITS: 100 INJECTION, SOLUTION INTRAVENOUS; SUBCUTANEOUS at 10:04

## 2017-04-08 RX ADMIN — OXYCODONE HYDROCHLORIDE 40 MG: 10 TABLET, FILM COATED, EXTENDED RELEASE ORAL at 08:04

## 2017-04-08 RX ADMIN — HEPARIN SODIUM 5000 UNITS: 5000 INJECTION, SOLUTION INTRAVENOUS; SUBCUTANEOUS at 05:04

## 2017-04-08 RX ADMIN — BISACODYL 10 MG: 10 SUPPOSITORY RECTAL at 08:04

## 2017-04-08 RX ADMIN — AMLODIPINE BESYLATE 10 MG: 10 TABLET ORAL at 08:04

## 2017-04-08 RX ADMIN — OXYCODONE HYDROCHLORIDE 15 MG: 5 TABLET ORAL at 10:04

## 2017-04-08 RX ADMIN — OXYCODONE HYDROCHLORIDE 15 MG: 5 TABLET ORAL at 04:04

## 2017-04-08 RX ADMIN — OXYCODONE HYDROCHLORIDE 15 MG: 5 TABLET ORAL at 05:04

## 2017-04-08 RX ADMIN — HEPARIN SODIUM 5000 UNITS: 5000 INJECTION, SOLUTION INTRAVENOUS; SUBCUTANEOUS at 08:04

## 2017-04-08 RX ADMIN — VALGANCICLOVIR 900 MG: 450 TABLET, FILM COATED ORAL at 08:04

## 2017-04-08 RX ADMIN — INSULIN ASPART 3 UNITS: 100 INJECTION, SOLUTION INTRAVENOUS; SUBCUTANEOUS at 09:04

## 2017-04-08 RX ADMIN — SULFAMETHOXAZOLE AND TRIMETHOPRIM 1 TABLET: 400; 80 TABLET ORAL at 08:04

## 2017-04-08 RX ADMIN — INSULIN ASPART 15 UNITS: 100 INJECTION, SOLUTION INTRAVENOUS; SUBCUTANEOUS at 09:04

## 2017-04-08 RX ADMIN — TACROLIMUS 1 MG: 1 CAPSULE, GELATIN COATED ORAL at 08:04

## 2017-04-08 RX ADMIN — Medication 800 MG: at 05:04

## 2017-04-08 RX ADMIN — HEPARIN SODIUM 5000 UNITS: 5000 INJECTION, SOLUTION INTRAVENOUS; SUBCUTANEOUS at 01:04

## 2017-04-08 RX ADMIN — Medication 800 MG: at 01:04

## 2017-04-08 RX ADMIN — ERGOCALCIFEROL 50000 UNITS: 1.25 CAPSULE ORAL at 08:04

## 2017-04-08 NOTE — PLAN OF CARE
Problem: Patient Care Overview  Goal: Plan of Care Review  Outcome: Ongoing (interventions implemented as appropriate)  Pt has remained free from injury during shift. VSS. Pain controlled with 12hr 40mg oxycodone. Pt aware of Oxycodone for BTP. No requests have been made as of yet. Pt ambulating to chair with walker and minimal assistance. Wife attentive and assisting pt with ambulating. Plastic surgery assessed right leg/groin this am. Leg progressing as it should. Pt keeping leg elevated while in bed or chair. Wound vac in place to suction setting. Minimal output. Dressing to LVAD site changed and packed per orders. Heels painted with betadine. ACHS monitoring in place. Appetite good. Call light in reach. Will continue to monitor.

## 2017-04-08 NOTE — PROGRESS NOTES
Progress Note  Heart Transplant Service    Admit Date: 3/28/2017   LOS: 11 days     SUBJECTIVE:     Follow up for: Lymphocele after surgical procedure    Interval History: No issues overnight. No pain at the right groin.     Scheduled Meds:   amlodipine  10 mg Oral Daily    amoxicillin  500 mg Oral Q8H    ergocalciferol  50,000 Units Oral Q7 Days    ferrous gluconate  324 mg Oral Daily with breakfast    furosemide  40 mg Oral BID    heparin (porcine)  5,000 Units Subcutaneous Q8H    HYDROmorphone  0.5 mg Intravenous Once    insulin aspart  15 Units Subcutaneous TIDWM    insulin glargine  20 Units Subcutaneous Daily    magnesium oxide  800 mg Oral TID    mycophenolate  1,000 mg Oral BID    oxycodone  40 mg Oral Q12H    pantoprazole  40 mg Oral Daily    pravastatin  40 mg Oral Daily    predniSONE  5 mg Oral Daily    senna-docusate 8.6-50 mg  2 tablet Oral BID    sulfamethoxazole-trimethoprim 400-80mg  1 tablet Oral Daily    tacrolimus  1 mg Oral BID    valganciclovir  900 mg Oral Daily     Continuous Infusions:   sodium chloride 0.9%       PRN Meds:bisacodyl, dextrose 50%, dextrose 50%, glucagon (human recombinant), glucose, glucose, insulin aspart, naloxone, oxycodone    Review of patient's allergies indicates:   Allergen Reactions    Levemir [insulin detemir] Itching    Pork/porcine containing products     Ranexa [ranolazine] Nausea Only       OBJECTIVE:     Vital Signs (Most Recent)  Temp: 98.1 °F (36.7 °C) (04/08/17 1116)  Pulse: 88 (04/08/17 1116)  Resp: 16 (04/08/17 1116)  BP: (!) 144/64 (04/08/17 1116)  SpO2: 98 % (04/08/17 1116)    Vital Signs Range (Last 24H):  Temp:  [97.7 °F (36.5 °C)-98.2 °F (36.8 °C)]   Pulse:  [82-99]   Resp:  [16-18]   BP: (130-144)/(64-74)   SpO2:  [95 %-98 %]     I & O (Last 24H):    Intake/Output Summary (Last 24 hours) at 04/08/17 1154  Last data filed at 04/08/17 1000   Gross per 24 hour   Intake              920 ml   Output             2305 ml   Net             -1385 ml          Constitutional: laying in bed NAD  Neck: JVD present.   Cardiovascular: Normal rate, regular rhythm and intact distal pulses.  No murmurs heard.   Pulmonary/Chest: CTA  Abdominal: + BS, exhibits no distension. There is no tenderness. There is no rebound.   Extremities: no cyanosis, clubbing or edema.  Groin wound in wound vac WITH mild swelling without erythema or temp.       Labs:       Recent Labs  Lab 04/06/17 0524 04/07/17 0423 04/08/17  0350   WBC 9.00 7.60 8.00   HGB 10.3* 10.1* 9.5*   HCT 31.4* 30.8* 32.9*    273 323   LYMPH 20.6  1.9 17.0*  CANCELED 21.4  1.7   MONO 5.2  0.5 3.0*  CANCELED 5.8  0.5   EOSINOPHIL 1.4 4.0 1.3       No results for input(s): APTT, INR, PTT in the last 168 hours.       Recent Labs  Lab 04/06/17 0524 04/07/17 0423 04/08/17  0539   * 223* 221*   CALCIUM 9.1 9.1 8.9   ALBUMIN 2.8* 2.6* 2.7*   PROT 6.1 5.9* 6.0   * 137 136   K 4.3 4.5 4.7   CO2 29 29 28   CL 97 98 99   BUN 17 19 16   CREATININE 1.1 1.0 1.0   ALKPHOS 65 66 68   ALT 12 11 12   AST 15 13 16   BILITOT 0.5 0.3 0.3   MG 2.0 1.3* 1.4*     Estimated Creatinine Clearance: 93.5 mL/min (based on Cr of 1).    No results for input(s): BNP, BNPTRIAGEBLO in the last 168 hours.    No results for input(s): LDH in the last 168 hours.    Microbiology Results (last 7 days)     Procedure Component Value Units Date/Time    Culture, Anaerobe [[id number]] Collected:  03/30/17 1414    Order Status:  Completed Specimen:  Wound from Groin Updated:  04/05/17 1103     Anaerobic Culture --     ANAEROCOCCUS VAGINALIS  Few      Narrative:       Groin wound    Aerobic culture [[id number]] Collected:  04/02/17 1521    Order Status:  Completed Specimen:  Wound from Abdomen Updated:  04/04/17 1020     Aerobic Bacterial Culture Skin geo,  no predominant organism    Fungus culture [[id number]] Collected:  03/30/17 1414    Order Status:  Completed Specimen:  Wound from Groin Updated:  04/03/17 0921      Fungus (Mycology) Culture Culture in progress    Narrative:       Groin wound    Blood culture [[id number]] Collected:  03/28/17 1650    Order Status:  Completed Specimen:  Blood Updated:  04/02/17 2012     Blood Culture, Routine No growth after 5 days.    Blood culture [[id number]] Collected:  03/28/17 1651    Order Status:  Completed Specimen:  Blood Updated:  04/02/17 2012     Blood Culture, Routine No growth after 5 days.    Aerobic culture [[id number]]  (Susceptibility) Collected:  03/30/17 1414    Order Status:  Completed Specimen:  Wound from Groin Updated:  04/02/17 0707     Aerobic Bacterial Culture --     ENTEROCOCCUS FAECALIS  Few      Narrative:       Groin wound            ASSESSMENT:     57 yo M with PMHx of NICM s/p HM II (8/24/16), now s/p OHTx 2/9/17 who is admitted secondary to lymphocele foul smell/draining more.     PLAN:     S/P Orthotopic Heart Transplantation 2/9/17  -CMV D-/R+ Continue valcyte  immunosuppression with  mg BID, Tacro 1/1, Pred 5 mg QD  -Tacro level stable     Right Groin Lymphocele  S/P I &D R groin & Rectus Femoris Muscle Flap   -Wound vac placed; wound care following  -Wound cultures positive for nterococcus Faecalis sensitive to ampicillin  -ID consulted, recommend transition to PO amoxicillin. Changed to PO 4/4/17  -Pain control  -DARIN drain per surgery; per note by plastics, drain can be left in if discharged and he needs 1 week f/u (4/4/17 note).  - Mild swelling this AM WITH out erythema at Rt groin site.   Plan to mobilize and reassess.      HTN  -Continue amlodipine 10 mg     DM2  -Continue insulin aspart, insulin glargine     HLD  -Continue pravastatin     DLES Drainage  -Wound care following     Constipation  -Improving, continue bowel regimen     Post-Operative Pain  -Increase long acting oxycodone to 40 mg BID, and increased oxycodone dosing for breakthrough    Dispo  -PT/OT

## 2017-04-08 NOTE — PROGRESS NOTES
- 4/7/17 2000 Pt was found to have already eaten entire dinner tray without having BG checked.  - Pt's BG was 125 at 1807; tray was delayed approximately another hour and pt ate tray without calling to have his BG re-checked with this meal.  - Consulted charge nurse as pt was due to receive 15 Units of insulin aspart; was advised to wait until bedtime BG check and then cover with sliding scale, if needed. Will continue to monitor.    Addendum 2230: Pt's bedtime BG was 104. No sliding scale insulin administered.

## 2017-04-08 NOTE — PLAN OF CARE
Problem: Patient Care Overview  Goal: Plan of Care Review  Outcome: Ongoing (interventions implemented as appropriate)  - VSS throughout evening shift.  - Pt continues to have significant pain to right groin incision (Wound Vac to site). Pt receives scheduled OxyContin 40 mg q12h plus PRN oxycodone IR 15 mg q6h. Pt requested the oxycodone IR twice overnight as soon as he could receive both doses.  - Wound Vac putting out scant drainage; no output charted due to minimal volume in canister.  - Pt did not receive scheduled insulin aspart last night due to lack of BG check contemporaneous with meal; separate note filed regarding this.  - Pt has very limited mobility and is unable to meet PT/OT goals based on therapists' notes; despite this, pt wishes to be discharged to home rather than rehab.  - Wounds to bilateral heels healing; left heel is more healed than right. Will continue to monitor.

## 2017-04-08 NOTE — PROGRESS NOTES
- Pt's midline catheter (R upper arm) is very difficult to draw back from; was able to get enough blood for one purple-top tube but only a partial green-top tube.  - Received call from lab that green-top tube was hemolyzed.  - Pt changed to a lab collect for blood work. Reordered CMP and Mg level for this AM.

## 2017-04-09 LAB
ALBUMIN SERPL BCP-MCNC: 2.9 G/DL
ALP SERPL-CCNC: 69 U/L
ALT SERPL W/O P-5'-P-CCNC: 14 U/L
ANION GAP SERPL CALC-SCNC: 11 MMOL/L
ANISOCYTOSIS BLD QL SMEAR: SLIGHT
AST SERPL-CCNC: 18 U/L
BASOPHILS # BLD AUTO: ABNORMAL K/UL
BASOPHILS NFR BLD: 1 %
BILIRUB SERPL-MCNC: 0.4 MG/DL
BUN SERPL-MCNC: 16 MG/DL
BURR CELLS BLD QL SMEAR: ABNORMAL
CALCIUM SERPL-MCNC: 9.2 MG/DL
CHLORIDE SERPL-SCNC: 100 MMOL/L
CO2 SERPL-SCNC: 27 MMOL/L
CREAT SERPL-MCNC: 1 MG/DL
DIFFERENTIAL METHOD: ABNORMAL
EOSINOPHIL # BLD AUTO: ABNORMAL K/UL
EOSINOPHIL NFR BLD: 2 %
ERYTHROCYTE [DISTWIDTH] IN BLOOD BY AUTOMATED COUNT: 19 %
EST. GFR  (AFRICAN AMERICAN): >60 ML/MIN/1.73 M^2
EST. GFR  (NON AFRICAN AMERICAN): >60 ML/MIN/1.73 M^2
GLUCOSE SERPL-MCNC: 167 MG/DL
HCT VFR BLD AUTO: 31.5 %
HGB BLD-MCNC: 10.1 G/DL
HYPOCHROMIA BLD QL SMEAR: ABNORMAL
LYMPHOCYTES # BLD AUTO: ABNORMAL K/UL
LYMPHOCYTES NFR BLD: 19 %
MAGNESIUM SERPL-MCNC: 1.4 MG/DL
MCH RBC QN AUTO: 20.1 PG
MCHC RBC AUTO-ENTMCNC: 32.1 %
MCV RBC AUTO: 63 FL
MONOCYTES # BLD AUTO: ABNORMAL K/UL
MONOCYTES NFR BLD: 0 %
NEUTROPHILS NFR BLD: 78 %
OVALOCYTES BLD QL SMEAR: ABNORMAL
PLATELET # BLD AUTO: 341 K/UL
PMV BLD AUTO: ABNORMAL FL
POCT GLUCOSE: 253 MG/DL (ref 70–110)
POCT GLUCOSE: 262 MG/DL (ref 70–110)
POCT GLUCOSE: 267 MG/DL (ref 70–110)
POCT GLUCOSE: 284 MG/DL (ref 70–110)
POIKILOCYTOSIS BLD QL SMEAR: SLIGHT
POLYCHROMASIA BLD QL SMEAR: ABNORMAL
POTASSIUM SERPL-SCNC: 4.4 MMOL/L
PROT SERPL-MCNC: 6.1 G/DL
RBC # BLD AUTO: 5.03 M/UL
SCHISTOCYTES BLD QL SMEAR: ABNORMAL
SODIUM SERPL-SCNC: 138 MMOL/L
SPHEROCYTES BLD QL SMEAR: ABNORMAL
TACROLIMUS BLD-MCNC: 9.7 NG/ML
WBC # BLD AUTO: 6.79 K/UL

## 2017-04-09 PROCEDURE — 25000003 PHARM REV CODE 250: Performed by: NURSE PRACTITIONER

## 2017-04-09 PROCEDURE — 80197 ASSAY OF TACROLIMUS: CPT

## 2017-04-09 PROCEDURE — 99232 SBSQ HOSP IP/OBS MODERATE 35: CPT | Mod: ,,, | Performed by: INTERNAL MEDICINE

## 2017-04-09 PROCEDURE — 63600175 PHARM REV CODE 636 W HCPCS: Performed by: NURSE PRACTITIONER

## 2017-04-09 PROCEDURE — 25000003 PHARM REV CODE 250: Performed by: INTERNAL MEDICINE

## 2017-04-09 PROCEDURE — 25000003 PHARM REV CODE 250: Performed by: PHYSICIAN ASSISTANT

## 2017-04-09 PROCEDURE — 36415 COLL VENOUS BLD VENIPUNCTURE: CPT

## 2017-04-09 PROCEDURE — 20600001 HC STEP DOWN PRIVATE ROOM

## 2017-04-09 PROCEDURE — 63600175 PHARM REV CODE 636 W HCPCS: Performed by: INTERNAL MEDICINE

## 2017-04-09 PROCEDURE — 83735 ASSAY OF MAGNESIUM: CPT

## 2017-04-09 PROCEDURE — 85007 BL SMEAR W/DIFF WBC COUNT: CPT

## 2017-04-09 PROCEDURE — 85027 COMPLETE CBC AUTOMATED: CPT

## 2017-04-09 PROCEDURE — 97530 THERAPEUTIC ACTIVITIES: CPT

## 2017-04-09 PROCEDURE — 80053 COMPREHEN METABOLIC PANEL: CPT

## 2017-04-09 RX ADMIN — MYCOPHENOLATE MOFETIL 1000 MG: 250 CAPSULE ORAL at 08:04

## 2017-04-09 RX ADMIN — VALGANCICLOVIR 900 MG: 450 TABLET, FILM COATED ORAL at 08:04

## 2017-04-09 RX ADMIN — INSULIN ASPART 1 UNITS: 100 INJECTION, SOLUTION INTRAVENOUS; SUBCUTANEOUS at 10:04

## 2017-04-09 RX ADMIN — STANDARDIZED SENNA CONCENTRATE AND DOCUSATE SODIUM 2 TABLET: 8.6; 5 TABLET, FILM COATED ORAL at 08:04

## 2017-04-09 RX ADMIN — TACROLIMUS 1 MG: 1 CAPSULE, GELATIN COATED ORAL at 05:04

## 2017-04-09 RX ADMIN — INSULIN ASPART 15 UNITS: 100 INJECTION, SOLUTION INTRAVENOUS; SUBCUTANEOUS at 06:04

## 2017-04-09 RX ADMIN — TACROLIMUS 1 MG: 1 CAPSULE, GELATIN COATED ORAL at 08:04

## 2017-04-09 RX ADMIN — SULFAMETHOXAZOLE AND TRIMETHOPRIM 1 TABLET: 400; 80 TABLET ORAL at 08:04

## 2017-04-09 RX ADMIN — OXYCODONE HYDROCHLORIDE 15 MG: 5 TABLET ORAL at 05:04

## 2017-04-09 RX ADMIN — AMOXICILLIN 500 MG: 500 CAPSULE ORAL at 08:04

## 2017-04-09 RX ADMIN — PANTOPRAZOLE SODIUM 40 MG: 40 TABLET, DELAYED RELEASE ORAL at 08:04

## 2017-04-09 RX ADMIN — PREDNISONE 5 MG: 5 TABLET ORAL at 08:04

## 2017-04-09 RX ADMIN — PRAVASTATIN SODIUM 40 MG: 40 TABLET ORAL at 08:04

## 2017-04-09 RX ADMIN — OXYCODONE HYDROCHLORIDE 40 MG: 10 TABLET, FILM COATED, EXTENDED RELEASE ORAL at 08:04

## 2017-04-09 RX ADMIN — FUROSEMIDE 40 MG: 40 TABLET ORAL at 08:04

## 2017-04-09 RX ADMIN — HEPARIN SODIUM 5000 UNITS: 5000 INJECTION, SOLUTION INTRAVENOUS; SUBCUTANEOUS at 01:04

## 2017-04-09 RX ADMIN — INSULIN GLARGINE 20 UNITS: 100 INJECTION, SOLUTION SUBCUTANEOUS at 09:04

## 2017-04-09 RX ADMIN — AMLODIPINE BESYLATE 10 MG: 10 TABLET ORAL at 08:04

## 2017-04-09 RX ADMIN — AMOXICILLIN 500 MG: 500 CAPSULE ORAL at 01:04

## 2017-04-09 RX ADMIN — HEPARIN SODIUM 5000 UNITS: 5000 INJECTION, SOLUTION INTRAVENOUS; SUBCUTANEOUS at 05:04

## 2017-04-09 RX ADMIN — Medication 800 MG: at 05:04

## 2017-04-09 RX ADMIN — OXYCODONE HYDROCHLORIDE 15 MG: 5 TABLET ORAL at 11:04

## 2017-04-09 RX ADMIN — INSULIN ASPART 15 UNITS: 100 INJECTION, SOLUTION INTRAVENOUS; SUBCUTANEOUS at 09:04

## 2017-04-09 RX ADMIN — AMOXICILLIN 500 MG: 500 CAPSULE ORAL at 05:04

## 2017-04-09 RX ADMIN — Medication 800 MG: at 08:04

## 2017-04-09 RX ADMIN — Medication 800 MG: at 01:04

## 2017-04-09 RX ADMIN — INSULIN ASPART 15 UNITS: 100 INJECTION, SOLUTION INTRAVENOUS; SUBCUTANEOUS at 01:04

## 2017-04-09 RX ADMIN — FERROUS GLUCONATE TAB 324 MG (37.5 MG ELEMENTAL IRON) 324 MG: 324 (37.5 FE) TAB at 09:04

## 2017-04-09 RX ADMIN — INSULIN ASPART 3 UNITS: 100 INJECTION, SOLUTION INTRAVENOUS; SUBCUTANEOUS at 01:04

## 2017-04-09 RX ADMIN — INSULIN ASPART 3 UNITS: 100 INJECTION, SOLUTION INTRAVENOUS; SUBCUTANEOUS at 06:04

## 2017-04-09 RX ADMIN — HEPARIN SODIUM 5000 UNITS: 5000 INJECTION, SOLUTION INTRAVENOUS; SUBCUTANEOUS at 09:04

## 2017-04-09 RX ADMIN — INSULIN ASPART 3 UNITS: 100 INJECTION, SOLUTION INTRAVENOUS; SUBCUTANEOUS at 09:04

## 2017-04-09 NOTE — PLAN OF CARE
Pt remained free from injury during shift. VSS. Pt c/o pain to right groin. PRN Oxycodone given 1x with moderate relief. Wound vac dressing remains CDI. Scant drainage in cannister. Pt worked with OT during shift. RN noted pt walking with walker. Pt putting minimal weight on right leg. ACHS monitoring in place. SSI needed at breakfast/lunch. Call light in reach. Will continue to monitor.

## 2017-04-09 NOTE — PLAN OF CARE
Problem: Occupational Therapy Goal  Goal: Occupational Therapy Goal  Goals to be met by: 04/13/17     Patient will increase functional independence with ADLs by performing:    UE Dressing with Set-up Assistance.  LE Dressing with Moderate Assistance.  Grooming while standing with Contact Guard Assistance.  Toileting from bedside commode with Moderate Assistance for hygiene and clothing management.   Supine to sit with Contact Guard Assistance. Met 4/9  Revised: Supine to sit from flat bed/no rail at mod indep level  Toilet transfer to bedside commode with Moderate Assistance. Met 4/9  Revised: Toilet transfer with supervision with RW  Upper extremity exercise program x12 reps per handout, with supervision.   Outcome: Ongoing (interventions implemented as appropriate)  2 goals met and advanced  SANTIAGO Preciado  4/9/2017  [phone]

## 2017-04-09 NOTE — PROGRESS NOTES
Progress Note  Heart Transplant Service    Admit Date: 3/28/2017   LOS: 12 days     SUBJECTIVE:     Follow up for: Lymphocele after surgical procedure    Interval History: Pt remained stable overnight and this AM.   Scheduled Meds:   amlodipine  10 mg Oral Daily    amoxicillin  500 mg Oral Q8H    ergocalciferol  50,000 Units Oral Q7 Days    ferrous gluconate  324 mg Oral Daily with breakfast    furosemide  40 mg Oral BID    heparin (porcine)  5,000 Units Subcutaneous Q8H    HYDROmorphone  0.5 mg Intravenous Once    insulin aspart  15 Units Subcutaneous TIDWM    insulin glargine  20 Units Subcutaneous Daily    magnesium oxide  800 mg Oral TID    mycophenolate  1,000 mg Oral BID    oxycodone  40 mg Oral Q12H    pantoprazole  40 mg Oral Daily    pravastatin  40 mg Oral Daily    predniSONE  5 mg Oral Daily    senna-docusate 8.6-50 mg  2 tablet Oral BID    sulfamethoxazole-trimethoprim 400-80mg  1 tablet Oral Daily    tacrolimus  1 mg Oral BID    valganciclovir  900 mg Oral Daily     Continuous Infusions:   sodium chloride 0.9%       PRN Meds:bisacodyl, dextrose 50%, dextrose 50%, glucagon (human recombinant), glucose, glucose, insulin aspart, naloxone, oxycodone    Review of patient's allergies indicates:   Allergen Reactions    Levemir [insulin detemir] Itching    Pork/porcine containing products     Ranexa [ranolazine] Nausea Only       OBJECTIVE:     Vital Signs (Most Recent)  Temp: 97.9 °F (36.6 °C) (04/09/17 1119)  Pulse: 89 (04/09/17 1119)  Resp: 17 (04/09/17 1119)  BP: (!) 140/69 (04/09/17 1119)  SpO2: 99 % (04/09/17 1119)    Vital Signs Range (Last 24H):  Temp:  [97.8 °F (36.6 °C)-98.2 °F (36.8 °C)]   Pulse:  [79-99]   Resp:  [16-18]   BP: (139-148)/(62-70)   SpO2:  [96 %-99 %]     I & O (Last 24H):    Intake/Output Summary (Last 24 hours) at 04/09/17 1256  Last data filed at 04/09/17 1136   Gross per 24 hour   Intake             1520 ml   Output             1600 ml   Net              -80  ml          Constitutional: laying in bed NAD  Neck: JVD present.   Cardiovascular: Normal rate, regular rhythm and intact distal pulses.  No murmurs heard.   Pulmonary/Chest: CTA  Abdominal: + BS, exhibits no distension. There is no tenderness. There is no rebound.   Extremities: no cyanosis, clubbing or edema.  Groin wound vac in place, no swelling or erythema.   NEURO: Alert and Oriented x 4, non-focal, no signs of cerebellar dysfunction.        Labs:       Recent Labs  Lab 04/06/17  0524 04/07/17  0423 04/08/17  0350 04/09/17  0439   WBC 9.00 7.60 8.00 6.79   HGB 10.3* 10.1* 9.5* 10.1*   HCT 31.4* 30.8* 32.9* 31.5*    273 323  --    LYMPH 20.6  1.9 17.0*  CANCELED 21.4  1.7  --    MONO 5.2  0.5 3.0*  CANCELED 5.8  0.5  --    EOSINOPHIL 1.4 4.0 1.3  --        No results for input(s): APTT, INR, PTT in the last 168 hours.       Recent Labs  Lab 04/07/17  0423 04/08/17  0539 04/09/17  0439   * 221* 167*   CALCIUM 9.1 8.9 9.2   ALBUMIN 2.6* 2.7* 2.9*   PROT 5.9* 6.0 6.1    136 138   K 4.5 4.7 4.4   CO2 29 28 27   CL 98 99 100   BUN 19 16 16   CREATININE 1.0 1.0 1.0   ALKPHOS 66 68 69   ALT 11 12 14   AST 13 16 18   BILITOT 0.3 0.3 0.4   MG 1.3* 1.4* 1.4*     Estimated Creatinine Clearance: 93.6 mL/min (based on Cr of 1).    No results for input(s): BNP, BNPTRIAGEBLO in the last 168 hours.    No results for input(s): LDH in the last 168 hours.    Microbiology Results (last 7 days)     Procedure Component Value Units Date/Time    Culture, Anaerobe [[id number]] Collected:  03/30/17 1414    Order Status:  Completed Specimen:  Wound from Groin Updated:  04/05/17 1103     Anaerobic Culture --     ANAEROCOCCUS VAGINALIS  Few      Narrative:       Groin wound    Aerobic culture [[id number]] Collected:  04/02/17 1521    Order Status:  Completed Specimen:  Wound from Abdomen Updated:  04/04/17 1020     Aerobic Bacterial Culture Skin geo,  no predominant organism    Fungus culture [[id number]]  Collected:  03/30/17 1414    Order Status:  Completed Specimen:  Wound from Groin Updated:  04/03/17 0940     Fungus (Mycology) Culture Culture in progress    Narrative:       Groin wound    Blood culture [[id number]] Collected:  03/28/17 1650    Order Status:  Completed Specimen:  Blood Updated:  04/02/17 2012     Blood Culture, Routine No growth after 5 days.    Blood culture [[id number]] Collected:  03/28/17 1651    Order Status:  Completed Specimen:  Blood Updated:  04/02/17 2012     Blood Culture, Routine No growth after 5 days.            ASSESSMENT:     57 yo M with PMHx of NICM s/p HM II (8/24/16), now s/p OHTx 2/9/17 who is admitted secondary to lymphocele foul smell/draining more.     PLAN:     S/P Orthotopic Heart Transplantation 2/9/17  -CMV D-/R+ Continue valcyte  immunosuppression with  mg BID, Tacro 1/1, Pred 5 mg QD  -Tacro level stable     Right Groin Lymphocele  S/P I &D R groin & Rectus Femoris Muscle Flap   -Wound vac placed; wound care following appreciate assistance.   -Wound cultures positive for nterococcus Faecalis sensitive to ampicillin  -Currently on PO amoxicillin/ID rec's. .   -Pain control  -DARIN drain per surgery; per note by plastics, drain can be left in if discharged and he needs 1 week f/u (4/4/17 note).  -Swelling improving.      HTN  -Continue amlodipine 10 mg     DM2  -Continue insulin aspart, insulin glargine     HLD  -Continue pravastatin     DLES Drainage  -Wound care following     Constipation  -Improving, continue bowel regimen     Post-Operative Pain  -Continue oxycodone to 40 mg BID, and increased oxycodone dosing for breakthrough    Dispo  -PT/OT

## 2017-04-10 VITALS
DIASTOLIC BLOOD PRESSURE: 68 MMHG | RESPIRATION RATE: 18 BRPM | SYSTOLIC BLOOD PRESSURE: 136 MMHG | HEART RATE: 88 BPM | BODY MASS INDEX: 31.07 KG/M2 | OXYGEN SATURATION: 97 % | WEIGHT: 205 LBS | TEMPERATURE: 98 F | HEIGHT: 68 IN

## 2017-04-10 LAB
ALBUMIN SERPL BCP-MCNC: 3 G/DL
ALP SERPL-CCNC: 66 U/L
ALT SERPL W/O P-5'-P-CCNC: 12 U/L
ANION GAP SERPL CALC-SCNC: 11 MMOL/L
ANISOCYTOSIS BLD QL SMEAR: SLIGHT
AST SERPL-CCNC: 15 U/L
BASOPHILS NFR BLD: 1 %
BILIRUB SERPL-MCNC: 0.3 MG/DL
BUN SERPL-MCNC: 17 MG/DL
BURR CELLS BLD QL SMEAR: ABNORMAL
CALCIUM SERPL-MCNC: 8.9 MG/DL
CHLORIDE SERPL-SCNC: 97 MMOL/L
CO2 SERPL-SCNC: 27 MMOL/L
CREAT SERPL-MCNC: 0.9 MG/DL
DACRYOCYTES BLD QL SMEAR: ABNORMAL
DIFFERENTIAL METHOD: ABNORMAL
EOSINOPHIL NFR BLD: 0 %
ERYTHROCYTE [DISTWIDTH] IN BLOOD BY AUTOMATED COUNT: 19 %
EST. GFR  (AFRICAN AMERICAN): >60 ML/MIN/1.73 M^2
EST. GFR  (NON AFRICAN AMERICAN): >60 ML/MIN/1.73 M^2
GLUCOSE SERPL-MCNC: 236 MG/DL
HCT VFR BLD AUTO: 30.4 %
HGB BLD-MCNC: 9.6 G/DL
HYPOCHROMIA BLD QL SMEAR: ABNORMAL
LYMPHOCYTES NFR BLD: 24 %
MAGNESIUM SERPL-MCNC: 1.2 MG/DL
MCH RBC QN AUTO: 20.1 PG
MCHC RBC AUTO-ENTMCNC: 31.6 %
MCV RBC AUTO: 64 FL
MONOCYTES NFR BLD: 4 %
NEUTROPHILS NFR BLD: 70 %
NEUTS BAND NFR BLD MANUAL: 1 %
PLATELET # BLD AUTO: 307 K/UL
PLATELET BLD QL SMEAR: ABNORMAL
PMV BLD AUTO: ABNORMAL FL
POCT GLUCOSE: 283 MG/DL (ref 70–110)
POCT GLUCOSE: 284 MG/DL (ref 70–110)
POIKILOCYTOSIS BLD QL SMEAR: SLIGHT
POLYCHROMASIA BLD QL SMEAR: ABNORMAL
POTASSIUM SERPL-SCNC: 3.9 MMOL/L
PROT SERPL-MCNC: 5.9 G/DL
RBC # BLD AUTO: 4.78 M/UL
SCHISTOCYTES BLD QL SMEAR: PRESENT
SODIUM SERPL-SCNC: 135 MMOL/L
TACROLIMUS BLD-MCNC: 9.1 NG/ML
WBC # BLD AUTO: 7.03 K/UL

## 2017-04-10 PROCEDURE — 80197 ASSAY OF TACROLIMUS: CPT

## 2017-04-10 PROCEDURE — 63600175 PHARM REV CODE 636 W HCPCS: Performed by: NURSE PRACTITIONER

## 2017-04-10 PROCEDURE — 85007 BL SMEAR W/DIFF WBC COUNT: CPT

## 2017-04-10 PROCEDURE — 97607 NEG PRS WND THR NDME<=50SQCM: CPT

## 2017-04-10 PROCEDURE — 36415 COLL VENOUS BLD VENIPUNCTURE: CPT

## 2017-04-10 PROCEDURE — 85027 COMPLETE CBC AUTOMATED: CPT

## 2017-04-10 PROCEDURE — 83735 ASSAY OF MAGNESIUM: CPT

## 2017-04-10 PROCEDURE — 25000003 PHARM REV CODE 250: Performed by: PHYSICIAN ASSISTANT

## 2017-04-10 PROCEDURE — 63600175 PHARM REV CODE 636 W HCPCS: Performed by: INTERNAL MEDICINE

## 2017-04-10 PROCEDURE — 25000003 PHARM REV CODE 250: Performed by: INTERNAL MEDICINE

## 2017-04-10 PROCEDURE — 80053 COMPREHEN METABOLIC PANEL: CPT

## 2017-04-10 PROCEDURE — 99238 HOSP IP/OBS DSCHRG MGMT 30/<: CPT | Mod: ,,, | Performed by: INTERNAL MEDICINE

## 2017-04-10 PROCEDURE — 25000003 PHARM REV CODE 250: Performed by: NURSE PRACTITIONER

## 2017-04-10 RX ORDER — TACROLIMUS 1 MG/1
CAPSULE ORAL
Qty: 60 CAPSULE | Refills: 11 | Status: SHIPPED | OUTPATIENT
Start: 2017-04-10 | End: 2017-10-17 | Stop reason: ALTCHOICE

## 2017-04-10 RX ORDER — OXYCODONE HCL 40 MG/1
40 TABLET, FILM COATED, EXTENDED RELEASE ORAL EVERY 12 HOURS
Qty: 28 TABLET | Refills: 0 | Status: SHIPPED | OUTPATIENT
Start: 2017-04-10 | End: 2017-04-18

## 2017-04-10 RX ORDER — INSULIN GLARGINE 100 [IU]/ML
20 INJECTION, SOLUTION SUBCUTANEOUS DAILY
Qty: 15 ML | Refills: 3
Start: 2017-04-10 | End: 2017-10-23 | Stop reason: DRUGHIGH

## 2017-04-10 RX ORDER — OXYCODONE HYDROCHLORIDE 15 MG/1
15 TABLET ORAL EVERY 6 HOURS PRN
Qty: 45 TABLET | Refills: 0 | Status: SHIPPED | OUTPATIENT
Start: 2017-04-10 | End: 2017-04-18

## 2017-04-10 RX ORDER — MYCOPHENOLATE MOFETIL 250 MG/1
1000 CAPSULE ORAL 2 TIMES DAILY
Qty: 360 CAPSULE | Refills: 11 | Status: SHIPPED | OUTPATIENT
Start: 2017-04-10 | End: 2018-04-17 | Stop reason: SDUPTHER

## 2017-04-10 RX ORDER — AMOXICILLIN 500 MG/1
500 CAPSULE ORAL EVERY 8 HOURS
Qty: 30 CAPSULE | Refills: 0 | Status: SHIPPED | OUTPATIENT
Start: 2017-04-10 | End: 2017-05-02

## 2017-04-10 RX ORDER — PREDNISONE 5 MG/1
10 TABLET ORAL DAILY
Qty: 30 TABLET | Refills: 11 | Status: SHIPPED | OUTPATIENT
Start: 2017-04-10 | End: 2017-04-18 | Stop reason: SDUPTHER

## 2017-04-10 RX ORDER — ERGOCALCIFEROL 1.25 MG/1
50000 CAPSULE ORAL
Qty: 4 CAPSULE | Refills: 11 | Status: SHIPPED | OUTPATIENT
Start: 2017-04-10 | End: 2017-11-17 | Stop reason: ALTCHOICE

## 2017-04-10 RX ADMIN — INSULIN ASPART 15 UNITS: 100 INJECTION, SOLUTION INTRAVENOUS; SUBCUTANEOUS at 09:04

## 2017-04-10 RX ADMIN — INSULIN ASPART 15 UNITS: 100 INJECTION, SOLUTION INTRAVENOUS; SUBCUTANEOUS at 01:04

## 2017-04-10 RX ADMIN — OXYCODONE HYDROCHLORIDE 15 MG: 5 TABLET ORAL at 01:04

## 2017-04-10 RX ADMIN — STANDARDIZED SENNA CONCENTRATE AND DOCUSATE SODIUM 2 TABLET: 8.6; 5 TABLET, FILM COATED ORAL at 09:04

## 2017-04-10 RX ADMIN — PANTOPRAZOLE SODIUM 40 MG: 40 TABLET, DELAYED RELEASE ORAL at 09:04

## 2017-04-10 RX ADMIN — Medication 800 MG: at 01:04

## 2017-04-10 RX ADMIN — AMOXICILLIN 500 MG: 500 CAPSULE ORAL at 01:04

## 2017-04-10 RX ADMIN — PREDNISONE 5 MG: 5 TABLET ORAL at 09:04

## 2017-04-10 RX ADMIN — INSULIN ASPART 3 UNITS: 100 INJECTION, SOLUTION INTRAVENOUS; SUBCUTANEOUS at 09:04

## 2017-04-10 RX ADMIN — INSULIN GLARGINE 20 UNITS: 100 INJECTION, SOLUTION SUBCUTANEOUS at 09:04

## 2017-04-10 RX ADMIN — TACROLIMUS 1 MG: 1 CAPSULE, GELATIN COATED ORAL at 09:04

## 2017-04-10 RX ADMIN — INSULIN ASPART 3 UNITS: 100 INJECTION, SOLUTION INTRAVENOUS; SUBCUTANEOUS at 01:04

## 2017-04-10 RX ADMIN — MYCOPHENOLATE MOFETIL 1000 MG: 250 CAPSULE ORAL at 09:04

## 2017-04-10 RX ADMIN — AMLODIPINE BESYLATE 10 MG: 10 TABLET ORAL at 09:04

## 2017-04-10 RX ADMIN — Medication 800 MG: at 05:04

## 2017-04-10 RX ADMIN — OXYCODONE HYDROCHLORIDE 40 MG: 10 TABLET, FILM COATED, EXTENDED RELEASE ORAL at 09:04

## 2017-04-10 RX ADMIN — FERROUS GLUCONATE TAB 324 MG (37.5 MG ELEMENTAL IRON) 324 MG: 324 (37.5 FE) TAB at 07:04

## 2017-04-10 RX ADMIN — AMOXICILLIN 500 MG: 500 CAPSULE ORAL at 05:04

## 2017-04-10 RX ADMIN — OXYCODONE HYDROCHLORIDE 15 MG: 5 TABLET ORAL at 12:04

## 2017-04-10 RX ADMIN — FUROSEMIDE 40 MG: 40 TABLET ORAL at 09:04

## 2017-04-10 RX ADMIN — HEPARIN SODIUM 5000 UNITS: 5000 INJECTION, SOLUTION INTRAVENOUS; SUBCUTANEOUS at 05:04

## 2017-04-10 RX ADMIN — PRAVASTATIN SODIUM 40 MG: 40 TABLET ORAL at 09:04

## 2017-04-10 RX ADMIN — OXYCODONE HYDROCHLORIDE 15 MG: 5 TABLET ORAL at 08:04

## 2017-04-10 RX ADMIN — BISACODYL 10 MG: 10 SUPPOSITORY RECTAL at 09:04

## 2017-04-10 RX ADMIN — VALGANCICLOVIR 900 MG: 450 TABLET, FILM COATED ORAL at 09:04

## 2017-04-10 RX ADMIN — SULFAMETHOXAZOLE AND TRIMETHOPRIM 1 TABLET: 400; 80 TABLET ORAL at 09:04

## 2017-04-10 NOTE — PROGRESS NOTES
Mr. Mick Barriga is being discharged today following admission for evaluation of lymphocele foul smell with increased drainage. His PMH is significant for OHT from 2/17, HM2 lvad explant, DM, HTN, and chronic pain.     During his hospital stay he was evaluated by surgery and had a muscle flap and DARIN drain placed. He was started on antibiotis for enterococcus faecalis growing from the wound. His course will continue with amoxicillin to complete a 14 day course. Increases in his home dose of oxycontin was required. His ER was increased to 40mg bid and oxycodone IR 15mg every 6 hours as needed.     His immunosuppression was dose adjusted for wound healing. His MMF was decreased to 1000mg bid and prograf to 1mg bid (goal 10-12), and prednisone 5mg orally daily.     Mr. Barriga was provided with a new medication administration card prior to discharge. He was provided with hard copies of pain prescriptions for two week supply.

## 2017-04-10 NOTE — PLAN OF CARE
[facility]   Heart Transplant Clinic  [address] [place] [address]   [phone] after hours        HOME  HEALTH ORDERS      Admit to Home Health    Diagnosis:   Patient Active Problem List   Diagnosis    Essential hypertension    Hyperlipidemia    GERD (gastroesophageal reflux disease)    Type 2 diabetes mellitus with stage 3 chronic kidney disease, with long-term current use of insulin    Hyponatremia    Obesity (BMI 30-39.9)    Benign prostatic hyperplasia    Heart transplanted    Adrenal cortical steroids causing adverse effect in therapeutic use    Immunosuppression    Heart transplant, orthotopic, status    Numbness of left hand    Lymphocele    Lymphocele after surgical procedure       Patient is homebound due to:  Wound vac/OHTs     Diet: cardiac    Acitivities: as tolerated    Nursing:   SN to complete comprehensive assessment including routine vital signs. Instruct on disease process and s/s of complications to report to MD. Review/verify medication list sent home with the patient at time of discharge  and instruct patient/caregiver as needed. Frequency may be adjusted depending on start of care date.    Notify MD if SBP > 160 or < 90; DBP > 90 or < 50; HR > 120 or < 50; Temp > 101; Weight gain >3lbs in 1 day or 5lbs in 1 week.   Other:       LABS:  SN to perform labs: CBC, CMP 4/13    Wound Care:   Wound spray or saline for wound cleaning with all dressing changes.    All wounds to be measured with first dressing changes and every week.    Wound Vac:     Location: Right groin        Dressing changes every Monday, Wednesday and Friday.        ET Consult    Other Wounds:   Surgical   (DLES)-Right Abdomen Midline  Apply the following to wound:      -  Wet to Damp dressing   -  Other: Cleanse and irrigate with sterile normal saline         CONSULTS:   Physical Therapy to evaluate and treat. Evaluate for home safety and equipment needs;  Establish/upgrade home exercise program. Perform / instruct on therapeutic exercises, gait training, transfer training, and Range of Motion.    Occupational Therapy to evaluate and treat. Evaluate home environment for safety and equipment needs. Perform/Instruct on transfers, ADL training, ROM, and therapeutic exercises.    Aide to provide assistance with personal care, ADLs, and vital signs      Send initial Home Health orders to [facility] attending physician on call.  Send follow up questions to [phone] or fax:               Post Transplant: [phone]

## 2017-04-10 NOTE — PROGRESS NOTES
Wound vac dressing changed. wound be is red/moist. Andrew-wound is slightly macerated.  cavilon barrier film applied to andrew-wound, Restore Ag applied to wound bed, covered with a black dressing then bridged to upper thigh- suction intact.    Wound care will continue to change dressing twice a week - Mon-Thurs.        04/10/17 1105       Incision/Site 02/19/17 1950 Right groin transverse   Date First Assessed/Time First Assessed: 02/19/17 1950   Present Prior to Hospital Arrival?: No  Side: Right  Location: groin  Incision Type: transverse  Closure Method: other (see comments)   Incision WDL ex   Dressing Appearance intact   Appearance pink;moist   Periwound Area intact   Drainage Characteristics/Odor clear;serous   Drainage Amount scant   Wound Edges open   Cleansed W/ wound cleanser   Therapy Setting 125 mmHg   Pressure Setting 125 mmHg   Negative Pressure Wound Therapy Dressing contact layer Ag;foam, black;transparent dressing;seal intact   Dressing Change Due 04/13/17     Consent was received from the patient for the wound photograph.  Right groin  3cm L x 5.5 cm W x 0.2 cm D

## 2017-04-10 NOTE — NURSING
Patient is being discharged from the hospital. PIV removed, discharge instructions given, and all questions answered prior to patient leaving the hospital. His home wound vac pump was applied and patient and wife were instructed on how to use it. Will monitor.

## 2017-04-10 NOTE — DISCHARGE SUMMARY
[facility]  General Surgery  Discharge Summary      Patient Name: Mick Barriga  MRN: [id number]  Admission Date: 3/28/2017  Hospital Length of Stay: 13 days  Discharge Date and Time:  04/10/2017 3:14 PM  Attending Physician: Josefina Saul MD   Discharging Provider: HEATHER Wynne  Primary Care Provider: Sukhdev Alan MD     HPI: 56 year old male with PMHx of NICM s/p HM II (8/24/16), HLD, HTN, VT s/p ICD now s/p OHTx 2/9/17. Pt presented to the hospital for elevated LDH in the setting of subtherapeutic INR with lower extremity swelling concerning for LVAD pump thrombosis, made status 1a and underwent OHTx 2/9/17. Post op course complicated by bradycardia and he was started on terbutaline 5 mg TID. Here for 6 week f/u and hopsital d/c f/u.   Patient continues to do very well, slowly improving, healing slowly but steadily. Was seen by plastic surgery yesterday, with plan for possible flap if it does not heal soon. DLES is healing per patient, was a lot bigger before, continues to pack it and has good pink granulation tissue, no odor on evaluation by myself. Followed by wound care for all of his wounds. Walking is limited by the pressure DTI on his heels, which are improving slowly per patient and wife, but definitely better than before.     Procedure(s) (LRB):  Repair/ligation of leaking lymphatic with muscle flap coverage.  R groin (Right)     Hospital Course:   Right Groin Lymphocele- Patient complained of increase foul smelling drainage.  Plastic surgery and Wound care consulted.  He was taken to the OR for I &D R groin & Rectus Femoris Muscle Flap and wound vac placed.   Post procedure pain was difficult to control.  He was put on PCA pump and transitioned to oral regimen with adequate control.  Wound care followed patient throughout hospital course.  Wound cultures grew Enterococcus Faecalis sensitive to ampicillin. ID consulted, recommend transition to PO amoxicillin. Changed  to PO 4/4/17.  DARIN drain removed by surgery.  Patient will be sent home with home health PT/OT and wound care  S/P Orthotopic Heart Transplantation 2/9/17  -CMV D-/R+ Continue valcyte  -immunosuppression with MMF 1000 mg BID, Tacro 1/1, Pred 5 mg QD  -Tacro level stable throughout hospital stay  HTN-Continue amlodipine 10 mg  DM2-Continue insulin aspart, insulin glargine  HLD-Continue pravastatin  DLES Drainage-Wound care followed.  Will arrange for home health wound care  Post-Operative Pain- controlled on long acting oxycodone to 40 mg BID, and increased oxycodone dosing for breakthrough       Consults:   Consults         Status Ordering Provider     Inpatient consult to Dietary  Once     Provider:  (Not yet assigned)    Completed DONALD PRINCE     Inpatient consult to Infectious Diseases  Once     Provider:  (Not yet assigned)    Completed ANDRES SIU     Inpatient consult to Midline team  Once     Provider:  (Not yet assigned)    Completed GOLDY SOLER          Significant Diagnostic Studies: N/A      Pending Diagnostic Studies:     None        Final Active Diagnoses:    Diagnosis Date Noted POA    PRINCIPAL PROBLEM:  Lymphocele after surgical procedure [I89.8] 03/28/2017 Yes    Hyponatremia [E87.1] 09/06/2016 Yes    Type 2 diabetes mellitus with stage 3 chronic kidney disease, with long-term current use of insulin [E11.22, N18.3, Z79.4] 06/14/2015 Not Applicable     Chronic    Hyperlipidemia [E78.5] 06/14/2015 Yes     Chronic    GERD (gastroesophageal reflux disease) [K21.9] 06/14/2015 Yes     Chronic    Essential hypertension [I10] 06/14/2015 Yes     Chronic      Problems Resolved During this Admission:    Diagnosis Date Noted Date Resolved POA      Discharged Condition: stable    Disposition: Home or Self Care    Follow Up:  Follow-up Information     Follow up with Aravind Palomino MD. Call in 1 week.    Specialty:  Plastic Surgery    Contact information:    [address]  [facility] [address]  [phone]          Follow up with [facility] On 4/18/2017.    Specialty:  Transplant    Why:  as scheduled for biospy, labwork and appointment,     Contact information:    [address]  [facility] [address] [phone]    Additional information:    3rd floor        Patient Instructions:     Diet general   Order Specific Question Answer Comments   Additional restrictions: Cardiac (Low Na/Chol)      Activity as tolerated     Call MD for:  temperature >100.4     Call MD for:  redness, tenderness, or signs of infection (pain, swelling, redness, odor or green/yellow discharge around incision site)     Call MD for:   Order Comments: Chest pain, shortness of breath     Change dressing (specify)   Order Comments: See home health orders       Medications:  Reconciled Home Medications:   Current Discharge Medication List      START taking these medications    Details   amoxicillin (AMOXIL) 500 MG capsule Take 1 capsule (500 mg total) by mouth every 8 (eight) hours.  Qty: 30 capsule, Refills: 0      ergocalciferol (ERGOCALCIFEROL) 50,000 unit Cap Take 1 capsule (50,000 Units total) by mouth every 7 days.  Qty: 4 capsule, Refills: 11      oxycodone (OXYCONTIN) 40 mg 12 hr tablet Take 1 tablet (40 mg total) by mouth every 12 (twelve) hours.  Qty: 28 tablet, Refills: 0      oxycodone (ROXICODONE) 15 MG Tab Take 1 tablet (15 mg total) by mouth every 6 (six) hours as needed.  Qty: 45 tablet, Refills: 0         CONTINUE these medications which have CHANGED    Details   insulin glargine (LANTUS SOLOSTAR) 100 unit/mL (3 mL) InPn pen Inject 20 Units into the skin once daily.  Qty: 15 mL, Refills: 3      mycophenolate (CELLCEPT) 250 mg Cap Take 4 capsules (1,000 mg total) by mouth 2 (two) times daily.  Qty: 360 capsule, Refills: 11      predniSONE (DELTASONE) 5 MG tablet Take 2 tablets (10 mg total) by mouth once daily.  Qty: 30 tablet, Refills: 11    Associated Diagnoses: Status post  heart transplant      tacrolimus (PROGRAF) 1 MG Cap Take 1mg orally in the morning and 1mg orally in the evening  Qty: 60 capsule, Refills: 11    Associated Diagnoses: Status post heart transplant         CONTINUE these medications which have NOT CHANGED    Details   amlodipine (NORVASC) 10 MG tablet Take 1 tablet (10 mg total) by mouth once daily.  Qty: 30 tablet, Refills: 11      aspirin (ECOTRIN) 81 MG EC tablet Take 1 tablet (81 mg total) by mouth once daily.  Refills: 0      calcium-vitamin D3 500 mg(1,250mg) -200 unit per tablet Take 1 tablet by mouth 2 (two) times daily with meals.  Qty: 60 tablet, Refills: 11      esomeprazole (NEXIUM) 40 MG capsule Take 1 capsule (40 mg total) by mouth before breakfast.  Qty: 90 capsule, Refills: 3      ferrous gluconate (FERGON) 324 MG tablet Take 1 tablet (324 mg total) by mouth daily with breakfast.  Qty: 90 tablet, Refills: 3      furosemide (LASIX) 80 MG tablet Take 0.5 tablets (40 mg total) by mouth 2 (two) times daily.  Qty: 60 tablet, Refills: 11      magnesium oxide (MAG-OX) 400 mg tablet Take 2 tablets (800 mg total) by mouth 3 (three) times daily.  Qty: 540 tablet, Refills: 3    Associated Diagnoses: Hypomagnesemia      pravastatin (PRAVACHOL) 40 MG tablet Take 1 tablet (40 mg total) by mouth once daily.  Qty: 30 tablet, Refills: 11      sulfamethoxazole-trimethoprim 400-80mg (BACTRIM,SEPTRA) 400-80 mg per tablet Take 1 tablet by mouth once daily.  Qty: 30 tablet, Refills: 11      valganciclovir (VALCYTE) 450 mg Tab TAKE 2 TABLETS(900 MG) BY MOUTH EVERY DAY  Qty: 180 tablet, Refills: 2    Comments: **Patient requests 90 days supply**      alendronate (FOSAMAX) 70 MG tablet TAKE 1 TABLET BY MOUTH EVERY 7 DAYS  Qty: 12 tablet, Refills: 11    Comments: **Patient requests 90 days supply**      blood sugar diagnostic Strp To use to check BG 4 times daily, to use with insurance preferred meter  Qty: 350 strip, Refills: 3      blood-glucose meter kit To use to check BG  4 times daily, to use with insurance preferred meter  Qty: 1 each, Refills: 0      docusate sodium (COLACE) 100 MG capsule Take 2 capsules (200 mg total) by mouth every evening.  Qty: 180 capsule, Refills: 3      insulin aspart (NOVOLOG) 100 unit/mL InPn pen Inject 15 Units into the skin 3 (three) times daily.  Qty: 1 Box, Refills: 0      lancets Misc To use to check BG 4 times daily, to use with insurance preferred meter  Qty: 350 each, Refills: 3             Laura Beaulieu PA-C  [facility]

## 2017-04-10 NOTE — PROGRESS NOTES
DISCHARGE    SW to pt's room for discharge today.  Pt presents as sitting up in bed with wife at bedside.  Pt and wife both present as aao x4, calm, cooperative, and asking and answering questions appropriately.  Pt and wife verbalize understanding and agreement with plan for d/c to home today with home health and wound vac.  Pt reports home wound vac has already been placed ([facility] order # [id number]).  SW faxed resume  orders and updated records to [facility] (ph: [phone], f: [phone]) and notified them of d/c today.  Pt and wife report coping well at this time.  Pt requesting to meet with SW in clinic next week for asstc with Medicaid billing issue.  SW sent message to have pt added to SW schedule on 4/18 after pt's biopsy.  Pt and wife deny any other needs or concerns at this time.  SW providing ongoing psychosocial and counseling support, education, resources, assistance, and discharge planning as indicated.  SW remains available.

## 2017-04-10 NOTE — PROGRESS NOTES
Progress Note  Heart Transplant Service    Admit Date: 3/28/2017   LOS: 13 days     SUBJECTIVE:     Follow up for: Lymphocele after surgical procedure    Interval History: Patient reports pain is controlled.  He has been walking around and is ready to go home.  Has no complaints today.     Scheduled Meds:   amlodipine  10 mg Oral Daily    amoxicillin  500 mg Oral Q8H    ergocalciferol  50,000 Units Oral Q7 Days    ferrous gluconate  324 mg Oral Daily with breakfast    furosemide  40 mg Oral BID    heparin (porcine)  5,000 Units Subcutaneous Q8H    HYDROmorphone  0.5 mg Intravenous Once    insulin aspart  15 Units Subcutaneous TIDWM    insulin glargine  20 Units Subcutaneous Daily    magnesium oxide  800 mg Oral TID    mycophenolate  1,000 mg Oral BID    oxycodone  40 mg Oral Q12H    pantoprazole  40 mg Oral Daily    pravastatin  40 mg Oral Daily    predniSONE  5 mg Oral Daily    senna-docusate 8.6-50 mg  2 tablet Oral BID    sulfamethoxazole-trimethoprim 400-80mg  1 tablet Oral Daily    tacrolimus  1 mg Oral BID    valganciclovir  900 mg Oral Daily     Continuous Infusions:   sodium chloride 0.9%       PRN Meds:bisacodyl, dextrose 50%, dextrose 50%, glucagon (human recombinant), glucose, glucose, insulin aspart, naloxone, oxycodone    Review of patient's allergies indicates:   Allergen Reactions    Levemir [insulin detemir] Itching    Pork/porcine containing products     Ranexa [ranolazine] Nausea Only       OBJECTIVE:     Vital Signs (Most Recent)  Temp: 97.9 °F (36.6 °C) (04/10/17 0500)  Pulse: 84 (04/10/17 0700)  Resp: 16 (04/10/17 0500)  BP: (!) 143/71 (04/10/17 0500)  SpO2: 98 % (04/10/17 0500)    Vital Signs Range (Last 24H):  Temp:  [97.7 °F (36.5 °C)-98.1 °F (36.7 °C)]   Pulse:  [82-90]   Resp:  [16-18]   BP: (135-145)/(53-71)   SpO2:  [97 %-99 %]     I & O (Last 24H):    Intake/Output Summary (Last 24 hours) at 04/10/17 0745  Last data filed at 04/10/17 0500   Gross per 24 hour    Intake             1500 ml   Output             2100 ml   Net             -600 ml          Constitutional: laying in bed NAD; wife at bedside   Neck:No JVD present.   Cardiovascular: Normal rate, regular rhythm and intact distal pulses.  No murmurs heard.   Pulmonary/Chest: CTA  Abdominal: + BS, exhibits no distension. There is no tenderness. There is no rebound.   Extremities: no cyanosis, clubbing or edema.  Groin wound in wound vac       Labs:       Recent Labs  Lab 04/07/17  0423 04/08/17  0350 04/09/17  0439   WBC 7.60 8.00 6.79   HGB 10.1* 9.5* 10.1*   HCT 30.8* 32.9* 31.5*    323 341   LYMPH 17.0*  CANCELED 21.4  1.7 19.0  CANCELED   MONO 3.0*  CANCELED 5.8  0.5 0.0*  CANCELED   EOSINOPHIL 4.0 1.3 2.0       No results for input(s): APTT, INR, PTT in the last 168 hours.       Recent Labs  Lab 04/07/17  0423 04/08/17  0539 04/09/17  0439   * 221* 167*   CALCIUM 9.1 8.9 9.2   ALBUMIN 2.6* 2.7* 2.9*   PROT 5.9* 6.0 6.1    136 138   K 4.5 4.7 4.4   CO2 29 28 27   CL 98 99 100   BUN 19 16 16   CREATININE 1.0 1.0 1.0   ALKPHOS 66 68 69   ALT 11 12 14   AST 13 16 18   BILITOT 0.3 0.3 0.4   MG 1.3* 1.4* 1.4*     Estimated Creatinine Clearance: 93.4 mL/min (based on Cr of 1).    No results for input(s): BNP, BNPTRIAGEBLO in the last 168 hours.    No results for input(s): LDH in the last 168 hours.    Microbiology Results (last 7 days)     Procedure Component Value Units Date/Time    Culture, Anaerobe [[id number]] Collected:  03/30/17 1414    Order Status:  Completed Specimen:  Wound from Groin Updated:  04/05/17 1103     Anaerobic Culture --     ANAEROCOCCUS VAGINALIS  Few      Narrative:       Groin wound    Aerobic culture [[id number]] Collected:  04/02/17 1521    Order Status:  Completed Specimen:  Wound from Abdomen Updated:  04/04/17 1020     Aerobic Bacterial Culture Skin geo,  no predominant organism    Fungus culture [[id number]] Collected:  03/30/17 1414    Order Status:  Completed  Specimen:  Wound from Groin Updated:  04/03/17 0940     Fungus (Mycology) Culture Culture in progress    Narrative:       Groin wound            ASSESSMENT:     57 yo M with PMHx of NICM s/p HM II (8/24/16), now s/p OHTx 2/9/17 who is admitted secondary to lymphocele foul smell/draining more.     PLAN:     S/P Orthotopic Heart Transplantation 2/9/17  -CMV D-/R+ Continue valcyte  -immunosuppression with MMF 1000 mg BID, Tacro 1/1, Pred 5 mg QD  -Tacro level stable     Right Groin Lymphocele  S/P I &D R groin & Rectus Femoris Muscle Flap   -Wound vac placed  -Wound care following  -Wound cultures grew Enterococcus Faecalis sensitive to ampicillin  -ID consulted, recommend transition to PO amoxicillin. Changed to PO 4/4/17  -Pain control  -DARIN drain removed by surgery     HTN  -Continue amlodipine 10 mg     DM2  -Continue insulin aspart, insulin glargine     HLD  -Continue pravastatin     DLES Drainage  -Wound care following     Constipation  -Improving, continue bowel regimen     Post-Operative Pain  -Increased long acting oxycodone to 40 mg BID, and increased oxycodone dosing for breakthrough    Dispo  -PT/OT    Laura Beaulieu PA-C  HTS [facility]ink: [id number]

## 2017-04-10 NOTE — PLAN OF CARE
Problem: Patient Care Overview  Goal: Plan of Care Review  Outcome: Ongoing (interventions implemented as appropriate)  - VSS throughout evening shift.  - Pt requested pain medication once overnight for right leg pain from two incisions (right groin and right thigh).  - Wound VAC to right groin with scant output. Wound care nurse will change dressing today.  - Pressure ulcers to bilateral heels healing; right heel slightly less healed than left.  - Plan is for pt to have right heart catheterization with biopsy on Tuesday and then be discharged to home.  - Will continue to monitor.

## 2017-04-11 ENCOUNTER — TELEPHONE (OUTPATIENT)
Dept: TRANSPLANT | Facility: CLINIC | Age: 57
End: 2017-04-11

## 2017-04-11 NOTE — PT/OT/SLP DISCHARGE
Physical Therapy Discharge Summary    Mick Barriga  MRN: [id number]   Lymphocele after surgical procedure   Patient Discharged from acute Physical Therapy on 4/10/17.  Please refer to prior PT noted date on 17 for functional status.     Assessment:   Patient appropriate for care in another setting.  GOALS:   Physical Therapy Goals        Problem: Physical Therapy Goal    Goal Priority Disciplines Outcome Goal Variances Interventions   Physical Therapy Goal     PT/OT, PT Ongoing (interventions implemented as appropriate)     Description:  Goals to be met by: 4/10/2017     Patient will increase functional independence with mobility by performin. Supine to sit with Set-up [place] - GOAL MET  2. Sit to stand transfer with Supervision - GOAL MET  3. Bed to chair transfer with Stand-by Assistance using Rolling Walker  4. Gait  x 100 feet with Contact Guard Assistance using Rolling Walker.   5. Lower extremity exercise program x15 reps per handout, with supervision               Reasons for Discontinuation of Therapy Services  Transfer to alternate level of care.      Plan:  Patient Discharged to: Home with Home Health Service.    Mary Brown, PT  2017

## 2017-04-11 NOTE — TELEPHONE ENCOUNTER
----- Message from Ander Avilez sent at 4/11/2017 11:31 AM CDT -----  Contact: Sharla[facility]  Please call Sharla at [phone] if any questions. Hosp/disc on yesterday and need an  order for bedside commode. Please fax# [phone]    Thank you

## 2017-04-11 NOTE — TELEPHONE ENCOUNTER
Spoke with pt he states he is doing ok, HH coming out to see him and they will get cbc, cmp on Thursday 4/13/17.  He will RTC on 4/18/17.

## 2017-04-16 DIAGNOSIS — Z79.899 ENCOUNTER FOR LONG-TERM (CURRENT) USE OF MEDICATIONS: ICD-10-CM

## 2017-04-16 DIAGNOSIS — T86.20 COMPLICATION OF HEART TRANSPLANT, UNSPECIFIED COMPLICATION: ICD-10-CM

## 2017-04-16 DIAGNOSIS — Z94.1 STATUS POST HEART TRANSPLANT: ICD-10-CM

## 2017-04-17 DIAGNOSIS — I10 ESSENTIAL (PRIMARY) HYPERTENSION: ICD-10-CM

## 2017-04-17 DIAGNOSIS — Z94.1 S/P ORTHOTOPIC HEART TRANSPLANT: Primary | ICD-10-CM

## 2017-04-18 ENCOUNTER — HOSPITAL ENCOUNTER (OUTPATIENT)
Facility: HOSPITAL | Age: 57
Discharge: HOME | End: 2017-04-18
Attending: INTERNAL MEDICINE | Admitting: INTERNAL MEDICINE
Payer: MEDICARE

## 2017-04-18 ENCOUNTER — OFFICE VISIT (OUTPATIENT)
Dept: PLASTIC SURGERY | Facility: CLINIC | Age: 57
End: 2017-04-18
Payer: MEDICARE

## 2017-04-18 ENCOUNTER — SURGERY (OUTPATIENT)
Age: 57
End: 2017-04-18

## 2017-04-18 ENCOUNTER — OFFICE VISIT (OUTPATIENT)
Dept: TRANSPLANT | Facility: CLINIC | Age: 57
End: 2017-04-18
Payer: MEDICARE

## 2017-04-18 ENCOUNTER — SOCIAL WORK (OUTPATIENT)
Dept: TRANSPLANT | Facility: CLINIC | Age: 57
End: 2017-04-18
Payer: MEDICARE

## 2017-04-18 VITALS
HEIGHT: 68 IN | WEIGHT: 203.69 LBS | BODY MASS INDEX: 30.87 KG/M2 | SYSTOLIC BLOOD PRESSURE: 124 MMHG | HEART RATE: 94 BPM | DIASTOLIC BLOOD PRESSURE: 70 MMHG

## 2017-04-18 VITALS — SYSTOLIC BLOOD PRESSURE: 137 MMHG | DIASTOLIC BLOOD PRESSURE: 66 MMHG | TEMPERATURE: 98 F | HEART RATE: 90 BPM

## 2017-04-18 DIAGNOSIS — Z94.1 STATUS POST HEART TRANSPLANT: ICD-10-CM

## 2017-04-18 DIAGNOSIS — E66.9 OBESITY (BMI 30-39.9): Chronic | ICD-10-CM

## 2017-04-18 DIAGNOSIS — Z94.1 HEART TRANSPLANT, ORTHOTOPIC, STATUS: Primary | ICD-10-CM

## 2017-04-18 DIAGNOSIS — Z94.1 HEART TRANSPLANT, ORTHOTOPIC, STATUS: ICD-10-CM

## 2017-04-18 DIAGNOSIS — E78.5 HYPERLIPIDEMIA, UNSPECIFIED HYPERLIPIDEMIA TYPE: Chronic | ICD-10-CM

## 2017-04-18 DIAGNOSIS — M79.661 PAIN AND SWELLING OF RIGHT LOWER LEG: Primary | ICD-10-CM

## 2017-04-18 DIAGNOSIS — D84.9 IMMUNOSUPPRESSION: ICD-10-CM

## 2017-04-18 DIAGNOSIS — M79.89 PAIN AND SWELLING OF RIGHT LOWER LEG: Primary | ICD-10-CM

## 2017-04-18 DIAGNOSIS — Z09 SURGERY FOLLOW-UP EXAMINATION: Primary | ICD-10-CM

## 2017-04-18 DIAGNOSIS — T38.0X5D ADRENAL CORTICAL STEROIDS CAUSING ADVERSE EFFECT IN THERAPEUTIC USE, SUBSEQUENT ENCOUNTER: ICD-10-CM

## 2017-04-18 DIAGNOSIS — K21.9 GASTROESOPHAGEAL REFLUX DISEASE, ESOPHAGITIS PRESENCE NOT SPECIFIED: Chronic | ICD-10-CM

## 2017-04-18 LAB
DIASTOLIC DYSFUNCTION: NO
GLOBAL PERICARDIAL EFFUSION: NORMAL
RETIRED EF AND QEF - SEE NOTES: 65 (ref 55–65)

## 2017-04-18 PROCEDURE — 99213 OFFICE O/P EST LOW 20 MIN: CPT | Mod: PBBFAC,27 | Performed by: INTERNAL MEDICINE

## 2017-04-18 PROCEDURE — 93505 ENDOMYOCARDIAL BIOPSY: CPT | Mod: 26,,, | Performed by: INTERNAL MEDICINE

## 2017-04-18 PROCEDURE — 99212 OFFICE O/P EST SF 10 MIN: CPT | Mod: PBBFAC | Performed by: SURGERY

## 2017-04-18 PROCEDURE — 99214 OFFICE O/P EST MOD 30 MIN: CPT | Mod: S$PBB,,, | Performed by: INTERNAL MEDICINE

## 2017-04-18 PROCEDURE — 99024 POSTOP FOLLOW-UP VISIT: CPT | Mod: ,,, | Performed by: SURGERY

## 2017-04-18 PROCEDURE — 99999 PR PBB SHADOW E&M-EST. PATIENT-LVL II: CPT | Mod: PBBFAC,,, | Performed by: SURGERY

## 2017-04-18 PROCEDURE — 88307 TISSUE EXAM BY PATHOLOGIST: CPT | Mod: 26,,, | Performed by: PATHOLOGY

## 2017-04-18 PROCEDURE — 88342 IMHCHEM/IMCYTCHM 1ST ANTB: CPT | Mod: 26,,, | Performed by: PATHOLOGY

## 2017-04-18 PROCEDURE — 93306 TTE W/DOPPLER COMPLETE: CPT

## 2017-04-18 PROCEDURE — 93306 TTE W/DOPPLER COMPLETE: CPT | Mod: 26,,, | Performed by: INTERNAL MEDICINE

## 2017-04-18 PROCEDURE — 99999 PR PBB SHADOW E&M-EST. PATIENT-LVL III: CPT | Mod: PBBFAC,,, | Performed by: INTERNAL MEDICINE

## 2017-04-18 RX ORDER — FUROSEMIDE 40 MG/1
TABLET ORAL
Refills: 5 | COMMUNITY
Start: 2017-02-24 | End: 2023-02-07

## 2017-04-18 RX ORDER — LISINOPRIL 10 MG/1
TABLET ORAL
COMMUNITY
Start: 2017-04-14 | End: 2017-04-18

## 2017-04-18 RX ORDER — WARFARIN 1 MG/1
TABLET ORAL
COMMUNITY
Start: 2017-04-14 | End: 2017-04-18

## 2017-04-18 RX ORDER — SPIRONOLACTONE 25 MG/1
TABLET ORAL
Refills: 2 | COMMUNITY
Start: 2017-02-20 | End: 2017-04-18

## 2017-04-18 RX ORDER — SERTRALINE HYDROCHLORIDE 100 MG/1
TABLET, FILM COATED ORAL
Refills: 2 | COMMUNITY
Start: 2017-02-24 | End: 2017-04-18

## 2017-04-18 RX ORDER — TAMSULOSIN HYDROCHLORIDE 0.4 MG/1
CAPSULE ORAL
Refills: 3 | COMMUNITY
Start: 2017-03-19 | End: 2017-04-18

## 2017-04-18 RX ORDER — PEN NEEDLE, DIABETIC 31 GX5/16"
NEEDLE, DISPOSABLE MISCELLANEOUS
COMMUNITY
Start: 2017-04-17 | End: 2020-09-16

## 2017-04-18 RX ORDER — RAMELTEON 8 MG/1
TABLET, FILM COATED ORAL
Refills: 3 | COMMUNITY
Start: 2017-02-13 | End: 2017-04-18

## 2017-04-18 RX ORDER — GABAPENTIN 300 MG/1
300 CAPSULE ORAL NIGHTLY
Qty: 60 CAPSULE | Refills: 11 | Status: SHIPPED | OUTPATIENT
Start: 2017-04-18 | End: 2017-11-26

## 2017-04-18 RX ORDER — AMIODARONE HYDROCHLORIDE 400 MG/1
TABLET ORAL
COMMUNITY
Start: 2017-04-17 | End: 2017-04-18

## 2017-04-18 RX ORDER — PREDNISONE 5 MG/1
5 TABLET ORAL DAILY
Qty: 30 TABLET | Refills: 11 | Status: SHIPPED | OUTPATIENT
Start: 2017-04-18 | End: 2017-06-15 | Stop reason: SDUPTHER

## 2017-04-18 RX ORDER — VALGANCICLOVIR 450 MG/1
TABLET, FILM COATED ORAL
Qty: 180 TABLET | Refills: 2 | Status: SHIPPED | OUTPATIENT
Start: 2017-04-18 | End: 2017-04-18 | Stop reason: SDUPTHER

## 2017-04-18 NOTE — DISCHARGE INSTRUCTIONS

## 2017-04-18 NOTE — BRIEF OP NOTE
Admit 04/18/2017  Discharge  04/18/2017  Discharge / Principle diagnosis heart transplant  Discharge attending Trixie Marx MD  Hospital course.  Came in for echo biopsy. Tolerated procedure well (see full results in cardiac procedure section).  Results discussed with patient / caregiver.   Discharge condition / disposition Good / home   discharge activity activity as tolerated    Discharge diet diet as tolerated / unchanged from admission  Discharge medication   No current facility-administered medications on file prior to encounter.      Current Outpatient Prescriptions on File Prior to Encounter   Medication Sig    alendronate (FOSAMAX) 70 MG tablet TAKE 1 TABLET BY MOUTH EVERY 7 DAYS    amlodipine (NORVASC) 10 MG tablet Take 1 tablet (10 mg total) by mouth once daily.    aspirin (ECOTRIN) 81 MG EC tablet Take 1 tablet (81 mg total) by mouth once daily.    blood sugar diagnostic Strp To use to check BG 4 times daily, to use with insurance preferred meter    blood-glucose meter kit To use to check BG 4 times daily, to use with insurance preferred meter    calcium-vitamin D3 500 mg(1,250mg) -200 unit per tablet Take 1 tablet by mouth 2 (two) times daily with meals.    docusate sodium (COLACE) 100 MG capsule Take 2 capsules (200 mg total) by mouth every evening. (Patient taking differently: Take 100 mg by mouth 2 (two) times daily. )    esomeprazole (NEXIUM) 40 MG capsule Take 1 capsule (40 mg total) by mouth before breakfast.    ferrous gluconate (FERGON) 324 MG tablet Take 1 tablet (324 mg total) by mouth daily with breakfast.    furosemide (LASIX) 80 MG tablet Take 0.5 tablets (40 mg total) by mouth 2 (two) times daily.    insulin aspart (NOVOLOG) 100 unit/mL InPn pen Inject 15 Units into the skin 3 (three) times daily.    lancets Misc To use to check BG 4 times daily, to use with insurance preferred meter    pravastatin (PRAVACHOL) 40 MG tablet Take 1 tablet (40 mg total) by mouth once daily.     sulfamethoxazole-trimethoprim 400-80mg (BACTRIM,SEPTRA) 400-80 mg per tablet Take 1 tablet by mouth once daily.     Followup in clinic as scheduled

## 2017-04-18 NOTE — PROGRESS NOTES
Subjective:   Patient ID:  Mick Barriga is a 56 y.o. male who presents for heart transplant follow-up.    CMV Donor: -  CMV Recipient: +    HPI     56 year old male with PMHx of NICM s/p HM II (8/24/16), HLD, HTN, VT s/p ICD now s/p OHTx 2/9/17. Pt  presented to the hospital for elevated LDH in the setting of subtherapeutic INR with lower extremity swelling concerning for LVAD pump thrombosis, made status 1a and underwent OHTx 2/9/17. Post op course complicated by bradycardia and he was started on terbutaline 5 mg TID. Most recently was admitted from 03/28-04/10 for increased foul smelling drainage from right groin. Patient seen by wound care and plastics, he was taken to OR for I&D right groin and right femoris muscle flap, with wound vac placed. He had significant pain issues, even needing PCA following surgery, but eventually was able to be transitioned to oral pain meds. The wound had E. Faecalis, placed on amoxicillin (seen by ID). He states the pain has been well controlled, however two days ago had wound vac changed by HH and it is been gurgling/making strange sound since then. Heels are healing per patient and wife, overall he states things are getting better, still needing wheelchair to get around after OHT due to DTI on heels. But walking at home slowly.      Immunosuppression: tacro 2/1, cellcept 1000 BID, pred 10mg po daily    Review of Systems   Constitution: Negative for chills, decreased appetite, diaphoresis, fever, weakness, malaise/fatigue, weight gain and weight loss.   HENT: Negative for headaches.    Eyes: Negative for visual disturbance.   Cardiovascular: Positive for leg swelling. Negative for chest pain, dyspnea on exertion, irregular heartbeat, near-syncope, orthopnea and palpitations.   Respiratory: Negative for cough, shortness of breath, sleep disturbances due to breathing, snoring, sputum production and wheezing.    Hematologic/Lymphatic: Negative for adenopathy and bleeding problem.  "Does not bruise/bleed easily.   Skin: Negative for color change, poor wound healing, rash, skin cancer and suspicious lesions.   Musculoskeletal: Negative for back pain, falls, gout, joint pain and muscle weakness.   Gastrointestinal: Negative for bloating, abdominal pain, anorexia, constipation, diarrhea, heartburn, hematemesis, hematochezia, hemorrhoids, melena, nausea and vomiting.   Genitourinary: Negative for nocturia.   Neurological: Negative for excessive daytime sleepiness, dizziness, focal weakness, light-headedness, paresthesias and tremors.   Psychiatric/Behavioral: Negative for depression and memory loss. The patient does not have insomnia and is not nervous/anxious.        Objective:   /70  Pulse 94  Ht 5' 8" (1.727 m)  Wt 92.4 kg (203 lb 11.3 oz)  BMI 30.97 kg/m2    Physical Exam   Constitutional: He is oriented to person, place, and time. He appears well-developed and well-nourished. No distress.        HENT:   Head: Normocephalic and atraumatic.   Nose: Nose normal.   Mouth/Throat: Oropharynx is clear and moist. No oropharyngeal exudate.   Eyes: Conjunctivae and EOM are normal. Pupils are equal, round, and reactive to light. No scleral icterus.   Neck: Normal range of motion. Neck supple. No JVD present. No tracheal deviation present. No thyromegaly present.   Cardiovascular: Normal rate, regular rhythm, S1 normal, S2 normal and normal heart sounds.  Exam reveals no gallop and no friction rub.    No murmur heard.  Pulses:       Radial pulses are 2+ on the right side, and 2+ on the left side.   Pulmonary/Chest: Effort normal and breath sounds normal. No respiratory distress. He has no wheezes. He has no rales. He exhibits no tenderness.   Abdominal: Soft. Bowel sounds are normal. He exhibits no distension and no mass. There is no tenderness. There is no rebound and no guarding.   Musculoskeletal: Normal range of motion. He exhibits no edema or tenderness.   Bilat feet- small healing " wounds/areas outer heels   Neurological: He is alert and oriented to person, place, and time. Coordination normal.   Skin: Skin is warm and dry. No rash noted. He is not diaphoretic. No erythema. No pallor.   Psychiatric: He has a normal mood and affect. His behavior is normal. Judgment and thought content normal.   Nursing note and vitals reviewed.        Chemistry        Component Value Date/Time     04/18/2017 0742    K 4.3 04/18/2017 0742     04/18/2017 0742    CO2 27 04/18/2017 0742    BUN 13 04/18/2017 0742    CREATININE 0.9 04/18/2017 0742     (H) 04/18/2017 0742        Component Value Date/Time    CALCIUM 9.3 04/18/2017 0742    ALKPHOS 66 04/18/2017 0742    AST 21 04/18/2017 0742    ALT 17 04/18/2017 0742    BILITOT 0.4 04/18/2017 0742          Magnesium   Date Value Ref Range Status   04/18/2017 1.5 (L) 1.6 - 2.6 mg/dL Final       Lab Results   Component Value Date    WBC 6.17 04/18/2017    HGB 10.6 (L) 04/18/2017    HCT 35.2 (L) 04/18/2017    MCV 65 (L) 04/18/2017     04/10/2017       Lab Results   Component Value Date    INR 1.6 (H) 02/15/2017    INR 1.4 (H) 02/14/2017    INR 1.5 (H) 02/13/2017       BNP   Date Value Ref Range Status   04/18/2017 185 (H) 0 - 99 pg/mL Final     Comment:     Values of less than 100 pg/ml are consistent with non-CHF populations.   03/22/2017 333 (H) 0 - 99 pg/mL Final     Comment:     Values of less than 100 pg/ml are consistent with non-CHF populations.   03/07/2017 329 (H) 0 - 99 pg/mL Final     Comment:     Values of less than 100 pg/ml are consistent with non-CHF populations.       Tacrolimus Lvl   Date Value Ref Range Status   04/10/2017 9.1 5.0 - 15.0 ng/mL Final     Comment:     Testing performed by Liquid Chromatography-Tandem  Mass Spectrometry (LC-MS/MS).  This test was developed and its performance characteristics  determined by [facility], Department of Pathology  and Laboratory Medicine in a manner consistent with  CLIA  requirements. It has not been cleared or approved by the [place]  Food and Drug Administration.  This test is used for clinical   purposes.  It should not be regarded as investigational or for  research.           Assessment:     1. Adrenal cortical steroids causing adverse effect in therapeutic use, subsequent encounter    2. Immunosuppression    3. Gastroesophageal reflux disease, esophagitis presence not specified    4. Hyperlipidemia, unspecified hyperlipidemia type    5. Obesity (BMI 30-39.9)    6. Heart transplant, orthotopic, status        Plan:     Patient continues to heal, pain per patient is well controlled right now.  RLE extremely swollen in the thigh, maybe related to wound vac not working for 2 days, however will get ultrasound of leg to evaluate for DVT.   Will see if we can figure out what is going on with his wound vac.  ADDENDUM: will send up to plastic surgery to have it evaluated, at first hard to get seen by wound care, and subsequently recommended plastics see it directly.     Return instructions as set forth by post transplant schedule or as needed:    Clinic: Return for labs and/or biopsy weekly the first month, every two weeks during month 2 and then monthly for the first year at the provider or coordinator's discretion. During the second year, return to clinic every 3 months. Post transplant year 3-5 return every 6 months. There will be a comprehensive post transplant evaluation every year that may include LHC/RHC/biopsy, stress test, echo, CXR, and other health screening exams.    In addition to the clinical assessment, I have ordered Allomap testing for this patient to assist in identification of moderate/severe acute cellular rejection (ACR) in a pt with stable Allograft function instead of endomyocardial biopsy.     Patient is reminded to call with any health changes as these can be early signs of transplant complications. Patient is advised to make sure any new medications or changes  of old medications are discussed with a pharmacist or physician knowledgeable with transplant to avoid rejection/drug toxicity related to significant drug interactions.    UNOS Patient Status  Functional Status: 80% - Normal activity with effort: some symptoms of disease  Physical Capacity: Limited Mobility  Working for Income: No  If no, reason not working: Unknown

## 2017-04-18 NOTE — PROGRESS NOTES
SW followed up with pt and wife today in transplant clinic. Pt requested assistance figuring out a bills he had received from [facility] for an ambulance ride [facility] had provided on 12/15/16 from pt's home to [place]. Pt received a bill for $73.94.     SW contacted Medicaid using the number on the back of pt's card. ANAND spoke to 'Christopher' who verified that pt has:    Take Charge Plus Program which started on Nov 1st 2016 and QMB which he has had since August of 2006. Pt has never had a co-pay for an ambulance ride prior to this.    ANAND then spoke to 'Mrs. Mariano' who was able to verify that Medicaid had NOT been billed for this ambulance service for 12/15/16. Also verified that pt does have coverage for this.    ANAND then called the number on the [facility], ph: [phone], and spoke to Rupinder Giles, who stated pt did not have Medicaid. ANAND had to explain several times that SW had just spoken to Medicaid and verified pt's coverage. Rupinder Giles looked in 'another system' and saw that pt did have QMB and coverage for co-pays. She stated she will submit the claim and pt should not receive another bill, she stated to tell pt it is taken care of.    Pt instructed by ANAND to contact  if/when he receives another bill.     Pt verbalized understanding and was very appreciative of assistance provided.    ANAND remains available for any further needs.

## 2017-04-18 NOTE — H&P
Subjective:      Mick Barriga is a 56 y.o. male with a who needs echo biopsy for evaluation of  cardiac rejection after 2/9/17 OHT.  Currently able to lay flat.      Past Medical History:   Diagnosis Date    CHF (congestive heart failure)     Coronary artery disease     GERD (gastroesophageal reflux disease)     Hyperlipidemia     Hypertension     LVAD (left ventricular assist device) present 2/13/2017    Type 2 diabetes mellitus with hyperglycemia      Past Surgical History:   Procedure Laterality Date    CARDIAC PACEMAKER PLACEMENT      CHOLECYSTECTOMY      COLONOSCOPY N/A 1/11/2017    Procedure: COLONOSCOPY;  Surgeon: Petr Almanzar MD;  Location: [facility] ([address] [place] [address]);  Service: Endoscopy;  Laterality: N/A;    COLONOSCOPY N/A 1/12/2017    Procedure: COLONOSCOPY;  Surgeon: Petr Almanzar MD;  Location: [facility] ([address] [place] [address]);  Service: Endoscopy;  Laterality: N/A;    HEART TRANSPLANT  02/2017    LEFT VENTRICULAR ASSIST DEVICE      x 3 months ago     Social History     Social History    Marital status:      Spouse name: N/A    Number of children: N/A    Years of education: N/A     Occupational History    Not on file.     Social History Main Topics    Smoking status: Former Smoker     Packs/day: 0.50     Years: 15.00     Quit date: 6/14/2006    Smokeless tobacco: Never Used    Alcohol use No    Drug use: Not on file    Sexual activity: Yes     Partners: Female     Other Topics Concern    Not on file     Social History Narrative     Family History   Problem Relation Age of Onset    Heart disease Mother     Hypertension Mother     Heart attack Mother     Heart disease Father     Hypertension Father     Heart attack Father     Cancer Brother            Other significant clinical information:  Review of patient's allergies indicates:   Allergen Reactions    Levemir [insulin detemir] Itching    Pork/porcine containing products     Ranexa [ranolazine] Nausea Only     No current  facility-administered medications on file prior to encounter.      Current Outpatient Prescriptions on File Prior to Encounter   Medication Sig    alendronate (FOSAMAX) 70 MG tablet TAKE 1 TABLET BY MOUTH EVERY 7 DAYS    amlodipine (NORVASC) 10 MG tablet Take 1 tablet (10 mg total) by mouth once daily.    aspirin (ECOTRIN) 81 MG EC tablet Take 1 tablet (81 mg total) by mouth once daily.    blood sugar diagnostic Strp To use to check BG 4 times daily, to use with insurance preferred meter    blood-glucose meter kit To use to check BG 4 times daily, to use with insurance preferred meter    calcium-vitamin D3 500 mg(1,250mg) -200 unit per tablet Take 1 tablet by mouth 2 (two) times daily with meals.    docusate sodium (COLACE) 100 MG capsule Take 2 capsules (200 mg total) by mouth every evening. (Patient taking differently: Take 100 mg by mouth 2 (two) times daily. )    esomeprazole (NEXIUM) 40 MG capsule Take 1 capsule (40 mg total) by mouth before breakfast.    ferrous gluconate (FERGON) 324 MG tablet Take 1 tablet (324 mg total) by mouth daily with breakfast.    furosemide (LASIX) 80 MG tablet Take 0.5 tablets (40 mg total) by mouth 2 (two) times daily.    insulin aspart (NOVOLOG) 100 unit/mL InPn pen Inject 15 Units into the skin 3 (three) times daily.    lancets Misc To use to check BG 4 times daily, to use with insurance preferred meter    pravastatin (PRAVACHOL) 40 MG tablet Take 1 tablet (40 mg total) by mouth once daily.    sulfamethoxazole-trimethoprim 400-80mg (BACTRIM,SEPTRA) 400-80 mg per tablet Take 1 tablet by mouth once daily.            Objective:      Physical Exam  BP  159 / 90  HR 98    General Appearance:    Alert, cooperative, no distress, appears stated age   Head:    Normocephalic, without obvious abnormality, atraumatic   Eyes:    PERRL, conjunctiva/corneas clear, EOM's intact, fundi     benign, both eyes        Ears:    Normal TM's and external ear canals, both ears   Nose:    Nares normal, septum midline, mucosa normal, no drainage    or sinus tenderness   Throat:   Lips, mucosa, and tongue normal; teeth and gums normal   Neck:   Supple, symmetrical, trachea midline, no adenopathy;        thyroid:  No enlargement/tenderness/nodules; no carotid    bruit or JVD   Back:     Symmetric, no curvature, ROM normal, no CVA tenderness   Lungs:     Clear to auscultation bilaterally, respirations unlabored   Chest wall:    No tenderness or deformity   Heart:    Regular rate and rhythm, S1 and S2 normal, no murmur, rub   or gallop   Abdomen:     Soft, non-tender, bowel sounds active all four quadrants,     no masses, no organomegaly   Genitalia:    Normal male without lesion, discharge or tenderness   Rectal:    Normal tone, normal prostate, no masses or tenderness;    guaiac negative stool   Extremities:   Extremities normal, atraumatic, no cyanosis or edema   Pulses:   2+ and symmetric all extremities   Skin:   Skin color, texture, turgor normal, no rashes or lesions   Lymph nodes:   Cervical, supraclavicular, and axillary nodes normal   Neurologic:   CNII-XII intact. Normal strength, sensation and reflexes       throughout         Lab Review   Lab Results   Component Value Date    WBC 6.17 04/18/2017    HGB 10.6 (L) 04/18/2017    HCT 35.2 (L) 04/18/2017    MCV 65 (L) 04/18/2017     04/10/2017     Lab Results   Component Value Date    INR 1.6 (H) 02/15/2017    INR 1.4 (H) 02/14/2017    INR 1.5 (H) 02/13/2017           Assessment:       Plan:     I have explained the risks, benefits, and alternatives of the procedure in detail.  The patient expresses understanding and all questions have been answered.  The patient agrees to the proceed as planned.   Echo biopsy via RIJ   Micropuncture access needle will be used to minimize bleeding risk.

## 2017-04-18 NOTE — IP AVS SNAPSHOT
[facility]  [address]  [facility] [address]  Phone: [phone]           Patient Discharge Instructions   Our goal is to set you up for success. This packet includes information on your condition, medications, and your home care.  It will help you care for yourself to prevent having to return to the hospital.     Please ask your nurse if you have any questions.      There are many details to remember when preparing to leave the hospital. Here is what you will need to do:    1. Take your medicine. If you are prescribed medications, review your Medication List on the following pages. You may have new medications to  at the pharmacy and others that you'll need to stop taking. Review the instructions for how and when to take your medications. Talk with your doctor or nurses if you are unsure of what to do.     2. Go to your follow-up appointments. Specific follow-up information is listed in the following pages. Your may be contacted by a nurse or clinical provider about future appointments. Be sure we have all of the phone numbers to reach you. Please contact your provider's office if you are unable to make an appointment.     3. Watch for warning signs. Your doctor or nurse will give you detailed warning signs to watch for and when to call for assistance. These instructions may also include educational information about your condition. If you experience any of warning signs to your health, call your doctor.           [facility]  Unless otherwise directed by your provider, please   contact [facility], our nurse care line   that is available for 24/7 assistance.     [phone] (toll-free)     Registered nurses in the [facility]   provide: appointment scheduling, clinical advisement, health education, and other advisory services.                  ** Verify the list of medication(s) below is accurate and up to date. Carry this with you in case of  emergency. If your medications have changed, please notify your healthcare provider.             Medication List      ASK your doctor about these medications        Additional Info                      alendronate 70 MG tablet   Commonly known as:  FOSAMAX   Quantity:  12 tablet   Refills:  11   Comments:  **Patient requests 90 days supply**    Instructions:  TAKE 1 TABLET BY MOUTH EVERY 7 DAYS     Begin Date    AM    Noon    PM    Bedtime       amlodipine 10 MG tablet   Commonly known as:  NORVASC   Quantity:  30 tablet   Refills:  11   Dose:  10 mg    Instructions:  Take 1 tablet (10 mg total) by mouth once daily.     Begin Date    AM    Noon    PM    Bedtime       amoxicillin 500 MG capsule   Commonly known as:  AMOXIL   Quantity:  30 capsule   Refills:  0   Dose:  500 mg   Indications:  Skin & soft tissue    Instructions:  Take 1 capsule (500 mg total) by mouth every 8 (eight) hours.     Begin Date    AM    Noon    PM    Bedtime       aspirin 81 MG EC tablet   Commonly known as:  ECOTRIN   Refills:  0   Dose:  81 mg    Instructions:  Take 1 tablet (81 mg total) by mouth once daily.     Begin Date    AM    Noon    PM    Bedtime       blood sugar diagnostic Strp   Quantity:  350 strip   Refills:  3    Instructions:  To use to check BG 4 times daily, to use with insurance preferred meter     Begin Date    AM    Noon    PM    Bedtime       blood-glucose meter kit   Quantity:  1 each   Refills:  0    Instructions:  To use to check BG 4 times daily, to use with insurance preferred meter     Begin Date    AM    Noon    PM    Bedtime       calcium-vitamin D3 500 mg(1,250mg) -200 unit per tablet   Quantity:  60 tablet   Refills:  11   Dose:  1 tablet    Instructions:  Take 1 tablet by mouth 2 (two) times daily with meals.     Begin Date    AM    Noon    PM    Bedtime       docusate sodium 100 MG capsule   Commonly known as:  COLACE   Quantity:  180 capsule   Refills:  3   Dose:  180 mg    Instructions:  Take 2 capsules  (200 mg total) by mouth every evening.     Begin Date    AM    Noon    PM    Bedtime       ergocalciferol 50,000 unit Cap   Commonly known as:  ERGOCALCIFEROL   Quantity:  4 capsule   Refills:  11   Dose:  76334 Units    Instructions:  Take 1 capsule (50,000 Units total) by mouth every 7 days.     Begin Date    AM    Noon    PM    Bedtime       esomeprazole 40 MG capsule   Commonly known as:  NEXIUM   Quantity:  90 capsule   Refills:  3   Dose:  40 mg    Instructions:  Take 1 capsule (40 mg total) by mouth before breakfast.     Begin Date    AM    Noon    PM    Bedtime       ferrous gluconate 324 MG tablet   Commonly known as:  FERGON   Quantity:  90 tablet   Refills:  3   Dose:  324 mg    Instructions:  Take 1 tablet (324 mg total) by mouth daily with breakfast.     Begin Date    AM    Noon    PM    Bedtime       furosemide 80 MG tablet   Commonly known as:  LASIX   Quantity:  60 tablet   Refills:  11   Dose:  40 mg    Instructions:  Take 0.5 tablets (40 mg total) by mouth 2 (two) times daily.     Begin Date    AM    Noon    PM    Bedtime       insulin aspart 100 unit/mL Inpn pen   Commonly known as:  NovoLOG   Quantity:  1 Box   Refills:  0   Dose:  15 Units    Instructions:  Inject 15 Units into the skin 3 (three) times daily.     Begin Date    AM    Noon    PM    Bedtime       insulin glargine 100 unit/mL (3 mL) Inpn pen   Commonly known as:  LANTUS SOLOSTAR   Quantity:  15 mL   Refills:  3   Dose:  20 Units    Instructions:  Inject 20 Units into the skin once daily.     Begin Date    AM    Noon    PM    Bedtime       lancets Misc   Quantity:  350 each   Refills:  3    Instructions:  To use to check BG 4 times daily, to use with insurance preferred meter     Begin Date    AM    Noon    PM    Bedtime       magnesium oxide 400 mg tablet   Commonly known as:  MAG-OX   Quantity:  540 tablet   Refills:  3   Dose:  800 mg    Instructions:  Take 2 tablets (800 mg total) by mouth 3 (three) times daily.     Begin Date     AM    Noon    PM    Bedtime       mycophenolate 250 mg Cap   Commonly known as:  CELLCEPT   Quantity:  360 capsule   Refills:  11   Dose:  1000 mg    Instructions:  Take 4 capsules (1,000 mg total) by mouth 2 (two) times daily.     Begin Date    AM    Noon    PM    Bedtime       * oxycodone 40 mg 12 hr tablet   Commonly known as:  OXYCONTIN   Quantity:  28 tablet   Refills:  0   Dose:  40 mg    Instructions:  Take 1 tablet (40 mg total) by mouth every 12 (twelve) hours.     Begin Date    AM    Noon    PM    Bedtime       * oxycodone 15 MG Tab   Commonly known as:  ROXICODONE   Quantity:  45 tablet   Refills:  0   Dose:  15 mg    Instructions:  Take 1 tablet (15 mg total) by mouth every 6 (six) hours as needed.     Begin Date    AM    Noon    PM    Bedtime       pravastatin 40 MG tablet   Commonly known as:  PRAVACHOL   Quantity:  30 tablet   Refills:  11   Dose:  40 mg    Instructions:  Take 1 tablet (40 mg total) by mouth once daily.     Begin Date    AM    Noon    PM    Bedtime       predniSONE 5 MG tablet   Commonly known as:  DELTASONE   Quantity:  30 tablet   Refills:  11   Dose:  10 mg    Instructions:  Take 2 tablets (10 mg total) by mouth once daily.     Begin Date    AM    Noon    PM    Bedtime       sulfamethoxazole-trimethoprim 400-80mg 400-80 mg per tablet   Commonly known as:  BACTRIM,SEPTRA   Quantity:  30 tablet   Refills:  11   Dose:  1 tablet   Indications:  Opportunistic infection prophylaxis    Instructions:  Take 1 tablet by mouth once daily.     Begin Date    AM    Noon    PM    Bedtime       tacrolimus 1 MG Cap   Commonly known as:  PROGRAF   Quantity:  60 capsule   Refills:  11    Instructions:  Take 1mg orally in the morning and 1mg orally in the evening     Begin Date    AM    Noon    PM    Bedtime       * valganciclovir 450 mg Tab   Commonly known as:  VALCYTE   Quantity:  180 tablet   Refills:  2   What changed:  Another medication with the same name was added. Make sure you understand how  and when to take each.   Comments:  **Patient requests 90 days supply**    Instructions:  TAKE 2 TABLETS(900 MG) BY MOUTH EVERY DAY     Begin Date    AM    Noon    PM    Bedtime       * valganciclovir 450 mg Tab   Commonly known as:  VALCYTE   Quantity:  180 tablet   Refills:  2   What changed:  You were already taking a medication with the same name, and this prescription was added. Make sure you understand how and when to take each.   Comments:  **Patient requests 90 days supply**    Instructions:  TAKE 2 TABLETS(900 MG) BY MOUTH EVERY DAY     Begin Date    AM    Noon    PM    Bedtime       * Notice:  This list has 4 medication(s) that are the same as other medications prescribed for you. Read the directions carefully, and ask your doctor or other care provider to review them with you.         Where to Get Your Medications      These medications were sent to [id number] [place] - [address] AT [facility] [address]  [address] [place] [address]    Hours:  24-hours Phone:  [phone]     valganciclovir 450 mg Tab                  Please bring to all follow up appointments:    1. A copy of your discharge instructions.  2. All medicines you are currently taking in their original bottles.  3. Identification and insurance card.    Please arrive 15 minutes ahead of scheduled appointment time.    Please call 24 hours in advance if you must reschedule your appointment and/or time.        Your Scheduled Appointments     Apr 18, 2017 10:00 AM CDT   Heart Transplant  Visit with GERARD,    [facility] ([facility] )    [address]  [place] [address]   [phone]            Apr 18, 2017 11:30 AM CDT   Established Patient Visit with Julia Gupta MD   [facility] ([facility] )    [address]  [place] [address]   [phone]            Apr 19, 2017  1:45 PM CDT   Post OP with  Aravind Palomino MD   [facility] [place] ([facility] )    [address] [facility] [address] [phone]            May 02, 2017  8:00 AM CDT   Fasting Lab with LAB, APPOINTMENT [place] [facility] ([facility])    [address] [facility] [address] [phone]            May 02, 2017 11:00 AM CDT   Established Patient Visit with Peewee Romero MD   [facility] ([facility] )    [address]  [place] [address] [phone]              Your Future Surgeries/Procedures     May 02, 2017   Surgery with Josefina Saul MD   [facility] ([facility])    [address] [facility] [address] [phone]                  Discharge Instructions       AFTER THE PROCEDURE:  -You may remove the bandage in 24 hours and wash with soap and water.  -You may shower, but do not soak in a tub for three days.   PRECAUTIONS FOR THE NEXT 24 HOURS:  -If you need to cough, sneeze, have a bowel movement, or bear down, hold pressure over your bandage.  -Do not  anything heavier than a gallon of milk(about 5 pounds)  -Avoid excessive bending over.  SYMPTOMS TO WATCH FOR AND REPORT TO YOUR DOCTOR:  -BLEEDING: hold pressure over the site until bleeding stops. Proceed to Emergency Room by ambulance (do not drive yourself) if unable to stop bleeding. Notify your doctor.  -HEMATOMA(hard bruise under the skin): Dioni around the bruise if one develops. Call your doctor if it increases in size or if you have difficulty talking, swallowing, breathing or anything unusual.  SIGNS OF INFECTION:Fever (temperature over 100.5 F), pus or redness  -RASH  -CHEST PAIN OR SHORTNESS OF BREATH    You may call you coordinator in the Heart Failure/Heart Transplant/Pulmonary Hypertension Clinic at [phone] during normal business hours(Monday through Friday from 8 A.M.  to 5 P.M.) After hours, call the Heart Transplant Service doctor on call at [phone].        Admission Information     Date & Time Provider Department CSN    4/18/2017  8:03 AM Peewee Romero MD [facility] [id number]      Care Providers     Provider Role Specialty Primary office phone    Peewee Romero MD Attending Provider Cardiology [phone]      Recent Lab Values        6/15/2015 8/20/2016 8/22/2016 8/23/2016 12/28/2016 2/9/2017            5:29 AM 12:00 AM  2:50 PM  4:00 AM 10:41 AM  2:17 PM      A1C 7.9 (H) 7.8 (H) 8.1 (H) 8.3 (H) 7.8 (H) 6.8 (H)      Comment for A1C at 12:00 AM on 8/20/2016:  According to ADA guidelines, hemoglobin A1C <7.0% represents  optimal control in non-pregnant diabetic patients.  Different  metrics may apply to specific populations.   Standards of Medical Care in Diabetes - 2016.  For the purpose of screening for the presence of diabetes:  <5.7%     Consistent with the absence of diabetes  5.7-6.4%  Consistent with increasing risk for diabetes   (prediabetes)  >or=6.5%  Consistent with diabetes  Currently no consensus exists for use of hemoglobin A1C  for diagnosis of diabetes for children.      Comment for A1C at  2:50 PM on 8/22/2016:  According to ADA guidelines, hemoglobin A1C <7.0% represents  optimal control in non-pregnant diabetic patients.  Different  metrics may apply to specific populations.   Standards of Medical Care in Diabetes - 2016.  For the purpose of screening for the presence of diabetes:  <5.7%     Consistent with the absence of diabetes  5.7-6.4%  Consistent with increasing risk for diabetes   (prediabetes)  >or=6.5%  Consistent with diabetes  Currently no consensus exists for use of hemoglobin A1C  for diagnosis of diabetes for children.      Comment for A1C at  4:00 AM on 8/23/2016:  According to ADA guidelines, hemoglobin A1C <7.0% represents  optimal control in non-pregnant diabetic patients.  Different  metrics may apply to specific  populations.   Standards of Medical Care in Diabetes - 2016.  For the purpose of screening for the presence of diabetes:  <5.7%     Consistent with the absence of diabetes  5.7-6.4%  Consistent with increasing risk for diabetes   (prediabetes)  >or=6.5%  Consistent with diabetes  Currently no consensus exists for use of hemoglobin A1C  for diagnosis of diabetes for children.      Comment for A1C at 10:41 AM on 12/28/2016:  According to ADA guidelines, hemoglobin A1C <7.0% represents  optimal control in non-pregnant diabetic patients.  Different  metrics may apply to specific populations.   Standards of Medical Care in Diabetes - 2016.  For the purpose of screening for the presence of diabetes:  <5.7%     Consistent with the absence of diabetes  5.7-6.4%  Consistent with increasing risk for diabetes   (prediabetes)  >or=6.5%  Consistent with diabetes  Currently no consensus exists for use of hemoglobin A1C  for diagnosis of diabetes for children.      Comment for A1C at  2:17 PM on 2/9/2017:  According to ADA guidelines, hemoglobin A1C <7.0% represents  optimal control in non-pregnant diabetic patients.  Different  metrics may apply to specific populations.   Standards of Medical Care in Diabetes - 2016.  For the purpose of screening for the presence of diabetes:  <5.7%     Consistent with the absence of diabetes  5.7-6.4%  Consistent with increasing risk for diabetes   (prediabetes)  >or=6.5%  Consistent with diabetes  Currently no consensus exists for use of hemoglobin A1C  for diagnosis of diabetes for children.        Pending Labs     Order Current Status    Specimen to Pathology - Surgery Collected (04/18/17 0916)      Allergies as of 4/18/2017        Reactions    Levemir [Insulin Detemir] Itching    Pork/porcine Containing Products     Ranexa [Ranolazine] Nausea Only      Advance Directives     An advance directive is a document which, in the event you are no longer able to make decisions for yourself, tells  your healthcare team what kind of treatment you do or do not want to receive, or who you would like to make those decisions for you.  If you do not currently have an advance directive, [facility] encourages you to create one.  For more information call:  [phone]), [phone] ([phone]),  or log on to [email or web address].        Smoking Cessation     If you would like to quit smoking:   You may be eligible for free services if you are a [place] resident and started smoking cigarettes before September 1, 1988.  Call the Smoking Cessation Trust (SCT) toll free at (651) 243-0815 or ([phone] 265-9865.   Call 0-161-QUIT-NOW if you do not meet the above criteria.   Contact us via email: [email or web address]   View our website for more information: [email or web address]        Language Assistance Services     ATTENTION: Language assistance services are available, free of charge. Please call [phone].      ATENCIÓN: Si habla español, tiene a cowart disposición servicios gratuitos de asistencia lingüística. Llame al [phone].     CHÚ Ý: N?u b?n nói Ti?ng Vi?t, có các d?ch v? h? tr? ngôn ng? mi?n phí dành cho b?n. G?i s? [phone].        Chronic Kindey Disease Education             Diabetes Discharge Instructions                                    [facility] complies with applicable Federal civil rights laws and does not discriminate on the basis of race, color, national origin, age, disability, or sex.

## 2017-04-18 NOTE — MR AVS SNAPSHOT
[facility]  [address]  [place] [address]  Phone: [phone]                  Mick Barriga   2017 11:30 AM   Office Visit    Description:  Male : 1960   Provider:  Julia Gupta MD   Department:  [facility]           Reason for Visit     Heart Transplant Follow-up           Diagnoses this Visit        Comments    Pain and swelling of right lower leg    -  Primary     Adrenal cortical steroids causing adverse effect in therapeutic use, subsequent encounter         Immunosuppression         Gastroesophageal reflux disease, esophagitis presence not specified         Hyperlipidemia, unspecified hyperlipidemia type         Obesity (BMI 30-39.9)         Heart transplant, orthotopic, status                To Do List           Future Appointments        Provider Department Dept Phone    2017 3:00 PM VASCULAR, CARDIOLOGY [facility] - Vascular Cardiology [phone]    2017 1:45 PM Aravind Palomino MD [facility] - Plastic Surg [place] [phone]    2017 8:00 AM LAB[place] [facility] [phone]    2017 11:00 AM Peewee Romero MD [facility] [phone]    2017 8:05 AM LAB, [place] [facility] [phone]      Your Future Surgeries/Procedures     May 02, 2017   Surgery with Josefina Saul MD   [facility] ([facility])    [address] [facility] [address] [phone]              Goals (5 Years of Data)     None       These Medications        Disp Refills Start End    gabapentin (NEURONTIN) 300 MG capsule 60 capsule 11 2017    Take 1 capsule (300 mg total) by mouth every evening. - Oral    Pharmacy: [id number] - [place] -  [address] AT [facility] [address] Ph #: [phone]         [facility] On Call     [facility] On Call Nurse Care Line -  24/7 Assistance  Unless otherwise directed by your provider, please contact [facility], our nurse care line that is available for 24/7 assistance.     Registered nurses in the [facility] provide: appointment scheduling, clinical advisement, health education, and other advisory services.  Call: [phone] (toll free)               Medications           Message regarding Medications     Verify the changes and/or additions to your medication regime listed below are the same as discussed with your clinician today.  If any of these changes or additions are incorrect, please notify your healthcare provider.        START taking these NEW medications        Refills    gabapentin (NEURONTIN) 300 MG capsule 11    Sig: Take 1 capsule (300 mg total) by mouth every evening.    Class: Normal    Route: Oral      STOP taking these medications     amiodarone (PACERONE) 400 MG tablet     docusate sodium (COLACE) 100 MG capsule Take 2 capsules (200 mg total) by mouth every evening.    lisinopril 10 MG tablet     oxycodone (OXYCONTIN) 40 mg 12 hr tablet Take 1 tablet (40 mg total) by mouth every 12 (twelve) hours.    oxycodone (ROXICODONE) 15 MG Tab Take 1 tablet (15 mg total) by mouth every 6 (six) hours as needed.    ROZEREM 8 mg tablet TK 1 T PO NIGHTLY PRF INSOMNIA    sertraline (ZOLOFT) 100 MG tablet TK ONE T PO QD    spironolactone (ALDACTONE) 25 MG tablet TK 1 T PO ONCE D    tamsulosin (FLOMAX) 0.4 mg Cp24 TK 1 C PO ONCE D    warfarin (COUMADIN) 1 MG tablet            Verify that the below list of medications is an accurate representation of the medications you are currently taking.  If none reported, the list may be blank. If incorrect, please contact your healthcare provider. Carry this list with you in case of emergency.           Current Medications     alendronate (FOSAMAX) 70 MG tablet TAKE 1 TABLET BY MOUTH EVERY 7 DAYS    amlodipine (NORVASC) 10 MG tablet Take 1 tablet (10 mg total) by mouth once  "daily.    amoxicillin (AMOXIL) 500 MG capsule Take 1 capsule (500 mg total) by mouth every 8 (eight) hours.    aspirin (ECOTRIN) 81 MG EC tablet Take 1 tablet (81 mg total) by mouth once daily.    BD INSULIN PEN NEEDLE UF SHORT 31 gauge x 5/16" Ndle     blood sugar diagnostic Strp To use to check BG 4 times daily, to use with insurance preferred meter    blood-glucose meter kit To use to check BG 4 times daily, to use with insurance preferred meter    calcium-vitamin D3 500 mg(1,250mg) -200 unit per tablet Take 1 tablet by mouth 2 (two) times daily with meals.    ergocalciferol (ERGOCALCIFEROL) 50,000 unit Cap Take 1 capsule (50,000 Units total) by mouth every 7 days.    esomeprazole (NEXIUM) 40 MG capsule Take 1 capsule (40 mg total) by mouth before breakfast.    ferrous gluconate (FERGON) 324 MG tablet Take 1 tablet (324 mg total) by mouth daily with breakfast.    furosemide (LASIX) 40 MG tablet TK ONE TABLET PO BID    gabapentin (NEURONTIN) 300 MG capsule Take 1 capsule (300 mg total) by mouth every evening.    insulin aspart (NOVOLOG) 100 unit/mL InPn pen Inject 15 Units into the skin 3 (three) times daily.    insulin glargine (LANTUS SOLOSTAR) 100 unit/mL (3 mL) InPn pen Inject 20 Units into the skin once daily.    lancets Misc To use to check BG 4 times daily, to use with insurance preferred meter    magnesium oxide (MAG-OX) 400 mg tablet Take 2 tablets (800 mg total) by mouth 3 (three) times daily.    mycophenolate (CELLCEPT) 250 mg Cap Take 4 capsules (1,000 mg total) by mouth 2 (two) times daily.    pravastatin (PRAVACHOL) 40 MG tablet Take 1 tablet (40 mg total) by mouth once daily.    predniSONE (DELTASONE) 5 MG tablet Take 2 tablets (10 mg total) by mouth once daily.    sulfamethoxazole-trimethoprim 400-80mg (BACTRIM,SEPTRA) 400-80 mg per tablet Take 1 tablet by mouth once daily.    tacrolimus (PROGRAF) 1 MG Cap Take 1mg orally in the morning and 1mg orally in the evening    valganciclovir (VALCYTE) 450 " "mg Tab TAKE 2 TABLETS(900 MG) BY MOUTH EVERY DAY           Clinical Reference Information           Your Vitals Were     BP Pulse Height Weight BMI    124/70 94 5' 8" (1.727 m) 92.4 kg (203 lb 11.3 oz) 30.97 kg/m2      Blood Pressure          Most Recent Value    BP  124/70      Allergies as of 4/18/2017     Levemir [Insulin Detemir]    Pork/porcine Containing Products    Ranexa [Ranolazine]      Immunizations Administered on Date of Encounter - 4/18/2017     None      Orders Placed During Today's Visit     Future Labs/Procedures Expected by Expires    CAR Ultrasound doppler venous leg right  As directed 4/18/2018      Maintenance Dialysis History     Patient has no recorded history of maintenance dialysis.      Transplant Information        Txp Date Organ Coordinator Care Team    2/9/2017 Heart Mckenzie Lawrence Referring Physician:  Fox Quinn MD   Corresponding Physician:  Fox Quinn MD   Surgeon:  Heebr Woodruff MD         Language Assistance Services     ATTENTION: Language assistance services are available, free of charge. Please call [phone].      ATENCIÓN: Si habla español, tiene a cowart disposición servicios gratuitos de asistencia lingüística. Llame al [phone].     JESSICA Ý: N?u b?n nói Ti?ng Vi?t, có các d?ch v? h? tr? ngôn ng? mi?n phí dành cho b?n. G?i s? [phone].         [facility] complies with applicable Federal civil rights laws and does not discriminate on the basis of race, color, national origin, age, disability, or sex.        "

## 2017-04-20 NOTE — PROGRESS NOTES
Doing well. Wound vac removed. Wound is clean and healing. Will D/C wound vac and continue with local wound care - Bacitracin and nonstick dressing.     Will RTC x 2 weeks.

## 2017-04-24 RX ORDER — OXYCODONE HYDROCHLORIDE 15 MG/1
15 TABLET ORAL EVERY 6 HOURS PRN
Qty: 45 TABLET | Refills: 0 | Status: CANCELLED | OUTPATIENT
Start: 2017-04-24

## 2017-04-24 RX ORDER — OXYCODONE HCL 40 MG/1
40 TABLET, FILM COATED, EXTENDED RELEASE ORAL EVERY 12 HOURS
Qty: 28 TABLET | Refills: 0 | Status: SHIPPED | OUTPATIENT
Start: 2017-04-24 | End: 2017-07-12 | Stop reason: SDUPTHER

## 2017-04-24 RX ORDER — OXYCODONE HYDROCHLORIDE 15 MG/1
15 TABLET ORAL EVERY 6 HOURS PRN
Qty: 45 TABLET | Refills: 0 | Status: SHIPPED | OUTPATIENT
Start: 2017-04-24 | End: 2017-07-12 | Stop reason: SDUPTHER

## 2017-04-24 RX ORDER — OXYCODONE HCL 40 MG/1
40 TABLET, FILM COATED, EXTENDED RELEASE ORAL EVERY 12 HOURS
Qty: 28 TABLET | Refills: 0 | Status: CANCELLED | OUTPATIENT
Start: 2017-04-24

## 2017-04-30 NOTE — PT/OT/SLP DISCHARGE
Occupational Therapy Discharge Summary    Mick Barriga  MRN: [id number]   Lymphocele after surgical procedure   Patient Discharged from acute Occupational Therapy on 4/10/2017.  Please refer to prior OT note dated on 4/9/2017 for functional status.     Assessment:   Patient appropriate for care in another setting.  GOALS:   Occupational Therapy Goals        Problem: Occupational Therapy Goal    Goal Priority Disciplines Outcome Interventions   Occupational Therapy Goal     OT, PT/OT Ongoing (interventions implemented as appropriate)    Description:  Goals to be met by: 04/13/17     Patient will increase functional independence with ADLs by performing:    UE Dressing with Set-up Assistance.  LE Dressing with Moderate Assistance.  Grooming while standing with Contact Guard Assistance.  Toileting from bedside commode with Moderate Assistance for hygiene and clothing management.   Supine to sit with Contact Guard Assistance. Met 4/9  Revised:  Supine to sit from flat bed/no rail at mod indep level  Toilet transfer to bedside commode with Moderate Assistance. Met 4/9  Revised:  Toilet transfer with supervision with RW  Upper extremity exercise program x12 reps per handout, with supervision.               Reasons for Discontinuation of Therapy Services  Transfer to alternate level of care.      Plan:  Patient Discharged to: Home with Home Health Service.

## 2017-05-01 DIAGNOSIS — Z94.1 S/P ORTHOTOPIC HEART TRANSPLANT: Primary | ICD-10-CM

## 2017-05-01 DIAGNOSIS — I10 ESSENTIAL (PRIMARY) HYPERTENSION: ICD-10-CM

## 2017-05-02 ENCOUNTER — TELEPHONE (OUTPATIENT)
Dept: ENDOCRINOLOGY | Facility: HOSPITAL | Age: 57
End: 2017-05-02

## 2017-05-02 ENCOUNTER — OFFICE VISIT (OUTPATIENT)
Dept: TRANSPLANT | Facility: CLINIC | Age: 57
End: 2017-05-02
Payer: MEDICARE

## 2017-05-02 ENCOUNTER — HOSPITAL ENCOUNTER (OUTPATIENT)
Facility: HOSPITAL | Age: 57
Discharge: HOME | End: 2017-05-02
Attending: INTERNAL MEDICINE | Admitting: INTERNAL MEDICINE
Payer: MEDICARE

## 2017-05-02 VITALS
BODY MASS INDEX: 31.04 KG/M2 | HEART RATE: 96 BPM | HEIGHT: 68 IN | SYSTOLIC BLOOD PRESSURE: 140 MMHG | DIASTOLIC BLOOD PRESSURE: 83 MMHG | WEIGHT: 204.81 LBS

## 2017-05-02 DIAGNOSIS — N18.30 TYPE 2 DIABETES MELLITUS WITH STAGE 3 CHRONIC KIDNEY DISEASE, WITH LONG-TERM CURRENT USE OF INSULIN: Chronic | ICD-10-CM

## 2017-05-02 DIAGNOSIS — Z94.1 S/P ORTHOTOPIC HEART TRANSPLANT: Primary | ICD-10-CM

## 2017-05-02 DIAGNOSIS — E66.9 OBESITY (BMI 30-39.9): Chronic | ICD-10-CM

## 2017-05-02 DIAGNOSIS — Z79.4 TYPE 2 DIABETES MELLITUS WITH STAGE 3 CHRONIC KIDNEY DISEASE, WITH LONG-TERM CURRENT USE OF INSULIN: Chronic | ICD-10-CM

## 2017-05-02 DIAGNOSIS — E11.22 TYPE 2 DIABETES MELLITUS WITH STAGE 3 CHRONIC KIDNEY DISEASE, WITH LONG-TERM CURRENT USE OF INSULIN: Chronic | ICD-10-CM

## 2017-05-02 DIAGNOSIS — I10 ESSENTIAL HYPERTENSION: Chronic | ICD-10-CM

## 2017-05-02 PROCEDURE — 88342 IMHCHEM/IMCYTCHM 1ST ANTB: CPT | Mod: 26,,, | Performed by: PATHOLOGY

## 2017-05-02 PROCEDURE — 93505 ENDOMYOCARDIAL BIOPSY: CPT | Mod: 26,,, | Performed by: INTERNAL MEDICINE

## 2017-05-02 PROCEDURE — 99214 OFFICE O/P EST MOD 30 MIN: CPT | Mod: S$PBB,,, | Performed by: INTERNAL MEDICINE

## 2017-05-02 PROCEDURE — 93306 TTE W/DOPPLER COMPLETE: CPT | Mod: 26,,, | Performed by: INTERNAL MEDICINE

## 2017-05-02 PROCEDURE — 88307 TISSUE EXAM BY PATHOLOGIST: CPT | Mod: 26,,, | Performed by: PATHOLOGY

## 2017-05-02 PROCEDURE — 99999 PR PBB SHADOW E&M-EST. PATIENT-LVL IV: CPT | Mod: 25,PBBFAC,, | Performed by: INTERNAL MEDICINE

## 2017-05-02 PROCEDURE — 99214 OFFICE O/P EST MOD 30 MIN: CPT | Mod: 25,PBBFAC | Performed by: INTERNAL MEDICINE

## 2017-05-02 PROCEDURE — 93306 TTE W/DOPPLER COMPLETE: CPT

## 2017-05-02 RX ORDER — NYSTATIN 100000 [USP'U]/ML
SUSPENSION ORAL
Refills: 5 | COMMUNITY
Start: 2017-04-20 | End: 2017-07-12 | Stop reason: SDUPTHER

## 2017-05-02 NOTE — PROGRESS NOTES
Subjective:   Mr. Barriga is a 56 y.o. year old White male who received a  - brain death heart transplant on 17.      CMV status:   Donor: -  Recipient: +    HPI  Mr. Barriga is a very pleasant 56 year old male with PMHx of NICM s/p HM II (16), HLD, HTN, VT s/p ICD now s/p OHTx 17 who comes for his regular post-Tx clinic visit.. Pt presented to the hospital for elevated LDH in the setting of subtherapeutic INR with lower extremity swelling concerning for LVAD pump thrombosis, made status 1a and underwent OHTx 17. Post op course complicated by bradycardia and he was started on terbutaline 5 mg TID. Most recently was admitted from -04/10 for increased foul smelling drainage from right groin. Patient seen by wound care and plastics, he was taken to OR for I&D right groin and right femoris muscle flap, with wound vac placed. He had significant pain issues, even needing PCA following surgery, but eventually was able to be transitioned to oral pain meds. The wound had E. Faecalis, placed on amoxicillin (seen by ID). He states the pain has been well controlled, wound VAC was removed last week. Wound is still draining. Underwent an echo guided RV biopsy today. Results are still pending but LV function assessed 2 weeks ago was preserved with an estimated EF=60%. Creatinine is WNL. Had his 2nd Allomap drawn today.      Immunosuppression regimen includes: tacro , cellcept 1000 BID, pred 5 mg po daily.      Review of Systems   Constitution: Negative. Negative for chills, decreased appetite, diaphoresis, fever, weakness, malaise/fatigue, night sweats, weight gain and weight loss.   Eyes: Negative.    Cardiovascular: Negative for chest pain, claudication, cyanosis, dyspnea on exertion, irregular heartbeat, leg swelling, near-syncope, orthopnea, palpitations, paroxysmal nocturnal dyspnea and syncope.   Respiratory: Negative for cough, hemoptysis and shortness of breath.    Endocrine: Negative.   "  Hematologic/Lymphatic: Negative.    Skin: Negative for color change, dry skin and nail changes.   Musculoskeletal: Negative.    Gastrointestinal: Negative.    Genitourinary: Negative.        Objective:   Blood pressure (!) 140/83, pulse 96, height 5' 8" (1.727 m), weight 92.9 kg (204 lb 12.9 oz).body mass index is 31.14 kg/(m^2).    Physical Exam   Constitutional: He appears well-developed.   BP (!) 140/83  Pulse 96  Ht 5' 8" (1.727 m)  Wt 92.9 kg (204 lb 12.9 oz)  BMI 31.14 kg/m2     HENT:   Head: Normocephalic.   Neck: No JVD present. Carotid bruit is not present.   Cardiovascular: Regular rhythm, normal heart sounds and normal pulses.  PMI is not displaced.  Exam reveals no gallop and no S3.    No murmur heard.  Pulmonary/Chest: Effort normal and breath sounds normal. No respiratory distress. He has no wheezes. He has no rales.   Abdominal: Soft. Bowel sounds are normal. He exhibits no distension. There is no tenderness. There is no rebound.   Musculoskeletal: He exhibits no edema.   Neurological: He is alert.   Skin: Skin is warm.   Vitals reviewed.      Lab Results   Component Value Date    WBC 6.17 04/18/2017    HGB 10.6 (L) 04/18/2017    HCT 35.2 (L) 04/18/2017    MCV 65 (L) 04/18/2017     04/18/2017    CO2 33 (H) 05/02/2017    CREATININE 1.2 05/02/2017    CALCIUM 9.5 05/02/2017    ALBUMIN 3.9 05/02/2017    AST 23 05/02/2017     (H) 05/02/2017    ALT 17 05/02/2017    ALLOMAP 29 04/18/2017       Lab Results   Component Value Date    INR 1.6 (H) 02/15/2017    INR 1.4 (H) 02/14/2017    INR 1.5 (H) 02/13/2017       BNP   Date Value Ref Range Status   05/02/2017 189 (H) 0 - 99 pg/mL Final     Comment:     Values of less than 100 pg/ml are consistent with non-CHF populations.   04/18/2017 185 (H) 0 - 99 pg/mL Final     Comment:     Values of less than 100 pg/ml are consistent with non-CHF populations.   03/22/2017 333 (H) 0 - 99 pg/mL Final     Comment:     Values of less than 100 pg/ml are " consistent with non-CHF populations.       LD   Date Value Ref Range Status   02/08/2017 523 (H) 110 - 260 U/L Final     Comment:     Results are increased in hemolyzed samples.   02/07/2017 536 (H) 110 - 260 U/L Final     Comment:     Results are increased in hemolyzed samples.   02/06/2017 584 (H) 110 - 260 U/L Final     Comment:     Results are increased in hemolyzed samples.       Tacrolimus Lvl   Date Value Ref Range Status   04/18/2017 10.8 5.0 - 15.0 ng/mL Final     Comment:     Testing performed by Liquid Chromatography-Tandem  Mass Spectrometry (LC-MS/MS).  This test was developed and its performance characteristics  determined by [facility], Department of Pathology  and Laboratory Medicine in a manner consistent with CLIA  requirements. It has not been cleared or approved by the [place]  Food and Drug Administration.  This test is used for clinical   purposes.  It should not be regarded as investigational or for  research.       No results found for: SIROLIMUS  No results found for: CYCLOSPORINE    No results found for this or any previous visit.  No results found for this or any previous visit.    Labs were reviewed with the patient.    Assessment:     1. S/P orthotopic heart transplant    2. Essential hypertension    3. Type 2 diabetes mellitus with stage 3 chronic kidney disease, with long-term current use of insulin    4. Obesity (BMI 30-39.9)        Plan:   Doing well  Decrease Lasix to 40 mg daily.   Continue to monitor prograf level and adjust regimen accordingly  F/U echo and biopsy results    Return instructions as set forth by post transplant schedule or as needed:    Clinic: Return for labs and/or biopsy weekly the first month, every two weeks during month 2 and then monthly for the first year at the provider or coordinator's discretion. During the second year, return to clinic every 3 months. Post transplant year 3-5 return every 6 months. There will be a comprehensive post transplant  evaluation every year that may include LHC/RHC/biopsy, stress test, echo, CXR, and other health screening exams.    In addition to the clinical assessment, I have ordered Allomap testing for this patient to assist in identification of moderate/severe acute cellular rejection (ACR) in a pt with stable Allograft function instead of endomyocardial biopsy.     Patient is reminded to call with any health changes as these can be early signs of transplant complications. Patient is advised to make sure any new medications or changes of old medications are discussed with a pharmacist or physician knowledgeable with transplant to avoid rejection/drug toxicity related to significant drug interactions.    UNOS Patient Status  Functional Status: 80% - Normal activity with effort: some symptoms of disease  Physical Capacity: Limited Mobility  Working for Income: Unknown    Peewee Romero MD

## 2017-05-02 NOTE — DISCHARGE INSTRUCTIONS

## 2017-05-02 NOTE — LETTER
May 2, 2017        Fox Quinn  [address] [place] [address] [address]  [id number]  CARDIOLOGY CENTER  [place] [address]  Phone: [phone]  Fax: [phone]             [facility] [address]  [facility] [address]  Phone: [phone]   Patient: Mick Barriga   MR Number: [id number]   YOB: 1960   Date of Visit: 5/2/2017       Dear Dr. Fox Quinn    Thank you for referring Mick Barriga to me for evaluation. Attached you will find relevant portions of my assessment and plan of care.    If you have questions, please do not hesitate to call me. I look forward to following Mick Barriga along with you.    Sincerely,    Peewee Romero MD    Enclosure    If you would like to receive this communication electronically, please contact [email or web address] or [phone] to request [facility] Link access.    [facility] is a tool which provides read-only access to select patient information with whom you have a relationship. Its easy to use and provides real time access to review your patients record including encounter summaries, notes, results, and demographic information.    If you feel you have received this communication in error or would no longer like to receive these types of communications, please e-mail [email or web address]

## 2017-05-02 NOTE — INTERVAL H&P NOTE
No significant interval change from H&P from 04/18/17. Patient presents for his 6th post-OHT biopsy. Doing well, healing from his groin/leg. I have explained the risks, benefits, and alternatives of the procedure in detail.  The patient voices understanding and all questions have been answered.  The patient agrees to proceed as planned.

## 2017-05-02 NOTE — MR AVS SNAPSHOT
[facility]  [address]  [place] [address]  Phone: [phone]                  Mick Barriga   2017 11:00 AM   Appointment    Description:  Male : 1960   Provider:  Peewee Romero MD   Department:  [facility]                To Do List           Future Appointments        Provider Department Dept Phone    2017 8:00 AM LAB, [place]POINT[place] [facility] [phone]    2017 11:00 AM Peewee Romero MD [facility] [phone]    2017 8:05 AM LAB, [place]POINT[place] [facility] [phone]    2017 8:45 AM ECHO, [facility] - Echo/Stress Lab [phone]    2017 10:30 AM Peewee Romero MD [facility] [phone]      Your Future Surgeries/Procedures     May 02, 2017   Surgery with Julia Gupta MD   [facility] ([facility])    [address] [facility] [address] [phone]              Goals (5 Years of Data)     None      [facility]     [facility] Nurse Care Line - 24/ Assistance  Unless otherwise directed by your provider, please contact [facility], our nurse care line that is available for  assistance.     Registered nurses in the [facility] provide: appointment scheduling, clinical advisement, health education, and other advisory services.  Call: [phone] (toll free)               Medications           Message regarding Medications     Verify the changes and/or additions to your medication regime listed below are the same as discussed with your clinician today.  If any of these changes or additions are incorrect, please notify your healthcare provider.             Verify that the below list of medications is an accurate representation of the medications you are currently taking.  If none reported, the list may be blank. If incorrect, please contact your healthcare  "provider. Carry this list with you in case of emergency.           Current Medications     alendronate (FOSAMAX) 70 MG tablet TAKE 1 TABLET BY MOUTH EVERY 7 DAYS    amlodipine (NORVASC) 10 MG tablet Take 1 tablet (10 mg total) by mouth once daily.    amoxicillin (AMOXIL) 500 MG capsule Take 1 capsule (500 mg total) by mouth every 8 (eight) hours.    aspirin (ECOTRIN) 81 MG EC tablet Take 1 tablet (81 mg total) by mouth once daily.    BD INSULIN PEN NEEDLE UF SHORT 31 gauge x 5/16" Ndle     blood sugar diagnostic Strp To use to check BG 4 times daily, to use with insurance preferred meter    blood-glucose meter kit To use to check BG 4 times daily, to use with insurance preferred meter    calcium-vitamin D3 500 mg(1,250mg) -200 unit per tablet Take 1 tablet by mouth 2 (two) times daily with meals.    ergocalciferol (ERGOCALCIFEROL) 50,000 unit Cap Take 1 capsule (50,000 Units total) by mouth every 7 days.    esomeprazole (NEXIUM) 40 MG capsule Take 1 capsule (40 mg total) by mouth before breakfast.    ferrous gluconate (FERGON) 324 MG tablet Take 1 tablet (324 mg total) by mouth daily with breakfast.    furosemide (LASIX) 40 MG tablet TK ONE TABLET PO BID    gabapentin (NEURONTIN) 300 MG capsule Take 1 capsule (300 mg total) by mouth every evening.    insulin aspart (NOVOLOG) 100 unit/mL InPn pen Inject 15 Units into the skin 3 (three) times daily.    insulin glargine (LANTUS SOLOSTAR) 100 unit/mL (3 mL) InPn pen Inject 20 Units into the skin once daily.    lancets Misc To use to check BG 4 times daily, to use with insurance preferred meter    magnesium oxide (MAG-OX) 400 mg tablet Take 2 tablets (800 mg total) by mouth 3 (three) times daily.    mycophenolate (CELLCEPT) 250 mg Cap Take 4 capsules (1,000 mg total) by mouth 2 (two) times daily.    oxycodone (OXYCONTIN) 40 mg 12 hr tablet Take 1 tablet (40 mg total) by mouth every 12 (twelve) hours.    oxycodone (ROXICODONE) 15 MG Tab Take 1 tablet (15 mg total) by " mouth every 6 (six) hours as needed for Pain.    pravastatin (PRAVACHOL) 40 MG tablet Take 1 tablet (40 mg total) by mouth once daily.    predniSONE (DELTASONE) 5 MG tablet Take 1 tablet (5 mg total) by mouth once daily.    sulfamethoxazole-trimethoprim 400-80mg (BACTRIM,SEPTRA) 400-80 mg per tablet Take 1 tablet by mouth once daily.    tacrolimus (PROGRAF) 1 MG Cap Take 1mg orally in the morning and 1mg orally in the evening    valganciclovir (VALCYTE) 450 mg Tab TAKE 2 TABLETS(900 MG) BY MOUTH EVERY DAY           Clinical Reference Information           Allergies as of 5/2/2017     Levemir [Insulin Detemir]    Pork/porcine Containing Products    Ranexa [Ranolazine]      Immunizations Administered on Date of Encounter - 5/2/2017     None      Maintenance Dialysis History     Patient has no recorded history of maintenance dialysis.      Transplant Information        Txp Date Organ Coordinator Care Team    2/9/2017 Heart Mckenzie Lawrence Referring Physician:  Fox Quinn MD   Corresponding Physician:  Fox Quinn MD   Surgeon:  Heber Woodruff MD         Language Assistance Services     ATTENTION: Language assistance services are available, free of charge. Please call [phone].      ATENCIÓN: Si habla ernestineañol, tiene a cowart disposición servicios gratuitos de asistencia lingüística. Llame al [phone].     [facility] Ý: N?u b?n nói Ti?ng Vi?t, có các d?ch v? h? tr? ngôn ng? mi?n phí dành cho b?n. G?i s? [phone].         [facility] complies with applicable Federal civil rights laws and does not discriminate on the basis of race, color, national origin, age, disability, or sex.

## 2017-05-02 NOTE — BRIEF OP NOTE
Patient tolerated the procedure well. Please see complete echo-guided biopsy procedure note for full details and results. Stable to return from cath lab to their home.   Procedure: echo-guided EMBX  Procedure date: 05/02/17    Discharge date: 05/02/17  Estimated blood loss: less than 10 cc  Complications: none  Condition at discharge: stable  Discharge instructions: no heavy lifting greater than 5 lbs and no bearing down/coughing without holding pressure over incision site.

## 2017-05-02 NOTE — IP AVS SNAPSHOT
[facility]  [address]  [facility] [address]  Phone: [phone]           Patient Discharge Instructions   Our goal is to set you up for success. This packet includes information on your condition, medications, and your home care.  It will help you care for yourself to prevent having to return to the hospital.     Please ask your nurse if you have any questions.      There are many details to remember when preparing to leave the hospital. Here is what you will need to do:    1. Take your medicine. If you are prescribed medications, review your Medication List on the following pages. You may have new medications to  at the pharmacy and others that you'll need to stop taking. Review the instructions for how and when to take your medications. Talk with your doctor or nurses if you are unsure of what to do.     2. Go to your follow-up appointments. Specific follow-up information is listed in the following pages. Your may be contacted by a nurse or clinical provider about future appointments. Be sure we have all of the phone numbers to reach you. Please contact your provider's office if you are unable to make an appointment.     3. Watch for warning signs. Your doctor or nurse will give you detailed warning signs to watch for and when to call for assistance. These instructions may also include educational information about your condition. If you experience any of warning signs to your health, call your doctor.           [facility]  Unless otherwise directed by your provider, please   contact [facility], our nurse care line   that is available for 24/7 assistance.     [phone] (toll-free)     Registered nurses in the [facility]   provide: appointment scheduling, clinical advisement, health education, and other advisory services.                  ** Verify the list of medication(s) below is accurate and up to date. Carry this with you in case of  "emergency. If your medications have changed, please notify your healthcare provider.             Medication List      ASK your doctor about these medications        Additional Info                      alendronate 70 MG tablet   Commonly known as:  FOSAMAX   Quantity:  12 tablet   Refills:  11   Comments:  **Patient requests 90 days supply**    Instructions:  TAKE 1 TABLET BY MOUTH EVERY 7 DAYS     Begin Date    AM    Noon    PM    Bedtime       amlodipine 10 MG tablet   Commonly known as:  NORVASC   Quantity:  30 tablet   Refills:  11   Dose:  10 mg    Instructions:  Take 1 tablet (10 mg total) by mouth once daily.     Begin Date    AM    Noon    PM    Bedtime       amoxicillin 500 MG capsule   Commonly known as:  AMOXIL   Quantity:  30 capsule   Refills:  0   Dose:  500 mg   Indications:  Skin & soft tissue    Instructions:  Take 1 capsule (500 mg total) by mouth every 8 (eight) hours.     Begin Date    AM    Noon    PM    Bedtime       aspirin 81 MG EC tablet   Commonly known as:  ECOTRIN   Refills:  0   Dose:  81 mg    Instructions:  Take 1 tablet (81 mg total) by mouth once daily.     Begin Date    AM    Noon    PM    Bedtime       BD INSULIN PEN NEEDLE UF SHORT 31 gauge x 5/16" Ndle   Refills:  0   Generic drug:  pen needle, diabetic      Begin Date    AM    Noon    PM    Bedtime       blood sugar diagnostic Strp   Quantity:  350 strip   Refills:  3    Instructions:  To use to check BG 4 times daily, to use with insurance preferred meter     Begin Date    AM    Noon    PM    Bedtime       blood-glucose meter kit   Quantity:  1 each   Refills:  0    Instructions:  To use to check BG 4 times daily, to use with insurance preferred meter     Begin Date    AM    Noon    PM    Bedtime       calcium-vitamin D3 500 mg(1,250mg) -200 unit per tablet   Quantity:  60 tablet   Refills:  11   Dose:  1 tablet    Instructions:  Take 1 tablet by mouth 2 (two) times daily with meals.     Begin Date    AM    Noon    PM    " Bedtime       ergocalciferol 50,000 unit Cap   Commonly known as:  ERGOCALCIFEROL   Quantity:  4 capsule   Refills:  11   Dose:  75328 Units    Instructions:  Take 1 capsule (50,000 Units total) by mouth every 7 days.     Begin Date    AM    Noon    PM    Bedtime       esomeprazole 40 MG capsule   Commonly known as:  NEXIUM   Quantity:  90 capsule   Refills:  3   Dose:  40 mg    Instructions:  Take 1 capsule (40 mg total) by mouth before breakfast.     Begin Date    AM    Noon    PM    Bedtime       ferrous gluconate 324 MG tablet   Commonly known as:  FERGON   Quantity:  90 tablet   Refills:  3   Dose:  324 mg    Instructions:  Take 1 tablet (324 mg total) by mouth daily with breakfast.     Begin Date    AM    Noon    PM    Bedtime       furosemide 40 MG tablet   Commonly known as:  LASIX   Refills:  5    Instructions:  TK ONE TABLET PO BID     Begin Date    AM    Noon    PM    Bedtime       gabapentin 300 MG capsule   Commonly known as:  NEURONTIN   Quantity:  60 capsule   Refills:  11   Dose:  300 mg    Instructions:  Take 1 capsule (300 mg total) by mouth every evening.     Begin Date    AM    Noon    PM    Bedtime       insulin aspart 100 unit/mL Inpn pen   Commonly known as:  NovoLOG   Quantity:  1 Box   Refills:  0   Dose:  15 Units    Instructions:  Inject 15 Units into the skin 3 (three) times daily.     Begin Date    AM    Noon    PM    Bedtime       insulin glargine 100 unit/mL (3 mL) Inpn pen   Commonly known as:  LANTUS SOLOSTAR   Quantity:  15 mL   Refills:  3   Dose:  20 Units    Instructions:  Inject 20 Units into the skin once daily.     Begin Date    AM    Noon    PM    Bedtime       lancets Misc   Quantity:  350 each   Refills:  3    Instructions:  To use to check BG 4 times daily, to use with insurance preferred meter     Begin Date    AM    Noon    PM    Bedtime       magnesium oxide 400 mg tablet   Commonly known as:  MAG-OX   Quantity:  540 tablet   Refills:  3   Dose:  800 mg    Instructions:   Take 2 tablets (800 mg total) by mouth 3 (three) times daily.     Begin Date    AM    Noon    PM    Bedtime       mycophenolate 250 mg Cap   Commonly known as:  CELLCEPT   Quantity:  360 capsule   Refills:  11   Dose:  1000 mg    Instructions:  Take 4 capsules (1,000 mg total) by mouth 2 (two) times daily.     Begin Date    AM    Noon    PM    Bedtime       * oxycodone 15 MG Tab   Commonly known as:  ROXICODONE   Quantity:  45 tablet   Refills:  0   Dose:  15 mg    Instructions:  Take 1 tablet (15 mg total) by mouth every 6 (six) hours as needed for Pain.     Begin Date    AM    Noon    PM    Bedtime       * oxycodone 40 mg 12 hr tablet   Commonly known as:  OXYCONTIN   Quantity:  28 tablet   Refills:  0   Dose:  40 mg    Instructions:  Take 1 tablet (40 mg total) by mouth every 12 (twelve) hours.     Begin Date    AM    Noon    PM    Bedtime       pravastatin 40 MG tablet   Commonly known as:  PRAVACHOL   Quantity:  30 tablet   Refills:  11   Dose:  40 mg    Instructions:  Take 1 tablet (40 mg total) by mouth once daily.     Begin Date    AM    Noon    PM    Bedtime       predniSONE 5 MG tablet   Commonly known as:  DELTASONE   Quantity:  30 tablet   Refills:  11   Dose:  5 mg    Instructions:  Take 1 tablet (5 mg total) by mouth once daily.     Begin Date    AM    Noon    PM    Bedtime       sulfamethoxazole-trimethoprim 400-80mg 400-80 mg per tablet   Commonly known as:  BACTRIM,SEPTRA   Quantity:  30 tablet   Refills:  11   Dose:  1 tablet   Indications:  Opportunistic infection prophylaxis    Instructions:  Take 1 tablet by mouth once daily.     Begin Date    AM    Noon    PM    Bedtime       tacrolimus 1 MG Cap   Commonly known as:  PROGRAF   Quantity:  60 capsule   Refills:  11    Instructions:  Take 1mg orally in the morning and 1mg orally in the evening     Begin Date    AM    Noon    PM    Bedtime       valganciclovir 450 mg Tab   Commonly known as:  VALCYTE   Quantity:  180 tablet   Refills:  2    Comments:  **Patient requests 90 days supply**    Instructions:  TAKE 2 TABLETS(900 MG) BY MOUTH EVERY DAY     Begin Date    AM    Noon    PM    Bedtime       * Notice:  This list has 2 medication(s) that are the same as other medications prescribed for you. Read the directions carefully, and ask your doctor or other care provider to review them with you.               Please bring to all follow up appointments:    1. A copy of your discharge instructions.  2. All medicines you are currently taking in their original bottles.  3. Identification and insurance card.    Please arrive 15 minutes ahead of scheduled appointment time.    Please call 24 hours in advance if you must reschedule your appointment and/or time.        Your Scheduled Appointments     May 02, 2017 11:00 AM CDT   Established Patient Visit with Peewee Romero MD   [facility] [address] [place] [address]   [phone]            Jun 12, 2017  8:05 AM CDT   Fasting Lab with LAB, APPOINTMENT [place] [facility] ([facility])    [address] [place] [address]   [phone]            Jun 12, 2017  8:45 AM CDT   Color Flow Doppler Echo with ECHO, [facility] - Echo/Stress Lab ([facility] ) [address] [place] [address]   [phone]            Jun 12, 2017 10:30 AM CDT   Established Patient Visit with Peewee Romero MD   [facility] ([facility] ) [address] [place] [address]   [phone]            Jun 13, 2017 10:15 AM CDT   Established Patient Visit with MAURA Chatterjee MD   [facility] - Urology ([facility])    [address] [facility] [address]  [facility] [id number] [phone]                  Discharge Instructions       AFTER THE PROCEDURE:  -You may remove the bandage in 24 hours and wash with soap and water.  -You may shower, but do not soak  in a tub for three days.   PRECAUTIONS FOR THE NEXT 24 HOURS:  -If you need to cough, sneeze, have a bowel movement, or bear down, hold pressure over your bandage.  -Do not  anything heavier than a gallon of milk(about 5 pounds)  -Avoid excessive bending over.  SYMPTOMS TO WATCH FOR AND REPORT TO YOUR DOCTOR:  -BLEEDING: hold pressure over the site until bleeding stops. Proceed to Emergency Room by ambulance (do not drive yourself) if unable to stop bleeding. Notify your doctor.  -HEMATOMA(hard bruise under the skin): Dioni around the bruise if one develops. Call your doctor if it increases in size or if you have difficulty talking, swallowing, breathing or anything unusual.  SIGNS OF INFECTION:Fever (temperature over 100.5 F), pus or redness  -RASH  -CHEST PAIN OR SHORTNESS OF BREATH    You may call you coordinator in the Heart Failure/Heart Transplant/Pulmonary Hypertension Clinic at [phone] during normal business hours(Monday through Friday from 8 A.M. to 5 P.M.) After hours, call the Heart Transplant Service doctor on call at [phone].        Admission Information     Date & Time Provider Department CSN    5/2/2017  7:57 AM Josefina Saul MD [facility] [id number]      Care Providers     Provider Role Specialty Primary office phone    Josefina Saul MD Attending Provider Cardiology [phone]      Recent Lab Values        6/15/2015 8/20/2016 8/22/2016 8/23/2016 12/28/2016 2/9/2017            5:29 AM 12:00 AM  2:50 PM  4:00 AM 10:41 AM  2:17 PM      A1C 7.9 (H) 7.8 (H) 8.1 (H) 8.3 (H) 7.8 (H) 6.8 (H)      Comment for A1C at 12:00 AM on 8/20/2016:  According to ADA guidelines, hemoglobin A1C <7.0% represents  optimal control in non-pregnant diabetic patients.  Different  metrics may apply to specific populations.   Standards of Medical Care in Diabetes - 2016.  For the purpose of screening for the presence of diabetes:  <5.7%     Consistent with the absence of  diabetes  5.7-6.4%  Consistent with increasing risk for diabetes   (prediabetes)  >or=6.5%  Consistent with diabetes  Currently no consensus exists for use of hemoglobin A1C  for diagnosis of diabetes for children.      Comment for A1C at  2:50 PM on 8/22/2016:  According to ADA guidelines, hemoglobin A1C <7.0% represents  optimal control in non-pregnant diabetic patients.  Different  metrics may apply to specific populations.   Standards of Medical Care in Diabetes - 2016.  For the purpose of screening for the presence of diabetes:  <5.7%     Consistent with the absence of diabetes  5.7-6.4%  Consistent with increasing risk for diabetes   (prediabetes)  >or=6.5%  Consistent with diabetes  Currently no consensus exists for use of hemoglobin A1C  for diagnosis of diabetes for children.      Comment for A1C at  4:00 AM on 8/23/2016:  According to ADA guidelines, hemoglobin A1C <7.0% represents  optimal control in non-pregnant diabetic patients.  Different  metrics may apply to specific populations.   Standards of Medical Care in Diabetes - 2016.  For the purpose of screening for the presence of diabetes:  <5.7%     Consistent with the absence of diabetes  5.7-6.4%  Consistent with increasing risk for diabetes   (prediabetes)  >or=6.5%  Consistent with diabetes  Currently no consensus exists for use of hemoglobin A1C  for diagnosis of diabetes for children.      Comment for A1C at 10:41 AM on 12/28/2016:  According to ADA guidelines, hemoglobin A1C <7.0% represents  optimal control in non-pregnant diabetic patients.  Different  metrics may apply to specific populations.   Standards of Medical Care in Diabetes - 2016.  For the purpose of screening for the presence of diabetes:  <5.7%     Consistent with the absence of diabetes  5.7-6.4%  Consistent with increasing risk for diabetes   (prediabetes)  >or=6.5%  Consistent with diabetes  Currently no consensus exists for use of hemoglobin A1C  for diagnosis of diabetes for  children.      Comment for A1C at  2:17 PM on 2/9/2017:  According to ADA guidelines, hemoglobin A1C <7.0% represents  optimal control in non-pregnant diabetic patients.  Different  metrics may apply to specific populations.   Standards of Medical Care in Diabetes - 2016.  For the purpose of screening for the presence of diabetes:  <5.7%     Consistent with the absence of diabetes  5.7-6.4%  Consistent with increasing risk for diabetes   (prediabetes)  >or=6.5%  Consistent with diabetes  Currently no consensus exists for use of hemoglobin A1C  for diagnosis of diabetes for children.        Allergies as of 5/2/2017        Reactions    Levemir [Insulin Detemir] Itching    Pork/porcine Containing Products     Ranexa [Ranolazine] Nausea Only      Advance Directives     An advance directive is a document which, in the event you are no longer able to make decisions for yourself, tells your healthcare team what kind of treatment you do or do not want to receive, or who you would like to make those decisions for you.  If you do not currently have an advance directive, [facility] encourages you to create one.  For more information call:  [phone]), [phone] ([phone]),  or log on to [email or web address].        Smoking Cessation     If you would like to quit smoking:   You may be eligible for free services if you are a [place] resident and started smoking cigarettes before September 1, 1988.  Call the Smoking Cessation Trust ([facility]) toll free at [phone] or [phone].   Call 7-176-QUIT-NOW if you do not meet the above criteria.   Contact us via email: [email or web address]   View our website for more information: [email or web address]        Language Assistance Services     ATTENTION: Language assistance services are available, free of charge. Please call [phone].      ATENCIÓN: Si habla español, tiene a cowart disposición servicios gratuitos de asistencia lingüística.  Edmund reyes [phone].     JESSICA Ý: N?u b?n nói Ti?ng Vi?t, có các d?ch v? h? tr? ngôn ng? mi?n phí dành cho b?n. G?i s? [phone].        Chronic Kindey Disease Education             Diabetes Discharge Instructions                                    [facility] complies with applicable Federal civil rights laws and does not discriminate on the basis of race, color, national origin, age, disability, or sex.

## 2017-05-02 NOTE — TELEPHONE ENCOUNTER
[facility] diabetic supplies form completed and signed, given to staff to fax. Please notify patient that he should obtain prescription for future DM testing supplies from primary provider, as pt is no longer under my care.

## 2017-05-03 LAB — FUNGUS SPEC CULT: NORMAL

## 2017-05-04 ENCOUNTER — TELEPHONE (OUTPATIENT)
Dept: ENDOCRINOLOGY | Facility: CLINIC | Age: 57
End: 2017-05-04

## 2017-05-04 ENCOUNTER — PATIENT MESSAGE (OUTPATIENT)
Dept: TRANSPLANT | Facility: CLINIC | Age: 57
End: 2017-05-04

## 2017-05-04 NOTE — TELEPHONE ENCOUNTER
Spoke with patient in detail informed him of instructions per Lorna Emerson NP. Patient verbalized understanding

## 2017-05-05 ENCOUNTER — TELEPHONE (OUTPATIENT)
Dept: TRANSPLANT | Facility: CLINIC | Age: 57
End: 2017-05-05

## 2017-05-05 ENCOUNTER — PATIENT MESSAGE (OUTPATIENT)
Dept: TRANSPLANT | Facility: CLINIC | Age: 57
End: 2017-05-05

## 2017-05-05 DIAGNOSIS — E83.42 HYPOMAGNESEMIA: ICD-10-CM

## 2017-05-05 DIAGNOSIS — Z79.60 LONG-TERM USE OF IMMUNOSUPPRESSANT MEDICATION: ICD-10-CM

## 2017-05-05 DIAGNOSIS — Z94.1 STATUS POST HEART TRANSPLANT: Primary | ICD-10-CM

## 2017-05-05 RX ORDER — TACROLIMUS 0.5 MG/1
CAPSULE ORAL
Qty: 30 CAPSULE | Refills: 11 | Status: SHIPPED | OUTPATIENT
Start: 2017-05-05 | End: 2017-11-26 | Stop reason: SDUPTHER

## 2017-05-05 RX ORDER — LANOLIN ALCOHOL/MO/W.PET/CERES
CREAM (GRAM) TOPICAL
Qty: 630 TABLET | Refills: 3 | Status: ON HOLD | OUTPATIENT
Start: 2017-05-05 | End: 2017-11-30

## 2017-05-05 NOTE — TELEPHONE ENCOUNTER
Reviewed with pt timing of medication and tacro level of 17.9   And he stated that he takes the medicine at 8 am and 8 pm.  Will decrease from 1 mg bid to 1 mg in am and 0.5 mg in pm, repeating level on Monday 5/8/17.  Pt's magnesium 1.3 on 800 mg tid, increased to 1200 mg in am and 800 mg at noon and at 1800.  Pt stated understanding and restated the directions.  Will also send directions through my chart.  Spoke to pt's pharmacy, they stated that they have the 0.5 mg capsules in stock.

## 2017-05-05 NOTE — TELEPHONE ENCOUNTER
Spoke with pt regarding lab results, tacro level elevated and reviewed timing of labs and dosage of medications, pt took correctly 12 hour trough, will decrease from 1 mg bid to 1 mg in am and 0.5 mg in pm, will repeat level on Monday.  Pt's mag 1.3 pt taking magnesium oxide 800 mg tid, pt will increase mag to 1200 mg in am and 800 mg 12 noon and pm dose.  Repeating labs on Monday, reviewed biopsy results and lab results with pt. reviewed that he decreased the lasix from

## 2017-05-08 ENCOUNTER — LAB VISIT (OUTPATIENT)
Dept: LAB | Facility: HOSPITAL | Age: 57
End: 2017-05-08
Attending: INTERNAL MEDICINE
Payer: MEDICARE

## 2017-05-08 DIAGNOSIS — Z79.52 LONG TERM CURRENT USE OF SYSTEMIC STEROIDS: ICD-10-CM

## 2017-05-08 DIAGNOSIS — E78.2 MIXED HYPERLIPIDEMIA: ICD-10-CM

## 2017-05-08 DIAGNOSIS — R06.02 SHORTNESS OF BREATH: ICD-10-CM

## 2017-05-08 DIAGNOSIS — T86.20 COMPLICATION OF HEART TRANSPLANT, UNSPECIFIED COMPLICATION: ICD-10-CM

## 2017-05-08 DIAGNOSIS — Z79.899 ENCOUNTER FOR LONG-TERM (CURRENT) USE OF MEDICATIONS: ICD-10-CM

## 2017-05-08 DIAGNOSIS — Z94.1 STATUS POST HEART TRANSPLANT: ICD-10-CM

## 2017-05-08 DIAGNOSIS — I10 ESSENTIAL HYPERTENSION: ICD-10-CM

## 2017-05-08 LAB
ALBUMIN SERPL BCP-MCNC: 3.7 G/DL
ALP SERPL-CCNC: 62 U/L
ALT SERPL W/O P-5'-P-CCNC: 16 U/L
ANION GAP SERPL CALC-SCNC: 7 MMOL/L
ANISOCYTOSIS BLD QL SMEAR: SLIGHT
AST SERPL-CCNC: 20 U/L
BASOPHILS NFR BLD: 0 %
BILIRUB SERPL-MCNC: 0.4 MG/DL
BNP SERPL-MCNC: 167 PG/ML
BUN SERPL-MCNC: 14 MG/DL
CALCIUM SERPL-MCNC: 9.3 MG/DL
CHLORIDE SERPL-SCNC: 102 MMOL/L
CO2 SERPL-SCNC: 31 MMOL/L
CREAT SERPL-MCNC: 1 MG/DL
DIFFERENTIAL METHOD: ABNORMAL
EOSINOPHIL NFR BLD: 1 %
ERYTHROCYTE [DISTWIDTH] IN BLOOD BY AUTOMATED COUNT: 17.4 %
EST. GFR  (AFRICAN AMERICAN): >60 ML/MIN/1.73 M^2
EST. GFR  (NON AFRICAN AMERICAN): >60 ML/MIN/1.73 M^2
GLUCOSE SERPL-MCNC: 157 MG/DL
HCT VFR BLD AUTO: 36.2 %
HGB BLD-MCNC: 11.2 G/DL
HYPOCHROMIA BLD QL SMEAR: ABNORMAL
LYMPHOCYTES NFR BLD: 31 %
MAGNESIUM SERPL-MCNC: 1.4 MG/DL
MCH RBC QN AUTO: 19.6 PG
MCHC RBC AUTO-ENTMCNC: 30.9 %
MCV RBC AUTO: 64 FL
MONOCYTES NFR BLD: 3 %
NEUTROPHILS NFR BLD: 62 %
NEUTS BAND NFR BLD MANUAL: 3 %
OVALOCYTES BLD QL SMEAR: ABNORMAL
PLATELET # BLD AUTO: 258 K/UL
PMV BLD AUTO: ABNORMAL FL
POIKILOCYTOSIS BLD QL SMEAR: SLIGHT
POTASSIUM SERPL-SCNC: 4.2 MMOL/L
PROT SERPL-MCNC: 6.6 G/DL
RBC # BLD AUTO: 5.7 M/UL
SODIUM SERPL-SCNC: 140 MMOL/L
WBC # BLD AUTO: 6.74 K/UL

## 2017-05-08 PROCEDURE — 85027 COMPLETE CBC AUTOMATED: CPT

## 2017-05-08 PROCEDURE — 36415 COLL VENOUS BLD VENIPUNCTURE: CPT

## 2017-05-08 PROCEDURE — 85007 BL SMEAR W/DIFF WBC COUNT: CPT

## 2017-05-08 PROCEDURE — 80053 COMPREHEN METABOLIC PANEL: CPT

## 2017-05-08 PROCEDURE — 83735 ASSAY OF MAGNESIUM: CPT

## 2017-05-08 PROCEDURE — 83880 ASSAY OF NATRIURETIC PEPTIDE: CPT

## 2017-05-08 PROCEDURE — 80197 ASSAY OF TACROLIMUS: CPT

## 2017-05-09 ENCOUNTER — TELEPHONE (OUTPATIENT)
Dept: TRANSPLANT | Facility: CLINIC | Age: 57
End: 2017-05-09

## 2017-05-09 LAB — TACROLIMUS BLD-MCNC: 10.9 NG/ML

## 2017-05-09 NOTE — TELEPHONE ENCOUNTER
----- Message from Mary Alicecj Lopez sent at 5/9/2017 12:52 PM CDT -----  Contact: Sharla from [facility]  Sharla from [facility] called, requesting a bed side toilet.  [phone] and fax number for order is [phone]. Thank you

## 2017-05-11 ENCOUNTER — OFFICE VISIT (OUTPATIENT)
Dept: WOUND CARE | Facility: CLINIC | Age: 57
End: 2017-05-11
Payer: MEDICARE

## 2017-05-11 DIAGNOSIS — L92.9 HYPERGRANULATION: ICD-10-CM

## 2017-05-11 DIAGNOSIS — N18.30 TYPE 2 DIABETES MELLITUS WITH STAGE 3 CHRONIC KIDNEY DISEASE, WITH LONG-TERM CURRENT USE OF INSULIN: Chronic | ICD-10-CM

## 2017-05-11 DIAGNOSIS — T81.89XA NON-HEALING SURGICAL WOUND, INITIAL ENCOUNTER: Primary | ICD-10-CM

## 2017-05-11 DIAGNOSIS — E11.22 TYPE 2 DIABETES MELLITUS WITH STAGE 3 CHRONIC KIDNEY DISEASE, WITH LONG-TERM CURRENT USE OF INSULIN: Chronic | ICD-10-CM

## 2017-05-11 DIAGNOSIS — Z79.4 TYPE 2 DIABETES MELLITUS WITH STAGE 3 CHRONIC KIDNEY DISEASE, WITH LONG-TERM CURRENT USE OF INSULIN: Chronic | ICD-10-CM

## 2017-05-11 PROCEDURE — 17250 CHEM CAUT OF GRANLTJ TISSUE: CPT | Mod: PBBFAC | Performed by: CLINICAL NURSE SPECIALIST

## 2017-05-11 PROCEDURE — 17250 CHEM CAUT OF GRANLTJ TISSUE: CPT | Mod: S$PBB,,, | Performed by: CLINICAL NURSE SPECIALIST

## 2017-05-11 PROCEDURE — 99214 OFFICE O/P EST MOD 30 MIN: CPT | Mod: S$PBB,25,, | Performed by: CLINICAL NURSE SPECIALIST

## 2017-05-11 PROCEDURE — 99211 OFF/OP EST MAY X REQ PHY/QHP: CPT | Mod: PBBFAC,25 | Performed by: CLINICAL NURSE SPECIALIST

## 2017-05-11 PROCEDURE — 99999 PR PBB SHADOW E&M-EST. PATIENT-LVL I: CPT | Mod: PBBFAC,,, | Performed by: CLINICAL NURSE SPECIALIST

## 2017-05-11 NOTE — DISCHARGE SUMMARY
Patient did not stay in short stay after his echo-guided EMBX. That being said, he is to follow up in transplant clinic in 1 month. Final diagnosis is transplant rejection surveillance, s/p OHT. Activity as tolerated, including PT/OT.

## 2017-05-11 NOTE — PROGRESS NOTES
"Subjective:       Patient ID: Mick Barriga is a 56 y.o. male.    Chief Complaint: Wound Check    HPI Comments: This is fu to wound care and was sent from heart transplant team, he has several wounds to be evaluated post tx.  PMH His biggest issue is his right groin where the incision has a 1 cm opening and it is "a steady stream" of clear to light yellow fluid.this was subsequently handled by plastic surgery on 3/30 with a muscle flap, he comes today because of discomfort and non healing state of this wound, he has been directed to apply bactroban and cover from surgical team.     Wound Check       Review of Systems   Constitutional: Negative for activity change, appetite change and fever.   Respiratory: Negative for cough and shortness of breath.    Cardiovascular: Negative for chest pain and leg swelling.   Gastrointestinal: Negative for abdominal distention and constipation.   Genitourinary: Negative for dysuria, flank pain and hematuria.   Musculoskeletal: Negative.    Skin: Positive for wound. Negative for color change and rash.   Psychiatric/Behavioral: Negative.        Objective:      Physical Exam   Constitutional: He is oriented to person, place, and time. He appears well-developed and well-nourished.   Pulmonary/Chest: Effort normal. No respiratory distress. He has no wheezes.   Abdominal: Soft. Bowel sounds are normal. He exhibits no distension.   Musculoskeletal: Normal range of motion. He exhibits no edema.   Neurological: He is alert and oriented to person, place, and time.   Skin: Skin is warm and dry. No erythema.              This is tissue that has overgrown and is out of control, it will not heal in this state, it needs to be cauterized and reduced, may even need surgical intervention , will try cautery first  PROCEDURE:  CHEMICAL CAUTERY: Performed for hypergranulation tissue/ of groin wound . The tissue was anesthetized topically with application of Lidocaine gel 2% and 5 minute wait time. " The area noted was cauterized with AGNO3. The patient stated pain was 2 on 1-10 scale with this procedure.    Placed HYDROFERA blue on the wound and secured with tape  Assessment:       1. Non-healing surgical wound, initial encounter    2. Hypergranulation    3. Type 2 diabetes mellitus with stage 3 chronic kidney disease, with long-term current use of insulin        Plan:       HYDROFERA blue ready to wound daily   Wife states she can do and understands  Told to call for any problems or questions  Return in 4-5 days for eval and may need surgery to see again   I have reviewed the plan of care with the patient and/  Wife and they express understanding. I spent over 50% of this 25 minute visit in face to face counseling.

## 2017-05-15 ENCOUNTER — LAB VISIT (OUTPATIENT)
Dept: LAB | Facility: HOSPITAL | Age: 57
End: 2017-05-15
Attending: FAMILY MEDICINE
Payer: MEDICARE

## 2017-05-15 DIAGNOSIS — Z79.52 LONG TERM CURRENT USE OF SYSTEMIC STEROIDS: ICD-10-CM

## 2017-05-15 DIAGNOSIS — T86.20 COMPLICATION OF HEART TRANSPLANT, UNSPECIFIED COMPLICATION: ICD-10-CM

## 2017-05-15 DIAGNOSIS — Z94.1 STATUS POST HEART TRANSPLANT: ICD-10-CM

## 2017-05-15 DIAGNOSIS — Z79.899 ENCOUNTER FOR LONG-TERM (CURRENT) USE OF MEDICATIONS: ICD-10-CM

## 2017-05-15 DIAGNOSIS — R06.02 SHORTNESS OF BREATH: ICD-10-CM

## 2017-05-15 DIAGNOSIS — E78.2 MIXED HYPERLIPIDEMIA: ICD-10-CM

## 2017-05-15 DIAGNOSIS — I10 ESSENTIAL HYPERTENSION: ICD-10-CM

## 2017-05-15 LAB
ALBUMIN SERPL BCP-MCNC: 3.8 G/DL
ALP SERPL-CCNC: 62 U/L
ALT SERPL W/O P-5'-P-CCNC: 15 U/L
ANION GAP SERPL CALC-SCNC: 9 MMOL/L
ANISOCYTOSIS BLD QL SMEAR: SLIGHT
AST SERPL-CCNC: 17 U/L
BASOPHILS NFR BLD: 1 %
BILIRUB SERPL-MCNC: 0.3 MG/DL
BUN SERPL-MCNC: 14 MG/DL
CALCIUM SERPL-MCNC: 9.6 MG/DL
CHLORIDE SERPL-SCNC: 105 MMOL/L
CO2 SERPL-SCNC: 29 MMOL/L
CREAT SERPL-MCNC: 1 MG/DL
DIFFERENTIAL METHOD: ABNORMAL
EOSINOPHIL NFR BLD: 1 %
ERYTHROCYTE [DISTWIDTH] IN BLOOD BY AUTOMATED COUNT: 17.8 %
EST. GFR  (AFRICAN AMERICAN): >60 ML/MIN/1.73 M^2
EST. GFR  (NON AFRICAN AMERICAN): >60 ML/MIN/1.73 M^2
GLUCOSE SERPL-MCNC: 112 MG/DL
HCT VFR BLD AUTO: 35.6 %
HGB BLD-MCNC: 11.3 G/DL
HYPOCHROMIA BLD QL SMEAR: ABNORMAL
LYMPHOCYTES NFR BLD: 31 %
MCH RBC QN AUTO: 19.6 PG
MCHC RBC AUTO-ENTMCNC: 31.7 %
MCV RBC AUTO: 62 FL
MONOCYTES NFR BLD: 4 %
NEUTROPHILS NFR BLD: 63 %
OVALOCYTES BLD QL SMEAR: ABNORMAL
PLATELET # BLD AUTO: 260 K/UL
PMV BLD AUTO: ABNORMAL FL
POIKILOCYTOSIS BLD QL SMEAR: SLIGHT
POTASSIUM SERPL-SCNC: 4.1 MMOL/L
PROT SERPL-MCNC: 6.6 G/DL
RBC # BLD AUTO: 5.77 M/UL
SODIUM SERPL-SCNC: 143 MMOL/L
WBC # BLD AUTO: 6.62 K/UL

## 2017-05-15 PROCEDURE — 80053 COMPREHEN METABOLIC PANEL: CPT

## 2017-05-15 PROCEDURE — 80197 ASSAY OF TACROLIMUS: CPT

## 2017-05-15 PROCEDURE — 85027 COMPLETE CBC AUTOMATED: CPT

## 2017-05-15 PROCEDURE — 36415 COLL VENOUS BLD VENIPUNCTURE: CPT

## 2017-05-15 PROCEDURE — 85007 BL SMEAR W/DIFF WBC COUNT: CPT

## 2017-05-15 RX ORDER — VALGANCICLOVIR 450 MG/1
TABLET, FILM COATED ORAL
Qty: 60 TABLET | Refills: 0 | Status: SHIPPED | OUTPATIENT
Start: 2017-05-15 | End: 2017-06-16 | Stop reason: SDUPTHER

## 2017-05-16 ENCOUNTER — TELEPHONE (OUTPATIENT)
Dept: TRANSPLANT | Facility: CLINIC | Age: 57
End: 2017-05-16

## 2017-05-16 ENCOUNTER — OFFICE VISIT (OUTPATIENT)
Dept: WOUND CARE | Facility: CLINIC | Age: 57
End: 2017-05-16
Payer: MEDICARE

## 2017-05-16 DIAGNOSIS — Z94.1 HEART TRANSPLANT, ORTHOTOPIC, STATUS: ICD-10-CM

## 2017-05-16 DIAGNOSIS — D84.9 IMMUNOSUPPRESSION: ICD-10-CM

## 2017-05-16 DIAGNOSIS — N18.30 TYPE 2 DIABETES MELLITUS WITH STAGE 3 CHRONIC KIDNEY DISEASE, WITH LONG-TERM CURRENT USE OF INSULIN: ICD-10-CM

## 2017-05-16 DIAGNOSIS — E11.22 TYPE 2 DIABETES MELLITUS WITH STAGE 3 CHRONIC KIDNEY DISEASE, WITH LONG-TERM CURRENT USE OF INSULIN: ICD-10-CM

## 2017-05-16 DIAGNOSIS — T81.89XD NON-HEALING SURGICAL WOUND, SUBSEQUENT ENCOUNTER: Primary | ICD-10-CM

## 2017-05-16 DIAGNOSIS — Z79.4 TYPE 2 DIABETES MELLITUS WITH STAGE 3 CHRONIC KIDNEY DISEASE, WITH LONG-TERM CURRENT USE OF INSULIN: ICD-10-CM

## 2017-05-16 LAB — TACROLIMUS BLD-MCNC: 11.2 NG/ML

## 2017-05-16 PROCEDURE — 99999 PR PBB SHADOW E&M-EST. PATIENT-LVL II: CPT | Mod: PBBFAC,,, | Performed by: CLINICAL NURSE SPECIALIST

## 2017-05-16 PROCEDURE — 99214 OFFICE O/P EST MOD 30 MIN: CPT | Mod: S$PBB,25,, | Performed by: CLINICAL NURSE SPECIALIST

## 2017-05-16 PROCEDURE — 99212 OFFICE O/P EST SF 10 MIN: CPT | Mod: PBBFAC,25 | Performed by: CLINICAL NURSE SPECIALIST

## 2017-05-16 PROCEDURE — 11042 DBRDMT SUBQ TIS 1ST 20SQCM/<: CPT | Mod: PBBFAC | Performed by: CLINICAL NURSE SPECIALIST

## 2017-05-16 PROCEDURE — 11042 DBRDMT SUBQ TIS 1ST 20SQCM/<: CPT | Mod: S$PBB,,, | Performed by: CLINICAL NURSE SPECIALIST

## 2017-05-16 NOTE — PROGRESS NOTES
"Subjective:       Patient ID: Mick Barriga is a 57 y.o. male.    Chief Complaint: Wound Check (rt groin)    HPI Comments: This is fu to wound care to right groin that has over healed post plastic surgery and  post tx. He says the pain is a bit less but he is still apprehensive about it healing at all    PMH His biggest issue is his right groin where the incision had a 1 cm opening and it is "a steady stream" of clear to light yellow fluid.this was subsequently handled by plastic surgery on 3/30 with a muscle flap,this area is non healing and will take repeated interventions to get it to heal correctly     Wound Check       Review of Systems   Constitutional: Negative for activity change, appetite change and fever.   Respiratory: Negative for cough and shortness of breath.    Cardiovascular: Negative for chest pain and leg swelling.   Gastrointestinal: Negative for abdominal distention and constipation.   Genitourinary: Negative for dysuria, flank pain and hematuria.   Musculoskeletal: Negative.    Skin: Positive for wound. Negative for color change and rash.   Psychiatric/Behavioral: Negative.        Objective:      Physical Exam   Constitutional: He is oriented to person, place, and time. He appears well-developed and well-nourished.   Pulmonary/Chest: Effort normal. No respiratory distress. He has no wheezes.   Abdominal: Soft. Bowel sounds are normal. He exhibits no distension.   Musculoskeletal: Normal range of motion. He exhibits no edema.   Neurological: He is alert and oriented to person, place, and time.   Skin: Skin is warm and dry. No erythema.            5/11/17 first visit      5/15/17     5/17 after  debridement today    There was improvement today but still has a lot of thickened extra hypergranulation that needs to be removed before it can be expected to heal over   PROCEDURE:  DEBRIDEMENT: The wound was prepped with betadine. Utilizing a scissors and pickups, I debrided the wound full-thickness " through skin and subcutaneous tissue to excise viable and nonviable tissue inside and outside the wound margins. The patient tolerated well.I used topical lidocaine 2% with 5 min wait time to take effect. The wound was flushed with wound cleanser. There was minimal bleeding with debridement and was well controlled with direct pressure and silver nitrate. The wound was then dressed with HYDROFERA Blue Ready . The patient tolerated the procedure well.   Wound measures 5.5 cm x 2 cm   Assessment:       1. Non-healing surgical wound, subsequent encounter    2. Immunosuppression    3. Type 2 diabetes mellitus with stage 3 chronic kidney disease, with long-term current use of insulin    4. Heart transplant, orthotopic, status        Plan:       HYDROFERA blue ready to wound and change every other day  Wife has been doing this and understands how to manage  Told to call for any problems or questions  Return in 10 days for eval    I have reviewed the plan of care with the patient and/  Wife and they express understanding. I spent over 50% of this 25 minute visit in face to face counseling.

## 2017-05-16 NOTE — TELEPHONE ENCOUNTER
----- Message from Gin Sterling sent at 5/16/2017 12:53 PM CDT -----  Contact: Linda carroll/ [facility]  Linda called because pt wants to be released from their services and she wants to make sure it is ok.  She can be reached @ [phone].  Thanks!!

## 2017-05-26 ENCOUNTER — OFFICE VISIT (OUTPATIENT)
Dept: WOUND CARE | Facility: CLINIC | Age: 57
End: 2017-05-26
Payer: MEDICARE

## 2017-05-26 VITALS — HEART RATE: 95 BPM | DIASTOLIC BLOOD PRESSURE: 76 MMHG | SYSTOLIC BLOOD PRESSURE: 132 MMHG

## 2017-05-26 DIAGNOSIS — T81.89XD NON-HEALING SURGICAL WOUND, SUBSEQUENT ENCOUNTER: Primary | ICD-10-CM

## 2017-05-26 DIAGNOSIS — Z79.4 TYPE 2 DIABETES MELLITUS WITH STAGE 3 CHRONIC KIDNEY DISEASE, WITH LONG-TERM CURRENT USE OF INSULIN: ICD-10-CM

## 2017-05-26 DIAGNOSIS — N18.30 TYPE 2 DIABETES MELLITUS WITH STAGE 3 CHRONIC KIDNEY DISEASE, WITH LONG-TERM CURRENT USE OF INSULIN: ICD-10-CM

## 2017-05-26 DIAGNOSIS — E11.22 TYPE 2 DIABETES MELLITUS WITH STAGE 3 CHRONIC KIDNEY DISEASE, WITH LONG-TERM CURRENT USE OF INSULIN: ICD-10-CM

## 2017-05-26 DIAGNOSIS — L92.9 HYPERGRANULATION: ICD-10-CM

## 2017-05-26 PROCEDURE — 17250 CHEM CAUT OF GRANLTJ TISSUE: CPT | Mod: PBBFAC | Performed by: CLINICAL NURSE SPECIALIST

## 2017-05-26 PROCEDURE — 99212 OFFICE O/P EST SF 10 MIN: CPT | Mod: PBBFAC | Performed by: CLINICAL NURSE SPECIALIST

## 2017-05-26 PROCEDURE — 99999 PR PBB SHADOW E&M-EST. PATIENT-LVL II: CPT | Mod: PBBFAC,,, | Performed by: CLINICAL NURSE SPECIALIST

## 2017-05-26 PROCEDURE — 11042 DBRDMT SUBQ TIS 1ST 20SQCM/<: CPT | Mod: S$PBB,,, | Performed by: CLINICAL NURSE SPECIALIST

## 2017-05-26 PROCEDURE — 99214 OFFICE O/P EST MOD 30 MIN: CPT | Mod: S$PBB,25,, | Performed by: CLINICAL NURSE SPECIALIST

## 2017-05-26 NOTE — PROGRESS NOTES
"Subjective:       Patient ID: Mick Barriga is a 57 y.o. male.    Chief Complaint: Wound Check    This is another fu to wound care to right groin that has over healed post plastic surgery and  post heart tx. He says the pain is significantly better but he is still apprehensive about it healing at all . Pain is 0 today, comes in w/c with wife     PMH His biggest issue is his right groin where the incision had a 1 cm opening and it is "a steady stream" of clear to light yellow fluid.this was subsequently handled by plastic surgery on 3/30 with a muscle flap,this area is non healing and will take repeated interventions to get it to heal correctly       Wound Check       Review of Systems   Constitutional: Negative for activity change, appetite change and fever.   Respiratory: Negative for cough and shortness of breath.    Cardiovascular: Negative for chest pain and leg swelling.   Gastrointestinal: Negative for abdominal distention and constipation.   Genitourinary: Negative for dysuria, flank pain and hematuria.   Musculoskeletal: Negative.    Skin: Positive for wound. Negative for color change and rash.   Psychiatric/Behavioral: Negative.        Objective:      Physical Exam   Constitutional: He is oriented to person, place, and time. He appears well-developed and well-nourished.   Pulmonary/Chest: Effort normal. No respiratory distress. He has no wheezes.   Abdominal: Soft. Bowel sounds are normal. He exhibits no distension.   Musculoskeletal: Normal range of motion. He exhibits no edema.   Neurological: He is alert and oriented to person, place, and time.   Skin: Skin is warm and dry. No erythema.            5/11/17 first visit      5/15/17     5/17 after  debridement today   5/26     There was improvement today but still has a  thickened extra hypergranulation that needs to be removed before it can be expected to heal over   PROCEDURE:  DEBRIDEMENT: The wound was prepped with betadine. Utilizing a scissors and " pickups, I debrided the wound full-thickness through skin and subcutaneous tissue to excise viable and nonviable tissue inside and outside the wound margins. The patient tolerated well.I used topical lidocaine 2% with 5 min wait time to take effect. The wound was flushed with wound cleanser. There was minimal bleeding with debridement and was well controlled with direct pressure and silver nitrate. The wound was then dressed with HYDROFERA Blue Ready . The patient tolerated the procedure well.     Wound measures  4 cm x 1.4 cm   ( was 5.5 cm x 2 cm ) last visit  IMPROVED  Assessment:       1. Non-healing surgical wound, subsequent encounter    2. Type 2 diabetes mellitus with stage 3 chronic kidney disease, with long-term current use of insulin    3. Hypergranulation        Plan:     Chemical cautery to wound   HYDROFERA blue ready to wound and change every other day  Wife has been doing this and understands how to manage  Told to call for any problems or questions  Return in 7 days for eval    I have reviewed the plan of care with the patient and/  Wife and they express understanding. I spent over 50% of this 25 minute visit in face to face counseling.

## 2017-06-02 ENCOUNTER — OFFICE VISIT (OUTPATIENT)
Dept: WOUND CARE | Facility: CLINIC | Age: 57
End: 2017-06-02
Payer: MEDICARE

## 2017-06-02 DIAGNOSIS — L92.9 HYPERGRANULATION: ICD-10-CM

## 2017-06-02 DIAGNOSIS — N18.30 TYPE 2 DIABETES MELLITUS WITH STAGE 3 CHRONIC KIDNEY DISEASE, WITH LONG-TERM CURRENT USE OF INSULIN: ICD-10-CM

## 2017-06-02 DIAGNOSIS — T81.89XD NON-HEALING SURGICAL WOUND, SUBSEQUENT ENCOUNTER: Primary | ICD-10-CM

## 2017-06-02 DIAGNOSIS — Z79.4 TYPE 2 DIABETES MELLITUS WITH STAGE 3 CHRONIC KIDNEY DISEASE, WITH LONG-TERM CURRENT USE OF INSULIN: ICD-10-CM

## 2017-06-02 DIAGNOSIS — E11.22 TYPE 2 DIABETES MELLITUS WITH STAGE 3 CHRONIC KIDNEY DISEASE, WITH LONG-TERM CURRENT USE OF INSULIN: ICD-10-CM

## 2017-06-02 DIAGNOSIS — Z94.1 HEART TRANSPLANT, ORTHOTOPIC, STATUS: ICD-10-CM

## 2017-06-02 PROCEDURE — 99999 PR PBB SHADOW E&M-EST. PATIENT-LVL II: CPT | Mod: PBBFAC,,, | Performed by: CLINICAL NURSE SPECIALIST

## 2017-06-02 PROCEDURE — 11042 DBRDMT SUBQ TIS 1ST 20SQCM/<: CPT | Mod: S$PBB,,, | Performed by: CLINICAL NURSE SPECIALIST

## 2017-06-02 PROCEDURE — 11042 DBRDMT SUBQ TIS 1ST 20SQCM/<: CPT | Mod: PBBFAC | Performed by: CLINICAL NURSE SPECIALIST

## 2017-06-02 PROCEDURE — 99212 OFFICE O/P EST SF 10 MIN: CPT | Mod: PBBFAC | Performed by: CLINICAL NURSE SPECIALIST

## 2017-06-02 PROCEDURE — 99214 OFFICE O/P EST MOD 30 MIN: CPT | Mod: S$PBB,25,, | Performed by: CLINICAL NURSE SPECIALIST

## 2017-06-02 NOTE — PROGRESS NOTES
"Subjective:       Patient ID: Mick Barriga is a 57 y.o. male.    Chief Complaint: Wound Check    This is another fu to wound care to right groin that had over healed post plastic surgery and  post heart tx. He says the pain is significantly better and actually walked in for first time today . Pain is 0 today, comes  with wife     PMH His biggest issue is his right groin where the incision had a 1 cm opening and it is "a steady stream" of clear to light yellow fluid.this was subsequently handled by plastic surgery on 3/30 with a muscle flap,this area is non healing and will take repeated interventions to get it to heal correctly       Wound Check       Review of Systems   Constitutional: Negative for activity change, appetite change and fever.   Respiratory: Negative for cough and shortness of breath.    Cardiovascular: Negative for chest pain and leg swelling.   Gastrointestinal: Negative for abdominal distention and constipation.   Genitourinary: Negative for dysuria, flank pain and hematuria.   Musculoskeletal: Negative.    Skin: Positive for wound. Negative for color change and rash.   Psychiatric/Behavioral: Negative.        Objective:      Physical Exam   Constitutional: He is oriented to person, place, and time. He appears well-developed and well-nourished.   Pulmonary/Chest: Effort normal. No respiratory distress. He has no wheezes.   Abdominal: Soft. Bowel sounds are normal. He exhibits no distension.   Musculoskeletal: Normal range of motion. He exhibits no edema.   Neurological: He is alert and oriented to person, place, and time.   Skin: Skin is warm and dry. No erythema.            5/11/17 first visit      5/17 after  debridement today   5/26 6/2             There was improvement today but still has a  thickened extra hypergranulation that needs to be cauterized and debrided  PROCEDURE:  DEBRIDEMENT: The wound was prepped with betadine. Utilizing a curette , I debrided the wound full-thickness " through hypergranulated subcutaneous tissue to excise viable and nonviable tissue inside and outside the wound margins. The patient tolerated well.I used topical lidocaine 2% with 5 min wait time to take effect. The wound was flushed with wound cleanser. There was minimal bleeding with debridement and was well controlled with direct pressure and silver nitrate. The wound was then dressed with HYDROFERA Blue Ready . The patient tolerated the procedure well.     Wound measures  4 cm x 1.0 cm no depth   IMPROVED and new epithelization  Noted again   Assessment:       1. Non-healing surgical wound, subsequent encounter    2. Type 2 diabetes mellitus with stage 3 chronic kidney disease, with long-term current use of insulin    3. Hypergranulation    4. Heart transplant, orthotopic, status        Plan:   Debrided with curette   HYDROFERA blue ready to wound and change every other day  Wife has been doing this and understands how to manage  Told to call for any problems or questions  Return in 10 days for eval    I have reviewed the plan of care with the patient and/  Wife and they express understanding. I spent over 50% of this 25 minute visit in face to face counseling.

## 2017-06-12 ENCOUNTER — OFFICE VISIT (OUTPATIENT)
Dept: WOUND CARE | Facility: CLINIC | Age: 57
End: 2017-06-12
Payer: MEDICARE

## 2017-06-12 ENCOUNTER — OFFICE VISIT (OUTPATIENT)
Dept: TRANSPLANT | Facility: CLINIC | Age: 57
End: 2017-06-12
Payer: MEDICARE

## 2017-06-12 ENCOUNTER — HOSPITAL ENCOUNTER (OUTPATIENT)
Dept: CARDIOLOGY | Facility: CLINIC | Age: 57
Discharge: HOME | End: 2017-06-12
Payer: MEDICARE

## 2017-06-12 VITALS
WEIGHT: 211.44 LBS | HEART RATE: 97 BPM | BODY MASS INDEX: 32.05 KG/M2 | DIASTOLIC BLOOD PRESSURE: 72 MMHG | SYSTOLIC BLOOD PRESSURE: 149 MMHG | HEIGHT: 68 IN

## 2017-06-12 DIAGNOSIS — E78.2 MIXED HYPERLIPIDEMIA: ICD-10-CM

## 2017-06-12 DIAGNOSIS — Z79.52 LONG TERM CURRENT USE OF SYSTEMIC STEROIDS: ICD-10-CM

## 2017-06-12 DIAGNOSIS — D84.9 IMMUNOSUPPRESSION: ICD-10-CM

## 2017-06-12 DIAGNOSIS — T86.20 COMPLICATION OF HEART TRANSPLANT, UNSPECIFIED COMPLICATION: ICD-10-CM

## 2017-06-12 DIAGNOSIS — T81.89XD NON-HEALING SURGICAL WOUND, SUBSEQUENT ENCOUNTER: Primary | ICD-10-CM

## 2017-06-12 DIAGNOSIS — L92.9 HYPERGRANULATION: ICD-10-CM

## 2017-06-12 DIAGNOSIS — I10 ESSENTIAL HYPERTENSION: ICD-10-CM

## 2017-06-12 DIAGNOSIS — E11.22 TYPE 2 DIABETES MELLITUS WITH STAGE 3 CHRONIC KIDNEY DISEASE, WITH LONG-TERM CURRENT USE OF INSULIN: Chronic | ICD-10-CM

## 2017-06-12 DIAGNOSIS — Z79.899 ENCOUNTER FOR LONG-TERM (CURRENT) USE OF MEDICATIONS: ICD-10-CM

## 2017-06-12 DIAGNOSIS — Z94.1 S/P ORTHOTOPIC HEART TRANSPLANT: Primary | ICD-10-CM

## 2017-06-12 DIAGNOSIS — E78.2 MIXED HYPERLIPIDEMIA: Chronic | ICD-10-CM

## 2017-06-12 DIAGNOSIS — R06.02 SHORTNESS OF BREATH: ICD-10-CM

## 2017-06-12 DIAGNOSIS — I10 ESSENTIAL HYPERTENSION: Chronic | ICD-10-CM

## 2017-06-12 DIAGNOSIS — E11.22 TYPE 2 DIABETES MELLITUS WITH STAGE 3 CHRONIC KIDNEY DISEASE, WITH LONG-TERM CURRENT USE OF INSULIN: ICD-10-CM

## 2017-06-12 DIAGNOSIS — Z79.4 TYPE 2 DIABETES MELLITUS WITH STAGE 3 CHRONIC KIDNEY DISEASE, WITH LONG-TERM CURRENT USE OF INSULIN: ICD-10-CM

## 2017-06-12 DIAGNOSIS — N18.30 TYPE 2 DIABETES MELLITUS WITH STAGE 3 CHRONIC KIDNEY DISEASE, WITH LONG-TERM CURRENT USE OF INSULIN: Chronic | ICD-10-CM

## 2017-06-12 DIAGNOSIS — Z94.1 STATUS POST HEART TRANSPLANT: ICD-10-CM

## 2017-06-12 DIAGNOSIS — E66.9 OBESITY (BMI 30-39.9): Chronic | ICD-10-CM

## 2017-06-12 DIAGNOSIS — N18.30 TYPE 2 DIABETES MELLITUS WITH STAGE 3 CHRONIC KIDNEY DISEASE, WITH LONG-TERM CURRENT USE OF INSULIN: ICD-10-CM

## 2017-06-12 DIAGNOSIS — Z79.4 TYPE 2 DIABETES MELLITUS WITH STAGE 3 CHRONIC KIDNEY DISEASE, WITH LONG-TERM CURRENT USE OF INSULIN: Chronic | ICD-10-CM

## 2017-06-12 LAB
DIASTOLIC DYSFUNCTION: NO
MITRAL VALVE MOBILITY: NORMAL
RETIRED EF AND QEF - SEE NOTES: 60 (ref 55–65)

## 2017-06-12 PROCEDURE — 93306 TTE W/DOPPLER COMPLETE: CPT | Mod: 26,S$PBB,, | Performed by: INTERNAL MEDICINE

## 2017-06-12 PROCEDURE — 99999 PR PBB SHADOW E&M-EST. PATIENT-LVL III: CPT | Mod: PBBFAC,,, | Performed by: INTERNAL MEDICINE

## 2017-06-12 PROCEDURE — 99213 OFFICE O/P EST LOW 20 MIN: CPT | Mod: PBBFAC,27 | Performed by: INTERNAL MEDICINE

## 2017-06-12 PROCEDURE — 99999 PR PBB SHADOW E&M-EST. PATIENT-LVL II: CPT | Mod: PBBFAC,,, | Performed by: CLINICAL NURSE SPECIALIST

## 2017-06-12 PROCEDURE — 17250 CHEM CAUT OF GRANLTJ TISSUE: CPT | Mod: S$PBB,,, | Performed by: CLINICAL NURSE SPECIALIST

## 2017-06-12 PROCEDURE — 99214 OFFICE O/P EST MOD 30 MIN: CPT | Mod: S$PBB,,, | Performed by: INTERNAL MEDICINE

## 2017-06-12 PROCEDURE — 3044F HG A1C LEVEL LT 7.0%: CPT | Mod: ,,, | Performed by: CLINICAL NURSE SPECIALIST

## 2017-06-12 PROCEDURE — 99213 OFFICE O/P EST LOW 20 MIN: CPT | Mod: S$PBB,25,, | Performed by: CLINICAL NURSE SPECIALIST

## 2017-06-12 PROCEDURE — 3044F HG A1C LEVEL LT 7.0%: CPT | Mod: ,,, | Performed by: INTERNAL MEDICINE

## 2017-06-12 NOTE — PROGRESS NOTES
"Subjective:       Patient ID: Mick Barriga is a 57 y.o. male.    Chief Complaint: Wound Check    This is another fu to wound care to right groin that had " over healed"  post plastic surgery (3/30 ) and  post heart tx of 2/09/17. He says the pain is better and actually walked in again today . Pain is 0 today, comes  with wife     PMH His biggest issue is his right groin where the incision had a 1 cm opening and it is "a steady stream" of clear to light yellow fluid.this was subsequently handled by plastic surgery on 3/30 with a muscle flap,this area is non healing and will take repeated interventions to get it to heal correctly       Wound Check       Review of Systems   Constitutional: Negative for activity change, appetite change and fever.   Respiratory: Negative for cough and shortness of breath.    Cardiovascular: Negative for chest pain and leg swelling.   Gastrointestinal: Negative for abdominal distention and constipation.   Genitourinary: Negative for dysuria, flank pain and hematuria.   Musculoskeletal: Negative.    Skin: Positive for wound. Negative for color change and rash.   Psychiatric/Behavioral: Negative.        Objective:      Physical Exam   Constitutional: He is oriented to person, place, and time. He appears well-developed and well-nourished.   Pulmonary/Chest: Effort normal. No respiratory distress. He has no wheezes.   Abdominal: Soft. Bowel sounds are normal. He exhibits no distension.   Musculoskeletal: Normal range of motion. He exhibits no edema.   Neurological: He is alert and oriented to person, place, and time.   Skin: Skin is warm and dry. No erythema.            5/11/17 first visit       6/2 6/12/17             There was continued improvement today but still has a  thickened hyperplasia/ hypergranulation that needs to be cauterized , but overall wound is now rapidly closing with repeated cautery and good topical care by wife  PROCEDURE:  PROCEDURE:  CHEMICAL CAUTERY: Performed " for hypergranulation tissue/granuloma(s) of right groin . The tissue was anesthetized topically with application of Lidocaine gel 2% and 5 minute wait time. The area noted was cauterized with AGNO3. The patient stated pain was 0 on 1-10 scale with this procedure. Redressed with Hydrofera blue ready .  Wound measures 2.5 cm x .7 cm   ( was 4 cm x 1.0 cm no depth)   IMPROVED and new epithelization can be seen    This is drive line wound that is almost healed, no significant depth, slowly filled and about to heal over, applied foam to it as well    Assessment:       1. Non-healing surgical wound, subsequent encounter    2. Type 2 diabetes mellitus with stage 3 chronic kidney disease, with long-term current use of insulin    3. Immunosuppression    4. Hypergranulation        Plan:   Chemical cautery  HYDROFERA blue ready to wound and change every other day  Wife has been doing this and understands how to manage  Told to call for any problems or questions  Return in 10 days for reval    I have reviewed the plan of care with the patient and/  Wife and they express understanding. I spent over 50% of this 15 minute visit in face to face counseling.

## 2017-06-12 NOTE — LETTER
June 12, 2017        Fox Quinn  [address] [place] [address] [address]  [id number]  [facility]  [place] [address]  Phone: [phone]  Fax: [phone]             [facility] [address]  [facility] [address]  Phone: [phone]   Patient: Mick Barriga   MR Number: [id number]   YOB: 1960   Date of Visit: 6/12/2017       Dear Dr. Fox Quinn    Thank you for referring Mick Barriga to me for evaluation. Attached you will find relevant portions of my assessment and plan of care.    If you have questions, please do not hesitate to call me. I look forward to following Mick Barriga along with you.    Sincerely,    Peewee Romero MD    Enclosure    If you would like to receive this communication electronically, please contact [email or web address] or [phone] to request [employer] access.    [employer] is a tool which provides read-only access to select patient information with whom you have a relationship. Its easy to use and provides real time access to review your patients record including encounter summaries, notes, results, and demographic information.    If you feel you have received this communication in error or would no longer like to receive these types of communications, please e-mail [email or web address]

## 2017-06-12 NOTE — PROGRESS NOTES
Subjective:   Mr. Barriga is a 57 y.o. year old White male who received a  - brain death heart transplant on 17.      CMV status:   Donor: -  Recipient: +    HPI  Mr. Barriga is a very pleasant 56 year old male with PMHx of NICM s/p HM II (16), HLD, HTN, VT s/p ICD now s/p OHTx 17 who comes for his regular post-Tx clinic visit.. Pt presented to the hospital for elevated LDH in the setting of subtherapeutic INR with lower extremity swelling concerning for LVAD pump thrombosis, made status 1a and underwent OHTx 17. Post op course complicated by bradycardia and he was started on terbutaline 5 mg TID. Most recently was admitted from -04/10 for increased foul smelling drainage from right groin. Patient seen by wound care and plastics, he was taken to OR for I&D right groin and right femoris muscle flap, with wound vac placed. He had significant pain issues, even needing PCA following surgery, but eventually was able to be transitioned to oral pain meds. The wound had E. Faecalis, placed on amoxicillin (seen by ID).  Wound is still draining but looks better. Echo done today showed preserved graft function with an estimated EF=60%. Creatinine is WNL. Immuno regimen includes: Tacro 1/0.5, Cellcept 1000 mg BID, Prednisone 5 mg daily. Valcyte 900 mg daily. On Bactrim. Creatinine is WNL.     Review of Systems   Constitution: Negative. Negative for chills, decreased appetite, diaphoresis, fever, weakness, malaise/fatigue, night sweats, weight gain and weight loss.   Eyes: Negative.    Cardiovascular: Negative for chest pain, claudication, cyanosis, dyspnea on exertion, irregular heartbeat, leg swelling, near-syncope, orthopnea, palpitations, paroxysmal nocturnal dyspnea and syncope.   Respiratory: Negative for cough, hemoptysis and shortness of breath.    Endocrine: Negative.    Hematologic/Lymphatic: Negative.    Skin: Negative for color change, dry skin and nail changes.   Musculoskeletal:  "Negative.    Gastrointestinal: Negative.    Genitourinary: Negative.        Objective:   Blood pressure (!) 149/72, pulse 97, height 5' 8" (1.727 m), weight 95.9 kg (211 lb 6.7 oz).body mass index is 32.15 kg/m².    Physical Exam   Constitutional: He appears well-developed.   BP (!) 149/72   Pulse 97   Ht 5' 8" (1.727 m)   Wt 95.9 kg (211 lb 6.7 oz)   BMI 32.15 kg/m²      HENT:   Head: Normocephalic.   Neck: No JVD present. Carotid bruit is not present.   Cardiovascular: Regular rhythm, normal heart sounds and normal pulses.  PMI is not displaced.    No murmur heard.  Pulmonary/Chest: Effort normal and breath sounds normal. No respiratory distress. He has no wheezes. He has no rales.   Abdominal: Soft. Bowel sounds are normal. He exhibits no distension. There is no tenderness. There is no rebound.   Musculoskeletal: He exhibits no edema.   Neurological: He is alert.   Skin: Skin is warm.   Vitals reviewed.      Lab Results   Component Value Date    WBC 6.60 06/12/2017    HGB 11.7 (L) 06/12/2017    HCT 38.6 (L) 06/12/2017    MCV 63 (L) 06/12/2017     06/12/2017    CO2 31 (H) 06/12/2017    CREATININE 1.0 06/12/2017    CALCIUM 9.2 06/12/2017    ALBUMIN 3.7 06/12/2017    AST 23 06/12/2017     (H) 06/12/2017    ALT 25 06/12/2017    ALLOMAP 27 05/02/2017       Lab Results   Component Value Date    INR 1.6 (H) 02/15/2017    INR 1.4 (H) 02/14/2017    INR 1.5 (H) 02/13/2017       BNP   Date Value Ref Range Status   06/12/2017 139 (H) 0 - 99 pg/mL Final     Comment:     Values of less than 100 pg/ml are consistent with non-CHF populations.   05/08/2017 167 (H) 0 - 99 pg/mL Final     Comment:     Values of less than 100 pg/ml are consistent with non-CHF populations.   05/02/2017 189 (H) 0 - 99 pg/mL Final     Comment:     Values of less than 100 pg/ml are consistent with non-CHF populations.       LD   Date Value Ref Range Status   02/08/2017 523 (H) 110 - 260 U/L Final     Comment:     Results are increased " in hemolyzed samples.   02/07/2017 536 (H) 110 - 260 U/L Final     Comment:     Results are increased in hemolyzed samples.   02/06/2017 584 (H) 110 - 260 U/L Final     Comment:     Results are increased in hemolyzed samples.       Tacrolimus Lvl   Date Value Ref Range Status   05/15/2017 11.2 5.0 - 15.0 ng/mL Final     Comment:     Testing performed by Liquid Chromatography-Tandem  Mass Spectrometry (LC-MS/MS).  This test was developed and its performance characteristics  determined by [facility], Department of Pathology  and Laboratory Medicine in a manner consistent with CLIA  requirements. It has not been cleared or approved by the [place]  Food and Drug Administration.  This test is used for clinical   purposes.  It should not be regarded as investigational or for  research.       No results found for: SIROLIMUS  No results found for: CYCLOSPORINE    No results found for this or any previous visit.  No results found for this or any previous visit.    Labs were reviewed with the patient.    Assessment:     1. S/P orthotopic heart transplant    2. Essential hypertension    3. Mixed hyperlipidemia    4. Immunosuppression    5. Obesity (BMI 30-39.9)    6. Type 2 diabetes mellitus with stage 3 chronic kidney disease, with long-term current use of insulin        Plan:   Doing well  Monitor Tacro level  Cotninue current dose of Prednisone until Tacro level is therapeutic   F/U with wound care    Return instructions as set forth by post transplant schedule or as needed:    Clinic: Return for labs and/or biopsy weekly the first month, every two weeks during month 2 and then monthly for the first year at the provider or coordinator's discretion. During the second year, return to clinic every 3 months. Post transplant year 3-5 return every 6 months. There will be a comprehensive post transplant evaluation every year that may include LHC/RHC/biopsy, stress test, echo, CXR, and other health screening exams.    In  addition to the clinical assessment, I have ordered Allomap testing for this patient to assist in identification of moderate/severe acute cellular rejection (ACR) in a pt with stable Allograft function instead of endomyocardial biopsy.     Patient is reminded to call with any health changes as these can be early signs of transplant complications. Patient is advised to make sure any new medications or changes of old medications are discussed with a pharmacist or physician knowledgeable with transplant to avoid rejection/drug toxicity related to significant drug interactions.    UNOS Patient Status  Functional Status: 100% - Normal, no complaints, no evidence of disease  Physical Capacity: No Limitations  Working for Income: Unknown    Peewee Romero MD

## 2017-06-14 DIAGNOSIS — D84.9 IMMUNOSUPPRESSED STATUS: ICD-10-CM

## 2017-06-14 RX ORDER — NYSTATIN 100000 [USP'U]/ML
SUSPENSION ORAL
Qty: 473 ML | Refills: 0 | Status: SHIPPED | OUTPATIENT
Start: 2017-06-14 | End: 2017-07-12 | Stop reason: SDUPTHER

## 2017-06-15 ENCOUNTER — CONFERENCE (OUTPATIENT)
Dept: TRANSPLANT | Facility: CLINIC | Age: 57
End: 2017-06-15
Payer: MEDICARE

## 2017-06-15 DIAGNOSIS — Z94.1 STATUS POST HEART TRANSPLANT: ICD-10-CM

## 2017-06-15 NOTE — TELEPHONE ENCOUNTER
Spoke with pt and reviewed results and change in prednisone from 5 mg to 2.5 mg daily, pt stated understanding and updated blue card. Pt has his 5 mos RTC visit on 7/10/17. Pt stated understanding to call if any concerns.

## 2017-06-15 NOTE — TELEPHONE ENCOUNTER
Patient seen in clinic for his 4th post-transplant clinic visit. Reviewed his studies in chart review.   Allomap stable, 24.   Echo normal limits.  Will reduce prednisone to 2.5mg po daily.

## 2017-06-16 RX ORDER — VALGANCICLOVIR 450 MG/1
TABLET, FILM COATED ORAL
Qty: 60 TABLET | Refills: 0 | Status: SHIPPED | OUTPATIENT
Start: 2017-06-16 | End: 2017-07-12 | Stop reason: SDUPTHER

## 2017-06-17 RX ORDER — PREDNISONE 5 MG/1
2.5 TABLET ORAL DAILY
Qty: 30 TABLET | Refills: 11 | Status: SHIPPED | OUTPATIENT
Start: 2017-06-17 | End: 2017-09-12

## 2017-06-20 ENCOUNTER — OFFICE VISIT (OUTPATIENT)
Dept: WOUND CARE | Facility: CLINIC | Age: 57
End: 2017-06-20
Payer: MEDICARE

## 2017-06-20 DIAGNOSIS — E11.22 TYPE 2 DIABETES MELLITUS WITH STAGE 3 CHRONIC KIDNEY DISEASE, WITH LONG-TERM CURRENT USE OF INSULIN: ICD-10-CM

## 2017-06-20 DIAGNOSIS — Z79.4 TYPE 2 DIABETES MELLITUS WITH STAGE 3 CHRONIC KIDNEY DISEASE, WITH LONG-TERM CURRENT USE OF INSULIN: ICD-10-CM

## 2017-06-20 DIAGNOSIS — T81.89XD NON-HEALING SURGICAL WOUND, SUBSEQUENT ENCOUNTER: ICD-10-CM

## 2017-06-20 DIAGNOSIS — N18.30 TYPE 2 DIABETES MELLITUS WITH STAGE 3 CHRONIC KIDNEY DISEASE, WITH LONG-TERM CURRENT USE OF INSULIN: ICD-10-CM

## 2017-06-20 DIAGNOSIS — L92.9 HYPERGRANULATION: Primary | ICD-10-CM

## 2017-06-20 PROCEDURE — 17250 CHEM CAUT OF GRANLTJ TISSUE: CPT | Mod: S$PBB,,, | Performed by: CLINICAL NURSE SPECIALIST

## 2017-06-20 PROCEDURE — 17250 CHEM CAUT OF GRANLTJ TISSUE: CPT | Mod: PBBFAC | Performed by: CLINICAL NURSE SPECIALIST

## 2017-06-20 PROCEDURE — 99213 OFFICE O/P EST LOW 20 MIN: CPT | Mod: S$PBB,25,, | Performed by: CLINICAL NURSE SPECIALIST

## 2017-06-20 PROCEDURE — 99211 OFF/OP EST MAY X REQ PHY/QHP: CPT | Mod: PBBFAC,25 | Performed by: CLINICAL NURSE SPECIALIST

## 2017-06-20 PROCEDURE — 3044F HG A1C LEVEL LT 7.0%: CPT | Mod: ,,, | Performed by: CLINICAL NURSE SPECIALIST

## 2017-06-20 PROCEDURE — 99999 PR PBB SHADOW E&M-EST. PATIENT-LVL I: CPT | Mod: PBBFAC,,, | Performed by: CLINICAL NURSE SPECIALIST

## 2017-06-20 NOTE — PROGRESS NOTES
"Subjective:       Patient ID: Mick Barriga is a 57 y.o. male.    Chief Complaint: No chief complaint on file.    This is another fu to wound care to right groin that had " over healed"  post plastic surgery (3/30 ) and  post heart tx of 2/09/17. He says the pain is better and actually walked in again today . Pain is 0 today, comes  with wife     PMH His right groin where the incision from pump during tx  had a 1 cm opening and it is "a steady stream" of clear to light yellow fluid.this was subsequently handled by plastic surgery on 3/30 with a muscle flap, now this area is non healing and needs repeated cautery interventions to get it to heal correctly       Wound Check       Review of Systems   Constitutional: Negative for activity change, appetite change and fever.   Respiratory: Negative for cough and shortness of breath.    Cardiovascular: Negative for chest pain and leg swelling.   Gastrointestinal: Negative for abdominal distention and constipation.   Genitourinary: Negative for dysuria, flank pain and hematuria.   Musculoskeletal: Negative.    Skin: Positive for wound. Negative for color change and rash.   Psychiatric/Behavioral: Negative.        Objective:      Physical Exam   Constitutional: He appears well-developed and well-nourished.   Pulmonary/Chest: Effort normal. No respiratory distress. He has no wheezes.   Abdominal: Soft. Bowel sounds are normal.   Musculoskeletal: Normal range of motion. He exhibits no edema.   Skin: Skin is warm and dry.            5/11/17 first visit       6/2 6/12/17             There was slight improvement today but still has a  thickened hyperplasia/ hypergranulation that needs to be cauterized , but overall wound is now rapidly closing with repeated cautery and good topical care by wife  PROCEDURE:  PROCEDURE:  CHEMICAL CAUTERY: Performed for hypergranulation tissue/granuloma(s) of right groin . The tissue was anesthetized topically with application of Lidocaine gel " 2% and 5 minute wait time. The area noted was cauterized with AGNO3. The patient stated pain was 0 on 1-10 scale with this procedure. Redressed with soft silicone foam  .  Wound measures 2.4 cm x .6 cm   ( was 4 cm x 1.0 cm no depth)   IMPROVED and new epithelization can be seen    The drive line wound that is almost healed, no significant depth, slowly filled and about to heal over, applied foam to it as well    Assessment:       1. Hypergranulation    2. Non-healing surgical wound, subsequent encounter    3. Type 2 diabetes mellitus with stage 3 chronic kidney disease, with long-term current use of insulin        Plan:   Chemical cautery to groin  Soft silicone foam  to wound and change every other day  Wife doing and understands how to manage  Told to call for any problems or questions  Return in 10 days for reval    I have reviewed the plan of care with the patient and/  Wife and they express understanding. I spent over 50% of this 15 minute visit in face to face counseling.

## 2017-06-30 ENCOUNTER — TELEPHONE (OUTPATIENT)
Dept: TRANSPLANT | Facility: CLINIC | Age: 57
End: 2017-06-30

## 2017-07-05 ENCOUNTER — OFFICE VISIT (OUTPATIENT)
Dept: WOUND CARE | Facility: CLINIC | Age: 57
End: 2017-07-05
Payer: MEDICARE

## 2017-07-05 DIAGNOSIS — E11.22 TYPE 2 DIABETES MELLITUS WITH STAGE 3 CHRONIC KIDNEY DISEASE, WITH LONG-TERM CURRENT USE OF INSULIN: ICD-10-CM

## 2017-07-05 DIAGNOSIS — T81.89XD NON-HEALING SURGICAL WOUND, SUBSEQUENT ENCOUNTER: Primary | ICD-10-CM

## 2017-07-05 DIAGNOSIS — Z79.4 TYPE 2 DIABETES MELLITUS WITH STAGE 3 CHRONIC KIDNEY DISEASE, WITH LONG-TERM CURRENT USE OF INSULIN: ICD-10-CM

## 2017-07-05 DIAGNOSIS — N18.30 TYPE 2 DIABETES MELLITUS WITH STAGE 3 CHRONIC KIDNEY DISEASE, WITH LONG-TERM CURRENT USE OF INSULIN: ICD-10-CM

## 2017-07-05 DIAGNOSIS — D84.9 IMMUNOSUPPRESSION: ICD-10-CM

## 2017-07-05 PROCEDURE — 99212 OFFICE O/P EST SF 10 MIN: CPT | Mod: S$PBB,,, | Performed by: CLINICAL NURSE SPECIALIST

## 2017-07-05 PROCEDURE — 99999 PR PBB SHADOW E&M-EST. PATIENT-LVL II: CPT | Mod: PBBFAC,,, | Performed by: CLINICAL NURSE SPECIALIST

## 2017-07-05 PROCEDURE — 99212 OFFICE O/P EST SF 10 MIN: CPT | Mod: PBBFAC | Performed by: CLINICAL NURSE SPECIALIST

## 2017-07-05 PROCEDURE — 3044F HG A1C LEVEL LT 7.0%: CPT | Mod: ,,, | Performed by: CLINICAL NURSE SPECIALIST

## 2017-07-05 NOTE — PROGRESS NOTES
"Subjective:       Patient ID: Mick Barriga is a 57 y.o. male.    Chief Complaint: Wound Check    This is LAST fu to wound care to right groin that had " over healed"  post plastic surgery (3/30 ) and  post heart tx of 2/09/17. Pain is 0 today, comes  with wife     PMH His right groin where the incision from pump during tx  had a 1 cm opening and it is "a steady stream" of clear to light yellow fluid.this was subsequently handled by plastic surgery on 3/30 with a muscle flap, now this area is non healing and needs repeated cautery interventions to get it to heal correctly       Wound Check       Review of Systems   Constitutional: Negative for activity change, appetite change and fever.   Respiratory: Negative for cough and shortness of breath.    Cardiovascular: Negative for chest pain and leg swelling.   Gastrointestinal: Negative for abdominal distention and constipation.   Genitourinary: Negative for dysuria, flank pain and hematuria.   Musculoskeletal: Negative.    Skin: Positive for wound. Negative for color change and rash.   Psychiatric/Behavioral: Negative.        Objective:      Physical Exam   Constitutional: He appears well-developed and well-nourished.   Pulmonary/Chest: Effort normal. No respiratory distress. He has no wheezes.   Abdominal: Soft. Bowel sounds are normal.   Musculoskeletal: Normal range of motion. He exhibits no edema.   Skin: Skin is warm and dry.            5/11/17 first visit       6/2 6/12/17 7/5/17 HEALED  This wound is essentially healed and needs no further dressings, applied calmoseptine only to scar        The drive line wound that is essentially healed as well ,    Assessment:       1. Non-healing surgical wound, subsequent encounter    2. Type 2 diabetes mellitus with stage 3 chronic kidney disease, with long-term current use of insulin    3. Immunosuppression        Plan:   Both wounds essentially healed and require no further wound care   Okay to leave open to air " and just apply calmoseptine or aquaphor  I have reviewed the plan of care with the patient and/  Wife and they express understanding. I spent over 50% of this 10 minute visit in face to face counseling.

## 2017-07-12 ENCOUNTER — PATIENT MESSAGE (OUTPATIENT)
Dept: WOUND CARE | Facility: CLINIC | Age: 57
End: 2017-07-12

## 2017-07-12 ENCOUNTER — DOCUMENTATION ONLY (OUTPATIENT)
Dept: TRANSPLANT | Facility: CLINIC | Age: 57
End: 2017-07-12

## 2017-07-12 ENCOUNTER — HOSPITAL ENCOUNTER (OUTPATIENT)
Dept: CARDIOLOGY | Facility: CLINIC | Age: 57
Discharge: HOME | End: 2017-07-12
Payer: MEDICARE

## 2017-07-12 ENCOUNTER — OFFICE VISIT (OUTPATIENT)
Dept: TRANSPLANT | Facility: CLINIC | Age: 57
End: 2017-07-12
Payer: MEDICARE

## 2017-07-12 VITALS
WEIGHT: 211.63 LBS | HEIGHT: 68 IN | SYSTOLIC BLOOD PRESSURE: 144 MMHG | DIASTOLIC BLOOD PRESSURE: 82 MMHG | HEART RATE: 97 BPM | BODY MASS INDEX: 32.07 KG/M2

## 2017-07-12 DIAGNOSIS — Z79.899 ENCOUNTER FOR LONG-TERM (CURRENT) USE OF MEDICATIONS: ICD-10-CM

## 2017-07-12 DIAGNOSIS — T38.0X5D ADRENAL CORTICAL STEROIDS CAUSING ADVERSE EFFECT IN THERAPEUTIC USE, SUBSEQUENT ENCOUNTER: ICD-10-CM

## 2017-07-12 DIAGNOSIS — I10 ESSENTIAL HYPERTENSION: ICD-10-CM

## 2017-07-12 DIAGNOSIS — R06.02 SHORTNESS OF BREATH: ICD-10-CM

## 2017-07-12 DIAGNOSIS — E78.2 MIXED HYPERLIPIDEMIA: ICD-10-CM

## 2017-07-12 DIAGNOSIS — F11.20 UNCOMPLICATED OPIOID DEPENDENCE: ICD-10-CM

## 2017-07-12 DIAGNOSIS — E66.9 OBESITY (BMI 30-39.9): Chronic | ICD-10-CM

## 2017-07-12 DIAGNOSIS — I89.8 LYMPHOCELE AFTER SURGICAL PROCEDURE: Primary | ICD-10-CM

## 2017-07-12 DIAGNOSIS — Z79.52 LONG TERM CURRENT USE OF SYSTEMIC STEROIDS: ICD-10-CM

## 2017-07-12 DIAGNOSIS — Z94.1 STATUS POST HEART TRANSPLANT: ICD-10-CM

## 2017-07-12 DIAGNOSIS — T86.20 COMPLICATION OF HEART TRANSPLANT, UNSPECIFIED COMPLICATION: ICD-10-CM

## 2017-07-12 DIAGNOSIS — T81.89XA LYMPHOCELE AFTER SURGICAL PROCEDURE: Primary | ICD-10-CM

## 2017-07-12 LAB
DIASTOLIC DYSFUNCTION: NO
ESTIMATED PA SYSTOLIC PRESSURE: 27.36
GLOBAL PERICARDIAL EFFUSION: NORMAL
MITRAL VALVE REGURGITATION: NORMAL
RETIRED EF AND QEF - SEE NOTES: 65 (ref 55–65)
TRICUSPID VALVE REGURGITATION: NORMAL

## 2017-07-12 PROCEDURE — 93306 TTE W/DOPPLER COMPLETE: CPT | Mod: 26,S$PBB,, | Performed by: INTERNAL MEDICINE

## 2017-07-12 PROCEDURE — 99215 OFFICE O/P EST HI 40 MIN: CPT | Mod: PBBFAC,25 | Performed by: INTERNAL MEDICINE

## 2017-07-12 PROCEDURE — 99999 PR PBB SHADOW E&M-EST. PATIENT-LVL V: CPT | Mod: PBBFAC,,, | Performed by: INTERNAL MEDICINE

## 2017-07-12 PROCEDURE — 99214 OFFICE O/P EST MOD 30 MIN: CPT | Mod: S$PBB,,, | Performed by: INTERNAL MEDICINE

## 2017-07-12 RX ORDER — OXYCODONE HYDROCHLORIDE 15 MG/1
15 TABLET ORAL EVERY 8 HOURS PRN
Qty: 45 TABLET | Refills: 0 | Status: SHIPPED | OUTPATIENT
Start: 2017-07-12 | End: 2017-07-26

## 2017-07-12 RX ORDER — PREDNISONE 1 MG/1
1 TABLET ORAL DAILY
Qty: 30 TABLET | Refills: 1 | Status: SHIPPED | OUTPATIENT
Start: 2017-07-14 | End: 2017-10-10

## 2017-07-12 RX ORDER — OXYCODONE HCL 20 MG/1
20 TABLET, FILM COATED, EXTENDED RELEASE ORAL EVERY 12 HOURS
Qty: 28 TABLET | Refills: 0 | Status: SHIPPED | OUTPATIENT
Start: 2017-07-19 | End: 2023-01-13

## 2017-07-12 NOTE — PROGRESS NOTES
"Subjective:   Mr. Barriga is a 57 y.o. year old White male who received a  - brain death heart transplant on 17.      CMV status: Donor Negative/Recip Positive:    HPI  NICM s/p HM II (16), HLD, HTN, VT s/p ICD now s/p OHTx 17 who comes for his regular post-Tx clinic visit..  Biggest post op issues has been right groin wound which has been slowing improving as evidenced by 17 wound care picture which shows essentially healed and needs no further dressings, applied calmoseptine only to scar.   Pain in groin has resolved but still has chronic musculoskeletal pain requiring oxycodone (nacrotic needs present prior to LVAD)  Biggest complaint is lack of motivation to get out and do things.        Immunosuppression: Tacro 1 / 0.5 , Cellcept 1000 BID, Prednisone 2.5 qd  Review of Systems   Constitution: Negative for decreased appetite, weight gain and weight loss.   Cardiovascular: Negative for chest pain, dyspnea on exertion, leg swelling, near-syncope, orthopnea and palpitations.   Respiratory: Negative for cough and shortness of breath.    Musculoskeletal: Negative for myalgias.   Gastrointestinal: Negative for jaundice.       Objective:     Physical Exam   Constitutional: He is oriented to person, place, and time. He appears well-developed and well-nourished. He is active. He is not intubated.   BP (!) 144/82   Pulse 97   Ht 5' 8" (1.727 m)   Wt 96 kg (211 lb 10.3 oz)   BMI 32.18 kg/m²      HENT:   Head: Normocephalic and atraumatic. Hair is normal.   Right Ear: External ear normal.   Left Ear: External ear normal.   Nose: Nose normal. No nasal deformity. No epistaxis.  No foreign bodies.   Mouth/Throat: Mucous membranes are normal. Mucous membranes are not cyanotic. No oropharyngeal exudate.   Eyes: Conjunctivae and EOM are normal. Pupils are equal, round, and reactive to light.   Neck: Neck supple. No hepatojugular reflux and no JVD present.   Cardiovascular: Normal rate, regular rhythm, " normal heart sounds and normal pulses.  Exam reveals no gallop.    Pulmonary/Chest: Effort normal and breath sounds normal. No apnea and no tachypnea. He is not intubated. No respiratory distress. He exhibits no tenderness.   Abdominal: Soft. Normal appearance and bowel sounds are normal. There is no tenderness. No hernia.   Musculoskeletal: Normal range of motion.   Neurological: He is alert and oriented to person, place, and time. He displays no seizure activity.   Skin: Skin is warm, dry and intact. No rash noted. No pallor.   Psychiatric: He has a normal mood and affect. His speech is normal and behavior is normal. Thought content normal. Cognition and memory are normal.       Lab Results   Component Value Date    WBC 6.60 06/12/2017    HGB 11.7 (L) 06/12/2017    HCT 38.6 (L) 06/12/2017    MCV 63 (L) 06/12/2017     06/12/2017    CO2 29 07/12/2017    CREATININE 1.1 07/12/2017    CALCIUM 9.2 07/12/2017    ALBUMIN 3.9 07/12/2017    AST 22 07/12/2017    BNP 68 07/12/2017    ALT 17 07/12/2017    ALLOMAP 24 06/12/2017       Lab Results   Component Value Date    INR 1.6 (H) 02/15/2017    INR 1.4 (H) 02/14/2017    INR 1.5 (H) 02/13/2017       BNP   Date Value Ref Range Status   07/12/2017 68 0 - 99 pg/mL Final     Comment:     Values of less than 100 pg/ml are consistent with non-CHF populations.   06/12/2017 139 (H) 0 - 99 pg/mL Final     Comment:     Values of less than 100 pg/ml are consistent with non-CHF populations.   05/08/2017 167 (H) 0 - 99 pg/mL Final     Comment:     Values of less than 100 pg/ml are consistent with non-CHF populations.         Tacrolimus Lvl   Date Value Ref Range Status   06/12/2017 10.1 5.0 - 15.0 ng/mL Final     Comment:     Testing performed by Liquid Chromatography-Tandem  Mass Spectrometry (LC-MS/MS).  This test was developed and its performance characteristics  determined by [facility], Department of Pathology  and Laboratory Medicine in a manner consistent with  CLIA  requirements. It has not been cleared or approved by the [place]  Food and Drug Administration.  This test is used for clinical   purposes.  It should not be regarded as investigational or for  research.           Assessment:     1. Lymphocele after surgical procedure    2. Adrenal cortical steroids causing adverse effect in therapeutic use, subsequent encounter    3. Obesity (BMI 30-39.9)    4. Uncomplicated opioid dependence        Plan:   R femoral Lymphocele after transplant  Resolved remains off antibiotics    Adrenal cortical steroids causing adverse effect in therapeutic use  Hope to wean off in next few visits     Obesity (BMI 30-39.9)  Stable continue diet Should be able to exercise now that groin issues better Consider cardiac rehab    Uncomplicated opioid dependence  Given 45 pills of oxycodone (unable to verify prior RX as they were paper)   No evidence of diversion   Additionally given oxycontin at lower dose (20mg) BID to help with weaning for two week supply (28 pills)  Will refer to pain management who should be able to take over     Referred to PT / OT but wants to do it one his own first    Return instructions as set forth by post transplant schedule or as needed:    Clinic: Return for labs and/or biopsy weekly the first month, every two weeks during month 2 and then monthly for the first year at the provider or coordinator's discretion. During the second year, return to clinic every 3 months. Post transplant year 3-5 return every 6 months. There will be a comprehensive post transplant evaluation every year that may include LHC/RHC/biopsy, stress test, echo, CXR, and other health screening exams.    In addition to the clinical assessment, I have ordered Allomap testing for this patient to assist in identification of moderate/severe acute cellular rejection (ACR) in a pt with stable Allograft function instead of endomyocardial biopsy.     Patient is reminded to call with any health changes as these  can be early signs of transplant complications. Patient is advised to make sure any new medications or changes of old medications are discussed with a pharmacist or physician knowledgeable with transplant to avoid rejection/drug toxicity related to significant drug interactions.    UNOS Patient Status  Functional Status: 70% - Cares for self: unable to carry on normal activity or active work  Physical Capacity: No Limitations  Working for Income: Unknown    Trixie Marx MD

## 2017-07-12 NOTE — ASSESSMENT & PLAN NOTE
Given 45 pills of oxycodone (unable to verify prior RX as they were paper)   No evidence of diversion   Additionally given oxycontin at lower dose (20mg) BID to help with weaning for two week supply (28 pills)  Will refer to pain management who should be able to take over

## 2017-07-12 NOTE — ASSESSMENT & PLAN NOTE
Stable continue diet Should be able to exercise now that groin issues better Consider cardiac rehab

## 2017-07-12 NOTE — PROGRESS NOTES
Met with pt in clinic for 5 month f/u. Pt reports fatigue, tiredness and abdominal discomfort. Reviewed medications. Labs pending at this time. Reviewed with Dr. Marx. D/c Valcyte, decreased pain medications; Oxycontin and Roxycodone - and sent referral to pain management  , put in referral to PT/ OT. Prescription given to pt for decreased dose of pain medication. Per review- plan is to decrease prednisone to 1 mg if all labs within normal.

## 2017-07-12 NOTE — LETTER
July 12, 2017        Fox Quinn  [address] [place] [address] [address]  [id number]  [facility]  [place] [address]  Phone: [phone]  Fax: [phone]             [facility] [address]  [facility] [address]  Phone: [phone]   Patient: Mick Barriga   MR Number: [id number]   YOB: 1960   Date of Visit: 7/12/2017       Dear Dr. Fox Quinn    Thank you for referring Mick Barriga to me for evaluation. Attached you will find relevant portions of my assessment and plan of care.    If you have questions, please do not hesitate to call me. I look forward to following Mick Barriga along with you.    Sincerely,    Trixie Marx MD    Enclosure    If you would like to receive this communication electronically, please contact [email or web address] or [phone] to request [employer] access.    [employer] is a tool which provides read-only access to select patient information with whom you have a relationship. Its easy to use and provides real time access to review your patients record including encounter summaries, notes, results, and demographic information.    If you feel you have received this communication in error or would no longer like to receive these types of communications, please e-mail [email or web address]

## 2017-07-17 RX ORDER — VALGANCICLOVIR 450 MG/1
TABLET, FILM COATED ORAL
Qty: 60 TABLET | Refills: 0 | Status: SHIPPED | OUTPATIENT
Start: 2017-07-17 | End: 2017-09-12

## 2017-07-20 ENCOUNTER — CLINICAL SUPPORT (OUTPATIENT)
Dept: REHABILITATION | Facility: HOSPITAL | Age: 57
End: 2017-07-20
Attending: INTERNAL MEDICINE
Payer: MEDICARE

## 2017-07-20 DIAGNOSIS — R53.1 WEAKNESS: Primary | ICD-10-CM

## 2017-07-20 DIAGNOSIS — M79.602 LEFT ARM PAIN: ICD-10-CM

## 2017-07-20 PROCEDURE — G8979 MOBILITY GOAL STATUS: HCPCS | Mod: CK,PN

## 2017-07-20 PROCEDURE — 97162 PT EVAL MOD COMPLEX 30 MIN: CPT | Mod: PN

## 2017-07-20 PROCEDURE — G8978 MOBILITY CURRENT STATUS: HCPCS | Mod: CL,PN

## 2017-07-20 PROCEDURE — 97110 THERAPEUTIC EXERCISES: CPT | Mod: PN

## 2017-07-20 NOTE — PROGRESS NOTES
"  OUTPATIENT PHYSICAL THERAPY   PATIENT EVALUATION  Onset Date: 7/20/17  Primary Diagnosis:   1. Weakness     2. Left arm pain         Past Medical History:   Diagnosis Date    CHF (congestive heart failure)     Coronary artery disease     GERD (gastroesophageal reflux disease)     Hyperlipidemia     Hypertension     LVAD (left ventricular assist device) present 2/13/2017    Type 2 diabetes mellitus with hyperglycemia      Auth: 20   Referral Expiration: 12/31/17  POC Expiration: 9/12/17    Precautions: cardiac  Prior Therapy: abbreviated home health  Medications: Mick Barriga has a current medication list which includes the following prescription(s): alendronate, amlodipine, aspirin, bd insulin pen needle uf short, blood sugar diagnostic, blood-glucose meter, ergocalciferol, esomeprazole, ferrous gluconate, furosemide, gabapentin, insulin aspart, insulin glargine, lancets, magnesium oxide, mycophenolate, oxycodone, oxycodone, pravastatin, prednisone, prednisone, sulfamethoxazole-trimethoprim 400-80mg, tacrolimus, tacrolimus, and valganciclovir.  Nutrition:  Overweight  Prior Level of Function: Independent; pt reports he was "lazy" not much exercise; pacemaker for 10 years  Social History: Pt is retired, previously worked as a ; lives with wife in two story home. Pt states he has not gone up the stairs in a year due to his heart issues.  Functional Deficits: cooking, cleaning, sometimes pt's wife helps with dressing if pt has pain  Participation: limited, unable to go out  Patient Therapy Goals: "to get better, to live with less pain, to be able to do things like walking, go up and down stairs, activities"    Subjective     Pt reports he had a heart transplant 5 months ago and continues to have some problems including L arm pain and numbness. He states it used to be in his shoulder but now travels from elbow to pinky and ring finger. Pt states he also has a problem with his legs, they " "are painful and cramping in his calves. Pt reports he has surgery in his right groin and the swelling and numbness doesn't want to go away. He states that his groin had difficulty healing and did not want to close up so he had to have plastic surgery for it. Pt states his neck and his back are also painful. Pt reports he had home PT for 2-3 weeks but he couldn't do anything because of his groin surgery, "couldn't move my legs." Pt states, "I can't put any pressure on my R leg because I am scared my leg will open again." Pt reports he thinks he needs "massage and electricity" for his L arm.    Pt states he is on a lot of medication for his heart. Pt states he is diabetic and has had gall bladder removal.     Pain:  Location: generalized pain    Description: Aching, Burning, Throbbing, Shooting and a bit of everything  Activities Which Increase Pain: Sitting and Walking  Activities Which Decrease Pain: pain medication  Pain Scale: 6/10 at best 7/10 now  10/10 at worst    Objective     Posture: forward head rounded shoulders. L shoulder depressed compared to R, L trunk shortened, PPT, slouched, antalgic posturing   Sensation: in tact LT  Range of Motion/Strength: grossly within functional limits, decreased functional strength, easily observed  Myotome screen: 5/5    Flexibility: decreased grossly  Gait: R sided limp, decreased step length, decreased gait speed, decreased hip flexion, decreased DF, absent hip extension, decreased pelvic rotation, antalgic gait  Analysis: Assistance: modified independent  Bed Mobility: Modified independent  Transfers: Modified independent  Balance: poor    Resting HR: 94 bpm  O2: 97    30 second sit to stand: 5 (UE's used, pt presses into LE's)  Racquel rating: fairly light, 11    6 MWT - 300 ft  Post test:   bpm  O2 98    Treatment:     Pt received individual therapeutic exercise to develop strength, endurance, core stabilization, improved CV function, functional mobility, strength, " and stability for 15 minutes including:    - Ambulation on turf; pt with significantly decreased endurance and ambulation limited by pain    Education: Instructed on general anatomy/physiology; discussed plan of care with patient; instructed in purpose of physical therapy and the benefits/risks of treatment; instructed in risks of refusing treatment. Pt agreed to tx plan.    Assessment     Mr. Barriga is a 57 y.o. male who presents to outpatient physical therapy today 5 months s/p a heart transplant with chief complaints of L arm pain and numbness, R leg pain, and neck and back pain. Pt also presents today verbalizing desire to receive massage and electrical stimulation to his L forearm for pain. After explaining role of PT and importance of therapeutic exercise and conditioning, pt agreed to tx plan and is expected to be compliant. Pt with long term deconditioning due to heart failure with significant functional limitations resulting from decreased endurance, pain, strength, flexibility, core strength and stability, gait and balance deficits, and cardiovascular limitations. Pt will benefit from skilled outpatient PT to maximize functional independence, decreased further decline, minimize/prevent future hospital visits, and improve pt's independence with ADL's and ability to participate in the home and community.     Prognosis: fair  No educational, cultural, or spiritual barriers to learning identified.      History  Co-morbidities and personal factors that may impact the plan of care Examination  Body Structures and Functions, activity limitations and participation restrictions that may impact the plan of care Clinical Presentation   Decision Making/ Complexity Score   Co-morbidities:   DM, HTN, CAD  Heart failure  L arm pain  Back and neck pain  Personal Factors:   Sedentary lifestyle  Low motivation Body Regions: Generalized deconditioning, back, neck, LE's, UE's, trunk    Body Systems: Musculoskeletal,  neuromuscular, cardiovascular, integumentary    Activity limitations: limited: cooking, cleaning, dressing, stairs     Participation Restrictions: severely limited/no-participatory   Evolving      Mod    FOTO score: 77% limitation     GOALS  Short Term Goals: 4 weeks (8/17/17):  1. Pt will demonstrate improvement in 5 time sit to  order to decrease cardiopulmonary limitations.  2. Pt will tolerate 10 minutes of moderate aerobic exercise without requiring a rest break  to decrease pain and improve CV endurance in order to improve activity tolerance.   3. Pt will tolerate HEP to progress towards self management.    Long Term Goals: 12 weeks (9/12/17)  1. Pt will increase gait speed in order to improve activity tolerance.   2. Pt will tolerate 20-30 minutes of aerobic exercise without rest break to decrease pain and improve CV endurance in order to improve activity tolerance.   3. Pt will report < 60% on FOTO in order to demonstrate improvement in functional independence and decrease in disability.  4. Pt will be independent with HEP and self management.      CMS Impairment/Limitation/Restriction for FOTO Cerebrovascular Disorders Survey  Status Limitation G-Code CMS Severity Modifier  Intake 23% 77% Current Status CL - At least 60 percent but less than 80 percent  Predicted 42% 58% Goal Status+ CK - At least 40 percent but less than 60 percent  Plan     Pt will be treated by physical therapy 1-3 times/week for 12 weeks for patient education, HEP, therapeutic exercise, neuromuscular reeducation, manual therapy, gait training, and modalities PRN to achieve established goals. Linus may at times be seen by a PTA as part of the Rehab team.

## 2017-07-24 NOTE — PLAN OF CARE
"  OUTPATIENT PHYSICAL THERAPY   PATIENT EVALUATION  Onset Date: 7/20/17  Primary Diagnosis:   1. Weakness     2. Left arm pain         Past Medical History:   Diagnosis Date    CHF (congestive heart failure)     Coronary artery disease     GERD (gastroesophageal reflux disease)     Hyperlipidemia     Hypertension     LVAD (left ventricular assist device) present 2/13/2017    Type 2 diabetes mellitus with hyperglycemia      Auth: 20   Referral Expiration: 12/31/17  POC Expiration: 9/12/17    Precautions: cardiac  Prior Therapy: abbreviated home health  Medications: Mick aBrriga has a current medication list which includes the following prescription(s): alendronate, amlodipine, aspirin, bd insulin pen needle uf short, blood sugar diagnostic, blood-glucose meter, ergocalciferol, esomeprazole, ferrous gluconate, furosemide, gabapentin, insulin aspart, insulin glargine, lancets, magnesium oxide, mycophenolate, oxycodone, oxycodone, pravastatin, prednisone, prednisone, sulfamethoxazole-trimethoprim 400-80mg, tacrolimus, tacrolimus, and valganciclovir.  Nutrition:  Overweight  Prior Level of Function: Independent; pt reports he was "lazy" not much exercise; pacemaker for 10 years  Social History: Pt is retired, previously worked as a ; lives with wife in two story home. Pt states he has not gone up the stairs in a year due to his heart issues.  Functional Deficits: cooking, cleaning, sometimes pt's wife helps with dressing if pt has pain  Participation: limited, unable to go out  Patient Therapy Goals: "to get better, to live with less pain, to be able to do things like walking, go up and down stairs, activities"    Subjective     Pt reports he had a heart transplant 5 months ago and continues to have some problems including L arm pain and numbness. He states it used to be in his shoulder but now travels from elbow to pinky and ring finger. Pt states he also has a problem with his legs, they " "are painful and cramping in his calves. Pt reports he has surgery in his right groin and the swelling and numbness doesn't want to go away. He states that his groin had difficulty healing and did not want to close up so he had to have plastic surgery for it. Pt states his neck and his back are also painful. Pt reports he had home PT for 2-3 weeks but he couldn't do anything because of his groin surgery, "couldn't move my legs." Pt states, "I can't put any pressure on my R leg because I am scared my leg will open again." Pt reports he thinks he needs "massage and electricity" for his L arm.    Pt states he is on a lot of medication for his heart. Pt states he is diabetic and has had gall bladder removal.     Pain:  Location: generalized pain    Description: Aching, Burning, Throbbing, Shooting and a bit of everything  Activities Which Increase Pain: Sitting and Walking  Activities Which Decrease Pain: pain medication  Pain Scale: 6/10 at best 7/10 now  10/10 at worst    Objective     Posture: forward head rounded shoulders. L shoulder depressed compared to R, L trunk shortened, PPT, slouched, antalgic posturing   Sensation: in tact LT  Range of Motion/Strength: grossly within functional limits, decreased functional strength, easily observed  Myotome screen: 5/5    Flexibility: decreased grossly  Gait: R sided limp, decreased step length, decreased gait speed, decreased hip flexion, decreased DF, absent hip extension, decreased pelvic rotation, antalgic gait  Analysis: Assistance: modified independent  Bed Mobility: Modified independent  Transfers: Modified independent  Balance: poor    Resting HR: 94 bpm  O2: 97    30 second sit to stand: 5 (UE's used, pt presses into LE's)  Racquel rating: fairly light, 11    6 MWT - 300 ft  Post test:   bpm  O2 98    Treatment:     Pt received individual therapeutic exercise to develop strength, endurance, core stabilization, improved CV function, functional mobility, strength, " and stability for 15 minutes including:    - Ambulation on turf; pt with significantly decreased endurance and ambulation limited by pain    Education: Instructed on general anatomy/physiology; discussed plan of care with patient; instructed in purpose of physical therapy and the benefits/risks of treatment; instructed in risks of refusing treatment. Pt agreed to tx plan.    Assessment     Mr. Barriga is a 57 y.o. male who presents to outpatient physical therapy today 5 months s/p a heart transplant with chief complaints of L arm pain and numbness, R leg pain, and neck and back pain. Pt also presents today verbalizing desire to receive massage and electrical stimulation to his L forearm for pain. After explaining role of PT and importance of therapeutic exercise and conditioning, pt agreed to tx plan and is expected to be compliant. Pt with long term deconditioning due to heart failure with significant functional limitations resulting from decreased endurance, pain, strength, flexibility, core strength and stability, gait and balance deficits, and cardiovascular limitations. Pt will benefit from skilled outpatient PT to maximize functional independence, decreased further decline, minimize/prevent future hospital visits, and improve pt's independence with ADL's and ability to participate in the home and community.     Prognosis: fair  No educational, cultural, or spiritual barriers to learning identified.      History  Co-morbidities and personal factors that may impact the plan of care Examination  Body Structures and Functions, activity limitations and participation restrictions that may impact the plan of care Clinical Presentation   Decision Making/ Complexity Score   Co-morbidities:   DM, HTN, CAD  Heart failure  L arm pain  Back and neck pain  Personal Factors:   Sedentary lifestyle  Low motivation Body Regions: Generalized deconditioning, back, neck, LE's, UE's, trunk    Body Systems: Musculoskeletal,  neuromuscular, cardiovascular, integumentary    Activity limitations: limited: cooking, cleaning, dressing, stairs     Participation Restrictions: severely limited/no-participatory   Evolving      Mod    FOTO score: 77% limitation     GOALS  Short Term Goals: 4 weeks (8/17/17):  1. Pt will demonstrate improvement in 5 time sit to  order to decrease cardiopulmonary limitations.  2. Pt will tolerate 10 minutes of moderate aerobic exercise without requiring a rest break  to decrease pain and improve CV endurance in order to improve activity tolerance.   3. Pt will tolerate HEP to progress towards self management.    Long Term Goals: 12 weeks (9/12/17)  1. Pt will increase gait speed in order to improve activity tolerance.   2. Pt will tolerate 20-30 minutes of aerobic exercise without rest break to decrease pain and improve CV endurance in order to improve activity tolerance.   3. Pt will report < 60% on FOTO in order to demonstrate improvement in functional independence and decrease in disability.  4. Pt will be independent with HEP and self management.      CMS Impairment/Limitation/Restriction for FOTO Cerebrovascular Disorders Survey  Status Limitation G-Code CMS Severity Modifier  Intake 23% 77% Current Status CL - At least 60 percent but less than 80 percent  Predicted 42% 58% Goal Status+ CK - At least 40 percent but less than 60 percent  Plan     Pt will be treated by physical therapy 1-3 times/week for 12 weeks for patient education, HEP, therapeutic exercise, neuromuscular reeducation, manual therapy, gait training, and modalities PRN to achieve established goals. Linus may at times be seen by a PTA as part of the Rehab team.

## 2017-07-26 ENCOUNTER — CLINICAL SUPPORT (OUTPATIENT)
Dept: REHABILITATION | Facility: HOSPITAL | Age: 57
End: 2017-07-26
Attending: INTERNAL MEDICINE
Payer: MEDICARE

## 2017-07-26 DIAGNOSIS — M79.602 LEFT ARM PAIN: ICD-10-CM

## 2017-07-26 DIAGNOSIS — R53.1 WEAKNESS: Primary | ICD-10-CM

## 2017-07-26 PROCEDURE — 97110 THERAPEUTIC EXERCISES: CPT | Mod: PN

## 2017-07-26 NOTE — PROGRESS NOTES
Physical Therapy Daily Note     Name: Mick Barriga  [facility] Number: [id number]  Diagnosis:   Encounter Diagnoses   Name Primary?    Weakness Yes    Left arm pain      Physician: Trixie Marx MD  Precautions: cardiac  Visit #: 2/20  Time In: 8:12  Time Out: 9:05  Total Treatment Time: 53 minutes    Subjective     Pt reports: he is not feeling great today. Pt states he took pain medication.  Pain Scale: Mick rates pain on a scale of 0-10 to be 8 currently.    Objective     Mick received individual therapeutic exercises to develop strength, endurance, flexibility, posture and core stabilization for 48 minutes including:    - Nustep x 10 min; pt required 3 rest breaks  - Supine pec stretch on 1/2 foam x 3 min  - Snow nika on half foam x 10  - March on 1/2 foam x 2'  - Shuttle x 5', sets of 10, breaks as needed  - Calf stretch (2 x 30'') and heel lifts (2 x 10) on shuttle   - LTR x 2'    Pt received manual therapy for 5 minutes including: MFR applied to chest region    Education provided: discussed current functional status, progress, and POC. Pt was instructed in HEP including: supine pec stretch, supine snow angels, sit to stands, LTR; pt demonstrated and verbalized understanding and was provided with a handout. Pt was also instructed to begin walking 5-10 minutes twice per day.   No spiritual or educational barriers to learning provided    Assessment     Patient tolerated session well without visible adverse effects. Pt with good tolerance to tx today without exacerbation of pain and able to perform all exercises without difficulty, requiring rest breaks during aerobic activity only. Pt presents 5 months s/p a heart transplant with long term deconditioning due to heart failure with significant functional limitations resulting from decreased endurance, pain, strength, flexibility, core strength and stability, gait and balance deficits, and cardiovascular  limitations. Pt will benefit from skilled outpatient PT to maximize functional independence, decreased further decline, minimize/prevent future hospital visits, and improve pt's independence with ADL's and ability to participate in the home and community.     Pt is making good progress towards established goals.    GOALS  Short Term Goals: 4 weeks (8/17/17):  1. Pt will demonstrate improvement in 5 time sit to  order to decrease cardiopulmonary limitations.  2. Pt will tolerate 10 minutes of moderate aerobic exercise without requiring a rest break  to decrease pain and improve CV endurance in order to improve activity tolerance.   3. Pt will tolerate HEP to progress towards self management.     Long Term Goals: 12 weeks (9/12/17)  1. Pt will increase gait speed in order to improve activity tolerance.   2. Pt will tolerate 20-30 minutes of aerobic exercise without rest break to decrease pain and improve CV endurance in order to improve activity tolerance.   3. Pt will report < 60% on FOTO in order to demonstrate improvement in functional independence and decrease in disability.  4. Pt will be independent with HEP and self management.     CMS Impairment/Limitation/Restriction for FOTO Cerebrovascular Disorders Survey  Status Limitation G-Code CMS Severity Modifier  Intake 23% 77% Current Status CL - At least 60 percent but less than 80 percent  Predicted 42% 58% Goal Status+ CK - At least 40 percent but less than 60 percent    Plan     Continue with established Plan of Care towards PT goals.

## 2017-07-28 ENCOUNTER — TELEPHONE (OUTPATIENT)
Dept: REHABILITATION | Facility: HOSPITAL | Age: 57
End: 2017-07-28

## 2017-08-09 ENCOUNTER — CLINICAL SUPPORT (OUTPATIENT)
Dept: REHABILITATION | Facility: HOSPITAL | Age: 57
End: 2017-08-09
Attending: INTERNAL MEDICINE
Payer: MEDICARE

## 2017-08-09 DIAGNOSIS — Z74.09 DECREASED FUNCTIONAL MOBILITY AND ENDURANCE: ICD-10-CM

## 2017-08-09 DIAGNOSIS — M79.602 LEFT ARM PAIN: ICD-10-CM

## 2017-08-09 DIAGNOSIS — R53.1 WEAKNESS: Primary | ICD-10-CM

## 2017-08-09 PROCEDURE — 97140 MANUAL THERAPY 1/> REGIONS: CPT | Mod: PN

## 2017-08-09 PROCEDURE — 97110 THERAPEUTIC EXERCISES: CPT | Mod: PN

## 2017-08-09 NOTE — PROGRESS NOTES
Physical Therapy Daily Note     Name: Mick Barriga  [facility] Number: [id number]  Diagnosis:   Encounter Diagnoses   Name Primary?    Weakness Yes    Left arm pain     Decreased functional mobility and endurance      Physician: Trixie Marx MD  Precautions: cardiac  Visit #: 3/20  Time In: 7:00  Time Out: 8:22  Total Treatment Time: 82 minutes    Subjective     Pt reports: he has been having back pain which he rates as a 7/10 this morning. Pt reports he feels like his body is tense due to not having been exercising for a long time. Pt reports 5/10 pain following tx.    Objective     Mick received individual therapeutic exercises to develop strength, endurance, flexibility, posture and core stabilization for 70 minutes including:    - Nustep x 10 min; pt required 3 rest breaks  - LTR x 2'  - DKCT with PB and UE flexion using dowel x 15  - SLR 2 x 10 ea  - Supine clams YTB 2 x 10 x 3''  - Supine ball squeeze 2 x 10 x 3'  - Ant/post pelvic tilts with max verbal and tactile cueing; pt with max limitation/min movement; difficulty in supine and seated on PB  - Seated ball roll outs x 10  - Supine hip flexor stretch x 3 min ea; soft tissue mobilization applied to quads/hip flexors using rolling stick    Not performed:  - Supine pec stretch on 1/2 foam x 3 min  - Snow nika on half foam x 10  - March on 1/2 foam x 2'  - Shuttle x 5', sets of 10, breaks as needed  - Calf stretch (2 x 30'') and heel lifts (2 x 10) on shuttle     Pt received manual therapy for 15 minutes including: deep cross body MFR applied to lumbar paraspinals    Pt received MHP to low-mid back and R groin region for 10 min following tx.    Education provided: discussed current functional status, progress, and POC. Pt was instructed to continue current HEP including: supine pec stretch, supine snow angels, sit to stands, LTR with addition of clams, ball squeeze, SLR, and hip flexor stretch; pt  demonstrated and verbalized understanding and was provided with a handout. Pt was also instructed to continue walking 5-10 minutes twice per day.   No spiritual or educational barriers to learning provided    Assessment     Patient tolerated session well without visible adverse effects. Pt with report of decreased pain following today's tx. Right groin incision with decreased mobility, will provide scar and myofascial mobilization as needed at pt's next visit; pt was instructed to begin scar massage using an good quality oil at home with discussion of importance of soft tissue mobility to prevent adhesion. Pt verbalized understanding. Pt presents 5 months s/p a heart transplant with long term deconditioning due to heart failure with significant functional limitations resulting from decreased endurance, pain, strength, flexibility, core strength and stability, gait and balance deficits, and cardiovascular limitations. Pt will benefit from skilled outpatient PT to maximize functional independence, minimize risk of further decline, minimize/prevent future hospital visits, and improve pt's independence with ADL's and ability to participate in the home and community.     Pt is making good progress towards established goals.    GOALS  Short Term Goals: 4 weeks (8/17/17):  1. Pt will demonstrate improvement in 5 time sit to  order to decrease cardiopulmonary limitations.  2. Pt will tolerate 10 minutes of moderate aerobic exercise without requiring a rest break  to decrease pain and improve CV endurance in order to improve activity tolerance.   3. Pt will tolerate HEP to progress towards self management.     Long Term Goals: 12 weeks (9/12/17)  1. Pt will increase gait speed in order to improve activity tolerance.   2. Pt will tolerate 20-30 minutes of aerobic exercise without rest break to decrease pain and improve CV endurance in order to improve activity tolerance.   3. Pt will report < 60% on FOTO in order to  demonstrate improvement in functional independence and decrease in disability.  4. Pt will be independent with HEP and self management.     CMS Impairment/Limitation/Restriction for FOTO Cerebrovascular Disorders Survey  Status Limitation G-Code CMS Severity Modifier  Intake 23% 77% Current Status CL - At least 60 percent but less than 80 percent  Predicted 42% 58% Goal Status+ CK - At least 40 percent but less than 60 percent    Plan     Continue with established Plan of Care towards PT goals.

## 2017-08-11 ENCOUNTER — CLINICAL SUPPORT (OUTPATIENT)
Dept: REHABILITATION | Facility: HOSPITAL | Age: 57
End: 2017-08-11
Attending: INTERNAL MEDICINE
Payer: MEDICARE

## 2017-08-11 DIAGNOSIS — Z74.09 DECREASED FUNCTIONAL MOBILITY AND ENDURANCE: Primary | ICD-10-CM

## 2017-08-11 DIAGNOSIS — M79.602 LEFT ARM PAIN: ICD-10-CM

## 2017-08-11 DIAGNOSIS — R53.1 WEAKNESS: ICD-10-CM

## 2017-08-11 PROCEDURE — 97140 MANUAL THERAPY 1/> REGIONS: CPT | Mod: PN

## 2017-08-11 PROCEDURE — 97110 THERAPEUTIC EXERCISES: CPT | Mod: PN

## 2017-08-11 PROCEDURE — 97014 ELECTRIC STIMULATION THERAPY: CPT | Mod: PN

## 2017-08-11 NOTE — PROGRESS NOTES
Physical Therapy Daily Note     Name: Mick Barriga  Clinic Number: [id number]  Diagnosis:   Encounter Diagnoses   Name Primary?    Decreased functional mobility and endurance Yes    Weakness     Left arm pain      Physician: Trixie Marx MD  Precautions: cardiac  Visit #: 4/20  Time In: 10:00  Time Out: 11:20  Total Treatment Time: 80 minutes    Subjective     Pt reports: he is doing well this morning. Pt reports 5/10 pain, states he took his pain medication. Pt reports compliance with his HEP.    Objective     Mick received individual therapeutic exercises to develop strength, endurance, flexibility, posture and core stabilization for 20 minutes including:    - Nustep x 10 min, level 3    Akhil exercises performed with MHP:  - LTR x 3'  - DKCT with PB x 20  - Open book x 10    Not performed:  - SLR 2 x 10 ea  - Supine clams YTB 2 x 10 x 3''  - Supine ball squeeze 2 x 10 x 3'  - Ant/post pelvic tilts with max verbal and tactile cueing; pt with max limitation/min movement; difficulty in supine and seated on PB  - Seated ball roll outs x 10  - Supine hip flexor stretch x 3 min ea; soft tissue mobilization applied to quads/hip flexors using rolling stick    - Supine pec stretch on 1/2 foam x 3 min  - Snow nika on half foam x 10  - March on 1/2 foam x 2'  - Shuttle x 5', sets of 10, breaks as needed  - Calf stretch (2 x 30'') and heel lifts (2 x 10) on shuttle     Pt received manual therapy for 45 minutes including: scar mobilization with skin rolling and MFR applied to R groin region; scar mobilization applied to vertical anterior chest wall incisions; pec minor MFR bilaterally  NP deep cross body MFR applied to lumbar paraspinals    Pt received TENS to low thoracic paraspinals for 10 min post tx.  Pt received MHP to low-mid back and R groin region for 10 min following tx. NP    Education provided: discussed current functional status, progress, and POC. Pt  was instructed to continue current HEP including: supine pec stretch, supine snow angels, sit to stands, LTR with addition of clams, ball squeeze, SLR, and hip flexor stretch; pt demonstrated and verbalized understanding and was provided with a handout. Pt was also instructed to continue walking 5-10 minutes twice per day.   No spiritual or educational barriers to learning provided    Assessment     Patient tolerated session well without visible adverse effects. Pt arrived with notable improvement in quality of ambulation and with decreased need for rest break during aerobic activity. Will continue to progress CV exercise as pt is able. Pt with good response to manual care today; his chest wall and groin incisions are both fully healed with restrictions and adhesions to groin incision; he was encouraged to perform self mobilization with instructions and verbalized understanding. Pt presents 5 months s/p a heart transplant with long term deconditioning due to heart failure with significant functional limitations resulting from decreased endurance, pain, strength, flexibility, core strength and stability, gait and balance deficits, and cardiovascular limitations. Pt will benefit from skilled outpatient PT to maximize functional independence, minimize risk of further decline, minimize/prevent future hospital visits, and improve pt's independence with ADL's and ability to participate in the home and community.     Pt is making good progress towards established goals.    GOALS  Short Term Goals: 4 weeks (8/17/17):  1. Pt will demonstrate improvement in 5 time sit to  order to decrease cardiopulmonary limitations.  2. Pt will tolerate 10 minutes of moderate aerobic exercise without requiring a rest break  to decrease pain and improve CV endurance in order to improve activity tolerance.   3. Pt will tolerate HEP to progress towards self management.     Long Term Goals: 12 weeks (9/12/17)  1. Pt will increase gait  speed in order to improve activity tolerance.   2. Pt will tolerate 20-30 minutes of aerobic exercise without rest break to decrease pain and improve CV endurance in order to improve activity tolerance.   3. Pt will report < 60% on FOTO in order to demonstrate improvement in functional independence and decrease in disability.  4. Pt will be independent with HEP and self management.     CMS Impairment/Limitation/Restriction for FOTO Cerebrovascular Disorders Survey  Status Limitation G-Code CMS Severity Modifier  Intake 23% 77% Current Status CL - At least 60 percent but less than 80 percent  Predicted 42% 58% Goal Status+ CK - At least 40 percent but less than 60 percent    Plan     Continue with established Plan of Care towards PT goals.

## 2017-08-14 ENCOUNTER — TELEPHONE (OUTPATIENT)
Dept: TRANSPLANT | Facility: CLINIC | Age: 57
End: 2017-08-14

## 2017-08-14 ENCOUNTER — HOSPITAL ENCOUNTER (OUTPATIENT)
Dept: CARDIOLOGY | Facility: CLINIC | Age: 57
Discharge: HOME | End: 2017-08-14
Payer: MEDICARE

## 2017-08-14 ENCOUNTER — OFFICE VISIT (OUTPATIENT)
Dept: TRANSPLANT | Facility: CLINIC | Age: 57
End: 2017-08-14
Payer: MEDICARE

## 2017-08-14 VITALS
SYSTOLIC BLOOD PRESSURE: 123 MMHG | BODY MASS INDEX: 33.15 KG/M2 | DIASTOLIC BLOOD PRESSURE: 55 MMHG | HEART RATE: 96 BPM | WEIGHT: 218.69 LBS | HEIGHT: 68 IN

## 2017-08-14 DIAGNOSIS — Z79.4 TYPE 2 DIABETES MELLITUS WITH STAGE 3 CHRONIC KIDNEY DISEASE, WITH LONG-TERM CURRENT USE OF INSULIN: Chronic | ICD-10-CM

## 2017-08-14 DIAGNOSIS — Z79.899 ENCOUNTER FOR LONG-TERM (CURRENT) USE OF MEDICATIONS: ICD-10-CM

## 2017-08-14 DIAGNOSIS — Z94.1 STATUS POST HEART TRANSPLANT: ICD-10-CM

## 2017-08-14 DIAGNOSIS — E78.2 MIXED HYPERLIPIDEMIA: ICD-10-CM

## 2017-08-14 DIAGNOSIS — D84.9 IMMUNOSUPPRESSION: ICD-10-CM

## 2017-08-14 DIAGNOSIS — T86.20 COMPLICATION OF HEART TRANSPLANT, UNSPECIFIED COMPLICATION: ICD-10-CM

## 2017-08-14 DIAGNOSIS — E78.2 MIXED HYPERLIPIDEMIA: Chronic | ICD-10-CM

## 2017-08-14 DIAGNOSIS — I10 ESSENTIAL HYPERTENSION: ICD-10-CM

## 2017-08-14 DIAGNOSIS — Z79.52 LONG TERM CURRENT USE OF SYSTEMIC STEROIDS: ICD-10-CM

## 2017-08-14 DIAGNOSIS — N18.30 TYPE 2 DIABETES MELLITUS WITH STAGE 3 CHRONIC KIDNEY DISEASE, WITH LONG-TERM CURRENT USE OF INSULIN: Chronic | ICD-10-CM

## 2017-08-14 DIAGNOSIS — E11.22 TYPE 2 DIABETES MELLITUS WITH STAGE 3 CHRONIC KIDNEY DISEASE, WITH LONG-TERM CURRENT USE OF INSULIN: Chronic | ICD-10-CM

## 2017-08-14 DIAGNOSIS — Z94.1 S/P ORTHOTOPIC HEART TRANSPLANT: Primary | ICD-10-CM

## 2017-08-14 DIAGNOSIS — I10 ESSENTIAL HYPERTENSION: Chronic | ICD-10-CM

## 2017-08-14 DIAGNOSIS — R06.02 SHORTNESS OF BREATH: ICD-10-CM

## 2017-08-14 DIAGNOSIS — E66.9 OBESITY (BMI 30-39.9): Chronic | ICD-10-CM

## 2017-08-14 LAB
DIASTOLIC DYSFUNCTION: NO
ESTIMATED PA SYSTOLIC PRESSURE: 20.64
RETIRED EF AND QEF - SEE NOTES: 68 (ref 55–65)

## 2017-08-14 PROCEDURE — 3044F HG A1C LEVEL LT 7.0%: CPT | Mod: ,,, | Performed by: INTERNAL MEDICINE

## 2017-08-14 PROCEDURE — 93306 TTE W/DOPPLER COMPLETE: CPT | Mod: 26,S$PBB,, | Performed by: INTERNAL MEDICINE

## 2017-08-14 PROCEDURE — 3074F SYST BP LT 130 MM HG: CPT | Mod: ,,, | Performed by: INTERNAL MEDICINE

## 2017-08-14 PROCEDURE — 99999 PR PBB SHADOW E&M-EST. PATIENT-LVL III: CPT | Mod: PBBFAC,,, | Performed by: INTERNAL MEDICINE

## 2017-08-14 PROCEDURE — 99213 OFFICE O/P EST LOW 20 MIN: CPT | Mod: PBBFAC,25 | Performed by: INTERNAL MEDICINE

## 2017-08-14 PROCEDURE — 3078F DIAST BP <80 MM HG: CPT | Mod: ,,, | Performed by: INTERNAL MEDICINE

## 2017-08-14 PROCEDURE — 3008F BODY MASS INDEX DOCD: CPT | Mod: ,,, | Performed by: INTERNAL MEDICINE

## 2017-08-14 PROCEDURE — 99214 OFFICE O/P EST MOD 30 MIN: CPT | Mod: S$PBB,,, | Performed by: INTERNAL MEDICINE

## 2017-08-14 NOTE — TELEPHONE ENCOUNTER
Met with pt this morning in clinic and reviewed medications. Pt is not set up with an endocrinologist, this was reviewed with pt and wife and reviewed side effects of Tacrolimus.  Pt stated that he was going to see the endocrinologist he saw before transplant and I encouraged pt for better control of blood sugars with his medications would to be to his benefit.

## 2017-08-14 NOTE — LETTER
August 14, 2017        Fox Quinn  [address] [place] [address] [address]  [address]  [facility]  [place] [address]  Phone: [phone]  Fax: [phone]             [facility] [address]  [facility] [address]  Phone: [phone]   Patient: Mick Barriga   MR Number: [id number]   YOB: 1960   Date of Visit: 8/14/2017       Dear Dr. Fox Quinn    Thank you for referring Mick Barriga to me for evaluation. Attached you will find relevant portions of my assessment and plan of care.    If you have questions, please do not hesitate to call me. I look forward to following Mick Barriga along with you.    Sincerely,    Peewee Romero MD    Enclosure    If you would like to receive this communication electronically, please contact [email or web address] or [phone] to request Instant Information Link access.    Instant Information Link is a tool which provides read-only access to select patient information with whom you have a relationship. Its easy to use and provides real time access to review your patients record including encounter summaries, notes, results, and demographic information.    If you feel you have received this communication in error or would no longer like to receive these types of communications, please e-mail [email or web address]

## 2017-08-14 NOTE — TELEPHONE ENCOUNTER
----- Message from Ander Avilez sent at 8/14/2017 11:46 AM CDT -----  Contact: Felisha/Estephanie  Please call Felisha at [phone]. Has a question regarding medication    Thank you

## 2017-08-14 NOTE — PROGRESS NOTES
Subjective:   Mr. Barriga is a 57 y.o. year old White male who received a  - brain death heart transplant on 17.      CMV status:   Donor: -  Recipient: +     HPI  Mr. Barriga is a very pleasant 56 year old male with PMHx of NICM s/p HM II (16), HLD, HTN, VT s/p ICD now s/p OHTx 17 who comes for his 6 months post-Tx clinic visit.. Pt presented to the hospital for elevated LDH in the setting of subtherapeutic INR with lower extremity swelling concerning for LVAD pump thrombosis, made status 1a and underwent OHTx 17. Post op course complicated by bradycardia and he was started on terbutaline 5 mg TID. Most recently was admitted from -04/10 for increased foul smelling drainage from right groin. Patient seen by wound care and plastics, he was taken to OR for I&D right groin and right femoris muscle flap, with wound vac placed. He had significant pain issues, even needing PCA following surgery, but eventually was able to be transitioned to oral pain meds. The wound had E. Faecalis, placed on amoxicillin (seen by ID).  Wound is still draining but looks better. Echo done today showed preserved graft function with an estimated EF=60%. Creatinine is WNL. Immuno regimen includes: Tacro 1/0.5, Cellcept 1000 mg BID, Prednisone 1 mg daily. Valcyte 900 mg daily. On Bactrim. Creatinine is WNL. Echo done today showed normal graft function with an EF=60-65%.     Review of Systems   Constitution: Negative. Negative for chills, decreased appetite, diaphoresis, fever, weakness, malaise/fatigue, night sweats, weight gain and weight loss.   Eyes: Negative.    Cardiovascular: Negative for chest pain, claudication, cyanosis, dyspnea on exertion, irregular heartbeat, leg swelling, near-syncope, orthopnea, palpitations, paroxysmal nocturnal dyspnea and syncope.   Respiratory: Negative for cough, hemoptysis and shortness of breath.    Endocrine: Negative.    Hematologic/Lymphatic: Negative.    Skin: Negative  "for color change, dry skin and nail changes.   Musculoskeletal: Negative.    Gastrointestinal: Negative.    Genitourinary: Negative.        Objective:   Blood pressure (!) 123/55, pulse 96, height 5' 8" (1.727 m), weight 99.2 kg (218 lb 11.1 oz).body mass index is 33.25 kg/m².    Physical Exam   Constitutional: He appears well-developed.   BP (!) 123/55 (BP Location: Right arm, Patient Position: Sitting, BP Method: Medium (Automatic))   Pulse 96   Ht 5' 8" (1.727 m)   Wt 99.2 kg (218 lb 11.1 oz)   BMI 33.25 kg/m²      HENT:   Head: Normocephalic.   Neck: No JVD present. Carotid bruit is not present.   Cardiovascular: Regular rhythm, normal heart sounds and normal pulses.  Tachycardia present.    No murmur heard.  Pulmonary/Chest: Effort normal and breath sounds normal. No respiratory distress. He has no wheezes. He has no rales.   Abdominal: Soft. Bowel sounds are normal. He exhibits no distension. There is no tenderness. There is no rebound.   Musculoskeletal: He exhibits no edema.   Neurological: He is alert.   Skin: Skin is warm.   Vitals reviewed.      Lab Results   Component Value Date    WBC 6.59 08/14/2017    HGB 10.7 (L) 08/14/2017    HCT 34.1 (L) 08/14/2017    MCV 60 (L) 08/14/2017     08/14/2017    CO2 28 08/14/2017    CREATININE 1.0 08/14/2017    CALCIUM 9.6 08/14/2017    ALBUMIN 3.8 08/14/2017    AST 23 08/14/2017    BNP 74 08/14/2017    ALT 14 08/14/2017    ALLOMAP 22 07/12/2017       Lab Results   Component Value Date    INR 1.6 (H) 02/15/2017    INR 1.4 (H) 02/14/2017    INR 1.5 (H) 02/13/2017       BNP   Date Value Ref Range Status   08/14/2017 74 0 - 99 pg/mL Final     Comment:     Values of less than 100 pg/ml are consistent with non-CHF populations.   07/12/2017 68 0 - 99 pg/mL Final     Comment:     Values of less than 100 pg/ml are consistent with non-CHF populations.   06/12/2017 139 (H) 0 - 99 pg/mL Final     Comment:     Values of less than 100 pg/ml are consistent with non-CHF " populations.       LD   Date Value Ref Range Status   02/08/2017 523 (H) 110 - 260 U/L Final     Comment:     Results are increased in hemolyzed samples.   02/07/2017 536 (H) 110 - 260 U/L Final     Comment:     Results are increased in hemolyzed samples.   02/06/2017 584 (H) 110 - 260 U/L Final     Comment:     Results are increased in hemolyzed samples.       Tacrolimus Lvl   Date Value Ref Range Status   07/12/2017 9.3 5.0 - 15.0 ng/mL Final     Comment:     Testing performed by Liquid Chromatography-Tandem  Mass Spectrometry (LC-MS/MS).  This test was developed and its performance characteristics  determined by [facility], Department of Pathology  and Laboratory Medicine in a manner consistent with CLIA  requirements. It has not been cleared or approved by the [place]  Food and Drug Administration.  This test is used for clinical   purposes.  It should not be regarded as investigational or for  research.         Assessment:     1. S/P orthotopic heart transplant    2. Immunosuppression    3. Mixed hyperlipidemia    4. Essential hypertension    5. Type 2 diabetes mellitus with stage 3 chronic kidney disease, with long-term current use of insulin    6. Obesity (BMI 30-39.9)        Plan:   Doing well  Change Lasix to every other day  Patient instructed to take some extra Magnesium today   Monitor Tacro level  Continue to wean Prednisone   F/U with wound care     Return instructions as set forth by post transplant schedule or as needed:    Clinic: Return for labs and/or biopsy weekly the first month, every two weeks during month 2 and then monthly for the first year at the provider or coordinator's discretion. During the second year, return to clinic every 3 months. Post transplant year 3-5 return every 6 months. There will be a comprehensive post transplant evaluation every year that may include LHC/RHC/biopsy, stress test, echo, CXR, and other health screening exams.    In addition to the clinical  assessment, I have ordered Allomap testing for this patient to assist in identification of moderate/severe acute cellular rejection (ACR) in a pt with stable Allograft function instead of endomyocardial biopsy.     Patient is reminded to call with any health changes as these can be early signs of transplant complications. Patient is advised to make sure any new medications or changes of old medications are discussed with a pharmacist or physician knowledgeable with transplant to avoid rejection/drug toxicity related to significant drug interactions.    UNOS Patient Status  Functional Status: 90% - Able to carry on normal activity: minor symptoms of disease  Physical Capacity: No Limitations  Working for Income: Unknown    Peewee Romero MD

## 2017-08-16 ENCOUNTER — CLINICAL SUPPORT (OUTPATIENT)
Dept: REHABILITATION | Facility: HOSPITAL | Age: 57
End: 2017-08-16
Attending: INTERNAL MEDICINE
Payer: MEDICARE

## 2017-08-16 DIAGNOSIS — Z74.09 DECREASED FUNCTIONAL MOBILITY AND ENDURANCE: Primary | ICD-10-CM

## 2017-08-16 DIAGNOSIS — M79.602 LEFT ARM PAIN: ICD-10-CM

## 2017-08-16 DIAGNOSIS — R53.1 WEAKNESS: ICD-10-CM

## 2017-08-16 PROCEDURE — 97110 THERAPEUTIC EXERCISES: CPT | Mod: PN

## 2017-08-16 NOTE — PROGRESS NOTES
Physical Therapy Daily Note     Name: Mick Barriga  [facility] Number: [id number]  Diagnosis:   Encounter Diagnoses   Name Primary?    Decreased functional mobility and endurance Yes    Weakness     Left arm pain      Physician: Trixie Marx MD  Precautions: cardiac  Visit #: 5/20  Time In: 8:01  Time Out: 9:15  Total Treatment Time: 74 minutes    Subjective     Pt reports: he is doing well this morning. Pt reports 4-5/10 pain.     Objective     Mick received individual therapeutic exercises to develop strength, endurance, flexibility, posture and core stabilization for 60 minutes including:    - Nustep x 5 min  - Treadmill x 10 min; 4 min warm up at 1.0 mph, 2 min at 1.5 mph, 4 min at 0.7  - Shuttle 3 x 10, 1.5 cords  - Piriformis stretch on shuttle 3 x 30''  - Postural reeducation using visual (mirror), verbal and tactile cueing with UE overhead stretch  - LTR x 3'  - DKCT with PB x 20  - Supine mini march x 2'  - Supine hip flexor stretch x 3 min ea; soft tissue mobilization applied to quads/hip flexors using rolling stick    Not performed:  - Open book x 10  - SLR 2 x 10 ea  - Supine clams YTB 2 x 10 x 3''  - Supine ball squeeze 2 x 10 x 3'  - Ant/post pelvic tilts with max verbal and tactile cueing; pt with max limitation/min movement; difficulty in supine and seated on PB  - Seated ball roll outs x 10    Pt received manual therapy for 0 minutes including: scar mobilization with skin rolling and MFR applied to R groin region; scar mobilization applied to vertical anterior chest wall incisions; pec minor MFR bilaterally  NP deep cross body MFR applied to lumbar paraspinals    Pt received TENS with MHP to low thoracic paraspinals for 10 min post tx.    Education provided: discussed current functional status, progress, and POC. Pt was instructed to continue current HEP including: supine pec stretch, supine snow angels, sit to stands, LTR, clams, ball  squeeze, SLR, and hip flexor stretch with addition of diaphragmatic breathing; pt demonstrated and verbalized understanding and was provided with a handout. Pt was also instructed to increase walking on treadmill to 10 min twice/day.   No spiritual or educational barriers to learning provided    Assessment     Patient tolerated session well without visible adverse effects. Pt continues to display good tolerance for PT; we discussed importance of committing to aeorbic exercise every day and pt was instructed to walk on his treadmill for 10 minutes twice per day with instructions to stop and take a break if he feels lightheaded or faint and if HR gets to high; pt's HR elevated to 100 bpm on treadmill today, pt required one rest break. Pt presents 5 months s/p a heart transplant with long term deconditioning due to heart failure with significant functional limitations resulting from decreased endurance, pain, strength, flexibility, core strength and stability, gait and balance deficits, and cardiovascular limitations. Pt will benefit from skilled outpatient PT to maximize functional independence, minimize risk of further decline, minimize/prevent future hospital visits, and improve pt's independence with ADL's and ability to participate in the home and community. Will continue to progress aerobic exercise, therapeutic exercise, and will provide manual care to incision at pt's next visit.    Pt is making good progress towards established goals.    GOALS  Short Term Goals: 4 weeks (8/17/17):  1. Pt will demonstrate improvement in 5 time sit to  order to decrease cardiopulmonary limitations.  2. Pt will tolerate 10 minutes of moderate aerobic exercise without requiring a rest break  to decrease pain and improve CV endurance in order to improve activity tolerance.   3. Pt will tolerate HEP to progress towards self management.     Long Term Goals: 12 weeks (9/12/17)  1. Pt will increase gait speed in order to  improve activity tolerance.   2. Pt will tolerate 20-30 minutes of aerobic exercise without rest break to decrease pain and improve CV endurance in order to improve activity tolerance.   3. Pt will report < 60% on FOTO in order to demonstrate improvement in functional independence and decrease in disability.  4. Pt will be independent with HEP and self management.     CMS Impairment/Limitation/Restriction for FOTO Cerebrovascular Disorders Survey  Status Limitation G-Code CMS Severity Modifier  Intake 23% 77% Current Status CL - At least 60 percent but less than 80 percent  Predicted 42% 58% Goal Status+ CK - At least 40 percent but less than 60 percent    Plan     Continue with established Plan of Care towards PT goals.

## 2017-08-17 ENCOUNTER — TELEPHONE (OUTPATIENT)
Dept: TRANSPLANT | Facility: CLINIC | Age: 57
End: 2017-08-17

## 2017-08-17 DIAGNOSIS — Z79.899 ENCOUNTER FOR LONG-TERM (CURRENT) USE OF MEDICATIONS: ICD-10-CM

## 2017-08-17 DIAGNOSIS — T86.20 COMPLICATION OF HEART TRANSPLANT, UNSPECIFIED COMPLICATION: ICD-10-CM

## 2017-08-17 DIAGNOSIS — Z94.1 STATUS POST HEART TRANSPLANT: ICD-10-CM

## 2017-08-18 ENCOUNTER — TELEPHONE (OUTPATIENT)
Dept: TRANSPLANT | Facility: CLINIC | Age: 57
End: 2017-08-18

## 2017-08-18 DIAGNOSIS — Z79.899 ENCOUNTER FOR LONG-TERM CURRENT USE OF MEDICATION: Primary | ICD-10-CM

## 2017-08-22 ENCOUNTER — HOSPITAL ENCOUNTER (OUTPATIENT)
Facility: HOSPITAL | Age: 57
Discharge: HOME | End: 2017-08-22
Attending: INTERNAL MEDICINE | Admitting: INTERNAL MEDICINE
Payer: MEDICARE

## 2017-08-22 DIAGNOSIS — I10 ESSENTIAL (PRIMARY) HYPERTENSION: ICD-10-CM

## 2017-08-22 DIAGNOSIS — Z94.1 HEART TRANSPLANTED: Primary | ICD-10-CM

## 2017-08-22 DIAGNOSIS — Z94.1 HEART TRANSPLANT, ORTHOTOPIC, STATUS: ICD-10-CM

## 2017-08-22 PROCEDURE — 88342 IMHCHEM/IMCYTCHM 1ST ANTB: CPT | Mod: 26,,, | Performed by: PATHOLOGY

## 2017-08-22 PROCEDURE — 93308 TTE F-UP OR LMTD: CPT

## 2017-08-22 PROCEDURE — 25000003 PHARM REV CODE 250

## 2017-08-22 PROCEDURE — 88307 TISSUE EXAM BY PATHOLOGIST: CPT | Mod: 26,,, | Performed by: PATHOLOGY

## 2017-08-22 PROCEDURE — 93505 ENDOMYOCARDIAL BIOPSY: CPT | Mod: 26,,, | Performed by: INTERNAL MEDICINE

## 2017-08-22 PROCEDURE — 88307 TISSUE EXAM BY PATHOLOGIST: CPT | Performed by: PATHOLOGY

## 2017-08-22 PROCEDURE — 93307 TTE W/O DOPPLER COMPLETE: CPT | Mod: 26,,, | Performed by: INTERNAL MEDICINE

## 2017-08-22 PROCEDURE — 93505 ENDOMYOCARDIAL BIOPSY: CPT

## 2017-08-22 NOTE — INTERVAL H&P NOTE
Cardiac Cath Lab History and Physical  - there has been no significant interval change since this clinic visit.     History of Present Illness:  S/p OHT here for Echo-guided biopsy after an elevation in his Allomap score. He has no new complaints.      Assessment/Plan:  S/p OHT  - I have explained the risks, benefits, and alternatives of the procedure in detail. The patient expresses understanding and all questions have been answered. The patient agrees to the proceed as planned.  - proceed with EGBx via RIJ using 7Fr Sheath to evaluate for cardiac transplant rejection  - Micropuncture access needle will be used to minimize bleeding risk.    RUBY Childs MD  Transplant Cardiology

## 2017-08-22 NOTE — DISCHARGE INSTRUCTIONS

## 2017-08-22 NOTE — BRIEF OP NOTE
[facility] Lab Post Procedure Note     Patient tolerated the procedure well. There were no complications. Please see full report in CVIS for details.     5 specimens obtained  Est blood loss <10cc    RUBY Childs MD  Transplant Cardiology

## 2017-08-23 NOTE — DISCHARGE SUMMARY
Admit date: 8/22/2017    Discharge date and time: 08/23/2017 08/23/2017    Admitting Physician: RUBY Childs MD    Discharge Physician: RUBY Childs MD    Admission Diagnoses: s/p OHT    Discharge Diagnoses: s/p OHT    Discharged Condition: stable    Disposition: Home or Self Care    Patient Instructions:   AFTER THE PROCEDURE:  -You may remove the bandage in 24 hours and wash with soap and water.  -You may shower, but do not soak in a tub for three days.   PRECAUTIONS FOR THE NEXT 24 HOURS:  -If you need to cough, sneeze, have a bowel movement, or bear down, hold pressure over your bandage.  -Do not  anything heavier than a gallon of milk(about 5 pounds)  -Avoid excessive bending over.  SYMPTOMS TO WATCH FOR AND REPORT TO YOUR DOCTOR:  -BLEEDING: hold pressure over the site until bleeding stops. Proceed to Emergency Room by ambulance (do not drive yourself) if unable to stop bleeding. Notify your doctor.  -HEMATOMA(hard bruise under the skin): Dioni around the bruise if one develops. Call your doctor if it increases in size or if you have difficulty talking, swallowing, breathing or anything unusual.  SIGNS OF INFECTION:Fever (temperature over 100.5 F), pus or redness  -RASH  -CHEST PAIN OR SHORTNESS OF BREATH    You may call you coordinator in the Heart Failure/Heart Transplant/Pulmonary Hypertension Clinic at [phone] during normal business hours(Monday through Friday from 8 A.M. to 5 P.M.) After hours, call the Heart Transplant Service doctor on call at [phone].    Activity: activity as tolerated  Diet: cardiac diet    Signed:  RUBY Childs MD  Transplant Cardiology

## 2017-08-25 ENCOUNTER — CLINICAL SUPPORT (OUTPATIENT)
Dept: REHABILITATION | Facility: HOSPITAL | Age: 57
End: 2017-08-25
Attending: INTERNAL MEDICINE
Payer: MEDICARE

## 2017-08-25 ENCOUNTER — TELEPHONE (OUTPATIENT)
Dept: TRANSPLANT | Facility: CLINIC | Age: 57
End: 2017-08-25

## 2017-08-25 DIAGNOSIS — Z74.09 DECREASED FUNCTIONAL MOBILITY AND ENDURANCE: Primary | ICD-10-CM

## 2017-08-25 DIAGNOSIS — R53.1 WEAKNESS: ICD-10-CM

## 2017-08-25 DIAGNOSIS — M79.602 LEFT ARM PAIN: ICD-10-CM

## 2017-08-25 PROCEDURE — 97110 THERAPEUTIC EXERCISES: CPT | Mod: PN

## 2017-08-25 NOTE — TELEPHONE ENCOUNTER
Spoke with pt regarding his biopsy results and to keep prednisone at 2.5 mg, no other changes. Pt is 6 months post heart transplant, will f/u in one month with allomap, labs, echo and clinic visit.

## 2017-08-25 NOTE — PROGRESS NOTES
Physical Therapy Daily Note     Name: Mick Barriga  Clinic Number: [id number]  Diagnosis:   Encounter Diagnoses   Name Primary?    Decreased functional mobility and endurance Yes    Weakness     Left arm pain      Physician: Trixie Marx MD  Precautions: cardiac  Visit #: 6/20  Time In: 9:12  Time Out: 10:12  Total Treatment Time: 60 minutes    Subjective     Pt reports: he has a surgery to make sure everything is going ok with his transplant (echo guided biopsy) and was unable to move much for a few days afterwards; his back is therefore painful today. Pt rates his back pain as 7/10 on a scale of 0-10 with 0 being no pain and 10 being excruciating pain.    Objective     Mick received individual therapeutic exercises to develop strength, endurance, flexibility, posture and core stabilization for 45 minutes including:    - Nustep x 10 min (one rest break at 6 min)  - LTR x 5' with rest breaks  - Knee circles with DB x 3 ea direction  - Open book x 10 ea  - Supine snow angels x 10  - Diaphragmatic breathing  - Lifted heel slide with SKTC x 10 ea    Not performed:  - Treadmill x 10 min; 4 min warm up at 1.0 mph, 2 min at 1.5 mph, 4 min at 0.7  - Shuttle 3 x 10, 1.5 cords  - Piriformis stretch on shuttle 3 x 30''  - Postural reeducation using visual (mirror), verbal and tactile cueing with UE overhead stretch  - Supine mini march x 2'  - Supine hip flexor stretch x 3 min ea; soft tissue mobilization applied to quads/hip flexors using rolling stick    - Open book x 10  - SLR 2 x 10 ea  - Supine clams YTB 2 x 10 x 3''  - Supine ball squeeze 2 x 10 x 3'  - Ant/post pelvic tilts with max verbal and tactile cueing; pt with max limitation/min movement; difficulty in supine and seated on PB  - Seated ball roll outs x 10    Pt received manual therapy for 5 minutes including: MET trunk rotation  NP - scar mobilization with skin rolling and MFR applied to R groin  region; scar mobilization applied to vertical anterior chest wall incisions; pec minor MFR bilaterally  deep cross body MFR applied to lumbar paraspinals    Pt received TENS with MHP to low thoracic paraspinals for 10 min post tx.    Education provided: discussed current functional status, progress, and POC. Pt was instructed to continue current HEP including: supine pec stretch, supine snow angels, sit to stands, LTR, clams, ball squeeze, SLR, and hip flexor stretch, diaphragmatic breathing, and walking on treadmill (x 10 min twice per day); pt verbalized understanding.  No spiritual or educational barriers to learning provided    Assessment     Patient tolerated session well without visible adverse effects. Pt arrives ~ 1 week following his last session after having a biopsy due to concerns about increased HR. Pt with good tolerance to today's session with no exacerbation of pain and good ability to take rest breaks as needed. Pt presents 5 months s/p a heart transplant with long term deconditioning due to heart failure with significant functional limitations resulting from decreased endurance, pain, strength, flexibility, core strength and stability, gait and balance deficits, and cardiovascular limitations. Pt will benefit from skilled outpatient PT to maximize functional independence, minimize risk of further decline, minimize/prevent future hospital visits, and improve pt's independence with ADL's and ability to participate in the home and community. Will continue to progress aerobic exercise, therapeutic exercise, and will provide manual care.     Reassess next visit.    Pt is making good progress towards established goals.    GOALS  Short Term Goals: 4 weeks (8/17/17):  1. Pt will demonstrate improvement in 5 time sit to  order to decrease cardiopulmonary limitations.  2. Pt will tolerate 10 minutes of moderate aerobic exercise without requiring a rest break  to decrease pain and improve CV endurance  in order to improve activity tolerance.   3. Pt will tolerate HEP to progress towards self management.     Long Term Goals: 12 weeks (9/12/17)  1. Pt will increase gait speed in order to improve activity tolerance.   2. Pt will tolerate 20-30 minutes of aerobic exercise without rest break to decrease pain and improve CV endurance in order to improve activity tolerance.   3. Pt will report < 60% on FOTO in order to demonstrate improvement in functional independence and decrease in disability.  4. Pt will be independent with HEP and self management.     CMS Impairment/Limitation/Restriction for FOTO Cerebrovascular Disorders Survey  Status Limitation G-Code CMS Severity Modifier  Intake 23% 77% Current Status CL - At least 60 percent but less than 80 percent  Predicted 42% 58% Goal Status+ CK - At least 40 percent but less than 60 percent    Plan     Continue with established Plan of Care towards PT goals.

## 2017-08-30 ENCOUNTER — CLINICAL SUPPORT (OUTPATIENT)
Dept: REHABILITATION | Facility: HOSPITAL | Age: 57
End: 2017-08-30
Attending: INTERNAL MEDICINE
Payer: MEDICARE

## 2017-08-30 DIAGNOSIS — M79.602 LEFT ARM PAIN: ICD-10-CM

## 2017-08-30 DIAGNOSIS — R53.1 WEAKNESS: ICD-10-CM

## 2017-08-30 DIAGNOSIS — Z74.09 DECREASED FUNCTIONAL MOBILITY AND ENDURANCE: Primary | ICD-10-CM

## 2017-08-30 PROCEDURE — 97110 THERAPEUTIC EXERCISES: CPT | Mod: PN

## 2017-08-30 NOTE — PROGRESS NOTES
Physical Therapy Daily Note     Name: Mick Barriga  [facility] Number: [id number]  Diagnosis:   Encounter Diagnoses   Name Primary?    Decreased functional mobility and endurance Yes    Weakness     Left arm pain      Physician: Trixie Marx MD  Precautions: cardiac  Visit #: 7/20  Time In: 7:12  Time Out: 8:30  Total Treatment Time: 78 minutes    Subjective     Pt reports: he missed his last session because he wasn't feeling well. He states that he performed his exercises at home and his back hurt the next day; he reports he was unable to move his back. Pt rates back pain 6-7/10 today.    Objective     8/30/17:  30'' sit to stand: 6     Mick received individual therapeutic exercises to develop strength, endurance, flexibility, posture and core stabilization for 60 minutes including:    - Nustep x 10 min   - Shuttle with UE flexion using dowel x 6 min, 1.5 cords  - Piriformis stretch on shuttle 3 x 30''  - Standing HS stretch 3 x 30''  - Standing calf stretch 3 x 30''  - High marching  - Standing hip stretches at table  - PPT x 20    Not performed:  - LTR x 5' with rest breaks  - Knee circles with DB x 3 ea direction  - Open book x 10 ea  - Supine snow angels x 10  - Diaphragmatic breathing  - Lifted heel slide with SKTC x 10 ea    - Treadmill x 10 min; 4 min warm up at 1.0 mph, 2 min at 1.5 mph, 4 min at 0.7  - Postural reeducation using visual (mirror), verbal and tactile cueing with UE overhead stretch  - Supine mini march x 2'  - Supine hip flexor stretch x 3 min ea; soft tissue mobilization applied to quads/hip flexors using rolling stick  - SLR 2 x 10 ea  - Supine clams YTB 2 x 10 x 3''  - Supine ball squeeze 2 x 10 x 3'  - Seated ball roll outs x 10    Pt received manual therapy for 0 minutes including: MET trunk rotation  NP - scar mobilization with skin rolling and MFR applied to R groin region; scar mobilization applied to vertical anterior chest  wall incisions; pec minor MFR bilaterally  deep cross body MFR applied to lumbar paraspinals    Pt received TENS with MHP to low thoracic paraspinals for 10 min post tx.    Education provided: discussed current functional status, progress, and POC. Pt was instructed to continue current HEP including: supine pec stretch, supine snow angels, sit to stands, LTR, clams, ball squeeze, SLR, and hip flexor stretch, diaphragmatic breathing, and walking on treadmill (x 10 min twice per day); pt verbalized understanding.  No spiritual or educational barriers to learning provided    Assessment     Patient tolerated session well without visible adverse effects. Pt continues to c/o back pain, he was encouraged to come to therapy even if he has a great deal of back pain. Pt has made improvements in endurance and strength, though will benefit form more consistent exercise; his back pain is a limiting factor though pt does display good motivation. Pt presents to PT 5 months s/p heart transplant with long term deconditioning due to heart failure with significant functional limitations resulting from decreased endurance, pain, strength, flexibility, core strength and stability, gait and balance deficits, and cardiovascular limitations. Pt will benefit from skilled outpatient PT to maximize functional independence, minimize risk of further decline, minimize/prevent future hospital visits, and improve pt's independence with ADL's and ability to participate in the home and community. Will continue to progress aerobic exercise, therapeutic exercise, and will provide manual care.     FOTO next time    Pt is making good progress towards established goals.    GOALS  Short Term Goals: 4 weeks (8/17/17):  1. Pt will demonstrate improvement in 30'' sit to  order to decrease cardiopulmonary limitations. Met 8/30/17  2. Pt will tolerate 10 minutes of moderate aerobic exercise without requiring a rest break  to decrease pain and improve  CV endurance in order to improve activity tolerance. Met 8/30/17  3. Pt will tolerate HEP to progress towards self management. Met 8/30/17     Long Term Goals: 12 weeks (9/12/17)  1. Pt will increase gait speed in order to improve activity tolerance.   2. Pt will tolerate 20-30 minutes of aerobic exercise without rest break to decrease pain and improve CV endurance in order to improve activity tolerance.   3. Pt will report < 60% on FOTO in order to demonstrate improvement in functional independence and decrease in disability.  4. Pt will be independent with HEP and self management.     CMS Impairment/Limitation/Restriction for FOTO Cerebrovascular Disorders Survey  Status Limitation G-Code CMS Severity Modifier  Intake 23% 77% Current Status CL - At least 60 percent but less than 80 percent  Predicted 42% 58% Goal Status+ CK - At least 40 percent but less than 60 percent    Plan     Continue with established Plan of Care towards PT goals.

## 2017-09-01 ENCOUNTER — CLINICAL SUPPORT (OUTPATIENT)
Dept: REHABILITATION | Facility: HOSPITAL | Age: 57
End: 2017-09-01
Attending: INTERNAL MEDICINE
Payer: MEDICARE

## 2017-09-01 DIAGNOSIS — M79.602 LEFT ARM PAIN: ICD-10-CM

## 2017-09-01 DIAGNOSIS — Z74.09 DECREASED FUNCTIONAL MOBILITY AND ENDURANCE: Primary | ICD-10-CM

## 2017-09-01 DIAGNOSIS — R53.1 WEAKNESS: ICD-10-CM

## 2017-09-01 PROCEDURE — 97110 THERAPEUTIC EXERCISES: CPT | Mod: PN

## 2017-09-01 NOTE — PROGRESS NOTES
"                                                    Physical Therapy Daily Note     Name: Mick Barriga  Clinic Number: [id number]  Diagnosis:   Encounter Diagnoses   Name Primary?    Decreased functional mobility and endurance Yes    Weakness     Left arm pain      Physician: Trixie Marx MD  Precautions: cardiac  Visit #: 8/20  Time In: 1402  Time Out: 1450  Total Treatment Time: 48 minutes (1:1 with PTA 30 minutes of treamtnet session)    Subjective     Pt reports: Mick states his lower back is sore as usual today. Patient states his right groin is also bothering him today as well. Pt rates back pain 6-7/10 today.    Objective     8/30/17:  30'' sit to stand: 6     Mick received individual therapeutic exercises to develop strength, endurance, flexibility, posture and core stabilization for 60 minutes including:    - Nustep x 10 min   - Shuttle with UE flexion using dowel x 6 min, 1.5 cords  - Piriformis stretch on shuttle 3 x 30''  - Standing HS stretch 3 x 30''  - Standing calf stretch 3 x 30''  - Supine hip flexor stretch 3 x 30" with manual pressure to right hip flexor   - High marching  - Standing hip stretches at table  - PPT x 20    Not performed:  - LTR x 5' with rest breaks  - Knee circles with DB x 3 ea direction  - Open book x 10 ea  - Supine snow angels x 10  - Diaphragmatic breathing  - Lifted heel slide with SKTC x 10 ea    - Treadmill x 10 min; 4 min warm up at 1.0 mph, 2 min at 1.5 mph, 4 min at 0.7  - Postural reeducation using visual (mirror), verbal and tactile cueing with UE overhead stretch  - Supine mini march x 2'  - Supine hip flexor stretch x 3 min ea; soft tissue mobilization applied to quads/hip flexors using rolling stick  - SLR 2 x 10 ea  - Supine clams YTB 2 x 10 x 3''  - Supine ball squeeze 2 x 10 x 3'  - Seated ball roll outs x 10    Pt received manual therapy for 0 minutes including: MET trunk rotation  NP - scar mobilization with skin rolling and MFR applied to R groin " region; scar mobilization applied to vertical anterior chest wall incisions; pec minor MFR bilaterally  deep cross body MFR applied to lumbar paraspinals    Pt received TENS with MHP to low thoracic paraspinals for 10 min post tx.    Education provided: discussed current functional status, progress, and POC. Pt was instructed to continue current HEP including: supine pec stretch, supine snow angels, sit to stands, LTR, clams, ball squeeze, SLR, and hip flexor stretch, diaphragmatic breathing, and walking on treadmill (x 10 min twice per day); pt verbalized understanding.  No spiritual or educational barriers to learning provided    Assessment     Hosni tolerated treatment well today. Patient demonstrates decreased cardiovascular endurance as he demonstrated increased difficulty performing standing marches without rest breaks. Patient with increased tissue restriction through right hip flexor musculature that responded favorably to stretching techniques. Patient with no complaints of increased pain back throughout treatment session today. Pt presents to PT 5 months s/p heart transplant with long term deconditioning due to heart failure with significant functional limitations resulting from decreased endurance, pain, strength, flexibility, core strength and stability, gait and balance deficits, and cardiovascular limitations. Pt will benefit from skilled outpatient PT to maximize functional independence, minimize risk of further decline, minimize/prevent future hospital visits, and improve pt's independence with ADL's and ability to participate in the home and community. Will continue to progress aerobic exercise, therapeutic exercise, and will provide manual care.     FOTO next time    Pt is making good progress towards established goals.    GOALS  Short Term Goals: 4 weeks (8/17/17):  1. Pt will demonstrate improvement in 30'' sit to  order to decrease cardiopulmonary limitations. Met 8/30/17  2. Pt will  tolerate 10 minutes of moderate aerobic exercise without requiring a rest break  to decrease pain and improve CV endurance in order to improve activity tolerance. Met 8/30/17  3. Pt will tolerate HEP to progress towards self management. Met 8/30/17     Long Term Goals: 12 weeks (9/12/17)  1. Pt will increase gait speed in order to improve activity tolerance.   2. Pt will tolerate 20-30 minutes of aerobic exercise without rest break to decrease pain and improve CV endurance in order to improve activity tolerance.   3. Pt will report < 60% on FOTO in order to demonstrate improvement in functional independence and decrease in disability.  4. Pt will be independent with HEP and self management.     CMS Impairment/Limitation/Restriction for FOTO Cerebrovascular Disorders Survey  Status Limitation G-Code CMS Severity Modifier  Intake 23% 77% Current Status CL - At least 60 percent but less than 80 percent  Predicted 42% 58% Goal Status+ CK - At least 40 percent but less than 60 percent    Plan     Continue with established Plan of Care towards PT goals.    Roxana Hickey, PTA  9/1/2017

## 2017-09-08 ENCOUNTER — CLINICAL SUPPORT (OUTPATIENT)
Dept: REHABILITATION | Facility: HOSPITAL | Age: 57
End: 2017-09-08
Attending: INTERNAL MEDICINE
Payer: MEDICARE

## 2017-09-08 DIAGNOSIS — Z74.09 DECREASED FUNCTIONAL MOBILITY AND ENDURANCE: Primary | ICD-10-CM

## 2017-09-08 DIAGNOSIS — M79.602 LEFT ARM PAIN: ICD-10-CM

## 2017-09-08 DIAGNOSIS — R53.1 WEAKNESS: ICD-10-CM

## 2017-09-08 PROCEDURE — 97110 THERAPEUTIC EXERCISES: CPT | Mod: PN

## 2017-09-08 PROCEDURE — 97014 ELECTRIC STIMULATION THERAPY: CPT | Mod: PN

## 2017-09-08 NOTE — PROGRESS NOTES
"                                                    Physical Therapy Daily Note     Name: Mick Barriga  Clinic Number: [id number]  Diagnosis:   Encounter Diagnoses   Name Primary?    Decreased functional mobility and endurance Yes    Weakness     Left arm pain      Physician: Trixie Marx MD  Precautions: cardiac  Visit #: 9 /20    Time In: 1500  Time Out: 1600  Total Treatment Time: 60  minutes (1:1 with PTA 30 minutes of treamtnet session)    Subjective     Pt reports that he would like to not do anything hard today.  Pt states that he had increased pain after last session in his back.  He feels it was one of the stretches he did last time.   Pt rates back pain 6/10 today.    Objective     8/30/17:  30'' sit to stand: 6     Mick received individual therapeutic exercises to develop strength, endurance, flexibility, posture and core stabilization for 60 minutes including:    - Nustep x 10 min   - Shuttle with UE flexion using dowel x 6 min, 1.5 cords  - Piriformis stretch on shuttle 3 x 30''  - Standing HS stretch 3 x 30''  - Standing calf stretch 3 x 30''  - Supine hip flexor stretch 3 x 30" with manual pressure to right hip flexor   - High marching  - Standing hip stretches at table  - PPT x 20    Not performed:  - LTR x 5' with rest breaks  - Knee circles with DB x 3 ea direction  - Open book x 10 ea  - Supine snow angels x 10  - Diaphragmatic breathing  - Lifted heel slide with SKTC x 10 ea    - Treadmill x 10 min; 4 min warm up at 1.0 mph, 2 min at 1.5 mph, 4 min at 0.7  - Postural reeducation using visual (mirror), verbal and tactile cueing with UE overhead stretch  - Supine mini march x 2'  - Supine hip flexor stretch x 3 min ea; soft tissue mobilization applied to quads/hip flexors using rolling stick  - SLR 2 x 10 ea  - Supine clams YTB 2 x 10 x 3''  - Supine ball squeeze 2 x 10 x 3'  - Seated ball roll outs x 10    Pt received manual therapy for 0 minutes including: MET trunk rotation  NP - scar " mobilization with skin rolling and MFR applied to R groin region; scar mobilization applied to vertical anterior chest wall incisions; pec minor MFR bilaterally  deep cross body MFR applied to lumbar paraspinals    Pt received TENS with MHP to low thoracic paraspinals for 10 min post tx.    Education provided: discussed current functional status, progress, and POC. Pt was instructed to continue current HEP including: supine pec stretch, supine snow angels, sit to stands, LTR, clams, ball squeeze, SLR, and hip flexor stretch, diaphragmatic breathing, and walking on treadmill (x 10 min twice per day); pt verbalized understanding.  No spiritual or educational barriers to learning provided    Assessment     Hosni tolerated treatment well today. Patient with good tolerance to exercises  without provocation of low back pain.  Pt demonstrated improvements in endurance with very minimal rest breaks needed.   Pt presents to PT 5 months s/p heart transplant with long term deconditioning due to heart failure with significant functional limitations resulting from decreased endurance, pain, strength, flexibility, core strength and stability, gait and balance deficits, and cardiovascular limitations. Pt will benefit from skilled outpatient PT to maximize functional independence, minimize risk of further decline, minimize/prevent future hospital visits, and improve pt's independence with ADL's and ability to participate in the home and community. Will continue to progress aerobic exercise, therapeutic exercise, and will provide manual care.     FOTO next time    Pt is making good progress towards established goals.    GOALS  Short Term Goals: 4 weeks (8/17/17):  1. Pt will demonstrate improvement in 30'' sit to  order to decrease cardiopulmonary limitations. Met 8/30/17  2. Pt will tolerate 10 minutes of moderate aerobic exercise without requiring a rest break  to decrease pain and improve CV endurance in order to improve  activity tolerance. Met 8/30/17  3. Pt will tolerate HEP to progress towards self management. Met 8/30/17     Long Term Goals: 12 weeks (9/12/17)  1. Pt will increase gait speed in order to improve activity tolerance.   2. Pt will tolerate 20-30 minutes of aerobic exercise without rest break to decrease pain and improve CV endurance in order to improve activity tolerance.   3. Pt will report < 60% on FOTO in order to demonstrate improvement in functional independence and decrease in disability.  4. Pt will be independent with HEP and self management.     CMS Impairment/Limitation/Restriction for FOTO Cerebrovascular Disorders Survey  Status Limitation G-Code CMS Severity Modifier  Intake 23% 77% Current Status CL - At least 60 percent but less than 80 percent  Predicted 42% 58% Goal Status+ CK - At least 40 percent but less than 60 percent    Plan     Continue with established Plan of Care towards PT goals.    Lesly Chan, PTA  9/8/2017

## 2017-09-11 RX ORDER — TAMSULOSIN HYDROCHLORIDE 0.4 MG/1
CAPSULE ORAL
Qty: 90 CAPSULE | Refills: 0 | Status: SHIPPED | OUTPATIENT
Start: 2017-09-11 | End: 2017-09-12

## 2017-09-12 ENCOUNTER — OFFICE VISIT (OUTPATIENT)
Dept: TRANSPLANT | Facility: CLINIC | Age: 57
End: 2017-09-12
Payer: MEDICARE

## 2017-09-12 ENCOUNTER — HOSPITAL ENCOUNTER (OUTPATIENT)
Dept: CARDIOLOGY | Facility: CLINIC | Age: 57
Discharge: HOME | End: 2017-09-12
Payer: MEDICARE

## 2017-09-12 VITALS
HEART RATE: 99 BPM | DIASTOLIC BLOOD PRESSURE: 72 MMHG | WEIGHT: 225.31 LBS | SYSTOLIC BLOOD PRESSURE: 141 MMHG | HEIGHT: 68 IN | BODY MASS INDEX: 34.15 KG/M2

## 2017-09-12 DIAGNOSIS — Z94.1 HEART TRANSPLANT, ORTHOTOPIC, STATUS: Primary | ICD-10-CM

## 2017-09-12 DIAGNOSIS — R06.02 SHORTNESS OF BREATH: ICD-10-CM

## 2017-09-12 DIAGNOSIS — D84.9 IMMUNOSUPPRESSION: ICD-10-CM

## 2017-09-12 DIAGNOSIS — E11.22 TYPE 2 DIABETES MELLITUS WITH STAGE 3 CHRONIC KIDNEY DISEASE, WITH LONG-TERM CURRENT USE OF INSULIN: Chronic | ICD-10-CM

## 2017-09-12 DIAGNOSIS — I10 ESSENTIAL HYPERTENSION: Chronic | ICD-10-CM

## 2017-09-12 DIAGNOSIS — T86.20 COMPLICATION OF HEART TRANSPLANT, UNSPECIFIED COMPLICATION: ICD-10-CM

## 2017-09-12 DIAGNOSIS — T38.0X5D ADRENAL CORTICAL STEROIDS CAUSING ADVERSE EFFECT IN THERAPEUTIC USE, SUBSEQUENT ENCOUNTER: ICD-10-CM

## 2017-09-12 DIAGNOSIS — E78.2 MIXED HYPERLIPIDEMIA: ICD-10-CM

## 2017-09-12 DIAGNOSIS — Z79.4 TYPE 2 DIABETES MELLITUS WITH STAGE 3 CHRONIC KIDNEY DISEASE, WITH LONG-TERM CURRENT USE OF INSULIN: Chronic | ICD-10-CM

## 2017-09-12 DIAGNOSIS — Z79.899 ENCOUNTER FOR LONG-TERM (CURRENT) USE OF MEDICATIONS: ICD-10-CM

## 2017-09-12 DIAGNOSIS — Z94.1 STATUS POST HEART TRANSPLANT: ICD-10-CM

## 2017-09-12 DIAGNOSIS — E66.9 OBESITY (BMI 30-39.9): Chronic | ICD-10-CM

## 2017-09-12 DIAGNOSIS — I10 ESSENTIAL HYPERTENSION: ICD-10-CM

## 2017-09-12 DIAGNOSIS — E78.2 MIXED HYPERLIPIDEMIA: Chronic | ICD-10-CM

## 2017-09-12 DIAGNOSIS — Z79.52 LONG TERM CURRENT USE OF SYSTEMIC STEROIDS: ICD-10-CM

## 2017-09-12 DIAGNOSIS — N18.30 TYPE 2 DIABETES MELLITUS WITH STAGE 3 CHRONIC KIDNEY DISEASE, WITH LONG-TERM CURRENT USE OF INSULIN: Chronic | ICD-10-CM

## 2017-09-12 LAB
DIASTOLIC DYSFUNCTION: NO
ESTIMATED PA SYSTOLIC PRESSURE: 28
GLOBAL PERICARDIAL EFFUSION: ABNORMAL
MITRAL VALVE MOBILITY: NORMAL
RETIRED EF AND QEF - SEE NOTES: 60 (ref 55–65)
TRICUSPID VALVE REGURGITATION: ABNORMAL

## 2017-09-12 PROCEDURE — 3077F SYST BP >= 140 MM HG: CPT | Mod: ,,, | Performed by: INTERNAL MEDICINE

## 2017-09-12 PROCEDURE — 99999 PR PBB SHADOW E&M-EST. PATIENT-LVL III: CPT | Mod: PBBFAC,,, | Performed by: INTERNAL MEDICINE

## 2017-09-12 PROCEDURE — 99214 OFFICE O/P EST MOD 30 MIN: CPT | Mod: S$PBB,,, | Performed by: INTERNAL MEDICINE

## 2017-09-12 PROCEDURE — 3044F HG A1C LEVEL LT 7.0%: CPT | Mod: ,,, | Performed by: INTERNAL MEDICINE

## 2017-09-12 PROCEDURE — 93306 TTE W/DOPPLER COMPLETE: CPT | Mod: PBBFAC | Performed by: INTERNAL MEDICINE

## 2017-09-12 PROCEDURE — 99213 OFFICE O/P EST LOW 20 MIN: CPT | Mod: PBBFAC,25 | Performed by: INTERNAL MEDICINE

## 2017-09-12 PROCEDURE — 3078F DIAST BP <80 MM HG: CPT | Mod: ,,, | Performed by: INTERNAL MEDICINE

## 2017-09-12 NOTE — LETTER
September 15, 2017        Fox Quinn  [address] [place] [address] [address]  [id number]  [facility]  [place] [id number]  Phone: [phone]  Fax: [phone]             [facility] [address]  [facility] [address]  Phone: [phone]   Patient: Mick Barriga   MR Number: [id number]   YOB: 1960   Date of Visit: 9/12/2017       Dear Dr. Fox Quinn    Thank you for referring Mick Barriga to me for evaluation. Attached you will find relevant portions of my assessment and plan of care.    If you have questions, please do not hesitate to call me. I look forward to following Mick Barriga along with you.    Sincerely,    Julia Gupta MD    Enclosure    If you would like to receive this communication electronically, please contact [email or web address] or [phone] to request [employer] access.    [employer] is a tool which provides read-only access to select patient information with whom you have a relationship. Its easy to use and provides real time access to review your patients record including encounter summaries, notes, results, and demographic information.    If you feel you have received this communication in error or would no longer like to receive these types of communications, please e-mail [email or web address]

## 2017-09-13 ENCOUNTER — CLINICAL SUPPORT (OUTPATIENT)
Dept: REHABILITATION | Facility: HOSPITAL | Age: 57
End: 2017-09-13
Attending: INTERNAL MEDICINE
Payer: MEDICARE

## 2017-09-13 DIAGNOSIS — M79.602 LEFT ARM PAIN: ICD-10-CM

## 2017-09-13 DIAGNOSIS — Z74.09 DECREASED FUNCTIONAL MOBILITY AND ENDURANCE: Primary | ICD-10-CM

## 2017-09-13 DIAGNOSIS — R53.1 WEAKNESS: ICD-10-CM

## 2017-09-13 PROCEDURE — G8979 MOBILITY GOAL STATUS: HCPCS | Mod: CJ,PN

## 2017-09-13 PROCEDURE — 97110 THERAPEUTIC EXERCISES: CPT | Mod: PN

## 2017-09-13 PROCEDURE — G8978 MOBILITY CURRENT STATUS: HCPCS | Mod: CK,PN

## 2017-09-13 NOTE — PROGRESS NOTES
"                                                    Physical Therapy Updated POC     Name: Mick Barriga  Clinic Number: [id number]  Diagnosis:   Encounter Diagnoses   Name Primary?    Decreased functional mobility and endurance Yes    Weakness     Left arm pain      Physician: Trixie Marx MD  Precautions: cardiac  Visit #: 10/20    Time In: 2:29  Time Out: 3:40  Total Treatment Time: 71 minutes (1:1 with PT 60 min)  Referral expiration: 12/31/17  POC expiration: 12/12/17    Subjective     Pt reports that he is doing ok. He is concerned about his high HR (despite being told repeatedly by his doctor that it is WNL for heart transplant) and is also concerned about his stomach weight. Pt reports 7/10 back pain today.    Objective     6 MWT: 569 ft    Mick received individual therapeutic exercises to develop strength, endurance, flexibility, posture and core stabilization for 60 minutes including:    - 6 MWT  - Nustep x 10 min   - Shuttle x 5 min, 2 cords  - LTR x 5'  - Open book x 10 ea    Not performed:  - Piriformis stretch on shuttle 3 x 30''  - Standing HS stretch 3 x 30''  - Standing calf stretch 3 x 30''  - Supine hip flexor stretch 3 x 30" with manual pressure to right hip flexor   - High marching  - Standing hip stretches at table  - PPT x 20  - Knee circles with DB x 3 ea direction  - Supine snow angels x 10  - Diaphragmatic breathing  - Lifted heel slide with SKTC x 10 ea    - Treadmill x 10 min; 4 min warm up at 1.0 mph, 2 min at 1.5 mph, 4 min at 0.7  - Postural reeducation using visual (mirror), verbal and tactile cueing with UE overhead stretch  - Supine mini march x 2'  - SLR 2 x 10 ea  - Supine clams YTB 2 x 10 x 3''  - Supine ball squeeze 2 x 10 x 3'  - Seated ball roll outs x 10    Pt received manual therapy for 0 minutes including: MET trunk rotation  NP - scar mobilization with skin rolling and MFR applied to R groin region; scar mobilization applied to vertical anterior chest wall " incisions; pec minor MFR bilaterally  deep cross body MFR applied to lumbar paraspinals    Pt received TENS with MHP to low thoracic paraspinals for 10 min post tx.    Education provided: discussed current functional status, progress, and POC. Pt was instructed to continue current HEP including: supine pec stretch, supine snow angels, sit to stands, LTR, clams, ball squeeze, SLR, and hip flexor stretch, diaphragmatic breathing, and walking on treadmill (x 10 min twice per day); pt verbalized understanding and was provided with a walking chart today in order to improve compliance with walking program.  No spiritual or educational barriers to learning provided    Assessment     Pt tolerated session well without visible adverse effects. Pt displays good progress with improvement in CV endurance and ability to progress through exercises. Pt continues to report significant back pain; we discussed importance of movement and commitment to HEP; pt was provided with a walking chart in order to improved compliance with walking program. Pt verbalized understanding. Pt is also concerned about abdominal weight gain; he reports poor dietary intake and presents with sedentary lifestyle. Will continue to provide pt education to encourage pt and to continue to make progress. Pt presents to PT 5 months s/p heart transplant with long term deconditioning due to heart failure with significant functional limitations resulting from decreased endurance, pain, strength, flexibility, core strength and stability, gait and balance deficits, and cardiovascular limitations. Pt will benefit from skilled outpatient PT to maximize functional independence, minimize risk of further decline, minimize/prevent future hospital visits, and improve pt's independence with ADL's and ability to participate in the home and community. Will continue to progress aerobic exercise, therapeutic exercise, and will provide manual care.     Pt is making good progress  towards established goals.    GOALS  Short Term Goals: 4 weeks (8/17/17):  1. Pt will demonstrate improvement in 30'' sit to  order to decrease cardiopulmonary limitations. Met 8/30/17  2. Pt will tolerate 10 minutes of moderate aerobic exercise without requiring a rest break  to decrease pain and improve CV endurance in order to improve activity tolerance. Met 8/30/17  3. Pt will tolerate HEP to progress towards self management. Met 8/30/17     Long Term Goals: 12 weeks (9/12/17)  1. Pt will increase gait speed in order to improve activity tolerance. Met 9/13/17, continue  2. Pt will tolerate 20-30 minutes of aerobic exercise without rest break to decrease pain and improve CV endurance in order to improve activity tolerance. continue  3. Pt will report < 60% on FOTO in order to demonstrate improvement in functional independence and decrease in disability. Met 9/13/17  4. Pt will be independent with HEP and self management.     CMS Impairment/Limitation/Restriction for FOTO Cerebrovascular Disorders Survey  Status Limitation G-Code CMS Severity Modifier  Intake 23% 77%  Predicted 42% 58% Goal Status+ CK - At least 40 percent but less than 60 percent  9/13/2017 80% 20% Current Status CJ - At least 20 percent but less than 40 percent  Plan     Continue with established Plan of Care towards PT goals.

## 2017-09-14 ENCOUNTER — CONFERENCE (OUTPATIENT)
Dept: TRANSPLANT | Facility: CLINIC | Age: 57
End: 2017-09-14

## 2017-09-14 ENCOUNTER — TELEPHONE (OUTPATIENT)
Dept: TRANSPLANT | Facility: CLINIC | Age: 57
End: 2017-09-14

## 2017-09-14 NOTE — TELEPHONE ENCOUNTER
Spoke with pt regarding results and stopping 1 mg of prednisone, pt stated understanding. RTC in one month with [facility].

## 2017-09-14 NOTE — TELEPHONE ENCOUNTER
Chart reviewed for prednisone wean   (1) labs reviewed prograf 8.3/ allomap 34  (2) studies biopsy negative   (3) Changes stop prednisone  (4) immunosuppression goals Tacro 1/ 0.5 , Cellcept 1000 BID   Immuno goal levels:  [place] 5-10  (5) followup biospy after off prednisone May move to [place]

## 2017-09-14 NOTE — PLAN OF CARE
"                                                    Physical Therapy Updated POC     Name: Mick Barriga  Clinic Number: [id number]  Diagnosis:   Encounter Diagnoses   Name Primary?    Decreased functional mobility and endurance Yes    Weakness     Left arm pain      Physician: Trixie Marx MD  Precautions: cardiac  Visit #: 10/20    Time In: 2:29  Time Out: 3:40  Total Treatment Time: 71 minutes (1:1 with PT 60 min)  Referral expiration: 12/31/17  POC expiration: 12/12/17    Subjective     Pt reports that he is doing ok. He is concerned about his high HR (despite being told repeatedly by his doctor that it is WNL for heart transplant) and is also concerned about his stomach weight. Pt reports 7/10 back pain today.    Objective     6 MWT: 569 ft    Mick received individual therapeutic exercises to develop strength, endurance, flexibility, posture and core stabilization for 60 minutes including:    - 6 MWT  - Nustep x 10 min   - Shuttle x 5 min, 2 cords  - LTR x 5'  - Open book x 10 ea    Not performed:  - Piriformis stretch on shuttle 3 x 30''  - Standing HS stretch 3 x 30''  - Standing calf stretch 3 x 30''  - Supine hip flexor stretch 3 x 30" with manual pressure to right hip flexor   - High marching  - Standing hip stretches at table  - PPT x 20  - Knee circles with DB x 3 ea direction  - Supine snow angels x 10  - Diaphragmatic breathing  - Lifted heel slide with SKTC x 10 ea    - Treadmill x 10 min; 4 min warm up at 1.0 mph, 2 min at 1.5 mph, 4 min at 0.7  - Postural reeducation using visual (mirror), verbal and tactile cueing with UE overhead stretch  - Supine mini march x 2'  - SLR 2 x 10 ea  - Supine clams YTB 2 x 10 x 3''  - Supine ball squeeze 2 x 10 x 3'  - Seated ball roll outs x 10    Pt received manual therapy for 0 minutes including: MET trunk rotation  NP - scar mobilization with skin rolling and MFR applied to R groin region; scar mobilization applied to vertical anterior chest wall " incisions; pec minor MFR bilaterally  deep cross body MFR applied to lumbar paraspinals    Pt received TENS with MHP to low thoracic paraspinals for 10 min post tx.    Education provided: discussed current functional status, progress, and POC. Pt was instructed to continue current HEP including: supine pec stretch, supine snow angels, sit to stands, LTR, clams, ball squeeze, SLR, and hip flexor stretch, diaphragmatic breathing, and walking on treadmill (x 10 min twice per day); pt verbalized understanding and was provided with a walking chart today in order to improve compliance with walking program.  No spiritual or educational barriers to learning provided    Assessment     Pt tolerated session well without visible adverse effects. Pt displays good progress with improvement in CV endurance and ability to progress through exercises. Pt continues to report significant back pain; we discussed importance of movement and commitment to HEP; pt was provided with a walking chart in order to improved compliance with walking program. Pt verbalized understanding. Pt is also concerned about abdominal weight gain; he reports poor dietary intake and presents with sedentary lifestyle. Will continue to provide pt education to encourage pt and to continue to make progress. Pt presents to PT 5 months s/p heart transplant with long term deconditioning due to heart failure with significant functional limitations resulting from decreased endurance, pain, strength, flexibility, core strength and stability, gait and balance deficits, and cardiovascular limitations. Pt will benefit from skilled outpatient PT to maximize functional independence, minimize risk of further decline, minimize/prevent future hospital visits, and improve pt's independence with ADL's and ability to participate in the home and community. Will continue to progress aerobic exercise, therapeutic exercise, and will provide manual care.     Pt is making good progress  towards established goals.    GOALS  Short Term Goals: 4 weeks (8/17/17):  1. Pt will demonstrate improvement in 30'' sit to  order to decrease cardiopulmonary limitations. Met 8/30/17  2. Pt will tolerate 10 minutes of moderate aerobic exercise without requiring a rest break  to decrease pain and improve CV endurance in order to improve activity tolerance. Met 8/30/17  3. Pt will tolerate HEP to progress towards self management. Met 8/30/17     Long Term Goals: 12 weeks (9/12/17)  1. Pt will increase gait speed in order to improve activity tolerance. Met 9/13/17, continue  2. Pt will tolerate 20-30 minutes of aerobic exercise without rest break to decrease pain and improve CV endurance in order to improve activity tolerance. continue  3. Pt will report < 60% on FOTO in order to demonstrate improvement in functional independence and decrease in disability. Met 9/13/17  4. Pt will be independent with HEP and self management.     CMS Impairment/Limitation/Restriction for FOTO Cerebrovascular Disorders Survey  Status Limitation G-Code CMS Severity Modifier  Intake 23% 77%  Predicted 42% 58% Goal Status+ CK - At least 40 percent but less than 60 percent  9/13/2017 80% 20% Current Status CJ - At least 20 percent but less than 40 percent  Plan     Continue with established Plan of Care towards PT goals.

## 2017-09-19 NOTE — PROGRESS NOTES
Subjective:   Mr. Barriga is a 57 y.o. year old White male who received a  - brain death heart transplant on 17.      CMV status:   Donor: -  Recipient: +     HPI  Mr. Barriga is a very pleasant 56 year old male with NICM s/p HM II (16), HLD, HTN, VT s/p ICD now s/p OHTx 17 who comes for his 7 months post-Tx clinic visit. Patient had complications post-OHT with bradycardia on terbutaline, right groin wound requiring wound care and plastics requiring right femoris muscle flap with wound vac, requiring significant pain control/pain issues, even needing PCA following leg surgery. Patient finally has been doing well, no draining from his groin anymore, of note, had e. Faecalis in his groin, completed antibiotics for it. Patient with groin wound completely healed now. Doing PT/OT every week, feels he is getting better and stronger. Happy with his progress. Has gained a little weight, but otherwise no cardiopulmonary complaints.     Immunosuppresion: tacrolimus 1/0.5mg po BID, cellcept 100mg po BID, prednisone 1mg    Review of Systems   Constitution: Negative. Negative for chills, decreased appetite, diaphoresis, fever, weakness, malaise/fatigue, night sweats, weight gain and weight loss.   Eyes: Negative.  Negative for visual disturbance.   Cardiovascular: Negative for chest pain, claudication, cyanosis, dyspnea on exertion, irregular heartbeat, leg swelling, near-syncope, orthopnea, palpitations, paroxysmal nocturnal dyspnea and syncope.   Respiratory: Negative for cough, hemoptysis, shortness of breath, sleep disturbances due to breathing, snoring, sputum production and wheezing.    Endocrine: Negative.    Hematologic/Lymphatic: Negative.  Negative for adenopathy and bleeding problem. Does not bruise/bleed easily.   Skin: Negative for color change, dry skin, nail changes, poor wound healing, rash, skin cancer and suspicious lesions.   Musculoskeletal: Negative.  Negative for back pain, falls,  "gout, joint pain and muscle weakness.   Gastrointestinal: Negative.  Negative for bloating, abdominal pain, anorexia, constipation, diarrhea, heartburn, hematemesis, hematochezia, hemorrhoids, melena, nausea and vomiting.   Genitourinary: Negative.  Negative for nocturia.   Neurological: Negative for excessive daytime sleepiness, dizziness, focal weakness, headaches, light-headedness, paresthesias and tremors.   Psychiatric/Behavioral: Negative for depression and memory loss. The patient does not have insomnia and is not nervous/anxious.        Objective:   Blood pressure (!) 141/72, pulse 99, height 5' 8" (1.727 m), weight 102.2 kg (225 lb 5 oz).body mass index is 34.26 kg/m².    Physical Exam   Constitutional: He is oriented to person, place, and time. He appears well-developed and well-nourished. No distress.        HENT:   Head: Normocephalic and atraumatic.   No thrush in mouth on exam   Eyes: Conjunctivae and EOM are normal. Pupils are equal, round, and reactive to light.   Neck: Normal range of motion. Neck supple. No JVD present. Carotid bruit is not present. No thyromegaly present.   Cardiovascular: Regular rhythm, normal heart sounds and normal pulses.  Tachycardia present.  Exam reveals no gallop and no friction rub.    No murmur heard.  Pulmonary/Chest: Effort normal and breath sounds normal. No respiratory distress. He has no wheezes. He has no rales.   Abdominal: Soft. Bowel sounds are normal. He exhibits no distension. There is no tenderness. There is no rebound.   Musculoskeletal: He exhibits no edema or tenderness.   Lymphadenopathy:     He has no cervical adenopathy.   Neurological: He is alert and oriented to person, place, and time.   Skin: Skin is warm. No rash noted. He is not diaphoretic. No erythema. No pallor.   Psychiatric: He has a normal mood and affect. His behavior is normal.   Nursing note and vitals reviewed.      Lab Results   Component Value Date    WBC 7.40 09/12/2017    HGB 10.6 " (L) 09/12/2017    HCT 33.1 (L) 09/12/2017    MCV 60 (L) 09/12/2017     09/12/2017    CO2 31 (H) 09/12/2017    CREATININE 1.1 09/12/2017    CALCIUM 9.2 09/12/2017    ALBUMIN 3.5 09/12/2017    AST 17 09/12/2017     (H) 09/12/2017    ALT 14 09/12/2017    ALLOMAP 34 09/12/2017       BNP   Date Value Ref Range Status   09/12/2017 110 (H) 0 - 99 pg/mL Final     Comment:     Values of less than 100 pg/ml are consistent with non-CHF populations.   08/14/2017 74 0 - 99 pg/mL Final     Comment:     Values of less than 100 pg/ml are consistent with non-CHF populations.   07/12/2017 68 0 - 99 pg/mL Final     Comment:     Values of less than 100 pg/ml are consistent with non-CHF populations.     Tacrolimus Lvl   Date Value Ref Range Status   09/12/2017 8.6 5.0 - 15.0 ng/mL Final     Comment:     Testing performed by Liquid Chromatography-Tandem  Mass Spectrometry (LC-MS/MS).  This test was developed and its performance characteristics  determined by [facility], Department of Pathology  and Laboratory Medicine in a manner consistent with CLIA  requirements. It has not been cleared or approved by the [place]  Food and Drug Administration.  This test is used for clinical   purposes.  It should not be regarded as investigational or for  research.         Assessment:     1. Heart transplant, orthotopic, status    2. Adrenal cortical steroids causing adverse effect in therapeutic use, subsequent encounter    3. Essential hypertension    4. Mixed hyperlipidemia    5. Immunosuppression    6. Type 2 diabetes mellitus with stage 3 chronic kidney disease, with long-term current use of insulin    7. Obesity (BMI 30-39.9)        Plan:   Will stop prednisone if allomap stable. Biopsy in 1 month off prednisone after stopping.  Blood pressure slightly elevated however home pressures are within normal limits.      Return instructions as set forth by post transplant schedule or as needed:    Clinic: Return for labs and/or  biopsy weekly the first month, every two weeks during month 2 and then monthly for the first year at the provider or coordinator's discretion. During the second year, return to clinic every 3 months. Post transplant year 3-5 return every 6 months. There will be a comprehensive post transplant evaluation every year that may include LHC/RHC/biopsy, stress test, echo, CXR, and other health screening exams.    In addition to the clinical assessment, I have ordered Allomap testing for this patient to assist in identification of moderate/severe acute cellular rejection (ACR) in a pt with stable Allograft function instead of endomyocardial biopsy.     Patient is reminded to call with any health changes as these can be early signs of transplant complications. Patient is advised to make sure any new medications or changes of old medications are discussed with a pharmacist or physician knowledgeable with transplant to avoid rejection/drug toxicity related to significant drug interactions.    UNOS Patient Status  Functional Status: 90% - Able to carry on normal activity: minor symptoms of disease  Physical Capacity: No Limitations  Working for Income: Unknown    Julia Gupta MD

## 2017-09-22 ENCOUNTER — TELEPHONE (OUTPATIENT)
Dept: REHABILITATION | Facility: OTHER | Age: 57
End: 2017-09-22

## 2017-09-22 NOTE — TELEPHONE ENCOUNTER
"  PHYSICAL THERAPY DISCHARGE SUMMARY     Name: Mick Barriga  Clinic Number: [id number]    Physician:  Francisco J  Diagnosis: Decreased functional mobility and endurance; weakness; left arm pain    Treatment ordered: Physical Therapy    Medical History:   Past Medical History:   Diagnosis Date    CHF (congestive heart failure)     Coronary artery disease     GERD (gastroesophageal reflux disease)     Hyperlipidemia     Hypertension     LVAD (left ventricular assist device) present 2/13/2017    Type 2 diabetes mellitus with hyperglycemia         Surgical History:   Past Surgical History:   Procedure Laterality Date    CARDIAC PACEMAKER PLACEMENT      CHOLECYSTECTOMY      COLONOSCOPY N/A 1/11/2017    Procedure: COLONOSCOPY;  Surgeon: Petr Almanzar MD;  Location: [facility] ENDO ([address] [place] [address]);  Service: Endoscopy;  Laterality: N/A;    COLONOSCOPY N/A 1/12/2017    Procedure: COLONOSCOPY;  Surgeon: Petr Almanzar MD;  Location: [facility] ([address] [place] [address]);  Service: Endoscopy;  Laterality: N/A;    HEART TRANSPLANT  02/2017    LEFT VENTRICULAR ASSIST DEVICE      x 3 months ago        Medications:   Current Outpatient Prescriptions   Medication Sig    alendronate (FOSAMAX) 70 MG tablet TAKE 1 TABLET BY MOUTH EVERY 7 DAYS    amlodipine (NORVASC) 10 MG tablet Take 1 tablet (10 mg total) by mouth once daily.    aspirin (ECOTRIN) 81 MG EC tablet Take 1 tablet (81 mg total) by mouth once daily.    BD INSULIN PEN NEEDLE UF SHORT 31 gauge x 5/16" Ndle     blood sugar diagnostic Strp To use to check BG 5 times daily, to use with insurance preferred meter    blood sugar diagnostic Strp 5 strips by Misc.(Non-Drug; Combo Route) route 5 (five) times daily.    blood-glucose meter kit To use to check BG 4 times daily, to use with insurance preferred meter    ergocalciferol (ERGOCALCIFEROL) 50,000 unit Cap Take 1 capsule (50,000 Units total) by mouth every 7 days.    esomeprazole (NEXIUM) 40 MG capsule Take 1 capsule (40 mg total) by " mouth before breakfast.    ferrous gluconate (FERGON) 324 MG tablet Take 1 tablet (324 mg total) by mouth daily with breakfast.    furosemide (LASIX) 40 MG tablet TK ONE TABLET PO BID    gabapentin (NEURONTIN) 300 MG capsule Take 1 capsule (300 mg total) by mouth every evening.    insulin aspart (NOVOLOG) 100 unit/mL InPn pen Inject 15 Units into the skin 3 (three) times daily.    insulin glargine (LANTUS SOLOSTAR) 100 unit/mL (3 mL) InPn pen Inject 20 Units into the skin once daily.    lancets Misc To use to check BG 4 times daily, to use with insurance preferred meter    magnesium oxide (MAG-OX) 400 mg tablet Take 1200 mg in am and 800 mg at 1200 PM, 800 MG AT 1800    mycophenolate (CELLCEPT) 250 mg Cap Take 4 capsules (1,000 mg total) by mouth 2 (two) times daily.    oxycodone (OXYCONTIN) 20 mg 12 hr tablet Take 1 tablet (20 mg total) by mouth every 12 (twelve) hours.    pravastatin (PRAVACHOL) 40 MG tablet Take 1 tablet (40 mg total) by mouth once daily.    predniSONE (DELTASONE) 1 MG tablet Take 1 tablet (1 mg total) by mouth once daily.    sulfamethoxazole-trimethoprim 400-80mg (BACTRIM,SEPTRA) 400-80 mg per tablet Take 1 tablet by mouth once daily.    tacrolimus (PROGRAF) 0.5 MG Cap Take 1 mg in am and 0.5 mg in pm    tacrolimus (PROGRAF) 1 MG Cap Take 1mg orally in the morning and 1mg orally in the evening     No current facility-administered medications for this visit.        Allergies:   Review of patient's allergies indicates:   Allergen Reactions    Levemir [insulin detemir] Itching    Pork/porcine containing products     Ranexa [ranolazine] Nausea Only        Initial Evaluation: 7/20/17  Date of Last visit: 9/13/17  Date of Discharge Note: 09/22/2017  Attended Visits: 10  Missed Visits: 7    ASSESSMENT     Pt with poor compliance; discharge due to multiple missed visits. Pt encouraged to seek PT in the future when/if able to participate.    GOALS  Short Term Goals: 4 weeks  (8/17/17):  1. Pt will demonstrate improvement in 30'' sit to  order to decrease cardiopulmonary limitations. Met 8/30/17  2. Pt will tolerate 10 minutes of moderate aerobic exercise without requiring a rest break  to decrease pain and improve CV endurance in order to improve activity tolerance. Met 8/30/17  3. Pt will tolerate HEP to progress towards self management. Met 8/30/17     Long Term Goals: 12 weeks (9/12/17)  1. Pt will increase gait speed in order to improve activity tolerance. Met 9/13/17, continue  2. Pt will tolerate 20-30 minutes of aerobic exercise without rest break to decrease pain and improve CV endurance in order to improve activity tolerance. continue  3. Pt will report < 60% on FOTO in order to demonstrate improvement in functional independence and decrease in disability. Met 9/13/17  4. Pt will be independent with HEP and self management.     CMS Impairment/Limitation/Restriction for FOTO Cerebrovascular Disorders Survey  Status Limitation G-Code CMS Severity Modifier  Intake 23% 77%  Predicted 42% 58% Goal Status+ CK - At least 40 percent but less than 60 percent  9/13/2017 80% 20% Current Status CJ - At least 20 percent but less than 40 percent    Discharge reason: Non-Compliance with attendance    G-Code: Discharge G-code not filed due to discharge note being documentation only.     PLAN     This patient is discharged from Physical Therapy Services.

## 2017-10-10 ENCOUNTER — SURGERY (OUTPATIENT)
Age: 57
End: 2017-10-10

## 2017-10-10 ENCOUNTER — OFFICE VISIT (OUTPATIENT)
Dept: TRANSPLANT | Facility: CLINIC | Age: 57
End: 2017-10-10
Payer: MEDICARE

## 2017-10-10 ENCOUNTER — HOSPITAL ENCOUNTER (OUTPATIENT)
Facility: HOSPITAL | Age: 57
Discharge: HOME | End: 2017-10-10
Attending: INTERNAL MEDICINE | Admitting: INTERNAL MEDICINE
Payer: MEDICARE

## 2017-10-10 ENCOUNTER — TELEPHONE (OUTPATIENT)
Dept: TRANSPLANT | Facility: CLINIC | Age: 57
End: 2017-10-10

## 2017-10-10 VITALS
HEIGHT: 68 IN | SYSTOLIC BLOOD PRESSURE: 154 MMHG | BODY MASS INDEX: 34.04 KG/M2 | DIASTOLIC BLOOD PRESSURE: 76 MMHG | HEART RATE: 109 BPM | WEIGHT: 224.63 LBS

## 2017-10-10 DIAGNOSIS — Z94.1 HEART TRANSPLANTED: Primary | ICD-10-CM

## 2017-10-10 DIAGNOSIS — T86.20 COMPLICATION OF HEART TRANSPLANT, UNSPECIFIED COMPLICATION: ICD-10-CM

## 2017-10-10 DIAGNOSIS — I10 ESSENTIAL HYPERTENSION: Chronic | ICD-10-CM

## 2017-10-10 DIAGNOSIS — R60.0 BILATERAL LOWER EXTREMITY EDEMA: ICD-10-CM

## 2017-10-10 DIAGNOSIS — N52.8 OTHER MALE ERECTILE DYSFUNCTION: ICD-10-CM

## 2017-10-10 DIAGNOSIS — Z94.1 STATUS POST HEART TRANSPLANT: ICD-10-CM

## 2017-10-10 DIAGNOSIS — E78.2 MIXED HYPERLIPIDEMIA: Chronic | ICD-10-CM

## 2017-10-10 DIAGNOSIS — Z94.1 S/P ORTHOTOPIC HEART TRANSPLANT: Primary | ICD-10-CM

## 2017-10-10 DIAGNOSIS — Z79.4 TYPE 2 DIABETES MELLITUS WITH STAGE 3 CHRONIC KIDNEY DISEASE, WITH LONG-TERM CURRENT USE OF INSULIN: Chronic | ICD-10-CM

## 2017-10-10 DIAGNOSIS — E66.9 OBESITY (BMI 30-39.9): Chronic | ICD-10-CM

## 2017-10-10 DIAGNOSIS — E11.22 TYPE 2 DIABETES MELLITUS WITH STAGE 3 CHRONIC KIDNEY DISEASE, WITH LONG-TERM CURRENT USE OF INSULIN: Chronic | ICD-10-CM

## 2017-10-10 DIAGNOSIS — I10 ESSENTIAL (PRIMARY) HYPERTENSION: ICD-10-CM

## 2017-10-10 DIAGNOSIS — N18.30 TYPE 2 DIABETES MELLITUS WITH STAGE 3 CHRONIC KIDNEY DISEASE, WITH LONG-TERM CURRENT USE OF INSULIN: Chronic | ICD-10-CM

## 2017-10-10 PROCEDURE — 88307 TISSUE EXAM BY PATHOLOGIST: CPT | Mod: 26,,, | Performed by: PATHOLOGY

## 2017-10-10 PROCEDURE — 93306 TTE W/DOPPLER COMPLETE: CPT

## 2017-10-10 PROCEDURE — 99214 OFFICE O/P EST MOD 30 MIN: CPT | Mod: S$PBB,,, | Performed by: INTERNAL MEDICINE

## 2017-10-10 PROCEDURE — 25000003 PHARM REV CODE 250

## 2017-10-10 PROCEDURE — 99213 OFFICE O/P EST LOW 20 MIN: CPT | Mod: PBBFAC | Performed by: INTERNAL MEDICINE

## 2017-10-10 PROCEDURE — 93505 ENDOMYOCARDIAL BIOPSY: CPT | Mod: 26,,, | Performed by: INTERNAL MEDICINE

## 2017-10-10 PROCEDURE — 88342 IMHCHEM/IMCYTCHM 1ST ANTB: CPT | Mod: 26,,, | Performed by: PATHOLOGY

## 2017-10-10 PROCEDURE — 88307 TISSUE EXAM BY PATHOLOGIST: CPT | Performed by: PATHOLOGY

## 2017-10-10 PROCEDURE — 93505 ENDOMYOCARDIAL BIOPSY: CPT

## 2017-10-10 PROCEDURE — 93306 TTE W/DOPPLER COMPLETE: CPT | Mod: 26,,, | Performed by: INTERNAL MEDICINE

## 2017-10-10 PROCEDURE — 99999 PR PBB SHADOW E&M-EST. PATIENT-LVL III: CPT | Mod: PBBFAC,,, | Performed by: INTERNAL MEDICINE

## 2017-10-10 RX ORDER — SILDENAFIL 25 MG/1
25 TABLET, FILM COATED ORAL DAILY PRN
Qty: 10 TABLET | Refills: 0 | Status: SHIPPED | OUTPATIENT
Start: 2017-10-10 | End: 2017-10-17

## 2017-10-10 NOTE — BRIEF OP NOTE
Patient tolerated the procedure well. Please see complete echo-guided biopsy procedure note for full details and results. Stable to return from cath lab to their home.   Procedure: echo-guided EMBX  Procedure date: 10/10/17    Discharge date: 10/10/17  Estimated blood loss: less than 10 cc  Complications: none  Condition at discharge: stable  Discharge instructions: no heavy lifting greater than 5 lbs and no bearing down/coughing without holding pressure over incision site.  Activity: as tolerated

## 2017-10-10 NOTE — PROGRESS NOTES
Subjective:   Mr. Barriga is a 57 y.o. year old White male who received a  - brain death heart transplant on 17.      CMV status:   Donor: -  Recipient: +    HPI  Mr. Barriga is a very pleasant 56 year old male with PMHx of NICM s/p HM II (16), HLD, HTN, VT s/p ICD now s/p OHTx 17 who comes for his 6 months post-Tx clinic visit.. Pt presented to the hospital for elevated LDH in the setting of subtherapeutic INR with lower extremity swelling concerning for LVAD pump thrombosis, made status 1a and underwent OHTx 17. Post op course complicated by bradycardia and he was started on terbutaline 5 mg TID. Most recently was admitted from -04/10 for increased foul smelling drainage from right groin. Patient seen by wound care and plastics, he was taken to OR for I&D right groin and right femoris muscle flap, with wound vac placed. He had significant pain issues, even needing PCA following surgery, but eventually was able to be transitioned to oral pain meds. The wound had E. Faecalis, placed on amoxicillin (seen by ID).  Wound is still draining but looks better. Echo done today showed preserved graft function with an estimated EF=60%. Creatinine is WNL. Immuno regimen includes: Tacro 1/0.5, Cellcept 1000 mg BID, off prednisone since last month. Had his biopsy done today.  On Bactrim. Creatinine is WNL. Echo done today showed normal graft function with an EF=60-65%.     Review of Systems   Constitution: Negative. Negative for chills, decreased appetite, diaphoresis, fever, weakness, malaise/fatigue, night sweats, weight gain and weight loss.   Eyes: Negative.    Cardiovascular: Negative for chest pain, claudication, cyanosis, dyspnea on exertion, irregular heartbeat, leg swelling, near-syncope, orthopnea, palpitations, paroxysmal nocturnal dyspnea and syncope.   Respiratory: Negative for cough, hemoptysis and shortness of breath.    Endocrine: Negative.    Hematologic/Lymphatic: Negative.   "  Skin: Negative for color change, dry skin and nail changes.   Musculoskeletal: Negative.    Gastrointestinal: Negative.    Genitourinary: Negative.        Objective:   Blood pressure (!) 154/76, pulse 109, height 5' 8" (1.727 m), weight 101.9 kg (224 lb 10.4 oz).body mass index is 34.16 kg/m².    Physical Exam   Constitutional: He appears well-developed.   BP (!) 154/76 (BP Location: Left arm, Patient Position: Sitting, BP Method: Large (Manual))   Pulse 109   Ht 5' 8" (1.727 m)   Wt 101.9 kg (224 lb 10.4 oz)   BMI 34.16 kg/m²      HENT:   Head: Normocephalic.   Neck: No JVD present. Carotid bruit is not present.   Cardiovascular: Regular rhythm, normal heart sounds and normal pulses.    No murmur heard.  Pulmonary/Chest: Effort normal and breath sounds normal. No respiratory distress. He has no wheezes. He has no rales.   Abdominal: Soft. Bowel sounds are normal. He exhibits no distension. There is no tenderness. There is no rebound.   Musculoskeletal: He exhibits edema.   Neurological: He is alert.   Skin: Skin is warm.   Vitals reviewed.      Lab Results   Component Value Date    WBC 8.83 10/10/2017    HGB 10.6 (L) 10/10/2017    HCT 33.8 (L) 10/10/2017    MCV 60 (L) 10/10/2017     10/10/2017    CO2 30 (H) 10/10/2017    CREATININE 1.2 10/10/2017    CALCIUM 9.3 10/10/2017    ALBUMIN 3.6 10/10/2017    AST 17 10/10/2017     (H) 10/10/2017    ALT 15 10/10/2017    ALLOMAP 34 09/12/2017       Lab Results   Component Value Date    INR 1.6 (H) 02/15/2017    INR 1.4 (H) 02/14/2017    INR 1.5 (H) 02/13/2017       BNP   Date Value Ref Range Status   10/10/2017 127 (H) 0 - 99 pg/mL Final     Comment:     Values of less than 100 pg/ml are consistent with non-CHF populations.   09/12/2017 110 (H) 0 - 99 pg/mL Final     Comment:     Values of less than 100 pg/ml are consistent with non-CHF populations.   08/14/2017 74 0 - 99 pg/mL Final     Comment:     Values of less than 100 pg/ml are consistent with " non-CHF populations.       LD   Date Value Ref Range Status   02/08/2017 523 (H) 110 - 260 U/L Final     Comment:     Results are increased in hemolyzed samples.   02/07/2017 536 (H) 110 - 260 U/L Final     Comment:     Results are increased in hemolyzed samples.   02/06/2017 584 (H) 110 - 260 U/L Final     Comment:     Results are increased in hemolyzed samples.       Tacrolimus Lvl   Date Value Ref Range Status   09/12/2017 8.6 5.0 - 15.0 ng/mL Final     Comment:     Testing performed by Liquid Chromatography-Tandem  Mass Spectrometry (LC-MS/MS).  This test was developed and its performance characteristics  determined by [facility], Department of Pathology  and Laboratory Medicine in a manner consistent with CLIA  requirements. It has not been cleared or approved by the [place]  Food and Drug Administration.  This test is used for clinical   purposes.  It should not be regarded as investigational or for  research.         Assessment:     1. S/P orthotopic heart transplant    2. Essential hypertension    3. Mixed hyperlipidemia    4. Obesity (BMI 30-39.9)    5. Type 2 diabetes mellitus with stage 3 chronic kidney disease, with long-term current use of insulin        Plan:   Clinically doing well  F/U biopsy results  F/U Tacro levels  Will review 2D echo   Bilateral lower extremity edema. Although he may have some venous insufficiency This is likely secondary to amlodipine. Recommend compressions stockings for now. Will reevaluate if edema persists then will consider discontinuing amlodipine.      He plans to move to [place], [place] so will need to coordinate how will send his prescriptions since he has medicaid.     Return instructions as set forth by post transplant schedule or as needed:    Clinic: Return for labs and/or biopsy weekly the first month, every two weeks during month 2 and then monthly for the first year at the provider or coordinator's discretion. During the second year, return to clinic every 3  months. Post transplant year 3-5 return every 6 months. There will be a comprehensive post transplant evaluation every year that may include LHC/RHC/biopsy, stress test, echo, CXR, and other health screening exams.    In addition to the clinical assessment, I have ordered Allomap testing for this patient to assist in identification of moderate/severe acute cellular rejection (ACR) in a pt with stable Allograft function instead of endomyocardial biopsy.     Patient is reminded to call with any health changes as these can be early signs of transplant complications. Patient is advised to make sure any new medications or changes of old medications are discussed with a pharmacist or physician knowledgeable with transplant to avoid rejection/drug toxicity related to significant drug interactions.    UNOS Patient Status  Functional Status: 100% - Normal, no complaints, no evidence of disease  Physical Capacity: No Limitations  Working for Income: Unknown    Peewee Romreo MD

## 2017-10-10 NOTE — TELEPHONE ENCOUNTER
Pt has concerns about his legs and his rt groin sometimes swelling when he is walking, explained side effects of norvasc and will plan on obtaining venous ultra sounds bilaterally.

## 2017-10-10 NOTE — INTERVAL H&P NOTE
Patient presents for Echo-guided EMBX. No significant interval change in H&P since 09/12/17. I have explained the risks, benefits, and alternatives of the procedure in detail.  The patient voices understanding and all questions have been answered.  The patient agrees to proceed as planned.

## 2017-10-10 NOTE — DISCHARGE INSTRUCTIONS

## 2017-10-10 NOTE — LETTER
October 10, 2017        Fox Quinn  [address] [place] [address] [address]  [id number]  [facility]  [place] [id number]  Phone: [phone]  Fax: [phone]             [facility] [address]  [facility] [address]  Phone: [phone]   Patient: Mikc Barriga   MR Number: [id number]   YOB: 1960   Date of Visit: 10/10/2017       Dear Dr. Fox Quinn    Thank you for referring Mick Barriga to me for evaluation. Attached you will find relevant portions of my assessment and plan of care.    If you have questions, please do not hesitate to call me. I look forward to following Mick Barriga along with you.    Sincerely,    Peewee Romero MD    Enclosure    If you would like to receive this communication electronically, please contact [email or web address] or [phone] to request [facility] access.    [facility] is a tool which provides read-only access to select patient information with whom you have a relationship. Its easy to use and provides real time access to review your patients record including encounter summaries, notes, results, and demographic information.    If you feel you have received this communication in error or would no longer like to receive these types of communications, please e-mail [email or web address]

## 2017-10-11 ENCOUNTER — TELEPHONE (OUTPATIENT)
Dept: TRANSPLANT | Facility: CLINIC | Age: 57
End: 2017-10-11

## 2017-10-11 DIAGNOSIS — Z94.1 STATUS POST HEART TRANSPLANT: ICD-10-CM

## 2017-10-11 DIAGNOSIS — T86.20 COMPLICATION OF HEART TRANSPLANT, UNSPECIFIED COMPLICATION: ICD-10-CM

## 2017-10-11 DIAGNOSIS — R60.9 EDEMA, UNSPECIFIED TYPE: Primary | ICD-10-CM

## 2017-10-11 RX ORDER — DOCUSATE SODIUM 100 MG/1
CAPSULE, LIQUID FILLED ORAL
Qty: 180 CAPSULE | Refills: 0 | Status: SHIPPED | OUTPATIENT
Start: 2017-10-11 | End: 2018-03-22

## 2017-10-11 RX ORDER — FERROUS GLUCONATE 324(38)MG
TABLET ORAL
Qty: 90 TABLET | Refills: 0 | Status: SHIPPED | OUTPATIENT
Start: 2017-10-11 | End: 2017-11-17 | Stop reason: ALTCHOICE

## 2017-10-11 NOTE — TELEPHONE ENCOUNTER
Spoke with pt regarding biopsy results, no changes in meds at this time, pt stated he will repeat lab in am.

## 2017-10-12 ENCOUNTER — LAB VISIT (OUTPATIENT)
Dept: LAB | Facility: HOSPITAL | Age: 57
End: 2017-10-12
Attending: INTERNAL MEDICINE
Payer: MEDICARE

## 2017-10-12 DIAGNOSIS — Z94.1 STATUS POST HEART TRANSPLANT: ICD-10-CM

## 2017-10-12 DIAGNOSIS — I10 ESSENTIAL HYPERTENSION: ICD-10-CM

## 2017-10-12 DIAGNOSIS — Z79.899 ENCOUNTER FOR LONG-TERM (CURRENT) USE OF MEDICATIONS: ICD-10-CM

## 2017-10-12 DIAGNOSIS — T86.20 COMPLICATION OF HEART TRANSPLANT, UNSPECIFIED COMPLICATION: ICD-10-CM

## 2017-10-12 DIAGNOSIS — Z79.52 LONG TERM CURRENT USE OF SYSTEMIC STEROIDS: ICD-10-CM

## 2017-10-12 DIAGNOSIS — E78.2 MIXED HYPERLIPIDEMIA: ICD-10-CM

## 2017-10-12 DIAGNOSIS — R06.02 SHORTNESS OF BREATH: ICD-10-CM

## 2017-10-12 PROCEDURE — 80197 ASSAY OF TACROLIMUS: CPT

## 2017-10-12 PROCEDURE — 36415 COLL VENOUS BLD VENIPUNCTURE: CPT | Mod: PO

## 2017-10-12 RX ORDER — INSULIN GLARGINE 100 [IU]/ML
INJECTION, SOLUTION SUBCUTANEOUS
Qty: 15 ML | Refills: 0 | Status: SHIPPED | OUTPATIENT
Start: 2017-10-12 | End: 2017-11-26

## 2017-10-13 ENCOUNTER — TELEPHONE (OUTPATIENT)
Dept: TRANSPLANT | Facility: CLINIC | Age: 57
End: 2017-10-13

## 2017-10-13 LAB — TACROLIMUS BLD-MCNC: 13.5 NG/ML

## 2017-10-13 NOTE — TELEPHONE ENCOUNTER
Called to go over repeat tacro level no changes in meds at this time, will discuss with team ion chart review.

## 2017-10-17 ENCOUNTER — CONFERENCE (OUTPATIENT)
Dept: TRANSPLANT | Facility: CLINIC | Age: 57
End: 2017-10-17

## 2017-10-17 ENCOUNTER — TELEPHONE (OUTPATIENT)
Dept: TRANSPLANT | Facility: CLINIC | Age: 57
End: 2017-10-17

## 2017-10-17 DIAGNOSIS — Z94.1 STATUS POST HEART TRANSPLANT: Primary | ICD-10-CM

## 2017-10-17 RX ORDER — AMLODIPINE BESYLATE 10 MG/1
5 TABLET ORAL DAILY
Qty: 30 TABLET | Refills: 11 | Status: ON HOLD | OUTPATIENT
Start: 2017-10-17 | End: 2017-11-30

## 2017-10-17 RX ORDER — TADALAFIL 10 MG/1
10 TABLET ORAL DAILY PRN
Qty: 10 TABLET | Refills: 1 | Status: SHIPPED | OUTPATIENT
Start: 2017-10-17 | End: 2018-03-22

## 2017-10-17 RX ORDER — SERTRALINE HYDROCHLORIDE 100 MG/1
100 TABLET, FILM COATED ORAL DAILY
COMMUNITY
Start: 2017-08-15 | End: 2021-09-29

## 2017-10-17 RX ORDER — LISINOPRIL 10 MG/1
10 TABLET ORAL DAILY
Qty: 90 TABLET | Refills: 3 | Status: SHIPPED | OUTPATIENT
Start: 2017-10-17 | End: 2018-09-04 | Stop reason: SDUPTHER

## 2017-10-17 RX ORDER — PREGABALIN 75 MG/1
75 CAPSULE ORAL 2 TIMES DAILY
COMMUNITY
Start: 2017-10-04 | End: 2018-04-17

## 2017-10-17 RX ORDER — TACROLIMUS 1 MG/1
CAPSULE ORAL
Qty: 60 CAPSULE | Refills: 11 | Status: ON HOLD | OUTPATIENT
Start: 2017-10-17 | End: 2017-11-30

## 2017-10-17 NOTE — TELEPHONE ENCOUNTER
Patient discussed at post transplant chart review. Now 8mos post transplant    Current immunosuppression:  tacro 1/0.5  MMF 1000mg BID    Labs from  10/10/17    Cr: 1.2    WBC: 8.83    tacro level: 13 (15)--consistently elevated    Allomap 9/12: 34    Bx: 10/10--> 0/0     A/P:  - decrease Tacro to 0.5/0.5  - decrease amlodipine to 5mg daily  - start lisinopril 10mg daily  - BMP next week    RUBY Childs MD  Transplant Cardiology

## 2017-10-17 NOTE — TELEPHONE ENCOUNTER
Spoke with pt regarding decrease in Tacrolimus from 0.5 mg in am/1 mg in pm to 0.5 mg bid  Starting Lisinopril 10 mg orally daily  Decrease in Amlodipine to 5 mg daily.  Pt stated understanding and asked if he could start Cialis instead of viagra due to cost.

## 2017-10-19 ENCOUNTER — HOSPITAL ENCOUNTER (OUTPATIENT)
Dept: RADIOLOGY | Facility: HOSPITAL | Age: 57
Discharge: HOME | End: 2017-10-19
Attending: INTERNAL MEDICINE
Payer: MEDICARE

## 2017-10-19 DIAGNOSIS — Z94.1 STATUS POST HEART TRANSPLANT: ICD-10-CM

## 2017-10-19 DIAGNOSIS — T86.20 COMPLICATION OF HEART TRANSPLANT, UNSPECIFIED COMPLICATION: ICD-10-CM

## 2017-10-19 DIAGNOSIS — R60.9 EDEMA, UNSPECIFIED TYPE: ICD-10-CM

## 2017-10-19 PROCEDURE — 93970 EXTREMITY STUDY: CPT | Mod: 26,,, | Performed by: RADIOLOGY

## 2017-10-19 PROCEDURE — 93970 EXTREMITY STUDY: CPT | Mod: TC

## 2017-10-20 ENCOUNTER — TELEPHONE (OUTPATIENT)
Dept: TRANSPLANT | Facility: CLINIC | Age: 57
End: 2017-10-20

## 2017-10-20 NOTE — TELEPHONE ENCOUNTER
Spoke with pt regarding his U/S completed Yesterday   Reviewed tacrolimus dose 0.5 mg bid, repeating labs on Monday 10/23/17.

## 2017-10-23 ENCOUNTER — LAB VISIT (OUTPATIENT)
Dept: LAB | Facility: HOSPITAL | Age: 57
End: 2017-10-23
Attending: INTERNAL MEDICINE
Payer: MEDICARE

## 2017-10-23 DIAGNOSIS — Z79.899 ENCOUNTER FOR LONG-TERM (CURRENT) USE OF MEDICATIONS: ICD-10-CM

## 2017-10-23 DIAGNOSIS — R06.02 SHORTNESS OF BREATH: ICD-10-CM

## 2017-10-23 DIAGNOSIS — I10 ESSENTIAL HYPERTENSION: ICD-10-CM

## 2017-10-23 DIAGNOSIS — E78.2 MIXED HYPERLIPIDEMIA: ICD-10-CM

## 2017-10-23 DIAGNOSIS — Z79.52 LONG TERM CURRENT USE OF SYSTEMIC STEROIDS: ICD-10-CM

## 2017-10-23 DIAGNOSIS — Z94.1 STATUS POST HEART TRANSPLANT: ICD-10-CM

## 2017-10-23 DIAGNOSIS — T86.20 COMPLICATION OF HEART TRANSPLANT, UNSPECIFIED COMPLICATION: ICD-10-CM

## 2017-10-23 LAB
ALBUMIN SERPL BCP-MCNC: 3.7 G/DL
ALP SERPL-CCNC: 78 U/L
ALT SERPL W/O P-5'-P-CCNC: 15 U/L
ANION GAP SERPL CALC-SCNC: 7 MMOL/L
ANISOCYTOSIS BLD QL SMEAR: SLIGHT
AST SERPL-CCNC: 15 U/L
BASOPHILS # BLD AUTO: 0.03 K/UL
BASOPHILS NFR BLD: 0.3 %
BILIRUB SERPL-MCNC: 0.5 MG/DL
BUN SERPL-MCNC: 13 MG/DL
CALCIUM SERPL-MCNC: 9.5 MG/DL
CHLORIDE SERPL-SCNC: 106 MMOL/L
CO2 SERPL-SCNC: 27 MMOL/L
CREAT SERPL-MCNC: 1 MG/DL
DIFFERENTIAL METHOD: ABNORMAL
EOSINOPHIL # BLD AUTO: 0.2 K/UL
EOSINOPHIL NFR BLD: 2.3 %
ERYTHROCYTE [DISTWIDTH] IN BLOOD BY AUTOMATED COUNT: 16.4 %
EST. GFR  (AFRICAN AMERICAN): >60 ML/MIN/1.73 M^2
EST. GFR  (NON AFRICAN AMERICAN): >60 ML/MIN/1.73 M^2
GLUCOSE SERPL-MCNC: 199 MG/DL
HCT VFR BLD AUTO: 34.6 %
HGB BLD-MCNC: 11 G/DL
HYPOCHROMIA BLD QL SMEAR: ABNORMAL
LYMPHOCYTES # BLD AUTO: 2.4 K/UL
LYMPHOCYTES NFR BLD: 27.4 %
MCH RBC QN AUTO: 18.5 PG
MCHC RBC AUTO-ENTMCNC: 31.8 G/DL
MCV RBC AUTO: 58 FL
MONOCYTES # BLD AUTO: 0.8 K/UL
MONOCYTES NFR BLD: 8.4 %
NEUTROPHILS # BLD AUTO: 5.5 K/UL
NEUTROPHILS NFR BLD: 61.6 %
OVALOCYTES BLD QL SMEAR: ABNORMAL
PLATELET # BLD AUTO: 294 K/UL
PMV BLD AUTO: ABNORMAL FL
POIKILOCYTOSIS BLD QL SMEAR: SLIGHT
POLYCHROMASIA BLD QL SMEAR: ABNORMAL
POTASSIUM SERPL-SCNC: 4.5 MMOL/L
PROT SERPL-MCNC: 6.6 G/DL
RBC # BLD AUTO: 5.95 M/UL
SODIUM SERPL-SCNC: 140 MMOL/L
WBC # BLD AUTO: 8.88 K/UL

## 2017-10-23 PROCEDURE — 80197 ASSAY OF TACROLIMUS: CPT

## 2017-10-23 PROCEDURE — 36415 COLL VENOUS BLD VENIPUNCTURE: CPT

## 2017-10-23 PROCEDURE — 80053 COMPREHEN METABOLIC PANEL: CPT

## 2017-10-23 PROCEDURE — 85025 COMPLETE CBC W/AUTO DIFF WBC: CPT

## 2017-10-23 RX ORDER — INSULIN GLARGINE 100 [IU]/ML
22 INJECTION, SOLUTION SUBCUTANEOUS DAILY
Qty: 3 BOX | Refills: 3 | Status: ON HOLD | OUTPATIENT
Start: 2017-10-23 | End: 2017-11-30

## 2017-10-23 NOTE — TELEPHONE ENCOUNTER
----- Message from Gin Sterling sent at 10/23/2017  9:19 AM CDT -----  Contact: Ana Maria carroll/ Estephanie Rodgers called this am to get clarification on the pt's insulin glargine (LANTUS SOLOSTAR) 100 unit/mL (3 mL) InPn pen.  She states the pt is giving a different dosage than what they have on file.  She can be reached @ [phone].  Thanks!!

## 2017-10-24 ENCOUNTER — TELEPHONE (OUTPATIENT)
Dept: TRANSPLANT | Facility: CLINIC | Age: 57
End: 2017-10-24

## 2017-10-24 DIAGNOSIS — R10.31 RT GROIN PAIN: Primary | ICD-10-CM

## 2017-10-24 DIAGNOSIS — R60.9 SWELLING: ICD-10-CM

## 2017-10-24 LAB — TACROLIMUS BLD-MCNC: 8.9 NG/ML

## 2017-10-24 NOTE — TELEPHONE ENCOUNTER
Spoke with pt regarding repeat labs after decrease in Tacro and started lisinopril 10 mg daily.    Pt stated that his b/p is ok, no numbers given at this time, he is c/o of an area on his groin area that has been there for months, some days it is better then other days.    Will see about u/s area or vascular.

## 2017-11-06 ENCOUNTER — HOSPITAL ENCOUNTER (OUTPATIENT)
Dept: RADIOLOGY | Facility: HOSPITAL | Age: 57
Discharge: HOME | End: 2017-11-06
Attending: INTERNAL MEDICINE
Payer: MEDICARE

## 2017-11-06 DIAGNOSIS — M79.604 RIGHT LEG PAIN: ICD-10-CM

## 2017-11-06 DIAGNOSIS — R10.31 RT GROIN PAIN: ICD-10-CM

## 2017-11-06 DIAGNOSIS — R60.9 SWELLING: ICD-10-CM

## 2017-11-06 PROCEDURE — 76882 US LMTD JT/FCL EVL NVASC XTR: CPT | Mod: 26,,, | Performed by: RADIOLOGY

## 2017-11-06 PROCEDURE — 76882 US LMTD JT/FCL EVL NVASC XTR: CPT | Mod: TC

## 2017-11-06 RX ORDER — LANCETS
EACH MISCELLANEOUS
Qty: 350 EACH | Refills: 3 | Status: CANCELLED | OUTPATIENT
Start: 2017-11-06

## 2017-11-14 RX ORDER — ESOMEPRAZOLE MAGNESIUM 40 MG/1
CAPSULE, DELAYED RELEASE ORAL
Qty: 90 CAPSULE | Refills: 0 | Status: SHIPPED | OUTPATIENT
Start: 2017-11-14 | End: 2017-11-17 | Stop reason: SDUPTHER

## 2017-11-14 RX ORDER — ESOMEPRAZOLE MAGNESIUM 40 MG/1
CAPSULE, DELAYED RELEASE ORAL
Qty: 90 CAPSULE | Refills: 0 | Status: SHIPPED | OUTPATIENT
Start: 2017-11-14 | End: 2018-04-11

## 2017-11-14 RX ORDER — LANCETS
EACH MISCELLANEOUS
Qty: 350 EACH | Refills: 3 | Status: CANCELLED | OUTPATIENT
Start: 2017-11-14

## 2017-11-14 NOTE — TELEPHONE ENCOUNTER
There is nothing linked to this encounter that was routed to me? Please advise?     Also, pt not followed by endocrine team since 5/2017 while hospitalized

## 2017-11-17 ENCOUNTER — TELEPHONE (OUTPATIENT)
Dept: TRANSPLANT | Facility: CLINIC | Age: 57
End: 2017-11-17

## 2017-11-17 ENCOUNTER — OFFICE VISIT (OUTPATIENT)
Dept: TRANSPLANT | Facility: CLINIC | Age: 57
End: 2017-11-17
Payer: MEDICARE

## 2017-11-17 ENCOUNTER — HOSPITAL ENCOUNTER (OUTPATIENT)
Dept: CARDIOLOGY | Facility: CLINIC | Age: 57
Discharge: HOME | End: 2017-11-17
Payer: MEDICARE

## 2017-11-17 VITALS
BODY MASS INDEX: 33.51 KG/M2 | DIASTOLIC BLOOD PRESSURE: 76 MMHG | SYSTOLIC BLOOD PRESSURE: 135 MMHG | WEIGHT: 221.13 LBS | HEIGHT: 68 IN | HEART RATE: 101 BPM

## 2017-11-17 DIAGNOSIS — I10 ESSENTIAL HYPERTENSION: Chronic | ICD-10-CM

## 2017-11-17 DIAGNOSIS — Z79.52 LONG TERM CURRENT USE OF SYSTEMIC STEROIDS: ICD-10-CM

## 2017-11-17 DIAGNOSIS — Z94.1 S/P ORTHOTOPIC HEART TRANSPLANT: Primary | ICD-10-CM

## 2017-11-17 DIAGNOSIS — N18.30 TYPE 2 DIABETES MELLITUS WITH STAGE 3 CHRONIC KIDNEY DISEASE, WITH LONG-TERM CURRENT USE OF INSULIN: Chronic | ICD-10-CM

## 2017-11-17 DIAGNOSIS — E66.9 OBESITY (BMI 30-39.9): Chronic | ICD-10-CM

## 2017-11-17 DIAGNOSIS — T86.20 COMPLICATION OF HEART TRANSPLANT, UNSPECIFIED COMPLICATION: ICD-10-CM

## 2017-11-17 DIAGNOSIS — R06.02 SHORTNESS OF BREATH: ICD-10-CM

## 2017-11-17 DIAGNOSIS — E78.2 MIXED HYPERLIPIDEMIA: ICD-10-CM

## 2017-11-17 DIAGNOSIS — E11.22 TYPE 2 DIABETES MELLITUS WITH STAGE 3 CHRONIC KIDNEY DISEASE, WITH LONG-TERM CURRENT USE OF INSULIN: Chronic | ICD-10-CM

## 2017-11-17 DIAGNOSIS — Z79.4 TYPE 2 DIABETES MELLITUS WITH STAGE 3 CHRONIC KIDNEY DISEASE, WITH LONG-TERM CURRENT USE OF INSULIN: Chronic | ICD-10-CM

## 2017-11-17 DIAGNOSIS — Z79.899 ENCOUNTER FOR LONG-TERM (CURRENT) USE OF MEDICATIONS: ICD-10-CM

## 2017-11-17 DIAGNOSIS — Z94.1 STATUS POST HEART TRANSPLANT: ICD-10-CM

## 2017-11-17 DIAGNOSIS — E78.2 MIXED HYPERLIPIDEMIA: Chronic | ICD-10-CM

## 2017-11-17 DIAGNOSIS — I10 ESSENTIAL HYPERTENSION: ICD-10-CM

## 2017-11-17 LAB
DIASTOLIC DYSFUNCTION: NO
MITRAL VALVE MOBILITY: NORMAL
RETIRED EF AND QEF - SEE NOTES: 65 (ref 55–65)

## 2017-11-17 PROCEDURE — 93306 TTE W/DOPPLER COMPLETE: CPT | Mod: PBBFAC | Performed by: INTERNAL MEDICINE

## 2017-11-17 PROCEDURE — 99214 OFFICE O/P EST MOD 30 MIN: CPT | Mod: S$PBB,,, | Performed by: INTERNAL MEDICINE

## 2017-11-17 PROCEDURE — 99999 PR PBB SHADOW E&M-EST. PATIENT-LVL III: CPT | Mod: PBBFAC,,, | Performed by: INTERNAL MEDICINE

## 2017-11-17 PROCEDURE — 99213 OFFICE O/P EST LOW 20 MIN: CPT | Mod: PBBFAC,25 | Performed by: INTERNAL MEDICINE

## 2017-11-17 NOTE — LETTER
November 17, 2017        Fox Quinn  [address] [place] [address] [address]LD  [id number]  [facility]  [place] [address]  Phone: [phone]  Fax: [phone]             [facility] [address]  [facility] [address]  Phone: [phone]   Patient: Mick Barriga   MR Number: [id number]   YOB: 1960   Date of Visit: 11/17/2017       Dear Dr. Fox Quinn    Thank you for referring Mick Barriga to me for evaluation. Attached you will find relevant portions of my assessment and plan of care.    If you have questions, please do not hesitate to call me. I look forward to following Mick Barriga along with you.    Sincerely,    Peewee Romero MD    Enclosure    If you would like to receive this communication electronically, please contact [email or web address] or [phone] to request [employer] [facility] access.    [employer] is a tool which provides read-only access to select patient information with whom you have a relationship. Its easy to use and provides real time access to review your patients record including encounter summaries, notes, results, and demographic information.    If you feel you have received this communication in error or would no longer like to receive these types of communications, please e-mail [email or web address]

## 2017-11-17 NOTE — PROGRESS NOTES
"Subjective:   Mr. Barriga is a 57 y.o. year old White male who received a  - brain death heart transplant on 17.      CMV status:   Donor: -  Recipient: +    HPI   Mr. Barriga is a very pleasant 56 year old male with PMHx of NICM s/p HM II (16), HLD, HTN, VT s/p ICD now s/p OHTx 17 who comes for his 6 months post-Tx clinic visit.. Pt presented to the hospital for elevated LDH in the setting of subtherapeutic INR with lower extremity swelling concerning for LVAD pump thrombosis, made status 1a and underwent OHTx 17. Post op course complicated by bradycardia and he was started on terbutaline 5 mg TID. Most recently was admitted from -04/10 for increased foul smelling drainage from right groin. Patient seen by wound care and plastics, he was taken to OR for I&D right groin and right femoris muscle flap, with wound vac placed. He had significant pain issues, even needing PCA following surgery, but eventually was able to be transitioned to oral pain meds.  Creatinine is WNL. Immuno regimen includes: Tacro 0.5/0.5, Cellcept 1000 mg BID, off prednisone for 2 months now.  Creatinine is WNL. Echo done today showed normal graft function with an EF=60-65%.    Review of Systems   Constitution: Negative. Negative for chills, decreased appetite, diaphoresis, fever, weakness, malaise/fatigue, night sweats, weight gain and weight loss.   Eyes: Negative.    Cardiovascular: Negative for chest pain, claudication, cyanosis, dyspnea on exertion, irregular heartbeat, leg swelling, near-syncope, orthopnea, palpitations, paroxysmal nocturnal dyspnea and syncope.   Respiratory: Negative for cough, hemoptysis and shortness of breath.    Endocrine: Negative.    Hematologic/Lymphatic: Negative.    Skin: Negative for color change, dry skin and nail changes.   Musculoskeletal: Negative.    Gastrointestinal: Negative.    Genitourinary: Negative.        Objective:   Blood pressure 135/76, pulse 101, height 5' 8" " "(1.727 m), weight 100.3 kg (221 lb 1.9 oz).body mass index is 33.62 kg/m².    Physical Exam   Constitutional: He appears well-developed.   /76 (BP Location: Left arm, Patient Position: Sitting, BP Method: Large (Automatic))   Pulse 101   Ht 5' 8" (1.727 m)   Wt 100.3 kg (221 lb 1.9 oz)   BMI 33.62 kg/m²      HENT:   Head: Normocephalic.   Neck: No JVD present. Carotid bruit is not present.   Cardiovascular: Regular rhythm, normal heart sounds and normal pulses.  PMI is not displaced.  Exam reveals no S3.    No murmur heard.  Pulmonary/Chest: Effort normal and breath sounds normal. No respiratory distress. He has no wheezes. He has no rales.   Abdominal: Soft. Bowel sounds are normal. He exhibits no distension. There is no tenderness. There is no rebound.   Mass in his right groin   Musculoskeletal: He exhibits no edema.   Neurological: He is alert.   Skin: Skin is warm.   Vitals reviewed.      Lab Results   Component Value Date    WBC 8.73 11/17/2017    HGB 11.6 (L) 11/17/2017    HCT 38.7 (L) 11/17/2017    MCV 61 (L) 11/17/2017     11/17/2017    CO2 27 11/17/2017    CREATININE 1.1 11/17/2017    CALCIUM 9.4 11/17/2017    ALBUMIN 3.7 11/17/2017    AST 15 11/17/2017    BNP 36 11/17/2017    ALT 14 11/17/2017    ALLOMAP 34 09/12/2017       Lab Results   Component Value Date    INR 1.6 (H) 02/15/2017    INR 1.4 (H) 02/14/2017    INR 1.5 (H) 02/13/2017       BNP   Date Value Ref Range Status   11/17/2017 36 0 - 99 pg/mL Final     Comment:     Values of less than 100 pg/ml are consistent with non-CHF populations.   10/10/2017 127 (H) 0 - 99 pg/mL Final     Comment:     Values of less than 100 pg/ml are consistent with non-CHF populations.   09/12/2017 110 (H) 0 - 99 pg/mL Final     Comment:     Values of less than 100 pg/ml are consistent with non-CHF populations.       LD   Date Value Ref Range Status   02/08/2017 523 (H) 110 - 260 U/L Final     Comment:     Results are increased in hemolyzed samples. "   02/07/2017 536 (H) 110 - 260 U/L Final     Comment:     Results are increased in hemolyzed samples.   02/06/2017 584 (H) 110 - 260 U/L Final     Comment:     Results are increased in hemolyzed samples.       Tacrolimus Lvl   Date Value Ref Range Status   10/23/2017 8.9 5.0 - 15.0 ng/mL Final     Comment:     Testing performed by Liquid Chromatography-Tandem  Mass Spectrometry (LC-MS/MS).  This test was developed and its performance characteristics  determined by [facility], Department of Pathology  and Laboratory Medicine in a manner consistent with CLIA  requirements. It has not been cleared or approved by the [place]  Food and Drug Administration.  This test is used for clinical   purposes.  It should not be regarded as investigational or for  research.       No results found for: SIROLIMUS  No results found for: CYCLOSPORINE    No results found for this or any previous visit.  No results found for this or any previous visit.    Labs were reviewed with the patient.    Assessment:     1. S/P orthotopic heart transplant    2. Essential hypertension    3. Mixed hyperlipidemia    4. Obesity (BMI 30-39.9)    5. Type 2 diabetes mellitus with stage 3 chronic kidney disease, with long-term current use of insulin        Plan:   Continue current regimen  Annual angiogram due in Feb but could be done earlier (since it will coincide with [place] week)   Follow Tacro levels    Return instructions as set forth by post transplant schedule or as needed:    Clinic: Return for labs and/or biopsy weekly the first month, every two weeks during month 2 and then monthly for the first year at the provider or coordinator's discretion. During the second year, return to clinic every 3 months. Post transplant year 3-5 return every 6 months. There will be a comprehensive post transplant evaluation every year that may include LHC/RHC/biopsy, stress test, echo, CXR, and other health screening exams.    In addition to the clinical  assessment, I have ordered Allomap testing for this patient to assist in identification of moderate/severe acute cellular rejection (ACR) in a pt with stable Allograft function instead of endomyocardial biopsy.     Patient is reminded to call with any health changes as these can be early signs of transplant complications. Patient is advised to make sure any new medications or changes of old medications are discussed with a pharmacist or physician knowledgeable with transplant to avoid rejection/drug toxicity related to significant drug interactions.    UNOS Patient Status  Functional Status: 100% - Normal, no complaints, no evidence of disease  Physical Capacity: No Limitations  Working for Income: Unknown    Peewee Romero MD

## 2017-11-20 ENCOUNTER — PATIENT MESSAGE (OUTPATIENT)
Dept: TRANSPLANT | Facility: CLINIC | Age: 57
End: 2017-11-20

## 2017-11-20 ENCOUNTER — LAB VISIT (OUTPATIENT)
Dept: LAB | Facility: HOSPITAL | Age: 57
End: 2017-11-20
Attending: FAMILY MEDICINE
Payer: MEDICARE

## 2017-11-20 DIAGNOSIS — R06.02 SHORTNESS OF BREATH: ICD-10-CM

## 2017-11-20 DIAGNOSIS — T86.20 COMPLICATION OF HEART TRANSPLANT, UNSPECIFIED COMPLICATION: ICD-10-CM

## 2017-11-20 DIAGNOSIS — Z79.899 ENCOUNTER FOR LONG-TERM (CURRENT) USE OF MEDICATIONS: ICD-10-CM

## 2017-11-20 DIAGNOSIS — Z79.52 LONG TERM CURRENT USE OF SYSTEMIC STEROIDS: ICD-10-CM

## 2017-11-20 DIAGNOSIS — Z94.1 STATUS POST HEART TRANSPLANT: ICD-10-CM

## 2017-11-20 DIAGNOSIS — E78.2 MIXED HYPERLIPIDEMIA: ICD-10-CM

## 2017-11-20 DIAGNOSIS — I10 ESSENTIAL HYPERTENSION: ICD-10-CM

## 2017-11-20 PROCEDURE — 80197 ASSAY OF TACROLIMUS: CPT

## 2017-11-20 PROCEDURE — 36415 COLL VENOUS BLD VENIPUNCTURE: CPT

## 2017-11-21 LAB — TACROLIMUS BLD-MCNC: 10.7 NG/ML

## 2017-11-22 ENCOUNTER — PATIENT MESSAGE (OUTPATIENT)
Dept: TRANSPLANT | Facility: CLINIC | Age: 57
End: 2017-11-22

## 2017-11-24 ENCOUNTER — OFFICE VISIT (OUTPATIENT)
Dept: SURGERY | Facility: CLINIC | Age: 57
DRG: 871 | End: 2017-11-24
Payer: MEDICARE

## 2017-11-24 ENCOUNTER — TELEPHONE (OUTPATIENT)
Dept: SURGERY | Facility: CLINIC | Age: 57
End: 2017-11-24

## 2017-11-24 VITALS
SYSTOLIC BLOOD PRESSURE: 157 MMHG | HEART RATE: 116 BPM | DIASTOLIC BLOOD PRESSURE: 71 MMHG | HEIGHT: 68 IN | WEIGHT: 229.19 LBS | TEMPERATURE: 98 F | BODY MASS INDEX: 34.74 KG/M2

## 2017-11-24 DIAGNOSIS — M79.604 RIGHT LEG PAIN: Primary | ICD-10-CM

## 2017-11-24 PROCEDURE — 99999 PR PBB SHADOW E&M-EST. PATIENT-LVL III: CPT | Mod: PBBFAC,,, | Performed by: SURGERY

## 2017-11-24 PROCEDURE — 99213 OFFICE O/P EST LOW 20 MIN: CPT | Mod: S$PBB,,, | Performed by: SURGERY

## 2017-11-24 PROCEDURE — 99213 OFFICE O/P EST LOW 20 MIN: CPT | Mod: PBBFAC | Performed by: SURGERY

## 2017-11-24 RX ORDER — INSULIN PUMP SYRINGE, 3 ML
EACH MISCELLANEOUS
Qty: 1 EACH | Refills: 0 | Status: CANCELLED | OUTPATIENT
Start: 2017-11-24

## 2017-11-24 NOTE — LETTER
November 24, 2017      Sukhdev Alan MD  [address] [place] [address]           [facility]  [address]  [place] [address]  Phone: [phone]          Patient: Mick Barriga   MR Number: [id number]   YOB: 1960   Date of Visit: 11/24/2017       Dear Dr. Sukhdev Alan:    Thank you for referring Mick Barriga to me for evaluation. Attached you will find relevant portions of my assessment and plan of care.    If you have questions, please do not hesitate to call me. I look forward to following Mick Barriga along with you.    Sincerely,    Frederick Jones Jr., MD    Enclosure  CC:  No Recipients    If you would like to receive this communication electronically, please contact [email or web address] or [phone] to request more information on [facility] access.    For providers and/or their staff who would like to refer a patient to [facility], please contact us through our one-stop-shop provider referral line, [facility], at [phone].    If you feel you have received this communication in error or would no longer like to receive these types of communications, please e-mail [email or web address]

## 2017-11-24 NOTE — TELEPHONE ENCOUNTER
----- Message from Dominique Guthrie RN sent at 11/24/2017 10:07 AM CST -----  Good morning,    Can y'all assist in getting this pt set up for an appt with elias per Dr. Jones?  He is having right groin pain, which he has a hx of right groin wound infection which was treated by plastics with a gracilus flap.    Thanks,  Dominique

## 2017-11-24 NOTE — PROGRESS NOTES
"History & Physical    SUBJECTIVE:     History of Present Illness:  Patient is a 57 y.o. male with a past medical hx of heart failure s/p LVAD with subsequent heart transplantation, presents for some right groin discomfort. Of note, he had a right groin wound infection which was treated by plastics with a gracilus flap. He has done well regarding healing and resolution of his pain.  However, he reports some vague discomfort and heaviness inferior to the wound that comes on after walking for a while, he describes it as "a bottle of water." He denies numbness, sharp pain, or other complaints or concerns. He otherwise feels well.     Chief Complaint   Patient presents with    Consult       Review of patient's allergies indicates:   Allergen Reactions    Levemir [insulin detemir] Itching    Pork/porcine containing products     Ranexa [ranolazine] Nausea Only       Current Outpatient Prescriptions   Medication Sig Dispense Refill    ACCU-CHEK HANNAH [facility] CHECK BLOOD GLUCOSE QID  0    amlodipine (NORVASC) 10 MG tablet Take 0.5 tablets (5 mg total) by mouth once daily. 30 tablet 11    aspirin (ECOTRIN) 81 MG EC tablet Take 1 tablet (81 mg total) by mouth once daily.  0    BD INSULIN PEN NEEDLE UF SHORT 31 gauge x 5/16" Ndle       blood sugar diagnostic Strp To use to check BG 5 times daily, to use with insurance preferred meter 380 strip 3    blood sugar diagnostic Strp 5 strips by Misc.(Non-Drug; Combo Route) route 5 (five) times daily. 600 each 3    blood-glucose meter kit To use to check BG 4 times daily, to use with insurance preferred meter 1 each 0     mg capsule TAKE 2 CAPSULES BY MOUTH EVERY EVENING 180 capsule 0    esomeprazole (NEXIUM) 40 MG capsule TAKE 1 CAPSULE BY MOUTH BEFORE BREAKFAST 90 capsule 0    furosemide (LASIX) 40 MG tablet TK ONE TABLET PO BID  5    gabapentin (NEURONTIN) 300 MG capsule Take 1 capsule (300 mg total) by mouth every evening. 60 capsule 11    insulin aspart " (NOVOLOG) 100 unit/mL InPn pen Inject 15 Units into the skin 3 (three) times daily. 1 Box 0    insulin glargine (LANTUS SOLOSTAR) 100 unit/mL (3 mL) InPn pen Inject 22 Units into the skin once daily. 3 Box 3    lancets Misc To use to check BG 4 times daily, to use with insurance preferred meter 350 each 3    LANTUS SOLOSTAR 100 unit/mL (3 mL) InPn pen INJECT UNDER THE SKIN 16 UNITS ONCE DAILY 15 mL 0    lisinopril 10 MG tablet Take 1 tablet (10 mg total) by mouth once daily. 90 tablet 3    LYRICA 75 mg capsule Take 75 mg by mouth 2 (two) times daily.      magnesium oxide (MAG-OX) 400 mg tablet Take 1200 mg in am and 800 mg at 1200 PM, 800 MG AT 1800 630 tablet 3    mycophenolate (CELLCEPT) 250 mg Cap Take 4 capsules (1,000 mg total) by mouth 2 (two) times daily. 360 capsule 11    oxycodone (OXYCONTIN) 20 mg 12 hr tablet Take 1 tablet (20 mg total) by mouth every 12 (twelve) hours. 28 tablet 0    pravastatin (PRAVACHOL) 40 MG tablet Take 1 tablet (40 mg total) by mouth once daily. (Patient taking differently: Take 20 mg by mouth once daily. ) 30 tablet 11    sertraline (ZOLOFT) 100 MG tablet Take 100 mg by mouth once daily.      sulfamethoxazole-trimethoprim 400-80mg (BACTRIM,SEPTRA) 400-80 mg per tablet Take 1 tablet by mouth once daily. 30 tablet 11    tacrolimus (PROGRAF) 0.5 MG Cap Take 1 mg in am and 0.5 mg in pm 30 capsule 11    tacrolimus (PROGRAF) 1 MG Cap Take 0.5mg orally in the morning and 1mg orally in the evening 60 capsule 11    tadalafil (CIALIS) 10 MG tablet Take 1 tablet (10 mg total) by mouth daily as needed for Erectile Dysfunction. 10 tablet 1     No current facility-administered medications for this visit.        Past Medical History:   Diagnosis Date    CHF (congestive heart failure)     Coronary artery disease     GERD (gastroesophageal reflux disease)     Hyperlipidemia     Hypertension     LVAD (left ventricular assist device) present 2/13/2017    Type 2 diabetes mellitus  "with hyperglycemia      Past Surgical History:   Procedure Laterality Date    CARDIAC PACEMAKER PLACEMENT      CHOLECYSTECTOMY      COLONOSCOPY N/A 1/11/2017    Procedure: COLONOSCOPY;  Surgeon: Petr Almanzar MD;  Location: [facility] ([address] [place] [address]);  Service: Endoscopy;  Laterality: N/A;    COLONOSCOPY N/A 1/12/2017    Procedure: COLONOSCOPY;  Surgeon: Petr Almanzar MD;  Location: [facility] ([address] [place] [address]);  Service: Endoscopy;  Laterality: N/A;    HEART TRANSPLANT  02/2017    LEFT VENTRICULAR ASSIST DEVICE      x 3 months ago     Family History   Problem Relation Age of Onset    Heart disease Mother     Hypertension Mother     Heart attack Mother     Heart disease Father     Hypertension Father     Heart attack Father     Cancer Brother      Social History   Substance Use Topics    Smoking status: Former Smoker     Packs/day: 0.50     Years: 15.00     Quit date: 6/14/2006    Smokeless tobacco: Never Used    Alcohol use No        Review of Systems:  Review of Systems   Constitutional: Negative for chills and fever.   HENT: Negative.    Respiratory: Negative for chest tightness and shortness of breath.    Cardiovascular: Negative for chest pain.   Gastrointestinal: Negative for abdominal pain.   Genitourinary: Negative.    Musculoskeletal: Negative.    Skin: Negative.    Psychiatric/Behavioral: Negative.        OBJECTIVE:     Vital Signs (Most Recent)  Temp: 98.1 °F (36.7 °C) (11/24/17 0926)  Pulse: (!) 116 (11/24/17 0926)  BP: (!) 157/71 (11/24/17 0926)  5' 8" (1.727 m)  103.9 kg (229 lb 2.7 oz)     Physical Exam:  Physical Exam   Constitutional: He is oriented to person, place, and time. He appears well-developed and well-nourished.   HENT:   Head: Normocephalic.   Eyes: Pupils are equal, round, and reactive to light. No scleral icterus.   Neck: No tracheal deviation present.   Cardiovascular: Normal rate.    Pulmonary/Chest: Effort normal. No respiratory distress.   Abdominal: Soft. He exhibits no " distension.   Genitourinary: Right testis shows no mass, no swelling and no tenderness. Right testis is descended. Cremasteric reflex is not absent on the right side.         Neurological: He is alert and oriented to person, place, and time. No cranial nerve deficit.   Skin: He is not diaphoretic. No erythema.       ASSESSMENT/PLAN:     Mick Barriga 57 y.o. male with no abnormality or obvious issue regarding well healed right groin wound    Refer back to plastics for evaluation of postoperative pain at wound site    Lenin Asher MD PGY III  [phone]    I have personally taken the history and examined this patient and agree with the resident's note as stated above.         Frederick Jones MD

## 2017-11-26 ENCOUNTER — HOSPITAL ENCOUNTER (INPATIENT)
Facility: HOSPITAL | Age: 57
LOS: 4 days | Discharge: HOME | DRG: 871 | End: 2017-11-30
Attending: EMERGENCY MEDICINE | Admitting: INTERNAL MEDICINE
Payer: MEDICARE

## 2017-11-26 DIAGNOSIS — Z94.1 STATUS POST HEART TRANSPLANT: ICD-10-CM

## 2017-11-26 DIAGNOSIS — R50.9 FEBRILE ILLNESS, ACUTE: Primary | ICD-10-CM

## 2017-11-26 DIAGNOSIS — Z79.4 TYPE 2 DIABETES MELLITUS WITH STAGE 3 CHRONIC KIDNEY DISEASE, WITH LONG-TERM CURRENT USE OF INSULIN: Chronic | ICD-10-CM

## 2017-11-26 DIAGNOSIS — N18.30 TYPE 2 DIABETES MELLITUS WITH STAGE 3 CHRONIC KIDNEY DISEASE, WITH LONG-TERM CURRENT USE OF INSULIN: Chronic | ICD-10-CM

## 2017-11-26 DIAGNOSIS — I50.9 HEART FAILURE: ICD-10-CM

## 2017-11-26 DIAGNOSIS — E11.22 TYPE 2 DIABETES MELLITUS WITH STAGE 3 CHRONIC KIDNEY DISEASE, WITH LONG-TERM CURRENT USE OF INSULIN: Chronic | ICD-10-CM

## 2017-11-26 DIAGNOSIS — D84.9 IMMUNOSUPPRESSION: ICD-10-CM

## 2017-11-26 DIAGNOSIS — Z94.1 HEART TRANSPLANTED: ICD-10-CM

## 2017-11-26 DIAGNOSIS — E83.42 HYPOMAGNESEMIA: ICD-10-CM

## 2017-11-26 DIAGNOSIS — Z94.1 HEART TRANSPLANT, ORTHOTOPIC, STATUS: ICD-10-CM

## 2017-11-26 DIAGNOSIS — A41.9 SEPSIS, DUE TO UNSPECIFIED ORGANISM: ICD-10-CM

## 2017-11-26 PROBLEM — J06.9 UPPER RESPIRATORY INFECTION: Status: ACTIVE | Noted: 2017-11-26

## 2017-11-26 LAB
ALBUMIN SERPL BCP-MCNC: 3.6 G/DL
ALP SERPL-CCNC: 81 U/L
ALT SERPL W/O P-5'-P-CCNC: 13 U/L
ANION GAP SERPL CALC-SCNC: 10 MMOL/L
APTT BLDCRRT: 24.3 SEC
AST SERPL-CCNC: 21 U/L
BASOPHILS # BLD AUTO: 0.03 K/UL
BASOPHILS NFR BLD: 0.4 %
BILIRUB SERPL-MCNC: 0.5 MG/DL
BILIRUB UR QL STRIP: NEGATIVE
BILIRUB UR QL STRIP: NEGATIVE
BNP SERPL-MCNC: 59 PG/ML
BUN SERPL-MCNC: 13 MG/DL
CALCIUM SERPL-MCNC: 9.4 MG/DL
CHLORIDE SERPL-SCNC: 97 MMOL/L
CLARITY UR REFRACT.AUTO: CLEAR
CLARITY UR REFRACT.AUTO: CLEAR
CO2 SERPL-SCNC: 23 MMOL/L
COLOR UR AUTO: NORMAL
COLOR UR AUTO: NORMAL
CORTIS SERPL-MCNC: 7.9 UG/DL
CREAT SERPL-MCNC: 1.1 MG/DL
DIFFERENTIAL METHOD: ABNORMAL
EOSINOPHIL # BLD AUTO: 0.1 K/UL
EOSINOPHIL NFR BLD: 0.7 %
ERYTHROCYTE [DISTWIDTH] IN BLOOD BY AUTOMATED COUNT: 17.2 %
EST. GFR  (AFRICAN AMERICAN): >60 ML/MIN/1.73 M^2
EST. GFR  (NON AFRICAN AMERICAN): >60 ML/MIN/1.73 M^2
ESTIMATED AVG GLUCOSE: 174 MG/DL
FLUAV AG SPEC QL IA: NEGATIVE
FLUBV AG SPEC QL IA: NEGATIVE
GLUCOSE SERPL-MCNC: 219 MG/DL
GLUCOSE SERPL-MCNC: 267 MG/DL (ref 70–110)
GLUCOSE UR QL STRIP: NEGATIVE
GLUCOSE UR QL STRIP: NEGATIVE
HBA1C MFR BLD HPLC: 7.7 %
HCT VFR BLD AUTO: 34.2 %
HGB BLD-MCNC: 10.5 G/DL
HGB UR QL STRIP: NEGATIVE
HGB UR QL STRIP: NEGATIVE
IMM GRANULOCYTES # BLD AUTO: 0.04 K/UL
IMM GRANULOCYTES NFR BLD AUTO: 0.5 %
INR PPP: 1
KETONES UR QL STRIP: NEGATIVE
KETONES UR QL STRIP: NEGATIVE
LACTATE SERPL-SCNC: 2.6 MMOL/L
LEUKOCYTE ESTERASE UR QL STRIP: NEGATIVE
LEUKOCYTE ESTERASE UR QL STRIP: NEGATIVE
LIPASE SERPL-CCNC: 5 U/L
LYMPHOCYTES # BLD AUTO: 2.1 K/UL
LYMPHOCYTES NFR BLD: 24.7 %
MAGNESIUM SERPL-MCNC: 1.3 MG/DL
MCH RBC QN AUTO: 18.4 PG
MCHC RBC AUTO-ENTMCNC: 30.7 G/DL
MCV RBC AUTO: 60 FL
MONOCYTES # BLD AUTO: 0.8 K/UL
MONOCYTES NFR BLD: 9.4 %
NEUTROPHILS # BLD AUTO: 5.3 K/UL
NEUTROPHILS NFR BLD: 64.3 %
NITRITE UR QL STRIP: NEGATIVE
NITRITE UR QL STRIP: NEGATIVE
NRBC BLD-RTO: 0 /100 WBC
PH UR STRIP: 5 [PH] (ref 5–8)
PH UR STRIP: 7 [PH] (ref 5–8)
PHOSPHATE SERPL-MCNC: 2.7 MG/DL
PLATELET # BLD AUTO: 182 K/UL
PMV BLD AUTO: ABNORMAL FL
POTASSIUM SERPL-SCNC: 4.4 MMOL/L
PROCALCITONIN SERPL IA-MCNC: 0.1 NG/ML
PROT SERPL-MCNC: 6.8 G/DL
PROT UR QL STRIP: NEGATIVE
PROT UR QL STRIP: NEGATIVE
PROTHROMBIN TIME: 11 SEC
RBC # BLD AUTO: 5.71 M/UL
SODIUM SERPL-SCNC: 130 MMOL/L
SP GR UR STRIP: 1 (ref 1–1.03)
SP GR UR STRIP: 1 (ref 1–1.03)
SPECIMEN SOURCE: NORMAL
T4 FREE SERPL-MCNC: 1.1 NG/DL
TROPONIN I SERPL DL<=0.01 NG/ML-MCNC: 0.03 NG/ML
TSH SERPL DL<=0.005 MIU/L-ACNC: 0.18 UIU/ML
URN SPEC COLLECT METH UR: NORMAL
URN SPEC COLLECT METH UR: NORMAL
UROBILINOGEN UR STRIP-ACNC: NEGATIVE EU/DL
UROBILINOGEN UR STRIP-ACNC: NEGATIVE EU/DL
WBC # BLD AUTO: 8.29 K/UL

## 2017-11-26 PROCEDURE — 80053 COMPREHEN METABOLIC PANEL: CPT

## 2017-11-26 PROCEDURE — 96368 THER/DIAG CONCURRENT INF: CPT

## 2017-11-26 PROCEDURE — A4216 STERILE WATER/SALINE, 10 ML: HCPCS | Performed by: INTERNAL MEDICINE

## 2017-11-26 PROCEDURE — 63600175 PHARM REV CODE 636 W HCPCS: Performed by: EMERGENCY MEDICINE

## 2017-11-26 PROCEDURE — 84443 ASSAY THYROID STIM HORMONE: CPT

## 2017-11-26 PROCEDURE — 84439 ASSAY OF FREE THYROXINE: CPT

## 2017-11-26 PROCEDURE — 25000003 PHARM REV CODE 250: Performed by: EMERGENCY MEDICINE

## 2017-11-26 PROCEDURE — 93010 ELECTROCARDIOGRAM REPORT: CPT | Mod: ,,, | Performed by: INTERNAL MEDICINE

## 2017-11-26 PROCEDURE — 81003 URINALYSIS AUTO W/O SCOPE: CPT

## 2017-11-26 PROCEDURE — 83690 ASSAY OF LIPASE: CPT

## 2017-11-26 PROCEDURE — 84100 ASSAY OF PHOSPHORUS: CPT

## 2017-11-26 PROCEDURE — 96365 THER/PROPH/DIAG IV INF INIT: CPT

## 2017-11-26 PROCEDURE — 96366 THER/PROPH/DIAG IV INF ADDON: CPT

## 2017-11-26 PROCEDURE — 82533 TOTAL CORTISOL: CPT

## 2017-11-26 PROCEDURE — 85730 THROMBOPLASTIN TIME PARTIAL: CPT

## 2017-11-26 PROCEDURE — 83735 ASSAY OF MAGNESIUM: CPT

## 2017-11-26 PROCEDURE — 63600175 PHARM REV CODE 636 W HCPCS: Performed by: INTERNAL MEDICINE

## 2017-11-26 PROCEDURE — 83605 ASSAY OF LACTIC ACID: CPT

## 2017-11-26 PROCEDURE — 87040 BLOOD CULTURE FOR BACTERIA: CPT | Mod: 59

## 2017-11-26 PROCEDURE — 12000002 HC ACUTE/MED SURGE SEMI-PRIVATE ROOM

## 2017-11-26 PROCEDURE — 82962 GLUCOSE BLOOD TEST: CPT

## 2017-11-26 PROCEDURE — 96375 TX/PRO/DX INJ NEW DRUG ADDON: CPT

## 2017-11-26 PROCEDURE — 96367 TX/PROPH/DG ADDL SEQ IV INF: CPT

## 2017-11-26 PROCEDURE — 99285 EMERGENCY DEPT VISIT HI MDM: CPT | Mod: 25

## 2017-11-26 PROCEDURE — 84484 ASSAY OF TROPONIN QUANT: CPT

## 2017-11-26 PROCEDURE — 81003 URINALYSIS AUTO W/O SCOPE: CPT | Mod: 91

## 2017-11-26 PROCEDURE — 83036 HEMOGLOBIN GLYCOSYLATED A1C: CPT

## 2017-11-26 PROCEDURE — 83880 ASSAY OF NATRIURETIC PEPTIDE: CPT

## 2017-11-26 PROCEDURE — 99285 EMERGENCY DEPT VISIT HI MDM: CPT | Mod: ,,, | Performed by: EMERGENCY MEDICINE

## 2017-11-26 PROCEDURE — 25000003 PHARM REV CODE 250: Performed by: INTERNAL MEDICINE

## 2017-11-26 PROCEDURE — 87400 INFLUENZA A/B EACH AG IA: CPT | Mod: 59

## 2017-11-26 PROCEDURE — 93005 ELECTROCARDIOGRAM TRACING: CPT

## 2017-11-26 PROCEDURE — 84145 PROCALCITONIN (PCT): CPT

## 2017-11-26 PROCEDURE — 85025 COMPLETE CBC W/AUTO DIFF WBC: CPT

## 2017-11-26 PROCEDURE — 85610 PROTHROMBIN TIME: CPT

## 2017-11-26 RX ORDER — ASPIRIN 81 MG/1
81 TABLET ORAL DAILY
Status: DISCONTINUED | OUTPATIENT
Start: 2017-11-27 | End: 2017-11-30 | Stop reason: HOSPADM

## 2017-11-26 RX ORDER — SODIUM CHLORIDE 0.9 % (FLUSH) 0.9 %
3 SYRINGE (ML) INJECTION EVERY 8 HOURS
Status: DISCONTINUED | OUTPATIENT
Start: 2017-11-26 | End: 2017-11-30 | Stop reason: HOSPADM

## 2017-11-26 RX ORDER — LANOLIN ALCOHOL/MO/W.PET/CERES
1200 CREAM (GRAM) TOPICAL 2 TIMES DAILY
Status: DISCONTINUED | OUTPATIENT
Start: 2017-11-26 | End: 2017-11-30 | Stop reason: HOSPADM

## 2017-11-26 RX ORDER — SERTRALINE HYDROCHLORIDE 100 MG/1
100 TABLET, FILM COATED ORAL DAILY
Status: DISCONTINUED | OUTPATIENT
Start: 2017-11-27 | End: 2017-11-30 | Stop reason: HOSPADM

## 2017-11-26 RX ORDER — MYCOPHENOLATE MOFETIL 250 MG/1
1000 CAPSULE ORAL 2 TIMES DAILY
Status: DISCONTINUED | OUTPATIENT
Start: 2017-11-26 | End: 2017-11-30 | Stop reason: HOSPADM

## 2017-11-26 RX ORDER — OXYCODONE HCL 10 MG/1
20 TABLET, FILM COATED, EXTENDED RELEASE ORAL EVERY 12 HOURS
Status: DISCONTINUED | OUTPATIENT
Start: 2017-11-26 | End: 2017-11-30 | Stop reason: HOSPADM

## 2017-11-26 RX ORDER — OSELTAMIVIR PHOSPHATE 75 MG/1
75 CAPSULE ORAL 2 TIMES DAILY
Status: DISCONTINUED | OUTPATIENT
Start: 2017-11-26 | End: 2017-11-30 | Stop reason: HOSPADM

## 2017-11-26 RX ORDER — IBUPROFEN 200 MG
16 TABLET ORAL
Status: DISCONTINUED | OUTPATIENT
Start: 2017-11-26 | End: 2017-11-27

## 2017-11-26 RX ORDER — PREGABALIN 75 MG/1
75 CAPSULE ORAL 2 TIMES DAILY
Status: DISCONTINUED | OUTPATIENT
Start: 2017-11-26 | End: 2017-11-30 | Stop reason: HOSPADM

## 2017-11-26 RX ORDER — PANTOPRAZOLE SODIUM 40 MG/1
40 TABLET, DELAYED RELEASE ORAL DAILY
Status: DISCONTINUED | OUTPATIENT
Start: 2017-11-27 | End: 2017-11-30 | Stop reason: HOSPADM

## 2017-11-26 RX ORDER — OXYCODONE HYDROCHLORIDE 5 MG/1
10 TABLET ORAL
Status: COMPLETED | OUTPATIENT
Start: 2017-11-26 | End: 2017-11-26

## 2017-11-26 RX ORDER — LISINOPRIL 10 MG/1
10 TABLET ORAL ONCE
Status: COMPLETED | OUTPATIENT
Start: 2017-11-26 | End: 2017-11-26

## 2017-11-26 RX ORDER — HYDRALAZINE HYDROCHLORIDE 20 MG/ML
2 INJECTION INTRAMUSCULAR; INTRAVENOUS ONCE
Status: COMPLETED | OUTPATIENT
Start: 2017-11-26 | End: 2017-11-26

## 2017-11-26 RX ORDER — DOCUSATE SODIUM 100 MG/1
200 CAPSULE, LIQUID FILLED ORAL NIGHTLY
Status: DISCONTINUED | OUTPATIENT
Start: 2017-11-26 | End: 2017-11-30 | Stop reason: HOSPADM

## 2017-11-26 RX ORDER — INSULIN ASPART 100 [IU]/ML
1-10 INJECTION, SOLUTION INTRAVENOUS; SUBCUTANEOUS
Status: DISCONTINUED | OUTPATIENT
Start: 2017-11-26 | End: 2017-11-27

## 2017-11-26 RX ORDER — IBUPROFEN 200 MG
24 TABLET ORAL
Status: DISCONTINUED | OUTPATIENT
Start: 2017-11-26 | End: 2017-11-27

## 2017-11-26 RX ORDER — PRAVASTATIN SODIUM 20 MG/1
20 TABLET ORAL DAILY
Status: DISCONTINUED | OUTPATIENT
Start: 2017-11-27 | End: 2017-11-30 | Stop reason: HOSPADM

## 2017-11-26 RX ORDER — AMLODIPINE BESYLATE 5 MG/1
5 TABLET ORAL DAILY
Status: DISCONTINUED | OUTPATIENT
Start: 2017-11-27 | End: 2017-11-27

## 2017-11-26 RX ORDER — GLUCAGON 1 MG
1 KIT INJECTION
Status: DISCONTINUED | OUTPATIENT
Start: 2017-11-26 | End: 2017-11-27

## 2017-11-26 RX ORDER — FUROSEMIDE 40 MG/1
40 TABLET ORAL DAILY
Status: DISCONTINUED | OUTPATIENT
Start: 2017-11-27 | End: 2017-11-27

## 2017-11-26 RX ORDER — LISINOPRIL 2.5 MG/1
10 TABLET ORAL DAILY
Status: DISCONTINUED | OUTPATIENT
Start: 2017-11-27 | End: 2017-11-27

## 2017-11-26 RX ORDER — TACROLIMUS 0.5 MG/1
0.5 CAPSULE ORAL 2 TIMES DAILY
Status: DISCONTINUED | OUTPATIENT
Start: 2017-11-26 | End: 2017-11-30 | Stop reason: HOSPADM

## 2017-11-26 RX ADMIN — MAGNESIUM SULFATE HEPTAHYDRATE 1 G: 500 INJECTION, SOLUTION INTRAMUSCULAR; INTRAVENOUS at 10:11

## 2017-11-26 RX ADMIN — OXYCODONE HYDROCHLORIDE 10 MG: 5 TABLET ORAL at 06:11

## 2017-11-26 RX ADMIN — PREGABALIN 75 MG: 75 CAPSULE ORAL at 08:11

## 2017-11-26 RX ADMIN — MAGNESIUM OXIDE TAB 400 MG (241.3 MG ELEMENTAL MG) 1200 MG: 400 (241.3 MG) TAB at 08:11

## 2017-11-26 RX ADMIN — CEFTRIAXONE 1 G: 1 INJECTION, SOLUTION INTRAVENOUS at 07:11

## 2017-11-26 RX ADMIN — OXYCODONE HYDROCHLORIDE 20 MG: 20 TABLET, FILM COATED, EXTENDED RELEASE ORAL at 11:11

## 2017-11-26 RX ADMIN — VANCOMYCIN HYDROCHLORIDE 1500 MG: 1 INJECTION, POWDER, LYOPHILIZED, FOR SOLUTION INTRAVENOUS at 08:11

## 2017-11-26 RX ADMIN — HYDRALAZINE HYDROCHLORIDE 2 MG: 20 INJECTION INTRAMUSCULAR; INTRAVENOUS at 10:11

## 2017-11-26 RX ADMIN — OSELTAMIVIR PHOSPHATE 75 MG: 75 CAPSULE ORAL at 10:11

## 2017-11-26 RX ADMIN — TACROLIMUS 0.5 MG: 0.5 CAPSULE ORAL at 08:11

## 2017-11-26 RX ADMIN — Medication 3 ML: at 11:11

## 2017-11-26 RX ADMIN — DOCUSATE SODIUM 200 MG: 100 CAPSULE, LIQUID FILLED ORAL at 08:11

## 2017-11-26 RX ADMIN — LISINOPRIL 10 MG: 10 TABLET ORAL at 08:11

## 2017-11-26 RX ADMIN — MYCOPHENOLATE MOFETIL 1000 MG: 250 CAPSULE ORAL at 10:11

## 2017-11-26 NOTE — PROVIDER PROGRESS NOTES - EMERGENCY DEPT.
Encounter Date: 11/26/2017    ED Physician Progress Notes         EKG - STEMI Decision  Initial Reading: No STEMI present.

## 2017-11-26 NOTE — ED NOTES
"Pt now states his temp was 102.0 at home "that's why I called the EMS", this    info was not related to triage nurse upon arrival.   "

## 2017-11-27 PROBLEM — E83.42 HYPOMAGNESEMIA: Status: ACTIVE | Noted: 2017-11-27

## 2017-11-27 PROBLEM — T38.0X5A ADRENAL CORTICAL STEROIDS CAUSING ADVERSE EFFECT IN THERAPEUTIC USE: Status: RESOLVED | Noted: 2017-02-09 | Resolved: 2017-11-27

## 2017-11-27 PROBLEM — A41.9 SEPSIS: Status: ACTIVE | Noted: 2017-11-27

## 2017-11-27 LAB
ALBUMIN SERPL BCP-MCNC: 3.3 G/DL
ALP SERPL-CCNC: 74 U/L
ALT SERPL W/O P-5'-P-CCNC: 10 U/L
ANION GAP SERPL CALC-SCNC: 11 MMOL/L
AST SERPL-CCNC: 13 U/L
BASOPHILS # BLD AUTO: 0.02 K/UL
BASOPHILS NFR BLD: 0.3 %
BILIRUB SERPL-MCNC: 0.4 MG/DL
BUN SERPL-MCNC: 13 MG/DL
CALCIUM SERPL-MCNC: 8.8 MG/DL
CHLORIDE SERPL-SCNC: 100 MMOL/L
CO2 SERPL-SCNC: 24 MMOL/L
CREAT SERPL-MCNC: 1.1 MG/DL
DIASTOLIC DYSFUNCTION: NO
DIFFERENTIAL METHOD: ABNORMAL
EOSINOPHIL # BLD AUTO: 0 K/UL
EOSINOPHIL NFR BLD: 0.3 %
ERYTHROCYTE [DISTWIDTH] IN BLOOD BY AUTOMATED COUNT: 16.7 %
EST. GFR  (AFRICAN AMERICAN): >60 ML/MIN/1.73 M^2
EST. GFR  (NON AFRICAN AMERICAN): >60 ML/MIN/1.73 M^2
GLUCOSE SERPL-MCNC: 238 MG/DL
HCT VFR BLD AUTO: 32.9 %
HGB BLD-MCNC: 10.2 G/DL
IMM GRANULOCYTES # BLD AUTO: 0.05 K/UL
IMM GRANULOCYTES NFR BLD AUTO: 0.6 %
LACTATE SERPL-SCNC: 1.4 MMOL/L
LACTATE SERPL-SCNC: 2.7 MMOL/L
LYMPHOCYTES # BLD AUTO: 1.1 K/UL
LYMPHOCYTES NFR BLD: 13.6 %
MAGNESIUM SERPL-MCNC: 1.2 MG/DL
MAGNESIUM SERPL-MCNC: 1.8 MG/DL
MCH RBC QN AUTO: 18 PG
MCHC RBC AUTO-ENTMCNC: 31 G/DL
MCV RBC AUTO: 58 FL
MITRAL VALVE MOBILITY: NORMAL
MONOCYTES # BLD AUTO: 1 K/UL
MONOCYTES NFR BLD: 12.4 %
NEUTROPHILS # BLD AUTO: 5.7 K/UL
NEUTROPHILS NFR BLD: 72.8 %
NRBC BLD-RTO: 0 /100 WBC
PLATELET # BLD AUTO: 153 K/UL
PMV BLD AUTO: ABNORMAL FL
POCT GLUCOSE: 267 MG/DL (ref 70–110)
POCT GLUCOSE: 268 MG/DL (ref 70–110)
POCT GLUCOSE: 272 MG/DL (ref 70–110)
POCT GLUCOSE: 296 MG/DL (ref 70–110)
POTASSIUM SERPL-SCNC: 4.3 MMOL/L
PROT SERPL-MCNC: 6.5 G/DL
RBC # BLD AUTO: 5.67 M/UL
RETIRED EF AND QEF - SEE NOTES: 60 (ref 55–65)
SODIUM SERPL-SCNC: 135 MMOL/L
TACROLIMUS BLD-MCNC: 9.4 NG/ML
WBC # BLD AUTO: 7.82 K/UL

## 2017-11-27 PROCEDURE — 87899 AGENT NOS ASSAY W/OPTIC: CPT

## 2017-11-27 PROCEDURE — 25000003 PHARM REV CODE 250: Performed by: INTERNAL MEDICINE

## 2017-11-27 PROCEDURE — A4216 STERILE WATER/SALINE, 10 ML: HCPCS | Performed by: INTERNAL MEDICINE

## 2017-11-27 PROCEDURE — 87205 SMEAR GRAM STAIN: CPT

## 2017-11-27 PROCEDURE — 83605 ASSAY OF LACTIC ACID: CPT

## 2017-11-27 PROCEDURE — 80053 COMPREHEN METABOLIC PANEL: CPT

## 2017-11-27 PROCEDURE — 83735 ASSAY OF MAGNESIUM: CPT

## 2017-11-27 PROCEDURE — 99223 1ST HOSP IP/OBS HIGH 75: CPT | Mod: ,,, | Performed by: NURSE PRACTITIONER

## 2017-11-27 PROCEDURE — 80197 ASSAY OF TACROLIMUS: CPT

## 2017-11-27 PROCEDURE — 99233 SBSQ HOSP IP/OBS HIGH 50: CPT | Mod: ,,, | Performed by: INTERNAL MEDICINE

## 2017-11-27 PROCEDURE — 99223 1ST HOSP IP/OBS HIGH 75: CPT | Mod: GC,,, | Performed by: INTERNAL MEDICINE

## 2017-11-27 PROCEDURE — 83735 ASSAY OF MAGNESIUM: CPT | Mod: 91

## 2017-11-27 PROCEDURE — 63600175 PHARM REV CODE 636 W HCPCS: Performed by: NURSE PRACTITIONER

## 2017-11-27 PROCEDURE — 85025 COMPLETE CBC W/AUTO DIFF WBC: CPT

## 2017-11-27 PROCEDURE — 20600001 HC STEP DOWN PRIVATE ROOM

## 2017-11-27 PROCEDURE — 87581 M.PNEUMON DNA AMP PROBE: CPT

## 2017-11-27 PROCEDURE — 87070 CULTURE OTHR SPECIMN AEROBIC: CPT

## 2017-11-27 PROCEDURE — 93306 TTE W/DOPPLER COMPLETE: CPT

## 2017-11-27 PROCEDURE — 30000890 MAYO MISCELLANEOUS TEST (REFLEX)

## 2017-11-27 PROCEDURE — 63600175 PHARM REV CODE 636 W HCPCS: Performed by: INTERNAL MEDICINE

## 2017-11-27 PROCEDURE — 93306 TTE W/DOPPLER COMPLETE: CPT | Mod: 26,,, | Performed by: INTERNAL MEDICINE

## 2017-11-27 PROCEDURE — 36415 COLL VENOUS BLD VENIPUNCTURE: CPT

## 2017-11-27 PROCEDURE — 87632 RESP VIRUS 6-11 TARGETS: CPT

## 2017-11-27 RX ORDER — INSULIN ASPART 100 [IU]/ML
4 INJECTION, SOLUTION INTRAVENOUS; SUBCUTANEOUS
Status: DISCONTINUED | OUTPATIENT
Start: 2017-11-27 | End: 2017-11-27

## 2017-11-27 RX ORDER — OXYCODONE HYDROCHLORIDE 5 MG/1
15 TABLET ORAL EVERY 6 HOURS PRN
Status: DISCONTINUED | OUTPATIENT
Start: 2017-11-27 | End: 2017-11-30 | Stop reason: HOSPADM

## 2017-11-27 RX ORDER — GLUCAGON 1 MG
1 KIT INJECTION
Status: DISCONTINUED | OUTPATIENT
Start: 2017-11-27 | End: 2017-11-30 | Stop reason: HOSPADM

## 2017-11-27 RX ORDER — INSULIN ASPART 100 [IU]/ML
0-5 INJECTION, SOLUTION INTRAVENOUS; SUBCUTANEOUS
Status: DISCONTINUED | OUTPATIENT
Start: 2017-11-27 | End: 2017-11-30 | Stop reason: HOSPADM

## 2017-11-27 RX ORDER — CEFEPIME HYDROCHLORIDE 2 G/50ML
2 INJECTION, SOLUTION INTRAVENOUS
Status: DISCONTINUED | OUTPATIENT
Start: 2017-11-27 | End: 2017-11-29

## 2017-11-27 RX ORDER — IBUPROFEN 200 MG
16 TABLET ORAL
Status: DISCONTINUED | OUTPATIENT
Start: 2017-11-27 | End: 2017-11-30 | Stop reason: HOSPADM

## 2017-11-27 RX ORDER — ACETAMINOPHEN 325 MG/1
650 TABLET ORAL EVERY 6 HOURS PRN
Status: DISCONTINUED | OUTPATIENT
Start: 2017-11-27 | End: 2017-11-30 | Stop reason: HOSPADM

## 2017-11-27 RX ORDER — ACETAMINOPHEN 325 MG/1
650 TABLET ORAL ONCE
Status: COMPLETED | OUTPATIENT
Start: 2017-11-27 | End: 2017-11-27

## 2017-11-27 RX ORDER — IBUPROFEN 200 MG
24 TABLET ORAL
Status: DISCONTINUED | OUTPATIENT
Start: 2017-11-27 | End: 2017-11-30 | Stop reason: HOSPADM

## 2017-11-27 RX ORDER — INSULIN GLARGINE 100 [IU]/ML
15 INJECTION, SOLUTION SUBCUTANEOUS DAILY
Status: DISCONTINUED | OUTPATIENT
Start: 2017-11-27 | End: 2017-11-28

## 2017-11-27 RX ORDER — INSULIN ASPART 100 [IU]/ML
6 INJECTION, SOLUTION INTRAVENOUS; SUBCUTANEOUS
Status: DISCONTINUED | OUTPATIENT
Start: 2017-11-27 | End: 2017-11-28

## 2017-11-27 RX ORDER — MAGNESIUM SULFATE HEPTAHYDRATE 40 MG/ML
2 INJECTION, SOLUTION INTRAVENOUS ONCE
Status: COMPLETED | OUTPATIENT
Start: 2017-11-27 | End: 2017-11-27

## 2017-11-27 RX ADMIN — MAGNESIUM OXIDE TAB 400 MG (241.3 MG ELEMENTAL MG) 1200 MG: 400 (241.3 MG) TAB at 08:11

## 2017-11-27 RX ADMIN — Medication 1500 MG: at 08:11

## 2017-11-27 RX ADMIN — SERTRALINE HYDROCHLORIDE 100 MG: 100 TABLET ORAL at 08:11

## 2017-11-27 RX ADMIN — INSULIN GLARGINE 15 UNITS: 100 INJECTION, SOLUTION SUBCUTANEOUS at 10:11

## 2017-11-27 RX ADMIN — PREGABALIN 75 MG: 75 CAPSULE ORAL at 08:11

## 2017-11-27 RX ADMIN — PRAVASTATIN SODIUM 20 MG: 20 TABLET ORAL at 08:11

## 2017-11-27 RX ADMIN — MYCOPHENOLATE MOFETIL 1000 MG: 250 CAPSULE ORAL at 08:11

## 2017-11-27 RX ADMIN — CEFEPIME 2 G: 2 INJECTION, POWDER, FOR SOLUTION INTRAVENOUS at 06:11

## 2017-11-27 RX ADMIN — ASPIRIN 81 MG: 81 TABLET, COATED ORAL at 08:11

## 2017-11-27 RX ADMIN — OXYCODONE HYDROCHLORIDE 20 MG: 20 TABLET, FILM COATED, EXTENDED RELEASE ORAL at 08:11

## 2017-11-27 RX ADMIN — DOCUSATE SODIUM 200 MG: 100 CAPSULE, LIQUID FILLED ORAL at 08:11

## 2017-11-27 RX ADMIN — TACROLIMUS 0.5 MG: 0.5 CAPSULE ORAL at 06:11

## 2017-11-27 RX ADMIN — MYCOPHENOLATE MOFETIL 1000 MG: 250 CAPSULE ORAL at 10:11

## 2017-11-27 RX ADMIN — Medication 3 ML: at 02:11

## 2017-11-27 RX ADMIN — AZITHROMYCIN MONOHYDRATE 500 MG: 500 INJECTION, POWDER, LYOPHILIZED, FOR SOLUTION INTRAVENOUS at 01:11

## 2017-11-27 RX ADMIN — OSELTAMIVIR PHOSPHATE 75 MG: 75 CAPSULE ORAL at 08:11

## 2017-11-27 RX ADMIN — INSULIN ASPART 3 UNITS: 100 INJECTION, SOLUTION INTRAVENOUS; SUBCUTANEOUS at 03:11

## 2017-11-27 RX ADMIN — Medication 3 ML: at 05:11

## 2017-11-27 RX ADMIN — ACETAMINOPHEN 650 MG: 325 TABLET ORAL at 06:11

## 2017-11-27 RX ADMIN — VANCOMYCIN HYDROCHLORIDE 2000 MG: 100 INJECTION, POWDER, LYOPHILIZED, FOR SOLUTION INTRAVENOUS at 08:11

## 2017-11-27 RX ADMIN — CEFEPIME 2 G: 2 INJECTION, POWDER, FOR SOLUTION INTRAVENOUS at 05:11

## 2017-11-27 RX ADMIN — INSULIN ASPART 3 UNITS: 100 INJECTION, SOLUTION INTRAVENOUS; SUBCUTANEOUS at 10:11

## 2017-11-27 RX ADMIN — ACETAMINOPHEN 650 MG: 325 TABLET ORAL at 05:11

## 2017-11-27 RX ADMIN — TACROLIMUS 0.5 MG: 0.5 CAPSULE ORAL at 08:11

## 2017-11-27 RX ADMIN — INSULIN ASPART 3 UNITS: 100 INJECTION, SOLUTION INTRAVENOUS; SUBCUTANEOUS at 06:11

## 2017-11-27 RX ADMIN — OXYCODONE HYDROCHLORIDE 15 MG: 5 TABLET ORAL at 06:11

## 2017-11-27 RX ADMIN — MAGNESIUM SULFATE HEPTAHYDRATE 1 G: 500 INJECTION, SOLUTION INTRAMUSCULAR; INTRAVENOUS at 11:11

## 2017-11-27 RX ADMIN — PANTOPRAZOLE SODIUM 40 MG: 40 TABLET, DELAYED RELEASE ORAL at 08:11

## 2017-11-27 RX ADMIN — INSULIN ASPART 3 UNITS: 100 INJECTION, SOLUTION INTRAVENOUS; SUBCUTANEOUS at 11:11

## 2017-11-27 RX ADMIN — MAGNESIUM SULFATE IN WATER 2 G: 40 INJECTION, SOLUTION INTRAVENOUS at 03:11

## 2017-11-27 NOTE — HPI
56 year old male with PMHx of NICM s/p HM II (8/24/16), HLD, HTN, VT s/p ICD now s/p OHTx 2/9/17 who presents with URI and sepsis.  Pt reports beginning to feel poorly last night when he started feeling SOB, + cough w yellow phlegm, temperature up to 102 at maximum, -140 at maximum, loss of appetite.  States that he has some excess LE swelling over the last few day.  Denies CP, palpitation, MARTINO.  Ambulance took him here to hospital.  Did not receive flu vaccination this year.  Also complains of recent increased urinary frequency but now dysuria.  Has a hx of BPH.

## 2017-11-27 NOTE — HPI
Reason for Consult: Management of T2DM, Hyperglycemia     Surgical Procedure and Date: History of heart transplant 2/9/17    Diabetes diagnosis year: 1996    Home Diabetes Medications:  Lantus 22 units QAM, Novolog 22/22/18 units AC  Lab Results   Component Value Date    HGBA1C 7.7 (H) 11/26/2017     How often checking glucose at home? 3x/day  BG readings on regimen: 100-200s  Hypoglycemia on the regimen?  One reading of 70 in the past month  Missed doses on regimen? No    Diabetes Complications include:   Diabetic chronic kidney disease         Complicating diabetes co morbidities: CHF    HPI: Patient is a 57 y.o. male with a diagnosis of DM since 1996, on MDI at home. Admitted with c/o cough, fever. History of transplant but no maintenance steroid therapy.   Noted allergy to Levemir.   Endocrine consulted for management of DM, insulin therapy.

## 2017-11-27 NOTE — ASSESSMENT & PLAN NOTE
- patient under care of  for pain management  - he is asking for high dose of oxycodone and OxyContin during stay  - called Dr. oliveros's office wo success.  - we will continue OxyContin and hold oxycodone until we clarify on dosage. Informed on decision for the changes

## 2017-11-27 NOTE — ASSESSMENT & PLAN NOTE
- TX on 2/9/2017  - on Prograft and cellcept.  - Tacrol level 9.4 this morning. Continue to monitor daily

## 2017-11-27 NOTE — NURSING
Rounds Report: Attended interdisciplinary rounds this morning with the transplant team including SW, physicians, fellows,  mid-level providers, and transplant coordinators.  Discussed plan of care including blood cultures, IV antibiotics. No source of sepsis identified. Waiting for cultures to result. On broad spectrum antibiotics for now.  I was not able to meet with the pt today and

## 2017-11-27 NOTE — ASSESSMENT & PLAN NOTE
Pt takes 1200 mg PO magnesium in the AM, 800 mg afternoon, 400 mg evening  -will start on MgO 1200 mg BID  -will give 1 g IV mag now

## 2017-11-27 NOTE — H&P
[facility]  Heart Transplant  H&P    Patient Name: Mick Barriga  MRN: [id number]  Admission Date: 11/26/2017  Attending Physician: Екатерина Nickerson MD  Primary Care Provider: Sukhdev Alan MD  Principal Problem:Sepsis    Subjective:     History of Present Illness:  56 year old male with PMHx of NICM s/p HM II (8/24/16), HLD, HTN, VT s/p ICD now s/p OHTx 2/9/17 who presents with URI and sepsis.  Pt reports beginning to feel poorly last night when he started feeling SOB, + cough w yellow phlegm, temperature up to 102 at maximum, -140 at maximum, loss of appetite.  States that he has some excess LE swelling over the last few day.  Denies CP, palpitation, MARTINO.  Ambulance took him here to hospital.  Did not receive flu vaccination this year.  Also complains of recent increased urinary frequency but now dysuria.  Has a hx of BPH.    Past Medical History:   Diagnosis Date    CHF (congestive heart failure)     Coronary artery disease     GERD (gastroesophageal reflux disease)     Hyperlipidemia     Hypertension     LVAD (left ventricular assist device) present 2/13/2017    Type 2 diabetes mellitus with hyperglycemia        Past Surgical History:   Procedure Laterality Date    CARDIAC PACEMAKER PLACEMENT      CHOLECYSTECTOMY      COLONOSCOPY N/A 1/11/2017    Procedure: COLONOSCOPY;  Surgeon: Petr Almanzar MD;  Location: [address];  Service: Endoscopy;  Laterality: N/A;    COLONOSCOPY N/A 1/12/2017    Procedure: COLONOSCOPY;  Surgeon: Petr Almanzar MD;  Location: [address]);  Service: Endoscopy;  Laterality: N/A;    HEART TRANSPLANT  02/2017    LEFT VENTRICULAR ASSIST DEVICE      x 3 months ago       Review of patient's allergies indicates:   Allergen Reactions    Levemir [insulin detemir] Itching    Niacin preparations     Pork/porcine containing products     Ranexa [ranolazine] Nausea Only       Current Facility-Administered Medications  "  Medication    [START ON 11/27/2017] amLODIPine tablet 5 mg    [START ON 11/27/2017] aspirin EC tablet 81 mg    docusate sodium capsule 200 mg    [START ON 11/27/2017] furosemide tablet 40 mg    [START ON 11/27/2017] lisinopril tablet 10 mg    magnesium oxide tablet 1,200 mg    mycophenolate capsule 1,000 mg    oseltamivir capsule 75 mg    oxyCODONE 12 hr tablet 20 mg    [START ON 11/27/2017] pantoprazole EC tablet 40 mg    [START ON 11/27/2017] pravastatin tablet 20 mg    pregabalin capsule 75 mg    [START ON 11/27/2017] sertraline tablet 100 mg    tacrolimus capsule 0.5 mg    vancomycin (VANCOCIN) 1,500 mg in dextrose 5 % 250 mL IVPB     Current Outpatient Prescriptions   Medication Sig    ACCU-CHEK HANNAH Misc CHECK BLOOD GLUCOSE QID    amlodipine (NORVASC) 10 MG tablet Take 0.5 tablets (5 mg total) by mouth once daily.    aspirin (ECOTRIN) 81 MG EC tablet Take 1 tablet (81 mg total) by mouth once daily.    BD INSULIN PEN NEEDLE UF SHORT 31 gauge x 5/16" Ndle     blood sugar diagnostic Strp To use to check BG 5 times daily, to use with insurance preferred meter    blood sugar diagnostic Strp 5 strips by Misc.(Non-Drug; Combo Route) route 5 (five) times daily.    blood-glucose meter kit To use to check BG 4 times daily, to use with insurance preferred meter     mg capsule TAKE 2 CAPSULES BY MOUTH EVERY EVENING    esomeprazole (NEXIUM) 40 MG capsule TAKE 1 CAPSULE BY MOUTH BEFORE BREAKFAST    furosemide (LASIX) 40 MG tablet once daily    insulin aspart (NOVOLOG) 100 unit/mL InPn pen Inject 15 Units into the skin 3 (three) times daily.    insulin glargine (LANTUS SOLOSTAR) 100 unit/mL (3 mL) InPn pen Inject 22 Units into the skin once daily.    lancets Misc To use to check BG 4 times daily, to use with insurance preferred meter    lisinopril 10 MG tablet Take 1 tablet (10 mg total) by mouth once daily.    LYRICA 75 mg capsule Take 75 mg by mouth 2 (two) times daily.    magnesium " oxide (MAG-OX) 400 mg tablet Take 1200 mg in am and 800 mg at 1200 PM, 800 MG AT 1800    mycophenolate (CELLCEPT) 250 mg Cap Take 4 capsules (1,000 mg total) by mouth 2 (two) times daily.    oxycodone (OXYCONTIN) 20 mg 12 hr tablet Take 1 tablet (20 mg total) by mouth every 12 (twelve) hours.    pravastatin (PRAVACHOL) 40 MG tablet Take 1 tablet (40 mg total) by mouth once daily. (Patient taking differently: Take 20 mg by mouth once daily. )    sertraline (ZOLOFT) 100 MG tablet Take 100 mg by mouth once daily.    tacrolimus (PROGRAF) 1 MG Cap Take 0.5mg orally in the morning and 1mg orally in the evening (Patient taking differently: 0.5 mg 2 (two) times daily. Take 0.5mg orally in the morning and 1mg orally in the evening)    tadalafil (CIALIS) 10 MG tablet Take 1 tablet (10 mg total) by mouth daily as needed for Erectile Dysfunction.     Family History     Problem Relation (Age of Onset)    Cancer Brother    Heart attack Mother, Father    Heart disease Mother, Father    Hypertension Mother, Father        Social History Main Topics    Smoking status: Former Smoker     Packs/day: 0.50     Years: 15.00     Quit date: 6/14/2006    Smokeless tobacco: Never Used    Alcohol use No    Drug use: Unknown    Sexual activity: Yes     Partners: Female     Review of Systems   Constitutional: Positive for appetite change, chills, diaphoresis and fever. Negative for activity change and unexpected weight change.   HENT: Positive for congestion, postnasal drip and sore throat.    Respiratory: Positive for cough, chest tightness and shortness of breath. Negative for apnea and choking.    Cardiovascular: Positive for leg swelling. Negative for chest pain and palpitations.   Gastrointestinal: Negative for diarrhea, nausea and vomiting.   Genitourinary: Positive for frequency and urgency. Negative for difficulty urinating and dysuria.   Neurological: Positive for dizziness, weakness and light-headedness. Negative for syncope.      Objective:     Vital Signs (Most Recent):  Temp: 99.3 °F (37.4 °C) (11/26/17 1736)  Pulse: (!) 113 (11/26/17 1931)  Resp: 19 (11/26/17 1849)  BP: (!) 168/82 (11/26/17 1931)  SpO2: 99 % (11/26/17 1931) Vital Signs (24h Range):  Temp:  [97.8 °F (36.6 °C)-99.3 °F (37.4 °C)] 99.3 °F (37.4 °C)  Pulse:  [113-120] 113  Resp:  [18-19] 19  SpO2:  [95 %-100 %] 99 %  BP: (134-168)/(60-92) 168/82     Patient Vitals for the past 72 hrs (Last 3 readings):   Weight   11/26/17 1653 97.5 kg (215 lb)     Body mass index is 32.69 kg/m².    No intake or output data in the 24 hours ending 11/26/17 2011    Physical Exam       General Appearance:    Alert, cooperative, no distress   Lungs:     Soft crackles bilaterally, respirations unlabored    Heart:   JVP 14 cm H2O at 45 degrees, Regular rate and rhythm, S1 and S2 normal, no murmur, rub or gallop   Abdomen:     Soft, non-tender, bowel sounds active all four quadrants,     no masses, no organomegaly   Extremities:   +1 edema b/l, pulses +1 b/l       Significant Labs:  CBC:    Recent Labs  Lab 11/26/17  1730   WBC 8.29   RBC 5.71   HGB 10.5*   HCT 34.2*      MCV 60*   MCH 18.4*   MCHC 30.7*     BNP:    Recent Labs  Lab 11/26/17  1730   BNP 59     CMP:    Recent Labs  Lab 11/26/17  1730   *   CALCIUM 9.4   ALBUMIN 3.6   PROT 6.8   *   K 4.4   CO2 23   CL 97   BUN 13   CREATININE 1.1   ALKPHOS 81   ALT 13   AST 21   BILITOT 0.5      Coagulation:     Recent Labs  Lab 11/26/17  1730   INR 1.0   APTT 24.3     LDH:  No results for input(s): LDH in the last 72 hours.  Microbiology:  Microbiology Results (last 7 days)     Procedure Component Value Units Date/Time    Blood culture x two cultures. Draw prior to antibiotics. [[id number]] Collected:  11/26/17 1733    Order Status:  Sent Specimen:  Blood from Peripheral, Antecubital, Left Updated:  11/26/17 1804    Blood culture x two cultures. Draw prior to antibiotics. [[id number]] Collected:  11/26/17 1700    Order Status:   Sent Specimen:  Blood from Peripheral, Upper Arm, Right Updated:  11/26/17 1803          BMP:   Recent Labs  Lab 11/26/17 1730   *   *   K 4.4   CL 97   CO2 23   BUN 13   CREATININE 1.1   CALCIUM 9.4   MG 1.3*     Cardiac Markers: No results for input(s): CKMB, TROPONINT, MYOGLOBIN in the last 72 hours.  Microbiology Results (last 7 days)     Procedure Component Value Units Date/Time    Blood culture x two cultures. Draw prior to antibiotics. [[id number]] Collected:  11/26/17 1733    Order Status:  Sent Specimen:  Blood from Peripheral, Antecubital, Left Updated:  11/26/17 1804    Blood culture x two cultures. Draw prior to antibiotics. [[id number]] Collected:  11/26/17 1700    Order Status:  Sent Specimen:  Blood from Peripheral, Upper Arm, Right Updated:  11/26/17 1803        Diagnostic Results:  CXR: X-ray Chest Ap Portable    Result Date: 11/26/2017  Minimal interstitial edema, no large focal consolidation. Electronically signed by: VALENTÍN KING MD Date:     11/26/17 Time:    18:31     Assessment/Plan:     * Sepsis    -lactate 2.6, significantly tachycardic with temp 102, so meets SIRS criteria   -procalcitonin negative for bacterial infection  -continue to monitor lactate  -flu swab negative but has poor sensitivity  -broad spectrum abx: vanc/ceftriaxone and tamiflu  -will need to hold off IVF given CXR showing interstitial edema already with LE edema on exam        Upper respiratory infection    -possibly flu vs URI  -CXR does not show inflitrate, only minor interstitial edema  -continue tamiflu and broad spectrum abx, vanc/ceftriaxone  -f/u blood cx          Heart transplant, orthotopic, status    -continue current immunosuppressive therapy: cellcept and prograf  -monitor daily prograf levels  -echo tomorrow        Benign prostatic hyperplasia    -check UA  -continue to monitor        Essential hypertension    -currently elevated  -giving extra dose lisinopril  -continue to monitor         Hypomagnesemia    Pt takes 1200 mg PO magnesium in the AM, 800 mg afternoon, 400 mg evening  -will start on MgO 1200 mg BID  -will give 1 g IV mag now        Type 2 diabetes mellitus with stage 3 chronic kidney disease, with long-term current use of insulin    -continue to monitor blood glucose with fingersticks qachs  -continue medium dose sliding scale   -continue long-acting insulin, glargine 22 u daily  -monitor for hypoglycemia  -continue diabetic heart healthy diet       Hyponatremia, slightly hypervolemic       -mild, likely 2/2 to SIADH and volume overload       -continue to monitor       Ellen Ervin MD  Heart Transplant  [facility]

## 2017-11-27 NOTE — ED NOTES
Called pharmacy in regards to missing oxycodone medication.  Pharmacy reports that they will send medication.

## 2017-11-27 NOTE — CONSULTS
[facility]  Endocrinology  Diabetes Consult Note    Consult Requested by: Josefina Saul MD   Reason for admit: Sepsis    HISTORY OF PRESENT ILLNESS:  Reason for Consult: Management of T2DM, Hyperglycemia     Surgical Procedure and Date: History of heart transplant 2/9/17    Diabetes diagnosis year: 1996    Home Diabetes Medications:  Lantus 22 units QAM, Novolog 22/22/18    How often checking glucose at home? 3x/day  BG readings on regimen: 100-200s  Hypoglycemia on the regimen?  One reading of 70 in the past month  Missed doses on regimen? No    Diabetes Complications include:     Diabetic chronic kidney disease         Complicating diabetes co morbidities:   CHF      HPI:   Patient is a 57 y.o. male with a diagnosis of DM since 1996, on MDI at home. Admitted with c/o cough, fever. Endocrine consulted for management of DM, insulin therapy.          Interval HPI:   On a 2,200 kathy ADA diet, tolerating <50%. Febrile, ID has been consulted. BGs  Uncontrolled, currently on moderate dose correction scale. Denies nausea. Has not experienced hypoglycemia during this admission.     PMH, PSH, FH, SH updated and reviewed     Review of Systems   Constitutional: Negative for weight changes.  Eyes: Positive for blurry disturbance.  Respiratory: Negative for current cough.   Cardiovascular: Positive for chest pain.  Gastrointestinal: Negative for nausea.  Endocrine: Negative for polyuria, polydipsia.  Musculoskeletal: Negative for back pain.  Skin: Negative for rash.  Neurological: Negative for syncope.  Psychiatric/Behavioral: Negative for depression.     Current Medications and/or Treatments Impacting Glycemic Control  Immunotherapy:    Immunosuppressants         Stop Route Frequency     mycophenolate capsule 1,000 mg      -- Oral 2 times daily     tacrolimus capsule 0.5 mg      -- Oral 2 times daily        Steroids:   Hormones     None        Pressors:    Autonomic Drugs     None         Hyperglycemia/Diabetes Medications:   Antihyperglycemics     Start     Stop Route Frequency Ordered    11/26/17 2201  insulin aspart pen 1-10 Units      -- SubQ Before meals & nightly PRN 11/26/17 2102               PHYSICAL EXAMINATION:Vitals:    11/27/17 0700   BP:    Pulse: (!) 113   Resp:    Temp:      Body mass index is 32.65 kg/m².    Physical Exam   Constitutional:  Well developed, well nourished, NAD.  ENT: External ears no masses with nose patent; normal hearing.   Neck:  Supple; trachea midline; no thyromegaly.   Cardiovascular: Normal heart sounds, no LE edema.     Lungs:  Normal effort; lungs with expiratory wheezes, rhonchi to auscultation.  Abdomen:  Soft, no masses,  no hernias.  MS: No clubbing or cyanosis of nails noted; unable to assess gait.  Skin: No rashes, lesions, or ulcers; no nodules.  Psychiatric: Good judgement and insight; normal mood and affect.  Neurological: Cranial nerves are grossly intact.       Labs Reviewed and Include     Recent Labs  Lab 11/27/17  0509   *   CALCIUM 8.8   ALBUMIN 3.3*   PROT 6.5   *   K 4.3   CO2 24      BUN 13   CREATININE 1.1   ALKPHOS 74   ALT 10   AST 13   BILITOT 0.4     Lab Results   Component Value Date    WBC 7.82 11/27/2017    HGB 10.2 (L) 11/27/2017    HCT 32.9 (L) 11/27/2017    MCV 58 (L) 11/27/2017     11/27/2017       Recent Labs  Lab 11/26/17  1730   TSH 0.184*   FREET4 1.10     Lab Results   Component Value Date    HGBA1C 7.7 (H) 11/26/2017       Nutritional status:   Body mass index is 32.65 kg/m².  Lab Results   Component Value Date    ALBUMIN 3.3 (L) 11/27/2017    ALBUMIN 3.6 11/26/2017    ALBUMIN 3.7 11/17/2017     Lab Results   Component Value Date    PREALBUMIN 19 (L) 02/08/2017    PREALBUMIN 14 (L) 02/06/2017    PREALBUMIN 14 (L) 02/03/2017       Estimated Creatinine Clearance: 85.9 mL/min (based on SCr of 1.1 mg/dL).    Accu-Checks  Recent Labs      11/26/17   2307  11/27/17   0910   POCTGLUCOSE  267*  268*         ASSESSMENT and PLAN    * Sepsis    On IV antibiotics  ID has been consulted          Type 2 diabetes mellitus with stage 3 chronic kidney disease, with long-term current use of insulin    BG goal 140-180. Begin Lantus 15 units subQ QAM (pt has allergy to Levemir and can ONLY take Lantus). He has his home supply of Lantus.     Novolog 6 units with meals plus low dose correction scale. Monitor AC/HS.     Discharge Planning:   Will need dose adjustment of MDI and glycemic discharge clinic f/u          Heart transplanted    On immunosuppressants          Immunosuppression    Can worsen insulin resistance              Syeda Boudreaux, PEDRO, NP  Endocrinology  [facility]

## 2017-11-27 NOTE — SUBJECTIVE & OBJECTIVE
Past Medical History:   Diagnosis Date    CHF (congestive heart failure)     Coronary artery disease     GERD (gastroesophageal reflux disease)     Hyperlipidemia     Hypertension     LVAD (left ventricular assist device) present 2/13/2017    Type 2 diabetes mellitus with hyperglycemia        Past Surgical History:   Procedure Laterality Date    CARDIAC PACEMAKER PLACEMENT      CHOLECYSTECTOMY      COLONOSCOPY N/A 1/11/2017    Procedure: COLONOSCOPY;  Surgeon: Petr Almanzar MD;  [facility]  Service: Endoscopy;  Laterality: N/A;    COLONOSCOPY N/A 1/12/2017    Procedure: COLONOSCOPY;  Surgeon: Petr Almanzar MD;  Location: [facility] ([address] [place] [address]);  Service: Endoscopy;  Laterality: N/A;    HEART TRANSPLANT  02/2017    LEFT VENTRICULAR ASSIST DEVICE      x 3 months ago       Review of patient's allergies indicates:   Allergen Reactions    Levemir [insulin detemir] Itching    Niacin preparations     Pork/porcine containing products     Ranexa [ranolazine] Nausea Only       Current Facility-Administered Medications   Medication    [START ON 11/27/2017] amLODIPine tablet 5 mg    [START ON 11/27/2017] aspirin EC tablet 81 mg    docusate sodium capsule 200 mg    [START ON 11/27/2017] furosemide tablet 40 mg    [START ON 11/27/2017] lisinopril tablet 10 mg    magnesium oxide tablet 1,200 mg    mycophenolate capsule 1,000 mg    oseltamivir capsule 75 mg    oxyCODONE 12 hr tablet 20 mg    [START ON 11/27/2017] pantoprazole EC tablet 40 mg    [START ON 11/27/2017] pravastatin tablet 20 mg    pregabalin capsule 75 mg    [START ON 11/27/2017] sertraline tablet 100 mg    tacrolimus capsule 0.5 mg    vancomycin (VANCOCIN) 1,500 mg in dextrose 5 % 250 mL IVPB     Current Outpatient Prescriptions   Medication Sig    ACCU-CHEK HANNAH Misc CHECK BLOOD GLUCOSE QID    amlodipine (NORVASC) 10 MG tablet Take 0.5 tablets (5 mg total) by mouth once daily.    aspirin (ECOTRIN) 81 MG EC  "tablet Take 1 tablet (81 mg total) by mouth once daily.    BD INSULIN PEN NEEDLE UF SHORT 31 gauge x 5/16" Ndle     blood sugar diagnostic Strp To use to check BG 5 times daily, to use with insurance preferred meter    blood sugar diagnostic Strp 5 strips by Misc.(Non-Drug; Combo Route) route 5 (five) times daily.    blood-glucose meter kit To use to check BG 4 times daily, to use with insurance preferred meter     mg capsule TAKE 2 CAPSULES BY MOUTH EVERY EVENING    esomeprazole (NEXIUM) 40 MG capsule TAKE 1 CAPSULE BY MOUTH BEFORE BREAKFAST    furosemide (LASIX) 40 MG tablet once daily    insulin aspart (NOVOLOG) 100 unit/mL InPn pen Inject 15 Units into the skin 3 (three) times daily.    insulin glargine (LANTUS SOLOSTAR) 100 unit/mL (3 mL) InPn pen Inject 22 Units into the skin once daily.    lancets [facility] To use to check BG 4 times daily, to use with insurance preferred meter    lisinopril 10 MG tablet Take 1 tablet (10 mg total) by mouth once daily.    LYRICA 75 mg capsule Take 75 mg by mouth 2 (two) times daily.    magnesium oxide (MAG-OX) 400 mg tablet Take 1200 mg in am and 800 mg at 1200 PM, 800 MG AT 1800    mycophenolate (CELLCEPT) 250 mg Cap Take 4 capsules (1,000 mg total) by mouth 2 (two) times daily.    oxycodone (OXYCONTIN) 20 mg 12 hr tablet Take 1 tablet (20 mg total) by mouth every 12 (twelve) hours.    pravastatin (PRAVACHOL) 40 MG tablet Take 1 tablet (40 mg total) by mouth once daily. (Patient taking differently: Take 20 mg by mouth once daily. )    sertraline (ZOLOFT) 100 MG tablet Take 100 mg by mouth once daily.    tacrolimus (PROGRAF) 1 MG Cap Take 0.5mg orally in the morning and 1mg orally in the evening (Patient taking differently: 0.5 mg 2 (two) times daily. Take 0.5mg orally in the morning and 1mg orally in the evening)    tadalafil (CIALIS) 10 MG tablet Take 1 tablet (10 mg total) by mouth daily as needed for Erectile Dysfunction.     Family History     " Problem Relation (Age of Onset)    Cancer Brother    Heart attack Mother, Father    Heart disease Mother, Father    Hypertension Mother, Father        Social History Main Topics    Smoking status: Former Smoker     Packs/day: 0.50     Years: 15.00     Quit date: 6/14/2006    Smokeless tobacco: Never Used    Alcohol use No    Drug use: Unknown    Sexual activity: Yes     Partners: Female     Review of Systems   Constitutional: Positive for appetite change, chills, diaphoresis and fever. Negative for activity change and unexpected weight change.   HENT: Positive for congestion, postnasal drip and sore throat.    Respiratory: Positive for cough, chest tightness and shortness of breath. Negative for apnea and choking.    Cardiovascular: Positive for leg swelling. Negative for chest pain and palpitations.   Gastrointestinal: Negative for diarrhea, nausea and vomiting.   Genitourinary: Positive for frequency and urgency. Negative for difficulty urinating and dysuria.   Neurological: Positive for dizziness, weakness and light-headedness. Negative for syncope.     Objective:     Vital Signs (Most Recent):  Temp: 99.3 °F (37.4 °C) (11/26/17 1736)  Pulse: (!) 113 (11/26/17 1931)  Resp: 19 (11/26/17 1849)  BP: (!) 168/82 (11/26/17 1931)  SpO2: 99 % (11/26/17 1931) Vital Signs (24h Range):  Temp:  [97.8 °F (36.6 °C)-99.3 °F (37.4 °C)] 99.3 °F (37.4 °C)  Pulse:  [113-120] 113  Resp:  [18-19] 19  SpO2:  [95 %-100 %] 99 %  BP: (134-168)/(60-92) 168/82     Patient Vitals for the past 72 hrs (Last 3 readings):   Weight   11/26/17 1653 97.5 kg (215 lb)     Body mass index is 32.69 kg/m².    No intake or output data in the 24 hours ending 11/26/17 2011    Physical Exam         Significant Labs:  CBC:    Recent Labs  Lab 11/26/17  1730   WBC 8.29   RBC 5.71   HGB 10.5*   HCT 34.2*      MCV 60*   MCH 18.4*   MCHC 30.7*     BNP:    Recent Labs  Lab 11/26/17  1730   BNP 59     CMP:    Recent Labs  Lab 11/26/17  1730   *    CALCIUM 9.4   ALBUMIN 3.6   PROT 6.8   *   K 4.4   CO2 23   CL 97   BUN 13   CREATININE 1.1   ALKPHOS 81   ALT 13   AST 21   BILITOT 0.5      Coagulation:     Recent Labs  Lab 11/26/17 1730   INR 1.0   APTT 24.3     LDH:  No results for input(s): LDH in the last 72 hours.  Microbiology:  Microbiology Results (last 7 days)     Procedure Component Value Units Date/Time    Blood culture x two cultures. Draw prior to antibiotics. [[id number]] Collected:  11/26/17 1733    Order Status:  Sent Specimen:  Blood from Peripheral, Antecubital, Left Updated:  11/26/17 1804    Blood culture x two cultures. Draw prior to antibiotics. [[id number]] Collected:  11/26/17 1700    Order Status:  Sent Specimen:  Blood from Peripheral, Upper Arm, Right Updated:  11/26/17 1803          BMP:   Recent Labs  Lab 11/26/17 1730   *   *   K 4.4   CL 97   CO2 23   BUN 13   CREATININE 1.1   CALCIUM 9.4   MG 1.3*     Cardiac Markers: No results for input(s): CKMB, TROPONINT, MYOGLOBIN in the last 72 hours.  Microbiology Results (last 7 days)     Procedure Component Value Units Date/Time    Blood culture x two cultures. Draw prior to antibiotics. [[id number]] Collected:  11/26/17 1733    Order Status:  Sent Specimen:  Blood from Peripheral, Antecubital, Left Updated:  11/26/17 1804    Blood culture x two cultures. Draw prior to antibiotics. [[id number]] Collected:  11/26/17 1700    Order Status:  Sent Specimen:  Blood from Peripheral, Upper Arm, Right Updated:  11/26/17 1803        Diagnostic Results:  CXR: X-ray Chest Ap Portable    Result Date: 11/26/2017  Minimal interstitial edema, no large focal consolidation. Electronically signed by: VALENTÍN KING MD Date:     11/26/17 Time:    18:31

## 2017-11-27 NOTE — ASSESSMENT & PLAN NOTE
- Unclear source at the moment. Presumed from URI versus PNA with the ongoing cough  - lactate non elevated. CXR negative for infiltrate  - continue broads-pectrum as current. Azithromax added to cefepime and vanco  - FU on blood cultures and respiratory cultures  - ID review pending.

## 2017-11-27 NOTE — ED PROVIDER NOTES
"Encounter Date: 11/26/2017    SCRIBE #1 NOTE: I, Olga Perez, am scribing for, and in the presence of,  Dr. Nickerson. I have scribed the entire note.       History     Chief Complaint   Patient presents with    Chest Pain     Heart transplant 2/2017 here. Has SOB w/ cough and chest pain .     Shortness of Breath     Time patient was seen by the provider: 6:31 PM      The patient is a 57 y.o. male with hx of: HTN, HLD, heart transplant, CHF, DM, GERD, LVAD present and CAD that presents to the ED with a complaint of CP and body pain.   Pt describes feeling like "something is stuck in my chest."  He reports that he was feeling great until last night.  He states that he tries to cough it out but is only able to get a little out. His heart rate has been high in the 130's and he notes a self-reported fever of 102 around 3 pm this afternoon.  Last night and this morning fever was 96-98.  Pt did not take any medication for his fever.  He reports that this is the first major complaint he has had since his transplant.  Denies rhinorrhea and sore throat.  Notes that he has had congestion for months.  Pt states that he sees pain management for his body pain, but has not found any relief.  He did not get the flu shot this year.      The history is provided by the patient and medical records.     Review of patient's allergies indicates:   Allergen Reactions    Levemir [insulin detemir] Itching    Niacin preparations     Pork/porcine containing products     Ranexa [ranolazine] Nausea Only     Past Medical History:   Diagnosis Date    CHF (congestive heart failure)     Coronary artery disease     GERD (gastroesophageal reflux disease)     Hyperlipidemia     Hypertension     LVAD (left ventricular assist device) present 2/13/2017    Type 2 diabetes mellitus with hyperglycemia      Past Surgical History:   Procedure Laterality Date    CARDIAC PACEMAKER PLACEMENT      CHOLECYSTECTOMY      COLONOSCOPY N/A " 1/11/2017    Procedure: COLONOSCOPY;  Surgeon: Petr Almanzar MD;  Location: [facility] ENDO ([address] [place] [address]);  Service: Endoscopy;  Laterality: N/A;    COLONOSCOPY N/A 1/12/2017    Procedure: COLONOSCOPY;  Surgeon: Petr Almanzar MD;  [facility]  Service: Endoscopy;  Laterality: N/A;    HEART TRANSPLANT  02/2017    LEFT VENTRICULAR ASSIST DEVICE      x 3 months ago     Family History   Problem Relation Age of Onset    Heart disease Mother     Hypertension Mother     Heart attack Mother     Heart disease Father     Hypertension Father     Heart attack Father     Cancer Brother      Social History   Substance Use Topics    Smoking status: Former Smoker     Packs/day: 0.50     Years: 15.00     Quit date: 6/14/2006    Smokeless tobacco: Never Used    Alcohol use No     Review of Systems   Constitutional: Positive for fatigue and fever.   HENT: Positive for congestion. Negative for rhinorrhea and sore throat.    Eyes: Negative for pain and visual disturbance.   Respiratory: Positive for cough and shortness of breath.    Cardiovascular: Positive for chest pain.   Gastrointestinal: Negative for abdominal pain and blood in stool.   Genitourinary: Negative for dysuria and hematuria.   Musculoskeletal: Positive for back pain (chronic), myalgias and neck pain (chronic).   Skin: Negative for rash.   Neurological: Negative for syncope and speech difficulty.       Physical Exam     Initial Vitals [11/26/17 1653]   BP Pulse Resp Temp SpO2   134/60 (!) 120 18 97.8 °F (36.6 °C) 98 %      MAP       84.67         Physical Exam    Nursing note and vitals reviewed.  Constitutional: He appears well-developed and well-nourished. No distress.   HENT:   Head: Normocephalic and atraumatic.   Eyes: EOM are normal. Pupils are equal, round, and reactive to light.   Cardiovascular: Normal rate, regular rhythm and normal heart sounds. Exam reveals no gallop and no friction rub.    No murmur heard.  Pulmonary/Chest: No  respiratory distress. He has no wheezes. He has no rales.   Scattered expiratory rhonchi.   Abdominal: Soft. He exhibits no distension. There is no tenderness.   Musculoskeletal:   Trace pitting edema in the LLE.   Neurological: He is alert and oriented to person, place, and time. He has normal strength. No cranial nerve deficit or sensory deficit.   Skin: Skin is warm and dry.   Psychiatric: He has a normal mood and affect. His behavior is normal. Judgment and thought content normal.         ED Course   Procedures  Labs Reviewed   CBC W/ AUTO DIFFERENTIAL - Abnormal; Notable for the following:        Result Value    Hemoglobin 10.5 (*)     Hematocrit 34.2 (*)     MCV 60 (*)     MCH 18.4 (*)     MCHC 30.7 (*)     RDW 17.2 (*)     All other components within normal limits   COMPREHENSIVE METABOLIC PANEL - Abnormal; Notable for the following:     Sodium 130 (*)     Glucose 219 (*)     All other components within normal limits   LACTIC ACID, PLASMA - Abnormal; Notable for the following:     Lactate (Lactic Acid) 2.6 (*)     All other components within normal limits   LACTIC ACID, PLASMA - Abnormal; Notable for the following:     Lactate (Lactic Acid) 2.7 (*)     All other components within normal limits   MAGNESIUM - Abnormal; Notable for the following:     Magnesium 1.3 (*)     All other components within normal limits   TROPONIN I - Abnormal; Notable for the following:     Troponin I 0.034 (*)     All other components within normal limits   TSH - Abnormal; Notable for the following:     TSH 0.184 (*)     All other components within normal limits   HEMOGLOBIN A1C - Abnormal; Notable for the following:     Hemoglobin A1C 7.7 (*)     Estimated Avg Glucose 174 (*)     All other components within normal limits   CULTURE, BLOOD   CULTURE, BLOOD   APTT   B-TYPE NATRIURETIC PEPTIDE   CORTISOL, RANDOM   LIPASE   PHOSPHORUS   PROTIME-INR   PROCALCITONIN   URINALYSIS, REFLEX TO URINE CULTURE    Narrative:     Preferred  Collection Type->Urine, Clean Catch   INFLUENZA A AND B ANTIGEN   T4, FREE   URINALYSIS    Narrative:     rec'd 1 urine gray tube   VANCOMYCIN, TROUGH   POCT GLUCOSE MONITORING CONTINUOUS          X-Rays:   Independently Interpreted Readings:   Chest X-Ray: Linear opacification of the left mid-lung field.  No definite infiltrate.     Medical Decision Making:   History:   Old Medical Records: I decided to obtain old medical records.  Old Records Summarized: records from clinic visits and records from previous admission(s).       <> Summary of Records: Hx nonischemic cardiomyopathy s/p heart transplant February 9, 2017.  His last checkup was November 17th.    Initial Assessment:   57 y.o. male s/p heart transplant in February, now with fever.  Not immunized against flu.  My differential includes influenza, bacteremia, PNA, UTI, transplant rejection.  I have ordered labs including blood cultures, CXR, influenza swab. Plan to discuss with cardiology.  Independently Interpreted Test(s):   I have ordered and independently interpreted X-rays - see prior notes.  I have ordered and independently interpreted EKG Reading(s) - see prior notes  Clinical Tests:   Lab Tests: Ordered and Reviewed  Radiological Study: Ordered and Reviewed  Medical Tests: Ordered and Reviewed            Scribe Attestation:   Scribe #1: I performed the above scribed service and the documentation accurately describes the services I performed. I attest to the accuracy of the note.    Attending Attestation:           Physician Attestation for Scribe:      Comments: I, Dr. Екатерина Nickerson, personally performed the services described in this documentation. All medical record entries made by the scribe were at my direction and in my presence.  I have reviewed the chart and agree that the record reflects my personal performance and is accurate and complete. Екатерина Nickerson MD.     Attending ED Notes:   6:45 PM Discussed case with cardiology fellow.   They are recommending treatment with broad spectrum abx, which I have ordered.     7:00 PM Cardiology has evaluated the pt and they plan to admit.          ED Course      Clinical Impression:   The encounter diagnosis was Febrile illness, acute.    Disposition:   Disposition: Admitted                        Екатерина Nickerson MD  12/02/17 1114

## 2017-11-27 NOTE — ASSESSMENT & PLAN NOTE
-continue current immunosuppressive therapy: cellcept and prograf  -monitor daily prograf levels  -echo tomorrow

## 2017-11-27 NOTE — ASSESSMENT & PLAN NOTE
-possibly flu vs URI  -CXR does not show inflitrate, only minor interstitial edema  -continue tamiflu and broad spectrum abx, vanc/ceftriaxone  -f/u blood cx

## 2017-11-27 NOTE — PROGRESS NOTES
Admit Note     Met with patient to assess needs. Patient is a 57 y.o.  male, admitted for sepsis. Pt is also s/p OHT 2/9/17.      Patient admitted from emergency on 11/26/2017 .  At this time, patient presents as oriented, but very tired.   At this time, patients caregiver is not in attendance.     Household/Family Systems     Patient resides with patient's spouse, at     [address] [facility] [address].        Support system includes spouse and three adult children.    Patient does not have dependents that are need of being cared for.     Patients primary caregiver is Epcamila, patients spouse.    Pt's home phone: [phone]  Pt's cell:  [phone]  Additional emergency contact:  Manny (son) [phone]    During admission, patient's caregiver plans to stay at home.  Confirmed patient and patients caregivers do have access to reliable transportation.    Cognitive Status/Learning     Patient reports reading ability as 8th grade and states patient does not have difficulty with seeing, hearing, comprehension, learning and memory. The pt does have difficulty with reading and writing in English.    Patient reports patient learns best by one on one.     Needed: No.   Highest education level: Grade School (0-8)    Vocation/Disability   .  Working for Income: No  If no, reason not working: Disability  Pt is disabled due to heart failure.     Adherence     Patient reports a high level of adherence to patients health care regimen.  Adherence counseling and education provided. Patient verbalizes understanding.    Substance Use    Patient reports the following substance usage.    Tobacco: none. pt quit smoking in 2006.  Alcohol: none, patient denies any use.  Illicit Drugs/Non-prescribed Medications: none, patient denies any use.  Patient states clear understanding of the potential impact of substance use.  Substance abstinence/cessation counseling, education and resources provided and  reviewed.     Services Utilizing/ADLS    Infusion Service: Prior to admission, patient utilizing? no  Home Health: Prior to admission, patient utilizing? no Pt has used [facility] in the past.  Pt not sure if he would like to use same HH co if HH is needed when discharged.  Pt reports he may like a different company.   DME: Prior to admission, yes walker  Pulmonary/Cardiac Rehab: Prior to admission, no  Dialysis:  Prior to admission, no  Transplant Specialty Pharmacy:  Prior to admission, no.    Prior to admission, patient reports patient was independent with ADLS and was driving.  Patient reports patient is not able to care for self at this time due to compromised medical condition (as documented in medical record) and physical weakness..  Patient indicates a willingness to care for self once medically cleared to do so.    Insurance/Medications    Insured by   Payor/Plan Subscr  Sex Relation Sub. Ins. ID Effective Group Num   1. MEDICARE - ME* FORTINO KULKARNIKAREL DANIEL* 1960 Male  [id number] 3/1/06                                    PO       Primary Insurance (for UNOS reporting): Public Insurance - Medicare FFS (Fee For Service)  Secondary Insurance (for UNOS reporting): Public Insurance - Medicaid    Patient reports patient is not  able to obtain and afford medications at this time and at time of discharge.  Pt reports he has three medications with high co pays and it's difficult for him to cover this cost.  Worker reports this can be reviewed further closer to discharge.    Living Will/Healthcare Power of     Patient states patient does not have a LW and/or HCPA.   provided education regarding LW and HCPA and the completion of forms.    Coping/Mental Health    Patient is coping adequately with the aid of  family members. .  Patient indicates mental health difficulties. Pt reports issues with depression and has been taking Zoloft for the last 14 years.  Pt not able to fully express self  about his depression due to being so tired.  Transplant  will follow to assess same  Prior to discharge.     Discharge Planning    At time of discharge, patient plans to return to patient's home under the care of self and spouse.  Patients family will transport patient.  Per rounds today, expected discharge date has not been medically determined at this time. Patient and patients caregiver  verbalize understanding and are involved in treatment planning and discharge process.    Additional Concerns    Patient is being followed for needs, education, resources, information, emotional support, supportive counseling, and for supportive and skilled discharge plan of care.  providing ongoing psychosocial support, education, resources and d/c planning as needed.  SW remains available. Patient verbalizes understanding and agreement with information reviewed, social work availability, and how to access available resources as needed.

## 2017-11-27 NOTE — CONSULTS
Mr. Barriga is a 58 yo M with a history of NICM s/p LVAD and then transplant 2/17, HTN, HLD, and DM who presents with 1 day of fever and cough.    Had been doing well until last evening when he developed a cough and then this afternoon he spiked a fever to 102.  On arrival he was tachycardic, but satting well on room air.  Denies any sick contacts and did not get a flu shot this year.    On exam he has sb scattered rhonchi.    Labs significant for elevated lactate at 2.6 and minimally elevated troponin at 0.034.  EKG shows sinus tachycardia and CXR does not show infiltrate.    Plan:    - Admit to hospitals  - Vancomycin and ceftriaxone  - cultures pending  - flu swab pending, but will give dose of tamiflu to treat empirically    Discussed with Dr. Saul and hospitals hospitalist.    Ellen Francois MD, MPH  Cardiology Fellow

## 2017-11-27 NOTE — SUBJECTIVE & OBJECTIVE
Interval HPI:   On a 2,200 kathy ADA diet, tolerating <50%. Febrile, ID has been consulted. BGs  Uncontrolled, currently on moderate dose correction scale. Denies nausea. Has not experienced hypoglycemia during this admission.     PMH, PSH, FH, SH updated and reviewed     Review of Systems   Constitutional: Negative for weight changes.  Eyes: Positive for blurry disturbance.  Respiratory: Negative for current cough.   Cardiovascular: Positive for chest pain.  Gastrointestinal: Negative for nausea.  Endocrine: Negative for polyuria, polydipsia.  Musculoskeletal: Negative for back pain.  Skin: Negative for rash.  Neurological: Negative for syncope.  Psychiatric/Behavioral: Negative for depression.     Current Medications and/or Treatments Impacting Glycemic Control  Immunotherapy:    Immunosuppressants         Stop Route Frequency     mycophenolate capsule 1,000 mg      -- Oral 2 times daily     tacrolimus capsule 0.5 mg      -- Oral 2 times daily        Steroids:   Hormones     None        Pressors:    Autonomic Drugs     None        Hyperglycemia/Diabetes Medications:   Antihyperglycemics     Start     Stop Route Frequency Ordered    11/26/17 2201  insulin aspart pen 1-10 Units      -- SubQ Before meals & nightly PRN 11/26/17 2102               PHYSICAL EXAMINATION:Vitals:    11/27/17 0700   BP:    Pulse: (!) 113   Resp:    Temp:      Body mass index is 32.65 kg/m².    Physical Exam   Constitutional:  Well developed, well nourished, NAD.  ENT: External ears no masses with nose patent; normal hearing.   Neck:  Supple; trachea midline; no thyromegaly.   Cardiovascular: Normal heart sounds, no LE edema.     Lungs:  Normal effort; lungs with expiratory wheezes, rhonchi to auscultation.  Abdomen:  Soft, no masses,  no hernias.  MS: No clubbing or cyanosis of nails noted; unable to assess gait.  Skin: No rashes, lesions, or ulcers; no nodules.  Psychiatric: Good judgement and insight; normal mood and affect.  Neurological:  Cranial nerves are grossly intact.

## 2017-11-27 NOTE — SIGNIFICANT EVENT
Notified Dr. Ervin of patient's oral temperature= 102.9 F. Orders received: Tylenol 650 mg PO X 1 now. Also, hold morning BP meds (in the event that patient becomes septic with hypotension). MD verbalized that she would call Infectious disease team now to inquire on further recommendations. Patient currently on Vancomycin, Rocephin, and Tamiflu, with blood cultures pending. (Last Lactate= 2.7, with lab drawing third level now.)

## 2017-11-27 NOTE — ASSESSMENT & PLAN NOTE
Pt takes 1200 mg PO magnesium in the AM, 800 mg afternoon, 400 mg evening  -will start on MgO 1200 mg BID  -will give 1 g IV mag now  - 2 more grams IV added. We will repeat level at 3PM

## 2017-11-27 NOTE — ED NOTES
Admitting team at bedside.  Patient provided urinal at this time.  Standing steady at bedside.  No voiced complaints.  RR regular and non labored.  Skin W/D/P.  WHITNEY well.

## 2017-11-27 NOTE — CONSULTS
[facility]  Infectious Disease  Consult Note    Inpatient consult to Infectious Diseases  Consult performed by: KRANTHI NEWMAN  Consult ordered by: DONALD PRINCE  Reason for consult: Febrile        Assessment/Plan:      Consult received, full note to follow. Please call with questions.     Thanks,   Kranthi Newman MD, PhD  Infectious Disease, PGY-4  [facility]

## 2017-11-27 NOTE — HOSPITAL COURSE
11/27  - patient having fever of 102.1. Reporting myalgia, chills and cough  - Abx spectrum widened to include azithromyxin, cefepime, vancomycin and tamiflu  - PCR negative for influenza  - prograft level therapeutic    11/28  - no further febrile episodes for the last 24hrs.   - Blood cultures negative. Sputum gram stain + for rare gram positive cocci with moderate WBC  - abx regimen kept the same    11/29  -  Afebrile for more than 36hrs, cultures negative so far.   -  Respiratory panel positive for influenza A  11/30  - ID recommended continuing Zithromax for 6 more days and Tamiflu for a 9 more days.  - prograft level 6.6  - discharged without any post discharge need. Cautioned about droplet isolation to close contacts at home.

## 2017-11-27 NOTE — ASSESSMENT & PLAN NOTE
-continue to monitor blood glucose with fingersticks qachs  -continue medium dose sliding scale   -continue long-acting insulin, glargine 22 u daily  -monitor for hypoglycemia  -continue diabetic heart healthy diet

## 2017-11-27 NOTE — SUBJECTIVE & OBJECTIVE
Interval History:     Patient seen this morning. Still febrile requiring tylenol for fever > 102. Reports generalized myalgia, chills and cough. ID contacted and recommended adding Azithromax to his regimen. Prograft level within acceptable level. Denies diarrhea or dysuria.     Continuous Infusions:   Scheduled Meds:   aspirin  81 mg Oral Daily    azithromycin  500 mg Intravenous Q24H    ceFEPime (MAXIPIME) IVPB  2 g Intravenous Q12H    docusate sodium  200 mg Oral QHS    insulin aspart  6 Units Subcutaneous TIDWM    insulin glargine  15 Units Subcutaneous Daily    magnesium oxide  1,200 mg Oral BID    magnesium sulfate IVPB  2 g Intravenous Once    mycophenolate  1,000 mg Oral BID    oseltamivir  75 mg Oral BID    oxyCODONE  20 mg Oral Q12H    pantoprazole  40 mg Oral Daily    pravastatin  20 mg Oral Daily    pregabalin  75 mg Oral BID    sertraline  100 mg Oral Daily    sodium chloride 0.9%  3 mL Intravenous Q8H    tacrolimus  0.5 mg Oral BID    vancomycin (VANCOCIN) IVPB  1,500 mg Intravenous Q12H     PRN Meds:dextrose 50%, dextrose 50%, glucagon (human recombinant), glucose, glucose, insulin aspart    Review of patient's allergies indicates:   Allergen Reactions    Levemir [insulin detemir] Itching    Niacin preparations     Pork/porcine containing products     Ranexa [ranolazine] Nausea Only     Objective:     Vital Signs (Most Recent):  Temp: 99.5 °F (37.5 °C) (11/27/17 1119)  Pulse: 101 (11/27/17 1119)  Resp: 20 (11/27/17 1119)  BP: (!) 140/67 (11/27/17 1119)  SpO2: (!) 92 % (11/27/17 1119) Vital Signs (24h Range):  Temp:  [97.8 °F (36.6 °C)-102.9 °F (39.4 °C)] 99.5 °F (37.5 °C)  Pulse:  [101-121] 101  Resp:  [12-20] 20  SpO2:  [91 %-100 %] 92 %  BP: (134-171)/(60-92) 140/67     Patient Vitals for the past 72 hrs (Last 3 readings):   Weight   11/27/17 0400 97.4 kg (214 lb 11.7 oz)   11/27/17 0053 97.2 kg (214 lb 4.6 oz)   11/26/17 1653 97.5 kg (215 lb)     Body mass index is 32.65  kg/m².      Intake/Output Summary (Last 24 hours) at 11/27/17 1224  Last data filed at 11/27/17 0826   Gross per 24 hour   Intake          1000.83 ml   Output             1425 ml   Net          -424.17 ml       Hemodynamic Parameters:       Telemetry: mild tachycardia    Physical Exam   Constitutional: He is oriented to person, place, and time. He appears well-developed.   HENT:   Head: Normocephalic and atraumatic.   Eyes: EOM are normal. Pupils are equal, round, and reactive to light.   Neck: Normal range of motion. Neck supple. No JVD present.   Cardiovascular: Normal rate, regular rhythm and normal heart sounds.    Pulmonary/Chest: Effort normal and breath sounds normal.   Abdominal: Soft. Bowel sounds are normal. There is no tenderness. There is no guarding.   Musculoskeletal: Normal range of motion. He exhibits no tenderness or deformity.   Neurological: He is alert and oriented to person, place, and time.   Skin: Skin is warm and dry. Capillary refill takes 2 to 3 seconds.   Vitals reviewed.      Significant Labs:  CBC:    Recent Labs  Lab 11/26/17 1730 11/27/17  0509   WBC 8.29 7.82   RBC 5.71 5.67   HGB 10.5* 10.2*   HCT 34.2* 32.9*    153   MCV 60* 58*   MCH 18.4* 18.0*   MCHC 30.7* 31.0*     BNP:    Recent Labs  Lab 11/26/17  1730   BNP 59     CMP:    Recent Labs  Lab 11/26/17  1730 11/27/17  0509   * 238*   CALCIUM 9.4 8.8   ALBUMIN 3.6 3.3*   PROT 6.8 6.5   * 135*   K 4.4 4.3   CO2 23 24   CL 97 100   BUN 13 13   CREATININE 1.1 1.1   ALKPHOS 81 74   ALT 13 10   AST 21 13   BILITOT 0.5 0.4      Coagulation:     Recent Labs  Lab 11/26/17  1730   INR 1.0   APTT 24.3     LDH:  No results for input(s): LDH in the last 72 hours.  Microbiology:  Microbiology Results (last 7 days)     Procedure Component Value Units Date/Time    Culture, Respiratory with Gram Stain [[id number]] Collected:  11/27/17 1100    Order Status:  Sent Specimen:  Respiratory from Sputum, Expectorated Updated:   11/27/17 1201    Respiratory Viral Panel by PCR [facility]; Nasal Swab [[id number]] Collected:  11/27/17 1115    Order Status:  Sent Specimen:  Respiratory Updated:  11/27/17 1140    Blood culture x two cultures. Draw prior to antibiotics. [[id number]] Collected:  11/26/17 1733    Order Status:  Completed Specimen:  Blood from Peripheral, Antecubital, Left Updated:  11/27/17 0145     Blood Culture, Routine No Growth to date    Narrative:       Aerobic and anaerobic    Blood culture x two cultures. Draw prior to antibiotics. [[id number]] Collected:  11/26/17 1700    Order Status:  Completed Specimen:  Blood from Peripheral, Upper Arm, Right Updated:  11/27/17 0145     Blood Culture, Routine No Growth to date    Narrative:       Aerobic and anaerobic          I have reviewed all pertinent labs within the past 24 hours.    Estimated Creatinine Clearance: 85.9 mL/min (based on SCr of 1.1 mg/dL).    Diagnostic Results:  CXR: X-ray Chest Ap Portable    Result Date: 11/26/2017  Minimal interstitial edema, no large focal consolidation. Electronically signed by: VALENTÍN KING MD Date:     11/26/17 Time:    18:31

## 2017-11-27 NOTE — SUBJECTIVE & OBJECTIVE
"Past Medical History:   Diagnosis Date    CHF (congestive heart failure)     Coronary artery disease     GERD (gastroesophageal reflux disease)     Hyperlipidemia     Hypertension     LVAD (left ventricular assist device) present 2/13/2017    Type 2 diabetes mellitus with hyperglycemia        Past Surgical History:   Procedure Laterality Date    CARDIAC PACEMAKER PLACEMENT      CHOLECYSTECTOMY      COLONOSCOPY N/A 1/11/2017    Procedure: COLONOSCOPY;  Surgeon: Petr Almanzar MD;  Location: [facility]  Service: Endoscopy;  Laterality: N/A;    COLONOSCOPY N/A 1/12/2017    Procedure: COLONOSCOPY;  Surgeon: Petr Almanzar MD;  Location: [facility] ([address] [place] [address]);  Service: Endoscopy;  Laterality: N/A;    HEART TRANSPLANT  02/2017    LEFT VENTRICULAR ASSIST DEVICE      x 3 months ago       Review of patient's allergies indicates:   Allergen Reactions    Levemir [insulin detemir] Itching    Niacin preparations     Pork/porcine containing products     Ranexa [ranolazine] Nausea Only       Medications:  Prescriptions Prior to Admission   Medication Sig    ACCU-CHEK [facility] CHECK BLOOD GLUCOSE QID    amlodipine (NORVASC) 10 MG tablet Take 0.5 tablets (5 mg total) by mouth once daily.    aspirin (ECOTRIN) 81 MG EC tablet Take 1 tablet (81 mg total) by mouth once daily.    BD INSULIN PEN NEEDLE UF SHORT 31 gauge x 5/16" Ndle     blood sugar diagnostic Strp To use to check BG 5 times daily, to use with insurance preferred meter    blood sugar diagnostic Strp 5 strips by Misc.(Non-Drug; Combo Route) route 5 (five) times daily.    blood-glucose meter kit To use to check BG 4 times daily, to use with insurance preferred meter     mg capsule TAKE 2 CAPSULES BY MOUTH EVERY EVENING    esomeprazole (NEXIUM) 40 MG capsule TAKE 1 CAPSULE BY MOUTH BEFORE BREAKFAST    furosemide (LASIX) 40 MG tablet once daily    insulin aspart (NOVOLOG) 100 unit/mL InPn pen Inject 15 Units into the skin 3 " (three) times daily.    insulin glargine (LANTUS SOLOSTAR) 100 unit/mL (3 mL) InPn pen Inject 22 Units into the skin once daily.    lancets Misc To use to check BG 4 times daily, to use with insurance preferred meter    lisinopril 10 MG tablet Take 1 tablet (10 mg total) by mouth once daily.    LYRICA 75 mg capsule Take 75 mg by mouth 2 (two) times daily.    magnesium oxide (MAG-OX) 400 mg tablet Take 1200 mg in am and 800 mg at 1200 PM, 800 MG AT 1800    mycophenolate (CELLCEPT) 250 mg Cap Take 4 capsules (1,000 mg total) by mouth 2 (two) times daily.    oxycodone (OXYCONTIN) 20 mg 12 hr tablet Take 1 tablet (20 mg total) by mouth every 12 (twelve) hours.    pravastatin (PRAVACHOL) 40 MG tablet Take 1 tablet (40 mg total) by mouth once daily. (Patient taking differently: Take 20 mg by mouth once daily. )    sertraline (ZOLOFT) 100 MG tablet Take 100 mg by mouth once daily.    tacrolimus (PROGRAF) 1 MG Cap Take 0.5mg orally in the morning and 1mg orally in the evening (Patient taking differently: 0.5 mg 2 (two) times daily. Take 0.5mg orally in the morning and 1mg orally in the evening)    tadalafil (CIALIS) 10 MG tablet Take 1 tablet (10 mg total) by mouth daily as needed for Erectile Dysfunction.     Antibiotics     Start     Stop Route Frequency Ordered    11/27/17 2030  vancomycin (VANCOCIN) 1,500 mg in dextrose 5 % 250 mL IVPB      -- IV Every 12 hours (non-standard times) 11/27/17 1054    11/27/17 1030  azithromycin 500 mg in dextrose 5 % 250 mL IVPB (ready to mix system)      -- IV Every 24 hours (non-standard times) 11/27/17 0922    11/27/17 0630  ceFEPIme in dextrose 5% 2 gram/50 mL IVPB 2 g      -- IV Every 12 hours (non-standard times) 11/27/17 0527        Antifungals     None        Antivirals         Stop Route Frequency     oseltamivir      12/01 2059 Oral 2 times daily           Immunization History   Administered Date(s) Administered    Hepatitis B, Adult 12/31/2016    Pneumococcal  Conjugate - 13 Valent 08/24/2016    Tdap 08/24/2016       Family History     Problem Relation (Age of Onset)    Cancer Brother    Heart attack Mother, Father    Heart disease Mother, Father    Hypertension Mother, Father        Social History     Social History    Marital status:      Spouse name: N/A    Number of children: N/A    Years of education: N/A     Social History Main Topics    Smoking status: Former Smoker     Packs/day: 0.50     Years: 15.00     Quit date: 6/14/2006    Smokeless tobacco: Never Used    Alcohol use No    Drug use: Unknown    Sexual activity: Yes     Partners: Female     Other Topics Concern    None     Social History Narrative    None     Review of Systems   Constitutional: Positive for chills, fatigue and fever. Negative for activity change and appetite change.   HENT: Negative for postnasal drip, sneezing and sore throat.    Respiratory: Positive for cough and chest tightness. Negative for shortness of breath and wheezing.    Cardiovascular: Positive for chest pain. Negative for palpitations and leg swelling.   Gastrointestinal: Positive for constipation. Negative for abdominal distention, nausea and vomiting.   Endocrine: Negative for polyuria.   Genitourinary: Positive for urgency. Negative for dysuria and frequency.   Skin: Negative for color change and rash.   Neurological: Negative for dizziness and weakness.   Hematological: Negative for adenopathy.   Psychiatric/Behavioral: Negative for agitation and behavioral problems.     Objective:     Vital Signs (Most Recent):  Temp: 99.5 °F (37.5 °C) (11/27/17 1119)  Pulse: 101 (11/27/17 1119)  Resp: 20 (11/27/17 1119)  BP: (!) 140/67 (11/27/17 1119)  SpO2: (!) 92 % (11/27/17 1119) Vital Signs (24h Range):  Temp:  [97.8 °F (36.6 °C)-102.9 °F (39.4 °C)] 99.5 °F (37.5 °C)  Pulse:  [101-121] 101  Resp:  [12-20] 20  SpO2:  [91 %-100 %] 92 %  BP: (134-171)/(60-92) 140/67     Weight: 97.4 kg (214 lb 11.7 oz)  Body mass index is  32.65 kg/m².    Estimated Creatinine Clearance: 85.9 mL/min (based on SCr of 1.1 mg/dL).    Physical Exam   Constitutional: He is oriented to person, place, and time. He appears well-developed and well-nourished.   HENT:   Head: Normocephalic and atraumatic.   Mouth/Throat: Oropharynx is clear and moist. No oropharyngeal exudate.   Eyes: EOM are normal. Pupils are equal, round, and reactive to light. No scleral icterus.   Neck: No JVD present.   Cardiovascular: Normal rate and regular rhythm.  Exam reveals no gallop and no friction rub.    No murmur heard.  Pulmonary/Chest: Effort normal. No respiratory distress. He has no wheezes. He has no rales.   No increased vocal fremitus no dullness to percussion   Abdominal: Soft. He exhibits no distension. There is no rebound and no guarding.   Musculoskeletal: He exhibits no edema or deformity.   Former inguinal wound appears well healed   Lymphadenopathy:     He has no cervical adenopathy.   Neurological: He is alert and oriented to person, place, and time.   Skin: Skin is warm and dry. Capillary refill takes less than 2 seconds.   Psychiatric: He has a normal mood and affect. His behavior is normal.       Significant Labs:   CBC:   Recent Labs  Lab 11/26/17  1730 11/27/17  0509   WBC 8.29 7.82   HGB 10.5* 10.2*   HCT 34.2* 32.9*    153     CMP:   Recent Labs  Lab 11/26/17  1730 11/27/17  0509   * 135*   K 4.4 4.3   CL 97 100   CO2 23 24   * 238*   BUN 13 13   CREATININE 1.1 1.1   CALCIUM 9.4 8.8   PROT 6.8 6.5   ALBUMIN 3.6 3.3*   BILITOT 0.5 0.4   ALKPHOS 81 74   AST 21 13   ALT 13 10   ANIONGAP 10 11   EGFRNONAA >60.0 >60.0     Lactic Acid:   Recent Labs  Lab 11/26/17  1810 11/26/17  2308 11/27/17  0509   LACTATE 2.6* 2.7* 1.4     Microbiology Results (last 7 days)     Procedure Component Value Units Date/Time    Respiratory Viral Panel by PCR [facility]; Nasal Swab [[id number]] Collected:  11/27/17 1115    Order Status:  Sent Specimen:  Respiratory  Updated:  11/27/17 1140    Culture, Respiratory with Gram Stain [[id number]] Collected:  11/27/17 1100    Order Status:  Sent Specimen:  Respiratory from Sputum, Expectorated Updated:  11/27/17 1106    Blood culture x two cultures. Draw prior to antibiotics. [[id number]] Collected:  11/26/17 1733    Order Status:  Completed Specimen:  Blood from Peripheral, Antecubital, Left Updated:  11/27/17 0145     Blood Culture, Routine No Growth to date    Narrative:       Aerobic and anaerobic    Blood culture x two cultures. Draw prior to antibiotics. [[id number]] Collected:  11/26/17 1700    Order Status:  Completed Specimen:  Blood from Peripheral, Upper Arm, Right Updated:  11/27/17 0145     Blood Culture, Routine No Growth to date    Narrative:       Aerobic and anaerobic        Procalcitonin:   Recent Labs  Lab 11/26/17  1810   PROCAL 0.10     Urine Studies:   Recent Labs  Lab 11/26/17  2330   COLORU Straw   APPEARANCEUA Clear   PHUR 7.0   SPECGRAV 1.005   PROTEINUA Negative   GLUCUA Negative   KETONESU Negative   BILIRUBINUA Negative   OCCULTUA Negative   NITRITE Negative   UROBILINOGEN Negative   LEUKOCYTESUR Negative       Significant Imaging: I have reviewed all pertinent imaging results/findings within the past 24 hours.

## 2017-11-27 NOTE — ASSESSMENT & PLAN NOTE
BG goal 140-180. Begin Lantus 15 units subQ QAM (pt has allergy to Levemir and can ONLY take Lantus). He has his home supply of Lantus.     Novolog 6 units with meals plus low dose correction scale. Monitor AC/HS.     Discharge Planning:   Will need dose adjustment of MDI and glycemic discharge clinic f/u

## 2017-11-27 NOTE — ED TRIAGE NOTES
Patient received with complaint of fever and difficulty breathing with yellow sputum noted since this morning.       No LDA's in place on arrival to department.    Family not present.    Pain:  Aching all over 8/10.      Psychosocial:  Patient is calm and cooperative.  Patients insight and judgement are appropriate to situation.  Appears clean, well maintained, with clothing appropriate to environment.  No evidence of delusions, hallucinations, or psychosis.    Neuro:  Eyes open spontaneously.  Awake, alert, oriented x 4.  Speech clear and appropriate.  Tolerating saliva secretions well.  Able to follow commands, demonstrating ability to actively and appropriately communicate within context of current conversation.  Symmetrical facial muscles.  Moving all extremities well with no noted weakness.  Adequate muscle tone present.    Movement is purposeful.  No evidence of impaired sensation.  Responds to external stimuli with appropriate reflexes.  Pupils equally round and reactive to light.  No noted drifts.      Airway:  Bilateral chest rise and fall.  RR regular and non-labored.  No crepitus or subcutaneous emphysema noted on palpation.      Circulatory:  Skin warm, dry, and pink.  Apical and radial pulses strong and regular.  Capillary refill/skin blanching less than 3 seconds to distal of 4 extremities.  ST on the CM without noted ectopy.    Abdomen:  Abdomen soft and non-distended.  LBM normal brown and formed.     Urinary:  Patient reports routine urination without pain, frequency, or urgency.  Voids independently.  Reports urine appears prachi/yellow in color.    Extremities:  No redness, heat, swelling, deformity, or pain.    Skin:  Intact with no bruising/discolorations noted.

## 2017-11-27 NOTE — ASSESSMENT & PLAN NOTE
-lactate 2.6, significantly tachycardic with temp 102, so meets SIRS criteria   -procalcitonin negative for bacterial infection  -continue to monitor lactate  -flu swab negative but has poor sensitivity  -broad spectrum abx: vanc/ceftriaxone and tamiflu  -will need to hold off IVF given CXR showing interstitial edema already with LE edema on exam

## 2017-11-27 NOTE — PROGRESS NOTES
[facility]  Heart Transplant  Progress Note    Patient Name: Mick Barriga  MRN: [id number]  Admission Date: 11/26/2017  Hospital Length of Stay: 1 days  Attending Physician: Josefina Saul MD  Primary Care Provider: Sukhdev Alan MD  Principal Problem:Sepsis    Subjective:     Interval History:     Patient seen this morning. Still febrile requiring tylenol for fever > 102. Reports generalized myalgia, chills and cough. ID contacted and recommended adding Azithromax to his regimen. Prograft level within acceptable level. Denies diarrhea or dysuria.     Addendum: talked to Dr. Lopez (pain service). Patient on oxycodone 15mg TID. Regimen resumed  Continuous Infusions:   Scheduled Meds:   aspirin  81 mg Oral Daily    azithromycin  500 mg Intravenous Q24H    ceFEPime (MAXIPIME) IVPB  2 g Intravenous Q12H    docusate sodium  200 mg Oral QHS    insulin aspart  6 Units Subcutaneous TIDWM    insulin glargine  15 Units Subcutaneous Daily    magnesium oxide  1,200 mg Oral BID    magnesium sulfate IVPB  2 g Intravenous Once    mycophenolate  1,000 mg Oral BID    oseltamivir  75 mg Oral BID    oxyCODONE  20 mg Oral Q12H    pantoprazole  40 mg Oral Daily    pravastatin  20 mg Oral Daily    pregabalin  75 mg Oral BID    sertraline  100 mg Oral Daily    sodium chloride 0.9%  3 mL Intravenous Q8H    tacrolimus  0.5 mg Oral BID    vancomycin (VANCOCIN) IVPB  1,500 mg Intravenous Q12H     PRN Meds:dextrose 50%, dextrose 50%, glucagon (human recombinant), glucose, glucose, insulin aspart    Review of patient's allergies indicates:   Allergen Reactions    Levemir [insulin detemir] Itching    Niacin preparations     Pork/porcine containing products     Ranexa [ranolazine] Nausea Only     Objective:     Vital Signs (Most Recent):  Temp: 99.5 °F (37.5 °C) (11/27/17 1119)  Pulse: 101 (11/27/17 1119)  Resp: 20 (11/27/17 1119)  BP: (!) 140/67 (11/27/17 1119)  SpO2: (!) 92 % (11/27/17  1119) Vital Signs (24h Range):  Temp:  [97.8 °F (36.6 °C)-102.9 °F (39.4 °C)] 99.5 °F (37.5 °C)  Pulse:  [101-121] 101  Resp:  [12-20] 20  SpO2:  [91 %-100 %] 92 %  BP: (134-171)/(60-92) 140/67     Patient Vitals for the past 72 hrs (Last 3 readings):   Weight   11/27/17 0400 97.4 kg (214 lb 11.7 oz)   11/27/17 0053 97.2 kg (214 lb 4.6 oz)   11/26/17 1653 97.5 kg (215 lb)     Body mass index is 32.65 kg/m².      Intake/Output Summary (Last 24 hours) at 11/27/17 1224  Last data filed at 11/27/17 0826   Gross per 24 hour   Intake          1000.83 ml   Output             1425 ml   Net          -424.17 ml       Hemodynamic Parameters:       Telemetry: mild tachycardia    Physical Exam   Constitutional: He is oriented to person, place, and time. He appears well-developed.   HENT:   Head: Normocephalic and atraumatic.   Eyes: EOM are normal. Pupils are equal, round, and reactive to light.   Neck: Normal range of motion. Neck supple. No JVD present.   Cardiovascular: Normal rate, regular rhythm and normal heart sounds.    Pulmonary/Chest: Effort normal and breath sounds normal.   Abdominal: Soft. Bowel sounds are normal. There is no tenderness. There is no guarding.   Musculoskeletal: Normal range of motion. He exhibits no tenderness or deformity.   Neurological: He is alert and oriented to person, place, and time.   Skin: Skin is warm and dry. Capillary refill takes 2 to 3 seconds.   Vitals reviewed.      Significant Labs:  CBC:    Recent Labs  Lab 11/26/17  1730 11/27/17  0509   WBC 8.29 7.82   RBC 5.71 5.67   HGB 10.5* 10.2*   HCT 34.2* 32.9*    153   MCV 60* 58*   MCH 18.4* 18.0*   MCHC 30.7* 31.0*     BNP:    Recent Labs  Lab 11/26/17  1730   BNP 59     CMP:    Recent Labs  Lab 11/26/17  1730 11/27/17  0509   * 238*   CALCIUM 9.4 8.8   ALBUMIN 3.6 3.3*   PROT 6.8 6.5   * 135*   K 4.4 4.3   CO2 23 24   CL 97 100   BUN 13 13   CREATININE 1.1 1.1   ALKPHOS 81 74   ALT 13 10   AST 21 13   BILITOT 0.5  0.4      Coagulation:     Recent Labs  Lab 11/26/17  1730   INR 1.0   APTT 24.3     LDH:  No results for input(s): LDH in the last 72 hours.  Microbiology:  Microbiology Results (last 7 days)     Procedure Component Value Units Date/Time    Culture, Respiratory with Gram Stain [[id number]] Collected:  11/27/17 1100    Order Status:  Sent Specimen:  Respiratory from Sputum, Expectorated Updated:  11/27/17 1201    Respiratory Viral Panel by PCR [facility]; Nasal Swab [[id number]] Collected:  11/27/17 1115    Order Status:  Sent Specimen:  Respiratory Updated:  11/27/17 1140    Blood culture x two cultures. Draw prior to antibiotics. [[id number]] Collected:  11/26/17 1733    Order Status:  Completed Specimen:  Blood from Peripheral, Antecubital, Left Updated:  11/27/17 0145     Blood Culture, Routine No Growth to date    Narrative:       Aerobic and anaerobic    Blood culture x two cultures. Draw prior to antibiotics. [[id number]] Collected:  11/26/17 1700    Order Status:  Completed Specimen:  Blood from Peripheral, Upper Arm, Right Updated:  11/27/17 0145     Blood Culture, Routine No Growth to date    Narrative:       Aerobic and anaerobic          I have reviewed all pertinent labs within the past 24 hours.    Estimated Creatinine Clearance: 85.9 mL/min (based on SCr of 1.1 mg/dL).    Diagnostic Results:  CXR: X-ray Chest Ap Portable    Result Date: 11/26/2017  Minimal interstitial edema, no large focal consolidation. Electronically signed by: VALENTÍN KING MD Date:     11/26/17 Time:    18:31     Assessment and Plan:     56 year old male with PMHx of NICM s/p HM II (8/24/16), HLD, HTN, VT s/p ICD now s/p OHTx 2/9/17 who presents with URI and sepsis.  Pt reports beginning to feel poorly last night when he started feeling SOB, + cough w yellow phlegm, temperature up to 102 at maximum, -140 at maximum, loss of appetite.  States that he has some excess LE swelling over the last few day.  Denies CP, palpitation, MARTINO.   Ambulance took him here to hospital.  Did not receive flu vaccination this year.  Also complains of recent increased urinary frequency but now dysuria.  Has a hx of BPH.    * Sepsis    - Unclear source at the moment. Presumed from URI versus PNA with the ongoing cough  - lactate non elevated. CXR negative for infiltrate  - continue broads-pectrum as current. Azithromax added to cefepime and vanco  - FU on blood cultures and respiratory cultures  - ID review pending.         Essential hypertension    -currently elevated  -giving extra dose lisinopril  -continue to monitor        Heart transplanted    - TX on 2/9/2017  - on Prograft and cellcept.  - Tacrol level 9.4 this morning. Continue to monitor daily        Hypomagnesemia    Pt takes 1200 mg PO magnesium in the AM, 800 mg afternoon, 400 mg evening  -will start on MgO 1200 mg BID  -will give 1 g IV mag now  - 2 more grams IV added. We will repeat level at 3PM        Uncomplicated opioid dependence    - patient under care of  for pain management  - he is asking for high dose of oxycodone and OxyContin during stay  - called Dr. oliveros's office wo success.  - we will continue OxyContin and hold oxycodone until we clarify on dosage. Informed on decision for the changes        Upper respiratory infection    -possibly flu vs URI  -CXR does not show inflitrate, only minor interstitial edema  -continue tamiflu and broad spectrum abx, vanc/ceftriaxone  -f/u blood cx          Heart transplant, orthotopic, status    -continue current immunosuppressive therapy: cellcept and prograf  -monitor daily prograf levels  -echo tomorrow        Benign prostatic hyperplasia    -check UA  -continue to monitor        Type 2 diabetes mellitus with stage 3 chronic kidney disease, with long-term current use of insulin    -continue to monitor blood glucose with fingersticks qachs  -continue medium dose sliding scale   -continue long-acting insulin, glargine 22 u daily  -monitor for  hypoglycemia  -continue diabetic heart healthy diet            Discussed plan of care with dr. earl Lowe MD  Heart Transplant  [facility]

## 2017-11-27 NOTE — PROGRESS NOTES
Called by nurse that pt spiked fever to 102 this morning.  Will give tylenol for fever.  Broadening abx coverage to vanc and cefepime.  Attempted to call ID for recs but did not call back.  Placed ID consult.  Pt was hypertensive to 's in ED so gave him a dose of lisinopril 10 mg.  Although pt is not hypotensive now w SBP in 130's, this is a big drop so has to be watched.  I therefore d/c'ed morning lisinopril, amlodipine and lasix, although pt has slight hypervolemia on exam and so interstitial edema on CXR so needs to be watched.

## 2017-11-28 LAB
ALBUMIN SERPL BCP-MCNC: 3.1 G/DL
ALP SERPL-CCNC: 66 U/L
ALT SERPL W/O P-5'-P-CCNC: 11 U/L
ANION GAP SERPL CALC-SCNC: 6 MMOL/L
ANISOCYTOSIS BLD QL SMEAR: SLIGHT
AST SERPL-CCNC: 15 U/L
BASOPHILS # BLD AUTO: 0.02 K/UL
BASOPHILS NFR BLD: 0.3 %
BILIRUB SERPL-MCNC: 0.4 MG/DL
BUN SERPL-MCNC: 16 MG/DL
CALCIUM SERPL-MCNC: 8.6 MG/DL
CHLORIDE SERPL-SCNC: 98 MMOL/L
CMV DNA SERPL NAA+PROBE-ACNC: NORMAL IU/ML
CO2 SERPL-SCNC: 28 MMOL/L
CREAT SERPL-MCNC: 0.9 MG/DL
DACRYOCYTES BLD QL SMEAR: ABNORMAL
DIFFERENTIAL METHOD: ABNORMAL
EOSINOPHIL # BLD AUTO: 0 K/UL
EOSINOPHIL NFR BLD: 0 %
ERYTHROCYTE [DISTWIDTH] IN BLOOD BY AUTOMATED COUNT: 16 %
EST. GFR  (AFRICAN AMERICAN): >60 ML/MIN/1.73 M^2
EST. GFR  (NON AFRICAN AMERICAN): >60 ML/MIN/1.73 M^2
FERRITIN SERPL-MCNC: 179 NG/ML
GLUCOSE SERPL-MCNC: 299 MG/DL
HCT VFR BLD AUTO: 31.2 %
HGB BLD-MCNC: 9.5 G/DL
HYPOCHROMIA BLD QL SMEAR: ABNORMAL
IMM GRANULOCYTES # BLD AUTO: 0.03 K/UL
IMM GRANULOCYTES NFR BLD AUTO: 0.4 %
IRON SERPL-MCNC: 12 UG/DL
LYMPHOCYTES # BLD AUTO: 1.2 K/UL
LYMPHOCYTES NFR BLD: 18.2 %
MAGNESIUM SERPL-MCNC: 1.8 MG/DL
MAYO MISCELLANEOUS RESULT (REF): NORMAL
MCH RBC QN AUTO: 18.2 PG
MCHC RBC AUTO-ENTMCNC: 30.4 G/DL
MCV RBC AUTO: 60 FL
MONOCYTES # BLD AUTO: 0.9 K/UL
MONOCYTES NFR BLD: 13 %
NEUTROPHILS # BLD AUTO: 4.6 K/UL
NEUTROPHILS NFR BLD: 68.1 %
NRBC BLD-RTO: 0 /100 WBC
PLATELET # BLD AUTO: 180 K/UL
PLATELET BLD QL SMEAR: ABNORMAL
PMV BLD AUTO: ABNORMAL FL
POCT GLUCOSE: 266 MG/DL (ref 70–110)
POCT GLUCOSE: 279 MG/DL (ref 70–110)
POCT GLUCOSE: 343 MG/DL (ref 70–110)
POCT GLUCOSE: 352 MG/DL (ref 70–110)
POCT GLUCOSE: 378 MG/DL (ref 70–110)
POIKILOCYTOSIS BLD QL SMEAR: SLIGHT
POLYCHROMASIA BLD QL SMEAR: ABNORMAL
POTASSIUM SERPL-SCNC: 4.5 MMOL/L
PROT SERPL-MCNC: 6.2 G/DL
RBC # BLD AUTO: 5.22 M/UL
SATURATED IRON: 4 %
SCHISTOCYTES BLD QL SMEAR: ABNORMAL
SODIUM SERPL-SCNC: 132 MMOL/L
TACROLIMUS BLD-MCNC: 8.6 NG/ML
TOTAL IRON BINDING CAPACITY: 309 UG/DL
TRANSFERRIN SERPL-MCNC: 209 MG/DL
VANCOMYCIN TROUGH SERPL-MCNC: 12.8 UG/ML
WBC # BLD AUTO: 6.71 K/UL

## 2017-11-28 PROCEDURE — 99232 SBSQ HOSP IP/OBS MODERATE 35: CPT | Mod: ,,, | Performed by: INTERNAL MEDICINE

## 2017-11-28 PROCEDURE — 25000003 PHARM REV CODE 250: Performed by: INTERNAL MEDICINE

## 2017-11-28 PROCEDURE — 80202 ASSAY OF VANCOMYCIN: CPT

## 2017-11-28 PROCEDURE — 80197 ASSAY OF TACROLIMUS: CPT

## 2017-11-28 PROCEDURE — 83540 ASSAY OF IRON: CPT

## 2017-11-28 PROCEDURE — 99232 SBSQ HOSP IP/OBS MODERATE 35: CPT | Mod: ,,, | Performed by: NURSE PRACTITIONER

## 2017-11-28 PROCEDURE — 63600175 PHARM REV CODE 636 W HCPCS: Performed by: INTERNAL MEDICINE

## 2017-11-28 PROCEDURE — 85025 COMPLETE CBC W/AUTO DIFF WBC: CPT

## 2017-11-28 PROCEDURE — 36415 COLL VENOUS BLD VENIPUNCTURE: CPT

## 2017-11-28 PROCEDURE — 63600175 PHARM REV CODE 636 W HCPCS: Performed by: NURSE PRACTITIONER

## 2017-11-28 PROCEDURE — 83735 ASSAY OF MAGNESIUM: CPT

## 2017-11-28 PROCEDURE — 80053 COMPREHEN METABOLIC PANEL: CPT

## 2017-11-28 PROCEDURE — A4216 STERILE WATER/SALINE, 10 ML: HCPCS | Performed by: INTERNAL MEDICINE

## 2017-11-28 PROCEDURE — 20600001 HC STEP DOWN PRIVATE ROOM

## 2017-11-28 PROCEDURE — 99233 SBSQ HOSP IP/OBS HIGH 50: CPT | Mod: GC,,, | Performed by: INTERNAL MEDICINE

## 2017-11-28 PROCEDURE — 82728 ASSAY OF FERRITIN: CPT

## 2017-11-28 RX ORDER — INSULIN ASPART 100 [IU]/ML
9 INJECTION, SOLUTION INTRAVENOUS; SUBCUTANEOUS
Status: DISCONTINUED | OUTPATIENT
Start: 2017-11-28 | End: 2017-11-29

## 2017-11-28 RX ORDER — FUROSEMIDE 40 MG/1
40 TABLET ORAL DAILY
Status: DISCONTINUED | OUTPATIENT
Start: 2017-11-29 | End: 2017-11-30 | Stop reason: HOSPADM

## 2017-11-28 RX ORDER — LISINOPRIL 2.5 MG/1
10 TABLET ORAL DAILY
Status: DISCONTINUED | OUTPATIENT
Start: 2017-11-29 | End: 2017-11-30 | Stop reason: HOSPADM

## 2017-11-28 RX ORDER — AMLODIPINE BESYLATE 5 MG/1
5 TABLET ORAL DAILY
Status: DISCONTINUED | OUTPATIENT
Start: 2017-11-29 | End: 2017-11-29

## 2017-11-28 RX ORDER — INSULIN GLARGINE 100 [IU]/ML
22 INJECTION, SOLUTION SUBCUTANEOUS DAILY
Status: DISCONTINUED | OUTPATIENT
Start: 2017-11-28 | End: 2017-11-29

## 2017-11-28 RX ADMIN — SERTRALINE HYDROCHLORIDE 100 MG: 100 TABLET ORAL at 09:11

## 2017-11-28 RX ADMIN — MYCOPHENOLATE MOFETIL 1000 MG: 250 CAPSULE ORAL at 09:11

## 2017-11-28 RX ADMIN — PREGABALIN 75 MG: 75 CAPSULE ORAL at 09:11

## 2017-11-28 RX ADMIN — OXYCODONE HYDROCHLORIDE 15 MG: 5 TABLET ORAL at 12:11

## 2017-11-28 RX ADMIN — OXYCODONE HYDROCHLORIDE 20 MG: 20 TABLET, FILM COATED, EXTENDED RELEASE ORAL at 09:11

## 2017-11-28 RX ADMIN — PANTOPRAZOLE SODIUM 40 MG: 40 TABLET, DELAYED RELEASE ORAL at 09:11

## 2017-11-28 RX ADMIN — Medication 1500 MG: at 11:11

## 2017-11-28 RX ADMIN — DOCUSATE SODIUM 200 MG: 100 CAPSULE, LIQUID FILLED ORAL at 09:11

## 2017-11-28 RX ADMIN — PRAVASTATIN SODIUM 20 MG: 20 TABLET ORAL at 09:11

## 2017-11-28 RX ADMIN — Medication 3 ML: at 10:11

## 2017-11-28 RX ADMIN — MAGNESIUM OXIDE TAB 400 MG (241.3 MG ELEMENTAL MG) 1200 MG: 400 (241.3 MG) TAB at 09:11

## 2017-11-28 RX ADMIN — INSULIN GLARGINE 22 UNITS: 100 INJECTION, SOLUTION SUBCUTANEOUS at 08:11

## 2017-11-28 RX ADMIN — OXYCODONE HYDROCHLORIDE 15 MG: 5 TABLET ORAL at 06:11

## 2017-11-28 RX ADMIN — OSELTAMIVIR PHOSPHATE 75 MG: 75 CAPSULE ORAL at 09:11

## 2017-11-28 RX ADMIN — INSULIN ASPART 4 UNITS: 100 INJECTION, SOLUTION INTRAVENOUS; SUBCUTANEOUS at 08:11

## 2017-11-28 RX ADMIN — INSULIN ASPART 3 UNITS: 100 INJECTION, SOLUTION INTRAVENOUS; SUBCUTANEOUS at 12:11

## 2017-11-28 RX ADMIN — AZITHROMYCIN MONOHYDRATE 500 MG: 500 INJECTION, POWDER, LYOPHILIZED, FOR SOLUTION INTRAVENOUS at 02:11

## 2017-11-28 RX ADMIN — TACROLIMUS 0.5 MG: 0.5 CAPSULE ORAL at 09:11

## 2017-11-28 RX ADMIN — CEFEPIME 2 G: 2 INJECTION, POWDER, FOR SOLUTION INTRAVENOUS at 09:11

## 2017-11-28 RX ADMIN — OXYCODONE HYDROCHLORIDE 15 MG: 5 TABLET ORAL at 07:11

## 2017-11-28 RX ADMIN — INSULIN ASPART 9 UNITS: 100 INJECTION, SOLUTION INTRAVENOUS; SUBCUTANEOUS at 08:11

## 2017-11-28 RX ADMIN — TACROLIMUS 0.5 MG: 0.5 CAPSULE ORAL at 06:11

## 2017-11-28 RX ADMIN — ASPIRIN 81 MG: 81 TABLET, COATED ORAL at 09:11

## 2017-11-28 RX ADMIN — INSULIN ASPART 3 UNITS: 100 INJECTION, SOLUTION INTRAVENOUS; SUBCUTANEOUS at 06:11

## 2017-11-28 RX ADMIN — CEFEPIME 2 G: 2 INJECTION, POWDER, FOR SOLUTION INTRAVENOUS at 08:11

## 2017-11-28 RX ADMIN — INSULIN ASPART 9 UNITS: 100 INJECTION, SOLUTION INTRAVENOUS; SUBCUTANEOUS at 12:11

## 2017-11-28 RX ADMIN — Medication 3 ML: at 08:11

## 2017-11-28 RX ADMIN — INSULIN ASPART 9 UNITS: 100 INJECTION, SOLUTION INTRAVENOUS; SUBCUTANEOUS at 06:11

## 2017-11-28 RX ADMIN — INSULIN ASPART 1 UNITS: 100 INJECTION, SOLUTION INTRAVENOUS; SUBCUTANEOUS at 10:11

## 2017-11-28 NOTE — CHAPLAIN
Provided initial visit. Pt present. Introduced pastoral support, and made pt aware of Spiritual Care Dept's availability to facilitate and/or provide support as needed. No spiritual needs expressed at this time. Pt thankful for the visit.

## 2017-11-28 NOTE — PROGRESS NOTES
"[facility]  Endocrinology  Progress Note    Admit Date: 11/26/2017     Reason for Consult: Management of T2DM, Hyperglycemia     Surgical Procedure and Date: History of heart transplant 2/9/17    Diabetes diagnosis year: 1996    Home Diabetes Medications:  Lantus 22 units QAM, Novolog 22/22/18    How often checking glucose at home? 3x/day  BG readings on regimen: 100-200s  Hypoglycemia on the regimen?  One reading of 70 in the past month  Missed doses on regimen? No    Diabetes Complications include:     Diabetic chronic kidney disease         Complicating diabetes co morbidities:   CHF      HPI:   Patient is a 57 y.o. male with a diagnosis of DM since 1996, on MDI at home. Admitted with c/o cough, fever. Endocrine consulted for management of DM, insulin therapy.          Interval HPI:   No acute events overnight. BGs uncontrolled. Tolerating PO, but limited PO intake. Denies nausea. Currently afebrile. No hypoglycemic episodes.     BP (!) 163/75   Pulse 99   Temp 97.8 °F (36.6 °C)   Resp 17   Ht 5' 8" (1.727 m)   Wt 97.4 kg (214 lb 11.7 oz)   SpO2 (!) 93%   BMI 32.65 kg/m²      Labs Reviewed and Include      Recent Labs  Lab 11/28/17  0646   *   CALCIUM 8.6*   ALBUMIN 3.1*   PROT 6.2   *   K 4.5   CO2 28   CL 98   BUN 16   CREATININE 0.9   ALKPHOS 66   ALT 11   AST 15   BILITOT 0.4     Lab Results   Component Value Date    WBC 7.82 11/27/2017    HGB 10.2 (L) 11/27/2017    HCT 32.9 (L) 11/27/2017    MCV 58 (L) 11/27/2017     11/27/2017       Recent Labs  Lab 11/26/17  1730   TSH 0.184*   FREET4 1.10     Lab Results   Component Value Date    HGBA1C 7.7 (H) 11/26/2017       Nutritional status:   Body mass index is 32.65 kg/m².  Lab Results   Component Value Date    ALBUMIN 3.1 (L) 11/28/2017    ALBUMIN 3.3 (L) 11/27/2017    ALBUMIN 3.6 11/26/2017     Lab Results   Component Value Date    PREALBUMIN 19 (L) 02/08/2017    PREALBUMIN 14 (L) 02/06/2017    PREALBUMIN 14 (L) " 02/03/2017       Estimated Creatinine Clearance: 104.9 mL/min (based on SCr of 0.9 mg/dL).    Accu-Checks  Recent Labs      11/26/17   2307  11/27/17   0910  11/27/17   1407  11/27/17   1832  11/27/17   2057  11/27/17   2303   POCTGLUCOSE  267*  268*  272*  296*  352*  378*       Current Medications and/or Treatments Impacting Glycemic Control  Immunotherapy:  Immunosuppressants         Stop Route Frequency     mycophenolate capsule 1,000 mg      -- Oral 2 times daily     tacrolimus capsule 0.5 mg      -- Oral 2 times daily        Steroids:   Hormones     None        Pressors:    Autonomic Drugs     None        Hyperglycemia/Diabetes Medications: Antihyperglycemics     Start     Stop Route Frequency Ordered    11/27/17 1130  insulin aspart pen 6 Units      -- SubQ 3 times daily with meals 11/27/17 0945    11/27/17 1100  Insulin Glargine prefilled pen injection (Lantus)     Question Answer Comment   Brand Name: Lantus    Generic name: Insulin Glargine    Form: Pen Injector    Length of Therapy: Indefinite    Reason for Non-Formulary Pt has allergy to Levemir    How soon needed? (normally 72 hrs needed to procure): 0-24 hrs Pt has his own home supply of Lantus   Is the patient competent? Yes        -- SubQ Daily 11/27/17 0956    11/27/17 0923  insulin aspart pen 0-5 Units      -- SubQ Before meals & nightly PRN 11/27/17 0824          ASSESSMENT and PLAN    * Sepsis    On IV antibiotics  ID has been consulted  BC and respiratory cultures done          Type 2 diabetes mellitus with stage 3 chronic kidney disease, with long-term current use of insulin    BG goal 140-180. Increase Lantus to 22 units subQ QAM (pt has allergy to Levemir and can ONLY take Lantus). He has his home supply of Lantus.     Novolog to 9 units with meals plus low dose correction scale. Monitor AC/HS.     Discharge Planning:   Will need dose adjustment of MDI and glycemic discharge clinic f/u          Heart transplanted    On immunosuppressants           Immunosuppression    Can worsen insulin resistance              Syeda Boudreaux, PEDRO, NP  Endocrinology  [facility]

## 2017-11-28 NOTE — PLAN OF CARE
Problem: Patient Care Overview  Goal: Plan of Care Review  Outcome: Ongoing (interventions implemented as appropriate)  Glucoses elevated.  Correction Insulin given as ordered. Pt has not eaten anything today. Reinforced to ambulate wearing slippers to prevent falling. Verbalized understanding. Temp elevated today. Cx sent as ordered. Azithromycin started. Oseltamivir, Cefepime and Vanc continue. Tylenol given as ordered. C/O pain. Scheduled pain meds given with minimal relief. Pt sleeping most of afternoon. Oxycodone PRN dose given this evening. Mg+2=1.2. Mg+ rider 1 gram given. Repeat Mg+2=1.8. Second Mg+2 rider rider 2 grams IV given. Vanc trough scheduled for am.

## 2017-11-28 NOTE — ASSESSMENT & PLAN NOTE
Pt is a 58 y/o male with a pmhx of NICM with LVAD, HLD, HTN, VTach, now s.p DDHT on 02/9/2017, MCV D-/R+. Immunosuppression includes Cellcept and tacrolimus, OI ppx includes Bactrim. Most recent admission on 03/28/2017 for a right groin lymphocele that was positive for Enterococcus faecalis and Streptococcus mitis. Pt was transitioned to a 14 day course of amoxacillin during this admission. Pt also had a muscle flap placed by plastic Surgery to facilitate healing. Pt now having symptoms of a possible PNA with fever and productive cough  -pt describes two days of fever and chills, also staes a slightly productive cough with yellow sputum.   - no rales or rhonchi on exam, no increased vocal fremitus or dullness to percussion  -pt febrile to 102.9 on admission  -WBC 7.8 without bandemia, appears to be baseline.   - lactate initially elevated at 2.7, repeat is 1.4. Procalcitonin negativ at 0.10  - UA negative for signs of infection  -CXR without a large focal consoidation but some band like atelectasis over the left midlung zone.   - Respiratory Gram stain shows moderate WBC's and rare GPC's, blood cultures NGTD  - Respiratory viral panel pending  - pt's most likely infectious process is a viral vs bacterial pneunmonia  - continue oseltamivir  - would continue vancomycin and cefepime for now, please add azithromycin for atypical coverage.   - will continue to follow.

## 2017-11-28 NOTE — CONSULTS
[facility]  Infectious Disease  Consult Note    Patient Name: Mick Barriga  MRN: [id number]  Admission Date: 11/26/2017  Hospital Length of Stay: 1 days  Attending Physician: Dennis Lyles Jr.,*  Primary Care Provider: Sukhdev Alan MD     Isolation Status: No active isolations    Patient information was obtained from patient, past medical records and ER records.      Consults  Assessment/Plan:     Febrile illness, acute    Pt is a 56 y/o male with a pmhx of NICM with LVAD, HLD, HTN, VTach, now s.p DDHT on 02/9/2017, MCV D-/R+. Immunosuppression includes Cellcept and tacrolimus, OI ppx includes Bactrim. Most recent admission on 03/28/2017 for a right groin lymphocele that was positive for Enterococcus faecalis and Streptococcus mitis. Pt was transitioned to a 14 day course of amoxacillin during this admission. Pt also had a muscle flap placed by plastic Surgery to facilitate healing. Pt now having symptoms of a possible PNA with fever and productive cough  -pt describes two days of fever and chills, also staes a slightly productive cough with yellow sputum.   - no rales or rhonchi on exam, no increased vocal fremitus or dullness to percussion  -pt febrile to 102.9 on admission  -WBC 7.8 without bandemia, appears to be baseline.   - lactate initially elevated at 2.7, repeat is 1.4. Procalcitonin negativ at 0.10  - UA negative for signs of infection  -CXR without a large focal consoidation but some band like atelectasis over the left midlung zone.   - Respiratory Gram stain shows moderate WBC's and rare GPC's, blood cultures NGTD  - Respiratory viral panel pending  - pt's most likely infectious process is a viral vs bacterial pneunmonia  - continue oseltamivir  - would continue vancomycin and cefepime for now, please add azithromycin for atypical coverage.   - will continue to follow.             Thank you for your consult. I will follow-up with patient. Please contact us if you  have any additional questions.    Kranthi Corado MD, PhD  Infectious Disease, PGY-4  [facility]    Subjective:     Principal Problem: Sepsis    HPI: Pt is a 58 y/o male with a pmhx of NICM with LVAD, HLD, HTN, VTach, now s.p DDHT ib 02/9/2017, MCV D-/R+. Immunosuppression includes Cellcept and tacrolimus, OI ppx includes Bactrim. Most recent admission on 03/28/2017 for a right groin lymphocele that was positive for Enterococcus faecalis and Streptococcus mitis. Pt was transitioned to a 14 day course of amoxacillin during this admission. Pt also had a muscle flap placed by plastic Surgery to facilitate healing.    Pt states he was doing well up to this admission, was in his [employer] (retired, lives with wife, able to perform all ADL's) until the night prior to admission when he began to feel feverish and have some pleuritic chest pain. Pt describes it as a feeling like there was something in the bottom of his chest that he needed to cough up. Pt relates two episodes of coughing up yellow sputum, after which he states that he felt mch better, denies hemoptysis. Pt states he checked his temperature at home and he was 102. Pt also complaining of some chills. Pt relates a hx of chronic generalized body pain that he follows with Pain Management for and chronic constipation that he states is unchanged in nature. Only other symptom is some urinary urgency, denies dysuria or frequency. Pt denies any recent travel, no sick contacts. States he did not receive a flu vaccine this year.  Lives on the [facility] with his wife, emigrated from [place] 30 years ago, has lived in several parts of the .     Previous Positive Cultures  01/19/2017 UCx   Citrobacter koseri, Pan-S  02/08/2017 UCx   Citrobacter koseri, Pan-S  03/28/2017 Aerobic,lymph  E.faecalis, S.mitis  03/30/2017 Anaerobic, wound  Anaercoccus vaginalis  03/30/2017 Aerobic, wound  E.faecalis    Cultures this Admission  11/26/2017 BCx,  "RA   NGTD  11/26/2017 BCx, LAC  NGTD  11/27/2017 Resp Viral panel In process  11/27/2017 Resp cx with Gram In process    Antibiotics this Admission  Cefepime 2g daily 11/26-present  Ceftriaxone 1g once  11/26  Oseltamivir 75mg daily 11/26-present  Vancomycin 1.5 g q `12 11/26-present  Azithromycin 500mg daily 11/27-present     Past Medical History:   Diagnosis Date    CHF (congestive heart failure)     Coronary artery disease     GERD (gastroesophageal reflux disease)     Hyperlipidemia     Hypertension     LVAD (left ventricular assist device) present 2/13/2017    Type 2 diabetes mellitus with hyperglycemia        Past Surgical History:   Procedure Laterality Date    CARDIAC PACEMAKER PLACEMENT      CHOLECYSTECTOMY      COLONOSCOPY N/A 1/11/2017    Procedure: COLONOSCOPY;  Surgeon: Petr Almanzar MD;  [facility]  Service: Endoscopy;  Laterality: N/A;    COLONOSCOPY N/A 1/12/2017    Procedure: COLONOSCOPY;  Surgeon: Petr Almanzar MD;  [facility]  Service: Endoscopy;  Laterality: N/A;    HEART TRANSPLANT  02/2017    LEFT VENTRICULAR ASSIST DEVICE      x 3 months ago       Review of patient's allergies indicates:   Allergen Reactions    Levemir [insulin detemir] Itching    Niacin preparations     Pork/porcine containing products     Ranexa [ranolazine] Nausea Only       Medications:  Prescriptions Prior to Admission   Medication Sig    [place] [facility] [place]OD [place]UCOSE QID    amlodipine (NORVASC) 10 MG tablet Take 0.5 tablets (5 mg total) by mouth once daily.    aspirin (ECOTRIN) 81 MG EC tablet Take 1 tablet (81 mg total) by mouth once daily.    BD INSULIN PEN NEEDLE UF SHORT 31 gauge x 5/16" Ndle     blood sugar diagnostic Strp To use to check BG 5 times daily, to use with insurance preferred meter    blood sugar diagnostic Strp 5 strips by Misc.(Non-Drug; Combo Route) route 5 (five) times daily.    blood-glucose meter kit To use to check BG " 4 times daily, to use with insurance preferred meter     mg capsule TAKE 2 CAPSULES BY MOUTH EVERY EVENING    esomeprazole (NEXIUM) 40 MG capsule TAKE 1 CAPSULE BY MOUTH BEFORE BREAKFAST    furosemide (LASIX) 40 MG tablet once daily    insulin aspart (NOVOLOG) 100 unit/mL InPn pen Inject 15 Units into the skin 3 (three) times daily.    insulin glargine (LANTUS SOLOSTAR) 100 unit/mL (3 mL) InPn pen Inject 22 Units into the skin once daily.    lancets Misc To use to check BG 4 times daily, to use with insurance preferred meter    lisinopril 10 MG tablet Take 1 tablet (10 mg total) by mouth once daily.    LYRICA 75 mg capsule Take 75 mg by mouth 2 (two) times daily.    magnesium oxide (MAG-OX) 400 mg tablet Take 1200 mg in am and 800 mg at 1200 PM, 800 MG AT 1800    mycophenolate (CELLCEPT) 250 mg Cap Take 4 capsules (1,000 mg total) by mouth 2 (two) times daily.    oxycodone (OXYCONTIN) 20 mg 12 hr tablet Take 1 tablet (20 mg total) by mouth every 12 (twelve) hours.    pravastatin (PRAVACHOL) 40 MG tablet Take 1 tablet (40 mg total) by mouth once daily. (Patient taking differently: Take 20 mg by mouth once daily. )    sertraline (ZOLOFT) 100 MG tablet Take 100 mg by mouth once daily.    tacrolimus (PROGRAF) 1 MG Cap Take 0.5mg orally in the morning and 1mg orally in the evening (Patient taking differently: 0.5 mg 2 (two) times daily. Take 0.5mg orally in the morning and 1mg orally in the evening)    tadalafil (CIALIS) 10 MG tablet Take 1 tablet (10 mg total) by mouth daily as needed for Erectile Dysfunction.     Antibiotics     Start     Stop Route Frequency Ordered    11/27/17 2030  vancomycin (VANCOCIN) 1,500 mg in dextrose 5 % 250 mL IVPB      -- IV Every 12 hours (non-standard times) 11/27/17 1054    11/27/17 1030  azithromycin 500 mg in dextrose 5 % 250 mL IVPB (ready to mix system)      -- IV Every 24 hours (non-standard times) 11/27/17 0922    11/27/17 0630  ceFEPIme in dextrose 5% 2  gram/50 mL IVPB 2 g      -- IV Every 12 hours (non-standard times) 11/27/17 0527        Antifungals     None        Antivirals         Stop Route Frequency     oseltamivir      12/01 2059 Oral 2 times daily           Immunization History   Administered Date(s) Administered    Hepatitis B, Adult 12/31/2016    Pneumococcal Conjugate - 13 Valent 08/24/2016    Tdap 08/24/2016       Family History     Problem Relation (Age of Onset)    Cancer Brother    Heart attack Mother, Father    Heart disease Mother, Father    Hypertension Mother, Father        Social History     Social History    Marital status:      Spouse name: N/A    Number of children: N/A    Years of education: N/A     Social History Main Topics    Smoking status: Former Smoker     Packs/day: 0.50     Years: 15.00     Quit date: 6/14/2006    Smokeless tobacco: Never Used    Alcohol use No    Drug use: Unknown    Sexual activity: Yes     Partners: Female     Other Topics Concern    None     Social History Narrative    None     Review of Systems   Constitutional: Positive for chills, fatigue and fever. Negative for activity change and appetite change.   HENT: Negative for postnasal drip, sneezing and sore throat.    Respiratory: Positive for cough and chest tightness. Negative for shortness of breath and wheezing.    Cardiovascular: Positive for chest pain. Negative for palpitations and leg swelling.   Gastrointestinal: Positive for constipation. Negative for abdominal distention, nausea and vomiting.   Endocrine: Negative for polyuria.   Genitourinary: Positive for urgency. Negative for dysuria and frequency.   Skin: Negative for color change and rash.   Neurological: Negative for dizziness and weakness.   Hematological: Negative for adenopathy.   Psychiatric/Behavioral: Negative for agitation and behavioral problems.     Objective:     Vital Signs (Most Recent):  Temp: 99.5 °F (37.5 °C) (11/27/17 1119)  Pulse: 101 (11/27/17 1119)  Resp: 20  (11/27/17 1119)  BP: (!) 140/67 (11/27/17 1119)  SpO2: (!) 92 % (11/27/17 1119) Vital Signs (24h Range):  Temp:  [97.8 °F (36.6 °C)-102.9 °F (39.4 °C)] 99.5 °F (37.5 °C)  Pulse:  [101-121] 101  Resp:  [12-20] 20  SpO2:  [91 %-100 %] 92 %  BP: (134-171)/(60-92) 140/67     Weight: 97.4 kg (214 lb 11.7 oz)  Body mass index is 32.65 kg/m².    Estimated Creatinine Clearance: 85.9 mL/min (based on SCr of 1.1 mg/dL).    Physical Exam   Constitutional: He is oriented to person, place, and time. He appears well-developed and well-nourished.   HENT:   Head: Normocephalic and atraumatic.   Mouth/Throat: Oropharynx is clear and moist. No oropharyngeal exudate.   Eyes: EOM are normal. Pupils are equal, round, and reactive to light. No scleral icterus.   Neck: No JVD present.   Cardiovascular: Normal rate and regular rhythm.  Exam reveals no gallop and no friction rub.    No murmur heard.  Pulmonary/Chest: Effort normal. No respiratory distress. He has no wheezes. He has no rales.   No increased vocal fremitus no dullness to percussion   Abdominal: Soft. He exhibits no distension. There is no rebound and no guarding.   Musculoskeletal: He exhibits no edema or deformity.   Former inguinal wound appears well healed   Lymphadenopathy:     He has no cervical adenopathy.   Neurological: He is alert and oriented to person, place, and time.   Skin: Skin is warm and dry. Capillary refill takes less than 2 seconds.   Psychiatric: He has a normal mood and affect. His behavior is normal.       Significant Labs:   CBC:   Recent Labs  Lab 11/26/17 1730 11/27/17  0509   WBC 8.29 7.82   HGB 10.5* 10.2*   HCT 34.2* 32.9*    153     CMP:   Recent Labs  Lab 11/26/17  1730 11/27/17  0509   * 135*   K 4.4 4.3   CL 97 100   CO2 23 24   * 238*   BUN 13 13   CREATININE 1.1 1.1   CALCIUM 9.4 8.8   PROT 6.8 6.5   ALBUMIN 3.6 3.3*   BILITOT 0.5 0.4   ALKPHOS 81 74   AST 21 13   ALT 13 10   ANIONGAP 10 11   EGFRNONAA >60.0 >60.0      Lactic Acid:   Recent Labs  Lab 11/26/17  1810 11/26/17  2308 11/27/17  0509   LACTATE 2.6* 2.7* 1.4     Microbiology Results (last 7 days)     Procedure Component Value Units Date/Time    Respiratory Viral Panel by PCR [facility]; Nasal Swab [[id number]] Collected:  11/27/17 1115    Order Status:  Sent Specimen:  Respiratory Updated:  11/27/17 1140    Culture, Respiratory with Gram Stain [[id number]] Collected:  11/27/17 1100    Order Status:  Sent Specimen:  Respiratory from Sputum, Expectorated Updated:  11/27/17 1106    Blood culture x two cultures. Draw prior to antibiotics. [[id number]] Collected:  11/26/17 1733    Order Status:  Completed Specimen:  Blood from Peripheral, Antecubital, Left Updated:  11/27/17 0145     Blood Culture, Routine No Growth to date    Narrative:       Aerobic and anaerobic    Blood culture x two cultures. Draw prior to antibiotics. [[id number]] Collected:  11/26/17 1700    Order Status:  Completed Specimen:  Blood from Peripheral, Upper Arm, Right Updated:  11/27/17 0145     Blood Culture, Routine No Growth to date    Narrative:       Aerobic and anaerobic        Procalcitonin:   Recent Labs  Lab 11/26/17  1810   PROCAL 0.10     Urine Studies:   Recent Labs  Lab 11/26/17  2330   COLORU Straw   APPEARANCEUA Clear   PHUR 7.0   SPECGRAV 1.005   PROTEINUA Negative   GLUCUA Negative   KETONESU Negative   BILIRUBINUA Negative   OCCULTUA Negative   NITRITE Negative   UROBILINOGEN Negative   LEUKOCYTESUR Negative       Significant Imaging: I have reviewed all pertinent imaging results/findings within the past 24 hours.

## 2017-11-28 NOTE — ASSESSMENT & PLAN NOTE
- TX on 2/9/2017  - on Prograft and cellcept.  - Tacrol level 8.6 this morning. Continue to monitor daily

## 2017-11-28 NOTE — PROGRESS NOTES
[facility]  Infectious Disease  Progress Note    Patient Name: Mick Barriga  MRN: [id number]  Admission Date: 11/26/2017  Length of Stay: 2 days  Attending Physician: Dennis Lyles Jr.,*  Primary Care Provider: Sukhdev Alan MD    Isolation Status: No active isolations  Assessment/Plan:      Febrile illness, acute    Patient being treated for lower respiratory tract infection. Currently on cefepime, vancomycin, azithromycin and oseltamivir. Has achieved defervescence today after addition of atypical coverage agent azithromycin was added yesterday. Patient reports improvement of symptoms, decreased cough and SOB. B/C have been NGTD. Pending results for respiratory panel and CMV quantitative. Respiratory culture with prelim normal geo. Will continue current regimen and observe for response may deescalate tomorrow if patient continues improving.    - Continue current treatment plan             Anticipated Disposition:     Thank you for your consult. I will follow-up with patient. Please contact us if you have any additional questions.    Darci Blackburn MD  Infectious Disease  [facility]    Subjective:     Principal Problem:Sepsis    HPI: Pt is a 56 y/o male with a pmhx of NICM with LVAD, HLD, HTN, VTach, now s.p DDHT ib 02/9/2017, MCV D-/R+. Immunosuppression includes Cellcept and tacrolimus, OI ppx includes Bactrim. Most recent admission on 03/28/2017 for a right groin lymphocele that was positive for Enterococcus faecalis and Streptococcus mitis. Pt was transitioned to a 14 day course of amoxacillin during this admission. Pt also had a muscle flap placed by plastic Surgery to facilitate healing.    Pt states he was doing well up to this admission, was in his [employer] (retired, lives with wife, able to perform all ADL's) until the night prior to admission when he began to feel feverish and have some pleuritic chest pain. Pt describes it as a feeling like  there was something in the bottom of his chest that he needed to cough up. Pt relates two episodes of coughing up yellow sputum, after which he states that he felt mch better, denies hemoptysis. Pt states he checked his temperature at home and he was 102. Pt also complaining of some chills. Pt relates a hx of chronic generalized body pain that he follows with Pain Management for and chronic constipation that he states is unchanged in nature. Only other symptom is some urinary urgency, denies dysuria or frequency. Pt denies any recent travel, no sick contacts. States he did not receive a flu vaccine this year.  Lives on the [place] [facility] with his wife, emigrated from [place] 30 years ago, has lived in several parts of the .     Previous Positive Cultures  01/19/2017 UCx   Citrobacter koseri, Pan-S  02/08/2017 UCx   Citrobacter koseri, Pan-S  03/28/2017 Aerobic,lymph  E.faecalis, S.mitis  03/30/2017 Anaerobic, wound  Anaercoccus vaginalis  03/30/2017 Aerobic, wound  E.faecalis    Cultures this Admission  11/26/2017 BCx, RA   NGTD  11/26/2017 BCx, LAC  NGTD  11/27/2017 Resp Viral panel In process  11/27/2017 Resp cx with Gram In process    Antibiotics this Admission  Cefepime 2g daily 11/26-present  Ceftriaxone 1g once  11/26  Oseltamivir 75mg daily 11/26-present  Vancomycin 1.5 g q `12 11/26-present  Azithromycin 500mg daily 11/27-present   Interval History: Afebrile during night. Patient mentions feeling improved since last evaluated     Review of Systems   Constitutional: Negative for chills, fatigue and fever.   HENT: Negative for mouth sores, rhinorrhea and sore throat.    Respiratory: Positive for cough. Negative for choking, chest tightness and shortness of breath.    Gastrointestinal: Negative for abdominal distention, abdominal pain, diarrhea, nausea and vomiting.   Genitourinary: Negative for dysuria, hematuria and urgency.   Skin: Negative for rash.     Objective:     Vital Signs (Most Recent):  Temp: 98.2  °F (36.8 °C) (11/28/17 1641)  Pulse: 87 (11/28/17 1641)  Resp: 18 (11/28/17 1641)  BP: (!) 116/58 (11/28/17 1641)  SpO2: 95 % (11/28/17 1641) Vital Signs (24h Range):  Temp:  [97.8 °F (36.6 °C)-101.3 °F (38.5 °C)] 98.2 °F (36.8 °C)  Pulse:  [] 87  Resp:  [16-20] 18  SpO2:  [91 %-96 %] 95 %  BP: (116-163)/(58-75) 116/58     Weight: 97.4 kg (214 lb 11.7 oz)  Body mass index is 32.65 kg/m².    Estimated Creatinine Clearance: 104.9 mL/min (based on SCr of 0.9 mg/dL).    Physical Exam   Constitutional: He is oriented to person, place, and time. He appears well-developed and well-nourished.   HENT:   Head: Normocephalic and atraumatic.   Eyes: Conjunctivae and EOM are normal. Pupils are equal, round, and reactive to light.   Neck: Normal range of motion. Neck supple.   Cardiovascular: Normal rate, regular rhythm and normal heart sounds.    Pulmonary/Chest: Effort normal and breath sounds normal.   Abdominal: Soft. Bowel sounds are normal. He exhibits no distension. There is no tenderness. There is no rebound.   Musculoskeletal: Normal range of motion. He exhibits no edema, tenderness or deformity.   Neurological: He is alert and oriented to person, place, and time.   Skin: No rash noted. No erythema.       Significant Labs:   Blood Culture:   Recent Labs  Lab 11/26/17  1700 11/26/17  1733   LAB[facility] No Growth to date  No Growth to date No Growth to date  No Growth to date     BMP:   Recent Labs  Lab 11/28/17  0646   *   *   K 4.5   CL 98   CO2 28   BUN 16   CREATININE 0.9   CALCIUM 8.6*   MG 1.8     CBC:   Recent Labs  Lab 11/26/17  1730 11/27/17  0509 11/28/17  0646   WBC 8.29 7.82 6.71   HGB 10.5* 10.2* 9.5*   HCT 34.2* 32.9* 31.2*    153 180     Microbiology Results (last 7 days)     Procedure Component Value Units Date/Time    Culture, Respiratory with Gram Stain [[id number]] Collected:  11/27/17 1100    Order Status:  Completed Specimen:  Respiratory from Sputum, Expectorated Updated:   11/28/17 0853     Respiratory Culture Normal respiratory geo     Gram Stain (Respiratory) <10 epithelial cells per low power field.     Gram Stain (Respiratory) Moderate WBC's     Gram Stain (Respiratory) Rare Gram positive cocci    Blood culture x two cultures. Draw prior to antibiotics. [[id number]] Collected:  11/26/17 1700    Order Status:  Completed Specimen:  Blood from Peripheral, Upper Arm, Right Updated:  11/27/17 2012     Blood Culture, Routine No Growth to date     Blood Culture, Routine No Growth to date    Narrative:       Aerobic and anaerobic    Blood culture x two cultures. Draw prior to antibiotics. [[id number]] Collected:  11/26/17 1733    Order Status:  Completed Specimen:  Blood from Peripheral, Antecubital, Left Updated:  11/27/17 2012     Blood Culture, Routine No Growth to date     Blood Culture, Routine No Growth to date    Narrative:       Aerobic and anaerobic    Respiratory Viral Panel by PCR [facility]; Nasal Swab [[id number]] Collected:  11/27/17 1115    Order Status:  Sent Specimen:  Respiratory Updated:  11/27/17 1140          Significant Imaging: I have reviewed all pertinent imaging results/findings within the past 24 hours.

## 2017-11-28 NOTE — ASSESSMENT & PLAN NOTE
-  Presumed from URI versus PNA with the ongoing cough  - Sputum G- stain + for Gram + with moderate WBC  - lactate non elevated. CXR negative for infiltrate  - continue broads-pectrum as current. Azithromax added to cefepime, vanco and tamiflu  - FU on blood cultures and respiratory cultures  - transition to PO regimen on d/c

## 2017-11-28 NOTE — ASSESSMENT & PLAN NOTE
BG goal 140-180. Increase Lantus to 22 units subQ QAM (pt has allergy to Levemir and can ONLY take Lantus). He has his home supply of Lantus.     Novolog to 9 units with meals plus low dose correction scale. Monitor AC/HS.     Discharge Planning:   Will need dose adjustment of MDI and glycemic discharge clinic f/u

## 2017-11-28 NOTE — PLAN OF CARE
Problem: Patient Care Overview  Goal: Plan of Care Review  Outcome: Ongoing (interventions implemented as appropriate)  Patient afebrile today, reports feeling better this shift. ID following - vanc, cefepime, azithromycin, and Tamiflu continued. Viral respiratory panel still pending. Pain controlled with scheduled Oxycontin and prn oxycodone IR. Endocrine following BG - 266 to 343 today, insulin regimen adjusted. Patient up ad carly in room.

## 2017-11-28 NOTE — SUBJECTIVE & OBJECTIVE
Interval History: Afebrile during night. Patient mentions feeling improved since last evaluated     Review of Systems   Constitutional: Negative for chills, fatigue and fever.   HENT: Negative for mouth sores, rhinorrhea and sore throat.    Respiratory: Positive for cough. Negative for choking, chest tightness and shortness of breath.    Gastrointestinal: Negative for abdominal distention, abdominal pain, diarrhea, nausea and vomiting.   Genitourinary: Negative for dysuria, hematuria and urgency.   Skin: Negative for rash.     Objective:     Vital Signs (Most Recent):  Temp: 98.2 °F (36.8 °C) (11/28/17 1641)  Pulse: 87 (11/28/17 1641)  Resp: 18 (11/28/17 1641)  BP: (!) 116/58 (11/28/17 1641)  SpO2: 95 % (11/28/17 1641) Vital Signs (24h Range):  Temp:  [97.8 °F (36.6 °C)-101.3 °F (38.5 °C)] 98.2 °F (36.8 °C)  Pulse:  [] 87  Resp:  [16-20] 18  SpO2:  [91 %-96 %] 95 %  BP: (116-163)/(58-75) 116/58     Weight: 97.4 kg (214 lb 11.7 oz)  Body mass index is 32.65 kg/m².    Estimated Creatinine Clearance: 104.9 mL/min (based on SCr of 0.9 mg/dL).    Physical Exam   Constitutional: He is oriented to person, place, and time. He appears well-developed and well-nourished.   HENT:   Head: Normocephalic and atraumatic.   Eyes: Conjunctivae and EOM are normal. Pupils are equal, round, and reactive to light.   Neck: Normal range of motion. Neck supple.   Cardiovascular: Normal rate, regular rhythm and normal heart sounds.    Pulmonary/Chest: Effort normal and breath sounds normal.   Abdominal: Soft. Bowel sounds are normal. He exhibits no distension. There is no tenderness. There is no rebound.   Musculoskeletal: Normal range of motion. He exhibits no edema, tenderness or deformity.   Neurological: He is alert and oriented to person, place, and time.   Skin: No rash noted. No erythema.       Significant Labs:   Blood Culture:   Recent Labs  Lab 11/26/17  1700 11/26/17  1733   [facility] to [facility]  No Growth to date No  Growth to date  No Growth to date     BMP:   Recent Labs  Lab 11/28/17  0646   *   *   K 4.5   CL 98   CO2 28   BUN 16   CREATININE 0.9   CALCIUM 8.6*   MG 1.8     CBC:   Recent Labs  Lab 11/26/17  1730 11/27/17  0509 11/28/17  0646   WBC 8.29 7.82 6.71   HGB 10.5* 10.2* 9.5*   HCT 34.2* 32.9* 31.2*    153 180     Microbiology Results (last 7 days)     Procedure Component Value Units Date/Time    Culture, Respiratory with Gram Stain [[id number]] Collected:  11/27/17 1100    Order Status:  Completed Specimen:  Respiratory from Sputum, Expectorated Updated:  11/28/17 0853     Respiratory Culture Normal respiratory geo     Gram Stain (Respiratory) <10 epithelial cells per low power field.     Gram Stain (Respiratory) Moderate WBC's     Gram Stain (Respiratory) Rare Gram positive cocci    Blood culture x two cultures. Draw prior to antibiotics. [[id number]] Collected:  11/26/17 1700    Order Status:  Completed Specimen:  Blood from Peripheral, Upper Arm, Right Updated:  11/27/17 2012     Blood Culture, Routine No Growth to date     Blood Culture, Routine No Growth to date    Narrative:       Aerobic and anaerobic    Blood culture x two cultures. Draw prior to antibiotics. [[id number]] Collected:  11/26/17 1733    Order Status:  Completed Specimen:  Blood from Peripheral, Antecubital, Left Updated:  11/27/17 2012     Blood Culture, Routine No Growth to date     Blood Culture, Routine No Growth to date    Narrative:       Aerobic and anaerobic    Respiratory Viral Panel by PCR [facility]; Nasal Swab [[id number]] Collected:  11/27/17 1115    Order Status:  Sent Specimen:  Respiratory Updated:  11/27/17 1140          Significant Imaging: I have reviewed all pertinent imaging results/findings within the past 24 hours.

## 2017-11-28 NOTE — PROGRESS NOTES
[facility]  Heart Transplant  Progress Note    Patient Name: Mick Barriga  MRN: [id number]  Admission Date: 11/26/2017  Hospital Length of Stay: 2 days  Attending Physician: Dennis Lyles Jr.,*  Primary Care Provider: Sukhdev Alan MD  Principal Problem:Sepsis    Subjective:     Interval History:     Seen this morning at bedside. Febrile episodes and cough have resolved. Feels better overall. ID recommends fu on culture and sxs improvement and to continue current abx/viral regimen    Continuous Infusions:   Scheduled Meds:   aspirin  81 mg Oral Daily    azithromycin  500 mg Intravenous Q24H    ceFEPime (MAXIPIME) IVPB  2 g Intravenous Q12H    docusate sodium  200 mg Oral QHS    insulin aspart  9 Units Subcutaneous TIDWM    insulin glargine  22 Units Subcutaneous Daily    magnesium oxide  1,200 mg Oral BID    mycophenolate  1,000 mg Oral BID    oseltamivir  75 mg Oral BID    oxyCODONE  20 mg Oral Q12H    pantoprazole  40 mg Oral Daily    pravastatin  20 mg Oral Daily    pregabalin  75 mg Oral BID    sertraline  100 mg Oral Daily    sodium chloride 0.9%  3 mL Intravenous Q8H    tacrolimus  0.5 mg Oral BID    vancomycin (VANCOCIN) IVPB  1,500 mg Intravenous Q12H     PRN Meds:acetaminophen, dextrose 50%, dextrose 50%, glucagon (human recombinant), glucose, glucose, insulin aspart, oxyCODONE    Review of patient's allergies indicates:   Allergen Reactions    Levemir [insulin detemir] Itching    Niacin preparations     Pork/porcine containing products     Ranexa [ranolazine] Nausea Only     Objective:     Vital Signs (Most Recent):  Temp: 98.6 °F (37 °C) (11/28/17 1218)  Pulse: 87 (11/28/17 1218)  Resp: 18 (11/28/17 1218)  BP: 129/60 (11/28/17 1218)  SpO2: 95 % (11/28/17 1218) Vital Signs (24h Range):  Temp:  [97.8 °F (36.6 °C)-101.3 °F (38.5 °C)] 98.6 °F (37 °C)  Pulse:  [] 87  Resp:  [16-20] 18  SpO2:  [91 %-96 %] 95 %  BP: (125-163)/(58-75) 129/60      Patient Vitals for the past 72 hrs (Last 3 readings):   Weight   11/28/17 0329 97.4 kg (214 lb 11.7 oz)   11/27/17 0400 97.4 kg (214 lb 11.7 oz)   11/27/17 0053 97.2 kg (214 lb 4.6 oz)     Body mass index is 32.65 kg/m².      Intake/Output Summary (Last 24 hours) at 11/28/17 1410  Last data filed at 11/28/17 1401   Gross per 24 hour   Intake             2865 ml   Output             3175 ml   Net             -310 ml       Hemodynamic Parameters:       Telemetry:     Physical Exam   Constitutional: He is oriented to person, place, and time. He appears well-developed.   HENT:   Head: Normocephalic and atraumatic.   Eyes: EOM are normal. Pupils are equal, round, and reactive to light.   Neck: Normal range of motion. Neck supple. No JVD present.   Cardiovascular: Normal rate, regular rhythm and normal heart sounds.    Pulmonary/Chest: Effort normal and breath sounds normal.   Abdominal: Soft. Bowel sounds are normal.   Musculoskeletal: Normal range of motion. He exhibits no edema.   Neurological: He is alert and oriented to person, place, and time.   Skin: Skin is warm and dry. Capillary refill takes 2 to 3 seconds.   Nursing note and vitals reviewed.      Significant Labs:  CBC:    Recent Labs  Lab 11/26/17 1730 11/27/17  0509 11/28/17  0646   WBC 8.29 7.82 6.71   RBC 5.71 5.67 5.22   HGB 10.5* 10.2* 9.5*   HCT 34.2* 32.9* 31.2*    153 180   MCV 60* 58* 60*   MCH 18.4* 18.0* 18.2*   MCHC 30.7* 31.0* 30.4*     BNP:    Recent Labs  Lab 11/26/17  1730   BNP 59     CMP:    Recent Labs  Lab 11/26/17 1730 11/27/17  0509 11/28/17  0646   * 238* 299*   CALCIUM 9.4 8.8 8.6*   ALBUMIN 3.6 3.3* 3.1*   PROT 6.8 6.5 6.2   * 135* 132*   K 4.4 4.3 4.5   CO2 23 24 28   CL 97 100 98   BUN 13 13 16   CREATININE 1.1 1.1 0.9   ALKPHOS 81 74 66   ALT 13 10 11   AST 21 13 15   BILITOT 0.5 0.4 0.4      Coagulation:     Recent Labs  Lab 11/26/17  1730   INR 1.0   APTT 24.3     LDH:  No results for input(s): LDH in the  last 72 hours.  Microbiology:  Microbiology Results (last 7 days)     Procedure Component Value Units Date/Time    Culture, Respiratory with Gram Stain [[id number]] Collected:  11/27/17 1100    Order Status:  Completed Specimen:  Respiratory from Sputum, Expectorated Updated:  11/28/17 0853     Respiratory Culture Normal respiratory geo     Gram Stain (Respiratory) <10 epithelial cells per low power field.     Gram Stain (Respiratory) Moderate WBC's     Gram Stain (Respiratory) Rare Gram positive cocci    Blood culture x two cultures. Draw prior to antibiotics. [[id number]] Collected:  11/26/17 1700    Order Status:  Completed Specimen:  Blood from Peripheral, Upper Arm, Right Updated:  11/27/17 2012     Blood Culture, Routine No Growth to date     Blood Culture, Routine No Growth to date    Narrative:       Aerobic and anaerobic    Blood culture x two cultures. Draw prior to antibiotics. [[id number]] Collected:  11/26/17 1733    Order Status:  Completed Specimen:  Blood from Peripheral, Antecubital, Left Updated:  11/27/17 2012     Blood Culture, Routine No Growth to date     Blood Culture, Routine No Growth to date    Narrative:       Aerobic and anaerobic    Respiratory Viral Panel by PCR [facility]; Nasal Swab [[id number]] Collected:  11/27/17 1115    Order Status:  Sent Specimen:  Respiratory Updated:  11/27/17 1140          I have reviewed all pertinent labs within the past 24 hours.      Estimated Creatinine Clearance: 104.9 mL/min (based on SCr of 0.9 mg/dL).    Diagnostic Results:      Assessment and Plan:     56 year old male with PMHx of NICM s/p HM II (8/24/16), HLD, HTN, VT s/p ICD now s/p OHTx 2/9/17 who presents with URI and sepsis.  Pt reports beginning to feel poorly last night when he started feeling SOB, + cough w yellow phlegm, temperature up to 102 at maximum, -140 at maximum, loss of appetite.  States that he has some excess LE swelling over the last few day.  Denies CP, palpitation, MARTINO.   Ambulance took him here to hospital.  Did not receive flu vaccination this year.  Also complains of recent increased urinary frequency but now dysuria.  Has a hx of BPH.    * Sepsis    -  Presumed from URI versus PNA with the ongoing cough  - Sputum G- stain + for Gram + with moderate WBC  - lactate non elevated. CXR negative for infiltrate  - continue broads-pectrum as current. Azithromax added to cefepime, vanco and tamiflu  - FU on blood cultures and respiratory cultures  - transition to PO regimen on d/c          Essential hypertension    -currently elevated  -giving extra dose lisinopril.  -continue to monitor        Heart transplanted    - [place] on 2/9/2017  - on Prograft and cellcept.  - Tacrol level 8.6 this morning. Continue to monitor daily        Hypomagnesemia    Pt takes 1200 mg PO magnesium in the AM, 800 mg afternoon, 400 mg evening  -will start on MgO 1200 mg BID  -will give 1 g IV mag now  - 2 more grams IV added. We will repeat level at 3PM        Uncomplicated opioid dependence    - patient under care of  for pain management  - he is asking for high dose of oxycodone and OxyContin during stay  - resumed home dose of oxycontin and Oxycodone per pain service  - discussed that no narcotics will be prescribed on d/c        Upper respiratory infection    -possibly flu vs URI  -CXR does not show inflitrate, only minor interstitial edema  -continue tamiflu and broad spectrum abx, vanc/ceftriaxone  -f/u blood cx          Heart transplant, orthotopic, status    -continue current immunosuppressive therapy: cellcept and prograf  -monitor daily prograf levels  -echo tomorrow        Benign prostatic hyperplasia    -check UA  -continue to monitor        Type 2 diabetes mellitus with stage 3 chronic kidney disease, with long-term current use of insulin    -continue to monitor blood glucose with fingersticks qachs  -continue medium dose sliding scale   -continue long-acting insulin, glargine 22 u  daily  -monitor for hypoglycemia  -continue diabetic heart healthy diet            Discussed plan of care with Dr. Lyles the attending on service    Lenin Lowe MD  Heart Transplant  [facility]

## 2017-11-28 NOTE — SUBJECTIVE & OBJECTIVE
Interval History:     Seen this morning at bedside. Febrile episodes and cough have resolved. Feels better overall. ID recommends fu on culture and sxs improvement and to continue current abx/viral regimen    Continuous Infusions:   Scheduled Meds:   aspirin  81 mg Oral Daily    azithromycin  500 mg Intravenous Q24H    ceFEPime (MAXIPIME) IVPB  2 g Intravenous Q12H    docusate sodium  200 mg Oral QHS    insulin aspart  9 Units Subcutaneous TIDWM    insulin glargine  22 Units Subcutaneous Daily    magnesium oxide  1,200 mg Oral BID    mycophenolate  1,000 mg Oral BID    oseltamivir  75 mg Oral BID    oxyCODONE  20 mg Oral Q12H    pantoprazole  40 mg Oral Daily    pravastatin  20 mg Oral Daily    pregabalin  75 mg Oral BID    sertraline  100 mg Oral Daily    sodium chloride 0.9%  3 mL Intravenous Q8H    tacrolimus  0.5 mg Oral BID    vancomycin (VANCOCIN) IVPB  1,500 mg Intravenous Q12H     PRN Meds:acetaminophen, dextrose 50%, dextrose 50%, glucagon (human recombinant), glucose, glucose, insulin aspart, oxyCODONE    Review of patient's allergies indicates:   Allergen Reactions    Levemir [insulin detemir] Itching    Niacin preparations     Pork/porcine containing products     Ranexa [ranolazine] Nausea Only     Objective:     Vital Signs (Most Recent):  Temp: 98.6 °F (37 °C) (11/28/17 1218)  Pulse: 87 (11/28/17 1218)  Resp: 18 (11/28/17 1218)  BP: 129/60 (11/28/17 1218)  SpO2: 95 % (11/28/17 1218) Vital Signs (24h Range):  Temp:  [97.8 °F (36.6 °C)-101.3 °F (38.5 °C)] 98.6 °F (37 °C)  Pulse:  [] 87  Resp:  [16-20] 18  SpO2:  [91 %-96 %] 95 %  BP: (125-163)/(58-75) 129/60     Patient Vitals for the past 72 hrs (Last 3 readings):   Weight   11/28/17 0329 97.4 kg (214 lb 11.7 oz)   11/27/17 0400 97.4 kg (214 lb 11.7 oz)   11/27/17 0053 97.2 kg (214 lb 4.6 oz)     Body mass index is 32.65 kg/m².      Intake/Output Summary (Last 24 hours) at 11/28/17 1410  Last data filed at 11/28/17 1401    Gross per 24 hour   Intake             2865 ml   Output             3175 ml   Net             -310 ml       Hemodynamic Parameters:       Telemetry:     Physical Exam   Constitutional: He is oriented to person, place, and time. He appears well-developed.   HENT:   Head: Normocephalic and atraumatic.   Eyes: EOM are normal. Pupils are equal, round, and reactive to light.   Neck: Normal range of motion. Neck supple. No JVD present.   Cardiovascular: Normal rate, regular rhythm and normal heart sounds.    Pulmonary/Chest: Effort normal and breath sounds normal.   Abdominal: Soft. Bowel sounds are normal.   Musculoskeletal: Normal range of motion. He exhibits no edema.   Neurological: He is alert and oriented to person, place, and time.   Skin: Skin is warm and dry. Capillary refill takes 2 to 3 seconds.   Nursing note and vitals reviewed.      Significant Labs:  CBC:    Recent Labs  Lab 11/26/17 1730 11/27/17  0509 11/28/17  0646   WBC 8.29 7.82 6.71   RBC 5.71 5.67 5.22   HGB 10.5* 10.2* 9.5*   HCT 34.2* 32.9* 31.2*    153 180   MCV 60* 58* 60*   MCH 18.4* 18.0* 18.2*   MCHC 30.7* 31.0* 30.4*     BNP:    Recent Labs  Lab 11/26/17  1730   BNP 59     CMP:    Recent Labs  Lab 11/26/17  1730 11/27/17  0509 11/28/17  0646   * 238* 299*   CALCIUM 9.4 8.8 8.6*   ALBUMIN 3.6 3.3* 3.1*   PROT 6.8 6.5 6.2   * 135* 132*   K 4.4 4.3 4.5   CO2 23 24 28   CL 97 100 98   BUN 13 13 16   CREATININE 1.1 1.1 0.9   ALKPHOS 81 74 66   ALT 13 10 11   AST 21 13 15   BILITOT 0.5 0.4 0.4      Coagulation:     Recent Labs  Lab 11/26/17  1730   INR 1.0   APTT 24.3     LDH:  No results for input(s): LDH in the last 72 hours.  Microbiology:  Microbiology Results (last 7 days)     Procedure Component Value Units Date/Time    Culture, Respiratory with Gram Stain [[id number]] Collected:  11/27/17 1100    Order Status:  Completed Specimen:  Respiratory from Sputum, Expectorated Updated:  11/28/17 0853     Respiratory Culture  Normal respiratory geo     Gram Stain (Respiratory) <10 epithelial cells per low power field.     Gram Stain (Respiratory) Moderate WBC's     Gram Stain (Respiratory) Rare Gram positive cocci    Blood culture x two cultures. Draw prior to antibiotics. [[id number]] Collected:  11/26/17 1700    Order Status:  Completed Specimen:  Blood from Peripheral, Upper Arm, Right Updated:  11/27/17 2012     Blood Culture, Routine No Growth to date     Blood Culture, Routine No Growth to date    Narrative:       Aerobic and anaerobic    Blood culture x two cultures. Draw prior to antibiotics. [[id number]] Collected:  11/26/17 1733    Order Status:  Completed Specimen:  Blood from Peripheral, Antecubital, Left Updated:  11/27/17 2012     Blood Culture, Routine No Growth to date     Blood Culture, Routine No Growth to date    Narrative:       Aerobic and anaerobic    Respiratory Viral Panel by PCR [facility]; Nasal Swab [[id number]] Collected:  11/27/17 1115    Order Status:  Sent Specimen:  Respiratory Updated:  11/27/17 1140          I have reviewed all pertinent labs within the past 24 hours.      Estimated Creatinine Clearance: 104.9 mL/min (based on SCr of 0.9 mg/dL).    Diagnostic Results:

## 2017-11-28 NOTE — SUBJECTIVE & OBJECTIVE
"Interval HPI:   No acute events overnight. BGs uncontrolled. Tolerating PO, but limited PO intake. Denies nausea. Currently afebrile. No hypoglycemic episodes.     BP (!) 163/75   Pulse 99   Temp 97.8 °F (36.6 °C)   Resp 17   Ht 5' 8" (1.727 m)   Wt 97.4 kg (214 lb 11.7 oz)   SpO2 (!) 93%   BMI 32.65 kg/m²     Labs Reviewed and Include      Recent Labs  Lab 11/28/17  0646   *   CALCIUM 8.6*   ALBUMIN 3.1*   PROT 6.2   *   K 4.5   CO2 28   CL 98   BUN 16   CREATININE 0.9   ALKPHOS 66   ALT 11   AST 15   BILITOT 0.4     Lab Results   Component Value Date    WBC 7.82 11/27/2017    HGB 10.2 (L) 11/27/2017    HCT 32.9 (L) 11/27/2017    MCV 58 (L) 11/27/2017     11/27/2017       Recent Labs  Lab 11/26/17  1730   TSH 0.184*   FREET4 1.10     Lab Results   Component Value Date    HGBA1C 7.7 (H) 11/26/2017       Nutritional status:   Body mass index is 32.65 kg/m².  Lab Results   Component Value Date    ALBUMIN 3.1 (L) 11/28/2017    ALBUMIN 3.3 (L) 11/27/2017    ALBUMIN 3.6 11/26/2017     Lab Results   Component Value Date    PREALBUMIN 19 (L) 02/08/2017    PREALBUMIN 14 (L) 02/06/2017    PREALBUMIN 14 (L) 02/03/2017       Estimated Creatinine Clearance: 104.9 mL/min (based on SCr of 0.9 mg/dL).    Accu-Checks  Recent Labs      11/26/17   2307  11/27/17   0910  11/27/17   1407  11/27/17   1832  11/27/17   2057  11/27/17   2303   POCTGLUCOSE  267*  268*  272*  296*  352*  378*       Current Medications and/or Treatments Impacting Glycemic Control  Immunotherapy:  Immunosuppressants         Stop Route Frequency     mycophenolate capsule 1,000 mg      -- Oral 2 times daily     tacrolimus capsule 0.5 mg      -- Oral 2 times daily        Steroids:   Hormones     None        Pressors:    Autonomic Drugs     None        Hyperglycemia/Diabetes Medications: Antihyperglycemics     Start     Stop Route Frequency Ordered    11/27/17 1130  insulin aspart pen 6 Units      -- SubQ 3 times daily with meals " 11/27/17 0945    11/27/17 1100  Insulin Glargine prefilled pen injection (Lantus)     Question Answer Comment   Brand Name: Lantus    Generic name: Insulin Glargine    Form: Pen Injector    Length of Therapy: Indefinite    Reason for Non-Formulary Pt has allergy to Levemir    How soon needed? (normally 72 hrs needed to procure): 0-24 hrs Pt has his own home supply of Lantus   Is the patient competent? Yes        -- SubQ Daily 11/27/17 0956    11/27/17 0923  insulin aspart pen 0-5 Units      -- SubQ Before meals & nightly PRN 11/27/17 0824

## 2017-11-28 NOTE — ASSESSMENT & PLAN NOTE
- patient under care of  for pain management  - he is asking for high dose of oxycodone and OxyContin during stay  - resumed home dose of oxycontin and Oxycodone per pain service  - discussed that no narcotics will be prescribed on d/c

## 2017-11-28 NOTE — CONSULTS
NIAS at bedside to place PIV, RN was able to obtain access, pt no longer needs additional access at this time.

## 2017-11-28 NOTE — PLAN OF CARE
-Pt AAOx4, VSS, remains afebrile overnight, complains of chronic persistent pain and requests oxycodone x2, Tele- remains sinus tach, NSR when sleeping, AC/HS-did not eat dinner but did have night time snack, night time coverage needed, SpO- 93-96% on RA, walked around unit before going to bed- minimal SOB noted  - receving vanc + cefepime overnight- Vanc trough to be collected in AM before fourth dose, tamiflu started- RVP results still pending  - Pt is up and independent, call light within reach, non skid socks worn. Will continue to monitor pt.

## 2017-11-29 LAB
ALBUMIN SERPL BCP-MCNC: 3.1 G/DL
ALP SERPL-CCNC: 60 U/L
ALT SERPL W/O P-5'-P-CCNC: 13 U/L
ANION GAP SERPL CALC-SCNC: 8 MMOL/L
AST SERPL-CCNC: 24 U/L
BACTERIA SPEC AEROBE CULT: NORMAL
BASOPHILS # BLD AUTO: 0.01 K/UL
BASOPHILS NFR BLD: 0.2 %
BILIRUB SERPL-MCNC: 0.3 MG/DL
BUN SERPL-MCNC: 16 MG/DL
CALCIUM SERPL-MCNC: 8.9 MG/DL
CHLORIDE SERPL-SCNC: 99 MMOL/L
CO2 SERPL-SCNC: 27 MMOL/L
CREAT SERPL-MCNC: 0.9 MG/DL
DIFFERENTIAL METHOD: ABNORMAL
ENTEROVIRUS: NOT DETECTED
ENTEROVIRUS: NOT DETECTED
EOSINOPHIL # BLD AUTO: 0.1 K/UL
EOSINOPHIL NFR BLD: 1.8 %
ERYTHROCYTE [DISTWIDTH] IN BLOOD BY AUTOMATED COUNT: 16.7 %
EST. GFR  (AFRICAN AMERICAN): >60 ML/MIN/1.73 M^2
EST. GFR  (NON AFRICAN AMERICAN): >60 ML/MIN/1.73 M^2
GLUCOSE SERPL-MCNC: 235 MG/DL
GRAM STN SPEC: NORMAL
HCT VFR BLD AUTO: 33.3 %
HGB BLD-MCNC: 10.2 G/DL
HUMAN BOCAVIRUS: NOT DETECTED
HUMAN BOCAVIRUS: NOT DETECTED
HUMAN CORONAVIRUS, COMMON COLD VIRUS: NOT DETECTED
HUMAN CORONAVIRUS, COMMON COLD VIRUS: NOT DETECTED
IMM GRANULOCYTES # BLD AUTO: 0.01 K/UL
IMM GRANULOCYTES NFR BLD AUTO: 0.2 %
INFLUENZA A - H1N1-09: POSITIVE
INFLUENZA A - H1N1-09: POSITIVE
LEGIONELLA PNEUMOPHILA: NOT DETECTED
LYMPHOCYTES # BLD AUTO: 1.4 K/UL
LYMPHOCYTES NFR BLD: 32.1 %
MAGNESIUM SERPL-MCNC: 1.3 MG/DL
MCH RBC QN AUTO: 18.4 PG
MCHC RBC AUTO-ENTMCNC: 30.6 G/DL
MCV RBC AUTO: 60 FL
MONOCYTES # BLD AUTO: 0.5 K/UL
MONOCYTES NFR BLD: 12.1 %
MORAXELLA CATARRHALIS: NOT DETECTED
NEUTROPHILS # BLD AUTO: 2.4 K/UL
NEUTROPHILS NFR BLD: 53.6 %
NRBC BLD-RTO: 0 /100 WBC
PARAINFLUENZA: NOT DETECTED
PARAINFLUENZA: NOT DETECTED
PLATELET # BLD AUTO: 146 K/UL
PMV BLD AUTO: ABNORMAL FL
POCT GLUCOSE: 170 MG/DL (ref 70–110)
POCT GLUCOSE: 178 MG/DL (ref 70–110)
POCT GLUCOSE: 288 MG/DL (ref 70–110)
POCT GLUCOSE: 354 MG/DL (ref 70–110)
POTASSIUM SERPL-SCNC: 4.8 MMOL/L
PROT SERPL-MCNC: 6.2 G/DL
RBC # BLD AUTO: 5.53 M/UL
RVP - ADENOVIRUS: NOT DETECTED
RVP - ADENOVIRUS: NOT DETECTED
RVP - HUMAN METAPNEUMOVIRUS (HMPV): NOT DETECTED
RVP - HUMAN METAPNEUMOVIRUS (HMPV): NOT DETECTED
RVP - INFLUENZA A: NOT DETECTED
RVP - INFLUENZA A: NOT DETECTED
RVP - INFLUENZA B: NOT DETECTED
RVP - INFLUENZA B: NOT DETECTED
RVP - RESPIRATORY SYNCTIAL VIRUS (RSV) A: NOT DETECTED
RVP - RESPIRATORY SYNCTIAL VIRUS (RSV) A: NOT DETECTED
RVP - RESPIRATORY VIRAL PANEL, SOURCE: ABNORMAL
RVP - RESPIRATORY VIRAL PANEL, SOURCE: ABNORMAL
RVP - RHINOVIRUS: NOT DETECTED
RVP - RHINOVIRUS: NOT DETECTED
SODIUM SERPL-SCNC: 134 MMOL/L
TACROLIMUS BLD-MCNC: 8 NG/ML
TEM - ACINETOBACTER BAUMANNII: NOT DETECTED
TEM - BORDETELLA PERTUSSIS: NOT DETECTED
TEM - CHLAMYDOPHILA PNEUMONIAE: NOT DETECTED
TEM - KLEBSIELLA PNEUMONIAE: NOT DETECTED
TEM - MRSA: NOT DETECTED
TEM - MYCOPLASMA PNEUMONIAE: NOT DETECTED
TEM - NEISSERIA MENINGITIDIS: NOT DETECTED
TEM - PANTON-VALENTINE: NOT DETECTED
TEM - PSEUDOMONAS AERUGINOSA: NOT DETECTED
TEM - STAPHYLOCOCCUS AUREUS: NOT DETECTED
TEM - STREPTOCOCCUS PNEUMONIAE: NOT DETECTED
TEM - STREPTOCOCCUS PYOGENES A: NOT DETECTED
TEM- HAEMOPHILUS INFLUENZAE B: NOT DETECTED
TEM- HAEMOPHILUS INFLUENZAE: NOT DETECTED
WBC # BLD AUTO: 4.48 K/UL

## 2017-11-29 PROCEDURE — 83735 ASSAY OF MAGNESIUM: CPT

## 2017-11-29 PROCEDURE — 99232 SBSQ HOSP IP/OBS MODERATE 35: CPT | Mod: ,,, | Performed by: NURSE PRACTITIONER

## 2017-11-29 PROCEDURE — 25000003 PHARM REV CODE 250: Performed by: INTERNAL MEDICINE

## 2017-11-29 PROCEDURE — 99233 SBSQ HOSP IP/OBS HIGH 50: CPT | Mod: GC,,, | Performed by: INTERNAL MEDICINE

## 2017-11-29 PROCEDURE — 85025 COMPLETE CBC W/AUTO DIFF WBC: CPT

## 2017-11-29 PROCEDURE — 63600175 PHARM REV CODE 636 W HCPCS: Performed by: INTERNAL MEDICINE

## 2017-11-29 PROCEDURE — A4216 STERILE WATER/SALINE, 10 ML: HCPCS | Performed by: INTERNAL MEDICINE

## 2017-11-29 PROCEDURE — 80053 COMPREHEN METABOLIC PANEL: CPT

## 2017-11-29 PROCEDURE — 36415 COLL VENOUS BLD VENIPUNCTURE: CPT

## 2017-11-29 PROCEDURE — 20600001 HC STEP DOWN PRIVATE ROOM

## 2017-11-29 PROCEDURE — 80197 ASSAY OF TACROLIMUS: CPT

## 2017-11-29 PROCEDURE — 63600175 PHARM REV CODE 636 W HCPCS: Performed by: NURSE PRACTITIONER

## 2017-11-29 PROCEDURE — 99232 SBSQ HOSP IP/OBS MODERATE 35: CPT | Mod: ,,, | Performed by: INTERNAL MEDICINE

## 2017-11-29 RX ORDER — INSULIN ASPART 100 [IU]/ML
11 INJECTION, SOLUTION INTRAVENOUS; SUBCUTANEOUS
Status: DISCONTINUED | OUTPATIENT
Start: 2017-11-29 | End: 2017-11-30

## 2017-11-29 RX ORDER — INSULIN ASPART 100 [IU]/ML
11 INJECTION, SOLUTION INTRAVENOUS; SUBCUTANEOUS
Status: DISCONTINUED | OUTPATIENT
Start: 2017-11-29 | End: 2017-11-29

## 2017-11-29 RX ORDER — INSULIN GLARGINE 100 [IU]/ML
25 INJECTION, SOLUTION SUBCUTANEOUS DAILY
Status: DISCONTINUED | OUTPATIENT
Start: 2017-11-29 | End: 2017-11-30 | Stop reason: HOSPADM

## 2017-11-29 RX ORDER — INSULIN ASPART 100 [IU]/ML
11 INJECTION, SOLUTION INTRAVENOUS; SUBCUTANEOUS
Status: COMPLETED | OUTPATIENT
Start: 2017-11-29 | End: 2017-11-29

## 2017-11-29 RX ADMIN — LISINOPRIL 10 MG: 2.5 TABLET ORAL at 09:11

## 2017-11-29 RX ADMIN — TACROLIMUS 0.5 MG: 0.5 CAPSULE ORAL at 06:11

## 2017-11-29 RX ADMIN — PRAVASTATIN SODIUM 20 MG: 20 TABLET ORAL at 09:11

## 2017-11-29 RX ADMIN — INSULIN ASPART 5 UNITS: 100 INJECTION, SOLUTION INTRAVENOUS; SUBCUTANEOUS at 12:11

## 2017-11-29 RX ADMIN — INSULIN GLARGINE 25 UNITS: 100 INJECTION, SOLUTION SUBCUTANEOUS at 09:11

## 2017-11-29 RX ADMIN — INSULIN ASPART 11 UNITS: 100 INJECTION, SOLUTION INTRAVENOUS; SUBCUTANEOUS at 09:11

## 2017-11-29 RX ADMIN — PANTOPRAZOLE SODIUM 40 MG: 40 TABLET, DELAYED RELEASE ORAL at 09:11

## 2017-11-29 RX ADMIN — INSULIN ASPART 11 UNITS: 100 INJECTION, SOLUTION INTRAVENOUS; SUBCUTANEOUS at 12:11

## 2017-11-29 RX ADMIN — PREGABALIN 75 MG: 75 CAPSULE ORAL at 09:11

## 2017-11-29 RX ADMIN — INSULIN ASPART 3 UNITS: 100 INJECTION, SOLUTION INTRAVENOUS; SUBCUTANEOUS at 09:11

## 2017-11-29 RX ADMIN — OXYCODONE HYDROCHLORIDE 15 MG: 5 TABLET ORAL at 04:11

## 2017-11-29 RX ADMIN — OXYCODONE HYDROCHLORIDE 15 MG: 5 TABLET ORAL at 01:11

## 2017-11-29 RX ADMIN — OSELTAMIVIR PHOSPHATE 75 MG: 75 CAPSULE ORAL at 08:11

## 2017-11-29 RX ADMIN — Medication 3 ML: at 09:11

## 2017-11-29 RX ADMIN — TACROLIMUS 0.5 MG: 0.5 CAPSULE ORAL at 09:11

## 2017-11-29 RX ADMIN — AZITHROMYCIN MONOHYDRATE 500 MG: 500 INJECTION, POWDER, LYOPHILIZED, FOR SOLUTION INTRAVENOUS at 01:11

## 2017-11-29 RX ADMIN — Medication 3 ML: at 02:11

## 2017-11-29 RX ADMIN — CEFEPIME 2 G: 2 INJECTION, POWDER, FOR SOLUTION INTRAVENOUS at 09:11

## 2017-11-29 RX ADMIN — MAGNESIUM SULFATE HEPTAHYDRATE 1 G: 500 INJECTION, SOLUTION INTRAMUSCULAR; INTRAVENOUS at 10:11

## 2017-11-29 RX ADMIN — OSELTAMIVIR PHOSPHATE 75 MG: 75 CAPSULE ORAL at 09:11

## 2017-11-29 RX ADMIN — OXYCODONE HYDROCHLORIDE 15 MG: 5 TABLET ORAL at 09:11

## 2017-11-29 RX ADMIN — OXYCODONE HYDROCHLORIDE 20 MG: 20 TABLET, FILM COATED, EXTENDED RELEASE ORAL at 09:11

## 2017-11-29 RX ADMIN — Medication 1500 MG: at 11:11

## 2017-11-29 RX ADMIN — DOCUSATE SODIUM 200 MG: 100 CAPSULE, LIQUID FILLED ORAL at 08:11

## 2017-11-29 RX ADMIN — MYCOPHENOLATE MOFETIL 1000 MG: 250 CAPSULE ORAL at 08:11

## 2017-11-29 RX ADMIN — OXYCODONE HYDROCHLORIDE 20 MG: 20 TABLET, FILM COATED, EXTENDED RELEASE ORAL at 08:11

## 2017-11-29 RX ADMIN — MYCOPHENOLATE MOFETIL 1000 MG: 250 CAPSULE ORAL at 09:11

## 2017-11-29 RX ADMIN — MAGNESIUM OXIDE TAB 400 MG (241.3 MG ELEMENTAL MG) 1200 MG: 400 (241.3 MG) TAB at 09:11

## 2017-11-29 RX ADMIN — PREGABALIN 75 MG: 75 CAPSULE ORAL at 08:11

## 2017-11-29 RX ADMIN — FUROSEMIDE 40 MG: 40 TABLET ORAL at 09:11

## 2017-11-29 RX ADMIN — AMLODIPINE BESYLATE 5 MG: 5 TABLET ORAL at 09:11

## 2017-11-29 RX ADMIN — MAGNESIUM OXIDE TAB 400 MG (241.3 MG ELEMENTAL MG) 1200 MG: 400 (241.3 MG) TAB at 08:11

## 2017-11-29 RX ADMIN — OXYCODONE HYDROCHLORIDE 15 MG: 5 TABLET ORAL at 10:11

## 2017-11-29 RX ADMIN — ASPIRIN 81 MG: 81 TABLET, COATED ORAL at 09:11

## 2017-11-29 NOTE — PROGRESS NOTES
"[facility]  Endocrinology  Progress Note    Admit Date: 11/26/2017     Reason for Consult: Management of T2DM, Hyperglycemia     Surgical Procedure and Date: History of heart transplant 2/9/17    Diabetes diagnosis year: 1996    Home Diabetes Medications:  Lantus 22 units QAM, Novolog 22/22/18    How often checking glucose at home? 3x/day  BG readings on regimen: 100-200s  Hypoglycemia on the regimen?  One reading of 70 in the past month  Missed doses on regimen? No    Diabetes Complications include:     Diabetic chronic kidney disease         Complicating diabetes co morbidities:   CHF      HPI:   Patient is a 57 y.o. male with a diagnosis of DM since 1996, on MDI at home. Admitted with c/o cough, fever. Endocrine consulted for management of DM, insulin therapy.          Interval HPI:   BGs uncontrolled. Tolerating PO. Denies nausea. Currently afebrile. No hypoglycemic episodes.      BP (!) 148/77 (BP Location: Left arm, Patient Position: Lying)   Pulse 90   Temp 98.3 °F (36.8 °C) (Oral)   Resp 16   Ht 5' 8" (1.727 m)   Wt 97.2 kg (214 lb 4.6 oz)   SpO2 (!) 94%   BMI 32.58 kg/m²       Labs Reviewed and Include      Recent Labs  Lab 11/29/17  0333   *   CALCIUM 8.9   ALBUMIN 3.1*   PROT 6.2   *   K 4.8   CO2 27   CL 99   BUN 16   CREATININE 0.9   ALKPHOS 60   ALT 13   AST 24   BILITOT 0.3     Lab Results   Component Value Date    WBC 4.48 11/29/2017    HGB 10.2 (L) 11/29/2017    HCT 33.3 (L) 11/29/2017    MCV 60 (L) 11/29/2017     (L) 11/29/2017       Recent Labs  Lab 11/26/17  1730   TSH 0.184*   FREET4 1.10     Lab Results   Component Value Date    HGBA1C 7.7 (H) 11/26/2017       Nutritional status:   Body mass index is 32.58 kg/m².  Lab Results   Component Value Date    ALBUMIN 3.1 (L) 11/29/2017    ALBUMIN 3.1 (L) 11/28/2017    ALBUMIN 3.3 (L) 11/27/2017     Lab Results   Component Value Date    PREALBUMIN 19 (L) 02/08/2017    PREALBUMIN 14 (L) 02/06/2017    " PREALBUMIN 14 (L) 02/03/2017       Estimated Creatinine Clearance: 104.8 mL/min (based on SCr of 0.9 mg/dL).    Accu-Checks  Recent Labs      11/26/17   2307  11/27/17   0910  11/27/17   1407  11/27/17   1832  11/27/17   2057  11/27/17   2303  11/28/17   0826  11/28/17   1229  11/28/17   1824  11/28/17   2224   POCTGLUCOSE  267*  268*  272*  296*  352*  378*  343*  266*  279*  288*       Current Medications and/or Treatments Impacting Glycemic Control  Immunotherapy:  Immunosuppressants         Stop Route Frequency     mycophenolate capsule 1,000 mg      -- Oral 2 times daily     tacrolimus capsule 0.5 mg      -- Oral 2 times daily        Steroids:   Hormones     None        Pressors:    Autonomic Drugs     None        Hyperglycemia/Diabetes Medications: Antihyperglycemics     Start     Stop Route Frequency Ordered    11/28/17 0900  Insulin Glargine prefilled pen injection 22 Units (Lantus)     Question Answer Comment   Brand Name: Lantus    Generic name: Insulin Glargine    Form: Pen Injector    Length of Therapy: Indefinite    Reason for Non-Formulary Pt has allergy to Levemir    How soon needed? (normally 72 hrs needed to procure): 0-24 hrs Pt has his own home supply of Lantus   Is the patient competent? Yes        -- SubQ Daily 11/28/17 0754    11/28/17 0800  insulin aspart pen 9 Units      -- SubQ 3 times daily with meals 11/28/17 0754    11/27/17 0923  insulin aspart pen 0-5 Units      -- SubQ Before meals & nightly PRN 11/27/17 0824          ASSESSMENT and PLAN    * Sepsis    On IV antibiotics  ID has been consulted        Type 2 diabetes mellitus with stage 3 chronic kidney disease, with long-term current use of insulin    BG goal 140-180. Increase Lantus to 25 units subQ QAM (pt has allergy to Levemir and can ONLY take Lantus). He has his home supply of Lantus.     Novolog to 11 units with meals plus low dose correction scale. Monitor AC/HS.     Discharge Planning:   Will need dose adjustment of MDI and  glycemic discharge clinic f/u          Heart transplanted    On immunosuppressants          Immunosuppression    Can worsen insulin resistance              Dot Michelle, SANDYP  Endocrinology  [facility]

## 2017-11-29 NOTE — ASSESSMENT & PLAN NOTE
- TX on 2/9/2017  - on Prograft and cellcept.  - Tacrol level 8 this morning and at goal. Continue to monitor daily

## 2017-11-29 NOTE — ASSESSMENT & PLAN NOTE
BG goal 140-180. Increase Lantus to 25 units subQ QAM (pt has allergy to Levemir and can ONLY take Lantus). He has his home supply of Lantus.     Novolog to 11 units with meals plus low dose correction scale. Monitor AC/HS.     Discharge Planning:   Will need dose adjustment of MDI and glycemic discharge clinic f/u

## 2017-11-29 NOTE — ASSESSMENT & PLAN NOTE
-Still elevated with the current dose of lisinopril and amlodipine  - improved with adequate pain control  - increase amlodipine to 7.5mg daily

## 2017-11-29 NOTE — PROGRESS NOTES
[facility]  Heart Transplant  Progress Note    Patient Name: Mick Barriga  MRN: [id number]  Admission Date: 11/26/2017  Hospital Length of Stay: 3 days  Attending Physician: Dennis Lyles Jr.,*  Primary Care Provider: Sukhdev Alan MD  Principal Problem:Sepsis    Subjective:     Interval History:     Seen at bedside. Afebrile for 36hrs. Cough and nausea has resolved. All cultures negative. ID stewarding her abx    Continuous Infusions:   Scheduled Meds:   amLODIPine  5 mg Oral Daily    aspirin  81 mg Oral Daily    azithromycin  500 mg Intravenous Q24H    ceFEPime (MAXIPIME) IVPB  2 g Intravenous Q12H    docusate sodium  200 mg Oral QHS    furosemide  40 mg Oral Daily    insulin aspart  11 Units Subcutaneous TIDWM    insulin glargine  25 Units Subcutaneous Daily    lisinopril  10 mg Oral Daily    magnesium oxide  1,200 mg Oral BID    mycophenolate  1,000 mg Oral BID    oseltamivir  75 mg Oral BID    oxyCODONE  20 mg Oral Q12H    pantoprazole  40 mg Oral Daily    pravastatin  20 mg Oral Daily    pregabalin  75 mg Oral BID    sertraline  100 mg Oral Daily    sodium chloride 0.9%  3 mL Intravenous Q8H    tacrolimus  0.5 mg Oral BID    vancomycin (VANCOCIN) IVPB  1,500 mg Intravenous Q12H     PRN Meds:acetaminophen, dextrose 50%, dextrose 50%, glucagon (human recombinant), glucose, glucose, insulin aspart, oxyCODONE    Review of patient's allergies indicates:   Allergen Reactions    Levemir [insulin detemir] Itching    Niacin preparations     Pork/porcine containing products     Ranexa [ranolazine] Nausea Only     Objective:     Vital Signs (Most Recent):  Temp: 98.3 °F (36.8 °C) (11/29/17 0815)  Pulse: 97 (11/29/17 1100)  Resp: 16 (11/29/17 0815)  BP: (!) 148/77 (11/29/17 0815)  SpO2: (!) 94 % (11/29/17 0815) Vital Signs (24h Range):  Temp:  [98.2 °F (36.8 °C)-99.2 °F (37.3 °C)] 98.3 °F (36.8 °C)  Pulse:  [] 97  Resp:  [15-18] 16  SpO2:  [94 %-96 %] 94  %  BP: (116-148)/(58-77) 148/77     Patient Vitals for the past 72 hrs (Last 3 readings):   Weight   11/29/17 0545 97.2 kg (214 lb 4.6 oz)   11/28/17 0329 97.4 kg (214 lb 11.7 oz)   11/27/17 0400 97.4 kg (214 lb 11.7 oz)     Body mass index is 32.58 kg/m².      Intake/Output Summary (Last 24 hours) at 11/29/17 1145  Last data filed at 11/29/17 0907   Gross per 24 hour   Intake             1800 ml   Output             2975 ml   Net            -1175 ml       Hemodynamic Parameters:       Telemetry:    Physical Exam   Constitutional: He is oriented to person, place, and time. He appears well-developed and well-nourished.   Eyes: EOM are normal. Pupils are equal, round, and reactive to light.   Neck: Normal range of motion. Neck supple.   Cardiovascular: Normal rate, regular rhythm and normal heart sounds.    Pulmonary/Chest: Effort normal and breath sounds normal.   Abdominal: Soft. Bowel sounds are normal.   Musculoskeletal: Normal range of motion.   Neurological: He is alert and oriented to person, place, and time.   Skin: Skin is warm and dry. Capillary refill takes 2 to 3 seconds.   Psychiatric: He has a normal mood and affect.   Vitals reviewed.      Significant Labs:  CBC:    Recent Labs  Lab 11/27/17 0509 11/28/17 0646 11/29/17  0333   WBC 7.82 6.71 4.48   RBC 5.67 5.22 5.53   HGB 10.2* 9.5* 10.2*   HCT 32.9* 31.2* 33.3*    180 146*   MCV 58* 60* 60*   MCH 18.0* 18.2* 18.4*   MCHC 31.0* 30.4* 30.6*     BNP:    Recent Labs  Lab 11/26/17  1730   BNP 59     CMP:    Recent Labs  Lab 11/27/17  0509 11/28/17  0646 11/29/17  0333   * 299* 235*   CALCIUM 8.8 8.6* 8.9   ALBUMIN 3.3* 3.1* 3.1*   PROT 6.5 6.2 6.2   * 132* 134*   K 4.3 4.5 4.8   CO2 24 28 27    98 99   BUN 13 16 16   CREATININE 1.1 0.9 0.9   ALKPHOS 74 66 60   ALT 10 11 13   AST 13 15 24   BILITOT 0.4 0.4 0.3      Coagulation:     Recent Labs  Lab 11/26/17  1730   INR 1.0   APTT 24.3     LDH:  No results for input(s): LDH in the  last 72 hours.  Microbiology:  Microbiology Results (last 7 days)     Procedure Component Value Units Date/Time    Culture, Respiratory with Gram Stain [[id number]] Collected:  11/27/17 1100    Order Status:  Completed Specimen:  Respiratory from Sputum, Expectorated Updated:  11/29/17 1013     Respiratory Culture Normal respiratory geo     Gram Stain (Respiratory) <10 epithelial cells per low power field.     Gram Stain (Respiratory) Moderate WBC's     Gram Stain (Respiratory) Rare Gram positive cocci    Blood culture x two cultures. Draw prior to antibiotics. [[id number]] Collected:  11/26/17 1700    Order Status:  Completed Specimen:  Blood from Peripheral, Upper Arm, Right Updated:  11/28/17 2012     Blood Culture, Routine No Growth to date     Blood Culture, Routine No Growth to date     Blood Culture, Routine No Growth to date    Narrative:       Aerobic and anaerobic    Blood culture x two cultures. Draw prior to antibiotics. [[id number]] Collected:  11/26/17 1733    Order Status:  Completed Specimen:  Blood from Peripheral, Antecubital, Left Updated:  11/28/17 2012     Blood Culture, Routine No Growth to date     Blood Culture, Routine No Growth to date     Blood Culture, Routine No Growth to date    Narrative:       Aerobic and anaerobic    Respiratory Viral Panel by PCR [facility]; Nasal Swab [[id number]] Collected:  11/27/17 1115    Order Status:  Sent Specimen:  Respiratory Updated:  11/27/17 1140          I have reviewed all pertinent labs within the past 24 hours.    Estimated Creatinine Clearance: 104.8 mL/min (based on SCr of 0.9 mg/dL).    Diagnostic Results:        Assessment and Plan:     56 year old male with PMHx of NICM s/p HM II (8/24/16), HLD, HTN, VT s/p ICD now s/p OHTx 2/9/17 who presents with URI and sepsis.  Pt reports beginning to feel poorly last night when he started feeling SOB, + cough w yellow phlegm, temperature up to 102 at maximum, -140 at maximum, loss of appetite.  States  that he has some excess LE swelling over the last few day.  Denies CP, palpitation, MARTINO.  Ambulance took him here to hospital.  Did not receive flu vaccination this year.  Also complains of recent increased urinary frequency but now dysuria.  Has a hx of BPH.    * Sepsis    -  Presumed from atypical PNA. Improved with azithromycin addition  - Sputum G- stain + for Gram + with moderate WBC  - continue broads-pectrum as current. Azithromax added to cefepime, vanco and tamiflu  - plan to change to PO tomorrow ( Azithromax) on D/C  - FU on blood cultures and respiratory cultures            Essential hypertension    -Still elevated with the current dose of lisinopril and amlodipine  - improved with adequate pain control  - increase amlodipine to 7.5mg daily for a goal of <130        Heart transplanted    - TX on 2/9/2017  - on Prograft and cellcept.  - Tacrol level 8 this morning and at goal. Continue to monitor daily        Hypomagnesemia    -resolved        Uncomplicated opioid dependence    - patient under care of  for pain management  - resumed home dose of oxycontin and Oxycodone per pain service (discussed with Dr. Lopez)  - discussed that no narcotics will be prescribed on d/c        Upper respiratory infection    -possibly flu vs URI  -CXR does not show inflitrate, only minor interstitial edema  -continue tamiflu and broad spectrum abx, vanc/ceftriaxone  -f/u blood cx          Heart transplant, orthotopic, status    -continue current immunosuppressive therapy: cellcept and prograf  -monitor daily prograf levels  -echo tomorrow        Benign prostatic hyperplasia    -check UA  -continue to monitor        Type 2 diabetes mellitus with stage 3 chronic kidney disease, with long-term current use of insulin    -continue to monitor blood glucose with fingersticks qachs  -continue medium dose sliding scale   -continue long-acting insulin, glargine 22 u daily  -monitor for hypoglycemia  -continue diabetic heart  healthy diet            Discussed plan of care with Dr. Lyles the attending on service    Lenin Lowe MD  Heart Transplant  [facility]

## 2017-11-29 NOTE — ASSESSMENT & PLAN NOTE
58yo man w/a history of CAD, HTN, HLD, DM2, and NICM (c/b VT s/p ICD and HM II 8/2016 until explant and simultaneous OHT 2/9/2017; CMV D-/R+; thymo induction, on maintenance tacro/MMF; c/b infected right groin lymphocele 3/2017 with E.faecalis and S.mitis s/p drainage, antibiotics, and flap coverage) who was admitted on 11/26/2017 with 24h of fevers, chills, productive cough (yellow phlegm), MARTINO, and pleuritic CP likely due to atypical bacterial pneumonia (with response in fever curve/cough after starting azithromycin). He is much improved and stable for discharge on oral agents.     - would stop IV vanc/cefepime  - would complete 7 day total course of azithromycin  - flu swab negative but definitive RVP still pending; as such would complete 5 days total course of tamiflu (since improvement was also while on this agent)  - follow-up per primary team  - ok to discharge from ID perspective

## 2017-11-29 NOTE — ASSESSMENT & PLAN NOTE
- patient under care of  for pain management  - resumed home dose of oxycontin and Oxycodone per pain service (discussed with Dr. Lopez)  - discussed that no narcotics will be prescribed on d/c

## 2017-11-29 NOTE — PROGRESS NOTES
[facility]  Infectious Disease  Progress Note    Patient Name: Mick Barriga  MRN: [id number]  Admission Date: 11/26/2017  Length of Stay: 3 days  Attending Physician: Dennis Lyles Jr.,*  Primary Care Provider: Sukhdev Alan MD    Isolation Status: No active isolations  Assessment/Plan:      Febrile illness, acute    58yo man w/a history of CAD, HTN, HLD, DM2, and NICM (c/b VT s/p ICD and HM II 8/2016 until explant and simultaneous OHT 2/9/2017; CMV D-/R+; thymo induction, on maintenance tacro/MMF; c/b infected right groin lymphocele 3/2017 with E.faecalis and S.mitis s/p drainage, antibiotics, and flap coverage) who was admitted on 11/26/2017 with 24h of fevers, chills, productive cough (yellow phlegm), MARTINO, and pleuritic CP likely due to atypical bacterial pneumonia (with response in fever curve/cough after starting azithromycin). He is much improved and stable for discharge on oral agents.     - would stop IV vanc/cefepime  - would complete 7 day total course of azithromycin  - flu swab negative but definitive RVP still pending; as such would complete 5 days total course of tamiflu (since improvement was also while on this agent)  - follow-up per primary team  - ok to discharge from ID perspective          Addendum: Influenza A/H1N1 PCR positive from RVP. Discussed with team. Would extend tamiflu course to 2wks given prolonged shedding in transplant patients and place on contact and droplet precautions while in house. Would complete empiric azithromycin course as planned as well since bacterial superinfection often tracks with influenza. Follow-up per primary team and recommend flu shot when patient has recovered from this illness and is off antivirals.    Anticipated Disposition: per primary team    Thank you for your consult. I will sign off. Please contact us if you have any additional questions.     Sapna Perez MD  Transplant ID Attending  [phone]    Sapna Perez,  MD  Infectious Disease  [facility]    Subjective:     Principal Problem:Sepsis    HPI: No notes on file  Interval History: Doing well today. Afebrile. Sputum cx with only normal geo. RVP remains pending.    Review of Systems   Constitutional: Negative for chills, fatigue and fever.   HENT: Negative for mouth sores, rhinorrhea and sore throat.    Respiratory: Positive for cough. Negative for choking, chest tightness and shortness of breath.    Gastrointestinal: Negative for abdominal distention, abdominal pain, diarrhea, nausea and vomiting.   Genitourinary: Negative for dysuria, hematuria and urgency.   Skin: Negative for rash.     Objective:     Vital Signs (Most Recent):  Temp: 98.3 °F (36.8 °C) (11/29/17 0815)  Pulse: 97 (11/29/17 1100)  Resp: 16 (11/29/17 0815)  BP: (!) 148/77 (11/29/17 0815)  SpO2: (!) 94 % (11/29/17 0815) Vital Signs (24h Range):  Temp:  [98.2 °F (36.8 °C)-99.2 °F (37.3 °C)] 98.3 °F (36.8 °C)  Pulse:  [] 97  Resp:  [15-18] 16  SpO2:  [94 %-96 %] 94 %  BP: (116-148)/(58-77) 148/77     Weight: 97.2 kg (214 lb 4.6 oz)  Body mass index is 32.58 kg/m².    Estimated Creatinine Clearance: 104.8 mL/min (based on SCr of 0.9 mg/dL).    Physical Exam   Constitutional: He is oriented to person, place, and time. He appears well-developed and well-nourished.   HENT:   Head: Normocephalic and atraumatic.   Eyes: Conjunctivae and EOM are normal. Pupils are equal, round, and reactive to light.   Neck: Normal range of motion. Neck supple.   Cardiovascular: Normal rate, regular rhythm and normal heart sounds.    Pulmonary/Chest: Effort normal and breath sounds normal.   Abdominal: Soft. Bowel sounds are normal. He exhibits no distension. There is no tenderness. There is no rebound.   Musculoskeletal: Normal range of motion. He exhibits no edema, tenderness or deformity.   Neurological: He is alert and oriented to person, place, and time.   Skin: No rash noted. No erythema.        Significant Labs:   Blood Culture:     Recent Labs  Lab 11/26/17  1700 11/26/17  1733   LABBLOO No Growth to date  No Growth to date  No Growth to date No Growth to date  No Growth to date  No Growth to date     BMP:     Recent Labs  Lab 11/29/17  0333   *   *   K 4.8   CL 99   CO2 27   BUN 16   CREATININE 0.9   CALCIUM 8.9   MG 1.3*     CBC:     Recent Labs  Lab 11/28/17  0646 11/29/17  0333   WBC 6.71 4.48   HGB 9.5* 10.2*   HCT 31.2* 33.3*    146*       Significant Imaging: I have reviewed all pertinent imaging results/findings within the past 24 hours.     CXR: interstitial edema/infection    Microbiology:  11/26 blood cx: negative  11/26 flu swab antigens: negative  11/26 sputum cx: normal geo  11/27 RVP pending

## 2017-11-29 NOTE — SUBJECTIVE & OBJECTIVE
Interval History: Doing well today. Afebrile. Sputum cx with only normal geo. RVP remains pending.    Review of Systems   Constitutional: Negative for chills, fatigue and fever.   HENT: Negative for mouth sores, rhinorrhea and sore throat.    Respiratory: Positive for cough. Negative for choking, chest tightness and shortness of breath.    Gastrointestinal: Negative for abdominal distention, abdominal pain, diarrhea, nausea and vomiting.   Genitourinary: Negative for dysuria, hematuria and urgency.   Skin: Negative for rash.     Objective:     Vital Signs (Most Recent):  Temp: 98.3 °F (36.8 °C) (11/29/17 0815)  Pulse: 97 (11/29/17 1100)  Resp: 16 (11/29/17 0815)  BP: (!) 148/77 (11/29/17 0815)  SpO2: (!) 94 % (11/29/17 0815) Vital Signs (24h Range):  Temp:  [98.2 °F (36.8 °C)-99.2 °F (37.3 °C)] 98.3 °F (36.8 °C)  Pulse:  [] 97  Resp:  [15-18] 16  SpO2:  [94 %-96 %] 94 %  BP: (116-148)/(58-77) 148/77     Weight: 97.2 kg (214 lb 4.6 oz)  Body mass index is 32.58 kg/m².    Estimated Creatinine Clearance: 104.8 mL/min (based on SCr of 0.9 mg/dL).    Physical Exam   Constitutional: He is oriented to person, place, and time. He appears well-developed and well-nourished.   HENT:   Head: Normocephalic and atraumatic.   Eyes: Conjunctivae and EOM are normal. Pupils are equal, round, and reactive to light.   Neck: Normal range of motion. Neck supple.   Cardiovascular: Normal rate, regular rhythm and normal heart sounds.    Pulmonary/Chest: Effort normal and breath sounds normal.   Abdominal: Soft. Bowel sounds are normal. He exhibits no distension. There is no tenderness. There is no rebound.   Musculoskeletal: Normal range of motion. He exhibits no edema, tenderness or deformity.   Neurological: He is alert and oriented to person, place, and time.   Skin: No rash noted. No erythema.       Significant Labs:   Blood Culture:     Recent Labs  Lab 11/26/17  1700 11/26/17  1733   LABBLOO No Growth to date  No Growth to  date  No Growth to date No Growth to date  No Growth to date  No Growth to date     BMP:     Recent Labs  Lab 11/29/17  0333   *   *   K 4.8   CL 99   CO2 27   BUN 16   CREATININE 0.9   CALCIUM 8.9   MG 1.3*     CBC:     Recent Labs  Lab 11/28/17  0646 11/29/17  0333   WBC 6.71 4.48   HGB 9.5* 10.2*   HCT 31.2* 33.3*    146*       Significant Imaging: I have reviewed all pertinent imaging results/findings within the past 24 hours.     CXR: interstitial edema/infection    Microbiology:  11/26 blood cx: negative  11/26 flu swab antigens: negative  11/26 sputum cx: normal geo  11/27 RVP pending

## 2017-11-29 NOTE — ASSESSMENT & PLAN NOTE
-  Presumed from atypical PNA. Improved with azithromycin addition  - Sputum G- stain + for Gram + with moderate WBC  - continue broads-pectrum as current. Azithromax added to cefepime, vanco and tamiflu  - plan to change to PO tomorrow ( Azithromax) on D/C  - FU on blood cultures and respiratory cultures

## 2017-11-29 NOTE — SUBJECTIVE & OBJECTIVE
"Interval HPI:   BGs uncontrolled. Tolerating PO. Denies nausea. Currently afebrile. No hypoglycemic episodes.      BP (!) 148/77 (BP Location: Left arm, Patient Position: Lying)   Pulse 90   Temp 98.3 °F (36.8 °C) (Oral)   Resp 16   Ht 5' 8" (1.727 m)   Wt 97.2 kg (214 lb 4.6 oz)   SpO2 (!) 94%   BMI 32.58 kg/m²     Labs Reviewed and Include      Recent Labs  Lab 11/29/17  0333   *   CALCIUM 8.9   ALBUMIN 3.1*   PROT 6.2   *   K 4.8   CO2 27   CL 99   BUN 16   CREATININE 0.9   ALKPHOS 60   ALT 13   AST 24   BILITOT 0.3     Lab Results   Component Value Date    WBC 4.48 11/29/2017    HGB 10.2 (L) 11/29/2017    HCT 33.3 (L) 11/29/2017    MCV 60 (L) 11/29/2017     (L) 11/29/2017       Recent Labs  Lab 11/26/17  1730   TSH 0.184*   FREET4 1.10     Lab Results   Component Value Date    HGBA1C 7.7 (H) 11/26/2017       Nutritional status:   Body mass index is 32.58 kg/m².  Lab Results   Component Value Date    ALBUMIN 3.1 (L) 11/29/2017    ALBUMIN 3.1 (L) 11/28/2017    ALBUMIN 3.3 (L) 11/27/2017     Lab Results   Component Value Date    PREALBUMIN 19 (L) 02/08/2017    PREALBUMIN 14 (L) 02/06/2017    PREALBUMIN 14 (L) 02/03/2017       Estimated Creatinine Clearance: 104.8 mL/min (based on SCr of 0.9 mg/dL).    Accu-Checks  Recent Labs      11/26/17   2307  11/27/17   0910  11/27/17   1407  11/27/17   1832  11/27/17   2057  11/27/17   2303  11/28/17   0826  11/28/17   1229  11/28/17   1824  11/28/17   2224   POCTGLUCOSE  267*  268*  272*  296*  352*  378*  343*  266*  279*  288*       Current Medications and/or Treatments Impacting Glycemic Control  Immunotherapy:  Immunosuppressants         Stop Route Frequency     mycophenolate capsule 1,000 mg      -- Oral 2 times daily     tacrolimus capsule 0.5 mg      -- Oral 2 times daily        Steroids:   Hormones     None        Pressors:    Autonomic Drugs     None        Hyperglycemia/Diabetes Medications: Antihyperglycemics     Start     Stop Route " Frequency Ordered    11/28/17 0900  Insulin Glargine prefilled pen injection 22 Units (Lantus)     Question Answer Comment   Brand Name: Lantus    Generic name: Insulin Glargine    Form: Pen Injector    Length of Therapy: Indefinite    Reason for Non-Formulary Pt has allergy to Levemir    How soon needed? (normally 72 hrs needed to procure): 0-24 hrs Pt has his own home supply of Lantus   Is the patient competent? Yes        -- SubQ Daily 11/28/17 0754    11/28/17 0800  insulin aspart pen 9 Units      -- SubQ 3 times daily with meals 11/28/17 0754    11/27/17 0923  insulin aspart pen 0-5 Units      -- SubQ Before meals & nightly PRN 11/27/17 0824

## 2017-11-29 NOTE — PLAN OF CARE
Problem: Patient Care Overview  Goal: Plan of Care Review  Outcome: Ongoing (interventions implemented as appropriate)  Pt is AAOx4 in bed wearing non-skid footwear, bed in low/locked position and with call bell within reach. Pt reminded to use call bell to call for assistance, pt verbalizes understanding. Pt is afebrile at this time. Proper hand hygiene performed before and after pt care activities. IV Cefepime and Vanc administered this shift. Bedtime BG of 288, sliding scale coverage administered. PRN and scheduled pain medications administered. Denies any pain or discomfort at this time.

## 2017-11-29 NOTE — PLAN OF CARE
Problem: Patient Care Overview  Goal: Plan of Care Review  Outcome: Ongoing (interventions implemented as appropriate)  IV antibiotics continued, likely to be transitioned PO tomorrow. BP improved to 120s-130s/70s after Norvasc and lisinopril started today. PO Lasix also resumed. Patient up ad carly in room. Still with c/o generalized pain moderately relieved by Oxycontin and oxycodone IR. Discharge tentatively planned for tomorrow.

## 2017-11-30 VITALS
OXYGEN SATURATION: 97 % | BODY MASS INDEX: 32.24 KG/M2 | TEMPERATURE: 98 F | WEIGHT: 212.75 LBS | SYSTOLIC BLOOD PRESSURE: 119 MMHG | DIASTOLIC BLOOD PRESSURE: 59 MMHG | RESPIRATION RATE: 16 BRPM | HEART RATE: 95 BPM | HEIGHT: 68 IN

## 2017-11-30 LAB
ALBUMIN SERPL BCP-MCNC: 3.3 G/DL
ALP SERPL-CCNC: 65 U/L
ALT SERPL W/O P-5'-P-CCNC: 14 U/L
ANION GAP SERPL CALC-SCNC: 8 MMOL/L
AST SERPL-CCNC: 19 U/L
BASOPHILS # BLD AUTO: 0.02 K/UL
BASOPHILS NFR BLD: 0.5 %
BILIRUB SERPL-MCNC: 0.5 MG/DL
BUN SERPL-MCNC: 17 MG/DL
CALCIUM SERPL-MCNC: 9.5 MG/DL
CHLORIDE SERPL-SCNC: 99 MMOL/L
CO2 SERPL-SCNC: 30 MMOL/L
CREAT SERPL-MCNC: 0.9 MG/DL
DIFFERENTIAL METHOD: ABNORMAL
EOSINOPHIL # BLD AUTO: 0.1 K/UL
EOSINOPHIL NFR BLD: 1.6 %
ERYTHROCYTE [DISTWIDTH] IN BLOOD BY AUTOMATED COUNT: 17.3 %
EST. GFR  (AFRICAN AMERICAN): >60 ML/MIN/1.73 M^2
EST. GFR  (NON AFRICAN AMERICAN): >60 ML/MIN/1.73 M^2
GLUCOSE SERPL-MCNC: 198 MG/DL
HCT VFR BLD AUTO: 35.2 %
HGB BLD-MCNC: 10.9 G/DL
IMM GRANULOCYTES # BLD AUTO: 0.01 K/UL
IMM GRANULOCYTES NFR BLD AUTO: 0.2 %
LYMPHOCYTES # BLD AUTO: 2 K/UL
LYMPHOCYTES NFR BLD: 45.8 %
MAGNESIUM SERPL-MCNC: 1.6 MG/DL
MCH RBC QN AUTO: 18.4 PG
MCHC RBC AUTO-ENTMCNC: 31 G/DL
MCV RBC AUTO: 59 FL
MONOCYTES # BLD AUTO: 0.5 K/UL
MONOCYTES NFR BLD: 11.3 %
NEUTROPHILS # BLD AUTO: 1.8 K/UL
NEUTROPHILS NFR BLD: 40.6 %
NRBC BLD-RTO: 0 /100 WBC
PLATELET # BLD AUTO: 166 K/UL
PMV BLD AUTO: ABNORMAL FL
POCT GLUCOSE: 271 MG/DL (ref 70–110)
POCT GLUCOSE: 293 MG/DL (ref 70–110)
POCT GLUCOSE: 304 MG/DL (ref 70–110)
POTASSIUM SERPL-SCNC: 4.5 MMOL/L
PROT SERPL-MCNC: 6.5 G/DL
RBC # BLD AUTO: 5.93 M/UL
SODIUM SERPL-SCNC: 137 MMOL/L
TACROLIMUS BLD-MCNC: 6.6 NG/ML
WBC # BLD AUTO: 4.41 K/UL

## 2017-11-30 PROCEDURE — 80053 COMPREHEN METABOLIC PANEL: CPT

## 2017-11-30 PROCEDURE — 63600175 PHARM REV CODE 636 W HCPCS: Performed by: INTERNAL MEDICINE

## 2017-11-30 PROCEDURE — 25000003 PHARM REV CODE 250: Performed by: INTERNAL MEDICINE

## 2017-11-30 PROCEDURE — 99239 HOSP IP/OBS DSCHRG MGMT >30: CPT | Mod: ,,, | Performed by: INTERNAL MEDICINE

## 2017-11-30 PROCEDURE — 80197 ASSAY OF TACROLIMUS: CPT

## 2017-11-30 PROCEDURE — 85025 COMPLETE CBC W/AUTO DIFF WBC: CPT

## 2017-11-30 PROCEDURE — 36415 COLL VENOUS BLD VENIPUNCTURE: CPT

## 2017-11-30 PROCEDURE — 83735 ASSAY OF MAGNESIUM: CPT

## 2017-11-30 PROCEDURE — 99232 SBSQ HOSP IP/OBS MODERATE 35: CPT | Mod: ,,, | Performed by: NURSE PRACTITIONER

## 2017-11-30 RX ORDER — AZITHROMYCIN 250 MG/1
500 TABLET, FILM COATED ORAL DAILY
Status: DISCONTINUED | OUTPATIENT
Start: 2017-11-30 | End: 2017-11-30 | Stop reason: HOSPADM

## 2017-11-30 RX ORDER — TACROLIMUS 0.5 MG/1
0.5 CAPSULE ORAL EVERY 12 HOURS
Start: 2017-11-30 | End: 2018-04-17 | Stop reason: SDUPTHER

## 2017-11-30 RX ORDER — AZITHROMYCIN 500 MG/1
500 TABLET, FILM COATED ORAL DAILY
Qty: 12 TABLET | Refills: 0 | Status: SHIPPED | OUTPATIENT
Start: 2017-12-01 | End: 2017-12-19

## 2017-11-30 RX ORDER — INSULIN GLARGINE 100 [IU]/ML
25 INJECTION, SOLUTION SUBCUTANEOUS DAILY
Qty: 3 BOX | Refills: 3
Start: 2017-11-30 | End: 2020-09-16

## 2017-11-30 RX ORDER — OSELTAMIVIR PHOSPHATE 75 MG/1
75 CAPSULE ORAL 2 TIMES DAILY
Qty: 18 CAPSULE | Refills: 0 | Status: SHIPPED | OUTPATIENT
Start: 2017-11-30 | End: 2017-12-09

## 2017-11-30 RX ORDER — PRAVASTATIN SODIUM 40 MG/1
40 TABLET ORAL DAILY
Qty: 30 TABLET | Refills: 11 | Status: SHIPPED | OUTPATIENT
Start: 2017-11-30 | End: 2017-12-19 | Stop reason: SDUPTHER

## 2017-11-30 RX ORDER — LANOLIN ALCOHOL/MO/W.PET/CERES
800 CREAM (GRAM) TOPICAL 3 TIMES DAILY
Qty: 180 TABLET | Refills: 3 | Status: SHIPPED | OUTPATIENT
Start: 2017-11-30 | End: 2018-04-17 | Stop reason: SDUPTHER

## 2017-11-30 RX ORDER — AMLODIPINE BESYLATE 5 MG/1
5 TABLET ORAL DAILY
Qty: 30 TABLET | Refills: 11 | Status: SHIPPED | OUTPATIENT
Start: 2017-11-30 | End: 2019-12-03

## 2017-11-30 RX ORDER — INSULIN ASPART 100 [IU]/ML
11 INJECTION, SOLUTION INTRAVENOUS; SUBCUTANEOUS 3 TIMES DAILY
Qty: 1 BOX | Refills: 0
Start: 2017-11-30 | End: 2020-09-16

## 2017-11-30 RX ORDER — INSULIN ASPART 100 [IU]/ML
14 INJECTION, SOLUTION INTRAVENOUS; SUBCUTANEOUS
Status: DISCONTINUED | OUTPATIENT
Start: 2017-11-30 | End: 2017-11-30 | Stop reason: HOSPADM

## 2017-11-30 RX ADMIN — OSELTAMIVIR PHOSPHATE 75 MG: 75 CAPSULE ORAL at 08:11

## 2017-11-30 RX ADMIN — PRAVASTATIN SODIUM 20 MG: 20 TABLET ORAL at 08:11

## 2017-11-30 RX ADMIN — MAGNESIUM OXIDE TAB 400 MG (241.3 MG ELEMENTAL MG) 1200 MG: 400 (241.3 MG) TAB at 08:11

## 2017-11-30 RX ADMIN — PANTOPRAZOLE SODIUM 40 MG: 40 TABLET, DELAYED RELEASE ORAL at 08:11

## 2017-11-30 RX ADMIN — AMLODIPINE BESYLATE 7.5 MG: 5 TABLET ORAL at 08:11

## 2017-11-30 RX ADMIN — INSULIN ASPART 3 UNITS: 100 INJECTION, SOLUTION INTRAVENOUS; SUBCUTANEOUS at 07:11

## 2017-11-30 RX ADMIN — AZITHROMYCIN 500 MG: 250 TABLET, FILM COATED ORAL at 08:11

## 2017-11-30 RX ADMIN — MYCOPHENOLATE MOFETIL 1000 MG: 250 CAPSULE ORAL at 09:11

## 2017-11-30 RX ADMIN — FUROSEMIDE 40 MG: 40 TABLET ORAL at 08:11

## 2017-11-30 RX ADMIN — OXYCODONE HYDROCHLORIDE 15 MG: 5 TABLET ORAL at 04:11

## 2017-11-30 RX ADMIN — INSULIN ASPART 11 UNITS: 100 INJECTION, SOLUTION INTRAVENOUS; SUBCUTANEOUS at 07:11

## 2017-11-30 RX ADMIN — LISINOPRIL 10 MG: 2.5 TABLET ORAL at 08:11

## 2017-11-30 RX ADMIN — INSULIN ASPART 11 UNITS: 100 INJECTION, SOLUTION INTRAVENOUS; SUBCUTANEOUS at 12:11

## 2017-11-30 RX ADMIN — PREGABALIN 75 MG: 75 CAPSULE ORAL at 08:11

## 2017-11-30 RX ADMIN — TACROLIMUS 0.5 MG: 0.5 CAPSULE ORAL at 08:11

## 2017-11-30 RX ADMIN — INSULIN GLARGINE 25 UNITS: 100 INJECTION, SOLUTION SUBCUTANEOUS at 08:11

## 2017-11-30 RX ADMIN — OXYCODONE HYDROCHLORIDE 20 MG: 20 TABLET, FILM COATED, EXTENDED RELEASE ORAL at 08:11

## 2017-11-30 RX ADMIN — INSULIN ASPART 4 UNITS: 100 INJECTION, SOLUTION INTRAVENOUS; SUBCUTANEOUS at 12:11

## 2017-11-30 RX ADMIN — OXYCODONE HYDROCHLORIDE 15 MG: 5 TABLET ORAL at 10:11

## 2017-11-30 RX ADMIN — ASPIRIN 81 MG: 81 TABLET, COATED ORAL at 08:11

## 2017-11-30 NOTE — PROGRESS NOTES
D/C:    SW to pt's room for d/c. Pt aaox4, calm, and pleasant. Pt reports in agreement with plan to D/C home today with no needs from SW. Pt reports plan to use [employer] for transport home. SW providing psychosocial counseling and support, education, assistance, resources, and D/C planning as indicated. SW remains available.

## 2017-11-30 NOTE — PLAN OF CARE
Problem: Patient Care Overview  Goal: Plan of Care Review  Outcome: Ongoing (interventions implemented as appropriate)  Pt is AAOx4 in bed wearing non-skid footwear, bed in low/locked position and with call bell within reach. Pt reminded to use call bell to call for assistance, pt verbalizes understanding. Pt is afebrile at this time. Proper hand hygiene performed before and after pt care activities. Bedtime BG of 178. Scheduled and PRN pain medication administered this shift. Denies any pain or discomfort at this time.

## 2017-11-30 NOTE — PROGRESS NOTES
Mr. Barriga will be discharged today after admission for SOB and increased cough with sputum production.  PMH includes OHT 2/9/17.  RVP revealed Influenza A.  The patient will be discharged with a course of azithromycin and tamiflu.  Immunosuppression remains unchanged with a tacrolimus dose of 0.5mg BID, and MMF 1000mg BID.   Discharge medications were reviewed with the patient.

## 2017-11-30 NOTE — ASSESSMENT & PLAN NOTE
- To resume home dose of Oxycontin and Oxycodone per pain service (discussed with Dr. Lopez)  - No new prescription provided

## 2017-11-30 NOTE — DISCHARGE SUMMARY
[facility]  Heart Transplant  Discharge Summary      Patient Name: Mick Barriga  MRN: [id number]  Admission Date: 11/26/2017  Hospital Length of Stay: 4 days  Discharge Date and Time: 11/30/2017 2:14 PM  Attending Physician: Dennis Lyles Jr.,*   Discharging Provider: Lenin Lowe MD  Primary Care Provider: Sukhdev Alan MD     HPI: 56 year old male with PMHx of NICM s/p HM II (8/24/16), HLD, HTN, VT s/p ICD now s/p OHTx 2/9/17 who presents with URI and sepsis.  Pt reports beginning to feel poorly last night when he started feeling SOB, + cough w yellow phlegm, temperature up to 102 at maximum, -140 at maximum, loss of appetite.  States that he has some excess LE swelling over the last few day.  Denies CP, palpitation, MARTINO.  Ambulance took him here to hospital.  Did not receive flu vaccination this year.  Also complains of recent increased urinary frequency but now dysuria.  Has a hx of BPH.    * No surgery found *     Hospital Course:   11/27  - patient having fever of 102.1. Reporting myalgia, chills and cough  - Abx spectrum widened to include azithromyxin, cefepime, vancomycin and tamiflu  - PCR negative for influenza  - prograft level therapeutic    11/28  - no further febrile episodes  - Blood cultures negative. Sputum gram stain + for rare gram positive cocci with moderate WBC  - abx regimen kept the same as yesterday    11/29  -  Afebrile for more than 36hrs, cultures negative so far.   -  Respiratory panel positive for influenza A  Later in the day    11/30  - ID recommended continuing Zithromax for 6 more days and Tamiflu for a 9 more days.  - prograft level 6.6  - discharged without any post discharge need. Cautioned about droplet isolation to close contacts at home.  - will have flu vaccine after resolution of current episode        Consults         Status Ordering Provider     Inpatient consult to Cardiology  Once     Provider:  (Not yet assigned)     Completed IVAN MASON     Inpatient consult to Endocrinology  Once     Provider:  (Not yet assigned)    Completed DONALD PRINCE     Inpatient consult to Infectious Diseases  Once     Provider:  (Not yet assigned)    Completed DONALD PRINCE     Inpatient consult to PICC team ([facility])  Once     Provider:  (Not yet assigned)    Completed CRAIG SOLIS JR          Significant Diagnostic Studies: Labs: All labs within the past 24 hours have been reviewed  Microbiology:   Blood Culture   Lab Results   Component Value Date    LABBLOO No Growth to date 11/26/2017    LABBLOO No Growth to date 11/26/2017    LABBLOO No Growth to date 11/26/2017    LABBLOO No Growth to date 11/26/2017       Pending Diagnostic Studies:     Procedure Component Value Units Date/Time    Strep Pneumo AG Urine [[id number]] Collected:  11/27/17 1140    Order Status:  Sent Lab Status:  No result     Specimen:  Urine from Urine, Clean Catch     VANCOMYCIN, TROUGH [[id number]] Collected:  11/27/17 0800    Order Status:  Sent Lab Status:  No result     Specimen:  Blood from Blood         Final Active Diagnoses:    Diagnosis Date Noted POA    PRINCIPAL PROBLEM:  Sepsis [A41.9] 11/27/2017 No    Essential hypertension [I10] 06/14/2015 Yes     Chronic    Heart transplanted [Z94.1] 02/09/2017 Not Applicable    Uncomplicated opioid dependence [F11.20] 07/12/2017 Yes    Type 2 diabetes mellitus with stage 3 chronic kidney disease, with long-term current use of insulin [E11.22, N18.3, Z79.4] 06/14/2015 Not Applicable     Chronic      Problems Resolved During this Admission:    Diagnosis Date Noted Date Resolved POA      Discharged Condition: good    Disposition:     Follow Up:    Patient Instructions:   No discharge procedures on file.  Medications:  Reconciled Home Medications:   Current Discharge Medication List      START taking these medications    Details   azithromycin (ZITHROMAX) 500 MG tablet Take 1 tablet (500 mg total) by  "mouth once daily.  Qty: 12 tablet, Refills: 0      oseltamivir (TAMIFLU) 75 MG capsule Take 1 capsule (75 mg total) by mouth 2 (two) times daily.  Qty: 18 capsule, Refills: 0         CONTINUE these medications which have NOT CHANGED    Details   ACCU-CHEK HANNAH Misc CHECK BLOOD GLUCOSE QID  Refills: 0      amlodipine (NORVASC) 10 MG tablet Take 0.5 tablets (5 mg total) by mouth once daily.  Qty: 30 tablet, Refills: 11    Associated Diagnoses: Status post heart transplant      aspirin (ECOTRIN) 81 MG EC tablet Take 1 tablet (81 mg total) by mouth once daily.  Refills: 0      BD INSULIN PEN NEEDLE UF SHORT 31 gauge x 5/16" Ndle       !! blood sugar diagnostic Strp To use to check BG 5 times daily, to use with insurance preferred meter  Qty: 380 strip, Refills: 3      !! blood sugar diagnostic Strp 5 strips by Misc.(Non-Drug; Combo Route) route 5 (five) times daily.  Qty: 600 each, Refills: 3      blood-glucose meter kit To use to check BG 4 times daily, to use with insurance preferred meter  Qty: 1 each, Refills: 0       mg capsule TAKE 2 CAPSULES BY MOUTH EVERY EVENING  Qty: 180 capsule, Refills: 0      esomeprazole (NEXIUM) 40 MG capsule TAKE 1 CAPSULE BY MOUTH BEFORE BREAKFAST  Qty: 90 capsule, Refills: 0      furosemide (LASIX) 40 MG tablet once daily  Refills: 5      insulin aspart (NOVOLOG) 100 unit/mL InPn pen Inject 15 Units into the skin 3 (three) times daily.  Qty: 1 Box, Refills: 0      insulin glargine (LANTUS SOLOSTAR) 100 unit/mL (3 mL) InPn pen Inject 22 Units into the skin once daily.  Qty: 3 Box, Refills: 3      lancets Misc To use to check BG 4 times daily, to use with insurance preferred meter  Qty: 350 each, Refills: 3      lisinopril 10 MG tablet Take 1 tablet (10 mg total) by mouth once daily.  Qty: 90 tablet, Refills: 3    Associated Diagnoses: Status post heart transplant      LYRICA 75 mg capsule Take 75 mg by mouth 2 (two) times daily.      magnesium oxide (MAG-OX) 400 mg tablet Take " 1200 mg in am and 800 mg at 1200 PM, 800 MG AT 1800  Qty: 630 tablet, Refills: 3    Associated Diagnoses: Hypomagnesemia; Status post heart transplant      mycophenolate (CELLCEPT) 250 mg Cap Take 4 capsules (1,000 mg total) by mouth 2 (two) times daily.  Qty: 360 capsule, Refills: 11      oxycodone (OXYCONTIN) 20 mg 12 hr tablet Take 1 tablet (20 mg total) by mouth every 12 (twelve) hours.  Qty: 28 tablet, Refills: 0      pravastatin (PRAVACHOL) 40 MG tablet Take 1 tablet (40 mg total) by mouth once daily.  Qty: 30 tablet, Refills: 11      sertraline (ZOLOFT) 100 MG tablet Take 100 mg by mouth once daily.      tacrolimus (PROGRAF) 1 MG Cap Take 0.5mg orally in the morning and 1mg orally in the evening  Qty: 60 capsule, Refills: 11    Associated Diagnoses: Status post heart transplant      tadalafil (CIALIS) 10 MG tablet Take 1 tablet (10 mg total) by mouth daily as needed for Erectile Dysfunction.  Qty: 10 tablet, Refills: 1       !! - Potential duplicate medications found. Please discuss with provider.        Discussed discharge plan with Dr. Lyles the attending on service    Lenin Lowe MD  Heart Transplant  [facility]

## 2017-11-30 NOTE — ASSESSMENT & PLAN NOTE
Afebrile currently; possibly flu vs URI  -CXR does not show inflitrate, only minor interstitial edema

## 2017-11-30 NOTE — NURSING
RN provided patient with verbal explanation and printed copy of AVS, pt verbalized understanding to all.  Pt verbalized understanding to updated home medication information.  IV removed without issue.  Pt discharged from TSU to home with all belongings in tow.  Pt denies pain or other need at time of discharge.

## 2017-11-30 NOTE — PROGRESS NOTES
"[facility]  Endocrinology  Progress Note    Admit Date: 2017     Reason for Consult: Management of T2DM, Hyperglycemia     Surgical Procedure and Date: History of heart transplant 17    Diabetes diagnosis year:     Home Diabetes Medications:  Lantus 22 units QAM, Novolog  units AC  Lab Results   Component Value Date    HGBA1C 7.7 (H) 2017     How often checking glucose at home? 3x/day  BG readings on regimen: 100-200s  Hypoglycemia on the regimen?  One reading of 70 in the past month  Missed doses on regimen? No    Diabetes Complications include:   Diabetic chronic kidney disease         Complicating diabetes co morbidities: CHF    HPI: Patient is a 57 y.o. male with a diagnosis of DM since , on MDI at home. Admitted with c/o cough, fever. History of transplant but no maintenance steroid therapy.   Noted allergy to Levemir.   Endocrine consulted for management of DM, insulin therapy.          Interval HPI:  Remains on TSU on droplet precautions, c/o "pain all over" this AM, BG elevated despite MDI, requiring correction scale coverage. Using home supply of Lantus given allergy to Levemir.   Overnight events: remains afebrile.   Eatin% of 2200 ADA diet, plus drinking Boost Glucose Control AC (total 3/ day)  Nausea: No  Hypoglycemia and intervention: No  Fever: No  TPN and/or TF: No    /73 (Patient Position: Lying)   Pulse 93   Temp 98.3 °F (36.8 °C) (Oral)   Resp 15   Ht 5' 8" (1.727 m)   Wt 96.5 kg (212 lb 11.9 oz)   SpO2 95%   BMI 32.35 kg/m²       Labs Reviewed and Include    No results for input(s): GLU, CALCIUM, ALBUMIN, PROT, NA, K, CO2, CL, BUN, CREATININE, ALKPHOS, ALT, AST, BILITOT in the last 24 hours.  Lab Results   Component Value Date    WBC 4.41 2017    HGB 10.9 (L) 2017    HCT 35.2 (L) 2017    MCV 59 (L) 2017     2017       Recent Labs  Lab 17  1730   TSH 0.184*   FREET4 1.10     Lab " Results   Component Value Date    HGBA1C 7.7 (H) 11/26/2017       Nutritional status:   Body mass index is 32.35 kg/m².  Lab Results   Component Value Date    ALBUMIN 3.1 (L) 11/29/2017    ALBUMIN 3.1 (L) 11/28/2017    ALBUMIN 3.3 (L) 11/27/2017     Lab Results   Component Value Date    PREALBUMIN 19 (L) 02/08/2017    PREALBUMIN 14 (L) 02/06/2017    PREALBUMIN 14 (L) 02/03/2017       Estimated Creatinine Clearance: 104.4 mL/min (based on SCr of 0.9 mg/dL).    Accu-Checks  Recent Labs      11/27/17   2303  11/28/17   0826  11/28/17   1229  11/28/17   1824  11/28/17   2224  11/29/17   0919  11/29/17   1254  11/29/17   1823  11/29/17   2101  11/30/17   0725   POCTGLUCOSE  378*  343*  266*  279*  288*  293*  354*  170*  178*  271*       Current Medications and/or Treatments Impacting Glycemic Control  Immunotherapy:  Immunosuppressants         Stop Route Frequency     mycophenolate capsule 1,000 mg      -- Oral 2 times daily     tacrolimus capsule 0.5 mg      -- Oral 2 times daily        Steroids:   Hormones     None        Hyperglycemia/Diabetes Medications: Antihyperglycemics     Start     Stop Route Frequency Ordered    11/29/17 1130  insulin aspart pen 11 Units      -- SubQ 3 times daily with meals 11/29/17 0834    11/29/17 0900  insulin glargine pen 25 Units     Question Answer Comment   Brand Name: Lantus    Generic name: Insulin Glargine    Form: Pen Injector    Length of Therapy: Indefinite    Reason for Non-Formulary Pt has allergy to Levemir    How soon needed? (normally 72 hrs needed to procure): 0-24 hrs Pt has his own home supply of Lantus   Is the patient competent? Yes        -- SubQ Daily 11/29/17 0833    11/27/17 0923  insulin aspart pen 0-5 Units      -- SubQ Before meals & nightly PRN 11/27/17 0824          ASSESSMENT and PLAN    Type 2 diabetes mellitus with stage 3 chronic kidney disease, with long-term current use of insulin    BG goal 140-180. Continue Lantus 25 units SQ QAM (pt has allergy to  Levemir and can ONLY take Lantus). He has his home supply of Lantus.     ADDENDUM: Increase Novolog to 14 units with meals plus low dose correction scale. Monitor AC/HS.     Discharge Planning:   Will need dose adjustment of MDI and glycemic discharge clinic f/u          Febrile illness, acute    Afebrile currently; possibly flu vs URI  -CXR does not show inflitrate, only minor interstitial edema        Heart transplanted    Per HTS  avoid hypoglycemia          Immunosuppression    Can worsen insulin resistance              KIMBERLEY Rivera,ANP-C  Endocrinology  [facility]

## 2017-11-30 NOTE — ASSESSMENT & PLAN NOTE
-  Influenza A related. Tamiflu for 9 more days and Zithromax for 6 more days  - Sputum G- stain + for Gram + with moderate WBC

## 2017-11-30 NOTE — PLAN OF CARE
Problem: Patient Care Overview  Goal: Plan of Care Review  Outcome: Ongoing (interventions implemented as appropriate)  Pt AAOx4 throughout shift.  Pt abiding by bed rest orders, up only to restroom.  Pt's results for influenza were positive, started on droplet and contact precautions.  Pt tolerating all medications and interventions well.  Pt tolerating all input.  Pt has appropriate output.  Pt's VSS throughout shift.  Pt denies pain or other need at this time.  RN will continue to monitor until report given to oncoming night shift nurse or patient is discharged.

## 2017-11-30 NOTE — ASSESSMENT & PLAN NOTE
BG goal 140-180. Continue Lantus 25 units SQ QAM (pt has allergy to Levemir and can ONLY take Lantus). He has his home supply of Lantus.     ADDENDUM: Increase Novolog to 14 units with meals plus low dose correction scale. Monitor AC/HS.     Discharge Planning:   Will need dose adjustment of MDI and glycemic discharge clinic f/u

## 2017-11-30 NOTE — ASSESSMENT & PLAN NOTE
- improved with adequate pain control  - increase amlodipine at 7.5mg on discharge with adequate control

## 2017-11-30 NOTE — SUBJECTIVE & OBJECTIVE
"Interval HPI:  Remains on TSU on droplet precautions, c/o "pain all over" this AM, BG elevated despite MDI, requiring correction scale coverage. Using home supply of Lantus given allergy to Levemir.   Overnight events: remains afebrile.   Eatin% of 2200 ADA diet, plus drinking Boost Glucose Control AC (total 3/ day)  Nausea: No  Hypoglycemia and intervention: No  Fever: No  TPN and/or TF: No    /73 (Patient Position: Lying)   Pulse 93   Temp 98.3 °F (36.8 °C) (Oral)   Resp 15   Ht 5' 8" (1.727 m)   Wt 96.5 kg (212 lb 11.9 oz)   SpO2 95%   BMI 32.35 kg/m²     Labs Reviewed and Include    No results for input(s): GLU, CALCIUM, ALBUMIN, PROT, NA, K, CO2, CL, BUN, CREATININE, ALKPHOS, ALT, AST, BILITOT in the last 24 hours.  Lab Results   Component Value Date    WBC 4.41 2017    HGB 10.9 (L) 2017    HCT 35.2 (L) 2017    MCV 59 (L) 2017     2017       Recent Labs  Lab 17  1730   TSH 0.184*   FREET4 1.10     Lab Results   Component Value Date    HGBA1C 7.7 (H) 2017       Nutritional status:   Body mass index is 32.35 kg/m².  Lab Results   Component Value Date    ALBUMIN 3.1 (L) 2017    ALBUMIN 3.1 (L) 2017    ALBUMIN 3.3 (L) 2017     Lab Results   Component Value Date    PREALBUMIN 19 (L) 2017    PREALBUMIN 14 (L) 2017    PREALBUMIN 14 (L) 2017       Estimated Creatinine Clearance: 104.4 mL/min (based on SCr of 0.9 mg/dL).    Accu-Checks  Recent Labs      17   2303  17   0826  17   1229  17   1824  17   2224  17   0919  17   1254  17   1823  17   2101  17   0725   POCTGLUCOSE  378*  343*  266*  279*  288*  293*  354*  170*  178*  271*       Current Medications and/or Treatments Impacting Glycemic Control  Immunotherapy:  Immunosuppressants         Stop Route Frequency     mycophenolate capsule 1,000 mg      -- Oral 2 times daily     tacrolimus capsule 0.5 mg   "    -- Oral 2 times daily        Steroids:   Hormones     None        Hyperglycemia/Diabetes Medications: Antihyperglycemics     Start     Stop Route Frequency Ordered    11/29/17 1130  insulin aspart pen 11 Units      -- SubQ 3 times daily with meals 11/29/17 0834    11/29/17 0900  insulin glargine pen 25 Units     Question Answer Comment   Brand Name: Lantus    Generic name: Insulin Glargine    Form: Pen Injector    Length of Therapy: Indefinite    Reason for Non-Formulary Pt has allergy to Levemir    How soon needed? (normally 72 hrs needed to procure): 0-24 hrs Pt has his own home supply of Lantus   Is the patient competent? Yes        -- SubQ Daily 11/29/17 0833    11/27/17 0923  insulin aspart pen 0-5 Units      -- SubQ Before meals & nightly PRN 11/27/17 0824

## 2017-12-01 PROBLEM — J18.9 PNEUMONIA DUE TO INFECTIOUS ORGANISM: Status: ACTIVE | Noted: 2017-12-01

## 2017-12-01 PROBLEM — J11.1 FLU: Status: ACTIVE | Noted: 2017-12-01

## 2017-12-01 LAB
BACTERIA BLD CULT: NORMAL
BACTERIA BLD CULT: NORMAL

## 2017-12-08 RX ORDER — TAMSULOSIN HYDROCHLORIDE 0.4 MG/1
CAPSULE ORAL
Qty: 90 CAPSULE | Refills: 0 | Status: SHIPPED | OUTPATIENT
Start: 2017-12-08 | End: 2018-03-06 | Stop reason: SDUPTHER

## 2017-12-13 ENCOUNTER — TELEPHONE (OUTPATIENT)
Dept: TRANSPLANT | Facility: CLINIC | Age: 57
End: 2017-12-13

## 2017-12-13 DIAGNOSIS — T86.20 COMPLICATION OF HEART TRANSPLANT, UNSPECIFIED COMPLICATION: ICD-10-CM

## 2017-12-13 DIAGNOSIS — N28.9 RENAL INSUFFICIENCY: ICD-10-CM

## 2017-12-13 DIAGNOSIS — Z94.1 STATUS POST HEART TRANSPLANT: ICD-10-CM

## 2017-12-13 DIAGNOSIS — Z79.899 ENCOUNTER FOR LONG-TERM (CURRENT) USE OF MEDICATIONS: ICD-10-CM

## 2017-12-13 NOTE — TELEPHONE ENCOUNTER
Pt scheduled for RTC and started on 1st annual visit post heart transplant visit.    Spoke with pt and reviewed f/u appointments, asked him about his blood sugars and how they have been at home, he states that they run 100-200 at home.

## 2017-12-18 ENCOUNTER — PATIENT MESSAGE (OUTPATIENT)
Dept: PLASTIC SURGERY | Facility: CLINIC | Age: 57
End: 2017-12-18

## 2017-12-19 ENCOUNTER — OFFICE VISIT (OUTPATIENT)
Dept: TRANSPLANT | Facility: CLINIC | Age: 57
End: 2017-12-19
Payer: MEDICARE

## 2017-12-19 ENCOUNTER — HOSPITAL ENCOUNTER (OUTPATIENT)
Dept: CARDIOLOGY | Facility: CLINIC | Age: 57
Discharge: HOME | End: 2017-12-19
Attending: INTERNAL MEDICINE
Payer: MEDICARE

## 2017-12-19 ENCOUNTER — TELEPHONE (OUTPATIENT)
Dept: TRANSPLANT | Facility: CLINIC | Age: 57
End: 2017-12-19

## 2017-12-19 VITALS
BODY MASS INDEX: 33.15 KG/M2 | HEART RATE: 105 BPM | HEIGHT: 68 IN | DIASTOLIC BLOOD PRESSURE: 75 MMHG | SYSTOLIC BLOOD PRESSURE: 133 MMHG | WEIGHT: 218.69 LBS

## 2017-12-19 DIAGNOSIS — I10 ESSENTIAL HYPERTENSION: ICD-10-CM

## 2017-12-19 DIAGNOSIS — Z94.1 HEART TRANSPLANTED: ICD-10-CM

## 2017-12-19 DIAGNOSIS — T86.20 COMPLICATION OF HEART TRANSPLANT, UNSPECIFIED COMPLICATION: ICD-10-CM

## 2017-12-19 DIAGNOSIS — Z94.1 HEART TRANSPLANT, ORTHOTOPIC, STATUS: Primary | ICD-10-CM

## 2017-12-19 DIAGNOSIS — E78.2 MIXED HYPERLIPIDEMIA: ICD-10-CM

## 2017-12-19 DIAGNOSIS — Z94.1 STATUS POST HEART TRANSPLANT: ICD-10-CM

## 2017-12-19 DIAGNOSIS — Z79.899 LONG TERM CURRENT USE OF IMMUNOSUPPRESSIVE DRUG: ICD-10-CM

## 2017-12-19 DIAGNOSIS — I10 ESSENTIAL HYPERTENSION: Chronic | ICD-10-CM

## 2017-12-19 DIAGNOSIS — R06.02 SHORTNESS OF BREATH: ICD-10-CM

## 2017-12-19 DIAGNOSIS — Z79.52 LONG TERM CURRENT USE OF SYSTEMIC STEROIDS: ICD-10-CM

## 2017-12-19 DIAGNOSIS — Z79.899 ENCOUNTER FOR LONG-TERM (CURRENT) USE OF MEDICATIONS: ICD-10-CM

## 2017-12-19 LAB
DIASTOLIC DYSFUNCTION: NO
ESTIMATED PA SYSTOLIC PRESSURE: 30.25
RETIRED EF AND QEF - SEE NOTES: 65 (ref 55–65)
TRICUSPID VALVE REGURGITATION: NORMAL

## 2017-12-19 PROCEDURE — 99999 PR PBB SHADOW E&M-EST. PATIENT-LVL III: CPT | Mod: PBBFAC,,, | Performed by: INTERNAL MEDICINE

## 2017-12-19 PROCEDURE — 99213 OFFICE O/P EST LOW 20 MIN: CPT | Mod: PBBFAC | Performed by: INTERNAL MEDICINE

## 2017-12-19 PROCEDURE — 99214 OFFICE O/P EST MOD 30 MIN: CPT | Mod: S$PBB,,, | Performed by: INTERNAL MEDICINE

## 2017-12-19 PROCEDURE — 93306 TTE W/DOPPLER COMPLETE: CPT | Mod: PBBFAC | Performed by: INTERNAL MEDICINE

## 2017-12-19 RX ORDER — PRAVASTATIN SODIUM 20 MG/1
20 TABLET ORAL DAILY
Refills: 2 | COMMUNITY
Start: 2017-12-04 | End: 2023-01-13

## 2017-12-19 RX ORDER — KETOCONAZOLE 20 MG/G
CREAM TOPICAL
Refills: 4 | COMMUNITY
Start: 2017-12-04 | End: 2019-02-13

## 2017-12-19 RX ORDER — CYCLOBENZAPRINE HCL 10 MG
10 TABLET ORAL NIGHTLY
COMMUNITY
Start: 2017-12-18

## 2017-12-19 RX ORDER — OXYCODONE HYDROCHLORIDE 15 MG/1
TABLET ORAL
Refills: 0 | COMMUNITY
Start: 2017-12-05 | End: 2023-01-13

## 2017-12-19 RX ORDER — SULFAMETHOXAZOLE AND TRIMETHOPRIM 400; 80 MG/1; MG/1
1 TABLET ORAL DAILY
COMMUNITY
End: 2018-03-06

## 2017-12-19 NOTE — TELEPHONE ENCOUNTER
Spoke with pt regarding his tacro level of 13.9 today, pt stated he took meds about 8 pm last night and fasted for this am labs.  Will repeat level on Wednesday 12/20/17.

## 2017-12-19 NOTE — LETTER
December 19, 2017        [facility]  [address] [place] [address] [address]  [id number]  [facility]  [place] [address]  Phone: [phone]  Fax: [phone]             [facility] [address]  [facility] [address]  Phone: [phone]   Patient: Mick Barriga   MR Number: [id number]   YOB: 1960   Date of Visit: 12/19/2017       Dear Dr. Fox Quinn    Thank you for referring Mick Barriga to me for evaluation. Attached you will find relevant portions of my assessment and plan of care.    If you have questions, please do not hesitate to call me. I look forward to following Mick Barriga along with you.    Sincerely,    Donavon Childs MD    Enclosure    If you would like to receive this communication electronically, please contact [email or web address] or [phone] to request [employer] access.    [employer] is a tool which provides read-only access to select patient information with whom you have a relationship. Its easy to use and provides real time access to review your patients record including encounter summaries, notes, results, and demographic information.    If you feel you have received this communication in error or would no longer like to receive these types of communications, please e-mail [email or web address]

## 2017-12-19 NOTE — PROGRESS NOTES
Subjective:   Mr. Barriga is a 57 y.o. year old White male who received a  - brain death heart transplant on 17.      CMV status:   Donor: -  Recipient: +    HPI   Mr. Barriga is a very pleasant 57 y.o. male with PMHx of NICM s/p HM II (16), HLD, HTN, VT s/p ICD now s/p OHTx 17 who's post op course complicated by bradycardia (started on terbutaline) as well as right groin infected seroma (s/p I&D in OR during admission from -04/10). Plastics then performed a right femoris muscle flap and he had pain-control issues following that. However, since that time he has done well. He was recently admitted from - for fevers, chills, myalgias and cough. He was + for Flu A and txp ID also highly suspected superimposed CAP so he was treated for both and has since completed Zithromax and Tamiflu. He presents today for f/u.     Today, he reports doing well since leaving the hospital but does report lack of energy. Otherwise no complaint; No cardiopulmonary complaints, no f/c, no cough. Creatinine is WNL. Echo today showed normal graft function with an EF=60-65%. Of note, his glucose has been poorly controlled and his A1c today is 8.4 up from 7.7 last month.     Immuno regimen includes:   -Tacro 0.5/0.5, Cellcept 1000 mg BID, off prednisone for 3 months now.      Echo today:    1 - Post-cardiac transplantation study.     2 - Normal left ventricular systolic function (EF 60-65%).     3 - Normal left ventricular diastolic function.     4 - No wall motion abnormalities.     5 - The estimated PA systolic pressure is 30 mmHg.     6 - Mild tricuspid regurgitation.     Review of Systems   Constitution: Positive for malaise/fatigue. Negative for chills, decreased appetite, diaphoresis, fever, weakness, night sweats, weight gain and weight loss.   Eyes: Negative.    Cardiovascular: Negative for chest pain, claudication, cyanosis, dyspnea on exertion, irregular heartbeat, leg swelling, near-syncope,  "orthopnea, palpitations, paroxysmal nocturnal dyspnea and syncope.   Respiratory: Negative for cough, hemoptysis and shortness of breath.    Endocrine: Negative.    Hematologic/Lymphatic: Negative.    Skin: Negative for color change, dry skin and nail changes.   Musculoskeletal: Negative.    Gastrointestinal: Negative.    Genitourinary: Negative.        Objective:   Blood pressure 133/75, pulse 105, height 5' 8" (1.727 m), weight 99.2 kg (218 lb 11.1 oz).body mass index is 33.25 kg/m².    Physical Exam   Constitutional: He appears well-developed.   HENT:   Head: Normocephalic.   Neck: No JVD present. Carotid bruit is not present.   Cardiovascular: Regular rhythm, normal heart sounds and normal pulses.  PMI is not displaced.  Exam reveals no S3.    No murmur heard.  Pulmonary/Chest: Effort normal and breath sounds normal. No respiratory distress. He has no wheezes. He has no rales.   Abdominal: Soft. Bowel sounds are normal. He exhibits no distension. There is no tenderness. There is no rebound.   Genitourinary:   Genitourinary Comments: RIght groin mass, stable   Musculoskeletal: He exhibits no edema.   Neurological: He is alert.   Skin: Skin is warm.   Vitals reviewed.      Lab Results   Component Value Date    WBC 4.41 11/30/2017    HGB 10.9 (L) 11/30/2017    HCT 35.2 (L) 11/30/2017    MCV 59 (L) 11/30/2017     11/30/2017    CO2 28 12/19/2017    CREATININE 1.0 12/19/2017    CALCIUM 9.7 12/19/2017    ALBUMIN 3.8 12/19/2017    AST 14 12/19/2017    BNP 49 12/19/2017    ALT 12 12/19/2017    ALLOMAP See result image under hyperlink 11/17/2017       Lab Results   Component Value Date    INR 1.0 11/26/2017    INR 1.6 (H) 02/15/2017    INR 1.4 (H) 02/14/2017       BNP   Date Value Ref Range Status   12/19/2017 49 0 - 99 pg/mL Final     Comment:     Values of less than 100 pg/ml are consistent with non-CHF populations.   11/26/2017 59 0 - 99 pg/mL Final     Comment:     Values of less than 100 pg/ml are consistent " with non-CHF populations.   11/17/2017 36 0 - 99 pg/mL Final     Comment:     Values of less than 100 pg/ml are consistent with non-CHF populations.       LD   Date Value Ref Range Status   02/08/2017 523 (H) 110 - 260 U/L Final     Comment:     Results are increased in hemolyzed samples.   02/07/2017 536 (H) 110 - 260 U/L Final     Comment:     Results are increased in hemolyzed samples.   02/06/2017 584 (H) 110 - 260 U/L Final     Comment:     Results are increased in hemolyzed samples.       Tacrolimus Lvl   Date Value Ref Range Status   11/30/2017 6.6 5.0 - 15.0 ng/mL Final     Comment:     Testing performed by Liquid Chromatography-Tandem  Mass Spectrometry (LC-MS/MS).  This test was developed and its performance characteristics  determined by [facility], Department of Pathology  and Laboratory Medicine in a manner consistent with CLIA  requirements. It has not been cleared or approved by the [place]  Food and Drug Administration.  This test is used for clinical   purposes.  It should not be regarded as investigational or for  research.       No results found for: SIROLIMUS  No results found for: CYCLOSPORINE    No results found for this or any previous visit.  No results found for this or any previous visit.    Labs were reviewed with the patient.    Assessment:     1. Heart transplant, orthotopic, status    2. Heart transplanted    3. Essential hypertension    4. Long term current use of immunosuppressive drug        Plan:   S/p OHT  -Continue current regimen  -Annual angiogram due in Feb but could be done earlier (since it will coincide with [place] week)   -Follow Tacro levels    HTN  - at goal  - cont amlodipine and lisinopril    Fatigue:  - restart iron supplementation    DMII  - had long discussion about his glucose control; he has been controlled in the past. He asked for 1 more [place] to see if can improve control; If not will refer to Endocrine for assistance    Return instructions as set  forth by post transplant schedule or as needed:    Clinic: Return for labs and/or biopsy weekly the first month, every two weeks during month 2 and then monthly for the first year at the provider or coordinator's discretion. During the second year, return to clinic every 3 months. Post transplant year 3-5 return every 6 months. There will be a comprehensive post transplant evaluation every year that may include LHC/RHC/biopsy, stress test, echo, CXR, and other health screening exams.    In addition to the clinical assessment, I have ordered Allomap testing for this patient to assist in identification of moderate/severe acute cellular rejection (ACR) in a pt with stable Allograft function instead of endomyocardial biopsy.     Patient is reminded to call with any health changes as these can be early signs of transplant complications. Patient is advised to make sure any new medications or changes of old medications are discussed with a pharmacist or physician knowledgeable with transplant to avoid rejection/drug toxicity related to significant drug interactions.    UNOS Patient Status  Functional Status: 100% - Normal, no complaints, no evidence of disease  Physical Capacity: No Limitations  Working for Income: Unknown    Donavon Childs MD

## 2017-12-20 ENCOUNTER — LAB VISIT (OUTPATIENT)
Dept: LAB | Facility: HOSPITAL | Age: 57
End: 2017-12-20
Attending: INTERNAL MEDICINE
Payer: MEDICARE

## 2017-12-20 DIAGNOSIS — E78.2 MIXED HYPERLIPIDEMIA: ICD-10-CM

## 2017-12-20 DIAGNOSIS — Z79.52 LONG TERM CURRENT USE OF SYSTEMIC STEROIDS: ICD-10-CM

## 2017-12-20 DIAGNOSIS — T86.20 COMPLICATION OF HEART TRANSPLANT, UNSPECIFIED COMPLICATION: ICD-10-CM

## 2017-12-20 DIAGNOSIS — Z94.1 STATUS POST HEART TRANSPLANT: ICD-10-CM

## 2017-12-20 DIAGNOSIS — I10 ESSENTIAL HYPERTENSION: ICD-10-CM

## 2017-12-20 DIAGNOSIS — Z79.899 ENCOUNTER FOR LONG-TERM (CURRENT) USE OF MEDICATIONS: ICD-10-CM

## 2017-12-20 DIAGNOSIS — R06.02 SHORTNESS OF BREATH: ICD-10-CM

## 2017-12-20 PROCEDURE — 36415 COLL VENOUS BLD VENIPUNCTURE: CPT

## 2017-12-20 PROCEDURE — 80197 ASSAY OF TACROLIMUS: CPT

## 2017-12-21 ENCOUNTER — TELEPHONE (OUTPATIENT)
Dept: TRANSPLANT | Facility: CLINIC | Age: 57
End: 2017-12-21

## 2017-12-21 LAB — TACROLIMUS BLD-MCNC: 11.7 NG/ML

## 2017-12-21 NOTE — TELEPHONE ENCOUNTER
Chart Review       Prograf level : 11.7   Had been higher (13.9)   On 0.5/0.5     Cellcept 1000m,g BID   Off prednisone         Needs to be more adherent with endocrinology Continue current tacro regiment, recheck

## 2018-01-15 ENCOUNTER — TELEPHONE (OUTPATIENT)
Dept: TRANSPLANT | Facility: CLINIC | Age: 58
End: 2018-01-15

## 2018-01-15 DIAGNOSIS — T86.20 COMPLICATION OF HEART TRANSPLANT, UNSPECIFIED COMPLICATION: ICD-10-CM

## 2018-01-15 DIAGNOSIS — Z13.820 SCREENING FOR OSTEOPOROSIS: ICD-10-CM

## 2018-01-15 DIAGNOSIS — M85.80 OSTEOPENIA, UNSPECIFIED LOCATION: Primary | ICD-10-CM

## 2018-01-15 DIAGNOSIS — Z79.52 LONG TERM (CURRENT) USE OF SYSTEMIC STEROIDS: ICD-10-CM

## 2018-01-15 DIAGNOSIS — Z79.899 LONG-TERM USE OF HIGH-RISK MEDICATION: ICD-10-CM

## 2018-01-15 DIAGNOSIS — Z94.1 STATUS POST HEART TRANSPLANT: ICD-10-CM

## 2018-01-18 ENCOUNTER — TELEPHONE (OUTPATIENT)
Dept: TRANSPLANT | Facility: CLINIC | Age: 58
End: 2018-01-18

## 2018-01-18 DIAGNOSIS — Z79.899 ENCOUNTER FOR LONG-TERM (CURRENT) USE OF MEDICATIONS: ICD-10-CM

## 2018-01-18 DIAGNOSIS — Z94.1 STATUS POST HEART TRANSPLANT: ICD-10-CM

## 2018-01-18 DIAGNOSIS — T86.20 COMPLICATION OF HEART TRANSPLANT, UNSPECIFIED COMPLICATION: ICD-10-CM

## 2018-01-19 NOTE — TELEPHONE ENCOUNTER
Pt called on Thursday and left a message that when he lies down he feels like there is blockage when he tries to take a breath in through his nose, denies SOB.    Pt would like to be set up on [facility] if possible, a new pcp is scheduled to evaluate, hopefully can assist with his DM.

## 2018-01-21 RX ORDER — SULFAMETHOXAZOLE AND TRIMETHOPRIM 400; 80 MG/1; MG/1
TABLET ORAL
Qty: 30 TABLET | Refills: 0 | Status: SHIPPED | OUTPATIENT
Start: 2018-01-21 | End: 2018-03-06

## 2018-01-23 NOTE — PROGRESS NOTES
This note was created by combination of typed  and Dragon dictation.  Transcription errors may be present.  If there are any questions, please contact me.    Assessment & Plan  Viral bronchitis-modest subjective improvement with nebulizer done here in the office.  I provided him with a prescriptive for albuterol MDI.  No indication for antibiotics.  Monitor.  -     albuterol nebulizer solution 2.5 mg; Take 3 mLs (2.5 mg total) by nebulization one time.  -     albuterol 90 mcg/actuation inhaler; Inhale 2 puffs into the lungs every 6 (six) hours as needed for Wheezing. Rescue  Dispense: 18 g; Refill: 0    Nasal congestion-I did discuss with him that he should continue the Flonase and it takes several days for her to really build up.  He would like to see ENT to assess and consider whether he needs any sort of surgical intervention.  I have submitted a referral for such.  -     Ambulatory referral to ENT    He would like to continue with his current primary care doctor.    Medications Discontinued During This Encounter   Medication Reason    FLUCELVAX QUAD [id number], PF, 60 mcg (15 mcg x 4)/0.5 mL Syrg vaccine Therapy completed       Follow-up: No Follow-up on file.      =================================================================      Chief Complaint   Patient presents with    Sinus Problem       ABDIRIZAK Barton is a 57 y.o. male, last appointment with this clinic was Visit date not found.    NICM s/p HM II (8/24/16) s/p ICD now s/p OHTx 2/9/17 whose post op course complicated by bradycardia (started on terbutaline) as well as right groin infected seroma (s/p I&D in OR during admission from 03/28-04/10). Plastics then performed a right femoris muscle flap and he had pain-control issues following that.  12/19/2017 TTE:  1 - Post-cardiac transplantation study.     2 - Normal left ventricular systolic function (EF 60-65%).     3 - Normal left ventricular diastolic function.     4 - No wall motion  abnormalities.     5 - The estimated PA systolic pressure is 30 mmHg.     6 - Mild tricuspid regurgitation.   Hyperlipidemia  HTN  DM, on insulin lantus and novolog.     1/6 oxycontin 20 mg #60 and oxycodone 15 mg #120; 12/20 lyrica 75 BID. travon John    12/2017 A1c 8.4.    He thought he was seeing a specialist today.  He was unaware that I am primary care.  He reports he already has primary care with [facility], Dr Alan and the NP Fabián Navarrete.  He sees Dr. Marcelino for his DM.    He had called his transplant doctors because of nasal congestion for the past week to 2 weeks, and wonders if he needed some surgical procedure to help clear it.  That was his purpose in contacting his transplant doctors.  He was then referred to me.  He had never snore before but he is starting to snore now.  As a confounder however he is having respiratory infection type symptoms that has been going on for maybe a week.  With some congestion.  No fevers, no chills.  He reports he has tried over-the-counter Flonase for about 3 days without relief.  I did discuss with him that it may take several days for truly build up in his system.      Patient Care Team:  Sukhdev Alan MD as PCP - General (General Practice)  Fabián Navarrete NP as Nurse Practitioner (Internal Medicine)  Sukhdev Alan MD as Consulting Physician (General Practice)  Daimon Marcelino MD as Consulting Physician (Endocrinology)    Patient Active Problem List    Diagnosis Date Noted    Long term current use of immunosuppressive drug 12/19/2017    Flu 12/01/2017    Pneumonia due to infectious organism 12/01/2017    Hypomagnesemia 11/27/2017    Sepsis 11/27/2017    Upper respiratory infection 11/26/2017    Febrile illness, acute 11/26/2017    Decreased functional mobility and endurance 08/09/2017    Weakness 07/20/2017    Left arm pain 07/20/2017    Uncomplicated opioid dependence 07/12/2017    Lymphocele 03/28/2017    R femoral Lymphocele after  transplant 03/28/2017    Numbness of left hand 02/14/2017    Heart transplant, orthotopic, status     Heart transplanted 02/09/2017    Immunosuppression 02/09/2017    Benign prostatic hyperplasia 02/07/2017    Hyponatremia 09/06/2016    Essential hypertension 06/14/2015    Hyperlipidemia 06/14/2015    GERD (gastroesophageal reflux disease) 06/14/2015    Type 2 diabetes mellitus with stage 3 chronic kidney disease, with long-term current use of insulin 06/14/2015       PAST MEDICAL HISTORY:  Past Medical History:   Diagnosis Date    CHF (congestive heart failure)     Coronary artery disease     GERD (gastroesophageal reflux disease)     Hyperlipidemia     Hypertension     LVAD (left ventricular assist device) present 2/13/2017    Type 2 diabetes mellitus with hyperglycemia        PAST SURGICAL HISTORY:  Past Surgical History:   Procedure Laterality Date    CARDIAC PACEMAKER PLACEMENT      CHOLECYSTECTOMY      COLONOSCOPY N/A 1/11/2017    Procedure: COLONOSCOPY;  Surgeon: Petr Almanzar MD;  [facility] ([address] [place] [address]);  Service: Endoscopy;  Laterality: N/A;    COLONOSCOPY N/A 1/12/2017    Procedure: COLONOSCOPY;  Surgeon: Petr Almanzar MD;  Location: [facility] ([address] [place] [address]);  Service: Endoscopy;  Laterality: N/A;    HEART TRANSPLANT  02/2017    LEFT VENTRICULAR ASSIST DEVICE      x 3 months ago       SOCIAL HISTORY:  Social History     Social History    Marital status:      Spouse name: N/A    Number of children: N/A    Years of education: N/A     Occupational History    Not on file.     Social History Main Topics    Smoking status: Former Smoker     Packs/day: 0.50     Years: 15.00     Quit date: 6/14/2006    Smokeless tobacco: Never Used    Alcohol use No    Drug use: Unknown    Sexual activity: Yes     Partners: Female     Other Topics Concern    Not on file     Social History Narrative    No narrative on file       ALLERGIES AND MEDICATIONS: updated and  "reviewed.  Review of patient's allergies indicates:   Allergen Reactions    Levemir [insulin detemir] Itching    Niacin preparations     Pork/porcine containing products     Ranexa [ranolazine] Nausea Only     Current Outpatient Prescriptions   Medication Sig Dispense Refill    ACCU-CHEK [facility] CHECK BLOOD GLUCOSE QID  0    amLODIPine (NORVASC) 5 MG tablet Take 1 tablet (5 mg total) by mouth once daily. 30 tablet 11    aspirin (ECOTRIN) 81 MG EC tablet Take 1 tablet (81 mg total) by mouth once daily.  0    BD INSULIN PEN NEEDLE UF SHORT 31 gauge x 5/16" Ndle       blood sugar diagnostic Strp To use to check BG 5 times daily, to use with insurance preferred meter 380 strip 3    blood sugar diagnostic Strp 5 strips by Misc.(Non-Drug; Combo Route) route 5 (five) times daily. 600 each 3    cyclobenzaprine (FLEXERIL) 10 MG tablet 10 mg every evening.        mg capsule TAKE 2 CAPSULES BY MOUTH EVERY EVENING 180 capsule 0    esomeprazole (NEXIUM) 40 MG capsule TAKE 1 CAPSULE BY MOUTH BEFORE BREAKFAST 90 capsule 0    furosemide (LASIX) 40 MG tablet once daily  5    insulin aspart (NOVOLOG) 100 unit/mL InPn pen Inject 11 Units into the skin 3 (three) times daily. (Patient taking differently: Inject 22 Units into the skin 3 (three) times daily. ) 1 Box 0    insulin glargine (LANTUS SOLOSTAR) 100 unit/mL (3 mL) InPn pen Inject 25 Units into the skin once daily. 3 Box 3    ketoconazole (NIZORAL) 2 % cream YADIEL EXT AA QD  4    lancets Misc To use to check BG 4 times daily, to use with insurance preferred meter 350 each 3    lisinopril 10 MG tablet Take 1 tablet (10 mg total) by mouth once daily. 90 tablet 3    LYRICA 75 mg capsule Take 75 mg by mouth 2 (two) times daily.      magnesium oxide (MAG-OX) 400 mg tablet Take 2 tablets (800 mg total) by mouth 3 (three) times daily. 180 tablet 3    mycophenolate (CELLCEPT) 250 mg Cap Take 4 capsules (1,000 mg total) by mouth 2 (two) times daily. 360 " "capsule 11    oxycodone (OXYCONTIN) 20 mg 12 hr tablet Take 1 tablet (20 mg total) by mouth every 12 (twelve) hours. 28 tablet 0    oxyCODONE (ROXICODONE) 15 MG Tab TK 1 T PO  QID AS NEEDED FOR PAIN  0    pravastatin (PRAVACHOL) 20 MG tablet TK 1 T PO QHS  2    sertraline (ZOLOFT) 100 MG tablet Take 100 mg by mouth once daily.      sulfamethoxazole-trimethoprim 400-80mg (BACTRIM,SEPTRA) 400-80 mg per tablet Take 1 tablet by mouth once daily.      sulfamethoxazole-trimethoprim 400-80mg (BACTRIM,SEPTRA) 400-80 mg per tablet TAKE 1 TABLET BY MOUTH EVERY DAY 30 tablet 0    tacrolimus (PROGRAF) 0.5 MG Cap Take 1 capsule (0.5 mg total) by mouth every 12 (twelve) hours.      tadalafil (CIALIS) 10 MG tablet Take 1 tablet (10 mg total) by mouth daily as needed for Erectile Dysfunction. 10 tablet 1    tamsulosin (FLOMAX) 0.4 mg Cp24 TAKE 1 CAPSULE BY MOUTH ONCE DAILY 90 capsule 0     No current facility-administered medications for this visit.        Review of Systems   Constitutional: Negative for chills and fever.   HENT: Positive for congestion. Negative for sinus pain and sore throat.    Respiratory: Positive for cough. Negative for sputum production and shortness of breath.    Cardiovascular: Negative for chest pain and palpitations.   Neurological: Negative for dizziness and headaches.       Physical Exam   Vitals:    01/24/18 1326   BP: 122/78   Pulse: (!) 116   Temp: 98.5 °F (36.9 °C)   SpO2: 96%   Weight: 99 kg (218 lb 5.9 oz)   Height: 5' 8" (1.727 m)    Body mass index is 33.2 kg/m².  Weight: 99 kg (218 lb 5.9 oz)   Height: 5' 8" (172.7 cm)     Physical Exam   Constitutional: He is oriented to person, place, and time. He appears well-developed and well-nourished.   HENT:   TMs grey/clear bilaterally.  OP no erythema no exudates   Eyes: EOM are normal.   Neck: Neck supple.   Cardiovascular: Normal rate, regular rhythm and normal heart sounds.    Pulmonary/Chest: Effort normal. He has wheezes.   Bilateral " inspiratory and expiratory wheeze in both bases   Musculoskeletal:   No ankle edema.  Warm and perfused   Lymphadenopathy:     He has no cervical adenopathy.   Neurological: He is alert and oriented to person, place, and time.   Skin: Skin is warm and dry.   Psychiatric: He has a normal mood and affect. His behavior is normal.     Albuterol nebulizer treatment administered in the office.  Patient notes subjective improvement though the wheezing remained in both lung fields.

## 2018-01-24 ENCOUNTER — OFFICE VISIT (OUTPATIENT)
Dept: FAMILY MEDICINE | Facility: CLINIC | Age: 58
End: 2018-01-24
Payer: MEDICARE

## 2018-01-24 VITALS
BODY MASS INDEX: 33.1 KG/M2 | HEART RATE: 116 BPM | OXYGEN SATURATION: 96 % | WEIGHT: 218.38 LBS | HEIGHT: 68 IN | DIASTOLIC BLOOD PRESSURE: 78 MMHG | SYSTOLIC BLOOD PRESSURE: 122 MMHG | TEMPERATURE: 99 F

## 2018-01-24 DIAGNOSIS — R09.81 NASAL CONGESTION: ICD-10-CM

## 2018-01-24 DIAGNOSIS — J20.8 VIRAL BRONCHITIS: Primary | ICD-10-CM

## 2018-01-24 PROBLEM — A41.9 SEPSIS: Status: RESOLVED | Noted: 2017-11-27 | Resolved: 2018-01-24

## 2018-01-24 PROBLEM — J06.9 UPPER RESPIRATORY INFECTION: Status: RESOLVED | Noted: 2017-11-26 | Resolved: 2018-01-24

## 2018-01-24 PROBLEM — R53.1 WEAKNESS: Status: RESOLVED | Noted: 2017-07-20 | Resolved: 2018-01-24

## 2018-01-24 PROBLEM — R50.9 FEBRILE ILLNESS, ACUTE: Status: RESOLVED | Noted: 2017-11-26 | Resolved: 2018-01-24

## 2018-01-24 PROBLEM — J11.1 FLU: Status: RESOLVED | Noted: 2017-12-01 | Resolved: 2018-01-24

## 2018-01-24 PROBLEM — Z94.1 HEART TRANSPLANTED: Status: RESOLVED | Noted: 2017-02-09 | Resolved: 2018-01-24

## 2018-01-24 PROBLEM — J18.9 PNEUMONIA DUE TO INFECTIOUS ORGANISM: Status: RESOLVED | Noted: 2017-12-01 | Resolved: 2018-01-24

## 2018-01-24 PROCEDURE — 99999 PR PBB SHADOW E&M-EST. PATIENT-LVL III: CPT | Mod: PBBFAC,,, | Performed by: INTERNAL MEDICINE

## 2018-01-24 PROCEDURE — 94640 AIRWAY INHALATION TREATMENT: CPT | Mod: PBBFAC,PO

## 2018-01-24 PROCEDURE — 99214 OFFICE O/P EST MOD 30 MIN: CPT | Mod: S$PBB,,, | Performed by: INTERNAL MEDICINE

## 2018-01-24 PROCEDURE — 99213 OFFICE O/P EST LOW 20 MIN: CPT | Mod: PBBFAC,PO | Performed by: INTERNAL MEDICINE

## 2018-01-24 RX ORDER — ALBUTEROL SULFATE 0.83 MG/ML
2.5 SOLUTION RESPIRATORY (INHALATION)
Status: COMPLETED | OUTPATIENT
Start: 2018-01-24 | End: 2018-01-24

## 2018-01-24 RX ORDER — ALBUTEROL SULFATE 90 UG/1
2 AEROSOL, METERED RESPIRATORY (INHALATION) EVERY 6 HOURS PRN
Qty: 18 G | Refills: 0 | Status: SHIPPED | OUTPATIENT
Start: 2018-01-24 | End: 2018-02-15 | Stop reason: SDUPTHER

## 2018-01-24 RX ADMIN — ALBUTEROL SULFATE 2.5 MG: 2.5 SOLUTION RESPIRATORY (INHALATION) at 01:01

## 2018-01-24 NOTE — LETTER
January 24, 2018      Julia Gupta MD  [address]  [place] [address]           [facility]  [address]  [place] [address]  Phone: [phone]  Fax: [phone]          Patient: Mick Barriga   MR Number: [id number]   YOB: 1960   Date of Visit: 1/24/2018       Dear Dr. Julia Gupta:    Thank you for referring Mick Barriga to me for evaluation. Attached you will find relevant portions of my assessment and plan of care.    If you have questions, please do not hesitate to call me. I look forward to following Mick Barriga along with you.    Sincerely,    Colin Phelan MD    Enclosure  CC:  No Recipients    If you would like to receive this communication electronically, please contact [email or web address] or [phone] to request more information on [employer] Link access.    For providers and/or their staff who would like to refer a patient to [facility], please contact us through our one-stop-shop provider referral line, [facility], at [phone].    If you feel you have received this communication in error or would no longer like to receive these types of communications, please e-mail [email or web address]

## 2018-01-25 ENCOUNTER — HOSPITAL ENCOUNTER (OUTPATIENT)
Dept: RADIOLOGY | Facility: HOSPITAL | Age: 58
Discharge: HOME | End: 2018-01-25
Attending: INTERNAL MEDICINE
Payer: MEDICARE

## 2018-01-25 ENCOUNTER — HOSPITAL ENCOUNTER (OUTPATIENT)
Dept: CARDIOLOGY | Facility: CLINIC | Age: 58
Discharge: HOME | End: 2018-01-25
Attending: INTERNAL MEDICINE
Payer: MEDICARE

## 2018-01-25 ENCOUNTER — TELEPHONE (OUTPATIENT)
Dept: TRANSPLANT | Facility: CLINIC | Age: 58
End: 2018-01-25

## 2018-01-25 ENCOUNTER — OFFICE VISIT (OUTPATIENT)
Dept: TRANSPLANT | Facility: CLINIC | Age: 58
End: 2018-01-25
Payer: MEDICARE

## 2018-01-25 VITALS
BODY MASS INDEX: 33.41 KG/M2 | DIASTOLIC BLOOD PRESSURE: 81 MMHG | HEART RATE: 124 BPM | HEIGHT: 68 IN | SYSTOLIC BLOOD PRESSURE: 181 MMHG | WEIGHT: 220.44 LBS

## 2018-01-25 DIAGNOSIS — T86.20 COMPLICATION OF HEART TRANSPLANT, UNSPECIFIED COMPLICATION: ICD-10-CM

## 2018-01-25 DIAGNOSIS — I10 ESSENTIAL HYPERTENSION: Chronic | ICD-10-CM

## 2018-01-25 DIAGNOSIS — Z79.899 LONG TERM CURRENT USE OF IMMUNOSUPPRESSIVE DRUG: ICD-10-CM

## 2018-01-25 DIAGNOSIS — Z79.899 ENCOUNTER FOR LONG-TERM (CURRENT) USE OF MEDICATIONS: ICD-10-CM

## 2018-01-25 DIAGNOSIS — Z79.52 LONG TERM CURRENT USE OF SYSTEMIC STEROIDS: ICD-10-CM

## 2018-01-25 DIAGNOSIS — Z94.1 STATUS POST HEART TRANSPLANT: ICD-10-CM

## 2018-01-25 DIAGNOSIS — I89.8 LYMPHOCELE AFTER SURGICAL PROCEDURE: ICD-10-CM

## 2018-01-25 DIAGNOSIS — E78.2 MIXED HYPERLIPIDEMIA: ICD-10-CM

## 2018-01-25 DIAGNOSIS — T81.89XA LYMPHOCELE AFTER SURGICAL PROCEDURE: ICD-10-CM

## 2018-01-25 DIAGNOSIS — E11.22 TYPE 2 DIABETES MELLITUS WITH STAGE 3 CHRONIC KIDNEY DISEASE, WITH LONG-TERM CURRENT USE OF INSULIN: Chronic | ICD-10-CM

## 2018-01-25 DIAGNOSIS — Z94.1 HEART TRANSPLANT, ORTHOTOPIC, STATUS: Primary | ICD-10-CM

## 2018-01-25 DIAGNOSIS — N28.9 RENAL INSUFFICIENCY: ICD-10-CM

## 2018-01-25 DIAGNOSIS — I10 ESSENTIAL HYPERTENSION: ICD-10-CM

## 2018-01-25 DIAGNOSIS — R06.02 SHORTNESS OF BREATH: ICD-10-CM

## 2018-01-25 DIAGNOSIS — Z79.4 TYPE 2 DIABETES MELLITUS WITH STAGE 3 CHRONIC KIDNEY DISEASE, WITH LONG-TERM CURRENT USE OF INSULIN: Chronic | ICD-10-CM

## 2018-01-25 DIAGNOSIS — N18.30 TYPE 2 DIABETES MELLITUS WITH STAGE 3 CHRONIC KIDNEY DISEASE, WITH LONG-TERM CURRENT USE OF INSULIN: Chronic | ICD-10-CM

## 2018-01-25 PROCEDURE — 71046 X-RAY EXAM CHEST 2 VIEWS: CPT | Mod: TC,FY

## 2018-01-25 PROCEDURE — 93306 TTE W/DOPPLER COMPLETE: CPT | Mod: PBBFAC | Performed by: INTERNAL MEDICINE

## 2018-01-25 PROCEDURE — 86833 HLA CLASS II HIGH DEFIN QUAL: CPT | Mod: PO,NTX

## 2018-01-25 PROCEDURE — 99213 OFFICE O/P EST LOW 20 MIN: CPT | Mod: PBBFAC,25 | Performed by: INTERNAL MEDICINE

## 2018-01-25 PROCEDURE — 71046 X-RAY EXAM CHEST 2 VIEWS: CPT | Mod: 26,GC,, | Performed by: RADIOLOGY

## 2018-01-25 PROCEDURE — 99999 PR PBB SHADOW E&M-EST. PATIENT-LVL III: CPT | Mod: PBBFAC,,, | Performed by: INTERNAL MEDICINE

## 2018-01-25 PROCEDURE — 99215 OFFICE O/P EST HI 40 MIN: CPT | Mod: S$PBB,,, | Performed by: INTERNAL MEDICINE

## 2018-01-25 NOTE — LETTER
January 25, 2018        Fox Quinn  [address] [place] [address] [address]  [address]  [facility]  [place] [address]  Phone: [phone]  Fax: [phone]             [facility] [address]  [facility] [address]  Phone: [phone]   Patient: Mick Barriga   MR Number: [id number]   YOB: 1960   Date of Visit: 1/25/2018       Dear Dr. Fox Quinn    Thank you for referring Mick Barriga to me for evaluation. Attached you will find relevant portions of my assessment and plan of care.    If you have questions, please do not hesitate to call me. I look forward to following Mick Barriga along with you.    Sincerely,    Ryan Dawn MD    Enclosure    If you would like to receive this communication electronically, please contact [email or web address] or [phone] to request [employer] access.    [employer] is a tool which provides read-only access to select patient information with whom you have a relationship. Its easy to use and provides real time access to review your patients record including encounter summaries, notes, results, and demographic information.    If you feel you have received this communication in error or would no longer like to receive these types of communications, please e-mail [email or web address]

## 2018-01-25 NOTE — PROGRESS NOTES
Subjective:   Mr. Barriga is a 57 y.o. year old White male who received a  - brain death heart transplant on 17.      CMV status:   Donor: -  Recipient: +    HPI   Mr. Barriga is a very pleasant 57 y.o. male with PMHx of NICM s/p HM II (16), HLD, HTN, VT s/p ICD now s/p OHTx 17 who's post op course complicated by bradycardia (started on terbutaline) as well as right groin infected seroma (s/p I&D in OR during admission from -04/10). Plastics then performed a right femoris muscle flap and he had pain-control issues following that. However, since that time he has done well. He was recently admitted from - for fevers, chills, myalgias and cough. He was + for Flu A and txp ID also highly suspected superimposed CAP so he was treated for both and has since completed Zithromax and Tamiflu. He presents today for f/u.     DOing well no issues except for right groin limphocele    Immuno regimen includes:   -Tacro 0.5/0.5, Cellcept 1000 mg BID, off prednisone for 3 months now.      Echo today:       1 - Post-cardiac transplantation study.     2 - Normal left ventricular systolic function (EF 60-65%).     3 - Normal right ventricular systolic function .     4 - Normal left ventricular diastolic function.     5 - Low central venous pressure.     Review of Systems   Constitution: Positive for malaise/fatigue. Negative for chills, decreased appetite, diaphoresis, fever, weakness, night sweats, weight gain and weight loss.   Eyes: Negative.    Cardiovascular: Negative for chest pain, claudication, cyanosis, dyspnea on exertion, irregular heartbeat, leg swelling, near-syncope, orthopnea, palpitations, paroxysmal nocturnal dyspnea and syncope.   Respiratory: Negative for cough, hemoptysis and shortness of breath.    Endocrine: Negative.    Hematologic/Lymphatic: Negative.    Skin: Negative for color change, dry skin and nail changes.   Musculoskeletal: Negative.    Gastrointestinal: Negative.   "  Genitourinary: Negative.        Objective:   Blood pressure (!) 181/81, pulse (!) 124, height 5' 8" (1.727 m), weight 100 kg (220 lb 7.4 oz).body mass index is 33.52 kg/m².    Physical Exam   Constitutional: He appears well-developed.   HENT:   Head: Normocephalic.   Neck: No JVD present. Carotid bruit is not present.   Cardiovascular: Regular rhythm, normal heart sounds and normal pulses.  PMI is not displaced.  Exam reveals no S3.    No murmur heard.  Pulmonary/Chest: Effort normal and breath sounds normal. No respiratory distress. He has no wheezes. He has no rales.   Abdominal: Soft. Bowel sounds are normal. He exhibits no distension. There is no tenderness. There is no rebound.   Genitourinary:   Genitourinary Comments: RIght groin mass, stable   Musculoskeletal: He exhibits no edema.   Neurological: He is alert.   Skin: Skin is warm.   Vitals reviewed.      Lab Results   Component Value Date    WBC 7.61 01/25/2018    HGB 11.8 (L) 01/25/2018    HCT 38.7 (L) 01/25/2018    MCV 60 (L) 01/25/2018     01/25/2018    CO2 28 01/25/2018    CREATININE 1.0 01/25/2018    CALCIUM 9.9 01/25/2018    ALBUMIN 4.0 01/25/2018    AST 15 01/25/2018    BNP 34 01/25/2018    ALT 9 (L) 01/25/2018    ALLOMAP See result image under hyperlink 12/19/2017       Lab Results   Component Value Date    INR 1.0 11/26/2017    INR 1.6 (H) 02/15/2017    INR 1.4 (H) 02/14/2017       BNP   Date Value Ref Range Status   01/25/2018 34 0 - 99 pg/mL Final     Comment:     Values of less than 100 pg/ml are consistent with non-CHF populations.   12/19/2017 49 0 - 99 pg/mL Final     Comment:     Values of less than 100 pg/ml are consistent with non-CHF populations.   11/26/2017 59 0 - 99 pg/mL Final     Comment:     Values of less than 100 pg/ml are consistent with non-CHF populations.       LD   Date Value Ref Range Status   02/08/2017 523 (H) 110 - 260 U/L Final     Comment:     Results are increased in hemolyzed samples.   02/07/2017 536 (J) 447 " - 260 U/L Final     Comment:     Results are increased in hemolyzed samples.   02/06/2017 584 (H) 110 - 260 U/L Final     Comment:     Results are increased in hemolyzed samples.       Tacrolimus Lvl   Date Value Ref Range Status   12/20/2017 11.7 5.0 - 15.0 ng/mL Final     Comment:     Testing performed by Liquid Chromatography-Tandem  Mass Spectrometry (LC-MS/MS).  This test was developed and its performance characteristics  determined by [facility], Department of Pathology  and Laboratory Medicine in a manner consistent with CLIA  requirements. It has not been cleared or approved by the [place]  Food and Drug Administration.  This test is used for clinical   purposes.  It should not be regarded as investigational or for  research.       No results found for: SIROLIMUS  No results found for: CYCLOSPORINE    No results found for this or any previous visit.  No results found for this or any previous visit.    Labs were reviewed with the patient.    Assessment:     1. Heart transplant, orthotopic, status    2. Essential hypertension    3. Type 2 diabetes mellitus with stage 3 chronic kidney disease, with long-term current use of insulin    4. Long term current use of immunosuppressive drug    5. R femoral Lymphocele after transplant        Plan:   S/p OHT  -Continue current regimen  -Annual angiogram due in Feb  -Follow Tacro levels    HTN  - at goal  - cont amlodipine and lisinopril    Fatigue:  - restart iron supplementation    DMII  - had long discussion about his glucose control; he has been controlled in the past. He asked for 1 more alexandria to see if can improve control; If not will refer to Endocrine for assistance    Return instructions as set forth by post transplant schedule or as needed:    Clinic: Return for labs and/or biopsy weekly the first month, every two weeks during month 2 and then monthly for the first year at the provider or coordinator's discretion. During the second year, return to clinic  every 3 months. Post transplant year 3-5 return every 6 months. There will be a comprehensive post transplant evaluation every year that may include LHC/RHC/biopsy, stress test, echo, CXR, and other health screening exams.    In addition to the clinical assessment, I have ordered Allomap testing for this patient to assist in identification of moderate/severe acute cellular rejection (ACR) in a pt with stable Allograft function instead of endomyocardial biopsy.     Patient is reminded to call with any health changes as these can be early signs of transplant complications. Patient is advised to make sure any new medications or changes of old medications are discussed with a pharmacist or physician knowledgeable with transplant to avoid rejection/drug toxicity related to significant drug interactions.    UNOS Patient Status  Functional Status: 100% - Normal, no complaints, no evidence of disease  Physical Capacity: No Limitations  Working for Income: Unknown    Ryan Dawn MD

## 2018-01-29 NOTE — TELEPHONE ENCOUNTER
Pt called still waiting for a call from ENT, pcp placed a referral for ENT last week. Pt feels like he has an obstruction.  Would like to discuss with Dr. Woodruff his right groin area   Lymph.

## 2018-01-30 ENCOUNTER — CONFERENCE (OUTPATIENT)
Dept: TRANSPLANT | Facility: CLINIC | Age: 58
End: 2018-01-30

## 2018-01-30 NOTE — TELEPHONE ENCOUNTER
tacro 0.5 mg BID  Tacro level 14.7-up past 3 checks  Normal Cr--leave same    Cellcept 1000 mg BID  Off prednisone  No changes

## 2018-02-06 RX ORDER — FUROSEMIDE 80 MG/1
TABLET ORAL
Qty: 60 TABLET | Refills: 0 | Status: SHIPPED | OUTPATIENT
Start: 2018-02-06 | End: 2018-03-06

## 2018-02-15 ENCOUNTER — LAB VISIT (OUTPATIENT)
Dept: LAB | Facility: HOSPITAL | Age: 58
End: 2018-02-15
Attending: INTERNAL MEDICINE
Payer: MEDICARE

## 2018-02-15 DIAGNOSIS — Z94.1 STATUS POST HEART TRANSPLANT: ICD-10-CM

## 2018-02-15 DIAGNOSIS — Z79.52 LONG TERM CURRENT USE OF SYSTEMIC STEROIDS: ICD-10-CM

## 2018-02-15 DIAGNOSIS — J20.8 VIRAL BRONCHITIS: ICD-10-CM

## 2018-02-15 DIAGNOSIS — R06.02 SHORTNESS OF BREATH: ICD-10-CM

## 2018-02-15 DIAGNOSIS — I10 ESSENTIAL HYPERTENSION: ICD-10-CM

## 2018-02-15 DIAGNOSIS — T86.20 COMPLICATION OF HEART TRANSPLANT, UNSPECIFIED COMPLICATION: ICD-10-CM

## 2018-02-15 DIAGNOSIS — E78.2 MIXED HYPERLIPIDEMIA: ICD-10-CM

## 2018-02-15 DIAGNOSIS — Z79.899 ENCOUNTER FOR LONG-TERM (CURRENT) USE OF MEDICATIONS: ICD-10-CM

## 2018-02-15 LAB
ALBUMIN SERPL BCP-MCNC: 4 G/DL
ALP SERPL-CCNC: 67 U/L
ALT SERPL W/O P-5'-P-CCNC: 26 U/L
ANION GAP SERPL CALC-SCNC: 5 MMOL/L
AST SERPL-CCNC: 14 U/L
BILIRUB SERPL-MCNC: 0.5 MG/DL
BNP SERPL-MCNC: 26 PG/ML
BUN SERPL-MCNC: 22 MG/DL
CALCIUM SERPL-MCNC: 9.1 MG/DL
CHLORIDE SERPL-SCNC: 100 MMOL/L
CO2 SERPL-SCNC: 31 MMOL/L
CREAT SERPL-MCNC: 1 MG/DL
EST. GFR  (AFRICAN AMERICAN): >60 ML/MIN/1.73 M^2
EST. GFR  (NON AFRICAN AMERICAN): >60 ML/MIN/1.73 M^2
GLUCOSE SERPL-MCNC: 164 MG/DL
POTASSIUM SERPL-SCNC: 4.7 MMOL/L
PROT SERPL-MCNC: 6.7 G/DL
SODIUM SERPL-SCNC: 136 MMOL/L

## 2018-02-15 PROCEDURE — 36415 COLL VENOUS BLD VENIPUNCTURE: CPT

## 2018-02-15 PROCEDURE — 80197 ASSAY OF TACROLIMUS: CPT

## 2018-02-15 PROCEDURE — 83880 ASSAY OF NATRIURETIC PEPTIDE: CPT

## 2018-02-15 PROCEDURE — 80053 COMPREHEN METABOLIC PANEL: CPT

## 2018-02-15 RX ORDER — ALBUTEROL SULFATE 90 UG/1
AEROSOL, METERED RESPIRATORY (INHALATION)
Qty: 18 G | Refills: 0 | Status: SHIPPED | OUTPATIENT
Start: 2018-02-15 | End: 2018-03-13 | Stop reason: SDUPTHER

## 2018-02-16 ENCOUNTER — TELEPHONE (OUTPATIENT)
Dept: TRANSPLANT | Facility: CLINIC | Age: 58
End: 2018-02-16

## 2018-02-16 LAB — TACROLIMUS BLD-MCNC: 6.1 NG/ML

## 2018-02-16 NOTE — LETTER
[facility] [address] [place] [address]  Phone: [phone]            February 16, 2018    Mick Barriga  [address] [facility] [address]              [facility] [address] [place] [address]  Phone: [phone]      To Whom It May Concern:    Ms. Barriga is currently under our care for Post Heart Transplant.     Dental procedures can be performed with local anesthesia. Recommended antibiotic amoxicillin 2 gm Oral,1 Hour prior to any dental work. Tylenol #3 or Percocet may be used for pain control.  X-rays are allowed for routine screening.      Sincerely,        Ryan Dawn M.D., F.A.C.C., F.A.C.P.  Director, Section of Cardiomyopathy & Heart Transplantation  [address] [place] [id number]  Phone # [phone]  Fax # [phone]

## 2018-02-19 ENCOUNTER — TELEPHONE (OUTPATIENT)
Dept: PLASTIC SURGERY | Facility: CLINIC | Age: 58
End: 2018-02-19

## 2018-02-19 NOTE — TELEPHONE ENCOUNTER
----- Message from Mary Alice Lopez sent at 2/16/2018 11:39 AM CST -----  Good Morning,          Pt is having ongoing rt groin pain from Right groin lymphocele and is requesting to be seen sooner than 3/28. Pt is a post heart transplant. Please respond.                                                                                 Thank you

## 2018-02-21 ENCOUNTER — OFFICE VISIT (OUTPATIENT)
Dept: PLASTIC SURGERY | Facility: CLINIC | Age: 58
End: 2018-02-21
Payer: MEDICARE

## 2018-02-21 VITALS
SYSTOLIC BLOOD PRESSURE: 122 MMHG | BODY MASS INDEX: 33.34 KG/M2 | HEIGHT: 68 IN | WEIGHT: 220 LBS | TEMPERATURE: 98 F | HEART RATE: 110 BPM | DIASTOLIC BLOOD PRESSURE: 73 MMHG

## 2018-02-21 DIAGNOSIS — I89.8 LYMPHOCELE AFTER SURGICAL PROCEDURE: Primary | ICD-10-CM

## 2018-02-21 DIAGNOSIS — T81.89XA LYMPHOCELE AFTER SURGICAL PROCEDURE: Primary | ICD-10-CM

## 2018-02-21 PROCEDURE — 99213 OFFICE O/P EST LOW 20 MIN: CPT | Mod: PBBFAC,PO | Performed by: SURGERY

## 2018-02-21 PROCEDURE — 99212 OFFICE O/P EST SF 10 MIN: CPT | Mod: S$PBB,,, | Performed by: SURGERY

## 2018-02-21 PROCEDURE — 99999 PR PBB SHADOW E&M-EST. PATIENT-LVL III: CPT | Mod: PBBFAC,,, | Performed by: SURGERY

## 2018-02-21 NOTE — PROGRESS NOTES
[facility].  He had a heart transplant and   a rectus femoris flap to the groin.  He still has some swelling in the groin.    He did have an ultrasound in November 2017, which showed no fluid.  He did,   however, had an ultrasound around the same time, which showed some evidence of   fluid that is not in the computer that I can find.  Nonetheless, I have   impressed upon him that the leg looks good.  Everything is well healed.  I would   not even want to place a drain in that.  I think if he has any problems, I will   be more than happy to reevaluate him, but it is only minimal swelling.      MIKEY/SARINA  dd: 02/21/2018 08:58:04 (CST)  td: 02/22/2018 04:13:31 (CST)  Doc ID   #[id number]  Job ID #[id number]    CC:

## 2018-02-26 ENCOUNTER — LAB VISIT (OUTPATIENT)
Dept: LAB | Facility: HOSPITAL | Age: 58
End: 2018-02-26
Attending: INTERNAL MEDICINE
Payer: MEDICARE

## 2018-02-26 DIAGNOSIS — Z94.1 STATUS POST HEART TRANSPLANT: ICD-10-CM

## 2018-02-26 DIAGNOSIS — R06.02 SHORTNESS OF BREATH: ICD-10-CM

## 2018-02-26 DIAGNOSIS — T86.20 COMPLICATION OF HEART TRANSPLANT, UNSPECIFIED COMPLICATION: ICD-10-CM

## 2018-02-26 DIAGNOSIS — Z79.899 ENCOUNTER FOR LONG-TERM (CURRENT) USE OF MEDICATIONS: ICD-10-CM

## 2018-02-26 DIAGNOSIS — E78.2 MIXED HYPERLIPIDEMIA: ICD-10-CM

## 2018-02-26 DIAGNOSIS — Z79.52 LONG TERM CURRENT USE OF SYSTEMIC STEROIDS: ICD-10-CM

## 2018-02-26 DIAGNOSIS — I10 ESSENTIAL HYPERTENSION: ICD-10-CM

## 2018-02-26 LAB
ALBUMIN SERPL BCP-MCNC: 3.8 G/DL
ALP SERPL-CCNC: 73 U/L
ALT SERPL W/O P-5'-P-CCNC: 13 U/L
ANION GAP SERPL CALC-SCNC: 9 MMOL/L
ANISOCYTOSIS BLD QL SMEAR: SLIGHT
AST SERPL-CCNC: 13 U/L
BASOPHILS # BLD AUTO: 0.03 K/UL
BASOPHILS NFR BLD: 0.3 %
BILIRUB SERPL-MCNC: 0.5 MG/DL
BNP SERPL-MCNC: 45 PG/ML
BUN SERPL-MCNC: 11 MG/DL
CALCIUM SERPL-MCNC: 9.9 MG/DL
CHLORIDE SERPL-SCNC: 100 MMOL/L
CO2 SERPL-SCNC: 27 MMOL/L
CREAT SERPL-MCNC: 1.1 MG/DL
DIFFERENTIAL METHOD: ABNORMAL
EOSINOPHIL # BLD AUTO: 0.1 K/UL
EOSINOPHIL NFR BLD: 1.2 %
ERYTHROCYTE [DISTWIDTH] IN BLOOD BY AUTOMATED COUNT: 19.6 %
EST. GFR  (AFRICAN AMERICAN): >60 ML/MIN/1.73 M^2
EST. GFR  (NON AFRICAN AMERICAN): >60 ML/MIN/1.73 M^2
GLUCOSE SERPL-MCNC: 273 MG/DL
HCT VFR BLD AUTO: 35.7 %
HGB BLD-MCNC: 11.3 G/DL
HYPOCHROMIA BLD QL SMEAR: ABNORMAL
LYMPHOCYTES # BLD AUTO: 2.1 K/UL
LYMPHOCYTES NFR BLD: 21 %
MCH RBC QN AUTO: 18.3 PG
MCHC RBC AUTO-ENTMCNC: 31.7 G/DL
MCV RBC AUTO: 58 FL
MONOCYTES # BLD AUTO: 0.7 K/UL
MONOCYTES NFR BLD: 7.2 %
NEUTROPHILS # BLD AUTO: 7 K/UL
NEUTROPHILS NFR BLD: 70.3 %
OVALOCYTES BLD QL SMEAR: ABNORMAL
PLATELET # BLD AUTO: 232 K/UL
PLATELET BLD QL SMEAR: ABNORMAL
PMV BLD AUTO: ABNORMAL FL
POIKILOCYTOSIS BLD QL SMEAR: SLIGHT
POLYCHROMASIA BLD QL SMEAR: ABNORMAL
POTASSIUM SERPL-SCNC: 5 MMOL/L
PROT SERPL-MCNC: 6.9 G/DL
RBC # BLD AUTO: 6.17 M/UL
SODIUM SERPL-SCNC: 136 MMOL/L
WBC # BLD AUTO: 9.96 K/UL

## 2018-02-26 PROCEDURE — 36415 COLL VENOUS BLD VENIPUNCTURE: CPT

## 2018-02-26 PROCEDURE — 80053 COMPREHEN METABOLIC PANEL: CPT

## 2018-02-26 PROCEDURE — 80197 ASSAY OF TACROLIMUS: CPT

## 2018-02-26 PROCEDURE — 85025 COMPLETE CBC W/AUTO DIFF WBC: CPT

## 2018-02-26 PROCEDURE — 83880 ASSAY OF NATRIURETIC PEPTIDE: CPT

## 2018-02-27 ENCOUNTER — TELEPHONE (OUTPATIENT)
Dept: TRANSPLANT | Facility: CLINIC | Age: 58
End: 2018-02-27

## 2018-02-27 LAB — TACROLIMUS BLD-MCNC: 18.2 NG/ML

## 2018-03-01 ENCOUNTER — LAB VISIT (OUTPATIENT)
Dept: LAB | Facility: HOSPITAL | Age: 58
End: 2018-03-01
Attending: INTERNAL MEDICINE
Payer: MEDICARE

## 2018-03-01 DIAGNOSIS — Z79.899 ENCOUNTER FOR LONG-TERM (CURRENT) USE OF MEDICATIONS: ICD-10-CM

## 2018-03-01 DIAGNOSIS — R06.02 SHORTNESS OF BREATH: ICD-10-CM

## 2018-03-01 DIAGNOSIS — T86.20 COMPLICATION OF HEART TRANSPLANT, UNSPECIFIED COMPLICATION: ICD-10-CM

## 2018-03-01 DIAGNOSIS — E78.2 MIXED HYPERLIPIDEMIA: ICD-10-CM

## 2018-03-01 DIAGNOSIS — Z79.52 LONG TERM CURRENT USE OF SYSTEMIC STEROIDS: ICD-10-CM

## 2018-03-01 DIAGNOSIS — I10 ESSENTIAL HYPERTENSION: ICD-10-CM

## 2018-03-01 DIAGNOSIS — Z94.1 STATUS POST HEART TRANSPLANT: ICD-10-CM

## 2018-03-01 LAB
ALBUMIN SERPL BCP-MCNC: 3.7 G/DL
ALP SERPL-CCNC: 66 U/L
ALT SERPL W/O P-5'-P-CCNC: 13 U/L
ANION GAP SERPL CALC-SCNC: 7 MMOL/L
AST SERPL-CCNC: 14 U/L
BILIRUB SERPL-MCNC: 0.7 MG/DL
BUN SERPL-MCNC: 13 MG/DL
CALCIUM SERPL-MCNC: 9.6 MG/DL
CHLORIDE SERPL-SCNC: 104 MMOL/L
CO2 SERPL-SCNC: 29 MMOL/L
CREAT SERPL-MCNC: 1 MG/DL
EST. GFR  (AFRICAN AMERICAN): >60 ML/MIN/1.73 M^2
EST. GFR  (NON AFRICAN AMERICAN): >60 ML/MIN/1.73 M^2
GLUCOSE SERPL-MCNC: 141 MG/DL
POTASSIUM SERPL-SCNC: 4.3 MMOL/L
PROT SERPL-MCNC: 6.6 G/DL
SODIUM SERPL-SCNC: 140 MMOL/L

## 2018-03-01 PROCEDURE — 80197 ASSAY OF TACROLIMUS: CPT

## 2018-03-01 PROCEDURE — 80053 COMPREHEN METABOLIC PANEL: CPT

## 2018-03-01 PROCEDURE — 36415 COLL VENOUS BLD VENIPUNCTURE: CPT

## 2018-03-02 LAB — TACROLIMUS BLD-MCNC: 14.1 NG/ML

## 2018-03-06 ENCOUNTER — INITIAL CONSULT (OUTPATIENT)
Dept: DERMATOLOGY | Facility: CLINIC | Age: 58
End: 2018-03-06
Payer: MEDICARE

## 2018-03-06 DIAGNOSIS — Z79.899 LONG TERM CURRENT USE OF IMMUNOSUPPRESSIVE DRUG: ICD-10-CM

## 2018-03-06 DIAGNOSIS — Z94.1 HEART TRANSPLANT, ORTHOTOPIC, STATUS: ICD-10-CM

## 2018-03-06 DIAGNOSIS — L81.4 LENTIGINES: ICD-10-CM

## 2018-03-06 DIAGNOSIS — L73.8 SEBACEOUS HYPERPLASIA: Primary | ICD-10-CM

## 2018-03-06 DIAGNOSIS — D22.9 MULTIPLE BENIGN NEVI: ICD-10-CM

## 2018-03-06 PROCEDURE — 99212 OFFICE O/P EST SF 10 MIN: CPT | Mod: PBBFAC | Performed by: DERMATOLOGY

## 2018-03-06 PROCEDURE — 99999 PR PBB SHADOW E&M-EST. PATIENT-LVL II: CPT | Mod: PBBFAC,,, | Performed by: DERMATOLOGY

## 2018-03-06 PROCEDURE — 99214 OFFICE O/P EST MOD 30 MIN: CPT | Mod: S$PBB,,, | Performed by: DERMATOLOGY

## 2018-03-06 RX ORDER — TAMSULOSIN HYDROCHLORIDE 0.4 MG/1
CAPSULE ORAL
Qty: 90 CAPSULE | Refills: 0 | Status: SHIPPED | OUTPATIENT
Start: 2018-03-06 | End: 2018-04-11

## 2018-03-06 RX ORDER — FERROUS GLUCONATE 324(38)MG
TABLET ORAL
Refills: 0 | COMMUNITY
Start: 2017-12-28 | End: 2018-08-02 | Stop reason: SDUPTHER

## 2018-03-06 RX ORDER — SILDENAFIL CITRATE 20 MG/1
TABLET ORAL
Refills: 0 | COMMUNITY
Start: 2017-12-21 | End: 2018-03-22

## 2018-03-06 NOTE — PROGRESS NOTES
Subjective:       Patient ID:  Mick Barriga is a 57 y.o. male who presents for   Chief Complaint   Patient presents with    Skin Check     tbse     HPI  56 yo M presents for skin check.  Pt was seen as a hospital consult last year.  He has not noticed any new or changing lesions.  He thinks the moles on the bottom of his foot have been present for years, unchanged, asymptomatic.  He does not wear sunscreen regularly    Derm Hx:  high risk for development of skin cancer due to immunosuppression s/p heart transplant (2/2017)    Past Medical History:   Diagnosis Date    CHF (congestive heart failure)     Coronary artery disease     GERD (gastroesophageal reflux disease)     Hyperlipidemia     Hypertension     LVAD (left ventricular assist device) present 2/13/2017    Pneumonia due to infectious organism 11/2017 on top of flu 12/1/2017    Type 2 diabetes mellitus with hyperglycemia      Review of Systems   Constitutional: Negative for fever, chills, weight loss, weight gain, fatigue and malaise.   Skin: Negative for daily sunscreen use, activity-related sunscreen use and recent sunburn.   Hematologic/Lymphatic: Does not bruise/bleed easily.        Objective:    Physical Exam   Constitutional: He appears well-developed and well-nourished. No distress.   Neurological: He is alert and oriented to person, place, and time. He is not disoriented.   Psychiatric: He has a normal mood and affect.   Skin:   Areas Examined (abnormalities noted in diagram):   Scalp / Hair Palpated and Inspected  Head / Face Inspection Performed  Neck Inspection Performed  Chest / Axilla Inspection Performed  Abdomen Inspection Performed  Genitals / Buttocks / Groin Inspection Performed  Back Inspection Performed  RUE Inspected  LUE Inspection Performed  RLE Inspected  LLE Inspection Performed  Nails and Digits Inspection Performed                       Diagram Legend     Erythematous scaling macule/papule c/w actinic keratosis        Vascular papule c/w angioma      Pigmented verrucoid papule/plaque c/w seborrheic keratosis      Yellow umbilicated papule c/w sebaceous hyperplasia      Irregularly shaped tan macule c/w lentigo     1-2 mm smooth white papules consistent with Milia      Movable subcutaneous cyst with punctum c/w epidermal inclusion cyst      Subcutaneous movable cyst c/w pilar cyst      Firm pink to brown papule c/w dermatofibroma      Pedunculated fleshy papule(s) c/w skin tag(s)      Evenly pigmented macule c/w junctional nevus     Mildly variegated pigmented, slightly irregular-bordered macule c/w mildly atypical nevus      Flesh colored to evenly pigmented papule c/w intradermal nevus       Pink pearly papule/plaque c/w basal cell carcinoma      Erythematous hyperkeratotic cursted plaque c/w SCC      Surgical scar with no sign of skin cancer recurrence      Open and closed comedones      Inflammatory papules and pustules      Verrucoid papule consistent consistent with wart     Erythematous eczematous patches and plaques     Dystrophic onycholytic nail with subungual debris c/w onychomycosis     Umbilicated papule    Erythematous-base heme-crusted tan verrucoid plaque consistent with inflamed seborrheic keratosis     Erythematous Silvery Scaling Plaque c/w Psoriasis     See annotation      Assessment / Plan:        Sebaceous hyperplasia  This is a common condition representing benign enlargement of the sebaceous lobule. It typically occurs in adulthood. Reassurance given to patient.     Multiple benign nevi  Reassurance that his nevi appear benign with regular and consistent pigment pattern on dermoscopy    Lentigines  These are benign hyperpigmented sun induced lesions. Daily sun protection will reduce the number of new lesions    Heart transplant, orthotopic, status/Long term current use of immunosuppressive drug  Discussed sun protection and provided brochures on Skin Cancer and ABCDEs of melanoma  [facility]  increased risk of skin cancer in [facility] transplant patients was provided to the patient             Follow-up in about 6 months (around 9/6/2018).

## 2018-03-06 NOTE — LETTER
March 9, 2018      Julia Gupta MD  [address]  [place] [facility] [address]           [facility]  [address]  [facility] [address]  Phone: [phone]  Fax: [phone]          Patient: Mick Barriga   MR Number: [id number]   YOB: 1960   Date of Visit: 3/6/2018       Dear Dr. Julia Gupta:    Thank you for referring Mick Barriga to me for evaluation. Attached you will find relevant portions of my assessment and plan of care.    If you have questions, please do not hesitate to call me. I look forward to following Mick Barriga along with you.    Sincerely,    Robles Calabrese  CC:  No Recipients    If you would like to receive this communication electronically, please contact [email or web address] or [phone] to request more information on TE2 Link access.    For providers and/or their staff who would like to refer a patient to [facility], please contact us through our one-stop-shop provider referral line, [facility], at [phone].    If you feel you have received this communication in error or would no longer like to receive these types of communications, please e-mail [email or web address]

## 2018-03-13 DIAGNOSIS — J20.8 VIRAL BRONCHITIS: ICD-10-CM

## 2018-03-13 RX ORDER — ALBUTEROL SULFATE 90 UG/1
AEROSOL, METERED RESPIRATORY (INHALATION)
Qty: 18 G | Refills: 0 | Status: SHIPPED | OUTPATIENT
Start: 2018-03-13 | End: 2018-03-22

## 2018-03-16 ENCOUNTER — TELEPHONE (OUTPATIENT)
Dept: TRANSPLANT | Facility: CLINIC | Age: 58
End: 2018-03-16

## 2018-03-16 NOTE — TELEPHONE ENCOUNTER
Spoke with pt and Dental clearance mailed to pt's home address, pt will repeat labs next week, if creat ok will proceed with annual [facility]

## 2018-03-19 ENCOUNTER — LAB VISIT (OUTPATIENT)
Dept: LAB | Facility: HOSPITAL | Age: 58
End: 2018-03-19
Attending: INTERNAL MEDICINE
Payer: MEDICARE

## 2018-03-19 DIAGNOSIS — Z94.1 STATUS POST HEART TRANSPLANT: ICD-10-CM

## 2018-03-19 DIAGNOSIS — N28.9 RENAL INSUFFICIENCY: ICD-10-CM

## 2018-03-19 DIAGNOSIS — Z79.52 LONG TERM CURRENT USE OF SYSTEMIC STEROIDS: ICD-10-CM

## 2018-03-19 DIAGNOSIS — R06.02 SHORTNESS OF BREATH: ICD-10-CM

## 2018-03-19 DIAGNOSIS — Z79.899 HIGH RISK MEDICATIONS (NOT ANTICOAGULANTS) LONG-TERM USE: ICD-10-CM

## 2018-03-19 DIAGNOSIS — Z79.60 LONG-TERM USE OF IMMUNOSUPPRESSANT MEDICATION: ICD-10-CM

## 2018-03-19 DIAGNOSIS — T86.20 COMPLICATION OF HEART TRANSPLANT, UNSPECIFIED COMPLICATION: ICD-10-CM

## 2018-03-19 DIAGNOSIS — I10 ESSENTIAL HYPERTENSION: ICD-10-CM

## 2018-03-19 DIAGNOSIS — Z94.1 STATUS POST ORTHOTOPIC HEART TRANSPLANT: ICD-10-CM

## 2018-03-19 DIAGNOSIS — Z79.899 ENCOUNTER FOR LONG-TERM CURRENT USE OF MEDICATION: ICD-10-CM

## 2018-03-19 DIAGNOSIS — E78.2 MIXED HYPERLIPIDEMIA: ICD-10-CM

## 2018-03-19 DIAGNOSIS — Z79.899 ENCOUNTER FOR LONG-TERM (CURRENT) USE OF MEDICATIONS: ICD-10-CM

## 2018-03-19 DIAGNOSIS — Z11.4 SCREENING FOR HUMAN IMMUNODEFICIENCY VIRUS: ICD-10-CM

## 2018-03-19 DIAGNOSIS — T86.90 COMPLICATIONS, ORGAN TRANSPLANT: ICD-10-CM

## 2018-03-19 LAB
ALBUMIN SERPL BCP-MCNC: 4 G/DL
ALP SERPL-CCNC: 77 U/L
ALT SERPL W/O P-5'-P-CCNC: 17 U/L
ANION GAP SERPL CALC-SCNC: 10 MMOL/L
AST SERPL-CCNC: 18 U/L
BASOPHILS # BLD AUTO: 0.04 K/UL
BASOPHILS NFR BLD: 0.5 %
BILIRUB SERPL-MCNC: 0.5 MG/DL
BNP SERPL-MCNC: 35 PG/ML
BUN SERPL-MCNC: 15 MG/DL
CALCIUM SERPL-MCNC: 9.8 MG/DL
CHLORIDE SERPL-SCNC: 101 MMOL/L
CHOLEST SERPL-MCNC: 123 MG/DL
CHOLEST/HDLC SERPL: 3.6 {RATIO}
CO2 SERPL-SCNC: 27 MMOL/L
CREAT SERPL-MCNC: 1 MG/DL
DIFFERENTIAL METHOD: ABNORMAL
EOSINOPHIL # BLD AUTO: 0.2 K/UL
EOSINOPHIL NFR BLD: 2.3 %
ERYTHROCYTE [DISTWIDTH] IN BLOOD BY AUTOMATED COUNT: 18.7 %
EST. GFR  (AFRICAN AMERICAN): >60 ML/MIN/1.73 M^2
EST. GFR  (NON AFRICAN AMERICAN): >60 ML/MIN/1.73 M^2
ESTIMATED AVG GLUCOSE: 177 MG/DL
GLUCOSE SERPL-MCNC: 109 MG/DL
HBA1C MFR BLD HPLC: 7.8 %
HBV SURFACE AG SERPL QL IA: NEGATIVE
HCT VFR BLD AUTO: 35.9 %
HDLC SERPL-MCNC: 34 MG/DL
HDLC SERPL: 27.6 %
HGB BLD-MCNC: 11.4 G/DL
HIV 1+2 AB+HIV1 P24 AG SERPL QL IA: NEGATIVE
LDLC SERPL CALC-MCNC: 62.8 MG/DL
LYMPHOCYTES # BLD AUTO: 2.7 K/UL
LYMPHOCYTES NFR BLD: 35 %
MAGNESIUM SERPL-MCNC: 1.5 MG/DL
MCH RBC QN AUTO: 18.6 PG
MCHC RBC AUTO-ENTMCNC: 31.8 G/DL
MCV RBC AUTO: 59 FL
MONOCYTES # BLD AUTO: 0.6 K/UL
MONOCYTES NFR BLD: 8.1 %
NEUTROPHILS # BLD AUTO: 4.2 K/UL
NEUTROPHILS NFR BLD: 54.1 %
NONHDLC SERPL-MCNC: 89 MG/DL
PLATELET # BLD AUTO: 294 K/UL
PMV BLD AUTO: ABNORMAL FL
POTASSIUM SERPL-SCNC: 4.2 MMOL/L
PROT SERPL-MCNC: 6.9 G/DL
RBC # BLD AUTO: 6.13 M/UL
SODIUM SERPL-SCNC: 138 MMOL/L
T4 SERPL-MCNC: 10 UG/DL
TACROLIMUS BLD-MCNC: 11 NG/ML
TRIGL SERPL-MCNC: 131 MG/DL
TSH SERPL DL<=0.005 MIU/L-ACNC: 1.4 UIU/ML
WBC # BLD AUTO: 7.74 K/UL

## 2018-03-19 PROCEDURE — 36415 COLL VENOUS BLD VENIPUNCTURE: CPT

## 2018-03-19 PROCEDURE — 84436 ASSAY OF TOTAL THYROXINE: CPT

## 2018-03-19 PROCEDURE — 84443 ASSAY THYROID STIM HORMONE: CPT

## 2018-03-19 PROCEDURE — 87516 HEPATITIS B DNA AMP PROBE: CPT

## 2018-03-19 PROCEDURE — 82652 VIT D 1 25-DIHYDROXY: CPT

## 2018-03-19 PROCEDURE — 83036 HEMOGLOBIN GLYCOSYLATED A1C: CPT

## 2018-03-19 PROCEDURE — 83735 ASSAY OF MAGNESIUM: CPT

## 2018-03-19 PROCEDURE — 86703 HIV-1/HIV-2 1 RESULT ANTBDY: CPT

## 2018-03-19 PROCEDURE — 87340 HEPATITIS B SURFACE AG IA: CPT

## 2018-03-19 PROCEDURE — 80053 COMPREHEN METABOLIC PANEL: CPT

## 2018-03-19 PROCEDURE — 80197 ASSAY OF TACROLIMUS: CPT

## 2018-03-19 PROCEDURE — 87522 HEPATITIS C REVRS TRNSCRPJ: CPT

## 2018-03-19 PROCEDURE — 80061 LIPID PANEL: CPT

## 2018-03-19 PROCEDURE — 85025 COMPLETE CBC W/AUTO DIFF WBC: CPT

## 2018-03-19 PROCEDURE — 83880 ASSAY OF NATRIURETIC PEPTIDE: CPT

## 2018-03-20 LAB
CMV DNA SERPL NAA+PROBE-ACNC: NORMAL IU/ML
HBV DNA SPEC QL NAA+PROBE: NOT DETECTED
HCV LOG: <1.08 LOG (10) IU/ML
HCV RNA QUANT PCR: <12 IU/ML
HCV, QUALITATIVE: NOT DETECTED IU/ML

## 2018-03-22 ENCOUNTER — HOSPITAL ENCOUNTER (INPATIENT)
Facility: HOSPITAL | Age: 58
LOS: 1 days | Discharge: HOME | DRG: 204 | End: 2018-03-24
Attending: EMERGENCY MEDICINE | Admitting: INTERNAL MEDICINE
Payer: MEDICARE

## 2018-03-22 DIAGNOSIS — Z94.1 HEART TRANSPLANTED: ICD-10-CM

## 2018-03-22 DIAGNOSIS — R53.1 WEAKNESS: ICD-10-CM

## 2018-03-22 DIAGNOSIS — R06.02 SHORTNESS OF BREATH: ICD-10-CM

## 2018-03-22 LAB
ALBUMIN SERPL BCP-MCNC: 3.8 G/DL
ALP SERPL-CCNC: 72 U/L
ALT SERPL W/O P-5'-P-CCNC: 11 U/L
ANION GAP SERPL CALC-SCNC: 2 MMOL/L
AST SERPL-CCNC: 13 U/L
BASOPHILS # BLD AUTO: 0.06 K/UL
BASOPHILS NFR BLD: 0.9 %
BILIRUB SERPL-MCNC: 0.5 MG/DL
BNP SERPL-MCNC: 81 PG/ML
BUN SERPL-MCNC: 14 MG/DL
CA-I BLDV-SCNC: 1.21 MMOL/L
CALCIUM SERPL-MCNC: ABNORMAL MG/DL
CHLORIDE SERPL-SCNC: 101 MMOL/L
CO2 SERPL-SCNC: 33 MMOL/L
CREAT SERPL-MCNC: 0.9 MG/DL
DIFFERENTIAL METHOD: ABNORMAL
EOSINOPHIL # BLD AUTO: 0.2 K/UL
EOSINOPHIL NFR BLD: 2.2 %
ERYTHROCYTE [DISTWIDTH] IN BLOOD BY AUTOMATED COUNT: 17.7 %
EST. GFR  (AFRICAN AMERICAN): >60 ML/MIN/1.73 M^2
EST. GFR  (NON AFRICAN AMERICAN): >60 ML/MIN/1.73 M^2
GLUCOSE SERPL-MCNC: 211 MG/DL
HCT VFR BLD AUTO: 35.5 %
HGB BLD-MCNC: 10.7 G/DL
IMM GRANULOCYTES # BLD AUTO: 0.02 K/UL
IMM GRANULOCYTES NFR BLD AUTO: 0.3 %
INR PPP: 1
LYMPHOCYTES # BLD AUTO: 1.6 K/UL
LYMPHOCYTES NFR BLD: 23.6 %
MCH RBC QN AUTO: 18.7 PG
MCHC RBC AUTO-ENTMCNC: 30.1 G/DL
MCV RBC AUTO: 62 FL
MONOCYTES # BLD AUTO: 0.5 K/UL
MONOCYTES NFR BLD: 7.8 %
NEUTROPHILS # BLD AUTO: 4.6 K/UL
NEUTROPHILS NFR BLD: 65.2 %
NRBC BLD-RTO: 0 /100 WBC
PLATELET # BLD AUTO: 193 K/UL
PMV BLD AUTO: ABNORMAL FL
POTASSIUM SERPL-SCNC: 4.8 MMOL/L
PROT SERPL-MCNC: 6.6 G/DL
PROTHROMBIN TIME: 10.9 SEC
RBC # BLD AUTO: 5.73 M/UL
SODIUM SERPL-SCNC: 136 MMOL/L
TROPONIN I SERPL DL<=0.01 NG/ML-MCNC: <0.006 NG/ML
TSH SERPL DL<=0.005 MIU/L-ACNC: 0.87 UIU/ML
WBC # BLD AUTO: 6.96 K/UL

## 2018-03-22 PROCEDURE — 84484 ASSAY OF TROPONIN QUANT: CPT

## 2018-03-22 PROCEDURE — 84443 ASSAY THYROID STIM HORMONE: CPT

## 2018-03-22 PROCEDURE — 93005 ELECTROCARDIOGRAM TRACING: CPT

## 2018-03-22 PROCEDURE — 25000003 PHARM REV CODE 250: Performed by: INTERNAL MEDICINE

## 2018-03-22 PROCEDURE — 93010 ELECTROCARDIOGRAM REPORT: CPT | Mod: ,,, | Performed by: INTERNAL MEDICINE

## 2018-03-22 PROCEDURE — 85610 PROTHROMBIN TIME: CPT

## 2018-03-22 PROCEDURE — 84075 ASSAY ALKALINE PHOSPHATASE: CPT

## 2018-03-22 PROCEDURE — 25000003 PHARM REV CODE 250: Performed by: EMERGENCY MEDICINE

## 2018-03-22 PROCEDURE — 99285 EMERGENCY DEPT VISIT HI MDM: CPT | Mod: 25

## 2018-03-22 PROCEDURE — 63600175 PHARM REV CODE 636 W HCPCS: Performed by: INTERNAL MEDICINE

## 2018-03-22 PROCEDURE — 99285 EMERGENCY DEPT VISIT HI MDM: CPT | Mod: ,,, | Performed by: EMERGENCY MEDICINE

## 2018-03-22 PROCEDURE — 83880 ASSAY OF NATRIURETIC PEPTIDE: CPT

## 2018-03-22 PROCEDURE — G0378 HOSPITAL OBSERVATION PER HR: HCPCS

## 2018-03-22 PROCEDURE — 94761 N-INVAS EAR/PLS OXIMETRY MLT: CPT

## 2018-03-22 PROCEDURE — 85025 COMPLETE CBC W/AUTO DIFF WBC: CPT

## 2018-03-22 RX ORDER — LANOLIN ALCOHOL/MO/W.PET/CERES
800 CREAM (GRAM) TOPICAL 3 TIMES DAILY
Status: DISCONTINUED | OUTPATIENT
Start: 2018-03-22 | End: 2018-03-24 | Stop reason: HOSPADM

## 2018-03-22 RX ORDER — CYCLOBENZAPRINE HCL 5 MG
10 TABLET ORAL 3 TIMES DAILY PRN
Status: DISCONTINUED | OUTPATIENT
Start: 2018-03-22 | End: 2018-03-24 | Stop reason: HOSPADM

## 2018-03-22 RX ORDER — ASPIRIN 81 MG/1
81 TABLET ORAL DAILY
Status: DISCONTINUED | OUTPATIENT
Start: 2018-03-23 | End: 2018-03-24 | Stop reason: HOSPADM

## 2018-03-22 RX ORDER — FERROUS GLUCONATE 324(38)MG
324 TABLET ORAL
Status: DISCONTINUED | OUTPATIENT
Start: 2018-03-23 | End: 2018-03-24 | Stop reason: HOSPADM

## 2018-03-22 RX ORDER — TACROLIMUS 0.5 MG/1
0.5 CAPSULE ORAL 2 TIMES DAILY
Status: DISCONTINUED | OUTPATIENT
Start: 2018-03-22 | End: 2018-03-24 | Stop reason: HOSPADM

## 2018-03-22 RX ORDER — SODIUM CHLORIDE 0.9 % (FLUSH) 0.9 %
3 SYRINGE (ML) INJECTION
Status: DISCONTINUED | OUTPATIENT
Start: 2018-03-22 | End: 2018-03-24 | Stop reason: HOSPADM

## 2018-03-22 RX ORDER — SERTRALINE HYDROCHLORIDE 100 MG/1
100 TABLET, FILM COATED ORAL DAILY
Status: DISCONTINUED | OUTPATIENT
Start: 2018-03-23 | End: 2018-03-24 | Stop reason: HOSPADM

## 2018-03-22 RX ORDER — OXYCODONE HCL 10 MG/1
20 TABLET, FILM COATED, EXTENDED RELEASE ORAL EVERY 12 HOURS
Status: DISCONTINUED | OUTPATIENT
Start: 2018-03-22 | End: 2018-03-24 | Stop reason: HOSPADM

## 2018-03-22 RX ORDER — PANTOPRAZOLE SODIUM 40 MG/1
40 TABLET, DELAYED RELEASE ORAL DAILY
Status: DISCONTINUED | OUTPATIENT
Start: 2018-03-23 | End: 2018-03-24 | Stop reason: HOSPADM

## 2018-03-22 RX ORDER — PREGABALIN 75 MG/1
75 CAPSULE ORAL 2 TIMES DAILY
Status: DISCONTINUED | OUTPATIENT
Start: 2018-03-22 | End: 2018-03-24 | Stop reason: HOSPADM

## 2018-03-22 RX ORDER — AMLODIPINE BESYLATE 5 MG/1
5 TABLET ORAL DAILY
Status: DISCONTINUED | OUTPATIENT
Start: 2018-03-23 | End: 2018-03-24 | Stop reason: HOSPADM

## 2018-03-22 RX ORDER — OXYCODONE HYDROCHLORIDE 5 MG/1
15 TABLET ORAL
Status: COMPLETED | OUTPATIENT
Start: 2018-03-22 | End: 2018-03-22

## 2018-03-22 RX ORDER — LISINOPRIL 10 MG/1
10 TABLET ORAL DAILY
Status: DISCONTINUED | OUTPATIENT
Start: 2018-03-23 | End: 2018-03-24 | Stop reason: HOSPADM

## 2018-03-22 RX ORDER — TAMSULOSIN HYDROCHLORIDE 0.4 MG/1
1 CAPSULE ORAL DAILY
Status: DISCONTINUED | OUTPATIENT
Start: 2018-03-23 | End: 2018-03-24 | Stop reason: HOSPADM

## 2018-03-22 RX ORDER — FUROSEMIDE 40 MG/1
40 TABLET ORAL
Status: DISCONTINUED | OUTPATIENT
Start: 2018-03-22 | End: 2018-03-24 | Stop reason: HOSPADM

## 2018-03-22 RX ORDER — MYCOPHENOLATE MOFETIL 250 MG/1
1000 CAPSULE ORAL 2 TIMES DAILY
Status: DISCONTINUED | OUTPATIENT
Start: 2018-03-22 | End: 2018-03-24 | Stop reason: HOSPADM

## 2018-03-22 RX ORDER — INSULIN ASPART 100 [IU]/ML
22 INJECTION, SOLUTION INTRAVENOUS; SUBCUTANEOUS
Status: DISCONTINUED | OUTPATIENT
Start: 2018-03-23 | End: 2018-03-23

## 2018-03-22 RX ORDER — DOXYLAMINE SUCCINATE 25 MG
TABLET ORAL NIGHTLY PRN
Status: DISCONTINUED | OUTPATIENT
Start: 2018-03-22 | End: 2018-03-24 | Stop reason: HOSPADM

## 2018-03-22 RX ORDER — OXYCODONE HYDROCHLORIDE 5 MG/1
15 TABLET ORAL EVERY 6 HOURS
Status: DISCONTINUED | OUTPATIENT
Start: 2018-03-23 | End: 2018-03-24 | Stop reason: HOSPADM

## 2018-03-22 RX ORDER — PRAVASTATIN SODIUM 20 MG/1
20 TABLET ORAL DAILY
Status: DISCONTINUED | OUTPATIENT
Start: 2018-03-23 | End: 2018-03-24 | Stop reason: HOSPADM

## 2018-03-22 RX ADMIN — PREGABALIN: 75 CAPSULE ORAL at 08:03

## 2018-03-22 RX ADMIN — MYCOPHENOLATE MOFETIL 1000 MG: 250 CAPSULE ORAL at 09:03

## 2018-03-22 RX ADMIN — MAGNESIUM OXIDE TAB 400 MG (241.3 MG ELEMENTAL MG) 800 MG: 400 (241.3 MG) TAB at 08:03

## 2018-03-22 RX ADMIN — TACROLIMUS 0.5 MG: 0.5 CAPSULE ORAL at 08:03

## 2018-03-22 RX ADMIN — OXYCODONE HYDROCHLORIDE 15 MG: 5 TABLET ORAL at 11:03

## 2018-03-22 RX ADMIN — OXYCODONE HYDROCHLORIDE 15 MG: 5 TABLET ORAL at 06:03

## 2018-03-22 NOTE — SUBJECTIVE & OBJECTIVE
Past Medical History:   Diagnosis Date    CHF (congestive heart failure)     Coronary artery disease     GERD (gastroesophageal reflux disease)     Hyperlipidemia     Hypertension     LVAD (left ventricular assist device) present 2/13/2017    Pneumonia due to infectious organism 11/2017 on top of flu 12/1/2017    Type 2 diabetes mellitus with hyperglycemia        Past Surgical History:   Procedure Laterality Date    CARDIAC PACEMAKER PLACEMENT      CHOLECYSTECTOMY      COLONOSCOPY N/A 1/11/2017    Procedure: COLONOSCOPY;  Surgeon: Petr Almanzar MD;  [facility]  Service: Endoscopy;  Laterality: N/A;    COLONOSCOPY N/A 1/12/2017    Procedure: COLONOSCOPY;  Surgeon: Petr Almanzar MD;  [facility]  Service: Endoscopy;  Laterality: N/A;    HEART TRANSPLANT  02/2017    LEFT VENTRICULAR ASSIST DEVICE      x 3 months ago       Review of patient's allergies indicates:   Allergen Reactions    Levemir [insulin detemir] Itching    Niacin preparations     Pork/porcine containing products     Ranexa [ranolazine] Nausea Only       Current Facility-Administered Medications   Medication    [START ON 3/23/2018] amLODIPine tablet 5 mg    [START ON 3/23/2018] aspirin EC tablet 81 mg    cyclobenzaprine tablet 10 mg    [START ON 3/23/2018] ferrous gluconate tablet 324 mg    furosemide tablet 40 mg    insulin aspart U-100 pen 22 Units    [START ON 3/23/2018] lisinopril tablet 10 mg    magnesium oxide tablet 800 mg    miconazole 2 % cream    mycophenolate capsule 1,000 mg    oxyCODONE 12 hr tablet 20 mg    [START ON 3/23/2018] oxyCODONE immediate release tablet 15 mg    [START ON 3/23/2018] pantoprazole EC tablet 40 mg    [START ON 3/23/2018] pravastatin tablet 20 mg    pregabalin capsule 75 mg    [START ON 3/23/2018] sertraline tablet 100 mg    sodium chloride 0.9% flush 3 mL    tacrolimus capsule 0.5 mg    [START ON 3/23/2018] tamsulosin 24 hr capsule 0.4  "mg     Current Outpatient Prescriptions   Medication Sig    [place] [facility] [place]SE QID    amLODIPine (NORVASC) 5 MG tablet Take 1 tablet (5 mg total) by mouth once daily.    aspirin (ECOTRIN) 81 MG EC tablet Take 1 tablet (81 mg total) by mouth once daily.    BD INSULIN PEN NEEDLE UF SHORT 31 gauge x 5/16" Ndle     blood sugar diagnostic Strp To use to check BG 5 times daily, to use with insurance preferred meter    blood sugar diagnostic Strp 5 strips by Misc.(Non-Drug; Combo Route) route 5 (five) times daily.    cyclobenzaprine (FLEXERIL) 10 MG tablet 10 mg every evening.     esomeprazole (NEXIUM) 40 MG capsule TAKE 1 CAPSULE BY MOUTH BEFORE BREAKFAST    ferrous gluconate (FERGON) 324 MG tablet TK 1 T PO D WITH AVI    furosemide (LASIX) 40 MG tablet once daily    insulin aspart (NOVOLOG) 100 unit/mL InPn pen Inject 11 Units into the skin 3 (three) times daily. (Patient taking differently: Inject 22 Units into the skin 3 (three) times daily. )    insulin glargine (LANTUS SOLOSTAR) 100 unit/mL (3 mL) InPn pen Inject 25 Units into the skin once daily.    ketoconazole (NIZORAL) 2 % cream YADIEL EXT AA QD    lancets Misc To use to check BG 4 times daily, to use with insurance preferred meter    lisinopril 10 MG tablet Take 1 tablet (10 mg total) by mouth once daily.    LYRICA 75 mg capsule Take 75 mg by mouth 2 (two) times daily.    magnesium oxide (MAG-OX) 400 mg tablet Take 2 tablets (800 mg total) by mouth 3 (three) times daily.    mycophenolate (CELLCEPT) 250 mg Cap Take 4 capsules (1,000 mg total) by mouth 2 (two) times daily.    oxycodone (OXYCONTIN) 20 mg 12 hr tablet Take 1 tablet (20 mg total) by mouth every 12 (twelve) hours.    oxyCODONE (ROXICODONE) 15 MG Tab TK 1 T PO  QID AS NEEDED FOR PAIN    pravastatin (PRAVACHOL) 20 MG tablet TK 1 T PO QHS    sertraline (ZOLOFT) 100 MG tablet Take 100 mg by mouth once daily.    tacrolimus (PROGRAF) 0.5 MG Cap Take 1 capsule (0.5 mg " total) by mouth every 12 (twelve) hours.    tamsulosin (FLOMAX) 0.4 mg Cp24 TAKE 1 CAPSULE BY MOUTH ONCE DAILY     Family History     Problem Relation (Age of Onset)    Cancer Brother    Heart attack Mother, Father    Heart disease Mother, Father    Hypertension Mother, Father        Social History Main Topics    Smoking status: Former Smoker     Packs/day: 0.50     Years: 15.00     Quit date: 6/14/2006    Smokeless tobacco: Never Used    Alcohol use No    Drug use: Unknown    Sexual activity: Yes     Partners: Female     Review of Systems   Respiratory: Negative.    Cardiovascular: Negative.    Musculoskeletal: Negative.    Skin: Negative.    Neurological: Negative.    All other systems reviewed and are negative.    Objective:     Vital Signs (Most Recent):  Temp: 98.4 °F (36.9 °C) (03/22/18 1414)  Pulse: 94 (03/22/18 1853)  Resp: 16 (03/22/18 1853)  BP: 132/69 (03/22/18 1758)  SpO2: 99 % (03/22/18 1853) Vital Signs (24h Range):  Temp:  [98.4 °F (36.9 °C)] 98.4 °F (36.9 °C)  Pulse:  [] 94  Resp:  [13-18] 16  SpO2:  [97 %-100 %] 99 %  BP: (125-170)/(64-82) 132/69     Patient Vitals for the past 72 hrs (Last 3 readings):   Weight   03/22/18 1414 99.8 kg (220 lb)     Body mass index is 33.45 kg/m².    No intake or output data in the 24 hours ending 03/22/18 1855    Physical Exam   Constitutional: He is oriented to person, place, and time. He appears well-developed.   Neck: No JVD present.   Cardiovascular: Regular rhythm, normal heart sounds and intact distal pulses.  Exam reveals no gallop and no friction rub.    No murmur heard.  Pulmonary/Chest: Effort normal and breath sounds normal.   Abdominal: Soft. Bowel sounds are normal.   Musculoskeletal: He exhibits no edema or tenderness.   Neurological: He is alert and oriented to person, place, and time.   Skin: Skin is warm and dry. Capillary refill takes 2 to 3 seconds.   Nursing note and vitals reviewed.      Significant Labs:  CBC:    Recent Labs  Lab  03/19/18  0738 03/22/18  1516   WBC 7.74 6.96   RBC 6.13 5.73   HGB 11.4* 10.7*   HCT 35.9* 35.5*    193   MCV 59* 62*   MCH 18.6* 18.7*   MCHC 31.8* 30.1*     BNP:    Recent Labs  Lab 03/19/18  0738   BNP 35     CMP:    Recent Labs  Lab 03/19/18  0738      CALCIUM 9.8   ALBUMIN 4.0   PROT 6.9      K 4.2   CO2 27      BUN 15   CREATININE 1.0   ALKPHOS 77   ALT 17   AST 18   BILITOT 0.5      Coagulation:     Recent Labs  Lab 03/22/18  1516   INR 1.0     LDH:  No results for input(s): LDH in the last 72 hours.  Microbiology:  Microbiology Results (last 7 days)     ** No results found for the last 168 hours. **          I have reviewed all pertinent labs within the past 24 hours.      Diagnostic Results:

## 2018-03-22 NOTE — CONSULTS
Heart Transplant Consult Note   Physician Requesting Consultation: Maegan Thomas MD  Reason for Consultation: Palpitations, dizziness, SOB    HPI:   57 y.o. male with PMHx of NICM s/p HM II (8/24/16), HLD, HTN, VT s/p ICD now s/p OHTx 2/9/17 who's post op course complicated by bradycardia (started on terbutaline) as well as right groin infected seroma (s/p I&D in OR during admission from 03/28-04/10). Plastics then performed a right femoris muscle flap and he had pain-control issues following that. However, since that time he has done well. He was recently admitted from 11/26-11/30 for fevers, chills, myalgias and cough. He was + for Flu A and txp ID also highly suspected superimposed CAP so he was treated for both and has since completed Zithromax and Tamiflu.     He presents to the ED with complaints of an episode of dizziness, lightheadedness, and shortness of breath that occurred earlier today. The patient states that he was doing light exercise (just started doing this a few days ago) this morning and about 15 minutes after exercising he was laying in bed and felt dizzy. He thought it was related to hypoglycemia, so he got up to drink some juice. After getting up he felt his hear racing and felt SOB. This lasted for several minutes then went away. Tried to call 911, but ended up dialing 119 secondary to his reported confusion. Feelings went away on their own. No chest pain. Kayode irregular heart rate.    He feels back at baseline now. Compliant with all of his medications. Has had no other recent issues.      CMP, troponin, BNP, and TSH pending at the time of assessment as the lab is having technical difficulties.    ROS:    Constitution: Negative for fever or chills. Negative for weight loss or gain.   HENT: Negative for sore throat or headaches. Negative for rhinorrhea.  Eyes: Negative for blurred or double vision.   Cardiovascular: See above  Pulmonary: Negative for SOB. Negative for cough.    Gastrointestinal: Negative for abdominal pain. Negative for nausea/ vomiting. Negative for diarrhea.   : Negative for dysuria.   Neurological: Negative for focal weakness or sensory changes.  PMH:     Past Medical History:   Diagnosis Date    CHF (congestive heart failure)     Coronary artery disease     GERD (gastroesophageal reflux disease)     Hyperlipidemia     Hypertension     LVAD (left ventricular assist device) present 2/13/2017    Pneumonia due to infectious organism 11/2017 on top of flu 12/1/2017    Type 2 diabetes mellitus with hyperglycemia      Past Surgical History:   Procedure Laterality Date    CARDIAC PACEMAKER PLACEMENT      CHOLECYSTECTOMY      COLONOSCOPY N/A 1/11/2017    Procedure: COLONOSCOPY;  Surgeon: Petr Almanzar MD;  Location: [facility] ENDO ([address] [place] [address]);  Service: Endoscopy;  Laterality: N/A;    COLONOSCOPY N/A 1/12/2017    Procedure: COLONOSCOPY;  Surgeon: Petr Almanzar MD;  [facility]  Service: Endoscopy;  Laterality: N/A;    HEART TRANSPLANT  02/2017    LEFT VENTRICULAR ASSIST DEVICE      x 3 months ago     Allergies:     Review of patient's allergies indicates:   Allergen Reactions    Levemir [insulin detemir] Itching    Niacin preparations     Pork/porcine containing products     Ranexa [ranolazine] Nausea Only       Medications:     (Not in a hospital admission)      Inpatient Medications   Continuous Infusions:  Scheduled Meds:  PRN Meds:     Social History:     Social History   Substance Use Topics    Smoking status: Former Smoker     Packs/day: 0.50     Years: 15.00     Quit date: 6/14/2006    Smokeless tobacco: Never Used    Alcohol use No     Family History:     Family History   Problem Relation Age of Onset    Heart disease Mother     Hypertension Mother     Heart attack Mother     Heart disease Father     Hypertension Father     Heart attack Father     Cancer Brother      Physical Exam:     Vitals:  Temp:  [98.4 °F (36.9 °C)]    Pulse:  [100-104]   Resp:  [13-18]   BP: (125-170)/(64-82)   SpO2:  [97 %-100 %]  I/O's:  No intake or output data in the 24 hours ending 03/22/18 1748     Constitutional: NAD  HEENT: Sclera anicteric, PERRLA, EOMI  Neck: No JVD, no carotid bruits  CV: RRR, no m/r/g, normal S1/S2  Pulm: CTAB, no wheezes, rales, or ronchi  GI: Abdomen soft, NTND, +BS  Extremities: No LE edema      Labs:       Recent Labs  Lab 03/19/18  0738      K 4.2      CO2 27   BUN 15   CREATININE 1.0   ANIONGAP 10       Recent Labs  Lab 03/19/18  0738   AST 18   ALT 17   ALKPHOS 77   BILITOT 0.5   ALBUMIN 4.0       Recent Labs  Lab 03/19/18  0738   CALCIUM 9.8   MG 1.5*       Recent Labs  Lab 03/19/18  0738   BNP 35     No results for input(s): NITRITE, LEUKOCYTESUR, WBCUA, BACTERIA in the last 168 hours.    Invalid input(s): EPITHELIALCE, COGRCA    Estimated Creatinine Clearance: 95.6 mL/min (based on SCr of 1 mg/dL).   Recent Labs  Lab 03/19/18  0738 03/22/18  1516   WBC 7.74 6.96   HGB 11.4* 10.7*   HCT 35.9* 35.5*    193   GRAN 54.1  4.2 65.2  4.6       Recent Labs  Lab 03/22/18  1516   INR 1.0     No results for input(s): LACTATE in the last 168 hours.  Lab Results   Component Value Date    CHOL 123 03/19/2018    HDL 34 (L) 03/19/2018    LDLCALC 62.8 (L) 03/19/2018    TRIG 131 03/19/2018     Lab Results   Component Value Date    HGBA1C 7.8 (H) 03/19/2018     Lab Results   Component Value Date    TSH 1.397 03/19/2018    J1XBYZD 10.0 03/19/2018            Imaging:     EF   Date Value Ref Range Status   01/25/2018 65 55 - 65    12/19/2017 65 55 - 65    11/27/2017 60 55 - 65    11/17/2017 65 55 - 65    10/10/2017 60 55 - 65        EKG: NSR, no clear evidence of ischemia      CXR 3/22/18  EXAMINATION:  XR CHEST AP PORTABLE    CLINICAL HISTORY:  CHF;    FINDINGS:  Heart size is upper normal.  There is postoperative change.  The lungs are clear.   Impression       See above    No acute process seen.       Point of Care  U/S  Preserved EF, IVC unable to be visualized    Assessment and Plan:   Dizziness   No evidence of volume overload on exam (BNP pending at the time of assessment). No chest pain (troponin pending at the time of assessment). EF preserved on bedside echocardiogram. CXR and EKG unremarkable.    - Admit to [facility] for observation  - Telemetry  - Check DSA  - Prograf levels daily  - 2D Echo with CFD, but should not hold up discharge tomorrow as it could occur as outpatient  - F/u BNP, troponin and CMP when they return    Patient discussed with attending physician, Dr. Marx.      Signed:    Robby Lim MD  Chief Cardiology Fellow  Pager: [phone]  3/22/2018 5:48 PM

## 2018-03-22 NOTE — ED TRIAGE NOTES
Presents with c/o having palpitations, dizziness and shortness of breath that started approx 15 mins after exercising. Symptoms resolved at this time lasted about 5 mins. Heart transplant one year ago.

## 2018-03-22 NOTE — HPI
57 y.o. male with PMHx of NICM s/p HM II (8/24/16), HLD, HTN, VT s/p ICD now s/p OHTx 2/9/17 who's post op course complicated by bradycardia (started on terbutaline) as well as right groin infected seroma (s/p I&D in OR during admission from 03/28-04/10). He had right femoris muscle flap and he has continued pain-control issues of which he is on high pain regimen.   He presents to the ED with complaints of an episode of dizziness, lightheadedness, and shortness of breath that occurred earlier today.. The patient states that he was doing light exercise (just started doing this a few days ago) this morning and about 15 minutes after exercising he was laying in bed and felt dizzy. He thought it was related to hypoglycemia, so he got up to drink some juice. After getting up he felt his heart racing and felt SOB. This lasted for several minutes then went away. Tried to call 911, but ended up dialing 119 secondary to his reported confusion. Feelings went away on their own. No chest pain. Denies irregular heart rate.     He feels back at baseline now. Compliant with all of his medications. Has had no other recent issues. He was requested for observation to evaluate for potential other causes for his symptoms.

## 2018-03-22 NOTE — ED NOTES
Meal called for pt per his request chicken noodle soup, tuna sandwich on wheat, fruit ceballos, coffee and water.

## 2018-03-23 PROBLEM — R53.1 WEAKNESS: Status: RESOLVED | Noted: 2018-03-22 | Resolved: 2018-03-23

## 2018-03-23 LAB
1,25(OH)2D3 SERPL-MCNC: 58 PG/ML
ANION GAP SERPL CALC-SCNC: 8 MMOL/L
BASOPHILS # BLD AUTO: 0.06 K/UL
BASOPHILS NFR BLD: 0.8 %
BILIRUB UR QL STRIP: NEGATIVE
BUN SERPL-MCNC: 13 MG/DL
CALCIUM SERPL-MCNC: 9 MG/DL
CHLORIDE SERPL-SCNC: 103 MMOL/L
CLARITY UR REFRACT.AUTO: CLEAR
CO2 SERPL-SCNC: 24 MMOL/L
COLOR UR AUTO: YELLOW
CREAT SERPL-MCNC: 0.9 MG/DL
DIASTOLIC DYSFUNCTION: NO
DIFFERENTIAL METHOD: ABNORMAL
EOSINOPHIL # BLD AUTO: 0.2 K/UL
EOSINOPHIL NFR BLD: 2.2 %
ERYTHROCYTE [DISTWIDTH] IN BLOOD BY AUTOMATED COUNT: 16.9 %
EST. GFR  (AFRICAN AMERICAN): >60 ML/MIN/1.73 M^2
EST. GFR  (NON AFRICAN AMERICAN): >60 ML/MIN/1.73 M^2
ESTIMATED PA SYSTOLIC PRESSURE: 21.9
GLUCOSE SERPL-MCNC: 201 MG/DL
GLUCOSE UR QL STRIP: NEGATIVE
HCT VFR BLD AUTO: 34.2 %
HGB BLD-MCNC: 10.2 G/DL
HGB UR QL STRIP: NEGATIVE
IMM GRANULOCYTES # BLD AUTO: 0.02 K/UL
IMM GRANULOCYTES NFR BLD AUTO: 0.3 %
KETONES UR QL STRIP: NEGATIVE
LEUKOCYTE ESTERASE UR QL STRIP: NEGATIVE
LYMPHOCYTES # BLD AUTO: 2.4 K/UL
LYMPHOCYTES NFR BLD: 33.1 %
MAGNESIUM SERPL-MCNC: 1.4 MG/DL
MCH RBC QN AUTO: 18.5 PG
MCHC RBC AUTO-ENTMCNC: 29.8 G/DL
MCV RBC AUTO: 62 FL
MITRAL VALVE REGURGITATION: NORMAL
MONOCYTES # BLD AUTO: 0.7 K/UL
MONOCYTES NFR BLD: 9.6 %
NEUTROPHILS # BLD AUTO: 4 K/UL
NEUTROPHILS NFR BLD: 54 %
NITRITE UR QL STRIP: NEGATIVE
NRBC BLD-RTO: 0 /100 WBC
PH UR STRIP: 5 [PH] (ref 5–8)
PLATELET # BLD AUTO: 186 K/UL
PMV BLD AUTO: ABNORMAL FL
POCT GLUCOSE: 141 MG/DL (ref 70–110)
POCT GLUCOSE: 176 MG/DL (ref 70–110)
POCT GLUCOSE: 200 MG/DL (ref 70–110)
POTASSIUM SERPL-SCNC: 4.7 MMOL/L
PROT UR QL STRIP: NEGATIVE
RBC # BLD AUTO: 5.5 M/UL
RETIRED EF AND QEF - SEE NOTES: 60 (ref 55–65)
SODIUM SERPL-SCNC: 135 MMOL/L
SP GR UR STRIP: 1.01 (ref 1–1.03)
TACROLIMUS BLD-MCNC: 10.4 NG/ML
TRICUSPID VALVE REGURGITATION: NORMAL
URN SPEC COLLECT METH UR: NORMAL
UROBILINOGEN UR STRIP-ACNC: NEGATIVE EU/DL
WBC # BLD AUTO: 7.31 K/UL

## 2018-03-23 PROCEDURE — 25000003 PHARM REV CODE 250: Performed by: PHYSICIAN ASSISTANT

## 2018-03-23 PROCEDURE — 93306 TTE W/DOPPLER COMPLETE: CPT

## 2018-03-23 PROCEDURE — 63600175 PHARM REV CODE 636 W HCPCS: Performed by: INTERNAL MEDICINE

## 2018-03-23 PROCEDURE — 83735 ASSAY OF MAGNESIUM: CPT

## 2018-03-23 PROCEDURE — 25000003 PHARM REV CODE 250: Performed by: INTERNAL MEDICINE

## 2018-03-23 PROCEDURE — 81003 URINALYSIS AUTO W/O SCOPE: CPT

## 2018-03-23 PROCEDURE — 85025 COMPLETE CBC W/AUTO DIFF WBC: CPT

## 2018-03-23 PROCEDURE — 87040 BLOOD CULTURE FOR BACTERIA: CPT

## 2018-03-23 PROCEDURE — 80048 BASIC METABOLIC PNL TOTAL CA: CPT

## 2018-03-23 PROCEDURE — 80197 ASSAY OF TACROLIMUS: CPT

## 2018-03-23 PROCEDURE — 25500020 PHARM REV CODE 255: Performed by: INTERNAL MEDICINE

## 2018-03-23 PROCEDURE — 20600001 HC STEP DOWN PRIVATE ROOM

## 2018-03-23 PROCEDURE — 93306 TTE W/DOPPLER COMPLETE: CPT | Mod: 26,,, | Performed by: INTERNAL MEDICINE

## 2018-03-23 PROCEDURE — 36415 COLL VENOUS BLD VENIPUNCTURE: CPT

## 2018-03-23 PROCEDURE — 63600175 PHARM REV CODE 636 W HCPCS: Performed by: PHYSICIAN ASSISTANT

## 2018-03-23 RX ORDER — MAGNESIUM SULFATE/D5W 2 G/50 ML
2 INTRAVENOUS SOLUTION, PIGGYBACK (ML) INTRAVENOUS ONCE
Status: COMPLETED | OUTPATIENT
Start: 2018-03-23 | End: 2018-03-23

## 2018-03-23 RX ORDER — ENOXAPARIN SODIUM 100 MG/ML
40 INJECTION SUBCUTANEOUS EVERY 24 HOURS
Status: DISCONTINUED | OUTPATIENT
Start: 2018-03-24 | End: 2018-03-24 | Stop reason: HOSPADM

## 2018-03-23 RX ORDER — INSULIN GLARGINE 100 [IU]/ML
30 INJECTION, SOLUTION SUBCUTANEOUS DAILY
Status: DISCONTINUED | OUTPATIENT
Start: 2018-03-24 | End: 2018-03-24 | Stop reason: HOSPADM

## 2018-03-23 RX ORDER — INSULIN ASPART 100 [IU]/ML
18 INJECTION, SOLUTION INTRAVENOUS; SUBCUTANEOUS
Status: DISCONTINUED | OUTPATIENT
Start: 2018-03-23 | End: 2018-03-24 | Stop reason: HOSPADM

## 2018-03-23 RX ADMIN — AMLODIPINE BESYLATE 5 MG: 5 TABLET ORAL at 09:03

## 2018-03-23 RX ADMIN — TACROLIMUS 0.5 MG: 0.5 CAPSULE ORAL at 05:03

## 2018-03-23 RX ADMIN — PANTOPRAZOLE SODIUM 40 MG: 40 TABLET, DELAYED RELEASE ORAL at 09:03

## 2018-03-23 RX ADMIN — MYCOPHENOLATE MOFETIL 1000 MG: 250 CAPSULE ORAL at 09:03

## 2018-03-23 RX ADMIN — MAGNESIUM OXIDE TAB 400 MG (241.3 MG ELEMENTAL MG) 800 MG: 400 (241.3 MG) TAB at 03:03

## 2018-03-23 RX ADMIN — OXYCODONE HYDROCHLORIDE 20 MG: 10 TABLET, FILM COATED, EXTENDED RELEASE ORAL at 09:03

## 2018-03-23 RX ADMIN — OXYCODONE HYDROCHLORIDE 15 MG: 5 TABLET ORAL at 12:03

## 2018-03-23 RX ADMIN — OXYCODONE HYDROCHLORIDE 15 MG: 5 TABLET ORAL at 05:03

## 2018-03-23 RX ADMIN — Medication 2 G: at 11:03

## 2018-03-23 RX ADMIN — MAGNESIUM OXIDE TAB 400 MG (241.3 MG ELEMENTAL MG) 800 MG: 400 (241.3 MG) TAB at 08:03

## 2018-03-23 RX ADMIN — PRAVASTATIN SODIUM 20 MG: 20 TABLET ORAL at 09:03

## 2018-03-23 RX ADMIN — INSULIN ASPART 18 UNITS: 100 INJECTION, SOLUTION INTRAVENOUS; SUBCUTANEOUS at 09:03

## 2018-03-23 RX ADMIN — TAMSULOSIN HYDROCHLORIDE 0.4 MG: 0.4 CAPSULE ORAL at 09:03

## 2018-03-23 RX ADMIN — INSULIN ASPART 18 UNITS: 100 INJECTION, SOLUTION INTRAVENOUS; SUBCUTANEOUS at 08:03

## 2018-03-23 RX ADMIN — OXYCODONE HYDROCHLORIDE 20 MG: 10 TABLET, FILM COATED, EXTENDED RELEASE ORAL at 08:03

## 2018-03-23 RX ADMIN — IOHEXOL 100 ML: 350 INJECTION, SOLUTION INTRAVENOUS at 02:03

## 2018-03-23 RX ADMIN — MYCOPHENOLATE MOFETIL 1000 MG: 250 CAPSULE ORAL at 08:03

## 2018-03-23 RX ADMIN — OXYCODONE HYDROCHLORIDE 15 MG: 5 TABLET ORAL at 11:03

## 2018-03-23 RX ADMIN — INSULIN ASPART 18 UNITS: 100 INJECTION, SOLUTION INTRAVENOUS; SUBCUTANEOUS at 03:03

## 2018-03-23 RX ADMIN — FERROUS GLUCONATE TAB 324 MG (37.5 MG ELEMENTAL IRON) 324 MG: 324 (37.5 FE) TAB at 09:03

## 2018-03-23 RX ADMIN — TACROLIMUS 0.5 MG: 0.5 CAPSULE ORAL at 09:03

## 2018-03-23 RX ADMIN — ASPIRIN 81 MG: 81 TABLET, COATED ORAL at 09:03

## 2018-03-23 RX ADMIN — LISINOPRIL 10 MG: 10 TABLET ORAL at 09:03

## 2018-03-23 RX ADMIN — MAGNESIUM OXIDE TAB 400 MG (241.3 MG ELEMENTAL MG) 800 MG: 400 (241.3 MG) TAB at 09:03

## 2018-03-23 NOTE — ASSESSMENT & PLAN NOTE
-Associated with LH/dizziness, confusion, back pain, and palpitations. All now resolved  -Monitor on tele  -2DE shows normal LV function  -CXR normal. Also check for possible infection- UA, Bl Cxs  -Get CTA, PE protocol

## 2018-03-23 NOTE — PLAN OF CARE
Problem: Patient Care Overview  Goal: Plan of Care Review  Outcome: Ongoing (interventions implemented as appropriate)  Pt arrived to unit from ED via transport per wheelchair with telemetry in place. No acute distress noted. Oriented pt to room. Fall precautions maintained. Bed wheels locked, bed in lowest position, upper SR up x 2, call light in reach, non-skid socks when OOB. Instructed pt to call for assistance as needed. VSS. Will cont to monitor.

## 2018-03-23 NOTE — SUBJECTIVE & OBJECTIVE
Interval History: No more SOB or palpitations. Feeling better this am    Continuous Infusions:  Scheduled Meds:   amLODIPine  5 mg Oral Daily    aspirin  81 mg Oral Daily    ferrous gluconate  324 mg Oral Daily with breakfast    insulin aspart U-100  18 Units Subcutaneous TID WM    lisinopril  10 mg Oral Daily    magnesium oxide  800 mg Oral TID    mycophenolate  1,000 mg Oral BID    oxyCODONE  20 mg Oral Q12H    oxyCODONE  15 mg Oral Q6H    pantoprazole  40 mg Oral Daily    pravastatin  20 mg Oral Daily    pregabalin  75 mg Oral BID    sertraline  100 mg Oral Daily    tacrolimus  0.5 mg Oral BID    tamsulosin  1 capsule Oral Daily     PRN Meds:cyclobenzaprine, furosemide, miconazole, sodium chloride 0.9%    Review of patient's allergies indicates:   Allergen Reactions    Levemir [insulin detemir] Itching    Niacin preparations     Pork/porcine containing products     Ranexa [ranolazine] Nausea Only     Objective:     Vital Signs (Most Recent):  Temp: 98.1 °F (36.7 °C) (03/23/18 1218)  Pulse: 105 (03/23/18 1500)  Resp: 18 (03/23/18 1218)  BP: 139/75 (03/23/18 1218)  SpO2: 97 % (03/23/18 1218) Vital Signs (24h Range):  Temp:  [97.7 °F (36.5 °C)-98.5 °F (36.9 °C)] 98.1 °F (36.7 °C)  Pulse:  [] 105  Resp:  [13-23] 18  SpO2:  [97 %-100 %] 97 %  BP: (125-175)/(64-82) 139/75     Patient Vitals for the past 72 hrs (Last 3 readings):   Weight   03/23/18 0500 106.5 kg (234 lb 14.4 oz)   03/22/18 1414 99.8 kg (220 lb)     Body mass index is 35.72 kg/m².      Intake/Output Summary (Last 24 hours) at 03/23/18 1541  Last data filed at 03/23/18 1157   Gross per 24 hour   Intake              710 ml   Output              300 ml   Net              410 ml       Hemodynamic Parameters:         Physical Exam   Constitutional: He is oriented to person, place, and time. He appears well-developed and well-nourished.   Neck: Normal range of motion. Neck supple. No JVD present.   Cardiovascular: Regular rhythm.   Tachycardia present.  Exam reveals no gallop and no friction rub.    No murmur heard.  Pulmonary/Chest: Effort normal and breath sounds normal. He has no wheezes. He has no rales.   Abdominal: Soft. Bowel sounds are normal. There is no tenderness.   Musculoskeletal: He exhibits no edema.   Neurological: He is alert and oriented to person, place, and time.   Skin: Skin is warm and dry.       Significant Labs:  CBC:    Recent Labs  Lab 03/19/18  0738 03/22/18  1516 03/23/18  0532   WBC 7.74 6.96 7.31   RBC 6.13 5.73 5.50   HGB 11.4* 10.7* 10.2*   HCT 35.9* 35.5* 34.2*    193 186   MCV 59* 62* 62*   MCH 18.6* 18.7* 18.5*   MCHC 31.8* 30.1* 29.8*     BNP:    Recent Labs  Lab 03/19/18  0738 03/22/18  1516   BNP 35 81     CMP:    Recent Labs  Lab 03/19/18  0738 03/22/18  1516 03/23/18  0532    211* 201*   CALCIUM 9.8 CANCELED 9.0   ALBUMIN 4.0 3.8  --    PROT 6.9 6.6  --     136 135*   K 4.2 4.8 4.7   CO2 27 33* 24    101 103   BUN 15 14 13   CREATININE 1.0 0.9 0.9   ALKPHOS 77 72  --    ALT 17 11  --    AST 18 13  --    BILITOT 0.5 0.5  --       Coagulation:     Recent Labs  Lab 03/22/18  1516   INR 1.0     LDH:  No results for input(s): LDH in the last 72 hours.  Microbiology:  Microbiology Results (last 7 days)     Procedure Component Value Units Date/Time    Blood culture [[id number]] Collected:  03/23/18 0901    Order Status:  Sent Specimen:  Blood Updated:  03/23/18 0915    Blood culture [[id number]] Collected:  03/23/18 0901    Order Status:  Sent Specimen:  Blood Updated:  03/23/18 0915          I have reviewed all pertinent labs within the past 24 hours.    Estimated Creatinine Clearance: 109.8 mL/min (based on SCr of 0.9 mg/dL).    Diagnostic Results:  I have reviewed all pertinent imaging results/findings within the past 24 hours.

## 2018-03-23 NOTE — ASSESSMENT & PLAN NOTE
-s/p OHTx 2/9/17, complicated by right groin infected seroma (s/p I&D in OR during admission from 03/28-04/10). He had right femoris muscle flap and he has continued pain-control issues of which he is on high pain regimen.  -Continue tacrolimus 0.5 bid and cellcept 1000mg BID. Monitor tacro levels  -Cont ASA, statin  -Echo shows normal EF

## 2018-03-23 NOTE — ASSESSMENT & PLAN NOTE
-Continue tacrolimus 0.5 bid and cellcept 1000mg BID  - Level draw tomorrow  - Bedside echo shows normal EF

## 2018-03-23 NOTE — PROGRESS NOTES
[facility]  Heart Transplant  Progress Note    Patient Name: Mick Barriga  MRN: [id number]  Admission Date: 3/22/2018  Hospital Length of Stay: 0 days  Attending Physician: Trixie Marx MD  Primary Care Provider: Sukhdev Alan MD  Principal Problem:Shortness of breath    Subjective:     Interval History: No more SOB or palpitations. Feeling better this am    Continuous Infusions:  Scheduled Meds:   amLODIPine  5 mg Oral Daily    aspirin  81 mg Oral Daily    ferrous gluconate  324 mg Oral Daily with breakfast    insulin aspart U-100  18 Units Subcutaneous TID WM    lisinopril  10 mg Oral Daily    magnesium oxide  800 mg Oral TID    mycophenolate  1,000 mg Oral BID    oxyCODONE  20 mg Oral Q12H    oxyCODONE  15 mg Oral Q6H    pantoprazole  40 mg Oral Daily    pravastatin  20 mg Oral Daily    pregabalin  75 mg Oral BID    sertraline  100 mg Oral Daily    tacrolimus  0.5 mg Oral BID    tamsulosin  1 capsule Oral Daily     PRN Meds:cyclobenzaprine, furosemide, miconazole, sodium chloride 0.9%    Review of patient's allergies indicates:   Allergen Reactions    Levemir [insulin detemir] Itching    Niacin preparations     Pork/porcine containing products     Ranexa [ranolazine] Nausea Only     Objective:     Vital Signs (Most Recent):  Temp: 98.1 °F (36.7 °C) (03/23/18 1218)  Pulse: 105 (03/23/18 1500)  Resp: 18 (03/23/18 1218)  BP: 139/75 (03/23/18 1218)  SpO2: 97 % (03/23/18 1218) Vital Signs (24h Range):  Temp:  [97.7 °F (36.5 °C)-98.5 °F (36.9 °C)] 98.1 °F (36.7 °C)  Pulse:  [] 105  Resp:  [13-23] 18  SpO2:  [97 %-100 %] 97 %  BP: (125-175)/(64-82) 139/75     Patient Vitals for the past 72 hrs (Last 3 readings):   Weight   03/23/18 0500 106.5 kg (234 lb 14.4 oz)   03/22/18 1414 99.8 kg (220 lb)     Body mass index is 35.72 kg/m².      Intake/Output Summary (Last 24 hours) at 03/23/18 1541  Last data filed at 03/23/18 1157   Gross per 24 hour   Intake               710 ml   Output              300 ml   Net              410 ml       Hemodynamic Parameters:         Physical Exam   Constitutional: He is oriented to person, place, and time. He appears well-developed and well-nourished.   Neck: Normal range of motion. Neck supple. No JVD present.   Cardiovascular: Regular rhythm.  Tachycardia present.  Exam reveals no gallop and no friction rub.    No murmur heard.  Pulmonary/Chest: Effort normal and breath sounds normal. He has no wheezes. He has no rales.   Abdominal: Soft. Bowel sounds are normal. There is no tenderness.   Musculoskeletal: He exhibits no edema.   Neurological: He is alert and oriented to person, place, and time.   Skin: Skin is warm and dry.       Significant Labs:  CBC:    Recent Labs  Lab 03/19/18  0738 03/22/18  1516 03/23/18  0532   WBC 7.74 6.96 7.31   RBC 6.13 5.73 5.50   HGB 11.4* 10.7* 10.2*   HCT 35.9* 35.5* 34.2*    193 186   MCV 59* 62* 62*   MCH 18.6* 18.7* 18.5*   MCHC 31.8* 30.1* 29.8*     BNP:    Recent Labs  Lab 03/19/18  0738 03/22/18  1516   BNP 35 81     CMP:    Recent Labs  Lab 03/19/18  0738 03/22/18  1516 03/23/18  0532    211* 201*   CALCIUM 9.8 CANCELED 9.0   ALBUMIN 4.0 3.8  --    PROT 6.9 6.6  --     136 135*   K 4.2 4.8 4.7   CO2 27 33* 24    101 103   BUN 15 14 13   CREATININE 1.0 0.9 0.9   ALKPHOS 77 72  --    ALT 17 11  --    AST 18 13  --    BILITOT 0.5 0.5  --       Coagulation:     Recent Labs  Lab 03/22/18  1516   INR 1.0     LDH:  No results for input(s): LDH in the last 72 hours.  Microbiology:  Microbiology Results (last 7 days)     Procedure Component Value Units Date/Time    Blood culture [[id number]] Collected:  03/23/18 0901    Order Status:  Sent Specimen:  Blood Updated:  03/23/18 0915    Blood culture [[id number]] Collected:  03/23/18 0901    Order Status:  Sent Specimen:  Blood Updated:  03/23/18 0915          I have reviewed all pertinent labs within the past 24 hours.    Estimated Creatinine  Clearance: 109.8 mL/min (based on SCr of 0.9 mg/dL).    Diagnostic Results:  I have reviewed all pertinent imaging results/findings within the past 24 hours.    Assessment and Plan:     57 y.o. male with PMHx of NICM s/p HM II (8/24/16), HLD, HTN, VT s/p ICD now s/p OHTx 2/9/17 who's post op course complicated by bradycardia (started on terbutaline) as well as right groin infected seroma (s/p I&D in OR during admission from 03/28-04/10). He had right femoris muscle flap and he has continued pain-control issues of which he is on high pain regimen.   He presents to the ED with complaints of an episode of dizziness, lightheadedness, and shortness of breath that occurred earlier today.. The patient states that he was doing light exercise (just started doing this a few days ago) this morning and about 15 minutes after exercising he was laying in bed and felt dizzy. He thought it was related to hypoglycemia, so he got up to drink some juice. After getting up he felt his heart racing and felt SOB. This lasted for several minutes then went away. Tried to call 911, but ended up dialing 119 secondary to his reported confusion. Feelings went away on their own. No chest pain. Denies irregular heart rate.     He feels back at baseline now. Compliant with all of his medications. Has had no other recent issues. He was requested for observation to evaluate for potential other causes for his symptoms.     * Shortness of breath    -Associated with LH/dizziness, confusion, back pain, and palpitations. All now resolved  -Monitor on tele  -2DE shows normal LV function  -CXR normal. Also check for possible infection- UA, Bl Cxs  -Get CTA, PE protocol        Heart transplant, orthotopic, status    -s/p OHTx 2/9/17, complicated by right groin infected seroma (s/p I&D in OR during admission from 03/28-04/10). He had right femoris muscle flap and he has continued pain-control issues of which he is on high pain regimen.  -Continue tacrolimus  0.5 bid and cellcept 1000mg BID. Monitor tacro levels  -Cont ASA, statin  -Echo shows normal EF        Essential hypertension    - continue amlodipine and lisinopril        Type 2 diabetes mellitus with stage 3 chronic kidney disease, with long-term current use of insulin    -He is allergic to Levemir. He will take his own [id number] q am  - Cont prandial insulin and SSI        Uncomplicated opioid dependence    - pain well controlled. Continue home regimen without change. Both oxycodone and oxycontin            Joan Benjamin PA-C  Heart Transplant  [facility]

## 2018-03-23 NOTE — ED PROVIDER NOTES
"Encounter Date: 3/22/2018    SCRIBE #1 NOTE: I, Radha Moises, am scribing for, and in the presence of,  Maegan Thomas MD. I have scribed the entire note.       History     Chief Complaint   Patient presents with    Weakness     Weakness/dizziness after working out     57 y.o. [place] male with GERD, HTN, CHF, HLD, DM type II, CAD, and history of heart transplant and PNA presents to the ED complaining of weakness after working out. Associated symptoms include stiffness in the middle of the back. After the patient noticed his weakness, he laid down; at that time, he reports feeling "strange" like he might "pass out." Also, he recalls palpitations, anxiety, hyperventilation, and SOB during this episode. At this time, the patient denies any of these symptoms, which spontaneously resolved en route to the ED. He denies N/V, abdominal cramping, CP, incontinence, and unilateral symptoms.       The history is provided by the patient and medical records.     Review of patient's allergies indicates:   Allergen Reactions    Levemir [insulin detemir] Itching    Niacin preparations     Pork/porcine containing products     Ranexa [ranolazine] Nausea Only     Past Medical History:   Diagnosis Date    CHF (congestive heart failure)     Coronary artery disease     GERD (gastroesophageal reflux disease)     Hyperlipidemia     Hypertension     LVAD (left ventricular assist device) present 2/13/2017    Pneumonia due to infectious organism 11/2017 on top of flu 12/1/2017    Type 2 diabetes mellitus with hyperglycemia      Past Surgical History:   Procedure Laterality Date    CARDIAC PACEMAKER PLACEMENT      CHOLECYSTECTOMY      COLONOSCOPY N/A 1/11/2017    Procedure: COLONOSCOPY;  Surgeon: Petr Almanzar MD;  Location: [facility] ([address] [place] [address]);  Service: Endoscopy;  Laterality: N/A;    COLONOSCOPY N/A 1/12/2017    Procedure: COLONOSCOPY;  Surgeon: Petr Almanzar MD;  Location: [facility] ([address] [place] [address]);  Service: Endoscopy;  " Laterality: N/A;    HEART TRANSPLANT  02/2017    LEFT VENTRICULAR ASSIST DEVICE      x 3 months ago     Family History   Problem Relation Age of Onset    Heart disease Mother     Hypertension Mother     Heart attack Mother     Heart disease Father     Hypertension Father     Heart attack Father     Cancer Brother      Social History   Substance Use Topics    Smoking status: Former Smoker     Packs/day: 0.50     Years: 15.00     Quit date: 6/14/2006    Smokeless tobacco: Never Used    Alcohol use No     Review of Systems   Constitutional: Negative for fever.   HENT: Negative for sore throat.    Respiratory: Positive for shortness of breath.         (+) Hyperventilation.   Cardiovascular: Positive for palpitations. Negative for chest pain.   Gastrointestinal: Negative for abdominal pain, nausea and vomiting.   Genitourinary: Negative for difficulty urinating and dysuria.   Musculoskeletal: Negative for back pain.        (+) Back stiffness.   Skin: Negative for rash.   Neurological: Positive for weakness.        (-) Unilateral symptoms.   Hematological: Does not bruise/bleed easily.   Psychiatric/Behavioral: Positive for confusion. The patient is nervous/anxious.        Physical Exam     Initial Vitals [03/22/18 1414]   BP Pulse Resp Temp SpO2   (!) 170/82 102 18 98.4 °F (36.9 °C) 100 %      MAP       111.33         Physical Exam    Nursing note and vitals reviewed.  Constitutional: He appears well-developed and well-nourished. He is not diaphoretic. No distress.   HENT:   Head: Normocephalic and atraumatic.   Mouth/Throat: Oropharynx is clear and moist.   Neck: Normal range of motion. Neck supple. No JVD present.   Cardiovascular: Normal rate, regular rhythm, normal heart sounds and intact distal pulses.   Midline scar consistent with recent heart transplant.   Pulmonary/Chest: Breath sounds normal. No respiratory distress. He has no wheezes. He has no rhonchi. He has no rales.   Abdominal: Soft. He  exhibits no distension. There is no tenderness.   Musculoskeletal: Normal range of motion. He exhibits no edema.   Lymphadenopathy:     He has no cervical adenopathy.   Neurological: He is alert and oriented to person, place, and time. No cranial nerve deficit or sensory deficit.   Skin: Skin is warm and dry.         ED Course   Procedures  Labs Reviewed   CBC W/ AUTO DIFFERENTIAL - Abnormal; Notable for the following:        Result Value    Hemoglobin 10.7 (*)     Hematocrit 35.5 (*)     MCV 62 (*)     MCH 18.7 (*)     MCHC 30.1 (*)     RDW 17.7 (*)     All other components within normal limits   PROTIME-INR   COMPREHENSIVE METABOLIC PANEL   TROPONIN I   B-TYPE NATRIURETIC PEPTIDE   TSH     EKG Readings: (Independently Interpreted)   Initial Reading: No STEMI. Heart Rate: 97.   RSR pattern. No ischemic changes. .       X-Rays:   Independently Interpreted Readings:   Chest X-Ray: No acute process.     Medical Decision Making:   History:   Old Medical Records: I decided to obtain old medical records.  Differential Diagnosis:   Near syncopal episode secondary to arrhthymia, orthostatic hypotension, ACS, and electrolyte derangement among other diagnoses.  Independently Interpreted Test(s):   I have ordered and independently interpreted X-rays - see prior notes.  I have ordered and independently interpreted EKG Reading(s) - see prior notes  Clinical Tests:   Lab Tests: Ordered and Reviewed  Radiological Study: Ordered and Reviewed  Medical Tests: Reviewed and Ordered  ED Management:  Will order labs, EKG, and CXR. Will consult Cardiology.     1731:  Discussed patient with Cardiology. They will come to the ED to evaluate the patient at bedside.    1913:  Cardiology recommending placement in Observation for continue monitoring.      Other:   I have discussed this case with another health care provider.            Scribe Attestation:   Scribe #1: I performed the above scribed service and the documentation accurately  describes the services I performed. I attest to the accuracy of the note.    Attending Attestation:           Physician Attestation for Scribe:      Comments: I, Dr. Maegan Thomas, personally performed the services described in this documentation. All medical record entries made by the scribe were at my direction and in my presence.  I have reviewed the chart and agree that the record reflects my personal performance and is accurate and complete. Maegan Thomas MD.                 Clinical Impression:   Diagnoses of Weakness and Shortness of breath were pertinent to this visit.    Disposition:   Disposition: Placed in Observation  Condition: Stable                        Maegan Thomas MD  03/22/18 2002

## 2018-03-23 NOTE — ASSESSMENT & PLAN NOTE
- he is allergic to detemir and has requested to use his own lantus in patient.  - nurse communication writen

## 2018-03-23 NOTE — NURSING
Rounds Report: Attended interdisciplinary rounds this morning with the transplant team including SW, physicians, fellows,  mid-level providers, and transplant coordinators.  Discussed plan of care, including DC date,early next week. Will get ECHO, CTA with PE protocol. R/O infection.

## 2018-03-23 NOTE — PROGRESS NOTES
Admit Note     Met with patient to assess needs. Patient is a 57 y.o.  male, admitted for Shortness of breath [R06.02] and Weakness [R53.1] per medical record. Pt is s/p OHT 2/9/17.      Patient admitted from home on 3/22/2018.  At this time, patient presents as aaox4 with pleasant affect.  At this time, patients caregiver is not in attendance.     Household/Family Systems     Patient resides with patient's spouse, at     [address]  [place] [address].      Pt's home phone: [phone]  Pt's cell:  [phone]    Additional emergency contact:  Manny (son) [phone]    Support system includes spouse and three adult children. Patient does not have dependents that are need of being cared for.     Patients primary caregiver is Eptesam, patients spouse.      During admission, patient's caregiver plans to stay at home. Confirmed patient and patients caregivers do have access to reliable transportation.    Cognitive Status/Learning     Patient reports reading ability as 8th grade and states patient does not have difficulty with seeing, hearing, comprehension, learning and memory. The pt does have difficulty with reading and writing in English.  Pt also speaks Nepali. Patient reports patient learns best by one on one.     Needed: No.   Highest education level: Grade School (0-8)    Vocation/Disability   .  Working for Income: No  If no, reason not working: Disability  Pt is disabled due to heart failure.     Adherence     Patient reports a high level of adherence to patients health care regimen.  Adherence counseling and education provided. Patient verbalizes understanding.    Substance Use    Patient reports the following substance usage.    Tobacco: none. pt quit smoking in 2006.  Alcohol: none, patient denies any use.  Illicit Drugs/Non-prescribed Medications: none, patient denies any use.    Patient states clear understanding of the potential impact of substance use.  Substance  abstinence/cessation counseling, education and resources provided and reviewed.     Services Utilizing/ADLS    Infusion Service: Prior to admission, patient utilizing? no  Home Health: Prior to admission, patient utilizing? no Pt has used [facility] in the past.  Pt not sure if he would like to use same HH co if HH is needed when discharged.  Pt reports he may like a different company.   DME: Prior to admission, yes walker  Pulmonary/Cardiac Rehab: Prior to admission, no  Dialysis:  Prior to admission, no  Transplant Specialty Pharmacy:  Prior to admission, no.    Prior to admission, patient reports patient was independent with ADLS and was driving.  Patient reports patient is able to care for self at this time..  Patient indicates a willingness to care for self once medically cleared to do so.    Insurance/Medications    Insured by   Payor/Plan Subscr  Sex Relation Sub. Ins. ID Effective Group Num   1. [place] - [place]* PHIL KULKARNI* 1960 Male  [id number] 3/1/06                                    [address]   2. [place]ICAID - [place]* PHIL KULKARNI* 1960 Male  [id number]* 06                                    [address]     Primary Insurance (for UNOS reporting): Public Insurance - Medicare FFS (Fee For Service)  Secondary Insurance (for UNOS reporting): Public Insurance - Medicaid    Patient reports patient is able to obtain and afford medications at this time and at time of discharge.      Living Will/Healthcare Power of     Patient states patient does not have a LW and/or HCPA.   provided education regarding LW and HCPA and the completion of forms.    Coping/Mental Health    Patient is coping adequately with the aid of  family members. Patient indicates mental health difficulties. Pt reports issues with depression and has been taking Zoloft for the last 14 years. Pt reports is not feeling depressed or anxious due to admission to hospital. Support provided.     Discharge  Planning    At time of discharge, patient plans to return to patient's home under the care of self and spouse.  Patients son or wife will transport patient.  Per rounds today, expected discharge date has not been medically determined at this time. Patient and patients caregiver verbalize understanding and are involved in treatment planning and discharge process.    Additional Concerns    Patient is being followed for needs, education, resources, information, emotional support, supportive counseling, and for supportive and skilled discharge plan of care.  providing ongoing psychosocial support, education, resources and d/c planning as needed.  SW remains available. Patient verbalizes understanding and agreement with information reviewed, social work availability, and how to access available resources as needed.

## 2018-03-23 NOTE — H&P
[facility]  Heart Transplant  H&P    Patient Name: Mick Barriga  MRN: [id number]  Admission Date: 3/22/2018  Attending Physician: Maegan Thomas MD  Primary Care Provider: Sukhdev Alan MD  Principal Problem:Weakness    Subjective:     History of Present Illness:  57 y.o. male with PMHx of NICM s/p HM II (8/24/16), HLD, HTN, VT s/p ICD now s/p OHTx 2/9/17 who's post op course complicated by bradycardia (started on terbutaline) as well as right groin infected seroma (s/p I&D in OR during admission from 03/28-04/10). He had right femoris muscle flap and he has continued pain-control issues of which he is on high pain regimen.   He presents to the ED with complaints of an episode of dizziness, lightheadedness, and shortness of breath that occurred earlier today.. The patient states that he was doing light exercise (just started doing this a few days ago) this morning and about 15 minutes after exercising he was laying in bed and felt dizzy. He thought it was related to hypoglycemia, so he got up to drink some juice. After getting up he felt his heart racing and felt SOB. This lasted for several minutes then went away. Tried to call 911, but ended up dialing 119 secondary to his reported confusion. Feelings went away on their own. No chest pain. Denies irregular heart rate.     He feels back at baseline now. Compliant with all of his medications. Has had no other recent issues. He was requested for observation to evaluate for potential other causes for his symptoms.     Past Medical History:   Diagnosis Date    CHF (congestive heart failure)     Coronary artery disease     GERD (gastroesophageal reflux disease)     Hyperlipidemia     Hypertension     LVAD (left ventricular assist device) present 2/13/2017    Pneumonia due to infectious organism 11/2017 on top of flu 12/1/2017    Type 2 diabetes mellitus with hyperglycemia        Past Surgical History:   Procedure Laterality Date     "CARDIAC PACEMAKER PLACEMENT      CHOLECYSTECTOMY      COLONOSCOPY N/A 1/11/2017    Procedure: COLONOSCOPY;  Surgeon: Petr Almanzar MD;  Location: [facility] ENDO (2ND FLR);  Service: Endoscopy;  Laterality: N/A;    COLONOSCOPY N/A 1/12/2017    Procedure: COLONOSCOPY;  Surgeon: Petr Almanzar MD;  Location: [facility] ENDO (2ND FLR);  Service: Endoscopy;  Laterality: N/A;    HEART TRANSPLANT  02/2017    LEFT VENTRICULAR ASSIST DEVICE      x 3 months ago       Review of patient's allergies indicates:   Allergen Reactions    Levemir [insulin detemir] Itching    Niacin preparations     Pork/porcine containing products     Ranexa [ranolazine] Nausea Only       Current Facility-Administered Medications   Medication    [START ON 3/23/2018] amLODIPine tablet 5 mg    [START ON 3/23/2018] aspirin EC tablet 81 mg    cyclobenzaprine tablet 10 mg    [START ON 3/23/2018] ferrous gluconate tablet 324 mg    furosemide tablet 40 mg    insulin aspart U-100 pen 22 Units    [START ON 3/23/2018] lisinopril tablet 10 mg    magnesium oxide tablet 800 mg    miconazole 2 % cream    mycophenolate capsule 1,000 mg    oxyCODONE 12 hr tablet 20 mg    [START ON 3/23/2018] oxyCODONE immediate release tablet 15 mg    [START ON 3/23/2018] pantoprazole EC tablet 40 mg    [START ON 3/23/2018] pravastatin tablet 20 mg    pregabalin capsule 75 mg    [START ON 3/23/2018] sertraline tablet 100 mg    sodium chloride 0.9% flush 3 mL    tacrolimus capsule 0.5 mg    [START ON 3/23/2018] tamsulosin 24 hr capsule 0.4 mg     Current Outpatient Prescriptions   Medication Sig    ACCU-CHEK HANNAH Misc CHECK BLOOD GLUCOSE QID    amLODIPine (NORVASC) 5 MG tablet Take 1 tablet (5 mg total) by mouth once daily.    aspirin (ECOTRIN) 81 MG EC tablet Take 1 tablet (81 mg total) by mouth once daily.    BD INSULIN PEN NEEDLE UF SHORT 31 gauge x 5/16" Ndle     blood sugar diagnostic Strp To use to check BG 5 times daily, to use with insurance " preferred meter    blood sugar diagnostic Strp 5 strips by Misc.(Non-Drug; Combo Route) route 5 (five) times daily.    cyclobenzaprine (FLEXERIL) 10 MG tablet 10 mg every evening.     esomeprazole (NEXIUM) 40 MG capsule TAKE 1 CAPSULE BY MOUTH BEFORE BREAKFAST    ferrous gluconate (FERGON) 324 MG tablet TK 1 T PO D WITH AVI    furosemide (LASIX) 40 MG tablet once daily    insulin aspart (NOVOLOG) 100 unit/mL InPn pen Inject 11 Units into the skin 3 (three) times daily. (Patient taking differently: Inject 22 Units into the skin 3 (three) times daily. )    insulin glargine (LANTUS SOLOSTAR) 100 unit/mL (3 mL) InPn pen Inject 25 Units into the skin once daily.    ketoconazole (NIZORAL) 2 % cream YADIEL EXT AA QD    lancets Misc To use to check BG 4 times daily, to use with insurance preferred meter    lisinopril 10 MG tablet Take 1 tablet (10 mg total) by mouth once daily.    LYRICA 75 mg capsule Take 75 mg by mouth 2 (two) times daily.    magnesium oxide (MAG-OX) 400 mg tablet Take 2 tablets (800 mg total) by mouth 3 (three) times daily.    mycophenolate (CELLCEPT) 250 mg Cap Take 4 capsules (1,000 mg total) by mouth 2 (two) times daily.    oxycodone (OXYCONTIN) 20 mg 12 hr tablet Take 1 tablet (20 mg total) by mouth every 12 (twelve) hours.    oxyCODONE (ROXICODONE) 15 MG Tab TK 1 T PO  QID AS NEEDED FOR PAIN    pravastatin (PRAVACHOL) 20 MG tablet TK 1 T PO QHS    sertraline (ZOLOFT) 100 MG tablet Take 100 mg by mouth once daily.    tacrolimus (PROGRAF) 0.5 MG Cap Take 1 capsule (0.5 mg total) by mouth every 12 (twelve) hours.    tamsulosin (FLOMAX) 0.4 mg Cp24 TAKE 1 CAPSULE BY MOUTH ONCE DAILY     Family History     Problem Relation (Age of Onset)    Cancer Brother    Heart attack Mother, Father    Heart disease Mother, Father    Hypertension Mother, Father        Social History Main Topics    Smoking status: Former Smoker     Packs/day: 0.50     Years: 15.00     Quit date: 6/14/2006    Smokeless  tobacco: Never Used    Alcohol use No    Drug use: Unknown    Sexual activity: Yes     Partners: Female     Review of Systems   Respiratory: Negative.    Cardiovascular: Negative.    Musculoskeletal: Negative.    Skin: Negative.    Neurological: Negative.    All other systems reviewed and are negative.    Objective:     Vital Signs (Most Recent):  Temp: 98.4 °F (36.9 °C) (03/22/18 1414)  Pulse: 94 (03/22/18 1853)  Resp: 16 (03/22/18 1853)  BP: 132/69 (03/22/18 1758)  SpO2: 99 % (03/22/18 1853) Vital Signs (24h Range):  Temp:  [98.4 °F (36.9 °C)] 98.4 °F (36.9 °C)  Pulse:  [] 94  Resp:  [13-18] 16  SpO2:  [97 %-100 %] 99 %  BP: (125-170)/(64-82) 132/69     Patient Vitals for the past 72 hrs (Last 3 readings):   Weight   03/22/18 1414 99.8 kg (220 lb)     Body mass index is 33.45 kg/m².    No intake or output data in the 24 hours ending 03/22/18 1855    Physical Exam   Constitutional: He is oriented to person, place, and time. He appears well-developed.   Neck: No JVD present.   Cardiovascular: Regular rhythm, normal heart sounds and intact distal pulses.  Exam reveals no gallop and no friction rub.    No murmur heard.  Pulmonary/Chest: Effort normal and breath sounds normal.   Abdominal: Soft. Bowel sounds are normal.   Musculoskeletal: He exhibits no edema or tenderness.   Neurological: He is alert and oriented to person, place, and time.   Skin: Skin is warm and dry. Capillary refill takes 2 to 3 seconds.   Nursing note and vitals reviewed.      Significant Labs:  CBC:    Recent Labs  Lab 03/19/18  0738 03/22/18  1516   WBC 7.74 6.96   RBC 6.13 5.73   HGB 11.4* 10.7*   HCT 35.9* 35.5*    193   MCV 59* 62*   MCH 18.6* 18.7*   MCHC 31.8* 30.1*     BNP:    Recent Labs  Lab 03/19/18  0738   BNP 35     CMP:    Recent Labs  Lab 03/19/18  0738      CALCIUM 9.8   ALBUMIN 4.0   PROT 6.9      K 4.2   CO2 27      BUN 15   CREATININE 1.0   ALKPHOS 77   ALT 17   AST 18   BILITOT 0.5       Coagulation:     Recent Labs  Lab 03/22/18  1516   INR 1.0     LDH:  No results for input(s): LDH in the last 72 hours.  Microbiology:  Microbiology Results (last 7 days)     ** No results found for the last 168 hours. **          I have reviewed all pertinent labs within the past 24 hours.      Diagnostic Results:        Assessment/Plan:     * Weakness    - unclear etiology. Resolved at the moment. CXR and CBC negative for infection. Await BMP to check renal function.  Will check for tracolimus level in am  - renal function test 4 days ago was normal  - Bedside echo in the ER shows normal Ef  - no evidence of fluid overload or arrythmia on telemetry        Essential hypertension    - well controlled for now  - continue amlodipine and lisinopril        Uncomplicated opioid dependence    - pain well controlled. Continue home regimen without change. Both oxycodone and oxycontin        Heart transplant, orthotopic, status    -Continue tacrolimus 0.5 bid and cellcept 1000mg BID  - Level draw tomorrow  - Bedside echo shows normal EF        Type 2 diabetes mellitus with stage 3 chronic kidney disease, with long-term current use of insulin    - he is allergic to detemir and has requested to use his own lantus in patient.  - nurse communication writen          Case discussed with Dr. Marx by ER admitting cardiology fellow (alannah)    Lenin Lowe MD  Heart Transplant  [facility]

## 2018-03-23 NOTE — ASSESSMENT & PLAN NOTE
- unclear etiology. Resolved at the moment. CXR and CBC negative for infection. Await BMP to check renal function.  Will check for tracolimus level in am  - renal function test 4 days ago was normal  - Bedside echo in the ER shows normal Ef  - no evidence of fluid overload or arrythmia on telemetry

## 2018-03-23 NOTE — NURSING
Notified MD Bates that pharmacy unable to provide label for home Lantus since order is written as nursing communication and not an order in the MAR. MD to write order. Lantus given back to pt, states he takes 30 units SQ every morning. Pharmacy states they will label once MD places appropriate order in chart.

## 2018-03-24 VITALS
HEART RATE: 90 BPM | BODY MASS INDEX: 35.6 KG/M2 | SYSTOLIC BLOOD PRESSURE: 135 MMHG | OXYGEN SATURATION: 97 % | RESPIRATION RATE: 18 BRPM | TEMPERATURE: 98 F | WEIGHT: 234.88 LBS | DIASTOLIC BLOOD PRESSURE: 76 MMHG | HEIGHT: 68 IN

## 2018-03-24 LAB
ANION GAP SERPL CALC-SCNC: 9 MMOL/L
BASOPHILS # BLD AUTO: 0.03 K/UL
BASOPHILS NFR BLD: 0.4 %
BUN SERPL-MCNC: 14 MG/DL
CALCIUM SERPL-MCNC: 9.1 MG/DL
CHLORIDE SERPL-SCNC: 104 MMOL/L
CO2 SERPL-SCNC: 25 MMOL/L
CREAT SERPL-MCNC: 0.9 MG/DL
DIFFERENTIAL METHOD: ABNORMAL
EOSINOPHIL # BLD AUTO: 0.2 K/UL
EOSINOPHIL NFR BLD: 2.2 %
ERYTHROCYTE [DISTWIDTH] IN BLOOD BY AUTOMATED COUNT: 16.8 %
EST. GFR  (AFRICAN AMERICAN): >60 ML/MIN/1.73 M^2
EST. GFR  (NON AFRICAN AMERICAN): >60 ML/MIN/1.73 M^2
GLUCOSE SERPL-MCNC: 128 MG/DL
HCT VFR BLD AUTO: 33 %
HGB BLD-MCNC: 10.1 G/DL
IMM GRANULOCYTES # BLD AUTO: 0.02 K/UL
IMM GRANULOCYTES NFR BLD AUTO: 0.3 %
LYMPHOCYTES # BLD AUTO: 2.5 K/UL
LYMPHOCYTES NFR BLD: 32.6 %
MAGNESIUM SERPL-MCNC: 1.4 MG/DL
MCH RBC QN AUTO: 18.8 PG
MCHC RBC AUTO-ENTMCNC: 30.6 G/DL
MCV RBC AUTO: 62 FL
MONOCYTES # BLD AUTO: 0.6 K/UL
MONOCYTES NFR BLD: 7.5 %
NEUTROPHILS # BLD AUTO: 4.4 K/UL
NEUTROPHILS NFR BLD: 57 %
NRBC BLD-RTO: 0 /100 WBC
PLATELET # BLD AUTO: 187 K/UL
PMV BLD AUTO: ABNORMAL FL
POCT GLUCOSE: 139 MG/DL (ref 70–110)
POCT GLUCOSE: 165 MG/DL (ref 70–110)
POCT GLUCOSE: 182 MG/DL (ref 70–110)
POCT GLUCOSE: 186 MG/DL (ref 70–110)
POTASSIUM SERPL-SCNC: 3.9 MMOL/L
RBC # BLD AUTO: 5.36 M/UL
SODIUM SERPL-SCNC: 138 MMOL/L
TACROLIMUS BLD-MCNC: 8.9 NG/ML
WBC # BLD AUTO: 7.7 K/UL

## 2018-03-24 PROCEDURE — 63600175 PHARM REV CODE 636 W HCPCS: Performed by: INTERNAL MEDICINE

## 2018-03-24 PROCEDURE — 25000003 PHARM REV CODE 250: Performed by: STUDENT IN AN ORGANIZED HEALTH CARE EDUCATION/TRAINING PROGRAM

## 2018-03-24 PROCEDURE — 85025 COMPLETE CBC W/AUTO DIFF WBC: CPT

## 2018-03-24 PROCEDURE — 80197 ASSAY OF TACROLIMUS: CPT

## 2018-03-24 PROCEDURE — 83735 ASSAY OF MAGNESIUM: CPT

## 2018-03-24 PROCEDURE — 36415 COLL VENOUS BLD VENIPUNCTURE: CPT

## 2018-03-24 PROCEDURE — 25000003 PHARM REV CODE 250: Performed by: INTERNAL MEDICINE

## 2018-03-24 PROCEDURE — 80048 BASIC METABOLIC PNL TOTAL CA: CPT

## 2018-03-24 PROCEDURE — 99222 1ST HOSP IP/OBS MODERATE 55: CPT | Mod: ,,, | Performed by: INTERNAL MEDICINE

## 2018-03-24 RX ORDER — MAGNESIUM SULFATE/D5W 2 G/50 ML
2 INTRAVENOUS SOLUTION, PIGGYBACK (ML) INTRAVENOUS ONCE
Status: COMPLETED | OUTPATIENT
Start: 2018-03-24 | End: 2018-03-24

## 2018-03-24 RX ADMIN — OXYCODONE HYDROCHLORIDE 15 MG: 5 TABLET ORAL at 06:03

## 2018-03-24 RX ADMIN — MYCOPHENOLATE MOFETIL 1000 MG: 250 CAPSULE ORAL at 08:03

## 2018-03-24 RX ADMIN — PRAVASTATIN SODIUM 20 MG: 20 TABLET ORAL at 08:03

## 2018-03-24 RX ADMIN — FERROUS GLUCONATE TAB 324 MG (37.5 MG ELEMENTAL IRON) 324 MG: 324 (37.5 FE) TAB at 08:03

## 2018-03-24 RX ADMIN — Medication 2 G: at 11:03

## 2018-03-24 RX ADMIN — OXYCODONE HYDROCHLORIDE 15 MG: 5 TABLET ORAL at 11:03

## 2018-03-24 RX ADMIN — AMLODIPINE BESYLATE 5 MG: 5 TABLET ORAL at 08:03

## 2018-03-24 RX ADMIN — INSULIN ASPART 18 UNITS: 100 INJECTION, SOLUTION INTRAVENOUS; SUBCUTANEOUS at 08:03

## 2018-03-24 RX ADMIN — TACROLIMUS 0.5 MG: 0.5 CAPSULE ORAL at 08:03

## 2018-03-24 RX ADMIN — ASPIRIN 81 MG: 81 TABLET, COATED ORAL at 08:03

## 2018-03-24 RX ADMIN — PANTOPRAZOLE SODIUM 40 MG: 40 TABLET, DELAYED RELEASE ORAL at 08:03

## 2018-03-24 RX ADMIN — INSULIN GLARGINE 30 UNITS: 100 INJECTION, SOLUTION SUBCUTANEOUS at 08:03

## 2018-03-24 RX ADMIN — LISINOPRIL 10 MG: 10 TABLET ORAL at 08:03

## 2018-03-24 RX ADMIN — TAMSULOSIN HYDROCHLORIDE 0.4 MG: 0.4 CAPSULE ORAL at 08:03

## 2018-03-24 RX ADMIN — MAGNESIUM OXIDE TAB 400 MG (241.3 MG ELEMENTAL MG) 800 MG: 400 (241.3 MG) TAB at 08:03

## 2018-03-24 RX ADMIN — OXYCODONE HYDROCHLORIDE 20 MG: 10 TABLET, FILM COATED, EXTENDED RELEASE ORAL at 08:03

## 2018-03-24 NOTE — HOSPITAL COURSE
ECHo unremarkable, no Afib noted on telemetry.  Will discharge today.    Seen and examined today.  AAO  3  No JVP  S1 S2 RRR  CTAB  Minimal distal LE edema (patient states that he has this chronically)    Will discharge on home meds with no changes, tacrolimus level reviewed today.  Will call for appointment with Dr. Trixie Marx in 2 weeks.

## 2018-03-24 NOTE — DISCHARGE SUMMARY
[facility]  Heart Transplant  Discharge Summary      Patient Name: Mick Barriga  MRN: [id number]  Admission Date: 3/22/2018  Hospital Length of Stay: 1 days  Discharge Date and Time: 03/24/2018 11:46 AM  Attending Physician: Trixie Marx MD   Discharging Provider: Valdemar Engel MD  Primary Care Provider: Sukhdev Alan MD     HPI: 57 y.o. male with PMHx of NICM s/p HM II (8/24/16), HLD, HTN, VT s/p ICD now s/p OHTx 2/9/17 who's post op course complicated by bradycardia (started on terbutaline) as well as right groin infected seroma (s/p I&D in OR during admission from 03/28-04/10). He had right femoris muscle flap and he has continued pain-control issues of which he is on high pain regimen.   He presents to the ED with complaints of an episode of dizziness, lightheadedness, and shortness of breath that occurred earlier today.. The patient states that he was doing light exercise (just started doing this a few days ago) this morning and about 15 minutes after exercising he was laying in bed and felt dizzy. He thought it was related to hypoglycemia, so he got up to drink some juice. After getting up he felt his heart racing and felt SOB. This lasted for several minutes then went away. Tried to call 911, but ended up dialing 119 secondary to his reported confusion. Feelings went away on their own. No chest pain. Denies irregular heart rate.     He feels back at baseline now. Compliant with all of his medications. Has had no other recent issues. He was requested for observation to evaluate for potential other causes for his symptoms.     * No surgery found *     Hospital Course: ECHo unremarkable, no Afib noted on telemetry.  Will discharge today.    Seen and examined today.  AAO  3  No JVP  S1 S2 RRR  CTAB  Minimal distal LE edema (patient states that he has this chronically)    Will discharge on home meds with no changes, tacrolimus level reviewed today.  Will call for appointment  with Dr. Trixie Marx in 2 weeks.  Will replete magnesium with 2g IV prior to discharge.    Consults         Status Ordering Provider     Inpatient consult to Cardiology  Once     Provider:  (Not yet assigned)    Completed ALFRED FOOTE     [facility]  [facility]     Provider:  (Not yet assigned)    Completed SHAY GORDON          Significant Diagnostic Studies: Labs:   BMP:   Recent Labs  Lab 03/22/18  1516 03/23/18  0532 03/24/18  0404   * 201* 128*    135* 138   K 4.8 4.7 3.9    103 104   CO2 33* 24 25   BUN 14 13 14   CREATININE 0.9 0.9 0.9   CALCIUM CANCELED 9.0 9.1   MG  --  1.4* 1.4*       Pending Diagnostic Studies:     None        Final Active Diagnoses:    Diagnosis Date Noted POA    PRINCIPAL PROBLEM:  Shortness of breath [R06.02] 06/14/2015 Yes     Chronic    Uncomplicated opioid dependence [F11.20] 07/12/2017 Yes    Heart transplant, orthotopic, status [Z94.1]  Not Applicable    Essential hypertension [I10] 06/14/2015 Yes     Chronic    Type 2 diabetes mellitus with stage 3 chronic kidney disease, with long-term current use of insulin [E11.22, N18.3, Z79.4] 06/14/2015 Not Applicable     Chronic      Problems Resolved During this Admission:    Diagnosis Date Noted Date Resolved POA    Weakness [R53.1] 03/22/2018 03/23/2018 Yes      Discharged Condition: good    Disposition: Home or Self Care    Follow Up:  Follow-up Information     Trixie Marx MD. Schedule an appointment as soon as possible for a visit in 2 weeks.    Specialties:  Transplant, Cardiology  [facility] information[facility]  [facility] [id number] [phone]                 Patient Instructions:     Diet Cardiac       Medications:  Reconciled Home Medications:   Current Discharge Medication List      CONTINUE these medications which have NOT CHANGED    Details   ACCU-CHEK HANNAH Misc CHECK BLOOD GLUCOSE QID  Refills: 0      amLODIPine (NORVASC) 5 MG tablet Take 1 tablet (5  "mg total) by mouth once daily.  Qty: 30 tablet, Refills: 11    Associated Diagnoses: Status post heart transplant      aspirin (ECOTRIN) 81 MG EC tablet Take 1 tablet (81 mg total) by mouth once daily.  Refills: 0      BD INSULIN PEN NEEDLE UF SHORT 31 gauge x 5/16" Ndle       !! blood sugar diagnostic Strp To use to check BG 5 times daily, to use with insurance preferred meter  Qty: 380 strip, Refills: 3      !! blood sugar diagnostic Strp 5 strips by Misc.(Non-Drug; Combo Route) route 5 (five) times daily.  Qty: 600 each, Refills: 3      cyclobenzaprine (FLEXERIL) 10 MG tablet 10 mg every evening.       esomeprazole (NEXIUM) 40 MG capsule TAKE 1 CAPSULE BY MOUTH BEFORE BREAKFAST  Qty: 90 capsule, Refills: 0      ferrous gluconate (FERGON) 324 MG tablet TK 1 T PO D WITH AVI  Refills: 0      furosemide (LASIX) 40 MG tablet once daily  Refills: 5      insulin aspart (NOVOLOG) 100 unit/mL InPn pen Inject 11 Units into the skin 3 (three) times daily.  Qty: 1 Box, Refills: 0      insulin glargine (LANTUS SOLOSTAR) 100 unit/mL (3 mL) InPn pen Inject 25 Units into the skin once daily.  Qty: 3 Box, Refills: 3      ketoconazole (NIZORAL) 2 % cream YADIEL EXT AA QD  Refills: 4      lancets Misc To use to check BG 4 times daily, to use with insurance preferred meter  Qty: 350 each, Refills: 3      lisinopril 10 MG tablet Take 1 tablet (10 mg total) by mouth once daily.  Qty: 90 tablet, Refills: 3    Associated Diagnoses: Status post heart transplant      LYRICA 75 mg capsule Take 75 mg by mouth 2 (two) times daily.      magnesium oxide (MAG-OX) 400 mg tablet Take 2 tablets (800 mg total) by mouth 3 (three) times daily.  Qty: 180 tablet, Refills: 3    Associated Diagnoses: Hypomagnesemia; Status post heart transplant      mycophenolate (CELLCEPT) 250 mg Cap Take 4 capsules (1,000 mg total) by mouth 2 (two) times daily.  Qty: 360 capsule, Refills: 11      oxycodone (OXYCONTIN) 20 mg 12 hr tablet Take 1 tablet (20 mg total) by " mouth every 12 (twelve) hours.  Qty: 28 tablet, Refills: 0      oxyCODONE (ROXICODONE) 15 MG Tab TK 1 T PO  QID AS NEEDED FOR PAIN  Refills: 0      pravastatin (PRAVACHOL) 20 MG tablet TK 1 T PO QHS  Refills: 2      sertraline (ZOLOFT) 100 MG tablet Take 100 mg by mouth once daily.      tacrolimus (PROGRAF) 0.5 MG Cap Take 1 capsule (0.5 mg total) by mouth every 12 (twelve) hours.    Associated Diagnoses: Status post heart transplant      tamsulosin (FLOMAX) 0.4 mg Cp24 TAKE 1 CAPSULE BY MOUTH ONCE DAILY  Qty: 90 capsule, Refills: 0       !! - Potential duplicate medications found. Please discuss with provider.      STOP taking these medications        mg capsule Comments:   Reason for Stopping:         sildenafil, antihypertensive, (REVATIO) 20 mg Tab Comments:   Reason for Stopping:         tadalafil (CIALIS) 10 MG tablet Comments:   Reason for Stopping:         VENTOLIN HFA 90 mcg/actuation inhaler Comments:   Reason for Stopping:               Valdemar Engel MD  Heart Transplant  [facility]

## 2018-03-24 NOTE — PLAN OF CARE
Problem: Patient Care Overview  Goal: Plan of Care Review  Outcome: Ongoing (interventions implemented as appropriate)  Pt is AAOx3.Pt is ambulatory and independent.Pt able to perform all ADL's without assistance. CBG monitored AC HS.  SS coverage given when appropriate.Pt is in good spirits.  Standard precautions maintained.  Telly monitoring on going. Pt remains injury and fall free, non skid footwear donned, call light within reach, personal items within reach, bed in low/locked position, pt able to voice needs all needs voiced have been met at this time.

## 2018-03-26 RX ORDER — FERROUS GLUCONATE 324(38)MG
TABLET ORAL
Qty: 90 TABLET | Refills: 0 | Status: SHIPPED | OUTPATIENT
Start: 2018-03-26 | End: 2019-12-03

## 2018-03-28 LAB
BACTERIA BLD CULT: NORMAL
BACTERIA BLD CULT: NORMAL

## 2018-04-11 ENCOUNTER — OFFICE VISIT (OUTPATIENT)
Dept: UROLOGY | Facility: CLINIC | Age: 58
End: 2018-04-11
Payer: MEDICARE

## 2018-04-11 VITALS — BODY MASS INDEX: 33.48 KG/M2 | HEIGHT: 68 IN | WEIGHT: 220.88 LBS | RESPIRATION RATE: 14 BRPM

## 2018-04-11 DIAGNOSIS — R35.1 NOCTURIA MORE THAN TWICE PER NIGHT: ICD-10-CM

## 2018-04-11 DIAGNOSIS — R35.0 URINARY FREQUENCY: ICD-10-CM

## 2018-04-11 DIAGNOSIS — R39.15 URINARY URGENCY: ICD-10-CM

## 2018-04-11 DIAGNOSIS — N40.1 BPH WITH OBSTRUCTION/LOWER URINARY TRACT SYMPTOMS: Primary | ICD-10-CM

## 2018-04-11 DIAGNOSIS — N13.8 BPH WITH OBSTRUCTION/LOWER URINARY TRACT SYMPTOMS: Primary | ICD-10-CM

## 2018-04-11 LAB
BILIRUB SERPL-MCNC: NORMAL MG/DL
BLOOD URINE, POC: NORMAL
COLOR, POC UA: NORMAL
GLUCOSE UR QL STRIP: NORMAL
KETONES UR QL STRIP: NORMAL
LEUKOCYTE ESTERASE URINE, POC: NORMAL
NITRITE, POC UA: NORMAL
PH, POC UA: 5
POC RESIDUAL URINE VOLUME: 45 ML (ref 0–100)
PROTEIN, POC: NORMAL
SPECIFIC GRAVITY, POC UA: 1000
UROBILINOGEN, POC UA: NORMAL

## 2018-04-11 PROCEDURE — 51798 US URINE CAPACITY MEASURE: CPT | Mod: PBBFAC | Performed by: NURSE PRACTITIONER

## 2018-04-11 PROCEDURE — 81001 URINALYSIS AUTO W/SCOPE: CPT | Mod: PBBFAC | Performed by: NURSE PRACTITIONER

## 2018-04-11 PROCEDURE — 99999 PR PBB SHADOW E&M-EST. PATIENT-LVL IV: CPT | Mod: PBBFAC,,, | Performed by: NURSE PRACTITIONER

## 2018-04-11 PROCEDURE — 99214 OFFICE O/P EST MOD 30 MIN: CPT | Mod: S$PBB,,, | Performed by: NURSE PRACTITIONER

## 2018-04-11 PROCEDURE — 99214 OFFICE O/P EST MOD 30 MIN: CPT | Mod: PBBFAC,25 | Performed by: NURSE PRACTITIONER

## 2018-04-11 RX ORDER — OMEPRAZOLE 40 MG/1
CAPSULE, DELAYED RELEASE ORAL
Refills: 0 | COMMUNITY
Start: 2018-03-26 | End: 2018-04-17

## 2018-04-11 RX ORDER — AMOXICILLIN 500 MG/1
CAPSULE ORAL
COMMUNITY
Start: 2018-04-10 | End: 2018-08-02

## 2018-04-11 RX ORDER — ALFUZOSIN HYDROCHLORIDE 10 MG/1
10 TABLET, EXTENDED RELEASE ORAL
Qty: 30 TABLET | Refills: 11 | Status: SHIPPED | OUTPATIENT
Start: 2018-04-11 | End: 2018-08-02

## 2018-04-11 NOTE — PROGRESS NOTES
Subjective:       Patient ID: Mick Barriga is a 57 y.o. male who is an established patient of Dr. Chatterjee though new to me was last seen in this office 12/27/16    Chief Complaint:   No chief complaint on file.    Benign Prostatic Hypertrophy  Patient complains of lower urinary tract symptoms. He reports frequency, nocturia three times a night and urgency. He denies intermittency, straining and weak stream. Patient states symptoms are of mild severity. Onset of symptoms was several years ago and was gradual in onset.  He has no personal history and no family history of prostate cancer. He reports a history of no complicating symptoms. He denies flank pain, gross hematuria, kidney stones and recurrent UTI. He has taken Flomax in the past for urinary symptoms but self stopped this medication ~1.5 years ago d/t retrograde ejaculation.     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  Review of patient's allergies indicates:   Allergen Reactions    Levemir [insulin detemir] Itching    Niacin preparations     Pork/porcine containing products     Ranexa [ranolazine] Nausea Only     Current Outpatient Prescriptions   Medication  "Sig    ACCU-CHEK [facility] [place] BLOOD GLUCOSE QID    amLODIPine (NORVASC) 5 MG tablet Take 1 tablet (5 mg total) by mouth once daily.    amoxicillin (AMOXIL) 500 MG capsule     BD INSULIN PEN NEEDLE UF SHORT 31 gauge x 5/16" Ndle     blood sugar diagnostic Strp To use to check BG 5 times daily, to use with insurance preferred meter    blood sugar diagnostic Strp 5 strips by Misc.(Non-Drug; Combo Route) route 5 (five) times daily.    cyclobenzaprine (FLEXERIL) 10 MG tablet 10 mg every evening.     ferrous gluconate (FERGON) 324 MG tablet TK 1 T PO D WITH AVI    ferrous gluconate (FERGON) 324 MG tablet TAKE 1 TABLET BY MOUTH DAILY WITH BREAKFAST    furosemide (LASIX) 40 MG tablet once daily    insulin aspart (NOVOLOG) 100 unit/mL InPn pen Inject 11 Units into the skin 3 (three) times daily. (Patient taking differently: Inject 22 Units into the skin 3 (three) times daily. )    insulin glargine (LANTUS SOLOSTAR) 100 unit/mL (3 mL) InPn pen Inject 25 Units into the skin once daily. (Patient taking differently: Inject 30 Units into the skin once daily. )    ketoconazole (NIZORAL) 2 % cream YADIEL EXT AA QD    lancets Misc To use to check BG 4 times daily, to use with insurance preferred meter    lisinopril 10 MG tablet Take 1 tablet (10 mg total) by mouth once daily.    LYRICA 75 mg capsule Take 75 mg by mouth 2 (two) times daily.    magnesium oxide (MAG-OX) 400 mg tablet Take 2 tablets (800 mg total) by mouth 3 (three) times daily.    omeprazole (PRILOSEC) 40 MG capsule TK 1 C PO QD    oxycodone (OXYCONTIN) 20 mg 12 hr tablet Take 1 tablet (20 mg total) by mouth every 12 (twelve) hours.    oxyCODONE (ROXICODONE) 15 MG Tab TK 1 T PO  QID AS NEEDED FOR PAIN    pravastatin (PRAVACHOL) 20 MG tablet TK 1 T PO QHS    sertraline (ZOLOFT) 100 MG tablet Take 100 mg by mouth once daily.    tacrolimus (PROGRAF) 0.5 MG Cap Take 1 capsule (0.5 mg total) by mouth every 12 (twelve) hours.    alfuzosin (UROXATRAL) " "10 mg Tb24 Take 1 tablet (10 mg total) by mouth daily with breakfast.    aspirin (ECOTRIN) 81 MG EC tablet Take 1 tablet (81 mg total) by mouth once daily.    mycophenolate (CELLCEPT) 250 mg Cap Take 4 capsules (1,000 mg total) by mouth 2 (two) times daily.     No current facility-administered medications for this visit.        Review of Systems   Constitutional: Negative for activity change, chills, fatigue, fever and unexpected weight change.   Eyes: Negative for discharge, redness and visual disturbance.   Respiratory: Negative for cough, shortness of breath and wheezing.    Cardiovascular: Negative for chest pain and leg swelling.   Gastrointestinal: Negative for abdominal distention, abdominal pain, constipation, diarrhea, nausea and vomiting.   Genitourinary: Positive for frequency and urgency. Negative for decreased urine volume, difficulty urinating, discharge, dysuria, flank pain, hematuria and testicular pain.   Musculoskeletal: Negative for arthralgias, joint swelling and myalgias.   Skin: Negative for color change and rash.   Neurological: Negative for dizziness and light-headedness.   Psychiatric/Behavioral: Negative for behavioral problems and confusion. The patient is not nervous/anxious.        Objective:      Vitals:    04/11/18 1334   Resp: 14   Weight: 100.2 kg (220 lb 14.4 oz)   Height: 5' 8" (1.727 m)     Physical Exam   Nursing note and vitals reviewed.  Constitutional: He is oriented to person, place, and time. He appears well-developed.   HENT:   Head: Normocephalic and atraumatic.   Nose: Nose normal.   Eyes: Conjunctivae are normal. Right eye exhibits no discharge. Left eye exhibits no discharge.   Neck: Normal range of motion. Neck supple. No tracheal deviation present. No thyromegaly present.   Cardiovascular: Normal rate and regular rhythm.    Pulmonary/Chest: Effort normal. No stridor. No respiratory distress. He has no wheezes.   Abdominal: Soft. He exhibits no distension. There is " no hepatosplenomegaly. There is no tenderness. There is no CVA tenderness. No hernia.   Genitourinary: Rectum normal. Rectal exam shows no external hemorrhoid, no mass and no tenderness. Prostate is enlarged. Prostate is not tender.       Musculoskeletal: Normal range of motion. He exhibits no edema.   Neurological: He is alert and oriented to person, place, and time.   Skin: Skin is warm and dry. No rash noted. No erythema.     Psychiatric: He has a normal mood and affect. His behavior is normal. Judgment normal.       Urine dipstick shows negative for all components.  Micro exam: negative for WBC's or RBC's.    Assessment:       1. BPH with obstruction/lower urinary tract symptoms    2. Urinary frequency    3. Urinary urgency    4. Nocturia more than twice per night          Plan:       1. BPH with obstruction/lower urinary tract symptoms  -Stop Flomax  - alfuzosin (UROXATRAL) 10 mg Tb24; Take 1 tablet (10 mg total) by mouth daily with breakfast.  Dispense: 30 tablet; Refill: 11  -Explained to patient that most BPH medications (alpha blockers) have potential SE of retrograde ejaculation. He is willing to trial Uroxatral, rx sent to pharmacy    2. Urinary frequency  -Acceptable PVR  -UA clear today  - POCT urinalysis, dipstick or tablet reag    3. Urinary urgency  -Acceptable PVR  - POCT Bladder Scan    4. Nocturia more than twice per night  -Limit evening fluids            Follow-up in about 6 weeks (around 5/23/2018) for Follow up.

## 2018-04-16 ENCOUNTER — TELEPHONE (OUTPATIENT)
Dept: TRANSPLANT | Facility: CLINIC | Age: 58
End: 2018-04-16

## 2018-04-16 DIAGNOSIS — Z79.52 LONG TERM CURRENT USE OF SYSTEMIC STEROIDS: ICD-10-CM

## 2018-04-16 DIAGNOSIS — N28.9 RENAL INSUFFICIENCY: ICD-10-CM

## 2018-04-16 DIAGNOSIS — Z94.1 STATUS POST HEART TRANSPLANT: ICD-10-CM

## 2018-04-16 DIAGNOSIS — T86.20 COMPLICATION OF HEART TRANSPLANT, UNSPECIFIED COMPLICATION: ICD-10-CM

## 2018-04-17 ENCOUNTER — INITIAL CONSULT (OUTPATIENT)
Dept: CARDIOLOGY | Facility: CLINIC | Age: 58
End: 2018-04-17
Payer: MEDICARE

## 2018-04-17 ENCOUNTER — DOCUMENTATION ONLY (OUTPATIENT)
Dept: CARDIOLOGY | Facility: CLINIC | Age: 58
End: 2018-04-17

## 2018-04-17 ENCOUNTER — HOSPITAL ENCOUNTER (OUTPATIENT)
Dept: CARDIOLOGY | Facility: CLINIC | Age: 58
Discharge: HOME | End: 2018-04-17
Attending: INTERNAL MEDICINE
Payer: MEDICARE

## 2018-04-17 ENCOUNTER — OFFICE VISIT (OUTPATIENT)
Dept: TRANSPLANT | Facility: CLINIC | Age: 58
End: 2018-04-17
Payer: MEDICARE

## 2018-04-17 VITALS
WEIGHT: 224.88 LBS | HEART RATE: 116 BPM | HEIGHT: 68 IN | BODY MASS INDEX: 34.08 KG/M2 | SYSTOLIC BLOOD PRESSURE: 156 MMHG | DIASTOLIC BLOOD PRESSURE: 78 MMHG

## 2018-04-17 VITALS
HEART RATE: 104 BPM | WEIGHT: 222.25 LBS | DIASTOLIC BLOOD PRESSURE: 80 MMHG | OXYGEN SATURATION: 97 % | SYSTOLIC BLOOD PRESSURE: 153 MMHG | BODY MASS INDEX: 39.38 KG/M2 | HEIGHT: 63 IN

## 2018-04-17 DIAGNOSIS — Z94.1 HEART TRANSPLANT, ORTHOTOPIC, STATUS: ICD-10-CM

## 2018-04-17 DIAGNOSIS — D84.9 IMMUNOSUPPRESSION: ICD-10-CM

## 2018-04-17 DIAGNOSIS — T86.20 COMPLICATION OF HEART TRANSPLANT, UNSPECIFIED COMPLICATION: ICD-10-CM

## 2018-04-17 DIAGNOSIS — E78.2 MIXED HYPERLIPIDEMIA: ICD-10-CM

## 2018-04-17 DIAGNOSIS — I10 ESSENTIAL HYPERTENSION: ICD-10-CM

## 2018-04-17 DIAGNOSIS — I10 ESSENTIAL HYPERTENSION: Chronic | ICD-10-CM

## 2018-04-17 DIAGNOSIS — Z79.899 ENCOUNTER FOR LONG-TERM (CURRENT) USE OF MEDICATIONS: ICD-10-CM

## 2018-04-17 DIAGNOSIS — Z94.1 HEART TRANSPLANT, ORTHOTOPIC, STATUS: Primary | ICD-10-CM

## 2018-04-17 DIAGNOSIS — E11.22 TYPE 2 DIABETES MELLITUS WITH STAGE 3 CHRONIC KIDNEY DISEASE, WITH LONG-TERM CURRENT USE OF INSULIN: Chronic | ICD-10-CM

## 2018-04-17 DIAGNOSIS — N18.30 TYPE 2 DIABETES MELLITUS WITH STAGE 3 CHRONIC KIDNEY DISEASE, WITH LONG-TERM CURRENT USE OF INSULIN: Chronic | ICD-10-CM

## 2018-04-17 DIAGNOSIS — Z94.1 STATUS POST HEART TRANSPLANT: ICD-10-CM

## 2018-04-17 DIAGNOSIS — Z79.4 TYPE 2 DIABETES MELLITUS WITH STAGE 3 CHRONIC KIDNEY DISEASE, WITH LONG-TERM CURRENT USE OF INSULIN: Chronic | ICD-10-CM

## 2018-04-17 DIAGNOSIS — E78.2 MIXED HYPERLIPIDEMIA: Chronic | ICD-10-CM

## 2018-04-17 DIAGNOSIS — E83.42 HYPOMAGNESEMIA: ICD-10-CM

## 2018-04-17 DIAGNOSIS — Z79.52 LONG TERM CURRENT USE OF SYSTEMIC STEROIDS: ICD-10-CM

## 2018-04-17 DIAGNOSIS — K21.9 GASTROESOPHAGEAL REFLUX DISEASE, ESOPHAGITIS PRESENCE NOT SPECIFIED: Chronic | ICD-10-CM

## 2018-04-17 DIAGNOSIS — R06.02 SHORTNESS OF BREATH: ICD-10-CM

## 2018-04-17 LAB
DIASTOLIC DYSFUNCTION: NO
ESTIMATED PA SYSTOLIC PRESSURE: 13.4
RETIRED EF AND QEF - SEE NOTES: 65 (ref 55–65)
TRICUSPID VALVE REGURGITATION: NORMAL

## 2018-04-17 PROCEDURE — 99213 OFFICE O/P EST LOW 20 MIN: CPT | Mod: PBBFAC,27 | Performed by: INTERNAL MEDICINE

## 2018-04-17 PROCEDURE — 99213 OFFICE O/P EST LOW 20 MIN: CPT | Mod: PBBFAC | Performed by: INTERNAL MEDICINE

## 2018-04-17 PROCEDURE — 99999 PR PBB SHADOW E&M-EST. PATIENT-LVL III: CPT | Mod: PBBFAC,,, | Performed by: INTERNAL MEDICINE

## 2018-04-17 PROCEDURE — 99214 OFFICE O/P EST MOD 30 MIN: CPT | Mod: S$PBB,,, | Performed by: INTERNAL MEDICINE

## 2018-04-17 PROCEDURE — 93306 TTE W/DOPPLER COMPLETE: CPT | Mod: PBBFAC | Performed by: INTERNAL MEDICINE

## 2018-04-17 RX ORDER — MYCOPHENOLATE MOFETIL 250 MG/1
1000 CAPSULE ORAL 2 TIMES DAILY
Qty: 720 CAPSULE | Refills: 3 | Status: SHIPPED | OUTPATIENT
Start: 2018-04-17 | End: 2019-02-13 | Stop reason: SDUPTHER

## 2018-04-17 RX ORDER — IBUPROFEN 800 MG/1
TABLET ORAL
COMMUNITY
Start: 2018-04-12

## 2018-04-17 RX ORDER — SODIUM CHLORIDE 9 MG/ML
3 INJECTION, SOLUTION INTRAVENOUS CONTINUOUS
Status: CANCELLED | OUTPATIENT
Start: 2018-04-17 | End: 2018-04-17

## 2018-04-17 RX ORDER — DIPHENHYDRAMINE HCL 25 MG
50 CAPSULE ORAL ONCE
Status: CANCELLED | OUTPATIENT
Start: 2018-04-17 | End: 2018-04-17

## 2018-04-17 RX ORDER — TACROLIMUS 0.5 MG/1
0.5 CAPSULE ORAL EVERY 12 HOURS
Qty: 180 CAPSULE | Refills: 3 | Status: SHIPPED | OUTPATIENT
Start: 2018-04-17 | End: 2019-04-25 | Stop reason: SDUPTHER

## 2018-04-17 RX ORDER — LANOLIN ALCOHOL/MO/W.PET/CERES
800 CREAM (GRAM) TOPICAL 3 TIMES DAILY
Qty: 540 TABLET | Refills: 3 | Status: SHIPPED | OUTPATIENT
Start: 2018-04-17 | End: 2019-06-02 | Stop reason: SDUPTHER

## 2018-04-17 NOTE — PROGRESS NOTES
OUTPATIENT CATHETERIZATION INSTRUCTIONS    You have been scheduled for a procedure in the catheterization lab on Wednesday, April 18, 2018.     Please report to the Cardiology Waiting Area on the Third floor of the hospital and check in at 11 AM.   You will then be taken to the SSCU (Short Stay Cardiac Unit) and prepared for your procedure. Please be aware that this is not the time of your procedure but the time you are to arrive. The procedures are scheduled on an hourly basis; however, emergency cases take precedence over all other cases.       You may not have anything to eat or drink after midnight the night before your test. You may take your regular morning medications with water. If there are any medications that you should not take you will be instructed to hold them that morning. If you are diabetic and on Metformin (Glucophage) do not take it the day before, the day of, and for 2 days after your procedure.      The procedure will take 1-2 hours to perform. After the procedure, you will return to SSCU on the third floor of the hospital. You will need to lie still (or keep your arm still) for the next 4 to 6 hours to minimize bleeding from the puncture site. Your family may remain in the room with you during this time.       You may be able to be discharged home that same afternoon if there is someone to drive you home and there were no complications. If you have one of the balloon, stent, or device procedures you may spend the night in the hospital. Your doctor will determine, based on your progress, the date and time of your discharge. The results of your procedure will be discussed with you before you are discharged. Any further testing or procedures will be scheduled for you either before you leave or you will be called with these appointments.       If you should have any questions, concerns, or need to change the date of your procedure, please call  FIDENCIO Ruff @ [phone]    Special  Instructions:  Hold your regularly scheduled insulin dose the morning of your procedure.  Take 1/2 of your regularly scheduled insulin dose the night before your procedure.  Drink plenty of water today and after the procedure        THE ABOVE INSTRUCTIONS WERE GIVEN TO THE PATIENT VERBALLY AND THEY VERBALIZED UNDERSTANDING.  THEY DO NOT REQUIRE ANY SPECIAL NEEDS AND DO NOT HAVE ANY LEARNING BARRIERS.          Directions for Reporting to Cardiology Waiting Area in the Hospital  If you park in the Parking Garage:  Take elevators to the1st floor of the parking garage.  Continue past the gift shop, coffee shop, and piano.  Take a right and go to the gold elevators. (Elevator B)  Take the elevator to the 3rd floor.  Follow the arrow on the sign on the wall that says Cath Lab Registration/EP Lab Registration.  Follow the long hallway all the way around until you come to a big open area.  This is the registration area.  Check in at Reception Desk.    OR    If family is dropping you off:  Have them drop you off at the front of the Hospital under the green overhang.  Enter through the doors and take a right.  Take the E elevators to the 3rd floor Cardiology Waiting Area.  Check in at the Reception Desk in the waiting room.

## 2018-04-17 NOTE — LETTER
April 17, 2018      Trixie Marx MD  [address]  [facility] [id number]           [facility]  [address]  [facility] [address]  Phone: [phone]          Patient: Mick Barriga   MR Number: [id number]   YOB: 1960   Date of Visit: 4/17/2018       Dear Dr. Trixie Marx:    Thank you for referring Mick Barriga to me for evaluation. Attached you will find relevant portions of my assessment and plan of care.    If you have questions, please do not hesitate to call me. I look forward to following Mick Barriga along with you.    Sincerely,    Donavon Smart MD    Enclosure  CC:  No Recipients    If you would like to receive this communication electronically, please contact [email or web address] or [phone] to request more information on [employer] access.    For providers and/or their staff who would like to refer a patient to [facility], please contact us through our one-stop-shop provider referral line, [facility], at [phone].    If you feel you have received this communication in error or would no longer like to receive these types of communications, please e-mail [email or web address]

## 2018-04-17 NOTE — PROGRESS NOTES
Subjective:    Patient ID:  Mick Barriga is a 57 y.o. male who presents for evaluation of post OHT surveilance angiogram.      Referring Physician: Trixie Marx MD    HPI   57 y.o. male with PMHx of NICM s/p HM II (8/24/16), HLD, HTN, DM II, VT s/p ICD now s/p OHTx 2/9/17 with post op course complicated by bradycardia as well as right groin infected seroma s/p I&D and right femoris muscle flap here for 1st post OHT angiogram. His last RHC with biopsy was in 10/2017 (EBx x 4).    Patient denies any chest pain/orthopnea or PND. Reports functional status > 4 METS.        Echo 4/17/18:    1 - Post-cardiac transplantation study.     2 - Normal left ventricular systolic function (EF 60-65%).     3 - Normal left ventricular diastolic function.     4 - Normal right ventricular systolic function .     5 - The estimated PA systolic pressure is greater than 13 mmHg.     6 - Trivial tricuspid regurgitation.       Review of Systems   Constitution: Negative for chills, decreased appetite, fever, weakness, night sweats, weight gain and weight loss.   HENT: Negative for congestion, hoarse voice, stridor and tinnitus.    Eyes: Negative for blurred vision, pain and visual disturbance.   Cardiovascular: Negative for chest pain, claudication, dyspnea on exertion, irregular heartbeat, leg swelling, near-syncope, orthopnea, palpitations, paroxysmal nocturnal dyspnea and syncope.   Respiratory: Negative for cough, hemoptysis, shortness of breath, snoring and wheezing.    Endocrine: Negative for cold intolerance, heat intolerance and polyuria.   Hematologic/Lymphatic: Negative for bleeding problem. Does not bruise/bleed easily.   Skin: Negative for color change and rash.   Musculoskeletal: Negative for arthritis, back pain, joint pain, muscle cramps, myalgias and stiffness.   Gastrointestinal: Negative for abdominal pain, anorexia, constipation, diarrhea, dysphagia, heartburn, hemorrhoids, melena, nausea and vomiting.    Genitourinary: Negative for frequency, hematuria, hesitancy, nocturia and urgency.   Neurological: Negative for difficulty with concentration, dizziness, focal weakness, headaches, light-headedness, numbness, seizures, tremors and vertigo.   Psychiatric/Behavioral: Negative for altered mental status and depression. The patient does not have insomnia.    Allergic/Immunologic: Negative for hives.        Objective:    Physical Exam   Constitutional: He is oriented to person, place, and time. He appears well-developed and well-nourished.   HENT:   Head: Normocephalic and atraumatic.   Eyes: Conjunctivae are normal. Pupils are equal, round, and reactive to light.   Neck: Normal range of motion. No JVD present. No tracheal deviation present. No thyromegaly present.   Cardiovascular: Regular rhythm, S1 normal, S2 normal, normal heart sounds, intact distal pulses and normal pulses.   No extrasystoles are present. Tachycardia present.  Exam reveals no S3.    No murmur heard.  Pulses:       Carotid pulses are 2+ on the right side, and 2+ on the left side.       Radial pulses are 2+ on the right side, and 2+ on the left side.        Femoral pulses are 2+ on the right side, and 2+ on the left side.       Dorsalis pedis pulses are 2+ on the right side, and 2+ on the left side.        Posterior tibial pulses are 2+ on the right side, and 2+ on the left side.   Allens normal both radials. Right CFV flap site with old errythemosis.    Pulmonary/Chest: Effort normal and breath sounds normal. No stridor. No respiratory distress. He has no wheezes. He has no rales.   Abdominal: Soft. Bowel sounds are normal. He exhibits no distension. There is no tenderness.   Musculoskeletal: Normal range of motion. He exhibits no edema or tenderness.   Neurological: He is alert and oriented to person, place, and time.   Skin: Skin is warm and dry. He is not diaphoretic. No erythema.   Psychiatric: He has a normal mood and affect.          Assessment/Plan:   ,Heart transplant, orthotopic, status  Will proceed with post OHT LHC   - Plan for LHC (Diagnostic)   -  R radial 5 Fr approach. Dain 5,   - Keep NPO   - Pre-cath IVF ordered  -The risks, benefits and alternatives of the procedure were explained to the patient.   -The risks of coronary angiography include but are not limited to: bleeding, infection, heart rhythm abnormalities, allergic reactions, kidney injury, stroke and death.   -The risks of moderate sedation include hypotension, respiratory depression, arrhythmias, bronchospasm, and death.   - Informed consent was obtained and the patient is agreeable to proceed with the procedure.    Hyperlipidemia  Continue with pravastatin 20 mg po daily.    Essential hypertension  Continue with Norvasc and ACE-I.     Immunosuppression  Continue with cellcept/prograf per HTS recommendations.     GERD (gastroesophageal reflux disease)  Continue PPI per home dose.     Type 2 diabetes mellitus with stage 3 chronic kidney disease, with long-term current use of insulin  Half dose of long acting Levamir. Patient not on metformin. COREEN Jose MD  PGY-7  Interventional Cardiology Fellow     I have personally seen, taken the history and examined the patient.  I agree with the housestaff whose note reflects my findings and plan as above.

## 2018-04-17 NOTE — LETTER
April 17, 2018        Fox Quinn  [address] [place] [address] [address]  [address]  [facility]  [place] [address]  Phone: [phone]  Fax: [phone]             [facility] [address]  [facility] [address]  Phone: [phone]   Patient: Mick Barriga   MR Number: [id number]   YOB: 1960   Date of Visit: 4/17/2018       Dear Dr. Fox Quinn    Thank you for referring Mick Barriga to me for evaluation. Attached you will find relevant portions of my assessment and plan of care.    If you have questions, please do not hesitate to call me. I look forward to following Mick Barriga along with you.    Sincerely,    Trixie Marx MD    Enclosure    If you would like to receive this communication electronically, please contact [email or web address] or [phone] to request [employer] Link access.    [employer] Link is a tool which provides read-only access to select patient information with whom you have a relationship. Its easy to use and provides real time access to review your patients record including encounter summaries, notes, results, and demographic information.    If you feel you have received this communication in error or would no longer like to receive these types of communications, please e-mail [email or web address]

## 2018-04-17 NOTE — ASSESSMENT & PLAN NOTE
Will proceed with post OHT LHC with IVUS  - Plan for LHC (Diagnostic) and RHC with biopsy through RCFV  -  R radial 5 Fr approach. Dain Hassan, [place] eagle eye  - Keep NPO   - Pre-cath IVF ordered  -The risks, benefits and alternatives of the procedure were explained to the patient.   -The risks of coronary angiography include but are not limited to: bleeding, infection, heart rhythm abnormalities, allergic reactions, kidney injury, stroke and death.   -The risks of moderate sedation include hypotension, respiratory depression, arrhythmias, bronchospasm, and death.   - Informed consent was obtained and the patient is agreeable to proceed with the procedure.

## 2018-04-18 ENCOUNTER — TELEPHONE (OUTPATIENT)
Dept: TRANSPLANT | Facility: CLINIC | Age: 58
End: 2018-04-18

## 2018-04-18 ENCOUNTER — HOSPITAL ENCOUNTER (OUTPATIENT)
Facility: HOSPITAL | Age: 58
Discharge: HOME | End: 2018-04-18
Attending: INTERNAL MEDICINE | Admitting: INTERNAL MEDICINE
Payer: MEDICARE

## 2018-04-18 VITALS
SYSTOLIC BLOOD PRESSURE: 139 MMHG | OXYGEN SATURATION: 98 % | BODY MASS INDEX: 33.34 KG/M2 | HEART RATE: 103 BPM | WEIGHT: 220 LBS | TEMPERATURE: 98 F | HEIGHT: 68 IN | RESPIRATION RATE: 18 BRPM | DIASTOLIC BLOOD PRESSURE: 76 MMHG

## 2018-04-18 DIAGNOSIS — R06.02 SHORTNESS OF BREATH: ICD-10-CM

## 2018-04-18 DIAGNOSIS — Z94.1 HEART TRANSPLANT, ORTHOTOPIC, STATUS: ICD-10-CM

## 2018-04-18 LAB
ABO + RH BLD: NORMAL
ANION GAP SERPL CALC-SCNC: 10 MMOL/L
ANION GAP SERPL CALC-SCNC: 8 MMOL/L
BASOPHILS # BLD AUTO: 0.03 K/UL
BASOPHILS NFR BLD: 0.4 %
BLD GP AB SCN CELLS X3 SERPL QL: NORMAL
BUN SERPL-MCNC: 11 MG/DL
BUN SERPL-MCNC: 14 MG/DL
CALCIUM SERPL-MCNC: 8.7 MG/DL
CALCIUM SERPL-MCNC: 9 MG/DL
CHLORIDE SERPL-SCNC: 102 MMOL/L
CHLORIDE SERPL-SCNC: 107 MMOL/L
CO2 SERPL-SCNC: 24 MMOL/L
CO2 SERPL-SCNC: 25 MMOL/L
CREAT SERPL-MCNC: 0.8 MG/DL
CREAT SERPL-MCNC: 0.9 MG/DL
DIFFERENTIAL METHOD: ABNORMAL
EOSINOPHIL # BLD AUTO: 0.2 K/UL
EOSINOPHIL NFR BLD: 1.9 %
ERYTHROCYTE [DISTWIDTH] IN BLOOD BY AUTOMATED COUNT: 16.6 %
EST. GFR  (AFRICAN AMERICAN): >60 ML/MIN/1.73 M^2
EST. GFR  (AFRICAN AMERICAN): >60 ML/MIN/1.73 M^2
EST. GFR  (NON AFRICAN AMERICAN): >60 ML/MIN/1.73 M^2
EST. GFR  (NON AFRICAN AMERICAN): >60 ML/MIN/1.73 M^2
GLUCOSE SERPL-MCNC: 148 MG/DL
GLUCOSE SERPL-MCNC: 173 MG/DL
HCT VFR BLD AUTO: 34.2 %
HGB BLD-MCNC: 10.2 G/DL
IMM GRANULOCYTES # BLD AUTO: 0.03 K/UL
IMM GRANULOCYTES NFR BLD AUTO: 0.4 %
LYMPHOCYTES # BLD AUTO: 1.8 K/UL
LYMPHOCYTES NFR BLD: 21.4 %
MCH RBC QN AUTO: 18.3 PG
MCHC RBC AUTO-ENTMCNC: 29.8 G/DL
MCV RBC AUTO: 61 FL
MONOCYTES # BLD AUTO: 0.6 K/UL
MONOCYTES NFR BLD: 7.4 %
NEUTROPHILS # BLD AUTO: 5.9 K/UL
NEUTROPHILS NFR BLD: 68.5 %
NRBC BLD-RTO: 0 /100 WBC
PLATELET # BLD AUTO: 191 K/UL
PMV BLD AUTO: ABNORMAL FL
POCT GLUCOSE: 174 MG/DL (ref 70–110)
POTASSIUM SERPL-SCNC: 4.1 MMOL/L
POTASSIUM SERPL-SCNC: 4.3 MMOL/L
RBC # BLD AUTO: 5.58 M/UL
SODIUM SERPL-SCNC: 135 MMOL/L
SODIUM SERPL-SCNC: 141 MMOL/L
WBC # BLD AUTO: 8.57 K/UL

## 2018-04-18 PROCEDURE — 25000003 PHARM REV CODE 250: Performed by: INTERNAL MEDICINE

## 2018-04-18 PROCEDURE — 99152 MOD SED SAME PHYS/QHP 5/>YRS: CPT | Mod: ,,, | Performed by: INTERNAL MEDICINE

## 2018-04-18 PROCEDURE — 82962 GLUCOSE BLOOD TEST: CPT

## 2018-04-18 PROCEDURE — 99152 MOD SED SAME PHYS/QHP 5/>YRS: CPT

## 2018-04-18 PROCEDURE — 63600175 PHARM REV CODE 636 W HCPCS

## 2018-04-18 PROCEDURE — 86850 RBC ANTIBODY SCREEN: CPT

## 2018-04-18 PROCEDURE — 93010 ELECTROCARDIOGRAM REPORT: CPT | Mod: ,,, | Performed by: INTERNAL MEDICINE

## 2018-04-18 PROCEDURE — 85025 COMPLETE CBC W/AUTO DIFF WBC: CPT

## 2018-04-18 PROCEDURE — 25000003 PHARM REV CODE 250

## 2018-04-18 PROCEDURE — 80048 BASIC METABOLIC PNL TOTAL CA: CPT

## 2018-04-18 PROCEDURE — 93458 L HRT ARTERY/VENTRICLE ANGIO: CPT

## 2018-04-18 PROCEDURE — 36415 COLL VENOUS BLD VENIPUNCTURE: CPT

## 2018-04-18 PROCEDURE — 93005 ELECTROCARDIOGRAM TRACING: CPT | Mod: 59

## 2018-04-18 PROCEDURE — 93458 L HRT ARTERY/VENTRICLE ANGIO: CPT | Mod: 26,,, | Performed by: INTERNAL MEDICINE

## 2018-04-18 RX ORDER — DIPHENHYDRAMINE HCL 50 MG
50 CAPSULE ORAL ONCE
Status: COMPLETED | OUTPATIENT
Start: 2018-04-18 | End: 2018-04-18

## 2018-04-18 RX ORDER — SODIUM CHLORIDE 9 MG/ML
INJECTION, SOLUTION INTRAVENOUS CONTINUOUS
Status: DISCONTINUED | OUTPATIENT
Start: 2018-04-18 | End: 2018-04-18 | Stop reason: HOSPADM

## 2018-04-18 RX ORDER — SODIUM CHLORIDE 9 MG/ML
3 INJECTION, SOLUTION INTRAVENOUS CONTINUOUS
Status: ACTIVE | OUTPATIENT
Start: 2018-04-18 | End: 2018-04-18

## 2018-04-18 RX ADMIN — DIPHENHYDRAMINE HYDROCHLORIDE 50 MG: 50 CAPSULE ORAL at 12:04

## 2018-04-18 RX ADMIN — SODIUM CHLORIDE 3 ML/KG/HR: 0.9 INJECTION, SOLUTION INTRAVENOUS at 12:04

## 2018-04-18 NOTE — PROGRESS NOTES
"Interventional Cardiology   Post Cath Note      Referring Physician: Donavon Smart MD  Procedure: [facility]  Indication: S/p OHT 2/2017    SUBJECTIVE:   57 year old male patient with h/o OHT in 2/2017 referred for surveillance [facility]    Cath Results:   Access:  Right radial    Normal coronaries. Left dominant circulation  LVEDP 16 mmHg    See full cath report for further details    Intervention:   none  Closure device used: radial arm band  Patient tolerated the procedure well, no complications    Post Cath Exam:   /80 (BP Location: Left arm, Patient Position: Lying)   Pulse 100   Temp 97.7 °F (36.5 °C) (Oral)   Resp 20   Ht 5' 8" (1.727 m)   Wt 99.8 kg (220 lb)   SpO2 98%   BMI 33.45 kg/m²     No unusual pain, hematoma, thrill or bruit at vascular access site.  Distal pulse present without signs of ischemia.    Vascular:   LEFT RIGHT   RADIAL 2+ 2+   ULNAR 2+ 2+       Assessment / Plan:       57 year old male S/p OHT now s/p [facility] via radial access.  Normal coronaries  Post cath care. IV fluids.  Maximze primary prevention of CAV      Tonio Dubon MD  Cardiovascular Disease Fellow  Pager: [phone]    4/18/2018 3:33 PM  "

## 2018-04-18 NOTE — PLAN OF CARE
Problem: Patient Care Overview  Goal: Plan of Care Review  Outcome: Ongoing (interventions implemented as appropriate)  Received report from Jose. Patient s/p [facility], AAOx3. VSS, no c/o pain or discomfort at this time, resp even and unlabored. Vascband dressing to R wrist is CDI. No active bleeding. No hematoma noted. Post procedure protocol reviewed with patient. Understanding verbalized. Nurse call bell within reach. Will continue to monitor per post procedure protocol.

## 2018-04-18 NOTE — INTERVAL H&P NOTE
The patient has been examined and the H&P has been reviewed:    I concur with the findings and no changes have occurred since H&P was written.    Anesthesia/Surgery risks, benefits and alternative options discussed and understood by patient/family.          Active Hospital Problems    Diagnosis  POA    Heart transplant, orthotopic, status [Z94.1]  Not Applicable      Resolved Hospital Problems    Diagnosis Date Resolved POA   No resolved problems to display.

## 2018-04-18 NOTE — LETTER
[facility]  [address]  [facility] [address]  Phone: [phone]     April 18, 2018               To Whom It May Concern:    This letter is on behalf of . Mick Barriga, who is currently under our care in the Advanced Heart Failure Clinic and Cardiac Transplantation at [facility] in [place], [place].  He is status post heart Transplant and a complicated medical regimen, which requires them to take many different medications.  Please see the attached list of named medications.    For travel, it is imperative that he keep his supplies with him at all times. He also has sternal wires in place for his chest closure during surgery  With this is mind, if you could please give special consideration to Mr. Barton, it would be greatly appreciated.    If we can be of any further assistance, please feel free to contact us at the listed number.    Sincerely,        Mckenzie Lawrence R.N., B.S.N.  Post Heart Transplant Coordinator  [facility]  Advanced Heart Failure & Cardiac Transplant  [address] [place] [address]  Phone: [phone]  Fax :    [phone]

## 2018-04-19 NOTE — PROGRESS NOTES
Patient discharged per MD orders. Instructions given on medications, wound care, activity, signs of infection, when to call MD, and follow up appointments. Pt verbalized understanding. Telemetry and PIV removed. Patient waiting for transport.

## 2018-04-27 NOTE — DISCHARGE SUMMARY
[facility]  Cardiology  Discharge Summary      Patient Name: Mick Barriga  MRN: [id number]  Admission Date: 4/18/2018  Hospital Length of Stay: 0 days  Discharge Date and Time: 4/18/2018  7:54 PM  Attending Physician: No att. providers found  Discharging Provider: Tonio Dubon MD  Primary Care Physician: Sukhdev Alan MD    HPI: 57 y.o. male with PMHx of NICM s/p HM II (8/24/16), HLD, HTN, DM II, VT s/p ICD now s/p OHTx 2/9/17 with post op course complicated by bradycardia as well as right groin infected seroma s/p I&D and right femoris muscle flap presents to the cath lab for 1st post OHT angiogram    Procedure(s) (LRB):  Left heart cath with IVUS and RHC with biopsy (Left)       Hospital Course : Patient had a LHC via radial access. He tolerated it well, was monitored per protocol post procedure and discharged later in the day.    Consults: none    Significant Diagnostic Studies: Angiography: normal coronary.    Final Active Diagnoses:    Diagnosis Date Noted POA    Heart transplant, orthotopic, status [Z94.1]  Not Applicable      Problems Resolved During this Admission:    Diagnosis Date Noted Date Resolved POA       Discharged Condition: good    Follow Up:  Follow-up Information     [facility].    Specialty:  Transplant  Contact information:  [address]  [facility] [address] [phone]  Additional information:  3rd floor               Patient Instructions:   No discharge procedures on file.     Diet: regular diet.  Activity: as tolerated.    Medications:  Reconciled Home Medications:      Medication List      CHANGE how you take these medications    insulin aspart U-100 100 unit/mL Inpn pen  Commonly known as:  NovoLOG  Inject 11 Units into the skin 3 (three) times daily.  What changed:  how much to take     insulin glargine 100 unit/mL (3 mL) Inpn pen  Commonly known as:  LANTUS SOLOSTAR  Inject 25 Units into the skin once  "daily.  What changed:  how much to take        CONTINUE taking these medications    ACCU-CHEK HANNHA Misc  Generic drug:  blood-glucose meter  CHECK BLOOD GLUCOSE QID     alfuzosin 10 mg Tb24  Commonly known as:  UROXATRAL  Take 1 tablet (10 mg total) by mouth daily with breakfast.     amLODIPine 5 MG tablet  Commonly known as:  NORVASC  Take 1 tablet (5 mg total) by mouth once daily.     amoxicillin 500 MG capsule  Commonly known as:  AMOXIL     aspirin 81 MG EC tablet  Commonly known as:  ECOTRIN  Take 1 tablet (81 mg total) by mouth once daily.     BD ULTRA-FINE SHORT PEN NEEDLE 31 gauge x 5/16" Ndle  Generic drug:  pen needle, diabetic     * blood sugar diagnostic Strp  To use to check BG 5 times daily, to use with insurance preferred meter     * blood sugar diagnostic Strp  5 strips by Misc.(Non-Drug; Combo Route) route 5 (five) times daily.     cyclobenzaprine 10 MG tablet  Commonly known as:  FLEXERIL  10 mg every evening.     * ferrous gluconate 324 MG tablet  Commonly known as:  FERGON  TK 1 T PO D WITH AVI     * ferrous gluconate 324 MG tablet  Commonly known as:  FERGON  TAKE 1 TABLET BY MOUTH DAILY WITH BREAKFAST     furosemide 40 MG tablet  Commonly known as:  LASIX  once daily     ibuprofen 800 MG tablet  Commonly known as:  ADVIL,MOTRIN     ketoconazole 2 % cream  Commonly known as:  NIZORAL  YADIEL EXT AA QD     lancets Misc  To use to check BG 4 times daily, to use with insurance preferred meter     lisinopril 10 MG tablet  Take 1 tablet (10 mg total) by mouth once daily.     magnesium oxide 400 mg tablet  Commonly known as:  MAG-OX  Take 2 tablets (800 mg total) by mouth 3 (three) times daily.     mycophenolate 250 mg Cap  Commonly known as:  CELLCEPT  Take 4 capsules (1,000 mg total) by mouth 2 (two) times daily.     * oxyCODONE 20 mg 12 hr tablet  Commonly known as:  OxyCONTIN  Take 1 tablet (20 mg total) by mouth every 12 (twelve) hours.     * oxyCODONE 15 MG Tab  Commonly known as:  ROXICODONE  TK 1 " T PO  QID AS NEEDED FOR PAIN     pravastatin 20 MG tablet  Commonly known as:  PRAVACHOL  TK 1 T PO QHS     sertraline 100 MG tablet  Commonly known as:  ZOLOFT  Take 100 mg by mouth once daily.     tacrolimus 0.5 MG Cap  Commonly known as:  PROGRAF  Take 1 capsule (0.5 mg total) by mouth every 12 (twelve) hours.        * This list has 6 medication(s) that are the same as other medications prescribed for you. Read the directions carefully, and ask your doctor or other care provider to review them with you.                Tonio Dubon MD  Cardiology  [facility]

## 2018-05-10 NOTE — SUBJECTIVE & OBJECTIVE
Cristy aBiley is a 72 year old female whose pharmacy called and left voicemail.  [facility] in [place] wants clarification of sig. Patient's  thought that Gabapentin was increasing to 2 caps twice daily. Prescription sent from Dr. Garcia states 1 cap twice daily.        Please call:   [facility]: [phone]     Please clarfiy with Dr. Garcia.     DIEGO Javed RN                   Interval History:     Seen at bedside. Afebrile for 36hrs. Cough and nausea has resolved. All cultures negative. ID stewarding her abx    Continuous Infusions:   Scheduled Meds:   amLODIPine  5 mg Oral Daily    aspirin  81 mg Oral Daily    azithromycin  500 mg Intravenous Q24H    ceFEPime (MAXIPIME) IVPB  2 g Intravenous Q12H    docusate sodium  200 mg Oral QHS    furosemide  40 mg Oral Daily    insulin aspart  11 Units Subcutaneous TIDWM    insulin glargine  25 Units Subcutaneous Daily    lisinopril  10 mg Oral Daily    magnesium oxide  1,200 mg Oral BID    mycophenolate  1,000 mg Oral BID    oseltamivir  75 mg Oral BID    oxyCODONE  20 mg Oral Q12H    pantoprazole  40 mg Oral Daily    pravastatin  20 mg Oral Daily    pregabalin  75 mg Oral BID    sertraline  100 mg Oral Daily    sodium chloride 0.9%  3 mL Intravenous Q8H    tacrolimus  0.5 mg Oral BID    vancomycin (VANCOCIN) IVPB  1,500 mg Intravenous Q12H     PRN Meds:acetaminophen, dextrose 50%, dextrose 50%, glucagon (human recombinant), glucose, glucose, insulin aspart, oxyCODONE    Review of patient's allergies indicates:   Allergen Reactions    Levemir [insulin detemir] Itching    Niacin preparations     Pork/porcine containing products     Ranexa [ranolazine] Nausea Only     Objective:     Vital Signs (Most Recent):  Temp: 98.3 °F (36.8 °C) (11/29/17 0815)  Pulse: 97 (11/29/17 1100)  Resp: 16 (11/29/17 0815)  BP: (!) 148/77 (11/29/17 0815)  SpO2: (!) 94 % (11/29/17 0815) Vital Signs (24h Range):  Temp:  [98.2 °F (36.8 °C)-99.2 °F (37.3 °C)] 98.3 °F (36.8 °C)  Pulse:  [] 97  Resp:  [15-18] 16  SpO2:  [94 %-96 %] 94 %  BP: (116-148)/(58-77) 148/77     Patient Vitals for the past 72 hrs (Last 3 readings):   Weight   11/29/17 0545 97.2 kg (214 lb 4.6 oz)   11/28/17 0329 97.4 kg (214 lb 11.7 oz)   11/27/17 0400 97.4 kg (214 lb 11.7 oz)     Body mass index is 32.58 kg/m².      Intake/Output Summary (Last 24 hours) at 11/29/17  1145  Last data filed at 11/29/17 0907   Gross per 24 hour   Intake             1800 ml   Output             2975 ml   Net            -1175 ml       Hemodynamic Parameters:       Telemetry:    Physical Exam   Constitutional: He is oriented to person, place, and time. He appears well-developed and well-nourished.   Eyes: EOM are normal. Pupils are equal, round, and reactive to light.   Neck: Normal range of motion. Neck supple.   Cardiovascular: Normal rate, regular rhythm and normal heart sounds.    Pulmonary/Chest: Effort normal and breath sounds normal.   Abdominal: Soft. Bowel sounds are normal.   Musculoskeletal: Normal range of motion.   Neurological: He is alert and oriented to person, place, and time.   Skin: Skin is warm and dry. Capillary refill takes 2 to 3 seconds.   Psychiatric: He has a normal mood and affect.   Vitals reviewed.      Significant Labs:  CBC:    Recent Labs  Lab 11/27/17  0509 11/28/17  0646 11/29/17  0333   WBC 7.82 6.71 4.48   RBC 5.67 5.22 5.53   HGB 10.2* 9.5* 10.2*   HCT 32.9* 31.2* 33.3*    180 146*   MCV 58* 60* 60*   MCH 18.0* 18.2* 18.4*   MCHC 31.0* 30.4* 30.6*     BNP:    Recent Labs  Lab 11/26/17  1730   BNP 59     CMP:    Recent Labs  Lab 11/27/17  0509 11/28/17  0646 11/29/17  0333   * 299* 235*   CALCIUM 8.8 8.6* 8.9   ALBUMIN 3.3* 3.1* 3.1*   PROT 6.5 6.2 6.2   * 132* 134*   K 4.3 4.5 4.8   CO2 24 28 27    98 99   BUN 13 16 16   CREATININE 1.1 0.9 0.9   ALKPHOS 74 66 60   ALT 10 11 13   AST 13 15 24   BILITOT 0.4 0.4 0.3      Coagulation:     Recent Labs  Lab 11/26/17  1730   INR 1.0   APTT 24.3     LDH:  No results for input(s): LDH in the last 72 hours.  Microbiology:  Microbiology Results (last 7 days)     Procedure Component Value Units Date/Time    Culture, Respiratory with Gram Stain [[id number]] Collected:  11/27/17 1100    Order Status:  Completed Specimen:  Respiratory from Sputum, Expectorated Updated:  11/29/17 1013     Respiratory  Culture Normal respiratory geo     Gram Stain (Respiratory) <10 epithelial cells per low power field.     Gram Stain (Respiratory) Moderate WBC's     Gram Stain (Respiratory) Rare Gram positive cocci    Blood culture x two cultures. Draw prior to antibiotics. [[id number]] Collected:  11/26/17 1700    Order Status:  Completed Specimen:  Blood from Peripheral, Upper Arm, Right Updated:  11/28/17 2012     Blood Culture, Routine No Growth to date     Blood Culture, Routine No Growth to date     Blood Culture, Routine No Growth to date    Narrative:       Aerobic and anaerobic    Blood culture x two cultures. Draw prior to antibiotics. [[id number]] Collected:  11/26/17 1733    Order Status:  Completed Specimen:  Blood from Peripheral, Antecubital, Left Updated:  11/28/17 2012     Blood Culture, Routine No Growth to date     Blood Culture, Routine No Growth to date     Blood Culture, Routine No Growth to date    Narrative:       Aerobic and anaerobic    Respiratory Viral Panel by PCR [place]; Nasal Swab [[id number]] Collected:  11/27/17 1115    Order Status:  Sent Specimen:  Respiratory Updated:  11/27/17 1140          I have reviewed all pertinent labs within the past 24 hours.    Estimated Creatinine Clearance: 104.8 mL/min (based on SCr of 0.9 mg/dL).    Diagnostic Results:

## 2018-06-11 ENCOUNTER — TELEPHONE (OUTPATIENT)
Dept: TRANSPLANT | Facility: CLINIC | Age: 58
End: 2018-06-11

## 2018-06-11 NOTE — TELEPHONE ENCOUNTER
----- Message from Bharti Moran sent at 6/11/2018  2:00 PM CDT -----  Contact: [facility]  Madison from [facility] calling about prior authorization for  Medication mycophenolate (CELLCEPT) 250 mg Cap  . Please call Madison @ [phone].  Thank you.

## 2018-06-29 ENCOUNTER — TELEPHONE (OUTPATIENT)
Dept: TRANSPLANT | Facility: CLINIC | Age: 58
End: 2018-06-29

## 2018-06-29 NOTE — TELEPHONE ENCOUNTER
Spoke with pt and he is still in [place], he will return the middle of next month. He states that he is doing well.

## 2018-07-24 RX ORDER — LANCETS
EACH MISCELLANEOUS
Qty: 408 EACH | Refills: 0 | OUTPATIENT
Start: 2018-07-24

## 2018-07-25 NOTE — CONSULTS
Dermatology Inpatient Consult Note:  2/19/2017  10:13 AM    Reason for consult:  Lesion on right cheek    HPI: 56 y.o. M with history of heart transplant (2/9/17) complains of a tender, somewhat pruritic lesion on his right cheek. He first noticed this upon extubation 2 days post op. He reports his face was clear before surgery. He does not recall any trauma or inciting event but says he was intubated/sedated for over two days with an oxygen mask on for much of that time. Lesion is associated with crusting but he denies any bleeding or purulent drainage. Lesion is stable in size and less red than in prior days. He denies any similar lesions elsewhere. He has not attempted any form of treatment. He denies any personal history of skin cancer.    Primary team consulted dermatology because they were somewhat concerned this could be new skin cancer.    Scheduled Meds:   albuterol-ipratropium 2.5mg-0.5mg/3mL  3 mL Nebulization Q4H    amlodipine  10 mg Oral Daily    ciprofloxacin HCl  500 mg Oral Q12H    docusate sodium  100 mg Oral BID    furosemide  60 mg Intravenous BID    insulin aspart  18 Units Subcutaneous TIDWM    insulin glargine  24 Units Subcutaneous QHS    magnesium oxide  400 mg Oral BID    mycophenolate  1,500 mg Oral BID    nystatin  500,000 Units Mouth/Throat QID (WM & HS)    pantoprazole  40 mg Oral Daily    polyethylene glycol  17 g Oral Daily    predniSONE  25 mg Oral BID    sulfamethoxazole-trimethoprim 400-80mg  1 tablet Oral Daily    tacrolimus  2 mg Oral BID    terbutaline  5 mg Oral TID    valganciclovir  900 mg Oral Daily     Continuous Infusions:   sodium chloride 0.45% 10 mL/hr (02/10/17 2000)     PRN Meds:.sodium chloride, sodium chloride, acetaminophen, dextrose 50%, dextrose 50%, glucagon (human recombinant), glucose, glucose, HYDROmorphone, insulin aspart, insulin aspart, magnesium sulfate IVPB, metoclopramide HCl, ondansetron, oxycodone, potassium chloride **AND** potassium  chloride **AND** potassium chloride, sodium phosphate IVPB, sodium phosphate IVPB, sodium phosphate IVPB    Review of patient's allergies indicates:   Allergen Reactions    Levemir [insulin detemir] Itching    Pork/porcine containing products     Ranexa [ranolazine] Nausea Only       Past Medical History   Diagnosis Date    CHF (congestive heart failure)     Coronary artery disease     GERD (gastroesophageal reflux disease)     Hyperlipidemia     Hypertension     LVAD (left ventricular assist device) present 2/13/2017    Type 2 diabetes mellitus with hyperglycemia        Past Surgical History   Procedure Laterality Date    Cardiac pacemaker placement      Cholecystectomy      Left ventricular assist device       x 3 months ago    Colonoscopy N/A 1/11/2017     Procedure: COLONOSCOPY;  Surgeon: Petr Almanzar MD;  Location: [facility] ENDO ([address] [place] [address]);  Service: Endoscopy;  Laterality: N/A;    Colonoscopy N/A 1/12/2017     Procedure: COLONOSCOPY;  Surgeon: Petr Almanzar MD;  Location: [facility] ([address] [place] [address]);  Service: Endoscopy;  Laterality: N/A;    Heart transplant  02/2017       Social History     Social History    Marital status:      Spouse name: N/A    Number of children: N/A    Years of education: N/A     Occupational History    Not on file.     Social History Main Topics    Smoking status: Former Smoker     Packs/day: 0.50     Years: 15.00     Quit date: 6/14/2006    Smokeless tobacco: Never Used    Alcohol use No    Drug use: Not on file    Sexual activity: Yes     Partners: Female     Other Topics Concern    Not on file     Social History Narrative       Family History   Problem Relation Age of Onset    Heart disease Mother     Hypertension Mother     Heart attack Mother     Heart disease Father     Hypertension Father     Heart attack Father     Cancer Brother      Review of Systems:  Constitutional:  no fevers, chills, fatigue, or malaise.  HEENT: no dysphagia or mouth  sores.  Ocular: no eye irritation or blurry vision.  Skin: + pruritus +tenderness +crusting a/w lesion in HPI    Physical Exam:  Vitals:    02/19/17 0900   BP:    Pulse: 85   Resp:    Temp:    General: NAD, WDWN.  Psych: AAOx3.  HEENT: no mouth sores or nasal lesions.  Ocular: no ocular lesions or conjunctivitis.  Skin: pertinent findings:  - Scalp: linear purpuric patch on superior occipital scalp  - Face: round, thin erythematous plaque with overlying yellow/brown crust and collarette of scale on right cheek  - Neck: no concerning findings  - Abdomen: purpuric macules and patches on lower abdomen  - Genitalia/buttocks: purpuric patches   - RUE: purpuric macules dorsal hand/wrist  - LUE: purpuric macules dorsal hand/wrist  - RLE: no concerning findings  - LLE: no concerning findings  - Hands: no concerning findings  - Feet: slightly tender, small hemorrhagic bullae on dorsolateral heels c/w coma bullae       Assessment and Plan:    Right Cheek Lesion:  -Ddx: Impetigo vs SCC vs Fungal   -Patient feels like lesion is stable in size and less inflamed in recent days  -Recommend conservative management with topical Mupirocin 2% ointment three times daily for next 5 days  -Will biopsy for further pathologic evaluation if there is no improvement   -Discussed treatment plan with primary team outside of patient's room  -Please call me if anything changes acutely  -Thanks for consult      Abundio Aguilar, PGY3  [facility] Dermatology  [phone]       No

## 2018-08-02 ENCOUNTER — OFFICE VISIT (OUTPATIENT)
Dept: TRANSPLANT | Facility: CLINIC | Age: 58
End: 2018-08-02
Attending: INTERNAL MEDICINE
Payer: MEDICARE

## 2018-08-02 ENCOUNTER — HOSPITAL ENCOUNTER (OUTPATIENT)
Dept: CARDIOLOGY | Facility: CLINIC | Age: 58
Discharge: HOME | End: 2018-08-02
Attending: INTERNAL MEDICINE
Payer: MEDICARE

## 2018-08-02 VITALS
HEIGHT: 68 IN | SYSTOLIC BLOOD PRESSURE: 142 MMHG | HEART RATE: 100 BPM | WEIGHT: 210.13 LBS | DIASTOLIC BLOOD PRESSURE: 76 MMHG | BODY MASS INDEX: 31.85 KG/M2

## 2018-08-02 DIAGNOSIS — Z79.52 LONG TERM CURRENT USE OF SYSTEMIC STEROIDS: ICD-10-CM

## 2018-08-02 DIAGNOSIS — F11.20 UNCOMPLICATED OPIOID DEPENDENCE: ICD-10-CM

## 2018-08-02 DIAGNOSIS — D84.9 IMMUNOSUPPRESSION: ICD-10-CM

## 2018-08-02 DIAGNOSIS — E78.2 MIXED HYPERLIPIDEMIA: Chronic | ICD-10-CM

## 2018-08-02 DIAGNOSIS — Z79.899 ENCOUNTER FOR LONG-TERM (CURRENT) USE OF MEDICATIONS: ICD-10-CM

## 2018-08-02 DIAGNOSIS — R06.02 SHORTNESS OF BREATH: ICD-10-CM

## 2018-08-02 DIAGNOSIS — E78.2 MIXED HYPERLIPIDEMIA: ICD-10-CM

## 2018-08-02 DIAGNOSIS — I10 ESSENTIAL HYPERTENSION: ICD-10-CM

## 2018-08-02 DIAGNOSIS — I10 ESSENTIAL HYPERTENSION: Chronic | ICD-10-CM

## 2018-08-02 DIAGNOSIS — Z94.1 STATUS POST HEART TRANSPLANT: ICD-10-CM

## 2018-08-02 DIAGNOSIS — T86.20 COMPLICATION OF HEART TRANSPLANT, UNSPECIFIED COMPLICATION: ICD-10-CM

## 2018-08-02 DIAGNOSIS — Z94.1 HEART TRANSPLANT, ORTHOTOPIC, STATUS: Primary | ICD-10-CM

## 2018-08-02 LAB
DIASTOLIC DYSFUNCTION: NO
ESTIMATED PA SYSTOLIC PRESSURE: 32.81
MITRAL VALVE REGURGITATION: NORMAL
RETIRED EF AND QEF - SEE NOTES: 60 (ref 55–65)
TRICUSPID VALVE REGURGITATION: NORMAL

## 2018-08-02 PROCEDURE — 99999 PR PBB SHADOW E&M-EST. PATIENT-LVL III: CPT | Mod: PBBFAC,,, | Performed by: INTERNAL MEDICINE

## 2018-08-02 PROCEDURE — 99215 OFFICE O/P EST HI 40 MIN: CPT | Mod: S$PBB,,, | Performed by: INTERNAL MEDICINE

## 2018-08-02 PROCEDURE — 99213 OFFICE O/P EST LOW 20 MIN: CPT | Mod: PBBFAC | Performed by: INTERNAL MEDICINE

## 2018-08-02 PROCEDURE — 93306 TTE W/DOPPLER COMPLETE: CPT | Mod: PBBFAC,NTX | Performed by: INTERNAL MEDICINE

## 2018-08-02 RX ORDER — ERGOCALCIFEROL 1.25 MG/1
50000 CAPSULE ORAL
Qty: 12 CAPSULE | Refills: 1 | Status: SHIPPED | OUTPATIENT
Start: 2018-08-02 | End: 2019-01-13 | Stop reason: SDUPTHER

## 2018-08-02 NOTE — LETTER
August 2, 2018        Fox Quinn  [address] [place] [address] [address]  [address]  [facility]  [place] [address]  Phone: [phone]  Fax: [phone]             [facility] [address]  [facility] [address]  Phone: [phone]   Patient: Mick Barriga   MR Number: [id number]   YOB: 1960   Date of Visit: 8/2/2018       Dear Dr. Fox Quinn    Thank you for referring Mick Barriga to me for evaluation. Attached you will find relevant portions of my assessment and plan of care.    If you have questions, please do not hesitate to call me. I look forward to following Mick Barriga along with you.    Sincerely,    Ryan Dawn MD    Enclosure    If you would like to receive this communication electronically, please contact [email or web address] or [phone] to request [employer] access.    [employer] is a tool which provides read-only access to select patient information with whom you have a relationship. Its easy to use and provides real time access to review your patients record including encounter summaries, notes, results, and demographic information.    If you feel you have received this communication in error or would no longer like to receive these types of communications, please e-mail [email or web address]

## 2018-08-02 NOTE — PROGRESS NOTES
Subjective:   Mr. Barriga is a 58 y.o. year old White male who received a  - brain death heart transplant on 17.      CMV status:   Donor:  neg  Recipient: post     HPI   PMHx of NICM s/p HM II (16), HLD, HTN, VT s/p ICD now s/p OHTx 17 who's post op course complicated by bradycardia (started on terbutaline) as well as right groin infected seroma (s/p I&D in OR during admission from -04/10). Plastics then performed a right femoris muscle flap and he had pain-control issues following that.     Immuno Tacro 0.5/0.5, Cellcept 1000 mg BID, off prednisone     DOING EXCELLENT    Echo today    CONCLUSIONS     1 - Normal left ventricular systolic function (EF 60-65%).     2 - Normal left ventricular diastolic function.     3 - Normal right ventricular systolic function .     4 - The estimated PA systolic pressure is 33 mmHg.     5 - Trivial mitral regurgitation.     6 - Trivial tricuspid regurgitation.     7 - No wall motion abnormalities.     8 - Concentric remodeling.     Review of Systems   Constitution: Negative for decreased appetite, weight gain and weight loss.   Cardiovascular: Negative for chest pain, dyspnea on exertion, leg swelling, near-syncope, orthopnea and palpitations.   Respiratory: Negative for cough and shortness of breath.    Musculoskeletal: Negative for myalgias.   Gastrointestinal: Negative for jaundice.       Objective:     Physical Exam   Constitutional: He is oriented to person, place, and time. He appears well-developed and well-nourished. He is active. He is not intubated.        HENT:   Head: Normocephalic and atraumatic. Hair is normal.   Right Ear: External ear normal.   Left Ear: External ear normal.   Nose: Nose normal. No nasal deformity. No epistaxis.  No foreign bodies.   Mouth/Throat: Mucous membranes are normal. Mucous membranes are not cyanotic. No oropharyngeal exudate.   Eyes: Conjunctivae and EOM are normal. Pupils are equal, round, and reactive to light.    Neck: Neck supple. No hepatojugular reflux and no JVD present.   Cardiovascular: Normal rate, regular rhythm, normal heart sounds and normal pulses.  Exam reveals no gallop.    Pulmonary/Chest: Effort normal and breath sounds normal. No apnea and no tachypnea. He is not intubated. No respiratory distress. He exhibits no tenderness.   Abdominal: Soft. Normal appearance and bowel sounds are normal. There is no tenderness. No hernia.   Musculoskeletal: Normal range of motion.   Neurological: He is alert and oriented to person, place, and time. He displays no seizure activity.   Skin: Skin is warm, dry and intact. No rash noted. No pallor.   Psychiatric: He has a normal mood and affect. His speech is normal and behavior is normal. Thought content normal. Cognition and memory are normal.       Lab Results   Component Value Date    WBC 8.57 04/18/2018    HGB 10.2 (L) 04/18/2018    HCT 34.2 (L) 04/18/2018    MCV 61 (L) 04/18/2018     04/18/2018    CO2 25 08/02/2018    CREATININE 0.9 08/02/2018    CALCIUM 9.3 08/02/2018    ALBUMIN 3.7 08/02/2018    AST 11 08/02/2018    BNP 36 08/02/2018    ALT 13 08/02/2018    ALLOMAP See result image under hyperlink 04/17/2018       Lab Results   Component Value Date    INR 1.0 03/22/2018    INR 1.0 11/26/2017    INR 1.6 (H) 02/15/2017       BNP   Date Value Ref Range Status   08/02/2018 36 0 - 99 pg/mL Final     Comment:     Values of less than 100 pg/ml are consistent with non-CHF populations.   04/17/2018 46 0 - 99 pg/mL Final     Comment:     Values of less than 100 pg/ml are consistent with non-CHF populations.   03/22/2018 81 0 - 99 pg/mL Final     Comment:     Values of less than 100 pg/ml are consistent with non-CHF populations.       Tacrolimus Lvl   Date Value Ref Range Status   04/17/2018 11.4 5.0 - 15.0 ng/mL Final     Comment:     Testing performed by Liquid Chromatography-Tandem  Mass Spectrometry (LC-MS/MS).  This test was developed and its performance  characteristics  determined by [facility], Department of Pathology  and Laboratory Medicine in a manner consistent with CLIA  requirements. It has not been cleared or approved by the [place]  Food and Drug Administration.  This test is used for clinical   purposes.  It should not be regarded as investigational or for  research.           Assessment:     1. Heart transplant, orthotopic, status    2. Mixed hyperlipidemia    3. Uncomplicated opioid dependence    4. Essential hypertension    5. Immunosuppression        Plan:     DOing very well  Routine follow up      Return instructions as set forth by post transplant schedule or as needed:    Clinic: Return for labs and/or biopsy weekly the first month, every two weeks during month 2 and then monthly for the first year at the provider or coordinator's discretion. During the second year, return to clinic every 3 months. Post transplant year 3-5 return every 6 months. There will be a comprehensive post transplant evaluation every year that may include LHC/RHC/biopsy, stress test, echo, CXR, and other health screening exams.    In addition to the clinical assessment, I have ordered Allomap testing for this patient to assist in identification of moderate/severe acute cellular rejection (ACR) in a pt with stable Allograft function instead of endomyocardial biopsy.     Patient is reminded to call with any health changes as these can be early signs of transplant complications. Patient is advised to make sure any new medications or changes of old medications are discussed with a pharmacist or physician knowledgeable with transplant to avoid rejection/drug toxicity related to significant drug interactions.    UNOS Patient Status  Functional Status: 80% - Normal activity with effort: some symptoms of disease  Physical Capacity: No Limitations  Working for Income: Unknown    Ryan Dawn MD

## 2018-08-09 ENCOUNTER — TELEPHONE (OUTPATIENT)
Dept: TRANSPLANT | Facility: CLINIC | Age: 58
End: 2018-08-09

## 2018-09-04 DIAGNOSIS — Z94.1 STATUS POST HEART TRANSPLANT: ICD-10-CM

## 2018-09-04 RX ORDER — BLOOD SUGAR DIAGNOSTIC
STRIP MISCELLANEOUS
Qty: 600 STRIP | Refills: 0 | Status: SHIPPED | OUTPATIENT
Start: 2018-09-04 | End: 2018-11-21 | Stop reason: SDUPTHER

## 2018-09-05 RX ORDER — LISINOPRIL 10 MG/1
10 TABLET ORAL DAILY
Qty: 90 TABLET | Refills: 3 | Status: SHIPPED | OUTPATIENT
Start: 2018-09-05 | End: 2019-12-03

## 2018-11-16 ENCOUNTER — HOSPITAL ENCOUNTER (OUTPATIENT)
Dept: CARDIOLOGY | Facility: CLINIC | Age: 58
Discharge: HOME | End: 2018-11-16
Attending: INTERNAL MEDICINE
Payer: MEDICARE

## 2018-11-16 ENCOUNTER — OFFICE VISIT (OUTPATIENT)
Dept: TRANSPLANT | Facility: CLINIC | Age: 58
End: 2018-11-16
Payer: MEDICARE

## 2018-11-16 VITALS
HEIGHT: 68 IN | BODY MASS INDEX: 31.83 KG/M2 | DIASTOLIC BLOOD PRESSURE: 70 MMHG | HEIGHT: 68 IN | DIASTOLIC BLOOD PRESSURE: 74 MMHG | WEIGHT: 210 LBS | BODY MASS INDEX: 32.55 KG/M2 | WEIGHT: 214.75 LBS | SYSTOLIC BLOOD PRESSURE: 120 MMHG | SYSTOLIC BLOOD PRESSURE: 146 MMHG | HEART RATE: 84 BPM

## 2018-11-16 DIAGNOSIS — Z94.1 STATUS POST HEART TRANSPLANT: ICD-10-CM

## 2018-11-16 DIAGNOSIS — D84.9 IMMUNOSUPPRESSION: ICD-10-CM

## 2018-11-16 DIAGNOSIS — N28.9 RENAL INSUFFICIENCY: ICD-10-CM

## 2018-11-16 DIAGNOSIS — E78.2 MIXED HYPERLIPIDEMIA: Chronic | ICD-10-CM

## 2018-11-16 DIAGNOSIS — Z79.52 LONG TERM CURRENT USE OF SYSTEMIC STEROIDS: ICD-10-CM

## 2018-11-16 DIAGNOSIS — Z79.899 LONG TERM CURRENT USE OF IMMUNOSUPPRESSIVE DRUG: ICD-10-CM

## 2018-11-16 DIAGNOSIS — T86.20 COMPLICATION OF HEART TRANSPLANT, UNSPECIFIED COMPLICATION: ICD-10-CM

## 2018-11-16 DIAGNOSIS — I10 ESSENTIAL HYPERTENSION: Chronic | ICD-10-CM

## 2018-11-16 DIAGNOSIS — Z94.1 HEART TRANSPLANT, ORTHOTOPIC, STATUS: Primary | ICD-10-CM

## 2018-11-16 LAB
ASCENDING AORTA: 3.2 CM
AV MEAN GRADIENT: 2.15 MMHG
AV PEAK GRADIENT: 3.1 MMHG
AV VALVE AREA: 5.11 CM2
BSA FOR ECHO PROCEDURE: 2.14 M2
CV ECHO LV RWT: 0.38 CM
DOP CALC AO PEAK VEL: 0.88 M/S
DOP CALC AO VTI: 14.76 CM
DOP CALC LVOT AREA: 4.79 CM2
DOP CALC LVOT DIAMETER: 2.47 CM
DOP CALC LVOT STROKE VOLUME: 75.48 CM3
DOP CALCLVOT PEAK VEL VTI: 15.76 CM
E WAVE DECELERATION TIME: 147.93 MSEC
E/A RATIO: 2.39
E/E' RATIO: 8.6
ECHO LV POSTERIOR WALL: 0.99 CM (ref 0.6–1.1)
FRACTIONAL SHORTENING: 38 % (ref 28–44)
INTERVENTRICULAR SEPTUM: 1.14 CM (ref 0.6–1.1)
LEFT INTERNAL DIMENSION IN SYSTOLE: 3.27 CM (ref 2.1–4)
LEFT VENTRICLE DIASTOLIC VOLUME INDEX: 62.53 ML/M2
LEFT VENTRICLE DIASTOLIC VOLUME: 133.82 ML
LEFT VENTRICLE MASS INDEX: 100.9 G/M2
LEFT VENTRICLE SYSTOLIC VOLUME INDEX: 20.2 ML/M2
LEFT VENTRICLE SYSTOLIC VOLUME: 43.3 ML
LEFT VENTRICULAR INTERNAL DIMENSION IN DIASTOLE: 5.27 CM (ref 3.5–6)
LEFT VENTRICULAR MASS: 215.97 G
LV LATERAL E/E' RATIO: 6.62
LV SEPTAL E/E' RATIO: 12.29
MV PEAK A VEL: 0.36 M/S
MV PEAK E VEL: 0.86 M/S
PULM VEIN S/D RATIO: 1.09
PV PEAK D VEL: 0.44 M/S
PV PEAK S VEL: 0.48 M/S
RA PRESSURE: 3 MMHG
RIGHT VENTRICULAR END-DIASTOLIC DIMENSION: 3.28 CM
RV TISSUE DOPPLER FREE WALL SYSTOLIC VELOCITY 1 (APICAL 4 CHAMBER VIEW): 8.49 M/S
SINUS: 3.14 CM
STJ: 2.87 CM
TDI LATERAL: 0.13
TDI SEPTAL: 0.07
TDI: 0.1

## 2018-11-16 PROCEDURE — 93306 TTE W/DOPPLER COMPLETE: CPT | Mod: PBBFAC | Performed by: INTERNAL MEDICINE

## 2018-11-16 PROCEDURE — 99213 OFFICE O/P EST LOW 20 MIN: CPT | Mod: PBBFAC,25 | Performed by: INTERNAL MEDICINE

## 2018-11-16 PROCEDURE — 99214 OFFICE O/P EST MOD 30 MIN: CPT | Mod: S$PBB,,, | Performed by: INTERNAL MEDICINE

## 2018-11-16 PROCEDURE — 99999 PR PBB SHADOW E&M-EST. PATIENT-LVL III: CPT | Mod: PBBFAC,,, | Performed by: INTERNAL MEDICINE

## 2018-11-16 RX ORDER — ESOMEPRAZOLE MAGNESIUM 40 MG/1
40 CAPSULE, DELAYED RELEASE ORAL DAILY
Refills: 0 | COMMUNITY
Start: 2018-08-29

## 2018-11-16 NOTE — LETTER
November 16, 2018        Fox Quinn  [address] [place] [address] [address]LD  [address]  [facility]  [place] [address]  Phone: [phone]  Fax: [phone]             [facility] [address]  [facility] [address]  Phone: [phone]   Patient: Mick Barriga   MR Number: [id number]   YOB: 1960   Date of Visit: 11/16/2018       Dear Dr. Fox Quinn    Thank you for referring Mick Barriga to me for evaluation. Attached you will find relevant portions of my assessment and plan of care.    If you have questions, please do not hesitate to call me. I look forward to following Mick Barriga along with you.    Sincerely,    Peewee Romero MD    Enclosure    If you would like to receive this communication electronically, please contact [email or web address] or [phone] to request [employer] Link access.    [employer] Link is a tool which provides read-only access to select patient information with whom you have a relationship. Its easy to use and provides real time access to review your patients record including encounter summaries, notes, results, and demographic information.    If you feel you have received this communication in error or would no longer like to receive these types of communications, please e-mail [email or web address]

## 2018-11-16 NOTE — H&P (VIEW-ONLY)
"Subjective:   Mr. Barriga is a 58 y.o. year old White male who received a  - brain death heart transplant on 17.      CMV status:   Donor: -  Recipient: +    HPI  Mr. Barriga is a very pleasant 59 y/o male with a Hx of NICM s/p HM II (16), HLD, HTN, VT s/p ICD now s/p OHTx 17 who's post op course complicated by bradycardia (started on terbutaline) as well as right groin infected seroma (s/p I&D in OR during admission from -04/10). Plastics then performed a right femoris muscle flap and he had pain-control issues following that. Has done remarkably well.. Comes for his 21st month post Tx visit. Has no complaints.  Immuno regimen includes; Tacro 0.5/0.5, Cellcept 1000 mg BID, off prednisone. Echo done today showed preserved graft function with an EF=60-65%. Labs reviewed. Creatinine is 0.9.     Echo done today  Normal left ventricular systolic function. The estimated ejection fraction is 65%  No wall motion abnormalities.  Normal right ventricular systolic function.  Cardiac transplantation.  Normal central venous pressure (3 mm Hg).  No pericardial effusion.      Review of Systems   Constitution: Negative. Negative for chills, decreased appetite, diaphoresis, fever, weakness, malaise/fatigue, night sweats, weight gain and weight loss.   Eyes: Negative.    Cardiovascular: Negative for chest pain, claudication, cyanosis, dyspnea on exertion, irregular heartbeat, leg swelling, near-syncope, orthopnea, palpitations, paroxysmal nocturnal dyspnea and syncope.   Respiratory: Negative for cough, hemoptysis and shortness of breath.    Endocrine: Negative.    Hematologic/Lymphatic: Negative.    Skin: Negative for color change, dry skin and nail changes.   Musculoskeletal: Negative.    Gastrointestinal: Negative.    Genitourinary: Negative.        Objective:   Blood pressure (!) 146/74, pulse 84, height 5' 8" (1.727 m), weight 97.4 kg (214 lb 11.7 oz).body mass index is 32.65 kg/m².    Physical Exam " "  Constitutional: He appears well-developed.   BP (!) 146/74 (BP Location: Left arm, Patient Position: Sitting, BP Method: Large (Automatic))   Pulse 84   Ht 5' 8" (1.727 m)   Wt 97.4 kg (214 lb 11.7 oz)   BMI 32.65 kg/m²      HENT:   Head: Normocephalic.   Neck: No JVD present. Carotid bruit is not present.   Cardiovascular: Regular rhythm, normal heart sounds and normal pulses.   No murmur heard.  Pulmonary/Chest: Effort normal and breath sounds normal.   Abdominal: Soft. Bowel sounds are normal.   Neurological: He is alert.   Skin: Skin is warm.   Vitals reviewed.      Lab Results   Component Value Date    WBC 11.28 11/16/2018    HGB 12.3 (L) 11/16/2018    HCT 41.4 11/16/2018    MCV 61 (L) 11/16/2018     11/16/2018    CO2 25 11/16/2018    CREATININE 0.9 11/16/2018    CALCIUM 9.4 11/16/2018    ALBUMIN 3.8 11/16/2018    AST 16 11/16/2018    BNP 38 11/16/2018    ALT 15 11/16/2018    ALLOMAP See result image under hyperlink 04/17/2018       Lab Results   Component Value Date    INR 1.0 03/22/2018    INR 1.0 11/26/2017    INR 1.6 (H) 02/15/2017       BNP   Date Value Ref Range Status   11/16/2018 38 0 - 99 pg/mL Final     Comment:     Values of less than 100 pg/ml are consistent with non-CHF populations.   08/02/2018 36 0 - 99 pg/mL Final     Comment:     Values of less than 100 pg/ml are consistent with non-CHF populations.   04/17/2018 46 0 - 99 pg/mL Final     Comment:     Values of less than 100 pg/ml are consistent with non-CHF populations.       LD   Date Value Ref Range Status   02/08/2017 523 (H) 110 - 260 U/L Final     Comment:     Results are increased in hemolyzed samples.   02/07/2017 536 (H) 110 - 260 U/L Final     Comment:     Results are increased in hemolyzed samples.   02/06/2017 584 (H) 110 - 260 U/L Final     Comment:     Results are increased in hemolyzed samples.       Tacrolimus Lvl   Date Value Ref Range Status   11/16/2018 5.9 5.0 - 15.0 ng/mL Final     Comment:     Testing performed " by Liquid Chromatography-Tandem  Mass Spectrometry (LC-MS/MS).  This test was developed and its performance characteristics  determined by [facility], Department of Pathology  and Laboratory Medicine in a manner consistent with CLIA  requirements. It has not been cleared or approved by the [place]  Food and Drug Administration.  This test is used for clinical   purposes.  It should not be regarded as investigational or for  research.         Assessment:     1. Heart transplant, orthotopic, status    2. Long term current use of immunosuppressive drug    3. Essential hypertension    4. Mixed hyperlipidemia    5. Immunosuppression        Plan:   Doing really well  Cotninue current immuno regimen  RTC in 3 months    Return instructions as set forth by post transplant schedule or as needed:    Clinic: Return for labs and/or biopsy weekly the first month, every two weeks during month 2 and then monthly for the first year at the provider or coordinator's discretion. During the second year, return to clinic every 3 months. Post transplant year 3-5 return every 6 months. There will be a comprehensive post transplant evaluation every year that may include LHC/RHC/biopsy, stress test, echo, CXR, and other health screening exams.    In addition to the clinical assessment, I have ordered Allomap testing for this patient to assist in identification of moderate/severe acute cellular rejection (ACR) in a pt with stable Allograft function instead of endomyocardial biopsy.     Patient is reminded to call with any health changes as these can be early signs of transplant complications. Patient is advised to make sure any new medications or changes of old medications are discussed with a pharmacist or physician knowledgeable with transplant to avoid rejection/drug toxicity related to significant drug interactions.    UNOS Patient Status  Functional Status: 100% - Normal, no complaints, no evidence of disease  Physical Capacity:  No Limitations  Working for Income: Unknown    Peewee Romero MD

## 2018-11-23 ENCOUNTER — TELEPHONE (OUTPATIENT)
Dept: TRANSPLANT | Facility: CLINIC | Age: 58
End: 2018-11-23

## 2018-11-23 NOTE — TELEPHONE ENCOUNTER
----- Message from Mary Alice Lopez sent at 11/23/2018  2:28 PM CST -----  Regarding: RE: are there any spot oin Tuesday 11/27 for an egbx ?  Yes, their is availability.   ----- Message -----  From: Mckenzie Lawrence  Sent: 11/23/2018   2:23 PM  To: [facility] Heart Transplant Schedulers  Subject: are there any spot oin Tuesday 11/27 for an #    I need to review with Julia bender but I am like 99 % that he will need

## 2018-11-23 NOTE — TELEPHONE ENCOUNTER
Spoke with pt after discussing increase in Allomap from 26-27 to 33, plan is to get a EGBX next week.    Pt stated understanding.

## 2018-11-27 ENCOUNTER — HOSPITAL ENCOUNTER (OUTPATIENT)
Facility: HOSPITAL | Age: 58
Discharge: HOME | End: 2018-11-27
Attending: INTERNAL MEDICINE | Admitting: INTERNAL MEDICINE
Payer: MEDICARE

## 2018-11-27 VITALS
HEART RATE: 90 BPM | WEIGHT: 212.94 LBS | BODY MASS INDEX: 32.27 KG/M2 | DIASTOLIC BLOOD PRESSURE: 90 MMHG | HEIGHT: 68 IN | SYSTOLIC BLOOD PRESSURE: 160 MMHG

## 2018-11-27 DIAGNOSIS — Z94.1 STATUS POST HEART TRANSPLANT: ICD-10-CM

## 2018-11-27 LAB
ASCENDING AORTA: 2.94 CM
AV INDEX (PROSTH): 1.07
AV MEAN GRADIENT: 1.57 MMHG
AV PEAK GRADIENT: 2.37 MMHG
AV VALVE AREA: 4.3 CM2
BSA FOR ECHO PROCEDURE: 2.15 M2
CV ECHO LV RWT: 0.43 CM
DOP CALC AO PEAK VEL: 0.77 M/S
DOP CALC AO VTI: 12.76 CM
DOP CALC LVOT AREA: 4.01 CM2
DOP CALC LVOT DIAMETER: 2.26 CM
DOP CALC LVOT STROKE VOLUME: 54.81 CM3
DOP CALCLVOT PEAK VEL VTI: 13.67 CM
E/E' RATIO: 10.93
ECHO LV POSTERIOR WALL: 0.98 CM (ref 0.6–1.1)
FRACTIONAL SHORTENING: 41 % (ref 28–44)
INTERVENTRICULAR SEPTUM: 1.07 CM (ref 0.6–1.1)
LEFT INTERNAL DIMENSION IN SYSTOLE: 2.7 CM (ref 2.1–4)
LEFT VENTRICLE DIASTOLIC VOLUME INDEX: 44.31 ML/M2
LEFT VENTRICLE DIASTOLIC VOLUME: 95.26 ML
LEFT VENTRICLE MASS INDEX: 75.3 G/M2
LEFT VENTRICLE SYSTOLIC VOLUME INDEX: 12.5 ML/M2
LEFT VENTRICLE SYSTOLIC VOLUME: 26.91 ML
LEFT VENTRICULAR INTERNAL DIMENSION IN DIASTOLE: 4.56 CM (ref 3.5–6)
LEFT VENTRICULAR MASS: 162 G
LV LATERAL E/E' RATIO: 10.25
LV SEPTAL E/E' RATIO: 11.71
MV PEAK E VEL: 0.82 M/S
RA PRESSURE: 3 MMHG
RIGHT VENTRICULAR END-DIASTOLIC DIMENSION: 3.36 CM
RV TISSUE DOPPLER FREE WALL SYSTOLIC VELOCITY 1 (APICAL 4 CHAMBER VIEW): 7.45 M/S
SINUS: 3.12 CM
STJ: 2.7 CM
TDI LATERAL: 0.08
TDI SEPTAL: 0.07
TDI: 0.08

## 2018-11-27 PROCEDURE — 88342 IMHCHEM/IMCYTCHM 1ST ANTB: CPT | Mod: 59 | Performed by: PATHOLOGY

## 2018-11-27 PROCEDURE — 27201423 OPTIME MED/SURG SUP & DEVICES STERILE SUPPLY: Performed by: INTERNAL MEDICINE

## 2018-11-27 PROCEDURE — 25000003 PHARM REV CODE 250: Performed by: INTERNAL MEDICINE

## 2018-11-27 PROCEDURE — C1894 INTRO/SHEATH, NON-LASER: HCPCS | Performed by: INTERNAL MEDICINE

## 2018-11-27 PROCEDURE — 88342 IMHCHEM/IMCYTCHM 1ST ANTB: CPT | Mod: 26,,, | Performed by: PATHOLOGY

## 2018-11-27 PROCEDURE — 93505 ENDOMYOCARDIAL BIOPSY: CPT | Performed by: INTERNAL MEDICINE

## 2018-11-27 PROCEDURE — 88307 TISSUE EXAM BY PATHOLOGIST: CPT | Mod: 26,,, | Performed by: PATHOLOGY

## 2018-11-27 PROCEDURE — 93505 ENDOMYOCARDIAL BIOPSY: CPT | Mod: 26,,, | Performed by: INTERNAL MEDICINE

## 2018-11-27 RX ORDER — LIDOCAINE HYDROCHLORIDE 20 MG/ML
INJECTION, SOLUTION INFILTRATION; PERINEURAL
Status: DISCONTINUED | OUTPATIENT
Start: 2018-11-27 | End: 2018-11-27 | Stop reason: HOSPADM

## 2018-11-27 NOTE — LETTER
April 24, 2017        Fox Quinn  [address] [place] [address] [address]  [address]  [facility]ENTER  [place] [address]  Phone: [phone]  Fax: [phone]             [facility] [address]  [facility] [address]  Phone: [phone]   Patient: Mick Barriga   MR Number: [id number]   YOB: 1960   Date of Visit: 4/18/2017       Dear Dr. Fox Quinn    Thank you for referring Mick Barriga to me for evaluation. Attached you will find relevant portions of my assessment and plan of care.    If you have questions, please do not hesitate to call me. I look forward to following Mick Barriga along with you.    Sincerely,    Julia Gupta MD    Enclosure    If you would like to receive this communication electronically, please contact [email or web address] or [phone] to request [employer] Link access.    [employer] Link is a tool which provides read-only access to select patient information with whom you have a relationship. Its easy to use and provides real time access to review your patients record including encounter summaries, notes, results, and demographic information.    If you feel you have received this communication in error or would no longer like to receive these types of communications, please e-mail [email or web address]       
1

## 2018-11-27 NOTE — DISCHARGE SUMMARY
[facility]  Discharge Note  Short Stay    Admit Date: 11/27/2018    Discharge Date and Time: No discharge date for patient encounter.     Attending Physician: Dennis Lyles Jr.,*     Discharge Provider: Dennis Lyles Jr    Diagnoses:  Active Hospital Problems    Diagnosis  POA    *Heart transplant, orthotopic, status [Z94.1]  Not Applicable    Long term current use of immunosuppressive drug [Z79.899]  Not Applicable    Immunosuppression [D89.9]  Yes    Type 2 diabetes mellitus with stage 3 chronic kidney disease, with long-term current use of insulin [E11.22, N18.3, Z79.4]  Not Applicable     Chronic    Essential hypertension [I10]  Yes     Chronic      Resolved Hospital Problems   No resolved problems to display.       Discharged Condition: stable    Hospital Course: Patient was admitted for an outpatient procedure and tolerated the procedure well with no complications.  5 specimens obtained  CVP normal before and after biopsy.  No change on echo.    Final Diagnoses: Same as principal problem.    Disposition: Home or Self Care    Follow up/Patient Instructions:    Medications:  Reconciled Home Medications:      Medication List      CHANGE how you take these medications    insulin aspart U-100 100 unit/mL Inpn pen  Commonly known as:  NovoLOG  Inject 11 Units into the skin 3 (three) times daily.  What changed:  how much to take     insulin glargine 100 unit/mL (3 mL) Inpn pen  Commonly known as:  LANTUS SOLOSTAR  Inject 25 Units into the skin once daily.  What changed:  how much to take        CONTINUE taking these medications    ACCU-CHEK FASTCLIX LANCET DRUM Misc  Generic drug:  lancets  CHECK BLOOD GLUCOSE FOUR TIMES DAILY     ACCU-CHEK HANNAH Misc  Generic drug:  blood-glucose meter  CHECK BLOOD GLUCOSE QID     amLODIPine 5 MG tablet  Commonly known as:  NORVASC  Take 1 tablet (5 mg total) by mouth once daily.     aspirin 81 MG EC tablet  Commonly known as:  ECOTRIN  Take 1 tablet (81 mg  "total) by mouth once daily.     BD ULTRA-FINE SHORT PEN NEEDLE 31 gauge x 5/16" Ndle  Generic drug:  pen needle, diabetic     * blood sugar diagnostic Strp  To use to check BG 5 times daily, to use with insurance preferred meter     * blood sugar diagnostic Strp  Commonly known as:  ACCU-CHEK SMARTVIEW TEST STRIP  Use five times daily     cyclobenzaprine 10 MG tablet  Commonly known as:  FLEXERIL  10 mg every evening.     ergocalciferol 50,000 unit Cap  Commonly known as:  ERGOCALCIFEROL  Take 1 capsule (50,000 Units total) by mouth every 7 days.     ferrous gluconate 324 MG tablet  Commonly known as:  FERGON  TAKE 1 TABLET BY MOUTH DAILY WITH BREAKFAST     furosemide 40 MG tablet  Commonly known as:  LASIX  two time daily. prn     ibuprofen 800 MG tablet  Commonly known as:  ADVIL,MOTRIN     ketoconazole 2 % cream  Commonly known as:  NIZORAL  YADIEL EXT AA QD     lisinopril 10 MG tablet  Take 1 tablet (10 mg total) by mouth once daily.     magnesium oxide 400 mg (241.3 mg magnesium) tablet  Commonly known as:  MAG-OX  Take 2 tablets (800 mg total) by mouth 3 (three) times daily.     mycophenolate 250 mg Cap  Commonly known as:  CELLCEPT  Take 4 capsules (1,000 mg total) by mouth 2 (two) times daily.     NexIUM 40 MG capsule  Generic drug:  esomeprazole  2 (two) times daily.     * oxyCODONE 20 mg 12 hr tablet  Commonly known as:  OxyCONTIN  Take 1 tablet (20 mg total) by mouth every 12 (twelve) hours.     * oxyCODONE 15 MG Tab  Commonly known as:  ROXICODONE  TK 1 T PO  QID AS NEEDED FOR PAIN     pravastatin 20 MG tablet  Commonly known as:  PRAVACHOL  TK 1 T PO QHS     sertraline 100 MG tablet  Commonly known as:  ZOLOFT  Take 100 mg by mouth once daily.     tacrolimus 0.5 MG Cap  Commonly known as:  PROGRAF  Take 1 capsule (0.5 mg total) by mouth every 12 (twelve) hours.             Resume previous diet and activity; continue f/u with your physicians as directed prior to this procedure    "

## 2018-11-27 NOTE — DISCHARGE INSTRUCTIONS
AFTER THE PROCEDURE:   -DO NOT DRINK OR EAT ANYTHING UNTIL NO LONGER HOARSE   -You may remove the bandage in 24 hours and wash with soap and water.   -You may shower, but do not soak in a tub for three days.   PRECAUTIONS FOR THE NEXT 24 HOURS:   -If you need to cough, sneeze, have a bowel movement, or bear down, hold pressure over your bandage.   -Do not  anything heavier than a gallon of milk(about 5 pounds)   -Avoid excessive bending over.   SYMPTOMS TO WATCH FOR AND REPORT TO YOUR DOCTOR:   -BLEEDING: hold pressure over the site until bleeding stops. Proceed to Emergency Room by ambulance (do not drive yourself) if unable to stop bleeding. Notify your doctor.   -HEMATOMA(hard bruise under the skin): Dioni around the bruise if one develops. Call your doctor if it increases in size or if you have difficulty talking, swallowing, breathing or anything unusual.   SIGNS OF INFECTION:Fever (temperature over 100.5 F), pus or redness   -RASH   -CHEST PAIN OR SHORTNESS OF BREATH   You may call you coordinator in the Heart Failure/Heart Transplant/Pulmonary Hypertension Clinic at [phone] during normal business hours(Monday through Friday from 8 A.M. to 5 P.M.) After hours, call the Heart Transplant Service doctor on call at [phone].

## 2018-11-27 NOTE — OP NOTE
[facility] Post Procedure Note    Patient tolerated the procedure well.   5 specimens obtained  CVP normal before and after biopsy.  No change on echo.  There were no complications.

## 2018-11-27 NOTE — INTERVAL H&P NOTE
The patient has been examined and the H&P has been reviewed:    Patient denies any change in clinical status.  JVP not visible sitting  Lungs clear  No S3  No significant pitting edema  LAB SUMMARY  Lab Results   Component Value Date    BNP 46 11/27/2018     11/27/2018    K 4.2 11/27/2018    MG 1.8 11/16/2018     11/27/2018    CO2 30 (H) 11/27/2018    BUN 13 11/27/2018    CREATININE 0.9 11/27/2018     (H) 11/27/2018    HGBA1C 7.8 (H) 03/19/2018    AST 13 11/27/2018    ALT 9 (L) 11/27/2018    ALBUMIN 3.7 11/27/2018    PROT 6.8 11/27/2018    BILITOT 0.5 11/27/2018    WBC 9.59 11/27/2018    HGB 11.7 (L) 11/27/2018    HCT 40.5 11/27/2018    HCT 21 (L) 02/09/2017     11/27/2018    INR 1.0 03/22/2018    INR 4.4 10/10/2016    TSH 0.868 03/22/2018    CHOL 122 11/16/2018    HDL 43 11/16/2018    LDLCALC 52.4 (L) 11/16/2018    TRIG 133 11/16/2018    [id number] 10.0 03/19/2018    FREET4 1.10 11/26/2017    TACROLIMUS 5.9 11/27/2018     This procedure has been fully reviewed with the patient and written informed consent has been obtained.  Will proceed with echo guided biopsy.

## 2018-12-14 ENCOUNTER — TELEPHONE (OUTPATIENT)
Dept: TRANSPLANT | Facility: CLINIC | Age: 58
End: 2018-12-14

## 2018-12-14 DIAGNOSIS — I10 ESSENTIAL HYPERTENSION: ICD-10-CM

## 2018-12-14 DIAGNOSIS — Z79.60 LONG-TERM USE OF IMMUNOSUPPRESSANT MEDICATION: ICD-10-CM

## 2018-12-14 DIAGNOSIS — E78.2 MIXED HYPERLIPIDEMIA: ICD-10-CM

## 2018-12-14 DIAGNOSIS — Z94.1 STATUS POST HEART TRANSPLANT: Primary | ICD-10-CM

## 2018-12-14 DIAGNOSIS — T86.20 COMPLICATION OF HEART TRANSPLANT, UNSPECIFIED COMPLICATION: ICD-10-CM

## 2018-12-14 DIAGNOSIS — Z12.5 SCREENING FOR PROSTATE CANCER: ICD-10-CM

## 2018-12-14 DIAGNOSIS — Z79.899 ENCOUNTER FOR LONG-TERM (CURRENT) USE OF MEDICATIONS: ICD-10-CM

## 2019-01-13 RX ORDER — ERGOCALCIFEROL 1.25 MG/1
CAPSULE ORAL
Qty: 12 CAPSULE | Refills: 0 | Status: SHIPPED | OUTPATIENT
Start: 2019-01-13 | End: 2019-05-20 | Stop reason: SDUPTHER

## 2019-01-23 ENCOUNTER — TELEPHONE (OUTPATIENT)
Dept: TRANSPLANT | Facility: CLINIC | Age: 59
End: 2019-01-23

## 2019-02-08 ENCOUNTER — TELEPHONE (OUTPATIENT)
Dept: TRANSPLANT | Facility: CLINIC | Age: 59
End: 2019-02-08

## 2019-02-08 DIAGNOSIS — T86.20 COMPLICATION OF HEART TRANSPLANT, UNSPECIFIED COMPLICATION: ICD-10-CM

## 2019-02-08 DIAGNOSIS — Z79.899 ENCOUNTER FOR LONG-TERM (CURRENT) USE OF MEDICATIONS: ICD-10-CM

## 2019-02-08 DIAGNOSIS — Z94.1 STATUS POST HEART TRANSPLANT: ICD-10-CM

## 2019-02-13 ENCOUNTER — TELEPHONE (OUTPATIENT)
Dept: TRANSPLANT | Facility: CLINIC | Age: 59
End: 2019-02-13

## 2019-02-13 ENCOUNTER — HOSPITAL ENCOUNTER (OUTPATIENT)
Dept: CARDIOLOGY | Facility: CLINIC | Age: 59
Discharge: HOME | End: 2019-02-13
Attending: INTERNAL MEDICINE
Payer: MEDICARE

## 2019-02-13 ENCOUNTER — OFFICE VISIT (OUTPATIENT)
Dept: TRANSPLANT | Facility: CLINIC | Age: 59
End: 2019-02-13
Payer: MEDICARE

## 2019-02-13 ENCOUNTER — OFFICE VISIT (OUTPATIENT)
Dept: CARDIOLOGY | Facility: CLINIC | Age: 59
End: 2019-02-13
Payer: MEDICARE

## 2019-02-13 ENCOUNTER — HOSPITAL ENCOUNTER (OUTPATIENT)
Dept: RADIOLOGY | Facility: HOSPITAL | Age: 59
Discharge: HOME | End: 2019-02-13
Attending: INTERNAL MEDICINE
Payer: MEDICARE

## 2019-02-13 ENCOUNTER — DOCUMENTATION ONLY (OUTPATIENT)
Dept: CARDIOLOGY | Facility: CLINIC | Age: 59
End: 2019-02-13

## 2019-02-13 VITALS
OXYGEN SATURATION: 98 % | HEIGHT: 68 IN | SYSTOLIC BLOOD PRESSURE: 157 MMHG | HEART RATE: 92 BPM | WEIGHT: 222.44 LBS | DIASTOLIC BLOOD PRESSURE: 79 MMHG | BODY MASS INDEX: 33.71 KG/M2

## 2019-02-13 VITALS
WEIGHT: 213 LBS | HEART RATE: 87 BPM | SYSTOLIC BLOOD PRESSURE: 130 MMHG | DIASTOLIC BLOOD PRESSURE: 80 MMHG | BODY MASS INDEX: 32.28 KG/M2 | HEIGHT: 68 IN

## 2019-02-13 VITALS
BODY MASS INDEX: 33.84 KG/M2 | HEART RATE: 97 BPM | DIASTOLIC BLOOD PRESSURE: 79 MMHG | WEIGHT: 223.31 LBS | SYSTOLIC BLOOD PRESSURE: 141 MMHG | HEIGHT: 68 IN

## 2019-02-13 DIAGNOSIS — Z94.1 STATUS POST HEART TRANSPLANT: ICD-10-CM

## 2019-02-13 DIAGNOSIS — Z79.899 LONG TERM CURRENT USE OF IMMUNOSUPPRESSIVE DRUG: ICD-10-CM

## 2019-02-13 DIAGNOSIS — I10 ESSENTIAL HYPERTENSION: Primary | Chronic | ICD-10-CM

## 2019-02-13 DIAGNOSIS — Z94.1 HEART TRANSPLANT, ORTHOTOPIC, STATUS: ICD-10-CM

## 2019-02-13 DIAGNOSIS — Z94.1 HEART TRANSPLANT, ORTHOTOPIC, STATUS: Primary | ICD-10-CM

## 2019-02-13 DIAGNOSIS — N28.9 RENAL INSUFFICIENCY: ICD-10-CM

## 2019-02-13 DIAGNOSIS — T86.20 COMPLICATION OF HEART TRANSPLANT, UNSPECIFIED COMPLICATION: ICD-10-CM

## 2019-02-13 DIAGNOSIS — Z79.60 LONG-TERM USE OF IMMUNOSUPPRESSANT MEDICATION: ICD-10-CM

## 2019-02-13 DIAGNOSIS — E78.2 MIXED HYPERLIPIDEMIA: Chronic | ICD-10-CM

## 2019-02-13 DIAGNOSIS — Z79.899 ENCOUNTER FOR LONG-TERM (CURRENT) USE OF MEDICATIONS: ICD-10-CM

## 2019-02-13 LAB
ASCENDING AORTA: 2.8 CM
AV INDEX (PROSTH): 0.83
AV MEAN GRADIENT: 2.21 MMHG
AV PEAK GRADIENT: 3.17 MMHG
AV VALVE AREA: 2.7 CM2
AV VELOCITY RATIO: 0.92
BSA FOR ECHO PROCEDURE: 2.15 M2
CV ECHO LV RWT: 0.39 CM
DOP CALC AO PEAK VEL: 0.89 M/S
DOP CALC AO VTI: 18.58 CM
DOP CALC LVOT AREA: 3.27 CM2
DOP CALC LVOT DIAMETER: 2.04 CM
DOP CALC LVOT PEAK VEL: 0.82 M/S
DOP CALC LVOT STROKE VOLUME: 50.08 CM3
DOP CALCLVOT PEAK VEL VTI: 15.33 CM
E WAVE DECELERATION TIME: 126.61 MSEC
E/A RATIO: 2.16
E/E' RATIO: 10
ECHO LV POSTERIOR WALL: 0.8 CM (ref 0.6–1.1)
FRACTIONAL SHORTENING: 28 % (ref 28–44)
INTERVENTRICULAR SEPTUM: 0.85 CM (ref 0.6–1.1)
IVRT: 0.08 MSEC
LEFT INTERNAL DIMENSION IN SYSTOLE: 2.95 CM (ref 2.1–4)
LEFT VENTRICLE DIASTOLIC VOLUME INDEX: 34.83 ML/M2
LEFT VENTRICLE DIASTOLIC VOLUME: 73.13 ML
LEFT VENTRICLE MASS INDEX: 47.7 G/M2
LEFT VENTRICLE SYSTOLIC VOLUME INDEX: 16 ML/M2
LEFT VENTRICLE SYSTOLIC VOLUME: 33.58 ML
LEFT VENTRICULAR INTERNAL DIMENSION IN DIASTOLE: 4.07 CM (ref 3.5–6)
LEFT VENTRICULAR MASS: 100.22 G
LV LATERAL E/E' RATIO: 8
LV SEPTAL E/E' RATIO: 13.33
MV PEAK A VEL: 0.37 M/S
MV PEAK E VEL: 0.8 M/S
PISA TR MAX VEL: 1.58 M/S
PULM VEIN S/D RATIO: 0.66
PV PEAK D VEL: 0.35 M/S
PV PEAK S VEL: 0.23 M/S
PV PEAK VELOCITY: 1.08 CM/S
RA PRESSURE: 3 MMHG
SINUS: 3.54 CM
STJ: 3.26 CM
TDI LATERAL: 0.1
TDI SEPTAL: 0.06
TDI: 0.08
TR MAX PG: 9.99 MMHG
TRICUSPID ANNULAR PLANE SYSTOLIC EXCURSION: 1.6 CM
TV REST PULMONARY ARTERY PRESSURE: 13 MMHG

## 2019-02-13 PROCEDURE — 71046 XR CHEST PA AND LATERAL: ICD-10-PCS | Mod: 26,,, | Performed by: RADIOLOGY

## 2019-02-13 PROCEDURE — 99999 PR PBB SHADOW E&M-EST. PATIENT-LVL V: ICD-10-PCS | Mod: PBBFAC,,, | Performed by: INTERNAL MEDICINE

## 2019-02-13 PROCEDURE — 99215 OFFICE O/P EST HI 40 MIN: CPT | Mod: S$PBB,,, | Performed by: INTERNAL MEDICINE

## 2019-02-13 PROCEDURE — 99999 PR PBB SHADOW E&M-EST. PATIENT-LVL III: CPT | Mod: PBBFAC,,, | Performed by: INTERNAL MEDICINE

## 2019-02-13 PROCEDURE — 93306 TRANSTHORACIC ECHO (TTE) COMPLETE (CUPID ONLY): ICD-10-PCS | Mod: 26,S$PBB,, | Performed by: INTERNAL MEDICINE

## 2019-02-13 PROCEDURE — 99215 OFFICE O/P EST HI 40 MIN: CPT | Mod: PBBFAC,25,27 | Performed by: INTERNAL MEDICINE

## 2019-02-13 PROCEDURE — 93306 TTE W/DOPPLER COMPLETE: CPT | Mod: PBBFAC | Performed by: INTERNAL MEDICINE

## 2019-02-13 PROCEDURE — 99999 PR PBB SHADOW E&M-EST. PATIENT-LVL III: ICD-10-PCS | Mod: PBBFAC,,, | Performed by: INTERNAL MEDICINE

## 2019-02-13 PROCEDURE — 99213 OFFICE O/P EST LOW 20 MIN: CPT | Mod: S$PBB,,, | Performed by: INTERNAL MEDICINE

## 2019-02-13 PROCEDURE — 71046 X-RAY EXAM CHEST 2 VIEWS: CPT | Mod: TC,FY

## 2019-02-13 PROCEDURE — 99999 PR PBB SHADOW E&M-EST. PATIENT-LVL V: CPT | Mod: PBBFAC,,, | Performed by: INTERNAL MEDICINE

## 2019-02-13 PROCEDURE — 99213 PR OFFICE/OUTPT VISIT, EST, LEVL III, 20-29 MIN: ICD-10-PCS | Mod: S$PBB,,, | Performed by: INTERNAL MEDICINE

## 2019-02-13 PROCEDURE — 71046 X-RAY EXAM CHEST 2 VIEWS: CPT | Mod: 26,,, | Performed by: RADIOLOGY

## 2019-02-13 PROCEDURE — 99215 PR OFFICE/OUTPT VISIT, EST, LEVL V, 40-54 MIN: ICD-10-PCS | Mod: S$PBB,,, | Performed by: INTERNAL MEDICINE

## 2019-02-13 PROCEDURE — 99213 OFFICE O/P EST LOW 20 MIN: CPT | Mod: PBBFAC,25 | Performed by: INTERNAL MEDICINE

## 2019-02-13 RX ORDER — SODIUM CHLORIDE 9 MG/ML
3 INJECTION, SOLUTION INTRAVENOUS CONTINUOUS
Status: CANCELLED | OUTPATIENT
Start: 2019-02-13 | End: 2019-02-13

## 2019-02-13 RX ORDER — MYCOPHENOLATE MOFETIL 250 MG/1
1000 CAPSULE ORAL 2 TIMES DAILY
Qty: 720 CAPSULE | Refills: 3 | Status: SHIPPED | OUTPATIENT
Start: 2019-02-13 | End: 2019-12-02 | Stop reason: SDUPTHER

## 2019-02-13 RX ORDER — DIPHENHYDRAMINE HCL 25 MG
50 CAPSULE ORAL ONCE
Status: CANCELLED | OUTPATIENT
Start: 2019-02-13 | End: 2019-02-13

## 2019-02-13 NOTE — PROGRESS NOTES
Interventional Cardiology Clinic Note  Reason for Visit: [facility] surveillance with IVUS for history of s/p OHTx    HPI:   57 yo M with hx of NICM s/p HM2 and then s/p OHTx (2017), VT s/p ICD who is referred to clinic for [facility] surveillance with IVUS. Patient had LHC in April 2018 that showed normal coronaries and LVEDP of 16 mmHg. TTE was done in November 2018 that showed normal LV and RV systolic function. Patient reports doing well with no acute complaints.     ROS:    Constitution: Negative for fever, chills, weight loss or gain.   HENT: Negative for sore throat, rhinorrhea, or headache.  Eyes: Negative for blurred or double vision.   Cardiovascular: Negative for palpations and angina  Pulmonary: Negative for SOB or orthopnea/PND    Gastrointestinal: Negative for abdominal pain, nausea, vomiting, or diarrhea.   Neurological: Negative for focal weakness or sensory changes.  PMH:     Past Medical History:   Diagnosis Date    CHF (congestive heart failure)     Coronary artery disease     Encounter for blood transfusion     GERD (gastroesophageal reflux disease)     Hyperlipidemia     Hypertension     LVAD (left ventricular assist device) present 2/13/2017    Pneumonia due to infectious organism 11/2017 on top of flu 12/1/2017    Type 2 diabetes mellitus with hyperglycemia      Past Surgical History:   Procedure Laterality Date    BIOPSY, WITH US GUIDANCE N/A 11/27/2018    Performed by Dennis [facility]yles Jr., [facility] at [facility] CATH LAB    BIOPSY-ECHO GUIDED N/A 10/10/2017    Performed by Trixie Marx MD at [facility] CATH LAB    BIOPSY-ECHO GUIDED N/A 8/22/2017    Performed by Donavon Childs MD at [facility] CATH LAB    BIOPSY-ECHO GUIDED N/A 4/18/2017    Performed by T[facility]ie Marx MD at [facility] CATH LAB    BIOPSY-ECHO GUIDED N/A 3/21/2017    Performed by Josefina Saul MD at [facility] CATH LAB    BIOPSY-ECHO GUIDED N/A 3/7/2017    Performed by T[facility]ie Marx [facility] at [facility] CATH LAB    BIOPSY-ECHO GUIDED N/A 3/1/2017  "   Per[facility] by [facility]ero [facility] at [facility] CATH LAB    CARDIAC PACEMAKER PLACEMENT      CHOLECYSTECTOMY      CLOSURE-STERNAL WOUND N/A 8/26/2016    Performed by [facility]ber [facility]ruff [facility] at [facility] OR [facility]    COLONOSCOPY N/A 1/12/2017    Performed by Petr Almanzar MD at [facility] ENDO (2ND FLR)    COLONOSCOPY N/A 1/11/2017    Performed by [facility]r [facility]zar [facility] at [facility] ENDO (2ND FLR)    HEART TRANSPLANT  02/2017    INSERTION-LEFT VENTRICULAR ASSIST DEVICE N/A 8/25/2016    Per[facility] by [facility] at [facility] OR [facility]    LEFT VENTRICULAR ASSIST DEVICE      x 3 months ago    PLACEMENT-NEOPERICARDIUM N/A 8/26/2016    [facility] by [facility] at [facility] OR [facility]    Removal-LEFT VENTRICULAR ASSIST DEVICE N/A 2/9/2017    Performed by [facility] at [facility] OR [facility]    REMOVAL-PACEMAKER Left 2/9/2017    Performed by [facility] at [facility] OR [facility]    Repair/ligation of leaking lymphatic with muscle flap coverage.  R groin Right 3/30/2017    Performed by Aravind Palomino MD at [facility] OR [facility]    TRANSPLANT-HEART N/A 2/9/2017    Performed by [facility]ruff [facility] at [facility] OR [facility] FLR     Allergies:     Review of patient's allergies indicates:   Allergen Reactions    Levemir [insulin detemir] Itching    Niacin preparations     Pork/porcine containing products     Ranexa [ranolazine] Nausea Only     Medications:     Current Outpatient Medications on File Prior to Visit   Medication Sig Dispense Refill    ACCU-CHEK FASTCLIX LANCET DRUM Misc CHECK BLOOD GLUCOSE FOUR TIMES DAILY 408 each 0    ACCU-CHEK HANNAH Misc CHECK BLOOD GLUCOSE QID  0    amLODIPine (NORVASC) 5 MG tablet Take 1 tablet (5 mg total) by mouth once daily. 30 tablet 11    aspirin (ECOTRIN) 81 MG EC tablet Take 1 tablet (81 mg total) by mouth once daily.  0    BD INSULIN PEN NEEDLE UF SHORT 31 gauge x 5/16" Ndle       blood sugar diagnostic (ACCU-CHEK SMARTVIEW TEST STRIP) Strp Use five times daily 400 strip 1    blood sugar diagnostic " Strp To use to check BG 5 times daily, to use with insurance preferred meter 380 strip 3    cyclobenzaprine (FLEXERIL) 10 MG tablet 10 mg every evening.       ergocalciferol (ERGOCALCIFEROL) 50,000 unit Cap TAKE 1 CAPSULE BY MOUTH EVERY 7 DAYS 12 capsule 0    ferrous gluconate (FERGON) 324 MG tablet TAKE 1 TABLET BY MOUTH DAILY WITH BREAKFAST 90 tablet 0    furosemide (LASIX) 40 MG tablet two time daily. prn  5    ibuprofen (ADVIL,MOTRIN) 800 MG tablet       insulin aspart (NOVOLOG) 100 unit/mL InPn pen Inject 11 Units into the skin 3 (three) times daily. (Patient taking differently: Inject 35 Units into the skin 3 (three) times daily. ) 1 Box 0    insulin glargine (LANTUS SOLOSTAR) 100 unit/mL (3 mL) InPn pen Inject 25 Units into the skin once daily. (Patient taking differently: Inject 30 Units into the skin once daily. ) 3 Box 3    ketoconazole (NIZORAL) 2 % cream YADIEL EXT AA QD  4    lisinopril 10 MG tablet Take 1 tablet (10 mg total) by mouth once daily. 90 tablet 3    magnesium oxide (MAG-OX) 400 mg tablet Take 2 tablets (800 mg total) by mouth 3 (three) times daily. 540 tablet 3    mycophenolate (CELLCEPT) 250 mg Cap Take 4 capsules (1,000 mg total) by mouth 2 (two) times daily. 720 capsule 3    NEXIUM 40 mg capsule 2 (two) times daily.  0    oxycodone (OXYCONTIN) 20 mg 12 hr tablet Take 1 tablet (20 mg total) by mouth every 12 (twelve) hours. 28 tablet 0    oxyCODONE (ROXICODONE) 15 MG Tab TK 1 T PO  QID AS NEEDED FOR PAIN  0    pravastatin (PRAVACHOL) 20 MG tablet TK 1 T PO QHS  2    sertraline (ZOLOFT) 100 MG tablet Take 100 mg by mouth once daily.      tacrolimus (PROGRAF) 0.5 MG Cap Take 1 capsule (0.5 mg total) by mouth every 12 (twelve) hours. 180 capsule 3     No current facility-administered medications on file prior to visit.      Social History:     Social History     Tobacco Use    Smoking status: Former Smoker     Packs/day: 0.50     Years: 15.00     Pack years: 7.50     Last  "attempt to quit: 2006     Years since quittin.6    Smokeless tobacco: Never Used   Substance Use Topics    Alcohol use: No     Family History:     Family History   Problem Relation Age of Onset    Heart disease Mother     Hypertension Mother     Heart attack Mother     Heart disease Father     Hypertension Father     Heart attack Father     Cancer Brother      Physical Exam:   BP (!) 157/79 (BP Location: Left arm, Patient Position: Sitting, BP Method: Large (Automatic))   Pulse 92   Ht 5' 8" (1.727 m)   Wt 100.9 kg (222 lb 7.1 oz)   SpO2 98%   BMI 33.82 kg/m²      Constitutional: NAD, conversant  HEENT: Sclera anicteric, PERRLA, EOMI  Neck: No JVD  CV: RRR, no murmur, normal S1/S2  Pulm: CTAB, no wheezes, rales, or rhonchi  GI: Abdomen soft, NTND, +BS  Extremities: No LE edema, warm and well perfused; +2 radialis pulses, negative Mono's test of the right hand   Skin: No ecchymosis, erythema, or ulcers  Psych: AOx3, appropriate affect  Neuro: CNII-XII intact, no focal deficits    Labs:     Lab Results   Component Value Date     2019    K 3.7 2019     2019    CO2 28 2019    BUN 15 2019    CREATININE 0.9 2019    ANIONGAP 7 (L) 2019     Lab Results   Component Value Date    HGBA1C 8.6 (H) 2019     Lab Results   Component Value Date    BNP 38 2019    BNP 46 2018    BNP 38 2018    Lab Results   Component Value Date    WBC 10.47 2019    HGB 13.2 (L) 2019    HCT 44.9 2019    HCT 21 (L) 2017     2019    GRAN 6.0 2019    GRAN 57.0 2019     Lab Results   Component Value Date    CHOL 131 2019    HDL 41 2019    LDLCALC 50.8 (L) 2019    TRIG 196 (H) 2019          Assessment/Plan:    Heart transplant, orthotopic, status  -pt with hx of NICM s/p LVAD HM2 () and then OHTx (2017) who is referred to Interventional Cardiology clinic for surveillance LHC with " IVUS     - The risks, benefits & alternatives of the LHC procedure were explained to the patient.    - The risks of coronary angiography include but are not limited to: bleeding, infection, heart rhythm abnormalities, allergic reactions, kidney injury, stroke and death.    - The risks of moderate sedation include hypotension, respiratory depression, arrhythmias, bronchospasm, & death.    - Informed consent was obtained & the patient is agreeable to proceed with the LHC procedure.    -access for LHC via right radial artery     - This patient was discussed with the attending interventional cardiologist who agrees with the above assessment & plan.      Signed:  Li Mejias DO  Cardiology Fellow  Pager - [phone]  2/13/2019 8:40 AM

## 2019-02-13 NOTE — PROGRESS NOTES
Subjective:   Mr. Barriga is a 58 y.o. year old White male who received a  - brain death heart transplant on 17.      CMV status:   Donor:  neg  Recipient: post     HPI   PMHx of NICM s/p HM II (16), HLD, HTN, VT s/p ICD now s/p OHTx 17 who's post op course complicated by bradycardia (started on terbutaline) as well as right groin infected seroma (s/p I&D in OR during admission from -04/10). Plastics then performed a right femoris muscle flap and he had pain-control issues following that.     Immuno Tacro 0.5/0.5, Cellcept 1000 mg BID, off prednisone     DOING EXCELLENT  Echo today  · Patient is s/p cardiac transplant.  · Normal left ventricular systolic function. The estimated ejection fraction is 60%  · Indeterminate left ventricular diastolic function.  · Normal central venous pressure (3 mm Hg).  · RV not well seen.  · The estimated PA systolic pressure is 13 mm Hg    Echo today    CONCLUSIONS     1 - Normal left ventricular systolic function (EF 60-65%).     2 - Normal left ventricular diastolic function.     3 - Normal right ventricular systolic function .     4 - The estimated PA systolic pressure is 33 mmHg.     5 - Trivial mitral regurgitation.     6 - Trivial tricuspid regurgitation.     7 - No wall motion abnormalities.     8 - Concentric remodeling.     Review of Systems   Constitution: Negative for decreased appetite, weight gain and weight loss.   Cardiovascular: Negative for chest pain, dyspnea on exertion, leg swelling, near-syncope, orthopnea and palpitations.   Respiratory: Negative for cough and shortness of breath.    Musculoskeletal: Negative for myalgias.   Gastrointestinal: Negative for jaundice.       Objective:     Physical Exam   Constitutional: He is oriented to person, place, and time. He appears well-developed and well-nourished. He is active. He is not intubated.        HENT:   Head: Normocephalic and atraumatic. Hair is normal.   Right Ear: External ear  normal.   Left Ear: External ear normal.   Nose: Nose normal. No nasal deformity. No epistaxis.  No foreign bodies.   Mouth/Throat: Mucous membranes are normal. Mucous membranes are not cyanotic. No oropharyngeal exudate.   Eyes: Conjunctivae and EOM are normal. Pupils are equal, round, and reactive to light.   Neck: Neck supple. No hepatojugular reflux and no JVD present.   Cardiovascular: Normal rate, regular rhythm, normal heart sounds and normal pulses. Exam reveals no gallop.   Pulmonary/Chest: Effort normal and breath sounds normal. No apnea and no tachypnea. He is not intubated. No respiratory distress. He exhibits no tenderness.   Abdominal: Soft. Normal appearance and bowel sounds are normal. There is no tenderness. No hernia.   Musculoskeletal: Normal range of motion.   Neurological: He is alert and oriented to person, place, and time. He displays no seizure activity.   Skin: Skin is warm, dry and intact. No rash noted. No pallor.   Psychiatric: He has a normal mood and affect. His speech is normal and behavior is normal. Thought content normal. Cognition and memory are normal.       Lab Results   Component Value Date    WBC 10.47 02/13/2019    HGB 13.2 (L) 02/13/2019    HCT 44.9 02/13/2019    MCV 63 (L) 02/13/2019     02/13/2019    CO2 28 02/13/2019    CREATININE 0.9 02/13/2019    CALCIUM 9.6 02/13/2019    ALBUMIN 3.9 02/13/2019    AST 17 02/13/2019    BNP 38 02/13/2019    ALT 18 02/13/2019    ALLOMAP See result image under hyperlink 11/16/2018       Lab Results   Component Value Date    INR 1.0 03/22/2018    INR 1.0 11/26/2017    INR 1.6 (H) 02/15/2017       BNP   Date Value Ref Range Status   02/13/2019 38 0 - 99 pg/mL Final     Comment:     Values of less than 100 pg/ml are consistent with non-CHF populations.   11/27/2018 46 0 - 99 pg/mL Final     Comment:     Values of less than 100 pg/ml are consistent with non-CHF populations.   11/16/2018 38 0 - 99 pg/mL Final     Comment:     Values of  less than 100 pg/ml are consistent with non-CHF populations.       Tacrolimus Lvl   Date Value Ref Range Status   11/27/2018 5.9 5.0 - 15.0 ng/mL Final     Comment:     Testing performed by Liquid Chromatography-Tandem  Mass Spectrometry (LC-MS/MS).  This test was developed and its performance characteristics  determined by [facility], Department of Pathology  and Laboratory Medicine in a manner consistent with CLIA  requirements. It has not been cleared or approved by the [place]  Food and Drug Administration.  This test is used for clinical   purposes.  It should not be regarded as investigational or for  research.           Assessment:     1. Essential hypertension    2. Heart transplant, orthotopic, status    3. Mixed hyperlipidemia    4. Long term current use of immunosuppressive drug        Plan:     DOing very well  Routine follow up      Return instructions as set forth by post transplant schedule or as needed:    Clinic: Return for labs and/or biopsy weekly the first month, every two weeks during month 2 and then monthly for the first year at the provider or coordinator's discretion. During the second year, return to clinic every 3 months. Post transplant year 3-5 return every 6 months. There will be a comprehensive post transplant evaluation every year that may include LHC/RHC/biopsy, stress test, echo, CXR, and other health screening exams.    In addition to the clinical assessment, I have ordered Allomap testing for this patient to assist in identification of moderate/severe acute cellular rejection (ACR) in a pt with stable Allograft function instead of endomyocardial biopsy.     Patient is reminded to call with any health changes as these can be early signs of transplant complications. Patient is advised to make sure any new medications or changes of old medications are discussed with a pharmacist or physician knowledgeable with transplant to avoid rejection/drug toxicity related to significant  drug interactions.    UNOS Patient Status  Functional Status: 80% - Normal activity with effort: some symptoms of disease  Physical Capacity: No Limitations  Working for Income: Unknown    Ryan Dawn MD

## 2019-02-13 NOTE — H&P (VIEW-ONLY)
Interventional Cardiology Clinic Note  Reason for Visit: [facility] surveillance with IVUS for history of s/p OHTx    HPI:   59 yo M with hx of NICM s/p HM2 and then s/p OHTx (2017), VT s/p ICD who is referred to clinic for [facility] surveillance with IVUS. Patient had LHC in April 2018 that showed normal coronaries and LVEDP of 16 mmHg. TTE was done in November 2018 that showed normal LV and RV systolic function. Patient reports doing well with no acute complaints.     ROS:    Constitution: Negative for fever, chills, weight loss or gain.   HENT: Negative for sore throat, rhinorrhea, or headache.  Eyes: Negative for blurred or double vision.   Cardiovascular: Negative for palpations and angina  Pulmonary: Negative for SOB or orthopnea/PND    Gastrointestinal: Negative for abdominal pain, nausea, vomiting, or diarrhea.   Neurological: Negative for focal weakness or sensory changes.  PMH:     Past Medical History:   Diagnosis Date    CHF (congestive heart failure)     Coronary artery disease     Encounter for blood transfusion     GERD (gastroesophageal reflux disease)     Hyperlipidemia     Hypertension     LVAD (left ventricular assist device) present 2/13/2017    Pneumonia due to infectious organism 11/2017 on top of flu 12/1/2017    Type 2 diabetes mellitus with hyperglycemia      Past Surgical History:   Procedure Laterality Date    BIOPSY, WITH US GUIDANCE N/A 11/27/2018    Performed by Dennis Lyles Jr., MD at [facility] CATH LAB    BIOPSY-ECHO GUIDED N/A 10/10/2017    Performed by Trixie Marx MD at [facility] CATH LAB    BIOPSY-ECHO GUIDED N/A 8/22/2017    Performed by Donavon Childs MD at [facility] CATH LAB    BIOPSY-ECHO GUIDED N/A 4/18/2017    Performed by Trixie Marx MD at [facility] CATH LAB    BIOPSY-ECHO GUIDED N/A 3/21/2017    Performed by Josefina Saul MD at [facility] CATH LAB    BIOPSY-ECHO GUIDED N/A 3/7/2017    Performed by Trixie Marx MD at [facility] CATH LAB    BIOPSY-ECHO GUIDED N/A 3/1/2017  "   Performed by Peewee Romero MD at [facility] CATH LAB    CARDIAC PACEMAKER PLACEMENT      CHOLECYSTECTOMY      CLOSURE-STERNAL WOUND N/A 8/26/2016    Performed by Heber Woodruff MD at [facility] OR 2ND FLR    COLONOSCOPY N/A 1/12/2017    Performed by Petr Almanzar MD at [facility] ENDO (2ND FLR)    COLONOSCOPY N/A 1/11/2017    Performed by Petr Almanzar MD at [facility] ENDO (2ND FLR)    HEART TRANSPLANT  02/2017    INSERTION-LEFT VENTRICULAR ASSIST DEVICE N/A 8/25/2016    Performed by Heber Woodruff MD at [facility] OR 2ND FLR    LEFT VENTRICULAR ASSIST DEVICE      x 3 months ago    PLACEMENT-NEOPERICARDIUM N/A 8/26/2016    Performed by Heber Woodruff MD at [facility] OR 2ND FLR    Removal-LEFT VENTRICULAR ASSIST DEVICE N/A 2/9/2017    Performed by Heber Woodruff MD at [facility] OR 2ND FLR    REMOVAL-PACEMAKER Left 2/9/2017    Performed by Heber Woodruff MD at [facility] OR 2ND FLR    Repair/ligation of leaking lymphatic with muscle flap coverage.  R groin Right 3/30/2017    Performed by Aravind Palomino MD at [facility] OR 2ND FLR    TRANSPLANT-HEART N/A 2/9/2017    Performed by Heber Woodruff MD at [facility] OR [facility] FLR     Allergies:     Review of patient's allergies indicates:   Allergen Reactions    Levemir [insulin detemir] Itching    Niacin preparations     Pork/porcine containing products     Ranexa [ranolazine] Nausea Only     Medications:     Current Outpatient Medications on File Prior to Visit   Medication Sig Dispense Refill    ACCU-CHEK FASTCLIX LANCET DRUM Misc CHECK BLOOD GLUCOSE FOUR TIMES DAILY 408 each 0    ACCU-CHEK HANNAH Misc CHECK BLOOD GLUCOSE QID  0    amLODIPine (NORVASC) 5 MG tablet Take 1 tablet (5 mg total) by mouth once daily. 30 tablet 11    aspirin (ECOTRIN) 81 MG EC tablet Take 1 tablet (81 mg total) by mouth once daily.  0    BD INSULIN PEN NEEDLE UF SHORT 31 gauge x 5/16" Ndle       blood sugar diagnostic (ACCU-CHEK SMARTVIEW TEST STRIP) Strp Use five times daily 400 strip 1    blood sugar diagnostic " Strp To use to check BG 5 times daily, to use with insurance preferred meter 380 strip 3    cyclobenzaprine (FLEXERIL) 10 MG tablet 10 mg every evening.       ergocalciferol (ERGOCALCIFEROL) 50,000 unit Cap TAKE 1 CAPSULE BY MOUTH EVERY 7 DAYS 12 capsule 0    ferrous gluconate (FERGON) 324 MG tablet TAKE 1 TABLET BY MOUTH DAILY WITH BREAKFAST 90 tablet 0    furosemide (LASIX) 40 MG tablet two time daily. prn  5    ibuprofen (ADVIL,MOTRIN) 800 MG tablet       insulin aspart (NOVOLOG) 100 unit/mL InPn pen Inject 11 Units into the skin 3 (three) times daily. (Patient taking differently: Inject 35 Units into the skin 3 (three) times daily. ) 1 Box 0    insulin glargine (LANTUS SOLOSTAR) 100 unit/mL (3 mL) InPn pen Inject 25 Units into the skin once daily. (Patient taking differently: Inject 30 Units into the skin once daily. ) 3 Box 3    ketoconazole (NIZORAL) 2 % cream YADIEL EXT AA QD  4    lisinopril 10 MG tablet Take 1 tablet (10 mg total) by mouth once daily. 90 tablet 3    magnesium oxide (MAG-OX) 400 mg tablet Take 2 tablets (800 mg total) by mouth 3 (three) times daily. 540 tablet 3    mycophenolate (CELLCEPT) 250 mg Cap Take 4 capsules (1,000 mg total) by mouth 2 (two) times daily. 720 capsule 3    NEXIUM 40 mg capsule 2 (two) times daily.  0    oxycodone (OXYCONTIN) 20 mg 12 hr tablet Take 1 tablet (20 mg total) by mouth every 12 (twelve) hours. 28 tablet 0    oxyCODONE (ROXICODONE) 15 MG Tab TK 1 T PO  QID AS NEEDED FOR PAIN  0    pravastatin (PRAVACHOL) 20 MG tablet TK 1 T PO QHS  2    sertraline (ZOLOFT) 100 MG tablet Take 100 mg by mouth once daily.      tacrolimus (PROGRAF) 0.5 MG Cap Take 1 capsule (0.5 mg total) by mouth every 12 (twelve) hours. 180 capsule 3     No current facility-administered medications on file prior to visit.      Social History:     Social History     Tobacco Use    Smoking status: Former Smoker     Packs/day: 0.50     Years: 15.00     Pack years: 7.50     Last  "attempt to quit: 2006     Years since quittin.6    Smokeless tobacco: Never Used   Substance Use Topics    Alcohol use: No     Family History:     Family History   Problem Relation Age of Onset    Heart disease Mother     Hypertension Mother     Heart attack Mother     Heart disease Father     Hypertension Father     Heart attack Father     Cancer Brother      Physical Exam:   BP (!) 157/79 (BP Location: Left arm, Patient Position: Sitting, BP Method: Large (Automatic))   Pulse 92   Ht 5' 8" (1.727 m)   Wt 100.9 kg (222 lb 7.1 oz)   SpO2 98%   BMI 33.82 kg/m²      Constitutional: NAD, conversant  HEENT: Sclera anicteric, PERRLA, EOMI  Neck: No JVD  CV: RRR, no murmur, normal S1/S2  Pulm: CTAB, no wheezes, rales, or rhonchi  GI: Abdomen soft, NTND, +BS  Extremities: No LE edema, warm and well perfused; +2 radialis pulses, negative Mono's test of the right hand   Skin: No ecchymosis, erythema, or ulcers  Psych: AOx3, appropriate affect  Neuro: CNII-XII intact, no focal deficits    Labs:     Lab Results   Component Value Date     2019    K 3.7 2019     2019    CO2 28 2019    BUN 15 2019    CREATININE 0.9 2019    ANIONGAP 7 (L) 2019     Lab Results   Component Value Date    HGBA1C 8.6 (H) 2019     Lab Results   Component Value Date    BNP 38 2019    BNP 46 2018    BNP 38 2018    Lab Results   Component Value Date    WBC 10.47 2019    HGB 13.2 (L) 2019    HCT 44.9 2019    HCT 21 (L) 2017     2019    GRAN 6.0 2019    GRAN 57.0 2019     Lab Results   Component Value Date    CHOL 131 2019    HDL 41 2019    LDLCALC 50.8 (L) 2019    TRIG 196 (H) 2019          Assessment/Plan:    Heart transplant, orthotopic, status  -pt with hx of NICM s/p LVAD HM2 () and then OHTx (2017) who is referred to Interventional Cardiology clinic for surveillance LHC with " IVUS     - The risks, benefits & alternatives of the LHC procedure were explained to the patient.    - The risks of coronary angiography include but are not limited to: bleeding, infection, heart rhythm abnormalities, allergic reactions, kidney injury, stroke and death.    - The risks of moderate sedation include hypotension, respiratory depression, arrhythmias, bronchospasm, & death.    - Informed consent was obtained & the patient is agreeable to proceed with the LHC procedure.    -access for LHC via right radial artery     - This patient was discussed with the attending interventional cardiologist who agrees with the above assessment & plan.      Signed:  Li Mejias DO  Cardiology Fellow  Pager - [phone]  2/13/2019 8:40 AM

## 2019-02-13 NOTE — LETTER
February 13, 2019        Fox Quinn  [address] [place] [address] [address]LD  [address]  CARDIOLOGY CENTER  [place] [address]  Phone: [phone]  Fax: [phone]             [facility] [address]  [facility] [address]  Phone: [phone]   Patient: Mick Barriga   MR Number: [id number]   YOB: 1960   Date of Visit: 2/13/2019       Dear Dr. Fox Quinn    Thank you for referring Mick Barriga to me for evaluation. Attached you will find relevant portions of my assessment and plan of care.    If you have questions, please do not hesitate to call me. I look forward to following Mick Barriga along with you.    Sincerely,    Ryan Dawn MD    Enclosure    If you would like to receive this communication electronically, please contact [email or web address] or [phone] to request [employer] access.    [employer] is a tool which provides read-only access to select patient information with whom you have a relationship. Its easy to use and provides real time access to review your patients record including encounter summaries, notes, results, and demographic information.    If you feel you have received this communication in error or would no longer like to receive these types of communications, please e-mail [email or web address]

## 2019-02-13 NOTE — PROGRESS NOTES
OUTPATIENT CATHETERIZATION INSTRUCTIONS    You have been scheduled for a procedure in the catheterization lab on Friday March 15, 2019.     Please report to the Cardiology Waiting Area on the Third floor of the hospital and check in at 7 AM.   You will then be taken to the SSCU (Short Stay Cardiac Unit) and prepared for your procedure. Please be aware that this is not the time of your procedure but the time you are to arrive. The procedures are scheduled on an hourly basis; however, emergency cases take precedence over all other cases.       You may not have anything to eat or drink after midnight the night before your test. You may take your regular morning medications with water. If there are any medications that you should not take you will be instructed to hold them that morning. If you are diabetic and on Metformin (Glucophage) do not take it the day before, the day of, and for 2 days after your procedure.      The procedure will take 1-2 hours to perform. After the procedure, you will return to SSCU on the third floor of the hospital. You will need to lie still (or keep your arm still) for the next 4 to 6 hours to minimize bleeding from the puncture site. Your family may remain in the room with you during this time.       You may be able to be discharged home that same afternoon if there is someone to drive you home and there were no complications. If you have one of the balloon, stent, or device procedures you may spend the night in the hospital. Your doctor will determine, based on your progress, the date and time of your discharge. The results of your procedure will be discussed with you before you are discharged. Any further testing or procedures will be scheduled for you either before you leave or you will be called with these appointments.       If you should have any questions, concerns, or need to change the date of your procedure, please call FIDENCIO Burns @ [phone]    Special  Instructions:    None        THE ABOVE INSTRUCTIONS WERE GIVEN TO THE PATIENT VERBALLY AND THEY VERBALIZED UNDERSTANDING.  THEY DO NOT REQUIRE ANY SPECIAL NEEDS AND DO NOT HAVE ANY LEARNING BARRIERS.          Directions for Reporting to Cardiology Waiting Area in the Hospital  If you park in the Parking Garage:  Take elevators to the1st floor of the parking garage.  Continue past the gift shop, coffee shop, and piano.  Take a right and go to the gold elevators. (Elevator B)  Take the elevator to the 3rd floor.  Follow the arrow on the sign on the wall that says Cath Lab Registration/EP Lab Registration.  Follow the long hallway all the way around until you come to a big open area.  This is the registration area.  Check in at Reception Desk.    OR    If family is dropping you off:  Have them drop you off at the front of the Hospital under the green overhang.  Enter through the doors and take a right.  Take the E elevators to the 3rd floor Cardiology Waiting Area.  Check in at the Reception Desk in the waiting room.

## 2019-02-13 NOTE — LETTER
[facility]  [address]  [facility] [address]  Phone: [phone]             February 13, 2019        To Whom It May Concern:     This letter is on behalf of . Mick Barriga, who is currently under our care in the Advanced Heart Failure Clinic and Cardiac Transplantation at [facility] in [place], [place].  He is status post heart Transplant and a complicated medical regimen, which requires them to take many different medications.  Please see the attached list of named medications.     For travel, it is imperative that he keep his supplies with him at all times. He also has sternal wires in place for his chest closure during surgery  With this is mind, if you could please give special consideration to Mr. Barton, it would be greatly appreciated.     If we can be of any further assistance, please feel free to contact us at the listed number.     Sincerely,         Mckenzie Lawrence R.N., B.S.N.  Post Heart Transplant Coordinator  [facility]  Advanced Heart Failure & Cardiac Transplant  [address] [place] [address]  Phone: [phone]  Fax :    [phone]

## 2019-02-13 NOTE — ASSESSMENT & PLAN NOTE
-pt with hx of NICM s/p LVAD HM2 (2016) and then OHTx (2017) who is referred to Interventional Cardiology clinic for surveillance LHC with IVUS     - The risks, benefits & alternatives of the LHC procedure were explained to the patient.    - The risks of coronary angiography include but are not limited to: bleeding, infection, heart rhythm abnormalities, allergic reactions, kidney injury, stroke and death.    - The risks of moderate sedation include hypotension, respiratory depression, arrhythmias, bronchospasm, & death.    - Informed consent was obtained & the patient is agreeable to proceed with the LHC procedure.    -access for LHC via right radial artery     - This patient was discussed with the attending interventional cardiologist who agrees with the above assessment & plan.

## 2019-03-15 ENCOUNTER — HOSPITAL ENCOUNTER (OUTPATIENT)
Facility: HOSPITAL | Age: 59
Discharge: HOME | End: 2019-03-15
Attending: INTERNAL MEDICINE | Admitting: INTERNAL MEDICINE
Payer: MEDICARE

## 2019-03-15 VITALS
HEIGHT: 68 IN | TEMPERATURE: 98 F | WEIGHT: 220 LBS | OXYGEN SATURATION: 97 % | DIASTOLIC BLOOD PRESSURE: 64 MMHG | RESPIRATION RATE: 20 BRPM | HEART RATE: 86 BPM | BODY MASS INDEX: 33.34 KG/M2 | SYSTOLIC BLOOD PRESSURE: 141 MMHG

## 2019-03-15 DIAGNOSIS — Z94.1 HEART TRANSPLANT, ORTHOTOPIC, STATUS: ICD-10-CM

## 2019-03-15 LAB
ABO + RH BLD: NORMAL
ANION GAP SERPL CALC-SCNC: 10 MMOL/L
BLD GP AB SCN CELLS X3 SERPL QL: NORMAL
BUN SERPL-MCNC: 10 MG/DL
CALCIUM SERPL-MCNC: 9.9 MG/DL
CHLORIDE SERPL-SCNC: 103 MMOL/L
CO2 SERPL-SCNC: 25 MMOL/L
CREAT SERPL-MCNC: 0.9 MG/DL
ERYTHROCYTE [DISTWIDTH] IN BLOOD BY AUTOMATED COUNT: 17.9 %
EST. GFR  (AFRICAN AMERICAN): >60 ML/MIN/1.73 M^2
EST. GFR  (NON AFRICAN AMERICAN): >60 ML/MIN/1.73 M^2
GLUCOSE SERPL-MCNC: 130 MG/DL
HCT VFR BLD AUTO: 41.8 %
HGB BLD-MCNC: 12.3 G/DL
INR PPP: 1
MCH RBC QN AUTO: 18.6 PG
MCHC RBC AUTO-ENTMCNC: 29.4 G/DL
MCV RBC AUTO: 63 FL
PLATELET # BLD AUTO: 185 K/UL
PMV BLD AUTO: ABNORMAL FL
POCT GLUCOSE: 129 MG/DL (ref 70–110)
POTASSIUM SERPL-SCNC: 4.1 MMOL/L
PROTHROMBIN TIME: 10.5 SEC
RBC # BLD AUTO: 6.6 M/UL
SODIUM SERPL-SCNC: 138 MMOL/L
WBC # BLD AUTO: 9.5 K/UL

## 2019-03-15 PROCEDURE — 85027 COMPLETE CBC AUTOMATED: CPT

## 2019-03-15 PROCEDURE — C1769 GUIDE WIRE: HCPCS | Performed by: INTERNAL MEDICINE

## 2019-03-15 PROCEDURE — 92978 PR IVUS, CORONARY, 1ST VESSEL: ICD-10-PCS | Mod: 26,LD,, | Performed by: INTERNAL MEDICINE

## 2019-03-15 PROCEDURE — 99152 PR MOD CONSCIOUS SEDATION, SAME PHYS, 5+ YRS, FIRST 15 MIN: ICD-10-PCS | Mod: ,,, | Performed by: INTERNAL MEDICINE

## 2019-03-15 PROCEDURE — 63600175 PHARM REV CODE 636 W HCPCS: Performed by: INTERNAL MEDICINE

## 2019-03-15 PROCEDURE — 93010 ELECTROCARDIOGRAM REPORT: CPT | Mod: ,,, | Performed by: INTERNAL MEDICINE

## 2019-03-15 PROCEDURE — 99152 MOD SED SAME PHYS/QHP 5/>YRS: CPT | Performed by: INTERNAL MEDICINE

## 2019-03-15 PROCEDURE — 92978 ENDOLUMINL IVUS OCT C 1ST: CPT | Mod: LD | Performed by: INTERNAL MEDICINE

## 2019-03-15 PROCEDURE — 93010 EKG 12-LEAD: ICD-10-PCS | Mod: ,,, | Performed by: INTERNAL MEDICINE

## 2019-03-15 PROCEDURE — 93458 L HRT ARTERY/VENTRICLE ANGIO: CPT | Performed by: INTERNAL MEDICINE

## 2019-03-15 PROCEDURE — 93458 PR CATH PLACE/CORON ANGIO, IMG SUPER/INTERP,W LEFT HEART VENTRICULOGRAPHY: ICD-10-PCS | Mod: 26,,, | Performed by: INTERNAL MEDICINE

## 2019-03-15 PROCEDURE — 92978 ENDOLUMINL IVUS OCT C 1ST: CPT | Mod: 26,LD,, | Performed by: INTERNAL MEDICINE

## 2019-03-15 PROCEDURE — 80048 BASIC METABOLIC PNL TOTAL CA: CPT

## 2019-03-15 PROCEDURE — C1894 INTRO/SHEATH, NON-LASER: HCPCS | Performed by: INTERNAL MEDICINE

## 2019-03-15 PROCEDURE — C1753 CATH, INTRAVAS ULTRASOUND: HCPCS | Performed by: INTERNAL MEDICINE

## 2019-03-15 PROCEDURE — 25000003 PHARM REV CODE 250: Performed by: STUDENT IN AN ORGANIZED HEALTH CARE EDUCATION/TRAINING PROGRAM

## 2019-03-15 PROCEDURE — 86901 BLOOD TYPING SEROLOGIC RH(D): CPT

## 2019-03-15 PROCEDURE — 93005 ELECTROCARDIOGRAM TRACING: CPT | Mod: 59

## 2019-03-15 PROCEDURE — 85610 PROTHROMBIN TIME: CPT

## 2019-03-15 PROCEDURE — 82962 GLUCOSE BLOOD TEST: CPT

## 2019-03-15 PROCEDURE — C1887 CATHETER, GUIDING: HCPCS | Performed by: INTERNAL MEDICINE

## 2019-03-15 PROCEDURE — 93458 L HRT ARTERY/VENTRICLE ANGIO: CPT | Mod: 26,,, | Performed by: INTERNAL MEDICINE

## 2019-03-15 PROCEDURE — 99152 MOD SED SAME PHYS/QHP 5/>YRS: CPT | Mod: ,,, | Performed by: INTERNAL MEDICINE

## 2019-03-15 RX ORDER — SODIUM CHLORIDE 9 MG/ML
3 INJECTION, SOLUTION INTRAVENOUS CONTINUOUS
Status: ACTIVE | OUTPATIENT
Start: 2019-03-15 | End: 2019-03-15

## 2019-03-15 RX ORDER — HEPARIN SODIUM 1000 [USP'U]/ML
INJECTION, SOLUTION INTRAVENOUS; SUBCUTANEOUS
Status: DISCONTINUED | OUTPATIENT
Start: 2019-03-15 | End: 2019-03-15 | Stop reason: HOSPADM

## 2019-03-15 RX ORDER — DIPHENHYDRAMINE HCL 25 MG
50 CAPSULE ORAL ONCE
Status: COMPLETED | OUTPATIENT
Start: 2019-03-15 | End: 2019-03-15

## 2019-03-15 RX ORDER — FENTANYL CITRATE 50 UG/ML
INJECTION, SOLUTION INTRAMUSCULAR; INTRAVENOUS
Status: DISCONTINUED | OUTPATIENT
Start: 2019-03-15 | End: 2019-03-15 | Stop reason: HOSPADM

## 2019-03-15 RX ORDER — MIDAZOLAM HYDROCHLORIDE 1 MG/ML
INJECTION, SOLUTION INTRAMUSCULAR; INTRAVENOUS
Status: DISCONTINUED | OUTPATIENT
Start: 2019-03-15 | End: 2019-03-15 | Stop reason: HOSPADM

## 2019-03-15 RX ADMIN — DIPHENHYDRAMINE HYDROCHLORIDE 50 MG: 25 CAPSULE ORAL at 08:03

## 2019-03-15 RX ADMIN — SODIUM CHLORIDE 3 ML/KG/HR: 0.9 INJECTION, SOLUTION INTRAVENOUS at 08:03

## 2019-03-15 NOTE — Clinical Note
50 ml injected throughout the case. 100 mL total wasted during the case. 150 mL total used in the case.

## 2019-03-15 NOTE — NURSING
Vasc band removed per protocol over 1 hour period without complication. No s/s hematoma or active bleeding. Patient tolerated well. Gauze/tegaderm dressing placed. Will monitor.

## 2019-03-15 NOTE — DISCHARGE INSTRUCTIONS
Discharge Instructions and Care of Your Wrist After a Cardiac Catheterization Procedure Performed via the Radial Artery    For 1 week following the procedure:  Do not subject your affected hand/arm to any forceful movements (i.e. supporting weight when rising from a chair or bed)  Avoid excessive (extension/flexion) wrist movement (i.e. supporting weight when rising from a chair or bed, push-ups, lifting garage doors, etc.)  Do not drive a car for 24 hours  The dressing on the puncture site may be removed after 24 hours and left open to air. If there is minor oozing, you may apply a Band-aid and remove after 12 hours  You may shower on the day following your procedure. Do not take a tub bath or submerge the puncture site in water for 1 week following the procedure  Do not lift anything heavier than 3 to 5 pounds with the affected hand/arm  Do not operate a lawnmower, motorcycle, chainsaw, or all-terrain vehicle   Do not engage in vigorous exercise (i.e. Tennis, Golf, Bowling) using the affected arm    If bleeding should occur following discharge:  Sit down and apply firm pressure to the puncture site with your fingers for 10 minutes   If the bleeding stops, continue to sit quietly, keeping your wrist straight for 2 hours. Notify your physician as soon as possible   If bleeding does not stop after 10 minutes or if there is a large amount of bleeding or spurting, call 911 immediately. Do not drive yourself to the hospital.     You should expect mild tingling in your hand and tenderness at the puncture site for up to 3 days.     Notify your physician if these symptoms persist or if you experience:  Change in color or temperature of the hand or arm  Redness, heat, or pus at the puncture site  Chills or fever greater than 101.0 F

## 2019-03-15 NOTE — DISCHARGE SUMMARY
[facility]  Discharge Summary      Admit Date: 3/15/2019    Discharge Date and Time:  03/15/2019 9:48 AM    Attending Physician: Ganesh Arnett MD     Reason for Admission: Coronary angiogram    Procedures Performed: Procedure(s) (LRB):  Left heart cath (Left)    Hospital Course (synopsis of major diagnoses, care, treatment, and services provided during the course of the hospital stay):   Mr Barriga was admitted for coronary angiogram and LAD IVUS. He tolerated it well. 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   He tolerated it well and there were no complications. He was safely discharged home and will follow up with Dr Gupta.     Consults: none    Significant Diagnostic Studies: Labs:   BMP:   Recent Labs   Lab 03/15/19  0734   *      K 4.1      CO2 25   BUN 10   CREATININE 0.9   CALCIUM 9.9   , CMP   Recent Labs   Lab 03/15/19  0734      K 4.1      CO2 25   *   BUN 10   CREATININE 0.9   CALCIUM 9.9   ANIONGAP 10   ESTGFRAFRICA >60.0   EGFRNONAA >60.0   , CBC No results for input(s): WBC, HGB, HCT, PLT in the last 48 hours., Lipid Panel   Lab Results   Component Value Date    CHOL 131 02/13/2019    HDL 41 02/13/2019    LDLCALC 50.8 (L) 02/13/2019    TRIG 196 (H) 02/13/2019    CHOLHDL 31.3 02/13/2019   , Troponin No results for input(s): TROPONINI in the last 168 hours. and All labs within the past 24 hours have been reviewed    Final Diagnoses:    Principal Problem: Heart transplant, orthotopic, status   Secondary Diagnoses:   Active Hospital Problems    Diagnosis  POA    *Heart transplant, orthotopic, status [Z94.1]  Not Applicable      Resolved Hospital Problems   No resolved problems to display.       Discharged Condition: good    Disposition: Home or Self Care    Follow Up/Patient Instructions:     Medications:  Reconciled Home Medications:      Medication List      CHANGE how you take these medications    insulin  "aspart U-100 100 unit/mL Inpn pen  Commonly known as:  NovoLOG  Inject 11 Units into the skin 3 (three) times daily.  What changed:  how much to take     insulin glargine 100 units/mL (3mL) SubQ pen  Inject 25 Units into the skin once daily.  What changed:  how much to take        CONTINUE taking these medications    ACCU-CHEK FASTCLIX LANCET DRUM Misc  Generic drug:  lancets  CHECK BLOOD GLUCOSE FOUR TIMES DAILY     ACCU-CHEK HANNAH Misc  Generic drug:  blood-glucose meter  CHECK BLOOD GLUCOSE QID     amLODIPine 5 MG tablet  Commonly known as:  NORVASC  Take 1 tablet (5 mg total) by mouth once daily.     aspirin 81 MG EC tablet  Commonly known as:  ECOTRIN  Take 1 tablet (81 mg total) by mouth once daily.     BD ULTRA-FINE SHORT PEN NEEDLE 31 gauge x 5/16" Ndle  Generic drug:  pen needle, diabetic     * blood sugar diagnostic Strp  To use to check BG 5 times daily, to use with insurance preferred meter     * blood sugar diagnostic Strp  Commonly known as:  ACCU-CHEK SMARTVIEW TEST STRIP  Use five times daily     cyclobenzaprine 10 MG tablet  Commonly known as:  FLEXERIL  10 mg every evening.     ergocalciferol 50,000 unit Cap  Commonly known as:  ERGOCALCIFEROL  TAKE 1 CAPSULE BY MOUTH EVERY 7 DAYS     ferrous gluconate 324 MG tablet  Commonly known as:  FERGON  TAKE 1 TABLET BY MOUTH DAILY WITH BREAKFAST     furosemide 40 MG tablet  Commonly known as:  LASIX  two time daily. prn     ibuprofen 800 MG tablet  Commonly known as:  ADVIL,MOTRIN  as needed.     lisinopril 10 MG tablet  Take 1 tablet (10 mg total) by mouth once daily.     magnesium oxide 400 mg (241.3 mg magnesium) tablet  Commonly known as:  MAG-OX  Take 2 tablets (800 mg total) by mouth 3 (three) times daily.     mycophenolate 250 mg Cap  Commonly known as:  CELLCEPT  Take 4 capsules (1,000 mg total) by mouth 2 (two) times daily.     NexIUM 40 MG capsule  Generic drug:  esomeprazole  2 (two) times daily.     * oxyCODONE 20 mg 12 hr tablet  Commonly known " as:  OxyCONTIN  Take 1 tablet (20 mg total) by mouth every 12 (twelve) hours.     * oxyCODONE 15 MG Tab  Commonly known as:  ROXICODONE  TK 1 T PO  QID AS NEEDED FOR PAIN     pravastatin 20 MG tablet  Commonly known as:  PRAVACHOL  TK 1 T PO QHS     sertraline 100 MG tablet  Commonly known as:  ZOLOFT  Take 100 mg by mouth once daily.     tacrolimus 0.5 MG Cap  Commonly known as:  PROGRAF  Take 1 capsule (0.5 mg total) by mouth every 12 (twelve) hours.         * This list has 4 medication(s) that are the same as other medications prescribed for you. Read the directions carefully, and ask your doctor or other care provider to review them with you.              No discharge procedures on file.  Follow-up Information     Julia Gupta MD.    Specialties:  Transplant, Cardiology  Contact information:  [address]  [place] [id number] [phone]

## 2019-03-15 NOTE — INTERVAL H&P NOTE
The patient has been examined and the H&P has been reviewed:    I concur with the findings and no changes have occurred since H&P was written.    Anesthesia/Surgery risks, benefits and alternative options discussed and understood by patient/family.          Active Hospital Problems    Diagnosis  POA    Heart transplant, orthotopic, status [Z94.1]  Not Applicable      Resolved Hospital Problems   No resolved problems to display.

## 2019-03-15 NOTE — Clinical Note
Catheter is inserted into the and is removed from the middle left anterior descending artery. ULTRASOUND PERFORMED

## 2019-03-15 NOTE — PLAN OF CARE
Received report from FIDENCIO Armendariz. Patient s/p [facility], AAOx3. VSS, no c/o pain or discomfort at this time, resp even and unlabored. Vasc band dressing to R radial is CDI. No active bleeding. No hematoma noted. Post procedure protocol reviewed with patient and patient's family. Understanding verbalized. Family members at bedside. Nurse call bell within reach. Will continue to monitor per post procedure protocol.

## 2019-03-18 ENCOUNTER — TELEPHONE (OUTPATIENT)
Dept: TRANSPLANT | Facility: CLINIC | Age: 59
End: 2019-03-18

## 2019-03-18 NOTE — TELEPHONE ENCOUNTER
Called to check on pt after he had a LHC last week, left a message, we reviewed cath in chart review no changes in medical management at this time.

## 2019-04-18 ENCOUNTER — HOSPITAL ENCOUNTER (EMERGENCY)
Facility: HOSPITAL | Age: 59
Discharge: HOME | End: 2019-04-19
Attending: EMERGENCY MEDICINE
Payer: MEDICARE

## 2019-04-18 DIAGNOSIS — R07.89 CHEST TIGHTNESS: ICD-10-CM

## 2019-04-18 DIAGNOSIS — R07.9 CHEST PAIN: ICD-10-CM

## 2019-04-18 LAB
ALBUMIN SERPL BCP-MCNC: 3.8 G/DL (ref 3.5–5.2)
ALP SERPL-CCNC: 60 U/L (ref 55–135)
ALT SERPL W/O P-5'-P-CCNC: 18 U/L (ref 10–44)
ANION GAP SERPL CALC-SCNC: 10 MMOL/L (ref 8–16)
ANISOCYTOSIS BLD QL SMEAR: ABNORMAL
AST SERPL-CCNC: 17 U/L (ref 10–40)
BASOPHILS # BLD AUTO: 0.04 K/UL (ref 0–0.2)
BASOPHILS NFR BLD: 0.5 % (ref 0–1.9)
BILIRUB SERPL-MCNC: 0.5 MG/DL (ref 0.1–1)
BNP SERPL-MCNC: <10 PG/ML (ref 0–99)
BUN SERPL-MCNC: 10 MG/DL (ref 6–20)
CALCIUM SERPL-MCNC: 9.5 MG/DL (ref 8.7–10.5)
CHLORIDE SERPL-SCNC: 104 MMOL/L (ref 95–110)
CO2 SERPL-SCNC: 25 MMOL/L (ref 23–29)
CREAT SERPL-MCNC: 0.8 MG/DL (ref 0.5–1.4)
DACRYOCYTES BLD QL SMEAR: ABNORMAL
DIFFERENTIAL METHOD: ABNORMAL
EOSINOPHIL # BLD AUTO: 0.2 K/UL (ref 0–0.5)
EOSINOPHIL NFR BLD: 2.2 % (ref 0–8)
ERYTHROCYTE [DISTWIDTH] IN BLOOD BY AUTOMATED COUNT: 16.1 % (ref 11.5–14.5)
EST. GFR  (AFRICAN AMERICAN): >60 ML/MIN/1.73 M^2
EST. GFR  (NON AFRICAN AMERICAN): >60 ML/MIN/1.73 M^2
GIANT PLATELETS BLD QL SMEAR: PRESENT
GLUCOSE SERPL-MCNC: 94 MG/DL (ref 70–110)
HCT VFR BLD AUTO: 38.5 % (ref 40–54)
HGB BLD-MCNC: 12 G/DL (ref 14–18)
HYPOCHROMIA BLD QL SMEAR: ABNORMAL
LYMPHOCYTES # BLD AUTO: 3.2 K/UL (ref 1–4.8)
LYMPHOCYTES NFR BLD: 38 % (ref 18–48)
MCH RBC QN AUTO: 19 PG (ref 27–31)
MCHC RBC AUTO-ENTMCNC: 31.2 G/DL (ref 32–36)
MCV RBC AUTO: 61 FL (ref 82–98)
MONOCYTES # BLD AUTO: 0.6 K/UL (ref 0.3–1)
MONOCYTES NFR BLD: 7 % (ref 4–15)
NEUTROPHILS # BLD AUTO: 4.4 K/UL (ref 1.8–7.7)
NEUTROPHILS NFR BLD: 52.7 % (ref 38–73)
OVALOCYTES BLD QL SMEAR: ABNORMAL
PLATELET # BLD AUTO: 163 K/UL (ref 150–350)
PMV BLD AUTO: ABNORMAL FL (ref 9.2–12.9)
POIKILOCYTOSIS BLD QL SMEAR: ABNORMAL
POLYCHROMASIA BLD QL SMEAR: ABNORMAL
POTASSIUM SERPL-SCNC: 4.3 MMOL/L (ref 3.5–5.1)
PROT SERPL-MCNC: 6.6 G/DL (ref 6–8.4)
RBC # BLD AUTO: 6.33 M/UL (ref 4.6–6.2)
SODIUM SERPL-SCNC: 139 MMOL/L (ref 136–145)
TROPONIN I SERPL DL<=0.01 NG/ML-MCNC: 0.02 NG/ML (ref 0–0.03)
WBC # BLD AUTO: 8.47 K/UL (ref 3.9–12.7)

## 2019-04-18 PROCEDURE — 93010 ELECTROCARDIOGRAM REPORT: CPT | Mod: ,,, | Performed by: INTERNAL MEDICINE

## 2019-04-18 PROCEDURE — 84484 ASSAY OF TROPONIN QUANT: CPT

## 2019-04-18 PROCEDURE — 93005 ELECTROCARDIOGRAM TRACING: CPT

## 2019-04-18 PROCEDURE — 93010 EKG 12-LEAD: ICD-10-PCS | Mod: ,,, | Performed by: INTERNAL MEDICINE

## 2019-04-18 PROCEDURE — 85025 COMPLETE CBC W/AUTO DIFF WBC: CPT

## 2019-04-18 PROCEDURE — 83880 ASSAY OF NATRIURETIC PEPTIDE: CPT

## 2019-04-18 PROCEDURE — 82306 VITAMIN D 25 HYDROXY: CPT

## 2019-04-18 PROCEDURE — 99285 EMERGENCY DEPT VISIT HI MDM: CPT | Mod: 25

## 2019-04-18 PROCEDURE — 80197 ASSAY OF TACROLIMUS: CPT

## 2019-04-18 PROCEDURE — 80053 COMPREHEN METABOLIC PANEL: CPT

## 2019-04-19 VITALS
RESPIRATION RATE: 17 BRPM | SYSTOLIC BLOOD PRESSURE: 132 MMHG | TEMPERATURE: 98 F | OXYGEN SATURATION: 97 % | HEIGHT: 68 IN | BODY MASS INDEX: 33.34 KG/M2 | WEIGHT: 220 LBS | HEART RATE: 80 BPM | DIASTOLIC BLOOD PRESSURE: 59 MMHG

## 2019-04-19 LAB
25(OH)D3+25(OH)D2 SERPL-MCNC: 15 NG/ML (ref 30–96)
TROPONIN I SERPL DL<=0.01 NG/ML-MCNC: 0.01 NG/ML (ref 0–0.03)

## 2019-04-19 PROCEDURE — 84484 ASSAY OF TROPONIN QUANT: CPT

## 2019-04-19 NOTE — ED TRIAGE NOTES
Pt, with a hx of a heart transplant, reports dizziness and L sided chest tightness x2 hours. Pt denies sob, nv. Pt is also reporting palpitations when going from sitting to standing.

## 2019-04-20 LAB — TACROLIMUS BLD-MCNC: 7.4 NG/ML (ref 5–15)

## 2019-04-22 ENCOUNTER — TELEPHONE (OUTPATIENT)
Dept: TRANSPLANT | Facility: CLINIC | Age: 59
End: 2019-04-22

## 2019-04-22 NOTE — TELEPHONE ENCOUNTER
Spoke with pt this morning and he stated that he is feeling ok, they explained to him that all of the test where normal. Pt has labs on 5/20/19 for post heart tx.

## 2019-04-25 DIAGNOSIS — Z94.1 STATUS POST HEART TRANSPLANT: ICD-10-CM

## 2019-04-25 RX ORDER — TACROLIMUS 0.5 MG/1
CAPSULE ORAL
Qty: 180 CAPSULE | Refills: 0 | Status: SHIPPED | OUTPATIENT
Start: 2019-04-25 | End: 2019-05-20 | Stop reason: SDUPTHER

## 2019-05-04 NOTE — ED PROVIDER NOTES
Encounter Date: 4/18/2019       History     Chief Complaint   Patient presents with    Dizziness     Pt c/o dizziness and chest tightness that began 1 hour PTA. Pt reports hx of heart transplant 2 years ago.    Chest Pain     HPI     Patient is a 58 year old male with history of heart transplant 2 years ago who presents to the ED with left sided chest tightness which began 1 hour prior to arrival.  Pain was accompanied by dizziness.  No shortness of breath, weakness, N/V.  Patient reports compliance with all medication - no previous concerns for rejection.  Symptoms began at rest.  No chest pain currently.  He notes palpitations when standing.    Review of patient's allergies indicates:   Allergen Reactions    Levemir [insulin detemir] Itching    Niacin preparations     Pork/porcine containing products     Ranexa [ranolazine] Nausea Only     Past Medical History:   Diagnosis Date    CHF (congestive heart failure)     Coronary artery disease     Encounter for blood transfusion     GERD (gastroesophageal reflux disease)     Hyperlipidemia     Hypertension     LVAD (left ventricular assist device) present 2/13/2017    Pneumonia due to infectious organism 11/2017 on top of flu 12/1/2017    Type 2 diabetes mellitus with hyperglycemia      Past Surgical History:   Procedure Laterality Date    BIOPSY, WITH US GUIDANCE N/A 11/27/2018    Performed by Dennis Lyles Jr., MD at [facility] CATH LAB    BIOPSY-ECHO GUIDED N/A 10/10/2017    Performed by Trixie Marx MD at [facility] CATH LAB    BIOPSY-ECHO GUIDED N/A 8/22/2017    Performed by Donavon Childs MD at [facility] CATH LAB    BIOPSY-ECHO GUIDED N/A 4/18/2017    Performed by Trixie Marx MD at [facility] CATH LAB    BIOPSY-ECHO GUIDED N/A 3/21/2017    Performed by Josefina Saul MD at [facility] CATH LAB    BIOPSY-ECHO GUIDED N/A 3/7/2017    Performed by Trixie Marx MD at [facility] CATH LAB    BIOPSY-ECHO GUIDED N/A 3/1/2017    Performed by Peewee Romero MD  at [facility] CATH LAB    CARDIAC PACEMAKER PLACEMENT      CHOLECYSTECTOMY      CLOSURE-STERNAL WOUND N/A 2016    Performed by Heber Woodurff MD at [facility] OR 2ND FLR    COLONOSCOPY N/A 2017    Performed by Petr Almanzar MD at [facility] ENDO (2ND FLR)    COLONOSCOPY N/A 2017    Performed by Petr Almanzar MD at [facility] ENDO (2ND FLR)    HEART TRANSPLANT  2017    INSERTION-LEFT VENTRICULAR ASSIST DEVICE N/A 2016    Performed by Heber Woodruff MD at [facility] OR 2ND FLR    Left heart cath Left 3/15/2019    Performed by Ganesh Arnett MD at [facility] CATH LAB    LEFT VENTRICULAR ASSIST DEVICE      x 3 months ago    PLACEMENT-NEOPERICARDIUM N/A 2016    Performed by Heber Woodruff MD at [facility] OR 2ND FLR    Removal-LEFT VENTRICULAR ASSIST DEVICE N/A 2017    Performed by Heber Woodruff MD at [facility] OR 2ND FLR    REMOVAL-PACEMAKER Left 2017    Performed by Heber Woodruff MD at [facility] OR 2ND FLR    Repair/ligation of leaking lymphatic with muscle flap coverage.  R groin Right 3/30/2017    Performed by Aravind Palomino MD at [facility] OR 2ND FLR    TRANSPLANT-HEART N/A 2017    Performed by Heber Woodruff MD at [facility] OR 2ND FLR     Family History   Problem Relation Age of Onset    Heart disease Mother     Hypertension Mother     Heart attack Mother     Heart disease Father     Hypertension Father     Heart attack Father     Cancer Brother      Social History     Tobacco Use    Smoking status: Former Smoker     Packs/day: 0.50     Years: 15.00     Pack years: 7.50     Last attempt to quit: 2006     Years since quittin.8    Smokeless tobacco: Never Used   Substance Use Topics    Alcohol use: No    Drug use: No     Review of Systems   Constitutional: Negative for fever.   HENT: Positive for trouble swallowing.    Respiratory: Negative for shortness of breath.    Cardiovascular: Positive for chest pain and palpitations.   Gastrointestinal: Negative for vomiting.   Genitourinary:  Negative for flank pain.   Musculoskeletal: Negative for back pain.   Skin: Negative for rash.   Neurological: Positive for dizziness.   Psychiatric/Behavioral: Negative for confusion.       Physical Exam     Initial Vitals [04/18/19 2231]   BP Pulse Resp Temp SpO2   136/62 87 18 97.9 °F (36.6 °C) 97 %      MAP       --         Physical Exam   Constitutional: Well-developed, overweight, No acute distressed, Alert  HENT: Normocephalic, Atraumatic, Moist mucous membranes  Eyes: Conjunctiva normal  Neck: Supple, ROM normal  Cardiac: RRR  Pulmonary/Chest wall: No respiratory distress, CTAB, no chest wall tenderness, midline sternotomy  Abdomen: Soft, nontender, nondistended, no rebound, no guarding  Musc: Normal ROM, No obvious joint swelling  Lymph: No lower extremity edema  Neuro: oriented x 3, no focal neurologic deficit  Skin: Pink, warm, dry.  No rashes  Psych: Behavior normal, Mood and affect normal    Previous medical record and nursing documentation reviewed where available.          ED Course   Procedures  Labs Reviewed   CBC W/ AUTO DIFFERENTIAL - Abnormal; Notable for the following components:       Result Value    RBC 6.33 (*)     Hemoglobin 12.0 (*)     Hematocrit 38.5 (*)     Mean Corpuscular Volume 61 (*)     Mean Corpuscular Hemoglobin 19.0 (*)     Mean Corpuscular Hemoglobin Conc 31.2 (*)     RDW 16.1 (*)     All other components within normal limits   VITAMIN D - Abnormal; Notable for the following components:    Vit D, 25-Hydroxy 15 (*)     All other components within normal limits   COMPREHENSIVE METABOLIC PANEL   TROPONIN I   B-TYPE NATRIURETIC PEPTIDE   TACROLIMUS LEVEL   TROPONIN I     EKG Readings: (Independently Interpreted)   Initial Reading: No STEMI. Rhythm: Normal Sinus Rhythm. Ectopy: No Ectopy. ST Segments: Normal ST Segments. T Waves: Normal. Clinical Impression: Normal Sinus Rhythm     ECG Results          EKG 12-lead (Final result)  Result time 04/20/19 11:39:06    Final result by  Interface, Lab In [facility] (04/20/19 11:39:06)                 Narrative:    Test Reason : R07.89,    Vent. Rate : 089 BPM     Atrial Rate : 089 BPM     P-R Int : 156 ms          QRS Dur : 102 ms      QT Int : 370 ms       P-R-T Axes : 057 -55 062 degrees     QTc Int : 450 ms    Normal sinus rhythm  Pulmonary disease pattern  Left anterior fascicular block  Abnormal ECG  When compared with ECG of 15-MAR-2019 07:51,  Significant changes have occurred  Confirmed by Jorge Raza MD ([id number]) on 4/20/2019 11:39:01 AM    Referred By: [email or web address]   SELF           Confirmed By:Jorge Raza MD                             EKG 12-LEAD (Final result)  Result time 05/01/19 11:30:48    Final result by Unknown User (05/01/19 11:30:48)                                Imaging Results          X-Ray Chest PA And Lateral (Final result)  Result time 04/19/19 00:59:49    Final result by Lianet Chan MD (04/19/19 00:59:49)                 Impression:      Resolution of the linear plate of atelectasis in the right lung field.  Otherwise, stable chest radiograph.      Electronically signed by: Lianet Chan  Date:    04/19/2019  Time:    00:59             Narrative:    EXAMINATION:  TWO VIEWS OF THE CHEST    CLINICAL HISTORY:  Chest pain, unspecified    TECHNIQUE:  Two views of the chest.    COMPARISON:  02/13/2019 and CTA chest from 03/23/2018    FINDINGS:  Median sternotomy changes are present.  There is redemonstration of a radiopaque metallic appearing density with a coiled tip extending from the left subclavian to the left brachiocephalic with its tip in the superior vena cava.  A similar finding was seen on the previous exam.  The cardiac silhouette is at the upper limits of normal.  There is a linear plate of atelectasis seen in the left mid lung field, which is unchanged.  The linear plate of atelectasis in the right lung has resolved.  There is stable minimal blunting of the left costophrenic angle, which is likely due to  pleural thickening.  There is no pneumothorax.  No focal consolidation is detected.                              X-Rays:   Independently Interpreted Readings:   Chest X-Ray: No infiltrates.  No acute abnormalities.     Medical Decision Making:   Independently Interpreted Test(s):   I have ordered and independently interpreted X-rays - see prior notes.  I have ordered and independently interpreted EKG Reading(s) - see prior notes  Clinical Tests:   Lab Tests: Ordered and Reviewed  Radiological Study: Ordered and Reviewed  Medical Tests: Ordered and Reviewed  ED Management:  58 year old male with history of heart transplant 2 years ago who presents to the ED with mild left-sided chest tightness.  + dizziness with mild orthostasis.  Pain/tightness somewhat atypical in nature.  He has had a recent normal cath (approx 1 month ago).  EKG with normal Twaves (previous EKG with inverted T's).  Troponin and BNP negative.  I have discussed with transplant physician at [facility] who recommends discahrge home with close transplant follow up.  Given only a few hours of symptoms and significant cardiac history, I have opted to keep in the ED for second troponin rather than discharge immediately.  Second troponin also negative.  Will discharge home per transplant recommendation with encouragement to return to the ED here or at [facility] (location of transplant physicians) if symptoms return.                       Clinical Impression:       ICD-10-CM ICD-9-CM   1. Chest tightness R07.89 786.59   2. Chest pain R07.9 786.50                                Pinky Carreno MD  05/04/19 1052

## 2019-05-20 DIAGNOSIS — Z94.1 STATUS POST HEART TRANSPLANT: ICD-10-CM

## 2019-05-20 RX ORDER — TACROLIMUS 0.5 MG/1
0.5 CAPSULE ORAL EVERY 12 HOURS
Qty: 180 CAPSULE | Refills: 0 | Status: SHIPPED | OUTPATIENT
Start: 2019-05-20 | End: 2019-08-30 | Stop reason: SDUPTHER

## 2019-05-20 RX ORDER — ERGOCALCIFEROL 1.25 MG/1
50000 CAPSULE ORAL
Qty: 12 CAPSULE | Refills: 0 | Status: SHIPPED | OUTPATIENT
Start: 2019-05-20 | End: 2019-12-03

## 2019-06-02 DIAGNOSIS — E83.42 HYPOMAGNESEMIA: ICD-10-CM

## 2019-06-02 DIAGNOSIS — Z94.1 STATUS POST HEART TRANSPLANT: ICD-10-CM

## 2019-06-03 RX ORDER — LANOLIN ALCOHOL/MO/W.PET/CERES
CREAM (GRAM) TOPICAL
Qty: 540 TABLET | Refills: 12 | Status: SHIPPED | OUTPATIENT
Start: 2019-06-03 | End: 2019-12-03

## 2019-06-14 ENCOUNTER — TELEPHONE (OUTPATIENT)
Dept: TRANSPLANT | Facility: CLINIC | Age: 59
End: 2019-06-14

## 2019-06-14 NOTE — TELEPHONE ENCOUNTER
Returned call to [facility], unsure of what medicine and no faxes received for pt, this is for diabetic supplies and the pt sees his pcp for his Diabetic needs..    ----- Message from Julia Meade sent at 6/14/2019  2:27 PM CDT -----  Contact: [phone]  Estephanie is calling to see if the form was sent over for the ZPt. Please call them back @ [phone]. Thanks, Julia

## 2019-07-26 ENCOUNTER — PATIENT MESSAGE (OUTPATIENT)
Dept: TRANSPLANT | Facility: HOSPITAL | Age: 59
End: 2019-07-26

## 2019-08-01 ENCOUNTER — PATIENT MESSAGE (OUTPATIENT)
Dept: TRANSPLANT | Facility: HOSPITAL | Age: 59
End: 2019-08-01

## 2019-08-30 ENCOUNTER — PATIENT MESSAGE (OUTPATIENT)
Dept: TRANSPLANT | Facility: CLINIC | Age: 59
End: 2019-08-30

## 2019-08-30 DIAGNOSIS — Z94.1 STATUS POST HEART TRANSPLANT: ICD-10-CM

## 2019-08-30 RX ORDER — TACROLIMUS 0.5 MG/1
0.5 CAPSULE ORAL EVERY 12 HOURS
Qty: 180 CAPSULE | Refills: 3 | Status: SHIPPED | OUTPATIENT
Start: 2019-08-30 | End: 2019-12-03

## 2019-12-02 DIAGNOSIS — E83.42 HYPOMAGNESEMIA: ICD-10-CM

## 2019-12-02 DIAGNOSIS — Z94.1 STATUS POST HEART TRANSPLANT: ICD-10-CM

## 2019-12-02 DIAGNOSIS — Z94.1 HEART TRANSPLANT, ORTHOTOPIC, STATUS: ICD-10-CM

## 2019-12-02 RX ORDER — MYCOPHENOLATE MOFETIL 250 MG/1
CAPSULE ORAL
Qty: 720 CAPSULE | Refills: 0 | Status: SHIPPED | OUTPATIENT
Start: 2019-12-02 | End: 2019-12-03

## 2019-12-03 ENCOUNTER — OFFICE VISIT (OUTPATIENT)
Dept: TRANSPLANT | Facility: CLINIC | Age: 59
End: 2019-12-03
Payer: MEDICARE

## 2019-12-03 ENCOUNTER — HOSPITAL ENCOUNTER (OUTPATIENT)
Dept: CARDIOLOGY | Facility: CLINIC | Age: 59
Discharge: HOME | End: 2019-12-03
Attending: INTERNAL MEDICINE
Payer: MEDICARE

## 2019-12-03 VITALS
DIASTOLIC BLOOD PRESSURE: 62 MMHG | WEIGHT: 221.31 LBS | HEIGHT: 68 IN | BODY MASS INDEX: 33.54 KG/M2 | DIASTOLIC BLOOD PRESSURE: 59 MMHG | BODY MASS INDEX: 33.34 KG/M2 | HEART RATE: 69 BPM | HEART RATE: 80 BPM | SYSTOLIC BLOOD PRESSURE: 132 MMHG | HEIGHT: 68 IN | WEIGHT: 220 LBS | SYSTOLIC BLOOD PRESSURE: 156 MMHG

## 2019-12-03 DIAGNOSIS — Z79.4 TYPE 2 DIABETES MELLITUS WITH STAGE 3 CHRONIC KIDNEY DISEASE, WITH LONG-TERM CURRENT USE OF INSULIN: Chronic | ICD-10-CM

## 2019-12-03 DIAGNOSIS — Z94.1 STATUS POST HEART TRANSPLANT: ICD-10-CM

## 2019-12-03 DIAGNOSIS — E11.22 TYPE 2 DIABETES MELLITUS WITH STAGE 3 CHRONIC KIDNEY DISEASE, WITH LONG-TERM CURRENT USE OF INSULIN: Chronic | ICD-10-CM

## 2019-12-03 DIAGNOSIS — T86.20 COMPLICATION OF HEART TRANSPLANT, UNSPECIFIED COMPLICATION: ICD-10-CM

## 2019-12-03 DIAGNOSIS — N28.9 RENAL INSUFFICIENCY: ICD-10-CM

## 2019-12-03 DIAGNOSIS — Z94.1 HEART TRANSPLANT, ORTHOTOPIC, STATUS: Primary | ICD-10-CM

## 2019-12-03 DIAGNOSIS — Z79.52 LONG TERM CURRENT USE OF SYSTEMIC STEROIDS: ICD-10-CM

## 2019-12-03 DIAGNOSIS — N18.30 TYPE 2 DIABETES MELLITUS WITH STAGE 3 CHRONIC KIDNEY DISEASE, WITH LONG-TERM CURRENT USE OF INSULIN: Chronic | ICD-10-CM

## 2019-12-03 DIAGNOSIS — D84.9 IMMUNOSUPPRESSION: ICD-10-CM

## 2019-12-03 DIAGNOSIS — Z79.899 LONG TERM CURRENT USE OF IMMUNOSUPPRESSIVE DRUG: ICD-10-CM

## 2019-12-03 DIAGNOSIS — I10 ESSENTIAL HYPERTENSION: Chronic | ICD-10-CM

## 2019-12-03 LAB
ASCENDING AORTA: 2.61 CM
AV INDEX (PROSTH): 0.87
AV MEAN GRADIENT: 3 MMHG
AV PEAK GRADIENT: 5 MMHG
AV VALVE AREA: 3.32 CM2
AV VELOCITY RATIO: 0.77
BSA FOR ECHO PROCEDURE: 2.19 M2
CV ECHO LV RWT: 0.33 CM
DOP CALC AO PEAK VEL: 1.17 M/S
DOP CALC AO VTI: 24.32 CM
DOP CALC LVOT AREA: 3.8 CM2
DOP CALC LVOT DIAMETER: 2.21 CM
DOP CALC LVOT PEAK VEL: 0.9 M/S
DOP CALC LVOT STROKE VOLUME: 80.82 CM3
DOP CALCLVOT PEAK VEL VTI: 21.08 CM
E WAVE DECELERATION TIME: 154.29 MSEC
E/A RATIO: 3.93
E/E' RATIO: 12.94 M/S
ECHO LV POSTERIOR WALL: 0.87 CM (ref 0.6–1.1)
FRACTIONAL SHORTENING: 32 % (ref 28–44)
INTERVENTRICULAR SEPTUM: 0.85 CM (ref 0.6–1.1)
IVRT: 0.1 MSEC
LEFT INTERNAL DIMENSION IN SYSTOLE: 3.56 CM (ref 2.1–4)
LEFT VENTRICLE DIASTOLIC VOLUME INDEX: 62.84 ML/M2
LEFT VENTRICLE DIASTOLIC VOLUME: 133.75 ML
LEFT VENTRICLE MASS INDEX: 77 G/M2
LEFT VENTRICLE SYSTOLIC VOLUME INDEX: 24.8 ML/M2
LEFT VENTRICLE SYSTOLIC VOLUME: 52.86 ML
LEFT VENTRICULAR INTERNAL DIMENSION IN DIASTOLE: 5.27 CM (ref 3.5–6)
LEFT VENTRICULAR MASS: 162.98 G
LV LATERAL E/E' RATIO: 11 M/S
LV SEPTAL E/E' RATIO: 15.71 M/S
MV PEAK A VEL: 0.28 M/S
MV PEAK E VEL: 1.1 M/S
PISA TR MAX VEL: 1.85 M/S
RA PRESSURE: 3 MMHG
RIGHT VENTRICULAR END-DIASTOLIC DIMENSION: 2.26 CM
RV TISSUE DOPPLER FREE WALL SYSTOLIC VELOCITY 1 (APICAL 4 CHAMBER VIEW): 9.85 CM/S
SINUS: 3.03 CM
STJ: 2.37 CM
TDI LATERAL: 0.1 M/S
TDI SEPTAL: 0.07 M/S
TDI: 0.09 M/S
TR MAX PG: 14 MMHG
TRICUSPID ANNULAR PLANE SYSTOLIC EXCURSION: 1.55 CM
TV REST PULMONARY ARTERY PRESSURE: 17 MMHG

## 2019-12-03 PROCEDURE — 99999 PR PBB SHADOW E&M-EST. PATIENT-LVL III: CPT | Mod: PBBFAC,,, | Performed by: INTERNAL MEDICINE

## 2019-12-03 PROCEDURE — 99999 PR PBB SHADOW E&M-EST. PATIENT-LVL III: ICD-10-PCS | Mod: PBBFAC,,, | Performed by: INTERNAL MEDICINE

## 2019-12-03 PROCEDURE — 93306 TTE W/DOPPLER COMPLETE: CPT | Mod: PBBFAC | Performed by: INTERNAL MEDICINE

## 2019-12-03 PROCEDURE — 99215 PR OFFICE/OUTPT VISIT, EST, LEVL V, 40-54 MIN: ICD-10-PCS | Mod: S$PBB,,, | Performed by: INTERNAL MEDICINE

## 2019-12-03 PROCEDURE — 99215 OFFICE O/P EST HI 40 MIN: CPT | Mod: S$PBB,,, | Performed by: INTERNAL MEDICINE

## 2019-12-03 PROCEDURE — 99213 OFFICE O/P EST LOW 20 MIN: CPT | Mod: PBBFAC,25 | Performed by: INTERNAL MEDICINE

## 2019-12-03 PROCEDURE — 93306 TRANSTHORACIC ECHO (TTE) COMPLETE: ICD-10-PCS | Mod: 26,S$PBB,, | Performed by: INTERNAL MEDICINE

## 2019-12-03 RX ORDER — TACROLIMUS 0.5 MG/1
0.5 CAPSULE ORAL EVERY 12 HOURS
Qty: 180 CAPSULE | Refills: 3 | Status: SHIPPED | OUTPATIENT
Start: 2019-12-03 | End: 2020-09-03 | Stop reason: SDUPTHER

## 2019-12-03 RX ORDER — FERROUS GLUCONATE 324(38)MG
1 TABLET ORAL DAILY
Qty: 90 TABLET | Refills: 3 | Status: SHIPPED | OUTPATIENT
Start: 2019-12-03 | End: 2020-09-03 | Stop reason: SDUPTHER

## 2019-12-03 RX ORDER — AMLODIPINE BESYLATE 5 MG/1
5 TABLET ORAL DAILY
Qty: 90 TABLET | Refills: 3 | Status: SHIPPED | OUTPATIENT
Start: 2019-12-03 | End: 2020-09-03 | Stop reason: SDUPTHER

## 2019-12-03 RX ORDER — LANOLIN ALCOHOL/MO/W.PET/CERES
2 CREAM (GRAM) TOPICAL 3 TIMES DAILY
Qty: 540 TABLET | Refills: 12 | Status: SHIPPED | OUTPATIENT
Start: 2019-12-03 | End: 2020-06-09

## 2019-12-03 RX ORDER — LISINOPRIL 10 MG/1
10 TABLET ORAL DAILY
Qty: 90 TABLET | Refills: 3 | Status: SHIPPED | OUTPATIENT
Start: 2019-12-03 | End: 2020-09-03 | Stop reason: SDUPTHER

## 2019-12-03 RX ORDER — MYCOPHENOLATE MOFETIL 250 MG/1
CAPSULE ORAL
Qty: 720 CAPSULE | Refills: 3 | Status: SHIPPED | OUTPATIENT
Start: 2019-12-03 | End: 2020-02-22

## 2019-12-03 RX ORDER — ERGOCALCIFEROL 1.25 MG/1
50000 CAPSULE ORAL
Qty: 12 CAPSULE | Refills: 0 | Status: SHIPPED | OUTPATIENT
Start: 2019-12-03 | End: 2020-06-20

## 2019-12-03 NOTE — PROGRESS NOTES
Subjective:   Mr. Barriga is a 59 y.o. year old White male who received a  - brain death heart transplant on 17.      CMV status:   Donor:  neg  Recipient: post     HPI   PMHx of NICM s/p HM II (16), HLD, HTN, VT s/p ICD now s/p OHTx 17 who's post op course complicated by bradycardia (started on terbutaline) as well as right groin infected seroma (s/p I&D in OR during admission from -04/10). Plastics then performed a right femoris muscle flap and he had pain-control issues following that.     Immuno Tacro 0.5/0.5, Cellcept 1000 mg BID, off prednisone     DOING EXCELLENT    December 3, 2019  · GERARD study  · Upper normal LV size, normal left ventricular systolic function. The estimated ejection fraction is 65%  · No wall motion abnormalities.  · Normal LV diastolic function.  · Normal right ventricular systolic function.  · At least mild-to-moderate aortic regurgitation, which is new since the  study ( I cannot tell with certainty the number of cusps).  · Mild mitral regurgitation.  · Normal central venous pressure (3 mm Hg).  · The estimated PA systolic pressure is 17 mm Hg        Echo today  · Patient is s/p cardiac transplant.  · Normal left ventricular systolic function. The estimated ejection fraction is 60%  · Indeterminate left ventricular diastolic function.  · Normal central venous pressure (3 mm Hg).  · RV not well seen.  · The estimated PA systolic pressure is 13 mm Hg    Echo today    CONCLUSIONS     1 - Normal left ventricular systolic function (EF 60-65%).     2 - Normal left ventricular diastolic function.     3 - Normal right ventricular systolic function .     4 - The estimated PA systolic pressure is 33 mmHg.     5 - Trivial mitral regurgitation.     6 - Trivial tricuspid regurgitation.     7 - No wall motion abnormalities.     8 - Concentric remodeling.     Review of Systems   Constitution: Negative for decreased appetite, weight gain and weight loss.   Cardiovascular:  Negative for chest pain, dyspnea on exertion, leg swelling, near-syncope, orthopnea and palpitations.   Respiratory: Negative for cough and shortness of breath.    Musculoskeletal: Negative for myalgias.   Gastrointestinal: Negative for jaundice.       Objective:     Physical Exam   Constitutional: He is oriented to person, place, and time. He appears well-developed and well-nourished. He is active. He is not intubated.        HENT:   Head: Normocephalic and atraumatic. Hair is normal.   Right Ear: External ear normal.   Left Ear: External ear normal.   Nose: Nose normal. No nasal deformity. No epistaxis.  No foreign bodies.   Mouth/Throat: Mucous membranes are normal. Mucous membranes are not cyanotic. No oropharyngeal exudate.   Eyes: Pupils are equal, round, and reactive to light. Conjunctivae and EOM are normal.   Neck: Neck supple. No hepatojugular reflux and no JVD present.   Cardiovascular: Normal rate, regular rhythm, normal heart sounds and normal pulses. Exam reveals no gallop.   Pulmonary/Chest: Effort normal and breath sounds normal. No apnea and no tachypnea. He is not intubated. No respiratory distress. He exhibits no tenderness.   Abdominal: Soft. Normal appearance and bowel sounds are normal. There is no tenderness. No hernia.   Musculoskeletal: Normal range of motion.   Neurological: He is alert and oriented to person, place, and time. He displays no seizure activity.   Skin: Skin is warm, dry and intact. No rash noted. No pallor.   Psychiatric: He has a normal mood and affect. His speech is normal and behavior is normal. Thought content normal. Cognition and memory are normal.       Lab Results   Component Value Date    WBC 8.47 04/18/2019    HGB 12.0 (L) 04/18/2019    HCT 38.5 (L) 04/18/2019    MCV 61 (L) 04/18/2019     04/18/2019    CO2 25 12/03/2019    CREATININE 0.9 12/03/2019    CALCIUM 9.4 12/03/2019    ALBUMIN 3.8 12/03/2019    AST 16 12/03/2019     (H) 12/03/2019    ALT 14  12/03/2019    ALLOMAP See result image under hyperlink 02/13/2019       Lab Results   Component Value Date    INR 1.0 03/15/2019    INR 1.0 03/22/2018    INR 1.0 11/26/2017       BNP   Date Value Ref Range Status   12/03/2019 212 (H) 0 - 99 pg/mL Final     Comment:     Values of less than 100 pg/ml are consistent with non-CHF populations.   04/18/2019 <10 0 - 99 pg/mL Final     Comment:     Values of less than 100 pg/ml are consistent with non-CHF populations.   02/13/2019 38 0 - 99 pg/mL Final     Comment:     Values of less than 100 pg/ml are consistent with non-CHF populations.       Tacrolimus Lvl   Date Value Ref Range Status   04/18/2019 7.4 5.0 - 15.0 ng/mL Final     Comment:     Testing performed by Liquid Chromatography-Tandem  Mass Spectrometry (LC-MS/MS).  This test was developed and its performance characteristics  determined by [facility], Department of Pathology  and Laboratory Medicine in a manner consistent with CLIA  requirements. It has not been cleared or approved by the [place]  Food and Drug Administration.  This test is used for clinical   purposes.  It should not be regarded as investigational or for  research.           Assessment:     1. Heart transplant, orthotopic, status    2. Essential hypertension    3. Immunosuppression    4. Type 2 diabetes mellitus with stage 3 chronic kidney disease, with long-term current use of insulin    5. Long term current use of immunosuppressive drug        Plan:     DOing very well  Routine follow up      Return instructions as set forth by post transplant schedule or as needed:    Clinic: Return for labs and/or biopsy weekly the first month, every two weeks during month 2 and then monthly for the first year at the provider or coordinator's discretion. During the second year, return to clinic every 3 months. Post transplant year 3-5 return every 6 months. There will be a comprehensive post transplant evaluation every year that may include  LHC/RHC/biopsy, stress test, echo, CXR, and other health screening exams.    In addition to the clinical assessment, I have ordered Allomap testing for this patient to assist in identification of moderate/severe acute cellular rejection (ACR) in a pt with stable Allograft function instead of endomyocardial biopsy.     Patient is reminded to call with any health changes as these can be early signs of transplant complications. Patient is advised to make sure any new medications or changes of old medications are discussed with a pharmacist or physician knowledgeable with transplant to avoid rejection/drug toxicity related to significant drug interactions.    UNOS Patient Status  Functional Status: 80% - Normal activity with effort: some symptoms of disease  Physical Capacity: No Limitations  Working for Income: Unknown    Ryan Dawn MD

## 2019-12-12 ENCOUNTER — TELEPHONE (OUTPATIENT)
Dept: TRANSPLANT | Facility: CLINIC | Age: 59
End: 2019-12-12

## 2019-12-13 NOTE — TELEPHONE ENCOUNTER
Spoke with pt this morning regarding labs and echo from last week, would like to repeat bnp and labs in 1 month, pt had been traveling for several days and is back home now.  Pt stated understanding and plans on seeing his pcp, labs, clinic visit and echo will be sent.

## 2019-12-16 NOTE — PLAN OF CARE
Problem: Patient Care Overview  Goal: Plan of Care Review  Outcome: Ongoing (interventions implemented as appropriate)  No acute events throughout night, no alarms over night,  Doppler was elevated, 5mg IV hydralazine given. VS and assessment per flow sheet, patient progressing towards goals as tolerated, plan of care reviewed with Mick Barriga and family, all concerns addressed, will continue to monitor.           Solaraze Counseling:  I discussed with the patient the risks of Solaraze including but not limited to erythema, scaling, itching, weeping, crusting, and pain.

## 2019-12-16 NOTE — PLAN OF CARE
EMERGENCY DEPARTMENT HISTORY AND PHYSICAL EXAM    9:42 AM      Date: 12/16/2019  Patient Name: Mohamud Phillips    History of Presenting Illness     Chief Complaint   Patient presents with    Facial Droop    Headache         History Provided By: Patient    Additional History (Context): Mohamud Phillips is a 46 y.o. male with Past medical history history of right leg fracture and femur surgery who presents with chief complaint of left-sided facial numbness for the past 3 days. Patient reports that on Saturday he noticed that the right side of his face and mouth were numb and he was unable to taste food as usual.  Associated symptoms are inability to completely close his left eye, left facial droop, and he also complains of headache. He states that he did have some left-sided shoulder pain a couple days ago but that resolved. He denies any dizziness, nausea, limb weakness, unsteady gait, fever, neck pain, vomiting, shortness of breath, chest pain, sore throat, no other complaint. PCP: None        Past History     Past Medical History:  History reviewed. No pertinent past medical history. Past Surgical History:  Past Surgical History:   Procedure Laterality Date    HX ORTHOPAEDIC      right femur       Family History:  History reviewed. No pertinent family history. Social History:  Social History     Tobacco Use    Smoking status: Former Smoker    Smokeless tobacco: Never Used   Substance Use Topics    Alcohol use: No    Drug use: No       Allergies:  No Known Allergies      Review of Systems       Review of Systems   Constitutional: Negative for chills and fever. HENT: Negative for congestion, facial swelling, rhinorrhea, sore throat and trouble swallowing. Eyes: Negative for visual disturbance. Respiratory: Negative for cough, shortness of breath and wheezing. Cardiovascular: Negative for chest pain and leg swelling. Gastrointestinal: Negative for abdominal pain, nausea and vomiting. Problem: Patient Care Overview  Goal: Plan of Care Review  Outcome: Ongoing (interventions implemented as appropriate)  VS/VAD/Doppler #s WNL. Denies any CP, SOB, palpitations, or dizziness. Fall precautions maintained. Free of fall/trauma/injury. General skin remains intact with no breakdown. Pt informed he is a back up and may receive  OHTx tomorrow. Crossmatch done this evening. Pt to be NPO after 0800AM tomorrow morning. Plan of care reviewed and updated with patient and son, verbalizes understanding. Patient in no acute distress. Will continue to monitor.        Endocrine: Negative for polyuria. Genitourinary: Negative for difficulty urinating and dysuria. Musculoskeletal: Negative for arthralgias and neck stiffness. Skin: Negative for rash. Neurological: Positive for numbness (Left-sided facial numbness) and headaches. Negative for dizziness, syncope, speech difficulty and weakness. Left-sided facial droop   Hematological: Does not bruise/bleed easily. Psychiatric/Behavioral: Negative for confusion and dysphoric mood. All other systems reviewed and are negative. Physical Exam     Visit Vitals  /75   Pulse (!) 58   Temp 98.1 °F (36.7 °C)   Resp 14   Ht 5' 9\" (1.753 m)   Wt 77.1 kg (170 lb)   SpO2 100%   BMI 25.10 kg/m²         Physical Exam  Vitals signs and nursing note reviewed. Constitutional:       General: He is not in acute distress. Appearance: He is well-developed. He is not diaphoretic. HENT:      Head: Normocephalic and atraumatic. Eyes:      General: No scleral icterus. Conjunctiva/sclera: Conjunctivae normal.      Pupils: Pupils are equal, round, and reactive to light. Neck:      Musculoskeletal: Normal range of motion and neck supple. Cardiovascular:      Rate and Rhythm: Normal rate. Comments: Capillary refill < 3 seconds  Pulmonary:      Effort: Pulmonary effort is normal. No respiratory distress. Breath sounds: Normal breath sounds. No wheezing. Abdominal:      General: Bowel sounds are normal. There is no distension. Palpations: Abdomen is soft. Tenderness: There is no tenderness. Musculoskeletal: Normal range of motion. Lymphadenopathy:      Cervical: No cervical adenopathy. Skin:     General: Skin is warm and dry. Neurological:      Mental Status: He is alert and oriented to person, place, and time. Cranial Nerves: No cranial nerve deficit. Sensory: Sensory deficit (Decreased sensation left side of face) present. Motor: No weakness.       Coordination: Coordination normal.      Comments: Left-sided facial droop  Sparing of forehead muscles on the left  Slight difficulty closing left eye completely  No cerebellar ataxia on finger-nose test  Strength 5 out of 5 bilateral upper and lower extremities           Diagnostic Study Results     Labs -  Recent Results (from the past 12 hour(s))   METABOLIC PANEL, BASIC    Collection Time: 12/16/19  9:30 AM   Result Value Ref Range    Sodium 139 136 - 145 mmol/L    Potassium 3.5 3.5 - 5.5 mmol/L    Chloride 107 100 - 111 mmol/L    CO2 29 21 - 32 mmol/L    Anion gap 3 3.0 - 18 mmol/L    Glucose 95 74 - 99 mg/dL    BUN 14 7.0 - 18 MG/DL    Creatinine 1.02 0.6 - 1.3 MG/DL    BUN/Creatinine ratio 14 12 - 20      GFR est AA >60 >60 ml/min/1.73m2    GFR est non-AA >60 >60 ml/min/1.73m2    Calcium 8.8 8.5 - 10.1 MG/DL   GLUCOSE, POC    Collection Time: 12/16/19  9:31 AM   Result Value Ref Range    Glucose (POC) 93 70 - 110 mg/dL   EKG, 12 LEAD, INITIAL    Collection Time: 12/16/19  9:45 AM   Result Value Ref Range    Ventricular Rate 68 BPM    Atrial Rate 68 BPM    P-R Interval 188 ms    QRS Duration 94 ms    Q-T Interval 370 ms    QTC Calculation (Bezet) 393 ms    Calculated P Axis 58 degrees    Calculated R Axis 61 degrees    Calculated T Axis 47 degrees    Diagnosis       Normal sinus rhythm with sinus arrhythmia  Normal ECG  When compared with ECG of 25-MAY-2017 09:44,  No significant change was found  Confirmed by Timur Colmenares MD, --- ([id number]) on 12/16/2019 10:26:47 AM         Radiologic Studies -   MRI BRAIN WO CONT   Final Result   IMPRESSION:      No acute intracranial findings. Small remote right cerebellar infarct. CT HEAD WO CONT   Final Result   IMPRESSION: No acute findings. Medical Decision Making   I am the first provider for this patient. I reviewed the vital signs, available nursing notes, past medical history, past surgical history, family history and social history.     Vital Signs-Reviewed the patient's vital signs. EKG: Interpreted by the Josh Perez DO. Time Interpreted: 9:45 AM   Rate: 68   Rhythm: Normal Sinus Rhythm    Interpretation: Normal QRS duration, normal QTC, normal axis, no ST elevation       Records Reviewed: Nursing Notes and Old Medical Records (Time of Review: 9:42 AM)    Provider Notes (Medical Decision Making): DDX: Bell's palsy, brain mass, stroke    Patient with headache, facial numbness since Saturday. CT brain, consult tele-neurology, Tylenol for headache    MDM    Medications   acetaminophen (TYLENOL) tablet 1,000 mg (1,000 mg Oral Given 12/16/19 1105)   predniSONE (DELTASONE) tablet 60 mg (60 mg Oral Given 12/16/19 1141)           ED Course: Progress Notes, Reevaluation, and Consults:  Consult:  Discussed care with Dr Giovanni Barone, Specialty: Tele-neurologist, standard discussion; including history of patients chief complaint, available diagnostic results, and treatment course. She will evaluate patient  10:40 AM, 12/16/2019     Dr. Giovanni Barone states get stat MRI brain without contrast, if negative send patient home with neurology consult, acyclovir, prednisone taper and no NSAIDs. If MRI positive for stroke patient will need to be admitted for further work-up. Agrees likely Bell's palsy    Nothing acute on MRI, old findings    I have reassessed the patient. I have discussed the workup, results and plan with the patient and patient is in agreement. Patient is feeling better. Patient will be prescribed acyclovir, prednisone, Tylenol. Patient was discharge in stable condition. Patient was given outpatient follow up. Patient is to return to emergency department if any new or worsening condition. Diagnosis     Clinical Impression:   1.  Left-sided Bell's palsy        Disposition: Discharged    Follow-up Information     Follow up With Specialties Details Why Contact John Salgado MD Neurology Schedule an appointment as soon as possible for a visit in 2 days  [facility]  [address]  EMERGENCY DEPT Emergency Medicine  As needed, If symptoms worsen [address] [facility]  [phone]           Discharge Medication List as of 12/16/2019  3:34 PM      START taking these medications    Details   predniSONE (STERAPRED DS) 10 mg dose pack Start this medication on Tuesday, 12/17/2019, Print, Disp-21 Tab, R-0      acyclovir (ZOVIRAX) 800 mg tablet Take 1 Tab by mouth five (5) times daily for 7 days. , Print, Disp-35 Tab, R-0      acetaminophen (TYLENOL) 325 mg tablet Take 2 Tabs by mouth every six (6) hours as needed (Headache, pain). , Print, Disp-12 Tab, R-0               DO Pawan Chadwick medical dictation software was used for portions of this report. Unintended transcription errors may occur. My signature above authenticates this document and my orders, the final    diagnosis (es), discharge prescription (s), and instructions in the Epic    record.

## 2020-02-04 NOTE — TELEPHONE ENCOUNTER
See [facility].   There are no preventive care reminders to display for this patient.    Patient is up to date, no discussion needed.

## 2020-02-21 ENCOUNTER — TELEPHONE (OUTPATIENT)
Dept: TRANSPLANT | Facility: CLINIC | Age: 60
End: 2020-02-21

## 2020-02-21 DIAGNOSIS — T86.20 COMPLICATION OF HEART TRANSPLANT, UNSPECIFIED COMPLICATION: ICD-10-CM

## 2020-02-21 DIAGNOSIS — Z12.5 PROSTATE CANCER SCREENING: Primary | ICD-10-CM

## 2020-02-21 DIAGNOSIS — Z79.899 ENCOUNTER FOR LONG-TERM (CURRENT) USE OF MEDICATIONS: ICD-10-CM

## 2020-02-21 DIAGNOSIS — Z94.1 STATUS POST HEART TRANSPLANT: ICD-10-CM

## 2020-02-22 DIAGNOSIS — Z94.1 HEART TRANSPLANT, ORTHOTOPIC, STATUS: ICD-10-CM

## 2020-02-22 RX ORDER — MYCOPHENOLATE MOFETIL 250 MG/1
CAPSULE ORAL
Qty: 720 CAPSULE | Refills: 3 | Status: SHIPPED | OUTPATIENT
Start: 2020-02-22 | End: 2020-09-03 | Stop reason: SDUPTHER

## 2020-03-19 ENCOUNTER — PATIENT MESSAGE (OUTPATIENT)
Dept: TRANSPLANT | Facility: CLINIC | Age: 60
End: 2020-03-19

## 2020-03-20 ENCOUNTER — TELEPHONE (OUTPATIENT)
Dept: TRANSPLANT | Facility: CLINIC | Age: 60
End: 2020-03-20

## 2020-03-20 ENCOUNTER — PATIENT MESSAGE (OUTPATIENT)
Dept: TRANSPLANT | Facility: CLINIC | Age: 60
End: 2020-03-20

## 2020-03-20 NOTE — TELEPHONE ENCOUNTER
Left a voice message and sent messages through [employer]. Trying to reach pt regarding appt. For next week. Asked for them to call back directly to my phone number [phone] or send a message through [employer].

## 2020-03-23 NOTE — TELEPHONE ENCOUNTER
Called pt's phone number listed and no answer, left a voice message again this morning, appt's canceled this week and will reschedule after COVIS 19 Unless pt needs to be seen sooner.

## 2020-08-11 ENCOUNTER — TELEPHONE (OUTPATIENT)
Dept: TRANSPLANT | Facility: CLINIC | Age: 60
End: 2020-08-11

## 2020-08-11 NOTE — TELEPHONE ENCOUNTER
Called home/mobile  Phone number, no voice mail, phone did ring until a recording came on stating try your call later.    Message also sent through [employer].

## 2020-09-03 DIAGNOSIS — E83.42 HYPOMAGNESEMIA: ICD-10-CM

## 2020-09-03 DIAGNOSIS — Z94.1 STATUS POST HEART TRANSPLANT: ICD-10-CM

## 2020-09-03 DIAGNOSIS — Z94.1 HEART TRANSPLANT, ORTHOTOPIC, STATUS: ICD-10-CM

## 2020-09-03 RX ORDER — MYCOPHENOLATE MOFETIL 250 MG/1
CAPSULE ORAL
Qty: 90 CAPSULE | Refills: 3 | Status: SHIPPED | OUTPATIENT
Start: 2020-09-03 | End: 2021-03-25 | Stop reason: SDUPTHER

## 2020-09-03 RX ORDER — ERGOCALCIFEROL 1.25 MG/1
50000 CAPSULE ORAL
Qty: 12 CAPSULE | Refills: 0 | Status: SHIPPED | OUTPATIENT
Start: 2020-09-03 | End: 2020-11-28

## 2020-09-03 RX ORDER — FERROUS GLUCONATE 324(38)MG
1 TABLET ORAL DAILY
Qty: 90 TABLET | Refills: 3 | Status: SHIPPED | OUTPATIENT
Start: 2020-09-03 | End: 2021-08-16

## 2020-09-03 RX ORDER — LANOLIN ALCOHOL/MO/W.PET/CERES
2 CREAM (GRAM) TOPICAL 3 TIMES DAILY
Qty: 540 TABLET | Refills: 12 | Status: SHIPPED | OUTPATIENT
Start: 2020-09-03 | End: 2021-10-20

## 2020-09-03 RX ORDER — BLOOD SUGAR DIAGNOSTIC
STRIP MISCELLANEOUS
Qty: 400 STRIP | Refills: 1 | Status: SHIPPED | OUTPATIENT
Start: 2020-09-03

## 2020-09-03 RX ORDER — LISINOPRIL 10 MG/1
10 TABLET ORAL DAILY
Qty: 90 TABLET | Refills: 3 | Status: SHIPPED | OUTPATIENT
Start: 2020-09-03 | End: 2021-08-16

## 2020-09-03 RX ORDER — TACROLIMUS 0.5 MG/1
0.5 CAPSULE ORAL EVERY 12 HOURS
Qty: 180 CAPSULE | Refills: 3 | Status: SHIPPED | OUTPATIENT
Start: 2020-09-03 | End: 2021-07-21 | Stop reason: SDUPTHER

## 2020-09-03 RX ORDER — AMLODIPINE BESYLATE 5 MG/1
5 TABLET ORAL DAILY
Qty: 90 TABLET | Refills: 3 | Status: SHIPPED | OUTPATIENT
Start: 2020-09-03 | End: 2021-08-16

## 2020-09-17 ENCOUNTER — TELEPHONE (OUTPATIENT)
Dept: TRANSPLANT | Facility: CLINIC | Age: 60
End: 2020-09-17

## 2020-09-17 NOTE — TELEPHONE ENCOUNTER
Called back to pharmacy, I explained to Pharmacist that we are not treating his blood sugars and will reach out to the patient to get his PCP to approve another medication.  ---- Message from Delia Solorio sent at 9/17/2020 12:40 PM CDT -----  Contact: Irvin [phone]    ----- Message -----  From: Maribeth Marr MA  Sent: 9/17/2020  11:49 AM CDT  To: [facility] Post-Heart Transplant Clinical      ----- Message -----  From: Claire Rocha MA  Sent: 9/17/2020  11:31 AM CDT  To: [facility] Heart Transplant Medical Assistants    She is calling about  the Lantus Solostar 100 insulin,med is not covered by insurance. Please call [facility].  pt. Last visit  12-3-19.

## 2020-10-14 ENCOUNTER — PATIENT MESSAGE (OUTPATIENT)
Dept: TRANSPLANT | Facility: CLINIC | Age: 60
End: 2020-10-14

## 2020-11-19 ENCOUNTER — TELEPHONE (OUTPATIENT)
Dept: TRANSPLANT | Facility: CLINIC | Age: 60
End: 2020-11-19

## 2020-11-19 NOTE — TELEPHONE ENCOUNTER
Pt called and left a message regarding f[place]u [place] He stated that he has been doing well and seeing a lpcal MD.

## 2021-01-08 ENCOUNTER — PATIENT MESSAGE (OUTPATIENT)
Dept: TRANSPLANT | Facility: CLINIC | Age: 61
End: 2021-01-08

## 2021-04-22 ENCOUNTER — PATIENT MESSAGE (OUTPATIENT)
Dept: TRANSPLANT | Facility: CLINIC | Age: 61
End: 2021-04-22

## 2021-04-22 ENCOUNTER — TELEPHONE (OUTPATIENT)
Dept: TRANSPLANT | Facility: CLINIC | Age: 61
End: 2021-04-22

## 2021-04-22 LAB
25(OH)D3 SERPL-MCNC: 27.4 NG/ML
EXT ALT: 11
EXT AST: 15
EXT BUN: 11
EXT CALCIUM: 8.6
EXT CHOLESTEROL (LIPID PANEL): 102
EXT CREATININE: 0.96 MG/DL
EXT GLUCOSE: 180
EXT HDL: 42
EXT HEMATOCRIT: 42
EXT HEMOGLOBIN: 13
EXT LDL CHOLESTEROL: 39
EXT PLATELETS: 190
EXT POTASSIUM: 4.1
EXT PROTEIN TOTAL: 11
EXT SODIUM: 142 MMOL/L
EXT TACROLIMUS LVL: 7.5
EXT TRIGLYCERIDES: 184
HBA1C MFR BLD: 8.2 %
TSH: 1.33
URIC ACID: 5.2
URIC ACID: 5.2
VITAMIN B12: 389

## 2022-06-15 ENCOUNTER — PATIENT MESSAGE (OUTPATIENT)
Dept: TRANSPLANT | Facility: CLINIC | Age: 62
End: 2022-06-15
Payer: MEDICARE

## 2022-07-15 DIAGNOSIS — Z94.1 STATUS POST HEART TRANSPLANT: ICD-10-CM

## 2022-07-15 RX ORDER — TACROLIMUS 0.5 MG/1
0.5 CAPSULE ORAL EVERY 12 HOURS
Qty: 180 CAPSULE | Refills: 3 | Status: SHIPPED | OUTPATIENT
Start: 2022-07-15 | End: 2023-01-13 | Stop reason: SDUPTHER

## 2022-07-29 NOTE — Clinical Note
Impression: Regular astigmatism, bilateral: H52.223. Presbyopia OU Plan: Rec SRx for best vision. The sheath is removed from the right radial artery.

## 2022-08-15 DIAGNOSIS — Z94.1 STATUS POST HEART TRANSPLANT: ICD-10-CM

## 2022-08-15 RX ORDER — AMLODIPINE BESYLATE 5 MG/1
5 TABLET ORAL DAILY
Qty: 90 TABLET | Refills: 3 | Status: SHIPPED | OUTPATIENT
Start: 2022-08-15 | End: 2023-01-13 | Stop reason: SDUPTHER

## 2022-08-15 RX ORDER — LISINOPRIL 10 MG/1
10 TABLET ORAL DAILY
Qty: 90 TABLET | Refills: 3 | Status: SHIPPED | OUTPATIENT
Start: 2022-08-15 | End: 2023-08-08

## 2022-09-22 NOTE — LETTER
December 3, 2019        Fox Quinn  [address] [place] [address] [address]  [address]  CARDIOLOGY CENTER  [place] [address]  Phone: [phone]  Fax: [phone]             [facility] [address]  [facility] [address]  Phone: [phone]   Patient: Mick Barriga   MR Number: [id number]   YOB: 1960   Date of Visit: 12/3/2019       Dear Dr. Fox Quinn    Thank you for referring Mick Barriga to me for evaluation. Attached you will find relevant portions of my assessment and plan of care.    If you have questions, please do not hesitate to call me. I look forward to following Mick Barriga along with you.    Sincerely,    Ryan Dawn MD    Enclosure    If you would like to receive this communication electronically, please contact [email or web address] or [phone] to request [employer] Link access.    [employer] Link is a tool which provides read-only access to select patient information with whom you have a relationship. Its easy to use and provides real time access to review your patients record including encounter summaries, notes, results, and demographic information.    If you feel you have received this communication in error or would no longer like to receive these types of communications, please e-mail [email or web address]       
Attending and PA/NP shared services statement (NON-critical care):

## 2022-10-04 ENCOUNTER — PATIENT MESSAGE (OUTPATIENT)
Dept: TRANSPLANT | Facility: CLINIC | Age: 62
End: 2022-10-04
Payer: MEDICARE

## 2022-11-14 DIAGNOSIS — Z94.1 HEART TRANSPLANT, ORTHOTOPIC, STATUS: Primary | ICD-10-CM

## 2022-11-14 RX ORDER — SERTRALINE HYDROCHLORIDE 100 MG/1
100 TABLET, FILM COATED ORAL DAILY
Qty: 90 TABLET | Refills: 3 | Status: SHIPPED | OUTPATIENT
Start: 2022-11-14 | End: 2024-01-12

## 2023-01-03 ENCOUNTER — TELEPHONE (OUTPATIENT)
Dept: TRANSPLANT | Facility: CLINIC | Age: 63
End: 2023-01-03
Payer: MEDICARE

## 2023-01-03 DIAGNOSIS — R06.02 SHORTNESS OF BREATH: ICD-10-CM

## 2023-01-03 DIAGNOSIS — Z79.899 ENCOUNTER FOR LONG-TERM (CURRENT) USE OF MEDICATIONS: ICD-10-CM

## 2023-01-03 DIAGNOSIS — T86.20 COMPLICATION OF HEART TRANSPLANT, UNSPECIFIED COMPLICATION: Primary | ICD-10-CM

## 2023-01-03 DIAGNOSIS — Z94.1 STATUS POST HEART TRANSPLANT: ICD-10-CM

## 2023-01-13 ENCOUNTER — HOSPITAL ENCOUNTER (OUTPATIENT)
Dept: CARDIOLOGY | Facility: HOSPITAL | Age: 63
Discharge: HOME | End: 2023-01-13
Attending: INTERNAL MEDICINE
Payer: MEDICARE

## 2023-01-13 ENCOUNTER — OFFICE VISIT (OUTPATIENT)
Dept: TRANSPLANT | Facility: CLINIC | Age: 63
End: 2023-01-13
Payer: MEDICARE

## 2023-01-13 VITALS
BODY MASS INDEX: 31.17 KG/M2 | HEART RATE: 80 BPM | HEART RATE: 91 BPM | DIASTOLIC BLOOD PRESSURE: 67 MMHG | WEIGHT: 205.69 LBS | HEIGHT: 68 IN | DIASTOLIC BLOOD PRESSURE: 67 MMHG | SYSTOLIC BLOOD PRESSURE: 130 MMHG | SYSTOLIC BLOOD PRESSURE: 153 MMHG | HEIGHT: 68 IN | BODY MASS INDEX: 33.49 KG/M2 | WEIGHT: 221 LBS

## 2023-01-13 DIAGNOSIS — E11.22 TYPE 2 DIABETES MELLITUS WITH STAGE 3A CHRONIC KIDNEY DISEASE, WITH LONG-TERM CURRENT USE OF INSULIN: Chronic | ICD-10-CM

## 2023-01-13 DIAGNOSIS — Z94.1 HEART TRANSPLANT, ORTHOTOPIC, STATUS: ICD-10-CM

## 2023-01-13 DIAGNOSIS — Z79.4 TYPE 2 DIABETES MELLITUS WITH STAGE 3A CHRONIC KIDNEY DISEASE, WITH LONG-TERM CURRENT USE OF INSULIN: Chronic | ICD-10-CM

## 2023-01-13 DIAGNOSIS — Z79.899 ENCOUNTER FOR LONG-TERM (CURRENT) USE OF MEDICATIONS: ICD-10-CM

## 2023-01-13 DIAGNOSIS — Z12.5 SCREENING PSA (PROSTATE SPECIFIC ANTIGEN): ICD-10-CM

## 2023-01-13 DIAGNOSIS — T45.1X5A IMMUNODEFICIENCY DUE TO LONG TERM IMMUNOSUPPRESSIVE DRUG THERAPY: ICD-10-CM

## 2023-01-13 DIAGNOSIS — Z79.899 IMMUNODEFICIENCY DUE TO LONG TERM IMMUNOSUPPRESSIVE DRUG THERAPY: ICD-10-CM

## 2023-01-13 DIAGNOSIS — Z94.1 STATUS POST HEART TRANSPLANT: ICD-10-CM

## 2023-01-13 DIAGNOSIS — Z94.1 HEART TRANSPLANT, ORTHOTOPIC, STATUS: Primary | ICD-10-CM

## 2023-01-13 DIAGNOSIS — T86.20 COMPLICATION OF HEART TRANSPLANT, UNSPECIFIED COMPLICATION: ICD-10-CM

## 2023-01-13 DIAGNOSIS — R06.02 SHORTNESS OF BREATH: ICD-10-CM

## 2023-01-13 DIAGNOSIS — D84.821 IMMUNODEFICIENCY DUE TO LONG TERM IMMUNOSUPPRESSIVE DRUG THERAPY: ICD-10-CM

## 2023-01-13 DIAGNOSIS — E78.2 MIXED HYPERLIPIDEMIA: Chronic | ICD-10-CM

## 2023-01-13 DIAGNOSIS — Z12.83 SCREENING EXAM FOR SKIN CANCER: Primary | ICD-10-CM

## 2023-01-13 DIAGNOSIS — I10 ESSENTIAL HYPERTENSION: Chronic | ICD-10-CM

## 2023-01-13 DIAGNOSIS — N18.31 TYPE 2 DIABETES MELLITUS WITH STAGE 3A CHRONIC KIDNEY DISEASE, WITH LONG-TERM CURRENT USE OF INSULIN: Chronic | ICD-10-CM

## 2023-01-13 LAB
ASCENDING AORTA: 2.87 CM
AV INDEX (PROSTH): 0.92
AV MEAN GRADIENT: 2 MMHG
AV PEAK GRADIENT: 4 MMHG
AV REGURGITATION PRESSURE HALF TIME: 340 MS
AV VALVE AREA: 3.45 CM2
AV VELOCITY RATIO: 0.81
BSA FOR ECHO PROCEDURE: 2.19 M2
CV ECHO LV RWT: 0.43 CM
DOP CALC AO PEAK VEL: 1.04 M/S
DOP CALC AO VTI: 18.72 CM
DOP CALC LVOT AREA: 3.8 CM2
DOP CALC LVOT DIAMETER: 2.19 CM
DOP CALC LVOT PEAK VEL: 0.84 M/S
DOP CALC LVOT STROKE VOLUME: 64.64 CM3
DOP CALCLVOT PEAK VEL VTI: 17.17 CM
E WAVE DECELERATION TIME: 181.14 MSEC
E/A RATIO: 1.61
E/E' RATIO: 6.45 M/S
ECHO LV POSTERIOR WALL: 1.01 CM (ref 0.6–1.1)
EJECTION FRACTION: 65 %
FRACTIONAL SHORTENING: 45 % (ref 28–44)
INTERVENTRICULAR SEPTUM: 0.94 CM (ref 0.6–1.1)
LA MINOR: 5.3 CM
LEFT ATRIUM SIZE: 4.2 CM
LEFT INTERNAL DIMENSION IN SYSTOLE: 2.58 CM (ref 2.1–4)
LEFT VENTRICLE DIASTOLIC VOLUME INDEX: 47.42 ML/M2
LEFT VENTRICLE DIASTOLIC VOLUME: 101.01 ML
LEFT VENTRICLE MASS INDEX: 74 G/M2
LEFT VENTRICLE SYSTOLIC VOLUME INDEX: 11.4 ML/M2
LEFT VENTRICLE SYSTOLIC VOLUME: 24.21 ML
LEFT VENTRICULAR INTERNAL DIMENSION IN DIASTOLE: 4.67 CM (ref 3.5–6)
LEFT VENTRICULAR MASS: 157.24 G
LV LATERAL E/E' RATIO: 5.46 M/S
LV SEPTAL E/E' RATIO: 7.89 M/S
MV PEAK A VEL: 0.44 M/S
MV PEAK E VEL: 0.71 M/S
MV STENOSIS PRESSURE HALF TIME: 52.53 MS
MV VALVE AREA P 1/2 METHOD: 4.19 CM2
RA WIDTH: 4.6 CM
RIGHT VENTRICULAR END-DIASTOLIC DIMENSION: 4.04 CM
RV TISSUE DOPPLER FREE WALL SYSTOLIC VELOCITY 1 (APICAL 4 CHAMBER VIEW): 10.25 CM/S
SINUS: 3.4 CM
STJ: 3.12 CM
TDI LATERAL: 0.13 M/S
TDI SEPTAL: 0.09 M/S
TDI: 0.11 M/S
TRICUSPID ANNULAR PLANE SYSTOLIC EXCURSION: 1.8 CM

## 2023-01-13 PROCEDURE — 93306 TTE W/DOPPLER COMPLETE: CPT

## 2023-01-13 PROCEDURE — 99999 PR PBB SHADOW E&M-EST. PATIENT-LVL III: CPT | Mod: PBBFAC,,, | Performed by: INTERNAL MEDICINE

## 2023-01-13 PROCEDURE — 93306 TTE W/DOPPLER COMPLETE: CPT | Mod: 26,,, | Performed by: INTERNAL MEDICINE

## 2023-01-13 PROCEDURE — 99205 OFFICE O/P NEW HI 60 MIN: CPT | Mod: S$PBB,,, | Performed by: INTERNAL MEDICINE

## 2023-01-13 PROCEDURE — 93306 ECHO (CUPID ONLY): ICD-10-PCS | Mod: 26,,, | Performed by: INTERNAL MEDICINE

## 2023-01-13 PROCEDURE — 99213 OFFICE O/P EST LOW 20 MIN: CPT | Mod: PBBFAC,25 | Performed by: INTERNAL MEDICINE

## 2023-01-13 PROCEDURE — 99999 PR PBB SHADOW E&M-EST. PATIENT-LVL III: ICD-10-PCS | Mod: PBBFAC,,, | Performed by: INTERNAL MEDICINE

## 2023-01-13 PROCEDURE — 99205 PR OFFICE/OUTPT VISIT, NEW, LEVL V, 60-74 MIN: ICD-10-PCS | Mod: S$PBB,,, | Performed by: INTERNAL MEDICINE

## 2023-01-13 RX ORDER — LANOLIN ALCOHOL/MO/W.PET/CERES
400 CREAM (GRAM) TOPICAL
COMMUNITY

## 2023-01-13 RX ORDER — MYCOPHENOLATE MOFETIL 250 MG/1
CAPSULE ORAL
Qty: 720 CAPSULE | Refills: 3 | Status: SHIPPED | OUTPATIENT
Start: 2023-01-13 | End: 2024-02-12

## 2023-01-13 RX ORDER — MYCOPHENOLATE MOFETIL 250 MG/1
CAPSULE ORAL
Qty: 720 CAPSULE | Refills: 3 | Status: SHIPPED | OUTPATIENT
Start: 2023-01-13 | End: 2023-01-13 | Stop reason: SDUPTHER

## 2023-01-13 RX ORDER — AMLODIPINE BESYLATE 5 MG/1
5 TABLET ORAL DAILY
Qty: 90 TABLET | Refills: 3 | Status: SHIPPED | OUTPATIENT
Start: 2023-01-13 | End: 2023-08-15

## 2023-01-13 RX ORDER — TACROLIMUS 0.5 MG/1
0.5 CAPSULE ORAL EVERY 12 HOURS
Qty: 180 CAPSULE | Refills: 3 | Status: SHIPPED | OUTPATIENT
Start: 2023-01-13 | End: 2023-01-13 | Stop reason: SDUPTHER

## 2023-01-13 RX ORDER — TACROLIMUS 0.5 MG/1
0.5 CAPSULE ORAL EVERY 12 HOURS
Qty: 180 CAPSULE | Refills: 3 | Status: SHIPPED | OUTPATIENT
Start: 2023-01-13 | End: 2024-02-09 | Stop reason: SDUPTHER

## 2023-01-13 NOTE — LETTER
January 13, 2023        Fox Quinn  [address] [place] [address] [address]  [address]  CARDIOLOGY CENTER  [place] [address]  Phone: [phone]  Fax: [phone]             [facility]  [address]  [facility] [address]  Phone: [phone]   Patient: Mick Barriga   MR Number: [id number]   YOB: 1960   Date of Visit: 1/13/2023       Dear Dr. Fox Quinn    Thank you for referring Mick Barriga to me for evaluation. Attached you will find relevant portions of my assessment and plan of care.    If you have questions, please do not hesitate to call me. I look forward to following Mick Barriga along with you.    Sincerely,    Peewee Romero MD    Enclosure    If you would like to receive this communication electronically, please contact [email or web address] or [phone] to request [employer] Link access.    [employer] Link is a tool which provides read-only access to select patient information with whom you have a relationship. Its easy to use and provides real time access to review your patients record including encounter summaries, notes, results, and demographic information.    If you feel you have received this communication in error or would no longer like to receive these types of communications, please e-mail [email or web address]

## 2023-01-13 NOTE — TELEPHONE ENCOUNTER
Met with pt in clinic this morning, Wife at his side. Discussed medications and test results pending.     Discussed annual test with Dr. Romero and will schedule and Pet stress echo    Will add labs on and schedule tests and repeat labs.

## 2023-01-13 NOTE — PROGRESS NOTES
Subjective:   Mr. Barriga is a 62 y.o. year old White male who received a donation after brain death heart transplant on 2/9/17.      CMV status: Donor Negative/Recip Positive  Moderate risk     biopsy 11/27/18  ISHLT 0, C4D Negative, pAMR 0 (done for increase in allomap)  Rejection risk    Moderate                                                              Immunosuppression: Tacro, Cellcept  Immuno goal levels:  Tacro 5-10  Immuno history (intolerance, finance, allergy, etc.):      Rejections: n/a  Last Biopsy :10/10/17  Off Prednisone  Negative     HLA / DSA 2/13/19 None   C1Q: none     Last LHC: 3.15.19  Dr. Arnett   Estimated blood loss: none  Normal coronary arteries.  S//P OHT           < 1 mm IMT of LAD       4/18/18  . Summary/Post-Operative Diagnosis     Normal coronary arteries.     not done due to wound     Previous MCS: LVAD  High Risk Donor: Yes     Rejection status: Moderate Risk  Positive Cross Match? : Negative with B channel Shift      HPI  Mr. Barriga is a very pleasant 63 y/o male with a Hx of NICM s/p HM II (8/24/16), HLD, HTN, VT s/p ICD now s/p OHTx 2/9/17 whose post op course complicated by bradycardia (started on terbutaline) as well as right groin infected seroma (s/p I&D in OR during admission from 03/28-04/10). Plastics then performed a right femoris muscle flap and he had pain-control issues following that. Has done remarkably well. Of note, this is his 1st visit sine his last visit 4 years ago. [place]. Has no complaints.  Immuno regimen includes; Tacro 0.5/0.5, Cellcept 1000 mg BID, off prednisone. Echo done today showed preserved graft function with an EF=60-65%. Labs are pending.      Review of Systems   Constitutional: Negative. Negative for chills, decreased appetite, diaphoresis, fever, malaise/fatigue, night sweats, weight gain and weight loss.   Eyes: Negative.    Cardiovascular:   "Negative for chest pain, claudication, cyanosis, dyspnea on exertion, irregular heartbeat, leg swelling, near-syncope, orthopnea, palpitations, paroxysmal nocturnal dyspnea and syncope.   Respiratory:  Negative for cough, hemoptysis and shortness of breath.    Endocrine: Negative.    Hematologic/Lymphatic: Negative.    Skin:  Negative for color change, dry skin and nail changes.   Musculoskeletal: Negative.    Gastrointestinal: Negative.    Genitourinary: Negative.    Neurological:  Negative for weakness.     Objective:   Blood pressure (!) 153/67, pulse 91, height 5' 8" (1.727 m), weight 93.3 kg (205 lb 11 oz).body mass index is 31.27 kg/m².    Physical Exam  Vitals reviewed.   Constitutional:       Appearance: He is well-developed.      Comments: BP (!) 153/67 (BP Location: Left arm, Patient Position: Sitting, BP Method: Medium (Automatic))   Pulse 91   Ht 5' 8" (1.727 m)   Wt 93.3 kg (205 lb 11 oz)   BMI 31.27 kg/m²      HENT:      Head: Normocephalic.   Neck:      Vascular: No carotid bruit or JVD.   Cardiovascular:      Rate and Rhythm: Regular rhythm.      Pulses: Normal pulses.      Heart sounds: Normal heart sounds. No murmur heard.  Pulmonary:      Effort: Pulmonary effort is normal.      Breath sounds: Normal breath sounds.   Abdominal:      General: Bowel sounds are normal.      Palpations: Abdomen is soft.   Skin:     General: Skin is warm.   Neurological:      Mental Status: He is alert.       Lab Results   Component Value Date    WBC 8.76 01/13/2023    HGB 15.0 01/13/2023    HCT 48.4 01/13/2023    MCV 62 (L) 01/13/2023     01/13/2023    CO2 25 12/03/2019    CREATININE 0.9 12/03/2019    CALCIUM 9.4 12/03/2019    ALBUMIN 3.8 12/03/2019    AST 16 12/03/2019     (H) 12/03/2019    ALT 14 12/03/2019    ALLOMAP See result image under hyperlink 12/03/2019       Lab Results   Component Value Date    INR 1.0 03/15/2019    INR 1.0 03/22/2018    INR 1.0 11/26/2017       BNP   Date Value Ref Range " Status   12/03/2019 212 (H) 0 - 99 pg/mL Final     Comment:     Values of less than 100 pg/ml are consistent with non-CHF populations.   04/18/2019 <10 0 - 99 pg/mL Final     Comment:     Values of less than 100 pg/ml are consistent with non-CHF populations.   02/13/2019 38 0 - 99 pg/mL Final     Comment:     Values of less than 100 pg/ml are consistent with non-CHF populations.       LD   Date Value Ref Range Status   02/08/2017 523 (H) 110 - 260 U/L Final     Comment:     Results are increased in hemolyzed samples.   02/07/2017 536 (H) 110 - 260 U/L Final     Comment:     Results are increased in hemolyzed samples.   02/06/2017 584 (H) 110 - 260 U/L Final     Comment:     Results are increased in hemolyzed samples.       Tacrolimus Lvl   Date Value Ref Range Status   04/18/2019 7.4 5.0 - 15.0 ng/mL Final     Comment:     Testing performed by Liquid Chromatography-Tandem  Mass Spectrometry (LC-MS/MS).  This test was developed and its performance characteristics  determined by [facility], Department of Pathology  and Laboratory Medicine in a manner consistent with CLIA  requirements. It has not been cleared or approved by the [place]  Food and Drug Administration.  This test is used for clinical   purposes.  It should not be regarded as investigational or for  research.         Assessment:     1. Heart transplant, orthotopic, status    2. Immunodeficiency due to long term immunosuppressive drug therapy    3. Essential hypertension    4. Mixed hyperlipidemia    5. Type 2 diabetes mellitus with stage 3a chronic kidney disease, with long-term current use of insulin        Plan:   Doing really well  Continue current immuno regimen  Cardiac PET stress in lieu of his 5 year coronary angiogram   Needs to see Podiatry for neuromas.   Dermatology for routine skin cancer screening  Urology for prostate cancer screening   Needs a colonoscopy (last colonoscopy was in 2017 and had a polyp)     Return instructions as set  forth by post transplant schedule or as needed:    Clinic: Return for labs and/or biopsy weekly the first month, every two weeks during month 2 and then monthly for the first year at the provider or coordinator's discretion. During the second year, return to clinic every 3 months. Post transplant year 3-5 return every 6 months. There will be a comprehensive post transplant evaluation every year that may include LHC/RHC/biopsy, stress test, echo, CXR, and other health screening exams.    In addition to the clinical assessment, I have ordered Allomap testing for this patient to assist in identification of moderate/severe acute cellular rejection (ACR) in a pt with stable Allograft function instead of endomyocardial biopsy.     Patient is reminded to call with any health changes as these can be early signs of transplant complications. Patient is advised to make sure any new medications or changes of old medications are discussed with a pharmacist or physician knowledgeable with transplant to avoid rejection/drug toxicity related to significant drug interactions.    Patient advised that it is recommended that all transplanted patients, and their close contacts and household members receive Covid vaccination.    UNOS Patient Status  Functional Status: 80% - Normal activity with effort: some symptoms of disease  Physical Capacity: No Limitations  Working for Income: No  If no, reason not working: Demands of Treatment    Peewee Romero MD

## 2023-01-17 ENCOUNTER — TELEPHONE (OUTPATIENT)
Dept: TRANSPLANT | Facility: CLINIC | Age: 63
End: 2023-01-17
Payer: MEDICARE

## 2023-01-17 NOTE — TELEPHONE ENCOUNTER
Pt called and left a voicemail message Monday 1/16/23, returned call, no answer on working number attempted twice to call back, added labs to appt on Friday as he was requesting.

## 2023-01-18 ENCOUNTER — TELEPHONE (OUTPATIENT)
Dept: TRANSPLANT | Facility: CLINIC | Age: 63
End: 2023-01-18
Payer: MEDICARE

## 2023-01-20 ENCOUNTER — HOSPITAL ENCOUNTER (OUTPATIENT)
Dept: RADIOLOGY | Facility: HOSPITAL | Age: 63
Discharge: HOME | End: 2023-01-20
Attending: INTERNAL MEDICINE
Payer: MEDICARE

## 2023-01-20 ENCOUNTER — OFFICE VISIT (OUTPATIENT)
Dept: DERMATOLOGY | Facility: CLINIC | Age: 63
End: 2023-01-20
Payer: MEDICARE

## 2023-01-20 DIAGNOSIS — M79.672 FOOT PAIN, BILATERAL: ICD-10-CM

## 2023-01-20 DIAGNOSIS — L81.4 LENTIGINES: ICD-10-CM

## 2023-01-20 DIAGNOSIS — D18.01 CHERRY ANGIOMA: ICD-10-CM

## 2023-01-20 DIAGNOSIS — R06.02 SHORTNESS OF BREATH: ICD-10-CM

## 2023-01-20 DIAGNOSIS — D22.9 MULTIPLE BENIGN NEVI: ICD-10-CM

## 2023-01-20 DIAGNOSIS — Z94.1 STATUS POST HEART TRANSPLANT: ICD-10-CM

## 2023-01-20 DIAGNOSIS — M79.671 FOOT PAIN, BILATERAL: ICD-10-CM

## 2023-01-20 DIAGNOSIS — Z94.1 HEART TRANSPLANT, ORTHOTOPIC, STATUS: Primary | ICD-10-CM

## 2023-01-20 DIAGNOSIS — L82.1 SK (SEBORRHEIC KERATOSIS): ICD-10-CM

## 2023-01-20 DIAGNOSIS — Z12.83 SCREENING EXAM FOR SKIN CANCER: Primary | ICD-10-CM

## 2023-01-20 DIAGNOSIS — Z79.899 ENCOUNTER FOR LONG-TERM (CURRENT) USE OF MEDICATIONS: ICD-10-CM

## 2023-01-20 DIAGNOSIS — Z12.83 SKIN CANCER SCREENING: ICD-10-CM

## 2023-01-20 DIAGNOSIS — T86.20 COMPLICATION OF HEART TRANSPLANT, UNSPECIFIED COMPLICATION: ICD-10-CM

## 2023-01-20 DIAGNOSIS — D23.9 DERMATOFIBROMA: ICD-10-CM

## 2023-01-20 PROCEDURE — 99999 PR PBB SHADOW E&M-EST. PATIENT-LVL IV: CPT | Mod: PBBFAC,,, | Performed by: DERMATOLOGY

## 2023-01-20 PROCEDURE — 99203 PR OFFICE/OUTPT VISIT, NEW, LEVL III, 30-44 MIN: ICD-10-PCS | Mod: S$PBB,,, | Performed by: DERMATOLOGY

## 2023-01-20 PROCEDURE — 99203 OFFICE O/P NEW LOW 30 MIN: CPT | Mod: S$PBB,,, | Performed by: DERMATOLOGY

## 2023-01-20 PROCEDURE — 71046 XR CHEST PA AND LATERAL: ICD-10-PCS | Mod: 26,,, | Performed by: RADIOLOGY

## 2023-01-20 PROCEDURE — 71046 X-RAY EXAM CHEST 2 VIEWS: CPT | Mod: 26,,, | Performed by: RADIOLOGY

## 2023-01-20 PROCEDURE — 99214 OFFICE O/P EST MOD 30 MIN: CPT | Mod: PBBFAC,25 | Performed by: DERMATOLOGY

## 2023-01-20 PROCEDURE — 71046 X-RAY EXAM CHEST 2 VIEWS: CPT | Mod: TC,FY

## 2023-01-20 PROCEDURE — 99999 PR PBB SHADOW E&M-EST. PATIENT-LVL IV: ICD-10-PCS | Mod: PBBFAC,,, | Performed by: DERMATOLOGY

## 2023-01-20 NOTE — PROGRESS NOTES
Subjective:       Patient ID:  Mick Barriga is a 62 y.o. male who presents for   Chief Complaint   Patient presents with    Foot Problem       HPI  Pt states he is here today for neuroma on the feet x 3 years. Has been getting foot massages for treatment.  Pt states that it is itchy, numbing, and tingling feeling that he gets when walking.     Per chart review, pt noted to have hx of heart transplant in 2017 and hasn't had a skin check since 2018.  No personal history of skin cancer or atypical moles.      Review of Systems   Constitutional:  Negative for fever, chills and fatigue.   Skin:  Negative for daily sunscreen use, activity-related sunscreen use and recent sunburn.   Hematologic/Lymphatic: Does not bruise/bleed easily.      Objective:    Physical Exam   Constitutional: He appears well-developed and well-nourished. No distress.   Neurological: He is alert and oriented to person, place, and time. He is not disoriented.   Psychiatric: He has a normal mood and affect.   Skin:   Areas Examined (abnormalities noted in diagram):   Scalp / Hair Palpated and Inspected  Head / Face Inspection Performed  Neck Inspection Performed  Chest / Axilla Inspection Performed  Abdomen Inspection Performed  Genitals / Buttocks / Groin Inspection Performed  Back Inspection Performed  RUE Inspected  LUE Inspection Performed  RLE Inspected  LLE Inspection Performed  Nails and Digits Inspection Performed                         Diagram Legend     Erythematous scaling macule/papule c/w actinic keratosis       Vascular papule c/w angioma      Pigmented verrucoid papule/plaque c/w seborrheic keratosis      Yellow umbilicated papule c/w sebaceous hyperplasia      Irregularly shaped tan macule c/w lentigo     1-2 mm smooth white papules consistent with Milia      Movable subcutaneous cyst with punctum c/w epidermal inclusion cyst      Subcutaneous movable cyst c/w pilar cyst      Firm pink to brown papule c/w dermatofibroma       Pedunculated fleshy papule(s) c/w skin tag(s)      Evenly pigmented macule c/w junctional nevus     Mildly variegated pigmented, slightly irregular-bordered macule c/w mildly atypical nevus      Flesh colored to evenly pigmented papule c/w intradermal nevus       Pink pearly papule/plaque c/w basal cell carcinoma      Erythematous hyperkeratotic cursted plaque c/w SCC      Surgical scar with no sign of skin cancer recurrence      Open and closed comedones      Inflammatory papules and pustules      Verrucoid papule consistent consistent with wart     Erythematous eczematous patches and plaques     Dystrophic onycholytic nail with subungual debris c/w onychomycosis     Umbilicated papule    Erythematous-base heme-crusted tan verrucoid plaque consistent with inflamed seborrheic keratosis     Erythematous Silvery Scaling Plaque c/w Psoriasis     See annotation      Assessment / Plan:        Screening exam for skin cancer  Status post heart transplant    Multiple benign nevi  Benign-appearing nevi present on exam today. Reassurance provided. Periodically examine moles and return to clinic if any moles change or become symptomatic (bleeding, itching, pain, etc).    Cherry angioma  This is a benign vascular lesion. Reassurance given. No treatment required. Treatment of benign, asymptomatic lesions may be considered cosmetic.    Dermatofibroma  Reassurance given to patient. No treatment is necessary.  This is benign scar tissue from previous minor trauma to the skin such as a bug bite.    Foot pain, bilateral  -     Ambulatory referral/consult to Podiatry; Future; Expected date: 01/27/2023    Lentigines  These are benign sun spots which should be monitored for changes. Patient instructed in importance of daily broad spectrum sunscreen use with spf at least 30. Sun avoidance and topical protection/protective clothing discussed.    SK (seborrheic keratosis)  These are benign inherited growths without a malignant potential.  Reassurance given to patient. No treatment is necessary.   Treatment of benign, asymptomatic lesions may be considered cosmetic.  Warned about risk of hypo- or hyperpigmentation with treatment and risk of recurrence.    Skin cancer screening  Total body skin examination performed today including at least 12 points as noted in physical examination. No lesions suspicious for malignancy noted.  Patient instructed in importance of daily broad spectrum sunscreen use with spf at least 30. Sun avoidance and topical protection/protective clothing discussed.      Follow up in about 1 year (around 1/20/2024) for skin check or sooner for any concerns.

## 2023-01-20 NOTE — PATIENT INSTRUCTIONS

## 2023-01-25 ENCOUNTER — OFFICE VISIT (OUTPATIENT)
Dept: PODIATRY | Facility: CLINIC | Age: 63
End: 2023-01-25
Payer: MEDICARE

## 2023-01-25 VITALS — WEIGHT: 205.69 LBS | BODY MASS INDEX: 31.17 KG/M2 | HEIGHT: 68 IN

## 2023-01-25 DIAGNOSIS — Z94.1 STATUS POST HEART TRANSPLANT: ICD-10-CM

## 2023-01-25 DIAGNOSIS — E11.49 TYPE II DIABETES MELLITUS WITH NEUROLOGICAL MANIFESTATIONS: Primary | ICD-10-CM

## 2023-01-25 DIAGNOSIS — M77.40 METATARSALGIA, UNSPECIFIED LATERALITY: ICD-10-CM

## 2023-01-25 DIAGNOSIS — D36.10 NEUROMA: ICD-10-CM

## 2023-01-25 PROCEDURE — 99999 PR PBB SHADOW E&M-EST. PATIENT-LVL IV: CPT | Mod: PBBFAC,,, | Performed by: PODIATRIST

## 2023-01-25 PROCEDURE — 99203 OFFICE O/P NEW LOW 30 MIN: CPT | Mod: S$PBB,,, | Performed by: PODIATRIST

## 2023-01-25 PROCEDURE — 99203 PR OFFICE/OUTPT VISIT, NEW, LEVL III, 30-44 MIN: ICD-10-PCS | Mod: S$PBB,,, | Performed by: PODIATRIST

## 2023-01-25 PROCEDURE — 99999 PR PBB SHADOW E&M-EST. PATIENT-LVL IV: ICD-10-PCS | Mod: PBBFAC,,, | Performed by: PODIATRIST

## 2023-01-25 PROCEDURE — 99214 OFFICE O/P EST MOD 30 MIN: CPT | Mod: PBBFAC,PO | Performed by: PODIATRIST

## 2023-01-25 RX ORDER — DICLOFENAC SODIUM 10 MG/G
2 GEL TOPICAL DAILY
Qty: 100 G | Refills: 3 | Status: SHIPPED | OUTPATIENT
Start: 2023-01-25

## 2023-01-25 NOTE — PROGRESS NOTES
Subjective:      Patient ID: Mick Barriga is a 62 y.o. male.    Chief Complaint: Foot Pain    Mick is a 62 y.o. male who presents to the podiatry clinic  with complaint of  right foot pain. Onset of the symptoms was several weeks ago. Precipitating event: none known. Current symptoms include: ability to bear weight, but with some pain. Aggravating factors: any weight bearing. Symptoms have stabilized. Patient has had no prior foot problems. Evaluation to date: none. Treatment to date: none. Patients rates pain 4/10 on pain scale.    Review of Systems   Constitutional: Negative for chills.   Cardiovascular:  Negative for chest pain and claudication.   Respiratory:  Negative for cough.    Skin:  Positive for color change, dry skin and nail changes.   Musculoskeletal:  Positive for joint pain.   Gastrointestinal:  Negative for nausea.   Neurological:  Positive for paresthesias. Negative for numbness.   Psychiatric/Behavioral:  The patient is not nervous/anxious.          Objective:      Physical Exam  Constitutional:       Appearance: He is well-developed.      Comments: Oriented to time, place, and person.   Cardiovascular:      Comments: DP and PT pulses are palpable bilaterally. 3 sec capillary refill time and toes and feet are warm to touch proximally .  There is  hair growth on the feet and toes b/l. There is no edema b/l. No spider veins or varicosities present b/l.     Musculoskeletal:      Comments: Equinus noted b/l ankles with < 10 deg DF noted. MMT 5/5 in DF/PF/Inv/Ev resistance with no reproduction of pain in any direction. Passive range of motion of ankle and pedal joints is painless b/l.    Moderate pain on palpation distal IM spaces 2-4 with pain radiating into adjacent toes right foot.      Feet:      Right foot:      Skin integrity: No callus or dry skin.      Left foot:      Skin integrity: No callus or dry skin.   Lymphadenopathy:      Comments: Negative lymphadenopathy bilateral popliteal  fossa and tarsal tunnel.   Skin:     Comments: No open lesions, lacerations or wounds noted.Interdigital spaces clean, dry and intact b/l. No erythema noted to b/l foot.  Nails normal color and trophic qualities.     Neurological:      Mental Status: He is alert.      Comments: Light touch, proprioception, and sharp/dull sensation are all intact bilaterally. Protective threshold with the Bishop-Wienstein monofilament is intact bilaterally.    Psychiatric:         Behavior: Behavior is cooperative.             Assessment:       Encounter Diagnoses   Name Primary?    Status post heart transplant     Metatarsalgia, unspecified laterality     Neuroma     Type II diabetes mellitus with neurological manifestations Yes         Plan:       Mick was seen today for foot pain.    Diagnoses and all orders for this visit:    Type II diabetes mellitus with neurological manifestations  -     DIABETIC SHOES FOR HOME USE  -     Ambulatory referral/consult to Physical/Occupational Therapy; Future    Status post heart transplant  -     Ambulatory referral/consult to Podiatry    Metatarsalgia, unspecified laterality  -     Ambulatory referral/consult to Podiatry    Neuroma    Other orders  -     diclofenac sodium (VOLTAREN) 1 % Gel; Apply 2 g topically once daily.      I counseled the patient on his conditions, their implications and medical management.        Rx Voltaren gel to be applied to affected area up to 3-4 x daily as needed for pain    CAM boot dispensed and applied to affected foot. Advised to use at all times while weightbearing and ambulating even for few minutes. Advised to use sneaker style shoe in opposite foot to maintain balance. Ok to remove  at night but reapply if patient is to get up in the middle of the night. Verbalized understanding. Advised to use for 3 -4 weeks.     Discussed conservative treatment with shoes of adequate dimensions, material, and style to alleviate symptoms and delay or prevent surgical  intervention.    - Shoe inspection. Diabetic Foot Education. Patient reminded of the importance of good nutrition and blood sugar control to help prevent podiatric complications of diabetes. Patient instructed on proper foot hygeine. We discussed wearing proper shoe gear, daily foot inspections, never walking without protective shoe gear, caution putting sharp instruments to feet     - Discussed DM foot care:  Wear comfortable, proper fitting shoes. Wash feet daily. Dry well. After drying, apply moisturizer to feet (no lotion to webspaces). Inspect feet daily for skin breaks, blisters, swelling, or redness. Wear cotton socks (preferably white)  Change socks every day. Do NOT walk barefoot. Do NOT use heating pads or warm/hot water soaks     Rx diabetic shoes with custom molded inserts to be worn at all times while ambulating. Prescription provided with list of local retailers.     F/u one year DM foot exam sooner PRN.

## 2023-02-06 NOTE — PROGRESS NOTES
Subjective:       Mick Barriga is a 62 y.o. male who is a new patient who was self-referred  for evaluation of elevated PSA and frequency.      Patient is here with an elevated PSA. He has no personal history and no family history of prostate cancer. He has no prior genitourinary history of hematuria, hematospermia, prostatitis, UTI, urolithiasis. He is immunosuppressed, s/p heart transplant 2017.    He also reports urinary frequency, nocturia q1-1.5h. Larger volume at night. Strong stream, denies straining. +urgency, no UUI. Admits to drinking a lot of water. +DM2. Not on BPH medications. No further Lasix. No LE edema. No snoring.     Started on Flomax about 3 weeks ago. Has not noted improvement. Noted RE.     PVR (bladder scan) today - 38cc     Previous PSA values are :  8/16 - 2.7  12/16 - 1.3  2/19 - 1.2  6 mths ago - 3 [place]  1/23 - 4.2      The following portions of the patient's history were reviewed and updated as appropriate: allergies, current medications, past family history, past medical history, past social history, past surgical history and problem list.    Review of Systems  Twelve point review of systems completed. Pertinent positive and negatives listed in HPI.      Objective:    Vitals: Wt 93.5 kg (206 lb 2.1 oz)   BMI 31.34 kg/m²     Physical Exam   General: well developed, well nourished in no acute distress  Head: normocephalic, atraumatic  Neck: supple, trachea midline, no obvious enlargement of thyroid  HEENT: EOMI, mucus membranes moist, sclera anicteric, no hearing impairment  Lungs: symmetric expansion, non-labored breathing  Skin: no rashes or lesions  Neuro: alert and oriented x 3, no gross deficits  Psych: normal judgment and insight, normal mood/affect and non-anxious  Genitourinary:   deferred   AKILAH: Symmetric 40g prostate without nodularity or tenderness. Normal landmarks. seminal vesicles non palpable, normal sphincter tone without hemorrhoids or rectal  masses. Normal appearance of anus and perineum.      Lab Review   Urine analysis today in clinic shows Negative     Lab Results   Component Value Date    WBC 8.76 01/13/2023    HGB 15.0 01/13/2023    HCT 48.4 01/13/2023    HCT 21 (L) 02/09/2017    MCV 62 (L) 01/13/2023     01/13/2023     Lab Results   Component Value Date    CREATININE 0.8 01/20/2023    BUN 7 01/20/2023     Lab Results   Component Value Date    PSA 4.2 (H) 01/20/2023     No results found for: PSADIAG    Imaging  NA     Assessment/Plan:      1. Elevated PSA    - Discussed the etiology of elevated PSA above age-corrected normal including BPH, infection/inflammation of the prostate, and prostate cancer. We had a long discussion regarding workup of elevated PSA.    - He understands that a prostate biopsy is indicated for definitive diagnosis of prostate cancer. Risks, benefits, and alternative of TRUS PBx were discussed thoroughly. Risks include, but are not limited to, pain, bleeding, infection, and sepsis. His pre-procedure regimen would require enema the morning of PBx and appropriate PO antibiotics for 3 days starting the day prior to procedure. He will also receive IM injection of antibiotics immediately before the procedure. He understands even after a prostate biopsy, prostate cancer can be missed and close follow up is necessary, with possible further imaging and/or repeat biopsy in the future.   - PSA 4.2   - Will repeat PSA, free and total   - If elevated, recommend mpMRI followed by possible PBx. Discussed.      2. Nocturia    - Reduce PM fluids   - Flomax - no improvement, consider stopping   - Gemtesa trial. Will get approval from transplant medicine prior to taking   - Defer ADH testing to PCP (pt request)     3. OAB (overactive bladder)    - Gemtesa trial       Follow up in 6-8 weeks

## 2023-02-07 ENCOUNTER — OFFICE VISIT (OUTPATIENT)
Dept: UROLOGY | Facility: CLINIC | Age: 63
End: 2023-02-07
Payer: MEDICARE

## 2023-02-07 VITALS — WEIGHT: 206.13 LBS | BODY MASS INDEX: 31.34 KG/M2

## 2023-02-07 DIAGNOSIS — R35.1 NOCTURIA: ICD-10-CM

## 2023-02-07 DIAGNOSIS — R97.20 ELEVATED PSA: Primary | ICD-10-CM

## 2023-02-07 DIAGNOSIS — N32.81 OAB (OVERACTIVE BLADDER): ICD-10-CM

## 2023-02-07 PROCEDURE — 99204 OFFICE O/P NEW MOD 45 MIN: CPT | Mod: S$PBB,,, | Performed by: UROLOGY

## 2023-02-07 PROCEDURE — 99999 PR PBB SHADOW E&M-EST. PATIENT-LVL III: ICD-10-PCS | Mod: PBBFAC,,, | Performed by: UROLOGY

## 2023-02-07 PROCEDURE — 99999 PR PBB SHADOW E&M-EST. PATIENT-LVL III: CPT | Mod: PBBFAC,,, | Performed by: UROLOGY

## 2023-02-07 PROCEDURE — 99204 PR OFFICE/OUTPT VISIT, NEW, LEVL IV, 45-59 MIN: ICD-10-PCS | Mod: S$PBB,,, | Performed by: UROLOGY

## 2023-02-07 PROCEDURE — 99213 OFFICE O/P EST LOW 20 MIN: CPT | Mod: PBBFAC | Performed by: UROLOGY

## 2023-02-07 RX ORDER — TAMSULOSIN HYDROCHLORIDE 0.4 MG/1
0.4 CAPSULE ORAL DAILY
COMMUNITY

## 2023-02-07 RX ORDER — VIBEGRON 75 MG/1
75 TABLET, FILM COATED ORAL DAILY
Qty: 30 TABLET | Refills: 11 | Status: SHIPPED | OUTPATIENT
Start: 2023-02-07 | End: 2023-12-15 | Stop reason: SDUPTHER

## 2023-02-17 ENCOUNTER — OFFICE VISIT (OUTPATIENT)
Dept: PODIATRY | Facility: CLINIC | Age: 63
End: 2023-02-17
Payer: MEDICARE

## 2023-02-17 VITALS
SYSTOLIC BLOOD PRESSURE: 164 MMHG | HEART RATE: 100 BPM | DIASTOLIC BLOOD PRESSURE: 77 MMHG | HEIGHT: 68 IN | WEIGHT: 206.13 LBS | BODY MASS INDEX: 31.24 KG/M2

## 2023-02-17 DIAGNOSIS — G57.61 MORTON NEUROMA, RIGHT: ICD-10-CM

## 2023-02-17 DIAGNOSIS — Z94.1 STATUS POST HEART TRANSPLANT: Primary | ICD-10-CM

## 2023-02-17 DIAGNOSIS — M24.573 EQUINUS CONTRACTURE OF ANKLE: ICD-10-CM

## 2023-02-17 DIAGNOSIS — M77.9 CAPSULITIS: ICD-10-CM

## 2023-02-17 DIAGNOSIS — E11.49 TYPE II DIABETES MELLITUS WITH NEUROLOGICAL MANIFESTATIONS: ICD-10-CM

## 2023-02-17 PROCEDURE — 29540 STRAPPING ANKLE &/FOOT: CPT | Mod: PBBFAC,PO,RT | Performed by: PODIATRIST

## 2023-02-17 PROCEDURE — 99214 PR OFFICE/OUTPT VISIT, EST, LEVL IV, 30-39 MIN: ICD-10-PCS | Mod: 25,S$PBB,, | Performed by: PODIATRIST

## 2023-02-17 PROCEDURE — 99999 PR PBB SHADOW E&M-EST. PATIENT-LVL IV: ICD-10-PCS | Mod: PBBFAC,,, | Performed by: PODIATRIST

## 2023-02-17 PROCEDURE — 29540 PR STRAPPING; ANKLE &/OR FOOT: ICD-10-PCS | Mod: S$PBB,RT,, | Performed by: PODIATRIST

## 2023-02-17 PROCEDURE — 99999 PR PBB SHADOW E&M-EST. PATIENT-LVL IV: CPT | Mod: PBBFAC,,, | Performed by: PODIATRIST

## 2023-02-17 PROCEDURE — 29540 STRAPPING ANKLE &/FOOT: CPT | Mod: S$PBB,RT,, | Performed by: PODIATRIST

## 2023-02-17 PROCEDURE — 99214 OFFICE O/P EST MOD 30 MIN: CPT | Mod: PBBFAC,PO | Performed by: PODIATRIST

## 2023-02-17 PROCEDURE — 99214 OFFICE O/P EST MOD 30 MIN: CPT | Mod: 25,S$PBB,, | Performed by: PODIATRIST

## 2023-02-17 RX ORDER — KETOCONAZOLE 20 MG/G
CREAM TOPICAL DAILY PRN
COMMUNITY
Start: 2023-01-28

## 2023-02-17 RX ORDER — TADALAFIL 20 MG/1
TABLET ORAL
COMMUNITY
Start: 2023-02-08

## 2023-02-17 RX ORDER — TRIAMCINOLONE ACETONIDE 40 MG/ML
40 INJECTION, SUSPENSION INTRA-ARTICULAR; INTRAMUSCULAR
Status: COMPLETED | OUTPATIENT
Start: 2023-02-17 | End: 2023-02-17

## 2023-02-17 RX ORDER — DEXAMETHASONE SODIUM PHOSPHATE 4 MG/ML
4 INJECTION, SOLUTION INTRA-ARTICULAR; INTRALESIONAL; INTRAMUSCULAR; INTRAVENOUS; SOFT TISSUE
Status: COMPLETED | OUTPATIENT
Start: 2023-02-17 | End: 2023-02-17

## 2023-02-17 RX ADMIN — TRIAMCINOLONE ACETONIDE 40 MG: 40 INJECTION, SUSPENSION INTRA-ARTICULAR; INTRAMUSCULAR at 09:02

## 2023-02-17 RX ADMIN — DEXAMETHASONE SODIUM PHOSPHATE 4 MG: 4 INJECTION, SOLUTION INTRA-ARTICULAR; INTRALESIONAL; INTRAMUSCULAR; INTRAVENOUS; SOFT TISSUE at 09:02

## 2023-02-17 NOTE — PROGRESS NOTES
Subjective:      Patient ID: Mick Barriga is a 62 y.o. male.    Chief Complaint: Foot Pain (Bilateral foot)    Sharp aching throbbing pain both forefoot with radiating stinging pain to toes 3,4 right.  Gradual onset, worsening over past several weeks, aggravated by increased weight bearing, shoe gear, pressure.  Prior PT, diabetic shoes, massage minimal help.     Review of Systems   Constitutional: Negative for chills, diaphoresis, fever, malaise/fatigue and night sweats.   Cardiovascular:  Negative for claudication, cyanosis, leg swelling and syncope.   Skin:  Negative for color change, dry skin, nail changes, rash, suspicious lesions and unusual hair distribution.   Musculoskeletal:  Positive for joint pain. Negative for falls, joint swelling, muscle cramps, muscle weakness and stiffness.   Gastrointestinal:  Negative for constipation, diarrhea, nausea and vomiting.   Neurological:  Negative for brief paralysis, disturbances in coordination, focal weakness, numbness, paresthesias, sensory change and tremors.         Objective:      Physical Exam  Constitutional:       General: He is not in acute distress.     Appearance: He is well-developed. He is not diaphoretic.   Cardiovascular:      Pulses:           Popliteal pulses are 2+ on the right side and 2+ on the left side.        Dorsalis pedis pulses are 2+ on the right side and 2+ on the left side.        Posterior tibial pulses are 2+ on the right side and 2+ on the left side.      Comments: Capillary refill 3 seconds all toes/distal feet, all toes/both feet warm to touch.      Negative lymphadenopathy bilateral popliteal fossa and tarsal tunnel.      Negavie lower extremity edema bilateral.    Musculoskeletal:      Right ankle: No swelling, deformity, ecchymosis or lacerations. Normal range of motion. Normal pulse.      Right Achilles Tendon: Normal. No defects. Patricio's test negative.      Comments: Pain to palpation inferior mtpj 1-5 bilateral  without evidence of trauma or infection.    Ankle dorsiflexion decreased at <10 degrees bilateral with moderate increase with knee flexion bilateral.    Otherwise, Normal angle, base, station of gait. All ten toes without clubbing, cyanosis, or signs of ischemia.  No pain to palpation bilateral lower extremities.  Range of motion, stability, muscle strength, and muscle tone normal bilateral feet and legs.    Lymphadenopathy:      Lower Body: No right inguinal adenopathy. No left inguinal adenopathy.      Comments: Negative lymphadenopathy bilateral popliteal fossa and tarsal tunnel.    Negative lymphangitic streaking bilateral feet/ankles/legs.   Skin:     General: Skin is warm and dry.      Capillary Refill: Capillary refill takes 2 to 3 seconds.      Coloration: Skin is not pale.      Findings: No abrasion, bruising, burn, ecchymosis, erythema, laceration, lesion or rash.      Nails: There is no clubbing.      Comments: Skin is normal age and health appropriate color, turgor, texture, and temperature bilateral lower extremities without ulceration, hyperpigmentation, discoloration, masses nodules or cords palpated.  No ecchymosis, erythema, edema, or cardinal signs of infection bilateral lower extremities.       Neurological:      Mental Status: He is alert and oriented to person, place, and time.      Sensory: No sensory deficit.      Motor: No tremor, atrophy or abnormal muscle tone.      Gait: Gait normal.      Deep Tendon Reflexes:      Reflex Scores:       Patellar reflexes are 2+ on the right side and 2+ on the left side.       Achilles reflexes are 2+ on the right side and 2+ on the left side.     Comments: Radiating pain to deep palpation plantar right 3rd intermetatarsal space.    Otherwise, Negative tinel sign to percussion sural, superficial peroneal, deep peroneal, saphenous, and posterior tibial nerves right and left ankles and feet.    Negative allodynia both feet.   Psychiatric:         Behavior:  Behavior is cooperative.           Assessment:       Encounter Diagnoses   Name Primary?    Status post heart transplant Yes    Capsulitis     Bermudez neuroma, right     Type II diabetes mellitus with neurological manifestations     Equinus contracture of ankle          Plan:       Mick was seen today for foot pain.    Diagnoses and all orders for this visit:    Status post heart transplant    Capsulitis    Bermudez neuroma, right    Type II diabetes mellitus with neurological manifestations    Equinus contracture of ankle      I counseled the patient on his conditions, their implications and medical management.        Patient will stretch the tendo achilles complex three times daily as demonstrated in the office.  Literature was dispensed illustrating proper stretching technique.    I applied a plantar rest strapping to the patient's right foot to offload symptomatic area, support the arch, and relieve pain.    Patient will obtain over the counter arch supports and wear them in shoes whenever possible.  Athletic shoes intended for walking or running are usually best.    The patient was advised that NSAID-type medications have two very important potential side effects: gastrointestinal irritation including hemorrhage and renal injuries. He was asked to take the medication with food and to stop if he experiences any GI upset. I asked him to call for vomiting, abdominal pain or black/bloody stools. The patient expresses understanding of these issues and questions were answered.    Discussed conservative treatment with shoes of adequate dimensions, material, and style to alleviate symptoms and delay or prevent surgical intervention.    Counseled the patient on the potential complications of local injection of corticosteroid including, but not limited to:  Discoloration of skin, erosion of soft tissue, and increased likelihood of rupture of a soft tissue structure (ie. Plantar fascia, muscle, tendon, ligament, or  capsule in the area of injection).  Patient indicates understanding of my explanation, that any questions have been answered to patient satisfaction, and patient gives verbal consent for injection of affected area.    After sterile skin prep, steroid injection was performed with patient verbal consent and timeout procedure with patient identifiers, site markings, and procedures in agreement to all present, at right common intermetatarsal plantar nerve to toes 3,4 using 20 mg of Kenalog, 4mg dexamethazone sodium phosphate, and 1mL 1% Lidocaine plain. This was well tolerated.            No follow-ups on file.

## 2023-02-22 ENCOUNTER — TELEPHONE (OUTPATIENT)
Dept: CARDIOLOGY | Facility: HOSPITAL | Age: 63
End: 2023-02-22
Payer: MEDICARE

## 2023-02-24 ENCOUNTER — HOSPITAL ENCOUNTER (OUTPATIENT)
Dept: CARDIOLOGY | Facility: HOSPITAL | Age: 63
Discharge: HOME | End: 2023-02-24
Attending: INTERNAL MEDICINE
Payer: MEDICARE

## 2023-02-24 VITALS
HEIGHT: 68 IN | SYSTOLIC BLOOD PRESSURE: 135 MMHG | WEIGHT: 206 LBS | HEART RATE: 86 BPM | DIASTOLIC BLOOD PRESSURE: 59 MMHG | BODY MASS INDEX: 31.22 KG/M2 | RESPIRATION RATE: 16 BRPM

## 2023-02-24 DIAGNOSIS — Z94.1 STATUS POST HEART TRANSPLANT: ICD-10-CM

## 2023-02-24 DIAGNOSIS — Z79.899 ENCOUNTER FOR LONG-TERM (CURRENT) USE OF MEDICATIONS: ICD-10-CM

## 2023-02-24 DIAGNOSIS — R06.02 SHORTNESS OF BREATH: ICD-10-CM

## 2023-02-24 DIAGNOSIS — T86.20 COMPLICATION OF HEART TRANSPLANT, UNSPECIFIED COMPLICATION: ICD-10-CM

## 2023-02-24 LAB
CFR FLOW - ANTERIOR: 2.55
CFR FLOW - INFERIOR: 2.51
CFR FLOW - LATERAL: 2.35
CFR FLOW - MAX: 3.23
CFR FLOW - MIN: 2.11
CFR FLOW - SEPTAL: 2.79
CFR FLOW - WHOLE HEART: 2.55
CV STRESS BASE HR: 81 BPM
DIASTOLIC BLOOD PRESSURE: 61 MMHG
EJECTION FRACTION- HIGH: 59 %
END DIASTOLIC INDEX-HIGH: 155 ML/M2
END DIASTOLIC INDEX-LOW: 91 ML/M2
END SYSTOLIC INDEX-HIGH: 78 ML/M2
END SYSTOLIC INDEX-LOW: 40 ML/M2
NUC REST DIASTOLIC VOLUME INDEX: 122
NUC REST EJECTION FRACTION: 75
NUC REST SYSTOLIC VOLUME INDEX: 31
NUC STRESS DIASTOLIC VOLUME INDEX: 123
NUC STRESS EJECTION FRACTION: 71 %
NUC STRESS SYSTOLIC VOLUME INDEX: 35
OHS CV CPX 1 MINUTE RECOVERY HEART RATE: 101 BPM
OHS CV CPX 85 PERCENT MAX PREDICTED HEART RATE MALE: 136
OHS CV CPX MAX PREDICTED HEART RATE: 160
OHS CV CPX PATIENT IS FEMALE: 0
OHS CV CPX PATIENT IS MALE: 1
OHS CV CPX PEAK DIASTOLIC BLOOD PRESSURE: 45 MMHG
OHS CV CPX PEAK HEAR RATE: 86 BPM
OHS CV CPX PEAK RATE PRESSURE PRODUCT: NORMAL
OHS CV CPX PEAK SYSTOLIC BLOOD PRESSURE: 127 MMHG
OHS CV CPX PERCENT MAX PREDICTED HEART RATE ACHIEVED: 54
OHS CV CPX RATE PRESSURE PRODUCT PRESENTING: NORMAL
REST FLOW - ANTERIOR: 1 CC/MIN/G
REST FLOW - INFERIOR: 1.01 CC/MIN/G
REST FLOW - LATERAL: 1.04 CC/MIN/G
REST FLOW - MAX: 1.33 CC/MIN/G
REST FLOW - MIN: 0.59 CC/MIN/G
REST FLOW - SEPTAL: 0.81 CC/MIN/G
REST FLOW - WHOLE HEART: 0.97 CC/MIN/G
RETIRED EF AND QEF - SEE NOTES: 47 %
STRESS FLOW - ANTERIOR: 2.55 CC/MIN/G
STRESS FLOW - INFERIOR: 2.51 CC/MIN/G
STRESS FLOW - LATERAL: 2.44 CC/MIN/G
STRESS FLOW - MAX: 3.04 CC/MIN/G
STRESS FLOW - MIN: 1.67 CC/MIN/G
STRESS FLOW - SEPTAL: 2.25 CC/MIN/G
STRESS FLOW - WHOLE HEART: 2.44 CC/MIN/G
SYSTOLIC BLOOD PRESSURE: 149 MMHG

## 2023-02-24 PROCEDURE — 78431 MYOCRD IMG PET RST&STRS CT: CPT | Mod: 26,,, | Performed by: INTERNAL MEDICINE

## 2023-02-24 PROCEDURE — 93018 CV STRESS TEST I&R ONLY: CPT | Mod: ,,, | Performed by: INTERNAL MEDICINE

## 2023-02-24 PROCEDURE — 93016 CARDIAC PET SCAN STRESS (CUPID ONLY): ICD-10-PCS | Mod: ,,, | Performed by: INTERNAL MEDICINE

## 2023-02-24 PROCEDURE — 93016 CV STRESS TEST SUPVJ ONLY: CPT | Mod: ,,, | Performed by: INTERNAL MEDICINE

## 2023-02-24 PROCEDURE — 93018 CARDIAC PET SCAN STRESS (CUPID ONLY): ICD-10-PCS | Mod: ,,, | Performed by: INTERNAL MEDICINE

## 2023-02-24 PROCEDURE — 78434 AQMBF PET REST & RX STRESS: CPT | Mod: 26,,, | Performed by: INTERNAL MEDICINE

## 2023-02-24 PROCEDURE — 78431 CARDIAC PET SCAN STRESS (CUPID ONLY): ICD-10-PCS | Mod: 26,,, | Performed by: INTERNAL MEDICINE

## 2023-02-24 PROCEDURE — 78431 MYOCRD IMG PET RST&STRS CT: CPT

## 2023-02-24 PROCEDURE — 63600175 PHARM REV CODE 636 W HCPCS: Performed by: INTERNAL MEDICINE

## 2023-02-24 PROCEDURE — 78434 AQMBF PET REST & RX STRESS: CPT

## 2023-02-24 PROCEDURE — 78434 CARDIAC PET SCAN STRESS (CUPID ONLY): ICD-10-PCS | Mod: 26,,, | Performed by: INTERNAL MEDICINE

## 2023-02-24 RX ORDER — REGADENOSON 0.08 MG/ML
0.4 INJECTION, SOLUTION INTRAVENOUS ONCE
Status: COMPLETED | OUTPATIENT
Start: 2023-02-24 | End: 2023-02-24

## 2023-02-24 RX ORDER — CAFFEINE CITRATE 20 MG/ML
60 SOLUTION INTRAVENOUS ONCE
Status: COMPLETED | OUTPATIENT
Start: 2023-02-24 | End: 2023-02-24

## 2023-02-24 RX ADMIN — CAFFEINE CITRATE 60 MG: 20 INJECTION INTRAVENOUS at 01:02

## 2023-02-24 RX ADMIN — REGADENOSON 0.4 MG: 0.08 INJECTION, SOLUTION INTRAVENOUS at 01:02

## 2023-02-28 ENCOUNTER — CLINICAL SUPPORT (OUTPATIENT)
Dept: REHABILITATION | Facility: HOSPITAL | Age: 63
End: 2023-02-28
Attending: PODIATRIST
Payer: MEDICARE

## 2023-02-28 DIAGNOSIS — M77.9 CAPSULITIS: ICD-10-CM

## 2023-02-28 DIAGNOSIS — G57.61 MORTON NEUROMA, RIGHT: ICD-10-CM

## 2023-02-28 DIAGNOSIS — M24.573 EQUINUS CONTRACTURE OF ANKLE: ICD-10-CM

## 2023-02-28 DIAGNOSIS — R26.89 IMPAIRED GAIT AND MOBILITY: ICD-10-CM

## 2023-02-28 PROCEDURE — 97161 PT EVAL LOW COMPLEX 20 MIN: CPT

## 2023-02-28 PROCEDURE — 97110 THERAPEUTIC EXERCISES: CPT

## 2023-02-28 NOTE — PLAN OF CARE
"[facility]   Physical Therapy Initial Evaluation       Name: Mick Barriga  Clinic Number: [id number]    Therapy Diagnosis:   Encounter Diagnoses   Name Primary?    Capsulitis     Bermudez neuroma, right     Equinus contracture of ankle     Impaired gait and mobility         Physician: Chalino Mina DPM    Physician Orders: PT Eval and Treat   Medical Diagnosis from Referral:   M77.9 (ICD-10-CM) - Capsulitis   G57.61 (ICD-10-CM) - Bermudez neuroma, right   M24.573 (ICD-10-CM) - Equinus contracture of ankle     Evaluation Date: 2/28/2023  Authorization Period Expiration: 12/31/2023  Plan of Care Expiration: 5/12/2023  Progress Note Due: 10th visit  Visit # / Visits authorized: 1/1   FOTO: 1/3    Precautions: Standard     Time In: 3:07 pm  Time Out: 3:50 pm  Total Appointment Time (timed & untimed codes): 43 minutes      SUBJECTIVE     Date of onset: 3 years    History of current condition - Mick reports: 3 years of chronic pain located at the 3rd and 4th metatarsal; constant pain when walking. Past medical history significant for heart transplant 5 years ago. Denies back pain, hip pain or knee pain.     Falls: denies any recent falls    Imaging, please see imaging    Prior Therapy: yes  Social History:  lives with their spouse  Occupation: retired  Prior Level of Function: independent with all personal and household ADL's  Current Level of Function: pain and difficulty walking and balance; remains independent with all household and personal ADL's    Pain:  Current 7/10, worst 9/10, best 5/10   Location: bilateral feet    Description: Numb, "feels like walking on a rock"  Aggravating Factors: Walking  Easing Factors: rest and elevation    Patients goals: "get better"     Medical History:   Past Medical History:   Diagnosis Date    CHF (congestive heart failure)     Coronary artery disease     Encounter for blood transfusion     GERD (gastroesophageal reflux disease)     Hyperlipidemia "     Hypertension     LVAD (left ventricular assist device) present 2/13/2017    Pneumonia due to infectious organism 11/2017 on top of flu 12/1/2017    Type 2 diabetes mellitus with hyperglycemia        Surgical History:   Mick Barriga  has a past surgical history that includes Cardiac pacemaker placement; Cholecystectomy; Left ventricular assist device; Colonoscopy (N/A, 1/11/2017); Colonoscopy (N/A, 1/12/2017); Heart transplant (02/2017); Biopsy with ultrasound guidance (N/A, 11/27/2018); and Left heart catheterization (Left, 3/15/2019).    Medications:   Mick has a current medication list which includes the following prescription(s): accu-chek fastclix lancet drum, accu-chek jessica, amlodipine, aspirin, bd ultra-fine short pen needle, accu-chek smartview test strip, blood sugar diagnostic, cyclobenzaprine, diclofenac sodium, ergocalciferol, ferrous gluconate, ibuprofen, ketoconazole, lantus solostar u-100 insulin, lisinopril, magnesium oxide, mycophenolate, nexium, novolog flexpen u-100 insulin, sertraline, tacrolimus, tadalafil, tamsulosin, and gemtesa.    Allergies:   Review of patient's allergies indicates:   Allergen Reactions    Insulin detemir Itching     Other reaction(s): Unknown    Niacin      Other reaction(s): Unknown  Other reaction(s): Other (See Comments)    Niacin preparations     Pork/porcine containing products      Other reaction(s): Other (See Comments)    Ranexa [ranolazine] Nausea Only          OBJECTIVE     Observation: Pt ambulates with decreased step length and slight shuffle gait     Postural examination: rounded shoulders and forward head    Palpation: non tender to palpation throughout gross structures of foot and ankle    Lower Extremity Strength:manual muscle test grades below  Right LE   Left LE     Hip Ext 3/5 Hip Ext 3/5    Hip Flexion: 3+/5 Hip Flexion: 3+/5   Hip ER:  3+/5 Hip ER: 3+/5   Hip IR: 3-/5 Hip IR: 3-/5   Hip Abduction: 3+/5  Hip Abduction 3+/5   Knee Extension:  4-/5 Knee Extension: 4-/5   Knee Flexion: 4-/5 Knee Flexion: 4-/5   Ankle Dorsiflexion: 4+/5 Ankle Dorsiflexion: 4+/5   Ankle Plantarflexion: 4-/5 Ankle Plantarflexion: 4-/5   Ankle inversion 4/5 Ankle inversion 4/5   Ankle eversion 4-/5 Ankle eversion 4-/5         Range of Motion:  R ankle Active L ankle Active    DF Lacking 5 degrees DF +5 from neutral   PF 35 PF 40   INV 30 INV 30   EV 5 EV 10     Joint Mobility: good talar and subtalar joint mobility bilaterally; decreased DF with spring end feel and right ankle    Functional Mobility Assessment:  Double leg Squat: 5x squats unremarkable    Balance Assessment:       Evaluation   Single Limb Stance R LE 5 seconds p!  (<10 sec = HIGH FALL RISK)   Single Limb Stance L LE 5 seconds p!  (<10 sec = HIGH FALL RISK)        Limitation/Restriction for FOTO NA Survey    Therapist reviewed FOTO scores for Mick Barriga on 2/28/2023.   FOTO documents entered into EPIC - see Media section.    Limitation Score: patient refused survey         TREATMENT     Total Treatment time (time-based codes) separate from Evaluation: 10 minutes      Mick received the treatments listed below:    neuromuscular re-education activities to improve: Balance, Kinesthetic, Proprioception, and motor control for 10 minutes. The following activities were included:  Fernley : 18 marbles  Towel crunches: 2 minutes  Toe yoga: 3 reps    PATIENT EDUCATION AND HOME EXERCISES     Education provided:   - HEP    Written Home Exercises Provided: yes. Exercises were reviewed and Mick was able to demonstrate them prior to the end of the session.  Mick demonstrated good  understanding of the education provided. See EMR under Patient Instructions for exercises provided during therapy sessions.    ASSESSMENT     Mick is a 62 y.o. male referred to outpatient Physical Therapy with a medical diagnosis of Capsulitis, Equinus contracture of ankle, and Bermudez neuroma, right. Patient presents with  decreased lower extremity strength, poor balance, and impaired gait. Patient would benefit from foot intrinsic muscle strengthening. motor control, and balance activities. Patient responded well to neur re-education activities with decreased pain report post intervention.     Patient prognosis is Fair.   Patient will benefit from skilled outpatient Physical Therapy to address the deficits stated above and in the chart below, provide patient /family education, and to maximize patientt's level of independence.     Plan of care discussed with patient: Yes  Patient's spiritual, cultural and educational needs considered and patient is agreeable to the plan of care and goals as stated below:     Anticipated Barriers for therapy: chronicity     Medical Necessity is demonstrated by the following  History  Co-morbidities and personal factors that may impact the plan of care Co-morbidities:   diabetes, HTN, and heart transplant    Personal Factors:   no deficits     moderate   Examination  Body Structures and Functions, activity limitations and participation restrictions that may impact the plan of care Body Regions:   lower extremities  feet    Body Systems:    gross symmetry  ROM  strength  balance  gait  motor control    Participation Restrictions:   none    Activity limitations:   Learning and applying knowledge  no deficits    General Tasks and Commands  no deficits    Communication  no deficits    Mobility  walking    Self care  no deficits    Domestic Life  doing house work (cleaning house, washing dishes, laundry)    Interactions/Relationships  no deficits    Life Areas  no deficits    Community and Social Life  no deficits         low   Clinical Presentation stable and uncomplicated low   Decision Making/ Complexity Score: low     Goals:  Short Term Goals (4 weeks):  1.Patient will be independent with home exercise program to supplement physical therapy treatment in improving functional status.  2.Pt will improve  impaired lower extremity manual muscle  tests to >/= 4-/5 to improve dynamic right knee support for closed chain tasks.  3.Patient will improve R dorsiflexion active range of motion to equal opposite ankle to improve gait mechanics.  4.Pt will improve perform R and L SLS for >10 seconds to reduce fall risk and to improve strength of ankle intrinsics    Long Term Goals (10 weeks):  1. Pt will perform R and L SLS for >15 seconds to reduce fall risk and to improve strength of ankle intrinsics  2. Pt will perform tandem stance >15 seconds, while tossing weighted ball, to demonstrate improved dynamic balance and proprioception.  3. Pt will improve impaired lower extremity manual muscle tests to >/= 4/5 to improve dynamic right knee support for closed chain tasks.      PLAN   Plan of care Certification: 2/28/2023 to 5/12/2023.    Outpatient Physical Therapy 2 times weekly for 10 weeks to include the following interventions: Aquatic Therapy, Cervical/Lumbar Traction, Electrical Stimulation TENS, Gait Training, Manual Therapy, Moist Heat/ Ice, Neuromuscular Re-ed, Orthotic Management and Training, Patient Education, Self Care, Therapeutic Activities, Therapeutic Exercise, and functional dry needling .     Dominique Salgado, PT      I CERTIFY THE NEED FOR THESE SERVICES FURNISHED UNDER THIS PLAN OF TREATMENT AND WHILE UNDER MY CARE   Physician's comments:     Physician's Signature: ___________________________________________________

## 2023-03-01 ENCOUNTER — TELEPHONE (OUTPATIENT)
Dept: PODIATRY | Facility: CLINIC | Age: 63
End: 2023-03-01
Payer: MEDICARE

## 2023-03-13 ENCOUNTER — TELEPHONE (OUTPATIENT)
Dept: UROLOGY | Facility: CLINIC | Age: 63
End: 2023-03-13
Payer: MEDICARE

## 2023-03-13 NOTE — TELEPHONE ENCOUNTER
Pt called and r/s his appt to 5/1/23 @ 3:20pm        ----- Message from Romel Carrero sent at 3/13/2023 12:45 PM CDT -----  Regarding: Cancel  Name of Who is Calling:  Patient          What is the request in detail:  Patient would like to cancel appointment            Can the clinic reply by [facility]: Yes            What Number to Call Back if not in [facility]:[phone]

## 2023-03-17 ENCOUNTER — HOSPITAL ENCOUNTER (OUTPATIENT)
Dept: RADIOLOGY | Facility: HOSPITAL | Age: 63
Discharge: HOME | End: 2023-03-17
Attending: PODIATRIST
Payer: MEDICARE

## 2023-03-17 ENCOUNTER — OFFICE VISIT (OUTPATIENT)
Dept: PODIATRY | Facility: CLINIC | Age: 63
End: 2023-03-17
Payer: MEDICARE

## 2023-03-17 VITALS
DIASTOLIC BLOOD PRESSURE: 71 MMHG | BODY MASS INDEX: 31.22 KG/M2 | SYSTOLIC BLOOD PRESSURE: 147 MMHG | HEIGHT: 68 IN | WEIGHT: 206 LBS | HEART RATE: 87 BPM

## 2023-03-17 DIAGNOSIS — G57.61 MORTON NEUROMA, RIGHT: ICD-10-CM

## 2023-03-17 DIAGNOSIS — M79.671 FOOT PAIN, BILATERAL: ICD-10-CM

## 2023-03-17 DIAGNOSIS — G89.29 OTHER CHRONIC PAIN: ICD-10-CM

## 2023-03-17 DIAGNOSIS — M77.9 CAPSULITIS: ICD-10-CM

## 2023-03-17 DIAGNOSIS — G60.9 IDIOPATHIC PERIPHERAL NEUROPATHY: ICD-10-CM

## 2023-03-17 DIAGNOSIS — Z94.1 STATUS POST HEART TRANSPLANT: Primary | ICD-10-CM

## 2023-03-17 DIAGNOSIS — M79.672 FOOT PAIN, BILATERAL: ICD-10-CM

## 2023-03-17 DIAGNOSIS — E11.49 TYPE II DIABETES MELLITUS WITH NEUROLOGICAL MANIFESTATIONS: ICD-10-CM

## 2023-03-17 PROCEDURE — 99213 PR OFFICE/OUTPT VISIT, EST, LEVL III, 20-29 MIN: ICD-10-PCS | Mod: S$PBB,,, | Performed by: PODIATRIST

## 2023-03-17 PROCEDURE — 73630 XR FOOT COMPLETE 3 VIEW BILATERAL: ICD-10-PCS | Mod: 26,50,, | Performed by: RADIOLOGY

## 2023-03-17 PROCEDURE — 99215 OFFICE O/P EST HI 40 MIN: CPT | Mod: PBBFAC,PO | Performed by: PODIATRIST

## 2023-03-17 PROCEDURE — 73630 X-RAY EXAM OF FOOT: CPT | Mod: 26,50,, | Performed by: RADIOLOGY

## 2023-03-17 PROCEDURE — 99999 PR PBB SHADOW E&M-EST. PATIENT-LVL V: CPT | Mod: PBBFAC,,, | Performed by: PODIATRIST

## 2023-03-17 PROCEDURE — 73630 X-RAY EXAM OF FOOT: CPT | Mod: TC,50,FY,PO

## 2023-03-17 PROCEDURE — 99213 OFFICE O/P EST LOW 20 MIN: CPT | Mod: S$PBB,,, | Performed by: PODIATRIST

## 2023-03-17 PROCEDURE — 99999 PR PBB SHADOW E&M-EST. PATIENT-LVL V: ICD-10-PCS | Mod: PBBFAC,,, | Performed by: PODIATRIST

## 2023-03-17 NOTE — PROGRESS NOTES
Subjective:      Patient ID: Mick Barriga is a 62 y.o. male.    Chief Complaint: Follow-up (Bilateral foot pain)    Sharp aching throbbing pain both forefoot with radiating stinging pain to toes 3,4 right.  Gradual onset, not improved over past several weeks, aggravated by increased weight bearing, shoe gear, pressure.  Prior PT, diabetic shoes, massage minimal help. Stretches, athletic shoes, injection last visit no help.  Raised GLU >300 3 days.    Wants neuroma surgery right...    Review of Systems   Constitutional: Negative for chills, diaphoresis, fever, malaise/fatigue and night sweats.   Cardiovascular:  Negative for claudication, cyanosis, leg swelling and syncope.   Skin:  Negative for color change, dry skin, nail changes, rash, suspicious lesions and unusual hair distribution.   Musculoskeletal:  Positive for joint pain. Negative for falls, joint swelling, muscle cramps, muscle weakness and stiffness.   Gastrointestinal:  Negative for constipation, diarrhea, nausea and vomiting.   Neurological:  Negative for brief paralysis, disturbances in coordination, focal weakness, numbness, paresthesias, sensory change and tremors.         Objective:      Physical Exam  Constitutional:       General: He is not in acute distress.     Appearance: He is well-developed. He is not diaphoretic.   Cardiovascular:      Pulses:           Popliteal pulses are 2+ on the right side and 2+ on the left side.        Dorsalis pedis pulses are 2+ on the right side and 2+ on the left side.        Posterior tibial pulses are 2+ on the right side and 2+ on the left side.      Comments: Capillary refill 3 seconds all toes/distal feet, all toes/both feet warm to touch.      Negative lymphadenopathy bilateral popliteal fossa and tarsal tunnel.      Negavie lower extremity edema bilateral.    Musculoskeletal:      Right ankle: No swelling, deformity, ecchymosis or lacerations. Normal range of motion. Normal pulse.      Right  Achilles Tendon: Normal. No defects. Patricio's test negative.      Comments: Pain to palpation inferior mtpj 1-5 bilateral without evidence of trauma or infection.pain is over entire plantar foot and forefoot - aggravated with met head pressure, but not specific or localized to the met heads.    Ankle dorsiflexion decreased at <10 degrees bilateral with moderate increase with knee flexion bilateral.    Otherwise, Normal angle, base, station of gait. All ten toes without clubbing, cyanosis, or signs of ischemia.  No pain to palpation bilateral lower extremities.  Range of motion, stability, muscle strength, and muscle tone normal bilateral feet and legs.    Lymphadenopathy:      Lower Body: No right inguinal adenopathy. No left inguinal adenopathy.      Comments: Negative lymphadenopathy bilateral popliteal fossa and tarsal tunnel.    Negative lymphangitic streaking bilateral feet/ankles/legs.   Skin:     General: Skin is warm and dry.      Capillary Refill: Capillary refill takes 2 to 3 seconds.      Coloration: Skin is not pale.      Findings: No abrasion, bruising, burn, ecchymosis, erythema, laceration, lesion or rash.      Nails: There is no clubbing.      Comments: Skin is normal age and health appropriate color, turgor, texture, and temperature bilateral lower extremities without ulceration, hyperpigmentation, discoloration, masses nodules or cords palpated.  No ecchymosis, erythema, edema, or cardinal signs of infection bilateral lower extremities.       Neurological:      Mental Status: He is alert and oriented to person, place, and time.      Motor: No tremor, atrophy or abnormal muscle tone.      Gait: Gait normal.      Comments: Radiating pain to deep palpation plantar right 3rd intermetatarsal space.    Paresthesias, and burning bilateral feet with no clearly identified trigger or source.      Otherwise, Negative tinel sign to percussion sural, superficial peroneal, deep peroneal, saphenous, and  posterior tibial nerves right and left ankles and feet.    Negative allodynia both feet.   Psychiatric:         Behavior: Behavior is cooperative.           Assessment:       Encounter Diagnoses   Name Primary?    Status post heart transplant Yes    Capsulitis     Bermudez neuroma, right     Type II diabetes mellitus with neurological manifestations     Other chronic pain     Foot pain, bilateral          Plan:       Mick was seen today for follow-up.    Diagnoses and all orders for this visit:    Status post heart transplant  -     ORTHOTIC DEVICE (DME)  -     Ambulatory referral/consult to Pain Clinic; Future    Capsulitis  -     ORTHOTIC DEVICE (DME)  -     Ambulatory referral/consult to Physical/Occupational Therapy; Future  -     X-Ray Foot Complete Bilateral; Future    Bermudez neuroma, right  -     ORTHOTIC DEVICE (DME)  -     Ambulatory referral/consult to Physical/Occupational Therapy; Future    Type II diabetes mellitus with neurological manifestations  -     ORTHOTIC DEVICE (DME)  -     Ambulatory referral/consult to Physical/Occupational Therapy; Future  -     Ambulatory referral/consult to Pain Clinic; Future    Other chronic pain    Foot pain, bilateral  -     X-Ray Foot Complete Bilateral; Future    I counseled the patient on his conditions, their implications and medical management.    Presently, I do not feel neuroma surgery will offer enough relief to be worthwhile.    Continue stretches, get otc inserts, continue athletic shoes.    Dispense night splint    RX PT, custom orthotics    Consult pain management                No follow-ups on file.

## 2023-03-20 ENCOUNTER — DOCUMENTATION ONLY (OUTPATIENT)
Dept: REHABILITATION | Facility: HOSPITAL | Age: 63
End: 2023-03-20
Payer: MEDICARE

## 2023-03-20 NOTE — PROGRESS NOTES
Physical Therapy: No show/Cancellation of Visit  Date: 03/20/2023    Patient was a no show to today's PT appointment. Patient's future appointments cancelled due to [facility] Cancell[facility]No show [facility].    Initial Evaluation: 2/28/2023  Plan of Care Explanation: 5/12/2023  Cancel: 0  No show: 3    Therapist: Dominique Salgado, PT

## 2023-03-29 NOTE — PLAN OF CARE
Problem: Patient Care Overview  Goal: Plan of Care Review  Outcome: Ongoing (interventions implemented as appropriate)     Pt AAOx4  Pt with c/o pain 10/10 to R groin post ligation of R groin lymphocele with skin flap repair, WV placement & DARIN drain insertion.     VSS.  Pt on tele running sinus rhythm with hr  80's-90's  's-140's/60's-70's  Satting 97%+ on room air.  Afebrile.     Accuchecks being monitored AC/HS. HS sugar 57. Per MD okmeagan to give 4 OJs.     Pt has voided > 1,200mL clear yellow urine thus far. No BM.     Midsternal incision healed.     R Cheek DTI healing     Bilateral heels with eschar.     R groin with WV continuous @ 75. Scant serosanguinous drainage.     RLQ with foam dressing with dry seruos drainage noted.     Upper abdomen with foam dressing CDI.     DARIN to R Upper leg with 30 mL sanguinous drainage thus far.    Incision to R upper leg with dermabond and clear dressing CDI.     Pt remained free from falls or injury thus far.   Bed is in low/ locked position, side rails are up x 2, call light is in reach.   Will continue to monitor.            MEDICATIONS  (STANDING):  acetaminophen     Tablet .. 975 milliGRAM(s) Oral every 8 hours  budesonide 160 MICROgram(s)/formoterol 4.5 MICROgram(s) Inhaler 2 Puff(s) Inhalation two times a day  dextrose 5%. 1000 milliLiter(s) (50 mL/Hr) IV Continuous <Continuous>  dextrose 5%. 1000 milliLiter(s) (100 mL/Hr) IV Continuous <Continuous>  dextrose 50% Injectable 25 Gram(s) IV Push once  dextrose 50% Injectable 12.5 Gram(s) IV Push once  dextrose Oral Gel 15 Gram(s) Oral once  enoxaparin Injectable 40 milliGRAM(s) SubCutaneous every 24 hours  glucagon  Injectable 1 milliGRAM(s) IntraMuscular once  influenza   Vaccine 0.5 milliLiter(s) IntraMuscular once  insulin glargine Injectable (LANTUS) 10 Unit(s) SubCutaneous at bedtime  insulin lispro (ADMELOG) corrective regimen sliding scale   SubCutaneous three times a day before meals  insulin lispro (ADMELOG) corrective regimen sliding scale   SubCutaneous at bedtime  insulin lispro Injectable (ADMELOG) 10 Unit(s) SubCutaneous three times a day before meals  lidocaine   4% Patch 1 Patch Transdermal daily  lisinopril 20 milliGRAM(s) Oral daily  polyethylene glycol 3350 17 Gram(s) Oral two times a day  senna 2 Tablet(s) Oral at bedtime  simvastatin 20 milliGRAM(s) Oral at bedtime  torsemide 10 milliGRAM(s) Oral daily  triamcinolone 0.1% Ointment 1 Application(s) Topical every 12 hours  trimethoprim  160 mG/sulfamethoxazole 800 mG 1 Tablet(s) Oral every 12 hours    MEDICATIONS  (PRN):  albuterol    90 MICROgram(s) HFA Inhaler 2 Puff(s) Inhalation every 6 hours PRN Shortness of Breath and/or Wheezing  ondansetron    Tablet 4 milliGRAM(s) Oral every 6 hours PRN Nausea and/or Vomiting  oxyCODONE    IR 5 milliGRAM(s) Oral every 4 hours PRN Moderate Pain (4 - 6)  oxyCODONE    IR 10 milliGRAM(s) Oral every 4 hours PRN Severe Pain (7 - 10)  saline laxative (FLEET) Rectal Enema 1 Enema Rectal daily PRN Constipation

## 2023-05-18 NOTE — PROGRESS NOTES
Admit Note     Met with patient and spouse to assess needs. Patient is a 56 y.o.  male, admitted  for LVAD .      Patient admitted from home on 1/5/2017 .  At this time, patient presents as alert and oriented x 4, pleasant and good eye contact.  At this time, patients caregiver presents as alert and oriented x 4 and good eye contact.    Household/Family Systems     Patient resides with patient's spouse, at     [address] [address].      Support system includes spouse and three adult children.  Patient does not have dependents that are need of being cared for.     Patients primary caregiver is Epdarionam, patients spouse.    Home phone: [phone]  Pt's cell:  [phone]  Additional emergency contact:  Manny (son) [phone]    During admission, patient's caregiver plans to stay in patient's room.  Confirmed patient and patients caregivers do have access to reliable transportation.    Cognitive Status/Learning     Patient reports reading ability as 8th grade and states patient does have difficulty with reading and writing in English.  The pt does not have issues with hearing, eye sight, learning or memory.    Patient reports patient learns best by one on one.   Needed: not for pt or spouse.   Highest education level: Grade School (0-8)    Vocation/Disability   .  Working for Income: No  If no, reason not working: Disability  Patient is disabled due to heart failure.     Adherence     Patient reports a high level of adherence to patients health care regimen.  Adherence counseling and education provided. Patient verbalizes understanding.    Substance Use    Patient reports the following substance usage.    Tobacco: none.  Pt quit smoking in 2006.  Alcohol: none, patient denies any use.  Illicit Drugs/Non-prescribed Medications: none, patient denies any use.  Patient states clear understanding of the potential impact of substance use.  Substance abstinence/cessation  counseling, education and resources provided and reviewed.     Services Utilizing/ADLS    Infusion Service: Prior to admission, patient utilizing? no  Home Health: Prior to admission, patient utilizing? no Pt has used [facility] in the past  DME: Prior to admission, LVAD.  Pt's spouse assists with dressing changes.   Pulmonary/Cardiac Rehab: Prior to admission, no  Dialysis:  Prior to admission, no  Transplant Specialty Pharmacy:  Prior to admission, no.    Prior to admission, patient reports patient was independent with ADLS and was driving.  Patient reports patient is not able to care for self at this time due to compromised medical condition (as documented in medical record) and physical weakness..  Patient indicates a willingness to care for self once medically cleared to do so.    Insurance/Medications    Insured by   Payor/Plan Subscr  Sex Relation Sub. Ins. ID Effective Group Num   1. [place] PH[place] KULKARNI 1960 Male  [id number] 3/1[birth year]06                                    [address]   2. [place] KULKARNI 1960 Male  [id number]* 06                                    [address] [address]      Primary Insurance (for UNOS reporting): Public Insurance - Medicare FFS (Fee For Service)  Secondary Insurance (for UNOS reporting): Public Insurance - Medicaid    Patient reports patient is able to obtain and afford medications at this time and at time of discharge.    Living Will/Healthcare Power of     Patient states patient does not have a LW and/or HCPA.   provided education regarding LW and HCPA and the completion of forms.    Coping/Mental Health    Patient is coping adequately with the aid of  family members. .  Patient indicates mental health difficulties. Pt reports he has issues with depression and has been  taking Zoloft for over 14 years.  Pt reports no current issues with depression.  Pt's PCP prescribes Zoloft.  General support provided.     Discharge  Planning    At time of discharge, patient plans to return to patient's home under the care of spouse.  Patients family will transport patient.  Per rounds today, expected discharge date has not been medically determined at this time. Patient and patients caregiver  verbalize understanding and are involved in treatment planning and discharge process.    Additional Concerns    Patient's caretaker denies additional needs and/or concerns at this time. Patient is being followed for needs, education, resources, information, emotional support, supportive counseling, and for supportive and skilled discharge plan of care.  providing ongoing psychosocial support, education, resources and d/c planning as needed.  SW remains available.  remains available. Patient's caregiver verbalizes understanding and agreement with information reviewed,  availability and how to access available resources as needed. Patient denies additional needs and/or concerns at this time. Patient verbalizes understanding and agreement with information reviewed, social work availability, and how to access available resources as needed.   show

## 2023-05-30 ENCOUNTER — PATIENT MESSAGE (OUTPATIENT)
Dept: TRANSPLANT | Facility: CLINIC | Age: 63
End: 2023-05-30
Payer: MEDICARE

## 2023-06-02 ENCOUNTER — TELEPHONE (OUTPATIENT)
Dept: TRANSPLANT | Facility: CLINIC | Age: 63
End: 2023-06-02
Payer: MEDICARE

## 2023-06-02 NOTE — TELEPHONE ENCOUNTER
Sent message through [employer] and called to reach pt for lab work, phone numbers are not avaialable and no voice mail is set up.

## 2023-08-08 DIAGNOSIS — Z94.1 STATUS POST HEART TRANSPLANT: ICD-10-CM

## 2023-08-08 RX ORDER — LISINOPRIL 10 MG/1
10 TABLET ORAL
Qty: 90 TABLET | Refills: 3 | Status: SHIPPED | OUTPATIENT
Start: 2023-08-08

## 2023-08-15 DIAGNOSIS — D84.821 IMMUNODEFICIENCY DUE TO LONG TERM IMMUNOSUPPRESSIVE DRUG THERAPY: ICD-10-CM

## 2023-08-15 DIAGNOSIS — T45.1X5A IMMUNODEFICIENCY DUE TO LONG TERM IMMUNOSUPPRESSIVE DRUG THERAPY: ICD-10-CM

## 2023-08-15 DIAGNOSIS — Z79.899 IMMUNODEFICIENCY DUE TO LONG TERM IMMUNOSUPPRESSIVE DRUG THERAPY: ICD-10-CM

## 2023-08-15 RX ORDER — AMLODIPINE BESYLATE 5 MG/1
5 TABLET ORAL
Qty: 90 TABLET | Refills: 3 | Status: SHIPPED | OUTPATIENT
Start: 2023-08-15

## 2023-09-22 ENCOUNTER — TELEPHONE (OUTPATIENT)
Dept: TRANSPLANT | Facility: CLINIC | Age: 63
End: 2023-09-22
Payer: MEDICARE

## 2023-09-22 DIAGNOSIS — Z94.1 STATUS POST HEART TRANSPLANT: ICD-10-CM

## 2023-09-22 DIAGNOSIS — Z12.5 SCREENING FOR PROSTATE CANCER: ICD-10-CM

## 2023-09-22 DIAGNOSIS — R06.02 SHORTNESS OF BREATH: ICD-10-CM

## 2023-09-22 DIAGNOSIS — T86.20 COMPLICATION OF HEART TRANSPLANT, UNSPECIFIED COMPLICATION: Primary | ICD-10-CM

## 2023-09-22 DIAGNOSIS — Z79.899 ENCOUNTER FOR LONG-TERM (CURRENT) USE OF MEDICATIONS: ICD-10-CM

## 2023-09-22 DIAGNOSIS — Z79.52 LONG TERM CURRENT USE OF SYSTEMIC STEROIDS: ICD-10-CM

## 2023-09-22 DIAGNOSIS — E78.2 MIXED HYPERLIPIDEMIA: ICD-10-CM

## 2023-09-22 DIAGNOSIS — N28.9 RENAL INSUFFICIENCY: ICD-10-CM

## 2023-09-22 NOTE — TELEPHONE ENCOUNTER
New orders placed for annual, still no response regarding lab work drawn, 7 year post heart transplant due in Feb 2024.

## 2023-10-16 NOTE — SUBJECTIVE & OBJECTIVE
"Interval HPI:   Fasting Bg near goal. + prandial excursions. Snacking increasing BG. Yesterday: pudding between lunch and dinner, juice between dinner and HS. Slight decrease in steroid dosing to prednisone 25mg twice daily. No nausea or fever.   Son is bringing Kaleb to bedside today.     Visit Vitals    BP (!) 161/76 (BP Location: Right arm, Patient Position: Lying, BP Method: Automatic)    Pulse 94    Temp 98.2 °F (36.8 °C) (Oral)    Resp 16    Ht 5' 8" (1.727 m)    Wt 91.5 kg (201 lb 11.5 oz)    SpO2 97%    BMI 30.67 kg/m2       Labs Reviewed and Include      Recent Labs  Lab 02/17/17  0622   *   CALCIUM 8.6*   ALBUMIN 2.3*   PROT 4.7*      K 4.1   CO2 30*   CL 98   BUN 27*   CREATININE 0.8   ALKPHOS 59   ALT 19   AST 13   BILITOT 0.6     Lab Results   Component Value Date    WBC 10.45 02/17/2017    HGB 8.2 (L) 02/17/2017    HCT 25.5 (L) 02/17/2017    MCV 70 (L) 02/17/2017     02/16/2017     No results for input(s): TSH, FREET4 in the last 168 hours.  Lab Results   Component Value Date    HGBA1C 6.8 (H) 02/09/2017       Nutritional status:   Body mass index is 30.67 kg/(m^2).  Lab Results   Component Value Date    ALBUMIN 2.3 (L) 02/17/2017    ALBUMIN 2.5 (L) 02/16/2017    ALBUMIN 2.6 (L) 02/15/2017     Lab Results   Component Value Date    PREALBUMIN 19 (L) 02/08/2017    PREALBUMIN 14 (L) 02/06/2017    PREALBUMIN 14 (L) 02/03/2017       Estimated Creatinine Clearance: 115.9 mL/min (based on Cr of 0.8).    Accu-Checks  Recent Labs      02/15/17   0748  02/15/17   1157  02/15/17   1902  02/15/17   2308  02/16/17   0204  02/16/17   0800  02/16/17   1155  02/16/17   1712  02/16/17   2200  02/17/17   0202   POCTGLUCOSE  349*  340*  218*  119*  168*  279*  338*  312*  305*  144*       Current Medications and/or Treatments Impacting Glycemic Control  Immunotherapy:  Immunosuppressants     Start     Stop Route Frequency Ordered    02/13/17 2100  mycophenolate capsule 1,500 mg      -- Oral 2 " Tunneled HD catheter insertion today  Discussed procedure, risks, benefits with patient who agrees to proceed. times daily 02/13/17 0937    02/17/17 0800  tacrolimus capsule 1 mg      -- Oral Daily 02/16/17 1157    02/16/17 1800  tacrolimus capsule 2 mg      -- Oral Daily 02/16/17 1157        Steroids:   Hormones     Start     Stop Route Frequency Ordered    02/17/17 0900  predniSONE tablet 25 mg      -- Oral 2 times daily 02/16/17 1159        Pressors:    Autonomic Drugs     None        Hyperglycemia/Diabetes Medications: Antihyperglycemics     Start     Stop Route Frequency Ordered    02/12/17 1130  insulin regular (Humulin R) 100 Units in sodium chloride 0.9% 100 mL infusion      -- IV Continuous 02/12/17 1016    02/15/17 1645  insulin aspart pen 18 Units      -- SubQ 3 times daily with meals 02/15/17 1251    02/15/17 2030  insulin aspart pen 4 Units      -- SubQ As needed (PRN) 02/15/17 1253    02/15/17 1418  insulin aspart pen 0-10 Units      -- SubQ As needed (PRN) 02/15/17 1318
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
## 2023-12-01 ENCOUNTER — PATIENT MESSAGE (OUTPATIENT)
Dept: TRANSPLANT | Facility: CLINIC | Age: 63
End: 2023-12-01
Payer: MEDICARE

## 2023-12-15 ENCOUNTER — TELEPHONE (OUTPATIENT)
Dept: UROLOGY | Facility: CLINIC | Age: 63
End: 2023-12-15
Payer: MEDICARE

## 2023-12-15 DIAGNOSIS — R97.20 ELEVATED PSA: Primary | ICD-10-CM

## 2023-12-15 DIAGNOSIS — R35.1 NOCTURIA: ICD-10-CM

## 2023-12-15 RX ORDER — VIBEGRON 75 MG/1
75 TABLET, FILM COATED ORAL DAILY
Qty: 30 TABLET | Refills: 11 | Status: SHIPPED | OUTPATIENT
Start: 2023-12-15 | End: 2024-12-14

## 2023-12-15 NOTE — TELEPHONE ENCOUNTER
Refill request received. Refills provided. He needs f/u appt with me with PSA as well. PSA ordered. Routine appt, no overbook.

## 2024-01-02 NOTE — Clinical Note
Biopsy device collected a biopsy of the RV under echocardiographic guidance. Biopsy sample count is 5. Chief Complaint   Patient presents with    Headache     X 2 days.  Leye pressure and twitching.       HPI       44 year old female presents to the Emergency Department today complaining of a 24-48 hour history of a left sided headache that she describes as throbbing in nature, constant, starts behind her left eye and radiates to the back of her head and neck, and varies in intensity. Notes that she had prodromal left eye twitching. The headache is not the first or worst of their life. Not of sudden onset or thunderclap in nature. Reports to having nausea and vomiting associated with the above. Denies any associated fever, chills, chest pain, shortness of breath, abdominal pain, diarrhea, constipation, or urinary symptoms. States that this headache feels similar to her prior migraines. Took Excedrin migraine without relief, but did not take her other migraine medication.       History provided by:  Patient             Patient History   No past medical history on file.  No past surgical history on file.  No family history on file.  Social History     Tobacco Use    Smoking status: Never    Smokeless tobacco: Not on file   Substance Use Topics    Alcohol use: Never    Drug use: Never           Physical Exam  Constitutional:       Appearance: Normal appearance.   HENT:      Head: Normocephalic.      Right Ear: Tympanic membrane, ear canal and external ear normal.      Left Ear: Tympanic membrane, ear canal and external ear normal.      Nose: Nose normal.      Mouth/Throat:      Mouth: Mucous membranes are moist.      Pharynx: Oropharynx is clear. No oropharyngeal exudate or posterior oropharyngeal erythema.   Eyes:      Conjunctiva/sclera: Conjunctivae normal.      Pupils: Pupils are equal, round, and reactive to light.   Cardiovascular:      Rate and Rhythm: Normal rate and regular rhythm.      Pulses:           Radial pulses are 3+ on the right side and 3+ on the left side.        Dorsalis pedis pulses are 3+ on the  right side and 3+ on the left side.      Heart sounds: Normal heart sounds. No murmur heard.     No friction rub. No gallop.   Pulmonary:      Effort: Pulmonary effort is normal. No respiratory distress.      Breath sounds: Normal breath sounds. No wheezing, rhonchi or rales.   Abdominal:      General: Abdomen is flat. Bowel sounds are normal.      Palpations: Abdomen is soft.      Tenderness: There is no abdominal tenderness. There is no right CVA tenderness, left CVA tenderness, guarding or rebound. Negative signs include Diaz's sign and McBurney's sign.   Musculoskeletal:         General: No swelling or deformity.      Cervical back: Full passive range of motion without pain.      Right lower leg: No edema.      Left lower leg: No edema.   Lymphadenopathy:      Cervical: No cervical adenopathy.   Skin:     Capillary Refill: Capillary refill takes less than 2 seconds.      Coloration: Skin is not jaundiced.      Findings: No rash.   Neurological:      General: No focal deficit present.      Mental Status: She is alert and oriented to person, place, and time. Mental status is at baseline.      Gait: Gait is intact.   Psychiatric:         Mood and Affect: Mood normal.         Behavior: Behavior is cooperative.       Labs Reviewed - No data to display    No orders to display            ED Course & MDM            Medical Decision Making  Patient was seen and evaluated by myself. Urine pregnancy was negative. Saline lock was established. Given 1 Liter of normal saline wide open over 1 hour. Given Compazine, Benadryl, and Toradol as well. After receiving the medications and IV fluids, reported feeling improved. Exhibits no signs of subarachnoid hemorrhage or meningitis. I feel that this is an exacerbation of her chronic migraine headaches. Continue her migraine medication as prescribed. Follow up with their doctor in 3 days. Return if worse in any way. Discharged in stable condition with computer  instructions.    Diagnostic Impression:     1. Acute cephalgia of typical migraine    2. IV meds and fluids in ED            Your medication list      You have not been prescribed any medications.           Procedure  Procedures     Harlan Lawler, TY-STEVO  01/02/24 0931

## 2024-01-12 DIAGNOSIS — Z94.1 HEART TRANSPLANT, ORTHOTOPIC, STATUS: ICD-10-CM

## 2024-01-12 RX ORDER — SERTRALINE HYDROCHLORIDE 100 MG/1
100 TABLET, FILM COATED ORAL
Qty: 90 TABLET | Refills: 3 | Status: SHIPPED | OUTPATIENT
Start: 2024-01-12

## 2024-02-09 DIAGNOSIS — D84.821 IMMUNODEFICIENCY DUE TO LONG TERM IMMUNOSUPPRESSIVE DRUG THERAPY: ICD-10-CM

## 2024-02-09 DIAGNOSIS — Z94.1 HEART TRANSPLANT, ORTHOTOPIC, STATUS: ICD-10-CM

## 2024-02-09 DIAGNOSIS — T45.1X5A IMMUNODEFICIENCY DUE TO LONG TERM IMMUNOSUPPRESSIVE DRUG THERAPY: ICD-10-CM

## 2024-02-09 DIAGNOSIS — Z79.899 IMMUNODEFICIENCY DUE TO LONG TERM IMMUNOSUPPRESSIVE DRUG THERAPY: ICD-10-CM

## 2024-02-09 RX ORDER — TACROLIMUS 0.5 MG/1
0.5 CAPSULE ORAL EVERY 12 HOURS
Qty: 180 CAPSULE | Refills: 3 | Status: SHIPPED | OUTPATIENT
Start: 2024-02-09 | End: 2024-02-12

## 2024-02-11 DIAGNOSIS — Z79.899 IMMUNODEFICIENCY DUE TO LONG TERM IMMUNOSUPPRESSIVE DRUG THERAPY: ICD-10-CM

## 2024-02-11 DIAGNOSIS — T45.1X5A IMMUNODEFICIENCY DUE TO LONG TERM IMMUNOSUPPRESSIVE DRUG THERAPY: ICD-10-CM

## 2024-02-11 DIAGNOSIS — D84.821 IMMUNODEFICIENCY DUE TO LONG TERM IMMUNOSUPPRESSIVE DRUG THERAPY: ICD-10-CM

## 2024-02-11 DIAGNOSIS — Z94.1 HEART TRANSPLANT, ORTHOTOPIC, STATUS: ICD-10-CM

## 2024-02-12 RX ORDER — TACROLIMUS 0.5 MG/1
0.5 CAPSULE ORAL EVERY 12 HOURS
Qty: 180 CAPSULE | Refills: 3 | Status: SHIPPED | OUTPATIENT
Start: 2024-02-12

## 2024-02-12 RX ORDER — MYCOPHENOLATE MOFETIL 250 MG/1
CAPSULE ORAL
Qty: 720 CAPSULE | Refills: 3 | Status: SHIPPED | OUTPATIENT
Start: 2024-02-12

## 2024-02-20 ENCOUNTER — TELEPHONE (OUTPATIENT)
Dept: TRANSPLANT | Facility: CLINIC | Age: 64
End: 2024-02-20
Payer: MEDICARE

## 2024-02-20 NOTE — TELEPHONE ENCOUNTER
Unable to contact pt, phone number is out of service and he has not answered my messages from Dec 2023. I have switched his transplant to not Followed unable to contact pt.

## 2024-03-26 ENCOUNTER — TELEPHONE (OUTPATIENT)
Dept: TRANSPLANT | Facility: CLINIC | Age: 64
End: 2024-03-26
Payer: MEDICARE

## 2024-03-26 NOTE — TELEPHONE ENCOUNTER
Called listed numbers and no service on phone. Letter sent to pt's home address. And through [facility].

## 2024-03-26 NOTE — LETTER
March 26, 2024    Mick Barriga  [address]  [address]  [place] [address]          Dear Mr. Mick Barriga,    We have not heard from you and your phone number listed is no longer in service.  Please reach out to us and let us know you are ok. You are current medication Tacrolimus needs to be monitor to ensure your safety with taking it. We Miss hearing from you and think of you often.     If you have any questions or concerns, please don't hesitate to call [phone] or contact us via your [facility] account.        Sincerely,      Mckenzie Lawrence RN, BSN  Post Heart Transplant Coordinator  [facility]  [facility] [facility] - [address] [place] [address]  [phone]

## 2024-05-21 NOTE — PROGRESS NOTES
RN spoke to Dr. Gagnon re slight swelling from drain removal site and pt requests to have MD at bedside. MD will see pt tomorrow. No new orders. Will keep leg elevated and continue to monitor.    yes

## 2024-07-03 NOTE — TELEPHONE ENCOUNTER
Spoke to pt and scheduled him to see Dr. Palomino next weds.  Appt reminder mailed to pts home.   The patient is a 6y7m Male complaining of fever.

## 2024-07-29 DIAGNOSIS — Z94.1 STATUS POST HEART TRANSPLANT: ICD-10-CM

## 2024-07-30 RX ORDER — LISINOPRIL 10 MG/1
10 TABLET ORAL
Qty: 90 TABLET | Refills: 3 | Status: SHIPPED | OUTPATIENT
Start: 2024-07-30

## 2024-10-25 DIAGNOSIS — T45.1X5A IMMUNODEFICIENCY DUE TO LONG TERM IMMUNOSUPPRESSIVE DRUG THERAPY: ICD-10-CM

## 2024-10-25 DIAGNOSIS — D84.821 IMMUNODEFICIENCY DUE TO LONG TERM IMMUNOSUPPRESSIVE DRUG THERAPY: ICD-10-CM

## 2024-10-25 DIAGNOSIS — Z79.899 IMMUNODEFICIENCY DUE TO LONG TERM IMMUNOSUPPRESSIVE DRUG THERAPY: ICD-10-CM

## 2024-10-25 RX ORDER — AMLODIPINE BESYLATE 5 MG/1
5 TABLET ORAL
Qty: 90 TABLET | Refills: 3 | Status: SHIPPED | OUTPATIENT
Start: 2024-10-25

## 2024-12-19 ENCOUNTER — TELEPHONE (OUTPATIENT)
Dept: UROLOGY | Facility: CLINIC | Age: 64
End: 2024-12-19
Payer: MEDICARE

## 2024-12-20 ENCOUNTER — TELEPHONE (OUTPATIENT)
Dept: UROLOGY | Facility: CLINIC | Age: 64
End: 2024-12-20
Payer: MEDICARE

## 2024-12-20 RX ORDER — VIBEGRON 75 MG/1
1 TABLET, FILM COATED ORAL
Qty: 30 TABLET | Refills: 11 | OUTPATIENT
Start: 2024-12-20

## 2024-12-20 NOTE — TELEPHONE ENCOUNTER
Last seen almost 2 years ago. Requesting refills. Multiple no shows previously when due for f/u. Needs routine f/u for future refills.

## 2025-04-18 NOTE — PROGRESS NOTES
Progress Note  Heart Transplant Service    Admit Date: 3/28/2017   LOS: 6 days     SUBJECTIVE:     Follow up for: Lymphocele after surgical procedure    Interval History: Patient still reports pain to his surgical site. Also complaining of constipation and requesting a suppository. Reports his DLES wound has been draining more frequently, no odor noted.     Scheduled Meds:   amlodipine  10 mg Oral Daily    ampicillin continuous infusion  4,000 mg Intravenous Q8H    ergocalciferol  50,000 Units Oral Q7 Days    ferrous gluconate  324 mg Oral Daily with breakfast    furosemide  40 mg Oral BID    heparin (porcine)  5,000 Units Subcutaneous Q8H    insulin aspart  15 Units Subcutaneous TIDWM    insulin glargine  20 Units Subcutaneous Daily    magnesium oxide  800 mg Oral TID    magnesium sulfate IVPB  2 g Intravenous Once    mycophenolate  1,000 mg Oral BID    oxycodone  20 mg Oral Q12H    pantoprazole  40 mg Oral Daily    pravastatin  40 mg Oral Daily    predniSONE  5 mg Oral Daily    senna-docusate 8.6-50 mg  2 tablet Oral BID    sulfamethoxazole-trimethoprim 400-80mg  1 tablet Oral Daily    tacrolimus  1 mg Oral BID    valganciclovir  900 mg Oral Daily     Continuous Infusions:   sodium chloride 0.9%       PRN Meds:bisacodyl, dextrose 50%, dextrose 50%, glucagon (human recombinant), glucose, glucose, hydrocodone-acetaminophen 7.5-325mg, HYDROmorphone, insulin aspart, naloxone    Immunosuppressants     Start     Stop Route Frequency Ordered    03/28/17 2100  mycophenolate capsule 1,000 mg      -- Oral 2 times daily 03/28/17 1634    03/31/17 1045  tacrolimus capsule 1 mg      -- Oral 2 times daily 03/31/17 0941          Review of patient's allergies indicates:   Allergen Reactions    Levemir [insulin detemir] Itching    Pork/porcine containing products     Ranexa [ranolazine] Nausea Only       OBJECTIVE:     Vital Signs (Most Recent)  Temp: 98.1 °F (36.7 °C) (04/03/17 1230)  Pulse: 87 (04/03/17  1230)  Resp: 14 (04/03/17 1230)  BP: 127/67 (04/03/17 1230)  SpO2: 96 % (04/03/17 1230)    Vital Signs Range (Last 24H):  Temp:  [98.1 °F (36.7 °C)-98.4 °F (36.9 °C)]   Pulse:  [82-95]   Resp:  [14-18]   BP: (127-149)/(62-76)   SpO2:  [95 %-97 %]     I & O (Last 24H):  Intake/Output Summary (Last 24 hours) at 04/03/17 1344  Last data filed at 04/03/17 0600   Gross per 24 hour   Intake              500 ml   Output             1180 ml   Net             -680 ml            Telemetry: 86 bpm NSR    Physical Exam   Constitutional: He is oriented to person, place, and time. He appears well-developed and well-nourished.   HENT:   Head: Normocephalic and atraumatic.   Neck: Normal range of motion. Neck supple. No JVD present.   Cardiovascular: Normal rate and regular rhythm.    Pulmonary/Chest: Effort normal and breath sounds normal.   Abdominal: Soft. Bowel sounds are normal. He exhibits no distension. There is no tenderness.   Musculoskeletal: Normal range of motion. He exhibits no edema.   Neurological: He is alert and oriented to person, place, and time.   Skin: Skin is warm and dry.        Psychiatric: He has a normal mood and affect. His behavior is normal. Judgment and thought content normal.       Labs:       Recent Labs  Lab 04/01/17  0614 04/02/17  0459 04/03/17  0456   WBC 12.75* 9.21 7.86   HGB 10.4* 9.6* 9.5*   HCT 33.1* 30.4* 29.6*    258 248   LYMPH 14.2*  1.8 12.2*  1.1 20.0  1.6   MONO 4.8  0.6 4.8  0.4 5.3  0.4   EOSINOPHIL 0.4 0.7 1.9       No results for input(s): APTT, INR, PTT in the last 168 hours.     Recent Labs  Lab 04/01/17  0615 04/02/17  0459 04/03/17  0456   * 232* 188*   CALCIUM 8.8 8.5* 8.7   ALBUMIN 2.8* 2.4* 2.4*   PROT 5.8* 5.4* 5.5*    137 136   K 4.0 4.4 4.3   CO2 31* 29 27   CL 99 100 101   BUN 14 14 12   CREATININE 0.8 0.8 0.8   ALKPHOS 70 63 56   ALT 17 11 11   AST 14 11 11   BILITOT 0.6 0.4 0.2   MG 1.4* 1.4* 1.3*     Estimated Creatinine Clearance: 117.2  mL/min (based on Cr of 0.8).    No results for input(s): BNP, BNPTRIAGEBLO in the last 168 hours.    No results for input(s): LDH in the last 168 hours.    Microbiology Results (last 7 days)     Procedure Component Value Units Date/Time    Culture, Anaerobe [[id number]] Collected:  03/30/17 1414    Order Status:  Completed Specimen:  Wound from Groin Updated:  04/03/17 1328     Anaerobic Culture Culture in progress    Narrative:       Groin wound    Fungus culture [[id number]] Collected:  03/30/17 1414    Order Status:  Completed Specimen:  Wound from Groin Updated:  04/03/17 0940     Fungus (Mycology) Culture Culture in progress    Narrative:       Groin wound    Aerobic culture [[id number]] Collected:  04/02/17 1521    Order Status:  Completed Specimen:  Wound from Abdomen Updated:  04/03/17 0713     Aerobic Bacterial Culture No growth    Blood culture [[id number]] Collected:  03/28/17 1650    Order Status:  Completed Specimen:  Blood Updated:  04/02/17 2012     Blood Culture, Routine No growth after 5 days.    Blood culture [[id number]] Collected:  03/28/17 1651    Order Status:  Completed Specimen:  Blood Updated:  04/02/17 2012     Blood Culture, Routine No growth after 5 days.    Aerobic culture [[id number]]  (Susceptibility) Collected:  03/30/17 1414    Order Status:  Completed Specimen:  Wound from Groin Updated:  04/02/17 0707     Aerobic Bacterial Culture --     ENTEROCOCCUS FAECALIS  Few      Narrative:       Groin wound    Aerobic culture [[id number]]  (Susceptibility) Collected:  03/28/17 1732    Order Status:  Completed Specimen:  Skin from Groin Updated:  03/31/17 1321     Aerobic Bacterial Culture --     ENTEROCOCCUS FAECALIS  Moderate       Aerobic Bacterial Culture --     STREPTOCOCCUS MITIS/ORALIS   Moderate      Narrative:       Culture lymphocele site    Gram stain [[id number]] Collected:  03/30/17 1414    Order Status:  Completed Specimen:  Wound from Groin Updated:  03/30/17 2203     Gram Stain Result  Few WBC's      Few Gram positive cocci    Narrative:       Groin wound            ASSESSMENT:     57 yo M with PMHx of NICM s/p HM II (8/24/16), now s/p OHTx 2/9/17 who is admitted secondary to lymphocele foul smell/draining more.     PLAN:     S/P Orthotopic Heart Transplantation 2/9/17  -CMV D-/R+ Continue valcyte  immunosuppression with  mg BID, Tacro 1/1, Pred 5 mg QD    Right Groin Lymphocele  S/P I &D R groin & Rectus Femoris Muscle Flap   -Wound vac placed  -Wound care following  -Wound cultures grew Enterococcus Faecalis sensitive to ampicillin  -ID consulted & following. Await their recommendations for PO options, will continue ampicillin for now  -Pain control post-operatively has been difficult. D/C PCA pump. Will transition to intermittent dilaudid for severe breakthrough pain and continue PO morphine.   -DARIN drain per surgery    HTN  -Continue amlodipine 10 mg    DM2  -Continue insulin aspart, insulin glargine    HLD  -Continue pravastatin    DLES Drainage  -Wound care following  -Await their recommendations    Constipation  -Patient reports he is constipated and is worsening his post-operative pain  -Requests suppository which has worked well for him in the past. Will order dulcolax suppository.    Dispo  -Pending pain control, wound vac set up for home, PO Abx. Likely towards the end of the week    Neelima Navarrete PA-C  [facility] Comic Reply #[id number]         Treatment Goal Explanation (Does Not Render In The Note): Stable for the purposes of categorizing medical decision making is defined by the specific treatment goals for an individual patient. A patient that is not at their treatment goal is not stable, even if the condition has not changed and there is no short- term threat to life or function. Treatment Goal Met?: no

## 2025-07-28 NOTE — PROGRESS NOTES
"Subjective:   Mr. Barriga is a 57 y.o. year old White male who received a  - brain death heart transplant on 17.      CMV status:   Donor:  neg  Recipient: post     HPI   PMHx of NICM s/p HM II (16), HLD, HTN, VT s/p ICD now s/p OHTx 17 who's post op course complicated by bradycardia (started on terbutaline) as well as right groin infected seroma (s/p I&D in OR during admission from -04/10). Plastics then performed a right femoris muscle flap and he had pain-control issues following that. However, since that time he has done well   Was admitted in late 2018 for dizziness which resolved without intervention.  No cause could be identified to symptoms    Today, feels well Reports that he has a family emergency and need to go to [facility] next week Has not gotten his annual angiogram yet.   Immuno Tacro 0.5/0.5, Cellcept 1000 mg BID, off prednisone     Review of Systems   Constitution: Negative for decreased appetite, weight gain and weight loss.   Cardiovascular: Negative for chest pain, dyspnea on exertion, leg swelling, near-syncope, orthopnea and palpitations.   Respiratory: Negative for cough and shortness of breath.    Musculoskeletal: Negative for myalgias.   Gastrointestinal: Negative for jaundice.       Objective:     Physical Exam   Constitutional: He is oriented to person, place, and time. He appears well-developed and well-nourished. He is active. He is not intubated.   BP (!) 156/78 (BP Location: Right arm, Patient Position: Sitting, BP Method: Large (Automatic))   Pulse (!) 116   Ht 5' 8" (1.727 m)   Wt 102 kg (224 lb 13.9 oz)   BMI 34.19 kg/m²      HENT:   Head: Normocephalic and atraumatic. Hair is normal.   Right Ear: External ear normal.   Left Ear: External ear normal.   Nose: Nose normal. No nasal deformity. No epistaxis.  No foreign bodies.   Mouth/Throat: Mucous membranes are normal. Mucous membranes are not cyanotic. No oropharyngeal exudate.   Eyes: " 4 Eyes Skin Assessment     NAME:  Zee Bhardwaj  YOB: 1955  MEDICAL RECORD NUMBER:  [id number]    The patient is being assessed for  Admission    I agree that at least one RN has performed a thorough Head to Toe Skin Assessment on the patient. ALL assessment sites listed below have been assessed.      Areas assessed by both nurses:    Head, Face, Ears, Shoulders, Back, Chest, Arms, Elbows, Hands, and Legs. Feet and Heels        Does the Patient have a Wound? No noted wound(s)       Praveen Prevention initiated by RN: No  Wound Care Orders initiated by RN: No    For hospital-acquired stage 1 & 2 and ALL Stage 3,4, Unstageable, DTI, NWPT, and Complex wounds: place order “IP Wound Care/Ostomy Nurse Eval and Treat” by RN under : No    New Ostomies, if present place, Ostomy referral order under : No     Nurse 1 eSignature: Electronically signed by Tequila Alba RN on 7/27/25 at 11:15 PM EDT    **SHARE this note so that the co-signing nurse can place an eSignature**    Nurse 2 eSignature: Electronically signed by Kayla Coulter RN on 7/27/25 at 11:34 PM EDT     Conjunctivae and EOM are normal. Pupils are equal, round, and reactive to light.   Neck: Neck supple. No hepatojugular reflux and no JVD present.   Cardiovascular: Normal rate, regular rhythm, normal heart sounds and normal pulses.  Exam reveals no gallop.    Pulmonary/Chest: Effort normal and breath sounds normal. No apnea and no tachypnea. He is not intubated. No respiratory distress. He exhibits no tenderness.   Abdominal: Soft. Normal appearance and bowel sounds are normal. There is no tenderness. No hernia.   Musculoskeletal: Normal range of motion.   Neurological: He is alert and oriented to person, place, and time. He displays no seizure activity.   Skin: Skin is warm, dry and intact. No rash noted. No pallor.   Psychiatric: He has a normal mood and affect. His speech is normal and behavior is normal. Thought content normal. Cognition and memory are normal.       Lab Results   Component Value Date    WBC 7.39 04/17/2018    HGB 10.7 (L) 04/17/2018    HCT 35.7 (L) 04/17/2018    MCV 61 (L) 04/17/2018     04/17/2018    CO2 30 (H) 04/17/2018    CREATININE 0.9 04/17/2018    CALCIUM 9.5 04/17/2018    ALBUMIN 3.8 04/17/2018    AST 17 04/17/2018    BNP 46 04/17/2018    ALT 15 04/17/2018    ALLOMAP See result image under hyperlink 01/25/2018       Lab Results   Component Value Date    INR 1.0 03/22/2018    INR 1.0 11/26/2017    INR 1.6 (H) 02/15/2017       BNP   Date Value Ref Range Status   04/17/2018 46 0 - 99 pg/mL Final     Comment:     Values of less than 100 pg/ml are consistent with non-CHF populations.   03/22/2018 81 0 - 99 pg/mL Final     Comment:     Values of less than 100 pg/ml are consistent with non-CHF populations.   03/19/2018 35 0 - 99 pg/mL Final     Comment:     Values of less than 100 pg/ml are consistent with non-CHF populations.       Tacrolimus Lvl   Date Value Ref Range Status   03/24/2018 8.9 5.0 - 15.0 ng/mL Final     Comment:     Testing performed by Liquid Chromatography-Tandem  Mass  Spectrometry (LC-MS/MS).  This test was developed and its performance characteristics  determined by [facility], Department of Pathology  and Laboratory Medicine in a manner consistent with CLIA  requirements. It has not been cleared or approved by the [place]  Food and Drug Administration.  This test is used for clinical   purposes.  It should not be regarded as investigational or for  research.           Assessment:     1. Heart transplant, orthotopic, status    2. Status post heart transplant    3. Hypomagnesemia    4. Essential hypertension        Plan:     HTN Continue with Norvasc and ACE-I for now  May need to increase either next visit once angiogram / travel done (has issues with dizziness so I wanted to wait a little longer)  POST transplant will get annual angiogram without biopsy this week prior to travel    On lower dose cellcept due to white count / nausea  [place] rechallenging      Return instructions as set forth by post transplant schedule or as needed:    Clinic: Return for labs and/or biopsy weekly the first month, every two weeks during month 2 and then monthly for the first year at the provider or coordinator's discretion. During the second year, return to clinic every 3 months. Post transplant year 3-5 return every 6 months. There will be a comprehensive post transplant evaluation every year that may include LHC/RHC/biopsy, stress test, echo, CXR, and other health screening exams.    In addition to the clinical assessment, I have ordered Allomap testing for this patient to assist in identification of moderate/severe acute cellular rejection (ACR) in a pt with stable Allograft function instead of endomyocardial biopsy.     Patient is reminded to call with any health changes as these can be early signs of transplant complications. Patient is advised to make sure any new medications or changes of old medications are discussed with a pharmacist or physician knowledgeable with transplant to  avoid rejection/drug toxicity related to significant drug interactions.    UNOS Patient Status  Functional Status: 80% - Normal activity with effort: some symptoms of disease  Physical Capacity: No Limitations  Working for Income: Unknown    Trixie Marx MD

## (undated) DEVICE — WIRE GUIDE SAFE-T-J .035 260CM

## (undated) DEVICE — SUT VICRYL CT-1 2-0 27IN

## (undated) DEVICE — KIT GLIDESHEATH SLEND 6FR 10CM

## (undated) DEVICE — SKINMARKER & RULER REGULAR X-F

## (undated) DEVICE — DRAIN CHANNEL ROUND 19FR

## (undated) DEVICE — SUT PROLENE 4-0 SH BLU 36IN

## (undated) DEVICE — KIT CO-PILOT

## (undated) DEVICE — ADHESIVE MASTISOL VIAL 48/BX

## (undated) DEVICE — SPONGE LAP 18X18 PREWASHED

## (undated) DEVICE — TAPE MEDIPORE 4IN X 2YDS

## (undated) DEVICE — SEE MEDLINE ITEM 154981

## (undated) DEVICE — Device

## (undated) DEVICE — WIRE PACING WHITE

## (undated) DEVICE — DRAIN CHEST DRY SUCTION

## (undated) DEVICE — GAUZE SPONGE 4X4 12PLY

## (undated) DEVICE — TRAY HEART

## (undated) DEVICE — INSERTS STEALTH FIBRA SIZE 5

## (undated) DEVICE — CLOSURE SKIN STERI STRIP 1/2X4

## (undated) DEVICE — DRESSING TELFA STRL 4X3 LF

## (undated) DEVICE — SUT PROLENE 4-0 RB-1 BL MO

## (undated) DEVICE — SET DECANTER MEDICHOICE

## (undated) DEVICE — SYS CLSR DERMABOND PRINEO 22CM

## (undated) DEVICE — GUIDE LAUNCHER 6FR EBU 3.5

## (undated) DEVICE — NDL HYPO REG 25G X 1 1/2

## (undated) DEVICE — DRAPE SPLIT STERILE

## (undated) DEVICE — GOWN SURGICAL X-LARGE

## (undated) DEVICE — DRESSING ABSRBNT ISLAND 3.6X8

## (undated) DEVICE — PLEDGET SUT SOFT 3/8X3/16X1/16

## (undated) DEVICE — BLADE SURG CARBON STEEL #10

## (undated) DEVICE — HEMOSTAT VASC BAND REG 24CM

## (undated) DEVICE — SPONGE GAUZE 16PLY 4X4

## (undated) DEVICE — WIRE BENTSON 035/180

## (undated) DEVICE — SUT SILK 2-0 SH 18IN BLACK

## (undated) DEVICE — DRESSING ADH ISLAND 3.6 X 14

## (undated) DEVICE — DRESSING TRANS 4X4 TEGADERM

## (undated) DEVICE — SEE MEDLINE ITEM 156894

## (undated) DEVICE — SUT MCRYL PLUS 4-0 PS2 27IN

## (undated) DEVICE — SUT MONOCRYL 3-0 PS-2 UND

## (undated) DEVICE — DRESSING VERSA-FOAM W/O TUBE

## (undated) DEVICE — LOOP VESSEL BLUE MAXI

## (undated) DEVICE — DRESSING SPONGE 8PLY 4X4 STRL

## (undated) DEVICE — OMNIPAQUE 350 200ML

## (undated) DEVICE — PACKING STRIP IDOFORM 1X5YD

## (undated) DEVICE — BLADE HALL STERILE

## (undated) DEVICE — SEE MEDLINE ITEM 157187

## (undated) DEVICE — SPIKE CONTRAST CONTROLLER

## (undated) DEVICE — ELECTRODE REM PLYHSV RETURN 9

## (undated) DEVICE — SUT SILK BLK BR. 2 2-60

## (undated) DEVICE — DRAPE SLUSH WARMER WITH DISC

## (undated) DEVICE — SEE MEDLINE ITEM 157128

## (undated) DEVICE — CATH JACKY RADIAL 5FR 100CM

## (undated) DEVICE — PROBE CATH TEMP 16 FRFOLEY 400

## (undated) DEVICE — FORCEP BIOPSY 50CM

## (undated) DEVICE — SUT 6 18IN STEEL MONO CCS

## (undated) DEVICE — BLADE SAW STERNAL 5/BX

## (undated) DEVICE — SHEATH INTRODUCER 7FR 11CM

## (undated) DEVICE — CATH EAGLE EYE PLATINUM

## (undated) DEVICE — BLADE SURG #15 CARBON STEEL

## (undated) DEVICE — SEE MEDLINE ITEM 146417

## (undated) DEVICE — GUIDE LAUNCHER 6FR JL 4.0

## (undated) DEVICE — KIT PROBE COVER WITH GEL

## (undated) DEVICE — SUT 2/0 30IN ETHIBOND

## (undated) DEVICE — SUT BONE WAX 2.5 GRMS 12/BX

## (undated) DEVICE — WIRE INTRAMYOCARDIAL TEMP

## (undated) DEVICE — CATH INFINITI JUDKINS JR4

## (undated) DEVICE — SUT VICRYL BR 1 GEN 27 CT-1

## (undated) DEVICE — KIT CUSTOM MANIFOLD

## (undated) DEVICE — TRAY MINOR GEN SURG

## (undated) DEVICE — KIT SAHARA DRAPE DRAW/LIFT

## (undated) DEVICE — PACK UNIVERSAL SPLIT II

## (undated) DEVICE — RETRACTOR OCTOBASE INSERT HOLD

## (undated) DEVICE — CANNULA VESSEL FREE FLOW

## (undated) DEVICE — SUT 3/0 48IN PROLENE BL MO

## (undated) DEVICE — DRESSING INFOVAC MED RND BLK

## (undated) DEVICE — DRESSING AQUACEL SACRAL 9 X 9

## (undated) DEVICE — KIT MICROINTRODUCE MINI 5X10CM

## (undated) DEVICE — TRAY CATH UM FOLEY SIL W 16FR

## (undated) DEVICE — GUIDE WIRE BMW 014 X190

## (undated) DEVICE — SUT PROLENE 5-0 24 C-1 BL